# Patient Record
Sex: MALE | Race: BLACK OR AFRICAN AMERICAN | NOT HISPANIC OR LATINO | Employment: OTHER | URBAN - METROPOLITAN AREA
[De-identification: names, ages, dates, MRNs, and addresses within clinical notes are randomized per-mention and may not be internally consistent; named-entity substitution may affect disease eponyms.]

---

## 2017-01-03 ENCOUNTER — OFFICE VISIT (OUTPATIENT)
Dept: NEPHROLOGY | Facility: CLINIC | Age: 62
End: 2017-01-03
Payer: COMMERCIAL

## 2017-01-03 VITALS
DIASTOLIC BLOOD PRESSURE: 89 MMHG | WEIGHT: 266.56 LBS | SYSTOLIC BLOOD PRESSURE: 133 MMHG | BODY MASS INDEX: 34.21 KG/M2 | HEART RATE: 104 BPM | HEIGHT: 74 IN

## 2017-01-03 DIAGNOSIS — N18.4 CHRONIC KIDNEY DISEASE (CKD), STAGE IV (SEVERE): Primary | ICD-10-CM

## 2017-01-03 PROCEDURE — 99999 PR PBB SHADOW E&M-EST. PATIENT-LVL III: CPT | Mod: PBBFAC,,, | Performed by: INTERNAL MEDICINE

## 2017-01-03 PROCEDURE — 3075F SYST BP GE 130 - 139MM HG: CPT | Mod: S$GLB,,, | Performed by: INTERNAL MEDICINE

## 2017-01-03 PROCEDURE — 1159F MED LIST DOCD IN RCRD: CPT | Mod: S$GLB,,, | Performed by: INTERNAL MEDICINE

## 2017-01-03 PROCEDURE — 3079F DIAST BP 80-89 MM HG: CPT | Mod: S$GLB,,, | Performed by: INTERNAL MEDICINE

## 2017-01-03 PROCEDURE — 99214 OFFICE O/P EST MOD 30 MIN: CPT | Mod: S$GLB,,, | Performed by: INTERNAL MEDICINE

## 2017-01-03 NOTE — PATIENT INSTRUCTIONS
Avoid NSAID pain medications such as advil, aleve, motrin, ibuprofen, naprosyn, meloxicam, diclofenac, mobic.

## 2017-01-03 NOTE — PROGRESS NOTES
"Subjective:       Patient ID: Isidro Alves is a 61 y.o.   male who presents for follow-up evaluation of CKD stage 4 , HTN, DM-2, SHPT        Endocrine - Dr Woodson from  Clinic     Steven Morgan MD      HPI: Isidro lAves Is a pleasant 61-year-old  gentleman seen in office today in f/u for above medical problems. I saw him about 3 months ago. He has chronic kidney diseased stage 4 . Recent serum creatinine is 3.5 mg/dL with a GFR of about 25 ml/mt. he has long-standing history of hypertension and diabetes for more than 10 years. Nonadherence with his blood pressure medications is a big issue with him in the past. Patient says he has been more adherent with his medications in the recent past. Improvement in blood pressures noted. Also has been exercising regularly and trying to watch salt intake.  recent PSA levels were elevated and is currently following with urology. Recent labs discussed with patient,        Past Medical History   Diagnosis Date    Anemia 4/16/2014    CKD (chronic kidney disease) stage 4, GFR 15-29 ml/min     Diabetes mellitus     Hypertension     Secondary hyperparathyroidism (of renal origin)     UTI (lower urinary tract infection) 5/1/2015         Current Outpatient Prescriptions on File Prior to Visit   Medication Sig Dispense Refill    acyclovir (ZOVIRAX) 400 MG tablet TAKE 1 TABLET 3 TIMES DAILY. 30 tablet 6    albuterol 90 mcg/actuation inhaler Inhale 2 puffs into the lungs every 6 (six) hours as needed for Wheezing. 1 each 1    BD INSULIN SYRINGE HALF UNIT 0.3 mL 31 x 5/16" Syrg       blood sugar diagnostic (ONETOUCH ULTRA TEST) Strp 1 strip by Misc.(Non-Drug; Combo Route) route 3 (three) times daily. 100 each 2    esomeprazole (NEXIUM) 40 MG capsule Take 40 mg by mouth once daily.      folic acid (FOLVITE) 1 MG tablet Take 1 tablet (1 mg total) by mouth once daily. 30 tablet 3    hydrochlorothiazide (HYDRODIURIL) 25 MG tablet Take 1 " "tablet (25 mg total) by mouth once daily. 90 tablet 2    insulin regular 500 unit/mL Soln Inject into the skin. Take 5 units in AM and 5 units in PM      insulin syringe-needle U-100 0.3 mL 31 x 5/16" Syrg For use with insulin. 100 each 2    lancets (ONETOUCH ULTRASOFT LANCETS) Misc 1 lancet by Misc.(Non-Drug; Combo Route) route 2 (two) times daily. (Patient taking differently: 1 lancet by Misc.(Non-Drug; Combo Route) route 3 (three) times daily. ) 200 each 1    linagliptin (TRADJENTA) 5 mg Tab tablet Take 1 tablet (5 mg total) by mouth once daily. 90 tablet 1    liraglutide 0.6 mg/0.1 mL, 18 mg/3 mL, subq PNIJ (VICTOZA 3-SETH) 0.6 mg/0.1 mL (18 mg/3 mL) PnIj INJECT 1.2 MG INTO THE SKIN AS NEEDED. 9 Syringe 1    multivitamin (THERAGRAN) tablet Take 1 tablet by mouth once daily.      nifedipine (ADALAT CC) 60 MG TbSR TAKE 1 TABLET (60 MG TOTAL) BY MOUTH 2 (TWO) TIMES DAILY. 60 tablet 2    rosuvastatin (CRESTOR) 40 MG Tab TAKE 1 TABLET (40 MG TOTAL) BY MOUTH EVERY EVENING. 90 tablet 0    tadalafil (CIALIS) 10 MG tablet Take 1 tablet (10 mg total) by mouth as needed for Erectile Dysfunction. 6 tablet 11    tamsulosin (FLOMAX) 0.4 mg Cp24 Take 2 capsules (0.8 mg total) by mouth every evening. 60 capsule 11    valsartan (DIOVAN) 320 MG tablet TAKE 1 TABLET (320 MG TOTAL) BY MOUTH ONCE DAILY. 90 tablet 2    VITAMIN D2 50,000 unit capsule TAKE 1 CAPSULE (50,000 UNITS TOTAL) BY MOUTH EVERY 7 DAYS. 4 capsule 6     No current facility-administered medications on file prior to visit.      PSH: thoracotomy in 2010      Family history : positive for chronic kidney disease and hemodialysis in his father who is no more.      Social history : he owns a restaurant. He never smoked. He drinks alcohol occasionally. He has 3 children     Review of Systems  :    Constitutional: Negative for activity change and appetite change.   HENT: Negative for congestion, facial swelling, neck pain and neck stiffness.   Eyes: Negative for " pain, discharge and redness.   Respiratory: Negative for apnea, cough and chest tightness.   Cardiovascular: Negative for chest pain, palpitations and leg swelling.   Gastrointestinal: Negative for abdominal distention.   Genitourinary: Negative for dysuria, frequency and difficulty urinating.   Skin: Negative for color change, rash and wound.   Neurological: Negative for dizziness, weakness and numbness.   Psychiatric/Behavioral: Negative for sleep disturbance.   All other systems reviewed and are negative         Objective:         Vitals:    01/03/17 1323   BP: 133/89   Pulse: 104       Weight 266 lbs, last weight 265 lbs         Physical Exam  :      Nursing note and vitals reviewed.   Constitutional: He is oriented to person, place, and time. He appears well-developed and well-nourished. No distress.   HENT: Head: Normocephalic and atraumatic. Pupils are equal, round, and reactive to light.   Neck: Normal range of motion. Neck supple. No tracheal deviation present. No thyromegaly present.   Cardiovascular: Normal rate, regular rhythm, normal heart sounds and intact distal pulses. gallop and no friction rub. No murmur heard.   Pulmonary/Chest: Effort normal and breath sounds normal. He has no wheezes. He has no rales.   Abdominal: obese, Soft. He exhibits no mass. There is no tenderness. There is no rebound and no guarding.   Musculoskeletal: Normal range of motion. He exhibits no edema.   Lymphadenopathy: He has no cervical adenopathy.   Neurological: He is alert and oriented to person, place, and time.   Skin: Skin is warm. No rash noted. He is not diaphoretic. No erythema.         Labs:    Lab Results   Component Value Date    CREATININE 3.5 (H) 12/30/2016    BUN 39 (H) 12/30/2016     12/30/2016    K 4.8 12/30/2016     12/30/2016    CO2 22 (L) 12/30/2016       Lab Results   Component Value Date    WBC 8.01 12/30/2016    HGB 13.5 (L) 12/30/2016    HCT 41.2 12/30/2016    MCV 80 (L) 12/30/2016    PLT  270 12/30/2016       Lab Results   Component Value Date    ALBUMIN 3.8 12/30/2016       Lab Results   Component Value Date    .0 (H) 12/30/2016    CALCIUM 9.8 12/30/2016    PHOS 3.4 12/30/2016       Lab Results   Component Value Date    HGBA1C 8.0 (H) 12/30/2016       Impression and Plan: 61 - year-old  gentleman seen in office today in f/u for following medical problems ,      1. Hypertension : Blood pressure is controlled,  target blood pressures less than 140/90. Again discussed low-salt diet.       2. Chronic kidney disease stage 4 : Secondary to long-standing history of hypertension and diabetes. Again advised patient to avoid NSAIDs. Serum creatinine fluctuates between 3 and 3.5 mg/dL. Most recent serum creatinine is 3.5, progressive decline in renal function explained to the patient     3. Diabetes mellitus type 2 : discussed compliance with his diet and medications. Discussed weight loss and daily exercise.      4. Secondary hyperparathyroidism - His PTH is Slightly elevated at 137 , replete Vitamin D      5. Proteinuria - about 2 g daily. cont Diovan. Discussed diabetes control and adherence with ARB      6. Follow up with urology for elevated PSA levels.     7. Obesity - discussed weight loss, exercise,      I will see himn followup in about 4 months , Face-to-face time was 25 minutes discussing his lab results and plan of care.      Gabino Champion M.D.

## 2017-01-03 NOTE — MR AVS SNAPSHOT
[facility] Nephrology  [address] [address]  Phone: [phone]  Fax: [phone]                  Isidro Alves   1/3/2017 1:00 PM   Office Visit    Description:  Male : 1955   Provider:  Gabino Champion MD   Department:  [facility] - Nephrology           Diagnoses this Visit        Comments    Chronic kidney disease (CKD), stage IV (severe)    -  Primary            To Do List           Future Appointments        Provider Department Dept Phone    2017 9:00 AM Rima Norman RD, CDE [facility] - Diabetes Management [phone]    3/13/2017 8:05 AM LABORATORY, [facility] [facility] [phone]    2017 9:30 AM LABORATORY, [address] [facility] [phone]    11/10/2017 10:10 AM LABORATORY, [address] [facility] [phone]    2017 1:20 PM Ravinder Fallon IV, MD [facility] Urology [phone]      Goals (5 Years of Data)     None      Follow-Up and Disposition     Return in about 4 months (around 5/3/2017).      [facility]     [facility] Nurse Care Line - 24/7 Assistance  Registered nurses in the [facility] provide clinical advisement, health education, appointment booking, and other advisory services.  Call for this free service at [phone].             Medications           Message regarding Medications     Verify the changes and/or additions to your medication regime listed below are the same as discussed with your clinician today.  If any of these changes or additions are incorrect, please notify your healthcare provider.             Verify that the below list of medications is an accurate representation of the medications you are currently taking.  If none reported, the list may be blank. If incorrect, please contact your healthcare provider. Carry this list with you in case of emergency.           Current Medications     acyclovir (ZOVIRAX) 400 MG tablet TAKE 1  "TABLET 3 TIMES DAILY.    albuterol 90 mcg/actuation inhaler Inhale 2 puffs into the lungs every 6 (six) hours as needed for Wheezing.    BD INSULIN SYRINGE HALF UNIT 0.3 mL 31 x 5/16" Syrg     blood sugar diagnostic (ONETOUCH ULTRA TEST) Strp 1 strip by Misc.(Non-Drug; Combo Route) route 3 (three) times daily.    esomeprazole (NEXIUM) 40 MG capsule Take 40 mg by mouth once daily.    folic acid (FOLVITE) 1 MG tablet Take 1 tablet (1 mg total) by mouth once daily.    hydrochlorothiazide (HYDRODIURIL) 25 MG tablet Take 1 tablet (25 mg total) by mouth once daily.    insulin regular 500 unit/mL Soln Inject into the skin. Take 5 units in AM and 5 units in PM    insulin syringe-needle U-100 0.3 mL 31 x 5/16" Syrg For use with insulin.    lancets (ONETOUCH ULTRASOFT LANCETS) Misc 1 lancet by Misc.(Non-Drug; Combo Route) route 2 (two) times daily.    linagliptin (TRADJENTA) 5 mg Tab tablet Take 1 tablet (5 mg total) by mouth once daily.    liraglutide 0.6 mg/0.1 mL, 18 mg/3 mL, subq PNIJ (VICTOZA 3-SETH) 0.6 mg/0.1 mL (18 mg/3 mL) PnIj INJECT 1.2 MG INTO THE SKIN AS NEEDED.    multivitamin (THERAGRAN) tablet Take 1 tablet by mouth once daily.    nifedipine (ADALAT CC) 60 MG TbSR TAKE 1 TABLET (60 MG TOTAL) BY MOUTH 2 (TWO) TIMES DAILY.    rosuvastatin (CRESTOR) 40 MG Tab TAKE 1 TABLET (40 MG TOTAL) BY MOUTH EVERY EVENING.    tadalafil (CIALIS) 10 MG tablet Take 1 tablet (10 mg total) by mouth as needed for Erectile Dysfunction.    tamsulosin (FLOMAX) 0.4 mg Cp24 Take 2 capsules (0.8 mg total) by mouth every evening.    valsartan (DIOVAN) 320 MG tablet TAKE 1 TABLET (320 MG TOTAL) BY MOUTH ONCE DAILY.    VITAMIN D2 50,000 unit capsule TAKE 1 CAPSULE (50,000 UNITS TOTAL) BY MOUTH EVERY 7 DAYS.           Clinical Reference Information           Vital Signs - Last Recorded  Most recent update: 1/3/2017  1:23 PM by Alexandra Rosenthal LPN    BP Pulse Ht Wt BMI    133/89 104 6' 2" (1.88 m) 120.9 kg (266 lb 8.6 oz) 34.22 kg/m2      Blood " Pressure          Most Recent Value    BP  133/89      Allergies as of 1/3/2017     Bactrim [Sulfamethoxazole-trimethoprim]    Nsaids (Non-steroidal Anti-inflammatory Drug)      Immunizations Administered on Date of Encounter - 1/3/2017     None      Orders Placed During Today's Visit     Future Labs/Procedures Expected by Expires    CBC auto differential  1/3/2017 3/4/2018    Protein / creatinine ratio, urine  1/3/2017 1/3/2018    PTH, intact  1/3/2017 3/4/2018    Renal function panel  1/3/2017 3/4/2018    Uric acid  1/3/2017 3/4/2018    Urinalysis  1/3/2017 1/3/2018    Vitamin D  1/3/2017 3/4/2018      Instructions    Avoid NSAID pain medications such as advil, aleve, motrin, ibuprofen, naprosyn, meloxicam, diclofenac, mobic.

## 2017-01-28 RX ORDER — FOLIC ACID 1 MG/1
TABLET ORAL
Qty: 30 TABLET | Refills: 3 | Status: SHIPPED | OUTPATIENT
Start: 2017-01-28 | End: 2017-07-08 | Stop reason: SDUPTHER

## 2017-01-30 ENCOUNTER — TELEPHONE (OUTPATIENT)
Dept: NEPHROLOGY | Facility: CLINIC | Age: 62
End: 2017-01-30

## 2017-01-30 NOTE — TELEPHONE ENCOUNTER
----- Message from Martamj Herrera sent at 1/30/2017  9:28 AM CST -----  Contact: pt  Pt calling to speak to nurse...wants to know if it is ok for him to take Zyrtec...please adv/call pt back at [phone]///thx jw

## 2017-01-31 ENCOUNTER — OFFICE VISIT (OUTPATIENT)
Dept: INTERNAL MEDICINE | Facility: CLINIC | Age: 62
End: 2017-01-31
Payer: COMMERCIAL

## 2017-01-31 VITALS
BODY MASS INDEX: 33.05 KG/M2 | HEIGHT: 74 IN | SYSTOLIC BLOOD PRESSURE: 142 MMHG | WEIGHT: 257.5 LBS | OXYGEN SATURATION: 96 % | TEMPERATURE: 97 F | DIASTOLIC BLOOD PRESSURE: 98 MMHG | HEART RATE: 121 BPM

## 2017-01-31 DIAGNOSIS — N18.4 CHRONIC KIDNEY DISEASE (CKD), STAGE IV (SEVERE): ICD-10-CM

## 2017-01-31 DIAGNOSIS — I15.2 HYPERTENSION ASSOCIATED WITH DIABETES: ICD-10-CM

## 2017-01-31 DIAGNOSIS — E11.21 CONTROLLED TYPE 2 DIABETES MELLITUS WITH DIABETIC NEPHROPATHY, WITH LONG-TERM CURRENT USE OF INSULIN: ICD-10-CM

## 2017-01-31 DIAGNOSIS — Z79.4 CONTROLLED TYPE 2 DIABETES MELLITUS WITH DIABETIC NEPHROPATHY, WITH LONG-TERM CURRENT USE OF INSULIN: ICD-10-CM

## 2017-01-31 DIAGNOSIS — R05.9 COUGH: ICD-10-CM

## 2017-01-31 DIAGNOSIS — J00 ACUTE NASOPHARYNGITIS: Primary | ICD-10-CM

## 2017-01-31 DIAGNOSIS — E11.59 HYPERTENSION ASSOCIATED WITH DIABETES: ICD-10-CM

## 2017-01-31 PROCEDURE — 2022F DILAT RTA XM EVC RTNOPTHY: CPT | Mod: S$GLB,,, | Performed by: PHYSICIAN ASSISTANT

## 2017-01-31 PROCEDURE — 3077F SYST BP >= 140 MM HG: CPT | Mod: S$GLB,,, | Performed by: PHYSICIAN ASSISTANT

## 2017-01-31 PROCEDURE — 3045F PR MOST RECENT HEMOGLOBIN A1C LEVEL 7.0-9.0%: CPT | Mod: S$GLB,,, | Performed by: PHYSICIAN ASSISTANT

## 2017-01-31 PROCEDURE — 99999 PR PBB SHADOW E&M-EST. PATIENT-LVL IV: CPT | Mod: PBBFAC,,, | Performed by: PHYSICIAN ASSISTANT

## 2017-01-31 PROCEDURE — 3066F NEPHROPATHY DOC TX: CPT | Mod: S$GLB,,, | Performed by: PHYSICIAN ASSISTANT

## 2017-01-31 PROCEDURE — 99213 OFFICE O/P EST LOW 20 MIN: CPT | Mod: S$GLB,,, | Performed by: PHYSICIAN ASSISTANT

## 2017-01-31 PROCEDURE — 3080F DIAST BP >= 90 MM HG: CPT | Mod: S$GLB,,, | Performed by: PHYSICIAN ASSISTANT

## 2017-01-31 PROCEDURE — 1159F MED LIST DOCD IN RCRD: CPT | Mod: S$GLB,,, | Performed by: PHYSICIAN ASSISTANT

## 2017-01-31 RX ORDER — PREDNISONE 20 MG/1
20-40 TABLET ORAL DAILY
Qty: 6 TABLET | Refills: 0 | Status: SHIPPED | OUTPATIENT
Start: 2017-01-31 | End: 2017-02-03

## 2017-01-31 RX ORDER — DOXYCYCLINE HYCLATE 100 MG
100 TABLET ORAL EVERY 12 HOURS
Qty: 20 TABLET | Refills: 0 | Status: SHIPPED | OUTPATIENT
Start: 2017-01-31 | End: 2017-02-10

## 2017-01-31 RX ORDER — FLUTICASONE PROPIONATE 50 MCG
2 SPRAY, SUSPENSION (ML) NASAL DAILY
Qty: 1 BOTTLE | Refills: 0 | Status: SHIPPED | OUTPATIENT
Start: 2017-01-31 | End: 2018-06-25

## 2017-01-31 RX ORDER — PROMETHAZINE HYDROCHLORIDE AND DEXTROMETHORPHAN HYDROBROMIDE 6.25; 15 MG/5ML; MG/5ML
5 SYRUP ORAL NIGHTLY PRN
Qty: 120 ML | Refills: 0 | Status: SHIPPED | OUTPATIENT
Start: 2017-01-31 | End: 2017-02-10

## 2017-01-31 RX ORDER — OLOPATADINE HYDROCHLORIDE 2 MG/ML
1 SOLUTION/ DROPS OPHTHALMIC DAILY PRN
Qty: 2.5 ML | Refills: 0 | Status: SHIPPED | OUTPATIENT
Start: 2017-01-31 | End: 2023-05-14

## 2017-01-31 NOTE — PROGRESS NOTES
Subjective:       Patient ID: Isidro Alves is a 61 y.o. male.    Chief Complaint: Sinus Problem    HPI Comments: 61 nasal congestion, watery eyes, sinus pressure, productive cough with clear sputum, post-nasal drainage, ear pain (resolved), subj fever last night, and frontal headaches X 5 days. He reports no sore throat, N/V, SOB, CP, edema, rash, or other medical complaints. He has taken Zyrtec without relief. He would like something specifically for eye watering as well and requests antibiotic also. Pt reports glucose has been 130s-140s recently.    Past Medical History:    Anemia                                          4/16/2014     CKD (chronic kidney disease) stage 4, GFR 15-2*               Diabetes mellitus                                             Hypertension                                                  Secondary hyperparathyroidism (of renal origin)               UTI (lower urinary tract infection)             5/1/2015          Review of Systems   Constitutional: Positive for fever (subj, resolved). Negative for chills.   HENT: Positive for congestion, ear pain (resolved), postnasal drip and sinus pressure. Negative for sore throat.    Respiratory: Positive for cough. Negative for shortness of breath.    Cardiovascular: Negative for chest pain, palpitations and leg swelling.   Gastrointestinal: Negative for nausea.   Skin: Negative for rash.   Neurological: Positive for headaches. Negative for dizziness, weakness and numbness.   Psychiatric/Behavioral: Negative for confusion.       Objective:      Physical Exam   Constitutional: He is oriented to person, place, and time. He appears well-developed and well-nourished. No distress.   HENT:   Head: Normocephalic and atraumatic.   Right Ear: Tympanic membrane and ear canal normal.   Left Ear: Tympanic membrane and ear canal normal.   Nose: Right sinus exhibits frontal sinus tenderness. Left sinus exhibits frontal sinus tenderness.   Mouth/Throat:  Oropharynx is clear and moist. No oropharyngeal exudate.   Cobblestone appearance of posterior pharynx   Eyes: EOM are normal. No scleral icterus.   Neck: Neck supple.   Cardiovascular: Normal rate and regular rhythm.    Pulmonary/Chest: Effort normal and breath sounds normal. No respiratory distress. He has no decreased breath sounds. He has no wheezes. He has no rhonchi. He has no rales.   Musculoskeletal: Normal range of motion. He exhibits no edema.   Lymphadenopathy:     He has no cervical adenopathy.   Neurological: He is alert and oriented to person, place, and time. No cranial nerve deficit.   Skin: Skin is dry. No rash noted. He is not diaphoretic.   Psychiatric: He has a normal mood and affect. His speech is normal and behavior is normal. Thought content normal.       Assessment:       1. Acute nasopharyngitis    2. Cough    3. Hypertension associated with diabetes    4. Chronic kidney disease (CKD), stage IV (severe)    5. Controlled type 2 diabetes mellitus with diabetic nephropathy, with long-term current use of insulin        Plan:         1. Flonase and Pataday PRN sinus and eye sxs. Phenergan-DM PRN bedtime cough (sedation warning discussed with pt). Prednisone X 3 days PRN - pt to monitor glucose while taking this.  2. If no improvement in the next few days, doxycycline X 10 days.  3. Monitor for new or worsening symptoms. RTC if sxs persist or worsen. ER if sxs become severe.

## 2017-01-31 NOTE — MR AVS SNAPSHOT
[facility] Internal Medicine  [address] [place] [address]  Phone: [phone]  Fax: [phone]                  Isidro Alves   2017 3:40 PM   Office Visit    Description:  Male : 1955   Provider:  JOVANNI Gates   Department:  [facility] Internal Medicine           Diagnoses this Visit        Comments    Acute nasopharyngitis    -  Primary     Cough         Hypertension associated with diabetes         Chronic kidney disease (CKD), stage IV (severe)         Controlled type 2 diabetes mellitus with diabetic nephropathy, with long-term current use of insulin                To Do List           Future Appointments        Provider Department Dept Phone    2017 3:40 PM JOVANNI Gates [facility] Internal Medicine [phone]    2017 9:00 AM Rima Norman RD, E [facility] Diabetes Management [phone]    2017 3:20 PM Steven Morgan MD [facility] [phone]    3/13/2017 8:05 AM LABORATORY, [facility] [facility] [phone]    2017 9:30 AM LABORATORY, [address] [facility] [phone]      Goals (5 Years of Data)     None       These Medications        Disp Refills Start End    doxycycline (VIBRA-TABS) 100 MG tablet 20 tablet 0 2017 2/10/2017    Take 1 tablet (100 mg total) by mouth every 12 (twelve) hours. - Oral    Pharmacy: [facility]/pharmacy #[id number] - [place], [place]  [address] Ph #: [phone]       olopatadine (PATADAY) 0.2 % Drop 2.5 mL 0 2017    Place 1 drop into both eyes daily as needed. - Both Eyes    Pharmacy: [facility]/pharmacy #[id number] - [place], [place]  [address] Ph #: [phone]       fluticasone (FLONASE) 50 mcg/actuation nasal spray 1 Bottle 0 2017     2 sprays by Each Nare route once daily. - Each Nare    Pharmacy: [facility]/pharmacy #[id number] - [place] [facility] [place] [address] #: [phone]       predniSONE (DELTASONE) 20 MG tablet 6 tablet 0 2017 2/3/2017    Take 1-2 tablets (20-40  mg total) by mouth once daily. - Oral    Pharmacy: [facility]/pharmacy #[id number] - [place], [place] - [address] #: [phone]       promethazine-dextromethorphan (PROMETHAZINE-DM) 6.25-15 mg/5 mL Syrp 120 mL 0 2017 2/10/2017    Take 5 mLs by mouth nightly as needed (cough). sedating - Oral    Pharmacy: [facility]/pharmacy #[id number] - [place], [place] - [address] #: [phone]         [facility]     [facility] Nurse Care Line -  Assistance  Registered nurses in the [facility] provide clinical advisement, health education, appointment booking, and other advisory services.  Call for this free service at [phone].             Medications           Message regarding Medications     Verify the changes and/or additions to your medication regime listed below are the same as discussed with your clinician today.  If any of these changes or additions are incorrect, please notify your healthcare provider.        START taking these NEW medications        Refills    doxycycline (VIBRA-TABS) 100 MG tablet 0    Sig: Take 1 tablet (100 mg total) by mouth every 12 (twelve) hours.    Class: Normal    Route: Oral    olopatadine (PATADAY) 0.2 % Drop 0    Sig: Place 1 drop into both eyes daily as needed.    Class: Normal    Route: Both Eyes    fluticasone (FLONASE) 50 mcg/actuation nasal spray 0    Si sprays by Each Nare route once daily.    Class: Normal    Route: Each Nare    predniSONE (DELTASONE) 20 MG tablet 0    Sig: Take 1-2 tablets (20-40 mg total) by mouth once daily.    Class: Normal    Route: Oral    promethazine-dextromethorphan (PROMETHAZINE-DM) 6.25-15 mg/5 mL Syrp 0    Sig: Take 5 mLs by mouth nightly as needed (cough). sedating    Class: Normal    Route: Oral           Verify that the below list of medications is an accurate representation of the medications you are currently taking.  If none reported, the list may be blank. If incorrect, please contact your healthcare provider. Carry this list with  "you in case of emergency.           Current Medications     acyclovir (ZOVIRAX) 400 MG tablet TAKE 1 TABLET 3 TIMES DAILY.    albuterol 90 mcg/actuation inhaler Inhale 2 puffs into the lungs every 6 (six) hours as needed for Wheezing.    BD INSULIN SYRINGE HALF UNIT 0.3 mL 31 x 5/16" Syrg     blood sugar diagnostic (ONETOUCH ULTRA TEST) Strp 1 strip by Misc.(Non-Drug; Combo Route) route 3 (three) times daily.    doxycycline (VIBRA-TABS) 100 MG tablet Take 1 tablet (100 mg total) by mouth every 12 (twelve) hours.    esomeprazole (NEXIUM) 40 MG capsule Take 40 mg by mouth once daily.    fluticasone (FLONASE) 50 mcg/actuation nasal spray 2 sprays by Each Nare route once daily.    folic acid (FOLVITE) 1 MG tablet TAKE 1 TABLET (1 MG TOTAL) BY MOUTH ONCE DAILY.    hydrochlorothiazide (HYDRODIURIL) 25 MG tablet Take 1 tablet (25 mg total) by mouth once daily.    insulin regular 500 unit/mL Soln Inject into the skin. Take 5 units in AM and 5 units in PM    insulin syringe-needle U-100 0.3 mL 31 x 5/16" Syrg For use with insulin.    lancets (ONETOUCH ULTRASOFT LANCETS) Misc 1 lancet by Misc.(Non-Drug; Combo Route) route 2 (two) times daily.    linagliptin (TRADJENTA) 5 mg Tab tablet Take 1 tablet (5 mg total) by mouth once daily.    liraglutide 0.6 mg/0.1 mL, 18 mg/3 mL, subq PNIJ (VICTOZA 3-SETH) 0.6 mg/0.1 mL (18 mg/3 mL) PnIj INJECT 1.2 MG INTO THE SKIN AS NEEDED.    multivitamin (THERAGRAN) tablet Take 1 tablet by mouth once daily.    nifedipine (ADALAT CC) 60 MG TbSR TAKE 1 TABLET (60 MG TOTAL) BY MOUTH 2 (TWO) TIMES DAILY.    olopatadine (PATADAY) 0.2 % Drop Place 1 drop into both eyes daily as needed.    predniSONE (DELTASONE) 20 MG tablet Take 1-2 tablets (20-40 mg total) by mouth once daily.    promethazine-dextromethorphan (PROMETHAZINE-DM) 6.25-15 mg/5 mL Syrp Take 5 mLs by mouth nightly as needed (cough). sedating    rosuvastatin (CRESTOR) 40 MG Tab TAKE 1 TABLET (40 MG TOTAL) BY MOUTH EVERY EVENING.    tadalafil " "(CIALIS) 10 MG tablet Take 1 tablet (10 mg total) by mouth as needed for Erectile Dysfunction.    tamsulosin (FLOMAX) 0.4 mg Cp24 Take 2 capsules (0.8 mg total) by mouth every evening.    valsartan (DIOVAN) 320 MG tablet TAKE 1 TABLET (320 MG TOTAL) BY MOUTH ONCE DAILY.    VITAMIN D2 50,000 unit capsule TAKE 1 CAPSULE (50,000 UNITS TOTAL) BY MOUTH EVERY 7 DAYS.           Clinical Reference Information           Vital Signs - Last Recorded  Most recent update: 1/31/2017 12:04 PM by Salas Hamilton MA    BP Pulse Temp Ht    (!) 142/98 (BP Location: Right arm, Patient Position: Sitting, BP Method: Manual) (!) 121 97.4 °F (36.3 °C) (Tympanic) 6' 2" (1.88 m)    Wt SpO2 BMI    116.8 kg (257 lb 8 oz) 96% 33.06 kg/m2      Blood Pressure          Most Recent Value    BP  (!)  142/98      Allergies as of 1/31/2017     Bactrim [Sulfamethoxazole-trimethoprim]    Nsaids (Non-steroidal Anti-inflammatory Drug)      Immunizations Administered on Date of Encounter - 1/31/2017     None      "

## 2017-02-26 DIAGNOSIS — E78.5 HYPERLIPIDEMIA: ICD-10-CM

## 2017-02-26 RX ORDER — ROSUVASTATIN CALCIUM 40 MG/1
TABLET, COATED ORAL
Qty: 90 TABLET | Refills: 0 | Status: SHIPPED | OUTPATIENT
Start: 2017-02-26 | End: 2017-07-13 | Stop reason: SDUPTHER

## 2017-03-06 RX ORDER — BLOOD SUGAR DIAGNOSTIC
STRIP MISCELLANEOUS
Qty: 100 STRIP | Refills: 2 | Status: SHIPPED | OUTPATIENT
Start: 2017-03-06 | End: 2017-08-25 | Stop reason: SDUPTHER

## 2017-04-08 DIAGNOSIS — I10 HTN (HYPERTENSION), BENIGN: ICD-10-CM

## 2017-04-08 DIAGNOSIS — Z79.4 CONTROLLED TYPE 2 DIABETES MELLITUS WITH DIABETIC NEPHROPATHY, WITH LONG-TERM CURRENT USE OF INSULIN: ICD-10-CM

## 2017-04-08 DIAGNOSIS — E11.21 CONTROLLED TYPE 2 DIABETES MELLITUS WITH DIABETIC NEPHROPATHY, WITH LONG-TERM CURRENT USE OF INSULIN: ICD-10-CM

## 2017-04-10 RX ORDER — LINAGLIPTIN 5 MG/1
TABLET, FILM COATED ORAL
Qty: 90 TABLET | Refills: 0 | Status: SHIPPED | OUTPATIENT
Start: 2017-04-10 | End: 2017-05-30 | Stop reason: SDUPTHER

## 2017-04-10 NOTE — TELEPHONE ENCOUNTER
Notified pt that Tradjenta has been refilled and that he is due for f/u appt with Dr. Morgan, but he needs to contact financial services due to credit risk. Pt verbalized understanding and stated he will call financial services.

## 2017-04-11 RX ORDER — VALSARTAN 320 MG/1
TABLET ORAL
Qty: 90 TABLET | Refills: 3 | Status: SHIPPED | OUTPATIENT
Start: 2017-04-11 | End: 2017-09-11 | Stop reason: SDUPTHER

## 2017-05-29 DIAGNOSIS — I10 HTN (HYPERTENSION), BENIGN: ICD-10-CM

## 2017-05-29 RX ORDER — NIFEDIPINE 60 MG/1
TABLET, EXTENDED RELEASE ORAL
Qty: 60 TABLET | Refills: 8 | Status: SHIPPED | OUTPATIENT
Start: 2017-05-29 | End: 2018-04-20 | Stop reason: SDUPTHER

## 2017-05-30 DIAGNOSIS — Z79.4 CONTROLLED TYPE 2 DIABETES MELLITUS WITH DIABETIC NEPHROPATHY, WITH LONG-TERM CURRENT USE OF INSULIN: ICD-10-CM

## 2017-05-30 DIAGNOSIS — E11.21 CONTROLLED TYPE 2 DIABETES MELLITUS WITH DIABETIC NEPHROPATHY, WITH LONG-TERM CURRENT USE OF INSULIN: ICD-10-CM

## 2017-05-30 RX ORDER — LINAGLIPTIN 5 MG/1
TABLET, FILM COATED ORAL
Qty: 90 TABLET | Refills: 0 | Status: SHIPPED | OUTPATIENT
Start: 2017-05-30 | End: 2017-09-17 | Stop reason: SDUPTHER

## 2017-06-22 RX ORDER — INSULIN HUMAN 500 [IU]/ML
INJECTION, SOLUTION SUBCUTANEOUS
Qty: 20 ML | Refills: 0 | Status: SHIPPED | OUTPATIENT
Start: 2017-06-22 | End: 2017-08-01 | Stop reason: SDUPTHER

## 2017-07-03 RX ORDER — ACYCLOVIR 400 MG/1
TABLET ORAL
Qty: 30 TABLET | Refills: 6 | Status: SHIPPED | OUTPATIENT
Start: 2017-07-03 | End: 2018-04-17 | Stop reason: SDUPTHER

## 2017-07-08 DIAGNOSIS — E78.5 HYPERLIPIDEMIA: ICD-10-CM

## 2017-07-08 RX ORDER — FOLIC ACID 1 MG/1
TABLET ORAL
Qty: 30 TABLET | Refills: 3 | Status: SHIPPED | OUTPATIENT
Start: 2017-07-08 | End: 2017-11-13 | Stop reason: SDUPTHER

## 2017-07-09 RX ORDER — ROSUVASTATIN CALCIUM 40 MG/1
TABLET, COATED ORAL
Qty: 90 TABLET | Refills: 0 | Status: CANCELLED | OUTPATIENT
Start: 2017-07-09

## 2017-07-11 DIAGNOSIS — N40.0 BENIGN NON-NODULAR PROSTATIC HYPERPLASIA WITHOUT LOWER URINARY TRACT SYMPTOMS: ICD-10-CM

## 2017-07-11 RX ORDER — TAMSULOSIN HYDROCHLORIDE 0.4 MG/1
0.8 CAPSULE ORAL NIGHTLY
Qty: 60 CAPSULE | Refills: 7 | Status: SHIPPED | OUTPATIENT
Start: 2017-07-11 | End: 2018-04-21 | Stop reason: SDUPTHER

## 2017-07-13 DIAGNOSIS — E78.5 HYPERLIPIDEMIA, UNSPECIFIED HYPERLIPIDEMIA TYPE: ICD-10-CM

## 2017-07-13 RX ORDER — ROSUVASTATIN CALCIUM 40 MG/1
TABLET, COATED ORAL
Qty: 90 TABLET | Refills: 0 | Status: SHIPPED | OUTPATIENT
Start: 2017-07-13 | End: 2017-12-04 | Stop reason: SDUPTHER

## 2017-08-01 RX ORDER — INSULIN HUMAN 500 [IU]/ML
INJECTION, SOLUTION SUBCUTANEOUS
Qty: 20 ML | Refills: 0 | Status: SHIPPED | OUTPATIENT
Start: 2017-08-01 | End: 2017-11-20

## 2017-08-26 RX ORDER — SYRINGE AND NEEDLE,INSULIN,1ML 28GX1/2"
SYRINGE, EMPTY DISPOSABLE MISCELLANEOUS
Qty: 100 SYRINGE | Refills: 1 | Status: SHIPPED | OUTPATIENT
Start: 2017-08-26 | End: 2023-05-14 | Stop reason: SDUPTHER

## 2017-08-26 RX ORDER — BLOOD SUGAR DIAGNOSTIC
STRIP MISCELLANEOUS
Qty: 100 STRIP | Refills: 2 | Status: SHIPPED | OUTPATIENT
Start: 2017-08-26 | End: 2018-02-17 | Stop reason: SDUPTHER

## 2017-09-11 DIAGNOSIS — I10 HTN (HYPERTENSION), BENIGN: ICD-10-CM

## 2017-09-11 DIAGNOSIS — E55.9 VITAMIN D DEFICIENCY: ICD-10-CM

## 2017-09-11 RX ORDER — VALSARTAN 320 MG/1
TABLET ORAL
Qty: 90 TABLET | Refills: 2 | Status: SHIPPED | OUTPATIENT
Start: 2017-09-11 | End: 2018-06-26 | Stop reason: SDUPTHER

## 2017-09-11 RX ORDER — ERGOCALCIFEROL 1.25 MG/1
CAPSULE ORAL
Qty: 4 CAPSULE | Refills: 6 | Status: SHIPPED | OUTPATIENT
Start: 2017-09-11 | End: 2018-08-09 | Stop reason: SDUPTHER

## 2017-09-12 RX ORDER — INSULIN HUMAN 500 [IU]/ML
INJECTION, SOLUTION SUBCUTANEOUS
Qty: 20 ML | Refills: 0 | OUTPATIENT
Start: 2017-09-12

## 2017-09-17 DIAGNOSIS — E11.21 CONTROLLED TYPE 2 DIABETES MELLITUS WITH DIABETIC NEPHROPATHY, WITH LONG-TERM CURRENT USE OF INSULIN: ICD-10-CM

## 2017-09-17 DIAGNOSIS — Z79.4 CONTROLLED TYPE 2 DIABETES MELLITUS WITH DIABETIC NEPHROPATHY, WITH LONG-TERM CURRENT USE OF INSULIN: ICD-10-CM

## 2017-09-18 RX ORDER — LINAGLIPTIN 5 MG/1
TABLET, FILM COATED ORAL
Qty: 30 TABLET | Refills: 0 | Status: SHIPPED | OUTPATIENT
Start: 2017-09-18 | End: 2017-11-20

## 2017-09-18 NOTE — TELEPHONE ENCOUNTER
[facility] due to [facility] credit risk.

## 2017-10-09 RX ORDER — INSULIN HUMAN 500 [IU]/ML
INJECTION, SOLUTION SUBCUTANEOUS
Qty: 20 ML | Refills: 0 | OUTPATIENT
Start: 2017-10-09

## 2017-10-16 RX ORDER — TADALAFIL 10 MG
10 TABLET ORAL
Qty: 6 TABLET | Refills: 7 | Status: SHIPPED | OUTPATIENT
Start: 2017-10-16 | End: 2018-08-10 | Stop reason: SDUPTHER

## 2017-10-31 DIAGNOSIS — R06.2 WHEEZING: ICD-10-CM

## 2017-10-31 RX ORDER — ALBUTEROL SULFATE 90 UG/1
2 AEROSOL, METERED RESPIRATORY (INHALATION) EVERY 6 HOURS PRN
Qty: 18 INHALER | Refills: 0 | Status: SHIPPED | OUTPATIENT
Start: 2017-10-31 | End: 2018-06-25

## 2017-11-10 ENCOUNTER — LAB VISIT (OUTPATIENT)
Dept: LAB | Facility: HOSPITAL | Age: 62
End: 2017-11-10
Attending: UROLOGY
Payer: COMMERCIAL

## 2017-11-10 DIAGNOSIS — N18.4 CHRONIC KIDNEY DISEASE (CKD), STAGE IV (SEVERE): ICD-10-CM

## 2017-11-10 DIAGNOSIS — R97.20 ELEVATED PSA: ICD-10-CM

## 2017-11-10 LAB
COMPLEXED PSA SERPL-MCNC: 10.7 NG/ML
ESTIMATED AVG GLUCOSE: 200 MG/DL
HBA1C MFR BLD HPLC: 8.6 %

## 2017-11-10 PROCEDURE — 36415 COLL VENOUS BLD VENIPUNCTURE: CPT

## 2017-11-10 PROCEDURE — 84153 ASSAY OF PSA TOTAL: CPT

## 2017-11-10 PROCEDURE — 83036 HEMOGLOBIN GLYCOSYLATED A1C: CPT

## 2017-11-13 RX ORDER — FOLIC ACID 1 MG/1
TABLET ORAL
Qty: 30 TABLET | Refills: 3 | Status: SHIPPED | OUTPATIENT
Start: 2017-11-13 | End: 2018-04-10 | Stop reason: SDUPTHER

## 2017-11-16 ENCOUNTER — TELEPHONE (OUTPATIENT)
Dept: DIABETES | Facility: CLINIC | Age: 62
End: 2017-11-16

## 2017-11-16 ENCOUNTER — OFFICE VISIT (OUTPATIENT)
Dept: UROLOGY | Facility: CLINIC | Age: 62
End: 2017-11-16
Payer: COMMERCIAL

## 2017-11-16 VITALS
DIASTOLIC BLOOD PRESSURE: 98 MMHG | HEART RATE: 89 BPM | HEIGHT: 74 IN | BODY MASS INDEX: 32.82 KG/M2 | SYSTOLIC BLOOD PRESSURE: 148 MMHG | WEIGHT: 255.75 LBS

## 2017-11-16 DIAGNOSIS — I10 HYPERTENSION, UNSPECIFIED TYPE: ICD-10-CM

## 2017-11-16 DIAGNOSIS — R97.20 ELEVATED PSA: Primary | ICD-10-CM

## 2017-11-16 DIAGNOSIS — Z12.5 PROSTATE CANCER SCREENING: ICD-10-CM

## 2017-11-16 PROCEDURE — 99999 PR PBB SHADOW E&M-EST. PATIENT-LVL III: CPT | Mod: PBBFAC,,, | Performed by: NURSE PRACTITIONER

## 2017-11-16 PROCEDURE — 99213 OFFICE O/P EST LOW 20 MIN: CPT | Mod: S$GLB,,, | Performed by: NURSE PRACTITIONER

## 2017-11-16 NOTE — TELEPHONE ENCOUNTER
Called and left message with patient that appointment was scheduled incorrectly for 11-20. Informed patient that I have placed him a 1pm appointment slot and if not okay to please call.

## 2017-11-16 NOTE — PROGRESS NOTES
"Chief Complaint: Elevated PSA    HPI:    11/16/17: Laws: PSA has gone up from 7.7 to 10.7. Feels ok off the T. Strong stream; no LUTS. Nocturia x4 but depends how much he drinks after 6 pm. The nocturia doesn't bother him. No abd/pelvic pain and no exac/rel factors.  No hematuria.  No urolithiasis.  BP is elevated today; he did not take his BP medication this morning.   11/10/16: Symptoms have resolved in general, PSA is down.    9/28/16: Back early with groin pain.  Describes an itching sensation that runs from the rectum to the penis, waxes/wanes, worse at night.  Was given 7d doxycycline and there was some improvement.  No caffeine.  Good urinary habits.  No skin problems.  No balanitis.  Doing fine on one flomax a day.  6/1/16: Here today with Dr. Ren's records to review.  Had a biopsy 4/15 with PSA 13.9 at the time.  Was on T by Dr. Chiu's reccs but was asymptomatic overall and felt no symptomatic change on the T.  Has ED that was improved with Cialis.  Got trimix once but wasn't comfortable with it.  05/17/16: rushing: " This is a 60-yr-old male presenting as a follow-up to TRT. Patient is currently on Axiron 30 mg ( 2 pumps /day) . Total T was 838; 5 months ago. ( H/H 14.4/46.0 ) and psa level was 4.7. Patient was to have psa level and follow-up 3 months ago related to psa level of 4.7 and did not. He does have a h/o negative prostate bx  15 months  ago per Dr. BRIGID Burt. No reported adverse side effects and energy level has greatly improved. Patient is currently on Flomax 0.8mg a day. He states he " ran out " and had an increase in nocturia 4-5 times/ night over the past 10 days. No hematuria, dysuria or flank pain. "  06/12/15: Aram; HPI: Flomax is been very helpful, nocturia now down to 3 times per night from 6 times per night with similar daytime improvement. Has been using Axiron for about 2 weeks, no adverse effects. This patient had severe deficiencies on his pretreatment labs. Denies flank pain, " dysuira, hematuria . Despite his subjective improvement, his postvoid residual today in the clinic is 180 mL. Dipstick urinalysis suggested 1+ microscopic hematuria, microscopic examination of urine reveals no RBCs.    Allergies:  Bactrim [sulfamethoxazole-trimethoprim] and Nsaids (non-steroidal anti-inflammatory drug)    Medications:  has a current medication list which includes the following prescription(s): acyclovir, bd insulin syringe half unit, bd insulin syringe half unit, cialis, esomeprazole, fluticasone, folic acid, humulin r u-500 (concentrated), lancets, liraglutide 0.6 mg/0.1 ml (18 mg/3 ml) subq pnij, multivitamin, nifedipine, olopatadine, onetouch ultra test, rosuvastatin, tamsulosin, tradjenta, valsartan, ventolin hfa, and vitamin d2.    Review of Systems:  General: No fever, chills, fatigability, or weight loss.  Skin: No rashes, itching, or changes in color or texture of skin.  Chest: Denies [place], cyanosis, wheezing, cough, and sputum production.  Abdomen: Appetite fine. No weight loss. Denies diarrhea, abdominal pain, hematemesis, or blood in stool.  Musculoskeletal: No joint stiffness or swelling. Denies back pain.  : As above.  All other review of systems negative.    PMH:   has a past medical history of Anemia (4/16/2014); CKD (chronic kidney disease) stage 4, GFR 15-29 ml/min; Diabetes mellitus; Hypertension; Secondary hyperparathyroidism (of renal origin); and UTI (lower urinary tract infection) (5/1/2015).    PSH:   has a past surgical history that includes thorocotomy (2010) and Prostate biopsy.    FamHx: family history includes Diabetes in his father; Hypertension in his father; No Known Problems in his mother.    SocHx:  reports that he has never smoked. He has never used smokeless tobacco. He reports that he drinks alcohol. He reports that he does not use drugs.      Physical Exam:  Vitals:    11/16/17 1128   BP: (!) 148/98   Pulse: 89     General: A&Ox3, no apparent distress, no  deformities  Neck: No masses, normal thyroid  Lungs: normal inspiration, no use of accessory muscles  Heart: normal pulse, no arrhythmias  Abdomen: Soft, NT, ND  Skin: The skin is warm and dry. No jaundice.  Ext: No c/c/e.  :   11/16/17: Test desc michaelle, no abnormalities of epididymus. Penis normal, with normal penile and scrotal skin. Meatus normal. Normal rectal tone, no hemorrhoids. Prost 40 gm with palpable ridge to left side. SV not palpable. Perineum and anus normal.       Labs/Studies:   PSA    4/15: 13.9    11/15: 4.7    5/16: 12.7    11/16: 7.7    11/17: 10.7    Impression/Plan:   1. Elevated PSA: PSA 3 and 6 months; RTC 6 months with Dr. Fallon.   2. Prostate CA screen:  RTC 6 mo for repeat ROOPA   3. HTN; Discussed the importance of taking BP meds. Refer to PCP for further management.

## 2017-11-17 ENCOUNTER — TELEPHONE (OUTPATIENT)
Dept: DIABETES | Facility: CLINIC | Age: 62
End: 2017-11-17

## 2017-11-20 ENCOUNTER — OFFICE VISIT (OUTPATIENT)
Dept: DIABETES | Facility: CLINIC | Age: 62
End: 2017-11-20
Payer: COMMERCIAL

## 2017-11-20 ENCOUNTER — LAB VISIT (OUTPATIENT)
Dept: LAB | Facility: HOSPITAL | Age: 62
End: 2017-11-20
Attending: INTERNAL MEDICINE
Payer: COMMERCIAL

## 2017-11-20 VITALS
HEIGHT: 74 IN | DIASTOLIC BLOOD PRESSURE: 78 MMHG | WEIGHT: 265 LBS | BODY MASS INDEX: 34.01 KG/M2 | SYSTOLIC BLOOD PRESSURE: 142 MMHG

## 2017-11-20 DIAGNOSIS — E11.65 TYPE 2 DIABETES MELLITUS WITH HYPERGLYCEMIA, WITH LONG-TERM CURRENT USE OF INSULIN: ICD-10-CM

## 2017-11-20 DIAGNOSIS — Z79.4 TYPE 2 DIABETES MELLITUS WITH HYPERGLYCEMIA, WITH LONG-TERM CURRENT USE OF INSULIN: Primary | ICD-10-CM

## 2017-11-20 DIAGNOSIS — E78.5 HYPERLIPIDEMIA, UNSPECIFIED HYPERLIPIDEMIA TYPE: ICD-10-CM

## 2017-11-20 DIAGNOSIS — E11.65 TYPE 2 DIABETES MELLITUS WITH HYPERGLYCEMIA, WITH LONG-TERM CURRENT USE OF INSULIN: Primary | ICD-10-CM

## 2017-11-20 DIAGNOSIS — Z79.4 TYPE 2 DIABETES MELLITUS WITH HYPERGLYCEMIA, WITH LONG-TERM CURRENT USE OF INSULIN: ICD-10-CM

## 2017-11-20 LAB
ALBUMIN SERPL BCP-MCNC: 3.4 G/DL
ALP SERPL-CCNC: 79 U/L
ALT SERPL W/O P-5'-P-CCNC: 12 U/L
ANION GAP SERPL CALC-SCNC: 7 MMOL/L
AST SERPL-CCNC: 23 U/L
BASOPHILS # BLD AUTO: 0.03 K/UL
BASOPHILS NFR BLD: 0.4 %
BILIRUB SERPL-MCNC: 0.7 MG/DL
BUN SERPL-MCNC: 37 MG/DL
C PEPTIDE SERPL-MCNC: 3.88 NG/ML
CALCIUM SERPL-MCNC: 9.3 MG/DL
CHLORIDE SERPL-SCNC: 109 MMOL/L
CHOLEST SERPL-MCNC: 139 MG/DL
CHOLEST/HDLC SERPL: 3.2 {RATIO}
CO2 SERPL-SCNC: 22 MMOL/L
CREAT SERPL-MCNC: 4.2 MG/DL
DIFFERENTIAL METHOD: ABNORMAL
EOSINOPHIL # BLD AUTO: 0.2 K/UL
EOSINOPHIL NFR BLD: 2.4 %
ERYTHROCYTE [DISTWIDTH] IN BLOOD BY AUTOMATED COUNT: 15.4 %
EST. GFR  (AFRICAN AMERICAN): 16.5 ML/MIN/1.73 M^2
EST. GFR  (NON AFRICAN AMERICAN): 14.3 ML/MIN/1.73 M^2
GLUCOSE SERPL-MCNC: 109 MG/DL
GLUCOSE SERPL-MCNC: 140 MG/DL (ref 70–110)
HCT VFR BLD AUTO: 38.8 %
HDLC SERPL-MCNC: 44 MG/DL
HDLC SERPL: 31.7 %
HGB BLD-MCNC: 12.3 G/DL
IMM GRANULOCYTES # BLD AUTO: 0.04 K/UL
IMM GRANULOCYTES NFR BLD AUTO: 0.5 %
LDLC SERPL CALC-MCNC: 63.6 MG/DL
LYMPHOCYTES # BLD AUTO: 1.5 K/UL
LYMPHOCYTES NFR BLD: 17.9 %
MCH RBC QN AUTO: 26 PG
MCHC RBC AUTO-ENTMCNC: 31.7 G/DL
MCV RBC AUTO: 82 FL
MONOCYTES # BLD AUTO: 0.7 K/UL
MONOCYTES NFR BLD: 8.6 %
NEUTROPHILS # BLD AUTO: 6 K/UL
NEUTROPHILS NFR BLD: 70.2 %
NONHDLC SERPL-MCNC: 95 MG/DL
NRBC BLD-RTO: 0 /100 WBC
PLATELET # BLD AUTO: 281 K/UL
PMV BLD AUTO: 11.8 FL
POTASSIUM SERPL-SCNC: 4.2 MMOL/L
PROT SERPL-MCNC: 7.7 G/DL
RBC # BLD AUTO: 4.73 M/UL
SODIUM SERPL-SCNC: 138 MMOL/L
TRIGL SERPL-MCNC: 157 MG/DL
TSH SERPL DL<=0.005 MIU/L-ACNC: 1.33 UIU/ML
WBC # BLD AUTO: 8.49 K/UL

## 2017-11-20 PROCEDURE — 99205 OFFICE O/P NEW HI 60 MIN: CPT | Mod: S$GLB,,, | Performed by: NURSE PRACTITIONER

## 2017-11-20 PROCEDURE — 84681 ASSAY OF C-PEPTIDE: CPT

## 2017-11-20 PROCEDURE — 80061 LIPID PANEL: CPT

## 2017-11-20 PROCEDURE — 82948 REAGENT STRIP/BLOOD GLUCOSE: CPT | Mod: S$GLB,,, | Performed by: NURSE PRACTITIONER

## 2017-11-20 PROCEDURE — 36415 COLL VENOUS BLD VENIPUNCTURE: CPT | Mod: PO

## 2017-11-20 PROCEDURE — 99999 PR PBB SHADOW E&M-EST. PATIENT-LVL III: CPT | Mod: PBBFAC,,, | Performed by: NURSE PRACTITIONER

## 2017-11-20 PROCEDURE — 85025 COMPLETE CBC W/AUTO DIFF WBC: CPT

## 2017-11-20 PROCEDURE — 80053 COMPREHEN METABOLIC PANEL: CPT

## 2017-11-20 PROCEDURE — 84443 ASSAY THYROID STIM HORMONE: CPT

## 2017-11-20 PROCEDURE — 86341 ISLET CELL ANTIBODY: CPT

## 2017-11-20 RX ORDER — ROSUVASTATIN CALCIUM 40 MG/1
TABLET, COATED ORAL
Qty: 90 TABLET | Refills: 0 | OUTPATIENT
Start: 2017-11-20

## 2017-11-20 RX ORDER — INSULIN DEGLUDEC 200 U/ML
10 INJECTION, SOLUTION SUBCUTANEOUS DAILY
Qty: 3 SYRINGE | Refills: 1 | Status: SHIPPED | OUTPATIENT
Start: 2017-11-20 | End: 2017-12-19 | Stop reason: SDUPTHER

## 2017-11-20 NOTE — PROGRESS NOTES
"Subjective:         Patient ID: Isidro Alves is a 62 y.o. male.  Patient's current PCP is [facility]   Social History     Social History    Marital status:      Spouse name: N/A    Number of children: 3    Years of education: N/A     Occupational History     Self-Rmployed     Social History Main Topics    Smoking status: Never Smoker    Smokeless tobacco: Never Used    Alcohol use Yes      Comment: seldom     Drug use: No    Sexual activity: Yes     Partners: Female     Other Topics Concern    Not on file     Social History Narrative    No narrative on file       Chief Complaint: Diabetes Mellitus    HPI   Isidro Alves is a 62 y.o. Black or  male presenting for new consultation for diabetes. Patient has been diagnosed with diabetes for over 20 years and has the following complications from diabetes: nephropathy and peripheral neuropathy. Blood glucose testing is performed regularly. In the past 2 weeks patient reports blood glucose values to have approximately ranged from 130-200s.     He denies any recent hospital admissions, emergency room visits, hypoglycemia.      Height: 6' 2" (188 cm)  //  Weight: 120.2 kg (264 lb 15.9 oz), Body mass index is 34.02 kg/m².  Home Blood Glucose reading this AM: 190 mg/dl fasting.  His blood sugar in clinic today is:   Lab Results   Component Value Date    POCGLU 140 (A) 11/20/2017       Labs reviewed and are noted below.    His most recent A1C is:   Lab Results   Component Value Date    HGBA1C 8.6 (H) 11/10/2017     No results found for: CPEPTIDE  No results found for: GLUTAMICACID  Lab Results   Component Value Date    WBC 8.01 12/30/2016    HGB 13.5 (L) 12/30/2016    HCT 41.2 12/30/2016     12/30/2016    CHOL 140 11/04/2016    TRIG 157 (H) 11/04/2016    HDL 32 (L) 11/04/2016    ALT 16 11/14/2016    AST 25 11/14/2016     12/30/2016    K 4.8 12/30/2016     12/30/2016    CREATININE 3.5 (H) 12/30/2016    BUN 39 " (H) 12/30/2016    CO2 22 (L) 12/30/2016    PSA 10.7 (H) 11/10/2017    INR 0.9 05/02/2015    HGBA1C 8.6 (H) 11/10/2017       CURRENT DM MEDICATIONS:   Current Outpatient Prescriptions   Medication Sig Dispense Refill    HUMULIN R U-500, CONCENTRATED, 500 unit/mL Soln INJECT 4 UNITS IN THE MORNING, 2 UNITS AT LUNCH, AND 3 UNITS AT SUPPER. 20 mL 0    liraglutide 0.6 mg/0.1 mL, 18 mg/3 mL, subq PNIJ (VICTOZA 3-SETH) 0.6 mg/0.1 mL (18 mg/3 mL) PnIj INJECT 1.2 MG INTO THE SKIN AS NEEDED.    NOT TAKING 9 Syringe 1    TRADJENTA 5 mg Tab tablet TAKE 1 TABLET (5 MG TOTAL) BY MOUTH ONCE DAILY.  NOT TAKING 30 tablet 0     No current facility-administered medications for this visit.        Health Maintenance   Topic Date Due    Colonoscopy  12/20/2015    Zoster Vaccine  08/26/2016    Foot Exam  03/14/2017    Influenza Vaccine  08/01/2017    Lipid Panel  11/04/2017    Eye Exam  03/09/2018    Hemoglobin A1c  05/10/2018    Pneumococcal PPSV23 (Medium Risk) (2) 08/26/2021    TETANUS VACCINE  05/05/2026    Hepatitis C Screening  Completed       STANDARDS OF CARE:  Current Ophthalmologist/Optometrist: Dr. Del Real.  Last exam 2017  Current Dentist: Yes. Last exam 2017.  Current Podiatrist: No  ACE/ARB: Yes  Statin: Yes  He  has attended diabetes education in the past.     LIFESTYLE:  ACTIVITY LEVEL: Moderately Active  EXERCISE:  30-60 min 5* d/wk  MEAL PLANNING: Patient reports number of meals per day to be 3 and number of snacks per day to be 2    BLOOD GLUCOSE TESTING: Patient reports testing on average a total of 2-3 times per day.    Review of Systems   Constitutional: Positive for fatigue. Negative for activity change, appetite change, chills, diaphoresis, fever and unexpected weight change.   Eyes: Negative for visual disturbance.   Respiratory: Negative for apnea and shortness of breath.    Cardiovascular: Negative.  Negative for chest pain, palpitations and leg swelling.   Gastrointestinal: Negative for abdominal  distention, constipation, diarrhea, nausea and vomiting.   Endocrine: Positive for polydipsia and polyuria. Negative for cold intolerance, heat intolerance and polyphagia.   Genitourinary: Negative.  Negative for frequency.   Skin: Negative.  Negative for color change, pallor, rash and wound.   Neurological: Negative.  Negative for dizziness, seizures, syncope, light-headedness, numbness and headaches.   Psychiatric/Behavioral: Negative for confusion, hallucinations and suicidal ideas.         Objective:      Physical Exam   Constitutional: He is oriented to person, place, and time. He appears well-developed and well-nourished.   HENT:   Head: Normocephalic and atraumatic.   Eyes: EOM are normal. Pupils are equal, round, and reactive to light.   Neck: Normal range of motion. Neck supple.   Cardiovascular: Normal rate, regular rhythm and normal heart sounds.    Pulmonary/Chest: Effort normal and breath sounds normal.   Abdominal: Soft. Bowel sounds are normal.   Musculoskeletal: Normal range of motion.   Neurological: He is alert and oriented to person, place, and time.   Skin: Skin is warm and dry.   Psychiatric: He has a normal mood and affect. His behavior is normal. Judgment and thought content normal.   Nursing note and vitals reviewed.      Assessment:       1. Type 2 diabetes mellitus with hyperglycemia, with long-term current use of insulin        Plan:   Type 2 diabetes mellitus with hyperglycemia, with long-term current use of insulin  -     C-peptide; Future; Expected date: 11/20/2017  -     Glutamic acid decarboxylase; Future; Expected date: 11/20/2017  -     Microalbumin/creatinine urine ratio; Future; Expected date: 11/20/2017  -     Lipid panel; Future; Expected date: 11/20/2017  -     TSH; Future; Expected date: 11/20/2017  -     POCT glucose  -     Comprehensive metabolic panel; Future; Expected date: 11/20/2017  -     CBC auto differential; Future; Expected date: 11/20/2017  -     Hemoglobin A1c;  Future; Expected date: 12/20/2017  -     Ambulatory consult to Podiatry  -     insulin degludec (TRESIBA FLEXTOUCH U-200) 200 unit/mL (3 mL) InPn; Inject 10 Units into the skin once daily. Increase 2 units every 3 days until fasting blood sugar is less than 110  Dispense: 3 Syringe; Refill: 1  -     dulaglutide (TRULICITY) 0.75 mg/0.5 mL PnIj; Inject 0.5 mLs (0.75 mg total) into the skin once a week.  Dispense: 4 Syringe; Refill: 0      - Condition uncontrolled on current regimen. DC Humulin, Victoza, Tradjenta. Start Tresiba and Trulicity as noted. DM education reviewed. Patient encouraged to carb count and exercise per recommendations. Labs and Referrals as noted. Patient instructed to send in log once weekly for my review. RV scheduled in 1 month.     Additional Plan Details:    1.) Patient was instructed to monitor blood glucose 2 - 3 x daily, fasting and ac dinner or at bedtime. Discussed ADA goal for fasting blood sugar, 80 - 130mg/dL; pp blood sugars below 180 mg/dl. Also, discussed prevention of hypoglycemia and the need to adjust goals to higher levels if persistent hypoglycemia.  Reminded to bring BG records or meter to each visit for review.  2.) Reviewed pathophysiology of Type 2 diabetes, complications related to the disease, importance of annual dilated eye exam and daily foot examination.  3.) We discussed the ADA recommendations, which are as follows:  Hemoglobin A1c below 7.0 %. All patients with diabetes should be on statins unless contraindicated.  ACE or ARB therapy if not contraindicated.    4.) Continue medications as prescribed.  My [facility] e-mail or phone review in one week with BG records for adjustment of medication.  5.) Meal planning teaching: Reviewed carb counting, portion control, importance of spacing meals throughout the day to prevent post prandial elevations.  6.) Discussed activity with related benefits, methods, and precautions. Recommended patient start or continue some form of  exercise and increase as tolerated to 30 minutes per day to aid in control of BGs.  7.) A1C, TSH, Lipid Panel, CMP with eGFR and Micro/Creatinine are utd or were ordered per ADA protocol.  8.) Return to clinic in 1 month for follow up. The patient was explained the above plan and given opportunity to ask questions.  He understands, chooses and consents to this plan and accepts all the risks, which include but are not limited to the risks mentioned above. He understands the alternative of having no testing, interventions or treatments at this time. He left content and without further questions.     A total of 55 minutes was spent in face to face time, of which over 50% was spent in counseling patient on disease process, complications, treatment, and side effects of medications.        MIGNON CarrionC

## 2017-11-20 NOTE — LETTER
November 20, 2017      Steven Morgan MD  [address] [place] [address]           [facility]  [address] [place] [address]  Phone: [phone]  Fax: [phone]          Patient: Isidro Alves   MR Number: [id number]   YOB: 1955   Date of Visit: 11/20/2017       Dear Dr. Steven Morgan:    Thank you for referring Isidro Alves to me for evaluation. Attached you will find relevant portions of my assessment and plan of care.    If you have questions, please do not hesitate to call me. I look forward to following Isidro Alves along with you.    Sincerely,    Lynne Pacheco, JES    Enclosure  CC:  No Recipients    If you would like to receive this communication electronically, please contact [email or web address] or [phone] to request more information on [facility] access.    For providers and/or their staff who would like to refer a patient to [facility], please contact us through our one-stop-shop provider referral line, [facility], at [phone].    If you feel you have received this communication in error or would no longer like to receive these types of communications, please e-mail [email or web address]

## 2017-11-20 NOTE — PATIENT INSTRUCTIONS
PATIENT INSTRUCTIONS  - Follow up as scheduled.   - Carb Count: 30-45G/meal and 15G/snack  - Exercise: Goal is 150 minutes or more per week  - Bring meter and blood sugar log to each appointment.   - STOP HUMULIN R, Victoza, Tradjenta  - START Tresiba at 10 units. Increase 2 units every 3 days until fasting blood sugar is less than 110  - Start Trulicity once a week  - Send me your log on Tuesday, by phone or Email    - You will take your blood sugar two times daily. Once will always be in the morning before you have any food, (known as your fasting blood sugar), and once should be two hours after EITHER your breakfast, lunch, or dinner, (known as your post-prandial blood sugar). Each day you should check a different meal, (ie. Monday-breakfast; Tuesday- lunch; Wednesday- supper, then repeat).     - Send me your blood sugars weekly if directed to do so. The easiest way to do this is through [facility]. Send in logs on Tuesdays, Wednesdays, or Thursdays.    - Blood Sugar Goals:  1. The goal for fasting blood sugars is 80 -110 mg/dl.   2. The goal for the 2 hour after meal blood sugars is below 140-180 mg/dl.  3. Blood sugars below 70 are considered LOW and you must eat or drink 15G of carbohydrates immediately, then recheck blood sugar after 15 minutes to ensure blood sugar has returned to normal range, (70 or above)                                                              Diabetes (General Information)  Diabetes is a long-term health problem. It means your body does not make enough insulin. Or it may mean that your body cannot use the insulin it makes. Insulin is a hormone in your body. It lets blood sugar (glucose) reach the cells in your body. All of your cells need glucose for fuel.  When you have diabetes, the glucose in your blood builds up because it cannot get into the cells. This buildup is called high blood sugar (hyperglycemia).  Your blood sugar level depends on several things. It depends on what kind  of food you eat and how much of it you eat. It also depends on how much exercise you get, and how much insulin you have in your body. Eating too much of the wrong kinds of food or not taking diabetes medicine on time can cause high blood sugar. Infections can cause high blood sugar even if you are taking medicines correctly.  These things can also cause low blood sugar:  · Missing meals  · Not eating enough food  · Taking too much diabetes medicine  Diabetes can cause serious problems over time if you do not get treated. These problems include heart disease, stroke, kidney failure, and blindness. They also include nerve pain or loss of feeling in your legs and feet, and gangrene of the feet. By keeping your blood sugar under control you can prevent or delay these problems.  Normal blood sugar levels are 80 to 100 before a meal and less than 180 in the 1 to 2 hours after a meal.  Home care  Follow these guidelines when caring for yourself at home:  · Follow the diet your healthcare provider gives you. Take insulin or other diabetes medicine exactly as told to.  · Watch your blood sugar as you are told to. Keep a log of your results. This will help your provider change your medicines to keep your blood sugar under control.  · Try to reach your ideal weight. You may be able to cut back on or not have to take diabetes medicine if you eat the right foods and get exercise.  · Do not smoke. Smoking worsens the effects of diabetes on your circulation. You are much more likely to have a heart attack if you have diabetes and you smoke.  · Take good care of your feet. If you have lost feeling in your feet, you may not see an injury or infection. Check your feet and between your toes at least once a week.  · Wear a medical alert bracelet or necklace, or carry a card in your wallet that says you have diabetes. This will help healthcare providers give you the right care if you get very ill and cannot tell them that you have  diabetes.  Sick day plan  If you get a cold, the flu, or a bacterial or viral infection, take these steps:  · Look at your diabetes sick plan and call your healthcare provider as you were told to. You may need to call your provider right away if:  ¨ Your blood sugar is above 240 while taking your diabetes medicine  ¨ Your urine ketone levels are above normal or high  ¨ You have been vomiting more than 6 hours  ¨ You have trouble breathing or your breath ha s a fruity smell  ¨ You have a high fever  ¨ You have a fever for several days and you are not getting better  ¨ You get light-headed and are sleepier than usual  · Keep taking your diabetes pills (oral medicine) even if you have been vomiting and are feeling sick. Call your provider right away because you may need insulin to lower your blood sugar until you recover from your illness.  · Keep taking your insulin even if you have been vomiting and are feeling sick. Call your provider right away to ask if you need to change your insulin dose. This will depend on your blood sugar results.  · Check your blood sugar every 2 to 4 hours, or at least 4 times a day.  · Check your ketones often. If you are vomiting and having diarrhea, watch them more often.  · Do not skip meals. Try to eat small meals on a regular schedule. Do this even if you do not feel like eating.  · Drink water or other liquids that do not have caffeine or calories. This will keep you from getting dehydrated. If you are nauseated or vomiting, takes small sips every 5 minutes. To prevent dehydration try to drink a cup (8 ounces) of fluids every hour while you are awake.  General care  Always bring a source of fast-acting sugar with you in case you have symptoms of low blood sugar (below 70). At the first sign of low blood sugar, eat or drink 15 to 20 grams of fast-acting sugar to raise your blood sugar. Examples are:  · 3 to 4 glucose tablets. You can buy these at most drugstores.  · 4 ounces (1/2 cup)  of regular (not diet) soft drinks  · 4 ounces (1/2 cup) of any fruit juice  · 8 ounces (1 cup) of milk  · 5 to 6 pieces of hard candy  · 1 tablespoon of honey  Check your blood sugar 15 minutes after treating yourself. If it is still below 70, take 15 to 20 more grams of fast-acting sugar. Test again in 15 minutes. If it returns to normal (70 or above), eat a snack or meal to keep your blood sugar in a safe range. If it stays low, call your doctor or go to an emergency room.  Follow-up care  Follow-up with your healthcare provider, or as advised. For more information about diabetes, visit the American Diabetes Association website at www.diabetes.org or call 825-885-4283.  When to seek medical advice  Call your healthcare provider right away if you have any of these symptoms of high blood sugar:  · Frequent urination  · Dizziness  · Drowsiness  · Thirst  · Headache  · Nausea or vomiting  · Abdominal pain  · Eyesight changes  · Fast breathing  · Confusion or loss of consciousness  Also call your provider right away if you have any of these signs of low blood sugar:  · Fatigue  · Headache  · Shakes  · Excess sweating  · Hunger  · Feeling anxious or restless  · Eyesight changes  · Drowsiness  · Weakness  · Confusion or loss of consciousness  Call 911  Call for emergency help right away if any of these occur:  · Chest pain or shortness of breath  · Dizziness or fainting  · Weakness of an arm or leg or one side of the face  · Trouble speaking or seeing   Date Last Reviewed: 6/1/2016  © 9254-2787 [employer], [employer]. [address] [place] [address], [place] [address]. All rights reserved. This information is not intended as a substitute for professional medical care. Always follow your healthcare professional's instructions.                                                                     Understanding Type 2 Diabetes  When your body is working normally, the food you eat is digested and used as fuel. This fuel supplies  energy to the bodys cells. When you have diabetes, the fuel cant enter the cells. Without treatment, diabetes can cause serious long-term health problems.     Your body breaks down the food you eat into glucose.          How the body gets energy  The digestive system breaks down food, resulting in a sugar called glucose. Some of this glucose is stored in the liver. But most of it enters the bloodstream and travels to the cells to be used as fuel. Glucose needs the help of a hormone called insulin to enter the cells. Insulin is made in the pancreas. It is released into the bloodstream in response to the presence of glucose in the blood. Think of insulin as a key. When insulin reaches a cell, it attaches to the cell wall. This signals the cell to create an opening that allows glucose to enter the cell.  When you have type 2 diabetes  Early in type 2 diabetes, your cells dont respond properly to insulin. Because of this, less glucose than normal moves into cells. This is called insulin resistance. In response, the pancreas makes more insulin. But eventually, the pancreas cant produce enough insulin to overcome insulin resistance. As less and less glucose enters cells, it builds up to a harmful level in the bloodstream. This is known as high blood sugar or hyperglycemia. The result is type 2 diabetes. The cells become starved for energy, which can leave you feeling tired and rundown.  Why high blood sugar is a problem  If high blood sugar is not controlled, blood vessels throughout the body become damaged. Prolonged high blood sugar affects organs, blood vessels, and nerves. As a result, the risks of damage to the heart, kidneys, eyes, and limbs increase. Diabetes also makes other problems, such as high blood pressure and high cholesterol, more dangerous. Over time, people with uncontrolled high blood sugar have an increase in risk of dying of, or being disabled by, heart attack or stroke.  Date Last Reviewed:  7/1/2016  © 1376-1848 [employer], [employer]. [address] [place] [address], [place] [address]. All rights reserved. This information is not intended as a substitute for professional medical care. Always follow your healthcare professional's instructions.                                                            Using a Blood Sugar Log    You have diabetes. This means your body has trouble regulating a sugar called glucose. To help manage your diabetes, youll need to check your blood sugar level as directed by your healthcare provider. Keeping a log of your blood sugar levels will help you track your blood sugar readings. Its a simple and easy way to see how well you are controlling your diabetes.  Checking your blood sugar level  You can check your blood sugar level with a blood glucose meter. Youll first prick the side of your finger with a tiny lancet to draw a tiny drop of blood onto the test strip. Some glucose meters let you use another place on your body to test. But these other places should not be used in some cases as they may be inaccurate. Follow the instructions for your glucose meter. And talk with your healthcare provider before doing the test on other places.  The strip goes into the meter first, then a drop of blood is placed on the tip of the strip. The meter then shows a reading that tells you the level of your blood sugar. Your readings should be in your target range as often as possible. This means not too high or too low. Staying in this range helps lower your risk for complications. Your healthcare provider will help you figure out the target range that is best for you.  Tracking your readings  Every time you check your blood sugar, use your log to keep track of your readings. Your meter will also probably have a memory feature that your healthcare provider can check at your next visit. You may be advised by your healthcare provider to check your blood sugar in the morning, at bedtime, and  before and after meals. Be sure to write down all of your numbers. Also use your log to record things that might have affected your blood sugar. Some examples include being sick, certain medicines, being physically active, feeling stressed, or skipping meals.   Lessons learned from your readings  Tracking your blood sugar readings helps you see patterns. These patterns tell you how your actions affect your blood sugar. For instance, you may have higher numbers after eating certain foods or lower numbers after exercise. They just help you understand how to stay in your target range more often, so that your diabetes remains in good control.  Sharing your log with your healthcare team  Bring your blood sugar log and glucose meter with you to all of your healthcare appointments. This can help your healthcare team make changes to your treatment plan, if needed. This may involve making changes in what you eat, what medicines you take, or how much you exercise.  To learn more  The resources below can help you learn more:  · American Diabetes Association 655-868-8222 www.diabetes.org  · [employer] 510-153-4684 www.lighthouse.org  · [employer] [facility] [place] 064-631-5117 www.nei.nih.gov  · [facility] [facility] 908-807-2735 www.hormone.org  Date Last Reviewed: 5/1/2016  © 5661-2077 [employer]. [address] [place] [address], [place] [address]. All rights reserved. This information is not intended as a substitute for professional medical care. Always follow your healthcare professional's instructions.                                                                                            Diabetes: Exams and Tests    For your diabetes care, you may see your primary care provider or a specialist 2 to 4 times a year, or as directed. This page lists some of the regular exams and tests recommended for people with diabetes. To learn more, contact the American Diabetes Association (743-161-1955,  www.diabetes.org).  Tests and immunizations  These should be done at least as often as stated below:  · Blood pressure check: every healthcare provider visit  · A1C: at first, every 3 months; if controlled, then every 3 to 6 months   · Cholesterol and blood lipid tests: at least every 12 months.  · Urine tests for kidney function: every 12 months  · Flu shots: once a year  · Pneumonia shots: talk with your healthcare provider about which pneumonia vaccines are right for you  · Hepatitis B shots: as soon as possible if youre under 60, or as advised by your healthcare provider if youre older than 60  · Shingles vaccine after age 60, even if you have already had shingles   · Other tests or vaccines: as advised by your healthcare provider  · Individualized medical nutrition therapy: at least once, then as needed  · Stop smoking counseling, if you still smoke, at each visit   Regular exams  The following exams help keep you healthy:  · Foot exams. Nerve and blood vessel problems can affect your feet sooner than other parts of your body. Make sure that your healthcare provider checks your feet at every office visit.  · Eye exams. You can have problems with your eyes even if you dont have trouble seeing. An ophthalmologist (eye healthcare provider) or specially trained optometrist will give you a dilated eye exam at least once a year. If you see dark spots, see poorly in dim light, have eye pain or pressure, or notice any other problems, tell your healthcare provider right away.  · Dental exams. Gum disease (also called periodontal disease) and other mouth problems are common in people with diabetes. To help prevent these problems, see your dentist two or more times a year.  Ask your healthcare provider what other exams youll need on a regular basis.  Date Last Reviewed: 6/1/2016  © 3402-0995 [employer]. [address] [place] [address], [place] [address]. All rights reserved. This information is not intended as a  substitute for professional medical care. Always follow your healthcare professional's instructions.

## 2017-11-21 ENCOUNTER — TELEPHONE (OUTPATIENT)
Dept: NEPHROLOGY | Facility: CLINIC | Age: 62
End: 2017-11-21

## 2017-11-21 NOTE — TELEPHONE ENCOUNTER
----- Message from Justine Song LPN sent at 11/21/2017  9:59 AM CST -----  Lynne Pacheco NP would like patient's labs to reviewed by . Thanks

## 2017-11-21 NOTE — TELEPHONE ENCOUNTER
Please review patient's labs and advise. I also scheduled patient to see you 11/28/17. Please let me know if he needs to be seen sooner.  Thank you

## 2017-11-22 NOTE — TELEPHONE ENCOUNTER
Reviewed , nothing urgent , I will discuss with him at next visit , 11/28/17 is good     Dr Champion

## 2017-11-26 DIAGNOSIS — E78.5 HYPERLIPIDEMIA, UNSPECIFIED HYPERLIPIDEMIA TYPE: ICD-10-CM

## 2017-11-27 LAB — GAD65 AB SER-SCNC: 0 NMOL/L

## 2017-11-27 RX ORDER — ROSUVASTATIN CALCIUM 40 MG/1
TABLET, COATED ORAL
Qty: 90 TABLET | Refills: 0 | OUTPATIENT
Start: 2017-11-27

## 2017-11-28 ENCOUNTER — TELEPHONE (OUTPATIENT)
Dept: NEPHROLOGY | Facility: CLINIC | Age: 62
End: 2017-11-28

## 2017-11-28 ENCOUNTER — OFFICE VISIT (OUTPATIENT)
Dept: NEPHROLOGY | Facility: CLINIC | Age: 62
End: 2017-11-28
Payer: COMMERCIAL

## 2017-11-28 VITALS
WEIGHT: 257.69 LBS | DIASTOLIC BLOOD PRESSURE: 88 MMHG | SYSTOLIC BLOOD PRESSURE: 142 MMHG | BODY MASS INDEX: 33.07 KG/M2 | HEIGHT: 74 IN | HEART RATE: 104 BPM

## 2017-11-28 DIAGNOSIS — N18.4 CHRONIC KIDNEY DISEASE (CKD), STAGE IV (SEVERE): Primary | ICD-10-CM

## 2017-11-28 PROCEDURE — 99999 PR PBB SHADOW E&M-EST. PATIENT-LVL III: CPT | Mod: PBBFAC,,, | Performed by: INTERNAL MEDICINE

## 2017-11-28 PROCEDURE — 99214 OFFICE O/P EST MOD 30 MIN: CPT | Mod: S$GLB,,, | Performed by: INTERNAL MEDICINE

## 2017-11-28 NOTE — PROGRESS NOTES
"Subjective:       Patient ID: Isidro Alves is a 62 y.o.   male who presents for follow-up evaluation of  CKD stage 4 , HTN, DM-2, SHPT       HPI: Isidro Alves Is a pleasant 62 -year-old  gentleman seen in office today in f/u for above medical problems. I saw him about 10 months ago, he was lost to f/u ,  He has chronic kidney diseased stage 4 . Recent serum creatinine is 4.2  mg/dL with a GFR of about 18 ml/mt. he has long-standing history of hypertension and diabetes ,  Nonadherence with his blood pressure medications is a big issue with him in the past. Patient says he has been more adherent with his medications in the recent past. Improvement in blood pressures noted. Also has been exercising regularly and trying to watch salt intake.   PSA levels were elevated and is currently following with urology. Recent labs discussed with patient,              Past Medical History:   Diagnosis Date    Anemia 4/16/2014    CKD (chronic kidney disease) stage 4, GFR 15-29 ml/min     Diabetes mellitus     Hypertension     Secondary hyperparathyroidism (of renal origin)     UTI (lower urinary tract infection) 5/1/2015           Current Outpatient Prescriptions on File Prior to Visit   Medication Sig Dispense Refill    acyclovir (ZOVIRAX) 400 MG tablet TAKE 1 TABLET 3 TIMES DAILY. 30 tablet 6    BD INSULIN SYRINGE HALF UNIT 0.3 mL 31 gauge x 5/16" Syrg FOR USE WITH INSULIN. 100 Syringe 1    CIALIS 10 mg tablet TAKE 1 TABLET (10 MG TOTAL) BY MOUTH AS NEEDED FOR ERECTILE DYSFUNCTION. 6 tablet 7    dulaglutide (TRULICITY) 0.75 mg/0.5 mL PnIj Inject 0.5 mLs (0.75 mg total) into the skin once a week. 4 Syringe 0    esomeprazole (NEXIUM) 40 MG capsule Take 40 mg by mouth once daily.      fluticasone (FLONASE) 50 mcg/actuation nasal spray 2 sprays by Each Nare route once daily. 1 Bottle 0    folic acid (FOLVITE) 1 MG tablet TAKE 1 TABLET (1 MG TOTAL) BY MOUTH ONCE DAILY. 30 tablet 3    " insulin degludec (TRESIBA FLEXTOUCH U-200) 200 unit/mL (3 mL) InPn Inject 10 Units into the skin once daily. Increase 2 units every 3 days until fasting blood sugar is less than 110 3 Syringe 1    lancets (ONETOUCH ULTRASOFT LANCETS) Misc 1 lancet by Misc.(Non-Drug; Combo Route) route 2 (two) times daily. (Patient taking differently: 1 lancet by Misc.(Non-Drug; Combo Route) route 3 (three) times daily. ) 200 each 1    multivitamin (THERAGRAN) tablet Take 1 tablet by mouth once daily.      nifedipine (ADALAT CC) 60 MG TbSR TAKE 1 TABLET (60 MG TOTAL) BY MOUTH 2 (TWO) TIMES DAILY. 60 tablet 8    olopatadine (PATADAY) 0.2 % Drop Place 1 drop into both eyes daily as needed. 2.5 mL 0    ONETOUCH ULTRA TEST Strp 1 STRIP BY MISC.(NON-DRUG COMBO ROUTE) ROUTE 3 (THREE) TIMES DAILY. 100 strip 2    rosuvastatin (CRESTOR) 40 MG Tab TAKE 1 TABLET (40 MG TOTAL) BY MOUTH EVERY EVENING. 90 tablet 0    tamsulosin (FLOMAX) 0.4 mg Cp24 TAKE 2 CAPSULES (0.8 MG TOTAL) BY MOUTH EVERY EVENING. 60 capsule 7    valsartan (DIOVAN) 320 MG tablet TAKE 1 TABLET (320 MG TOTAL) BY MOUTH ONCE DAILY. 90 tablet 2    VENTOLIN HFA 90 mcg/actuation inhaler INHALE 2 PUFFS INTO THE LUNGS EVERY 6 (SIX) HOURS AS NEEDED FOR WHEEZING. 18 Inhaler 0    VITAMIN D2 50,000 unit capsule TAKE 1 CAPSULE (50,000 UNITS TOTAL) BY MOUTH EVERY 7 DAYS. 4 capsule 6     No current facility-administered medications on file prior to visit.          PSH: thoracotomy in 2010      Family history : positive for chronic kidney disease and hemodialysis in his father who is no more.      Social history : he owns a restaurant. He never smoked. He drinks alcohol occasionally. He has 3 children       Review of Systems  :         Constitutional: Negative for activity change and appetite change.   HENT: Negative for congestion, facial swelling, neck pain and neck stiffness.   Eyes: Negative for pain, discharge and redness.   Respiratory: Negative for apnea, cough and chest  tightness.   Cardiovascular: Negative for chest pain, palpitations and leg swelling.   Gastrointestinal: Negative for abdominal distention.   Genitourinary: Negative for dysuria, frequency and difficulty urinating.   Skin: Negative for color change, rash and wound.   Neurological: Negative for dizziness, weakness and numbness.   Psychiatric/Behavioral: Negative for sleep disturbance.   All other systems reviewed and are negative        Objective:         Vitals:    11/28/17 1036   BP: (!) 142/88   Pulse: 104       Respiratory rate 20, afebrile, weight 257 pounds, previous weight was 266 pounds      Physical Exam  :      Nursing note and vitals reviewed.   Constitutional: He is oriented to person, place, and time. He appears well-developed and well-nourished. No distress.   HENT: Head: Normocephalic and atraumatic. Pupils are equal, round, and reactive to light.   Neck: Normal range of motion. Neck supple. No tracheal deviation present. No thyromegaly present.   Cardiovascular: Normal rate, regular rhythm, normal heart sounds and intact distal pulses. gallop and no friction rub. No murmur heard.   Pulmonary/Chest: Effort normal and breath sounds normal. He has no wheezes. He has no rales.   Abdominal: obese, Soft. He exhibits no mass. There is no tenderness. There is no rebound and no guarding.   Musculoskeletal: Normal range of motion. He exhibits no edema.   Lymphadenopathy: He has no cervical adenopathy.   Neurological: He is alert and oriented to person, place, and time.   Skin: Skin is warm. No rash noted. He is not diaphoretic. No erythema.           Labs:    Lab Results   Component Value Date    CREATININE 4.2 (H) 11/20/2017    BUN 37 (H) 11/20/2017     11/20/2017    K 4.2 11/20/2017     11/20/2017    CO2 22 (L) 11/20/2017       Lab Results   Component Value Date    WBC 8.49 11/20/2017    HGB 12.3 (L) 11/20/2017    HCT 38.8 (L) 11/20/2017    MCV 82 11/20/2017     11/20/2017       Lab Results    Component Value Date    .0 (H) 12/30/2016    CALCIUM 9.3 11/20/2017    PHOS 3.4 12/30/2016       Lab Results   Component Value Date    ALBUMIN 3.4 (L) 11/20/2017       Lab Results   Component Value Date    URICACID 7.5 (H) 12/30/2016       Lab Results   Component Value Date    HGBA1C 8.6 (H) 11/10/2017          Impression and Plan: 62 - year-old  gentleman seen in office today in f/u for following medical problems ,      1. Hypertension : Blood pressure is controlled,   Again discussed low-salt diet.       2. Chronic kidney disease stage 4 : Secondary to long-standing history of hypertension and diabetes. Again advised patient to avoid NSAIDs.  serum creatinine is 4.2 mg/dl , progressive decline in renal function explained to the patient , will refer him for a kidney transplant evaluation, he is in some kind of denial regarding his worsening renal function,     3. Diabetes mellitus type 2 : discussed compliance with his diet and medications. Discussed weight loss and daily exercise.      4. Secondary hyperparathyroidism - PTH is acceptable for degree of renal dysfunction,     5. Proteinuria - about 2 g daily. cont Diovan. Discussed diabetes control and adherence with [facility]      6. Follow up with urology for elevated PSA levels.      7. Obesity - discussed weight loss, exercise,      I will see himn followup in about 3 months , Face-to-face time was 25 minutes discussing his lab results and plan of care.      Gabino Champion M.D.

## 2017-11-28 NOTE — TELEPHONE ENCOUNTER
----- Message from Felicita Reyna sent at 11/28/2017  9:57 AM CST -----  Contact: Pt  Pt will be 5-10 minutes late due to traffic

## 2017-11-28 NOTE — PATIENT INSTRUCTIONS
Avoid NSAID pain medications such as advil, aleve, motrin, ibuprofen, naprosyn, meloxicam, diclofenac, mobic.           Low-Salt Diet:    This diet removes foods that are high in salt. It also limits the amount of salt you use when cooking. It is most often used for people with high blood pressure, edema (fluid retention), and kidney, liver, or heart disease.  Table salt contains the mineral sodium. Your body needs sodium to work normally. But too much sodium can make your health problems worse. Your healthcare provider is recommending a low-salt (also called low-sodium) diet for you. Your total daily allowance of salt is 1,500 to 2,300 milligrams (mg). It is less than 1 teaspoon of table salt. This means you can have only about 500 to 700 mg of sodium at each meal. People with certain health problems should limit salt intake to the lower end of the recommended range.    When you cook, dont add much salt. If you can cook without using salt, even better. Dont add salt to your food at the table.  When shopping, read food labels. Salt is often called sodium on the label. Choose foods that are salt-free, low salt, or very low salt. Note that foods with reduced salt may not lower your salt intake enough.    Beans, potatoes, and pasta  Ok: Dry beans, split peas, lentils, potatoes, rice, macaroni, pasta, spaghetti without added salt  Avoid: Potato chips, tortilla chips, and similar products  Breads and cereals  Ok: Low-sodium breads, rolls, cereals, and cakes; low-salt crackers, matzo crackers  Avoid: Salted crackers, pretzels, popcorn, Romanian toast, pancakes, muffins  Dairy  Ok: Milk, chocolate milk, hot chocolate mix, low-salt cheeses, and yogurt  Avoid: Processed cheese and cheese spreads; Roquefort, Camembert, and cottage cheese; buttermilk, instant breakfast drink  Desserts  Ok: Ice cream, frozen yogurt, juice bars, gelatin, cookies and pies, sugar, honey, jelly, hard candy  Avoid: Most pies, cakes and cookies  prepared or processed with salt; instant pudding  Drinks  Ok: Tea, coffee, fizzy (carbonated) drinks, juices  Avoid: Flavored coffees, electrolyte replacement drinks, sports drinks  Meats  Ok: All fresh meat, fish, poultry, low-salt tuna, eggs, egg substitute  Avoid: Smoked, pickled, brine-cured, or salted meats and fish. This includes sellers, chipped beef, corned beef, hot dogs, deli meats, ham, kosher meats, salt pork, sausage, canned tuna, salted codfish, smoked salmon, herring, sardines, or anchovies.  Seasonings and spices  Ok: Most seasonings are okay. Good substitutes for salt include: fresh herb blends, hot sauce, lemon, garlic, chris, vinegar, dry mustard, parsley, cilantro, horseradish, tomato paste, regular margarine, mayonnaise, unsalted butter, cream cheese, vegetable oil, cream, low-salt salad dressing and gravy.  Avoid: Regular ketchup, relishes, pickles, soy sauce, teriyaki sauce, Worcestershire sauce, BBQ sauce, tartar sauce, meat tenderizer, chili sauce, regular gravy, regular salad dressing, salted butter  Soups  Ok: Low-salt soups and broths made with allowed foods  Avoid: Bouillon cubes, soups with smoked or salted meats, regular soup and broth  Vegetables  Ok: Most vegetables are okay; also low-salt tomato and vegetable juices  Avoid: Sauerkraut and other brine-soaked vegetables; pickles and other pickled vegetables; tomato juice, olives  Date Last Reviewed: 8/1/2016  © 8088-5432 [employer]. [address] [place] [address], [place] [address]. All rights reserved. This information is not intended as a substitute for professional medical care. Always follow your healthcare professional's instructions.

## 2017-12-04 ENCOUNTER — PATIENT OUTREACH (OUTPATIENT)
Dept: ADMINISTRATIVE | Facility: HOSPITAL | Age: 62
End: 2017-12-04

## 2017-12-04 ENCOUNTER — OFFICE VISIT (OUTPATIENT)
Dept: INTERNAL MEDICINE | Facility: CLINIC | Age: 62
End: 2017-12-04
Payer: COMMERCIAL

## 2017-12-04 VITALS
WEIGHT: 257.94 LBS | DIASTOLIC BLOOD PRESSURE: 90 MMHG | SYSTOLIC BLOOD PRESSURE: 142 MMHG | BODY MASS INDEX: 33.1 KG/M2 | TEMPERATURE: 98 F | OXYGEN SATURATION: 98 % | HEIGHT: 74 IN | HEART RATE: 79 BPM

## 2017-12-04 DIAGNOSIS — Z00.00 ROUTINE GENERAL MEDICAL EXAMINATION AT A HEALTH CARE FACILITY: Primary | ICD-10-CM

## 2017-12-04 DIAGNOSIS — E11.59 HYPERTENSION ASSOCIATED WITH DIABETES: ICD-10-CM

## 2017-12-04 DIAGNOSIS — I15.2 HYPERTENSION ASSOCIATED WITH DIABETES: ICD-10-CM

## 2017-12-04 DIAGNOSIS — E11.69 HYPERLIPIDEMIA ASSOCIATED WITH TYPE 2 DIABETES MELLITUS: ICD-10-CM

## 2017-12-04 DIAGNOSIS — E78.5 HYPERLIPIDEMIA, UNSPECIFIED HYPERLIPIDEMIA TYPE: ICD-10-CM

## 2017-12-04 DIAGNOSIS — E11.65 TYPE 2 DIABETES MELLITUS WITH HYPERGLYCEMIA, WITH LONG-TERM CURRENT USE OF INSULIN: ICD-10-CM

## 2017-12-04 DIAGNOSIS — N18.4 CHRONIC KIDNEY DISEASE (CKD), STAGE IV (SEVERE): ICD-10-CM

## 2017-12-04 DIAGNOSIS — E78.5 HYPERLIPIDEMIA ASSOCIATED WITH TYPE 2 DIABETES MELLITUS: ICD-10-CM

## 2017-12-04 DIAGNOSIS — Z12.11 COLON CANCER SCREENING: ICD-10-CM

## 2017-12-04 DIAGNOSIS — Z79.4 TYPE 2 DIABETES MELLITUS WITH HYPERGLYCEMIA, WITH LONG-TERM CURRENT USE OF INSULIN: ICD-10-CM

## 2017-12-04 PROCEDURE — 99999 PR PBB SHADOW E&M-EST. PATIENT-LVL V: CPT | Mod: PBBFAC,,, | Performed by: INTERNAL MEDICINE

## 2017-12-04 PROCEDURE — 99396 PREV VISIT EST AGE 40-64: CPT | Mod: S$GLB,,, | Performed by: INTERNAL MEDICINE

## 2017-12-04 RX ORDER — ROSUVASTATIN CALCIUM 40 MG/1
TABLET, COATED ORAL
Qty: 90 TABLET | Refills: 3 | Status: SHIPPED | OUTPATIENT
Start: 2017-12-04 | End: 2018-09-21

## 2017-12-04 RX ORDER — SODIUM, POTASSIUM,MAG SULFATES 17.5-3.13G
SOLUTION, RECONSTITUTED, ORAL ORAL
Qty: 354 ML | Refills: 0 | Status: ON HOLD | OUTPATIENT
Start: 2017-12-04 | End: 2017-12-21 | Stop reason: HOSPADM

## 2017-12-07 ENCOUNTER — TELEPHONE (OUTPATIENT)
Dept: DIABETES | Facility: CLINIC | Age: 62
End: 2017-12-07

## 2017-12-07 NOTE — TELEPHONE ENCOUNTER
Returned patient's call.Blood glucose readings:  Patient was not at home but was able to remember how his BG has been running.  He stated the first week his numbers were okay,but the second week they increased.   Stated fasting some of his numbers has been :  179,175,220,240  He has increased Tresiba to 20 units as of last night.  He is scheduled to take his 3rd dose of Trulicity next Monday.

## 2017-12-08 NOTE — PROGRESS NOTES
Subjective:      Patient ID: Isidro Alves is a 62 y.o. male.    Chief Complaint: Follow-up    61 yo with Patient Active Problem List:     Chronic kidney disease (CKD), stage IV (severe)     Hypertension associated with diabetes     Anemia     Secondary hyperparathyroidism (of renal origin)     Metabolic acidosis     Type 2 diabetes mellitus with hyperglycemia, with long-term current use of insulin     Herpes labialis     Benign non-nodular prostatic hyperplasia without lower urinary tract symptoms     Elevated PSA     Hypogonadism in male     Hyperlipidemia associated with type 2 diabetes mellitus     Non morbid obesity due to excess calories    Here today for annual exam.  He reports compliance with his medications without significant side effects.  He is feeling well and in his usual state of health.  He has no new complaints today.    Social History    Marital status:              Spouse name:                       Years of education:                 Number of children: 3             Occupational History  Occupation          Employer            Comment                                   self-rmployed           Social History Main Topics    Smoking status: Never Smoker                                                                Smokeless tobacco: Never Used                        Alcohol use: Yes                Comment: seldom     Drug use: No              Sexual activity: Yes               Partners with: Female    Other Topics            Concern    None on file    Social History Narrative    None on file            Review of Systems   Constitutional: Negative for chills and fever.   HENT: Negative for ear pain and sore throat.    Respiratory: Negative for cough.    Cardiovascular: Negative for chest pain.   Gastrointestinal: Negative for abdominal pain and blood in stool.   Genitourinary: Negative for dysuria and hematuria.   Neurological: Negative for seizures and syncope.     Objective:   BP (!)  "142/90 (BP Location: Right arm, Patient Position: Sitting)   Pulse 79   Temp 97.9 °F (36.6 °C) (Tympanic)   Ht 6' 2" (1.88 m)   Wt 117 kg (257 lb 15 oz)   SpO2 98%   BMI 33.12 kg/m²     Physical Exam   Constitutional: He is oriented to person, place, and time. He appears well-developed and well-nourished. No distress.   HENT:   Head: Normocephalic and atraumatic.   Mouth/Throat: Oropharynx is clear and moist.   Eyes: EOM are normal. Pupils are equal, round, and reactive to light.   Neck: Neck supple. No thyromegaly present.   Cardiovascular: Normal rate and regular rhythm.    Pulmonary/Chest: Breath sounds normal. He has no wheezes. He has no rales.   Abdominal: Soft. Bowel sounds are normal. There is no tenderness.   Lymphadenopathy:     He has no cervical adenopathy.   Neurological: He is alert and oriented to person, place, and time.   Skin: Skin is warm and dry.   Psychiatric: He has a normal mood and affect. His behavior is normal.     No visits with results within 2 Week(s) from this visit.   Latest known visit with results is:   Lab Visit on 11/20/2017   Component Date Value Ref Range Status    C-Peptide 11/20/2017 3.88  0.78 - 5.19 ng/mL Final    Glutamic Acid Decarb Ab 11/27/2017 0.00  <=0.02 nmol/L Final    Comment: -------------------ADDITIONAL INFORMATION-------------------  This test was developed and its performance characteristics   determined by [facility] in a manner consistent with CLIA   requirements. This test has not been cleared or approved by   the U.S. Food and Drug Administration.  Test Performed by:  [facility] - [address] [address]      Cholesterol 11/20/2017 139  120 - 199 mg/dL Final    Comment: The National Cholesterol Education Program (NCEP) has set the  following guidelines (reference ranges) for Cholesterol:  Optimal.....................<200 mg/dL  Borderline High.............200-239 " mg/dL  High........................> or = 240 mg/dL      Triglycerides 11/20/2017 157* 30 - 150 mg/dL Final    Comment: The National Cholesterol Education Program (NCEP) has set the  following guidelines (reference values) for triglycerides:  Normal......................<150 mg/dL  Borderline High.............150-199 mg/dL  High........................200-499 mg/dL      HDL 11/20/2017 44  40 - 75 mg/dL Final    Comment: The National Cholesterol Education Program (NCEP) has set the  following guidelines (reference values) for HDL Cholesterol:  Low...............<40 mg/dL  Optimal...........>60 mg/dL      LDL Cholesterol 11/20/2017 63.6  63.0 - 159.0 mg/dL Final    Comment: The National Cholesterol Education Program (NCEP) has set the  following guidelines (reference values) for LDL Cholesterol:  Optimal.......................<130 mg/dL  Borderline High...............130-159 mg/dL  High..........................160-189 mg/dL  Very High.....................>190 mg/dL      HDL/Chol Ratio 11/20/2017 31.7  20.0 - 50.0 % Final    Total Cholesterol/HDL Ratio 11/20/2017 3.2  2.0 - 5.0 Final    Non-HDL Cholesterol 11/20/2017 95  mg/dL Final    Comment: Risk category and Non-HDL cholesterol goals:  Coronary heart disease (CHD)or equivalent (10-year risk of CHD >20%):  Non-HDL cholesterol goal     <130 mg/dL  Two or more CHD risk factors and 10-year risk of CHD <= 20%:  Non-HDL cholesterol goal     <160 mg/dL  0 to 1 CHD risk factor:  Non-HDL cholesterol goal     <190 mg/dL      TSH 11/20/2017 1.329  0.400 - 4.000 uIU/mL Final    Sodium 11/20/2017 138  136 - 145 mmol/L Final    Potassium 11/20/2017 4.2  3.5 - 5.1 mmol/L Final    Chloride 11/20/2017 109  95 - 110 mmol/L Final    CO2 11/20/2017 22* 23 - 29 mmol/L Final    Glucose 11/20/2017 109  70 - 110 mg/dL Final    BUN, Bld 11/20/2017 37* 8 - 23 mg/dL Final    Creatinine 11/20/2017 4.2* 0.5 - 1.4 mg/dL Final    Calcium 11/20/2017 9.3  8.7 - 10.5 mg/dL Final     Total Protein 11/20/2017 7.7  6.0 - 8.4 g/dL Final    Albumin 11/20/2017 3.4* 3.5 - 5.2 g/dL Final    Total Bilirubin 11/20/2017 0.7  0.1 - 1.0 mg/dL Final    Comment: For infants and newborns, interpretation of results should be based  on gestational age, weight and in agreement with clinical  observations.  Premature Infant recommended reference ranges:  Up to 24 hours.............<8.0 mg/dL  Up to 48 hours............<12.0 mg/dL  3-5 days..................<15.0 mg/dL  6-29 days.................<15.0 mg/dL      Alkaline Phosphatase 11/20/2017 79  55 - 135 U/L Final    AST 11/20/2017 23  10 - 40 U/L Final    ALT 11/20/2017 12  10 - 44 U/L Final    Anion Gap 11/20/2017 7* 8 - 16 mmol/L Final    eGFR if  11/20/2017 16.5* >60 mL/min/1.73 m^2 Final    eGFR if non  11/20/2017 14.3* >60 mL/min/1.73 m^2 Final    Comment: Calculation used to obtain the estimated glomerular filtration  rate (eGFR) is the CKD-EPI equation.       WBC 11/20/2017 8.49  3.90 - 12.70 K/uL Final    RBC 11/20/2017 4.73  4.60 - 6.20 M/uL Final    Hemoglobin 11/20/2017 12.3* 14.0 - 18.0 g/dL Final    Hematocrit 11/20/2017 38.8* 40.0 - 54.0 % Final    MCV 11/20/2017 82  82 - 98 fL Final    MCH 11/20/2017 26.0* 27.0 - 31.0 pg Final    MCHC 11/20/2017 31.7* 32.0 - 36.0 g/dL Final    RDW 11/20/2017 15.4* 11.5 - 14.5 % Final    Platelets 11/20/2017 281  150 - 350 K/uL Final    MPV 11/20/2017 11.8  9.2 - 12.9 fL Final    Immature Granulocytes 11/20/2017 0.5  0.0 - 0.5 % Final    Gran # 11/20/2017 6.0  1.8 - 7.7 K/uL Final    Immature Grans (Abs) 11/20/2017 0.04  0.00 - 0.04 K/uL Final    Lymph # 11/20/2017 1.5  1.0 - 4.8 K/uL Final    Mono # 11/20/2017 0.7  0.3 - 1.0 K/uL Final    Eos # 11/20/2017 0.2  0.0 - 0.5 K/uL Final    Baso # 11/20/2017 0.03  0.00 - 0.20 K/uL Final    nRBC 11/20/2017 0  0 /100 WBC Final    Gran% 11/20/2017 70.2  38.0 - 73.0 % Final    Lymph% 11/20/2017 17.9* 18.0 - 48.0 %  Final    Mono% 11/20/2017 8.6  4.0 - 15.0 % Final    Eosinophil% 11/20/2017 2.4  0.0 - 8.0 % Final    Basophil% 11/20/2017 0.4  0.0 - 1.9 % Final    Differential Method 11/20/2017 Automated   Final       Assessment:     1. Routine general medical examination at a health care facility    2. Hypertension associated with diabetes    3. Hyperlipidemia associated with type 2 diabetes mellitus    4. Chronic kidney disease (CKD), stage IV (severe)    5. Type 2 diabetes mellitus with hyperglycemia, with long-term current use of insulin    6. Hyperlipidemia, unspecified hyperlipidemia type    7. Colon cancer screening      Plan:   Routine general medical examination at a health care facility    Hypertension associated with diabetes  At goal per nephrology    Hyperlipidemia associated with type 2 diabetes mellitus  Controlled    Chronic kidney disease (CKD), stage IV (severe)  Stable    Type 2 diabetes mellitus with hyperglycemia, with long-term current use of insulin  Not at goal. meds recently adjusted    Hyperlipidemia, unspecified hyperlipidemia type  Controlled  -     rosuvastatin (CRESTOR) 40 MG Tab; TAKE 1 TABLET (40 MG TOTAL) BY MOUTH EVERY EVENING.  Dispense: 90 tablet; Refill: 3    Colon cancer screening  -     Case request GI: COLONOSCOPY  -     sodium,potassium,mag sulfates (SUPREP BOWEL PREP KIT) 17.5-3.13-1.6 gram SolR; Take as directed  Dispense: 354 mL; Refill: 0    Other orders  -     Cancel: Case request GI: COLONOSCOPY     heart healthy diet and regular exercise.  Health maintenance reviewed with patient    Lab Frequency Next Occurrence   PSA, Screening Once 11/10/2017   Renal function panel Once 01/03/2017   CBC auto differential Once 01/03/2017   PTH, intact Once 01/03/2017   Uric acid Once 01/03/2017   Vitamin D Once 01/03/2017   Protein / creatinine ratio, urine Once 01/03/2017   Urinalysis Once 01/03/2017   PSA, Screening Once 11/16/2017   PSA, Screening Once 11/16/2017   Hemoglobin A1c Once  12/20/2017   Renal function panel Once 11/28/2017   CBC auto differential Once 11/28/2017   PTH, intact Once 11/28/2017   Uric acid Once 11/28/2017   Vitamin D Once 11/28/2017   Protein / creatinine ratio, urine Once 11/28/2017   Urinalysis Once 11/28/2017       Problem List Items Addressed This Visit        Cardiac/Vascular    Hypertension associated with diabetes    Hyperlipidemia associated with type 2 diabetes mellitus       Renal/    Chronic kidney disease (CKD), stage IV (severe)       Endocrine    Type 2 diabetes mellitus with hyperglycemia, with long-term current use of insulin      Other Visit Diagnoses     Routine general medical examination at a health care facility    -  Primary    Hyperlipidemia, unspecified hyperlipidemia type        Relevant Medications    rosuvastatin (CRESTOR) 40 MG Tab    Colon cancer screening        Relevant Medications    sodium,potassium,mag sulfates (SUPREP BOWEL PREP KIT) 17.5-3.13-1.6 gram SolR    Other Relevant Orders    Case request GI: COLONOSCOPY (Completed)          Return in about 1 year (around 12/4/2018).

## 2017-12-12 ENCOUNTER — TELEPHONE (OUTPATIENT)
Dept: TRANSPLANT | Facility: CLINIC | Age: 62
End: 2017-12-12

## 2017-12-18 ENCOUNTER — LAB VISIT (OUTPATIENT)
Dept: LAB | Facility: HOSPITAL | Age: 62
End: 2017-12-18
Attending: NURSE PRACTITIONER
Payer: COMMERCIAL

## 2017-12-18 ENCOUNTER — OFFICE VISIT (OUTPATIENT)
Dept: PODIATRY | Facility: CLINIC | Age: 62
End: 2017-12-18
Payer: COMMERCIAL

## 2017-12-18 ENCOUNTER — TELEPHONE (OUTPATIENT)
Dept: DIABETES | Facility: CLINIC | Age: 62
End: 2017-12-18

## 2017-12-18 VITALS
HEIGHT: 74 IN | BODY MASS INDEX: 33.1 KG/M2 | HEART RATE: 84 BPM | DIASTOLIC BLOOD PRESSURE: 93 MMHG | SYSTOLIC BLOOD PRESSURE: 150 MMHG | WEIGHT: 257.94 LBS

## 2017-12-18 DIAGNOSIS — M72.2 PLANTAR FASCIITIS: Primary | ICD-10-CM

## 2017-12-18 DIAGNOSIS — E11.65 TYPE 2 DIABETES MELLITUS WITH HYPERGLYCEMIA, WITH LONG-TERM CURRENT USE OF INSULIN: ICD-10-CM

## 2017-12-18 DIAGNOSIS — E11.9 COMPREHENSIVE DIABETIC FOOT EXAMINATION, TYPE 2 DM, ENCOUNTER FOR: ICD-10-CM

## 2017-12-18 DIAGNOSIS — Z79.4 TYPE 2 DIABETES MELLITUS WITH HYPERGLYCEMIA, WITH LONG-TERM CURRENT USE OF INSULIN: ICD-10-CM

## 2017-12-18 LAB
ESTIMATED AVG GLUCOSE: 192 MG/DL
HBA1C MFR BLD HPLC: 8.3 %

## 2017-12-18 PROCEDURE — 99243 OFF/OP CNSLTJ NEW/EST LOW 30: CPT | Mod: S$GLB,,, | Performed by: PODIATRIST

## 2017-12-18 PROCEDURE — 99999 PR PBB SHADOW E&M-EST. PATIENT-LVL IV: CPT | Mod: PBBFAC,,, | Performed by: PODIATRIST

## 2017-12-18 PROCEDURE — 83036 HEMOGLOBIN GLYCOSYLATED A1C: CPT | Mod: TXP

## 2017-12-18 PROCEDURE — 36415 COLL VENOUS BLD VENIPUNCTURE: CPT | Mod: PO,TXP

## 2017-12-18 NOTE — LETTER
December 18, 2017      [place]  [address] [address] [place] [address]           [facility] - Podiatry  [address] [place] [address]  Phone: [phone]  Fax: [phone]          Patient: Isidro Alves   MR Number: [id number]   YOB: 1955   Date of Visit: 12/18/2017       Dear Lynne Pacheco:    Thank you for referring Isidro Alves to me for evaluation. Attached you will find relevant portions of my assessment and plan of care.    If you have questions, please do not hesitate to call me. I look forward to following Isidro Alves along with you.    Sincerely,    Tomeka Joseph DPM    Enclosure  CC:  No Recipients    If you would like to receive this communication electronically, please contact [email or web address] or [phone] to request more information on [employer] access.    For providers and/or their staff who would like to refer a patient to [facility], please contact us through our one-stop-shop provider referral line, [facility], at [phone].    If you feel you have received this communication in error or would no longer like to receive these types of communications, please e-mail [email or web address]

## 2017-12-18 NOTE — PROGRESS NOTES
PODIATRY CONSULTATION NOTE    CHIEF COMPLAINT  Chief Complaint   Patient presents with    Diabetic Foot Exam     establishing care PCP Dr. Morgan 12/04/2017    Heel Pain     right heel pain worse after walking       HPI  Isidro Alves is a 62 y.o. male who complains of pain to the right heel.  Pain is described as achy.  Pain is worst after walking or exercising  Pain is rated to be 9/10 by the patient on a 1-10 scale.   Pain has been present for 3 months.  Pain is worsening.  Pain is aggravated by walking and exercise.  Pain is improved by rest and Tylenol. He was referred here for diabetic foot exam and for evaluation of heel pain.    Hemoglobin A1C   Date Value Ref Range Status   11/10/2017 8.6 (H) 4.0 - 5.6 % Final     Comment:     According to ADA guidelines, hemoglobin A1c <7.0% represents  optimal control in non-pregnant diabetic patients. Different  metrics may apply to specific patient populations.   Standards of Medical Care in Diabetes-2016.  For the purpose of screening for the presence of diabetes:  <5.7%     Consistent with the absence of diabetes  5.7-6.4%  Consistent with increasing risk for diabetes   (prediabetes)  >or=6.5%  Consistent with diabetes  Currently, no consensus exists for use of hemoglobin A1c  for diagnosis of diabetes for children.  This Hemoglobin A1c assay has significant interference with fetal   hemoglobin   (HbF). The results are invalid for patients with abnormal amounts of   HbF,   including those with known Hereditary Persistence   of Fetal Hemoglobin. Heterozygous hemoglobin variants (HbAS, HbAC,   HbAD, HbAE, HbA2) do not significantly interfere with this assay;   however, presence of multiple variants in a sample may impact the %   interference.     12/30/2016 8.0 (H) 4.5 - 6.2 % Final     Comment:     According to ADA guidelines, hemoglobin A1C <7.0% represents  optimal control in non-pregnant diabetic patients.  Different  metrics may apply to specific populations.  "  Standards of Medical Care in Diabetes - 2016.  For the purpose of screening for the presence of diabetes:  <5.7%     Consistent with the absence of diabetes  5.7-6.4%  Consistent with increasing risk for diabetes   (prediabetes)  >or=6.5%  Consistent with diabetes  Currently no consensus exists for use of hemoglobin A1C  for diagnosis of diabetes for children.     11/14/2016 8.7 (H) 4.5 - 6.2 % Final     Comment:     According to ADA guidelines, hemoglobin A1C <7.0% represents  optimal control in non-pregnant diabetic patients.  Different  metrics may apply to specific populations.   Standards of Medical Care in Diabetes - 2016.  For the purpose of screening for the presence of diabetes:  <5.7%     Consistent with the absence of diabetes  5.7-6.4%  Consistent with increasing risk for diabetes   (prediabetes)  >or=6.5%  Consistent with diabetes  Currently no consensus exists for use of hemoglobin A1C  for diagnosis of diabetes for children.         PMH  Past Medical History:   Diagnosis Date    Anemia 4/16/2014    CKD (chronic kidney disease) stage 4, GFR 15-29 ml/min     Diabetes mellitus     Hypertension     Secondary hyperparathyroidism (of renal origin)     UTI (lower urinary tract infection) 5/1/2015        PSH  Past Surgical History:   Procedure Laterality Date    PROSTATE BIOPSY      thorocotomy  2010        MEDS  Current Outpatient Prescriptions on File Prior to Visit   Medication Sig Dispense Refill    acyclovir (ZOVIRAX) 400 MG tablet TAKE 1 TABLET 3 TIMES DAILY. (Patient taking differently: TAKE 1 TABLET 3 TIMES DAILY AS NEEDED) 30 tablet 6    BD INSULIN SYRINGE HALF UNIT 0.3 mL 31 gauge x 5/16" Syrg FOR USE WITH INSULIN. 100 Syringe 1    CIALIS 10 mg tablet TAKE 1 TABLET (10 MG TOTAL) BY MOUTH AS NEEDED FOR ERECTILE DYSFUNCTION. 6 tablet 7    dulaglutide (TRULICITY) 0.75 mg/0.5 mL PnIj Inject 0.5 mLs (0.75 mg total) into the skin once a week. 4 Syringe 0    esomeprazole (NEXIUM) 40 MG " capsule Take 40 mg by mouth once daily.      fluticasone (FLONASE) 50 mcg/actuation nasal spray 2 sprays by Each Nare route once daily. 1 Bottle 0    folic acid (FOLVITE) 1 MG tablet TAKE 1 TABLET (1 MG TOTAL) BY MOUTH ONCE DAILY. 30 tablet 3    insulin degludec (TRESIBA FLEXTOUCH U-200) 200 unit/mL (3 mL) InPn Inject 10 Units into the skin once daily. Increase 2 units every 3 days until fasting blood sugar is less than 110 3 Syringe 1    lancets (ONETOUCH ULTRASOFT LANCETS) Misc 1 lancet by Misc.(Non-Drug; Combo Route) route 2 (two) times daily. (Patient taking differently: 1 lancet by Misc.(Non-Drug; Combo Route) route 3 (three) times daily. ) 200 each 1    multivitamin (THERAGRAN) tablet Take 1 tablet by mouth once daily.      nifedipine (ADALAT CC) 60 MG TbSR TAKE 1 TABLET (60 MG TOTAL) BY MOUTH 2 (TWO) TIMES DAILY. 60 tablet 8    olopatadine (PATADAY) 0.2 % Drop Place 1 drop into both eyes daily as needed. 2.5 mL 0    ONETOUCH ULTRA TEST Strp 1 STRIP BY MISC.(NON-DRUG COMBO ROUTE) ROUTE 3 (THREE) TIMES DAILY. 100 strip 2    rosuvastatin (CRESTOR) 40 MG Tab TAKE 1 TABLET (40 MG TOTAL) BY MOUTH EVERY EVENING. 90 tablet 3    tamsulosin (FLOMAX) 0.4 mg Cp24 TAKE 2 CAPSULES (0.8 MG TOTAL) BY MOUTH EVERY EVENING. 60 capsule 7    valsartan (DIOVAN) 320 MG tablet TAKE 1 TABLET (320 MG TOTAL) BY MOUTH ONCE DAILY. 90 tablet 2    VITAMIN D2 50,000 unit capsule TAKE 1 CAPSULE (50,000 UNITS TOTAL) BY MOUTH EVERY 7 DAYS. 4 capsule 6    sodium,potassium,mag sulfates (SUPREP BOWEL PREP KIT) 17.5-3.13-1.6 gram SolR Take as directed 354 mL 0    VENTOLIN HFA 90 mcg/actuation inhaler INHALE 2 PUFFS INTO THE LUNGS EVERY 6 (SIX) HOURS AS NEEDED FOR WHEEZING. 18 Inhaler 0     No current facility-administered medications on file prior to visit.       Medication List with Changes/Refills   Current Medications    ACYCLOVIR (ZOVIRAX) 400 MG TABLET    TAKE 1 TABLET 3 TIMES DAILY.    BD INSULIN SYRINGE HALF UNIT 0.3 ML 31  "GAUGE X 5/16" SYRG    FOR USE WITH INSULIN.    CIALIS 10 MG TABLET    TAKE 1 TABLET (10 MG TOTAL) BY MOUTH AS NEEDED FOR ERECTILE DYSFUNCTION.    DULAGLUTIDE (TRULICITY) 0.75 MG/0.5 ML PNIJ    Inject 0.5 mLs (0.75 mg total) into the skin once a week.    ESOMEPRAZOLE (NEXIUM) 40 MG CAPSULE    Take 40 mg by mouth once daily.    FLUTICASONE (FLONASE) 50 MCG/ACTUATION NASAL SPRAY    2 sprays by Each Nare route once daily.    FOLIC ACID (FOLVITE) 1 MG TABLET    TAKE 1 TABLET (1 MG TOTAL) BY MOUTH ONCE DAILY.    INSULIN DEGLUDEC (TRESIBA FLEXTOUCH U-200) 200 UNIT/ML (3 ML) INPN    Inject 10 Units into the skin once daily. Increase 2 units every 3 days until fasting blood sugar is less than 110    LANCETS (ONETOUCH ULTRASOFT LANCETS) MISC    1 lancet by Misc.(Non-Drug; Combo Route) route 2 (two) times daily.    MULTIVITAMIN (THERAGRAN) TABLET    Take 1 tablet by mouth once daily.    NIFEDIPINE (ADALAT CC) 60 MG TBSR    TAKE 1 TABLET (60 MG TOTAL) BY MOUTH 2 (TWO) TIMES DAILY.    OLOPATADINE (PATADAY) 0.2 % DROP    Place 1 drop into both eyes daily as needed.    ONETOUCH ULTRA TEST STRP    1 STRIP BY [facility]NON-DRUG COMBO ROUTE) ROUTE 3 (THREE) TIMES DAILY.    ROSUVASTATIN (CRESTOR) 40 MG TAB    TAKE 1 TABLET (40 MG TOTAL) BY MOUTH EVERY EVENING.    SODIUM,POTASSIUM,MAG SULFATES (SUPREP BOWEL PREP KIT) 17.5-3.13-1.6 GRAM SOLR    Take as directed    TAMSULOSIN (FLOMAX) 0.4 MG CP24    TAKE 2 CAPSULES (0.8 MG TOTAL) BY MOUTH EVERY EVENING.    VALSARTAN (DIOVAN) 320 MG TABLET    TAKE 1 TABLET (320 MG TOTAL) BY MOUTH ONCE DAILY.    VENTOLIN HFA 90 MCG/ACTUATION INHALER    INHALE 2 PUFFS INTO THE LUNGS EVERY 6 (SIX) HOURS AS NEEDED FOR WHEEZING.    VITAMIN D2 50,000 UNIT CAPSULE    TAKE 1 CAPSULE (50,000 UNITS TOTAL) BY MOUTH EVERY 7 DAYS.       ALLG  Review of patient's allergies indicates:   Allergen Reactions    Bactrim [sulfamethoxazole-trimethoprim] Swelling    Nsaids (non-steroidal anti-inflammatory drug)      CKD " "      SOCIAL Hx  Social History     Social History    Marital status:      Spouse name: N/A    Number of children: 3    Years of education: N/A     Occupational History     Self-Rmployed     Social History Main Topics    Smoking status: Never Smoker    Smokeless tobacco: Never Used    Alcohol use Yes      Comment: seldom     Drug use: No    Sexual activity: Yes     Partners: Female     Other Topics Concern    None     Social History Narrative    None       FAM Hx  Family History   Problem Relation Age of Onset    No Known Problems Mother     Diabetes Father     Hypertension Father     Hypertension      Diabetes         Review of systems:  The patient denies nausea, vomiting, fevers, chills, shortness of breath, chest pain, and calf pain.      OBJECTIVE:  Vitals:    12/18/17 1310   BP: (!) 150/93   Pulse: 84   Weight: 117 kg (257 lb 15 oz)   Height: 6' 2" (1.88 m)   PainSc:   9   PainLoc: Foot       Lower Extremity Physical Exam    Vascular exam:   · Dorsalis pedis and posterior tibial pulses palpable 2/4 bilaterally.   · Capillary refill time immediate to the toes.   · Feet are warm to the touch.   · There are no varicosities, telangiectasias noted to bilateral foot and ankle regions.   · There are no ecchymoses noted to bilateral foot and ankle regions.   · There is no gross lower extremity edema.    Dermatologic exam:   · Skin moist with healthy texture and turgor.  · There are no open ulcerations, lacerations, or fissures to bilateral foot and ankle regions. There are no signs of infection as there is no erythema, no proximal-extending lymphangiitis, no fluctuance, or crepitus noted on palpation to bilateral foot and ankle regions.   · There is no interdigital maceration.   · There are no hyperkeratotic lesions noted to feet. Nails are well-trimmed.    Neurologic exam:  ·  Epicritic sensation is intact as the patient is able to sense light touch to bilateral foot and ankle regions. "   · There is a negative Tinel's sign on percussion of the tibial nerve, dorsal cutaneous nerves, sural nerves of the right foot.    Musculoskeletal exam:   · Pain on palpation plantar medial tubercle of calcaneus, right foot. No pain along plantar fascia bilaterally.   · No pain with medial-lateral compression of calcaneus.  · 5/5 muscle strength in all 4 quadrants.   · Ankle joint range of motion decreased with knee extended and flexed b/l.      ASSESSMENT:   Plantar fasciitis - Right Foot    Comprehensive diabetic foot examination, type 2 DM, encounter for        PLAN:   1. The patient was examined and evaluated   2. Treatment options were discussed in detail; the patient elected to undergo conservative treatment.  Discussed different treatment options for heel pain.   3. I gave written and verbal instructions on heel cord stretching and this was demonstrated for the patient. A theraband was also provided to the patient for stretching exercises. Recommendations were given for plantar fasciitis treatment including icing, stretching, arch supports, avoidance of barefoot walking, appropriate shoe wear, and strict compliance. Discussed importance of patient to perform stretching exercises.   4. Prescription was written for OTC arch supports.  I Discussed that plantar fasciitis typically resolves on its own in a variable amount of time. If no improvement, will proceed with cortisone injection. I also  discussed possible tx with SLC, PT, Dynasplint and custom orthotics.Surgical intervention is the last resort for plantar fasciitis.   Due to uncontrolled DM2 and CKD will not recommend NSAID OR Steroid. Rehab and inserts/shoes, night splint   5. Information was given regarding the patient's condition and prognosis. All the patient's questions were answered.   6. Return to clinic in 4-6 weeks for follow up evaluation, or sooner if symptoms worsen.   7. The patient is welcome to call or email with any questions or concerns  at any time.       Report Electronically Signed By:  Tomeka Joseph DPM   Podiatric Medicine & Surgery  [facility]  12/18/2017

## 2017-12-18 NOTE — PATIENT INSTRUCTIONS
"You can purchase a night splint on the internet or at a medical supply store. These are not as effective as a "dynamic" night splint, however they have shown to help greatly in the treatment of plantar fasciitis.    Go to:  http://[employer]/   Elisha Plantar Fasciitis Night splint-$23.99.  · Alleviates pain in the arch and heel area associated with plantar fasciitis  · Lightweight, low profile shell is sturdy and breathable  · Provides gentle stretching of the plantar fascia and achilles tendon  · User-friendly center-release puma simplify application      You can also search online at different websites for NIGHT SPLINT. http://www.plantarfasciitisresource.com/best-plantar-fasciitis-night-splint/     BEST PLANTAR FASCIITIS NIGHT SPLINT  (click image to enlarge)  Image Brand / Model Price Rating    [employer] ~$20 5/5    [employer] Medical ~$20 5/5    Futuro Sleep Support(Dorsal) and Futuro Adjustable ~$25 4/5    Active Ankle DNS (Dorsal) ~$25 4/5    Swede-O Deluxe (Dorsal) $50+ 3.5/5    Thermoskin Night Time Relief FXT ~$30 3.5/5           "

## 2017-12-19 ENCOUNTER — TELEPHONE (OUTPATIENT)
Dept: DIABETES | Facility: CLINIC | Age: 62
End: 2017-12-19

## 2017-12-19 ENCOUNTER — OFFICE VISIT (OUTPATIENT)
Dept: DIABETES | Facility: CLINIC | Age: 62
End: 2017-12-19
Payer: COMMERCIAL

## 2017-12-19 VITALS
BODY MASS INDEX: 33.9 KG/M2 | HEIGHT: 74 IN | DIASTOLIC BLOOD PRESSURE: 92 MMHG | WEIGHT: 264.13 LBS | SYSTOLIC BLOOD PRESSURE: 142 MMHG

## 2017-12-19 DIAGNOSIS — E11.65 TYPE 2 DIABETES MELLITUS WITH HYPERGLYCEMIA, WITH LONG-TERM CURRENT USE OF INSULIN: Primary | ICD-10-CM

## 2017-12-19 DIAGNOSIS — Z79.4 TYPE 2 DIABETES MELLITUS WITH HYPERGLYCEMIA, WITH LONG-TERM CURRENT USE OF INSULIN: Primary | ICD-10-CM

## 2017-12-19 LAB — GLUCOSE SERPL-MCNC: 142 MG/DL (ref 70–110)

## 2017-12-19 PROCEDURE — 99214 OFFICE O/P EST MOD 30 MIN: CPT | Mod: S$GLB,,, | Performed by: NURSE PRACTITIONER

## 2017-12-19 PROCEDURE — 82948 REAGENT STRIP/BLOOD GLUCOSE: CPT | Mod: S$GLB,,, | Performed by: NURSE PRACTITIONER

## 2017-12-19 PROCEDURE — 99999 PR PBB SHADOW E&M-EST. PATIENT-LVL III: CPT | Mod: PBBFAC,,, | Performed by: NURSE PRACTITIONER

## 2017-12-19 RX ORDER — INSULIN DEGLUDEC 200 U/ML
35 INJECTION, SOLUTION SUBCUTANEOUS DAILY
Qty: 3 SYRINGE | Refills: 5 | Status: SHIPPED | OUTPATIENT
Start: 2017-12-19 | End: 2018-04-23 | Stop reason: SDUPTHER

## 2017-12-19 NOTE — PATIENT INSTRUCTIONS
PATIENT INSTRUCTIONS  - Follow up as scheduled.   - Carb Count: 30-45G/meal and 15G/snack  - Exercise: Goal is 150 minutes or more per week  - Bring meter and blood sugar log to each appointment.   - Increase Tresiba at 35 units. Increase 2 units every 3 days until fasting blood sugar is less than 110  - Increase Trulicity to 1.5mg. Continue to take once a week  - Send me your log on Tuesday, by phone or Email    - You will take your blood sugar two times daily. Once will always be in the morning before you have any food, (known as your fasting blood sugar), and once should be two hours after EITHER your breakfast, lunch, or dinner, (known as your post-prandial blood sugar). Each day you should check a different meal, (ie. Monday-breakfast; Tuesday- lunch; Wednesday- supper, then repeat).     - Send me your blood sugars weekly if directed to do so. The easiest way to do this is through [facility]. Send in logs on Tuesdays, Wednesdays, or Thursdays.    - Blood Sugar Goals:  1. The goal for fasting blood sugars is 80 -110 mg/dl.   2. The goal for the 2 hour after meal blood sugars is below 140-180 mg/dl.  3. Blood sugars below 70 are considered LOW and you must eat or drink 15G of carbohydrates immediately, then recheck blood sugar after 15 minutes to ensure blood sugar has returned to normal range, (70 or above)                                                              Diabetes (General Information)  Diabetes is a long-term health problem. It means your body does not make enough insulin. Or it may mean that your body cannot use the insulin it makes. Insulin is a hormone in your body. It lets blood sugar (glucose) reach the cells in your body. All of your cells need glucose for fuel.  When you have diabetes, the glucose in your blood builds up because it cannot get into the cells. This buildup is called high blood sugar (hyperglycemia).  Your blood sugar level depends on several things. It depends on what kind of  food you eat and how much of it you eat. It also depends on how much exercise you get, and how much insulin you have in your body. Eating too much of the wrong kinds of food or not taking diabetes medicine on time can cause high blood sugar. Infections can cause high blood sugar even if you are taking medicines correctly.  These things can also cause low blood sugar:  · Missing meals  · Not eating enough food  · Taking too much diabetes medicine  Diabetes can cause serious problems over time if you do not get treated. These problems include heart disease, stroke, kidney failure, and blindness. They also include nerve pain or loss of feeling in your legs and feet, and gangrene of the feet. By keeping your blood sugar under control you can prevent or delay these problems.  Normal blood sugar levels are 80 to 100 before a meal and less than 180 in the 1 to 2 hours after a meal.  Home care  Follow these guidelines when caring for yourself at home:  · Follow the diet your healthcare provider gives you. Take insulin or other diabetes medicine exactly as told to.  · Watch your blood sugar as you are told to. Keep a log of your results. This will help your provider change your medicines to keep your blood sugar under control.  · Try to reach your ideal weight. You may be able to cut back on or not have to take diabetes medicine if you eat the right foods and get exercise.  · Do not smoke. Smoking worsens the effects of diabetes on your circulation. You are much more likely to have a heart attack if you have diabetes and you smoke.  · Take good care of your feet. If you have lost feeling in your feet, you may not see an injury or infection. Check your feet and between your toes at least once a week.  · Wear a medical alert bracelet or necklace, or carry a card in your wallet that says you have diabetes. This will help healthcare providers give you the right care if you get very ill and cannot tell them that you have  diabetes.  Sick day plan  If you get a cold, the flu, or a bacterial or viral infection, take these steps:  · Look at your diabetes sick plan and call your healthcare provider as you were told to. You may need to call your provider right away if:  ¨ Your blood sugar is above 240 while taking your diabetes medicine  ¨ Your urine ketone levels are above normal or high  ¨ You have been vomiting more than 6 hours  ¨ You have trouble breathing or your breath ha s a fruity smell  ¨ You have a high fever  ¨ You have a fever for several days and you are not getting better  ¨ You get light-headed and are sleepier than usual  · Keep taking your diabetes pills (oral medicine) even if you have been vomiting and are feeling sick. Call your provider right away because you may need insulin to lower your blood sugar until you recover from your illness.  · Keep taking your insulin even if you have been vomiting and are feeling sick. Call your provider right away to ask if you need to change your insulin dose. This will depend on your blood sugar results.  · Check your blood sugar every 2 to 4 hours, or at least 4 times a day.  · Check your ketones often. If you are vomiting and having diarrhea, watch them more often.  · Do not skip meals. Try to eat small meals on a regular schedule. Do this even if you do not feel like eating.  · Drink water or other liquids that do not have caffeine or calories. This will keep you from getting dehydrated. If you are nauseated or vomiting, takes small sips every 5 minutes. To prevent dehydration try to drink a cup (8 ounces) of fluids every hour while you are awake.  General care  Always bring a source of fast-acting sugar with you in case you have symptoms of low blood sugar (below 70). At the first sign of low blood sugar, eat or drink 15 to 20 grams of fast-acting sugar to raise your blood sugar. Examples are:  · 3 to 4 glucose tablets. You can buy these at most drugstores.  · 4 ounces (1/2 cup)  of regular (not diet) soft drinks  · 4 ounces (1/2 cup) of any fruit juice  · 8 ounces (1 cup) of milk  · 5 to 6 pieces of hard candy  · 1 tablespoon of honey  Check your blood sugar 15 minutes after treating yourself. If it is still below 70, take 15 to 20 more grams of fast-acting sugar. Test again in 15 minutes. If it returns to normal (70 or above), eat a snack or meal to keep your blood sugar in a safe range. If it stays low, call your doctor or go to an emergency room.  Follow-up care  Follow-up with your healthcare provider, or as advised. For more information about diabetes, visit the American Diabetes Association website at www.diabetes.org or call 594-662-3342.  When to seek medical advice  Call your healthcare provider right away if you have any of these symptoms of high blood sugar:  · Frequent urination  · Dizziness  · Drowsiness  · Thirst  · Headache  · Nausea or vomiting  · Abdominal pain  · Eyesight changes  · Fast breathing  · Confusion or loss of consciousness  Also call your provider right away if you have any of these signs of low blood sugar:  · Fatigue  · Headache  · Shakes  · Excess sweating  · Hunger  · Feeling anxious or restless  · Eyesight changes  · Drowsiness  · Weakness  · Confusion or loss of consciousness  Call 911  Call for emergency help right away if any of these occur:  · Chest pain or shortness of breath  · Dizziness or fainting  · Weakness of an arm or leg or one side of the face  · Trouble speaking or seeing   Date Last Reviewed: 6/1/2016  © 8197-0081 [employer]. [address] [place] [address], [place] [address]. All rights reserved. This information is not intended as a substitute for professional medical care. Always follow your healthcare professional's instructions.                                                                     Understanding Type 2 Diabetes  When your body is working normally, the food you eat is digested and used as fuel. This fuel supplies  energy to the bodys cells. When you have diabetes, the fuel cant enter the cells. Without treatment, diabetes can cause serious long-term health problems.     Your body breaks down the food you eat into glucose.          How the body gets energy  The digestive system breaks down food, resulting in a sugar called glucose. Some of this glucose is stored in the liver. But most of it enters the bloodstream and travels to the cells to be used as fuel. Glucose needs the help of a hormone called insulin to enter the cells. Insulin is made in the pancreas. It is released into the bloodstream in response to the presence of glucose in the blood. Think of insulin as a key. When insulin reaches a cell, it attaches to the cell wall. This signals the cell to create an opening that allows glucose to enter the cell.  When you have type 2 diabetes  Early in type 2 diabetes, your cells dont respond properly to insulin. Because of this, less glucose than normal moves into cells. This is called insulin resistance. In response, the pancreas makes more insulin. But eventually, the pancreas cant produce enough insulin to overcome insulin resistance. As less and less glucose enters cells, it builds up to a harmful level in the bloodstream. This is known as high blood sugar or hyperglycemia. The result is type 2 diabetes. The cells become starved for energy, which can leave you feeling tired and rundown.  Why high blood sugar is a problem  If high blood sugar is not controlled, blood vessels throughout the body become damaged. Prolonged high blood sugar affects organs, blood vessels, and nerves. As a result, the risks of damage to the heart, kidneys, eyes, and limbs increase. Diabetes also makes other problems, such as high blood pressure and high cholesterol, more dangerous. Over time, people with uncontrolled high blood sugar have an increase in risk of dying of, or being disabled by, heart attack or stroke.  Date Last Reviewed:  7/1/2016  © 6563-4594 [employer]. [address] [place] [address], [place] [address]. All rights reserved. This information is not intended as a substitute for professional medical care. Always follow your healthcare professional's instructions.                                                            Using a Blood Sugar Log    You have diabetes. This means your body has trouble regulating a sugar called glucose. To help manage your diabetes, youll need to check your blood sugar level as directed by your healthcare provider. Keeping a log of your blood sugar levels will help you track your blood sugar readings. Its a simple and easy way to see how well you are controlling your diabetes.  Checking your blood sugar level  You can check your blood sugar level with a blood glucose meter. Youll first prick the side of your finger with a tiny lancet to draw a tiny drop of blood onto the test strip. Some glucose meters let you use another place on your body to test. But these other places should not be used in some cases as they may be inaccurate. Follow the instructions for your glucose meter. And talk with your healthcare provider before doing the test on other places.  The strip goes into the meter first, then a drop of blood is placed on the tip of the strip. The meter then shows a reading that tells you the level of your blood sugar. Your readings should be in your target range as often as possible. This means not too high or too low. Staying in this range helps lower your risk for complications. Your healthcare provider will help you figure out the target range that is best for you.  Tracking your readings  Every time you check your blood sugar, use your log to keep track of your readings. Your meter will also probably have a memory feature that your healthcare provider can check at your next visit. You may be advised by your healthcare provider to check your blood sugar in the morning, at bedtime, and  before and after meals. Be sure to write down all of your numbers. Also use your log to record things that might have affected your blood sugar. Some examples include being sick, certain medicines, being physically active, feeling stressed, or skipping meals.   Lessons learned from your readings  Tracking your blood sugar readings helps you see patterns. These patterns tell you how your actions affect your blood sugar. For instance, you may have higher numbers after eating certain foods or lower numbers after exercise. They just help you understand how to stay in your target range more often, so that your diabetes remains in good control.  Sharing your log with your healthcare team  Bring your blood sugar log and glucose meter with you to all of your healthcare appointments. This can help your healthcare team make changes to your treatment plan, if needed. This may involve making changes in what you eat, what medicines you take, or how much you exercise.  To learn more  The resources below can help you learn more:  · American Diabetes Association 098-812-2325 www.diabetes.org  · [employer] 094-546-6544 www.lighthouse.org  · [facility] 134-869-6806 www.nei.nih.gov  · [employer] 196-799-0515 www.hormone.org  Date Last Reviewed: 5/1/2016  © 4327-4621 [employer]. [address] [place] [address], [place] [address]. All rights reserved. This information is not intended as a substitute for professional medical care. Always follow your healthcare professional's instructions.                                                                                            Diabetes: Exams and Tests    For your diabetes care, you may see your primary care provider or a specialist 2 to 4 times a year, or as directed. This page lists some of the regular exams and tests recommended for people with diabetes. To learn more, contact the American Diabetes Association (664-250-8561,  www.diabetes.org).  Tests and immunizations  These should be done at least as often as stated below:  · Blood pressure check: every healthcare provider visit  · A1C: at first, every 3 months; if controlled, then every 3 to 6 months   · Cholesterol and blood lipid tests: at least every 12 months.  · Urine tests for kidney function: every 12 months  · Flu shots: once a year  · Pneumonia shots: talk with your healthcare provider about which pneumonia vaccines are right for you  · Hepatitis B shots: as soon as possible if youre under 60, or as advised by your healthcare provider if youre older than 60  · Shingles vaccine after age 60, even if you have already had shingles   · Other tests or vaccines: as advised by your healthcare provider  · Individualized medical nutrition therapy: at least once, then as needed  · Stop smoking counseling, if you still smoke, at each visit   Regular exams  The following exams help keep you healthy:  · Foot exams. Nerve and blood vessel problems can affect your feet sooner than other parts of your body. Make sure that your healthcare provider checks your feet at every office visit.  · Eye exams. You can have problems with your eyes even if you dont have trouble seeing. An ophthalmologist (eye healthcare provider) or specially trained optometrist will give you a dilated eye exam at least once a year. If you see dark spots, see poorly in dim light, have eye pain or pressure, or notice any other problems, tell your healthcare provider right away.  · Dental exams. Gum disease (also called periodontal disease) and other mouth problems are common in people with diabetes. To help prevent these problems, see your dentist two or more times a year.  Ask your healthcare provider what other exams youll need on a regular basis.  Date Last Reviewed: 6/1/2016  © 2453-4238 [employer]. [address] [place] [address], [place] [address]. All rights reserved. This information is not intended as a  substitute for professional medical care. Always follow your healthcare professional's instructions.

## 2017-12-19 NOTE — TELEPHONE ENCOUNTER
----- Message from Dinora Andrade sent at 12/19/2017  3:47 PM CST -----  Contact: pt  Request a call, no additional info given, pt can be reached at [phone]///[email or web address]

## 2017-12-19 NOTE — PROGRESS NOTES
"Subjective:         Patient ID: Isidro Alves is a 62 y.o. male.  Patient's current PCP is Steven Morgan MD.   Social History     Social History    Marital status:      Spouse name: N/A    Number of children: 3    Years of education: N/A     Occupational History     Self-Rmployed     Social History Main Topics    Smoking status: Never Smoker    Smokeless tobacco: Never Used    Alcohol use Yes      Comment: seldom     Drug use: No    Sexual activity: Yes     Partners: Female     Other Topics Concern    Not on file     Social History Narrative    No narrative on file       Chief Complaint: Diabetes Mellitus    HPI   Isidro Alves is a 62 y.o. Black or  male presenting for follow up of diabetes. Patient has been diagnosed with diabetes for over 20 years and has the following complications from diabetes: nephropathy and peripheral neuropathy. Blood glucose testing is performed regularly. Since last visit, patient reports blood glucose values to have approximately ranged from 120-170s, fasting, less than 200 PP.     He denies any recent hospital admissions, emergency room visits, hypoglycemia.      Height: 6' 2" (188 cm)  //  Weight: 119.8 kg (264 lb 1.8 oz), Body mass index is 33.91 kg/m².  Home Blood Glucose reading this AM: 150 mg/dl fasting.  His blood sugar in clinic today is:   Lab Results   Component Value Date    POCGLU 142 (A) 12/19/2017       Labs reviewed and are noted below.    His most recent A1C is:   Lab Results   Component Value Date    HGBA1C 8.3 (H) 12/18/2017     Lab Results   Component Value Date    CPEPTIDE 3.88 11/20/2017     Lab Results   Component Value Date    GLUTAMICACID 0.00 11/20/2017     Lab Results   Component Value Date    WBC 8.49 11/20/2017    HGB 12.3 (L) 11/20/2017    HCT 38.8 (L) 11/20/2017     11/20/2017    CHOL 139 11/20/2017    TRIG 157 (H) 11/20/2017    HDL 44 11/20/2017    ALT 12 11/20/2017    AST 23 11/20/2017     11/20/2017 "    K 4.2 11/20/2017     11/20/2017    CREATININE 4.2 (H) 11/20/2017    BUN 37 (H) 11/20/2017    CO2 22 (L) 11/20/2017    TSH 1.329 11/20/2017    PSA 10.7 (H) 11/10/2017    INR 0.9 05/02/2015    HGBA1C 8.3 (H) 12/18/2017       CURRENT DM MEDICATIONS:   Current Outpatient Prescriptions   Medication Sig Dispense Refill    insulin degludec (TRESIBA FLEXTOUCH U-200) 200 unit/mL (3 mL) InPn Inject 30 Units into the skin once daily. Increase 2 units every 3 days until fasting blood sugar is less than 110 3 Syringe 5    dulaglutide (TRULICITY) 0.75 mg/0.5 mL PnIj Inject 0.75mg into the skin every 7 days. 4 Syringe 5     No current facility-administered medications for this visit.        No current facility-administered medications for this visit.        Health Maintenance   Topic Date Due    Colonoscopy  12/20/2015    Zoster Vaccine  08/26/2016    Foot Exam  03/14/2017    Eye Exam  03/09/2018    Hemoglobin A1c  06/18/2018    Lipid Panel  11/20/2018    Pneumococcal PPSV23 (Medium Risk) (2) 08/26/2021    TETANUS VACCINE  05/05/2026    Hepatitis C Screening  Completed    Influenza Vaccine  Addressed       STANDARDS OF CARE:  Current Ophthalmologist/Optometrist: Dr. Del Real.  Last exam 2017  Current Dentist: Yes. Last exam 2017.  Current Podiatrist: No  ACE/ARB: Yes  Statin: Yes  He  has attended diabetes education in the past.     LIFESTYLE:  ACTIVITY LEVEL: Moderately Active  EXERCISE:  30-60 min 5* d/wk  MEAL PLANNING: Patient reports number of meals per day to be 3 and number of snacks per day to be 2    BLOOD GLUCOSE TESTING: Patient reports testing on average a total of 2-3 times per day.    Review of Systems   Constitutional: Negative for activity change, appetite change, chills, diaphoresis, fatigue, fever and unexpected weight change.   Eyes: Negative for visual disturbance.   Respiratory: Negative for apnea and shortness of breath.    Cardiovascular: Negative.  Negative for chest pain, palpitations  and leg swelling.   Gastrointestinal: Negative for abdominal distention, constipation, diarrhea, nausea and vomiting.   Endocrine: Negative for cold intolerance, heat intolerance, polydipsia, polyphagia and polyuria.   Genitourinary: Negative.  Negative for frequency.   Skin: Negative.  Negative for color change, pallor, rash and wound.   Neurological: Negative.  Negative for dizziness, seizures, syncope, light-headedness, numbness and headaches.   Psychiatric/Behavioral: Negative for confusion, hallucinations and suicidal ideas.         Objective:      Physical Exam   Constitutional: He is oriented to person, place, and time. He appears well-developed and well-nourished.   HENT:   Head: Normocephalic and atraumatic.   Eyes: EOM are normal. Pupils are equal, round, and reactive to light.   Neck: Normal range of motion. Neck supple.   Cardiovascular: Normal rate, regular rhythm and normal heart sounds.    Pulmonary/Chest: Effort normal and breath sounds normal.   Abdominal: Soft. Bowel sounds are normal.   Musculoskeletal: Normal range of motion.        Right foot: There is no deformity.        Left foot: There is no deformity.   Feet:   Right Foot:   Protective Sensation: 10 sites tested. 9 sites sensed.   Left Foot:   Protective Sensation: 10 sites tested. 10 sites sensed.   Neurological: He is alert and oriented to person, place, and time.   Skin: Skin is warm and dry.   Psychiatric: He has a normal mood and affect. His behavior is normal. Judgment and thought content normal.   Nursing note and vitals reviewed.      Assessment:       1. Type 2 diabetes mellitus with hyperglycemia, with long-term current use of insulin        Plan:   Type 2 diabetes mellitus with hyperglycemia, with long-term current use of insulin  -     dulaglutide (TRULICITY) 1.5 mg/0.5 mL PnIj; Inject 1.5 mg into the skin every 7 days.  Dispense: 4 Syringe; Refill: 5  -     insulin degludec (TRESIBA FLEXTOUCH U-200) 200 unit/mL (3 mL) InPn;  Inject 35 Units into the skin once daily. Increase 2 units every 3 days until fasting blood sugar is less than 110  Dispense: 3 Syringe; Refill: 5  -     POCT glucose    Other orders  -     Discontinue: dulaglutide (TRULICITY) 1.5 mg/0.5 mL PnIj; Inject 1.5 mg into the skin every 7 days.  Dispense: 4 Syringe; Refill: 0      - Condition improved but still not controlled. Increase Tresiba and Trulicity as noted. . DM education reviewed. Patient encouraged to carb count and exercise per recommendations. Labs and Referrals as noted. Patient instructed to send in log once weekly for my review. RV scheduled in 1 month.     Additional Plan Details:    1.) Patient was instructed to monitor blood glucose 2 - 3 x daily, fasting and ac dinner or at bedtime. Discussed ADA goal for fasting blood sugar, 80 - 130mg/dL; pp blood sugars below 180 mg/dl. Also, discussed prevention of hypoglycemia and the need to adjust goals to higher levels if persistent hypoglycemia.  Reminded to bring BG records or meter to each visit for review.  2.) Reviewed pathophysiology of Type 2 diabetes, complications related to the disease, importance of annual dilated eye exam and daily foot examination.  3.) We discussed the ADA recommendations, which are as follows:  Hemoglobin A1c below 7.0 %. All patients with diabetes should be on statins unless contraindicated.  ACE or ARB therapy if not contraindicated.    4.) Continue medications as prescribed.  My [facility] e-mail or phone review in one week with BG records for adjustment of medication.  5.) Meal planning teaching: Reviewed carb counting, portion control, importance of spacing meals throughout the day to prevent post prandial elevations.  6.) Discussed activity with related benefits, methods, and precautions. Recommended patient start or continue some form of exercise and increase as tolerated to 30 minutes per day to aid in control of BGs.  7.) A1C, TSH, Lipid Panel, CMP with eGFR and  Micro/Creatinine are utd or were ordered per ADA protocol.  8.) Return to clinic in 1 month for follow up. The patient was explained the above plan and given opportunity to ask questions.  He understands, chooses and consents to this plan and accepts all the risks, which include but are not limited to the risks mentioned above. He understands the alternative of having no testing, interventions or treatments at this time. He left content and without further questions.     A total of 35 minutes was spent in face to face time, of which over 50% was spent in counseling patient on disease process, complications, treatment, and side effects of medications.        MIGNON CarrionC

## 2017-12-21 ENCOUNTER — HOSPITAL ENCOUNTER (OUTPATIENT)
Facility: HOSPITAL | Age: 62
Discharge: HOME | End: 2017-12-21
Attending: FAMILY MEDICINE | Admitting: FAMILY MEDICINE
Payer: COMMERCIAL

## 2017-12-21 ENCOUNTER — ANESTHESIA (OUTPATIENT)
Dept: ENDOSCOPY | Facility: HOSPITAL | Age: 62
End: 2017-12-21
Payer: COMMERCIAL

## 2017-12-21 ENCOUNTER — TELEPHONE (OUTPATIENT)
Dept: FAMILY MEDICINE | Facility: CLINIC | Age: 62
End: 2017-12-21

## 2017-12-21 ENCOUNTER — ANESTHESIA EVENT (OUTPATIENT)
Dept: ENDOSCOPY | Facility: HOSPITAL | Age: 62
End: 2017-12-21
Payer: COMMERCIAL

## 2017-12-21 VITALS
HEART RATE: 63 BPM | DIASTOLIC BLOOD PRESSURE: 116 MMHG | OXYGEN SATURATION: 98 % | SYSTOLIC BLOOD PRESSURE: 185 MMHG | TEMPERATURE: 98 F | RESPIRATION RATE: 18 BRPM

## 2017-12-21 DIAGNOSIS — Z12.11 SPECIAL SCREENING FOR MALIGNANT NEOPLASMS, COLON: ICD-10-CM

## 2017-12-21 DIAGNOSIS — Z12.11 COLON CANCER SCREENING: Primary | ICD-10-CM

## 2017-12-21 DIAGNOSIS — K63.5 POLYP OF COLON, UNSPECIFIED PART OF COLON, UNSPECIFIED TYPE: ICD-10-CM

## 2017-12-21 LAB — POCT GLUCOSE: 126 MG/DL (ref 70–110)

## 2017-12-21 PROCEDURE — 27201012 HC FORCEPS, HOT/COLD, DISP: Mod: TXP | Performed by: FAMILY MEDICINE

## 2017-12-21 PROCEDURE — 45380 COLONOSCOPY AND BIOPSY: CPT | Mod: 59,NTX,, | Performed by: FAMILY MEDICINE

## 2017-12-21 PROCEDURE — 25000003 PHARM REV CODE 250: Mod: TXP | Performed by: NURSE ANESTHETIST, CERTIFIED REGISTERED

## 2017-12-21 PROCEDURE — 45385 COLONOSCOPY W/LESION REMOVAL: CPT | Mod: 33,NTX,, | Performed by: FAMILY MEDICINE

## 2017-12-21 PROCEDURE — 27201089 HC SNARE, DISP (ANY): Mod: TXP | Performed by: FAMILY MEDICINE

## 2017-12-21 PROCEDURE — 88305 TISSUE EXAM BY PATHOLOGIST: CPT | Mod: 26,NTX,, | Performed by: PATHOLOGY

## 2017-12-21 PROCEDURE — 88305 TISSUE EXAM BY PATHOLOGIST: CPT | Mod: TXP | Performed by: PATHOLOGY

## 2017-12-21 PROCEDURE — 63600175 PHARM REV CODE 636 W HCPCS: Mod: TXP | Performed by: NURSE ANESTHETIST, CERTIFIED REGISTERED

## 2017-12-21 PROCEDURE — 37000009 HC ANESTHESIA EA ADD 15 MINS: Mod: TXP | Performed by: FAMILY MEDICINE

## 2017-12-21 PROCEDURE — 82962 GLUCOSE BLOOD TEST: CPT | Mod: TXP | Performed by: FAMILY MEDICINE

## 2017-12-21 PROCEDURE — 45380 COLONOSCOPY AND BIOPSY: CPT | Mod: TXP | Performed by: FAMILY MEDICINE

## 2017-12-21 PROCEDURE — 45385 COLONOSCOPY W/LESION REMOVAL: CPT | Mod: TXP | Performed by: FAMILY MEDICINE

## 2017-12-21 PROCEDURE — 37000008 HC ANESTHESIA 1ST 15 MINUTES: Mod: TXP | Performed by: FAMILY MEDICINE

## 2017-12-21 RX ORDER — SODIUM CHLORIDE, SODIUM LACTATE, POTASSIUM CHLORIDE, CALCIUM CHLORIDE 600; 310; 30; 20 MG/100ML; MG/100ML; MG/100ML; MG/100ML
INJECTION, SOLUTION INTRAVENOUS CONTINUOUS PRN
Status: DISCONTINUED | OUTPATIENT
Start: 2017-12-21 | End: 2017-12-21

## 2017-12-21 RX ORDER — PROPOFOL 10 MG/ML
INJECTION, EMULSION INTRAVENOUS
Status: DISCONTINUED | OUTPATIENT
Start: 2017-12-21 | End: 2017-12-21

## 2017-12-21 RX ORDER — LIDOCAINE HCL/PF 100 MG/5ML
SYRINGE (ML) INTRAVENOUS
Status: DISCONTINUED | OUTPATIENT
Start: 2017-12-21 | End: 2017-12-21

## 2017-12-21 RX ORDER — SODIUM CHLORIDE, SODIUM LACTATE, POTASSIUM CHLORIDE, CALCIUM CHLORIDE 600; 310; 30; 20 MG/100ML; MG/100ML; MG/100ML; MG/100ML
INJECTION, SOLUTION INTRAVENOUS CONTINUOUS
Status: DISCONTINUED | OUTPATIENT
Start: 2017-12-21 | End: 2017-12-21 | Stop reason: HOSPADM

## 2017-12-21 RX ADMIN — PROPOFOL 140 MG: 10 INJECTION, EMULSION INTRAVENOUS at 02:12

## 2017-12-21 RX ADMIN — SODIUM CHLORIDE, SODIUM LACTATE, POTASSIUM CHLORIDE, AND CALCIUM CHLORIDE: 600; 310; 30; 20 INJECTION, SOLUTION INTRAVENOUS at 02:12

## 2017-12-21 RX ADMIN — PROPOFOL 40 MG: 10 INJECTION, EMULSION INTRAVENOUS at 02:12

## 2017-12-21 RX ADMIN — LIDOCAINE HYDROCHLORIDE 100 MG: 20 INJECTION, SOLUTION INTRAVENOUS at 02:12

## 2017-12-21 NOTE — TRANSFER OF CARE
Anesthesia Transfer of Care Note    Patient: Isidro Alves    Procedure(s) Performed: Procedure(s) (LRB):  COLONOSCOPY (N/A)    Patient location: PACU    Anesthesia Type: MAC    Transport from OR: Transported from OR on room air with adequate spontaneous ventilation    Post pain: adequate analgesia    Post assessment: no apparent anesthetic complications    Post vital signs: stable    Level of consciousness: sedated    Nausea/Vomiting: no nausea/vomiting    Complications: none    Transfer of care protocol was followed      Last vitals:   Visit Vitals  BP (!) 151/100 (BP Location: Left arm, Patient Position: Lying)   Pulse 74   Temp 36.8 °C (98.2 °F) (Oral)   Resp 17   SpO2 (!) 94%

## 2017-12-21 NOTE — H&P
Short Stay Endoscopy History and Physical    PCP - Steven Morgan MD    Procedure - Colonoscopy  ASA - 4  Mallampati - per anesthesia  History of Anesthesia problems - no  Family history Anesthesia problems -  no     HPI:  This is a 62 y.o. male here for evaluation of :   Active Hospital Problems    Diagnosis  POA    *Colon cancer screening [Z12.11]  Not Applicable    Special screening for malignant neoplasms, colon [Z12.11]  Not Applicable      Resolved Hospital Problems    Diagnosis Date Resolved POA   No resolved problems to display.         Health Maintenance       Date Due Completion Date    Colonoscopy 12/20/2015 12/20/2010 (Done)    Override on 12/20/2010: Done (repeat in 5 years)    Zoster Vaccine 08/26/2016 ---    Foot Exam 03/14/2017 3/14/2016 (Done)    Override on 3/14/2016: Done    Override on 5/11/2015: Done    Eye Exam 03/09/2018 3/9/2017 (Done)    Override on 3/9/2017: Done    Override on 1/5/2016: Done    Override on 3/1/2015: Done    Hemoglobin A1c 06/18/2018 12/18/2017    Override on 3/14/2016: Done    Lipid Panel 11/20/2018 11/20/2017    Pneumococcal PPSV23 (Medium Risk) (2) 08/26/2021 4/4/2016    TETANUS VACCINE 05/05/2026 5/5/2016          Screening - yes-he is being screened and prepped for a kidney transplant.  History of polyps - no  Diarrhea - no  Anemia - no  Blood in stools - no  Abdominal pain - no  Other - no    ROS:  CONSTITUTIONAL: Denies weight change,  fatigue, fevers, chills, night sweats.  CARDIOVASCULAR: Denies chest pain, shortness of breath, orthopnea and edema.  RESPIRATORY: Denies cough, hemoptysis, dyspnea, and wheezing.  GI: See HPI.    Medical History:   Past Medical History:   Diagnosis Date    Anemia 4/16/2014    CKD (chronic kidney disease) stage 4, GFR 15-29 ml/min     Diabetes mellitus     Hypertension     Secondary hyperparathyroidism (of renal origin)     UTI (lower urinary tract infection) 5/1/2015       Surgical History:   Past Surgical History:  "  Procedure Laterality Date    PROSTATE BIOPSY      thorocotomy  2010       Family History:   Family History   Problem Relation Age of Onset    No Known Problems Mother     Diabetes Father     Hypertension Father     Hypertension      Diabetes         Social History:   Social History   Substance Use Topics    Smoking status: Never Smoker    Smokeless tobacco: Never Used    Alcohol use Yes      Comment: seldom        Allergies:   Review of patient's allergies indicates:   Allergen Reactions    Bactrim [sulfamethoxazole-trimethoprim] Swelling    Nsaids (non-steroidal anti-inflammatory drug)      CKD       Medications:   No current facility-administered medications on file prior to encounter.      Current Outpatient Prescriptions on File Prior to Encounter   Medication Sig Dispense Refill    acyclovir (ZOVIRAX) 400 MG tablet TAKE 1 TABLET 3 TIMES DAILY. (Patient taking differently: TAKE 1 TABLET 3 TIMES DAILY AS NEEDED) 30 tablet 6    BD INSULIN SYRINGE HALF UNIT 0.3 mL 31 gauge x 5/16" Syrg FOR USE WITH INSULIN. 100 Syringe 1    esomeprazole (NEXIUM) 40 MG capsule Take 40 mg by mouth once daily.      folic acid (FOLVITE) 1 MG tablet TAKE 1 TABLET (1 MG TOTAL) BY MOUTH ONCE DAILY. 30 tablet 3    lancets (ONETOUCH ULTRASOFT LANCETS) Misc 1 lancet by Misc.(Non-Drug; Combo Route) route 2 (two) times daily. (Patient taking differently: 1 lancet by Misc.(Non-Drug; Combo Route) route 3 (three) times daily. ) 200 each 1    multivitamin (THERAGRAN) tablet Take 1 tablet by mouth once daily.      nifedipine (ADALAT CC) 60 MG TbSR TAKE 1 TABLET (60 MG TOTAL) BY MOUTH 2 (TWO) TIMES DAILY. 60 tablet 8    olopatadine (PATADAY) 0.2 % Drop Place 1 drop into both eyes daily as needed. 2.5 mL 0    ONETOUCH ULTRA TEST Strp 1 STRIP BY MISC.(NON-DRUG COMBO ROUTE) ROUTE 3 (THREE) TIMES DAILY. 100 strip 2    rosuvastatin (CRESTOR) 40 MG Tab TAKE 1 TABLET (40 MG TOTAL) BY MOUTH EVERY EVENING. 90 tablet 3    tamsulosin " (FLOMAX) 0.4 mg Cp24 TAKE 2 CAPSULES (0.8 MG TOTAL) BY MOUTH EVERY EVENING. 60 capsule 7    valsartan (DIOVAN) 320 MG tablet TAKE 1 TABLET (320 MG TOTAL) BY MOUTH ONCE DAILY. 90 tablet 2    VITAMIN D2 50,000 unit capsule TAKE 1 CAPSULE (50,000 UNITS TOTAL) BY MOUTH EVERY 7 DAYS. 4 capsule 6    CIALIS 10 mg tablet TAKE 1 TABLET (10 MG TOTAL) BY MOUTH AS NEEDED FOR ERECTILE DYSFUNCTION. 6 tablet 7    fluticasone (FLONASE) 50 mcg/actuation nasal spray 2 sprays by Each Nare route once daily. 1 Bottle 0    sodium,potassium,mag sulfates (SUPREP BOWEL PREP KIT) 17.5-3.13-1.6 gram SolR Take as directed 354 mL 0    VENTOLIN HFA 90 mcg/actuation inhaler INHALE 2 PUFFS INTO THE LUNGS EVERY 6 (SIX) HOURS AS NEEDED FOR WHEEZING. 18 Inhaler 0       Physical Exam:  Vital Signs:   Vitals:    12/21/17 1410   BP: (!) 166/126   Pulse:    Resp:    Temp:      General Appearance: Well appearing in no acute distress  ENT: OP clear  Chest: CTA B  CV: RRR, no m/r/g  Abd: s/nt/nd/nabs  Ext: no edema    Labs:Reviewed    IMP:  Active Hospital Problems    Diagnosis  POA    *Colon cancer screening [Z12.11]  Not Applicable    Special screening for malignant neoplasms, colon [Z12.11]  Not Applicable      Resolved Hospital Problems    Diagnosis Date Resolved POA   No resolved problems to display.         Plan:   I have explained the risks and benefits of colonoscopy to the patient including but not limited to bleeding, perforation, infection, and death. The patient wishes to proceed.

## 2017-12-21 NOTE — ANESTHESIA PREPROCEDURE EVALUATION
12/21/2017  Isidro Alves is a 62 y.o., male.    Anesthesia Evaluation    I have reviewed the Patient Summary Reports.    I have reviewed the Nursing Notes.   I have reviewed the Medications.     Review of Systems  Anesthesia Hx:  No problems with previous Anesthesia    Social:  Non-Smoker, Social Alcohol Use    Hematology/Oncology:     Oncology Normal    -- Anemia:   EENT/Dental:   chronic allergic rhinitis   Cardiovascular:   Hypertension, poorly controlled hyperlipidemia ECG has been reviewed. CONCLUSIONS     1 - Mild left ventricular enlargement.     2 - Eccentric hypertrophy.     3 - Normal left ventricular systolic function (EF 60-65%).     4 - Normal left ventricular diastolic function.     5 - Right atrial enlargement.     6 - Right ventricular enlargement with normal systolic function.    Pulmonary:   Asthma asymptomatic    Renal/:   Chronic Renal Disease    Hepatic/GI:   Bowel Prep. 0945 last drink of fluid.   Musculoskeletal:   Arthritis     Neurological:  Neurology Normal    Endocrine:   Diabetes, well controlled, type 2    Dermatological:  Skin Normal        Physical Exam  General:  Well nourished, Obesity    Airway/Jaw/Neck:  Airway Findings: Mallampati: II                Anesthesia Plan  Type of Anesthesia, risks & benefits discussed:  Anesthesia Type:  MAC  Patient's Preference:   Intra-op Monitoring Plan:   Intra-op Monitoring Plan Comments:   Post Op Pain Control Plan:   Post Op Pain Control Plan Comments:   Induction:   IV  Beta Blocker:  Patient is not currently on a Beta-Blocker (No further documentation required).       Informed Consent: Patient understands risks and agrees with Anesthesia plan.  Questions answered. Anesthesia consent signed with patient.  ASA Score: 3     Day of Surgery Review of History & Physical: I have interviewed and examined the patient. I have reviewed the  patient's H&P dated: 12/21/17. There are no significant changes.  H&P update referred to the provider.         Ready For Surgery From Anesthesia Perspective.

## 2017-12-21 NOTE — ANESTHESIA RELEASE NOTE
Anesthesia Release from PACU Note    Patient: Isidro Alves    Procedure(s) Performed: Procedure(s) (LRB):  COLONOSCOPY (N/A)    Anesthesia type: MAC    Post pain: Adequate analgesia    Post assessment: no apparent anesthetic complications, tolerated procedure well and no evidence of recall    Last Vitals:   Visit Vitals  BP (!) 151/100 (BP Location: Left arm, Patient Position: Lying)   Pulse 74   Temp 36.8 °C (98.2 °F) (Oral)   Resp 17   SpO2 (!) 94%       Post vital signs: stable    Level of consciousness: awake and alert     Nausea/Vomiting: no nausea/no vomiting    Complications: none    Airway Patency: patent    Respiratory: unassisted, spontaneous ventilation, room air    Cardiovascular: stable    Hydration: euvolemic

## 2017-12-21 NOTE — DISCHARGE SUMMARY
" Endoscopy Discharge Summary      Admit Date: 12/21/2017    Discharge Date and Time:  12/21/2017 2:37 PM    Attending Physician: Fran Escalera MD     Discharge Physician: Fran Escalera MD     Principal Admitting Diagnoses: Colon cancer screening         Discharge Diagnosis: The primary encounter diagnosis was Colon cancer screening. Diagnoses of Special screening for malignant neoplasms, colon and Polyp of colon, unspecified part of colon, unspecified type were also pertinent to this visit.     Discharged Condition: Good    Indication for Admission: Colon cancer screening     Hospital Course: Patient was admitted for an inpatient procedure and tolerated the procedure well with no complications.    Significant Diagnostic Studies: Colonoscopy with cold biopsy polypectomy and Colonoscopy with hot snare polypectomy    Pathology (if any):  Specimen (12h ago through future)    Start     Ordered    12/21/17 1434  Specimen to Pathology - Surgery  Once     Comments:  #1 1mm sessile descending polyp#2 1mm sigmoid sessile polyp      12/21/17 1436          Estimated Blood Loss: 1 ml.    Discussed with: patient and family.    Disposition: Home.    Follow Up/Patient Instructions:   Current Discharge Medication List      CONTINUE these medications which have NOT CHANGED    Details   acyclovir (ZOVIRAX) 400 MG tablet TAKE 1 TABLET 3 TIMES DAILY.  Qty: 30 tablet, Refills: 6      BD INSULIN SYRINGE HALF UNIT 0.3 mL 31 gauge x 5/16" Syrg FOR USE WITH INSULIN.  Qty: 100 Syringe, Refills: 1      dulaglutide (TRULICITY) 1.5 mg/0.5 mL PnIj Inject 1.5 mg into the skin every 7 days.  Qty: 4 Syringe, Refills: 5    Associated Diagnoses: Type 2 diabetes mellitus with hyperglycemia, with long-term current use of insulin      esomeprazole (NEXIUM) 40 MG capsule Take 40 mg by mouth once daily.      folic acid (FOLVITE) 1 MG tablet TAKE 1 TABLET (1 MG TOTAL) BY MOUTH ONCE DAILY.  Qty: 30 tablet, Refills: 3      insulin degludec (TRESIBA " FLEXTOUCH U-200) 200 unit/mL (3 mL) InPn Inject 35 Units into the skin once daily. Increase 2 units every 3 days until fasting blood sugar is less than 110  Qty: 3 Syringe, Refills: 5    Associated Diagnoses: Type 2 diabetes mellitus with hyperglycemia, with long-term current use of insulin      lancets (ONETOUCH ULTRASOFT LANCETS) Misc 1 lancet by Misc.(Non-Drug; Combo Route) route 2 (two) times daily.  Qty: 200 each, Refills: 1      multivitamin (THERAGRAN) tablet Take 1 tablet by mouth once daily.      nifedipine (ADALAT CC) 60 MG TbSR TAKE 1 TABLET (60 MG TOTAL) BY MOUTH 2 (TWO) TIMES DAILY.  Qty: 60 tablet, Refills: 8    Associated Diagnoses: HTN (hypertension), benign      olopatadine (PATADAY) 0.2 % Drop Place 1 drop into both eyes daily as needed.  Qty: 2.5 mL, Refills: 0    Associated Diagnoses: Acute nasopharyngitis; Cough      ONETOUCH ULTRA TEST Strp 1 STRIP BY MISC.(NON-DRUG COMBO ROUTE) ROUTE 3 (THREE) TIMES DAILY.  Qty: 100 strip, Refills: 2      rosuvastatin (CRESTOR) 40 MG Tab TAKE 1 TABLET (40 MG TOTAL) BY MOUTH EVERY EVENING.  Qty: 90 tablet, Refills: 3    Associated Diagnoses: Hyperlipidemia, unspecified hyperlipidemia type      tamsulosin (FLOMAX) 0.4 mg Cp24 TAKE 2 CAPSULES (0.8 MG TOTAL) BY MOUTH EVERY EVENING.  Qty: 60 capsule, Refills: 7    Associated Diagnoses: Benign non-nodular prostatic hyperplasia without lower urinary tract symptoms      valsartan (DIOVAN) 320 MG tablet TAKE 1 TABLET (320 MG TOTAL) BY MOUTH ONCE DAILY.  Qty: 90 tablet, Refills: 2    Associated Diagnoses: HTN (hypertension), benign      VITAMIN D2 50,000 unit capsule TAKE 1 CAPSULE (50,000 UNITS TOTAL) BY MOUTH EVERY 7 DAYS.  Qty: 4 capsule, Refills: 6    Associated Diagnoses: Vitamin D deficiency      CIALIS 10 mg tablet TAKE 1 TABLET (10 MG TOTAL) BY MOUTH AS NEEDED FOR ERECTILE DYSFUNCTION.  Qty: 6 tablet, Refills: 7      fluticasone (FLONASE) 50 mcg/actuation nasal spray 2 sprays by Each Nare route once daily.  Qty:  1 Bottle, Refills: 0    Associated Diagnoses: Acute nasopharyngitis; Cough      VENTOLIN HFA 90 mcg/actuation inhaler INHALE 2 PUFFS INTO THE LUNGS EVERY 6 (SIX) HOURS AS NEEDED FOR WHEEZING.  Qty: 18 Inhaler, Refills: 0    Associated Diagnoses: Wheezing         STOP taking these medications       sodium,potassium,mag sulfates (SUPREP BOWEL PREP KIT) 17.5-3.13-1.6 gram SolR Comments:   Reason for Stopping:                 Discharge Procedure Orders  Diet general     Activity as tolerated     Call MD for:  temperature >100.4     Call MD for:  persistent nausea and vomiting     Call MD for:  severe uncontrolled pain     Call MD for:  difficulty breathing, headache or visual disturbances     Call MD for:  redness, tenderness, or signs of infection (pain, swelling, redness, odor or green/yellow discharge around incision site)     Call MD for:  hives     Call MD for:  persistent dizziness or light-headedness     No dressing needed         Follow-up Information     Fran Escalera MD. Call in 1 week.    Specialty:  Family Medicine  Why:  To receive pathology results.  Contact information:  [id number] [facility] [address] [phone]                   [email or web address]

## 2017-12-21 NOTE — ANESTHESIA POSTPROCEDURE EVALUATION
Anesthesia Post Evaluation    Patient: Isidro Alves    Procedure(s) Performed: Procedure(s) (LRB):  COLONOSCOPY (N/A)    Final Anesthesia Type: MAC  Patient location during evaluation: PACU  Patient participation: Yes- Able to Participate  Level of consciousness: awake and alert and oriented  Post-procedure vital signs: reviewed and stable  Pain management: adequate  Airway patency: patent  PONV status at discharge: No PONV  Anesthetic complications: no      Cardiovascular status: blood pressure returned to baseline  Respiratory status: room air and unassisted  Hydration status: euvolemic  Follow-up not needed.        Visit Vitals  BP (!) 151/100 (BP Location: Left arm, Patient Position: Lying)   Pulse 74   Temp 36.8 °C (98.2 °F) (Oral)   Resp 17   SpO2 (!) 94%       Pain/Julia Score: No Data Recorded

## 2017-12-21 NOTE — DISCHARGE INSTRUCTIONS

## 2017-12-28 NOTE — PROGRESS NOTES
Dear Steven Morgan MD,    I recently cared for Isidro Alves and performed an endoscopy.  Tissue was sent for pathology evaluation and I will have a letter written to ask the patient to repeat the colonoscopy in 5 years.  The pathology showed that there was adenomatous tissue present.  Thank you for allowing me to participate in the care of your patient.  Please call me for any questions that you might have.      Dr. Fran Escalera  [phone] cell  [phone] office      NURSING STAFF:Please  inform the patient that I reviewed the recent pathology obtained at the time of colonoscopy.    The results showed that there was adenomatous tissue present which is benign and based on that, I recommend that the patient have a repeat colonoscopy performed in 5 years.     If the patient has MyChart, this message has been sent to them.  Confirm that they read the note.  If not, copy the information and print a letter to send to the patient at this time.  Confirm that a notation to the PCP was done.      Dear Isidro Alves,    This is to inform you that I have reviewed your recent colonoscopy pathology.  The results showed that you had adenomatous tissue present which is benign and based on that, I recommend that you have a repeat colonoscopy performed in 5 years.      Dr. Fran Escalera  [phone]

## 2018-01-18 ENCOUNTER — OFFICE VISIT (OUTPATIENT)
Dept: DIABETES | Facility: CLINIC | Age: 63
End: 2018-01-18
Payer: COMMERCIAL

## 2018-01-18 VITALS
WEIGHT: 256.19 LBS | BODY MASS INDEX: 32.88 KG/M2 | DIASTOLIC BLOOD PRESSURE: 82 MMHG | HEIGHT: 74 IN | SYSTOLIC BLOOD PRESSURE: 130 MMHG

## 2018-01-18 DIAGNOSIS — E11.22 TYPE 2 DIABETES MELLITUS WITH STAGE 4 CHRONIC KIDNEY DISEASE, WITH LONG-TERM CURRENT USE OF INSULIN: Primary | ICD-10-CM

## 2018-01-18 DIAGNOSIS — N18.4 TYPE 2 DIABETES MELLITUS WITH STAGE 4 CHRONIC KIDNEY DISEASE, WITH LONG-TERM CURRENT USE OF INSULIN: Primary | ICD-10-CM

## 2018-01-18 DIAGNOSIS — Z79.4 TYPE 2 DIABETES MELLITUS WITH STAGE 4 CHRONIC KIDNEY DISEASE, WITH LONG-TERM CURRENT USE OF INSULIN: Primary | ICD-10-CM

## 2018-01-18 LAB — GLUCOSE SERPL-MCNC: 125 MG/DL (ref 70–110)

## 2018-01-18 PROCEDURE — 82948 REAGENT STRIP/BLOOD GLUCOSE: CPT | Mod: S$GLB,,, | Performed by: NURSE PRACTITIONER

## 2018-01-18 PROCEDURE — 99214 OFFICE O/P EST MOD 30 MIN: CPT | Mod: S$GLB,,, | Performed by: NURSE PRACTITIONER

## 2018-01-18 PROCEDURE — 99999 PR PBB SHADOW E&M-EST. PATIENT-LVL III: CPT | Mod: PBBFAC,,, | Performed by: NURSE PRACTITIONER

## 2018-01-18 NOTE — PATIENT INSTRUCTIONS
PATIENT INSTRUCTIONS  - Follow up as scheduled.   - Carb Count: 30-45G/meal and 15G/snack  - Exercise: Goal is 150 minutes or more per week  - Bring meter and blood sugar log to each appointment.   - Keep it up!! Great Job!!    - You will take your blood sugar two times daily. Once will always be in the morning before you have any food, (known as your fasting blood sugar), and once should be two hours after EITHER your breakfast, lunch, or dinner, (known as your post-prandial blood sugar). Each day you should check a different meal, (ie. Monday-breakfast; Tuesday- lunch; Wednesday- supper, then repeat).     - Send me your blood sugars weekly if directed to do so. The easiest way to do this is through [facility]. Send in logs on Tuesdays, Wednesdays, or Thursdays.    - Blood Sugar Goals:  1. The goal for fasting blood sugars is 80 -130 mg/dl.   2. The goal for the 2 hour after meal blood sugars is below 180 mg/dl.  3. Blood sugars below 70 are considered LOW and you must eat or drink 15G of carbohydrates immediately, then recheck blood sugar after 15 minutes to ensure blood sugar has returned to normal range, (70 or above)                                                              Diabetes (General Information)  Diabetes is a long-term health problem. It means your body does not make enough insulin. Or it may mean that your body cannot use the insulin it makes. Insulin is a hormone in your body. It lets blood sugar (glucose) reach the cells in your body. All of your cells need glucose for fuel.  When you have diabetes, the glucose in your blood builds up because it cannot get into the cells. This buildup is called high blood sugar (hyperglycemia).  Your blood sugar level depends on several things. It depends on what kind of food you eat and how much of it you eat. It also depends on how much exercise you get, and how much insulin you have in your body. Eating too much of the wrong kinds of food or not taking  diabetes medicine on time can cause high blood sugar. Infections can cause high blood sugar even if you are taking medicines correctly.  These things can also cause low blood sugar:  · Missing meals  · Not eating enough food  · Taking too much diabetes medicine  Diabetes can cause serious problems over time if you do not get treated. These problems include heart disease, stroke, kidney failure, and blindness. They also include nerve pain or loss of feeling in your legs and feet, and gangrene of the feet. By keeping your blood sugar under control you can prevent or delay these problems.  Normal blood sugar levels are 80 to 100 before a meal and less than 180 in the 1 to 2 hours after a meal.  Home care  Follow these guidelines when caring for yourself at home:  · Follow the diet your healthcare provider gives you. Take insulin or other diabetes medicine exactly as told to.  · Watch your blood sugar as you are told to. Keep a log of your results. This will help your provider change your medicines to keep your blood sugar under control.  · Try to reach your ideal weight. You may be able to cut back on or not have to take diabetes medicine if you eat the right foods and get exercise.  · Do not smoke. Smoking worsens the effects of diabetes on your circulation. You are much more likely to have a heart attack if you have diabetes and you smoke.  · Take good care of your feet. If you have lost feeling in your feet, you may not see an injury or infection. Check your feet and between your toes at least once a week.  · Wear a medical alert bracelet or necklace, or carry a card in your wallet that says you have diabetes. This will help healthcare providers give you the right care if you get very ill and cannot tell them that you have diabetes.  Sick day plan  If you get a cold, the flu, or a bacterial or viral infection, take these steps:  · Look at your diabetes sick plan and call your healthcare provider as you were told to.  You may need to call your provider right away if:  ¨ Your blood sugar is above 240 while taking your diabetes medicine  ¨ Your urine ketone levels are above normal or high  ¨ You have been vomiting more than 6 hours  ¨ You have trouble breathing or your breath ha s a fruity smell  ¨ You have a high fever  ¨ You have a fever for several days and you are not getting better  ¨ You get light-headed and are sleepier than usual  · Keep taking your diabetes pills (oral medicine) even if you have been vomiting and are feeling sick. Call your provider right away because you may need insulin to lower your blood sugar until you recover from your illness.  · Keep taking your insulin even if you have been vomiting and are feeling sick. Call your provider right away to ask if you need to change your insulin dose. This will depend on your blood sugar results.  · Check your blood sugar every 2 to 4 hours, or at least 4 times a day.  · Check your ketones often. If you are vomiting and having diarrhea, watch them more often.  · Do not skip meals. Try to eat small meals on a regular schedule. Do this even if you do not feel like eating.  · Drink water or other liquids that do not have caffeine or calories. This will keep you from getting dehydrated. If you are nauseated or vomiting, takes small sips every 5 minutes. To prevent dehydration try to drink a cup (8 ounces) of fluids every hour while you are awake.  General care  Always bring a source of fast-acting sugar with you in case you have symptoms of low blood sugar (below 70). At the first sign of low blood sugar, eat or drink 15 to 20 grams of fast-acting sugar to raise your blood sugar. Examples are:  · 3 to 4 glucose tablets. You can buy these at most drugstores.  · 4 ounces (1/2 cup) of regular (not diet) soft drinks  · 4 ounces (1/2 cup) of any fruit juice  · 8 ounces (1 cup) of milk  · 5 to 6 pieces of hard candy  · 1 tablespoon of honey  Check your blood sugar 15 minutes  after treating yourself. If it is still below 70, take 15 to 20 more grams of fast-acting sugar. Test again in 15 minutes. If it returns to normal (70 or above), eat a snack or meal to keep your blood sugar in a safe range. If it stays low, call your doctor or go to an emergency room.  Follow-up care  Follow-up with your healthcare provider, or as advised. For more information about diabetes, visit the American Diabetes Association website at www.diabetes.org or call 759-711-0427.  When to seek medical advice  Call your healthcare provider right away if you have any of these symptoms of high blood sugar:  · Frequent urination  · Dizziness  · Drowsiness  · Thirst  · Headache  · Nausea or vomiting  · Abdominal pain  · Eyesight changes  · Fast breathing  · Confusion or loss of consciousness  Also call your provider right away if you have any of these signs of low blood sugar:  · Fatigue  · Headache  · Shakes  · Excess sweating  · Hunger  · Feeling anxious or restless  · Eyesight changes  · Drowsiness  · Weakness  · Confusion or loss of consciousness  Call 911  Call for emergency help right away if any of these occur:  · Chest pain or shortness of breath  · Dizziness or fainting  · Weakness of an arm or leg or one side of the face  · Trouble speaking or seeing   Date Last Reviewed: 6/1/2016 © 2000-2016 [employer]. [address] [place] [address], [place] [address]. All rights reserved. This information is not intended as a substitute for professional medical care. Always follow your healthcare professional's instructions.                                                                     Understanding Type 2 Diabetes  When your body is working normally, the food you eat is digested and used as fuel. This fuel supplies energy to the bodys cells. When you have diabetes, the fuel cant enter the cells. Without treatment, diabetes can cause serious long-term health problems.     Your body breaks down the food you  eat into glucose.          How the body gets energy  The digestive system breaks down food, resulting in a sugar called glucose. Some of this glucose is stored in the liver. But most of it enters the bloodstream and travels to the cells to be used as fuel. Glucose needs the help of a hormone called insulin to enter the cells. Insulin is made in the pancreas. It is released into the bloodstream in response to the presence of glucose in the blood. Think of insulin as a key. When insulin reaches a cell, it attaches to the cell wall. This signals the cell to create an opening that allows glucose to enter the cell.  When you have type 2 diabetes  Early in type 2 diabetes, your cells dont respond properly to insulin. Because of this, less glucose than normal moves into cells. This is called insulin resistance. In response, the pancreas makes more insulin. But eventually, the pancreas cant produce enough insulin to overcome insulin resistance. As less and less glucose enters cells, it builds up to a harmful level in the bloodstream. This is known as high blood sugar or hyperglycemia. The result is type 2 diabetes. The cells become starved for energy, which can leave you feeling tired and rundown.  Why high blood sugar is a problem  If high blood sugar is not controlled, blood vessels throughout the body become damaged. Prolonged high blood sugar affects organs, blood vessels, and nerves. As a result, the risks of damage to the heart, kidneys, eyes, and limbs increase. Diabetes also makes other problems, such as high blood pressure and high cholesterol, more dangerous. Over time, people with uncontrolled high blood sugar have an increase in risk of dying of, or being disabled by, heart attack or stroke.  Date Last Reviewed: 7/1/2016 © 2000-2016 [employer]. [address] [place] [address], [place] [address]. All rights reserved. This information is not intended as a substitute for professional medical care. Always  follow your healthcare professional's instructions.                                                            Using a Blood Sugar Log    You have diabetes. This means your body has trouble regulating a sugar called glucose. To help manage your diabetes, youll need to check your blood sugar level as directed by your healthcare provider. Keeping a log of your blood sugar levels will help you track your blood sugar readings. Its a simple and easy way to see how well you are controlling your diabetes.  Checking your blood sugar level  You can check your blood sugar level with a blood glucose meter. Youll first prick the side of your finger with a tiny lancet to draw a tiny drop of blood onto the test strip. Some glucose meters let you use another place on your body to test. But these other places should not be used in some cases as they may be inaccurate. Follow the instructions for your glucose meter. And talk with your healthcare provider before doing the test on other places.  The strip goes into the meter first, then a drop of blood is placed on the tip of the strip. The meter then shows a reading that tells you the level of your blood sugar. Your readings should be in your target range as often as possible. This means not too high or too low. Staying in this range helps lower your risk for complications. Your healthcare provider will help you figure out the target range that is best for you.  Tracking your readings  Every time you check your blood sugar, use your log to keep track of your readings. Your meter will also probably have a memory feature that your healthcare provider can check at your next visit. You may be advised by your healthcare provider to check your blood sugar in the morning, at bedtime, and before and after meals. Be sure to write down all of your numbers. Also use your log to record things that might have affected your blood sugar. Some examples include being sick, certain medicines, being  physically active, feeling stressed, or skipping meals.   Lessons learned from your readings  Tracking your blood sugar readings helps you see patterns. These patterns tell you how your actions affect your blood sugar. For instance, you may have higher numbers after eating certain foods or lower numbers after exercise. They just help you understand how to stay in your target range more often, so that your diabetes remains in good control.  Sharing your log with your healthcare team  Bring your blood sugar log and glucose meter with you to all of your healthcare appointments. This can help your healthcare team make changes to your treatment plan, if needed. This may involve making changes in what you eat, what medicines you take, or how much you exercise.  To learn more  The resources below can help you learn more:  · American Diabetes Association 460-485-8801 www.diabetes.org  · [employer] 146-574-2938 www.lighthouse.org  · [facility] 903-730-2485 www.nei.nih.gov  · [facility] 316-345-4839 www.hormone.org  Date Last Reviewed: 5/1/2016  © 8649-4131 [employer]. [address] [place] [address]. All rights reserved. This information is not intended as a substitute for professional medical care. Always follow your healthcare professional's instructions.                                                                                            Diabetes: Exams and Tests    For your diabetes care, you may see your primary care provider or a specialist 2 to 4 times a year, or as directed. This page lists some of the regular exams and tests recommended for people with diabetes. To learn more, contact the American Diabetes Association (564-996-0364, www.diabetes.org).  Tests and immunizations  These should be done at least as often as stated below:  · Blood pressure check: every healthcare provider visit  · A1C: at first, every 3 months; if controlled, then every 3  to 6 months   · Cholesterol and blood lipid tests: at least every 12 months.  · Urine tests for kidney function: every 12 months  · Flu shots: once a year  · Pneumonia shots: talk with your healthcare provider about which pneumonia vaccines are right for you  · Hepatitis B shots: as soon as possible if youre under 60, or as advised by your healthcare provider if youre older than 60  · Shingles vaccine after age 60, even if you have already had shingles   · Other tests or vaccines: as advised by your healthcare provider  · Individualized medical nutrition therapy: at least once, then as needed  · Stop smoking counseling, if you still smoke, at each visit   Regular exams  The following exams help keep you healthy:  · Foot exams. Nerve and blood vessel problems can affect your feet sooner than other parts of your body. Make sure that your healthcare provider checks your feet at every office visit.  · Eye exams. You can have problems with your eyes even if you dont have trouble seeing. An ophthalmologist (eye healthcare provider) or specially trained optometrist will give you a dilated eye exam at least once a year. If you see dark spots, see poorly in dim light, have eye pain or pressure, or notice any other problems, tell your healthcare provider right away.  · Dental exams. Gum disease (also called periodontal disease) and other mouth problems are common in people with diabetes. To help prevent these problems, see your dentist two or more times a year.  Ask your healthcare provider what other exams youll need on a regular basis.  Date Last Reviewed: 6/1/2016  © 3514-4947 [employer]. [address] [place] [address], [place] [address]. All rights reserved. This information is not intended as a substitute for professional medical care. Always follow your healthcare professional's instructions.

## 2018-01-18 NOTE — PROGRESS NOTES
Subjective:         Patient ID: Isidro Alves is a 62 y.o. male.  Patient's current PCP is Steven Morgan MD.   Social History     Social History    Marital status:      Spouse name: N/A    Number of children: 3    Years of education: N/A     Occupational History     Self-Rmployed     Social History Main Topics    Smoking status: Never Smoker    Smokeless tobacco: Never Used    Alcohol use Yes      Comment: seldom     Drug use: No    Sexual activity: Yes     Partners: Female     Other Topics Concern    Not on file     Social History Narrative    No narrative on file       Chief Complaint: Diabetes Mellitus    HPI   Isidro Alves is a 62 y.o. Black or  male presenting for follow up of diabetes. Patient has been diagnosed with diabetes for over 20 years and has the following complications from diabetes: nephropathy and peripheral neuropathy. At last visit, insulin and trulicity were increased. Blood glucose testing is performed regularly. Since last visit, patient reports blood glucose values to have approximately ranged from 90-120s, fasting, less than 160 PP. Condition has improved since last visit.     He denies any recent hospital admissions, emergency room visits, hypoglycemia.         //   , Body mass index is 32.89 kg/m².  Home Blood Glucose reading this AM: 95 mg/dl fasting.  His blood sugar in clinic today is:   Lab Results   Component Value Date    POCGLU 125 (A) 01/18/2018       Labs reviewed and are noted below.    His most recent A1C is:   Lab Results   Component Value Date    HGBA1C 8.3 (H) 12/18/2017     Lab Results   Component Value Date    CPEPTIDE 3.88 11/20/2017     Lab Results   Component Value Date    GLUTAMICACID 0.00 11/20/2017     Lab Results   Component Value Date    WBC 8.49 11/20/2017    HGB 12.3 (L) 11/20/2017    HCT 38.8 (L) 11/20/2017     11/20/2017    CHOL 139 11/20/2017    TRIG 157 (H) 11/20/2017    HDL 44 11/20/2017    ALT 12 11/20/2017     AST 23 11/20/2017     11/20/2017    K 4.2 11/20/2017     11/20/2017    CREATININE 4.2 (H) 11/20/2017    BUN 37 (H) 11/20/2017    CO2 22 (L) 11/20/2017    TSH 1.329 11/20/2017    PSA 10.7 (H) 11/10/2017    INR 0.9 05/02/2015    HGBA1C 8.3 (H) 12/18/2017       CURRENT DM MEDICATIONS:   Current Outpatient Prescriptions   Medication Sig Dispense Refill    insulin degludec (TRESIBA FLEXTOUCH U-200) 200 unit/mL (3 mL) InPn Inject 30 Units into the skin once daily. Increase 2 units every 3 days until fasting blood sugar is less than 110 3 Syringe 5    dulaglutide (TRULICITY)1.5 mg/0.5 mL PnIj Inject 1.5mg into the skin every 7 days. 4 Syringe 5     No current facility-administered medications for this visit.        No current facility-administered medications for this visit.        Health Maintenance   Topic Date Due    Zoster Vaccine  08/26/2016    Foot Exam  03/14/2017    Eye Exam  03/09/2018    Hemoglobin A1c  06/18/2018    Lipid Panel  11/20/2018    Pneumococcal PPSV23 (Medium Risk) (2) 08/26/2021    Colonoscopy  12/21/2022    TETANUS VACCINE  05/05/2026    Hepatitis C Screening  Completed    Influenza Vaccine  Addressed       STANDARDS OF CARE:  Current Ophthalmologist/Optometrist: Dr. Del Real.  Last exam 2017  Current Dentist: Yes. Last exam 2017.  Current Podiatrist: No  ACE/ARB: Yes  Statin: Yes  He  has attended diabetes education in the past.     LIFESTYLE:  ACTIVITY LEVEL: Moderately Active  EXERCISE:  30-60 min 5 d/wk  MEAL PLANNING: Patient reports number of meals per day to be 3 and number of snacks per day to be 2    BLOOD GLUCOSE TESTING: Patient reports testing on average a total of 2-3 times per day.    Review of Systems   Constitutional: Negative for activity change, appetite change, chills, diaphoresis, fatigue, fever and unexpected weight change.   Eyes: Negative for visual disturbance.   Respiratory: Negative for apnea and shortness of breath.    Cardiovascular: Negative.   Negative for chest pain, palpitations and leg swelling.   Gastrointestinal: Negative for abdominal distention, constipation, diarrhea, nausea and vomiting.   Endocrine: Negative for cold intolerance, heat intolerance, polydipsia, polyphagia and polyuria.   Genitourinary: Negative.  Negative for frequency.   Skin: Negative.  Negative for color change, pallor, rash and wound.   Neurological: Negative.  Negative for dizziness, seizures, syncope, light-headedness, numbness and headaches.   Psychiatric/Behavioral: Negative for confusion, hallucinations and suicidal ideas.         Objective:      Physical Exam   Constitutional: He is oriented to person, place, and time. He appears well-developed and well-nourished.   HENT:   Head: Normocephalic and atraumatic.   Eyes: EOM are normal. Pupils are equal, round, and reactive to light.   Neck: Normal range of motion. Neck supple.   Cardiovascular: Normal rate, regular rhythm and normal heart sounds.    Pulmonary/Chest: Effort normal and breath sounds normal.   Abdominal: Soft. Bowel sounds are normal.   Musculoskeletal: Normal range of motion.   Neurological: He is alert and oriented to person, place, and time.   Skin: Skin is warm and dry.   Psychiatric: He has a normal mood and affect. His behavior is normal. Judgment and thought content normal.   Nursing note and vitals reviewed.      Assessment:       1. Type 2 diabetes mellitus with hyperglycemia, with long-term current use of insulin        Plan:   Type 2 diabetes mellitus with stage 4 chronic kidney disease, with long-term current use of insulin  -     POCT glucose  -     Hemoglobin A1c; Future; Expected date: 03/18/2018      - Condition now at goal. Continue current regimen. I will follow GFR along with nephrology, as Trulicity may have to be discontinued if GFR falls any more. Patient should ensure he remains well hydrated. Patient voices understanding. DM education reviewed. Patient encouraged to carb count and  exercise per recommendations. Labs and Referrals as noted. Patient instructed to send in log once weekly for my review. RV scheduled in 3 months.     Additional Plan Details:    1.) Patient was instructed to monitor blood glucose 2 - 3 x daily, fasting and ac dinner or at bedtime. Discussed ADA goal for fasting blood sugar, 80 - 130mg/dL; pp blood sugars below 180 mg/dl. Also, discussed prevention of hypoglycemia and the need to adjust goals to higher levels if persistent hypoglycemia.  Reminded to bring BG records or meter to each visit for review.  2.) Reviewed pathophysiology of Type 2 diabetes, complications related to the disease, importance of annual dilated eye exam and daily foot examination.  3.) We discussed the ADA recommendations, which are as follows:  Hemoglobin A1c below 7.0 %. All patients with diabetes should be on statins unless contraindicated.  ACE or ARB therapy if not contraindicated.    4.) Continue medications as prescribed.  My [facility] e-mail or phone review in one week with BG records for adjustment of medication.  5.) Meal planning teaching: Reviewed carb counting, portion control, importance of spacing meals throughout the day to prevent post prandial elevations.  6.) Discussed activity with related benefits, methods, and precautions. Recommended patient start or continue some form of exercise and increase as tolerated to 30 minutes per day to aid in control of BGs.  7.) A1C, TSH, Lipid Panel, CMP with eGFR and Micro/Creatinine are utd or were ordered per ADA protocol.  8.) Return to clinic in 3 months for follow up. The patient was explained the above plan and given opportunity to ask questions.  He understands, chooses and consents to this plan and accepts all the risks, which include but are not limited to the risks mentioned above. He understands the alternative of having no testing, interventions or treatments at this time. He left content and without further questions.     A total  of 30 minutes was spent in face to face time, of which over 50% was spent in counseling patient on disease process, complications, treatment, and side effects of medications.        MIGNON CarrionC

## 2018-02-19 ENCOUNTER — LAB VISIT (OUTPATIENT)
Dept: LAB | Facility: HOSPITAL | Age: 63
End: 2018-02-19
Attending: INTERNAL MEDICINE
Payer: COMMERCIAL

## 2018-02-19 DIAGNOSIS — N18.4 CHRONIC KIDNEY DISEASE (CKD), STAGE IV (SEVERE): ICD-10-CM

## 2018-02-19 DIAGNOSIS — Z12.5 PROSTATE CANCER SCREENING: ICD-10-CM

## 2018-02-19 LAB
25(OH)D3+25(OH)D2 SERPL-MCNC: 26 NG/ML
ALBUMIN SERPL BCP-MCNC: 3.2 G/DL
ANION GAP SERPL CALC-SCNC: 11 MMOL/L
BASOPHILS # BLD AUTO: 0.06 K/UL
BASOPHILS NFR BLD: 0.8 %
BUN SERPL-MCNC: 38 MG/DL
CALCIUM SERPL-MCNC: 9 MG/DL
CHLORIDE SERPL-SCNC: 109 MMOL/L
CO2 SERPL-SCNC: 20 MMOL/L
COMPLEXED PSA SERPL-MCNC: 11 NG/ML
CREAT SERPL-MCNC: 3.8 MG/DL
DIFFERENTIAL METHOD: ABNORMAL
EOSINOPHIL # BLD AUTO: 0.2 K/UL
EOSINOPHIL NFR BLD: 2.6 %
ERYTHROCYTE [DISTWIDTH] IN BLOOD BY AUTOMATED COUNT: 15.2 %
EST. GFR  (AFRICAN AMERICAN): 18.6 ML/MIN/1.73 M^2
EST. GFR  (NON AFRICAN AMERICAN): 16.1 ML/MIN/1.73 M^2
GLUCOSE SERPL-MCNC: 107 MG/DL
HCT VFR BLD AUTO: 41.4 %
HGB BLD-MCNC: 12.7 G/DL
IMM GRANULOCYTES # BLD AUTO: 0.04 K/UL
IMM GRANULOCYTES NFR BLD AUTO: 0.5 %
LYMPHOCYTES # BLD AUTO: 1.5 K/UL
LYMPHOCYTES NFR BLD: 19.1 %
MCH RBC QN AUTO: 25.9 PG
MCHC RBC AUTO-ENTMCNC: 30.7 G/DL
MCV RBC AUTO: 84 FL
MONOCYTES # BLD AUTO: 0.6 K/UL
MONOCYTES NFR BLD: 7.8 %
NEUTROPHILS # BLD AUTO: 5.3 K/UL
NEUTROPHILS NFR BLD: 69.2 %
NRBC BLD-RTO: 0 /100 WBC
PHOSPHATE SERPL-MCNC: 2.6 MG/DL
PLATELET # BLD AUTO: 289 K/UL
PMV BLD AUTO: 11.7 FL
POTASSIUM SERPL-SCNC: 5 MMOL/L
PTH-INTACT SERPL-MCNC: 259 PG/ML
RBC # BLD AUTO: 4.91 M/UL
SODIUM SERPL-SCNC: 140 MMOL/L
URATE SERPL-MCNC: 7.7 MG/DL
WBC # BLD AUTO: 7.59 K/UL

## 2018-02-19 PROCEDURE — 83970 ASSAY OF PARATHORMONE: CPT | Mod: TXP

## 2018-02-19 PROCEDURE — 82306 VITAMIN D 25 HYDROXY: CPT | Mod: NTX

## 2018-02-19 PROCEDURE — 85025 COMPLETE CBC W/AUTO DIFF WBC: CPT | Mod: TXP

## 2018-02-19 PROCEDURE — 84550 ASSAY OF BLOOD/URIC ACID: CPT | Mod: TXP

## 2018-02-19 PROCEDURE — 80069 RENAL FUNCTION PANEL: CPT | Mod: TXP

## 2018-02-19 PROCEDURE — 84153 ASSAY OF PSA TOTAL: CPT | Mod: NTX

## 2018-02-19 PROCEDURE — 36415 COLL VENOUS BLD VENIPUNCTURE: CPT | Mod: PO,TXP

## 2018-02-19 RX ORDER — BLOOD SUGAR DIAGNOSTIC
STRIP MISCELLANEOUS
Qty: 100 STRIP | Refills: 2 | Status: SHIPPED | OUTPATIENT
Start: 2018-02-19 | End: 2018-05-18 | Stop reason: SDUPTHER

## 2018-02-27 ENCOUNTER — OFFICE VISIT (OUTPATIENT)
Dept: NEPHROLOGY | Facility: CLINIC | Age: 63
End: 2018-02-27
Payer: COMMERCIAL

## 2018-02-27 VITALS
HEART RATE: 82 BPM | DIASTOLIC BLOOD PRESSURE: 84 MMHG | SYSTOLIC BLOOD PRESSURE: 126 MMHG | BODY MASS INDEX: 32.29 KG/M2 | HEIGHT: 74 IN | WEIGHT: 251.56 LBS

## 2018-02-27 DIAGNOSIS — N18.4 CHRONIC KIDNEY DISEASE (CKD), STAGE IV (SEVERE): Primary | ICD-10-CM

## 2018-02-27 PROCEDURE — 99214 OFFICE O/P EST MOD 30 MIN: CPT | Mod: S$GLB,,, | Performed by: INTERNAL MEDICINE

## 2018-02-27 PROCEDURE — 99999 PR PBB SHADOW E&M-EST. PATIENT-LVL III: CPT | Mod: PBBFAC,,, | Performed by: INTERNAL MEDICINE

## 2018-02-27 PROCEDURE — 3008F BODY MASS INDEX DOCD: CPT | Mod: S$GLB,,, | Performed by: INTERNAL MEDICINE

## 2018-02-27 NOTE — PROGRESS NOTES
"Subjective:       Patient ID: Isidro Alves is a 62 y.o.   male who presents for follow-up evaluation of CKD stage 4 , HTN, DM-2, SHPT     Steven Morgan MD      HPI: Isidro Alves Is a pleasant 62 -year-old  gentleman seen in office today in f/u for above medical problems. I saw him about 4  months ago,   He has chronic kidney diseased stage 4 . Recent serum creatinine is 3.8  mg/dL with a GFR of about 18 ml/mt. he has long-standing history of hypertension and diabetes ,  Nonadherence with his blood pressure medications is a big issue with him in the past. Patient says he has been more adherent with his medications in the recent past. Improvement in blood pressures noted. Also has been exercising regularly and trying to watch salt intake.   PSA levels were elevated and is currently following with urology. Recent labs discussed with patient,        Past Medical History:   Diagnosis Date    Anemia 4/16/2014    CKD (chronic kidney disease) stage 4, GFR 15-29 ml/min     Diabetes mellitus     Hypertension     Secondary hyperparathyroidism (of renal origin)     UTI (lower urinary tract infection) 5/1/2015         Current Outpatient Prescriptions on File Prior to Visit   Medication Sig Dispense Refill    acyclovir (ZOVIRAX) 400 MG tablet TAKE 1 TABLET 3 TIMES DAILY. (Patient taking differently: TAKE 1 TABLET 3 TIMES DAILY AS NEEDED) 30 tablet 6    BD INSULIN SYRINGE HALF UNIT 0.3 mL 31 gauge x 5/16" Syrg FOR USE WITH INSULIN. 100 Syringe 1    CIALIS 10 mg tablet TAKE 1 TABLET (10 MG TOTAL) BY MOUTH AS NEEDED FOR ERECTILE DYSFUNCTION. 6 tablet 7    dulaglutide (TRULICITY) 1.5 mg/0.5 mL PnIj Inject 1.5 mg into the skin every 7 days. 4 Syringe 5    esomeprazole (NEXIUM) 40 MG capsule Take 40 mg by mouth once daily.      fluticasone (FLONASE) 50 mcg/actuation nasal spray 2 sprays by Each Nare route once daily. 1 Bottle 0    folic acid (FOLVITE) 1 MG tablet TAKE 1 TABLET (1 MG " TOTAL) BY MOUTH ONCE DAILY. 30 tablet 3    insulin degludec (TRESIBA FLEXTOUCH U-200) 200 unit/mL (3 mL) InPn Inject 35 Units into the skin once daily. Increase 2 units every 3 days until fasting blood sugar is less than 110 (Patient taking differently: Inject 30 Units into the skin once daily. Increase 2 units every 3 days until fasting blood sugar is less than 110) 3 Syringe 5    lancets (ONETOUCH ULTRASOFT LANCETS) Misc 1 lancet by Misc.(Non-Drug; Combo Route) route 2 (two) times daily. (Patient taking differently: 1 lancet by Misc.(Non-Drug; Combo Route) route 3 (three) times daily. ) 200 each 1    multivitamin (THERAGRAN) tablet Take 1 tablet by mouth once daily.      nifedipine (ADALAT CC) 60 MG TbSR TAKE 1 TABLET (60 MG TOTAL) BY MOUTH 2 (TWO) TIMES DAILY. 60 tablet 8    ONETOUCH ULTRA TEST Strp 1 STRIP BY MISC.(NON-DRUG COMBO ROUTE) ROUTE 3 (THREE) TIMES DAILY. 100 strip 2    rosuvastatin (CRESTOR) 40 MG Tab TAKE 1 TABLET (40 MG TOTAL) BY MOUTH EVERY EVENING. 90 tablet 3    tamsulosin (FLOMAX) 0.4 mg Cp24 TAKE 2 CAPSULES (0.8 MG TOTAL) BY MOUTH EVERY EVENING. 60 capsule 7    valsartan (DIOVAN) 320 MG tablet TAKE 1 TABLET (320 MG TOTAL) BY MOUTH ONCE DAILY. 90 tablet 2    VENTOLIN HFA 90 mcg/actuation inhaler INHALE 2 PUFFS INTO THE LUNGS EVERY 6 (SIX) HOURS AS NEEDED FOR WHEEZING. 18 Inhaler 0    VITAMIN D2 50,000 unit capsule TAKE 1 CAPSULE (50,000 UNITS TOTAL) BY MOUTH EVERY 7 DAYS. 4 capsule 6    olopatadine (PATADAY) 0.2 % Drop Place 1 drop into both eyes daily as needed. 2.5 mL 0     No current facility-administered medications on file prior to visit.      PSH: thoracotomy in 2010      Family history : positive for chronic kidney disease and hemodialysis in his father who is no more.      Social history : he owns a restaurant. He never smoked. He drinks alcohol occasionally. He has 3 children     Review of Systems  :    Constitutional: Negative for activity change and appetite change.   HENT:  Negative for congestion, facial swelling, neck pain and neck stiffness.   Eyes: Negative for pain, discharge and redness.   Respiratory: Negative for apnea, cough and chest tightness.   Cardiovascular: Negative for chest pain, palpitations and leg swelling.   Gastrointestinal: Negative for abdominal distention.   Genitourinary: Negative for dysuria, frequency and difficulty urinating.   Skin: Negative for color change, rash and wound.   Neurological: Negative for dizziness, weakness and numbness.   Psychiatric/Behavioral: Negative for sleep disturbance.   All other systems reviewed and are negative    Objective:         Vitals:    02/27/18 1006   BP: 126/84   Pulse: 82       Respiratory rate 20, afebrile, weight 251 pounds, previous weight was 257 pounds    Physical Exam  :      Nursing note and vitals reviewed.   Constitutional: He is oriented to person, place, and time. He appears well-developed and well-nourished. No distress.   HENT: Head: Normocephalic and atraumatic. Pupils are equal, round, and reactive to light.   Neck: Normal range of motion. Neck supple. No tracheal deviation present. No thyromegaly present.   Cardiovascular: Normal rate, regular rhythm, normal heart sounds and intact distal pulses. gallop and no friction rub. No murmur heard.   Pulmonary/Chest: Effort normal and breath sounds normal. He has no wheezes. He has no rales.   Abdominal: obese, Soft. He exhibits no mass. There is no tenderness. There is no rebound and no guarding.   Musculoskeletal: Normal range of motion. He exhibits no edema.   Lymphadenopathy: He has no cervical adenopathy.   Neurological: He is alert and oriented to person, place, and time.   Skin: Skin is warm. No rash noted. He is not diaphoretic. No erythema.         Labs:    Lab Results   Component Value Date    CREATININE 3.8 (H) 02/19/2018    BUN 38 (H) 02/19/2018     02/19/2018    K 5.0 02/19/2018     02/19/2018    CO2 20 (L) 02/19/2018       Lab Results    Component Value Date    WBC 7.59 02/19/2018    HGB 12.7 (L) 02/19/2018    HCT 41.4 02/19/2018    MCV 84 02/19/2018     02/19/2018       Lab Results   Component Value Date    .0 (H) 02/19/2018    CALCIUM 9.0 02/19/2018    PHOS 2.6 (L) 02/19/2018       Lab Results   Component Value Date    ALBUMIN 3.2 (L) 02/19/2018       Lab Results   Component Value Date    HGBA1C 8.3 (H) 12/18/2017          Impression and Plan: 62 - year-old  gentleman seen in office today in f/u for following medical problems ,      1. Hypertension : Blood pressure is controlled,   Again discussed low-salt diet.       2. Chronic kidney disease stage 4 : Secondary to long-standing history of hypertension and diabetes. Again advised patient to avoid NSAIDs.  serum creatinine is 3.8  mg/dl , progressive decline in renal function explained to the patient , recent letter from the transplant Department printed and handed  to the patient, advised patient to call and make an appointment.       3. Diabetes mellitus type 2 : discussed compliance with his diet and medications. Discussed weight loss and daily exercise.      4. Secondary hyperparathyroidism - PTH is acceptable for degree of renal dysfunction, cont Vit D ,      5. Proteinuria - about 2 g daily. cont Diovan. Discussed diabetes control and adherence with ARB      6. Follow up with urology for elevated PSA levels.      7. Obesity - discussed weight loss, exercise,     8. Metabolic acidosis : mild, follow      I will see himn followup in about 4 months , Face-to-face time was 25 minutes discussing his lab results and plan of care.      Gabino Champion M.D.

## 2018-03-19 ENCOUNTER — LAB VISIT (OUTPATIENT)
Dept: LAB | Facility: HOSPITAL | Age: 63
End: 2018-03-19
Attending: NURSE PRACTITIONER
Payer: COMMERCIAL

## 2018-03-19 DIAGNOSIS — Z79.4 TYPE 2 DIABETES MELLITUS WITH STAGE 4 CHRONIC KIDNEY DISEASE, WITH LONG-TERM CURRENT USE OF INSULIN: ICD-10-CM

## 2018-03-19 DIAGNOSIS — E11.22 TYPE 2 DIABETES MELLITUS WITH STAGE 4 CHRONIC KIDNEY DISEASE, WITH LONG-TERM CURRENT USE OF INSULIN: ICD-10-CM

## 2018-03-19 DIAGNOSIS — N18.4 TYPE 2 DIABETES MELLITUS WITH STAGE 4 CHRONIC KIDNEY DISEASE, WITH LONG-TERM CURRENT USE OF INSULIN: ICD-10-CM

## 2018-03-19 LAB
ESTIMATED AVG GLUCOSE: 128 MG/DL
HBA1C MFR BLD HPLC: 6.1 %

## 2018-03-19 PROCEDURE — 36415 COLL VENOUS BLD VENIPUNCTURE: CPT | Mod: PO

## 2018-03-19 PROCEDURE — 83036 HEMOGLOBIN GLYCOSYLATED A1C: CPT

## 2018-03-20 ENCOUNTER — TELEPHONE (OUTPATIENT)
Dept: DIABETES | Facility: CLINIC | Age: 63
End: 2018-03-20

## 2018-04-10 RX ORDER — FOLIC ACID 1 MG/1
TABLET ORAL
Qty: 30 TABLET | Refills: 3 | Status: SHIPPED | OUTPATIENT
Start: 2018-04-10 | End: 2018-08-10 | Stop reason: SDUPTHER

## 2018-04-11 RX ORDER — ACYCLOVIR 400 MG/1
TABLET ORAL
Qty: 30 TABLET | Refills: 6 | OUTPATIENT
Start: 2018-04-11

## 2018-04-17 RX ORDER — ACYCLOVIR 400 MG/1
TABLET ORAL
Qty: 30 TABLET | Refills: 6 | OUTPATIENT
Start: 2018-04-17

## 2018-04-17 NOTE — TELEPHONE ENCOUNTER
----- Message from Felicita Reyna sent at 4/17/2018  9:17 AM CDT -----  Contact: Pt  Please give pt a call at ..[phone] (home) regarding a refill on his ACYCLOVIR.          ..  [id number] - [place], [place] - [address]  [address]  [place] [address]  Phone: [phone] Fax: [phone]

## 2018-04-17 NOTE — TELEPHONE ENCOUNTER
Pt requesting refill of acyclovir.  Notified pt that request will be sent to doctor for approval.  Pt verbalized understanding.

## 2018-04-19 ENCOUNTER — TELEPHONE (OUTPATIENT)
Dept: DIABETES | Facility: CLINIC | Age: 63
End: 2018-04-19

## 2018-04-19 NOTE — TELEPHONE ENCOUNTER
Called and spoke with patient regarding missed appointment. Patient stated that he was held up in traffic. Appointment rescheduled to next Monday.

## 2018-04-20 DIAGNOSIS — I10 HTN (HYPERTENSION), BENIGN: ICD-10-CM

## 2018-04-20 RX ORDER — ACYCLOVIR 400 MG/1
TABLET ORAL
Qty: 30 TABLET | Refills: 6 | Status: SHIPPED | OUTPATIENT
Start: 2018-04-20 | End: 2019-06-01 | Stop reason: SDUPTHER

## 2018-04-20 RX ORDER — NIFEDIPINE 60 MG/1
TABLET, EXTENDED RELEASE ORAL
Qty: 60 TABLET | Refills: 7 | Status: SHIPPED | OUTPATIENT
Start: 2018-04-20 | End: 2018-10-29 | Stop reason: SDUPTHER

## 2018-04-21 DIAGNOSIS — N40.0 BENIGN NON-NODULAR PROSTATIC HYPERPLASIA WITHOUT LOWER URINARY TRACT SYMPTOMS: ICD-10-CM

## 2018-04-23 ENCOUNTER — OFFICE VISIT (OUTPATIENT)
Dept: DIABETES | Facility: CLINIC | Age: 63
End: 2018-04-23
Payer: COMMERCIAL

## 2018-04-23 ENCOUNTER — TELEPHONE (OUTPATIENT)
Dept: INTERNAL MEDICINE | Facility: CLINIC | Age: 63
End: 2018-04-23

## 2018-04-23 VITALS
WEIGHT: 256.19 LBS | SYSTOLIC BLOOD PRESSURE: 158 MMHG | DIASTOLIC BLOOD PRESSURE: 70 MMHG | HEIGHT: 74 IN | BODY MASS INDEX: 32.88 KG/M2

## 2018-04-23 DIAGNOSIS — Z79.4 TYPE 2 DIABETES MELLITUS WITH CHRONIC KIDNEY DISEASE, WITH LONG-TERM CURRENT USE OF INSULIN, UNSPECIFIED CKD STAGE: Primary | ICD-10-CM

## 2018-04-23 DIAGNOSIS — E11.22 TYPE 2 DIABETES MELLITUS WITH CHRONIC KIDNEY DISEASE, WITH LONG-TERM CURRENT USE OF INSULIN, UNSPECIFIED CKD STAGE: Primary | ICD-10-CM

## 2018-04-23 LAB — GLUCOSE SERPL-MCNC: 174 MG/DL (ref 70–110)

## 2018-04-23 PROCEDURE — 3077F SYST BP >= 140 MM HG: CPT | Mod: CPTII,S$GLB,, | Performed by: NURSE PRACTITIONER

## 2018-04-23 PROCEDURE — 99999 PR PBB SHADOW E&M-EST. PATIENT-LVL III: CPT | Mod: PBBFAC,,, | Performed by: NURSE PRACTITIONER

## 2018-04-23 PROCEDURE — 82948 REAGENT STRIP/BLOOD GLUCOSE: CPT | Mod: S$GLB,,, | Performed by: NURSE PRACTITIONER

## 2018-04-23 PROCEDURE — 99214 OFFICE O/P EST MOD 30 MIN: CPT | Mod: S$GLB,,, | Performed by: NURSE PRACTITIONER

## 2018-04-23 PROCEDURE — 3044F HG A1C LEVEL LT 7.0%: CPT | Mod: CPTII,S$GLB,, | Performed by: NURSE PRACTITIONER

## 2018-04-23 PROCEDURE — 3078F DIAST BP <80 MM HG: CPT | Mod: CPTII,S$GLB,, | Performed by: NURSE PRACTITIONER

## 2018-04-23 RX ORDER — INSULIN DEGLUDEC 200 U/ML
35 INJECTION, SOLUTION SUBCUTANEOUS DAILY
Qty: 3 SYRINGE | Refills: 5 | Status: SHIPPED | OUTPATIENT
Start: 2018-04-23 | End: 2018-07-24

## 2018-04-23 RX ORDER — TAMSULOSIN HYDROCHLORIDE 0.4 MG/1
0.8 CAPSULE ORAL NIGHTLY
Qty: 60 CAPSULE | Refills: 7 | Status: SHIPPED | OUTPATIENT
Start: 2018-04-23 | End: 2018-11-05 | Stop reason: SDUPTHER

## 2018-04-23 NOTE — PATIENT INSTRUCTIONS
PATIENT INSTRUCTIONS  - Follow up as scheduled.   - Carb Count: 30-45G/meal and 15G/snack  - Exercise: Goal is 150 minutes or more per week  - Bring meter and blood sugar log to each appointment.   - Keep it up!! Great Job!!    - You will take your blood sugar two times daily. Once will always be in the morning before you have any food, (known as your fasting blood sugar), and once should be two hours after EITHER your breakfast, lunch, or dinner, (known as your post-prandial blood sugar). Each day you should check a different meal, (ie. Monday-breakfast; Tuesday- lunch; Wednesday- supper, then repeat).     - Send me your blood sugars weekly if directed to do so. The easiest way to do this is through [facility]. Send in logs on Tuesdays, Wednesdays, or Thursdays.    - Blood Sugar Goals:  1. The goal for fasting blood sugars is 80 -130 mg/dl.   2. The goal for the 2 hour after meal blood sugars is below 180 mg/dl.  3. Blood sugars below 70 are considered LOW and you must eat or drink 15G of carbohydrates immediately, then recheck blood sugar after 15 minutes to ensure blood sugar has returned to normal range, (70 or above)                                                              Diabetes (General Information)  Diabetes is a long-term health problem. It means your body does not make enough insulin. Or it may mean that your body cannot use the insulin it makes. Insulin is a hormone in your body. It lets blood sugar (glucose) reach the cells in your body. All of your cells need glucose for fuel.  When you have diabetes, the glucose in your blood builds up because it cannot get into the cells. This buildup is called high blood sugar (hyperglycemia).  Your blood sugar level depends on several things. It depends on what kind of food you eat and how much of it you eat. It also depends on how much exercise you get, and how much insulin you have in your body. Eating too much of the wrong kinds of food or not taking  diabetes medicine on time can cause high blood sugar. Infections can cause high blood sugar even if you are taking medicines correctly.  These things can also cause low blood sugar:  · Missing meals  · Not eating enough food  · Taking too much diabetes medicine  Diabetes can cause serious problems over time if you do not get treated. These problems include heart disease, stroke, kidney failure, and blindness. They also include nerve pain or loss of feeling in your legs and feet, and gangrene of the feet. By keeping your blood sugar under control you can prevent or delay these problems.  Normal blood sugar levels are 80 to 100 before a meal and less than 180 in the 1 to 2 hours after a meal.  Home care  Follow these guidelines when caring for yourself at home:  · Follow the diet your healthcare provider gives you. Take insulin or other diabetes medicine exactly as told to.  · Watch your blood sugar as you are told to. Keep a log of your results. This will help your provider change your medicines to keep your blood sugar under control.  · Try to reach your ideal weight. You may be able to cut back on or not have to take diabetes medicine if you eat the right foods and get exercise.  · Do not smoke. Smoking worsens the effects of diabetes on your circulation. You are much more likely to have a heart attack if you have diabetes and you smoke.  · Take good care of your feet. If you have lost feeling in your feet, you may not see an injury or infection. Check your feet and between your toes at least once a week.  · Wear a medical alert bracelet or necklace, or carry a card in your wallet that says you have diabetes. This will help healthcare providers give you the right care if you get very ill and cannot tell them that you have diabetes.  Sick day plan  If you get a cold, the flu, or a bacterial or viral infection, take these steps:  · Look at your diabetes sick plan and call your healthcare provider as you were told to.  You may need to call your provider right away if:  ¨ Your blood sugar is above 240 while taking your diabetes medicine  ¨ Your urine ketone levels are above normal or high  ¨ You have been vomiting more than 6 hours  ¨ You have trouble breathing or your breath ha s a fruity smell  ¨ You have a high fever  ¨ You have a fever for several days and you are not getting better  ¨ You get light-headed and are sleepier than usual  · Keep taking your diabetes pills (oral medicine) even if you have been vomiting and are feeling sick. Call your provider right away because you may need insulin to lower your blood sugar until you recover from your illness.  · Keep taking your insulin even if you have been vomiting and are feeling sick. Call your provider right away to ask if you need to change your insulin dose. This will depend on your blood sugar results.  · Check your blood sugar every 2 to 4 hours, or at least 4 times a day.  · Check your ketones often. If you are vomiting and having diarrhea, watch them more often.  · Do not skip meals. Try to eat small meals on a regular schedule. Do this even if you do not feel like eating.  · Drink water or other liquids that do not have caffeine or calories. This will keep you from getting dehydrated. If you are nauseated or vomiting, takes small sips every 5 minutes. To prevent dehydration try to drink a cup (8 ounces) of fluids every hour while you are awake.  General care  Always bring a source of fast-acting sugar with you in case you have symptoms of low blood sugar (below 70). At the first sign of low blood sugar, eat or drink 15 to 20 grams of fast-acting sugar to raise your blood sugar. Examples are:  · 3 to 4 glucose tablets. You can buy these at most drugstores.  · 4 ounces (1/2 cup) of regular (not diet) soft drinks  · 4 ounces (1/2 cup) of any fruit juice  · 8 ounces (1 cup) of milk  · 5 to 6 pieces of hard candy  · 1 tablespoon of honey  Check your blood sugar 15 minutes  after treating yourself. If it is still below 70, take 15 to 20 more grams of fast-acting sugar. Test again in 15 minutes. If it returns to normal (70 or above), eat a snack or meal to keep your blood sugar in a safe range. If it stays low, call your doctor or go to an emergency room.  Follow-up care  Follow-up with your healthcare provider, or as advised. For more information about diabetes, visit the American Diabetes Association website at www.diabetes.org or call 167-612-8594.  When to seek medical advice  Call your healthcare provider right away if you have any of these symptoms of high blood sugar:  · Frequent urination  · Dizziness  · Drowsiness  · Thirst  · Headache  · Nausea or vomiting  · Abdominal pain  · Eyesight changes  · Fast breathing  · Confusion or loss of consciousness  Also call your provider right away if you have any of these signs of low blood sugar:  · Fatigue  · Headache  · Shakes  · Excess sweating  · Hunger  · Feeling anxious or restless  · Eyesight changes  · Drowsiness  · Weakness  · Confusion or loss of consciousness  Call 911  Call for emergency help right away if any of these occur:  · Chest pain or shortness of breath  · Dizziness or fainting  · Weakness of an arm or leg or one side of the face  · Trouble speaking or seeing   Date Last Reviewed: 6/1/2016 © 2000-2016 [employer]. [address] [place] [address], [place] [address]. All rights reserved. This information is not intended as a substitute for professional medical care. Always follow your healthcare professional's instructions.                                                                     Understanding Type 2 Diabetes  When your body is working normally, the food you eat is digested and used as fuel. This fuel supplies energy to the bodys cells. When you have diabetes, the fuel cant enter the cells. Without treatment, diabetes can cause serious long-term health problems.     Your body breaks down the food you  eat into glucose.          How the body gets energy  The digestive system breaks down food, resulting in a sugar called glucose. Some of this glucose is stored in the liver. But most of it enters the bloodstream and travels to the cells to be used as fuel. Glucose needs the help of a hormone called insulin to enter the cells. Insulin is made in the pancreas. It is released into the bloodstream in response to the presence of glucose in the blood. Think of insulin as a key. When insulin reaches a cell, it attaches to the cell wall. This signals the cell to create an opening that allows glucose to enter the cell.  When you have type 2 diabetes  Early in type 2 diabetes, your cells dont respond properly to insulin. Because of this, less glucose than normal moves into cells. This is called insulin resistance. In response, the pancreas makes more insulin. But eventually, the pancreas cant produce enough insulin to overcome insulin resistance. As less and less glucose enters cells, it builds up to a harmful level in the bloodstream. This is known as high blood sugar or hyperglycemia. The result is type 2 diabetes. The cells become starved for energy, which can leave you feeling tired and rundown.  Why high blood sugar is a problem  If high blood sugar is not controlled, blood vessels throughout the body become damaged. Prolonged high blood sugar affects organs, blood vessels, and nerves. As a result, the risks of damage to the heart, kidneys, eyes, and limbs increase. Diabetes also makes other problems, such as high blood pressure and high cholesterol, more dangerous. Over time, people with uncontrolled high blood sugar have an increase in risk of dying of, or being disabled by, heart attack or stroke.  Date Last Reviewed: 7/1/2016 © 2000-2016 [employer]. [address] [place] [address], [place] [address]. All rights reserved. This information is not intended as a substitute for professional medical care. Always  follow your healthcare professional's instructions.                                                            Using a Blood Sugar Log    You have diabetes. This means your body has trouble regulating a sugar called glucose. To help manage your diabetes, youll need to check your blood sugar level as directed by your healthcare provider. Keeping a log of your blood sugar levels will help you track your blood sugar readings. Its a simple and easy way to see how well you are controlling your diabetes.  Checking your blood sugar level  You can check your blood sugar level with a blood glucose meter. Youll first prick the side of your finger with a tiny lancet to draw a tiny drop of blood onto the test strip. Some glucose meters let you use another place on your body to test. But these other places should not be used in some cases as they may be inaccurate. Follow the instructions for your glucose meter. And talk with your healthcare provider before doing the test on other places.  The strip goes into the meter first, then a drop of blood is placed on the tip of the strip. The meter then shows a reading that tells you the level of your blood sugar. Your readings should be in your target range as often as possible. This means not too high or too low. Staying in this range helps lower your risk for complications. Your healthcare provider will help you figure out the target range that is best for you.  Tracking your readings  Every time you check your blood sugar, use your log to keep track of your readings. Your meter will also probably have a memory feature that your healthcare provider can check at your next visit. You may be advised by your healthcare provider to check your blood sugar in the morning, at bedtime, and before and after meals. Be sure to write down all of your numbers. Also use your log to record things that might have affected your blood sugar. Some examples include being sick, certain medicines, being  physically active, feeling stressed, or skipping meals.   Lessons learned from your readings  Tracking your blood sugar readings helps you see patterns. These patterns tell you how your actions affect your blood sugar. For instance, you may have higher numbers after eating certain foods or lower numbers after exercise. They just help you understand how to stay in your target range more often, so that your diabetes remains in good control.  Sharing your log with your healthcare team  Bring your blood sugar log and glucose meter with you to all of your healthcare appointments. This can help your healthcare team make changes to your treatment plan, if needed. This may involve making changes in what you eat, what medicines you take, or how much you exercise.  To learn more  The resources below can help you learn more:  · American Diabetes Association 781-032-7598 www.diabetes.org  · [employer] 682-904-2993 www.lighthouse.org  · [facility] 586-880-3871 www.nei.nih.gov  · [employer] 571-145-4996 www.hormone.org  Date Last Reviewed: 5/1/2016  © 8609-9308 [employer]. [address] [place] [address]. All rights reserved. This information is not intended as a substitute for professional medical care. Always follow your healthcare professional's instructions.                                                                                            Diabetes: Exams and Tests    For your diabetes care, you may see your primary care provider or a specialist 2 to 4 times a year, or as directed. This page lists some of the regular exams and tests recommended for people with diabetes. To learn more, contact the American Diabetes Association (747-699-3116, www.diabetes.org).  Tests and immunizations  These should be done at least as often as stated below:  · Blood pressure check: every healthcare provider visit  · A1C: at first, every 3 months; if controlled, then every 3  to 6 months   · Cholesterol and blood lipid tests: at least every 12 months.  · Urine tests for kidney function: every 12 months  · Flu shots: once a year  · Pneumonia shots: talk with your healthcare provider about which pneumonia vaccines are right for you  · Hepatitis B shots: as soon as possible if youre under 60, or as advised by your healthcare provider if youre older than 60  · Shingles vaccine after age 60, even if you have already had shingles   · Other tests or vaccines: as advised by your healthcare provider  · Individualized medical nutrition therapy: at least once, then as needed  · Stop smoking counseling, if you still smoke, at each visit   Regular exams  The following exams help keep you healthy:  · Foot exams. Nerve and blood vessel problems can affect your feet sooner than other parts of your body. Make sure that your healthcare provider checks your feet at every office visit.  · Eye exams. You can have problems with your eyes even if you dont have trouble seeing. An ophthalmologist (eye healthcare provider) or specially trained optometrist will give you a dilated eye exam at least once a year. If you see dark spots, see poorly in dim light, have eye pain or pressure, or notice any other problems, tell your healthcare provider right away.  · Dental exams. Gum disease (also called periodontal disease) and other mouth problems are common in people with diabetes. To help prevent these problems, see your dentist two or more times a year.  Ask your healthcare provider what other exams youll need on a regular basis.  Date Last Reviewed: 6/1/2016  © 4042-4439 [employer]. [address] [place] [address], [place] [address]. All rights reserved. This information is not intended as a substitute for professional medical care. Always follow your healthcare professional's instructions.

## 2018-04-23 NOTE — PROGRESS NOTES
"Subjective:         Patient ID: Isidro Alves is a 62 y.o. male.  Patient's current PCP is Steven Morgan MD.   Social History     Social History    Marital status:      Spouse name: N/A    Number of children: 3    Years of education: N/A     Occupational History     Self-Rmployed     Social History Main Topics    Smoking status: Never Smoker    Smokeless tobacco: Never Used    Alcohol use Yes      Comment: seldom     Drug use: No    Sexual activity: Yes     Partners: Female     Other Topics Concern    Not on file     Social History Narrative    No narrative on file       Chief Complaint: Diabetes Mellitus    HPI   Isidro Alves is a 62 y.o. Black or  male presenting for follow up of diabetes. Patient has been diagnosed with diabetes for over 20 years and has the following complications from diabetes: nephropathy and peripheral neuropathy. Blood glucose testing is performed regularly. Since last visit, patient reports blood glucose values to have approximately ranged from 90-120s, fasting, less than 180 PP. Condition has remained stable and at goal since last visit.     He denies any recent hospital admissions, emergency room visits, hypoglycemia.      Height: 6' 2" (188 cm)  //  Weight: 116.2 kg (256 lb 2.8 oz), Body mass index is 32.89 kg/m².  Home Blood Glucose reading this AM: 101 mg/dl fasting.  His blood sugar in clinic today is:   Lab Results   Component Value Date    POCGLU 174 (A) 04/23/2018       Labs reviewed and are noted below.    His most recent A1C is:   Lab Results   Component Value Date    HGBA1C 6.1 (H) 03/19/2018     Lab Results   Component Value Date    CPEPTIDE 3.88 11/20/2017     Lab Results   Component Value Date    GLUTAMICACID 0.00 11/20/2017     Lab Results   Component Value Date    WBC 7.59 02/19/2018    HGB 12.7 (L) 02/19/2018    HCT 41.4 02/19/2018     02/19/2018    CHOL 139 11/20/2017    TRIG 157 (H) 11/20/2017    HDL 44 11/20/2017    ALT " 12 11/20/2017    AST 23 11/20/2017     02/19/2018    K 5.0 02/19/2018     02/19/2018    CREATININE 3.8 (H) 02/19/2018    BUN 38 (H) 02/19/2018    CO2 20 (L) 02/19/2018    TSH 1.329 11/20/2017    PSA 11.0 (H) 02/19/2018    INR 0.9 05/02/2015    HGBA1C 6.1 (H) 03/19/2018       CURRENT DM MEDICATIONS:   Current Outpatient Prescriptions   Medication Sig Dispense Refill    insulin degludec (TRESIBA FLEXTOUCH U-200) 200 unit/mL (3 mL) InPn Inject 30 Units into the skin once daily. Increase 2 units every 3 days until fasting blood sugar is less than 110 3 Syringe 5    dulaglutide (TRULICITY)1.5 mg/0.5 mL PnIj Inject 1.5mg into the skin every 7 days. 4 Syringe 5     No current facility-administered medications for this visit.        No current facility-administered medications for this visit.        Health Maintenance   Topic Date Due    Zoster Vaccine  08/26/2016    Foot Exam  03/14/2017    Eye Exam  03/09/2018    Hemoglobin A1c  09/19/2018    Lipid Panel  11/20/2018    Pneumococcal PPSV23 (Medium Risk) (2) 08/26/2021    Colonoscopy  12/21/2022    TETANUS VACCINE  05/05/2026    Hepatitis C Screening  Completed    Influenza Vaccine  Addressed       STANDARDS OF CARE:  Current Ophthalmologist/Optometrist: Dr. Del Real.  Last exam 2017. Exam scheduled next week.   Current Dentist: Yes. Last exam 2017.  Current Podiatrist: No  ACE/ARB: Yes  Statin: Yes  He  has attended diabetes education in the past.     LIFESTYLE:  ACTIVITY LEVEL: Moderately Active  EXERCISE:  30-60 min 5 d/wk  MEAL PLANNING: Patient reports number of meals per day to be 3 and number of snacks per day to be 2    BLOOD GLUCOSE TESTING: Patient reports testing on average a total of 2-3 times per day.    Review of Systems   Constitutional: Negative for activity change, appetite change, chills, diaphoresis, fatigue, fever and unexpected weight change.   Eyes: Negative for visual disturbance.   Respiratory: Negative for apnea and shortness  of breath.    Cardiovascular: Negative.  Negative for chest pain, palpitations and leg swelling.   Gastrointestinal: Negative for abdominal distention, constipation, diarrhea, nausea and vomiting.   Endocrine: Negative for cold intolerance, heat intolerance, polydipsia, polyphagia and polyuria.   Genitourinary: Negative.  Negative for frequency.   Skin: Negative.  Negative for color change, pallor, rash and wound.   Neurological: Negative.  Negative for dizziness, seizures, syncope, light-headedness, numbness and headaches.   Psychiatric/Behavioral: Negative for confusion, hallucinations and suicidal ideas.         Objective:      Physical Exam   Constitutional: He is oriented to person, place, and time. He appears well-developed and well-nourished.   HENT:   Head: Normocephalic and atraumatic.   Eyes: EOM are normal. Pupils are equal, round, and reactive to light.   Neck: Normal range of motion. Neck supple.   Cardiovascular: Normal rate, regular rhythm and normal heart sounds.    Pulses:       Dorsalis pedis pulses are 2+ on the right side, and 2+ on the left side.        Posterior tibial pulses are 2+ on the right side, and 2+ on the left side.   Pulmonary/Chest: Effort normal and breath sounds normal.   Abdominal: Soft. Bowel sounds are normal.   Musculoskeletal: Normal range of motion.        Right foot: There is no deformity.        Left foot: There is no deformity.   Feet:   Right Foot:   Protective Sensation: 10 sites tested. 10 sites sensed.   Skin Integrity: Negative for ulcer, blister, skin breakdown, erythema, warmth, callus or dry skin.   Left Foot:   Protective Sensation: 10 sites tested. 10 sites sensed.   Skin Integrity: Negative for ulcer, blister, skin breakdown, erythema, warmth, callus or dry skin.   Neurological: He is alert and oriented to person, place, and time.   Skin: Skin is warm and dry.   Psychiatric: He has a normal mood and affect. His behavior is normal. Judgment and thought content  normal.   Nursing note and vitals reviewed.      Assessment:       1. Type 2 diabetes mellitus with chronic kidney disease, with long-term current use of insulin, unspecified CKD stage        Plan:   Type 2 diabetes mellitus with chronic kidney disease, with long-term current use of insulin, unspecified CKD stage  -     insulin degludec (TRESIBA FLEXTOUCH U-200) 200 unit/mL (3 mL) InPn; Inject 35 Units into the skin once daily. Increase 2 units every 3 days until fasting blood sugar is less than 110  Dispense: 3 Syringe; Refill: 5  -     dulaglutide (TRULICITY) 1.5 mg/0.5 mL PnIj; Inject 1.5 mg into the skin every 7 days.  Dispense: 4 Syringe; Refill: 5  -     Hemoglobin A1c; Future; Expected date: 04/23/2018  -     POCT glucose    - Condition at goal. Continue current regimen. DM education reviewed. Patient encouraged to carb count and exercise per recommendations. Labs and Referrals as noted. Patient instructed to send in log once weekly for my review. Patient to consider Xultophy which combines Tresiba and Victoza, and decreases patient from 2 copays to 1 copay. RV scheduled in 3 months.     Additional Plan Details:    1.) Patient was instructed to monitor blood glucose 2 - 3 x daily, fasting and ac dinner or at bedtime. Discussed ADA goal for fasting blood sugar, 80 - 130mg/dL; pp blood sugars below 180 mg/dl. Also, discussed prevention of hypoglycemia and the need to adjust goals to higher levels if persistent hypoglycemia.  Reminded to bring BG records or meter to each visit for review.  2.) Reviewed pathophysiology of Type 2 diabetes, complications related to the disease, importance of annual dilated eye exam and daily foot examination.  3.) We discussed the ADA recommendations, which are as follows:  Hemoglobin A1c below 7.0 %. All patients with diabetes should be on statins unless contraindicated.  ACE or ARB therapy if not contraindicated.    4.) Continue medications as prescribed.  My [facility] e-mail or  phone review in one week with BG records for adjustment of medication.  5.) Meal planning teaching: Reviewed carb counting, portion control, importance of spacing meals throughout the day to prevent post prandial elevations.  6.) Discussed activity with related benefits, methods, and precautions. Recommended patient start or continue some form of exercise and increase as tolerated to 30 minutes per day to aid in control of BGs.  7.) A1C, TSH, Lipid Panel, CMP with eGFR and Micro/Creatinine are utd or were ordered per ADA protocol.  8.) Return to clinic in 3 months for follow up. The patient was explained the above plan and given opportunity to ask questions.  He understands, chooses and consents to this plan and accepts all the risks, which include but are not limited to the risks mentioned above. He understands the alternative of having no testing, interventions or treatments at this time. He left content and without further questions.     A total of 30 minutes was spent in face to face time, of which over 50% was spent in counseling patient on disease process, complications, treatment, and side effects of medications.        GIBRAN Carrion

## 2018-04-23 NOTE — TELEPHONE ENCOUNTER
----- Message from Ana Lopez sent at 4/23/2018  3:59 PM CDT -----  Contact: PT   Pt request call back ..[phone] (amtf)

## 2018-04-23 NOTE — TELEPHONE ENCOUNTER
Pt stated he was supposed to get a prescription of acyclovir sent to his pharmacy.  Notified pt that acyclovir script was sent to his pharmacy on 4/20/18.  Advised pt to check with his pharmacy.  Pt verbalized understanding.

## 2018-06-18 ENCOUNTER — LAB VISIT (OUTPATIENT)
Dept: LAB | Facility: HOSPITAL | Age: 63
End: 2018-06-18
Attending: INTERNAL MEDICINE
Payer: COMMERCIAL

## 2018-06-18 DIAGNOSIS — N18.4 CHRONIC KIDNEY DISEASE (CKD), STAGE IV (SEVERE): ICD-10-CM

## 2018-06-18 PROCEDURE — 36415 COLL VENOUS BLD VENIPUNCTURE: CPT | Mod: PO

## 2018-06-18 PROCEDURE — 85025 COMPLETE CBC W/AUTO DIFF WBC: CPT

## 2018-06-18 PROCEDURE — 80069 RENAL FUNCTION PANEL: CPT

## 2018-06-19 LAB
ALBUMIN SERPL BCP-MCNC: 3.7 G/DL
ANION GAP SERPL CALC-SCNC: 9 MMOL/L
BASOPHILS # BLD AUTO: 0.06 K/UL
BASOPHILS NFR BLD: 0.7 %
BUN SERPL-MCNC: 47 MG/DL
CALCIUM SERPL-MCNC: 9.6 MG/DL
CHLORIDE SERPL-SCNC: 110 MMOL/L
CO2 SERPL-SCNC: 23 MMOL/L
CREAT SERPL-MCNC: 4.4 MG/DL
DIFFERENTIAL METHOD: ABNORMAL
EOSINOPHIL # BLD AUTO: 0.2 K/UL
EOSINOPHIL NFR BLD: 2.5 %
ERYTHROCYTE [DISTWIDTH] IN BLOOD BY AUTOMATED COUNT: 14.6 %
EST. GFR  (AFRICAN AMERICAN): 15.6 ML/MIN/1.73 M^2
EST. GFR  (NON AFRICAN AMERICAN): 13.5 ML/MIN/1.73 M^2
GLUCOSE SERPL-MCNC: 80 MG/DL
HCT VFR BLD AUTO: 45.5 %
HGB BLD-MCNC: 13.8 G/DL
IMM GRANULOCYTES # BLD AUTO: 0.06 K/UL
IMM GRANULOCYTES NFR BLD AUTO: 0.7 %
LYMPHOCYTES # BLD AUTO: 1.7 K/UL
LYMPHOCYTES NFR BLD: 20.4 %
MCH RBC QN AUTO: 26.1 PG
MCHC RBC AUTO-ENTMCNC: 30.3 G/DL
MCV RBC AUTO: 86 FL
MONOCYTES # BLD AUTO: 0.6 K/UL
MONOCYTES NFR BLD: 6.8 %
NEUTROPHILS # BLD AUTO: 5.6 K/UL
NEUTROPHILS NFR BLD: 68.9 %
NRBC BLD-RTO: 0 /100 WBC
PHOSPHATE SERPL-MCNC: 2.8 MG/DL
PLATELET # BLD AUTO: 292 K/UL
PMV BLD AUTO: 12.1 FL
POTASSIUM SERPL-SCNC: 5.1 MMOL/L
RBC # BLD AUTO: 5.29 M/UL
SODIUM SERPL-SCNC: 142 MMOL/L
WBC # BLD AUTO: 8.1 K/UL

## 2018-06-25 ENCOUNTER — OFFICE VISIT (OUTPATIENT)
Dept: NEPHROLOGY | Facility: CLINIC | Age: 63
End: 2018-06-25
Payer: COMMERCIAL

## 2018-06-25 VITALS
SYSTOLIC BLOOD PRESSURE: 130 MMHG | DIASTOLIC BLOOD PRESSURE: 88 MMHG | HEART RATE: 98 BPM | BODY MASS INDEX: 33.53 KG/M2 | HEIGHT: 74 IN | WEIGHT: 261.25 LBS

## 2018-06-25 DIAGNOSIS — N18.4 CHRONIC KIDNEY DISEASE (CKD), STAGE IV (SEVERE): Primary | ICD-10-CM

## 2018-06-25 PROCEDURE — 3079F DIAST BP 80-89 MM HG: CPT | Mod: CPTII,S$GLB,, | Performed by: INTERNAL MEDICINE

## 2018-06-25 PROCEDURE — 99999 PR PBB SHADOW E&M-EST. PATIENT-LVL III: CPT | Mod: PBBFAC,,, | Performed by: INTERNAL MEDICINE

## 2018-06-25 PROCEDURE — 3075F SYST BP GE 130 - 139MM HG: CPT | Mod: CPTII,S$GLB,, | Performed by: INTERNAL MEDICINE

## 2018-06-25 PROCEDURE — 3008F BODY MASS INDEX DOCD: CPT | Mod: CPTII,S$GLB,, | Performed by: INTERNAL MEDICINE

## 2018-06-25 PROCEDURE — 99214 OFFICE O/P EST MOD 30 MIN: CPT | Mod: S$GLB,,, | Performed by: INTERNAL MEDICINE

## 2018-06-25 NOTE — PROGRESS NOTES
"Subjective:       Patient ID: Isidro Alves is a 62 y.o.   male who presents for follow-up evaluation of CKD stage 4/5 , HTN, DM-2, SHPT        Steven Morgan MD      HPI :  Isidro Alves Is a pleasant 62 -year-old  gentleman seen in office today in f/u for above medical problems. I saw him about 4  months ago,   He has chronic kidney disease stage 4 . Recent serum creatinine is 4.4 mg/dL with a GFR of about 15 ml/mt. he has long-standing history of hypertension and diabetes ,  Nonadherence with his blood pressure medications is a big issue with him in the past. Patient says he has been more adherent with his medications in the recent past. Improvement in blood pressures noted. Also has been exercising regularly and trying to watch salt intake.   PSA levels were elevated and is currently following with urology. Recent labs discussed with patient,          Past Medical History:   Diagnosis Date    Anemia 4/16/2014    CKD (chronic kidney disease) stage 4, GFR 15-29 ml/min     Diabetes mellitus     Hypertension     Secondary hyperparathyroidism (of renal origin)     UTI (lower urinary tract infection) 5/1/2015         Current Outpatient Prescriptions on File Prior to Visit   Medication Sig Dispense Refill    acyclovir (ZOVIRAX) 400 MG tablet TAKE 1 TABLET 3 TIMES DAILY AS NEEDED 30 tablet 6    BD INSULIN SYRINGE HALF UNIT 0.3 mL 31 gauge x 5/16" Syrg FOR USE WITH INSULIN. 100 Syringe 1    CIALIS 10 mg tablet TAKE 1 TABLET (10 MG TOTAL) BY MOUTH AS NEEDED FOR ERECTILE DYSFUNCTION. 6 tablet 7    dulaglutide (TRULICITY) 1.5 mg/0.5 mL PnIj Inject 1.5 mg into the skin every 7 days. 4 Syringe 5    esomeprazole (NEXIUM) 40 MG capsule Take 40 mg by mouth once daily.      folic acid (FOLVITE) 1 MG tablet TAKE 1 TABLET (1 MG TOTAL) BY MOUTH ONCE DAILY. 30 tablet 3    insulin degludec (TRESIBA FLEXTOUCH U-200) 200 unit/mL (3 mL) InPn Inject 35 Units into the skin once daily. " Increase 2 units every 3 days until fasting blood sugar is less than 110 3 Syringe 5    lancets (ONETOUCH ULTRASOFT LANCETS) Misc 1 lancet by Misc.(Non-Drug; Combo Route) route 2 (two) times daily. (Patient taking differently: 1 lancet by Misc.(Non-Drug; Combo Route) route 3 (three) times daily. ) 200 each 1    multivitamin (THERAGRAN) tablet Take 1 tablet by mouth once daily.      NIFEdipine (PROCARDIA-XL) 60 MG (OSM) 24 hr tablet TAKE 1 TABLET (60 MG TOTAL) BY MOUTH 2 (TWO) TIMES DAILY. 60 tablet 7    olopatadine (PATADAY) 0.2 % Drop Place 1 drop into both eyes daily as needed. 2.5 mL 0    ONETOUCH ULTRA BLUE TEST STRIP Strp 1 STRIP BY [facility] [place]NON-DRUG COMBO ROUTE) ROUTE 3 (THREE) TIMES DAILY. 100 strip 2    tamsulosin (FLOMAX) 0.4 mg Cp24 TAKE 2 CAPSULES (0.8 MG TOTAL) BY MOUTH EVERY EVENING. 60 capsule 7    valsartan (DIOVAN) 320 MG tablet TAKE 1 TABLET (320 MG TOTAL) BY MOUTH ONCE DAILY. 90 tablet 2    VITAMIN D2 50,000 unit capsule TAKE 1 CAPSULE (50,000 UNITS TOTAL) BY MOUTH EVERY 7 DAYS. 4 capsule 6    rosuvastatin (CRESTOR) 40 MG Tab TAKE 1 TABLET (40 MG TOTAL) BY MOUTH EVERY EVENING. 90 tablet 3    [DISCONTINUED] fluticasone (FLONASE) 50 mcg/actuation nasal spray 2 sprays by Each Nare route once daily. 1 Bottle 0    [DISCONTINUED] insulin degludec (TRESIBA FLEXTOUCH U-200) 200 unit/mL (3 mL) InPn Inject 35 units into the skin once daily. increase by 2 units every 3 days until fasting blood sugar is less than 110 9 mL 5    [DISCONTINUED] VENTOLIN HFA 90 mcg/actuation inhaler INHALE 2 PUFFS INTO THE LUNGS EVERY 6 (SIX) HOURS AS NEEDED FOR WHEEZING. 18 Inhaler 0     No current facility-administered medications on file prior to visit.      PSH: thoracotomy in 2010      Family history : positive for chronic kidney disease and hemodialysis in his father who is no more.      Social history : he owns a restaurant. He never smoked. He drinks alcohol occasionally. He has 3 children     Review of Systems   :      Constitutional: Negative for activity change and appetite change.   HENT: Negative for congestion, facial swelling, neck pain and neck stiffness.   Eyes: Negative for pain, discharge and redness.   Respiratory: Negative for apnea, cough and chest tightness.   Cardiovascular: Negative for chest pain, palpitations and leg swelling.   Gastrointestinal: Negative for abdominal distention.   Genitourinary: Negative for dysuria, frequency and difficulty urinating.   Skin: Negative for color change, rash and wound.   Neurological: Negative for dizziness, weakness and numbness.   Psychiatric/Behavioral: Negative for sleep disturbance.   All other systems reviewed and are negative        Objective:           Vitals:    06/25/18 1106   BP: 130/88   Pulse: 98       Weight 251 lb, previous weight was 261 lb    Physical Exam  :      Nursing note and vitals reviewed.   Constitutional: He is oriented to person, place, and time. He appears well-developed and well-nourished. No distress.   HENT: Head: Normocephalic and atraumatic. Pupils are equal, round, and reactive to light.   Neck: Normal range of motion. Neck supple. No tracheal deviation present. No thyromegaly present.   Cardiovascular: Normal rate, regular rhythm, normal heart sounds and intact distal pulses. gallop and no friction rub. No murmur heard.   Pulmonary/Chest: Effort normal and breath sounds normal. He has no wheezes. He has no rales.   Abdominal: obese, Soft. He exhibits no mass. There is no tenderness. There is no rebound and no guarding.   Musculoskeletal: Normal range of motion. He exhibits no edema.   Lymphadenopathy: He has no cervical adenopathy.   Neurological: He is alert and oriented to person, place, and time.   Skin: Skin is warm. No rash noted. He is not diaphoretic. No erythema.              Labs:    Lab Results   Component Value Date    CREATININE 4.4 (H) 06/18/2018    BUN 47 (H) 06/18/2018     06/18/2018    K 5.1 06/18/2018      06/18/2018    CO2 23 06/18/2018       Lab Results   Component Value Date    WBC 8.10 06/18/2018    HGB 13.8 (L) 06/18/2018    HCT 45.5 06/18/2018    MCV 86 06/18/2018     06/18/2018       Lab Results   Component Value Date    .0 (H) 02/19/2018    CALCIUM 9.6 06/18/2018    PHOS 2.8 06/18/2018       Lab Results   Component Value Date    URICACID 7.7 (H) 02/19/2018       Impression and Plan: 62 - year-old  gentleman seen in office today in f/u for following medical problems ,      1. Hypertension : Blood pressure is controlled,   discussed low-salt diet and adherence with his medications.     2. 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 Again advised patient to avoid NSAIDs.  serum creatinine is 4.4 mg/dl , progressive decline in renal function explained to the patient , recent letter from the transplant Department printed again and handed  to the patient, advised patient to call and make an appointment.  Discussed adequate fluid intake.        3. Diabetes mellitus type 2 : discussed compliance with his diet and medications. Discussed weight loss and daily exercise.      4. Secondary hyperparathyroidism - PTH is acceptable for degree of renal dysfunction, cont Vit D ,      5. Proteinuria - about 2 g daily. cont Diovan. Discussed diabetes control and adherence with ARB      6. Follow up with urology for elevated PSA levels.      7. Obesity - discussed weight loss, exercise,      8. Metabolic acidosis :  follow      I will see himn followup in about 3 months , Face-to-face time was 25 minutes discussing his lab results and plan of care.      Gabino Champion M.D.

## 2018-06-26 ENCOUNTER — TELEPHONE (OUTPATIENT)
Dept: NEPHROLOGY | Facility: CLINIC | Age: 63
End: 2018-06-26

## 2018-06-26 DIAGNOSIS — I10 HTN (HYPERTENSION), BENIGN: ICD-10-CM

## 2018-06-26 RX ORDER — VALSARTAN 320 MG/1
TABLET ORAL
Qty: 90 TABLET | Refills: 2 | Status: SHIPPED | OUTPATIENT
Start: 2018-06-26 | End: 2019-05-08 | Stop reason: SDUPTHER

## 2018-06-26 NOTE — TELEPHONE ENCOUNTER
----- Message from Dinora Andrade sent at 6/26/2018 10:14 AM CDT -----  Contact: pt  The pt request a return call, no additional info given, can be reached at [phone] ///[email or web address]

## 2018-06-29 ENCOUNTER — TELEPHONE (OUTPATIENT)
Dept: NEPHROLOGY | Facility: CLINIC | Age: 63
End: 2018-06-29

## 2018-06-29 NOTE — TELEPHONE ENCOUNTER
Pt states that he received a letter for organ transplant.  Pt states that [place] says letter is no longer valid the office need to be contacted for resubmission of new letter process and approval.

## 2018-06-29 NOTE — TELEPHONE ENCOUNTER
----- Message from Stacey Castellon sent at 6/29/2018 10:24 AM CDT -----  Pt is requesting a call from nurse to f/u on a request.          Please call pt back at [phone]

## 2018-07-20 ENCOUNTER — TELEPHONE (OUTPATIENT)
Dept: NEPHROLOGY | Facility: CLINIC | Age: 63
End: 2018-07-20

## 2018-07-20 ENCOUNTER — LAB VISIT (OUTPATIENT)
Dept: LAB | Facility: HOSPITAL | Age: 63
End: 2018-07-20
Attending: NURSE PRACTITIONER
Payer: COMMERCIAL

## 2018-07-20 DIAGNOSIS — N18.4 CHRONIC KIDNEY DISEASE (CKD), STAGE IV (SEVERE): Primary | ICD-10-CM

## 2018-07-20 DIAGNOSIS — Z79.4 TYPE 2 DIABETES MELLITUS WITH CHRONIC KIDNEY DISEASE, WITH LONG-TERM CURRENT USE OF INSULIN, UNSPECIFIED CKD STAGE: ICD-10-CM

## 2018-07-20 DIAGNOSIS — E11.22 TYPE 2 DIABETES MELLITUS WITH CHRONIC KIDNEY DISEASE, WITH LONG-TERM CURRENT USE OF INSULIN, UNSPECIFIED CKD STAGE: ICD-10-CM

## 2018-07-20 LAB
ESTIMATED AVG GLUCOSE: 134 MG/DL
HBA1C MFR BLD HPLC: 6.3 %

## 2018-07-20 PROCEDURE — 83036 HEMOGLOBIN GLYCOSYLATED A1C: CPT

## 2018-07-20 PROCEDURE — 36415 COLL VENOUS BLD VENIPUNCTURE: CPT | Mod: PO

## 2018-07-20 NOTE — TELEPHONE ENCOUNTER
Returned call to patient. The referral from 2017 to transplant has . Please put in a new referral for patient.  Thanks

## 2018-07-20 NOTE — TELEPHONE ENCOUNTER
----- Message from Felicita Reyna sent at 7/20/2018 11:21 AM CDT -----  Contact: Pt  Please give pt a call at ..[phone] (home) regarding the appt he was suppose to have in [place].

## 2018-07-24 ENCOUNTER — OFFICE VISIT (OUTPATIENT)
Dept: DIABETES | Facility: CLINIC | Age: 63
End: 2018-07-24
Payer: COMMERCIAL

## 2018-07-24 VITALS
WEIGHT: 257.94 LBS | HEIGHT: 74 IN | BODY MASS INDEX: 33.1 KG/M2 | DIASTOLIC BLOOD PRESSURE: 86 MMHG | SYSTOLIC BLOOD PRESSURE: 136 MMHG

## 2018-07-24 DIAGNOSIS — Z79.4 TYPE 2 DIABETES MELLITUS WITH CHRONIC KIDNEY DISEASE, WITH LONG-TERM CURRENT USE OF INSULIN, UNSPECIFIED CKD STAGE: Primary | ICD-10-CM

## 2018-07-24 DIAGNOSIS — E11.22 TYPE 2 DIABETES MELLITUS WITH CHRONIC KIDNEY DISEASE, WITH LONG-TERM CURRENT USE OF INSULIN, UNSPECIFIED CKD STAGE: Primary | ICD-10-CM

## 2018-07-24 LAB — GLUCOSE SERPL-MCNC: 114 MG/DL (ref 70–110)

## 2018-07-24 PROCEDURE — 3075F SYST BP GE 130 - 139MM HG: CPT | Mod: CPTII,S$GLB,, | Performed by: NURSE PRACTITIONER

## 2018-07-24 PROCEDURE — 99999 PR PBB SHADOW E&M-EST. PATIENT-LVL III: CPT | Mod: PBBFAC,,, | Performed by: NURSE PRACTITIONER

## 2018-07-24 PROCEDURE — 3044F HG A1C LEVEL LT 7.0%: CPT | Mod: CPTII,S$GLB,, | Performed by: NURSE PRACTITIONER

## 2018-07-24 PROCEDURE — 82948 REAGENT STRIP/BLOOD GLUCOSE: CPT | Mod: S$GLB,,, | Performed by: NURSE PRACTITIONER

## 2018-07-24 PROCEDURE — 99214 OFFICE O/P EST MOD 30 MIN: CPT | Mod: S$GLB,,, | Performed by: NURSE PRACTITIONER

## 2018-07-24 PROCEDURE — 3008F BODY MASS INDEX DOCD: CPT | Mod: CPTII,S$GLB,, | Performed by: NURSE PRACTITIONER

## 2018-07-24 PROCEDURE — 3079F DIAST BP 80-89 MM HG: CPT | Mod: CPTII,S$GLB,, | Performed by: NURSE PRACTITIONER

## 2018-07-24 NOTE — PROGRESS NOTES
"Subjective:         Patient ID: Isidro Alves is a 62 y.o. male.  Patient's current PCP is Steven Morgan MD.   Social History     Social History    Marital status:      Spouse name: N/A    Number of children: 3    Years of education: N/A     Occupational History     Self-Rmployed     Social History Main Topics    Smoking status: Never Smoker    Smokeless tobacco: Never Used    Alcohol use Yes      Comment: seldom     Drug use: No    Sexual activity: Yes     Partners: Female     Other Topics Concern    Not on file     Social History Narrative    No narrative on file       Chief Complaint: Diabetes Mellitus    HPI   Isidro Alves is a 62 y.o. Black or  male presenting for follow up of diabetes. Patient has been diagnosed with diabetes for over 20 years and has the following complications from diabetes: nephropathy and peripheral neuropathy. Blood glucose testing is performed regularly. Since last visit, patient reports blood glucose values to have approximately ranged from 90-120s, fasting, less than 180 PP. Condition has remained stable and at goal since last visit. Patient requests Xultophy, which is a combination of Tresiba and Victoza.    He denies any recent hospital admissions, emergency room visits, hypoglycemia.      Height: 6' 2" (188 cm)  //  Weight: 117 kg (257 lb 15 oz), Body mass index is 33.12 kg/m².  Home Blood Glucose reading this AM: 101 mg/dl fasting.  His blood sugar in clinic today is:   Lab Results   Component Value Date    POCGLU 114 (A) 07/24/2018       Labs reviewed and are noted below.    His most recent A1C is:   Lab Results   Component Value Date    HGBA1C 6.3 (H) 07/20/2018     Lab Results   Component Value Date    CPEPTIDE 3.88 11/20/2017     Lab Results   Component Value Date    GLUTAMICACID 0.00 11/20/2017     Lab Results   Component Value Date    WBC 8.10 06/18/2018    HGB 13.8 (L) 06/18/2018    HCT 45.5 06/18/2018     06/18/2018    CHOL " 139 11/20/2017    TRIG 157 (H) 11/20/2017    HDL 44 11/20/2017    ALT 12 11/20/2017    AST 23 11/20/2017     06/18/2018    K 5.1 06/18/2018     06/18/2018    CREATININE 4.4 (H) 06/18/2018    BUN 47 (H) 06/18/2018    CO2 23 06/18/2018    TSH 1.329 11/20/2017    PSA 11.0 (H) 02/19/2018    INR 0.9 05/02/2015    HGBA1C 6.3 (H) 07/20/2018       CURRENT DM MEDICATIONS:   Current Outpatient Prescriptions   Medication Sig Dispense Refill    insulin degludec (TRESIBA FLEXTOUCH U-200) 200 unit/mL (3 mL) InPn Inject 30 Units into the skin once daily. Increase 2 units every 3 days until fasting blood sugar is less than 110 3 Syringe 5    dulaglutide (TRULICITY)1.5 mg/0.5 mL PnIj Inject 1.5mg into the skin every 7 days. 4 Syringe 5     No current facility-administered medications for this visit.        No current facility-administered medications for this visit.        Health Maintenance   Topic Date Due    Zoster Vaccine  08/26/2016    Eye Exam  03/09/2018    Influenza Vaccine  08/01/2018    Lipid Panel  11/20/2018    Hemoglobin A1c  01/20/2019    Foot Exam  04/23/2019    Pneumococcal PPSV23 (Medium Risk) (2) 08/26/2021    Colonoscopy  12/21/2022    TETANUS VACCINE  05/05/2026    Hepatitis C Screening  Completed       STANDARDS OF CARE:  Current Ophthalmologist/Optometrist: Dr. Del Real.  Last exam 2017. Exam scheduled next week.   Current Dentist: Yes. Last exam 2017.  Current Podiatrist: No  ACE/ARB: Yes  Statin: Yes  He  has attended diabetes education in the past.     LIFESTYLE:  ACTIVITY LEVEL: Moderately Active  EXERCISE:  30-60 min 5 d/wk  MEAL PLANNING: Patient reports number of meals per day to be 3 and number of snacks per day to be 2    BLOOD GLUCOSE TESTING: Patient reports testing on average a total of 2-3 times per day.    Review of Systems   Constitutional: Negative for activity change, appetite change, chills, diaphoresis, fatigue, fever and unexpected weight change.   Eyes: Negative for  visual disturbance.   Respiratory: Negative for apnea and shortness of breath.    Cardiovascular: Negative.  Negative for chest pain, palpitations and leg swelling.   Gastrointestinal: Negative for abdominal distention, constipation, diarrhea, nausea and vomiting.   Endocrine: Negative for cold intolerance, heat intolerance, polydipsia, polyphagia and polyuria.   Genitourinary: Negative.  Negative for frequency.   Skin: Negative.  Negative for color change, pallor, rash and wound.   Neurological: Negative.  Negative for dizziness, seizures, syncope, light-headedness, numbness and headaches.   Psychiatric/Behavioral: Negative for confusion, hallucinations and suicidal ideas.         Objective:      Physical Exam   Constitutional: He is oriented to person, place, and time. He appears well-developed and well-nourished.   HENT:   Head: Normocephalic and atraumatic.   Eyes: EOM are normal. Pupils are equal, round, and reactive to light.   Neck: Normal range of motion. Neck supple.   Cardiovascular: Normal rate, regular rhythm and normal heart sounds.    Pulses:       Dorsalis pedis pulses are 2+ on the right side, and 2+ on the left side.        Posterior tibial pulses are 2+ on the right side, and 2+ on the left side.   Pulmonary/Chest: Effort normal and breath sounds normal.   Abdominal: Soft. Bowel sounds are normal.   Musculoskeletal: Normal range of motion.        Right foot: There is no deformity.        Left foot: There is no deformity.   Feet:   Right Foot:   Protective Sensation: 10 sites tested. 10 sites sensed.   Skin Integrity: Negative for ulcer, blister, skin breakdown, erythema, warmth, callus or dry skin.   Left Foot:   Protective Sensation: 10 sites tested. 10 sites sensed.   Skin Integrity: Negative for ulcer, blister, skin breakdown, erythema, warmth, callus or dry skin.   Neurological: He is alert and oriented to person, place, and time.   Skin: Skin is warm and dry.   Psychiatric: He has a normal  mood and affect. His behavior is normal. Judgment and thought content normal.   Nursing note and vitals reviewed.      Assessment:       1. Type 2 diabetes mellitus with chronic kidney disease, with long-term current use of insulin, unspecified CKD stage        Plan:   Type 2 diabetes mellitus with chronic kidney disease, with long-term current use of insulin, unspecified CKD stage  -     POCT glucose  -     insulin degludec-liraglutide (XULTOPHY 100/3.6) 100 unit-3.6 mg /mL (3 mL) InPn; Inject 35 Units into the skin once daily.  Dispense: 5 Syringe; Refill: 1  -     Hemoglobin A1c; Future; Expected date: 07/24/2018    - Condition at goal. DC Tresiba and Trulicity. Start Xultophy as noted above.DM education reviewed. Patient encouraged to carb count and exercise per recommendations. Labs and Referrals as noted. Patient instructed to send in log once weekly for my review. RV scheduled in 3 months.     Additional Plan Details:    1.) Patient was instructed to monitor blood glucose 2 - 3 x daily, fasting and ac dinner or at bedtime. Discussed ADA goal for fasting blood sugar, 80 - 130mg/dL; pp blood sugars below 180 mg/dl. Also, discussed prevention of hypoglycemia and the need to adjust goals to higher levels if persistent hypoglycemia.  Reminded to bring BG records or meter to each visit for review.  2.) Reviewed pathophysiology of Type 2 diabetes, complications related to the disease, importance of annual dilated eye exam and daily foot examination.  3.) We discussed the ADA recommendations, which are as follows:  Hemoglobin A1c below 7.0 %. All patients with diabetes should be on statins unless contraindicated.  ACE or ARB therapy if not contraindicated.    4.) Continue medications as prescribed.  My [facility] e-mail or phone review in one week with BG records for adjustment of medication.  5.) Meal planning teaching: Reviewed carb counting, portion control, importance of spacing meals throughout the day to prevent  post prandial elevations.  6.) Discussed activity with related benefits, methods, and precautions. Recommended patient start or continue some form of exercise and increase as tolerated to 30 minutes per day to aid in control of BGs.  7.) A1C, TSH, Lipid Panel, CMP with eGFR and Micro/Creatinine are utd or were ordered per ADA protocol.  8.) Return to clinic in 3 months for follow up. The patient was explained the above plan and given opportunity to ask questions.  He understands, chooses and consents to this plan and accepts all the risks, which include but are not limited to the risks mentioned above. He understands the alternative of having no testing, interventions or treatments at this time. He left content and without further questions.     A total of 30 minutes was spent in face to face time, of which over 50% was spent in counseling patient on disease process, complications, treatment, and side effects of medications.        Lynne Crocker NP-C

## 2018-07-24 NOTE — PATIENT INSTRUCTIONS
PATIENT INSTRUCTIONS  - Follow up as scheduled.   - Carb Count: 30-45G/meal and 15G/snack  - Exercise: Goal is 150 minutes or more per week  - Bring meter and blood sugar log to each appointment.   - DC Tresiba and Trulicity  - Start Xultophy at 35 units/day. We may need to increase or decrease insulin, so call numbers in 1 week after starting.     - You will take your blood sugar two times daily. Once will always be in the morning before you have any food, (known as your fasting blood sugar), and once should be two hours after EITHER your breakfast, lunch, or dinner, (known as your post-prandial blood sugar). Each day you should check a different meal, (ie. Monday-breakfast; Tuesday- lunch; Wednesday- supper, then repeat).     - Send me your blood sugars weekly if directed to do so. The easiest way to do this is through [facility]. Send in logs on Tuesdays, Wednesdays, or Thursdays.    - Blood Sugar Goals:  1. The goal for fasting blood sugars is 80 -130 mg/dl.   2. The goal for the 2 hour after meal blood sugars is below 180 mg/dl.  3. Blood sugars below 70 are considered LOW and you must eat or drink 15G of carbohydrates immediately, then recheck blood sugar after 15 minutes to ensure blood sugar has returned to normal range, (70 or above)                                                              Diabetes (General Information)  Diabetes is a long-term health problem. It means your body does not make enough insulin. Or it may mean that your body cannot use the insulin it makes. Insulin is a hormone in your body. It lets blood sugar (glucose) reach the cells in your body. All of your cells need glucose for fuel.  When you have diabetes, the glucose in your blood builds up because it cannot get into the cells. This buildup is called high blood sugar (hyperglycemia).  Your blood sugar level depends on several things. It depends on what kind of food you eat and how much of it you eat. It also depends on how much  exercise you get, and how much insulin you have in your body. Eating too much of the wrong kinds of food or not taking diabetes medicine on time can cause high blood sugar. Infections can cause high blood sugar even if you are taking medicines correctly.  These things can also cause low blood sugar:  · Missing meals  · Not eating enough food  · Taking too much diabetes medicine  Diabetes can cause serious problems over time if you do not get treated. These problems include heart disease, stroke, kidney failure, and blindness. They also include nerve pain or loss of feeling in your legs and feet, and gangrene of the feet. By keeping your blood sugar under control you can prevent or delay these problems.  Normal blood sugar levels are 80 to 100 before a meal and less than 180 in the 1 to 2 hours after a meal.  Home care  Follow these guidelines when caring for yourself at home:  · Follow the diet your healthcare provider gives you. Take insulin or other diabetes medicine exactly as told to.  · Watch your blood sugar as you are told to. Keep a log of your results. This will help your provider change your medicines to keep your blood sugar under control.  · Try to reach your ideal weight. You may be able to cut back on or not have to take diabetes medicine if you eat the right foods and get exercise.  · Do not smoke. Smoking worsens the effects of diabetes on your circulation. You are much more likely to have a heart attack if you have diabetes and you smoke.  · Take good care of your feet. If you have lost feeling in your feet, you may not see an injury or infection. Check your feet and between your toes at least once a week.  · Wear a medical alert bracelet or necklace, or carry a card in your wallet that says you have diabetes. This will help healthcare providers give you the right care if you get very ill and cannot tell them that you have diabetes.  Sick day plan  If you get a cold, the flu, or a bacterial or viral  infection, take these steps:  · Look at your diabetes sick plan and call your healthcare provider as you were told to. You may need to call your provider right away if:  ¨ Your blood sugar is above 240 while taking your diabetes medicine  ¨ Your urine ketone levels are above normal or high  ¨ You have been vomiting more than 6 hours  ¨ You have trouble breathing or your breath ha s a fruity smell  ¨ You have a high fever  ¨ You have a fever for several days and you are not getting better  ¨ You get light-headed and are sleepier than usual  · Keep taking your diabetes pills (oral medicine) even if you have been vomiting and are feeling sick. Call your provider right away because you may need insulin to lower your blood sugar until you recover from your illness.  · Keep taking your insulin even if you have been vomiting and are feeling sick. Call your provider right away to ask if you need to change your insulin dose. This will depend on your blood sugar results.  · Check your blood sugar every 2 to 4 hours, or at least 4 times a day.  · Check your ketones often. If you are vomiting and having diarrhea, watch them more often.  · Do not skip meals. Try to eat small meals on a regular schedule. Do this even if you do not feel like eating.  · Drink water or other liquids that do not have caffeine or calories. This will keep you from getting dehydrated. If you are nauseated or vomiting, takes small sips every 5 minutes. To prevent dehydration try to drink a cup (8 ounces) of fluids every hour while you are awake.  General care  Always bring a source of fast-acting sugar with you in case you have symptoms of low blood sugar (below 70). At the first sign of low blood sugar, eat or drink 15 to 20 grams of fast-acting sugar to raise your blood sugar. Examples are:  · 3 to 4 glucose tablets. You can buy these at most drugstores.  · 4 ounces (1/2 cup) of regular (not diet) soft drinks  · 4 ounces (1/2 cup) of any fruit  juice  · 8 ounces (1 cup) of milk  · 5 to 6 pieces of hard candy  · 1 tablespoon of honey  Check your blood sugar 15 minutes after treating yourself. If it is still below 70, take 15 to 20 more grams of fast-acting sugar. Test again in 15 minutes. If it returns to normal (70 or above), eat a snack or meal to keep your blood sugar in a safe range. If it stays low, call your doctor or go to an emergency room.  Follow-up care  Follow-up with your healthcare provider, or as advised. For more information about diabetes, visit the American Diabetes Association website at www.diabetes.org or call 203-693-9867.  When to seek medical advice  Call your healthcare provider right away if you have any of these symptoms of high blood sugar:  · Frequent urination  · Dizziness  · Drowsiness  · Thirst  · Headache  · Nausea or vomiting  · Abdominal pain  · Eyesight changes  · Fast breathing  · Confusion or loss of consciousness  Also call your provider right away if you have any of these signs of low blood sugar:  · Fatigue  · Headache  · Shakes  · Excess sweating  · Hunger  · Feeling anxious or restless  · Eyesight changes  · Drowsiness  · Weakness  · Confusion or loss of consciousness  Call 911  Call for emergency help right away if any of these occur:  · Chest pain or shortness of breath  · Dizziness or fainting  · Weakness of an arm or leg or one side of the face  · Trouble speaking or seeing   Date Last Reviewed: 6/1/2016 © 2000-2016 [employer]. [address] [place] [address], [place] [address]. All rights reserved. This information is not intended as a substitute for professional medical care. Always follow your healthcare professional's instructions.                                                                     Understanding Type 2 Diabetes  When your body is working normally, the food you eat is digested and used as fuel. This fuel supplies energy to the bodys cells. When you have diabetes, the fuel cant enter  the cells. Without treatment, diabetes can cause serious long-term health problems.     Your body breaks down the food you eat into glucose.          How the body gets energy  The digestive system breaks down food, resulting in a sugar called glucose. Some of this glucose is stored in the liver. But most of it enters the bloodstream and travels to the cells to be used as fuel. Glucose needs the help of a hormone called insulin to enter the cells. Insulin is made in the pancreas. It is released into the bloodstream in response to the presence of glucose in the blood. Think of insulin as a key. When insulin reaches a cell, it attaches to the cell wall. This signals the cell to create an opening that allows glucose to enter the cell.  When you have type 2 diabetes  Early in type 2 diabetes, your cells dont respond properly to insulin. Because of this, less glucose than normal moves into cells. This is called insulin resistance. In response, the pancreas makes more insulin. But eventually, the pancreas cant produce enough insulin to overcome insulin resistance. As less and less glucose enters cells, it builds up to a harmful level in the bloodstream. This is known as high blood sugar or hyperglycemia. The result is type 2 diabetes. The cells become starved for energy, which can leave you feeling tired and rundown.  Why high blood sugar is a problem  If high blood sugar is not controlled, blood vessels throughout the body become damaged. Prolonged high blood sugar affects organs, blood vessels, and nerves. As a result, the risks of damage to the heart, kidneys, eyes, and limbs increase. Diabetes also makes other problems, such as high blood pressure and high cholesterol, more dangerous. Over time, people with uncontrolled high blood sugar have an increase in risk of dying of, or being disabled by, heart attack or stroke.  Date Last Reviewed: 7/1/2016  © 7528-0432 [employer]. [address] [place] [address],  [place] [address]. All rights reserved. This information is not intended as a substitute for professional medical care. Always follow your healthcare professional's instructions.                                                            Using a Blood Sugar Log    You have diabetes. This means your body has trouble regulating a sugar called glucose. To help manage your diabetes, youll need to check your blood sugar level as directed by your healthcare provider. Keeping a log of your blood sugar levels will help you track your blood sugar readings. Its a simple and easy way to see how well you are controlling your diabetes.  Checking your blood sugar level  You can check your blood sugar level with a blood glucose meter. Youll first prick the side of your finger with a tiny lancet to draw a tiny drop of blood onto the test strip. Some glucose meters let you use another place on your body to test. But these other places should not be used in some cases as they may be inaccurate. Follow the instructions for your glucose meter. And talk with your healthcare provider before doing the test on other places.  The strip goes into the meter first, then a drop of blood is placed on the tip of the strip. The meter then shows a reading that tells you the level of your blood sugar. Your readings should be in your target range as often as possible. This means not too high or too low. Staying in this range helps lower your risk for complications. Your healthcare provider will help you figure out the target range that is best for you.  Tracking your readings  Every time you check your blood sugar, use your log to keep track of your readings. Your meter will also probably have a memory feature that your healthcare provider can check at your next visit. You may be advised by your healthcare provider to check your blood sugar in the morning, at bedtime, and before and after meals. Be sure to write down all of your numbers. Also use  your log to record things that might have affected your blood sugar. Some examples include being sick, certain medicines, being physically active, feeling stressed, or skipping meals.   Lessons learned from your readings  Tracking your blood sugar readings helps you see patterns. These patterns tell you how your actions affect your blood sugar. For instance, you may have higher numbers after eating certain foods or lower numbers after exercise. They just help you understand how to stay in your target range more often, so that your diabetes remains in good control.  Sharing your log with your healthcare team  Bring your blood sugar log and glucose meter with you to all of your healthcare appointments. This can help your healthcare team make changes to your treatment plan, if needed. This may involve making changes in what you eat, what medicines you take, or how much you exercise.  To learn more  The resources below can help you learn more:  · American Diabetes Association 744-304-6760 www.diabetes.org  · [employer] 543-570-9950 www.lighthouse.org  · [facility] [place] 123-601-2235 www.nei.nih.gov  · [facility] [facility] 320-795-3273 www.hormone.org  Date Last Reviewed: 5/1/2016  © 8481-4435 [employer]. [address] [place] [address], [place] [address]. All rights reserved. This information is not intended as a substitute for professional medical care. Always follow your healthcare professional's instructions.                                                                                            Diabetes: Exams and Tests    For your diabetes care, you may see your primary care provider or a specialist 2 to 4 times a year, or as directed. This page lists some of the regular exams and tests recommended for people with diabetes. To learn more, contact the American Diabetes Association (950-494-5588, www.diabetes.org).  Tests and immunizations  These should be done at least as often as  stated below:  · Blood pressure check: every healthcare provider visit  · A1C: at first, every 3 months; if controlled, then every 3 to 6 months   · Cholesterol and blood lipid tests: at least every 12 months.  · Urine tests for kidney function: every 12 months  · Flu shots: once a year  · Pneumonia shots: talk with your healthcare provider about which pneumonia vaccines are right for you  · Hepatitis B shots: as soon as possible if youre under 60, or as advised by your healthcare provider if youre older than 60  · Shingles vaccine after age 60, even if you have already had shingles   · Other tests or vaccines: as advised by your healthcare provider  · Individualized medical nutrition therapy: at least once, then as needed  · Stop smoking counseling, if you still smoke, at each visit   Regular exams  The following exams help keep you healthy:  · Foot exams. Nerve and blood vessel problems can affect your feet sooner than other parts of your body. Make sure that your healthcare provider checks your feet at every office visit.  · Eye exams. You can have problems with your eyes even if you dont have trouble seeing. An ophthalmologist (eye healthcare provider) or specially trained optometrist will give you a dilated eye exam at least once a year. If you see dark spots, see poorly in dim light, have eye pain or pressure, or notice any other problems, tell your healthcare provider right away.  · Dental exams. Gum disease (also called periodontal disease) and other mouth problems are common in people with diabetes. To help prevent these problems, see your dentist two or more times a year.  Ask your healthcare provider what other exams youll need on a regular basis.  Date Last Reviewed: 6/1/2016 © 2000-2016 [employer]. [address] [place] [address], [place] [address]. All rights reserved. This information is not intended as a substitute for professional medical care. Always follow your healthcare professional's  instructions.

## 2018-08-06 ENCOUNTER — TELEPHONE (OUTPATIENT)
Dept: TRANSPLANT | Facility: CLINIC | Age: 63
End: 2018-08-06

## 2018-08-09 DIAGNOSIS — E55.9 VITAMIN D DEFICIENCY: ICD-10-CM

## 2018-08-09 RX ORDER — ERGOCALCIFEROL 1.25 MG/1
CAPSULE ORAL
Qty: 4 CAPSULE | Refills: 6 | Status: SHIPPED | OUTPATIENT
Start: 2018-08-09 | End: 2019-03-28 | Stop reason: SDUPTHER

## 2018-08-10 RX ORDER — TADALAFIL 10 MG
10 TABLET ORAL
Qty: 6 TABLET | Refills: 7 | Status: SHIPPED | OUTPATIENT
Start: 2018-08-10 | End: 2018-11-05 | Stop reason: SDUPTHER

## 2018-08-10 RX ORDER — FOLIC ACID 1 MG/1
TABLET ORAL
Qty: 30 TABLET | Refills: 3 | Status: SHIPPED | OUTPATIENT
Start: 2018-08-10 | End: 2018-10-11 | Stop reason: SDUPTHER

## 2018-09-06 DIAGNOSIS — Z76.82 ORGAN TRANSPLANT CANDIDATE: Primary | ICD-10-CM

## 2018-09-21 ENCOUNTER — OFFICE VISIT (OUTPATIENT)
Dept: INTERNAL MEDICINE | Facility: CLINIC | Age: 63
End: 2018-09-21
Payer: COMMERCIAL

## 2018-09-21 VITALS
WEIGHT: 265.44 LBS | HEIGHT: 74 IN | TEMPERATURE: 98 F | OXYGEN SATURATION: 98 % | DIASTOLIC BLOOD PRESSURE: 94 MMHG | HEART RATE: 85 BPM | SYSTOLIC BLOOD PRESSURE: 142 MMHG | BODY MASS INDEX: 34.07 KG/M2

## 2018-09-21 DIAGNOSIS — M77.12 LATERAL EPICONDYLITIS OF LEFT ELBOW: Primary | ICD-10-CM

## 2018-09-21 PROCEDURE — 3080F DIAST BP >= 90 MM HG: CPT | Mod: CPTII,S$GLB,, | Performed by: INTERNAL MEDICINE

## 2018-09-21 PROCEDURE — 3008F BODY MASS INDEX DOCD: CPT | Mod: CPTII,S$GLB,, | Performed by: INTERNAL MEDICINE

## 2018-09-21 PROCEDURE — 3077F SYST BP >= 140 MM HG: CPT | Mod: CPTII,S$GLB,, | Performed by: INTERNAL MEDICINE

## 2018-09-21 PROCEDURE — 99214 OFFICE O/P EST MOD 30 MIN: CPT | Mod: S$GLB,,, | Performed by: INTERNAL MEDICINE

## 2018-09-21 PROCEDURE — 99999 PR PBB SHADOW E&M-EST. PATIENT-LVL III: CPT | Mod: PBBFAC,,, | Performed by: INTERNAL MEDICINE

## 2018-09-21 RX ORDER — METHYLPREDNISOLONE 4 MG/1
TABLET ORAL
Qty: 1 PACKAGE | Refills: 0 | Status: SHIPPED | OUTPATIENT
Start: 2018-09-21 | End: 2018-10-11 | Stop reason: ALTCHOICE

## 2018-09-21 NOTE — PATIENT INSTRUCTIONS
Understanding Lateral Epicondylitis    Tendons are strong bands of tissue that connect muscles to bones. Lateral epicondylitis affects the tendons that connect muscles in the forearm to the lateral epicondyle. This is the bony knob on the outer side of the elbow. The condition occurs if the extensor tendons of the wrist become red and swollen (irritated). This can cause pain in the elbow, forearm, and wrist. Because the condition is sometimes caused by playing tennis, it is also known as tennis elbow.  How to say it  [id number]   What causes lateral epicondylitis?  The condition most often occurs because of overuse. This can be from any activity that repeatedly puts stress on the forearm extensor muscles or tendons and wrist. For instance, playing tennis, lifting weights, cutting meat, painting, and typing can all cause the condition. Wear and tear of the tendons from aging or an injury to the tendons can also cause the condition.  Symptoms of lateral epicondylitis  The most common symptom is pain. You may feel it on the outer side of the elbow and down the back of the forearm. It may be worse when moving or using the elbow, forearm, or wrist. You may also feel pain when gripping or lifting things.  Treatment for lateral epicondylitis  Treatments may include:  · Resting the elbow, forearm, and wrist. Youll need to avoid movements that can make your symptoms worse. You also may need to avoid certain sports and types of work for a time. This helps relieve symptoms and prevent further damage to the tendons.  · Changing the action that caused the problem. For instance, if the tendons were damaged from playing tennis, it may help to change your playing technique or use different equipment. This helps prevent further damage to the tendons.  · Using cold packs. Putting an ice pack on the injured area can help reduce pain and swelling.  · Taking pain medicines. Taking prescription or  over-the-counter pain medicines may help reduce pain and swelling.    · Wearing a brace. This helps reduce strain on the muscles and tendons in the forearm, which may relieve symptoms. It is very important to wear the brace properly.  · Doing exercises and physical therapy. These help improve strength and range of motion in the elbow, forearm, and wrist.  · Getting shots of medicine into the injured area. These may help relieve symptoms for a time.  · Having surgery. This may be an option if other treatments fail to relieve symptoms. In many cases, the surgeon removes the damaged tissue.  Possible complications of lateral epicondylitis  If the tendons involved dont heal properly, symptoms may return or get worse. To help prevent this, follow your treatment plan as directed.  When to call your healthcare provider  Call your healthcare provider right away if you have any of these:  · Fever of 100.4°F (38°C) or higher, or as directed  · Redness, swelling, or warmth in the elbow or forearm that gets worse  · Symptoms that dont get better with treatment, or get worse  · New symptoms   Date Last Reviewed: 3/10/2016  © 1201-1132 [employer]. [address] [place] [address]. All rights reserved. This information is not intended as a substitute for professional medical care. Always follow your healthcare professional's instructions.        Treating Tennis Elbow    Your treatment will depend on how inflamed your tendon is. The goal is to relieve your symptoms and help you regain full use of your elbow.  Rest and medicine  Wearing a tennis elbow splint allows the inflamed tendon to rest. It must be worn properly. It should be placed down the arm past the painful area of the elbow. If it is directly over the inflamed tendon, it can worsen the symptoms. This brace can help the tendon heal. Using your other hand or changing your  also takes stress off the tendon. Oral  nonsteroidal anti-inflammatory medicines (NSAIDs) and/or ice can relieve pain and reduce swelling.  Exercises and therapy  Your healthcare provider may give you an exercise program. He or she may refer you to a therapist. The therapist will teach you to gently stretch and then strengthen the muscles around your elbow.  Anti-inflammatory injections  Your healthcare provider may give you injections of an anti-inflammatory, such as cortisone. This helps reduce swelling. You may have more pain at first. But in a few days, your elbow should feel better.  If surgery is needed  If your symptoms persist for a long time, or other treatments dont work, your healthcare provider may recommend surgery. Surgery repairs the inflamed tendon.   Date Last Reviewed: 9/26/2015  © 5477-9023 [employer]. [address] [place] [address], [place] [address]. All rights reserved. This information is not intended as a substitute for professional medical care. Always follow your healthcare professional's instructions.

## 2018-09-22 NOTE — PROGRESS NOTES
Subjective:      Patient ID: Isidro Alves is a 63 y.o. male.    Chief Complaint: Elbow Pain (left)    62 yo with Patient Active Problem List:     Chronic kidney disease (CKD), stage IV (severe)     Hypertension associated with diabetes     Anemia     Secondary hyperparathyroidism (of renal origin)     Metabolic acidosis     Type 2 diabetes mellitus with chronic kidney disease, with long-term current use of insulin     Herpes labialis     Benign non-nodular prostatic hyperplasia without lower urinary tract symptoms     Elevated PSA     Hypogonadism in male     Hyperlipidemia associated with type 2 diabetes mellitus     Non morbid obesity due to excess calories     Colon cancer screening     Special screening for malignant neoplasms, colon     Colon polyp    Past Medical History:  4/16/2014: Anemia  No date: CKD (chronic kidney disease) stage 4, GFR 15-29 ml/min  No date: Diabetes mellitus  No date: Hypertension  No date: Secondary hyperparathyroidism (of renal origin)  5/1/2015: UTI (lower urinary tract infection)    Here today c/o left elbow pain.      Elbow Injury   This is a new problem. The current episode started in the past 7 days. The problem occurs daily. The problem has been waxing and waning. Pertinent negatives include no anorexia, chest pain, chills, congestion, coughing, fever, numbness, rash, sore throat or weakness. Nothing aggravates the symptoms. He has tried nothing for the symptoms. The treatment provided no relief.     Review of Systems   Constitutional: Negative for chills and fever.   HENT: Negative for congestion and sore throat.    Respiratory: Negative for cough, shortness of breath and wheezing.    Cardiovascular: Negative for chest pain.   Gastrointestinal: Negative for anorexia.   Skin: Negative for rash.   Neurological: Negative for weakness and numbness.     Objective:   BP (!) 142/94 (BP Location: Right arm, Patient Position: Sitting)   Pulse 85   Temp 98 °F (36.7 °C) (Tympanic)   " Ht 6' 2" (1.88 m)   Wt 120.4 kg (265 lb 6.9 oz)   SpO2 98%   BMI 34.08 kg/m²     Physical Exam   Constitutional: He appears well-developed and well-nourished. No distress.   Cardiovascular: Normal rate and regular rhythm.   Pulmonary/Chest: Effort normal and breath sounds normal.   Musculoskeletal: He exhibits no edema.        Left elbow: He exhibits normal range of motion and no swelling. Tenderness found. Lateral epicondyle tenderness noted.   No redness or warmth. Pain reproduced with extension of wrist against resistance.    Skin: Skin is warm and dry.   Psychiatric: He has a normal mood and affect. His behavior is normal.       Assessment:     1. Lateral epicondylitis of left elbow      Plan:   Lateral epicondylitis of left elbow  -     methylPREDNISolone (MEDROL, SETH,) 4 mg tablet; use as directed  Dispense: 1 Package; Refill: 0        Lab Frequency Next Occurrence   PSA, Screening Once 11/16/2017   Renal function panel Once 06/25/2018   CBC auto differential Once 06/25/2018   Uric acid Once 06/25/2018   Vitamin D Once 06/25/2018   PTH, intact Once 06/25/2018   Protein / creatinine ratio, urine Once 06/25/2018   Urinalysis Once 06/25/2018   Hemoglobin A1c Once 07/24/2018   Hepatitis B core antibody, total Once 01/04/2019   Hepatitis B surface antigen Once 01/04/2019   HIV-1 and HIV-2 antibodies Once 01/04/2019   Hepatitis C antibody Once 01/04/2019   Cytomegalovirus antibody, IgG Once 01/04/2019   RPR Once 01/04/2019   Type & Screen Once 01/04/2019   Phosphorus Once 01/04/2019   ABO/Rh Once 01/04/2019   APTT Once 01/04/2019   Bilirubin, direct Once 01/04/2019   CBC auto differential Once 01/04/2019   Comprehensive metabolic panel Once 01/04/2019   Hepatitis B Surface Antibody, Qual/Quant Once 01/04/2019   Strongyloides IgG Antibodies Once 01/04/2019   Lipid panel Once 01/04/2019   Protime-INR Once 01/04/2019   PTH, intact Once 01/04/2019   HLA Solid Organ Recipient Workup Once 01/04/2019   Quantiferon " Gold TB Once 01/04/2019   Varicella zoster antibody, IgG Once 01/04/2019   Hemoglobin A1c Once 01/04/2019   PSA, Screening Once 01/04/2019   X-Ray Pelvis Routine AP Once 03/06/2019   Bilateral Iliac Ultrasound Doppler Once 03/06/2019   US Retroperitoneal Complete Once 03/06/2019   X-ray chest PA and lateral Once 03/06/2019   2D echo with color flow doppler Once 03/06/2019   NM Myocardial Perfusion Spect Multi Pharmacologic Once 09/06/2018       Problem List Items Addressed This Visit     None      Visit Diagnoses     Lateral epicondylitis of left elbow    -  Primary    Relevant Medications    methylPREDNISolone (MEDROL, SETH,) 4 mg tablet          Follow-up in about 3 months (around 12/12/2018), or if symptoms worsen or fail to improve.

## 2018-10-03 ENCOUNTER — TELEPHONE (OUTPATIENT)
Dept: NEPHROLOGY | Facility: CLINIC | Age: 63
End: 2018-10-03

## 2018-10-11 ENCOUNTER — OFFICE VISIT (OUTPATIENT)
Dept: TRANSPLANT | Facility: CLINIC | Age: 63
End: 2018-10-11
Payer: COMMERCIAL

## 2018-10-11 ENCOUNTER — HOSPITAL ENCOUNTER (OUTPATIENT)
Dept: RADIOLOGY | Facility: HOSPITAL | Age: 63
Discharge: HOME | End: 2018-10-11
Attending: NURSE PRACTITIONER
Payer: COMMERCIAL

## 2018-10-11 ENCOUNTER — TELEPHONE (OUTPATIENT)
Dept: UROLOGY | Facility: CLINIC | Age: 63
End: 2018-10-11

## 2018-10-11 ENCOUNTER — LAB VISIT (OUTPATIENT)
Dept: LAB | Facility: HOSPITAL | Age: 63
End: 2018-10-11
Payer: COMMERCIAL

## 2018-10-11 ENCOUNTER — CLINICAL SUPPORT (OUTPATIENT)
Dept: INFECTIOUS DISEASES | Facility: CLINIC | Age: 63
End: 2018-10-11
Payer: COMMERCIAL

## 2018-10-11 VITALS
SYSTOLIC BLOOD PRESSURE: 181 MMHG | HEART RATE: 79 BPM | HEIGHT: 72 IN | DIASTOLIC BLOOD PRESSURE: 113 MMHG | RESPIRATION RATE: 18 BRPM | OXYGEN SATURATION: 97 % | WEIGHT: 267.63 LBS | BODY MASS INDEX: 36.25 KG/M2 | TEMPERATURE: 98 F

## 2018-10-11 DIAGNOSIS — Z76.82 ORGAN TRANSPLANT CANDIDATE: ICD-10-CM

## 2018-10-11 DIAGNOSIS — E11.69 HYPERLIPIDEMIA ASSOCIATED WITH TYPE 2 DIABETES MELLITUS: ICD-10-CM

## 2018-10-11 DIAGNOSIS — E66.09 NON MORBID OBESITY DUE TO EXCESS CALORIES: ICD-10-CM

## 2018-10-11 DIAGNOSIS — B00.1 HERPES LABIALIS: ICD-10-CM

## 2018-10-11 DIAGNOSIS — N18.4 CHRONIC KIDNEY DISEASE (CKD), STAGE IV (SEVERE): ICD-10-CM

## 2018-10-11 DIAGNOSIS — N40.0 BENIGN NON-NODULAR PROSTATIC HYPERPLASIA WITHOUT LOWER URINARY TRACT SYMPTOMS: ICD-10-CM

## 2018-10-11 DIAGNOSIS — Z79.4 TYPE 2 DIABETES MELLITUS WITH STAGE 4 CHRONIC KIDNEY DISEASE, WITH LONG-TERM CURRENT USE OF INSULIN: ICD-10-CM

## 2018-10-11 DIAGNOSIS — Z01.818 PRE-TRANSPLANT EVALUATION FOR CHRONIC KIDNEY DISEASE: Primary | ICD-10-CM

## 2018-10-11 DIAGNOSIS — E78.5 HYPERLIPIDEMIA ASSOCIATED WITH TYPE 2 DIABETES MELLITUS: ICD-10-CM

## 2018-10-11 DIAGNOSIS — R97.20 ELEVATED PSA: ICD-10-CM

## 2018-10-11 DIAGNOSIS — R80.8 OTHER PROTEINURIA: Chronic | ICD-10-CM

## 2018-10-11 DIAGNOSIS — E11.59 HYPERTENSION ASSOCIATED WITH DIABETES: ICD-10-CM

## 2018-10-11 DIAGNOSIS — I15.2 HYPERTENSION ASSOCIATED WITH DIABETES: ICD-10-CM

## 2018-10-11 DIAGNOSIS — N18.4 TYPE 2 DIABETES MELLITUS WITH STAGE 4 CHRONIC KIDNEY DISEASE, WITH LONG-TERM CURRENT USE OF INSULIN: ICD-10-CM

## 2018-10-11 DIAGNOSIS — E11.22 TYPE 2 DIABETES MELLITUS WITH STAGE 4 CHRONIC KIDNEY DISEASE, WITH LONG-TERM CURRENT USE OF INSULIN: ICD-10-CM

## 2018-10-11 LAB
ABO + RH BLD: NORMAL
ALBUMIN SERPL BCP-MCNC: 3.5 G/DL
ALP SERPL-CCNC: 82 U/L
ALT SERPL W/O P-5'-P-CCNC: 9 U/L
ANION GAP SERPL CALC-SCNC: 8 MMOL/L
APTT BLDCRRT: 22.6 SEC
AST SERPL-CCNC: 18 U/L
BASOPHILS # BLD AUTO: 0.06 K/UL
BASOPHILS NFR BLD: 0.7 %
BILIRUB DIRECT SERPL-MCNC: 0.2 MG/DL
BILIRUB SERPL-MCNC: 0.5 MG/DL
BLD GP AB SCN CELLS X3 SERPL QL: NORMAL
BUN SERPL-MCNC: 43 MG/DL
CALCIUM SERPL-MCNC: 9.4 MG/DL
CHLORIDE SERPL-SCNC: 112 MMOL/L
CHOLEST SERPL-MCNC: 224 MG/DL
CHOLEST/HDLC SERPL: 6.6 {RATIO}
CO2 SERPL-SCNC: 21 MMOL/L
COMPLEXED PSA SERPL-MCNC: 12.2 NG/ML
CREAT SERPL-MCNC: 4.2 MG/DL
DIFFERENTIAL METHOD: ABNORMAL
EOSINOPHIL # BLD AUTO: 0.3 K/UL
EOSINOPHIL NFR BLD: 3.6 %
ERYTHROCYTE [DISTWIDTH] IN BLOOD BY AUTOMATED COUNT: 15 %
EST. GFR  (AFRICAN AMERICAN): 16.4 ML/MIN/1.73 M^2
EST. GFR  (NON AFRICAN AMERICAN): 14.2 ML/MIN/1.73 M^2
ESTIMATED AVG GLUCOSE: 146 MG/DL
GLUCOSE SERPL-MCNC: 124 MG/DL
HBA1C MFR BLD HPLC: 6.7 %
HBV CORE AB SERPL QL IA: POSITIVE
HBV SURFACE AG SERPL QL IA: NEGATIVE
HCT VFR BLD AUTO: 41.7 %
HCV AB SERPL QL IA: NEGATIVE
HDLC SERPL-MCNC: 34 MG/DL
HDLC SERPL: 15.2 %
HGB BLD-MCNC: 12.7 G/DL
HIV 1+2 AB+HIV1 P24 AG SERPL QL IA: NEGATIVE
IMM GRANULOCYTES # BLD AUTO: 0.11 K/UL
IMM GRANULOCYTES NFR BLD AUTO: 1.3 %
INR PPP: 0.9
LDLC SERPL CALC-MCNC: 140.4 MG/DL
LYMPHOCYTES # BLD AUTO: 1.3 K/UL
LYMPHOCYTES NFR BLD: 15.4 %
MCH RBC QN AUTO: 26.1 PG
MCHC RBC AUTO-ENTMCNC: 30.5 G/DL
MCV RBC AUTO: 86 FL
MONOCYTES # BLD AUTO: 0.6 K/UL
MONOCYTES NFR BLD: 7.3 %
NEUTROPHILS # BLD AUTO: 6.3 K/UL
NEUTROPHILS NFR BLD: 71.7 %
NONHDLC SERPL-MCNC: 190 MG/DL
NRBC BLD-RTO: 0 /100 WBC
PHOSPHATE SERPL-MCNC: 3.1 MG/DL
PLATELET # BLD AUTO: 288 K/UL
PMV BLD AUTO: 11.6 FL
POTASSIUM SERPL-SCNC: 4.2 MMOL/L
PROT SERPL-MCNC: 7.6 G/DL
PROTHROMBIN TIME: 9.4 SEC
PTH-INTACT SERPL-MCNC: 276 PG/ML
RBC # BLD AUTO: 4.86 M/UL
RPR SER QL: NORMAL
SODIUM SERPL-SCNC: 141 MMOL/L
TRIGL SERPL-MCNC: 248 MG/DL
VARICELLA INTERPRETATION: POSITIVE
VARICELLA ZOSTER IGG: 3.29 ISR
WBC # BLD AUTO: 8.71 K/UL

## 2018-10-11 PROCEDURE — 85610 PROTHROMBIN TIME: CPT | Mod: TXP

## 2018-10-11 PROCEDURE — 99204 OFFICE O/P NEW MOD 45 MIN: CPT | Mod: S$GLB,TXP,, | Performed by: TRANSPLANT SURGERY

## 2018-10-11 PROCEDURE — 3008F BODY MASS INDEX DOCD: CPT | Mod: CPTII,S$GLB,TXP, | Performed by: NURSE PRACTITIONER

## 2018-10-11 PROCEDURE — 85025 COMPLETE CBC W/AUTO DIFF WBC: CPT | Mod: TXP

## 2018-10-11 PROCEDURE — 99999 PR PBB SHADOW E&M-EST. PATIENT-LVL V: CPT | Mod: PBBFAC,TXP,, | Performed by: NURSE PRACTITIONER

## 2018-10-11 PROCEDURE — 81373 HLA I TYPING 1 LOCUS LR: CPT | Mod: 91,PO,TXP

## 2018-10-11 PROCEDURE — 81373 HLA I TYPING 1 LOCUS LR: CPT | Mod: PO,TXP

## 2018-10-11 PROCEDURE — 84153 ASSAY OF PSA TOTAL: CPT | Mod: TXP

## 2018-10-11 PROCEDURE — 81376 HLA II TYPING 1 LOCUS LR: CPT | Mod: 91,PO,TXP

## 2018-10-11 PROCEDURE — 86703 HIV-1/HIV-2 1 RESULT ANTBDY: CPT | Mod: TXP

## 2018-10-11 PROCEDURE — 76770 US EXAM ABDO BACK WALL COMP: CPT | Mod: TC,TXP

## 2018-10-11 PROCEDURE — 72170 X-RAY EXAM OF PELVIS: CPT | Mod: TC,FY,TXP

## 2018-10-11 PROCEDURE — 3077F SYST BP >= 140 MM HG: CPT | Mod: CPTII,S$GLB,TXP, | Performed by: NURSE PRACTITIONER

## 2018-10-11 PROCEDURE — 82248 BILIRUBIN DIRECT: CPT | Mod: TXP

## 2018-10-11 PROCEDURE — 86592 SYPHILIS TEST NON-TREP QUAL: CPT | Mod: TXP

## 2018-10-11 PROCEDURE — 86480 TB TEST CELL IMMUN MEASURE: CPT | Mod: TXP

## 2018-10-11 PROCEDURE — 86682 HELMINTH ANTIBODY: CPT | Mod: TXP

## 2018-10-11 PROCEDURE — 86825 HLA X-MATH NON-CYTOTOXIC: CPT | Mod: 91,PO,TXP

## 2018-10-11 PROCEDURE — 93978 VASCULAR STUDY: CPT | Mod: 26,TXP,, | Performed by: RADIOLOGY

## 2018-10-11 PROCEDURE — 86706 HEP B SURFACE ANTIBODY: CPT | Mod: TXP

## 2018-10-11 PROCEDURE — 90670 PCV13 VACCINE IM: CPT | Mod: S$GLB,TXP,, | Performed by: INTERNAL MEDICINE

## 2018-10-11 PROCEDURE — 99204 OFFICE O/P NEW MOD 45 MIN: CPT | Mod: S$PBB,TXP,, | Performed by: PHYSICIAN ASSISTANT

## 2018-10-11 PROCEDURE — 86825 HLA X-MATH NON-CYTOTOXIC: CPT | Mod: PO,TXP

## 2018-10-11 PROCEDURE — 80053 COMPREHEN METABOLIC PANEL: CPT | Mod: TXP

## 2018-10-11 PROCEDURE — 90662 IIV NO PRSV INCREASED AG IM: CPT | Mod: S$GLB,TXP,, | Performed by: INTERNAL MEDICINE

## 2018-10-11 PROCEDURE — 83036 HEMOGLOBIN GLYCOSYLATED A1C: CPT | Mod: TXP

## 2018-10-11 PROCEDURE — 3080F DIAST BP >= 90 MM HG: CPT | Mod: CPTII,S$GLB,TXP, | Performed by: NURSE PRACTITIONER

## 2018-10-11 PROCEDURE — 87340 HEPATITIS B SURFACE AG IA: CPT | Mod: TXP

## 2018-10-11 PROCEDURE — 85730 THROMBOPLASTIN TIME PARTIAL: CPT | Mod: TXP

## 2018-10-11 PROCEDURE — 80061 LIPID PANEL: CPT | Mod: TXP

## 2018-10-11 PROCEDURE — 99205 OFFICE O/P NEW HI 60 MIN: CPT | Mod: S$GLB,TXP,, | Performed by: NURSE PRACTITIONER

## 2018-10-11 PROCEDURE — 86644 CMV ANTIBODY: CPT | Mod: TXP

## 2018-10-11 PROCEDURE — 86829 HLA CLASS I/II ANTIBODY QUAL: CPT | Mod: PO,TXP

## 2018-10-11 PROCEDURE — 83970 ASSAY OF PARATHORMONE: CPT | Mod: TXP

## 2018-10-11 PROCEDURE — 84100 ASSAY OF PHOSPHORUS: CPT | Mod: TXP

## 2018-10-11 PROCEDURE — 86850 RBC ANTIBODY SCREEN: CPT | Mod: TXP

## 2018-10-11 PROCEDURE — 90636 HEP A/HEP B VACC ADULT IM: CPT | Mod: S$GLB,TXP,, | Performed by: INTERNAL MEDICINE

## 2018-10-11 PROCEDURE — 90471 IMMUNIZATION ADMIN: CPT | Mod: S$GLB,TXP,, | Performed by: INTERNAL MEDICINE

## 2018-10-11 PROCEDURE — 86803 HEPATITIS C AB TEST: CPT | Mod: TXP

## 2018-10-11 PROCEDURE — 76770 US EXAM ABDO BACK WALL COMP: CPT | Mod: 26,XS,TXP, | Performed by: RADIOLOGY

## 2018-10-11 PROCEDURE — 71046 X-RAY EXAM CHEST 2 VIEWS: CPT | Mod: TC,FY,TXP

## 2018-10-11 PROCEDURE — 81376 HLA II TYPING 1 LOCUS LR: CPT | Mod: PO,TXP

## 2018-10-11 PROCEDURE — 93978 VASCULAR STUDY: CPT | Mod: TC,TXP

## 2018-10-11 PROCEDURE — 72170 X-RAY EXAM OF PELVIS: CPT | Mod: 26,TXP,, | Performed by: RADIOLOGY

## 2018-10-11 PROCEDURE — 90472 IMMUNIZATION ADMIN EACH ADD: CPT | Mod: S$GLB,TXP,, | Performed by: INTERNAL MEDICINE

## 2018-10-11 PROCEDURE — 71046 X-RAY EXAM CHEST 2 VIEWS: CPT | Mod: 26,TXP,, | Performed by: RADIOLOGY

## 2018-10-11 PROCEDURE — 86704 HEP B CORE ANTIBODY TOTAL: CPT | Mod: TXP

## 2018-10-11 PROCEDURE — 86828 HLA CLASS I&II ANTIBODY QUAL: CPT | Mod: 91,PO,TXP

## 2018-10-11 PROCEDURE — 36415 COLL VENOUS BLD VENIPUNCTURE: CPT | Mod: TXP

## 2018-10-11 PROCEDURE — 3044F HG A1C LEVEL LT 7.0%: CPT | Mod: CPTII,S$GLB,TXP, | Performed by: NURSE PRACTITIONER

## 2018-10-11 PROCEDURE — 86787 VARICELLA-ZOSTER ANTIBODY: CPT | Mod: TXP

## 2018-10-11 NOTE — PROGRESS NOTES
"TRANSPLANT NUTRITIONAL ASSESSMENT    Referring Provider: Jessica Collins NP    Reason for Visit: Pre-kidney transplant work-up (pre-dialysis)    Age: 63 y.o.  Sex: male    Patient Active Problem List   Diagnosis    Chronic kidney disease (CKD), stage IV (severe)    Hypertension associated with diabetes    Anemia    Secondary hyperparathyroidism (of renal origin)    Metabolic acidosis    Type 2 diabetes mellitus with chronic kidney disease, with long-term current use of insulin    Herpes labialis    Benign non-nodular prostatic hyperplasia without lower urinary tract symptoms    Elevated PSA    Hypogonadism in male    Hyperlipidemia associated with type 2 diabetes mellitus    Non morbid obesity due to excess calories    Colon cancer screening    Special screening for malignant neoplasms, colon    Colon polyp    Pre-transplant evaluation for chronic kidney disease    Other proteinuria     Past Medical History:   Diagnosis Date    Anemia 4/16/2014    BPH (benign prostatic hyperplasia)     CKD (chronic kidney disease) stage 4, GFR 15-29 ml/min     Diabetes mellitus     Diabetes mellitus, type 2     Elevated PSA     Encounter for blood transfusion     Herpes labialis     Hyperlipidemia     Hypertension     Obesity     Proteinuria     Secondary hyperparathyroidism (of renal origin)     UTI (lower urinary tract infection) 5/1/2015     Lab Results   Component Value Date     (H) 10/11/2018    K 4.2 10/11/2018    PHOS 3.1 10/11/2018    CHOL 224 (H) 10/11/2018    HDL 34 (L) 10/11/2018    TRIG 248 (H) 10/11/2018    ALBUMIN 3.5 10/11/2018    ALBUMIN 4.0 05/17/2016    HGBA1C 6.7 (H) 10/11/2018    CALCIUM 9.4 10/11/2018       Current Outpatient Medications   Medication Sig    acyclovir (ZOVIRAX) 400 MG tablet TAKE 1 TABLET 3 TIMES DAILY AS NEEDED    BD INSULIN SYRINGE HALF UNIT 0.3 mL 31 gauge x 5/16" Syrg FOR USE WITH INSULIN.    CIALIS 10 mg tablet TAKE 1 TABLET (10 MG TOTAL) BY MOUTH AS " NEEDED FOR ERECTILE DYSFUNCTION.    esomeprazole (NEXIUM) 40 MG capsule Take 40 mg by mouth once daily.    folic acid (FOLVITE) 1 MG tablet TAKE 1 TABLET (1 MG TOTAL) BY MOUTH ONCE DAILY.    insulin degludec-liraglutide (XULTOPHY 100/3.6) 100 unit-3.6 mg /mL (3 mL) InPn Inject 35 Units into the skin once daily.    lancets (ONETOUCH ULTRASOFT LANCETS) Misc 1 lancet by Misc.(Non-Drug; Combo Route) route 2 (two) times daily. (Patient taking differently: 1 lancet by Misc.(Non-Drug; Combo Route) route 3 (three) times daily. )    multivitamin (THERAGRAN) tablet Take 1 tablet by mouth once daily.    NIFEdipine (PROCARDIA-XL) 60 MG (OSM) 24 hr tablet TAKE 1 TABLET (60 MG TOTAL) BY MOUTH 2 (TWO) TIMES DAILY.    ONETOUCH ULTRA BLUE TEST STRIP Strp 1 STRIP BY MISC.(NON-DRUG COMBO ROUTE) ROUTE 3 (THREE) TIMES DAILY.    tamsulosin (FLOMAX) 0.4 mg Cp24 TAKE 2 CAPSULES (0.8 MG TOTAL) BY MOUTH EVERY EVENING. (Patient taking differently: Take 0.4 mg by mouth 2 (two) times daily. )    valsartan (DIOVAN) 320 MG tablet TAKE 1 TABLET (320 MG TOTAL) BY MOUTH ONCE DAILY.    VITAMIN D2 50,000 unit capsule TAKE 1 CAPSULE (50,000 UNITS TOTAL) BY MOUTH EVERY 7 DAYS.    olopatadine (PATADAY) 0.2 % Drop Place 1 drop into both eyes daily as needed.     No current facility-administered medications for this visit.      Allergies: Bactrim [sulfamethoxazole-trimethoprim] and Nsaids (non-steroidal anti-inflammatory drug)    Ht Readings from Last 1 Encounters:   10/11/18 6' (1.829 m)     Wt Readings from Last 1 Encounters:   10/11/18 121.4 kg (267 lb 10.2 oz)      BMI: Body mass index is 36.3 kg/m².    Usual Weight: 240's approx 2-3 months per go  Weight Change/Time: reports wt gain over the past few months, states he eats the same does not walk as much as he did in the past  Current Diet: low Na, low carb diet per pt  Appetite/Current Intake: good   Exercise/Physical Activity: pt states he walked approx 4-5 times per week in the past but  has not been walking over the past 3 months, plans to resume  Nutritional/Herbal Supplements: folic acid, multivitamin, vitamin D  Potential Food/Medication Interactions: nifedipine and valsartan may have interaction with grapefruit  Chewing/Swallowing Problems: none reported  Symptoms: none  Assessment of Lab Values: 10/11/18 chol 224(H), trig 248(H), gluc 124(H), HbA1C 6.7%(H)  Support System: pt resides with wife, Kunal, and she prepares meals and grocery shops    Estimated Kcal Need: 2420kcals/day (20kcals/kg BW)  Estimated Protein Need: 96gms protein/day (0.8gms/kg BW)    Nutritional History: Pt reports dining out every day at local restaurants-states he typically order baked/fried meats and vegetables.  States he does not consume fast food often.  Pt's wife does use salt in cooking, tries to limit purchasing higher Na canned/boxed/processed foods for home but pt does occasionally consume deli meats and sellers and regularly consumes potato chips.  SMBG: daily, averages 101, highest is approx 150 per pt.  Diet Recall: B: sometimes may skip breakfast or has a banana and water, L: baked/fried meat, vegetables OR chips OR fried shrimp sandwich with lettuce/tomato/cheese and onion rings or fries, water, Afternoon snack: chips, D: seafood gumbo, potato salad OR grilled shrimp salad with blue cheese dressing or apple cider vinegar, water.    Nutritional Diagnoses   Problem: food- and nutrition-related knowledge deficit  Etiology: related to no prior renal diet education  Symptoms: as evidenced by pt report    Educational Need? yes  Barriers: none identified  Discussed with: patient and wife  Interventions: Patient taught nutrition information regarding   -Diabetes and CKD Nutrition Therapy reviewed ( high/low food sources of K, Phos and protein, low sodium and fluid intake, emphasis on moderate protein intake, basic DM guidelines, carb counting, 2-day sample meal plan)   -Encouraged pt to choose healthy options when  dining out-suggestions provided  -Encouraged pt to limit intake of fried foods and potato chips-offered healthier suggestions  -Encouraged pt to begin walking again daily to increase physical activity and aid in wt loss  Goals/Recommendations: diet adherence, gradual weight loss, choose healthy options when dining out and aerobic exercises as medically able  Actions Taken: instruct/provide written information  Strategies Used: problem solving, goal setting, motivational interviewing  Patient and/or family comprehend instructions: yes , adherence expected  Outcome: Verbalizes understanding  Monitoring:  Contact information provided, will f/u in clinic and communicate with the care team as needed.     Counseling Time: 15 minutes

## 2018-10-11 NOTE — PROGRESS NOTES
Mr. Alves received initial dose Hep A/B, Flu HD, and Prevnar 13 vaccines and tolerated it well. He left the unit in NAD.

## 2018-10-11 NOTE — PROGRESS NOTES
"Transplant Recipient Adult Psychosocial Assessment    Isidro Avles  [address]  [place] [address]    Telephone Information:   Mobile [phone]   Home  [phone] (home)  Work  There is no work phone number on file.  E-mail  [email or web address]    Sex: male  YOB: 1955  Age: 63 y.o.    Encounter Date: 10/11/2018  [place] Citizen: yes  Primary Language: English   Needed: no    Emergency Contact:  Name: Kunal Alves  Relationship: wife  Address: same as above  Phone Numbers:  n/a (home), [phone] (work), [phone] (mobile)    Family/Social Support:   Number of dependents/: none  Marital history:  twice and  once.  Currently  to wife of 24 years.    Other family dynamics: seven sisters and two brothers.  Pt is the eldest.  He stated "some of these are my father's kids, but we are very close".      Household Composition:  Name: Spring Alves  Age: 63 and 58  Relationship: patient and wife  Does person drive? yes    Do you and your caregivers have access to reliable transportation? yes  PRIMARY CAREGIVER: Kunal Alves will be primary caregiver, phone number [phone].      provided in-depth information to patient and caregiver regarding pre- and post-transplant caregiver role.   strongly encourages patient and caregiver to have concrete plan regarding post-transplant care giving, including back-up caregiver(s) to ensure care giving needs are met as needed.    Patient and Caregiver states understanding all aspects of caregiver role/commitment and is able/willing/committed to being caregiver to the fullest extent necessary.    Patient and Caregiver verbalizes understanding of the education provided today and caregiver responsibilities.         remains available. Patient and Caregiver agree to contact  in a timely manner if concerns arise.      Able to take time off work " without financial concerns: yes.     Additional Significant Others who will Assist with Transplant:  Name: Princess Alves [phone]  Age: 71  City:  State: [place]  Relationship: aunt  Does person drive? no    Name: Kevin Alves [phone] (can assist if not a donor.  Info provided.)  Age: 42  State: [place]  Relationship: daughter  Does person drive? yes    Living Will: no  Healthcare Power of : no  Advance Directives on file: <<no information> per medical record.  Verbally reviewed LW/HCPA information.   provided patient with copy of LW/HCPA documents and provided education on completion of forms.    Living Donors: Yes.  Name: possibly Kevin burden. Education and resource information given to patient.    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    Spouse/Significant Other Employment:     Disabled: no    Monthly Income:  Other: $combined ~4100.00  Able to afford all costs now and if transplanted, including medications: yes  Patient and Caregiver verbalizes understanding of personal responsibilities related to transplant costs and the importance of having a financial plan to ensure that patients transplant costs are fully covered.       provided fundraising information/education. Patient and Caregiververbalizes understanding.   remains available.    Insurance:   Payor/Plan Subscr  Sex Relation Sub. Ins. ID Effective Group Num   1. BLUE CROSS BL* MAGGIE ALVES 1960 Female  [id number]                                   [address] [address]     Primary Insurance (for UNOS reporting): Private Insurance through wife's employer  Secondary Insurance (for UNOS  reporting): None  Patient and Caregiver verbalizes clear understanding that patient may experience difficulty obtaining and/or be denied insurance coverage post-surgery. This includes and is not limited to disability insurance, life insurance, health insurance, burial insurance, long term care insurance, and other insurances.      Patient and Caregiver also reports understanding that future health concerns related to or unrelated to transplantation may not be covered by patient's insurance.  Resources and information provided and reviewed.     Patient and Caregiver provides verbal permission to release any necessary information to outside resources for patient care and discharge planning.  Resources and information provided are reviewed.      Dialysis Adherence: Patient reports he is predialysis.  He reports he has been followed by Dr. Ballard for about 8 yrs and is compliant with all aspects of medical care.  He stated he kows he needs to exercise more and lose weight which he states he is working on.  Per Neph documentation, pt was non adherent with BP meds in the past, but is now taking them with noted lowered BPs.  Pt has a 15 % no show rate.      Infusion Service: patient utilizing? no  Home Health: patient utilizing? no  DME: diabetic supplies  Pulmonary/Cardiac Rehab: denies  ADLS:  Pt states ability to independently accomplish all adls.    Adherence:   Pt states current and expected compliance with all healthcare recommendations..  Adherence education and counseling provided.     Per History Section:  Past Medical History:   Diagnosis Date    Anemia 4/16/2014    BPH (benign prostatic hyperplasia)     CKD (chronic kidney disease) stage 4, GFR 15-29 ml/min     Diabetes mellitus     Diabetes mellitus, type 2     Elevated PSA     Encounter for blood transfusion     Herpes labialis     Hyperlipidemia     Hypertension     Obesity     Proteinuria     Secondary hyperparathyroidism (of renal origin)      UTI (lower urinary tract infection) 5/1/2015     Social History     Tobacco Use    Smoking status: Never Smoker    Smokeless tobacco: Never Used   Substance Use Topics    Alcohol use: Yes     Comment: seldom      Social History     Substance and Sexual Activity   Drug Use No     Social History     Substance and Sexual Activity   Sexual Activity Yes    Partners: Female       Per Today's Psychosocial:  Tobacco: none, patient denies any use.  Alcohol: three times a week, depending upon work . Wife states ETOH is not a problem.  Illicit Drugs/Non-prescribed Medications: none, patient denies any use.    Patient and Caregiver states clear understanding of the potential impact of substance use as it relates to transplant candidacy and is aware of possible random substance screening.  Substance abstinence/cessation counseling, education and resources provided and reviewed.     Arrests/DWI/Treatment/Rehab: patient denies    Psychiatric History:    Mental Health: pt denies mental health concerns.  Wife supports this.  Psychiatrist/Counselor: pt denies  Medications:  none  Suicide/Homicide Issues: Pt denies current or past suicidal/homicidal ideation.  Safety at home: Pt denies any home safety concerns.    Knowledge: Patient and Caregiver states having clear understanding and realistic expectations regarding the potential risks and potential benefits of organ transplantation and organ donation and agrees to discuss with health care team members and support system members, as well as to utilize available resources and express questions and/or concerns in order to further facilitate the pt informed decision-making.  Resources and information provided and reviewed.    Patient and Caregiver is aware of [facility]'s affiliation and/or partnership with agencies in home health care, LTAC, SNF, [facility], and other hospitals and clinics.    Understanding: Patient and Caregiver reports having a clear understanding of the many lifetime  commitments involved with being a transplant recipient, including costs, compliance, medications, lab work, procedures, appointments, concrete and financial planning, preparedness, timely and appropriate communication of concerns, abstinence (ETOH, tobacco, illicit non-prescribed drugs), adherence to all health care team recommendations, support system and caregiver involvement, appropriate and timely resource utilization and follow-through, mental health counseling as needed/recommended, and patient and caregiver responsibilities.  Social Service Handbook, resources and detailed educational information provided and reviewed.  Educational information provided.    Patient and Caregiver also reports current and expected compliance with health care regime and states having a clear understanding of the importance of compliance.      Patient and Caregiver reports a clear understanding that risks and benefits may be involved with organ transplantation and with organ donation.       Patient and Caregiver also reports clear understanding that psychosocial risk factors may affect patient, and include but are not limited to feelings of depression, generalized anxiety, anxiety regarding dependence on others, post traumatic stress disorder, feelings of guilt and other emotional and/or mental concerns, and/or exacerbation of existing mental health concerns.  Detailed resources provided and discussed.      Patient and Caregiver agrees to access appropriate resources in a timely manner as needed and/or as recommended, and to communicate concerns appropriately.  Patient and Caregiver also reports a clear understanding of treatment options available.     Patient and Caregiver received education in a group setting.   reviewed education, provided additional information, and answered questions.    Feelings or Concerns: Pt and wife deny any concerns at this time.    Coping: Pt states he enjoys sleeping and taking walks as a  means of coping.    Goals: continue working, avoid dialysis.  Patient referred to Vocational Rehabilitation.    Interview Behavior: Patient and Caregiver presents as alert and oriented x 4, pleasant, good eye contact, well groomed, recall good, concentration/judgement good, average intelligence, calm, communicative, cooperative and asking and answering questions appropriately. Pt was accompanied by his wife who presented as supportive of pt's pursuit of transplant.         Transplant Social Work - Candidacy  Assessment/Plan:     Psychosocial Suitability: Patient presents as an acceptable candidate for kidney transplant at this time. Based on psychosocial risk factors, patient presents as low risk, due to absence of significant psychosocial risk factors..    Recommendations/Additional Comments: recommend fundraising and will benefit from [address].    Chelita Dickinson LCSW

## 2018-10-11 NOTE — PROGRESS NOTES
CLINIC TEACHING NOTE    Isidro Alves was seen in transplant clinic today.  Coordinator verified that the patient viewed the teaching video and received written educational material.    The following information was reviewed:  · Waiting list time for cadaveric vs. living donation  · Waiting list process including: back-up calls, notification of changes in contact information, dialysis/physician information to the transplant coordinator, notifications of changes in health or if hospitalized, and notification of travel out of the area  · Monthly antibody testing to be obtained by the dialysis unit or arranged through a local lab and mailed to the transplant center.  Patient informed that if blood is not sent monthly and a kidney becomes available, the patient will need to come to the hospital to provide a fresh sample of blood for testing.    Patient was instructed that once on the waiting list, an appointment with the transplant physician will be scheduled yearly or every 6 months to ensure patient remains an acceptable transplant candidate.    Patient also informed that at the time of transplant, participation in a research protocol may be offered.  Notified that this is strictly voluntary and will not affect the quality of care received.      The option to have name placed on multiple transplant lists to increase opportunity for transplant was reviewed.    All patient questions were answered.  Patient verbalized understanding of above information.    Patient presents as a suitable candidate for transplant.

## 2018-10-11 NOTE — TELEPHONE ENCOUNTER
----- Message from Ángela Noland sent at 10/11/2018 11:16 AM CDT -----  Pt at [phone]//states he is now in [place] at the Transplant Dept//is needing to make an appt with Dr Fallon//please call//thanks/[facility]

## 2018-10-11 NOTE — LETTER
October 15, 2018        Gabino Champion  [address] [place] [address]  Phone: [phone]  Fax: [phone]             [facility]  [address]  [facility] [address]  Phone: [phone]   Patient: Isidro Alves   MR Number: [id number]   YOB: 1955   Date of Visit: 10/11/2018       Dear Dr. Gabino Champion    Thank you for referring Isidro Alves to me for evaluation. Attached you will find relevant portions of my assessment and plan of care.    If you have questions, please do not hesitate to call me. I look forward to following Isidro Alves along with you.    Sincerely,    Jessica Collins, NP    Enclosure    If you would like to receive this communication electronically, please contact [email or web address] or [phone] to request [employer] Link access.    [employer] is a tool which provides read-only access to select patient information with whom you have a relationship. Its easy to use and provides real time access to review your patients record including encounter summaries, notes, results, and demographic information.    If you feel you have received this communication in error or would no longer like to receive these types of communications, please e-mail [email or web address]

## 2018-10-11 NOTE — PROGRESS NOTES
Pre Transplant Infectious Diseases Consult  Kidney Transplant Recipient Evaluation    Requesting Physician: Gabino Champion      Reason for Visit:    Chief Complaint   Patient presents with    Kidney Transplant Pre-evaluation     History of Present Illness  Isidro Alves is a 63 y.o. year old Black or  male with advanced Kidney disease currently being evaluated for Kidney transplant.  Patient is pre-dialysis and has no current dialysis access. Patient denies any recent fever, chills, or infective illnesses. He is currently on acyclovir for prevention of orolabial herpes.      1) Do you have a history of:   YES NO   Diabetes      [x] []     Diabetic Foot Infection/Bone Infection  []        [x]     Surgical Removal of Spleen   []  [x]              2) Have you had recurrent infections involving:             YES NO  Sinus infections  []         [x]   Sore Throat   []         [x]                 Prostate Infections  []         [x] prostate infection x 1 requiring abx             Bladder Infections  []         [x] UTI x 1 after colonoscopy                     Kidney Infections  []         [x]                               Intestinal Infections  []         [x]      Skin Infections   []         [x]       Reproductive Infections          []  [x]   Periodontal Disease  []         [x]        3)Have you ever had: YES     NO UNKNOWN      Chicken Pox   []         []  [x]   Shingles   []         [x]  []   Orolabial Herpes             [x]  []  [] on acyclovir   Genital Herpes  []         [x]  []   Cytomegalovirus  []         [x]  []   Sera-Barr Virus  []         [x]  []   Genital Warts   []         [x]  []   Hepatitis A   []         [x]  []   Hepatitis B   []         [x]  []   Hepatitis C   []         [x]  []   Syphilis   []         [x]  []   Gonorrhea   []         [x]  []   Pelvic Inflammatory   Disease   []         [x]  []   Chlamydia Infection  []         [x]  []   Intestinal parasites   or worms   []          [x]  []   Fungal Infections  []         [x]  []   Blood Infections  []         [x]  []        Comment:       4) Have you ever been exposed   YES NO  To someone with tuberculosis?  []   [x]   If yes, what treatment did you receive:     5) What states have you lived in?  [place]    6) What countries have you visited for more than 2 weeks? N/A                       YES NO  7) Did you have any associated infections?  []  [x]       8) Are you planning to travel outside the    []  [x]   [place] after your transplant?    9) Household                   YES NO  Do you have pets living in your house?    []         [x]   If yes, describe:     Do you spend time or live on a farm or     []         [x]   have livestock or other farm animals?  If yes, which ones:    Do you have a fish tank?          []  [x]       Do you have a litter box?      []         [x]     Do you fish or hunt?      [x]         []     Do you clean or skin fish or animals?    []         [x]     Do you consume raw or undercooked    []         [x]   meat, fish, or shellfish?      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    []         [x]   13)Do you hike, camp, or spend     time in wooded areas?   []         [x]        14) The patient's immunization history was reviewed.    Have you ever received:  YES NO UNKNOWN DATES   Routine Childhood vaccines  [x]         []  []      Influenza vaccine   [x]  []  []    Pneumovax    [x]  []  []     Tetanus-diptheria   [x]         []  []    Hepatitis A vaccine series       []  []  [x]    Hepatitis B vaccine series         []  []  [x]    Meningitis vaccine   []         []  [x]    Varicella vaccine   []         []  [x]         Based on the patients immunization history and serologies, immunizations were ordered:         Ordered  Not Ordered  Influenza Vaccine     [x]    []   Hepatitis A series at 0,  6  months   [x]    []   Hepatitis B seriesat 0, 1, and 6 months  [x]    []   Hepatitis B High Dose 0,1, and 6 months  []    []   Prevnar      [x]    []   Pneumovax      []    []    TDap       []    []    Zoster       []    []   Menactra      []    []            The patient was encouraged to contact us about any problems that may develop after immunization and possible side effects were reviewed.      Previous Transplant: no    Etiology of Kidney Disease: HTN    Allergies: Bactrim [sulfamethoxazole-trimethoprim] and Nsaids (non-steroidal anti-inflammatory drug)  Immunization History   Administered Date(s) Administered    Influenza - Quadrivalent 11/14/2016    Pneumococcal Polysaccharide - 23 Valent 04/04/2016    Tdap 05/05/2016     Past Medical History:   Diagnosis Date    Anemia 4/16/2014    BPH (benign prostatic hyperplasia)     CKD (chronic kidney disease) stage 4, GFR 15-29 ml/min     Diabetes mellitus     Diabetes mellitus, type 2     Elevated PSA     Encounter for blood transfusion     Herpes labialis     Hyperlipidemia     Hypertension     Obesity     Proteinuria     Secondary hyperparathyroidism (of renal origin)     UTI (lower urinary tract infection) 5/1/2015     Past Surgical History:   Procedure Laterality Date    COLONOSCOPY N/A 12/21/2017    Procedure: COLONOSCOPY;  Surgeon: Fran Escalera MD;  Location[facility];  Service: Endoscopy;  Laterality: N/A;    COLONOSCOPY N/A 12/21/2017    Performed by Fran Escalera MD at [facility]    PROSTATE BIOPSY      thorocotomy  2010      Social History     Socioeconomic History    Marital status:      Spouse name: Not on file    Number of children: 3    Years of education: Not on file    Highest education level: Not on file   Social Needs    Financial resource strain: Not on file    Food insecurity - worry: Not on file    Food insecurity - inability: Not on file    Transportation needs - medical: Not on file    Transportation  needs - non-medical: Not on file   Occupational History     Employer: self-rmployed   Tobacco Use    Smoking status: Never Smoker    Smokeless tobacco: Never Used   Substance and Sexual Activity    Alcohol use: Yes     Comment: seldom     Drug use: No    Sexual activity: Yes     Partners: Female   Other Topics Concern    Not on file   Social History Narrative    Not on file       Review of Systems   Constitution: Negative for chills, decreased appetite, fever, weakness, malaise/fatigue, night sweats, weight gain and weight loss.   HENT: Negative for congestion, ear pain, hearing loss, hoarse voice, sore throat and tinnitus.    Eyes: Positive for visual disturbance. Negative for blurred vision and redness.   Cardiovascular: Positive for leg swelling. Negative for chest pain and palpitations.   Respiratory: Negative for cough, hemoptysis, shortness of breath, sputum production and wheezing.    Endocrine: Negative for cold intolerance and heat intolerance.   Hematologic/Lymphatic: Negative for adenopathy. Does not bruise/bleed easily.   Skin: Negative for dry skin, itching, rash and suspicious lesions.   Musculoskeletal: Negative for back pain, joint pain, myalgias and neck pain.   Gastrointestinal: Negative for abdominal pain, constipation, diarrhea, heartburn, nausea and vomiting.   Genitourinary: Negative for dysuria, flank pain, frequency, hematuria, hesitancy and urgency.   Neurological: Positive for headaches. Negative for dizziness, numbness and paresthesias.   Psychiatric/Behavioral: Negative for depression and memory loss. The patient does not have insomnia and is not nervous/anxious.    Allergic/Immunologic: Negative for environmental allergies, HIV exposure, hives and persistent infections.     Physical Exam   Constitutional: He is oriented to person, place, and time. He appears well-developed and well-nourished.   HENT:   Head: Normocephalic and atraumatic.   Mouth/Throat: Uvula is midline,  oropharynx is clear and moist and mucous membranes are normal. He does not have dentures. No oral lesions. Normal dentition. No dental abscesses, lacerations or dental caries.   Eyes: Conjunctivae and lids are normal. Pupils are equal, round, and reactive to light. No scleral icterus.   Wears glasses   Neck: Neck supple.   Cardiovascular: Normal rate and regular rhythm.   No murmur heard.  Pulmonary/Chest: Effort normal and breath sounds normal. No respiratory distress. He has no decreased breath sounds. He has no wheezes. He has no rhonchi. He has no rales.   Abdominal: Soft. Normal appearance, normal aorta and bowel sounds are normal. He exhibits no distension. There is no hepatosplenomegaly. There is no tenderness. There is no guarding.   Obese abdomen   Musculoskeletal: He exhibits no edema.   Lymphadenopathy:        Head (right side): No submental, no submandibular and no occipital adenopathy present.        Head (left side): No submental, no submandibular and no occipital adenopathy present.     He has no cervical adenopathy.     He has no axillary adenopathy.   Neurological: He is alert and oriented to person, place, and time. No cranial nerve deficit.   Skin: Skin is warm, dry and intact. No lesion and no rash noted. He is not diaphoretic. No erythema. No pallor.   Psychiatric: He has a normal mood and affect. His behavior is normal.     Diagnostics:   RPR   Date Value Ref Range Status   08/03/2016 Non-reactive Non-reactive Final     No results found for: CMVANTIBODIE  No results found for: HIV1X2  No results found for: HTLVIIIANTIB  Hepatitis B Surface Ag   Date Value Ref Range Status   05/02/2015 Negative  Final     No results found for: HEPBCAB  Hepatitis C Ab   Date Value Ref Range Status   05/02/2015 Negative  Final     No results found for: TOXOIGG  No components found for: TOXOIGGINTER  No results found for: KIC0DQM  No results found for: CEH6RWC  No results found for: VARICELLAZOS  No results found  for: VARICELLAINT  No results found for: STRONGANTIGG  No results found for: EPSTEINBARRV  No results found for: HEPBSAB  No results found for: QUANTIFERON  Hep A IgM   Date Value Ref Range Status   05/02/2015 Negative  Final     No results found for: PPD  No results found for this or any previous visit.         Transplant Infectious Diseases - Candidacy    Assessment/Plan:     Transplant Candidacy: Based on available information, there are no identified significant barriers to transplantation from an infectious disease standpoint pending acceptable serologies.  Quantiferon gold, HIV, strongyloides IgG and RPR are pending. Please refer to ID clinic for any serologies requiring further evaluation.    Immunizations recommended:  - Influenza annually  - Prevnar 13 today followed by Pneumovax 23 booster every 5 years  - Hepatitis A/Hepatitis B combination vaccine today, in 1 month and 6 months  - Shingrix (two doses at 0 and 2-6 months)      Final determination of transplant candidacy will be made once evaluation is complete and reviewed by the Transplant Selection Committee.    Anabelle Valdez PA-C         Counseling:I discussed with Isidro the risk for increased susceptibility to infections following transplantation including increased risk for infection right after transplant and if rejection should occur.  The patients has been counseled on the importance of vaccinations including but not limited to a yearly flu vaccine.  Specific guidance has been provided to the patient regarding the patients occupation, hobbies and activities to avoid future infectious complications including but not limited to avoiding undercooked meats and seafood, proper hygiene, and contact with animals.

## 2018-10-11 NOTE — PROGRESS NOTES
"PHARM.D. PRE-TRANSPLANT NOTE:    This patient's medication therapy was evaluated as part of his pre-transplant evaluation.    The following pharmacologic concerns were noted: none    This patient's medication profile was reviewed for contraindications for DAA Hepatitis C therapy:    [X]  No current inducers of CYP 3A4 or PGP  [X]  No amiodarone on this patient's EMR profile in the last 24 months  [X]  No past or current atrial fibrillation on this patient's EMR profile       Current Outpatient Medications   Medication Sig Dispense Refill    acyclovir (ZOVIRAX) 400 MG tablet TAKE 1 TABLET 3 TIMES DAILY AS NEEDED 30 tablet 6    BD INSULIN SYRINGE HALF UNIT 0.3 mL 31 gauge x 5/16" Syrg FOR USE WITH INSULIN. 100 Syringe 1    CIALIS 10 mg tablet TAKE 1 TABLET (10 MG TOTAL) BY MOUTH AS NEEDED FOR ERECTILE DYSFUNCTION. 6 tablet 7    esomeprazole (NEXIUM) 40 MG capsule Take 40 mg by mouth once daily.      folic acid (FOLVITE) 1 MG tablet TAKE 1 TABLET (1 MG TOTAL) BY MOUTH ONCE DAILY. 30 tablet 3    insulin degludec-liraglutide (XULTOPHY 100/3.6) 100 unit-3.6 mg /mL (3 mL) InPn Inject 35 Units into the skin once daily. 5 Syringe 1    lancets (ONETOUCH ULTRASOFT LANCETS) Misc 1 lancet by Misc.(Non-Drug; Combo Route) route 2 (two) times daily. (Patient taking differently: 1 lancet by Misc.(Non-Drug; Combo Route) route 3 (three) times daily. ) 200 each 1    multivitamin (THERAGRAN) tablet Take 1 tablet by mouth once daily.      NIFEdipine (PROCARDIA-XL) 60 MG (OSM) 24 hr tablet TAKE 1 TABLET (60 MG TOTAL) BY MOUTH 2 (TWO) TIMES DAILY. 60 tablet 7    ONETOUCH ULTRA BLUE TEST STRIP Strp 1 STRIP BY MISC.(NON-DRUG COMBO ROUTE) ROUTE 3 (THREE) TIMES DAILY. 100 strip 2    tamsulosin (FLOMAX) 0.4 mg Cp24 TAKE 2 CAPSULES (0.8 MG TOTAL) BY MOUTH EVERY EVENING. (Patient taking differently: Take 0.4 mg by mouth 2 (two) times daily. ) 60 capsule 7    valsartan (DIOVAN) 320 MG tablet TAKE 1 TABLET (320 MG TOTAL) BY MOUTH ONCE " DAILY. 90 tablet 2    VITAMIN D2 50,000 unit capsule TAKE 1 CAPSULE (50,000 UNITS TOTAL) BY MOUTH EVERY 7 DAYS. 4 capsule 6    olopatadine (PATADAY) 0.2 % Drop Place 1 drop into both eyes daily as needed. 2.5 mL 0     No current facility-administered medications for this visit.          Currently he is responsible for preparing / administering this patient's medications on a daily basis.  I am available for consultation and can be contacted, as needed by the other members of the Kidney Transplant team.

## 2018-10-11 NOTE — PROGRESS NOTES
Transplant Nephrology  Kidney Transplant Recipient Evaluation    Referring Physician: Gabino Champion  Current Nephrologist: Gabino Champion    Subjective:   CC:  Initial evaluation of kidney transplant candidacy.    HPI:  Mr. Alves is a 63 y.o. year old Black or  male who has presented to be evaluated as a potential kidney transplant recipient.  He has advanced kidney disease secondary to diabetic nephropathy.  Patient is currently pre-dialysis. He has a no access for dialysis access.     Previous Transplant: no    Past Medical and Surgical History: Mr. Alves  has a past medical history of Anemia, BPH (benign prostatic hyperplasia), CKD (chronic kidney disease) stage 4, GFR 15-29 ml/min, Diabetes mellitus, Diabetes mellitus, type 2, Elevated PSA, Herpes labialis, Hyperlipidemia, Hypertension, Obesity, Proteinuria, Secondary hyperparathyroidism (of renal origin), and UTI (lower urinary tract infection).  He has a past surgical history that includes thorocotomy (2010); Prostate biopsy; and COLONOSCOPY (N/A, 12/21/2017).    Past Social and Family History: Mr. Alves reports that  has never smoked. he has never used smokeless tobacco. He reports that he drinks alcohol. He reports that he does not use drugs. His family history includes Diabetes in his father and unknown relative; Hypertension in his father and unknown relative; No Known Problems in his mother.    DM2                                              Diagnosed over 20 years ago  Insulin 38 units/day  Denies peripheral neuropathy, eye or GI associated problems   Lab Results   Component Value Date    HGBA1C 6.7 (H) 10/11/2018       thoracotomy   Due to a cough noticed after working out a few years back. Thought to be injury related . No residual problems     HX Elevated PSA for the past few years  Prostate biopsy a few years back at [facility]   Urologist Dr Fallon   [place]--denies   Noted in chart -->Incomplete bladder  emptying--is taking flomax   Lab Results   Component Value Date    PSA 12.2 (H) 10/11/2018    PSA 11.0 (H) 02/19/2018    PSA 10.7 (H) 11/10/2017       Kidney bx at [facility] ~ 6-7 years ago      Body mass index is 36.3 kg/m².   FX assessment  Walks ~ 4-5x/week ~ 3-5 miles  Denies SOB, chest pain or leg pain      May have a donor      Past Medical History:   Diagnosis Date    Anemia 4/16/2014    BPH (benign prostatic hyperplasia)     CKD (chronic kidney disease) stage 4, GFR 15-29 ml/min     Diabetes mellitus     Diabetes mellitus, type 2     Elevated PSA     Herpes labialis     Hyperlipidemia     Hypertension     Obesity     Proteinuria     Secondary hyperparathyroidism (of renal origin)     UTI (lower urinary tract infection) 5/1/2015       Review of Systems   Constitutional: Positive for fatigue. Negative for activity change, appetite change, chills, fever and unexpected weight change.   HENT: Negative for congestion, facial swelling, postnasal drip, rhinorrhea, sinus pressure, sore throat and trouble swallowing.    Eyes: Positive for visual disturbance. Negative for pain and redness.        Wears glasses   Respiratory: Negative for cough, chest tightness, shortness of breath and wheezing.    Cardiovascular: Positive for leg swelling. Negative for chest pain and palpitations.   Gastrointestinal: Negative for abdominal pain, diarrhea, nausea and vomiting.   Genitourinary: Negative for dysuria, flank pain and urgency.   Musculoskeletal: Negative for gait problem, neck pain and neck stiffness.   Skin: Negative for rash.   Allergic/Immunologic: Negative for environmental allergies, food allergies and immunocompromised state.   Neurological: Positive for headaches. Negative for dizziness, weakness and light-headedness.   Psychiatric/Behavioral: Negative for agitation and confusion. The patient is not nervous/anxious.        Objective:   Blood pressure (!) 181/113, pulse 79, temperature 97.9 °F  (36.6 °C), temperature source Oral, resp. rate 18, height 6' (1.829 m), weight 121.4 kg (267 lb 10.2 oz), SpO2 97 %.body mass index is 36.3 kg/m².    Physical Exam   Constitutional: He is oriented to person, place, and time. He appears well-developed and well-nourished.   HENT:   Head: Normocephalic.   Mouth/Throat: Oropharynx is clear and moist. No oropharyngeal exudate.   Eyes: Conjunctivae and EOM are normal. Pupils are equal, round, and reactive to light. No scleral icterus.   Neck: Normal range of motion. Neck supple.   Cardiovascular: Normal rate, regular rhythm and normal heart sounds.   Pulmonary/Chest: Effort normal and breath sounds normal.   Abdominal: Soft. Normal appearance and bowel sounds are normal. He exhibits no distension and no mass. There is no splenomegaly or hepatomegaly. There is no tenderness. There is no rebound, no guarding, no CVA tenderness, no tenderness at McBurney's point and negative Curran's sign.   Musculoskeletal: Normal range of motion. He exhibits edema.   Bilateral trace peripheral edema    Lymphadenopathy:     He has no cervical adenopathy.   Neurological: He is alert and oriented to person, place, and time. He exhibits normal muscle tone. Coordination normal.   Skin: Skin is warm and dry.   Psychiatric: He has a normal mood and affect. His behavior is normal.   Vitals reviewed.      Labs:  Lab Results   Component Value Date    WBC 8.71 10/11/2018    HGB 12.7 (L) 10/11/2018    HCT 41.7 10/11/2018     10/11/2018    K 4.2 10/11/2018     (H) 10/11/2018    CO2 21 (L) 10/11/2018    BUN 43 (H) 10/11/2018    CREATININE 4.2 (H) 10/11/2018    EGFRNONAA 14.2 (A) 10/11/2018    CALCIUM 9.4 10/11/2018    PHOS 3.1 10/11/2018    ALBUMIN 3.5 10/11/2018    AST 18 10/11/2018    ALT 9 (L) 10/11/2018    UTPCR 1.96 (H) 06/18/2018    .0 (H) 10/11/2018       Lab Results   Component Value Date    BILIRUBINUA Negative 06/18/2018    PROTEINUA 2+ (A) 06/18/2018    NITRITE Negative  06/18/2018    RBCUA 1 06/18/2018    WBCUA 12 (H) 06/18/2018       No results found for: HLAABCTYPE    Labs were reviewed with the patient.    Assessment:     1. Pre-transplant evaluation for chronic kidney disease    2. Organ transplant candidate    3. Type 2 diabetes mellitus with stage 4 chronic kidney disease, with long-term current use of insulin    4. Non morbid obesity due to excess calories    5. Hypertension associated with diabetes    6. Hyperlipidemia associated with type 2 diabetes mellitus    7. Herpes labialis    8. Elevated PSA    9. Chronic kidney disease (CKD), stage IV (severe)    10. Benign non-nodular prostatic hyperplasia without lower urinary tract symptoms    11. Other proteinuria        Plan:   Urologist Dr Fallon -->Urology clearance, HX elevated PSA and incomplete bladder emptying. Biopsy pathology if available -->[facility]   Kidney bx at [facility] ~ 6-7 years ago        Kidney allocation scheme was also discussed with the patient.  Patient was advised that the average wait time for a kidney in [place] is 3-5 years     Kidney donor profile index (KPDI) and estimated post-transplant survival scores (EPTS) were reviewed. The benefits and risks of accepting a kidney with KDPI > 85% were explained to the patient. Patient verbalized understanding and consented to accept a kidney with KDPI > 85%       The side effects of immunosuppressants were reviewed that include but are not limited to the risk of infection, the risks of cancer (skin and lymphoma being most common), the risk of diabetes associated with prednisone use, acceleration of heart disease and diarrhea( this being more common post transplant).     Reviewed the hospital stay.  Reviewed the post transplant follow up.  Reviewed the importance of maintaining a good weight.  Reviewed the importance of controlling BP.  Reviewed the importance of following the renal diet.        Transplant Candidacy:   Based on  available information, Mr. Alves is a suitable kidney transplant candidate.  Final determination of transplant candidacy will be made once workup is complete and reviewed by the selection committee.    Jessica Collins NP       UNOS Patient Status  Functional Status: 80% - Normal activity with effort: some symptoms of disease  Physical Capacity: No Limitations

## 2018-10-11 NOTE — PROGRESS NOTES
Transplant Surgery  Kidney Transplant Recipient Evaluation    Referring Physician: Gabino Champion  Current Nephrologist: Gabino Champion    Subjective:     Reason for Visit: evaluate transplant candidacy    History of Present Illness: Isidro Alves is a 63 y.o. year old male undergoing transplant evaluation.    Dialysis History: Isidro is pre-dialysis.      Transplant History: N/A    Etiology of Renal Disease: Diabetes Mellitus - Type II (based on medical records from referral).    Review of Systems   Constitutional: Negative for activity change and appetite change.   HENT: Negative for congestion and tinnitus.    Eyes: Negative for redness and visual disturbance.   Respiratory: Negative for cough and shortness of breath.    Cardiovascular: Negative for chest pain and palpitations.   Gastrointestinal: Negative for abdominal distention and abdominal pain.   Endocrine: Negative for polydipsia and polyuria.   Genitourinary: Negative for decreased urine volume and dysuria.   Musculoskeletal: Negative for arthralgias and back pain.   Skin: Negative for pallor and rash.   Allergic/Immunologic: Negative for environmental allergies and immunocompromised state.   Neurological: Negative for tremors and headaches.   Hematological: Negative for adenopathy. Does not bruise/bleed easily.   Psychiatric/Behavioral: Negative for behavioral problems and confusion.       Objective:     Physical Exam:  Constitutional:   Vitals reviewed: yes   Well-nourished and well-groomed: yes  Eyes:   Sclerae icteric: no   Extraocular movements intact: yes  GI:    Bowel sounds normal: yes   Tenderness: no    If yes, quadrant/location: not applicable   Palpable masses: no    If yes, quadrant/location: not applicable   Hepatosplenomegaly: no   Ascites: no   Hernia: no    If yes, type/location: not applicable   Surgical scars: no    If yes, type/location: not applicable  Resp:   Effort normal: yes   Breath sounds normal: yes    CV:   Regular rate  and rhythm: yes   Heart sounds normal: yes   Femoral pulses normal: yes   Extremities edematous: no  Skin:   Rashes or lesions present: no    If yes, describe:not applicable   Jaundice:: no    Musculoskeletal:   Gait normal: yes   Strength normal: yes  Psych:   Oriented to person, place, and time: yes   Affect and mood normal: yes    Additional comments: not applicable    Counseling: We provided Isidro Alves with a group education session today.  We discussed kidney transplantation at length with him, including risks, potential complications, and alternatives in the management of his renal failure.  The discussion included complications related to anesthesia, bleeding, infection, primary nonfunction, and ATN.  I discussed the typical postoperative course, length of hospitalization, the need for long-term immunosuppression, and the need for long-term routine follow-up.  I discussed living-donor and -donor transplantation and the relative advantages and disadvantages of each.  I also discussed average waiting times for both living donation and  donation.  I discussed national and center-specific survival rates.  I also mentioned the potential benefit of multicenter listing to candidates listed with centers within more than one organ procurement organization.  All questions were answered.    Final determination of transplant candidacy will be made once evaluation is complete and reviewed by the Kidney & Kidney/Pancreas Selection Committee.         Transplant Surgery - Candidacy   Assessment/Plan:   Isidro Alves is pre-dialysis with CKD stage 4 (GFR 15-29 mL/min). I see no surgical contraindication to placing a kidney transplant. Based on available information, Isidro Alves is a suitable kidney transplant candidate. I strongly encourage living donation and weight loss.    Adrian Smith MD

## 2018-10-12 LAB
CMV IGG SERPL QL IA: REACTIVE
HEP. B SURF AB, QUAL: POSITIVE
HEP. B SURF AB, QUANT.: NORMAL MIU/ML

## 2018-10-12 RX ORDER — FOLIC ACID 1 MG/1
TABLET ORAL
Qty: 90 TABLET | Refills: 1 | Status: SHIPPED | OUTPATIENT
Start: 2018-10-12 | End: 2019-08-07 | Stop reason: SDUPTHER

## 2018-10-15 LAB
M TB IFN-G CD4+ BCKGRND COR BLD-ACNC: -0.04 IU/ML
MITOGEN IGNF BCKGRD COR BLD-ACNC: 7.6 IU/ML
MITOGEN IGNF BCKGRD COR BLD-ACNC: NEGATIVE [IU]/ML
NIL: 0.06 IU/ML
STRONGYLOIDES ANTIBODY IGG: NEGATIVE
TB2 - NIL: -0.04 IU/ML

## 2018-10-18 ENCOUNTER — PATIENT OUTREACH (OUTPATIENT)
Dept: ADMINISTRATIVE | Facility: HOSPITAL | Age: 63
End: 2018-10-18

## 2018-10-18 NOTE — PROGRESS NOTES
Spoke with patient Re scheduling Appt for BP check for BP<140/90. Appt Elham'd 10/22/18 w/ pcp nurse.

## 2018-10-19 LAB
B CELL RESULTS - XM AUTO: NEGATIVE
B MCS AVERAGE - XM AUTO: -18.5
FXMAU TESTING DATE: NORMAL
HLA AB QL: NEGATIVE
HLA AB SERPL: NEGATIVE
HLA DRB4 1: NORMAL
HLA SSO DNA TYPING CLASS I & II INTERPRETATION: NORMAL
HLA-A 1 SERO. EQUIV: 30
HLA-A 1: NORMAL
HLA-A 2 SERO. EQUIV: 34
HLA-A 2: NORMAL
HLA-B 1 SERO. EQUIV: 72
HLA-B 1: NORMAL
HLA-B 2 SERO. EQUIV: 57
HLA-B 2: NORMAL
HLA-BW 1 SERO. EQUIV: 6
HLA-BW 2 SERO. EQUIV: 4
HLA-C 1: NORMAL
HLA-C 2: NORMAL
HLA-CW 1 SERO. EQUIV: 2
HLA-CW 2 SERO. EQUIV: 18
HLA-DQ 1 SERO. EQUIV: 7
HLA-DQ 2 SERO. EQUIV: 5
HLA-DQB1 1: NORMAL
HLA-DQB1 2: NORMAL
HLA-DRB1 1 SERO. EQUIV: 11
HLA-DRB1 1: NORMAL
HLA-DRB1 2 SERO. EQUIV: 13
HLA-DRB1 2: NORMAL
HLA-DRB3 1: NORMAL
HLA-DRB3 2: NORMAL
HLA-DRB345 1 SERO. EQUIV: 52
HLA-DRB345 2 SERO. EQUIV: NORMAL
HLA-DRB4 2: NORMAL
HLA-DRB5 1: NORMAL
HLA-DRB5 2: NORMAL
SCRFL TESTING DATE: NORMAL
SERUM COLLECTION DT - XM AUTO: NORMAL
SERUM COLLECTION DT: NORMAL
SSDQB TESTING DATE: NORMAL
SSDRB TESTING DATE: NORMAL
SSOA TESTING DATE: NORMAL
SSOB TESTING DATE: NORMAL
SSOC TESTING DATE: NORMAL
SSODR TESTING DATE: NORMAL
T CELL RESULTS - XM AUTO: NEGATIVE
T MCS AVERAGE - XM AUTO: -10
TYSSO TESTING DATE: NORMAL

## 2018-10-22 ENCOUNTER — CLINICAL SUPPORT (OUTPATIENT)
Dept: INTERNAL MEDICINE | Facility: CLINIC | Age: 63
End: 2018-10-22
Payer: COMMERCIAL

## 2018-10-22 ENCOUNTER — LAB VISIT (OUTPATIENT)
Dept: LAB | Facility: HOSPITAL | Age: 63
End: 2018-10-22
Attending: NURSE PRACTITIONER
Payer: COMMERCIAL

## 2018-10-22 VITALS — SYSTOLIC BLOOD PRESSURE: 164 MMHG | HEART RATE: 88 BPM | DIASTOLIC BLOOD PRESSURE: 104 MMHG

## 2018-10-22 DIAGNOSIS — Z79.4 TYPE 2 DIABETES MELLITUS WITH CHRONIC KIDNEY DISEASE, WITH LONG-TERM CURRENT USE OF INSULIN, UNSPECIFIED CKD STAGE: ICD-10-CM

## 2018-10-22 DIAGNOSIS — Z76.82 ORGAN TRANSPLANT CANDIDATE: ICD-10-CM

## 2018-10-22 DIAGNOSIS — Z01.30 BLOOD PRESSURE CHECK: Primary | ICD-10-CM

## 2018-10-22 DIAGNOSIS — E11.22 TYPE 2 DIABETES MELLITUS WITH CHRONIC KIDNEY DISEASE, WITH LONG-TERM CURRENT USE OF INSULIN, UNSPECIFIED CKD STAGE: ICD-10-CM

## 2018-10-22 LAB
ABO + RH BLD: NORMAL
ESTIMATED AVG GLUCOSE: 140 MG/DL
HBA1C MFR BLD HPLC: 6.5 %

## 2018-10-22 PROCEDURE — 86901 BLOOD TYPING SEROLOGIC RH(D): CPT | Mod: NTX

## 2018-10-22 PROCEDURE — 99999 PR PBB SHADOW E&M-EST. PATIENT-LVL II: CPT | Mod: PBBFAC,,,

## 2018-10-22 PROCEDURE — 36415 COLL VENOUS BLD VENIPUNCTURE: CPT | Mod: PO,TXP

## 2018-10-22 PROCEDURE — 83036 HEMOGLOBIN GLYCOSYLATED A1C: CPT | Mod: TXP

## 2018-10-22 RX ORDER — (INSULIN DEGLUDEC AND LIRAGLUTIDE) 100; 3.6 [IU]/ML; MG/ML
INJECTION, SOLUTION SUBCUTANEOUS
Qty: 15 SYRINGE | Refills: 0 | Status: SHIPPED | OUTPATIENT
Start: 2018-10-22 | End: 2019-01-04 | Stop reason: SDUPTHER

## 2018-10-22 NOTE — PROGRESS NOTES
Pt here for BP check as scheduled by care coordination.  /104, P 88.  After pt sat quietly in exam room for 10 minutes, recheck of BP was 158/100.  Notified Dr. Morgan who recommended pt contact his nephrologist, Dr. Champion, who oversees his blood pressure medications.  Conveyed instruction to pt who verbalized understanding.

## 2018-10-29 ENCOUNTER — HOSPITAL ENCOUNTER (OUTPATIENT)
Dept: CARDIOLOGY | Facility: HOSPITAL | Age: 63
Discharge: HOME | End: 2018-10-29
Attending: NURSE PRACTITIONER
Payer: COMMERCIAL

## 2018-10-29 ENCOUNTER — OFFICE VISIT (OUTPATIENT)
Dept: CARDIOLOGY | Facility: CLINIC | Age: 63
End: 2018-10-29
Payer: COMMERCIAL

## 2018-10-29 ENCOUNTER — HOSPITAL ENCOUNTER (OUTPATIENT)
Dept: RADIOLOGY | Facility: HOSPITAL | Age: 63
Discharge: HOME | End: 2018-10-29
Attending: NURSE PRACTITIONER
Payer: COMMERCIAL

## 2018-10-29 ENCOUNTER — CLINICAL SUPPORT (OUTPATIENT)
Dept: CARDIOLOGY | Facility: CLINIC | Age: 63
End: 2018-10-29
Payer: COMMERCIAL

## 2018-10-29 ENCOUNTER — HOSPITAL ENCOUNTER (OUTPATIENT)
Dept: PULMONOLOGY | Facility: HOSPITAL | Age: 63
Discharge: HOME | End: 2018-10-29
Attending: NURSE PRACTITIONER
Payer: COMMERCIAL

## 2018-10-29 VITALS
HEIGHT: 72 IN | BODY MASS INDEX: 36.13 KG/M2 | WEIGHT: 266.75 LBS | DIASTOLIC BLOOD PRESSURE: 94 MMHG | HEART RATE: 70 BPM | SYSTOLIC BLOOD PRESSURE: 150 MMHG

## 2018-10-29 DIAGNOSIS — N18.4 CHRONIC KIDNEY DISEASE (CKD), STAGE IV (SEVERE): ICD-10-CM

## 2018-10-29 DIAGNOSIS — Z76.82 ORGAN TRANSPLANT CANDIDATE: ICD-10-CM

## 2018-10-29 DIAGNOSIS — E78.5 HYPERLIPIDEMIA ASSOCIATED WITH TYPE 2 DIABETES MELLITUS: ICD-10-CM

## 2018-10-29 DIAGNOSIS — N18.4 TYPE 2 DIABETES MELLITUS WITH STAGE 4 CHRONIC KIDNEY DISEASE, WITH LONG-TERM CURRENT USE OF INSULIN: ICD-10-CM

## 2018-10-29 DIAGNOSIS — I10 ESSENTIAL HYPERTENSION: ICD-10-CM

## 2018-10-29 DIAGNOSIS — E11.69 HYPERLIPIDEMIA ASSOCIATED WITH TYPE 2 DIABETES MELLITUS: ICD-10-CM

## 2018-10-29 DIAGNOSIS — I10 HTN (HYPERTENSION): ICD-10-CM

## 2018-10-29 DIAGNOSIS — Z79.4 TYPE 2 DIABETES MELLITUS WITH STAGE 4 CHRONIC KIDNEY DISEASE, WITH LONG-TERM CURRENT USE OF INSULIN: ICD-10-CM

## 2018-10-29 DIAGNOSIS — E11.22 TYPE 2 DIABETES MELLITUS WITH STAGE 4 CHRONIC KIDNEY DISEASE, WITH LONG-TERM CURRENT USE OF INSULIN: ICD-10-CM

## 2018-10-29 DIAGNOSIS — I10 HTN (HYPERTENSION), BENIGN: ICD-10-CM

## 2018-10-29 DIAGNOSIS — I10 ESSENTIAL HYPERTENSION: Primary | ICD-10-CM

## 2018-10-29 LAB
DIASTOLIC DYSFUNCTION: NO
DIASTOLIC DYSFUNCTION: NO
ESTIMATED PA SYSTOLIC PRESSURE: 21.9
RETIRED EF AND QEF - SEE NOTES: 50 (ref 55–65)

## 2018-10-29 PROCEDURE — 93306 TTE W/DOPPLER COMPLETE: CPT | Mod: 26,TXP,, | Performed by: INTERNAL MEDICINE

## 2018-10-29 PROCEDURE — 78452 HT MUSCLE IMAGE SPECT MULT: CPT | Mod: TC,TXP

## 2018-10-29 PROCEDURE — 93010 ELECTROCARDIOGRAM REPORT: CPT | Mod: S$PBB,TXP,, | Performed by: INTERNAL MEDICINE

## 2018-10-29 PROCEDURE — 78452 HT MUSCLE IMAGE SPECT MULT: CPT | Mod: 26,TXP,, | Performed by: INTERNAL MEDICINE

## 2018-10-29 PROCEDURE — 93005 ELECTROCARDIOGRAM TRACING: CPT | Mod: PBBFAC,TXP | Performed by: INTERNAL MEDICINE

## 2018-10-29 PROCEDURE — 93306 TTE W/DOPPLER COMPLETE: CPT | Mod: TXP

## 2018-10-29 PROCEDURE — 93016 CV STRESS TEST SUPVJ ONLY: CPT | Mod: TXP,,, | Performed by: INTERNAL MEDICINE

## 2018-10-29 PROCEDURE — 93017 CV STRESS TEST TRACING ONLY: CPT | Mod: TXP | Performed by: GENERAL PRACTICE

## 2018-10-29 PROCEDURE — 99999 PR PBB SHADOW E&M-EST. PATIENT-LVL III: CPT | Mod: PBBFAC,TXP,, | Performed by: INTERNAL MEDICINE

## 2018-10-29 PROCEDURE — 99244 OFF/OP CNSLTJ NEW/EST MOD 40: CPT | Mod: S$PBB,NTX,, | Performed by: INTERNAL MEDICINE

## 2018-10-29 PROCEDURE — 93018 CV STRESS TEST I&R ONLY: CPT | Mod: TXP,,, | Performed by: INTERNAL MEDICINE

## 2018-10-29 PROCEDURE — 63600175 PHARM REV CODE 636 W HCPCS: Mod: TXP | Performed by: NURSE PRACTITIONER

## 2018-10-29 RX ORDER — NIFEDIPINE 90 MG/1
90 TABLET, EXTENDED RELEASE ORAL DAILY
Qty: 30 TABLET | Refills: 5 | Status: SHIPPED | OUTPATIENT
Start: 2018-10-29 | End: 2020-01-02 | Stop reason: SDUPTHER

## 2018-10-29 RX ORDER — REGADENOSON 0.08 MG/ML
0.4 INJECTION, SOLUTION INTRAVENOUS ONCE
Status: COMPLETED | OUTPATIENT
Start: 2018-10-29 | End: 2018-10-29

## 2018-10-29 RX ORDER — ATORVASTATIN CALCIUM 40 MG/1
40 TABLET, FILM COATED ORAL DAILY
Qty: 90 TABLET | Refills: 3 | Status: SHIPPED | OUTPATIENT
Start: 2018-10-29 | End: 2019-11-15 | Stop reason: SDUPTHER

## 2018-10-29 RX ADMIN — REGADENOSON 0.4 MG: 0.08 INJECTION, SOLUTION INTRAVENOUS at 04:10

## 2018-10-29 NOTE — LETTER
October 30, 2018      Jessica Collins, JES  [address]  [facility]  1st Floor Clinic  [facility] [address]           [facility]  [address] [place] [id number]  Phone: [phone]  Fax: [phone]          Patient: Isidro Alves   MR Number: [id number]   YOB: 1955   Date of Visit: 10/29/2018       Dear Jessica Collins:    Thank you for referring Isidro Alves to me for evaluation. Attached you will find relevant portions of my assessment and plan of care.    If you have questions, please do not hesitate to call me. I look forward to following Isidro Alves along with you.    Sincerely,    Dez Ragland MD    Enclosure  CC:  No Recipients    If you would like to receive this communication electronically, please contact [email or web address] or [phone] to request more information on [facility] access.    For providers and/or their staff who would like to refer a patient to [facility], please contact us through our one-stop-shop provider referral line, [facility], at [phone].    If you feel you have received this communication in error or would no longer like to receive these types of communications, please e-mail [email or web address]

## 2018-10-29 NOTE — PROGRESS NOTES
Subjective:   Patient ID:  Isidro Alves is a 63 y.o. male who presents for evaluation of Kidney Transplant Evaluation      62 yo male, renal Tx preop clearance. Own a night club.  PMH CKD stage IV not on HD, IDDM, HTN, HLD. No smoking. Occasional drink.  Denied h/o stroke, heart attack and cancer.  No chest pain, dyspnea, dizziness palpitation and orthopnea.  ECHO and MPI pending  No f/h premature CAD.        Past Medical History:   Diagnosis Date    Anemia 4/16/2014    BPH (benign prostatic hyperplasia)     CKD (chronic kidney disease) stage 4, GFR 15-29 ml/min     Diabetes mellitus     Diabetes mellitus, type 2     Elevated PSA     Encounter for blood transfusion     Herpes labialis     Hyperlipidemia     Hypertension     Obesity     Proteinuria     Secondary hyperparathyroidism (of renal origin)     UTI (lower urinary tract infection) 5/1/2015       Past Surgical History:   Procedure Laterality Date    COLONOSCOPY N/A 12/21/2017    Procedure: COLONOSCOPY;  Surgeon: Fran Escalera MD;  Location: [facility];  Service: Endoscopy;  Laterality: N/A;    COLONOSCOPY N/A 12/21/2017    [facility]era [facility] END[facility]    PROSTATE BIOPSY      thorocotomy  2010       Social History     Tobacco Use    Smoking status: Never Smoker    Smokeless tobacco: Never Used   Substance Use Topics    Alcohol use: Yes     Comment: seldom     Drug use: No       Family History   Problem Relation Age of Onset    No Known Problems Mother     Diabetes Father     Hypertension Father     Hypertension Unknown     Diabetes Unknown        Review of Systems   Constitution: Negative for decreased appetite, diaphoresis, fever, weakness, malaise/fatigue and night sweats.   HENT: Negative for nosebleeds.    Eyes: Negative for blurred vision and double vision.   Cardiovascular: Negative for chest pain, claudication, dyspnea on exertion, irregular heartbeat, leg swelling, near-syncope, orthopnea, palpitations,  paroxysmal nocturnal dyspnea and syncope.   Respiratory: Negative for cough, shortness of breath, sleep disturbances due to breathing, snoring, sputum production and wheezing.    Endocrine: Negative for cold intolerance and polyuria.   Hematologic/Lymphatic: Does not bruise/bleed easily.   Skin: Negative for rash.   Musculoskeletal: Negative for back pain, falls, joint pain, joint swelling and neck pain.   Gastrointestinal: Negative for abdominal pain, heartburn, nausea and vomiting.   Genitourinary: Negative for dysuria, frequency and hematuria.   Neurological: Negative for difficulty with concentration, dizziness, focal weakness, headaches, light-headedness, numbness and seizures.   Psychiatric/Behavioral: Negative for depression, memory loss and substance abuse. The patient does not have insomnia.    Allergic/Immunologic: Negative for HIV exposure and hives.       Objective:   Physical Exam   Constitutional: He is oriented to person, place, and time. He appears well-nourished.   HENT:   Head: Normocephalic.   Eyes: Pupils are equal, round, and reactive to light.   Neck: Normal carotid pulses and no JVD present. Carotid bruit is not present. No thyromegaly present.   Cardiovascular: Normal rate, regular rhythm, normal heart sounds and normal pulses.  No extrasystoles are present. PMI is not displaced. Exam reveals no gallop and no S3.   No murmur heard.  Pulmonary/Chest: Breath sounds normal. No stridor. No respiratory distress.   Abdominal: Soft. Bowel sounds are normal. There is no tenderness. There is no rebound.   Musculoskeletal: Normal range of motion. He exhibits edema.   Trace edema     Neurological: He is alert and oriented to person, place, and time.   Skin: Skin is intact. No rash noted.   Psychiatric: His behavior is normal.       Lab Results   Component Value Date    CHOL 224 (H) 10/11/2018    CHOL 139 11/20/2017    CHOL 140 11/04/2016     Lab Results   Component Value Date    HDL 34 (L) 10/11/2018     HDL 44 11/20/2017    HDL 32 (L) 11/04/2016     Lab Results   Component Value Date    LDLCALC 140.4 10/11/2018    LDLCALC 63.6 11/20/2017    LDLCALC 76.6 11/04/2016     Lab Results   Component Value Date    TRIG 248 (H) 10/11/2018    TRIG 157 (H) 11/20/2017    TRIG 157 (H) 11/04/2016     Lab Results   Component Value Date    CHOLHDL 15.2 (L) 10/11/2018    CHOLHDL 31.7 11/20/2017    CHOLHDL 22.9 11/04/2016       Chemistry        Component Value Date/Time     10/11/2018 0740    K 4.2 10/11/2018 0740     (H) 10/11/2018 0740    CO2 21 (L) 10/11/2018 0740    BUN 43 (H) 10/11/2018 0740    CREATININE 4.2 (H) 10/11/2018 0740     (H) 10/11/2018 0740        Component Value Date/Time    CALCIUM 9.4 10/11/2018 0740    ALKPHOS 82 10/11/2018 0740    AST 18 10/11/2018 0740    ALT 9 (L) 10/11/2018 0740    BILITOT 0.5 10/11/2018 0740    ESTGFRAFRICA 16.4 (A) 10/11/2018 0740    EGFRNONAA 14.2 (A) 10/11/2018 0740          Lab Results   Component Value Date    HGBA1C 6.5 (H) 10/22/2018     Lab Results   Component Value Date    TSH 1.329 11/20/2017     Lab Results   Component Value Date    INR 0.9 10/11/2018    INR 0.9 05/02/2015    INR 0.9 05/01/2015     Lab Results   Component Value Date    WBC 8.71 10/11/2018    HGB 12.7 (L) 10/11/2018    HCT 41.7 10/11/2018    MCV 86 10/11/2018     10/11/2018     BNP  @LABRCNTIP(BNP,BNPTRIAGEBLO)@  CrCl cannot be calculated (Patient's most recent lab result is older than the maximum 7 days allowed.).  No results found in the last 24 hours.  No results found in the last 24 hours.  No results found in the last 24 hours.    Assessment:      1. Essential hypertension    2. Hyperlipidemia associated with type 2 diabetes mellitus    3. Chronic kidney disease (CKD), stage IV (severe)    4. Type 2 diabetes mellitus with stage 4 chronic kidney disease, with long-term current use of insulin    5. HTN (hypertension), benign      No CP and CHF  BP high  IDDM.    Plan:   Resume lipitor  40 mg  Increase Procardia from 60 mg to 90 mg  Repeat CMP and Lipid profile  Continue Losartan.    10/30/2018 Addendum  ECHo and MPI reviewed  EF 35 to 40% with inferior WMA. NUKE showed old MI.   Would continue medical Rx.  The goal of LDL < 70 and BP < 140/90 mmHg  Add Coreg 3.125 mg bid  RTC in 2 months  If no allergy to Statin, will add Lipitor 80 mg     11/14/2018  Advise to add ASA 81 mg daily.    RCRI index: 4 including high risk procedure.  Elevated periop risk of CV events for high risk procedure.  Good functional and exercise capacity.  No chest pain, active arrhythmia and CHF symptoms.  Ok to proceed the scheduled surgery without further cardiac study.  OK to hold Aspirin 5 to 7 days before the procedure and resume ASAP postop.  Continue Coreg and Statin periop.  Avoid periop fluid overloaded.

## 2018-10-30 ENCOUNTER — TELEPHONE (OUTPATIENT)
Dept: CARDIOLOGY | Facility: CLINIC | Age: 63
End: 2018-10-30

## 2018-10-30 RX ORDER — CARVEDILOL 3.12 MG/1
3.12 TABLET ORAL 2 TIMES DAILY WITH MEALS
Qty: 60 TABLET | Refills: 5 | Status: SHIPPED | OUTPATIENT
Start: 2018-10-30 | End: 2019-04-22 | Stop reason: SDUPTHER

## 2018-10-30 NOTE — TELEPHONE ENCOUNTER
ECHo and NUKE stress showed decreased LV systolic function and old MI  Add Coreg 3.125 mg bid  Please confirm pt if taking Lipitor or allergy  RTC in 2 months  THX

## 2018-10-30 NOTE — TELEPHONE ENCOUNTER
Spoke with pt with test results and to add coreg.  Pt just started Lipitor.  Pt stated he will call if he has any problems with new medications.  Scheduled pt for follow up. Pt verbalized understanding.  All questions answered.

## 2018-11-05 ENCOUNTER — OFFICE VISIT (OUTPATIENT)
Dept: UROLOGY | Facility: CLINIC | Age: 63
End: 2018-11-05
Payer: COMMERCIAL

## 2018-11-05 VITALS
HEIGHT: 74 IN | DIASTOLIC BLOOD PRESSURE: 92 MMHG | HEART RATE: 78 BPM | SYSTOLIC BLOOD PRESSURE: 158 MMHG | RESPIRATION RATE: 18 BRPM | WEIGHT: 267.19 LBS | BODY MASS INDEX: 34.29 KG/M2

## 2018-11-05 DIAGNOSIS — N40.0 BENIGN NON-NODULAR PROSTATIC HYPERPLASIA WITHOUT LOWER URINARY TRACT SYMPTOMS: ICD-10-CM

## 2018-11-05 PROCEDURE — 99999 PR PBB SHADOW E&M-EST. PATIENT-LVL III: CPT | Mod: PBBFAC,TXP,, | Performed by: UROLOGY

## 2018-11-05 PROCEDURE — 99214 OFFICE O/P EST MOD 30 MIN: CPT | Mod: S$PBB,TXP,, | Performed by: UROLOGY

## 2018-11-05 RX ORDER — FINASTERIDE 5 MG/1
5 TABLET, FILM COATED ORAL DAILY
Qty: 30 TABLET | Refills: 11 | Status: SHIPPED | OUTPATIENT
Start: 2018-11-05 | End: 2019-11-15 | Stop reason: SDUPTHER

## 2018-11-05 RX ORDER — TADALAFIL 10 MG/1
10 TABLET ORAL
Qty: 10 TABLET | Refills: 11 | Status: SHIPPED | OUTPATIENT
Start: 2018-11-05 | End: 2019-07-17 | Stop reason: SDUPTHER

## 2018-11-05 RX ORDER — TAMSULOSIN HYDROCHLORIDE 0.4 MG/1
0.4 CAPSULE ORAL 2 TIMES DAILY
Qty: 60 CAPSULE | Refills: 11 | Status: SHIPPED | OUTPATIENT
Start: 2018-11-05 | End: 2019-07-17 | Stop reason: SDUPTHER

## 2018-11-05 NOTE — PROGRESS NOTES
"Chief Complaint: Elevated PSA    HPI:    11/5/18: PSA up where it was 2 years ago.  No LUTS, no pain.  Taking flomax voiding fine.  11/16/17: Laws: PSA has gone up from 7.7 to 10.7. Feels ok off the T. Strong stream; no LUTS. Nocturia x4 but depends how much he drinks after 6 pm. The nocturia doesn't bother him. No abd/pelvic pain and no exac/rel factors.  No hematuria.  No urolithiasis.  BP is elevated today; he did not take his BP medication this morning.   11/10/16: Symptoms have resolved in general, PSA is down.    9/28/16: Back early with groin pain.  Describes an itching sensation that runs from the rectum to the penis, waxes/wanes, worse at night.  Was given 7d doxycycline and there was some improvement.  No caffeine.  Good urinary habits.  No skin problems.  No balanitis.  Doing fine on one flomax a day.  6/1/16: Here today with Dr. Ren's records to review.  Had a biopsy 4/15 with PSA 13.9 at the time.  Was on T by Dr. Chiu's reccs but was asymptomatic overall and felt no symptomatic change on the T.  Has ED that was improved with Cialis.  Got trimix once but wasn't comfortable with it.  05/17/16: rushing: " This is a 60-yr-old male presenting as a follow-up to TRT. Patient is currently on Axiron 30 mg ( 2 pumps /day) . Total T was 838; 5 months ago. ( H/H 14.4/46.0 ) and psa level was 4.7. Patient was to have psa level and follow-up 3 months ago related to psa level of 4.7 and did not. He does have a h/o negative prostate bx  15 months  ago per Dr. BRIGID Burt. No reported adverse side effects and energy level has greatly improved. Patient is currently on Flomax 0.8mg a day. He states he " ran out " and had an increase in nocturia 4-5 times/ night over the past 10 days. No hematuria, dysuria or flank pain. "  06/12/15: Aram; HPI: Flomax is been very helpful, nocturia now down to 3 times per night from 6 times per night with similar daytime improvement. Has been using Axiron for about 2 weeks, no adverse " effects. This patient had severe deficiencies on his pretreatment labs. Denies flank pain, dysuira, hematuria . Despite his subjective improvement, his postvoid residual today in the clinic is 180 mL. Dipstick urinalysis suggested 1+ microscopic hematuria, microscopic examination of urine reveals no RBCs.    Allergies:  Bactrim [sulfamethoxazole-trimethoprim] and Nsaids (non-steroidal anti-inflammatory drug)    Medications:  has a current medication list which includes the following prescription(s): acyclovir, atorvastatin, bd insulin syringe half unit, carvedilol, cialis, esomeprazole, folic acid, lancets, multivitamin, nifedipine, onetouch ultra blue test strip, tamsulosin, valsartan, vitamin d2, xultophy 100/3.6, and olopatadine.    Review of Systems:  General: No fever, chills, fatigability, or weight loss.  Skin: No rashes, itching, or changes in color or texture of skin.  Chest: Denies [place], cyanosis, wheezing, cough, and sputum production.  Abdomen: Appetite fine. No weight loss. Denies diarrhea, abdominal pain, hematemesis, or blood in stool.  Musculoskeletal: No joint stiffness or swelling. Denies back pain.  : As above.  All other review of systems negative.    PMH:   has a past medical history of Anemia (4/16/2014), BPH (benign prostatic hyperplasia), CKD (chronic kidney disease) stage 4, GFR 15-29 ml/min, Diabetes mellitus, Diabetes mellitus, type 2, Elevated PSA, Encounter for blood transfusion, Herpes labialis, Hyperlipidemia, Hypertension, Obesity, Proteinuria, Secondary hyperparathyroidism (of renal origin), and UTI (lower urinary tract infection) (5/1/2015).    PSH:   has a past surgical history that includes thorocotomy (2010); Prostate biopsy; and COLONOSCOPY (N/A, 12/21/2017).    FamHx: family history includes Diabetes in his father and unknown relative; Hypertension in his father and unknown relative; No Known Problems in his mother.    SocHx:  reports that  has never smoked. he has never used  smokeless tobacco. He reports that he drinks alcohol. He reports that he does not use drugs.      Physical Exam:  Vitals:    11/05/18 1401   BP: (!) 158/92   Pulse: 78   Resp: 18     General: A&Ox3, no apparent distress, no deformities  Neck: No masses, normal thyroid  Lungs: normal inspiration, no use of accessory muscles  Heart: normal pulse, no arrhythmias  Abdomen: Soft, NT, ND  Skin: The skin is warm and dry. No jaundice.  Ext: No c/c/e.  :   11/16/17: Test desc michaelle, no abnormalities of epididymus. Penis normal, with normal penile and scrotal skin. Meatus normal. Normal rectal tone, no hemorrhoids. Prost 40 gm with palpable ridge to left side. SV not palpable. Perineum and anus normal.     Labs/Studies:   PSA    4/15: 13.9    11/15: 4.7    5/16: 12.7    11/16: 7.7    11/17: 10.7    10/18: 12.2    Impression/Plan:   1. Elevated PSA: PSA 6/12 months  2. HTN; Discussed the importance of taking BP meds. Refer to PCP for further management.   3. Flomax for BPH, added finasteride so perhaps less flomax next year

## 2018-11-14 ENCOUNTER — TELEPHONE (OUTPATIENT)
Dept: TRANSPLANT | Facility: CLINIC | Age: 63
End: 2018-11-14

## 2018-11-14 NOTE — TELEPHONE ENCOUNTER
----- Message from Ravinder Fallon IV, MD sent at 11/13/2018  6:40 PM CST -----  Regarding: RE: Urology clearance for kidney transplant  I think its okay    ----- Message -----  From: Komal Ellsworth  Sent: 11/13/2018  11:12 AM  To: Ravinder Fallon IV, MD  Subject: Urology clearance for kidney transplant          Dr. Fallon,    You saw Mr. Alves on 11/5 due to elevated PSA and incomplete bladder emptying. He is in evaluation for a kidney transplant with lifelong immunosuppression. Please advise if he is cleared to proceed with transplant. Thank you.

## 2018-11-30 ENCOUNTER — COMMITTEE REVIEW (OUTPATIENT)
Dept: TRANSPLANT | Facility: CLINIC | Age: 63
End: 2018-11-30

## 2018-11-30 DIAGNOSIS — Z76.82 ORGAN TRANSPLANT CANDIDATE: Primary | ICD-10-CM

## 2018-11-30 NOTE — COMMITTEE REVIEW
Native Organ Dx: Diabetes Mellitus - Type II      Unable to determine transplant candidacy at this time due to need for patient to have thyroid U/S, clarification from cards regarding fluctuating EF (35% on lexiscan and drop in EF on echo from 60-65% (May 2015) to 50-55% (Oct 2018) in light of low EF on lexiscan and old MI), and need to obtain prostate pathology report.    CT chest in [facility] from 2014 noted to have 4 mm right thyroid cyst thus need to obtain thyroid US for further evaluation (has it increased in size to warrant FNA, etc)    LM for patient to discuss decision and further testing needed.    Note written by Komal Ellsworth RN    ===============================================    I was present at the meeting and attest to the decision of the committee. My additional comments are bolded above.     Aleida Anders MD

## 2018-11-30 NOTE — LETTER
November 30, 2018    Isidro Alves  [address] [address]  [place] [address]      Dear Isidro Alves:  MRN: [id number]    Your transplant evaluation was reviewed at the [facility] Selection Committee meeting on 11/30/2018.  It is with regret I inform you that your workup is incomplete and we are unable to determine your transplant candidacy at this time due to need for thyroid ultrasound to assess thyroid nodules, obtain and review prostate biopsy pathology report, and clarification from Cardiology of concern for decreased ejection fraction on 2D Echocardiogram .  You will be re-presented to the selection committee once pending studies are completed.  You will be notified of the committees decision once we review the new results.    The [facility] Trans[facility] Selection Committee carefully considers each patients transplant candidacy using established selection criteria to determine if it is safe to proceed with transplantation for each and every person evaluated.  Although the selection committee believes your workup is incomplete and we are unable to determine your transplant candidacy, you have the right to be evaluated at other transplant centers.  You may request your [facility] records be sent to any center of your choice by contacting our Medical Records Department at [phone].    Attached is a letter from the [employer] for Organ Sharing (UNOS).  It describes the services and information offered to patients by UNOS and the Organ Procurement and Transplant Network.    Sincerely,      Lynn Kincaid MD  Medical Director, Kidney & Kidney/Pancreas Transplantation    Cc: Dr. Champion    [facility]    Encl: UNOS Letter          OPTN/UNOS: Your Resource for Organ Transplant Information        If you have a question regarding your own medical care, you always should call your transplant center first. However, for general organ transplant-related information, you can call the [employer]  Organ Sharing (UNOS) toll-free patient services line at 1-380.174.8406.    Anyone, including potential transplant candidates, recipients, family members/friends, living donors, and/or donor family members can call this number to:    · talk about organ donation, living donation, how transplant and donation work, the donation process, transplant policies, and transplant/donor information;  · get a free patient information kit with helpful booklets, waiting list and transplant information, and a list of all transplant centers;  · ask questions about the Organ Procurement and Transplantation Network (OPTN) web site (www.optn.transplant.hrsa.gov); the UNOS Web site (www.unos.org); or the UNOS web site for living donors and transplant recipients (www.transplantliving.org);  · learn how UNOS and the OPTN can help you;  · talk about any concerns that you may have with a transplant center and how they perform    UNOS is a not-for-profit organization that provides all of the administrative services for the national OPTN under federal contract to the Health Resources and Services Administration (HRSA), an agency under the U.S. Department of Health and Human Services (HHS).     UNOS and OPTN responsibilities include:    · writing educational material for patients, the public and professionals;  · helping to make people aware of the need for donated organs and tissue;  · writing organ transplant policy with help from doctors, nurses, transplant patients/candidates, donor families, living donors, and the public;  · coordinating the organ matching and placement process;  · collecting information about every organ transplant and donation that occurs in the [place].    Remember, you should contact your transplant center directly if you have questions or concerns about your own medical care including medical records, work-up progress and test reports. [facility] is not your transplant center, and staff at [facility] will not be able to  transfer you to your transplant center, so keep your transplant centers phone number handy. But, while you research your transplant needs and learn as much as you can about transplantation and donation, we welcome your call to our toll-free patient services line at [phone].

## 2019-01-04 DIAGNOSIS — E11.22 TYPE 2 DIABETES MELLITUS WITH CHRONIC KIDNEY DISEASE, WITH LONG-TERM CURRENT USE OF INSULIN, UNSPECIFIED CKD STAGE: ICD-10-CM

## 2019-01-04 DIAGNOSIS — Z79.4 TYPE 2 DIABETES MELLITUS WITH CHRONIC KIDNEY DISEASE, WITH LONG-TERM CURRENT USE OF INSULIN, UNSPECIFIED CKD STAGE: ICD-10-CM

## 2019-01-04 RX ORDER — (INSULIN DEGLUDEC AND LIRAGLUTIDE) 100; 3.6 [IU]/ML; MG/ML
INJECTION, SOLUTION SUBCUTANEOUS
Qty: 15 SYRINGE | Refills: 0 | Status: SHIPPED | OUTPATIENT
Start: 2019-01-04 | End: 2019-02-12 | Stop reason: SDUPTHER

## 2019-01-04 NOTE — TELEPHONE ENCOUNTER
----- Message from Maureen Handley sent at 1/4/2019 10:52 AM CST -----  Contact: pt  1. What is the name of the medication you are requesting?  xultophy  2. What is the dose?  100-3.6 mg  3. How do you take the medication? Orally, topically, etc?   orally   4. How often do you take this medication?  Once daily   5. Do you need a 30 day or 90 day supply?    6. How many refills are you requesting?    7. What is your preferred pharmacy and location of the pharmacy?    [facility]/pharmacy #[id number] - [place], [place] - [address]  [address] [address]  Phone: [phone] Fax: [phone]  8. Who can we contact with further questions?  .... [phone] (home)

## 2019-01-09 ENCOUNTER — TELEPHONE (OUTPATIENT)
Dept: TRANSPLANT | Facility: CLINIC | Age: 64
End: 2019-01-09

## 2019-01-25 ENCOUNTER — LAB VISIT (OUTPATIENT)
Dept: LAB | Facility: HOSPITAL | Age: 64
End: 2019-01-25
Attending: INTERNAL MEDICINE
Payer: COMMERCIAL

## 2019-01-25 DIAGNOSIS — N18.4 CHRONIC KIDNEY DISEASE (CKD), STAGE IV (SEVERE): ICD-10-CM

## 2019-01-25 LAB
25(OH)D3+25(OH)D2 SERPL-MCNC: 32 NG/ML
ALBUMIN SERPL BCP-MCNC: 3.3 G/DL
ANION GAP SERPL CALC-SCNC: 7 MMOL/L
BASOPHILS # BLD AUTO: 0.06 K/UL
BASOPHILS NFR BLD: 0.8 %
BUN SERPL-MCNC: 28 MG/DL
CALCIUM SERPL-MCNC: 9.8 MG/DL
CHLORIDE SERPL-SCNC: 105 MMOL/L
CO2 SERPL-SCNC: 27 MMOL/L
CREAT SERPL-MCNC: 4.1 MG/DL
DIFFERENTIAL METHOD: ABNORMAL
EOSINOPHIL # BLD AUTO: 0.3 K/UL
EOSINOPHIL NFR BLD: 4.7 %
ERYTHROCYTE [DISTWIDTH] IN BLOOD BY AUTOMATED COUNT: 15.1 %
EST. GFR  (AFRICAN AMERICAN): 16.9 ML/MIN/1.73 M^2
EST. GFR  (NON AFRICAN AMERICAN): 14.6 ML/MIN/1.73 M^2
GLUCOSE SERPL-MCNC: 80 MG/DL
HCT VFR BLD AUTO: 40.2 %
HGB BLD-MCNC: 12.3 G/DL
IMM GRANULOCYTES # BLD AUTO: 0.02 K/UL
IMM GRANULOCYTES NFR BLD AUTO: 0.3 %
LYMPHOCYTES # BLD AUTO: 1.6 K/UL
LYMPHOCYTES NFR BLD: 22.1 %
MCH RBC QN AUTO: 25.1 PG
MCHC RBC AUTO-ENTMCNC: 30.6 G/DL
MCV RBC AUTO: 82 FL
MONOCYTES # BLD AUTO: 0.7 K/UL
MONOCYTES NFR BLD: 9.3 %
NEUTROPHILS # BLD AUTO: 4.5 K/UL
NEUTROPHILS NFR BLD: 62.8 %
NRBC BLD-RTO: 0 /100 WBC
PHOSPHATE SERPL-MCNC: 4.1 MG/DL
PLATELET # BLD AUTO: 338 K/UL
PMV BLD AUTO: 11 FL
POTASSIUM SERPL-SCNC: 4.3 MMOL/L
PTH-INTACT SERPL-MCNC: 150 PG/ML
RBC # BLD AUTO: 4.9 M/UL
SODIUM SERPL-SCNC: 139 MMOL/L
URATE SERPL-MCNC: 8.6 MG/DL
WBC # BLD AUTO: 7.2 K/UL

## 2019-01-25 PROCEDURE — 83970 ASSAY OF PARATHORMONE: CPT | Mod: NTX

## 2019-01-25 PROCEDURE — 80069 RENAL FUNCTION PANEL: CPT | Mod: TXP

## 2019-01-25 PROCEDURE — 82306 VITAMIN D 25 HYDROXY: CPT | Mod: TXP

## 2019-01-25 PROCEDURE — 85025 COMPLETE CBC W/AUTO DIFF WBC: CPT | Mod: TXP

## 2019-01-25 PROCEDURE — 84550 ASSAY OF BLOOD/URIC ACID: CPT | Mod: TXP

## 2019-01-25 PROCEDURE — 36415 COLL VENOUS BLD VENIPUNCTURE: CPT | Mod: TXP

## 2019-01-29 ENCOUNTER — OFFICE VISIT (OUTPATIENT)
Dept: NEPHROLOGY | Facility: CLINIC | Age: 64
End: 2019-01-29
Payer: COMMERCIAL

## 2019-01-29 VITALS
HEIGHT: 74 IN | DIASTOLIC BLOOD PRESSURE: 88 MMHG | WEIGHT: 236.75 LBS | BODY MASS INDEX: 30.38 KG/M2 | HEART RATE: 90 BPM | SYSTOLIC BLOOD PRESSURE: 134 MMHG

## 2019-01-29 DIAGNOSIS — N18.4 CHRONIC KIDNEY DISEASE (CKD), STAGE IV (SEVERE): Primary | ICD-10-CM

## 2019-01-29 PROCEDURE — 99214 PR OFFICE/OUTPT VISIT, EST, LEVL IV, 30-39 MIN: ICD-10-PCS | Mod: S$GLB,,, | Performed by: INTERNAL MEDICINE

## 2019-01-29 PROCEDURE — 3079F DIAST BP 80-89 MM HG: CPT | Mod: CPTII,S$GLB,, | Performed by: INTERNAL MEDICINE

## 2019-01-29 PROCEDURE — 3008F BODY MASS INDEX DOCD: CPT | Mod: CPTII,S$GLB,, | Performed by: INTERNAL MEDICINE

## 2019-01-29 PROCEDURE — 99214 OFFICE O/P EST MOD 30 MIN: CPT | Mod: S$GLB,,, | Performed by: INTERNAL MEDICINE

## 2019-01-29 PROCEDURE — 99999 PR PBB SHADOW E&M-EST. PATIENT-LVL III: ICD-10-PCS | Mod: PBBFAC,,, | Performed by: INTERNAL MEDICINE

## 2019-01-29 PROCEDURE — 3075F PR MOST RECENT SYSTOLIC BLOOD PRESS GE 130-139MM HG: ICD-10-PCS | Mod: CPTII,S$GLB,, | Performed by: INTERNAL MEDICINE

## 2019-01-29 PROCEDURE — 3075F SYST BP GE 130 - 139MM HG: CPT | Mod: CPTII,S$GLB,, | Performed by: INTERNAL MEDICINE

## 2019-01-29 PROCEDURE — 99999 PR PBB SHADOW E&M-EST. PATIENT-LVL III: CPT | Mod: PBBFAC,,, | Performed by: INTERNAL MEDICINE

## 2019-01-29 PROCEDURE — 3008F PR BODY MASS INDEX (BMI) DOCUMENTED: ICD-10-PCS | Mod: CPTII,S$GLB,, | Performed by: INTERNAL MEDICINE

## 2019-01-29 PROCEDURE — 3079F PR MOST RECENT DIASTOLIC BLOOD PRESSURE 80-89 MM HG: ICD-10-PCS | Mod: CPTII,S$GLB,, | Performed by: INTERNAL MEDICINE

## 2019-01-29 RX ORDER — SODIUM BICARBONATE 650 MG/1
650 TABLET ORAL 2 TIMES DAILY
COMMUNITY
Start: 2018-12-13 | End: 2019-03-14 | Stop reason: SDUPTHER

## 2019-01-29 NOTE — PROGRESS NOTES
Subjective:       Patient ID: Isidro Alves is a 63 y.o.   male who presents for follow-up evaluation of f CKD stage 4/5 , HTN, DM-2, SHPT     Steven Morgan MD      HPI : Isidro Alves Is a pleasant 63 -year-old  gentleman seen in office today in f/u for above medical problems. I saw him about  6 months ago,   He has chronic kidney disease stage 4 . Recent serum creatinine is 4.1 mg/dL with a GFR of about 18 ml/mt. he has long-standing history of hypertension and diabetes ,  Nonadherence with his blood pressure medications is a big issue with him in the past. Patient says he has been more adherent with his medications in the recent past. Improvement in blood pressures noted. Also has been exercising regularly and trying to watch salt intake.   PSA levels were elevated and is currently following with urology. Recent labs discussed with patient and his wife, patient sustained a fall while working on his roof in November 2018, he lost his consciousness and was admitted in intensive care unit at [facility] for about 3 weeks, following discharge she had to undergo few weeks of rehab, he sustained some rib fractures and pelvic fracture.  Patient reports improvement in his overall strength and recovering well.          Past Medical History:   Diagnosis Date    Anemia 4/16/2014    BPH (benign prostatic hyperplasia)     CKD (chronic kidney disease) stage 4, GFR 15-29 ml/min     Diabetes mellitus     Diabetes mellitus, type 2     Elevated PSA     Encounter for blood transfusion     Herpes labialis     Hyperlipidemia     Hypertension     Obesity     Proteinuria     Secondary hyperparathyroidism (of renal origin)     UTI (lower urinary tract infection) 5/1/2015       Current Outpatient Medications on File Prior to Visit   Medication Sig Dispense Refill    acyclovir (ZOVIRAX) 400 MG tablet TAKE 1 TABLET 3 TIMES DAILY AS NEEDED 30 tablet 6    atorvastatin (LIPITOR) 40 MG tablet Take  "1 tablet (40 mg total) by mouth once daily. 90 tablet 3    BD INSULIN SYRINGE HALF UNIT 0.3 mL 31 gauge x 5/16" Syrg FOR USE WITH INSULIN. 100 Syringe 1    carvedilol (COREG) 3.125 MG tablet Take 1 tablet (3.125 mg total) by mouth 2 (two) times daily with meals. 60 tablet 5    esomeprazole (NEXIUM) 40 MG capsule Take 40 mg by mouth once daily.      finasteride (PROSCAR) 5 mg tablet Take 1 tablet (5 mg total) by mouth once daily. 30 tablet 11    folic acid (FOLVITE) 1 MG tablet TAKE 1 TABLET (1 MG TOTAL) BY MOUTH ONCE DAILY. 90 tablet 1    lancets (ONETOUCH ULTRASOFT LANCETS) Misc 1 lancet by Misc.(Non-Drug; Combo Route) route 2 (two) times daily. (Patient taking differently: 1 lancet by Misc.(Non-Drug; Combo Route) route 3 (three) times daily. ) 200 each 1    multivitamin (THERAGRAN) tablet Take 1 tablet by mouth once daily.      NIFEdipine (PROCARDIA-XL) 90 MG (OSM) 24 hr tablet Take 1 tablet (90 mg total) by mouth once daily. 30 tablet 5    olopatadine (PATADAY) 0.2 % Drop Place 1 drop into both eyes daily as needed. 2.5 mL 0    ONETOUCH ULTRA BLUE TEST STRIP Strp 1 STRIP [place] [facility]NON-DRUG COMBO ROUTE) ROUTE 3 (THREE) TIMES DAILY. 100 strip 2    sodium bicarbonate 650 MG tablet Take 1,300 mg by mouth 3 (three) times daily.      tadalafil (CIALIS) 10 MG tablet Take 1 tablet (10 mg total) by mouth as needed for Erectile Dysfunction. 10 tablet 11    tamsulosin (FLOMAX) 0.4 mg Cap Take 1 capsule (0.4 mg total) by mouth 2 (two) times daily. 60 capsule 11    valsartan (DIOVAN) 320 MG tablet TAKE 1 TABLET (320 MG TOTAL) BY MOUTH ONCE DAILY. 90 tablet 2    VITAMIN D2 50,000 unit capsule TAKE 1 CAPSULE (50,000 UNITS TOTAL) BY MOUTH EVERY 7 DAYS. 4 capsule 6    XULTOPHY 100/3.6 100 unit-3.6 mg /mL (3 mL) InPn INJECT 35 UNITS INTO THE SKIN ONCE DAILY. 15 Syringe 0     No current facility-administered medications on file prior to visit.      PSH: thoracotomy in 2010      Family history : positive for chronic " kidney disease and hemodialysis in his father who is no more.      Social history : he owns a restaurant. He never smoked. He drinks alcohol occasionally. He has 3 children     Review of Systems   Constitutional: Positive for fatigue. Negative for activity change and appetite change.   HENT: Negative for congestion and facial swelling.    Eyes: Negative for pain, discharge and redness.   Respiratory: Negative for apnea, cough and chest tightness.    Cardiovascular: Negative for chest pain, palpitations and leg swelling.   Gastrointestinal: Negative for abdominal distention.   Genitourinary: Negative for difficulty urinating, dysuria and frequency.   Musculoskeletal: Positive for arthralgias and back pain. Negative for neck pain and neck stiffness.   Skin: Negative for color change, rash and wound.   Neurological: Negative for dizziness, weakness and numbness.   Psychiatric/Behavioral: Negative for sleep disturbance.   All other systems reviewed and are negative.    :            Objective:         Vitals:    01/29/19 1113   BP: 134/88   Pulse: 90       Weight 236 lb, previous weight was 250 lb      Physical Exam  :      Nursing note and vitals reviewed.   Constitutional: He is oriented to person, place, and time. He appears well-developed and well-nourished. No distress.   HENT: Head: Normocephalic and atraumatic. Pupils are equal, round, and reactive to light.   Neck: Normal range of motion. Neck supple. No tracheal deviation present. No thyromegaly present.   Cardiovascular: Normal rate, regular rhythm, normal heart sounds and intact distal pulses. gallop and no friction rub. No murmur heard.   Pulmonary/Chest: Effort normal and breath sounds normal. He has no wheezes. He has no rales.   Abdominal: obese, Soft. He exhibits no mass. There is no tenderness. There is no rebound and no guarding.   Musculoskeletal: Normal range of motion. He exhibits no edema.   Lymphadenopathy: He has no cervical adenopathy.    Neurological: He is alert and oriented to person, place, and time.   Skin: Skin is warm. No rash noted. He is not diaphoretic. No erythema.          Labs:    Lab Results   Component Value Date    CREATININE 4.1 (H) 01/25/2019    BUN 28 (H) 01/25/2019     01/25/2019    K 4.3 01/25/2019     01/25/2019    CO2 27 01/25/2019       Lab Results   Component Value Date    WBC 7.20 01/25/2019    HGB 12.3 (L) 01/25/2019    HCT 40.2 01/25/2019    MCV 82 01/25/2019     01/25/2019       Lab Results   Component Value Date    .0 (H) 01/25/2019    CALCIUM 9.8 01/25/2019    PHOS 4.1 01/25/2019       Lab Results   Component Value Date    ALBUMIN 3.3 (L) 01/25/2019       Lab Results   Component Value Date    URICACID 8.6 (H) 01/25/2019     Lab Results   Component Value Date    HGBA1C 6.5 (H) 10/22/2018       Impression and Plan: 63 - year-old  gentleman seen in office today in f/u for following medical problems ,      1. Hypertension : Blood pressure is controlled,   discussed low-salt diet and adherence with his medications.     2. 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 Again advised patient to avoid NSAIDs.  serum creatinine is 4.1 mg/dl , work for kidney transplant is in progress, got slow down due to recent fall and hospitalization,     3. Diabetes mellitus type 2 : discussed compliance with his diet and medications. Discussed weight loss and daily exercise.      4. Secondary hyperparathyroidism - PTH is acceptable for degree of renal dysfunction, cont Vit D ,      5. Proteinuria - about 2 g daily. cont Diovan. Discussed diabetes control and adherence with ARB      6. BPH : Follow up with urology for elevated PSA levels.      7. Obesity - he lost weight following recent hospitalization,     8. Metabolic acidosis :  follow , on sodium bicarbonate supplements    9.  Anemia of chronic kidney disease - stable hemoglobin at 12  g,     I will see himn followup in about 3 months , Face-to-face time was 25 minutes discussing his lab results and plan of care.      Gabino Champion M.D.

## 2019-02-12 ENCOUNTER — TELEPHONE (OUTPATIENT)
Dept: TRANSPLANT | Facility: CLINIC | Age: 64
End: 2019-02-12

## 2019-02-12 DIAGNOSIS — Z79.4 TYPE 2 DIABETES MELLITUS WITH CHRONIC KIDNEY DISEASE, WITH LONG-TERM CURRENT USE OF INSULIN, UNSPECIFIED CKD STAGE: ICD-10-CM

## 2019-02-12 DIAGNOSIS — E11.22 TYPE 2 DIABETES MELLITUS WITH CHRONIC KIDNEY DISEASE, WITH LONG-TERM CURRENT USE OF INSULIN, UNSPECIFIED CKD STAGE: ICD-10-CM

## 2019-02-12 RX ORDER — (INSULIN DEGLUDEC AND LIRAGLUTIDE) 100; 3.6 [IU]/ML; MG/ML
INJECTION, SOLUTION SUBCUTANEOUS
Qty: 15 SYRINGE | Refills: 0 | Status: SHIPPED | OUTPATIENT
Start: 2019-02-12 | End: 2019-04-18 | Stop reason: SDUPTHER

## 2019-02-12 NOTE — TELEPHONE ENCOUNTER
Please call and let patient know he will need to present in clinic for any further refills, as he is overdue for visit.

## 2019-03-04 ENCOUNTER — TELEPHONE (OUTPATIENT)
Dept: INTERNAL MEDICINE | Facility: CLINIC | Age: 64
End: 2019-03-04

## 2019-03-04 NOTE — TELEPHONE ENCOUNTER
----- Message from Mary Viramontes sent at 3/4/2019  9:24 AM CST -----  Contact: pt  Please call pt regarding outside ct and xrays.

## 2019-03-04 NOTE — TELEPHONE ENCOUNTER
[facility] and wanted to make sure we could get those results from them. I informed the pt that we could.

## 2019-03-11 ENCOUNTER — OFFICE VISIT (OUTPATIENT)
Dept: INTERNAL MEDICINE | Facility: CLINIC | Age: 64
End: 2019-03-11
Payer: COMMERCIAL

## 2019-03-11 ENCOUNTER — LAB VISIT (OUTPATIENT)
Dept: LAB | Facility: HOSPITAL | Age: 64
End: 2019-03-11
Attending: INTERNAL MEDICINE
Payer: COMMERCIAL

## 2019-03-11 VITALS
WEIGHT: 234.81 LBS | SYSTOLIC BLOOD PRESSURE: 110 MMHG | OXYGEN SATURATION: 98 % | HEART RATE: 77 BPM | BODY MASS INDEX: 30.15 KG/M2 | DIASTOLIC BLOOD PRESSURE: 76 MMHG | TEMPERATURE: 97 F

## 2019-03-11 DIAGNOSIS — Z79.4 TYPE 2 DIABETES MELLITUS WITH STAGE 4 CHRONIC KIDNEY DISEASE, WITH LONG-TERM CURRENT USE OF INSULIN: ICD-10-CM

## 2019-03-11 DIAGNOSIS — K45.8 FLANK HERNIA: ICD-10-CM

## 2019-03-11 DIAGNOSIS — I15.2 HYPERTENSION ASSOCIATED WITH DIABETES: ICD-10-CM

## 2019-03-11 DIAGNOSIS — E11.59 HYPERTENSION ASSOCIATED WITH DIABETES: ICD-10-CM

## 2019-03-11 DIAGNOSIS — N18.4 CHRONIC KIDNEY DISEASE (CKD), STAGE IV (SEVERE): ICD-10-CM

## 2019-03-11 DIAGNOSIS — I10 ESSENTIAL HYPERTENSION: ICD-10-CM

## 2019-03-11 DIAGNOSIS — E78.5 HYPERLIPIDEMIA ASSOCIATED WITH TYPE 2 DIABETES MELLITUS: ICD-10-CM

## 2019-03-11 DIAGNOSIS — E11.69 HYPERLIPIDEMIA ASSOCIATED WITH TYPE 2 DIABETES MELLITUS: ICD-10-CM

## 2019-03-11 DIAGNOSIS — N18.4 TYPE 2 DIABETES MELLITUS WITH STAGE 4 CHRONIC KIDNEY DISEASE, WITH LONG-TERM CURRENT USE OF INSULIN: ICD-10-CM

## 2019-03-11 DIAGNOSIS — R97.20 ELEVATED PSA: ICD-10-CM

## 2019-03-11 DIAGNOSIS — N25.81 SECONDARY RENAL HYPERPARATHYROIDISM: Primary | ICD-10-CM

## 2019-03-11 DIAGNOSIS — E11.22 TYPE 2 DIABETES MELLITUS WITH STAGE 4 CHRONIC KIDNEY DISEASE, WITH LONG-TERM CURRENT USE OF INSULIN: ICD-10-CM

## 2019-03-11 PROCEDURE — 99999 PR PBB SHADOW E&M-EST. PATIENT-LVL III: CPT | Mod: PBBFAC,,, | Performed by: INTERNAL MEDICINE

## 2019-03-11 PROCEDURE — 83036 HEMOGLOBIN GLYCOSYLATED A1C: CPT | Mod: TXP

## 2019-03-11 PROCEDURE — 80053 COMPREHEN METABOLIC PANEL: CPT | Mod: TXP

## 2019-03-11 PROCEDURE — 80061 LIPID PANEL: CPT | Mod: NTX

## 2019-03-11 PROCEDURE — 3074F PR MOST RECENT SYSTOLIC BLOOD PRESSURE < 130 MM HG: ICD-10-PCS | Mod: CPTII,S$GLB,, | Performed by: INTERNAL MEDICINE

## 2019-03-11 PROCEDURE — 3008F PR BODY MASS INDEX (BMI) DOCUMENTED: ICD-10-PCS | Mod: CPTII,S$GLB,, | Performed by: INTERNAL MEDICINE

## 2019-03-11 PROCEDURE — 36415 COLL VENOUS BLD VENIPUNCTURE: CPT | Mod: TXP

## 2019-03-11 PROCEDURE — 3044F HG A1C LEVEL LT 7.0%: CPT | Mod: CPTII,S$GLB,, | Performed by: INTERNAL MEDICINE

## 2019-03-11 PROCEDURE — 3008F BODY MASS INDEX DOCD: CPT | Mod: CPTII,S$GLB,, | Performed by: INTERNAL MEDICINE

## 2019-03-11 PROCEDURE — 3044F PR MOST RECENT HEMOGLOBIN A1C LEVEL <7.0%: ICD-10-PCS | Mod: CPTII,S$GLB,, | Performed by: INTERNAL MEDICINE

## 2019-03-11 PROCEDURE — 99214 PR OFFICE/OUTPT VISIT, EST, LEVL IV, 30-39 MIN: ICD-10-PCS | Mod: S$GLB,,, | Performed by: INTERNAL MEDICINE

## 2019-03-11 PROCEDURE — 3078F PR MOST RECENT DIASTOLIC BLOOD PRESSURE < 80 MM HG: ICD-10-PCS | Mod: CPTII,S$GLB,, | Performed by: INTERNAL MEDICINE

## 2019-03-11 PROCEDURE — 3074F SYST BP LT 130 MM HG: CPT | Mod: CPTII,S$GLB,, | Performed by: INTERNAL MEDICINE

## 2019-03-11 PROCEDURE — 3078F DIAST BP <80 MM HG: CPT | Mod: CPTII,S$GLB,, | Performed by: INTERNAL MEDICINE

## 2019-03-11 PROCEDURE — 99214 OFFICE O/P EST MOD 30 MIN: CPT | Mod: S$GLB,,, | Performed by: INTERNAL MEDICINE

## 2019-03-11 PROCEDURE — 99999 PR PBB SHADOW E&M-EST. PATIENT-LVL III: ICD-10-PCS | Mod: PBBFAC,,, | Performed by: INTERNAL MEDICINE

## 2019-03-11 RX ORDER — FUROSEMIDE 80 MG/1
80 TABLET ORAL DAILY
Refills: 11 | COMMUNITY
Start: 2019-02-28 | End: 2019-03-11

## 2019-03-11 RX ORDER — CARVEDILOL 12.5 MG/1
12.5 TABLET ORAL 2 TIMES DAILY WITH MEALS
Refills: 11 | COMMUNITY
Start: 2019-02-28 | End: 2019-03-11

## 2019-03-11 NOTE — PROGRESS NOTES
Subjective:      Patient ID: Isidro Alves is a 63 y.o. male.    Chief Complaint: Flank Pain (right-sided, worse after eating)    HPI   62 yo with   Patient Active Problem List   Diagnosis    Chronic kidney disease (CKD), stage IV (severe)    Hypertension associated with diabetes    Anemia    Secondary renal hyperparathyroidism    Metabolic acidosis    Type 2 diabetes mellitus with chronic kidney disease, with long-term current use of insulin    Herpes labialis    Benign non-nodular prostatic hyperplasia without lower urinary tract symptoms    Elevated PSA    Hypogonadism in male    Hyperlipidemia associated with type 2 diabetes mellitus    Non morbid obesity due to excess calories    Colon cancer screening    Special screening for malignant neoplasms, colon    Colon polyp    Pre-transplant evaluation for chronic kidney disease    Other proteinuria    Essential hypertension     Past Medical History:   Diagnosis Date    Anemia 4/16/2014    BPH (benign prostatic hyperplasia)     CKD (chronic kidney disease) stage 4, GFR 15-29 ml/min     Diabetes mellitus     Diabetes mellitus, type 2     Elevated PSA     Encounter for blood transfusion     Herpes labialis     Hyperlipidemia     Hypertension     Obesity     Proteinuria     Secondary hyperparathyroidism (of renal origin)     UTI (lower urinary tract infection) 5/1/2015     Here today for management of multiple medical problems as outlined below and   Pt reports falling off of ladder. Sustained right sided pelvic fracture. Mult rib fractures. Continues therapy. Symptoms improving. Dr. Pierson has recommended sx for flank hernia with essentially normal ct scan reported radiolgist. Pt requests second opinion.       Review of Systems   Constitutional: Negative for chills and fever.   HENT: Negative for ear pain and sore throat.    Respiratory: Negative for cough.    Cardiovascular: Negative for chest pain.   Gastrointestinal: Negative for  abdominal pain and blood in stool.   Genitourinary: Negative for dysuria and hematuria.   Neurological: Negative for seizures and syncope.     Objective:   /76 (BP Location: Left arm, Patient Position: Sitting, BP Method: Large (Manual))   Pulse 77   Temp 97 °F (36.1 °C) (Tympanic)   Wt 106.5 kg (234 lb 12.6 oz)   SpO2 98%   BMI 30.15 kg/m²     Physical Exam   Constitutional: He is oriented to person, place, and time. He appears well-developed and well-nourished. No distress.   HENT:   Head: Normocephalic and atraumatic.   Mouth/Throat: Oropharynx is clear and moist.   Eyes: EOM are normal. Pupils are equal, round, and reactive to light.   Neck: Neck supple. No thyromegaly present.   Cardiovascular: Normal rate and regular rhythm.   Pulmonary/Chest: Breath sounds normal. He has no wheezes. He has no rales.   Abdominal: Soft. Bowel sounds are normal. There is no tenderness.   Lymphadenopathy:     He has no cervical adenopathy.   Neurological: He is alert and oriented to person, place, and time.   Skin: Skin is warm and dry.   Psychiatric: He has a normal mood and affect. His behavior is normal.   asymmetric abdomen. No ttp.     Assessment:     1. Secondary renal hyperparathyroidism    2. Essential hypertension    3. Hyperlipidemia associated with type 2 diabetes mellitus    4. Type 2 diabetes mellitus with stage 4 chronic kidney disease, with long-term current use of insulin    5. Hypertension associated with diabetes    6. Chronic kidney disease (CKD), stage IV (severe)    7. Elevated PSA    8. Flank hernia      Plan:   Secondary renal hyperparathyroidism  Stable  Cont recs and f/u as per renal  Essential hypertension  controlled    Hyperlipidemia associated with type 2 diabetes mellitus  stable  Type 2 diabetes mellitus with stage 4 chronic kidney disease, with long-term current use of insulin  -     Hemoglobin A1c; Future; Expected date: 03/11/2019  -     Hemoglobin A1c; Future; Expected date:  09/07/2019    Hypertension associated with diabetes  Controlled  Cont current meds    Chronic kidney disease (CKD), stage IV (severe)  Stable    Elevated PSA  Cont f/u and recs as per urology    Flank hernia  -     Ambulatory Referral to General Surgery        Lab Frequency Next Occurrence   Hepatitis A / Hepatitis B Combined Vaccine (IM) Once 11/10/2018   Hepatitis A / Hepatitis B Combined Vaccine (IM) Once 04/09/2019   Comprehensive metabolic panel Once 12/10/2018   Lipid panel Once 12/10/2018   PSA, Screening Once 11/05/2018   PSA, Screening Once 11/05/2018   US Soft Tissue Head Neck Thyroid Once 11/30/2018   Renal function panel Once 01/29/2019   CBC auto differential Once 01/29/2019   Urinalysis Once 01/29/2019   Protein / creatinine ratio, urine Once 01/29/2019   Hemoglobin A1c Once 03/11/2019   Hemoglobin A1c Once 09/07/2019       Problem List Items Addressed This Visit        Cardiac/Vascular    Hypertension associated with diabetes    Hyperlipidemia associated with type 2 diabetes mellitus    Essential hypertension       Renal/    Chronic kidney disease (CKD), stage IV (severe)    Elevated PSA    Overview     Sees rachell            Endocrine    Secondary renal hyperparathyroidism - Primary    Type 2 diabetes mellitus with chronic kidney disease, with long-term current use of insulin    Relevant Orders    Hemoglobin A1c (Completed)    Hemoglobin A1c      Other Visit Diagnoses     Flank hernia        Relevant Orders    Ambulatory Referral to General Surgery          Follow-up in about 1 year (around 3/11/2020), or if symptoms worsen or fail to improve.

## 2019-03-12 LAB
ALBUMIN SERPL BCP-MCNC: 3.7 G/DL
ALP SERPL-CCNC: 101 U/L
ALT SERPL W/O P-5'-P-CCNC: 13 U/L
ANION GAP SERPL CALC-SCNC: 13 MMOL/L
AST SERPL-CCNC: 17 U/L
BILIRUB SERPL-MCNC: 0.8 MG/DL
BUN SERPL-MCNC: 34 MG/DL
CALCIUM SERPL-MCNC: 10.2 MG/DL
CHLORIDE SERPL-SCNC: 105 MMOL/L
CHOLEST SERPL-MCNC: 150 MG/DL
CHOLEST/HDLC SERPL: 5.4 {RATIO}
CO2 SERPL-SCNC: 21 MMOL/L
CREAT SERPL-MCNC: 4.1 MG/DL
EST. GFR  (AFRICAN AMERICAN): 16.9 ML/MIN/1.73 M^2
EST. GFR  (NON AFRICAN AMERICAN): 14.6 ML/MIN/1.73 M^2
ESTIMATED AVG GLUCOSE: 131 MG/DL
GLUCOSE SERPL-MCNC: 78 MG/DL
HBA1C MFR BLD HPLC: 6.2 %
HDLC SERPL-MCNC: 28 MG/DL
HDLC SERPL: 18.7 %
LDLC SERPL CALC-MCNC: 93.2 MG/DL
NONHDLC SERPL-MCNC: 122 MG/DL
POTASSIUM SERPL-SCNC: 4.9 MMOL/L
PROT SERPL-MCNC: 7.9 G/DL
SODIUM SERPL-SCNC: 139 MMOL/L
TRIGL SERPL-MCNC: 144 MG/DL

## 2019-03-13 ENCOUNTER — OFFICE VISIT (OUTPATIENT)
Dept: SURGERY | Facility: CLINIC | Age: 64
End: 2019-03-13
Payer: COMMERCIAL

## 2019-03-13 ENCOUNTER — TELEPHONE (OUTPATIENT)
Dept: TRANSPLANT | Facility: CLINIC | Age: 64
End: 2019-03-13

## 2019-03-13 ENCOUNTER — TELEPHONE (OUTPATIENT)
Dept: CARDIOLOGY | Facility: CLINIC | Age: 64
End: 2019-03-13

## 2019-03-13 VITALS
WEIGHT: 237 LBS | HEART RATE: 92 BPM | SYSTOLIC BLOOD PRESSURE: 115 MMHG | HEIGHT: 74 IN | BODY MASS INDEX: 30.42 KG/M2 | TEMPERATURE: 98 F | DIASTOLIC BLOOD PRESSURE: 76 MMHG

## 2019-03-13 DIAGNOSIS — R19.8 ASYMMETRY OF ABDOMINAL WALL: Primary | ICD-10-CM

## 2019-03-13 PROCEDURE — 99213 PR OFFICE/OUTPT VISIT, EST, LEVL III, 20-29 MIN: ICD-10-PCS | Mod: NTX,S$GLB,, | Performed by: SURGERY

## 2019-03-13 PROCEDURE — 3008F PR BODY MASS INDEX (BMI) DOCUMENTED: ICD-10-PCS | Mod: CPTII,NTX,S$GLB, | Performed by: SURGERY

## 2019-03-13 PROCEDURE — 99999 PR PBB SHADOW E&M-EST. PATIENT-LVL III: CPT | Mod: PBBFAC,TXP,, | Performed by: SURGERY

## 2019-03-13 PROCEDURE — 3074F SYST BP LT 130 MM HG: CPT | Mod: CPTII,NTX,S$GLB, | Performed by: SURGERY

## 2019-03-13 PROCEDURE — 3008F BODY MASS INDEX DOCD: CPT | Mod: CPTII,NTX,S$GLB, | Performed by: SURGERY

## 2019-03-13 PROCEDURE — 3074F PR MOST RECENT SYSTOLIC BLOOD PRESSURE < 130 MM HG: ICD-10-PCS | Mod: CPTII,NTX,S$GLB, | Performed by: SURGERY

## 2019-03-13 PROCEDURE — 99213 OFFICE O/P EST LOW 20 MIN: CPT | Mod: NTX,S$GLB,, | Performed by: SURGERY

## 2019-03-13 PROCEDURE — 99999 PR PBB SHADOW E&M-EST. PATIENT-LVL III: ICD-10-PCS | Mod: PBBFAC,TXP,, | Performed by: SURGERY

## 2019-03-13 PROCEDURE — 3078F PR MOST RECENT DIASTOLIC BLOOD PRESSURE < 80 MM HG: ICD-10-PCS | Mod: CPTII,NTX,S$GLB, | Performed by: SURGERY

## 2019-03-13 PROCEDURE — 3078F DIAST BP <80 MM HG: CPT | Mod: CPTII,NTX,S$GLB, | Performed by: SURGERY

## 2019-03-13 NOTE — PROGRESS NOTES
"History & Physical    SUBJECTIVE:     History of Present Illness:  Patient is a 63 y.o. male reviewed for asymmetry of his right flank/abdomen.  Patient reports he underwent a fall and had to have a pelvic fracture fixed.  After surgery he noticed a bulge on his right flank.  Cause him some discomfort and deformity of his abdomen. Outside CT report shows no hernia defect. Patient presents for 2nd opinion as he has seen Dr. Pierson [facility] and considering ?  plication repair with mesh.     Chief Complaint   Patient presents with    Consult       Review of patient's allergies indicates:   Allergen Reactions    Bactrim [sulfamethoxazole-trimethoprim] Swelling    Nsaids (non-steroidal anti-inflammatory drug)      CKD       Current Outpatient Medications   Medication Sig Dispense Refill    acyclovir (ZOVIRAX) 400 MG tablet TAKE 1 TABLET 3 TIMES DAILY AS NEEDED 30 tablet 6    atorvastatin (LIPITOR) 40 MG tablet Take 1 tablet (40 mg total) by mouth once daily. 90 tablet 3    BD INSULIN SYRINGE HALF UNIT 0.3 mL 31 gauge x 5/16" Syrg FOR USE WITH INSULIN. 100 Syringe 1    carvedilol (COREG) 3.125 MG tablet Take 1 tablet (3.125 mg total) by mouth 2 (two) times daily with meals. 60 tablet 5    esomeprazole (NEXIUM) 40 MG capsule Take 40 mg by mouth once daily.      finasteride (PROSCAR) 5 mg tablet Take 1 tablet (5 mg total) by mouth once daily. 30 tablet 11    folic acid (FOLVITE) 1 MG tablet TAKE 1 TABLET (1 MG TOTAL) BY MOUTH ONCE DAILY. 90 tablet 1    lancets (ONETOUCH ULTRASOFT LANCETS) Misc 1 lancet by Misc.(Non-Drug; Combo Route) route 2 (two) times daily. (Patient taking differently: 1 lancet by Misc.(Non-Drug; Combo Route) route 3 (three) times daily. ) 200 each 1    multivitamin (THERAGRAN) tablet Take 1 tablet by mouth once daily.      NIFEdipine (PROCARDIA-XL) 90 MG (OSM) 24 hr tablet Take 1 tablet (90 mg total) by mouth once daily. 30 tablet 5    ONETOUCH ULTRA BLUE TEST STRIP Strp 1 STRIP BY " MISC.(NON-DRUG COMBO ROUTE) ROUTE 3 (THREE) TIMES DAILY. 100 strip 2    sodium bicarbonate 650 MG tablet Take 650 mg by mouth 2 (two) times daily.      tadalafil (CIALIS) 10 MG tablet Take 1 tablet (10 mg total) by mouth as needed for Erectile Dysfunction. 10 tablet 11    tamsulosin (FLOMAX) 0.4 mg Cap Take 1 capsule (0.4 mg total) by mouth 2 (two) times daily. (Patient taking differently: Take 0.4 mg by mouth once daily. ) 60 capsule 11    valsartan (DIOVAN) 320 MG tablet TAKE 1 TABLET (320 MG TOTAL) BY MOUTH ONCE DAILY. 90 tablet 2    VITAMIN D2 50,000 unit capsule TAKE 1 CAPSULE (50,000 UNITS TOTAL) BY MOUTH EVERY 7 DAYS. 4 capsule 6    XULTOPHY 100/3.6 100 unit-3.6 mg /mL (3 mL) InPn INJECT 35 UNITS INTO THE SKIN ONCE DAILY. 15 Syringe 0    olopatadine (PATADAY) 0.2 % Drop Place 1 drop into both eyes daily as needed. 2.5 mL 0     No current facility-administered medications for this visit.        Past Medical History:   Diagnosis Date    Anemia 4/16/2014    BPH (benign prostatic hyperplasia)     CKD (chronic kidney disease) stage 4, GFR 15-29 ml/min     Diabetes mellitus     Diabetes mellitus, type 2     Elevated PSA     Encounter for blood transfusion     Herpes labialis     Hyperlipidemia     Hypertension     Obesity     Proteinuria     Secondary hyperparathyroidism (of renal origin)     UTI (lower urinary tract infection) 5/1/2015     Past Surgical History:   Procedure Laterality Date    COLONOSCOPY N/A 12/21/2017    [facility]al[facility] at [facility] ENDO    PROSTATE BIOPSY      thorocotomy  2010     Family History   Problem Relation Age of Onset    No Known Problems Mother     Diabetes Father     Hypertension Father     Hypertension Unknown     Diabetes Unknown      Social History     Tobacco Use    Smoking status: Never Smoker    Smokeless tobacco: Never Used   Substance Use Topics    Alcohol use: Yes     Comment: seldom     Drug use: No        Review of  "Systems:  Review of Systems   Constitutional: Negative for activity change, chills, fever and unexpected weight change.   HENT: Negative for congestion, hearing loss, rhinorrhea and trouble swallowing.    Eyes: Negative for discharge and visual disturbance.   Respiratory: Negative for chest tightness, shortness of breath and wheezing.    Cardiovascular: Negative for chest pain and palpitations.   Gastrointestinal: Negative for abdominal distention, abdominal pain, blood in stool, constipation, diarrhea, nausea, rectal pain and vomiting.   Endocrine: Negative for polydipsia and polyuria.   Genitourinary: Negative for difficulty urinating, dysuria, hematuria and urgency.   Musculoskeletal: Negative for arthralgias, joint swelling and neck pain.   Skin: Negative for rash.   Neurological: Negative for weakness, light-headedness and headaches.   Hematological: Negative for adenopathy.   Psychiatric/Behavioral: Negative for confusion and dysphoric mood.       OBJECTIVE:     Vital Signs (Most Recent)  Temp: 98.2 °F (36.8 °C) (03/13/19 1406)  Pulse: 92 (03/13/19 1406)  BP: 115/76 (03/13/19 1406)  6' 2" (1.88 m)  107.5 kg (236 lb 15.9 oz)     Physical Exam:  Physical Exam   Constitutional: He is oriented to person, place, and time. He appears well-developed and well-nourished.   HENT:   Head: Normocephalic and atraumatic.   Eyes: EOM are normal.   Neck: Normal range of motion. Neck supple.   Cardiovascular: Normal rate and regular rhythm.   Pulmonary/Chest: Effort normal and breath sounds normal.   Abdominal: Soft. Bowel sounds are normal. He exhibits no distension. There is no tenderness.       Neurological: He is alert and oriented to person, place, and time.   Skin: Skin is warm and dry.   Vitals reviewed.      Diagnostic Results:  Outside CT:  1.  Despite the history of a flank hernia, I see no significant hernia along the anterior abdominal wall on this study.    2.  Prostatic hypertrophy with probable partial bladder " outlet obstruction and with secondary diffuse thickening of the wall the urinary bladder. There is a persistent air-fluid level in the bladder, probably iatrogenic.    3.  Minimal diverticulosis along the descending colon, with no diverticulitis.    4.  Small hiatal hernia.    5.  Herniation of fat along both inguinal canals, with no abnormal bowel herniation.    6.  Multiple right-sided lumbar transverse process and bilateral lower rib fractures, as discussed above, with a previous fracture dislocation of the right sacroiliac joint, with screws present through the right sacroiliac joint, similar to the previous CT.    ASSESSMENT/PLAN:     63-year-old male with abdominal wall asymmetry possibly secondary to his recent surgery to fix his pelvic injury or denervation injury    PLAN:Plan     Discussed with the patient he does not have a hernia clinically on exam nor on outside report.  He is already following with an outside general surgeon who is considering performing a ?Plication/repair to help the patient with his asymmetry.  Explained to the patient that this is not something I perform nor do my partners perform this operation to my knowledge so would be unable to offer him any surgery at this time to help with his abdominal wall asymmetry.  Recommend continuing to follow up with his initial general surgeon if he would like to undergo repair of this  Obtain outside CT films for review, report only available at time of appointment  Follow-up p.r.n.

## 2019-03-13 NOTE — TELEPHONE ENCOUNTER
----- Message from Dez Ragland MD sent at 3/13/2019  8:53 AM CDT -----  Lab showed controlled lipid and normal liver function

## 2019-03-13 NOTE — LETTER
March 14, 2019      Steven Morgan MD  [address]  [place] [address]           [facility]  [address] [address]  Phone: [phone]  Fax: [phone]          Patient: Isidro Alves   MR Number: [id number]   YOB: 1955   Date of Visit: 3/13/2019       Dear Dr. Steven Morgan:    Thank you for referring Isidro Alves to me for evaluation. Attached you will find relevant portions of my assessment and plan of care.    If you have questions, please do not hesitate to call me. I look forward to following Isidro Alves along with you.    Sincerely,    Juice Rodriguez MD    Enclosure  CC:  No Recipients    If you would like to receive this communication electronically, please contact [email or web address] or [phone] to request more information on [employer] Link access.    For providers and/or their staff who would like to refer a patient to [facility], please contact us through our one-stop-shop provider referral line, [facility], at [phone].    If you feel you have received this communication in error or would no longer like to receive these types of communications, please e-mail [email or web address]

## 2019-03-13 NOTE — TELEPHONE ENCOUNTER
Pt cancelled thyroid ultrasound on 3/11/19 needed for transplant eval. Will send 30 day letter for him to call to reschedule.

## 2019-03-14 RX ORDER — SODIUM BICARBONATE 650 MG/1
650 TABLET ORAL 2 TIMES DAILY
Qty: 60 TABLET | Refills: 11 | COMMUNITY
Start: 2019-03-14 | End: 2019-03-22 | Stop reason: SDUPTHER

## 2019-03-14 NOTE — TELEPHONE ENCOUNTER
----- Message from Adelita Joshua sent at 3/14/2019 11:38 AM CDT -----  Contact: [phone]  Would like to consult with the nurse,patients need a refill on his Rx medication, please call back [phone], thanks sj   .Type:  RX Refill Request    Who Called:  Mr evangelista  Refill or New Rx:refill  RX Name and Strength:sodium bicarb  How is the patient currently taking it? (ex. 1XDay):  Is this a 30 day or 90 day RX:  Preferred Pharmacy with phone number:.  [facility]/pharmacy #[id number] - [place], [place]  [address]  [address] [address]  Phone: [phone] Fax: [phone]        Local or Mail Order: mail order  Ordering Provider:dr gibson  Would the patient rather a call back or a response via [facility]? Call back   Best Call Back Number:[phone]  Additional Information:

## 2019-03-15 ENCOUNTER — TELEPHONE (OUTPATIENT)
Dept: SURGERY | Facility: CLINIC | Age: 64
End: 2019-03-15

## 2019-03-15 NOTE — TELEPHONE ENCOUNTER
----- Message from Juice Rodriguez MD sent at 3/14/2019  4:06 PM CDT -----  Regarding: outside ct  Can we get a copy of his outside CT for my review.  I have his report but would like to have disc with images on it to review the images.  Thanks

## 2019-03-22 NOTE — TELEPHONE ENCOUNTER
----- Message from Nandini Michael sent at 3/22/2019  4:25 PM CDT -----  Contact: Patient   Type:  RX Refill Request    Who Called:  Isidro  Refill or New Rx: Refill   RX Name and Strength: Sodium bicarbonate   How is the patient currently taking it? (ex. 1XDay): 2x day  Is this a 30 day or 90 day RX: 90  Preferred Pharmacy with phone number:   [facility]/pharmacy #[id number] - [place], [place] - [id number] [facility] [address]  Phone: [phone] Fax: [phone]  Local or Mail Order: Local  Ordering Provider: Dr. Champion  Would the patient rather a call back or a response via [facility]? Call back   Best Call Back Number: Please call him at [phone]  Additional Information: n/a

## 2019-03-25 RX ORDER — SODIUM BICARBONATE 650 MG/1
650 TABLET ORAL 2 TIMES DAILY
Qty: 60 TABLET | Refills: 11 | COMMUNITY
Start: 2019-03-25 | End: 2019-04-22 | Stop reason: SDUPTHER

## 2019-03-28 DIAGNOSIS — E55.9 VITAMIN D DEFICIENCY: ICD-10-CM

## 2019-03-28 RX ORDER — ERGOCALCIFEROL 1.25 MG/1
CAPSULE ORAL
Qty: 4 CAPSULE | Refills: 6 | Status: SHIPPED | OUTPATIENT
Start: 2019-03-28 | End: 2019-10-30 | Stop reason: SDUPTHER

## 2019-04-15 ENCOUNTER — LAB VISIT (OUTPATIENT)
Dept: LAB | Facility: HOSPITAL | Age: 64
End: 2019-04-15
Attending: INTERNAL MEDICINE
Payer: COMMERCIAL

## 2019-04-15 DIAGNOSIS — N18.4 CHRONIC KIDNEY DISEASE (CKD), STAGE IV (SEVERE): ICD-10-CM

## 2019-04-15 LAB
ALBUMIN SERPL BCP-MCNC: 3.5 G/DL (ref 3.5–5.2)
ANION GAP SERPL CALC-SCNC: 9 MMOL/L (ref 8–16)
BASOPHILS # BLD AUTO: 0.05 K/UL (ref 0–0.2)
BASOPHILS NFR BLD: 0.5 % (ref 0–1.9)
BUN SERPL-MCNC: 50 MG/DL (ref 8–23)
CALCIUM SERPL-MCNC: 9.5 MG/DL (ref 8.7–10.5)
CHLORIDE SERPL-SCNC: 108 MMOL/L (ref 95–110)
CO2 SERPL-SCNC: 21 MMOL/L (ref 23–29)
CREAT SERPL-MCNC: 4.3 MG/DL (ref 0.5–1.4)
DIFFERENTIAL METHOD: ABNORMAL
EOSINOPHIL # BLD AUTO: 0.3 K/UL (ref 0–0.5)
EOSINOPHIL NFR BLD: 3.2 % (ref 0–8)
ERYTHROCYTE [DISTWIDTH] IN BLOOD BY AUTOMATED COUNT: 18.2 % (ref 11.5–14.5)
EST. GFR  (AFRICAN AMERICAN): 15.9 ML/MIN/1.73 M^2
EST. GFR  (NON AFRICAN AMERICAN): 13.8 ML/MIN/1.73 M^2
GLUCOSE SERPL-MCNC: 113 MG/DL (ref 70–110)
HCT VFR BLD AUTO: 41 % (ref 40–54)
HGB BLD-MCNC: 12.5 G/DL (ref 14–18)
IMM GRANULOCYTES # BLD AUTO: 0.06 K/UL (ref 0–0.04)
IMM GRANULOCYTES NFR BLD AUTO: 0.6 % (ref 0–0.5)
LYMPHOCYTES # BLD AUTO: 1.6 K/UL (ref 1–4.8)
LYMPHOCYTES NFR BLD: 16.7 % (ref 18–48)
MCH RBC QN AUTO: 24.7 PG (ref 27–31)
MCHC RBC AUTO-ENTMCNC: 30.5 G/DL (ref 32–36)
MCV RBC AUTO: 81 FL (ref 82–98)
MONOCYTES # BLD AUTO: 0.7 K/UL (ref 0.3–1)
MONOCYTES NFR BLD: 7 % (ref 4–15)
NEUTROPHILS # BLD AUTO: 6.7 K/UL (ref 1.8–7.7)
NEUTROPHILS NFR BLD: 72 % (ref 38–73)
NRBC BLD-RTO: 0 /100 WBC
PHOSPHATE SERPL-MCNC: 4.1 MG/DL (ref 2.7–4.5)
PLATELET # BLD AUTO: 328 K/UL (ref 150–350)
PMV BLD AUTO: 11 FL (ref 9.2–12.9)
POTASSIUM SERPL-SCNC: 4.3 MMOL/L (ref 3.5–5.1)
RBC # BLD AUTO: 5.07 M/UL (ref 4.6–6.2)
SODIUM SERPL-SCNC: 138 MMOL/L (ref 136–145)
WBC # BLD AUTO: 9.27 K/UL (ref 3.9–12.7)

## 2019-04-15 PROCEDURE — 85025 COMPLETE CBC W/AUTO DIFF WBC: CPT | Mod: NTX

## 2019-04-15 PROCEDURE — 80069 RENAL FUNCTION PANEL: CPT | Mod: NTX

## 2019-04-15 PROCEDURE — 36415 COLL VENOUS BLD VENIPUNCTURE: CPT | Mod: PO,TXP

## 2019-04-17 DIAGNOSIS — Z79.4 TYPE 2 DIABETES MELLITUS WITH CHRONIC KIDNEY DISEASE, WITH LONG-TERM CURRENT USE OF INSULIN, UNSPECIFIED CKD STAGE: ICD-10-CM

## 2019-04-17 DIAGNOSIS — E11.22 TYPE 2 DIABETES MELLITUS WITH CHRONIC KIDNEY DISEASE, WITH LONG-TERM CURRENT USE OF INSULIN, UNSPECIFIED CKD STAGE: ICD-10-CM

## 2019-04-17 RX ORDER — (INSULIN DEGLUDEC AND LIRAGLUTIDE) 100; 3.6 [IU]/ML; MG/ML
INJECTION, SOLUTION SUBCUTANEOUS
Qty: 15 SYRINGE | Refills: 0 | OUTPATIENT
Start: 2019-04-17

## 2019-04-18 DIAGNOSIS — Z76.82 ORGAN TRANSPLANT CANDIDATE: Primary | ICD-10-CM

## 2019-04-18 DIAGNOSIS — Z79.4 TYPE 2 DIABETES MELLITUS WITH CHRONIC KIDNEY DISEASE, WITH LONG-TERM CURRENT USE OF INSULIN, UNSPECIFIED CKD STAGE: ICD-10-CM

## 2019-04-18 DIAGNOSIS — E11.22 TYPE 2 DIABETES MELLITUS WITH CHRONIC KIDNEY DISEASE, WITH LONG-TERM CURRENT USE OF INSULIN, UNSPECIFIED CKD STAGE: ICD-10-CM

## 2019-04-22 ENCOUNTER — OFFICE VISIT (OUTPATIENT)
Dept: DIABETES | Facility: CLINIC | Age: 64
End: 2019-04-22
Payer: COMMERCIAL

## 2019-04-22 ENCOUNTER — TELEPHONE (OUTPATIENT)
Dept: DIABETES | Facility: CLINIC | Age: 64
End: 2019-04-22

## 2019-04-22 ENCOUNTER — OFFICE VISIT (OUTPATIENT)
Dept: NEPHROLOGY | Facility: CLINIC | Age: 64
End: 2019-04-22
Payer: COMMERCIAL

## 2019-04-22 VITALS
DIASTOLIC BLOOD PRESSURE: 96 MMHG | HEIGHT: 74 IN | BODY MASS INDEX: 31.58 KG/M2 | SYSTOLIC BLOOD PRESSURE: 134 MMHG | DIASTOLIC BLOOD PRESSURE: 96 MMHG | HEIGHT: 74 IN | HEART RATE: 83 BPM | BODY MASS INDEX: 31.58 KG/M2 | SYSTOLIC BLOOD PRESSURE: 150 MMHG | WEIGHT: 246.06 LBS | WEIGHT: 246.06 LBS

## 2019-04-22 DIAGNOSIS — N18.4 TYPE 2 DIABETES MELLITUS WITH STAGE 4 CHRONIC KIDNEY DISEASE, WITH LONG-TERM CURRENT USE OF INSULIN: Primary | ICD-10-CM

## 2019-04-22 DIAGNOSIS — E11.22 TYPE 2 DIABETES MELLITUS WITH STAGE 4 CHRONIC KIDNEY DISEASE, WITH LONG-TERM CURRENT USE OF INSULIN: Primary | ICD-10-CM

## 2019-04-22 DIAGNOSIS — I10 HTN (HYPERTENSION), BENIGN: ICD-10-CM

## 2019-04-22 DIAGNOSIS — Z79.4 TYPE 2 DIABETES MELLITUS WITH STAGE 4 CHRONIC KIDNEY DISEASE, WITH LONG-TERM CURRENT USE OF INSULIN: Primary | ICD-10-CM

## 2019-04-22 DIAGNOSIS — E87.20 METABOLIC ACIDOSIS: ICD-10-CM

## 2019-04-22 DIAGNOSIS — N18.4 CHRONIC KIDNEY DISEASE (CKD), STAGE IV (SEVERE): Primary | ICD-10-CM

## 2019-04-22 LAB — GLUCOSE SERPL-MCNC: 88 MG/DL (ref 70–110)

## 2019-04-22 PROCEDURE — 3008F BODY MASS INDEX DOCD: CPT | Mod: CPTII,S$GLB,, | Performed by: NURSE PRACTITIONER

## 2019-04-22 PROCEDURE — 3044F HG A1C LEVEL LT 7.0%: CPT | Mod: CPTII,S$GLB,, | Performed by: NURSE PRACTITIONER

## 2019-04-22 PROCEDURE — 3008F PR BODY MASS INDEX (BMI) DOCUMENTED: ICD-10-PCS | Mod: CPTII,S$GLB,, | Performed by: NURSE PRACTITIONER

## 2019-04-22 PROCEDURE — 82962 GLUCOSE BLOOD TEST: CPT | Mod: S$GLB,,, | Performed by: NURSE PRACTITIONER

## 2019-04-22 PROCEDURE — 3080F DIAST BP >= 90 MM HG: CPT | Mod: CPTII,S$GLB,, | Performed by: INTERNAL MEDICINE

## 2019-04-22 PROCEDURE — 99999 PR PBB SHADOW E&M-EST. PATIENT-LVL III: ICD-10-PCS | Mod: PBBFAC,,, | Performed by: INTERNAL MEDICINE

## 2019-04-22 PROCEDURE — 3074F SYST BP LT 130 MM HG: CPT | Mod: CPTII,S$GLB,, | Performed by: NURSE PRACTITIONER

## 2019-04-22 PROCEDURE — 99999 PR PBB SHADOW E&M-EST. PATIENT-LVL III: CPT | Mod: PBBFAC,,, | Performed by: NURSE PRACTITIONER

## 2019-04-22 PROCEDURE — 3075F SYST BP GE 130 - 139MM HG: CPT | Mod: CPTII,S$GLB,, | Performed by: INTERNAL MEDICINE

## 2019-04-22 PROCEDURE — 99999 PR PBB SHADOW E&M-EST. PATIENT-LVL III: ICD-10-PCS | Mod: PBBFAC,,, | Performed by: NURSE PRACTITIONER

## 2019-04-22 PROCEDURE — 99214 OFFICE O/P EST MOD 30 MIN: CPT | Mod: S$GLB,,, | Performed by: INTERNAL MEDICINE

## 2019-04-22 PROCEDURE — 3008F PR BODY MASS INDEX (BMI) DOCUMENTED: ICD-10-PCS | Mod: CPTII,S$GLB,, | Performed by: INTERNAL MEDICINE

## 2019-04-22 PROCEDURE — 3080F PR MOST RECENT DIASTOLIC BLOOD PRESSURE >= 90 MM HG: ICD-10-PCS | Mod: CPTII,S$GLB,, | Performed by: NURSE PRACTITIONER

## 2019-04-22 PROCEDURE — 3074F PR MOST RECENT SYSTOLIC BLOOD PRESSURE < 130 MM HG: ICD-10-PCS | Mod: CPTII,S$GLB,, | Performed by: NURSE PRACTITIONER

## 2019-04-22 PROCEDURE — 99214 PR OFFICE/OUTPT VISIT, EST, LEVL IV, 30-39 MIN: ICD-10-PCS | Mod: S$GLB,,, | Performed by: INTERNAL MEDICINE

## 2019-04-22 PROCEDURE — 3008F BODY MASS INDEX DOCD: CPT | Mod: CPTII,S$GLB,, | Performed by: INTERNAL MEDICINE

## 2019-04-22 PROCEDURE — 99999 PR PBB SHADOW E&M-EST. PATIENT-LVL III: CPT | Mod: PBBFAC,,, | Performed by: INTERNAL MEDICINE

## 2019-04-22 PROCEDURE — 82962 POCT GLUCOSE, HAND-HELD DEVICE: ICD-10-PCS | Mod: S$GLB,,, | Performed by: NURSE PRACTITIONER

## 2019-04-22 PROCEDURE — 3080F DIAST BP >= 90 MM HG: CPT | Mod: CPTII,S$GLB,, | Performed by: NURSE PRACTITIONER

## 2019-04-22 PROCEDURE — 3075F PR MOST RECENT SYSTOLIC BLOOD PRESS GE 130-139MM HG: ICD-10-PCS | Mod: CPTII,S$GLB,, | Performed by: INTERNAL MEDICINE

## 2019-04-22 PROCEDURE — 99214 OFFICE O/P EST MOD 30 MIN: CPT | Mod: S$GLB,,, | Performed by: NURSE PRACTITIONER

## 2019-04-22 PROCEDURE — 99214 PR OFFICE/OUTPT VISIT, EST, LEVL IV, 30-39 MIN: ICD-10-PCS | Mod: S$GLB,,, | Performed by: NURSE PRACTITIONER

## 2019-04-22 PROCEDURE — 3044F PR MOST RECENT HEMOGLOBIN A1C LEVEL <7.0%: ICD-10-PCS | Mod: CPTII,S$GLB,, | Performed by: NURSE PRACTITIONER

## 2019-04-22 PROCEDURE — 3080F PR MOST RECENT DIASTOLIC BLOOD PRESSURE >= 90 MM HG: ICD-10-PCS | Mod: CPTII,S$GLB,, | Performed by: INTERNAL MEDICINE

## 2019-04-22 RX ORDER — SODIUM BICARBONATE 650 MG/1
650 TABLET ORAL 2 TIMES DAILY
Qty: 60 TABLET | Refills: 11 | COMMUNITY
Start: 2019-04-22 | End: 2020-04-15

## 2019-04-22 RX ORDER — FUROSEMIDE 80 MG/1
80 TABLET ORAL DAILY
Refills: 11 | COMMUNITY
Start: 2019-03-29 | End: 2020-02-04 | Stop reason: SDUPTHER

## 2019-04-22 RX ORDER — CARVEDILOL 3.12 MG/1
3.12 TABLET ORAL 2 TIMES DAILY WITH MEALS
Qty: 60 TABLET | Refills: 5 | Status: SHIPPED | OUTPATIENT
Start: 2019-04-22 | End: 2019-07-17 | Stop reason: DRUGHIGH

## 2019-04-22 NOTE — PATIENT INSTRUCTIONS
Avoid NSAID pain medications such as advil, aleve, motrin, ibuprofen, naprosyn, meloxicam, diclofenac, mobic.     Restart Coreg / carvedilol twice a day for BP control

## 2019-04-22 NOTE — PROGRESS NOTES
"Subjective:         Patient ID: Isidro Alves is a 63 y.o. male.  Patient's current PCP is Steven Morgan MD.       Chief Complaint: Diabetes Mellitus    HPI   Isidro Alves is a 63 y.o. Black or  male presenting for follow up of diabetes. Patient has been diagnosed with diabetes for over 20 years and has the following complications from diabetes: nephropathy and peripheral neuropathy. Blood glucose testing is performed regularly. Since last visit, patient reports blood glucose values to have approximately ranged from 90-120s, fasting, less than 180 PP. Condition has remained stable and at goal since last visit.     He denies any recent hospital admissions, emergency room visits related to DM.      Height: 6' 2" (188 cm)  //  Weight: 111.6 kg (246 lb 0.5 oz), Body mass index is 31.59 kg/m².  His blood sugar in clinic today is:   Lab Results   Component Value Date    POCGLU 88 04/22/2019       Labs reviewed and are noted below.    His most recent A1C is:   Lab Results   Component Value Date    HGBA1C 6.2 (H) 03/11/2019     Lab Results   Component Value Date    CPEPTIDE 3.88 11/20/2017     Lab Results   Component Value Date    GLUTAMICACID 0.00 11/20/2017         CURRENT DM MEDICATIONS:     Xultophy 35 units daily    No current facility-administered medications for this visit.        No current facility-administered medications for this visit.        Health Maintenance   Topic Date Due    Zoster Vaccine  08/26/2016    Foot Exam  07/24/2019    Eye Exam  09/05/2019    Hemoglobin A1c  09/11/2019    Lipid Panel  03/11/2020    Low Dose Statin  04/22/2020    Colonoscopy  12/21/2022    TETANUS VACCINE  05/05/2026    Hepatitis C Screening  Completed    Pneumococcal Vaccine (Medium Risk)  Completed    Influenza Vaccine  Completed       STANDARDS OF CARE:  Current Ophthalmologist/Optometrist: Dr. Del Real.  UTD  Current Podiatrist: No  ACE/ARB: Yes  Statin: Yes  He  has attended diabetes " education in the past.     LIFESTYLE:  ACTIVITY LEVEL: Moderately Active  EXERCISE:  30-60 min 5 d/wk  MEAL PLANNING: Patient reports number of meals per day to be 3 and number of snacks per day to be 2    BLOOD GLUCOSE TESTING: Patient reports testing on average a total of 2-3 times per day.    Review of Systems   Constitutional: Negative.  Negative for activity change, appetite change, fatigue and unexpected weight change.   Eyes: Negative for visual disturbance.   Gastrointestinal: Negative for constipation, diarrhea and nausea.   Endocrine: Negative.  Negative for cold intolerance, heat intolerance, polydipsia, polyphagia and polyuria.   Skin: Negative for wound.         Objective:      Physical Exam   Constitutional: He appears well-developed and well-nourished.   HENT:   Head: Normocephalic and atraumatic.   Cardiovascular: Normal rate.   Pulmonary/Chest: Effort normal.   Skin: Skin is warm and dry.   Psychiatric: He has a normal mood and affect. His speech is normal and behavior is normal. Judgment and thought content normal.   Nursing note and vitals reviewed.      Assessment:       1. Type 2 diabetes mellitus with stage 4 chronic kidney disease, with long-term current use of insulin        Plan:   Type 2 diabetes mellitus with stage 4 chronic kidney disease, with long-term current use of insulin  -     POCT Glucose, Hand-Held Device  -     insulin degludec-liraglutide (XULTOPHY 100/3.6) 100 unit-3.6 mg /mL (3 mL) InPn; INJECT 35 UNITS INTO THE SKIN ONCE DAILY.  Dispense: 15 Syringe; Refill: 1    - Condition at goal. Continue current regimen. DM education reviewed. Patient encouraged to carb count and exercise per recommendations. Labs and Referrals as noted. Patient instructed to send in log once weekly for my review. RV scheduled in 3 months.     Additional Plan Details:    1.) Patient was instructed to monitor blood glucose 2 - 3 x daily, fasting and ac dinner or at bedtime. Discussed ADA goal for fasting  blood sugar, 80 - 130mg/dL; pp blood sugars below 180 mg/dl. Also, discussed prevention of hypoglycemia and the need to adjust goals to higher levels if persistent hypoglycemia.  Reminded to bring BG records or meter to each visit for review.    2.) The patient was explained the above plan and given opportunity to ask questions.  He understands, chooses and consents to this plan and accepts all the risks, which include but are not limited to the risks mentioned above. He understands the alternative of having no testing, interventions or treatments at this time. He left content and without further questions.     A total of 30 minutes was spent in face to face time, of which over 50% was spent in counseling patient on disease process, complications, treatment, and side effects of medications.        MIGNON CarrionC

## 2019-04-22 NOTE — PROGRESS NOTES
Subjective:       Patient ID: Isidro Alves is a 63 y.o.   male who presents for follow-up evaluation of  CKD stage 4/5 , HTN, DM-2, SHPT     Steven Morgan MD      HPI : Isidro Alves Is a pleasant 63 -year-old  gentleman seen in office today in f/u for above medical problems. I saw him about 4 months ago,   He has chronic kidney disease stage 4 . Recent serum creatinine is 4.1 mg/dL with a GFR of about 16 ml/mt. he has long-standing history of hypertension and diabetes ,  Nonadherence with his blood pressure medications is a big issue with him in the past. Patient says he has been more adherent with his medications in the recent past. Improvement in blood pressures noted. Also has been exercising regularly and trying to watch salt intake.   PSA levels were elevated and is currently following with urology. Recent labs discussed with patient and his wife, patient sustained a fall while working on his roof in November 2018, he lost his consciousness and was admitted in intensive [facility] for about 3 weeks, following discharge she had to undergo few weeks of rehab, he sustained some rib fractures and pelvic fracture.  Patient reports improvement in his overall strength and recovering well.             Past Medical History:   Diagnosis Date    Anemia 4/16/2014    BPH (benign prostatic hyperplasia)     CKD (chronic kidney disease) stage 4, GFR 15-29 ml/min     Diabetes mellitus     Diabetes mellitus, type 2     Elevated PSA     Encounter for blood transfusion     Herpes labialis     Hyperlipidemia     Hypertension     Obesity     Proteinuria     Secondary hyperparathyroidism (of renal origin)     UTI (lower urinary tract infection) 5/1/2015         Current Outpatient Medications on File Prior to Visit   Medication Sig Dispense Refill    acyclovir (ZOVIRAX) 400 MG tablet TAKE 1 TABLET 3 TIMES DAILY AS NEEDED 30 tablet 6    atorvastatin (LIPITOR) 40 MG tablet  "Take 1 tablet (40 mg total) by mouth once daily. 90 tablet 3    BD INSULIN SYRINGE HALF UNIT 0.3 mL 31 gauge x 5/16" Syrg FOR USE WITH INSULIN. 100 Syringe 1    carvedilol (COREG) 3.125 MG tablet Take 1 tablet (3.125 mg total) by mouth 2 (two) times daily with meals. 60 tablet 5    esomeprazole (NEXIUM) 40 MG capsule Take 40 mg by mouth once daily.      finasteride (PROSCAR) 5 mg tablet Take 1 tablet (5 mg total) by mouth once daily. 30 tablet 11    folic acid (FOLVITE) 1 MG tablet TAKE 1 TABLET (1 MG TOTAL) BY MOUTH ONCE DAILY. 90 tablet 1    furosemide (LASIX) 80 MG tablet Take 80 mg by mouth once daily.  11    insulin degludec-liraglutide (XULTOPHY 100/3.6) 100 unit-3.6 mg /mL (3 mL) InPn INJECT 35 UNITS INTO THE SKIN ONCE DAILY. 15 Syringe 0    lancets (ONETOUCH ULTRASOFT LANCETS) Misc 1 lancet by Misc.(Non-Drug; Combo Route) route 2 (two) times daily. (Patient taking differently: 1 lancet by Misc.(Non-Drug; Combo Route) route 3 (three) times daily. ) 200 each 1    multivitamin (THERAGRAN) tablet Take 1 tablet by mouth once daily.      NIFEdipine (PROCARDIA-XL) 90 MG (OSM) 24 hr tablet Take 1 tablet (90 mg total) by mouth once daily. 30 tablet 5    olopatadine (PATADAY) 0.2 % Drop Place 1 drop into both eyes daily as needed. 2.5 mL 0    ONETOUCH ULTRA BLUE TEST STRIP Strp 1 STRIP BY [facility]NON-DRUG COMBO ROUTE) ROUTE 3 (THREE) TIMES DAILY. 100 strip 2    sodium bicarbonate 650 MG tablet Take 1 tablet (650 mg total) by mouth 2 (two) times daily. 60 tablet 11    tadalafil (CIALIS) 10 MG tablet Take 1 tablet (10 mg total) by mouth as needed for Erectile Dysfunction. 10 tablet 11    tamsulosin (FLOMAX) 0.4 mg Cap Take 1 capsule (0.4 mg total) by mouth 2 (two) times daily. (Patient taking differently: Take 0.4 mg by mouth once daily. ) 60 capsule 11    valsartan (DIOVAN) 320 MG tablet TAKE 1 TABLET (320 MG TOTAL) BY MOUTH ONCE DAILY. 90 tablet 2    VITAMIN D2 50,000 unit capsule TAKE 1 CAPSULE (50,000 " UNITS TOTAL) BY MOUTH EVERY 7 DAYS. 4 capsule 6     No current facility-administered medications on file prior to visit.      PSH: thoracotomy in 2010      Family history : positive for chronic kidney disease and hemodialysis in his father who is no more.      Social history : he owns a restaurant. He never smoked. He drinks alcohol occasionally. He has 3 children     Review of Systems   Constitutional: Negative for activity change and appetite change.   HENT: Negative for congestion and facial swelling.    Eyes: Negative for pain, discharge and redness.   Respiratory: Negative for apnea, cough and chest tightness.    Cardiovascular: Negative for chest pain, palpitations and leg swelling.   Gastrointestinal: Negative for abdominal distention.   Genitourinary: Negative for difficulty urinating, dysuria and frequency.   Musculoskeletal: Positive for arthralgias. Negative for neck pain and neck stiffness.   Skin: Negative for color change, rash and wound.   Neurological: Negative for dizziness, weakness and numbness.   Psychiatric/Behavioral: Negative for sleep disturbance.   All other systems reviewed and are negative.    :              Objective:           Vitals:    04/22/19 1416   BP: (!) 134/96   Pulse: 83       Weight 246 lb, previous weight was 236 lb    Physical Exam  :         Nursing note and vitals reviewed.   Constitutional: He is oriented to person, place, and time. He appears well-developed and well-nourished. No distress.   HENT: Head: Normocephalic and atraumatic. Pupils are equal, round, and reactive to light.   Neck: Normal range of motion. Neck supple. No tracheal deviation present. No thyromegaly present.   Cardiovascular: Normal rate, regular rhythm, normal heart sounds and intact distal pulses. gallop and no friction rub. No murmur heard.   Pulmonary/Chest: Effort normal and breath sounds normal. He has no wheezes. He has no rales.   Abdominal: obese, Soft. He exhibits no mass. There is no  tenderness. There is no rebound and no guarding.   Musculoskeletal: Normal range of motion. He exhibits no edema.   Lymphadenopathy: He has no cervical adenopathy.   Neurological: He is alert and oriented to person, place, and time.   Skin: Skin is warm. No rash noted. He is not diaphoretic. No erythema.     Labs:    Lab Results   Component Value Date    CREATININE 4.3 (H) 04/15/2019    BUN 50 (H) 04/15/2019     04/15/2019    K 4.3 04/15/2019     04/15/2019    CO2 21 (L) 04/15/2019       Lab Results   Component Value Date    WBC 9.27 04/15/2019    HGB 12.5 (L) 04/15/2019    HCT 41.0 04/15/2019    MCV 81 (L) 04/15/2019     04/15/2019       Lab Results   Component Value Date    .0 (H) 01/25/2019    CALCIUM 9.5 04/15/2019    PHOS 4.1 04/15/2019       Lab Results   Component Value Date    ALBUMIN 3.5 04/15/2019       Lab Results   Component Value Date    HGBA1C 6.2 (H) 03/11/2019         Impression and Plan: 63 - year-old  gentleman seen in office today in f/u for following medical problems ,      1. Hypertension : Blood pressure is elevated,  discussed low-salt diet and adherence with his medications.  Currently on nifedipine and Diovan, will add Coreg 3.125 mg twice a day, currently not taking this medication, will enroll in digital blood pressure monitoring program,     2. Chronic kidney disease stage 4/5  : Secondary to long-standing history of hypertension and diabetes. Again advised patient to avoid NSAIDs.  serum creatinine is 4.1 mg/dl , work for kidney transplant is in progress,     got slow down due to a  fall and hospitalization in 2018 ,     3. Diabetes mellitus type 2 : discussed compliance with his diet and medications. Discussed weight loss and daily exercise.      4. Secondary hyperparathyroidism - PTH is acceptable for degree of renal dysfunction, cont Vit D ,      5. Proteinuria - about 1.5 g daily. cont Diovan. Discussed diabetes control and adherence with  ARB      6. BPH : Follow up with urology ,      7. Obesity -  discuss calorie restriction weight loss,     8. Metabolic acidosis :  follow , on sodium bicarbonate supplements     9.  Anemia of chronic kidney disease - stable hemoglobin at 12.5 g,     I will see himn followup in about 3 months , Face-to-face time was 25 minutes discussing his lab results and plan of care.      Gabino Champion M.D.

## 2019-04-22 NOTE — TELEPHONE ENCOUNTER
----- Message from Nehemias Hook sent at 4/22/2019  8:39 AM CDT -----  Contact: [phone]  Would like a nurse to contact him regarding scheduling an appointment today, please contact him @ [phone]. Thanks

## 2019-04-23 NOTE — TELEPHONE ENCOUNTER
04/22/19 1814   Vital Signs   Temp 97.7 °F (36.5 °C)   Temp src Oral   Pulse 78   Heart Rate Source Monitor   Resp 17   SpO2 95 %   Pulse Oximetry Type Intermittent   O2 Device (Oxygen Therapy) room air   BP (!) 182/88   MAP (mmHg) 127   BP Location Right arm   BP Method Automatic   Patient Position Lying   Assessments (Pre/Post)   Level of Consciousness (AVPU) alert   Team paged regarding patient's hypertention.   The patient has been notified of this information and all questions answered. Voiced understand

## 2019-04-24 ENCOUNTER — TELEPHONE (OUTPATIENT)
Dept: NEPHROLOGY | Facility: CLINIC | Age: 64
End: 2019-04-24

## 2019-04-24 NOTE — TELEPHONE ENCOUNTER
----- Message from Nandini Michael sent at 4/24/2019 10:57 AM CDT -----  Contact: Patient   Patient is calling to check on the status of his medication, the pharmacy is saying that they don't have anything for him, Please call him at [phone].    Thanks  Td

## 2019-04-24 NOTE — TELEPHONE ENCOUNTER
Called patient who states that his medication was not called in and that he lost the prescription that the provider handed him. I called the medication in for patient. He expressed understanding.

## 2019-04-25 ENCOUNTER — HOSPITAL ENCOUNTER (OUTPATIENT)
Dept: RADIOLOGY | Facility: HOSPITAL | Age: 64
Discharge: HOME | End: 2019-04-25
Attending: NURSE PRACTITIONER
Payer: COMMERCIAL

## 2019-04-25 DIAGNOSIS — Z76.82 ORGAN TRANSPLANT CANDIDATE: ICD-10-CM

## 2019-04-25 PROCEDURE — 76536 US SOFT TISSUE HEAD NECK THYROID: ICD-10-PCS | Mod: 26,TXP,, | Performed by: RADIOLOGY

## 2019-04-25 PROCEDURE — 76536 US EXAM OF HEAD AND NECK: CPT | Mod: TC,TXP

## 2019-04-25 PROCEDURE — 76536 US EXAM OF HEAD AND NECK: CPT | Mod: 26,TXP,, | Performed by: RADIOLOGY

## 2019-04-29 ENCOUNTER — OFFICE VISIT (OUTPATIENT)
Dept: CARDIOLOGY | Facility: CLINIC | Age: 64
End: 2019-04-29
Payer: COMMERCIAL

## 2019-04-29 VITALS
BODY MASS INDEX: 31.46 KG/M2 | DIASTOLIC BLOOD PRESSURE: 96 MMHG | WEIGHT: 245.13 LBS | HEART RATE: 80 BPM | SYSTOLIC BLOOD PRESSURE: 150 MMHG | HEIGHT: 74 IN

## 2019-04-29 DIAGNOSIS — Z79.4 TYPE 2 DIABETES MELLITUS WITH STAGE 4 CHRONIC KIDNEY DISEASE, WITH LONG-TERM CURRENT USE OF INSULIN: ICD-10-CM

## 2019-04-29 DIAGNOSIS — N18.4 CHRONIC KIDNEY DISEASE (CKD), STAGE IV (SEVERE): ICD-10-CM

## 2019-04-29 DIAGNOSIS — E78.5 HYPERLIPIDEMIA ASSOCIATED WITH TYPE 2 DIABETES MELLITUS: ICD-10-CM

## 2019-04-29 DIAGNOSIS — E11.22 TYPE 2 DIABETES MELLITUS WITH STAGE 4 CHRONIC KIDNEY DISEASE, WITH LONG-TERM CURRENT USE OF INSULIN: ICD-10-CM

## 2019-04-29 DIAGNOSIS — E11.59 HYPERTENSION ASSOCIATED WITH DIABETES: Primary | ICD-10-CM

## 2019-04-29 DIAGNOSIS — I25.118 CORONARY ARTERY DISEASE OF NATIVE ARTERY OF NATIVE HEART WITH STABLE ANGINA PECTORIS: ICD-10-CM

## 2019-04-29 DIAGNOSIS — Z76.82 ORGAN TRANSPLANT CANDIDATE: Primary | ICD-10-CM

## 2019-04-29 DIAGNOSIS — N18.4 TYPE 2 DIABETES MELLITUS WITH STAGE 4 CHRONIC KIDNEY DISEASE, WITH LONG-TERM CURRENT USE OF INSULIN: ICD-10-CM

## 2019-04-29 DIAGNOSIS — I15.2 HYPERTENSION ASSOCIATED WITH DIABETES: Primary | ICD-10-CM

## 2019-04-29 DIAGNOSIS — E11.69 HYPERLIPIDEMIA ASSOCIATED WITH TYPE 2 DIABETES MELLITUS: ICD-10-CM

## 2019-04-29 PROCEDURE — 3044F HG A1C LEVEL LT 7.0%: CPT | Mod: CPTII,NTX,S$GLB, | Performed by: INTERNAL MEDICINE

## 2019-04-29 PROCEDURE — 99999 PR PBB SHADOW E&M-EST. PATIENT-LVL III: CPT | Mod: PBBFAC,TXP,, | Performed by: INTERNAL MEDICINE

## 2019-04-29 PROCEDURE — 99214 PR OFFICE/OUTPT VISIT, EST, LEVL IV, 30-39 MIN: ICD-10-PCS | Mod: NTX,S$GLB,, | Performed by: INTERNAL MEDICINE

## 2019-04-29 PROCEDURE — 3008F PR BODY MASS INDEX (BMI) DOCUMENTED: ICD-10-PCS | Mod: CPTII,NTX,S$GLB, | Performed by: INTERNAL MEDICINE

## 2019-04-29 PROCEDURE — 3008F BODY MASS INDEX DOCD: CPT | Mod: CPTII,NTX,S$GLB, | Performed by: INTERNAL MEDICINE

## 2019-04-29 PROCEDURE — 3077F SYST BP >= 140 MM HG: CPT | Mod: CPTII,NTX,S$GLB, | Performed by: INTERNAL MEDICINE

## 2019-04-29 PROCEDURE — 3044F PR MOST RECENT HEMOGLOBIN A1C LEVEL <7.0%: ICD-10-PCS | Mod: CPTII,NTX,S$GLB, | Performed by: INTERNAL MEDICINE

## 2019-04-29 PROCEDURE — 3080F PR MOST RECENT DIASTOLIC BLOOD PRESSURE >= 90 MM HG: ICD-10-PCS | Mod: CPTII,NTX,S$GLB, | Performed by: INTERNAL MEDICINE

## 2019-04-29 PROCEDURE — 99214 OFFICE O/P EST MOD 30 MIN: CPT | Mod: NTX,S$GLB,, | Performed by: INTERNAL MEDICINE

## 2019-04-29 PROCEDURE — 99999 PR PBB SHADOW E&M-EST. PATIENT-LVL III: ICD-10-PCS | Mod: PBBFAC,TXP,, | Performed by: INTERNAL MEDICINE

## 2019-04-29 PROCEDURE — 3080F DIAST BP >= 90 MM HG: CPT | Mod: CPTII,NTX,S$GLB, | Performed by: INTERNAL MEDICINE

## 2019-04-29 PROCEDURE — 3077F PR MOST RECENT SYSTOLIC BLOOD PRESSURE >= 140 MM HG: ICD-10-PCS | Mod: CPTII,NTX,S$GLB, | Performed by: INTERNAL MEDICINE

## 2019-04-29 RX ORDER — ASPIRIN 81 MG/1
81 TABLET ORAL DAILY
Status: ON HOLD
Start: 2019-04-29 | End: 2024-02-20

## 2019-04-29 NOTE — PROGRESS NOTES
Subjective:   Patient ID:  Isidro Alves is a 63 y.o. male who presents for follow up of Follow-up      62 yo male, 6 months f/u [employer].  PMH CKD stage IV not on HD and on renal Tx l ist, CAD, old MI per MPI in , EF 50%, IDDM, HTN, HLD. No smoking. Occasional drink.  Denied h/o stroke, heart attack and cancer.  No chest pain, dyspnea, dizziness palpitation and orthopnea.   MPI showed inferior old MI and EF 35%. ECHo showed EF 50% and LVH  No f/h premature CAD.  No smoking  Walks 4 times a week. And on PT      Past Medical History:   Diagnosis Date    Anemia 4/16/2014    BPH (benign prostatic hyperplasia)     CKD (chronic kidney disease) stage 4, GFR 15-29 ml/min     Coronary artery disease of native artery of native heart with stable angina pectoris 4/29/2019    Diabetes mellitus     Diabetes mellitus, type 2     Elevated PSA     Encounter for blood transfusion     Herpes labialis     Hyperlipidemia     Hypertension     Obesity     Proteinuria     Secondary hyperparathyroidism (of renal origin)     UTI (lower urinary tract infection) 5/1/2015       Past Surgical History:   Procedure Laterality Date    COLONOSCOPY N/A 12/21/2017    Per[facility]    PROSTATE BIOPSY      thorocotomy  2010       Social History     Tobacco Use    Smoking status: Never Smoker    Smokeless tobacco: Never Used   Substance Use Topics    Alcohol use: Yes     Comment: seldom     Drug use: No       Family History   Problem Relation Age of Onset    No Known Problems Mother     Diabetes Father     Hypertension Father     Hypertension Unknown     Diabetes Unknown          Review of Systems   Constitution: Negative for decreased appetite, diaphoresis, fever, malaise/fatigue and night sweats.   HENT: Negative for nosebleeds.    Eyes: Negative for blurred vision and double vision.   Cardiovascular: Negative for chest pain, claudication, dyspnea on exertion,  irregular heartbeat, leg swelling, near-syncope, orthopnea, palpitations, paroxysmal nocturnal dyspnea and syncope.   Respiratory: Negative for cough, shortness of breath, sleep disturbances due to breathing, snoring, sputum production and wheezing.    Endocrine: Negative for cold intolerance and polyuria.   Hematologic/Lymphatic: Does not bruise/bleed easily.   Skin: Negative for rash.   Musculoskeletal: Negative for back pain, falls, joint pain, joint swelling and neck pain.   Gastrointestinal: Negative for abdominal pain, heartburn, nausea and vomiting.   Genitourinary: Negative for dysuria, frequency and hematuria.   Neurological: Negative for difficulty with concentration, dizziness, focal weakness, headaches, light-headedness, numbness, seizures and weakness.   Psychiatric/Behavioral: Negative for depression, memory loss and substance abuse. The patient does not have insomnia.    Allergic/Immunologic: Negative for HIV exposure and hives.       Objective:   Physical Exam   Constitutional: He is oriented to person, place, and time. He appears well-nourished.   HENT:   Head: Normocephalic.   Eyes: Pupils are equal, round, and reactive to light.   Neck: Normal carotid pulses and no JVD present. Carotid bruit is not present. No thyromegaly present.   Cardiovascular: Normal rate, regular rhythm, normal heart sounds and normal pulses.  No extrasystoles are present. PMI is not displaced. Exam reveals no gallop and no S3.   No murmur heard.  Pulmonary/Chest: Breath sounds normal. No stridor. No respiratory distress.   Abdominal: Soft. Bowel sounds are normal. There is no tenderness. There is no rebound.   Musculoskeletal: Normal range of motion. He exhibits edema.   Trace edema     Neurological: He is alert and oriented to person, place, and time.   Skin: Skin is intact. No rash noted.   Psychiatric: His behavior is normal.       Lab Results   Component Value Date    CHOL 150 03/11/2019    CHOL 224 (H) 10/11/2018     CHOL 139 11/20/2017     Lab Results   Component Value Date    HDL 28 (L) 03/11/2019    HDL 34 (L) 10/11/2018    HDL 44 11/20/2017     Lab Results   Component Value Date    LDLCALC 93.2 03/11/2019    LDLCALC 140.4 10/11/2018    LDLCALC 63.6 11/20/2017     Lab Results   Component Value Date    TRIG 144 03/11/2019    TRIG 248 (H) 10/11/2018    TRIG 157 (H) 11/20/2017     Lab Results   Component Value Date    CHOLHDL 18.7 (L) 03/11/2019    CHOLHDL 15.2 (L) 10/11/2018    CHOLHDL 31.7 11/20/2017       Chemistry        Component Value Date/Time     04/15/2019 1103    K 4.3 04/15/2019 1103     04/15/2019 1103    CO2 21 (L) 04/15/2019 1103    BUN 50 (H) 04/15/2019 1103    CREATININE 4.3 (H) 04/15/2019 1103     (H) 04/15/2019 1103        Component Value Date/Time    CALCIUM 9.5 04/15/2019 1103    ALKPHOS 101 03/11/2019 1640    AST 17 03/11/2019 1640    ALT 13 03/11/2019 1640    BILITOT 0.8 03/11/2019 1640    ESTGFRAFRICA 15.9 (A) 04/15/2019 1103    EGFRNONAA 13.8 (A) 04/15/2019 1103          Lab Results   Component Value Date    HGBA1C 6.2 (H) 03/11/2019     Lab Results   Component Value Date    TSH 1.329 11/20/2017     Lab Results   Component Value Date    INR 0.9 10/11/2018    INR 0.9 05/02/2015    INR 0.9 05/01/2015     Lab Results   Component Value Date    WBC 9.27 04/15/2019    HGB 12.5 (L) 04/15/2019    HCT 41.0 04/15/2019    MCV 81 (L) 04/15/2019     04/15/2019     BMP  Sodium   Date Value Ref Range Status   04/15/2019 138 136 - 145 mmol/L Final     Potassium   Date Value Ref Range Status   04/15/2019 4.3 3.5 - 5.1 mmol/L Final     Chloride   Date Value Ref Range Status   04/15/2019 108 95 - 110 mmol/L Final     CO2   Date Value Ref Range Status   04/15/2019 21 (L) 23 - 29 mmol/L Final     BUN, Bld   Date Value Ref Range Status   04/15/2019 50 (H) 8 - 23 mg/dL Final     Creatinine   Date Value Ref Range Status   04/15/2019 4.3 (H) 0.5 - 1.4 mg/dL Final     Calcium   Date Value Ref Range Status    04/15/2019 9.5 8.7 - 10.5 mg/dL Final     Anion Gap   Date Value Ref Range Status   04/15/2019 9 8 - 16 mmol/L Final     eGFR if    Date Value Ref Range Status   04/15/2019 15.9 (A) >60 mL/min/1.73 m^2 Final     eGFR if non    Date Value Ref Range Status   04/15/2019 13.8 (A) >60 mL/min/1.73 m^2 Final     Comment:     Calculation used to obtain the estimated glomerular filtration  rate (eGFR) is the CKD-EPI equation.        BNP  @LABRCNTIP(BNP,BNPTRIAGEBLO)@  @LABRCNTIP(troponini)@  CrCl cannot be calculated (Patient's most recent lab result is older than the maximum 7 days allowed.).  No results found in the last 24 hours.  No results found in the last 24 hours.  No results found in the last 24 hours.    Assessment:      1. Hypertension associated with diabetes    2. Hyperlipidemia associated with type 2 diabetes mellitus    3. Coronary artery disease of native artery of native heart with stable angina pectoris    4. Chronic kidney disease (CKD), stage IV (severe)    5. Type 2 diabetes mellitus with stage 4 chronic kidney disease, with long-term current use of insulin      BP controlled at home. LDL and BS wnl  Sometime salty food  No CP and CHF    Plan:   Add ASA 81 mg  Continue Coreg, ARB, nifedipine, statin   DASH  Continue current meds.  Recommend heart-healthy diet, weight control and regular exercise.  Amy. Risk modification.   RTC in 1 yr    I have reviewed all pertinent labs and cardiac studies. Plans and recommendations have been formulated under my direct supervision. All questions answered and patient voiced understanding. Patient to continue current medications.

## 2019-04-30 ENCOUNTER — TELEPHONE (OUTPATIENT)
Dept: TRANSPLANT | Facility: CLINIC | Age: 64
End: 2019-04-30

## 2019-04-30 NOTE — TELEPHONE ENCOUNTER
"----- Message from Katie Robles MD sent at 4/30/2019  8:30 AM CDT -----  Good morning,    No biopsy, no CT. Just repeat US in a year and check TSH with next lab. Thank you! You can present the case.       ----- Message -----  From: Komal Castano MD  Sent: 4/30/2019   8:28 AM  To: Katie Robles MD    No biopsy needed.. Its too small  Can check a tsh with next labs  Can repeat the ultrasound in 1 year to ensure stability - especially if thyroid cancer risk factors         ----- Message -----  From: Katie Robles MD  Sent: 4/30/2019   8:11 AM  To: MD Dr. Omaira Spencer,    Hope you are well.  This patient is supposed to see endo but the appointment would be in few months as your department is really busy.  I am wondering if you can help me out to see if his thyroid finding is a concern.     The chest CT in [facility] on June 18, 2014 noted this as "Probable small, 4 mm cyst or adenoma in the right thyroid lobe". The Transplant committee ordered this thyroid ultrasound to reassess which showed Uniform echotexture identified throughout the gland with the exception of small heterogeneously hypoechoic nodule within the right inferior pole region measuring 0.4 x 0.2 x 0.3 cm.  Do you think this patinet needs biopsy or just follow up?      Regards  Katie        "

## 2019-05-08 DIAGNOSIS — I10 HTN (HYPERTENSION), BENIGN: ICD-10-CM

## 2019-05-08 RX ORDER — VALSARTAN 320 MG/1
TABLET ORAL
Qty: 90 TABLET | Refills: 3 | Status: SHIPPED | OUTPATIENT
Start: 2019-05-08 | End: 2019-05-09 | Stop reason: SDUPTHER

## 2019-05-08 RX ORDER — VALSARTAN 320 MG/1
TABLET ORAL
Qty: 90 TABLET | Refills: 2 | Status: SHIPPED | OUTPATIENT
Start: 2019-05-08 | End: 2019-05-08 | Stop reason: SDUPTHER

## 2019-05-08 NOTE — TELEPHONE ENCOUNTER
----- Message from Samantha King sent at 5/8/2019 10:31 AM CDT -----  Contact: patient  Type:  RX Refill Request    Who Called: patient  Refill or New Rx:Refill  RX Name and Strength:Valsartan, 320mg  How is the patient currently taking it? (ex. 1XDay):once daily  Is this a 30 day or 90 day RX:30  Preferred Pharmacy with phone number:[facility] in [place]  Local or Mail Order:Local  Ordering Provider:Dr Champion  Would the patient rather a call back or a response via [facility]? call  Best Call Back Number:[phone]  Additional Information: Patient needs this today, going out of town tomorrow

## 2019-05-09 DIAGNOSIS — I10 HTN (HYPERTENSION), BENIGN: ICD-10-CM

## 2019-05-09 RX ORDER — VALSARTAN 320 MG/1
TABLET ORAL
Qty: 90 TABLET | Refills: 3 | Status: SHIPPED | OUTPATIENT
Start: 2019-05-09 | End: 2020-03-24 | Stop reason: SDUPTHER

## 2019-05-09 NOTE — TELEPHONE ENCOUNTER
----- Message from Stacey Castellon sent at 5/9/2019  9:21 AM CDT -----    Who Called: pt   Refill or New Rx: refill   RX Name and Strength: valsartan   How is the patient currently taking it? (ex. 1XDay):1   Is this a 30 day or 90 day RX: 30   Preferred Pharmacy with phone number:..   [id number] - [place], [place]  [address]   [address] [address]   Phone: [phone] Fax: [phone]         Local or Mail Order:local   Ordering Provider:   Would the patient rather a call back or a response via [facility]? Call back   Best Call Back Number:[phone]   Additional Information: Pt states he will be leaving to go out of town this morning and will need a refill on valsartan. Pt says he requested a refill when he seen Dr Champion

## 2019-05-10 ENCOUNTER — COMMITTEE REVIEW (OUTPATIENT)
Dept: TRANSPLANT | Facility: CLINIC | Age: 64
End: 2019-05-10

## 2019-05-10 DIAGNOSIS — Z76.82 ORGAN TRANSPLANT CANDIDATE: Primary | ICD-10-CM

## 2019-05-10 NOTE — COMMITTEE REVIEW
Native Organ Dx: Diabetes Mellitus - Type II      Unable to determine transplant candidacy at this time due to need for urology follow up regarding PSA (Dr. Fernandez will discuss with Dr. Fallon regarding elevated PSA) and follow up Neph appointment in clinic to assess functional status secondary to fall in Nov 2018 causing multiple fractures. Patient will need to be fully recovered.     Cards clarification regarding EF and thyroid u/s issues reviewed and sorted out from last committee discussion.     (outside general surgeon note 2/22/19. Still recovering from his hip surgery doing physical therapy and recently started walking. Stated that patient will need continue 2-3 more months of physical therapy.)     Called patient to discuss committee's decision. Patient was attending his daughter's graduation and will call me back.     Note written by Nora Corbin RN    ===============================================    I was present at the meeting and attest to the decision of the committee.    Marcus Fernandez  05/10/2019

## 2019-05-10 NOTE — LETTER
May 10, 2019    Isidro Alves  [address]  [place] [address]    Dear Isidro Alves:  MRN: [id number]    Your transplant evaluation was reviewed at the [facility] Selection [facility] meeting on 11/30/2018.  It is with regret I inform you that your workup is incomplete and we are unable to determine your transplant candidacy at this time due to elevated PSA, which will need to be addressed by your Urologist and Transplant Nephrology follow up in clinic to assess functional status secondary to fall in Nov 2018 causing multiple fractures. You will need to be fully recovered. You will be re-presented to the selection committee once pending studies are completed.  You will be notified of the committees decision once we review the new results.    The [facility] Selection Committee carefully considers each patients transplant candidacy using established selection criteria to determine if it is safe to proceed with transplantation for each and every person evaluated.  Although the selection committee believes your workup is incomplete and we are unable to determine your transplant candidacy, you have the right to be evaluated at other transplant centers.  You may request your [facility] records be sent to any center of your choice by contacting our Medical Records Department at [phone].    Attached is a letter from the [employer] Organ [employer]aring (UNOS).  It describes the services and information offered to patients by UNOS and the Organ Procurement and Transplant Network.    Sincerely,      Lynn Kincaid MD  Medical Director, Kidney & Kidney/Pancreas Transplantation    Cc: Gabino Champion MD    [facility] [place]    Encl: UNOS Letter          OPTN/UNOS: Your Resource for Organ Transplant Information        If you have a question regarding your own medical care, you always should call your transplant center first. However, for general organ transplant-related information, you can  call the [employer] Organ [employer]aring (UNOS) toll-free patient services line at 1-943.111.8850.    Anyone, including potential transplant candidates, recipients, family members/friends, living donors, and/or donor family members can call this number to:    · talk about organ donation, living donation, how transplant and donation work, the donation process, transplant policies, and transplant/donor information;  · get a free patient information kit with helpful booklets, waiting list and transplant information, and a list of all transplant centers;  · ask questions about the Organ Procurement and Transplantation Network (OPTN) web site (www.optn.transplant.hrsa.gov); the UNOS Web site (www.unos.org); or the UNOS web site for living donors and transplant recipients (www.transplantliving.org);  · learn how UNOS and the OPTN can help you;  · talk about any concerns that you may have with a transplant center and how they perform    UN is a not-for-profit organization that provides all of the administrative services for the national OPTN under federal contract to the Health Resources and Services Administration (HRSA), an agency under the U.S. Department of Health and Human Services (HHS).     UNOS and OPTN responsibilities include:    · writing educational material for patients, the public and professionals;  · helping to make people aware of the need for donated organs and tissue;  · writing organ transplant policy with help from doctors, nurses, transplant patients/candidates, donor families, living donors, and the public;  · coordinating the organ matching and placement process;  · collecting information about every organ transplant and donation that occurs in the [place].    Remember, you should contact your transplant center directly if you have questions or concerns about your own medical care including medical records, work-up progress and test reports. [facility] is not your transplant center, and staff at  UN[employer] will not be able to transfer you to your transplant center, so keep your transplant centers phone number handy. But, while you research your transplant needs and learn as much as you can about transplantation and donation, we welcome your call to our toll-free patient services line at 1-602.645.1998.

## 2019-05-13 ENCOUNTER — TELEPHONE (OUTPATIENT)
Dept: TRANSPLANT | Facility: CLINIC | Age: 64
End: 2019-05-13

## 2019-05-13 NOTE — TELEPHONE ENCOUNTER
----- Message from Marcus Fernandez MD sent at 5/13/2019  1:10 PM CDT -----  Sure we do what ever you feel is needed. If you do not feel is indicated we list him active for a transplant.    Sergiorell: proceed with follow up PSA as per current guidelines. See Dr Fallon note below.  Marcus   ----- Message -----  From: Ravinder Fallon IV, MD  Sent: 5/13/2019  11:37 AM  To: Marcus Fernandez MD    His PSA doesn't track for a cancer based on history.  Always an outside chance of one being present.  An MRI pelvis for prostate protocol might bring something to light but clinically it isn't mandatory.  If you want to order one for your reassurance it wouldn't be wrong.    ----- Message -----  From: Marcus Fernandez MD  Sent: 5/13/2019   8:52 AM  To: Ravinder Fallon IV, MD, [facility]    Dr Fallon,    We discussed this patient in our transplant selection meeting last week and had some concerns with his elevated PSA. Please advise.   The question from the group is Does he need other prostate biopsy to make sure this is benign?  Please see below the PSA's you document in your last clinic encounter  4/15: 13.9    11/15: 4.7    5/16: 12.7    11/16: 7.7    11/17: 10.7    10/18: 12.2    Thanks very much    Regards    Marcus

## 2019-05-17 ENCOUNTER — TELEPHONE (OUTPATIENT)
Dept: TRANSPLANT | Facility: CLINIC | Age: 64
End: 2019-05-17

## 2019-05-17 NOTE — TELEPHONE ENCOUNTER
----- Message from Ju Mcclain sent at 5/17/2019  2:24 PM CDT -----  Contact: Patient       ----- Message -----  From: Kandace Barraza  Sent: 5/17/2019   8:20 AM  To: [facility] Pre-Kidney Transplant Clinical    Patient Returning Call from [facility]    Who Left Message for Patient: Nora    Communication Preference: [phone]     Additional Information: N/A

## 2019-05-17 NOTE — TELEPHONE ENCOUNTER
Patient informed of committee's decision. All questions were answered and patient verbalized understanding

## 2019-06-03 RX ORDER — ACYCLOVIR 400 MG/1
TABLET ORAL
Qty: 30 TABLET | Refills: 6 | Status: SHIPPED | OUTPATIENT
Start: 2019-06-03 | End: 2020-03-24 | Stop reason: SDUPTHER

## 2019-06-06 ENCOUNTER — OFFICE VISIT (OUTPATIENT)
Dept: TRANSPLANT | Facility: CLINIC | Age: 64
End: 2019-06-06
Payer: COMMERCIAL

## 2019-06-06 ENCOUNTER — LAB VISIT (OUTPATIENT)
Dept: LAB | Facility: HOSPITAL | Age: 64
End: 2019-06-06
Attending: UROLOGY
Payer: COMMERCIAL

## 2019-06-06 ENCOUNTER — TELEPHONE (OUTPATIENT)
Dept: TRANSPLANT | Facility: CLINIC | Age: 64
End: 2019-06-06

## 2019-06-06 VITALS
BODY MASS INDEX: 32.34 KG/M2 | DIASTOLIC BLOOD PRESSURE: 111 MMHG | HEIGHT: 74 IN | WEIGHT: 252 LBS | RESPIRATION RATE: 18 BRPM | SYSTOLIC BLOOD PRESSURE: 172 MMHG | TEMPERATURE: 98 F | OXYGEN SATURATION: 100 % | HEART RATE: 80 BPM

## 2019-06-06 DIAGNOSIS — Z76.82 ORGAN TRANSPLANT CANDIDATE: ICD-10-CM

## 2019-06-06 DIAGNOSIS — E04.2 MULTIPLE THYROID NODULES: ICD-10-CM

## 2019-06-06 DIAGNOSIS — R97.20 ELEVATED PSA: ICD-10-CM

## 2019-06-06 DIAGNOSIS — I15.2 HYPERTENSION ASSOCIATED WITH DIABETES: ICD-10-CM

## 2019-06-06 DIAGNOSIS — E11.59 HYPERTENSION ASSOCIATED WITH DIABETES: ICD-10-CM

## 2019-06-06 DIAGNOSIS — I25.118 CORONARY ARTERY DISEASE OF NATIVE ARTERY OF NATIVE HEART WITH STABLE ANGINA PECTORIS: ICD-10-CM

## 2019-06-06 DIAGNOSIS — Z01.818 PRE-TRANSPLANT EVALUATION FOR CHRONIC KIDNEY DISEASE: Primary | ICD-10-CM

## 2019-06-06 DIAGNOSIS — N40.0 BENIGN NON-NODULAR PROSTATIC HYPERPLASIA WITHOUT LOWER URINARY TRACT SYMPTOMS: ICD-10-CM

## 2019-06-06 DIAGNOSIS — I10 ESSENTIAL HYPERTENSION: ICD-10-CM

## 2019-06-06 LAB — COMPLEXED PSA SERPL-MCNC: 4 NG/ML (ref 0–4)

## 2019-06-06 PROCEDURE — 3044F PR MOST RECENT HEMOGLOBIN A1C LEVEL <7.0%: ICD-10-PCS | Mod: CPTII,S$GLB,TXP, | Performed by: NURSE PRACTITIONER

## 2019-06-06 PROCEDURE — 99999 PR PBB SHADOW E&M-EST. PATIENT-LVL V: CPT | Mod: PBBFAC,TXP,, | Performed by: NURSE PRACTITIONER

## 2019-06-06 PROCEDURE — 86833 HLA CLASS II HIGH DEFIN QUAL: CPT | Mod: PO,TXP

## 2019-06-06 PROCEDURE — 99215 OFFICE O/P EST HI 40 MIN: CPT | Mod: S$GLB,TXP,, | Performed by: NURSE PRACTITIONER

## 2019-06-06 PROCEDURE — 36415 COLL VENOUS BLD VENIPUNCTURE: CPT | Mod: TXP

## 2019-06-06 PROCEDURE — 99215 PR OFFICE/OUTPT VISIT, EST, LEVL V, 40-54 MIN: ICD-10-PCS | Mod: S$GLB,TXP,, | Performed by: NURSE PRACTITIONER

## 2019-06-06 PROCEDURE — 99999 PR PBB SHADOW E&M-EST. PATIENT-LVL V: ICD-10-PCS | Mod: PBBFAC,TXP,, | Performed by: NURSE PRACTITIONER

## 2019-06-06 PROCEDURE — 3077F PR MOST RECENT SYSTOLIC BLOOD PRESSURE >= 140 MM HG: ICD-10-PCS | Mod: CPTII,S$GLB,TXP, | Performed by: NURSE PRACTITIONER

## 2019-06-06 PROCEDURE — 3077F SYST BP >= 140 MM HG: CPT | Mod: CPTII,S$GLB,TXP, | Performed by: NURSE PRACTITIONER

## 2019-06-06 PROCEDURE — 3080F DIAST BP >= 90 MM HG: CPT | Mod: CPTII,S$GLB,TXP, | Performed by: NURSE PRACTITIONER

## 2019-06-06 PROCEDURE — 3080F PR MOST RECENT DIASTOLIC BLOOD PRESSURE >= 90 MM HG: ICD-10-PCS | Mod: CPTII,S$GLB,TXP, | Performed by: NURSE PRACTITIONER

## 2019-06-06 PROCEDURE — 86832 HLA CLASS I HIGH DEFIN QUAL: CPT | Mod: PO,TXP

## 2019-06-06 PROCEDURE — 3008F BODY MASS INDEX DOCD: CPT | Mod: CPTII,S$GLB,TXP, | Performed by: NURSE PRACTITIONER

## 2019-06-06 PROCEDURE — 84153 ASSAY OF PSA TOTAL: CPT | Mod: TXP

## 2019-06-06 PROCEDURE — 3008F PR BODY MASS INDEX (BMI) DOCUMENTED: ICD-10-PCS | Mod: CPTII,S$GLB,TXP, | Performed by: NURSE PRACTITIONER

## 2019-06-06 PROCEDURE — 3044F HG A1C LEVEL LT 7.0%: CPT | Mod: CPTII,S$GLB,TXP, | Performed by: NURSE PRACTITIONER

## 2019-06-06 NOTE — LETTER
June 6, 2019        Gabino Champion  [address]  [place] [address]  Phone: [phone]  Fax: [phone]             [facility]  [address]  [facility] [address]  Phone: [phone]   Patient: Isidro Alves   MR Number: [id number]   YOB: 1955   Date of Visit: 6/6/2019       Dear Dr. Gabino Champion    Thank you for referring Isidro Alves to me for evaluation. Attached you will find relevant portions of my assessment and plan of care.    If you have questions, please do not hesitate to call me. I look forward to following Isidro Alves along with you.    Sincerely,    Jessica Collins, NP    Enclosure    If you would like to receive this communication electronically, please contact [email or web address] or [phone] to request [employer] Link access.    [employer] Link is a tool which provides read-only access to select patient information with whom you have a relationship. Its easy to use and provides real time access to review your patients record including encounter summaries, notes, results, and demographic information.    If you feel you have received this communication in error or would no longer like to receive these types of communications, please e-mail [email or web address]

## 2019-06-06 NOTE — PROGRESS NOTES
Transplant Nephrology  Kidney Transplant Recipient Evaluation    Referring Physician: Gabino Champion  Current Nephrologist: Gabino Champion    Subjective:   CC:  Initial evaluation of kidney transplant candidacy.    HPI:  Mr. Alves is a 63 y.o. year old Black or  male who has presented to be evaluated as a potential kidney transplant recipient.  He has advanced kidney disease secondary to diabetic nephropathy.  Patient is currently pre-dialysis. He has a no access for dialysis access.     Previous Transplant: no    Past Medical and Surgical History: Mr. Alves  has a past medical history of Anemia, BPH (benign prostatic hyperplasia), CKD (chronic kidney disease) stage 4, GFR 15-29 ml/min, Coronary artery disease of native artery of native heart with stable angina pectoris, Diabetes mellitus, Diabetes mellitus, type 2, Elevated PSA, Encounter for blood transfusion, Herpes labialis, Hyperlipidemia, Hypertension, Obesity, Proteinuria, Secondary hyperparathyroidism (of renal origin), and UTI (lower urinary tract infection).  He has a past surgical history that includes thorocotomy (2010); Prostate biopsy; and Colonoscopy (N/A, 12/21/2017).    Past Social and Family History: Mr. Alves reports that he has never smoked. He has never used smokeless tobacco. He reports that he drinks alcohol. He reports that he does not use drugs. His family history includes Diabetes in his father and unknown relative; Hypertension in his father and unknown relative; No Known Problems in his mother.     Thyroid nodules, Thyroid US completed in 4/25/2019  Per Dr Castano: no bx needed, nodules are too small. Repeat thyroid US 1 year and  Add TSH with next labs    Cardiology   Pt was seen by genny juarez 4/29/2019  . Will need to RTC 1 year for annual f/u       HX Elevated PSA for the past few years  Prostate biopsy a few years back at [facility]   Urologist Dr Fallon  F/u scheduled 11/2019  TX--denies    Noted in chart -->Incomplete bladder emptying--is taking flomax   Lab Results   Component Value Date    PSA 4.0 06/06/2019    PSA 12.2 (H) 10/11/2018    PSA 11.0 (H) 02/19/2018     Transplant Coordinator      Telephone Encounter   Signed   Encounter Date:  5/13/2019                  Telephone encounter/ Epic    ----- Message from Marcus Fernandez MD sent at 5/13/2019  1:10 PM CDT -----  Sure we do what ever you feel is needed. If you do not feel is indicated we list him active for a transplant.     Sharrell: proceed with follow up PSA as per current guidelines. See Dr Fallon note below.  Marcus   ----- Message -----  From: Ravinder Fallon IV, MD  Sent: 5/13/2019  11:37 AM  To: Marcus Fernandez MD     His PSA doesn't track for a cancer based on history.  Always an outside chance of one being present.  An MRI pelvis for prostate protocol might bring something to light but clinically it isn't mandatory.  If you want to order one for your reassurance it wouldn't be wrong.     ----- Message -----  From: Marcus Fernandez MD  Sent: 5/13/2019   8:52 AM  To: Ravinder Fallon IV, MD, [facility]     Dr Fallon,     We discussed this patient in our transplant selection meeting last week and had some concerns with his elevated PSA. Please advise.   The question from the group is Does he need other prostate biopsy to make sure this is benign?  Please see below the PSA's you document in your last clinic encounter  4/15: 13.9    11/15: 4.7    5/16: 12.7    11/16: 7.7    11/17: 10.7    10/18: 12.2     Thanks very much     Regards     Marcus                      Body mass index is 32.41 kg/m².     FX assessment  Fall injury / from a roof top in 11/2018. Has been recovering and doing well. Walks a mile without problems. Does not use assistive devices. Does a have a limp to the right leg. Recently, the patient requested to return to PT to help strengthen the right leg. Over all he looks good and feels well.     May have a  "donor/ daughter      Past Medical History:   Diagnosis Date    Anemia 4/16/2014    BPH (benign prostatic hyperplasia)     CKD (chronic kidney disease) stage 4, GFR 15-29 ml/min     Coronary artery disease of native artery of native heart with stable angina pectoris 4/29/2019    Diabetes mellitus     Diabetes mellitus, type 2     Elevated PSA     Encounter for blood transfusion     Herpes labialis     Hyperlipidemia     Hypertension     Obesity     Proteinuria     Secondary hyperparathyroidism (of renal origin)     UTI (lower urinary tract infection) 5/1/2015       Review of Systems   Constitutional: Positive for fatigue. Negative for activity change, appetite change, chills, fever and unexpected weight change.   HENT: Negative for congestion, facial swelling, postnasal drip, rhinorrhea, sinus pressure, sore throat and trouble swallowing.    Eyes: Positive for visual disturbance. Negative for pain and redness.        Wears glasses   Respiratory: Negative for cough, chest tightness, shortness of breath and wheezing.    Cardiovascular: Negative for chest pain and palpitations.   Gastrointestinal: Negative for abdominal pain, diarrhea, nausea and vomiting.   Genitourinary: Negative for dysuria, flank pain and urgency.   Musculoskeletal: Negative for gait problem, neck pain and neck stiffness.   Skin: Negative for rash.   Allergic/Immunologic: Negative for environmental allergies, food allergies and immunocompromised state.   Neurological: Negative for dizziness, weakness and light-headedness.   Psychiatric/Behavioral: Negative for agitation and confusion. The patient is not nervous/anxious.        Objective:   Blood pressure (!) 172/111, pulse 80, temperature 98 °F (36.7 °C), temperature source Oral, resp. rate 18, height 6' 1.94" (1.878 m), weight 114.3 kg (251 lb 15.8 oz), SpO2 100 %.body mass index is 32.41 kg/m².    Physical Exam   Constitutional: He is oriented to person, place, and time. He appears " well-developed and well-nourished.   HENT:   Head: Normocephalic.   Mouth/Throat: Oropharynx is clear and moist. No oropharyngeal exudate.   Eyes: Pupils are equal, round, and reactive to light. Conjunctivae and EOM are normal. No scleral icterus.   Neck: Normal range of motion. Neck supple.   Cardiovascular: Normal rate, regular rhythm and normal heart sounds.   Pulmonary/Chest: Effort normal and breath sounds normal.   Abdominal: Soft. Normal appearance and bowel sounds are normal. He exhibits no distension and no mass. There is no splenomegaly or hepatomegaly. There is no tenderness. There is no rebound, no guarding, no CVA tenderness, no tenderness at McBurney's point and negative Curran's sign.   Musculoskeletal: Normal range of motion.   Bilateral trace peripheral edema    Lymphadenopathy:     He has no cervical adenopathy.   Neurological: He is alert and oriented to person, place, and time. He exhibits normal muscle tone. Coordination normal.   Skin: Skin is warm and dry.   Psychiatric: He has a normal mood and affect. His behavior is normal.   Vitals reviewed.      Labs:  Lab Results   Component Value Date    WBC 9.27 04/15/2019    HGB 12.5 (L) 04/15/2019    HCT 41.0 04/15/2019     04/15/2019    K 4.3 04/15/2019     04/15/2019    CO2 21 (L) 04/15/2019    BUN 50 (H) 04/15/2019    CREATININE 4.3 (H) 04/15/2019    EGFRNONAA 13.8 (A) 04/15/2019    CALCIUM 9.5 04/15/2019    PHOS 4.1 04/15/2019    ALBUMIN 3.5 04/15/2019    AST 17 03/11/2019    ALT 13 03/11/2019    UTPCR 1.49 (H) 04/15/2019    .0 (H) 01/25/2019       Lab Results   Component Value Date    BILIRUBINUA Negative 04/15/2019    PROTEINUA 2+ (A) 04/15/2019    NITRITE Negative 04/15/2019    RBCUA 0 04/15/2019    WBCUA 6 (H) 04/15/2019       No results found for: HLAABCTYPE    Labs were reviewed with the patient.    Assessment:     1. Pre-transplant evaluation for chronic kidney disease    2. Hypertension associated with diabetes    3.  Coronary artery disease of native artery of native heart with stable angina pectoris    4. Benign non-nodular prostatic hyperplasia without lower urinary tract symptoms    5. Multiple thyroid nodules    6. Elevated PSA    7. Essential hypertension        Plan:    Thyroid nodules--repeat US 1 year for surveillance and add TSH next labs    Elevated PSA--drawn today results pending  Urology clearance --if acceptable by the committee  Apt scheduled 11/11/2019 with Dr Fallon       High KDPI--no d/t weight  Pt meets organizational criteria for HCV kidney  Pt declines accepting HCV kidney         Kidney allocation scheme was also discussed with the patient.  Patient was advised that the average wait time for a kidney in [place] is 3-5 years     Kidney donor profile index (KPDI) and estimated post-transplant survival scores (EPTS) were reviewed. The benefits and risks of accepting a kidney with KDPI > 85% were explained to the patient. Patient verbalized understanding and consented to accept a kidney with KDPI > 85%       The side effects of immunosuppressants were reviewed that include but are not limited to the risk of infection, the risks of cancer (skin and lymphoma being most common), the risk of diabetes associated with prednisone use, acceleration of heart disease and diarrhea( this being more common post transplant).     Reviewed the hospital stay.  Reviewed the post transplant follow up.  Reviewed the importance of maintaining a good weight.  Reviewed the importance of controlling BP.  Reviewed the importance of following the renal diet.        Transplant Candidacy:   Based on available information, Mr. Alves is a suitable kidney transplant candidate assuming he is cleared by urology.  Final determination of transplant candidacy will be made once workup is complete and reviewed by the selection committee.    Jessica Collins, JES       OS Patient Status  Functional Status: 80% - Normal activity with effort:  some symptoms of disease  Physical Capacity: No Limitations

## 2019-06-10 LAB — HPRA INTERPRETATION: NORMAL

## 2019-06-13 LAB
CLASS I ANTIBODY COMMENTS - LUMINEX: NORMAL
CLASS II ANTIBODIES - LUMINEX: NEGATIVE
CPRA %: 0
SERUM COLLECTION DT - LUMINEX CLASS I: NORMAL
SERUM COLLECTION DT - LUMINEX CLASS II: NORMAL
SPCL1 TESTING DATE: NORMAL
SPCL2 TESTING DATE: NORMAL
SPCLU TESTING DATE: NORMAL

## 2019-07-12 ENCOUNTER — LAB VISIT (OUTPATIENT)
Dept: LAB | Facility: HOSPITAL | Age: 64
End: 2019-07-12
Attending: INTERNAL MEDICINE
Payer: COMMERCIAL

## 2019-07-12 DIAGNOSIS — N18.4 CHRONIC KIDNEY DISEASE (CKD), STAGE IV (SEVERE): ICD-10-CM

## 2019-07-12 LAB
25(OH)D3+25(OH)D2 SERPL-MCNC: 26 NG/ML (ref 30–96)
ALBUMIN SERPL BCP-MCNC: 3.5 G/DL (ref 3.5–5.2)
ANION GAP SERPL CALC-SCNC: 11 MMOL/L (ref 8–16)
BACTERIA #/AREA URNS AUTO: ABNORMAL /HPF
BASOPHILS # BLD AUTO: 0.07 K/UL (ref 0–0.2)
BASOPHILS NFR BLD: 0.8 % (ref 0–1.9)
BILIRUB UR QL STRIP: NEGATIVE
BUN SERPL-MCNC: 50 MG/DL (ref 8–23)
CALCIUM SERPL-MCNC: 8.9 MG/DL (ref 8.7–10.5)
CHLORIDE SERPL-SCNC: 111 MMOL/L (ref 95–110)
CLARITY UR REFRACT.AUTO: CLEAR
CO2 SERPL-SCNC: 18 MMOL/L (ref 23–29)
COLOR UR AUTO: ABNORMAL
CREAT SERPL-MCNC: 5.2 MG/DL (ref 0.5–1.4)
CREAT UR-MCNC: 34 MG/DL (ref 23–375)
DIFFERENTIAL METHOD: ABNORMAL
EOSINOPHIL # BLD AUTO: 0.3 K/UL (ref 0–0.5)
EOSINOPHIL NFR BLD: 3.7 % (ref 0–8)
ERYTHROCYTE [DISTWIDTH] IN BLOOD BY AUTOMATED COUNT: 15 % (ref 11.5–14.5)
EST. GFR  (AFRICAN AMERICAN): 12.6 ML/MIN/1.73 M^2
EST. GFR  (NON AFRICAN AMERICAN): 10.9 ML/MIN/1.73 M^2
GLUCOSE SERPL-MCNC: 122 MG/DL (ref 70–110)
GLUCOSE UR QL STRIP: NEGATIVE
HCT VFR BLD AUTO: 40.6 % (ref 40–54)
HGB BLD-MCNC: 12.4 G/DL (ref 14–18)
HGB UR QL STRIP: ABNORMAL
HYALINE CASTS UR QL AUTO: 0 /LPF
IMM GRANULOCYTES # BLD AUTO: 0.09 K/UL (ref 0–0.04)
IMM GRANULOCYTES NFR BLD AUTO: 1.1 % (ref 0–0.5)
KETONES UR QL STRIP: NEGATIVE
LEUKOCYTE ESTERASE UR QL STRIP: ABNORMAL
LYMPHOCYTES # BLD AUTO: 1.6 K/UL (ref 1–4.8)
LYMPHOCYTES NFR BLD: 18.8 % (ref 18–48)
MCH RBC QN AUTO: 26.1 PG (ref 27–31)
MCHC RBC AUTO-ENTMCNC: 30.5 G/DL (ref 32–36)
MCV RBC AUTO: 85 FL (ref 82–98)
MICROSCOPIC COMMENT: ABNORMAL
MONOCYTES # BLD AUTO: 0.8 K/UL (ref 0.3–1)
MONOCYTES NFR BLD: 9.3 % (ref 4–15)
NEUTROPHILS # BLD AUTO: 5.5 K/UL (ref 1.8–7.7)
NEUTROPHILS NFR BLD: 66.3 % (ref 38–73)
NITRITE UR QL STRIP: NEGATIVE
NRBC BLD-RTO: 0 /100 WBC
PH UR STRIP: 6 [PH] (ref 5–8)
PHOSPHATE SERPL-MCNC: 3.3 MG/DL (ref 2.7–4.5)
PLATELET # BLD AUTO: 273 K/UL (ref 150–350)
PMV BLD AUTO: 11.9 FL (ref 9.2–12.9)
POTASSIUM SERPL-SCNC: 4.6 MMOL/L (ref 3.5–5.1)
PROT UR QL STRIP: ABNORMAL
PROT UR-MCNC: 38 MG/DL (ref 0–15)
PROT/CREAT UR: 1.12 MG/G{CREAT} (ref 0–0.2)
PTH-INTACT SERPL-MCNC: 373 PG/ML (ref 9–77)
RBC # BLD AUTO: 4.76 M/UL (ref 4.6–6.2)
RBC #/AREA URNS AUTO: 3 /HPF (ref 0–4)
SODIUM SERPL-SCNC: 140 MMOL/L (ref 136–145)
SP GR UR STRIP: 1 (ref 1–1.03)
URN SPEC COLLECT METH UR: ABNORMAL
WBC # BLD AUTO: 8.29 K/UL (ref 3.9–12.7)
WBC #/AREA URNS AUTO: 23 /HPF (ref 0–5)
WBC CLUMPS UR QL AUTO: ABNORMAL

## 2019-07-12 PROCEDURE — 83970 ASSAY OF PARATHORMONE: CPT | Mod: NTX

## 2019-07-12 PROCEDURE — 84156 ASSAY OF PROTEIN URINE: CPT | Mod: NTX

## 2019-07-12 PROCEDURE — 81001 URINALYSIS AUTO W/SCOPE: CPT | Mod: NTX

## 2019-07-12 PROCEDURE — 82306 VITAMIN D 25 HYDROXY: CPT | Mod: NTX

## 2019-07-12 PROCEDURE — 85025 COMPLETE CBC W/AUTO DIFF WBC: CPT | Mod: NTX

## 2019-07-12 PROCEDURE — 36415 COLL VENOUS BLD VENIPUNCTURE: CPT | Mod: PO,NTX

## 2019-07-12 PROCEDURE — 80069 RENAL FUNCTION PANEL: CPT | Mod: TXP

## 2019-07-17 ENCOUNTER — TELEPHONE (OUTPATIENT)
Dept: NEPHROLOGY | Facility: CLINIC | Age: 64
End: 2019-07-17

## 2019-07-17 ENCOUNTER — OFFICE VISIT (OUTPATIENT)
Dept: NEPHROLOGY | Facility: CLINIC | Age: 64
End: 2019-07-17
Payer: COMMERCIAL

## 2019-07-17 ENCOUNTER — TELEPHONE (OUTPATIENT)
Dept: UROLOGY | Facility: CLINIC | Age: 64
End: 2019-07-17

## 2019-07-17 VITALS
DIASTOLIC BLOOD PRESSURE: 94 MMHG | HEART RATE: 85 BPM | BODY MASS INDEX: 34.59 KG/M2 | HEIGHT: 73 IN | OXYGEN SATURATION: 99 % | WEIGHT: 261 LBS | RESPIRATION RATE: 20 BRPM | SYSTOLIC BLOOD PRESSURE: 136 MMHG

## 2019-07-17 DIAGNOSIS — N18.4 CHRONIC KIDNEY DISEASE (CKD), STAGE IV (SEVERE): Primary | ICD-10-CM

## 2019-07-17 DIAGNOSIS — N52.01 ERECTILE DYSFUNCTION DUE TO ARTERIAL INSUFFICIENCY: ICD-10-CM

## 2019-07-17 DIAGNOSIS — I10 HTN (HYPERTENSION), BENIGN: ICD-10-CM

## 2019-07-17 DIAGNOSIS — N40.0 BENIGN NON-NODULAR PROSTATIC HYPERPLASIA WITHOUT LOWER URINARY TRACT SYMPTOMS: ICD-10-CM

## 2019-07-17 PROCEDURE — 3080F PR MOST RECENT DIASTOLIC BLOOD PRESSURE >= 90 MM HG: ICD-10-PCS | Mod: CPTII,S$GLB,, | Performed by: INTERNAL MEDICINE

## 2019-07-17 PROCEDURE — 3075F SYST BP GE 130 - 139MM HG: CPT | Mod: CPTII,S$GLB,, | Performed by: INTERNAL MEDICINE

## 2019-07-17 PROCEDURE — 3008F PR BODY MASS INDEX (BMI) DOCUMENTED: ICD-10-PCS | Mod: CPTII,S$GLB,, | Performed by: INTERNAL MEDICINE

## 2019-07-17 PROCEDURE — 99999 PR PBB SHADOW E&M-EST. PATIENT-LVL III: CPT | Mod: PBBFAC,,, | Performed by: INTERNAL MEDICINE

## 2019-07-17 PROCEDURE — 3075F PR MOST RECENT SYSTOLIC BLOOD PRESS GE 130-139MM HG: ICD-10-PCS | Mod: CPTII,S$GLB,, | Performed by: INTERNAL MEDICINE

## 2019-07-17 PROCEDURE — 99214 PR OFFICE/OUTPT VISIT, EST, LEVL IV, 30-39 MIN: ICD-10-PCS | Mod: S$GLB,,, | Performed by: INTERNAL MEDICINE

## 2019-07-17 PROCEDURE — 99214 OFFICE O/P EST MOD 30 MIN: CPT | Mod: S$GLB,,, | Performed by: INTERNAL MEDICINE

## 2019-07-17 PROCEDURE — 99999 PR PBB SHADOW E&M-EST. PATIENT-LVL III: ICD-10-PCS | Mod: PBBFAC,,, | Performed by: INTERNAL MEDICINE

## 2019-07-17 PROCEDURE — 3008F BODY MASS INDEX DOCD: CPT | Mod: CPTII,S$GLB,, | Performed by: INTERNAL MEDICINE

## 2019-07-17 PROCEDURE — 3080F DIAST BP >= 90 MM HG: CPT | Mod: CPTII,S$GLB,, | Performed by: INTERNAL MEDICINE

## 2019-07-17 RX ORDER — TAMSULOSIN HYDROCHLORIDE 0.4 MG/1
0.4 CAPSULE ORAL 2 TIMES DAILY
Qty: 60 CAPSULE | Refills: 11 | Status: SHIPPED | OUTPATIENT
Start: 2019-07-17 | End: 2019-11-21 | Stop reason: SDUPTHER

## 2019-07-17 RX ORDER — TADALAFIL 10 MG/1
10 TABLET ORAL
Qty: 10 TABLET | Refills: 11 | Status: SHIPPED | OUTPATIENT
Start: 2019-07-17 | End: 2019-07-17 | Stop reason: SDUPTHER

## 2019-07-17 RX ORDER — TADALAFIL 10 MG/1
10 TABLET ORAL
Qty: 10 TABLET | Refills: 11 | Status: SHIPPED | OUTPATIENT
Start: 2019-07-17 | End: 2020-03-24 | Stop reason: SDUPTHER

## 2019-07-17 RX ORDER — CARVEDILOL 12.5 MG/1
12.5 TABLET ORAL 2 TIMES DAILY WITH MEALS
Qty: 60 TABLET | Refills: 11 | Status: SHIPPED | OUTPATIENT
Start: 2019-07-17 | End: 2020-01-02 | Stop reason: DRUGHIGH

## 2019-07-17 NOTE — PROGRESS NOTES
Subjective:       Patient ID: Isidro Alves is a 63 y.o.  male who presents for follow-up evaluation of   CKD stage 4/5 , HTN, DM-2, SHPT     Steven Morgan MD      HPI  : Isidro Alves Is a pleasant 63 -year-old  gentleman seen in office today in f/u for above medical problems. I saw him about 3 months ago,   He has chronic kidney disease  . Recent serum creatinine is 5 mg/dL .  Progressive decline in renal function explained to the patient. he has long-standing history of hypertension and diabetes ,  Nonadherence with his blood pressure medications is a big issue with him in the past. Patient says he has been more adherent with his medications in the recent past. Improvement in blood pressures noted. Also has been exercising regularly and trying to watch salt intake.   PSA levels were elevated and is currently following with urology.  Recent PSA level was better at 4.      patient sustained a fall while working on his roof in November 2018, he lost his consciousness and was admitted in intensive care unit at [facility] for about 3 weeks, following discharge she had to undergo few weeks of rehab, he sustained some rib fractures and pelvic fracture.  Patient reports improvement in his overall strength and recovering well.        Past Medical History:   Diagnosis Date    Anemia 4/16/2014    BPH (benign prostatic hyperplasia)     CKD (chronic kidney disease) stage 4, GFR 15-29 ml/min     Coronary artery disease of native artery of native heart with stable angina pectoris 4/29/2019    Diabetes mellitus     Diabetes mellitus, type 2     Elevated PSA     Encounter for blood transfusion     Herpes labialis     Hyperlipidemia     Hypertension     Obesity     Proteinuria     Secondary hyperparathyroidism (of renal origin)     UTI (lower urinary tract infection) 5/1/2015       Current Outpatient Medications on File Prior to Visit   Medication Sig Dispense Refill    acyclovir  "(ZOVIRAX) 400 MG tablet TAKE 1 TABLET 3 TIMES DAILY AS NEEDED 30 tablet 6    aspirin (ECOTRIN) 81 MG EC tablet Take 1 tablet (81 mg total) by mouth once daily.      atorvastatin (LIPITOR) 40 MG tablet Take 1 tablet (40 mg total) by mouth once daily. 90 tablet 3    BD INSULIN SYRINGE HALF UNIT 0.3 mL 31 gauge x 5/16" Syrg FOR USE WITH INSULIN. 100 Syringe 1    carvedilol (COREG) 3.125 MG tablet Take 1 tablet (3.125 mg total) by mouth 2 (two) times daily with meals. 60 tablet 5    esomeprazole (NEXIUM) 40 MG capsule Take 40 mg by mouth once daily.      finasteride (PROSCAR) 5 mg tablet Take 1 tablet (5 mg total) by mouth once daily. 30 tablet 11    folic acid (FOLVITE) 1 MG tablet TAKE 1 TABLET (1 MG TOTAL) BY MOUTH ONCE DAILY. 90 tablet 1    furosemide (LASIX) 80 MG tablet Take 80 mg by mouth once daily.  11    insulin degludec-liraglutide (XULTOPHY 100/3.6) 100 unit-3.6 mg /mL (3 mL) InPn INJECT 35 UNITS INTO THE SKIN ONCE DAILY. 15 Syringe 1    lancets (ONETOUCH ULTRASOFT LANCETS) Misc 1 lancet by Misc.(Non-Drug; Combo Route) route 2 (two) times daily. (Patient taking differently: 1 lancet by Misc.(Non-Drug; Combo Route) route 3 (three) times daily. ) 200 each 1    multivitamin (THERAGRAN) tablet Take 1 tablet by mouth once daily.      NIFEdipine (PROCARDIA-XL) 90 MG (OSM) 24 hr tablet Take 1 tablet (90 mg total) by mouth once daily. 30 tablet 5    olopatadine (PATADAY) 0.2 % Drop Place 1 drop into both eyes daily as needed. 2.5 mL 0    ONETOUCH ULTRA BLUE TEST STRIP Strp 1 STRIP BY MISC.(NON-DRUG COMBO ROUTE) ROUTE 3 (THREE) TIMES DAILY. 100 strip 2    sodium bicarbonate 650 MG tablet Take 1 tablet (650 mg total) by mouth 2 (two) times daily. 60 tablet 11    tadalafil (CIALIS) 10 MG tablet Take 1 tablet (10 mg total) by mouth as needed for Erectile Dysfunction. 10 tablet 11    tamsulosin (FLOMAX) 0.4 mg Cap Take 1 capsule (0.4 mg total) by mouth 2 (two) times daily. (Patient taking differently: " Take 0.4 mg by mouth once daily. ) 60 capsule 11    valsartan (DIOVAN) 320 MG tablet TAKE 1 TABLET (320 MG TOTAL) BY MOUTH ONCE DAILY. 90 tablet 3    VITAMIN D2 50,000 unit capsule TAKE 1 CAPSULE (50,000 UNITS TOTAL) BY MOUTH EVERY 7 DAYS. 4 capsule 6     No current facility-administered medications on file prior to visit.         PSH: thoracotomy in 2010      Family history : positive for chronic kidney disease and hemodialysis in his father who is no more.      Social history : he owns a restaurant. He never smoked. He drinks alcohol occasionally. He has 3 children     Review of Systems  :      Constitutional: Negative for activity change and appetite change.   HENT: Negative for congestion and facial swelling.    Eyes: Negative for pain, discharge and redness.   Respiratory: Negative for apnea, cough and chest tightness.    Cardiovascular: Negative for chest pain, palpitations and leg swelling.   Gastrointestinal: Negative for abdominal distention.   Genitourinary: Negative for difficulty urinating, dysuria and frequency.   Musculoskeletal: Positive for arthralgias. Negative for neck pain and neck stiffness.   Skin: Negative for color change, rash and wound.   Neurological: Negative for dizziness, weakness and numbness.   Psychiatric/Behavioral: Negative for sleep disturbance.   All other systems reviewed and are negative.      Objective:           Vitals:    07/17/19 1313   BP: (!) 136/94   Pulse: 85   Resp: 20       Weight 261 lb, previous weight was 246 lb      Physical Exam  :         Nursing note and vitals reviewed.   Constitutional: He is oriented to person, place, and time. He appears well-developed and well-nourished. No distress.   HENT: Head: Normocephalic and atraumatic. Pupils are equal, round, and reactive to light.   Neck: Normal range of motion. Neck supple. No tracheal deviation present. No thyromegaly present.   Cardiovascular: Normal rate, regular rhythm, normal heart sounds and intact distal  pulses. gallop and no friction rub. No murmur heard.   Pulmonary/Chest: Effort normal and breath sounds normal. He has no wheezes. He has no rales.   Abdominal: obese, Soft. He exhibits no mass. There is no tenderness. There is no rebound and no guarding.   Musculoskeletal: Normal range of motion. He exhibits no edema.   Lymphadenopathy: He has no cervical adenopathy.   Neurological: He is alert and oriented to person, place, and time.   Skin: Skin is warm. No rash noted. He is not diaphoretic. No erythema.       Labs:    Lab Results   Component Value Date    CREATININE 5.2 (H) 07/12/2019    BUN 50 (H) 07/12/2019     07/12/2019    K 4.6 07/12/2019     (H) 07/12/2019    CO2 18 (L) 07/12/2019       Lab Results   Component Value Date    WBC 8.29 07/12/2019    HGB 12.4 (L) 07/12/2019    HCT 40.6 07/12/2019    MCV 85 07/12/2019     07/12/2019       Lab Results   Component Value Date    URICACID 8.6 (H) 01/25/2019       Lab Results   Component Value Date    .0 (H) 07/12/2019    CALCIUM 8.9 07/12/2019    PHOS 3.3 07/12/2019       Impression and Plan: 63 - year-old  gentleman seen in office today in f/u for following medical problems ,      1. Hypertension : Blood pressure is elevated,  discussed low-salt diet and adherence with his medications.  Currently on nifedipine Coreg and Diovan, will increase carvedilol from 6.25 mg twice a day to 12.5 mg twice a day,       2. Chronic kidney disease stage 4/5  : Secondary to long-standing history of hypertension and diabetes. Again advised patient to avoid NSAIDs.  serum creatinine is 5 mg/dl , work up for kidney transplant is in progress,      3. Diabetes mellitus type 2 : discussed compliance with his diet and medications. Discussed weight loss and daily exercise.      4. Secondary hyperparathyroidism - PTH is acceptable for degree of renal dysfunction, cont Vit D ,      5. Proteinuria - about 1.5 g daily. cont Diovan. Discussed diabetes  control and adherence with [facility]B      6. BPH : Follow up with urology ,      7. Obesity -  discuss calorie restriction weight loss,     8. Metabolic acidosis :  follow , on sodium bicarbonate supplements     9.  Anemia of chronic kidney disease - stable hemoglobin at 12.5 g,     I will see himn followup in about 2 months , Face-to-face time was 25 minutes discussing his lab results and plan of care.      Gbaino Champion M.D.

## 2019-07-17 NOTE — TELEPHONE ENCOUNTER
----- Message from Barbie Beavers sent at 7/17/2019  2:34 PM CDT -----  Contact: patient  Calling concerning needing to get a pre authorization to receive Cilias. Please call patient @ [phone] ASAP today. Thanks, zully

## 2019-07-17 NOTE — PATIENT INSTRUCTIONS
Avoid NSAID pain medications such as advil, aleve, motrin, ibuprofen, naprosyn, meloxicam, diclofenac, mobic.       Increase Coreg to 12.5 mg twice a day

## 2019-07-17 NOTE — TELEPHONE ENCOUNTER
----- Message from Myla Bee sent at 7/17/2019  2:52 PM CDT -----  Contact: pt   Type:  Needs Medical Advice    Who Called: KENNEY TEJEDA   Symptoms (please be specific):   How long has patient had these symptoms:    Pharmacy name and phone #:    Would the patient rather a call back or a response via [facility]?: call   Best Call Back Number:  [phone] (home)    Additional Information: caller is requesting a call back from the nurse in regards to the pt needing an authorization for med CIALIS sent to his insurance company

## 2019-07-17 NOTE — TELEPHONE ENCOUNTER
Called and informed patient that medication will be sent to another pharmacy. He expressed understanding.

## 2019-07-17 NOTE — TELEPHONE ENCOUNTER
Returned call, explained that Dr. Champion approved Cialis Rx today. He verbalized understanding.

## 2019-07-25 ENCOUNTER — OFFICE VISIT (OUTPATIENT)
Dept: DIABETES | Facility: CLINIC | Age: 64
End: 2019-07-25
Payer: COMMERCIAL

## 2019-07-25 ENCOUNTER — TELEPHONE (OUTPATIENT)
Dept: DIABETES | Facility: CLINIC | Age: 64
End: 2019-07-25

## 2019-07-25 VITALS
SYSTOLIC BLOOD PRESSURE: 134 MMHG | DIASTOLIC BLOOD PRESSURE: 88 MMHG | BODY MASS INDEX: 33.21 KG/M2 | HEIGHT: 74 IN | WEIGHT: 258.81 LBS

## 2019-07-25 DIAGNOSIS — E78.5 HYPERLIPIDEMIA ASSOCIATED WITH TYPE 2 DIABETES MELLITUS: ICD-10-CM

## 2019-07-25 DIAGNOSIS — N18.4 TYPE 2 DIABETES MELLITUS WITH STAGE 4 CHRONIC KIDNEY DISEASE, WITH LONG-TERM CURRENT USE OF INSULIN: Primary | ICD-10-CM

## 2019-07-25 DIAGNOSIS — E11.69 HYPERLIPIDEMIA ASSOCIATED WITH TYPE 2 DIABETES MELLITUS: ICD-10-CM

## 2019-07-25 DIAGNOSIS — Z79.4 TYPE 2 DIABETES MELLITUS WITH STAGE 4 CHRONIC KIDNEY DISEASE, WITH LONG-TERM CURRENT USE OF INSULIN: Primary | ICD-10-CM

## 2019-07-25 DIAGNOSIS — I15.2 HYPERTENSION ASSOCIATED WITH DIABETES: ICD-10-CM

## 2019-07-25 DIAGNOSIS — E66.09 NON MORBID OBESITY DUE TO EXCESS CALORIES: ICD-10-CM

## 2019-07-25 DIAGNOSIS — E11.22 TYPE 2 DIABETES MELLITUS WITH STAGE 4 CHRONIC KIDNEY DISEASE, WITH LONG-TERM CURRENT USE OF INSULIN: Primary | ICD-10-CM

## 2019-07-25 DIAGNOSIS — E11.59 HYPERTENSION ASSOCIATED WITH DIABETES: ICD-10-CM

## 2019-07-25 LAB — GLUCOSE SERPL-MCNC: 120 MG/DL (ref 70–110)

## 2019-07-25 PROCEDURE — 3075F PR MOST RECENT SYSTOLIC BLOOD PRESS GE 130-139MM HG: ICD-10-PCS | Mod: CPTII,S$GLB,, | Performed by: NURSE PRACTITIONER

## 2019-07-25 PROCEDURE — 99214 OFFICE O/P EST MOD 30 MIN: CPT | Mod: S$GLB,,, | Performed by: NURSE PRACTITIONER

## 2019-07-25 PROCEDURE — 82962 GLUCOSE BLOOD TEST: CPT | Mod: S$GLB,,, | Performed by: NURSE PRACTITIONER

## 2019-07-25 PROCEDURE — 82962 POCT GLUCOSE, HAND-HELD DEVICE: ICD-10-PCS | Mod: S$GLB,,, | Performed by: NURSE PRACTITIONER

## 2019-07-25 PROCEDURE — 3008F PR BODY MASS INDEX (BMI) DOCUMENTED: ICD-10-PCS | Mod: CPTII,S$GLB,, | Performed by: NURSE PRACTITIONER

## 2019-07-25 PROCEDURE — 3044F PR MOST RECENT HEMOGLOBIN A1C LEVEL <7.0%: ICD-10-PCS | Mod: CPTII,S$GLB,, | Performed by: NURSE PRACTITIONER

## 2019-07-25 PROCEDURE — 99214 PR OFFICE/OUTPT VISIT, EST, LEVL IV, 30-39 MIN: ICD-10-PCS | Mod: S$GLB,,, | Performed by: NURSE PRACTITIONER

## 2019-07-25 PROCEDURE — 3075F SYST BP GE 130 - 139MM HG: CPT | Mod: CPTII,S$GLB,, | Performed by: NURSE PRACTITIONER

## 2019-07-25 PROCEDURE — 3044F HG A1C LEVEL LT 7.0%: CPT | Mod: CPTII,S$GLB,, | Performed by: NURSE PRACTITIONER

## 2019-07-25 PROCEDURE — 99999 PR PBB SHADOW E&M-EST. PATIENT-LVL III: CPT | Mod: PBBFAC,,, | Performed by: NURSE PRACTITIONER

## 2019-07-25 PROCEDURE — 3008F BODY MASS INDEX DOCD: CPT | Mod: CPTII,S$GLB,, | Performed by: NURSE PRACTITIONER

## 2019-07-25 PROCEDURE — 99999 PR PBB SHADOW E&M-EST. PATIENT-LVL III: ICD-10-PCS | Mod: PBBFAC,,, | Performed by: NURSE PRACTITIONER

## 2019-07-25 PROCEDURE — 3079F DIAST BP 80-89 MM HG: CPT | Mod: CPTII,S$GLB,, | Performed by: NURSE PRACTITIONER

## 2019-07-25 PROCEDURE — 3079F PR MOST RECENT DIASTOLIC BLOOD PRESSURE 80-89 MM HG: ICD-10-PCS | Mod: CPTII,S$GLB,, | Performed by: NURSE PRACTITIONER

## 2019-07-25 NOTE — PROGRESS NOTES
"Subjective:         Patient ID: Isidro Alves is a 63 y.o. male.  Patient's current PCP is Steven Morgan MD.       Chief Complaint: Diabetes Mellitus    HPI   Isidro Alves is a 63 y.o. Black or  male presenting for follow up of diabetes. Patient has been diagnosed with diabetes for over 20 years and has the following complications from diabetes: nephropathy and peripheral neuropathy. Blood glucose testing is performed regularly. Since last visit, patient reports blood glucose values to have approximately ranged from 90-120s, fasting, less than 180 PP. Condition has remained stable and at goal since last visit.     He denies any recent hospital admissions, emergency room visits related to DM.      Height: 6' 2" (188 cm)  //  Weight: 117.4 kg (258 lb 13.1 oz), Body mass index is 33.23 kg/m².  His blood sugar in clinic today is:   Lab Results   Component Value Date    POCGLU 120 (A) 07/25/2019       Labs reviewed and are noted below.    His most recent A1C is:   Lab Results   Component Value Date    HGBA1C 6.2 (H) 03/11/2019     Lab Results   Component Value Date    CPEPTIDE 3.88 11/20/2017     Lab Results   Component Value Date    GLUTAMICACID 0.00 11/20/2017         CURRENT DM MEDICATIONS:     Xultophy 35 units daily    No current facility-administered medications for this visit.        No current facility-administered medications for this visit.        Health Maintenance   Topic Date Due    Influenza Vaccine  08/01/2019    Eye Exam  09/05/2019    Hemoglobin A1c  09/11/2019    Lipid Panel  03/11/2020    Foot Exam  04/22/2020    High Dose Statin  07/25/2020    Aspirin/Antiplatelet Therapy  07/25/2020    Colonoscopy  12/21/2022    TETANUS VACCINE  05/05/2026    Hepatitis C Screening  Completed    Pneumococcal Vaccine (Medium Risk)  Completed       STANDARDS OF CARE:  Current Ophthalmologist/Optometrist: Dr. Del Real.  [facility]  Current Podiatrist: No  ACE/ARB: Yes  Statin: Yes  He  has " attended diabetes education in the past.     BLOOD GLUCOSE TESTING: Patient reports testing on average a total of 2-3 times per day.    Review of Systems   Constitutional: Negative.  Negative for activity change, appetite change, fatigue and unexpected weight change.   Eyes: Negative for visual disturbance.   Gastrointestinal: Negative for constipation, diarrhea and nausea.   Endocrine: Negative.  Negative for cold intolerance, heat intolerance, polydipsia, polyphagia and polyuria.   Skin: Negative for wound.         Objective:      Physical Exam   Constitutional: He appears well-developed and well-nourished.   HENT:   Head: Normocephalic and atraumatic.   Cardiovascular: Normal rate.   Pulmonary/Chest: Effort normal.   Skin: Skin is warm and dry.   Psychiatric: He has a normal mood and affect. His speech is normal and behavior is normal. Judgment and thought content normal.   Nursing note and vitals reviewed.      Assessment:       1. Type 2 diabetes mellitus with stage 4 chronic kidney disease, with long-term current use of insulin    2. Non morbid obesity due to excess calories    3. Hyperlipidemia associated with type 2 diabetes mellitus    4. Hypertension associated with diabetes        Plan:   Type 2 diabetes mellitus with stage 4 chronic kidney disease, with long-term current use of insulin  -     POCT Glucose, Hand-Held Device  -     TSH; Future; Expected date: 07/25/2019  -     Microalbumin/creatinine urine ratio; Future; Expected date: 07/25/2019  -     insulin degludec-liraglutide (XULTOPHY 100/3.6) 100 unit-3.6 mg /mL (3 mL) InPn; INJECT 35 UNITS INTO THE SKIN ONCE DAILY.  Dispense: 15 Syringe; Refill: 1    - Condition at goal. Continue current regimen. DM education reviewed. Patient encouraged to carb count and exercise per recommendations. Labs and Referrals as noted. Patient instructed to send in log once weekly for my review. RV scheduled in 3 months. Patient has been referred to transplant team for  consideration for kidney transplantation. I will reach out to transplant team to see if patient would qualify for Simultaneous Pancreas Kidney transplant which can be advantageous for diabetic patients.     Non morbid obesity due to excess calories    - DM diet discussed.     Hyperlipidemia associated with type 2 diabetes mellitus    - LDL at goal. Cardiology managing.     Hypertension associated with diabetes    - Stable      Additional Plan Details:    1.) Patient was instructed to monitor blood glucose 2 - 3 x daily, fasting and ac dinner or at bedtime. Discussed ADA goal for fasting blood sugar, 80 - 130mg/dL; pp blood sugars below 180 mg/dl. Also, discussed prevention of hypoglycemia and the need to adjust goals to higher levels if persistent hypoglycemia.  Reminded to bring BG records or meter to each visit for review.    2.) The patient was explained the above plan and given opportunity to ask questions.  He understands, chooses and consents to this plan and accepts all the risks, which include but are not limited to the risks mentioned above. He understands the alternative of having no testing, interventions or treatments at this time. He left content and without further questions.     A total of 30 minutes was spent in face to face time, of which over 50% was spent in counseling patient on disease process, complications, treatment, and side effects of medications.        GIBRAN Carrion

## 2019-07-25 NOTE — TELEPHONE ENCOUNTER
Spoke with patient about BMI requirements for pancreas transplantation. Patient voiced understanding.

## 2019-07-25 NOTE — PATIENT INSTRUCTIONS
CONTINUE CURRENT REGIMEN!!!      IF YOU ARE HAPPY WITH YOUR VISIT TODAY, PLEASE FILL OUT THE SURVEY THAT MAY BE SENT TO YOUR EMAIL ADDRESS OR MAILBOX FOLLOWING THIS VISIT. WE LOVE TO HEAR YOUR COMMENTS! HAVE A WONDERFUL DAY!

## 2019-08-07 RX ORDER — FOLIC ACID 1 MG/1
TABLET ORAL
Qty: 30 TABLET | Refills: 5 | Status: SHIPPED | OUTPATIENT
Start: 2019-08-07 | End: 2020-01-02 | Stop reason: SDUPTHER

## 2019-08-16 ENCOUNTER — TELEPHONE (OUTPATIENT)
Dept: INTERNAL MEDICINE | Facility: CLINIC | Age: 64
End: 2019-08-16

## 2019-08-16 NOTE — TELEPHONE ENCOUNTER
Spoke with patient to schedule an appointment for hypertension with Dr. Steven Morgan. Pt was out of town will callback later to schedule

## 2019-08-23 ENCOUNTER — COMMITTEE REVIEW (OUTPATIENT)
Dept: TRANSPLANT | Facility: CLINIC | Age: 64
End: 2019-08-23

## 2019-08-23 DIAGNOSIS — Z76.82 ORGAN TRANSPLANT CANDIDATE: Primary | ICD-10-CM

## 2019-08-23 NOTE — LETTER
August 23, 2019    Gabino Champion MD  [address] [facility]  [place] [address]    Phone: [phone]  Fax: [phone]       Dear Dr. Gabino Champion:    Patient: Isidro Alves   MR Number: [id number]   YOB: 1955     Your patient, Isidro Alves, was recently discussed at the [facility] Selection Committee meeting on 11/30/2018. I am happy to inform you that Isidro has been approved for transplantation.  He has met selection criteria for a kidney transplant related to CKD stage 5 secondary to primary diagnosis of Diabetes Mellitus - Type II. Your patient will be placed on the cadaveric wait list pending final financial approval from insurance company.     We appreciate your confidence in allowing us to participate in your patients care.  If you have any questions or concerns, please do not hesitate to contact me.    Sincerely,      Lynn Kincaid MD  Medical Director, Kidney & Kidney/Pancreas Transplantation    [facility]

## 2019-08-23 NOTE — COMMITTEE REVIEW
Native Organ Dx: Diabetes Mellitus - Type II      SELECTION COMMITTEE NOTE    Isidro Alves was re-presented at selection committee on 08/23/2019.  Patient met selection criteria for kidney transplant related to CKD V due to  Diabetes Mellitus - Type II.  No absolute contraindications to transplant at this time.  Patient will be placed on the cadaveric wait list pending final financial approval from insurance company.  Patient will return to clinic for routine appointment in 1 year(s). Patient does not meet criteria for High KDPI kidney offer due to weight greater than 75 kgs.  Patient meets HCV+ kidney offer. Patient does not meet criteria for dual/enbloc, due to weight greater than 75 kgs.    Spoken to patient. All questions were answered and patient verbalized understanding.       Note written by Nora Corbin RN    ===============================================    I was present at the meeting and attest to the decision of the committee.    Aleida Anders MD

## 2019-08-29 ENCOUNTER — TELEPHONE (OUTPATIENT)
Dept: INTERNAL MEDICINE | Facility: CLINIC | Age: 64
End: 2019-08-29

## 2019-08-29 DIAGNOSIS — E78.5 HYPERLIPIDEMIA ASSOCIATED WITH TYPE 2 DIABETES MELLITUS: Primary | ICD-10-CM

## 2019-08-29 DIAGNOSIS — E11.69 HYPERLIPIDEMIA ASSOCIATED WITH TYPE 2 DIABETES MELLITUS: Primary | ICD-10-CM

## 2019-08-29 NOTE — TELEPHONE ENCOUNTER
----- Message from Becca Lizarraga sent at 8/29/2019  4:08 PM CDT -----  Contact: Self  Mr. Alves would like to know if he will need lab work done before his appointment with Dr. Morgan. If he does, he would like to have them added to the lab work he is currently scheduled to do for Dr. Champion on 09/05/19. Please call Mr. Alves to let him know at [phone]

## 2019-09-05 ENCOUNTER — LAB VISIT (OUTPATIENT)
Dept: LAB | Facility: HOSPITAL | Age: 64
End: 2019-09-05
Attending: INTERNAL MEDICINE
Payer: COMMERCIAL

## 2019-09-05 ENCOUNTER — PATIENT OUTREACH (OUTPATIENT)
Dept: ADMINISTRATIVE | Facility: HOSPITAL | Age: 64
End: 2019-09-05

## 2019-09-05 DIAGNOSIS — E11.22 TYPE 2 DIABETES MELLITUS WITH STAGE 4 CHRONIC KIDNEY DISEASE, WITH LONG-TERM CURRENT USE OF INSULIN: ICD-10-CM

## 2019-09-05 DIAGNOSIS — E11.69 HYPERLIPIDEMIA ASSOCIATED WITH TYPE 2 DIABETES MELLITUS: ICD-10-CM

## 2019-09-05 DIAGNOSIS — N18.4 CHRONIC KIDNEY DISEASE (CKD), STAGE IV (SEVERE): ICD-10-CM

## 2019-09-05 DIAGNOSIS — N18.4 TYPE 2 DIABETES MELLITUS WITH STAGE 4 CHRONIC KIDNEY DISEASE, WITH LONG-TERM CURRENT USE OF INSULIN: ICD-10-CM

## 2019-09-05 DIAGNOSIS — E78.5 HYPERLIPIDEMIA ASSOCIATED WITH TYPE 2 DIABETES MELLITUS: ICD-10-CM

## 2019-09-05 DIAGNOSIS — Z76.82 ORGAN TRANSPLANT CANDIDATE: ICD-10-CM

## 2019-09-05 DIAGNOSIS — Z79.4 TYPE 2 DIABETES MELLITUS WITH STAGE 4 CHRONIC KIDNEY DISEASE, WITH LONG-TERM CURRENT USE OF INSULIN: ICD-10-CM

## 2019-09-05 LAB
25(OH)D3+25(OH)D2 SERPL-MCNC: 32 NG/ML (ref 30–96)
ALBUMIN SERPL BCP-MCNC: 3.8 G/DL (ref 3.5–5.2)
ANION GAP SERPL CALC-SCNC: 11 MMOL/L (ref 8–16)
BACTERIA #/AREA URNS AUTO: ABNORMAL /HPF
BASOPHILS # BLD AUTO: 0.04 K/UL (ref 0–0.2)
BASOPHILS NFR BLD: 0.5 % (ref 0–1.9)
BILIRUB UR QL STRIP: NEGATIVE
BUN SERPL-MCNC: 61 MG/DL (ref 8–23)
CALCIUM SERPL-MCNC: 9.1 MG/DL (ref 8.7–10.5)
CHLORIDE SERPL-SCNC: 108 MMOL/L (ref 95–110)
CHOLEST SERPL-MCNC: 136 MG/DL (ref 120–199)
CHOLEST/HDLC SERPL: 4.3 {RATIO} (ref 2–5)
CLARITY UR REFRACT.AUTO: ABNORMAL
CO2 SERPL-SCNC: 19 MMOL/L (ref 23–29)
COLOR UR AUTO: YELLOW
CREAT SERPL-MCNC: 5.7 MG/DL (ref 0.5–1.4)
CREAT UR-MCNC: 73 MG/DL (ref 23–375)
DIFFERENTIAL METHOD: ABNORMAL
EOSINOPHIL # BLD AUTO: 0.3 K/UL (ref 0–0.5)
EOSINOPHIL NFR BLD: 3.7 % (ref 0–8)
ERYTHROCYTE [DISTWIDTH] IN BLOOD BY AUTOMATED COUNT: 14.9 % (ref 11.5–14.5)
EST. GFR  (AFRICAN AMERICAN): 11.2 ML/MIN/1.73 M^2
EST. GFR  (NON AFRICAN AMERICAN): 9.7 ML/MIN/1.73 M^2
ESTIMATED AVG GLUCOSE: 151 MG/DL (ref 68–131)
GLUCOSE SERPL-MCNC: 166 MG/DL (ref 70–110)
GLUCOSE UR QL STRIP: ABNORMAL
HBA1C MFR BLD HPLC: 6.9 % (ref 4–5.6)
HCT VFR BLD AUTO: 41 % (ref 40–54)
HDLC SERPL-MCNC: 32 MG/DL (ref 40–75)
HDLC SERPL: 23.5 % (ref 20–50)
HGB BLD-MCNC: 12.4 G/DL (ref 14–18)
HGB UR QL STRIP: ABNORMAL
HYALINE CASTS UR QL AUTO: 0 /LPF
IMM GRANULOCYTES # BLD AUTO: 0.05 K/UL (ref 0–0.04)
IMM GRANULOCYTES NFR BLD AUTO: 0.6 % (ref 0–0.5)
KETONES UR QL STRIP: NEGATIVE
LDLC SERPL CALC-MCNC: 73.8 MG/DL (ref 63–159)
LEUKOCYTE ESTERASE UR QL STRIP: ABNORMAL
LYMPHOCYTES # BLD AUTO: 1.2 K/UL (ref 1–4.8)
LYMPHOCYTES NFR BLD: 15.2 % (ref 18–48)
MCH RBC QN AUTO: 25.9 PG (ref 27–31)
MCHC RBC AUTO-ENTMCNC: 30.2 G/DL (ref 32–36)
MCV RBC AUTO: 86 FL (ref 82–98)
MICROSCOPIC COMMENT: ABNORMAL
MONOCYTES # BLD AUTO: 0.6 K/UL (ref 0.3–1)
MONOCYTES NFR BLD: 7.8 % (ref 4–15)
NEUTROPHILS # BLD AUTO: 5.6 K/UL (ref 1.8–7.7)
NEUTROPHILS NFR BLD: 72.2 % (ref 38–73)
NITRITE UR QL STRIP: NEGATIVE
NONHDLC SERPL-MCNC: 104 MG/DL
NRBC BLD-RTO: 0 /100 WBC
PH UR STRIP: 5 [PH] (ref 5–8)
PHOSPHATE SERPL-MCNC: 4.4 MG/DL (ref 2.7–4.5)
PLATELET # BLD AUTO: 283 K/UL (ref 150–350)
PMV BLD AUTO: 12.3 FL (ref 9.2–12.9)
POTASSIUM SERPL-SCNC: 4.7 MMOL/L (ref 3.5–5.1)
PROT UR QL STRIP: ABNORMAL
PROT UR-MCNC: 73 MG/DL (ref 0–15)
PROT/CREAT UR: 1 MG/G{CREAT} (ref 0–0.2)
PTH-INTACT SERPL-MCNC: 481 PG/ML (ref 9–77)
RBC # BLD AUTO: 4.78 M/UL (ref 4.6–6.2)
RBC #/AREA URNS AUTO: 1 /HPF (ref 0–4)
SODIUM SERPL-SCNC: 138 MMOL/L (ref 136–145)
SP GR UR STRIP: 1.01 (ref 1–1.03)
SQUAMOUS #/AREA URNS AUTO: 1 /HPF
TRIGL SERPL-MCNC: 151 MG/DL (ref 30–150)
TSH SERPL DL<=0.005 MIU/L-ACNC: 1.46 UIU/ML (ref 0.4–4)
URN SPEC COLLECT METH UR: ABNORMAL
WBC # BLD AUTO: 7.74 K/UL (ref 3.9–12.7)
WBC #/AREA URNS AUTO: 92 /HPF (ref 0–5)
WBC CLUMPS UR QL AUTO: ABNORMAL

## 2019-09-05 PROCEDURE — 84443 ASSAY THYROID STIM HORMONE: CPT | Mod: NTX

## 2019-09-05 PROCEDURE — 86832 HLA CLASS I HIGH DEFIN QUAL: CPT | Mod: TXP

## 2019-09-05 PROCEDURE — 82570 ASSAY OF URINE CREATININE: CPT | Mod: TXP

## 2019-09-05 PROCEDURE — 83970 ASSAY OF PARATHORMONE: CPT | Mod: TXP

## 2019-09-05 PROCEDURE — 80069 RENAL FUNCTION PANEL: CPT | Mod: NTX

## 2019-09-05 PROCEDURE — 80061 LIPID PANEL: CPT | Mod: TXP

## 2019-09-05 PROCEDURE — 36415 COLL VENOUS BLD VENIPUNCTURE: CPT | Mod: PO,TXP

## 2019-09-05 PROCEDURE — 86833 HLA CLASS II HIGH DEFIN QUAL: CPT | Mod: TXP

## 2019-09-05 PROCEDURE — 83036 HEMOGLOBIN GLYCOSYLATED A1C: CPT | Mod: NTX

## 2019-09-05 PROCEDURE — 81001 URINALYSIS AUTO W/SCOPE: CPT | Mod: NTX

## 2019-09-05 PROCEDURE — 85025 COMPLETE CBC W/AUTO DIFF WBC: CPT | Mod: NTX

## 2019-09-05 PROCEDURE — 82306 VITAMIN D 25 HYDROXY: CPT | Mod: NTX

## 2019-09-12 ENCOUNTER — OFFICE VISIT (OUTPATIENT)
Dept: NEPHROLOGY | Facility: CLINIC | Age: 64
End: 2019-09-12
Payer: COMMERCIAL

## 2019-09-12 VITALS
WEIGHT: 258.81 LBS | HEIGHT: 74 IN | HEART RATE: 74 BPM | RESPIRATION RATE: 20 BRPM | SYSTOLIC BLOOD PRESSURE: 138 MMHG | BODY MASS INDEX: 33.21 KG/M2 | DIASTOLIC BLOOD PRESSURE: 88 MMHG

## 2019-09-12 DIAGNOSIS — N18.5 CHRONIC KIDNEY DISEASE (CKD), STAGE V: Primary | ICD-10-CM

## 2019-09-12 PROCEDURE — 99999 PR PBB SHADOW E&M-EST. PATIENT-LVL III: ICD-10-PCS | Mod: PBBFAC,,, | Performed by: INTERNAL MEDICINE

## 2019-09-12 PROCEDURE — 99214 PR OFFICE/OUTPT VISIT, EST, LEVL IV, 30-39 MIN: ICD-10-PCS | Mod: S$GLB,,, | Performed by: INTERNAL MEDICINE

## 2019-09-12 PROCEDURE — 3008F PR BODY MASS INDEX (BMI) DOCUMENTED: ICD-10-PCS | Mod: CPTII,S$GLB,, | Performed by: INTERNAL MEDICINE

## 2019-09-12 PROCEDURE — 99214 OFFICE O/P EST MOD 30 MIN: CPT | Mod: S$GLB,,, | Performed by: INTERNAL MEDICINE

## 2019-09-12 PROCEDURE — 99999 PR PBB SHADOW E&M-EST. PATIENT-LVL III: CPT | Mod: PBBFAC,,, | Performed by: INTERNAL MEDICINE

## 2019-09-12 PROCEDURE — 3075F PR MOST RECENT SYSTOLIC BLOOD PRESS GE 130-139MM HG: ICD-10-PCS | Mod: CPTII,S$GLB,, | Performed by: INTERNAL MEDICINE

## 2019-09-12 PROCEDURE — 3079F PR MOST RECENT DIASTOLIC BLOOD PRESSURE 80-89 MM HG: ICD-10-PCS | Mod: CPTII,S$GLB,, | Performed by: INTERNAL MEDICINE

## 2019-09-12 PROCEDURE — 3075F SYST BP GE 130 - 139MM HG: CPT | Mod: CPTII,S$GLB,, | Performed by: INTERNAL MEDICINE

## 2019-09-12 PROCEDURE — 3079F DIAST BP 80-89 MM HG: CPT | Mod: CPTII,S$GLB,, | Performed by: INTERNAL MEDICINE

## 2019-09-12 PROCEDURE — 3008F BODY MASS INDEX DOCD: CPT | Mod: CPTII,S$GLB,, | Performed by: INTERNAL MEDICINE

## 2019-09-12 NOTE — PATIENT INSTRUCTIONS
Avoid NSAID pain medications such as advil, aleve, motrin, ibuprofen, naprosyn, meloxicam, diclofenac, mobic.     Low salt diet

## 2019-09-12 NOTE — PROGRESS NOTES
Subjective:       Patient ID: Isidro Alves is a 64 y.o.   male who presents for follow-up evaluation of CKD stage 4/5 , HTN, DM-2, SHPT        Steven Morgan MD      HPI : Isidro Alves Is a pleasant 64 -year-old  gentleman seen in office today in f/u for above medical problems. I saw him about 2 months ago,   He has chronic kidney disease  . Recent serum creatinine is 5.7 mg/dL .  Progressive decline in renal function explained to the patient. he has long-standing history of hypertension and diabetes ,  Nonadherence with his blood pressure medications is a big issue with him in the past. Patient says he has been more adherent with his medications in the recent past.  Also has been exercising regularly and trying to watch salt intake.   PSA levels were elevated and is currently following with urology.  Recent PSA level was better at 4.      patient sustained a fall while working on his roof in November 2018, he lost his consciousness and was admitted in intensive care unit at [facility] for about 3 weeks, following discharge she had to undergo few weeks of rehab, he sustained some rib fractures and pelvic fracture.  Patient reports improvement in his overall strength and recovering well.              Past Medical History:   Diagnosis Date    Anemia 4/16/2014    BPH (benign prostatic hyperplasia)     CKD (chronic kidney disease) stage 4, GFR 15-29 ml/min     Coronary artery disease of native artery of native heart with stable angina pectoris 4/29/2019    Diabetes mellitus     Diabetes mellitus, type 2     Elevated PSA     Encounter for blood transfusion     Herpes labialis     Hyperlipidemia     Hypertension     Obesity     Proteinuria     Secondary hyperparathyroidism (of renal origin)     UTI (lower urinary tract infection) 5/1/2015         Current Outpatient Medications on File Prior to Visit   Medication Sig Dispense Refill    acyclovir (ZOVIRAX) 400 MG tablet TAKE  "1 TABLET 3 TIMES DAILY AS NEEDED 30 tablet 6    aspirin (ECOTRIN) 81 MG EC tablet Take 1 tablet (81 mg total) by mouth once daily.      atorvastatin (LIPITOR) 40 MG tablet Take 1 tablet (40 mg total) by mouth once daily. 90 tablet 3    BD INSULIN SYRINGE HALF UNIT 0.3 mL 31 gauge x 5/16" Syrg FOR USE WITH INSULIN. 100 Syringe 1    carvedilol (COREG) 12.5 MG tablet Take 1 tablet (12.5 mg total) by mouth 2 (two) times daily with meals. 60 tablet 11    esomeprazole (NEXIUM) 40 MG capsule Take 40 mg by mouth once daily.      finasteride (PROSCAR) 5 mg tablet Take 1 tablet (5 mg total) by mouth once daily. 30 tablet 11    folic acid (FOLVITE) 1 MG tablet TAKE 1 TABLET BY MOUTH EVERY DAY 30 tablet 5    furosemide (LASIX) 80 MG tablet Take 80 mg by mouth once daily.  11    insulin degludec-liraglutide (XULTOPHY 100/3.6) 100 unit-3.6 mg /mL (3 mL) InPn INJECT 35 UNITS INTO THE SKIN ONCE DAILY. 15 Syringe 1    lancets (ONETOUCH ULTRASOFT LANCETS) Misc 1 lancet by Misc.(Non-Drug; Combo Route) route 2 (two) times daily. (Patient taking differently: 1 lancet by Misc.(Non-Drug; Combo Route) route 3 (three) times daily. ) 200 each 1    multivitamin (THERAGRAN) tablet Take 1 tablet by mouth once daily.      NIFEdipine (PROCARDIA-XL) 90 MG (OSM) 24 hr tablet Take 1 tablet (90 mg total) by mouth once daily. 30 tablet 5    olopatadine (PATADAY) 0.2 % Drop Place 1 drop into both eyes daily as needed. 2.5 mL 0    ONETOUCH ULTRA BLUE TEST STRIP Strp 1 STRIP BY MIS.(NON-DRUG COMBO ROUTE) ROUTE 3 (THREE) TIMES DAILY. 100 strip 2    sodium bicarbonate 650 MG tablet Take 1 tablet (650 mg total) by mouth 2 (two) times daily. 60 tablet 11    tadalafil (CIALIS) 10 MG tablet Take 1 tablet (10 mg total) by mouth as needed for Erectile Dysfunction. 10 tablet 11    tamsulosin (FLOMAX) 0.4 mg Cap Take 1 capsule (0.4 mg total) by mouth 2 (two) times daily. 60 capsule 11    valsartan (DIOVAN) 320 MG tablet TAKE 1 TABLET (320 MG " TOTAL) BY MOUTH ONCE DAILY. 90 tablet 3    VITAMIN D2 50,000 unit capsule TAKE 1 CAPSULE (50,000 UNITS TOTAL) BY MOUTH EVERY 7 DAYS. 4 capsule 6     No current facility-administered medications on file prior to visit.      PSH: thoracotomy in 2010      Family history : positive for chronic kidney disease and hemodialysis in his father who is no more.      Social history : he owns a restaurant. He never smoked. He drinks alcohol occasionally. He has 3 children     Review of Systems  :    Constitutional: Negative for activity change and appetite change.   HENT: Negative for congestion and facial swelling.    Eyes: Negative for pain, discharge and redness.   Respiratory: Negative for apnea, cough and chest tightness.    Cardiovascular: Negative for chest pain, palpitations and leg swelling.   Gastrointestinal: Negative for abdominal distention.   Genitourinary: Negative for difficulty urinating, dysuria and frequency.   Musculoskeletal: Positive for arthralgias. Negative for neck pain and neck stiffness.   Skin: Negative for color change, rash and wound.   Neurological: Negative for dizziness, weakness and numbness.   Psychiatric/Behavioral: Negative for sleep disturbance.   All other systems reviewed and are negative.        Objective:         Vitals:    09/12/19 1017   BP: 138/88   Pulse: 74   Resp: 20       Weight 258 lbs, last weight 261 lbs       Physical Exam  :         Nursing note and vitals reviewed.   Constitutional: He is oriented to person, place, and time. He appears well-developed and well-nourished. No distress.   HENT: Head: Normocephalic and atraumatic. Pupils are equal, round, and reactive to light.   Neck: Normal range of motion. Neck supple. No tracheal deviation present. No thyromegaly present.   Cardiovascular: Normal rate, regular rhythm, normal heart sounds and intact distal pulses. gallop and no friction rub. No murmur heard.   Pulmonary/Chest: Effort normal and breath sounds normal. He has no  wheezes. He has no rales.   Abdominal: obese, Soft. He exhibits no mass. There is no tenderness. There is no rebound and no guarding.   Musculoskeletal: Normal range of motion. He exhibits no edema.   Lymphadenopathy: He has no cervical adenopathy.   Neurological: He is alert and oriented to person, place, and time.   Skin: Skin is warm. No rash noted. He is not diaphoretic. No erythema.          Labs:    Lab Results   Component Value Date    CREATININE 5.7 (H) 09/05/2019    BUN 61 (H) 09/05/2019     09/05/2019    K 4.7 09/05/2019     09/05/2019    CO2 19 (L) 09/05/2019       Lab Results   Component Value Date    WBC 7.74 09/05/2019    HGB 12.4 (L) 09/05/2019    HCT 41.0 09/05/2019    MCV 86 09/05/2019     09/05/2019       Lab Results   Component Value Date    .0 (H) 09/05/2019    CALCIUM 9.1 09/05/2019    PHOS 4.4 09/05/2019       Lab Results   Component Value Date    URICACID 8.6 (H) 01/25/2019       Impression and Plan: 64 - year-old  gentleman seen in office today in f/u for following medical problems ,      1. Hypertension : Blood pressure is elevated,  discussed low-salt diet and adherence with his medications.  Currently on nifedipine Coreg and Diovan, will increase carvedilol from 6.25 mg twice a day to 12.5 mg twice a day,         2. Chronic kidney disease stage 5  : Secondary to long-standing history of hypertension and diabetes. Again advised patient to avoid NSAIDs.  serum creatinine is 5.7 mg/dl ,     He is active on kidney transplant list since 04/19, advised patient to discuss with family members and friends for a potential  Donor, he had ended chronic kidney disease class, he is interested in peritoneal dialysis when indicated,     3. Diabetes mellitus type 2 : discussed compliance with his diet and medications. Discussed weight loss and daily exercise.      4. Secondary hyperparathyroidism - PTH is acceptable for degree of renal dysfunction, cont Vit D ,       5. Proteinuria - about 1.5 g daily. cont Diovan. Discussed diabetes control and adherence with [facility]B      6. BPH : Follow up with urology ,      7. Obesity -  discuss calorie restriction weight loss,     8. Metabolic acidosis :  follow , on sodium bicarbonate supplements     9.  Anemia of chronic kidney disease - stable hemoglobin at 12.4 g,     I will see him followup in about 2 months , Face-to-face time was 25 minutes discussing his lab results and plan of care.      Gabino Champion M.D.

## 2019-09-17 ENCOUNTER — TELEPHONE (OUTPATIENT)
Dept: TRANSPLANT | Facility: CLINIC | Age: 64
End: 2019-09-17

## 2019-09-17 DIAGNOSIS — Z76.82 ORGAN TRANSPLANT CANDIDATE: Primary | ICD-10-CM

## 2019-09-17 NOTE — TELEPHONE ENCOUNTER
"  KIDNEY WAIT LISTING NOTE    Date of Financial clearance to list: 9/10/19    SSN/[facility]:     Organ: Kidney  Name:       Isidro Alves   : 1955          Gender:     male    MRN#: [id number]                                 State of Permanent Residence:  [address] [place] [address]  [place] [address]  Ethnicity: /Black   Race:      Black or     CLINICAL INFORMATION   Candidate Medical Urgency Status: Active  Number of Previous Kidney Transplants: 0  Number of Previous Solid Organ Transplants: 0  Did you enter number of previous kidney or other solid organ transplants? yes  Is this Candidate a Prior Living Donor: no  (If yes, please generate letter to [facility] with patient's date of donation, recipient SSN, signed by Surgical Director after patient is listed in order to receive priority points).      ABO  ABO Blood Group:   B POS     ABO Confirmation: (THESE DATES MUST BE PRIOR TO THE LIST DATE AND SUPPORTED BY SEPARATE LAB REPORTS)    Internal Results    Lab Results   Component Value Date    GROUPTRH B POS 10/22/2018    GROUPTRH B POS 10/11/2018     No results found for: ABO    External Results    ABO Date 1:    ABO Date 2  Are either of these ABO results based on External Labs? no  (If Yes, STOP and go to source document in Media Tab for verification).    VITALS  Height:  6' 1"  Weight:  251 lbs  (Use height from Transplant clinic visits only).  Did you enter height/weight? Yes    HLA    Class I:  Lab Results   Component Value Date    JOSS4HJ 30 10/11/2018    EHXN5DD 34 10/11/2018    VLAO5WE 72 10/11/2018    THOH8QB 57 10/11/2018    MHKJY4ZQ 6 10/11/2018    DGEDF4AV 4 10/11/2018    KMUFP6IU 2 10/11/2018    UMZBQ6TH 18 10/11/2018       Class II:  Lab Results   Component Value Date    GJGEAL90DM 11 10/11/2018    RPSVCN14ID 13 10/11/2018    [id number] 52 10/11/2018    [id number] XX 10/11/2018    KSWFX9LX 7 10/11/2018    WWICW8KM 5 10/11/2018       Tested for HLA Antibodies: Yes, " "antibodies detected     If result is "Positive" antibodies are detected     If result is "Negative or questionable" no antibodies detected    Lab Results   Component Value Date    CIPRAS Negative 10/11/2018    CIIPRAS Negative 10/11/2018       DIALYSIS INFORMATION  Is patient Pre-Dialysis: Yes     GFR Information  Report GFR being used as the criteria for placement on the kidney list. If not, leave blank  GFR < or = 20 ml/min? Yes  If Yes, Specify value  _11.2__   ml/min     Initial date GFR became 20 or less: 9/5/19  Is GFR obtained from an Outside lab Result? No  (If YES verify with source document scanned into media)    If patient on Dialysis:    Is candidate currently on dialysis for ESRD? No  If Yes,  Date Chronic Dialysis Started:   9/17/2019  (verify with source document in Media Tab)   [facility]                        Physician Name:  Dr. Lynn Toussaint  NPI#: [id number]    DIABETES INFORMATION  Primary Native Kidney Diagnosis: Diabetes Mellitus - Type II  C-Peptide Value -   Lab Results   Component Value Date    CPEPTIDE 3.88 11/20/2017     Current Diabetes Status: Type II    FOR NON-KIDNEY DEPARTMENT USE ONLY:  Additional Organs Registered? none    Maximum Acceptable Number of HLA Mismatches  ABDR:     6      (0-6)               AB:               (0-4)  ADR:   _____  (0-4)              BDR: _____ (0-4)  A:        _____  (0-2)              B:      _____ (0-2)          DR: ______ (0-2)    Will Recipient Accept?   Accept HBcAB Positive Organ:            Yes  Accept HBV DERICK Positive Organ:        no  Accept HCV Antibody Positive Organ: no   Accept HCV DERICK Positive Organ: no    Dual Kidney and En Bloc Opt In : No  Dual  Local:   No  Dual Import:   No  En Bloc Local:   No  En Bloc Import: No     Accept KDPI > 85: Single: No     Local: No     Import: No  Accept KDPI > 85: Dual: No     Local: No     Import: No      ### NURSE TO VERIFY CONSENT AND MAKE ANY NECESSARY " CHANGES NEEDED IN UNET AT THE TIME OF VERIFICATION ###    Unacceptible Antigens  If yes, list     Lab Results   Component Value Date    CIABCLM A66 06/06/2019    CIIAB Negative 06/06/2019       ### DO NOT LIST IF ANTIGEN VALUE WEAK ###

## 2019-09-17 NOTE — LETTER
2019     Isidro Long  [address] [facility]  [place] [address]    Dear Isidro Alves:  MRN: [id number]    Congratulations! On 2019, you were placed on  the waiting list for a  donor transplant.    Your candidacy for kidney transplant is based on the following criteria: CKD stage 5 due to Diabetes Mellitus - Type II.    Your transplant coordinator while on the waiting list is Sue Gonzalez RN. They can be reached at [phone] or [phone] with any questions.      What to do now?    ? Ask your living donors to call and begin testing   Give your donors our phone number, [phone]   Make sure your donors have your name and date of birth when they call   You will get transplanted much faster if you have a living donor    ? Have your blood sent to our Transplant Lab every month   If you are on dialysis - our Transplant Lab will work with your dialysis unit to send your blood every month   If you are not on dialysis   - If you live near an [facility] lab, we will schedule you to have blood drawn every month  - If you do not live near an [facility] lab, you will be sent blood kits in the mail. You will need to take a kit to your local lab or doctor to have your blood drawn every month and mail to the Transplant Lab.     ? Call us with ANY CHANGES   Phone numbers - we must be able to reach you anytime of the day or night when a kidney is available   Address   Insurance coverage   Dialysis unit or kidney doctor   Marlo: if you have surgery, stay in the hospital, have to get blood, or have an infection    ? Review your Kidney/Kidney-Pancreas Transplant Guide    This will give you detailed information about what happens when  - you are on the waiting list   - you are called when a kidney is available     The [facility] has a transplant surgeon and physician available 365  days a year, 24 hours a day to coordinate organ acceptance, procurement, surgical  placement and to  address urgent patient care issues.  You will be notified in writing of any changes to our Transplant  Centers staffing plan that would impact your ability to receive a transplant.     Attached is a letter from the [employer] Network for Organ Sharing (UNOS). It describes the services and  information offered to patients by UNOS and the Organ Procurement and Transplant Network. We look  forward to working with you while on the waiting list.      Congratulations,     Your Transplant Partner   [facility]    [facility] [place] [address]   [phone]    lh/Enclosure  In basket:  Dr. Gabino Champion                                                                          OPTN/UNOS: Your Resource for Organ Transplant Information    If you have a question regarding your own medical care, you always should call your transplant center first. However, for general organ transplant-related information, you can call the [employer] Organ Sharing (UNOS) toll-free patient services line at 1-930.217.6627.    Anyone, including potential transplant candidates, recipients, family members/friends, living donors, and/or donor family members can call this number to:    · talk about organ donation, living donation, how transplant and donation work, the donation process, transplant policies, and transplant/donor information;  · get a free patient information kit with helpful booklets, waiting list and transplant information, and a list of all transplant centers;  · ask questions about the Organ Procurement and Transplantation Network (OPTN) web site (www.optn.transplant.hrsa.gov); the UNOS Web site (www.unos.org); or the UNOS web site for living donors and transplant recipients (www.transplantliving.org);  · learn how UNOS and the OPTN can help you;  · talk about any concerns that you may have with a transplant center and how they perform    UNOS is a not-for-profit  organization that provides all of the administrative services for the national OPTN under federal contract to the Health Resources and Services Administration (HRSA), an agency under the U.S. Department of Health and Human Services (HHS).     UNOS and OPTN responsibilities include:    · writing educational material for patients, the public and professionals;  · helping to make people aware of the need for donated organs and tissue;  · writing organ transplant policy with help from doctors, nurses, transplant patients/candidates, donor families, living donors, and the public;  · coordinating the organ matching and placement process;  · collecting information about every organ transplant and donation that occurs in the [place].    Remember, you should contact your transplant center directly if you have questions or concerns about your own medical care including medical records, work-up progress and test reports. [facility] is not your transplant center, and staff at [facility] will not be able to transfer you to your transplant center, so keep your transplant centers phone number handy. But, while you research your transplant needs and learn as much as you can about transplantation and donation, we welcome your call to our toll-free patient services line at [phone].

## 2019-09-18 DIAGNOSIS — Z76.82 ORGAN TRANSPLANT CANDIDATE: Primary | ICD-10-CM

## 2019-09-18 LAB — HPRA INTERPRETATION: NORMAL

## 2019-10-01 ENCOUNTER — LAB VISIT (OUTPATIENT)
Dept: LAB | Facility: HOSPITAL | Age: 64
End: 2019-10-01
Payer: COMMERCIAL

## 2019-10-01 DIAGNOSIS — Z76.82 ORGAN TRANSPLANT CANDIDATE: Primary | ICD-10-CM

## 2019-10-01 DIAGNOSIS — Z76.82 ORGAN TRANSPLANT CANDIDATE: ICD-10-CM

## 2019-10-01 PROCEDURE — 86825 HLA X-MATH NON-CYTOTOXIC: CPT | Mod: PO,TXP

## 2019-10-01 PROCEDURE — 86825 HLA X-MATH NON-CYTOTOXIC: CPT | Mod: 91,PO,TXP

## 2019-10-01 PROCEDURE — 86826 HLA X-MATCH NONCYTOTOXC ADDL: CPT | Mod: PO,TXP

## 2019-10-01 PROCEDURE — 86826 HLA X-MATCH NONCYTOTOXC ADDL: CPT | Mod: 91,PO,TXP

## 2019-10-03 LAB
B CELL RESULTS - XM ALLO: NEGATIVE
B CELL RESULTS - XM ALLO: NEGATIVE
B MCS AVERAGE - XM ALLO: 7.1
B MCS AVERAGE - XM ALLO: 7.7
DONOR MRN: NORMAL
DONOR MRN: NORMAL
FXMAL TESTING DATE: NORMAL
FXMAL TESTING DATE: NORMAL
HLA FLOW CROSSMATCH (ALLO) INTERPRETATION: NORMAL
SERUM COLLECTION DT - XM ALLO: NORMAL
SERUM COLLECTION DT - XM ALLO: NORMAL
T CELL RESULTS - XM ALLO: NEGATIVE
T CELL RESULTS - XM ALLO: NEGATIVE
T MCS AVERAGE - XM ALLO: -2.2
T MCS AVERAGE - XM ALLO: -5.4

## 2019-10-03 RX ORDER — PEN NEEDLE, DIABETIC 30 GX3/16"
1 NEEDLE, DISPOSABLE MISCELLANEOUS DAILY
Qty: 100 EACH | Refills: 11 | Status: SHIPPED | OUTPATIENT
Start: 2019-10-03 | End: 2020-08-07

## 2019-10-03 NOTE — TELEPHONE ENCOUNTER
----- Message from Makenzie Aldridge sent at 10/3/2019  9:00 AM CDT -----  Patient needs call back will elaborate..[phone] (home)

## 2019-10-08 ENCOUNTER — LAB VISIT (OUTPATIENT)
Dept: LAB | Facility: HOSPITAL | Age: 64
End: 2019-10-08
Attending: NURSE PRACTITIONER
Payer: COMMERCIAL

## 2019-10-08 DIAGNOSIS — Z76.82 ORGAN TRANSPLANT CANDIDATE: ICD-10-CM

## 2019-10-08 PROCEDURE — 99001 SPECIMEN HANDLING PT-LAB: CPT | Mod: TXP

## 2019-10-21 LAB
HLA FREEZE AND HOLD INTERPRETATION: NORMAL
HLAFH COLLECTION DATE: NORMAL
HPRA INTERPRETATION: NORMAL

## 2019-10-24 ENCOUNTER — TELEPHONE (OUTPATIENT)
Dept: DIABETES | Facility: CLINIC | Age: 64
End: 2019-10-24

## 2019-10-26 PROCEDURE — 81382 HLA II TYPING 1 LOC HR: CPT | Mod: PO,TXP

## 2019-10-29 LAB
HLA HR DPB1: NORMAL
HLA HR DPB2: NORMAL
HRDPB TESTING DATE: NORMAL

## 2019-10-30 DIAGNOSIS — E55.9 VITAMIN D DEFICIENCY: ICD-10-CM

## 2019-10-30 RX ORDER — ERGOCALCIFEROL 1.25 MG/1
CAPSULE ORAL
Qty: 4 CAPSULE | Refills: 6 | Status: SHIPPED | OUTPATIENT
Start: 2019-10-30 | End: 2020-03-27

## 2019-11-05 ENCOUNTER — LAB VISIT (OUTPATIENT)
Dept: LAB | Facility: HOSPITAL | Age: 64
End: 2019-11-05
Attending: UROLOGY
Payer: COMMERCIAL

## 2019-11-05 DIAGNOSIS — N40.0 BENIGN NON-NODULAR PROSTATIC HYPERPLASIA WITHOUT LOWER URINARY TRACT SYMPTOMS: ICD-10-CM

## 2019-11-05 LAB — COMPLEXED PSA SERPL-MCNC: 3.8 NG/ML (ref 0–4)

## 2019-11-05 PROCEDURE — 84153 ASSAY OF PSA TOTAL: CPT | Mod: TXP

## 2019-11-05 PROCEDURE — 36415 COLL VENOUS BLD VENIPUNCTURE: CPT | Mod: PO,NTX

## 2019-11-11 ENCOUNTER — TELEPHONE (OUTPATIENT)
Dept: UROLOGY | Facility: CLINIC | Age: 64
End: 2019-11-11

## 2019-11-14 ENCOUNTER — OFFICE VISIT (OUTPATIENT)
Dept: DIABETES | Facility: CLINIC | Age: 64
End: 2019-11-14
Payer: COMMERCIAL

## 2019-11-14 VITALS
SYSTOLIC BLOOD PRESSURE: 123 MMHG | DIASTOLIC BLOOD PRESSURE: 80 MMHG | HEIGHT: 74 IN | WEIGHT: 254.44 LBS | BODY MASS INDEX: 32.65 KG/M2

## 2019-11-14 DIAGNOSIS — I10 ESSENTIAL HYPERTENSION: ICD-10-CM

## 2019-11-14 DIAGNOSIS — E11.22 TYPE 2 DIABETES MELLITUS WITH STAGE 4 CHRONIC KIDNEY DISEASE, WITH LONG-TERM CURRENT USE OF INSULIN: Primary | ICD-10-CM

## 2019-11-14 DIAGNOSIS — E78.5 HYPERLIPIDEMIA ASSOCIATED WITH TYPE 2 DIABETES MELLITUS: ICD-10-CM

## 2019-11-14 DIAGNOSIS — E66.09 NON MORBID OBESITY DUE TO EXCESS CALORIES: ICD-10-CM

## 2019-11-14 DIAGNOSIS — E11.69 HYPERLIPIDEMIA ASSOCIATED WITH TYPE 2 DIABETES MELLITUS: ICD-10-CM

## 2019-11-14 DIAGNOSIS — N18.4 TYPE 2 DIABETES MELLITUS WITH STAGE 4 CHRONIC KIDNEY DISEASE, WITH LONG-TERM CURRENT USE OF INSULIN: Primary | ICD-10-CM

## 2019-11-14 DIAGNOSIS — Z79.4 TYPE 2 DIABETES MELLITUS WITH STAGE 4 CHRONIC KIDNEY DISEASE, WITH LONG-TERM CURRENT USE OF INSULIN: Primary | ICD-10-CM

## 2019-11-14 LAB — GLUCOSE SERPL-MCNC: 92 MG/DL (ref 70–110)

## 2019-11-14 PROCEDURE — 99999 PR PBB SHADOW E&M-EST. PATIENT-LVL III: ICD-10-PCS | Mod: PBBFAC,,, | Performed by: NURSE PRACTITIONER

## 2019-11-14 PROCEDURE — 3074F PR MOST RECENT SYSTOLIC BLOOD PRESSURE < 130 MM HG: ICD-10-PCS | Mod: CPTII,S$GLB,, | Performed by: NURSE PRACTITIONER

## 2019-11-14 PROCEDURE — 3074F SYST BP LT 130 MM HG: CPT | Mod: CPTII,S$GLB,, | Performed by: NURSE PRACTITIONER

## 2019-11-14 PROCEDURE — 3044F PR MOST RECENT HEMOGLOBIN A1C LEVEL <7.0%: ICD-10-PCS | Mod: CPTII,S$GLB,, | Performed by: NURSE PRACTITIONER

## 2019-11-14 PROCEDURE — 99214 OFFICE O/P EST MOD 30 MIN: CPT | Mod: S$GLB,,, | Performed by: NURSE PRACTITIONER

## 2019-11-14 PROCEDURE — 3079F PR MOST RECENT DIASTOLIC BLOOD PRESSURE 80-89 MM HG: ICD-10-PCS | Mod: CPTII,S$GLB,, | Performed by: NURSE PRACTITIONER

## 2019-11-14 PROCEDURE — 99999 PR PBB SHADOW E&M-EST. PATIENT-LVL III: CPT | Mod: PBBFAC,,, | Performed by: NURSE PRACTITIONER

## 2019-11-14 PROCEDURE — 3044F HG A1C LEVEL LT 7.0%: CPT | Mod: CPTII,S$GLB,, | Performed by: NURSE PRACTITIONER

## 2019-11-14 PROCEDURE — 3079F DIAST BP 80-89 MM HG: CPT | Mod: CPTII,S$GLB,, | Performed by: NURSE PRACTITIONER

## 2019-11-14 PROCEDURE — 3008F BODY MASS INDEX DOCD: CPT | Mod: CPTII,S$GLB,, | Performed by: NURSE PRACTITIONER

## 2019-11-14 PROCEDURE — 82962 GLUCOSE BLOOD TEST: CPT | Mod: S$GLB,,, | Performed by: NURSE PRACTITIONER

## 2019-11-14 PROCEDURE — 99214 PR OFFICE/OUTPT VISIT, EST, LEVL IV, 30-39 MIN: ICD-10-PCS | Mod: S$GLB,,, | Performed by: NURSE PRACTITIONER

## 2019-11-14 PROCEDURE — 82962 POCT GLUCOSE, HAND-HELD DEVICE: ICD-10-PCS | Mod: S$GLB,,, | Performed by: NURSE PRACTITIONER

## 2019-11-14 PROCEDURE — 3008F PR BODY MASS INDEX (BMI) DOCUMENTED: ICD-10-PCS | Mod: CPTII,S$GLB,, | Performed by: NURSE PRACTITIONER

## 2019-11-14 NOTE — PATIENT INSTRUCTIONS
CONTINUE CURRENT REGIMEN!!    IF YOU ARE HAPPY WITH YOUR VISIT TODAY, PLEASE FILL OUT THE SURVEY THAT MAY BE SENT TO YOUR EMAIL ADDRESS OR MAILBOX FOLLOWING THIS VISIT. WE LOVE TO HEAR YOUR COMMENTS! HAVE A WONDERFUL DAY!

## 2019-11-14 NOTE — PROGRESS NOTES
"Subjective:         Patient ID: Isidro Alves is a 64 y.o. male.  Patient's current PCP is Steven Morgan MD.       Chief Complaint: Diabetes Mellitus    HPI   Isidro Alves is a 64 y.o. Black or  male presenting for follow up of diabetes. Patient has been diagnosed with diabetes for over 20 years and has the following complications from diabetes: nephropathy and peripheral neuropathy. Blood glucose testing is performed regularly. Since last visit, patient reports blood glucose values to have approximately ranged from 90-120s, fasting, less than 180 PP. Condition has remained stable and at goal since last visit.     He denies any recent hospital admissions, emergency room visits related to DM.      Height: 6' 2" (188 cm)  //  Weight: 115.4 kg (254 lb 6.6 oz), Body mass index is 32.66 kg/m².  His blood sugar in clinic today is:   Lab Results   Component Value Date    POCGLU 120 (A) 07/25/2019       Labs reviewed and are noted below.    His most recent A1C is:   Lab Results   Component Value Date    HGBA1C 6.9 (H) 09/05/2019     Lab Results   Component Value Date    CPEPTIDE 3.88 11/20/2017     Lab Results   Component Value Date    GLUTAMICACID 0.00 11/20/2017         CURRENT DM MEDICATIONS:     Xultophy 35 units daily    No current facility-administered medications for this visit.        No current facility-administered medications for this visit.        Health Maintenance   Topic Date Due    High Dose Statin  08/26/1977    Eye Exam  09/05/2019    Hemoglobin A1c  03/05/2020    Foot Exam  04/22/2020    Lipid Panel  09/05/2020    Pneumococcal Vaccine (Highest Risk) (3 of 3 - PPSV23) 04/04/2021    Colonoscopy  12/21/2022    TETANUS VACCINE  05/05/2026    Hepatitis C Screening  Completed       STANDARDS OF CARE:  Current Ophthalmologist/Optometrist: Dr. Del Real.  UTD  Current Podiatrist: No  ACE/ARB: Yes  Statin: Yes  He  has attended diabetes education in the past.     BLOOD GLUCOSE " TESTING: Patient reports testing on average a total of 2-3 times per day.    Review of Systems   Constitutional: Negative.  Negative for activity change, appetite change, fatigue and unexpected weight change.   Eyes: Negative for visual disturbance.   Gastrointestinal: Negative for constipation, diarrhea and nausea.   Endocrine: Negative.  Negative for cold intolerance, heat intolerance, polydipsia, polyphagia and polyuria.   Skin: Negative for wound.         Objective:      Physical Exam   Constitutional: He appears well-developed and well-nourished.   HENT:   Head: Normocephalic and atraumatic.   Cardiovascular: Normal rate.   Pulmonary/Chest: Effort normal.   Skin: Skin is warm and dry.   Psychiatric: He has a normal mood and affect. His speech is normal and behavior is normal. Judgment and thought content normal.   Nursing note and vitals reviewed.      Assessment:       1. Type 2 diabetes mellitus with stage 4 chronic kidney disease, with long-term current use of insulin    2. Non morbid obesity due to excess calories    3. Essential hypertension    4. Hyperlipidemia associated with type 2 diabetes mellitus        Plan:   Type 2 diabetes mellitus with stage 4 chronic kidney disease, with long-term current use of insulin  -     POCT Glucose, Hand-Held Device  -     Microalbumin/creatinine urine ratio; Future; Expected date: 11/14/2019  -     insulin degludec-liraglutide (XULTOPHY 100/3.6) 100 unit-3.6 mg /mL (3 mL) InPn; INJECT 35 UNITS INTO THE SKIN ONCE DAILY.  Dispense: 15 Syringe; Refill: 1    - Condition at goal. Continue current regimen. DM education reviewed. Patient encouraged to carb count and exercise per recommendations. Labs and Referrals as noted. Patient instructed to send in log once weekly for my review. RV scheduled in 3 months.   Non morbid obesity due to excess calories    - DM diet discussed.     Hyperlipidemia associated with type 2 diabetes mellitus    - LDL at goal. Cardiology managing.      Hypertension associated with diabetes    - Stable      Additional Plan Details:    1.) Patient was instructed to monitor blood glucose 2 - 3 x daily, fasting and ac dinner or at bedtime. Discussed ADA goal for fasting blood sugar, 80 - 130mg/dL; pp blood sugars below 180 mg/dl. Also, discussed prevention of hypoglycemia and the need to adjust goals to higher levels if persistent hypoglycemia.  Reminded to bring BG records or meter to each visit for review.    2.) The patient was explained the above plan and given opportunity to ask questions.  He understands, chooses and consents to this plan and accepts all the risks, which include but are not limited to the risks mentioned above. He understands the alternative of having no testing, interventions or treatments at this time. He left content and without further questions.     A total of 30 minutes was spent in face to face time, of which over 50% was spent in counseling patient on disease process, complications, treatment, and side effects of medications.        GIBRAN Carrion

## 2019-11-15 RX ORDER — ATORVASTATIN CALCIUM 40 MG/1
TABLET, FILM COATED ORAL
Qty: 30 TABLET | Refills: 11 | Status: SHIPPED | OUTPATIENT
Start: 2019-11-15 | End: 2021-02-08 | Stop reason: SDUPTHER

## 2019-11-18 RX ORDER — FINASTERIDE 5 MG/1
TABLET, FILM COATED ORAL
Qty: 90 TABLET | Refills: 3 | Status: SHIPPED | OUTPATIENT
Start: 2019-11-18 | End: 2019-11-21 | Stop reason: SDUPTHER

## 2019-11-21 ENCOUNTER — OFFICE VISIT (OUTPATIENT)
Dept: UROLOGY | Facility: CLINIC | Age: 64
End: 2019-11-21
Payer: COMMERCIAL

## 2019-11-21 VITALS
BODY MASS INDEX: 33.38 KG/M2 | DIASTOLIC BLOOD PRESSURE: 80 MMHG | WEIGHT: 260.13 LBS | HEIGHT: 74 IN | SYSTOLIC BLOOD PRESSURE: 122 MMHG

## 2019-11-21 DIAGNOSIS — Z12.5 PROSTATE CANCER SCREENING: ICD-10-CM

## 2019-11-21 DIAGNOSIS — N52.9 ERECTILE DYSFUNCTION, UNSPECIFIED ERECTILE DYSFUNCTION TYPE: ICD-10-CM

## 2019-11-21 DIAGNOSIS — I10 HYPERTENSION, UNSPECIFIED TYPE: ICD-10-CM

## 2019-11-21 DIAGNOSIS — N40.0 BENIGN NON-NODULAR PROSTATIC HYPERPLASIA WITHOUT LOWER URINARY TRACT SYMPTOMS: ICD-10-CM

## 2019-11-21 DIAGNOSIS — N40.0 BENIGN PROSTATIC HYPERPLASIA, UNSPECIFIED WHETHER LOWER URINARY TRACT SYMPTOMS PRESENT: Primary | ICD-10-CM

## 2019-11-21 LAB
BILIRUB SERPL-MCNC: NORMAL MG/DL
BLOOD URINE, POC: NORMAL
COLOR, POC UA: YELLOW
GLUCOSE UR QL STRIP: NORMAL
KETONES UR QL STRIP: NORMAL
LEUKOCYTE ESTERASE URINE, POC: NORMAL
NITRITE, POC UA: NORMAL
PH, POC UA: 5
PROTEIN, POC: NORMAL
SPECIFIC GRAVITY, POC UA: 1.01
UROBILINOGEN, POC UA: NORMAL

## 2019-11-21 PROCEDURE — 3008F BODY MASS INDEX DOCD: CPT | Mod: CPTII,NTX,S$GLB, | Performed by: UROLOGY

## 2019-11-21 PROCEDURE — 99214 OFFICE O/P EST MOD 30 MIN: CPT | Mod: 25,NTX,S$GLB, | Performed by: UROLOGY

## 2019-11-21 PROCEDURE — 3079F PR MOST RECENT DIASTOLIC BLOOD PRESSURE 80-89 MM HG: ICD-10-PCS | Mod: CPTII,NTX,S$GLB, | Performed by: UROLOGY

## 2019-11-21 PROCEDURE — 3074F SYST BP LT 130 MM HG: CPT | Mod: CPTII,NTX,S$GLB, | Performed by: UROLOGY

## 2019-11-21 PROCEDURE — 81002 POCT URINE DIPSTICK WITHOUT MICROSCOPE: ICD-10-PCS | Mod: NTX,S$GLB,, | Performed by: UROLOGY

## 2019-11-21 PROCEDURE — 81002 URINALYSIS NONAUTO W/O SCOPE: CPT | Mod: NTX,S$GLB,, | Performed by: UROLOGY

## 2019-11-21 PROCEDURE — 99999 PR PBB SHADOW E&M-EST. PATIENT-LVL III: CPT | Mod: PBBFAC,TXP,, | Performed by: UROLOGY

## 2019-11-21 PROCEDURE — 99999 PR PBB SHADOW E&M-EST. PATIENT-LVL III: ICD-10-PCS | Mod: PBBFAC,TXP,, | Performed by: UROLOGY

## 2019-11-21 PROCEDURE — 3008F PR BODY MASS INDEX (BMI) DOCUMENTED: ICD-10-PCS | Mod: CPTII,NTX,S$GLB, | Performed by: UROLOGY

## 2019-11-21 PROCEDURE — 3074F PR MOST RECENT SYSTOLIC BLOOD PRESSURE < 130 MM HG: ICD-10-PCS | Mod: CPTII,NTX,S$GLB, | Performed by: UROLOGY

## 2019-11-21 PROCEDURE — 3079F DIAST BP 80-89 MM HG: CPT | Mod: CPTII,NTX,S$GLB, | Performed by: UROLOGY

## 2019-11-21 PROCEDURE — 99214 PR OFFICE/OUTPT VISIT, EST, LEVL IV, 30-39 MIN: ICD-10-PCS | Mod: 25,NTX,S$GLB, | Performed by: UROLOGY

## 2019-11-21 RX ORDER — TADALAFIL 20 MG/1
20 TABLET ORAL DAILY
Qty: 5 TABLET | Refills: 11 | Status: SHIPPED | OUTPATIENT
Start: 2019-11-21 | End: 2020-06-26 | Stop reason: SDUPTHER

## 2019-11-21 RX ORDER — TAMSULOSIN HYDROCHLORIDE 0.4 MG/1
0.4 CAPSULE ORAL DAILY
Qty: 30 CAPSULE | Refills: 11 | Status: SHIPPED | OUTPATIENT
Start: 2019-11-21 | End: 2020-08-11

## 2019-11-21 RX ORDER — FINASTERIDE 5 MG/1
5 TABLET, FILM COATED ORAL DAILY
Qty: 90 TABLET | Refills: 3 | Status: SHIPPED | OUTPATIENT
Start: 2019-11-21 | End: 2020-11-23 | Stop reason: SDUPTHER

## 2019-11-21 NOTE — PROGRESS NOTES
"Chief Complaint: Elevated PSA    HPI:    11/21/19: Doing well no complaints, good stream on fllomax.  Reviewed history in detail.  PSA way down again.  Taking finasteride and PSA down as a result. Uses cialis 10mg with good effect for ED from a friend.  11/5/18: PSA up where it was 2 years ago.  No LUTS, no pain.  Taking flomax voiding fine.  11/16/17: Laws: PSA has gone up from 7.7 to 10.7. Feels ok off the T. Strong stream; no LUTS. Nocturia x4 but depends how much he drinks after 6 pm. The nocturia doesn't bother him. No abd/pelvic pain and no exac/rel factors.  No hematuria.  No urolithiasis.  BP is elevated today; he did not take his BP medication this morning.   11/10/16: Symptoms have resolved in general, PSA is down.    9/28/16: Back early with groin pain.  Describes an itching sensation that runs from the rectum to the penis, waxes/wanes, worse at night.  Was given 7d doxycycline and there was some improvement.  No caffeine.  Good urinary habits.  No skin problems.  No balanitis.  Doing fine on one flomax a day.  6/1/16: Here today with Dr. Ren's records to review.  Had a biopsy 4/15 with PSA 13.9 at the time.  Was on T by Dr. Chiu's reccs but was asymptomatic overall and felt no symptomatic change on the T.  Has ED that was improved with Cialis.  Got trimix once but wasn't comfortable with it.  05/17/16: rushing: " This is a 60-yr-old male presenting as a follow-up to T. Patient is currently on Axiron 30 mg ( 2 pumps /day) . Total T was 838; 5 months ago. ( H/H 14.4/46.0 ) and psa level was 4.7. Patient was to have psa level and follow-up 3 months ago related to psa level of 4.7 and did not. He does have a h/o negative prostate bx  15 months  ago per Dr. BRIGID Burt. No reported adverse side effects and energy level has greatly improved. Patient is currently on Flomax 0.8mg a day. He states he " ran out " and had an increase in nocturia 4-5 times/ night over the past 10 days. No hematuria, dysuria or " "flank pain. "  06/12/15: Aram; HPI: Flomax is been very helpful, nocturia now down to 3 times per night from 6 times per night with similar daytime improvement. Has been using Axiron for about 2 weeks, no adverse effects. This patient had severe deficiencies on his pretreatment labs. Denies flank pain, dysuira, hematuria . Despite his subjective improvement, his postvoid residual today in the clinic is 180 mL. Dipstick urinalysis suggested 1+ microscopic hematuria, microscopic examination of urine reveals no RBCs.    Allergies:  Bactrim [sulfamethoxazole-trimethoprim] and Nsaids (non-steroidal anti-inflammatory drug)    Medications:  has a current medication list which includes the following prescription(s): acyclovir, aspirin, atorvastatin, bd insulin syringe half unit, carvedilol, ergocalciferol, esomeprazole, finasteride, folic acid, furosemide, insulin degludec-liraglutide, lancets, multivitamin, nifedipine, olopatadine, onetouch ultra blue test strip, pen needle, diabetic, sodium bicarbonate, tadalafil, tamsulosin, and valsartan.    Review of Systems:  General: No fever, chills, fatigability, or weight loss.  Skin: No rashes, itching, or changes in color or texture of skin.  Chest: Denies [place], cyanosis, wheezing, cough, and sputum production.  Abdomen: Appetite fine. No weight loss. Denies diarrhea, abdominal pain, hematemesis, or blood in stool.  Musculoskeletal: No joint stiffness or swelling. Denies back pain.  : As above.  All other review of systems negative.    PMH:   has a past medical history of Anemia (4/16/2014), BPH (benign prostatic hyperplasia), CKD (chronic kidney disease) stage 4, GFR 15-29 ml/min, Coronary artery disease of native artery of native heart with stable angina pectoris (4/29/2019), Diabetes mellitus, Diabetes mellitus, type 2, Elevated PSA, Encounter for blood transfusion, Herpes labialis, Hyperlipidemia, Hypertension, Obesity, Proteinuria, Secondary hyperparathyroidism (of renal " origin), and UTI (lower urinary tract infection) (5/1/2015).    PSH:   has a past surgical history that includes thorocotomy (2010); Prostate biopsy; and Colonoscopy (N/A, 12/21/2017).    FamHx: family history includes Diabetes in his father and unknown relative; Hypertension in his father and unknown relative; No Known Problems in his mother.    SocHx:  reports that he has never smoked. He has never used smokeless tobacco. He reports that he drinks alcohol. He reports that he does not use drugs.      Physical Exam:  Vitals:    11/21/19 1206   BP: 122/80     General: A&Ox3, no apparent distress, no deformities  Neck: No masses, normal thyroid  Lungs: normal inspiration, no use of accessory muscles  Heart: normal pulse, no arrhythmias  Abdomen: Soft, NT, ND  Skin: The skin is warm and dry. No jaundice.  Ext: No c/c/e.  :   11/16/17: Test desc michaelle, no abnormalities of epididymus. Penis normal, with normal penile and scrotal skin. Meatus normal. Normal rectal tone, no hemorrhoids. Prost 40 gm with palpable ridge to left side. SV not palpable. Perineum and anus normal.     Labs/Studies:   PSA    4/15: 13.9    11/15: 4.7    5/16: 12.7    11/16: 7.7    11/17: 10.7    10/18: 12.2    6/19: 4.0    11/19: 3.8        Impression/Plan:   1. Elevated PSA: PSA 12 mo normal response to finasteride  2. HTN; BP better today has cut back on salt.   3. Flomax/finasteride for BPH  4. New rx for cialis for ED

## 2019-12-02 ENCOUNTER — LAB VISIT (OUTPATIENT)
Dept: LAB | Facility: HOSPITAL | Age: 64
End: 2019-12-02
Attending: INTERNAL MEDICINE
Payer: COMMERCIAL

## 2019-12-02 DIAGNOSIS — Z76.82 ORGAN TRANSPLANT CANDIDATE: ICD-10-CM

## 2019-12-02 PROCEDURE — 86832 HLA CLASS I HIGH DEFIN QUAL: CPT | Mod: TXP

## 2019-12-02 PROCEDURE — 86833 HLA CLASS II HIGH DEFIN QUAL: CPT | Mod: TXP

## 2019-12-23 LAB — HPRA INTERPRETATION: NORMAL

## 2019-12-30 ENCOUNTER — LAB VISIT (OUTPATIENT)
Dept: LAB | Facility: HOSPITAL | Age: 64
End: 2019-12-30
Attending: INTERNAL MEDICINE
Payer: COMMERCIAL

## 2019-12-30 DIAGNOSIS — N18.5 CHRONIC KIDNEY DISEASE (CKD), STAGE V: ICD-10-CM

## 2019-12-30 LAB
25(OH)D3+25(OH)D2 SERPL-MCNC: 31 NG/ML (ref 30–96)
ALBUMIN SERPL BCP-MCNC: 3.4 G/DL (ref 3.5–5.2)
ANION GAP SERPL CALC-SCNC: 7 MMOL/L (ref 8–16)
BACTERIA #/AREA URNS HPF: ABNORMAL /HPF
BASOPHILS # BLD AUTO: 0.07 K/UL (ref 0–0.2)
BASOPHILS NFR BLD: 0.8 % (ref 0–1.9)
BILIRUB UR QL STRIP: NEGATIVE
BUN SERPL-MCNC: 49 MG/DL (ref 8–23)
CALCIUM SERPL-MCNC: 8.9 MG/DL (ref 8.7–10.5)
CHLORIDE SERPL-SCNC: 113 MMOL/L (ref 95–110)
CLARITY UR: ABNORMAL
CO2 SERPL-SCNC: 21 MMOL/L (ref 23–29)
COLOR UR: ABNORMAL
CREAT SERPL-MCNC: 4.5 MG/DL (ref 0.5–1.4)
CREAT UR-MCNC: 77 MG/DL (ref 23–375)
DIFFERENTIAL METHOD: ABNORMAL
EOSINOPHIL # BLD AUTO: 0.3 K/UL (ref 0–0.5)
EOSINOPHIL NFR BLD: 3 % (ref 0–8)
ERYTHROCYTE [DISTWIDTH] IN BLOOD BY AUTOMATED COUNT: 15.3 % (ref 11.5–14.5)
EST. GFR  (AFRICAN AMERICAN): 15 ML/MIN/1.73 M^2
EST. GFR  (NON AFRICAN AMERICAN): 12.9 ML/MIN/1.73 M^2
GLUCOSE SERPL-MCNC: 155 MG/DL (ref 70–110)
GLUCOSE UR QL STRIP: NEGATIVE
HCT VFR BLD AUTO: 40.6 % (ref 40–54)
HGB BLD-MCNC: 12 G/DL (ref 14–18)
HGB UR QL STRIP: ABNORMAL
HYALINE CASTS #/AREA URNS LPF: 0 /LPF
IMM GRANULOCYTES # BLD AUTO: 0.11 K/UL (ref 0–0.04)
IMM GRANULOCYTES NFR BLD AUTO: 1.2 % (ref 0–0.5)
KETONES UR QL STRIP: NEGATIVE
LEUKOCYTE ESTERASE UR QL STRIP: ABNORMAL
LYMPHOCYTES # BLD AUTO: 1.4 K/UL (ref 1–4.8)
LYMPHOCYTES NFR BLD: 14.9 % (ref 18–48)
MCH RBC QN AUTO: 25.2 PG (ref 27–31)
MCHC RBC AUTO-ENTMCNC: 29.6 G/DL (ref 32–36)
MCV RBC AUTO: 85 FL (ref 82–98)
MICROSCOPIC COMMENT: ABNORMAL
MONOCYTES # BLD AUTO: 0.7 K/UL (ref 0.3–1)
MONOCYTES NFR BLD: 7 % (ref 4–15)
NEUTROPHILS # BLD AUTO: 6.8 K/UL (ref 1.8–7.7)
NEUTROPHILS NFR BLD: 73.1 % (ref 38–73)
NITRITE UR QL STRIP: NEGATIVE
NRBC BLD-RTO: 0 /100 WBC
PH UR STRIP: 6 [PH] (ref 5–8)
PHOSPHATE SERPL-MCNC: 3.2 MG/DL (ref 2.7–4.5)
PLATELET # BLD AUTO: 323 K/UL (ref 150–350)
PMV BLD AUTO: 11.4 FL (ref 9.2–12.9)
POTASSIUM SERPL-SCNC: 4.4 MMOL/L (ref 3.5–5.1)
PROT UR QL STRIP: ABNORMAL
PROT UR-MCNC: 141 MG/DL (ref 0–15)
PROT/CREAT UR: 1.83 MG/G{CREAT} (ref 0–0.2)
PTH-INTACT SERPL-MCNC: 432 PG/ML (ref 9–77)
RBC # BLD AUTO: 4.77 M/UL (ref 4.6–6.2)
RBC #/AREA URNS HPF: 0 /HPF (ref 0–4)
SODIUM SERPL-SCNC: 141 MMOL/L (ref 136–145)
SP GR UR STRIP: 1.02 (ref 1–1.03)
URN SPEC COLLECT METH UR: ABNORMAL
WBC # BLD AUTO: 9.29 K/UL (ref 3.9–12.7)
WBC #/AREA URNS HPF: >100 /HPF (ref 0–5)

## 2019-12-30 PROCEDURE — 83970 ASSAY OF PARATHORMONE: CPT | Mod: TXP

## 2019-12-30 PROCEDURE — 80069 RENAL FUNCTION PANEL: CPT | Mod: NTX

## 2019-12-30 PROCEDURE — 82306 VITAMIN D 25 HYDROXY: CPT | Mod: TXP

## 2019-12-30 PROCEDURE — 84156 ASSAY OF PROTEIN URINE: CPT | Mod: NTX

## 2019-12-30 PROCEDURE — 81000 URINALYSIS NONAUTO W/SCOPE: CPT | Mod: TXP

## 2019-12-30 PROCEDURE — 36415 COLL VENOUS BLD VENIPUNCTURE: CPT | Mod: NTX

## 2019-12-30 PROCEDURE — 85025 COMPLETE CBC W/AUTO DIFF WBC: CPT | Mod: NTX

## 2020-01-02 ENCOUNTER — OFFICE VISIT (OUTPATIENT)
Dept: NEPHROLOGY | Facility: CLINIC | Age: 65
End: 2020-01-02
Payer: COMMERCIAL

## 2020-01-02 VITALS
HEIGHT: 74 IN | RESPIRATION RATE: 20 BRPM | WEIGHT: 258.63 LBS | BODY MASS INDEX: 33.19 KG/M2 | SYSTOLIC BLOOD PRESSURE: 156 MMHG | DIASTOLIC BLOOD PRESSURE: 100 MMHG | HEART RATE: 62 BPM

## 2020-01-02 DIAGNOSIS — I10 HTN (HYPERTENSION), BENIGN: ICD-10-CM

## 2020-01-02 DIAGNOSIS — E53.8 FOLATE DEFICIENCY: ICD-10-CM

## 2020-01-02 DIAGNOSIS — N18.5 CHRONIC KIDNEY DISEASE (CKD), STAGE V: Primary | ICD-10-CM

## 2020-01-02 PROCEDURE — 99214 OFFICE O/P EST MOD 30 MIN: CPT | Mod: S$GLB,,, | Performed by: INTERNAL MEDICINE

## 2020-01-02 PROCEDURE — 99999 PR PBB SHADOW E&M-EST. PATIENT-LVL III: CPT | Mod: PBBFAC,,, | Performed by: INTERNAL MEDICINE

## 2020-01-02 PROCEDURE — 3077F PR MOST RECENT SYSTOLIC BLOOD PRESSURE >= 140 MM HG: ICD-10-PCS | Mod: CPTII,S$GLB,, | Performed by: INTERNAL MEDICINE

## 2020-01-02 PROCEDURE — 3077F SYST BP >= 140 MM HG: CPT | Mod: CPTII,S$GLB,, | Performed by: INTERNAL MEDICINE

## 2020-01-02 PROCEDURE — 99999 PR PBB SHADOW E&M-EST. PATIENT-LVL III: ICD-10-PCS | Mod: PBBFAC,,, | Performed by: INTERNAL MEDICINE

## 2020-01-02 PROCEDURE — 3080F PR MOST RECENT DIASTOLIC BLOOD PRESSURE >= 90 MM HG: ICD-10-PCS | Mod: CPTII,S$GLB,, | Performed by: INTERNAL MEDICINE

## 2020-01-02 PROCEDURE — 3008F BODY MASS INDEX DOCD: CPT | Mod: CPTII,S$GLB,, | Performed by: INTERNAL MEDICINE

## 2020-01-02 PROCEDURE — 3008F PR BODY MASS INDEX (BMI) DOCUMENTED: ICD-10-PCS | Mod: CPTII,S$GLB,, | Performed by: INTERNAL MEDICINE

## 2020-01-02 PROCEDURE — 99214 PR OFFICE/OUTPT VISIT, EST, LEVL IV, 30-39 MIN: ICD-10-PCS | Mod: S$GLB,,, | Performed by: INTERNAL MEDICINE

## 2020-01-02 PROCEDURE — 3080F DIAST BP >= 90 MM HG: CPT | Mod: CPTII,S$GLB,, | Performed by: INTERNAL MEDICINE

## 2020-01-02 RX ORDER — CARVEDILOL 25 MG/1
25 TABLET ORAL 2 TIMES DAILY WITH MEALS
Qty: 60 TABLET | Refills: 11 | Status: SHIPPED | OUTPATIENT
Start: 2020-01-02 | End: 2020-04-30 | Stop reason: SDUPTHER

## 2020-01-02 RX ORDER — NIFEDIPINE 90 MG/1
90 TABLET, EXTENDED RELEASE ORAL DAILY
Qty: 30 TABLET | Refills: 9 | Status: SHIPPED | OUTPATIENT
Start: 2020-01-02 | End: 2020-11-13

## 2020-01-02 RX ORDER — FOLIC ACID 1 MG/1
TABLET ORAL
Qty: 30 TABLET | Refills: 9 | Status: ON HOLD | OUTPATIENT
Start: 2020-01-02 | End: 2024-02-20 | Stop reason: HOSPADM

## 2020-01-02 NOTE — PROGRESS NOTES
Subjective:       Patient ID: Isidro Alves is a 64 y.o.   male who presents for follow-up evaluation of CKD stage 5 , HTN, DM-2, SHPT        Steven Morgan MD      HPI : Isidro Alves Is a pleasant 64 -year-old  gentleman seen in office today in f/u for above medical problems. I saw him about 2 months ago,   He has chronic kidney disease  . Recent serum creatinine is 4.5  mg/dL .  Progressive decline in renal function explained to the patient. he has long-standing history of hypertension and diabetes ,  Nonadherence with his blood pressure medications is a big issue with him in the past. Patient says he has been more adherent with his medications in the recent past.  Also has been exercising regularly and trying to watch salt intake.   PSA levels were elevated and is currently following with urology.  Recent PSA level is  3.8.     patient sustained a fall while working on his roof in November 2018, he lost his consciousness and was admitted in intensive care unit at [facility] for about 3 weeks, following discharge she had to undergo few weeks of rehab, he sustained some rib fractures and pelvic fracture.  Patient reports improvement in his overall strength and recovering well.          Past Medical History:   Diagnosis Date    Anemia 4/16/2014    BPH (benign prostatic hyperplasia)     CKD (chronic kidney disease) stage 4, GFR 15-29 ml/min     Coronary artery disease of native artery of native heart with stable angina pectoris 4/29/2019    Diabetes mellitus     Diabetes mellitus, type 2     Elevated PSA     Encounter for blood transfusion     Herpes labialis     Hyperlipidemia     Hypertension     Obesity     Proteinuria     Secondary hyperparathyroidism (of renal origin)     UTI (lower urinary tract infection) 5/1/2015       Current Outpatient Medications on File Prior to Visit   Medication Sig Dispense Refill    acyclovir (ZOVIRAX) 400 MG tablet TAKE 1 TABLET 3 TIMES  "DAILY AS NEEDED 30 tablet 6    aspirin (ECOTRIN) 81 MG EC tablet Take 1 tablet (81 mg total) by mouth once daily.      atorvastatin (LIPITOR) 40 MG tablet TAKE 1 TABLET BY MOUTH EVERY DAY 30 tablet 11    BD INSULIN SYRINGE HALF UNIT 0.3 mL 31 gauge x 5/16" Syrg FOR USE WITH INSULIN. 100 Syringe 1    carvedilol (COREG) 12.5 MG tablet Take 1 tablet (12.5 mg total) by mouth 2 (two) times daily with meals. 60 tablet 11    ergocalciferol (ERGOCALCIFEROL) 50,000 unit Cap TAKE 1 CAPSULE (50,000 UNITS TOTAL) BY MOUTH EVERY 7 DAYS. 4 capsule 6    esomeprazole (NEXIUM) 40 MG capsule Take 40 mg by mouth once daily.      finasteride (PROSCAR) 5 mg tablet Take 1 tablet (5 mg total) by mouth once daily. 90 tablet 3    folic acid (FOLVITE) 1 MG tablet TAKE 1 TABLET BY MOUTH EVERY DAY 30 tablet 5    furosemide (LASIX) 80 MG tablet Take 80 mg by mouth once daily.  11    insulin degludec-liraglutide (XULTOPHY 100/3.6) 100 unit-3.6 mg /mL (3 mL) InPn INJECT 35 UNITS INTO THE SKIN ONCE DAILY. 15 Syringe 1    lancets (ONETOUCH ULTRASOFT LANCETS) Misc 1 lancet by Misc.(Non-Drug; Combo Route) route 2 (two) times daily. (Patient taking differently: 1 lancet by Misc.(Non-Drug; Combo Route) route 3 (three) times daily. ) 200 each 1    multivitamin (THERAGRAN) tablet Take 1 tablet by mouth once daily.      NIFEdipine (PROCARDIA-XL) 90 MG (OSM) 24 hr tablet Take 1 tablet (90 mg total) by mouth once daily. 30 tablet 5    ONETOUCH ULTRA BLUE TEST STRIP Strp 1 STRIP BY MIS.(NON-DRUG COMBO ROUTE) ROUTE 3 (THREE) TIMES DAILY. 100 strip 2    pen needle, diabetic (BD ULTRA-FINE SHIRA PEN NEEDLE) 32 gauge x 5/32" Ndle 1 each by Misc.(Non-Drug; Combo Route) route once daily. 100 each 11    sodium bicarbonate 650 MG tablet Take 1 tablet (650 mg total) by mouth 2 (two) times daily. 60 tablet 11    tadalafil (ADCIRCA) 20 mg Tab Take 1 tablet (20 mg total) by mouth once daily. 5 tablet 11    tadalafil (CIALIS) 10 MG tablet Take 1 tablet " (10 mg total) by mouth as needed for Erectile Dysfunction. 10 tablet 11    tamsulosin (FLOMAX) 0.4 mg Cap Take 1 capsule (0.4 mg total) by mouth once daily. 30 capsule 11    valsartan (DIOVAN) 320 MG tablet TAKE 1 TABLET (320 MG TOTAL) BY MOUTH ONCE DAILY. 90 tablet 3    olopatadine (PATADAY) 0.2 % Drop Place 1 drop into both eyes daily as needed. 2.5 mL 0     No current facility-administered medications on file prior to visit.      PSH: thoracotomy in 2010      Family history : positive for chronic kidney disease and hemodialysis in his father who is no more.      Social history : he owns a restaurant. He never smoked. He drinks alcohol occasionally. He has 3 children     Review of Systems  :      Constitutional: Negative for activity change and appetite change.   HENT: Negative for congestion and facial swelling.    Eyes: Negative for pain, discharge and redness.   Respiratory: Negative for apnea, cough and chest tightness.    Cardiovascular: Negative for chest pain, palpitations and leg swelling.   Gastrointestinal: Negative for abdominal distention.   Genitourinary: Negative for difficulty urinating, dysuria and frequency.   Musculoskeletal: Positive for arthralgias. Negative for neck pain and neck stiffness.   Skin: Negative for color change, rash and wound.   Neurological: Negative for dizziness, weakness and numbness.   Psychiatric/Behavioral: Negative for sleep disturbance.   All other systems reviewed and are negative.          Objective:         Vitals:    01/02/20 1427   BP: (!) 156/100   Pulse: 62   Resp: 20       Weight 258 lbs , stable       Physical Exam  :      Nursing note and vitals reviewed.   Constitutional: He is oriented to person, place, and time. He appears well-developed and well-nourished. No distress.   HENT: Head: Normocephalic and atraumatic. Pupils are equal, round, and reactive to light.   Neck: Normal range of motion. Neck supple. No tracheal deviation present. No thyromegaly  present.   Cardiovascular: Normal rate, regular rhythm, normal heart sounds and intact distal pulses. gallop and no friction rub. No murmur heard.   Pulmonary/Chest: Effort normal and breath sounds normal. He has no wheezes. He has no rales.   Abdominal: obese, Soft. He exhibits no mass. There is no tenderness. There is no rebound and no guarding.   Musculoskeletal: Normal range of motion. He exhibits no edema.   Lymphadenopathy: He has no cervical adenopathy.   Neurological: He is alert and oriented to person, place, and time.   Skin: Skin is warm. No rash noted. He is not diaphoretic. No erythema.                Labs:    Lab Results   Component Value Date    CREATININE 4.5 (H) 12/30/2019    BUN 49 (H) 12/30/2019     12/30/2019    K 4.4 12/30/2019     (H) 12/30/2019    CO2 21 (L) 12/30/2019       Lab Results   Component Value Date    WBC 9.29 12/30/2019    HGB 12.0 (L) 12/30/2019    HCT 40.6 12/30/2019    MCV 85 12/30/2019     12/30/2019         Lab Results   Component Value Date    .0 (H) 12/30/2019    CALCIUM 8.9 12/30/2019    PHOS 3.2 12/30/2019       Lab Results   Component Value Date    ALBUMIN 3.4 (L) 12/30/2019       Lab Results   Component Value Date    HGBA1C 6.9 (H) 09/05/2019       Impression and Plan: 64 - year-old  gentleman seen in office today in f/u for following medical problems ,      1. Hypertension : Blood pressure is elevated,  discussed low-salt diet and adherence with his medications.  Currently on nifedipine Coreg and Diovan,     will increase carvedilol from 12.5 mg twice a day to  25 mg twice a day, Refill given for Nifedipine.      2. Chronic kidney disease stage 5  : Secondary to long-standing history of hypertension and diabetes. Again advised patient to avoid NSAIDs.  serum creatinine is 4.5 mg/dl ,      He is active on kidney transplant list since 04/19, advised patient to discuss with family members and friends for a potential  Donor, he had  ended chronic kidney disease class, he is interested in peritoneal dialysis when indicated,     3. Diabetes mellitus type 2 : discussed compliance with his diet and medications. Discussed weight loss and daily exercise.      4. Secondary hyperparathyroidism - PTH is acceptable for degree of renal dysfunction, cont Vit D ,      5. Proteinuria - about 1.5 g daily. cont Diovan. Discussed diabetes control and adherence with [facility]      6. BPH : Follow up with urology ,      7. Obesity -  discuss calorie restriction weight loss,     8. Metabolic acidosis :  follow , on sodium bicarbonate supplements     9.  Anemia of chronic kidney disease - stable hemoglobin at 12 g,     I will see him followup in about 2 months , Face-to-face time was 25 minutes discussing his lab results and plan of care.      Gabino Champion M.D.

## 2020-01-02 NOTE — PATIENT INSTRUCTIONS
Avoid NSAID pain medications such as advil, aleve, motrin, ibuprofen, naprosyn, meloxicam, diclofenac, mobic.     Low salt diet as discussed

## 2020-01-06 ENCOUNTER — LAB VISIT (OUTPATIENT)
Dept: LAB | Facility: HOSPITAL | Age: 65
End: 2020-01-06
Attending: NURSE PRACTITIONER
Payer: COMMERCIAL

## 2020-01-06 DIAGNOSIS — Z76.82 ORGAN TRANSPLANT CANDIDATE: ICD-10-CM

## 2020-01-06 PROCEDURE — 99001 SPECIMEN HANDLING PT-LAB: CPT | Mod: TXP

## 2020-02-03 ENCOUNTER — HOSPITAL ENCOUNTER (OUTPATIENT)
Dept: RADIOLOGY | Facility: HOSPITAL | Age: 65
Discharge: HOME | End: 2020-02-03
Attending: FAMILY MEDICINE
Payer: COMMERCIAL

## 2020-02-03 ENCOUNTER — OFFICE VISIT (OUTPATIENT)
Dept: INTERNAL MEDICINE | Facility: CLINIC | Age: 65
End: 2020-02-03
Payer: COMMERCIAL

## 2020-02-03 VITALS
WEIGHT: 271.63 LBS | BODY MASS INDEX: 34.86 KG/M2 | OXYGEN SATURATION: 97 % | TEMPERATURE: 99 F | HEIGHT: 74 IN | SYSTOLIC BLOOD PRESSURE: 124 MMHG | DIASTOLIC BLOOD PRESSURE: 76 MMHG | HEART RATE: 82 BPM

## 2020-02-03 DIAGNOSIS — R05.9 COUGH: ICD-10-CM

## 2020-02-03 DIAGNOSIS — J06.9 URI WITH COUGH AND CONGESTION: Primary | ICD-10-CM

## 2020-02-03 PROCEDURE — 99999 PR PBB SHADOW E&M-EST. PATIENT-LVL III: ICD-10-PCS | Mod: PBBFAC,,, | Performed by: FAMILY MEDICINE

## 2020-02-03 PROCEDURE — 71046 X-RAY EXAM CHEST 2 VIEWS: CPT | Mod: TC,TXP

## 2020-02-03 PROCEDURE — 71046 XR CHEST PA AND LATERAL: ICD-10-PCS | Mod: 26,NTX,, | Performed by: RADIOLOGY

## 2020-02-03 PROCEDURE — 3078F DIAST BP <80 MM HG: CPT | Mod: CPTII,S$GLB,, | Performed by: FAMILY MEDICINE

## 2020-02-03 PROCEDURE — 3078F PR MOST RECENT DIASTOLIC BLOOD PRESSURE < 80 MM HG: ICD-10-PCS | Mod: CPTII,S$GLB,, | Performed by: FAMILY MEDICINE

## 2020-02-03 PROCEDURE — 71046 X-RAY EXAM CHEST 2 VIEWS: CPT | Mod: 26,NTX,, | Performed by: RADIOLOGY

## 2020-02-03 PROCEDURE — 3074F SYST BP LT 130 MM HG: CPT | Mod: CPTII,S$GLB,, | Performed by: FAMILY MEDICINE

## 2020-02-03 PROCEDURE — 99999 PR PBB SHADOW E&M-EST. PATIENT-LVL III: CPT | Mod: PBBFAC,,, | Performed by: FAMILY MEDICINE

## 2020-02-03 PROCEDURE — 3074F PR MOST RECENT SYSTOLIC BLOOD PRESSURE < 130 MM HG: ICD-10-PCS | Mod: CPTII,S$GLB,, | Performed by: FAMILY MEDICINE

## 2020-02-03 PROCEDURE — 3008F BODY MASS INDEX DOCD: CPT | Mod: CPTII,S$GLB,, | Performed by: FAMILY MEDICINE

## 2020-02-03 PROCEDURE — 99214 OFFICE O/P EST MOD 30 MIN: CPT | Mod: S$GLB,,, | Performed by: FAMILY MEDICINE

## 2020-02-03 PROCEDURE — 99214 PR OFFICE/OUTPT VISIT, EST, LEVL IV, 30-39 MIN: ICD-10-PCS | Mod: S$GLB,,, | Performed by: FAMILY MEDICINE

## 2020-02-03 PROCEDURE — 3008F PR BODY MASS INDEX (BMI) DOCUMENTED: ICD-10-PCS | Mod: CPTII,S$GLB,, | Performed by: FAMILY MEDICINE

## 2020-02-03 RX ORDER — PROMETHAZINE HYDROCHLORIDE AND DEXTROMETHORPHAN HYDROBROMIDE 6.25; 15 MG/5ML; MG/5ML
SYRUP ORAL
COMMUNITY
Start: 2020-02-01 | End: 2020-02-03 | Stop reason: ALTCHOICE

## 2020-02-03 RX ORDER — BENZONATATE 200 MG/1
200 CAPSULE ORAL 3 TIMES DAILY PRN
Qty: 30 CAPSULE | Refills: 1 | Status: SHIPPED | OUTPATIENT
Start: 2020-02-03 | End: 2020-02-13

## 2020-02-03 RX ORDER — LOTEPREDNOL ETABONATE AND TOBRAMYCIN 5; 3 MG/ML; MG/ML
SUSPENSION/ DROPS OPHTHALMIC
COMMUNITY
Start: 2019-11-26 | End: 2020-08-07

## 2020-02-03 RX ORDER — PREDNISONE 20 MG/1
TABLET ORAL
COMMUNITY
Start: 2020-02-01 | End: 2020-02-14

## 2020-02-03 RX ORDER — CODEINE PHOSPHATE AND GUAIFENESIN 10; 100 MG/5ML; MG/5ML
5-10 SOLUTION ORAL 3 TIMES DAILY PRN
Qty: 118 ML | Refills: 1 | Status: SHIPPED | OUTPATIENT
Start: 2020-02-03 | End: 2020-02-24

## 2020-02-03 RX ORDER — ALBUTEROL SULFATE 90 UG/1
AEROSOL, METERED RESPIRATORY (INHALATION)
COMMUNITY
Start: 2020-02-01 | End: 2020-08-07

## 2020-02-03 NOTE — ASSESSMENT & PLAN NOTE
Diabetes Management Status    Statin: Taking  ACE/ARB: Taking    Screening or Prevention Patient's value Goal Complete/Controlled?   HgA1C Testing and Control   Lab Results   Component Value Date    HGBA1C 6.9 (H) 09/05/2019      Annually/Less than 8% Yes   Lipid profile : 09/05/2019 Annually Yes   LDL control Lab Results   Component Value Date    LDLCALC 73.8 09/05/2019    Annually/Less than 100 mg/dl  Yes   Nephropathy screening Lab Results   Component Value Date    LABMICR 807.0 11/20/2017     Lab Results   Component Value Date    PROTEINUA 2+ (A) 12/30/2019    Annually Yes   Blood pressure BP Readings from Last 1 Encounters:   02/03/20 124/76    Less than 140/90 Yes   Dilated retinal exam : 09/05/2018 Annually No   Foot exam   : 04/22/2019 Annually Yes     Lab Results   Component Value Date    HGBA1C 6.9 (H) 09/05/2019    HGBA1C 6.2 (H) 03/11/2019    HGBA1C 6.5 (H) 10/22/2018    ESTGFRAFRICA 15.0 (A) 12/30/2019    EGFRNONAA 12.9 (A) 12/30/2019    MICALBCREAT 1222.7 (H) 11/20/2017    LDLCALC 73.8 09/05/2019       HEALTH MAINTENANCE: Diabetic health maintenance interventions reviewed and are up to date except for:      Topic    Eye Exam

## 2020-02-03 NOTE — PROGRESS NOTES
CHIEF COMPLAINT  Cough and Nasal Congestion      HISTORY, ASSESSMENT, and PLAN    1. URI with cough and congestion    2. Cough      ONSET of symptoms reported as 4-5 days ago. QUALITY of symptoms described primarily as nonproductive cough. ASSOCIATED SYMPTOMS include nasal congestion, and runny nose. No additional ASSOCIATED SYMPTOMS reported. LOCATION of worst symptoms reported as chest (cough). SEVERITY of symptoms described as SEVERE at worst, and MODERATELY SEVERE at present. TIMING of symptoms described as symptoms typically worse at night. EXACERBATING FACTORS include recumbent positioning. ALLEVIATING FACTORS were not identified. NARRATIVE HISTORY: He says he went to urgent care about four days ago and received to shot, apparently corticosteroids.  His diabetes had been well controlled, but since the shot, he says that his morning blood glucose levels are approaching 200.  He reports no symptomatic hyperglycemia. We discussed the apparent viral nature of this acute illness, how the management was largely supportive and symptom focused. We discussed the risks and benefits of treatment options, and he feels that the severity of his symptoms is sufficient to warrant the treatment prescribed. I told him to expect gradual improvement and resolution of symptoms over the next week or two, and I instructed him to let me know if his illness failed to follow this expected course.     Orders Placed This Encounter    X-Ray Chest PA And Lateral    benzonatate (TESSALON) 200 MG capsule    guaifenesin-codeine 100-10 mg/5 ml (TUSSI-ORGANIDIN NR)  mg/5 mL syrup        Problem List Items Addressed This Visit     None      Visit Diagnoses     URI with cough and congestion    -  Primary    Relevant Medications    benzonatate (TESSALON) 200 MG capsule    guaifenesin-codeine 100-10 mg/5 ml (TUSSI-ORGANIDIN NR)  mg/5 mL syrup    Cough        Relevant Medications    benzonatate (TESSALON) 200 MG capsule     "guaifenesin-codeine 100-10 mg/5 ml (TUSSI-ORGANIDIN NR)  mg/5 mL syrup    Other Relevant Orders    X-Ray Chest PA And Lateral           MEDICATION MANAGMENT    Outpatient Medications Prior to Visit   Medication Sig Dispense Refill    acyclovir (ZOVIRAX) 400 MG tablet TAKE 1 TABLET 3 TIMES DAILY AS NEEDED 30 tablet 6    aspirin (ECOTRIN) 81 MG EC tablet Take 1 tablet (81 mg total) by mouth once daily.      atorvastatin (LIPITOR) 40 MG tablet TAKE 1 TABLET BY MOUTH EVERY DAY 30 tablet 11    BD INSULIN SYRINGE HALF UNIT 0.3 mL 31 gauge x 5/16" Syrg FOR USE WITH INSULIN. 100 Syringe 1    carvedilol (COREG) 25 MG tablet Take 1 tablet (25 mg total) by mouth 2 (two) times daily with meals. 60 tablet 11    ergocalciferol (ERGOCALCIFEROL) 50,000 unit Cap TAKE 1 CAPSULE (50,000 UNITS TOTAL) BY MOUTH EVERY 7 DAYS. 4 capsule 6    esomeprazole (NEXIUM) 40 MG capsule Take 40 mg by mouth once daily.      finasteride (PROSCAR) 5 mg tablet Take 1 tablet (5 mg total) by mouth once daily. 90 tablet 3    folic acid (FOLVITE) 1 MG tablet TAKE 1 TABLET BY MOUTH EVERY DAY 30 tablet 9    insulin degludec-liraglutide (XULTOPHY 100/3.6) 100 unit-3.6 mg /mL (3 mL) InPn INJECT 35 UNITS INTO THE SKIN ONCE DAILY. 15 Syringe 1    lancets (ONETOUCH ULTRASOFT LANCETS) Misc 1 lancet by Misc.(Non-Drug; Combo Route) route 2 (two) times daily. (Patient taking differently: 1 lancet by Misc.(Non-Drug; Combo Route) route 3 (three) times daily. ) 200 each 1    multivitamin (THERAGRAN) tablet Take 1 tablet by mouth once daily.      NIFEdipine (PROCARDIA-XL) 90 MG (OSM) 24 hr tablet Take 1 tablet (90 mg total) by mouth once daily. 30 tablet 9    ONETOUCH ULTRA BLUE TEST STRIP Strp 1 STRIP BY MISC.(NON-DRUG COMBO ROUTE) ROUTE 3 (THREE) TIMES DAILY. 100 strip 2    pen needle, diabetic (BD ULTRA-FINE SHIRA PEN NEEDLE) 32 gauge x 5/32" Ndle 1 each by Misc.(Non-Drug; Combo Route) route once daily. 100 each 11    predniSONE (DELTASONE) 20 MG " tablet       sodium bicarbonate 650 MG tablet Take 1 tablet (650 mg total) by mouth 2 (two) times daily. 60 tablet 11    tadalafil (ADCIRCA) 20 mg Tab Take 1 tablet (20 mg total) by mouth once daily. 5 tablet 11    tadalafil (CIALIS) 10 MG tablet Take 1 tablet (10 mg total) by mouth as needed for Erectile Dysfunction. 10 tablet 11    tamsulosin (FLOMAX) 0.4 mg Cap Take 1 capsule (0.4 mg total) by mouth once daily. 30 capsule 11    valsartan (DIOVAN) 320 MG tablet TAKE 1 TABLET (320 MG TOTAL) BY MOUTH ONCE DAILY. 90 tablet 3    VENTOLIN HFA 90 mcg/actuation inhaler       ZYLET 0.3-0.5 % DrpS       promethazine-dextromethorphan (PROMETHAZINE-DM) 6.25-15 mg/5 mL Syrp       furosemide (LASIX) 80 MG tablet Take 80 mg by mouth once daily.  11    olopatadine (PATADAY) 0.2 % Drop Place 1 drop into both eyes daily as needed. 2.5 mL 0     No facility-administered medications prior to visit.         Medications Discontinued During This Encounter   Medication Reason    promethazine-dextromethorphan (PROMETHAZINE-DM) 6.25-15 mg/5 mL Syrp Alternate therapy        Medications Ordered This Encounter   Medications    benzonatate (TESSALON) 200 MG capsule     Sig: Take 1 capsule (200 mg total) by mouth 3 (three) times daily as needed for Cough.     Dispense:  30 capsule     Refill:  1    guaifenesin-codeine 100-10 mg/5 ml (TUSSI-ORGANIDIN NR)  mg/5 mL syrup     Sig: Take 5-10 mLs by mouth 3 (three) times daily as needed (FOR BAD COUGH).     Dispense:  118 mL     Refill:  1     GREATER THAN 7-DAY SUPPLY IS MEDICALLY NECESSARY.        FOLLOW-UP  Follow up if symptoms worsen or fail to improve.     REVIEW OF SYSTEMS  CONSTITUTIONAL: No objective fever reported.  PULMONARY: No hemoptysis or difficulty breathing reported.  CARDIOVASCULAR: No chest pain or orthopnea reported.     PHYSICAL EXAM  Vitals:    02/03/20 1432   BP: 124/76   BP Location: Right arm   Patient Position: Sitting   BP Method: Large (Manual)   Pulse:  "82   Temp: 98.7 °F (37.1 °C)   TempSrc: Oral   SpO2: 97%   Weight: 123.2 kg (271 lb 9.7 oz)   Height: 6' 2" (1.88 m)     CONSTITUTIONAL: Vital signs noted. No apparent distress. Does not appear acutely ill or septic. Appears adequately hydrated.  EYES: Pupils equal and reactive. Extraocular movements intact. Sclerae anicteric. Lids and conjunctiva unremarkable.  ENT: External ENT grossly unremarkable. Ear canals and visualized tympanic membranes are unremarkable. Hearing grossly intact. Nasal mucosa pink. Oropharynx moist. Posterior oropharynx is reasonably symmetric with mild erythema, but is without lesion, ulceration, or exudate.  NECK: Trachea midline. No significant cervical lymphadenopathy.  PULM: Lungs clear. Breathing unlabored.  HEART: Auscultation reveals regular rate and rhythm.  DERM: Skin warm and moist with normal turgor.     Documentation entered by me for this encounter may have been done in part using speech-recognition technology. Although I have made an effort to ensure accuracy, "sound like" errors may exist and should be interpreted in context. -JAJA Thao MD.   "

## 2020-02-04 RX ORDER — FUROSEMIDE 80 MG/1
80 TABLET ORAL DAILY
Qty: 30 TABLET | Refills: 11 | Status: SHIPPED | OUTPATIENT
Start: 2020-02-04 | End: 2021-02-24

## 2020-02-04 NOTE — TELEPHONE ENCOUNTER
----- Message from Pretty Núñez sent at 2/4/2020 10:47 AM CST -----  Contact: self  Requesting call back regarding refill status on rx furosemide. [facility] pharmacy has been calling but has not gotten no response. Please call back at [phone].    Thanks,  Pretty Núñez

## 2020-02-04 NOTE — TELEPHONE ENCOUNTER
----- Message from Pretty Núñez sent at 2/4/2020 10:47 AM CST -----  Contact: self  Requesting call back regarding refill status on rx furosemide. [facility] pharmacy has been calling but has not gotten no response. Please call back at [phone].    Thanks,  Pretty Núñez

## 2020-02-04 NOTE — PROGRESS NOTES
Please let him know: I'm happy to report that your chest x-ray did NOT show a pneumonia or anything else bad in your lungs and heart.

## 2020-02-04 NOTE — TELEPHONE ENCOUNTER
Pt says he has asked for refill at the pharmacy and not us. I sent his refill req to dr gibson2/4/2020/1058/sf

## 2020-02-05 ENCOUNTER — TELEPHONE (OUTPATIENT)
Dept: INTERNAL MEDICINE | Facility: CLINIC | Age: 65
End: 2020-02-05

## 2020-02-05 NOTE — TELEPHONE ENCOUNTER
----- Message from Pretty Núñez sent at 2/5/2020 10:44 AM CST -----  Contact: self  Requesting call back regarding status of pt results. States the nurse said she would get back with him but she has not heard anything. Please call back at [phone].    Thanks,  Pretty Núñez

## 2020-02-05 NOTE — TELEPHONE ENCOUNTER
Spoke with patient. He was calling for his xray results. Gave him the results and he verbalized understanding.

## 2020-02-14 ENCOUNTER — OFFICE VISIT (OUTPATIENT)
Dept: FAMILY MEDICINE | Facility: CLINIC | Age: 65
End: 2020-02-14
Payer: COMMERCIAL

## 2020-02-14 VITALS
HEART RATE: 100 BPM | TEMPERATURE: 98 F | OXYGEN SATURATION: 96 % | DIASTOLIC BLOOD PRESSURE: 85 MMHG | WEIGHT: 254.88 LBS | BODY MASS INDEX: 32.71 KG/M2 | SYSTOLIC BLOOD PRESSURE: 140 MMHG | HEIGHT: 74 IN

## 2020-02-14 DIAGNOSIS — R05.9 COUGHING: Primary | ICD-10-CM

## 2020-02-14 DIAGNOSIS — R06.02 SOB (SHORTNESS OF BREATH): ICD-10-CM

## 2020-02-14 DIAGNOSIS — R07.9 CHEST PAIN, UNSPECIFIED TYPE: ICD-10-CM

## 2020-02-14 PROCEDURE — 99214 PR OFFICE/OUTPT VISIT, EST, LEVL IV, 30-39 MIN: ICD-10-PCS | Mod: S$GLB,,, | Performed by: FAMILY MEDICINE

## 2020-02-14 PROCEDURE — 3077F SYST BP >= 140 MM HG: CPT | Mod: CPTII,S$GLB,, | Performed by: FAMILY MEDICINE

## 2020-02-14 PROCEDURE — 99214 OFFICE O/P EST MOD 30 MIN: CPT | Mod: S$GLB,,, | Performed by: FAMILY MEDICINE

## 2020-02-14 PROCEDURE — 99999 PR PBB SHADOW E&M-EST. PATIENT-LVL III: ICD-10-PCS | Mod: PBBFAC,,, | Performed by: FAMILY MEDICINE

## 2020-02-14 PROCEDURE — 3079F PR MOST RECENT DIASTOLIC BLOOD PRESSURE 80-89 MM HG: ICD-10-PCS | Mod: CPTII,S$GLB,, | Performed by: FAMILY MEDICINE

## 2020-02-14 PROCEDURE — 99999 PR PBB SHADOW E&M-EST. PATIENT-LVL III: CPT | Mod: PBBFAC,,, | Performed by: FAMILY MEDICINE

## 2020-02-14 PROCEDURE — 3008F PR BODY MASS INDEX (BMI) DOCUMENTED: ICD-10-PCS | Mod: CPTII,S$GLB,, | Performed by: FAMILY MEDICINE

## 2020-02-14 PROCEDURE — 3077F PR MOST RECENT SYSTOLIC BLOOD PRESSURE >= 140 MM HG: ICD-10-PCS | Mod: CPTII,S$GLB,, | Performed by: FAMILY MEDICINE

## 2020-02-14 PROCEDURE — 3079F DIAST BP 80-89 MM HG: CPT | Mod: CPTII,S$GLB,, | Performed by: FAMILY MEDICINE

## 2020-02-14 PROCEDURE — 3008F BODY MASS INDEX DOCD: CPT | Mod: CPTII,S$GLB,, | Performed by: FAMILY MEDICINE

## 2020-02-14 RX ORDER — AZELASTINE 1 MG/ML
1 SPRAY, METERED NASAL 2 TIMES DAILY
Qty: 30 ML | Refills: 0 | Status: SHIPPED | OUTPATIENT
Start: 2020-02-14 | End: 2023-03-09 | Stop reason: ALTCHOICE

## 2020-02-14 RX ORDER — AZITHROMYCIN 250 MG/1
TABLET, FILM COATED ORAL
Qty: 6 TABLET | Refills: 0 | Status: SHIPPED | OUTPATIENT
Start: 2020-02-14 | End: 2020-02-19

## 2020-02-14 NOTE — PROGRESS NOTES
"Subjective:      Patient ID: Isidro Alves is a 64 y.o. male.    Chief Complaint: Sinus Problem    HPI    Patient here today for sinus problem   2 weeks   Coughing - consistent  Symptoms feel like they are worsening   Nasal drip, drip down his throat, congestion in chest, head congestion, sweating at night - this is a new symptom x 3-4 days ago, some sob and wheezing  Left sided chest pain - only  Happens when cough   Hx of coughing so hard in the past - with tearing of "lining in between his ribs"   CXR on 2/3/20 - did not show acute changes or pneumonia  Was seen by urgent care and after hours clinic was given tessalon, cough syrup 5 days of prednisone - not helping. Also has inahler - albuterol not working. zytec not working   BS running , last a1c 6.9   Did start using new cologne at the same time the coughing began   When this happened last time - when he tore his lining- he had walked into an area where pesticides had been sprayed which they thought caused the issue        Past Medical History:   Diagnosis Date    Anemia 4/16/2014    BPH (benign prostatic hyperplasia)     CKD (chronic kidney disease) stage 4, GFR 15-29 ml/min     Coronary artery disease of native artery of native heart with stable angina pectoris 4/29/2019    Diabetes mellitus     Diabetes mellitus, type 2     Elevated PSA     Encounter for blood transfusion     Herpes labialis     Hyperlipidemia     Hypertension     Obesity     Proteinuria     Secondary hyperparathyroidism (of renal origin)     UTI (lower urinary tract infection) 5/1/2015       Past Surgical History:   Procedure Laterality Date    COLONOSCOPY N/A 12/21/2017    Procedure: COLONOSCOPY;  Surgeon: Fran Escalera MD;  Location: [facility];  Service: Endoscopy;  Laterality: N/A;    PROSTATE BIOPSY      thorocotomy  2010       Family History   Problem Relation Age of Onset    No Known Problems Mother     Diabetes Father     Hypertension Father     " Hypertension Unknown     Diabetes Unknown        Social History     Socioeconomic History    Marital status:      Spouse name: Not on file    Number of children: 3    Years of education: Not on file    Highest education level: Not on file   Occupational History     Employer: self-rmployed   Social Needs    Financial resource strain: Not on file    Food insecurity:     Worry: Not on file     Inability: Not on file    Transportation needs:     Medical: Not on file     Non-medical: Not on file   Tobacco Use    Smoking status: Never Smoker    Smokeless tobacco: Never Used   Substance and Sexual Activity    Alcohol use: Yes     Comment: seldom     Drug use: No    Sexual activity: Yes     Partners: Female   Lifestyle    Physical activity:     Days per week: Not on file     Minutes per session: Not on file    Stress: Not on file   Relationships    Social connections:     Talks on phone: Not on file     Gets together: Not on file     Attends [employer] service: Not on file     Active member of club or organization: Not on file     Attends meetings of clubs or organizations: Not on file     Relationship status: Not on file   Other Topics Concern    Not on file   Social History Narrative    Not on file       Health Maintenance Topics with due status: Not Due       Topic Last Completion Date    TETANUS VACCINE 05/05/2016    Colonoscopy 12/21/2017    Pneumococcal Vaccine (Highest Risk) 10/11/2018    Foot Exam 04/22/2019    Lipid Panel 09/05/2019    Hemoglobin A1c 09/05/2019    High Dose Statin 02/14/2020    Aspirin/Antiplatelet Therapy 02/14/2020       Medication List with Changes/Refills   New Medications    AZELASTINE (ASTELIN) 137 MCG (0.1 %) NASAL SPRAY    1 spray (137 mcg total) by Nasal route 2 (two) times daily.    AZITHROMYCIN (Z-SETH) 250 MG TABLET    Take 2 tablets by mouth on day 1; Take 1 tablet by mouth on days 2-5   Current Medications    ACYCLOVIR (ZOVIRAX) 400 MG TABLET    TAKE 1 TABLET 3  "TIMES DAILY AS NEEDED    ASPIRIN (ECOTRIN) 81 MG EC TABLET    Take 1 tablet (81 mg total) by mouth once daily.    ATORVASTATIN (LIPITOR) 40 MG TABLET    TAKE 1 TABLET BY MOUTH EVERY DAY    BD INSULIN SYRINGE HALF UNIT 0.3 ML 31 GAUGE X 5/16" SYRG    FOR USE WITH INSULIN.    CARVEDILOL (COREG) 25 MG TABLET    Take 1 tablet (25 mg total) by mouth 2 (two) times daily with meals.    ERGOCALCIFEROL (ERGOCALCIFEROL) 50,000 UNIT CAP    TAKE 1 CAPSULE (50,000 UNITS TOTAL) BY MOUTH EVERY 7 DAYS.    ESOMEPRAZOLE (NEXIUM) 40 MG CAPSULE    Take 40 mg by mouth once daily.    FINASTERIDE (PROSCAR) 5 MG TABLET    Take 1 tablet (5 mg total) by mouth once daily.    FOLIC ACID (FOLVITE) 1 MG TABLET    TAKE 1 TABLET BY MOUTH EVERY DAY    FUROSEMIDE (LASIX) 80 MG TABLET    Take 1 tablet (80 mg total) by mouth once daily.    INSULIN DEGLUDEC-LIRAGLUTIDE (XULTOPHY 100/3.6) 100 UNIT-3.6 MG /ML (3 ML) INPN    INJECT 35 UNITS INTO THE SKIN ONCE DAILY.    LANCETS (ONETOUCH ULTRASOFT LANCETS) MISC    1 lancet by Misc.(Non-Drug; Combo Route) route 2 (two) times daily.    MULTIVITAMIN (THERAGRAN) TABLET    Take 1 tablet by mouth once daily.    NIFEDIPINE (PROCARDIA-XL) 90 MG (OSM) 24 HR TABLET    Take 1 tablet (90 mg total) by mouth once daily.    OLOPATADINE (PATADAY) 0.2 % DROP    Place 1 drop into both eyes daily as needed.    ONETOUCH ULTRA BLUE TEST STRIP STRP    1 STRIP BY [facility]NON-DRUG COMBO ROUTE) ROUTE 3 (THREE) TIMES DAILY.    PEN NEEDLE, DIABETIC (BD ULTRA-FINE SHIRA PEN NEEDLE) 32 GAUGE X 5/32" NDLE    1 each by Misc.(Non-Drug; Combo Route) route once daily.    SODIUM BICARBONATE 650 MG TABLET    Take 1 tablet (650 mg total) by mouth 2 (two) times daily.    TADALAFIL (ADCIRCA) 20 MG TAB    Take 1 tablet (20 mg total) by mouth once daily.    TADALAFIL (CIALIS) 10 MG TABLET    Take 1 tablet (10 mg total) by mouth as needed for Erectile Dysfunction.    TAMSULOSIN (FLOMAX) 0.4 MG CAP    Take 1 capsule (0.4 mg total) by mouth once daily. "    VALSARTAN (DIOVAN) 320 MG TABLET    TAKE 1 TABLET (320 MG TOTAL) BY MOUTH ONCE DAILY.    VENTOLIN HFA 90 MCG/ACTUATION INHALER        ZYLET 0.3-0.5 % DRPS       Discontinued Medications    PREDNISONE (DELTASONE) 20 MG TABLET           Review of patient's allergies indicates:   Allergen Reactions    Bactrim [sulfamethoxazole-trimethoprim] Swelling    Nsaids (non-steroidal anti-inflammatory drug)      CKD       Review of Systems   Constitutional: Positive for chills and malaise/fatigue. Negative for fever.   HENT: Positive for congestion. Negative for ear discharge, ear pain, sinus pain, sore throat and tinnitus.         Nasal congestion    Eyes: Negative for blurred vision and pain.   Respiratory: Positive for cough, sputum production and wheezing. Negative for shortness of breath.    Cardiovascular: Positive for chest pain. Negative for leg swelling.   Gastrointestinal: Negative for abdominal pain, constipation, diarrhea and nausea.   Genitourinary: Negative for dysuria.   Musculoskeletal: Negative for myalgias.   Skin: Negative for rash.   Neurological: Negative for headaches.       Objective:     Vitals:    02/14/20 1630   BP: (!) 140/85   Pulse: 100   Temp: 97.9 °F (36.6 °C)     Body mass index is 32.72 kg/m².    Physical Exam   Constitutional: He is oriented to person, place, and time. He appears well-developed and well-nourished. No distress.   HENT:   Head: Normocephalic and atraumatic.   Right Ear: External ear normal.   Left Ear: External ear normal.   Nose: Nose normal.   Mouth/Throat: Oropharynx is clear and moist.   Post nasal drip symptoms    Eyes: Pupils are equal, round, and reactive to light. Conjunctivae and EOM are normal.   Neck: No thyromegaly present.   Cardiovascular: Normal rate, regular rhythm, normal heart sounds and intact distal pulses.   No murmur heard.  Pulmonary/Chest: Effort normal and breath sounds normal. No respiratory distress. He has no wheezes. He has no rales. He exhibits  no tenderness.   Tenderness in area highlighted        Abdominal: Soft. Bowel sounds are normal. He exhibits no distension. There is no tenderness.   Musculoskeletal: He exhibits no edema.   Lymphadenopathy:     He has no cervical adenopathy.   Neurological: He is alert and oriented to person, place, and time.   Skin: Skin is warm and dry.   Psychiatric: He has a normal mood and affect.   Nursing note and vitals reviewed.      Assessment and Plan:     Coughing    Chest pain, unspecified type    SOB (shortness of breath)    Other orders  -     azithromycin (Z-SETH) 250 MG tablet; Take 2 tablets by mouth on day 1; Take 1 tablet by mouth on days 2-5  Dispense: 6 tablet; Refill: 0  -     azelastine (ASTELIN) 137 mcg (0.1 %) nasal spray; 1 spray (137 mcg total) by Nasal route 2 (two) times daily.  Dispense: 30 mL; Refill: 0    Vitals stable, no XR available at clinic today - but given hx of new symptoms - night sweats and sob - will treat with azithromycin to cover for possible pneumonia. Will not start steroids given that he recently finished course without relief   Advised continued use of albuterol inhaler, will add astelin  Stop using new product as this may be a trigger for his cough   Will monitor for symptoms relief - if worsens return to clinic asap or go to ER  Follow up if symptoms worsen or fail to improve.    @@

## 2020-03-02 ENCOUNTER — LAB VISIT (OUTPATIENT)
Dept: LAB | Facility: HOSPITAL | Age: 65
End: 2020-03-02
Attending: INTERNAL MEDICINE
Payer: COMMERCIAL

## 2020-03-02 DIAGNOSIS — Z76.82 ORGAN TRANSPLANT CANDIDATE: ICD-10-CM

## 2020-03-02 PROCEDURE — 99001 SPECIMEN HANDLING PT-LAB: CPT | Mod: TXP

## 2020-03-12 LAB
HLA FREEZE AND HOLD INTERPRETATION: NORMAL
HLAFH COLLECTION DATE: NORMAL

## 2020-03-13 LAB — HPRA INTERPRETATION: NORMAL

## 2020-03-19 ENCOUNTER — LAB VISIT (OUTPATIENT)
Dept: LAB | Facility: HOSPITAL | Age: 65
End: 2020-03-19
Attending: INTERNAL MEDICINE
Payer: COMMERCIAL

## 2020-03-19 DIAGNOSIS — N18.5 CHRONIC KIDNEY DISEASE (CKD), STAGE V: ICD-10-CM

## 2020-03-19 LAB
25(OH)D3+25(OH)D2 SERPL-MCNC: 31 NG/ML (ref 30–96)
ALBUMIN SERPL BCP-MCNC: 3.6 G/DL (ref 3.5–5.2)
ANION GAP SERPL CALC-SCNC: 10 MMOL/L (ref 8–16)
BASOPHILS # BLD AUTO: 0.07 K/UL (ref 0–0.2)
BASOPHILS NFR BLD: 0.9 % (ref 0–1.9)
BUN SERPL-MCNC: 45 MG/DL (ref 8–23)
CALCIUM SERPL-MCNC: 9.6 MG/DL (ref 8.7–10.5)
CHLORIDE SERPL-SCNC: 107 MMOL/L (ref 95–110)
CO2 SERPL-SCNC: 24 MMOL/L (ref 23–29)
CREAT SERPL-MCNC: 5 MG/DL (ref 0.5–1.4)
DIFFERENTIAL METHOD: ABNORMAL
EOSINOPHIL # BLD AUTO: 0.3 K/UL (ref 0–0.5)
EOSINOPHIL NFR BLD: 3.9 % (ref 0–8)
ERYTHROCYTE [DISTWIDTH] IN BLOOD BY AUTOMATED COUNT: 16.1 % (ref 11.5–14.5)
EST. GFR  (AFRICAN AMERICAN): 13.2 ML/MIN/1.73 M^2
EST. GFR  (NON AFRICAN AMERICAN): 11.4 ML/MIN/1.73 M^2
GLUCOSE SERPL-MCNC: 81 MG/DL (ref 70–110)
HCT VFR BLD AUTO: 43.3 % (ref 40–54)
HGB BLD-MCNC: 12.8 G/DL (ref 14–18)
IMM GRANULOCYTES # BLD AUTO: 0.06 K/UL (ref 0–0.04)
IMM GRANULOCYTES NFR BLD AUTO: 0.7 % (ref 0–0.5)
LYMPHOCYTES # BLD AUTO: 1.6 K/UL (ref 1–4.8)
LYMPHOCYTES NFR BLD: 19.1 % (ref 18–48)
MCH RBC QN AUTO: 25.1 PG (ref 27–31)
MCHC RBC AUTO-ENTMCNC: 29.6 G/DL (ref 32–36)
MCV RBC AUTO: 85 FL (ref 82–98)
MONOCYTES # BLD AUTO: 0.7 K/UL (ref 0.3–1)
MONOCYTES NFR BLD: 8.2 % (ref 4–15)
NEUTROPHILS # BLD AUTO: 5.5 K/UL (ref 1.8–7.7)
NEUTROPHILS NFR BLD: 67.2 % (ref 38–73)
NRBC BLD-RTO: 0 /100 WBC
PHOSPHATE SERPL-MCNC: 3.4 MG/DL (ref 2.7–4.5)
PLATELET # BLD AUTO: 322 K/UL (ref 150–350)
PMV BLD AUTO: 10.9 FL (ref 9.2–12.9)
POTASSIUM SERPL-SCNC: 4.7 MMOL/L (ref 3.5–5.1)
PTH-INTACT SERPL-MCNC: 408 PG/ML (ref 9–77)
RBC # BLD AUTO: 5.1 M/UL (ref 4.6–6.2)
SODIUM SERPL-SCNC: 141 MMOL/L (ref 136–145)
URATE SERPL-MCNC: 10 MG/DL (ref 3.4–7)
WBC # BLD AUTO: 8.21 K/UL (ref 3.9–12.7)

## 2020-03-19 PROCEDURE — 82306 VITAMIN D 25 HYDROXY: CPT | Mod: NTX

## 2020-03-19 PROCEDURE — 36415 COLL VENOUS BLD VENIPUNCTURE: CPT | Mod: NTX

## 2020-03-19 PROCEDURE — 83970 ASSAY OF PARATHORMONE: CPT | Mod: NTX

## 2020-03-19 PROCEDURE — 85025 COMPLETE CBC W/AUTO DIFF WBC: CPT | Mod: TXP

## 2020-03-19 PROCEDURE — 80069 RENAL FUNCTION PANEL: CPT | Mod: TXP

## 2020-03-19 PROCEDURE — 84550 ASSAY OF BLOOD/URIC ACID: CPT | Mod: TXP

## 2020-03-23 ENCOUNTER — PATIENT OUTREACH (OUTPATIENT)
Dept: ADMINISTRATIVE | Facility: OTHER | Age: 65
End: 2020-03-23

## 2020-03-23 DIAGNOSIS — E11.22 TYPE 2 DIABETES MELLITUS WITH STAGE 4 CHRONIC KIDNEY DISEASE, WITH LONG-TERM CURRENT USE OF INSULIN: ICD-10-CM

## 2020-03-23 DIAGNOSIS — E11.69 HYPERLIPIDEMIA ASSOCIATED WITH TYPE 2 DIABETES MELLITUS: Primary | ICD-10-CM

## 2020-03-23 DIAGNOSIS — E78.5 HYPERLIPIDEMIA ASSOCIATED WITH TYPE 2 DIABETES MELLITUS: Primary | ICD-10-CM

## 2020-03-23 DIAGNOSIS — N18.4 TYPE 2 DIABETES MELLITUS WITH STAGE 4 CHRONIC KIDNEY DISEASE, WITH LONG-TERM CURRENT USE OF INSULIN: ICD-10-CM

## 2020-03-23 DIAGNOSIS — Z79.4 TYPE 2 DIABETES MELLITUS WITH STAGE 4 CHRONIC KIDNEY DISEASE, WITH LONG-TERM CURRENT USE OF INSULIN: ICD-10-CM

## 2020-03-24 ENCOUNTER — OFFICE VISIT (OUTPATIENT)
Dept: NEPHROLOGY | Facility: CLINIC | Age: 65
End: 2020-03-24
Payer: COMMERCIAL

## 2020-03-24 VITALS
HEIGHT: 74 IN | SYSTOLIC BLOOD PRESSURE: 140 MMHG | RESPIRATION RATE: 20 BRPM | BODY MASS INDEX: 32.31 KG/M2 | DIASTOLIC BLOOD PRESSURE: 94 MMHG | WEIGHT: 251.75 LBS | HEART RATE: 88 BPM

## 2020-03-24 DIAGNOSIS — I10 HTN (HYPERTENSION), BENIGN: ICD-10-CM

## 2020-03-24 DIAGNOSIS — N18.5 ANEMIA OF CHRONIC RENAL FAILURE, STAGE 5: ICD-10-CM

## 2020-03-24 DIAGNOSIS — N18.5 CHRONIC KIDNEY DISEASE (CKD), STAGE V: Primary | ICD-10-CM

## 2020-03-24 DIAGNOSIS — N25.81 SECONDARY HYPERPARATHYROIDISM OF RENAL ORIGIN: ICD-10-CM

## 2020-03-24 DIAGNOSIS — D63.1 ANEMIA OF CHRONIC RENAL FAILURE, STAGE 5: ICD-10-CM

## 2020-03-24 PROCEDURE — 99214 OFFICE O/P EST MOD 30 MIN: CPT | Mod: S$GLB,,, | Performed by: INTERNAL MEDICINE

## 2020-03-24 PROCEDURE — 3077F SYST BP >= 140 MM HG: CPT | Mod: CPTII,S$GLB,, | Performed by: INTERNAL MEDICINE

## 2020-03-24 PROCEDURE — 99214 PR OFFICE/OUTPT VISIT, EST, LEVL IV, 30-39 MIN: ICD-10-PCS | Mod: S$GLB,,, | Performed by: INTERNAL MEDICINE

## 2020-03-24 PROCEDURE — 3077F PR MOST RECENT SYSTOLIC BLOOD PRESSURE >= 140 MM HG: ICD-10-PCS | Mod: CPTII,S$GLB,, | Performed by: INTERNAL MEDICINE

## 2020-03-24 PROCEDURE — 99999 PR PBB SHADOW E&M-EST. PATIENT-LVL III: ICD-10-PCS | Mod: PBBFAC,,, | Performed by: INTERNAL MEDICINE

## 2020-03-24 PROCEDURE — 3080F DIAST BP >= 90 MM HG: CPT | Mod: CPTII,S$GLB,, | Performed by: INTERNAL MEDICINE

## 2020-03-24 PROCEDURE — 3008F BODY MASS INDEX DOCD: CPT | Mod: CPTII,S$GLB,, | Performed by: INTERNAL MEDICINE

## 2020-03-24 PROCEDURE — 99999 PR PBB SHADOW E&M-EST. PATIENT-LVL III: CPT | Mod: PBBFAC,,, | Performed by: INTERNAL MEDICINE

## 2020-03-24 PROCEDURE — 3080F PR MOST RECENT DIASTOLIC BLOOD PRESSURE >= 90 MM HG: ICD-10-PCS | Mod: CPTII,S$GLB,, | Performed by: INTERNAL MEDICINE

## 2020-03-24 PROCEDURE — 3008F PR BODY MASS INDEX (BMI) DOCUMENTED: ICD-10-PCS | Mod: CPTII,S$GLB,, | Performed by: INTERNAL MEDICINE

## 2020-03-24 RX ORDER — ACYCLOVIR 400 MG/1
400 TABLET ORAL 2 TIMES DAILY
Qty: 30 TABLET | Refills: 6 | Status: SHIPPED | OUTPATIENT
Start: 2020-03-24 | End: 2020-07-10

## 2020-03-24 RX ORDER — VALSARTAN 320 MG/1
TABLET ORAL
Qty: 90 TABLET | Refills: 3 | Status: SHIPPED | OUTPATIENT
Start: 2020-03-24 | End: 2021-04-18

## 2020-03-24 NOTE — PROGRESS NOTES
Subjective:       Patient ID: Isidro Alves is a 64 y.o.   male who presents for follow-up evaluation of CKD stage 5 , HTN, DM-2, SHPT        Steven Morgan MD      HPI : Isidro Alves Is a pleasant 64 -year-old  gentleman seen in office today in f/u for above medical problems. I saw him about 2 months ago,   He has chronic kidney disease  . Recent serum creatinine is 5  mg/dL .  Progressive decline in renal function explained to the patient. he has long-standing history of hypertension and diabetes ,  Nonadherence with his blood pressure medications is a big issue with him in the past. Patient says he has been more adherent with his medications in the recent past.  Also has been exercising regularly and trying to watch salt intake.   PSA levels were elevated and is currently following with urology.  Recent PSA level is  3.8.     patient sustained a fall while working on his roof in November 2018, he lost his consciousness and was admitted in intensive care unit at [facility] for about 3 weeks, following discharge she had to undergo few weeks of rehab, he sustained some rib fractures and pelvic fracture.  Patient reports improvement in his overall strength and recovering well.          Past Medical History:   Diagnosis Date    Anemia 4/16/2014    BPH (benign prostatic hyperplasia)     CKD (chronic kidney disease) stage 4, GFR 15-29 ml/min     Coronary artery disease of native artery of native heart with stable angina pectoris 4/29/2019    Diabetes mellitus     Diabetes mellitus, type 2     Elevated PSA     Encounter for blood transfusion     Herpes labialis     Hyperlipidemia     Hypertension     Obesity     Proteinuria     Secondary hyperparathyroidism (of renal origin)     UTI (lower urinary tract infection) 5/1/2015       Current Outpatient Medications on File Prior to Visit   Medication Sig Dispense Refill    acyclovir (ZOVIRAX) 400 MG tablet TAKE 1 TABLET 3 TIMES  "DAILY AS NEEDED 30 tablet 6    aspirin (ECOTRIN) 81 MG EC tablet Take 1 tablet (81 mg total) by mouth once daily.      atorvastatin (LIPITOR) 40 MG tablet TAKE 1 TABLET BY MOUTH EVERY DAY 30 tablet 11    azelastine (ASTELIN) 137 mcg (0.1 %) nasal spray 1 spray (137 mcg total) by Nasal route 2 (two) times daily. 30 mL 0    BD INSULIN SYRINGE HALF UNIT 0.3 mL 31 gauge x 5/16" Syrg FOR USE WITH INSULIN. 100 Syringe 1    carvedilol (COREG) 25 MG tablet Take 1 tablet (25 mg total) by mouth 2 (two) times daily with meals. 60 tablet 11    ergocalciferol (ERGOCALCIFEROL) 50,000 unit Cap TAKE 1 CAPSULE (50,000 UNITS TOTAL) BY MOUTH EVERY 7 DAYS. 4 capsule 6    esomeprazole (NEXIUM) 40 MG capsule Take 40 mg by mouth once daily.      finasteride (PROSCAR) 5 mg tablet Take 1 tablet (5 mg total) by mouth once daily. 90 tablet 3    folic acid (FOLVITE) 1 MG tablet TAKE 1 TABLET BY MOUTH EVERY DAY 30 tablet 9    furosemide (LASIX) 80 MG tablet Take 1 tablet (80 mg total) by mouth once daily. 30 tablet 11    insulin degludec-liraglutide (XULTOPHY 100/3.6) 100 unit-3.6 mg /mL (3 mL) InPn INJECT 35 UNITS INTO THE SKIN ONCE DAILY. 15 Syringe 1    lancets (ONETOUCH ULTRASOFT LANCETS) Misc 1 lancet by Misc.(Non-Drug; Combo Route) route 2 (two) times daily. (Patient taking differently: 1 lancet by Misc.(Non-Drug; Combo Route) route 3 (three) times daily. ) 200 each 1    multivitamin (THERAGRAN) tablet Take 1 tablet by mouth once daily.      NIFEdipine (PROCARDIA-XL) 90 MG (OSM) 24 hr tablet Take 1 tablet (90 mg total) by mouth once daily. 30 tablet 9    olopatadine (PATADAY) 0.2 % Drop Place 1 drop into both eyes daily as needed. 2.5 mL 0    ONETOUCH ULTRA BLUE TEST STRIP Strp 1 STRIP BY [facility].(NON-DRUG COMBO ROUTE) ROUTE 3 (THREE) TIMES DAILY. 100 strip 2    pen needle, diabetic (BD ULTRA-FINE SHIRA PEN NEEDLE) 32 gauge x 5/32" Ndle 1 each by Misc.(Non-Drug; Combo Route) route once daily. 100 each 11    sodium " bicarbonate 650 MG tablet Take 1 tablet (650 mg total) by mouth 2 (two) times daily. 60 tablet 11    tadalafil (ADCIRCA) 20 mg Tab Take 1 tablet (20 mg total) by mouth once daily. 5 tablet 11    tamsulosin (FLOMAX) 0.4 mg Cap Take 1 capsule (0.4 mg total) by mouth once daily. 30 capsule 11    valsartan (DIOVAN) 320 MG tablet TAKE 1 TABLET (320 MG TOTAL) BY MOUTH ONCE DAILY. 90 tablet 3    VENTOLIN HFA 90 mcg/actuation inhaler       ZYLET 0.3-0.5 % DrpS       tadalafil (CIALIS) 10 MG tablet Take 1 tablet (10 mg total) by mouth as needed for Erectile Dysfunction. 10 tablet 11     No current facility-administered medications on file prior to visit.      PSH: thoracotomy in 2010      Family history : positive for chronic kidney disease and hemodialysis in his father who is no more.      Social history : he owns a restaurant. He never smoked. He drinks alcohol occasionally. He has 3 children     Review of Systems  :         Constitutional: Negative for activity change and appetite change.   HENT: Negative for congestion and facial swelling.    Eyes: Negative for pain, discharge and redness.   Respiratory: Negative for apnea, cough and chest tightness.    Cardiovascular: Negative for chest pain, palpitations and leg swelling.   Gastrointestinal: Negative for abdominal distention.   Genitourinary: Negative for difficulty urinating, dysuria and frequency.   Musculoskeletal: Positive for arthralgias. Negative for neck pain and neck stiffness.   Skin: Negative for color change, rash and wound.   Neurological: Negative for dizziness, weakness and numbness.   Psychiatric/Behavioral: Negative for sleep disturbance.   All other systems reviewed and are negative.      Objective:         Vitals:    03/24/20 1302   BP: (!) 140/94   Pulse: 88   Resp: 20       Weight 251 lbs , last weight 258 lbs     Physical Exam  :      Nursing note and vitals reviewed.   Constitutional: He is oriented to person, place, and time. He appears  well-developed and well-nourished. No distress.   HENT: Head: Normocephalic and atraumatic. Pupils are equal, round, and reactive to light.   Neck: Normal range of motion. Neck supple. No tracheal deviation present. No thyromegaly present.   Cardiovascular: Normal rate, regular rhythm, normal heart sounds and intact distal pulses. gallop and no friction rub. No murmur heard.   Pulmonary/Chest: Effort normal and breath sounds normal. He has no wheezes. He has no rales.   Abdominal: obese, Soft. He exhibits no mass. There is no tenderness. There is no rebound and no guarding.   Musculoskeletal: Normal range of motion. He exhibits no edema.   Lymphadenopathy: He has no cervical adenopathy.   Neurological: He is alert and oriented to person, place, and time.   Skin: Skin is warm. No rash noted. He is not diaphoretic. No erythema.          Labs:    Lab Results   Component Value Date    CREATININE 5.0 (H) 03/19/2020    BUN 45 (H) 03/19/2020     03/19/2020    K 4.7 03/19/2020     03/19/2020    CO2 24 03/19/2020       Lab Results   Component Value Date    WBC 8.21 03/19/2020    HGB 12.8 (L) 03/19/2020    HCT 43.3 03/19/2020    MCV 85 03/19/2020     03/19/2020         Lab Results   Component Value Date    .0 (H) 03/19/2020    CALCIUM 9.6 03/19/2020    PHOS 3.4 03/19/2020       Lab Results   Component Value Date    URICACID 10.0 (H) 03/19/2020       Impression and Plan: 64 - year-old  gentleman seen in office today in f/u for following medical problems ,      1. Hypertension : Blood pressure is elevated,  discussed low-salt diet and adherence with his medications.       2. Chronic kidney disease stage 5  : Secondary to long-standing history of hypertension and diabetes. Again advised patient to avoid NSAIDs.  serum creatinine is 5 mg/dl ,      He is active on kidney transplant list since 04/19, advised patient to discuss with family members and friends for a potential  Donor, he had  ended chronic kidney disease class, he is interested in peritoneal dialysis when indicated,     3. Diabetes mellitus type 2 : discussed compliance with his diet and medications. Discussed weight loss and daily exercise.      4. Secondary hyperparathyroidism - PTH is acceptable for degree of renal dysfunction, cont Vit D ,      5. Proteinuria - about 1.5 g daily. cont Diovan. Discussed diabetes control and adherence with [facility]      6. BPH : Follow up with urology ,      7. Obesity -  discuss calorie restriction weight loss,     8. Metabolic acidosis :  follow , on sodium bicarbonate supplements     9.  Anemia of chronic kidney disease - stable hemoglobin at 12.8 g,     I will see him followup in about 2 months , Face-to-face time was 25 minutes discussing his lab results and plan of care.      Gabino Champion M.D.

## 2020-03-26 DIAGNOSIS — E55.9 VITAMIN D DEFICIENCY: ICD-10-CM

## 2020-03-27 RX ORDER — ERGOCALCIFEROL 1.25 MG/1
CAPSULE ORAL
Qty: 12 CAPSULE | Refills: 2 | Status: SHIPPED | OUTPATIENT
Start: 2020-03-27 | End: 2021-01-21

## 2020-04-03 ENCOUNTER — TELEPHONE (OUTPATIENT)
Dept: DIABETES | Facility: CLINIC | Age: 65
End: 2020-04-03

## 2020-04-03 NOTE — TELEPHONE ENCOUNTER
Called and spoke with patient. Virtual appointment is scheduled.     ----- Message from Satish Patel sent at 4/3/2020  3:33 PM CDT -----  Contact: Pt  Pt called and said that he use to see Dr. Pacheco but she left    Pt had an appt with you but it was canceled    Pt need refills on his medication    Pt can be reached at [phone]

## 2020-04-06 ENCOUNTER — LAB VISIT (OUTPATIENT)
Dept: LAB | Facility: HOSPITAL | Age: 65
End: 2020-04-06
Attending: SPECIALIST
Payer: COMMERCIAL

## 2020-04-06 DIAGNOSIS — Z76.82 ORGAN TRANSPLANT CANDIDATE: ICD-10-CM

## 2020-04-06 PROCEDURE — 86832 HLA CLASS I HIGH DEFIN QUAL: CPT | Mod: TXP

## 2020-04-06 PROCEDURE — 86833 HLA CLASS II HIGH DEFIN QUAL: CPT | Mod: TXP

## 2020-04-07 ENCOUNTER — PATIENT OUTREACH (OUTPATIENT)
Dept: ADMINISTRATIVE | Facility: OTHER | Age: 65
End: 2020-04-07

## 2020-04-07 ENCOUNTER — OFFICE VISIT (OUTPATIENT)
Dept: DIABETES | Facility: CLINIC | Age: 65
End: 2020-04-07
Payer: COMMERCIAL

## 2020-04-07 DIAGNOSIS — E29.1 HYPOGONADISM IN MALE: ICD-10-CM

## 2020-04-07 DIAGNOSIS — E66.09 NON MORBID OBESITY DUE TO EXCESS CALORIES: ICD-10-CM

## 2020-04-07 DIAGNOSIS — Z01.818 PRE-TRANSPLANT EVALUATION FOR CHRONIC KIDNEY DISEASE: ICD-10-CM

## 2020-04-07 DIAGNOSIS — I15.2 HYPERTENSION ASSOCIATED WITH DIABETES: ICD-10-CM

## 2020-04-07 DIAGNOSIS — E11.22 TYPE 2 DIABETES MELLITUS WITH STAGE 4 CHRONIC KIDNEY DISEASE, WITH LONG-TERM CURRENT USE OF INSULIN: Primary | ICD-10-CM

## 2020-04-07 DIAGNOSIS — E11.59 HYPERTENSION ASSOCIATED WITH DIABETES: ICD-10-CM

## 2020-04-07 DIAGNOSIS — N18.4 TYPE 2 DIABETES MELLITUS WITH STAGE 4 CHRONIC KIDNEY DISEASE, WITH LONG-TERM CURRENT USE OF INSULIN: Primary | ICD-10-CM

## 2020-04-07 DIAGNOSIS — N18.4 CHRONIC KIDNEY DISEASE (CKD), STAGE IV (SEVERE): ICD-10-CM

## 2020-04-07 DIAGNOSIS — E11.69 HYPERLIPIDEMIA ASSOCIATED WITH TYPE 2 DIABETES MELLITUS: ICD-10-CM

## 2020-04-07 DIAGNOSIS — E78.5 HYPERLIPIDEMIA ASSOCIATED WITH TYPE 2 DIABETES MELLITUS: ICD-10-CM

## 2020-04-07 DIAGNOSIS — Z79.4 TYPE 2 DIABETES MELLITUS WITH STAGE 4 CHRONIC KIDNEY DISEASE, WITH LONG-TERM CURRENT USE OF INSULIN: Primary | ICD-10-CM

## 2020-04-07 DIAGNOSIS — E04.2 MULTIPLE THYROID NODULES: ICD-10-CM

## 2020-04-07 DIAGNOSIS — I25.118 CORONARY ARTERY DISEASE OF NATIVE ARTERY OF NATIVE HEART WITH STABLE ANGINA PECTORIS: ICD-10-CM

## 2020-04-07 PROCEDURE — 3044F PR MOST RECENT HEMOGLOBIN A1C LEVEL <7.0%: ICD-10-PCS | Mod: CPTII,,, | Performed by: PHYSICIAN ASSISTANT

## 2020-04-07 PROCEDURE — 99214 OFFICE O/P EST MOD 30 MIN: CPT | Mod: 95,,, | Performed by: PHYSICIAN ASSISTANT

## 2020-04-07 PROCEDURE — 3044F HG A1C LEVEL LT 7.0%: CPT | Mod: CPTII,,, | Performed by: PHYSICIAN ASSISTANT

## 2020-04-07 PROCEDURE — 99214 PR OFFICE/OUTPT VISIT, EST, LEVL IV, 30-39 MIN: ICD-10-PCS | Mod: 95,,, | Performed by: PHYSICIAN ASSISTANT

## 2020-04-07 NOTE — PROGRESS NOTES
PCP: Steven Morgan MD    Subjective:     Chief Complaint: Diabetes - Established Patient    TELEMEDICINE VISIT:     The patient location is: Home  The chief complaint leading to consultation is: Diabetes Follow up  Visit type: Virtual visit with synchronous audio and video  Total time spent with patient: 30  Each patient to whom he or she provides medical services by telemedicine is:  (1) informed of the relationship between the physician and patient and the respective role of any other health care provider with respect to management of the patient; and (2) notified that he or she may decline to receive medical services by telemedicine and may withdraw from such care at any time.    Notes: N/A      HISTORY OF PRESENT ILLNESS: 64 y.o.   male presenting for diabetes management visit.   Patient has previously been established with the Diabetes Management Department and was last seen by Lynne Crocker NP on 11/14/19.   Patient is new to me and is here for establishment of future care .   Patient has had Type II diabetes since 20 years or more.  Pertinent to decision making is the following comorbidities: HTN, HLD, CAD, ESRD, Obesity by BMI and Male Hypogonadism, History of Thyroid Nodules, and Renal Transplant Candidate  Patient has the following Diabetes complications: with diabetic chronic kidney disease  He  has attended diabetes education in the past.     Patient's most recent A1c of 6.9% was completed 7 months ago.   Patient states since His last A1c His blood glucose levels have been within range throughout the day .   Patient monitors blood glucose 2 times per day with meter : Fasting and Before Bed.   Patient blood glucose monitoring device will not be uploaded into Media Section today secondary to Telemedicine visit.   Per patient recall, fasting blood sugars ranging from 109 - 120 with a max of 130 and before bed blood sugars ranging from 116 - 125.   Patient endorses the following  diabetes related symptoms: None.   Patient is due today for the following diabetes-related health maintenance standards: Foot Exam , Eye Exam and A1c. Patient denies active issues with feet today. Patient reports having Eye Exam in Jan 2020 at outside eye clinic and will have report faxed.   He denies recent hospital admissions or emergency room visits.   He denies having hypoglycemia.   Patient's concerns today include glycemic control.   Patient medication regimen is as below.     CURRENT DM MEDICATIONS:    Xultophy 38 units in the morning    Patient has failed the following Diabetes medications:    Metformin        Labs Reviewed.       Lab Results   Component Value Date    CPEPTIDE 3.88 11/20/2017     Lab Results   Component Value Date    GLUTAMICACID 0.00 11/20/2017          //   , There is no height or weight on file to calculate BMI.  His blood sugar in clinic today is:    Lab Results   Component Value Date    POCGLU 92 11/14/2019       Review of Systems   Constitutional: Negative for activity change, appetite change, chills and fever.   HENT: Negative for dental problem, mouth sores, nosebleeds, sore throat and trouble swallowing.    Eyes: Negative for pain and discharge.   Respiratory: Negative for shortness of breath, wheezing and stridor.    Cardiovascular: Negative for chest pain, palpitations and leg swelling.   Gastrointestinal: Negative for abdominal pain, diarrhea, nausea and vomiting.   Endocrine: Negative for polydipsia, polyphagia and polyuria.   Genitourinary: Negative for dysuria, frequency and urgency.   Musculoskeletal: Negative for joint swelling and myalgias.   Skin: Negative for rash and wound.   Neurological: Negative for dizziness, syncope, weakness and headaches.   Psychiatric/Behavioral: Negative for behavioral problems and dysphoric mood.         Diabetes Management Status  Statin: Taking  ACE/ARB: Taking    Screening or Prevention Patient's value Goal Complete/Controlled?   HgA1C  Testing and Control   Lab Results   Component Value Date    HGBA1C 6.9 (H) 09/05/2019      Annually/Less than 8% Yes   Lipid profile : 09/05/2019 Annually Yes   LDL control Lab Results   Component Value Date    LDLCALC 73.8 09/05/2019    Annually/Less than 100 mg/dl  Yes   Nephropathy screening Lab Results   Component Value Date    MICALBCREAT 1222.7 (H) 11/20/2017     Lab Results   Component Value Date    PROTEINUA 2+ (A) 03/19/2020    Annually Yes   Blood pressure BP Readings from Last 1 Encounters:   03/24/20 (!) 140/94    Less than 140/90 No   Dilated retinal exam : 09/05/2018 Annually No    Foot exam   : 04/22/2019 Annually Yes     Social History     Socioeconomic History    Marital status:      Spouse name: Not on file    Number of children: 3    Years of education: Not on file    Highest education level: Not on file   Occupational History     Employer: self-rmployed   Social Needs    Financial resource strain: Not on file    Food insecurity:     Worry: Not on file     Inability: Not on file    Transportation needs:     Medical: Not on file     Non-medical: Not on file   Tobacco Use    Smoking status: Never Smoker    Smokeless tobacco: Never Used   Substance and Sexual Activity    Alcohol use: Yes     Comment: seldom     Drug use: No    Sexual activity: Yes     Partners: Female   Lifestyle    Physical activity:     Days per week: Not on file     Minutes per session: Not on file    Stress: Not on file   Relationships    Social connections:     Talks on phone: Not on file     Gets together: Not on file     Attends Mandaen service: Not on file     Active member of club or organization: Not on file     Attends meetings of clubs or organizations: Not on file     Relationship status: Not on file   Other Topics Concern    Not on file   Social History Narrative    Not on file     Past Medical History:   Diagnosis Date    Anemia 4/16/2014    BPH (benign prostatic hyperplasia)     CKD  (chronic kidney disease) stage 4, GFR 15-29 ml/min     Coronary artery disease of native artery of native heart with stable angina pectoris 4/29/2019    Diabetes mellitus     Diabetes mellitus, type 2     Elevated PSA     Encounter for blood transfusion     Herpes labialis     Hyperlipidemia     Hypertension     Obesity     Proteinuria     Secondary hyperparathyroidism (of renal origin)     UTI (lower urinary tract infection) 5/1/2015       Objective:        Physical Exam   Constitutional: He is oriented to person, place, and time. He appears well-developed and well-nourished. No distress.   HENT:   Head: Normocephalic and atraumatic.   Right Ear: External ear normal.   Left Ear: External ear normal.   Nose: Nose normal.   Eyes: EOM are normal. Right eye exhibits no discharge. Left eye exhibits no discharge.   Neck: Normal range of motion.   Pulmonary/Chest: Effort normal. No stridor. No respiratory distress.   Musculoskeletal: Normal range of motion.   Neurological: He is alert and oriented to person, place, and time. He exhibits normal muscle tone. Coordination normal.   Skin: He is not diaphoretic. No pallor.   Psychiatric: He has a normal mood and affect. His behavior is normal. Judgment and thought content normal.       Physical Exam limited secondary to Telemedicine visit    Assessment / Plan:     Type 2 diabetes mellitus with stage 4 chronic kidney disease, with long-term current use of insulin  -     insulin degludec-liraglutide (XULTOPHY 100/3.6) 100 unit-3.6 mg /mL (3 mL) InPn; INJECT 35 UNITS INTO THE SKIN ONCE DAILY.  Dispense: 15 Syringe; Refill: 1  -     Hemoglobin A1c; Standing    Chronic kidney disease (CKD), stage IV (severe)    Hypertension associated with diabetes    Hyperlipidemia associated with type 2 diabetes mellitus    Coronary artery disease of native artery of native heart with stable angina pectoris    Pre-transplant evaluation for chronic kidney disease    Hypogonadism in  male    Multiple thyroid nodules    Non morbid obesity due to excess calories      Additional Plan Details:    - POCT Glucose  - Encouraged continuation of lifestyle changes including regular exercise and limiting carbohydrates to 30-45 grams per meal threes times daily and 15 grams per snack with a limit of two daily.   - Encouraged continued monitoring of blood glucose with maintenance of 2 times daily at Fasting and Before Bed.   - Current DM Medication Regimen: Continue Xultophy 38 units daily.   - Health Maintenance standards addressed today: Foot Exam - deferred by patient today because Telemedicine visit and will be completed at next visit and Eye Exam - completed in Jan 2020 at outside office and patient will faxed to us, and A1c scheduled today  - Nursing Visit: Patient is below goal range for nursing visit for age group and will not need nursing visit at this time .   - Follow up in 3 months with A1c prior.       Blakeney McKnight, PA-C [facility]    A total of 30 minutes was spent in face to face time, of which over 50 % was spent in counseling patient on disease process, complications, treatment, and side effects of medications.

## 2020-04-13 LAB — HPRA INTERPRETATION: NORMAL

## 2020-04-14 LAB
CLASS I ANTIBODY COMMENTS - LUMINEX: NORMAL
CLASS II ANTIBODIES - LUMINEX: NORMAL
CLASS II ANTIBODY COMMENTS - LUMINEX: NORMAL
CPRA %: 0
SERUM COLLECTION DT - LUMINEX CLASS I: NORMAL
SERUM COLLECTION DT - LUMINEX CLASS II: NORMAL
SPCL1 TESTING DATE: NORMAL
SPCL2 TESTING DATE: NORMAL
SPCLU TESTING DATE: NORMAL

## 2020-04-15 DIAGNOSIS — E87.20 METABOLIC ACIDOSIS: ICD-10-CM

## 2020-04-15 RX ORDER — SODIUM BICARBONATE 650 MG/1
TABLET ORAL
Qty: 180 TABLET | Refills: 3 | Status: SHIPPED | OUTPATIENT
Start: 2020-04-15 | End: 2021-02-26

## 2020-04-24 DIAGNOSIS — Z76.82 ORGAN TRANSPLANT CANDIDATE: Primary | ICD-10-CM

## 2020-04-30 DIAGNOSIS — I10 HTN (HYPERTENSION), BENIGN: ICD-10-CM

## 2020-04-30 RX ORDER — CARVEDILOL 25 MG/1
25 TABLET ORAL 2 TIMES DAILY WITH MEALS
Qty: 60 TABLET | Refills: 11 | Status: SHIPPED | OUTPATIENT
Start: 2020-04-30 | End: 2020-09-18

## 2020-04-30 RX ORDER — CARVEDILOL 3.12 MG/1
TABLET ORAL
Qty: 60 TABLET | Refills: 11 | Status: SHIPPED | OUTPATIENT
Start: 2020-04-30 | End: 2020-04-30 | Stop reason: DRUGHIGH

## 2020-04-30 NOTE — TELEPHONE ENCOUNTER
"Asked him the dose of coreg he takes.25mg bid"I have been unable to reach this patient by phone.  A letter is being sent to the last known home address. Dr gibson know4/30/[id number]/[id number]  "

## 2020-04-30 NOTE — TELEPHONE ENCOUNTER
----- Message from Gabino Champion MD sent at 4/30/2020  8:26 AM CDT -----  What is the dose of  Coreg is he taking ? Can you pl ask him

## 2020-05-04 ENCOUNTER — LAB VISIT (OUTPATIENT)
Dept: LAB | Facility: HOSPITAL | Age: 65
End: 2020-05-04
Attending: INTERNAL MEDICINE
Payer: COMMERCIAL

## 2020-05-04 DIAGNOSIS — Z76.82 ORGAN TRANSPLANT CANDIDATE: ICD-10-CM

## 2020-05-04 PROCEDURE — 99001 SPECIMEN HANDLING PT-LAB: CPT | Mod: TXP

## 2020-05-11 ENCOUNTER — TELEPHONE (OUTPATIENT)
Dept: TRANSPLANT | Facility: CLINIC | Age: 65
End: 2020-05-11

## 2020-05-29 ENCOUNTER — PATIENT OUTREACH (OUTPATIENT)
Dept: ADMINISTRATIVE | Facility: OTHER | Age: 65
End: 2020-05-29

## 2020-06-01 ENCOUNTER — LAB VISIT (OUTPATIENT)
Dept: LAB | Facility: HOSPITAL | Age: 65
End: 2020-06-01
Attending: NURSE PRACTITIONER
Payer: COMMERCIAL

## 2020-06-01 DIAGNOSIS — Z76.82 ORGAN TRANSPLANT CANDIDATE: ICD-10-CM

## 2020-06-01 DIAGNOSIS — E11.22 TYPE 2 DIABETES MELLITUS WITH STAGE 4 CHRONIC KIDNEY DISEASE, WITH LONG-TERM CURRENT USE OF INSULIN: ICD-10-CM

## 2020-06-01 DIAGNOSIS — N18.4 TYPE 2 DIABETES MELLITUS WITH STAGE 4 CHRONIC KIDNEY DISEASE, WITH LONG-TERM CURRENT USE OF INSULIN: ICD-10-CM

## 2020-06-01 DIAGNOSIS — Z79.4 TYPE 2 DIABETES MELLITUS WITH STAGE 4 CHRONIC KIDNEY DISEASE, WITH LONG-TERM CURRENT USE OF INSULIN: ICD-10-CM

## 2020-06-01 PROCEDURE — 36415 COLL VENOUS BLD VENIPUNCTURE: CPT | Mod: PO,TXP

## 2020-06-01 PROCEDURE — 83036 HEMOGLOBIN GLYCOSYLATED A1C: CPT | Mod: NTX

## 2020-06-01 PROCEDURE — 86833 HLA CLASS II HIGH DEFIN QUAL: CPT | Mod: TXP

## 2020-06-01 PROCEDURE — 86832 HLA CLASS I HIGH DEFIN QUAL: CPT | Mod: TXP

## 2020-06-02 LAB
ESTIMATED AVG GLUCOSE: 137 MG/DL (ref 68–131)
HBA1C MFR BLD HPLC: 6.4 % (ref 4–5.6)

## 2020-06-10 ENCOUNTER — TELEPHONE (OUTPATIENT)
Dept: RADIOLOGY | Facility: HOSPITAL | Age: 65
End: 2020-06-10

## 2020-06-11 ENCOUNTER — HOSPITAL ENCOUNTER (OUTPATIENT)
Dept: RADIOLOGY | Facility: HOSPITAL | Age: 65
Discharge: HOME | End: 2020-06-11
Attending: NURSE PRACTITIONER
Payer: COMMERCIAL

## 2020-06-11 ENCOUNTER — HOSPITAL ENCOUNTER (OUTPATIENT)
Dept: PULMONOLOGY | Facility: HOSPITAL | Age: 65
Discharge: HOME | End: 2020-06-11
Attending: NURSE PRACTITIONER
Payer: COMMERCIAL

## 2020-06-11 ENCOUNTER — HOSPITAL ENCOUNTER (OUTPATIENT)
Dept: CARDIOLOGY | Facility: HOSPITAL | Age: 65
Discharge: HOME | End: 2020-06-11
Attending: NURSE PRACTITIONER
Payer: COMMERCIAL

## 2020-06-11 VITALS — WEIGHT: 247 LBS | BODY MASS INDEX: 31.7 KG/M2 | HEIGHT: 74 IN

## 2020-06-11 VITALS
SYSTOLIC BLOOD PRESSURE: 162 MMHG | HEART RATE: 61 BPM | DIASTOLIC BLOOD PRESSURE: 90 MMHG | BODY MASS INDEX: 31.7 KG/M2 | WEIGHT: 247 LBS | HEIGHT: 74 IN

## 2020-06-11 DIAGNOSIS — Z76.82 ORGAN TRANSPLANT CANDIDATE: ICD-10-CM

## 2020-06-11 LAB
AORTIC ROOT ANNULUS: 3.63 CM
ASCENDING AORTA: 3.28 CM
AV INDEX (PROSTH): 0.59
AV MEAN GRADIENT: 4 MMHG
AV PEAK GRADIENT: 7 MMHG
AV VALVE AREA: 1.82 CM2
AV VELOCITY RATIO: 0.57
BSA FOR ECHO PROCEDURE: 2.42 M2
CV ECHO LV RWT: 0.54 CM
CV STRESS BASE HR: 60 BPM
DIASTOLIC BLOOD PRESSURE: 90 MMHG
DOP CALC AO PEAK VEL: 1.31 M/S
DOP CALC AO VTI: 25.83 CM
DOP CALC LVOT AREA: 3.1 CM2
DOP CALC LVOT DIAMETER: 1.99 CM
DOP CALC LVOT PEAK VEL: 0.75 M/S
DOP CALC LVOT STROKE VOLUME: 47.07 CM3
DOP CALCLVOT PEAK VEL VTI: 15.14 CM
E WAVE DECELERATION TIME: 249.04 MSEC
E/A RATIO: 0.81
ECHO LV POSTERIOR WALL: 1.36 CM (ref 0.6–1.1)
FRACTIONAL SHORTENING: 29 % (ref 28–44)
HPRA INTERPRETATION: NORMAL
INTERVENTRICULAR SEPTUM: 1.63 CM (ref 0.6–1.1)
IVRT: 64.59 MSEC
LA MAJOR: 5.25 CM
LA MINOR: 4.97 CM
LA WIDTH: 4.69 CM
LEFT ATRIUM SIZE: 4.4 CM
LEFT ATRIUM VOLUME INDEX: 37.7 ML/M2
LEFT ATRIUM VOLUME: 89.57 CM3
LEFT INTERNAL DIMENSION IN SYSTOLE: 3.57 CM (ref 2.1–4)
LEFT VENTRICLE DIASTOLIC VOLUME INDEX: 50.61 ML/M2
LEFT VENTRICLE DIASTOLIC VOLUME: 120.31 ML
LEFT VENTRICLE MASS INDEX: 137 G/M2
LEFT VENTRICLE SYSTOLIC VOLUME INDEX: 22.4 ML/M2
LEFT VENTRICLE SYSTOLIC VOLUME: 53.31 ML
LEFT VENTRICULAR INTERNAL DIMENSION IN DIASTOLE: 5.04 CM (ref 3.5–6)
LEFT VENTRICULAR MASS: 324.88 G
MV PEAK A VEL: 0.42 M/S
MV PEAK E VEL: 0.34 M/S
NUC REST DIASTOLIC VOLUME INDEX: 173
NUC REST EJECTION FRACTION: 42
NUC REST SYSTOLIC VOLUME INDEX: 100
NUC STRESS DIASTOLIC VOLUME INDEX: 164
NUC STRESS EJECTION FRACTION: 41 %
NUC STRESS SYSTOLIC VOLUME INDEX: 96
OHS CV CPX 85 PERCENT MAX PREDICTED HEART RATE MALE: 133
OHS CV CPX MAX PREDICTED HEART RATE: 157
OHS CV CPX PATIENT IS FEMALE: 0
OHS CV CPX PATIENT IS MALE: 1
OHS CV CPX PEAK DIASTOLIC BLOOD PRESSURE: 90 MMHG
OHS CV CPX PEAK HEAR RATE: 87 BPM
OHS CV CPX PEAK RATE PRESSURE PRODUCT: NORMAL
OHS CV CPX PEAK SYSTOLIC BLOOD PRESSURE: 162 MMHG
OHS CV CPX PERCENT MAX PREDICTED HEART RATE ACHIEVED: 55
OHS CV CPX RATE PRESSURE PRODUCT PRESENTING: 9720
RA MAJOR: 4.46 CM
RA PRESSURE: 3 MMHG
RA WIDTH: 3.91 CM
SINUS: 3.53 CM
STJ: 3.44 CM
STRESS ECHO TARGET HR: 133 BPM
SYSTOLIC BLOOD PRESSURE: 162 MMHG
TRICUSPID ANNULAR PLANE SYSTOLIC EXCURSION: 2.24 CM

## 2020-06-11 PROCEDURE — 93306 TTE W/DOPPLER COMPLETE: CPT | Mod: TXP

## 2020-06-11 PROCEDURE — A9502 TC99M TETROFOSMIN: HCPCS | Mod: TXP

## 2020-06-11 PROCEDURE — 93306 TTE W/DOPPLER COMPLETE: CPT | Mod: 26,TXP,, | Performed by: INTERNAL MEDICINE

## 2020-06-11 PROCEDURE — 93306 ECHO (CUPID ONLY): ICD-10-PCS | Mod: 26,TXP,, | Performed by: INTERNAL MEDICINE

## 2020-06-11 PROCEDURE — 63600175 PHARM REV CODE 636 W HCPCS: Mod: TXP | Performed by: PHYSICIAN ASSISTANT

## 2020-06-11 RX ORDER — REGADENOSON 0.08 MG/ML
0.4 INJECTION, SOLUTION INTRAVENOUS ONCE
Status: COMPLETED | OUTPATIENT
Start: 2020-06-11 | End: 2020-06-11

## 2020-06-11 RX ADMIN — REGADENOSON 0.4 MG: 0.08 INJECTION, SOLUTION INTRAVENOUS at 03:06

## 2020-06-12 ENCOUNTER — HOSPITAL ENCOUNTER (OUTPATIENT)
Dept: RADIOLOGY | Facility: HOSPITAL | Age: 65
Discharge: HOME | End: 2020-06-12
Attending: NURSE PRACTITIONER
Payer: COMMERCIAL

## 2020-06-12 DIAGNOSIS — Z76.82 ORGAN TRANSPLANT CANDIDATE: ICD-10-CM

## 2020-06-12 PROCEDURE — 93978 US DOPP ILIACS BILATERAL: ICD-10-PCS | Mod: 26,TXP,, | Performed by: RADIOLOGY

## 2020-06-12 PROCEDURE — 93978 VASCULAR STUDY: CPT | Mod: 26,TXP,, | Performed by: RADIOLOGY

## 2020-06-12 PROCEDURE — 72170 XR PELVIS ROUTINE AP: ICD-10-PCS | Mod: 26,TXP,, | Performed by: RADIOLOGY

## 2020-06-12 PROCEDURE — 76536 US SOFT TISSUE HEAD NECK THYROID: ICD-10-PCS | Mod: 26,TXP,, | Performed by: RADIOLOGY

## 2020-06-12 PROCEDURE — 72170 X-RAY EXAM OF PELVIS: CPT | Mod: 26,TXP,, | Performed by: RADIOLOGY

## 2020-06-12 PROCEDURE — 76770 US EXAM ABDO BACK WALL COMP: CPT | Mod: TC,TXP

## 2020-06-12 PROCEDURE — 76770 US RETROPERITONEAL COMPLETE: ICD-10-PCS | Mod: 26,TXP,, | Performed by: RADIOLOGY

## 2020-06-12 PROCEDURE — 76536 US EXAM OF HEAD AND NECK: CPT | Mod: TC,TXP

## 2020-06-12 PROCEDURE — 93978 VASCULAR STUDY: CPT | Mod: TC,TXP

## 2020-06-12 PROCEDURE — 76536 US EXAM OF HEAD AND NECK: CPT | Mod: 26,TXP,, | Performed by: RADIOLOGY

## 2020-06-12 PROCEDURE — 72170 X-RAY EXAM OF PELVIS: CPT | Mod: TC,TXP

## 2020-06-12 PROCEDURE — 76770 US EXAM ABDO BACK WALL COMP: CPT | Mod: 26,TXP,, | Performed by: RADIOLOGY

## 2020-06-16 ENCOUNTER — LAB VISIT (OUTPATIENT)
Dept: LAB | Facility: HOSPITAL | Age: 65
End: 2020-06-16
Attending: INTERNAL MEDICINE
Payer: COMMERCIAL

## 2020-06-16 DIAGNOSIS — N18.5 CHRONIC KIDNEY DISEASE (CKD), STAGE V: ICD-10-CM

## 2020-06-16 DIAGNOSIS — D63.1 ANEMIA OF CHRONIC RENAL FAILURE, STAGE 5: ICD-10-CM

## 2020-06-16 DIAGNOSIS — N18.5 ANEMIA OF CHRONIC RENAL FAILURE, STAGE 5: ICD-10-CM

## 2020-06-16 DIAGNOSIS — I10 HTN (HYPERTENSION), BENIGN: ICD-10-CM

## 2020-06-16 LAB
ALBUMIN SERPL BCP-MCNC: 3.5 G/DL (ref 3.5–5.2)
ANION GAP SERPL CALC-SCNC: 9 MMOL/L (ref 8–16)
BACTERIA #/AREA URNS HPF: ABNORMAL /HPF
BASOPHILS # BLD AUTO: 0.07 K/UL (ref 0–0.2)
BASOPHILS NFR BLD: 0.8 % (ref 0–1.9)
BILIRUB UR QL STRIP: NEGATIVE
BUN SERPL-MCNC: 52 MG/DL (ref 8–23)
CALCIUM SERPL-MCNC: 8.7 MG/DL (ref 8.7–10.5)
CHLORIDE SERPL-SCNC: 110 MMOL/L (ref 95–110)
CLARITY UR: CLEAR
CLASS I ANTIBODY COMMENTS - LUMINEX: NORMAL
CLASS II ANTIBODIES - LUMINEX: NEGATIVE
CO2 SERPL-SCNC: 22 MMOL/L (ref 23–29)
COLOR UR: YELLOW
CPRA %: 0
CREAT SERPL-MCNC: 5.3 MG/DL (ref 0.5–1.4)
CREAT UR-MCNC: 54 MG/DL (ref 23–375)
DIFFERENTIAL METHOD: ABNORMAL
EOSINOPHIL # BLD AUTO: 0.3 K/UL (ref 0–0.5)
EOSINOPHIL NFR BLD: 3.5 % (ref 0–8)
ERYTHROCYTE [DISTWIDTH] IN BLOOD BY AUTOMATED COUNT: 14.8 % (ref 11.5–14.5)
EST. GFR  (AFRICAN AMERICAN): 12 ML/MIN/1.73 M^2
EST. GFR  (NON AFRICAN AMERICAN): 11 ML/MIN/1.73 M^2
GLUCOSE SERPL-MCNC: 108 MG/DL (ref 70–110)
GLUCOSE UR QL STRIP: NEGATIVE
HCT VFR BLD AUTO: 39.7 % (ref 40–54)
HGB BLD-MCNC: 12.3 G/DL (ref 14–18)
HGB UR QL STRIP: ABNORMAL
HYALINE CASTS #/AREA URNS LPF: 0 /LPF
IMM GRANULOCYTES # BLD AUTO: 0.08 K/UL (ref 0–0.04)
IMM GRANULOCYTES NFR BLD AUTO: 0.9 % (ref 0–0.5)
KETONES UR QL STRIP: NEGATIVE
LEUKOCYTE ESTERASE UR QL STRIP: ABNORMAL
LYMPHOCYTES # BLD AUTO: 1.4 K/UL (ref 1–4.8)
LYMPHOCYTES NFR BLD: 16 % (ref 18–48)
MCH RBC QN AUTO: 25.4 PG (ref 27–31)
MCHC RBC AUTO-ENTMCNC: 31 G/DL (ref 32–36)
MCV RBC AUTO: 82 FL (ref 82–98)
MICROSCOPIC COMMENT: ABNORMAL
MONOCYTES # BLD AUTO: 0.7 K/UL (ref 0.3–1)
MONOCYTES NFR BLD: 8.2 % (ref 4–15)
NEUTROPHILS # BLD AUTO: 6.1 K/UL (ref 1.8–7.7)
NEUTROPHILS NFR BLD: 71.5 % (ref 38–73)
NITRITE UR QL STRIP: NEGATIVE
NRBC BLD-RTO: 0 /100 WBC
PH UR STRIP: 7 [PH] (ref 5–8)
PHOSPHATE SERPL-MCNC: 4 MG/DL (ref 2.7–4.5)
PLATELET # BLD AUTO: 264 K/UL (ref 150–350)
PMV BLD AUTO: 9.6 FL (ref 9.2–12.9)
POTASSIUM SERPL-SCNC: 4.2 MMOL/L (ref 3.5–5.1)
PROT UR QL STRIP: ABNORMAL
PROT UR-MCNC: 91 MG/DL (ref 0–15)
PROT/CREAT UR: 1.69 MG/G{CREAT} (ref 0–0.2)
RBC # BLD AUTO: 4.85 M/UL (ref 4.6–6.2)
RBC #/AREA URNS HPF: 2 /HPF (ref 0–4)
SERUM COLLECTION DT - LUMINEX CLASS I: NORMAL
SERUM COLLECTION DT - LUMINEX CLASS II: NORMAL
SODIUM SERPL-SCNC: 141 MMOL/L (ref 136–145)
SP GR UR STRIP: 1.01 (ref 1–1.03)
SPCL1 TESTING DATE: NORMAL
SPCL2 TESTING DATE: NORMAL
SPCLU TESTING DATE: NORMAL
SQUAMOUS #/AREA URNS HPF: 2 /HPF
URN SPEC COLLECT METH UR: ABNORMAL
WBC # BLD AUTO: 8.46 K/UL (ref 3.9–12.7)
WBC #/AREA URNS HPF: 8 /HPF (ref 0–5)

## 2020-06-16 PROCEDURE — 81000 URINALYSIS NONAUTO W/SCOPE: CPT | Mod: NTX

## 2020-06-16 PROCEDURE — 36415 COLL VENOUS BLD VENIPUNCTURE: CPT | Mod: TXP

## 2020-06-16 PROCEDURE — 85025 COMPLETE CBC W/AUTO DIFF WBC: CPT | Mod: TXP

## 2020-06-16 PROCEDURE — 80069 RENAL FUNCTION PANEL: CPT | Mod: NTX

## 2020-06-16 PROCEDURE — 84156 ASSAY OF PROTEIN URINE: CPT | Mod: NTX

## 2020-06-17 ENCOUNTER — TELEPHONE (OUTPATIENT)
Dept: TRANSPLANT | Facility: CLINIC | Age: 65
End: 2020-06-17

## 2020-06-17 NOTE — TELEPHONE ENCOUNTER
Faxed results and requested nephrologist medically manage PTH. Patient is scheduled follow-up with his Urologist noted.    ----- Message from Faisal León RN sent at 6/15/2020  1:32 PM CDT -----    ----- Message -----  From: Katie Robles MD  Sent: 6/15/2020  10:48 AM CDT  To: Kidney Waitlisted Coordinator    Please inform HD unit for better PTh management. Urology follow up for high PSA

## 2020-06-18 ENCOUNTER — PATIENT OUTREACH (OUTPATIENT)
Dept: ADMINISTRATIVE | Facility: OTHER | Age: 65
End: 2020-06-18

## 2020-06-19 ENCOUNTER — OFFICE VISIT (OUTPATIENT)
Dept: NEPHROLOGY | Facility: CLINIC | Age: 65
End: 2020-06-19
Payer: COMMERCIAL

## 2020-06-19 VITALS
DIASTOLIC BLOOD PRESSURE: 88 MMHG | WEIGHT: 256.38 LBS | BODY MASS INDEX: 32.9 KG/M2 | SYSTOLIC BLOOD PRESSURE: 132 MMHG | HEIGHT: 74 IN | HEART RATE: 100 BPM

## 2020-06-19 DIAGNOSIS — D63.1 ANEMIA OF CHRONIC RENAL FAILURE, STAGE 5: ICD-10-CM

## 2020-06-19 DIAGNOSIS — N18.5 ANEMIA OF CHRONIC RENAL FAILURE, STAGE 5: ICD-10-CM

## 2020-06-19 DIAGNOSIS — N18.5 CHRONIC KIDNEY DISEASE (CKD), STAGE V: Primary | ICD-10-CM

## 2020-06-19 DIAGNOSIS — N25.81 SECONDARY HYPERPARATHYROIDISM OF RENAL ORIGIN: ICD-10-CM

## 2020-06-19 DIAGNOSIS — I10 HTN (HYPERTENSION), BENIGN: ICD-10-CM

## 2020-06-19 PROCEDURE — 99999 PR PBB SHADOW E&M-EST. PATIENT-LVL IV: CPT | Mod: PBBFAC,,, | Performed by: INTERNAL MEDICINE

## 2020-06-19 PROCEDURE — 3008F PR BODY MASS INDEX (BMI) DOCUMENTED: ICD-10-PCS | Mod: CPTII,S$GLB,, | Performed by: INTERNAL MEDICINE

## 2020-06-19 PROCEDURE — 3008F BODY MASS INDEX DOCD: CPT | Mod: CPTII,S$GLB,, | Performed by: INTERNAL MEDICINE

## 2020-06-19 PROCEDURE — 3075F PR MOST RECENT SYSTOLIC BLOOD PRESS GE 130-139MM HG: ICD-10-PCS | Mod: CPTII,S$GLB,, | Performed by: INTERNAL MEDICINE

## 2020-06-19 PROCEDURE — 3075F SYST BP GE 130 - 139MM HG: CPT | Mod: CPTII,S$GLB,, | Performed by: INTERNAL MEDICINE

## 2020-06-19 PROCEDURE — 99215 PR OFFICE/OUTPT VISIT, EST, LEVL V, 40-54 MIN: ICD-10-PCS | Mod: S$GLB,,, | Performed by: INTERNAL MEDICINE

## 2020-06-19 PROCEDURE — 3079F DIAST BP 80-89 MM HG: CPT | Mod: CPTII,S$GLB,, | Performed by: INTERNAL MEDICINE

## 2020-06-19 PROCEDURE — 99215 OFFICE O/P EST HI 40 MIN: CPT | Mod: S$GLB,,, | Performed by: INTERNAL MEDICINE

## 2020-06-19 PROCEDURE — 99999 PR PBB SHADOW E&M-EST. PATIENT-LVL IV: ICD-10-PCS | Mod: PBBFAC,,, | Performed by: INTERNAL MEDICINE

## 2020-06-19 PROCEDURE — 3079F PR MOST RECENT DIASTOLIC BLOOD PRESSURE 80-89 MM HG: ICD-10-PCS | Mod: CPTII,S$GLB,, | Performed by: INTERNAL MEDICINE

## 2020-06-19 RX ORDER — CALCITRIOL 0.25 UG/1
0.25 CAPSULE ORAL
Qty: 45 CAPSULE | Refills: 3 | Status: SHIPPED | OUTPATIENT
Start: 2020-06-19 | End: 2021-07-12 | Stop reason: SDUPTHER

## 2020-06-19 NOTE — PROGRESS NOTES
Subjective:       Patient ID: Isidro Alves is a 64 y.o.    male who presents for follow-up evaluation of CKD stage 5 , HTN, DM-2, SHPT        Steven Morgan MD    HPI : Isidro Alves Is a pleasant 64 -year-old  gentleman seen in office today in f/u for above medical problems. I saw him about 3 months ago,   He has chronic kidney disease  . Recent serum creatinine is 5.2  mg/dL .  Progressive decline in renal function explained to the patient. he has long-standing history of hypertension and diabetes ,  Nonadherence with his blood pressure medications is a big issue with him in the past. Patient says he has been more adherent with his medications in the recent past.  Also has been exercising regularly and trying to watch salt intake.   PSA levels were elevated and is currently following with urology.  Recent PSA level is  3.8.     patient sustained a fall while working on his roof in November 2018, he lost his consciousness and was admitted in intensive care unit at [facility] for about 3 weeks, following discharge she had to undergo few weeks of rehab, he sustained some rib fractures and pelvic fracture.  Patient reports improvement in his overall strength and recovering well.          Past Medical History:   Diagnosis Date    Anemia 4/16/2014    BPH (benign prostatic hyperplasia)     CKD (chronic kidney disease) stage 4, GFR 15-29 ml/min     Coronary artery disease of native artery of native heart with stable angina pectoris 4/29/2019    Diabetes mellitus     Diabetes mellitus, type 2     Elevated PSA     Encounter for blood transfusion     Herpes labialis     Hyperlipidemia     Hypertension     Obesity     Proteinuria     Secondary hyperparathyroidism (of renal origin)     UTI (lower urinary tract infection) 5/1/2015       Current Outpatient Medications on File Prior to Visit   Medication Sig Dispense Refill    acyclovir (ZOVIRAX) 400 MG tablet Take 1 tablet (400 mg  "total) by mouth 2 (two) times daily. One tablet 30 tablet 6    aspirin (ECOTRIN) 81 MG EC tablet Take 1 tablet (81 mg total) by mouth once daily.      atorvastatin (LIPITOR) 40 MG tablet TAKE 1 TABLET BY MOUTH EVERY DAY 30 tablet 11    azelastine (ASTELIN) 137 mcg (0.1 %) nasal spray 1 spray (137 mcg total) by Nasal route 2 (two) times daily. 30 mL 0    BD INSULIN SYRINGE HALF UNIT 0.3 mL 31 gauge x 5/16" Syrg FOR USE WITH INSULIN. 100 Syringe 1    carvediloL (COREG) 25 MG tablet Take 1 tablet (25 mg total) by mouth 2 (two) times daily with meals. 60 tablet 11    ergocalciferol (ERGOCALCIFEROL) 50,000 unit Cap TAKE 1 CAPSULE (50,000 UNITS TOTAL) BY MOUTH EVERY 7 DAYS. 12 capsule 2    esomeprazole (NEXIUM) 40 MG capsule Take 40 mg by mouth once daily.      finasteride (PROSCAR) 5 mg tablet Take 1 tablet (5 mg total) by mouth once daily. 90 tablet 3    folic acid (FOLVITE) 1 MG tablet TAKE 1 TABLET BY MOUTH EVERY DAY 30 tablet 9    furosemide (LASIX) 80 MG tablet Take 1 tablet (80 mg total) by mouth once daily. 30 tablet 11    insulin degludec-liraglutide (XULTOPHY 100/3.6) 100 unit-3.6 mg /mL (3 mL) InPn INJECT 35 UNITS INTO THE SKIN ONCE DAILY. 15 Syringe 1    lancets (ONETOUCH ULTRASOFT LANCETS) Misc 1 lancet by Misc.(Non-Drug; Combo Route) route 2 (two) times daily. (Patient taking differently: 1 lancet by Misc.(Non-Drug; Combo Route) route 3 (three) times daily. ) 200 each 1    multivitamin (THERAGRAN) tablet Take 1 tablet by mouth once daily.      NIFEdipine (PROCARDIA-XL) 90 MG (OSM) 24 hr tablet Take 1 tablet (90 mg total) by mouth once daily. 30 tablet 9    ONETOUCH ULTRA BLUE TEST STRIP Strp 1 STRIP BY MISC.(NON-DRUG COMBO ROUTE) ROUTE 3 (THREE) TIMES DAILY. 100 strip 2    pen needle, diabetic (BD ULTRA-FINE SHIRA PEN NEEDLE) 32 gauge x 5/32" Ndle 1 each by Misc.(Non-Drug; Combo Route) route once daily. 100 each 11    sodium bicarbonate 650 MG tablet TAKE 1 TABLET TWICE DAILY 180 tablet 3    " tadalafil (ADCIRCA) 20 mg Tab Take 1 tablet (20 mg total) by mouth once daily. 5 tablet 11    tamsulosin (FLOMAX) 0.4 mg Cap Take 1 capsule (0.4 mg total) by mouth once daily. 30 capsule 11    valsartan (DIOVAN) 320 MG tablet TAKE 1 TABLET (320 MG TOTAL) BY MOUTH ONCE DAILY. 90 tablet 3    VENTOLIN HFA 90 mcg/actuation inhaler       ZYLET 0.3-0.5 % DrpS       olopatadine (PATADAY) 0.2 % Drop Place 1 drop into both eyes daily as needed. 2.5 mL 0     No current facility-administered medications on file prior to visit.      PSH: thoracotomy in 2010      Family history : positive for chronic kidney disease and hemodialysis in his father who is no more.      Social history : he owns a restaurant. He never smoked. He drinks alcohol occasionally. He has 3 children     Review of Systems   Constitutional: Negative for activity change and appetite change.   HENT: Negative for congestion and facial swelling.    Eyes: Negative for pain, discharge and redness.   Respiratory: Negative for apnea, cough and chest tightness.    Cardiovascular: Negative for chest pain, palpitations and leg swelling.   Gastrointestinal: Negative for abdominal distention.   Genitourinary: Negative for difficulty urinating, dysuria and frequency.   Musculoskeletal: Positive for arthralgias. Negative for neck pain and neck stiffness.   Skin: Negative for color change, rash and wound.   Neurological: Negative for dizziness, weakness and numbness.   Psychiatric/Behavioral: Negative for sleep disturbance.   All other systems reviewed and are negative.    :            Objective:           Vitals:    06/19/20 1401   BP: (!) 132/90   Pulse: 100       Weight 256 lbs, stable     Physical Exam  Vitals signs and nursing note reviewed.   Constitutional:       General: He is not in acute distress.     Appearance: He is well-developed. He is not diaphoretic.   HENT:      Head: Normocephalic and atraumatic.   Eyes:      General:         Right eye: No discharge.       Pupils: Pupils are equal, round, and reactive to light.   Neck:      Musculoskeletal: Normal range of motion and neck supple.      Thyroid: No thyromegaly.      Trachea: No tracheal deviation.   Cardiovascular:      Rate and Rhythm: Normal rate and regular rhythm.      Heart sounds: Normal heart sounds. No murmur. No friction rub. No gallop.    Pulmonary:      Effort: Pulmonary effort is normal.      Breath sounds: Normal breath sounds. No wheezing or rales.   Abdominal:      Palpations: Abdomen is soft. There is no mass.      Tenderness: There is no abdominal tenderness. There is no guarding or rebound.   Musculoskeletal: Normal range of motion.   Lymphadenopathy:      Cervical: No cervical adenopathy.   Skin:     General: Skin is warm.      Findings: No erythema or rash.   Neurological:      Mental Status: He is alert and oriented to person, place, and time.       :            Labs:    Lab Results   Component Value Date    CREATININE 5.3 (H) 06/16/2020    BUN 52 (H) 06/16/2020     06/16/2020    K 4.2 06/16/2020     06/16/2020    CO2 22 (L) 06/16/2020       Lab Results   Component Value Date    WBC 8.46 06/16/2020    HGB 12.3 (L) 06/16/2020    HCT 39.7 (L) 06/16/2020    MCV 82 06/16/2020     06/16/2020         Lab Results   Component Value Date    .0 (H) 06/12/2020    CALCIUM 8.7 06/16/2020    PHOS 4.0 06/16/2020       Lab Results   Component Value Date    ALBUMIN 3.5 06/16/2020       Lab Results   Component Value Date    URICACID 10.0 (H) 03/19/2020       Impression and Plan: 64 - year-old  gentleman seen in office today in f/u for following medical problems ,      1. Hypertension :  discussed low-salt diet and adherence with his medications.       2. Chronic kidney disease stage 5  : Secondary to long-standing history of hypertension and diabetes. Again advised patient to avoid NSAIDs.  serum creatinine is 5.2  mg/dl ,      He is active on kidney transplant list  since 04/19, advised patient to discuss with family members and friends for a potential  Donor, he had ended chronic kidney disease class, he is interested in peritoneal dialysis when indicated,     3. Diabetes mellitus type 2 : discussed compliance with his diet and medications. Discussed weight loss and daily exercise.      4. Secondary hyperparathyroidism - PTH is 712, follow, cont ergocalciferol, will add calcitriol 0.25 mcg 3 times a week,     5. Proteinuria - about 1.5 g daily. cont Diovan. Discussed diabetes control and adherence with [facility]B      6. BPH : Follow up with urology ,      7. Obesity -  discuss calorie restriction weight loss,     8. Metabolic acidosis :  follow , on sodium bicarbonate supplements     9.  Anemia of chronic kidney disease - stable hemoglobin at 12.3 g,     I will see him followup in about  3 months , Face-to-face time was 40  minutes discussing his lab results and plan of care.            Gabino Champion M.D.

## 2020-06-19 NOTE — PATIENT INSTRUCTIONS
Avoid NSAID pain medications such as advil, aleve, motrin, ibuprofen, naprosyn, meloxicam, diclofenac, mobic.     Low salt diet     Fluid restriction about 40-50 oz a day

## 2020-06-25 DIAGNOSIS — I10 ESSENTIAL HYPERTENSION: Primary | ICD-10-CM

## 2020-06-26 ENCOUNTER — HOSPITAL ENCOUNTER (OUTPATIENT)
Dept: CARDIOLOGY | Facility: HOSPITAL | Age: 65
Discharge: HOME | End: 2020-06-26
Attending: INTERNAL MEDICINE
Payer: COMMERCIAL

## 2020-06-26 ENCOUNTER — OFFICE VISIT (OUTPATIENT)
Dept: CARDIOLOGY | Facility: CLINIC | Age: 65
End: 2020-06-26
Payer: COMMERCIAL

## 2020-06-26 VITALS
OXYGEN SATURATION: 97 % | DIASTOLIC BLOOD PRESSURE: 98 MMHG | BODY MASS INDEX: 32.86 KG/M2 | SYSTOLIC BLOOD PRESSURE: 144 MMHG | HEART RATE: 78 BPM | WEIGHT: 255.94 LBS

## 2020-06-26 DIAGNOSIS — N18.4 CHRONIC KIDNEY DISEASE (CKD), STAGE IV (SEVERE): ICD-10-CM

## 2020-06-26 DIAGNOSIS — N18.4 TYPE 2 DIABETES MELLITUS WITH STAGE 4 CHRONIC KIDNEY DISEASE, WITH LONG-TERM CURRENT USE OF INSULIN: ICD-10-CM

## 2020-06-26 DIAGNOSIS — E11.22 TYPE 2 DIABETES MELLITUS WITH STAGE 4 CHRONIC KIDNEY DISEASE, WITH LONG-TERM CURRENT USE OF INSULIN: ICD-10-CM

## 2020-06-26 DIAGNOSIS — Z79.4 TYPE 2 DIABETES MELLITUS WITH STAGE 4 CHRONIC KIDNEY DISEASE, WITH LONG-TERM CURRENT USE OF INSULIN: ICD-10-CM

## 2020-06-26 DIAGNOSIS — E78.5 HYPERLIPIDEMIA ASSOCIATED WITH TYPE 2 DIABETES MELLITUS: ICD-10-CM

## 2020-06-26 DIAGNOSIS — I25.118 CORONARY ARTERY DISEASE OF NATIVE ARTERY OF NATIVE HEART WITH STABLE ANGINA PECTORIS: Primary | ICD-10-CM

## 2020-06-26 DIAGNOSIS — I10 ESSENTIAL HYPERTENSION: ICD-10-CM

## 2020-06-26 DIAGNOSIS — E11.69 HYPERLIPIDEMIA ASSOCIATED WITH TYPE 2 DIABETES MELLITUS: ICD-10-CM

## 2020-06-26 PROCEDURE — 93010 EKG 12-LEAD: ICD-10-PCS | Mod: NTX,,, | Performed by: INTERNAL MEDICINE

## 2020-06-26 PROCEDURE — 99214 PR OFFICE/OUTPT VISIT, EST, LEVL IV, 30-39 MIN: ICD-10-PCS | Mod: 25,NTX,S$GLB, | Performed by: INTERNAL MEDICINE

## 2020-06-26 PROCEDURE — 3044F PR MOST RECENT HEMOGLOBIN A1C LEVEL <7.0%: ICD-10-PCS | Mod: CPTII,NTX,S$GLB, | Performed by: INTERNAL MEDICINE

## 2020-06-26 PROCEDURE — 3080F DIAST BP >= 90 MM HG: CPT | Mod: CPTII,NTX,S$GLB, | Performed by: INTERNAL MEDICINE

## 2020-06-26 PROCEDURE — 93005 ELECTROCARDIOGRAM TRACING: CPT | Mod: TXP

## 2020-06-26 PROCEDURE — 3044F HG A1C LEVEL LT 7.0%: CPT | Mod: CPTII,NTX,S$GLB, | Performed by: INTERNAL MEDICINE

## 2020-06-26 PROCEDURE — 3008F BODY MASS INDEX DOCD: CPT | Mod: CPTII,NTX,S$GLB, | Performed by: INTERNAL MEDICINE

## 2020-06-26 PROCEDURE — 3080F PR MOST RECENT DIASTOLIC BLOOD PRESSURE >= 90 MM HG: ICD-10-PCS | Mod: CPTII,NTX,S$GLB, | Performed by: INTERNAL MEDICINE

## 2020-06-26 PROCEDURE — 99214 OFFICE O/P EST MOD 30 MIN: CPT | Mod: 25,NTX,S$GLB, | Performed by: INTERNAL MEDICINE

## 2020-06-26 PROCEDURE — 3077F SYST BP >= 140 MM HG: CPT | Mod: CPTII,NTX,S$GLB, | Performed by: INTERNAL MEDICINE

## 2020-06-26 PROCEDURE — 99999 PR PBB SHADOW E&M-EST. PATIENT-LVL V: ICD-10-PCS | Mod: PBBFAC,TXP,, | Performed by: INTERNAL MEDICINE

## 2020-06-26 PROCEDURE — 3077F PR MOST RECENT SYSTOLIC BLOOD PRESSURE >= 140 MM HG: ICD-10-PCS | Mod: CPTII,NTX,S$GLB, | Performed by: INTERNAL MEDICINE

## 2020-06-26 PROCEDURE — 99999 PR PBB SHADOW E&M-EST. PATIENT-LVL V: CPT | Mod: PBBFAC,TXP,, | Performed by: INTERNAL MEDICINE

## 2020-06-26 PROCEDURE — 3008F PR BODY MASS INDEX (BMI) DOCUMENTED: ICD-10-PCS | Mod: CPTII,NTX,S$GLB, | Performed by: INTERNAL MEDICINE

## 2020-06-26 PROCEDURE — 93010 ELECTROCARDIOGRAM REPORT: CPT | Mod: NTX,,, | Performed by: INTERNAL MEDICINE

## 2020-06-26 NOTE — PROGRESS NOTES
Subjective:   Patient ID:  Isidro Alves is a 64 y.o. male who presents for follow up of No chief complaint on file.      63 yo male, 1yr f/u [employer] a [employer].  PMH CKD stage IV not on HD and on renal Tx l ist, CAD, old MI per MPI in , EF 50%, IDDM 20 yrs, HTN, HLD. No smoking. Occasional drink.  Denied h/o stroke, heart attack and cancer.  No chest pain, dyspnea, dizziness palpitation and orthopnea.  Fell off the roof and multi fx.    MPI showed inferior old MI and EF 35%. ECHo showed EF 50% and LVH  No f/h premature CAD.  No smoking  Walks 4 times a week. And on PT  ekg NSR  Out of Valsartan for 1 day and states that BP controlled at home      Past Medical History:   Diagnosis Date    Anemia 4/16/2014    BPH (benign prostatic hyperplasia)     CKD (chronic kidney disease) stage 4, GFR 15-29 ml/min     Coronary artery disease of native artery of native heart with stable angina pectoris 4/29/2019    Diabetes mellitus     Diabetes mellitus, type 2     Elevated PSA     Encounter for blood transfusion     Herpes labialis     Hyperlipidemia     Hypertension     Obesity     Proteinuria     Secondary hyperparathyroidism (of renal origin)     UTI (lower urinary tract infection) 5/1/2015       Past Surgical History:   Procedure Laterality Date    COLONOSCOPY N/A 12/21/2017    Procedure: COLONOSCOPY;  Surgeon: Fran Escalera MD;  Location: [facility];  Service: Endoscopy;  Laterality: N/A;    PROSTATE BIOPSY      thorocotomy  2010       Social History     Tobacco Use    Smoking status: Never Smoker    Smokeless tobacco: Never Used   Substance Use Topics    Alcohol use: Yes     Comment: seldom     Drug use: No       Family History   Problem Relation Age of Onset    No Known Problems Mother     Diabetes Father     Hypertension Father     Hypertension Unknown     Diabetes Unknown          Review of Systems   Constitution: Negative for decreased appetite, diaphoresis, fever,  malaise/fatigue and night sweats.   HENT: Negative for nosebleeds.    Eyes: Negative for blurred vision and double vision.   Cardiovascular: Negative for chest pain, claudication, dyspnea on exertion, irregular heartbeat, leg swelling, near-syncope, orthopnea, palpitations, paroxysmal nocturnal dyspnea and syncope.   Respiratory: Negative for cough, shortness of breath, sleep disturbances due to breathing, snoring, sputum production and wheezing.    Endocrine: Negative for cold intolerance and polyuria.   Hematologic/Lymphatic: Does not bruise/bleed easily.   Skin: Negative for rash.   Musculoskeletal: Negative for back pain, falls, joint pain, joint swelling and neck pain.   Gastrointestinal: Negative for abdominal pain, heartburn, nausea and vomiting.   Genitourinary: Negative for dysuria, frequency and hematuria.   Neurological: Negative for difficulty with concentration, dizziness, focal weakness, headaches, light-headedness, numbness, seizures and weakness.   Psychiatric/Behavioral: Negative for depression, memory loss and substance abuse. The patient does not have insomnia.    Allergic/Immunologic: Negative for HIV exposure and hives.       Objective:   Physical Exam   Constitutional: He is oriented to person, place, and time. He appears well-nourished.   HENT:   Head: Normocephalic.   Eyes: Pupils are equal, round, and reactive to light.   Neck: Normal carotid pulses and no JVD present. Carotid bruit is not present. No thyromegaly present.   Cardiovascular: Normal rate, regular rhythm, normal heart sounds and normal pulses.  No extrasystoles are present. PMI is not displaced. Exam reveals no gallop and no S3.   No murmur heard.  Pulmonary/Chest: Breath sounds normal. No stridor. No respiratory distress.   Abdominal: Soft. Bowel sounds are normal. There is no abdominal tenderness. There is no rebound.   Musculoskeletal: Normal range of motion.         General: Edema present.      Comments: Trace edema      Neurological: He is alert and oriented to person, place, and time.   Skin: Skin is intact. No rash noted.   Psychiatric: His behavior is normal.       Lab Results   Component Value Date    CHOL 136 09/05/2019    CHOL 150 03/11/2019    CHOL 224 (H) 10/11/2018     Lab Results   Component Value Date    HDL 32 (L) 09/05/2019    HDL 28 (L) 03/11/2019    HDL 34 (L) 10/11/2018     Lab Results   Component Value Date    LDLCALC 73.8 09/05/2019    LDLCALC 93.2 03/11/2019    LDLCALC 140.4 10/11/2018     Lab Results   Component Value Date    TRIG 151 (H) 09/05/2019    TRIG 144 03/11/2019    TRIG 248 (H) 10/11/2018     Lab Results   Component Value Date    CHOLHDL 23.5 09/05/2019    CHOLHDL 18.7 (L) 03/11/2019    CHOLHDL 15.2 (L) 10/11/2018       Chemistry        Component Value Date/Time     06/16/2020 0903    K 4.2 06/16/2020 0903     06/16/2020 0903    CO2 22 (L) 06/16/2020 0903    BUN 52 (H) 06/16/2020 0903    CREATININE 5.3 (H) 06/16/2020 0903     06/16/2020 0903        Component Value Date/Time    CALCIUM 8.7 06/16/2020 0903    ALKPHOS 101 03/11/2019 1640    AST 17 03/11/2019 1640    ALT 13 03/11/2019 1640    BILITOT 0.8 03/11/2019 1640    ESTGFRAFRICA 12 (A) 06/16/2020 0903    EGFRNONAA 11 (A) 06/16/2020 0903          Lab Results   Component Value Date    HGBA1C 6.4 (H) 06/01/2020     Lab Results   Component Value Date    TSH 1.464 09/05/2019     Lab Results   Component Value Date    INR 0.9 10/11/2018    INR 0.9 05/02/2015    INR 0.9 05/01/2015     Lab Results   Component Value Date    WBC 8.46 06/16/2020    HGB 12.3 (L) 06/16/2020    HCT 39.7 (L) 06/16/2020    MCV 82 06/16/2020     06/16/2020     BMP  Sodium   Date Value Ref Range Status   06/16/2020 141 136 - 145 mmol/L Final     Potassium   Date Value Ref Range Status   06/16/2020 4.2 3.5 - 5.1 mmol/L Final     Chloride   Date Value Ref Range Status   06/16/2020 110 95 - 110 mmol/L Final     CO2   Date Value Ref Range Status   06/16/2020 22  (L) 23 - 29 mmol/L Final     BUN, Bld   Date Value Ref Range Status   06/16/2020 52 (H) 8 - 23 mg/dL Final     Creatinine   Date Value Ref Range Status   06/16/2020 5.3 (H) 0.5 - 1.4 mg/dL Final     Calcium   Date Value Ref Range Status   06/16/2020 8.7 8.7 - 10.5 mg/dL Final     Anion Gap   Date Value Ref Range Status   06/16/2020 9 8 - 16 mmol/L Final     eGFR if    Date Value Ref Range Status   06/16/2020 12 (A) >60 mL/min/1.73 m^2 Final     eGFR if non    Date Value Ref Range Status   06/16/2020 11 (A) >60 mL/min/1.73 m^2 Final     Comment:     Calculation used to obtain the estimated glomerular filtration  rate (eGFR) is the CKD-EPI equation.        BNP  @LABRCNTIP(BNP,BNPTRIAGEBLO)@  @LABRCNTIP(troponini)@  CrCl cannot be calculated (Patient's most recent lab result is older than the maximum 7 days allowed.).  No results found in the last 24 hours.  No results found in the last 24 hours.  No results found in the last 24 hours.    Assessment:      1. Coronary artery disease of native artery of native heart with stable angina pectoris    2. Essential hypertension    3. Hyperlipidemia associated with type 2 diabetes mellitus    4. Chronic kidney disease (CKD), stage IV (severe)    5. Type 2 diabetes mellitus with stage 4 chronic kidney disease, with long-term current use of insulin        Plan:   Continue ASA, Lipitor, Losartan Coreg and nefidipine  Counseled DASH  Check Lipid profile in 6 months  Recommend heart-healthy diet, weight control and regular exercise.  Amy. Risk modification.   RTC in 1 yr    I have reviewed all pertinent labs and cardiac studies independently. Plans and recommendations have been formulated under my direct supervision. All questions answered and patient voiced understanding. Patient to continue current medications.   Return sooner for concerns or questions.   If symptoms persist go to the ED

## 2020-06-28 RX ORDER — TADALAFIL 20 MG/1
20 TABLET ORAL DAILY
Qty: 5 TABLET | Refills: 11 | Status: SHIPPED | OUTPATIENT
Start: 2020-06-28 | End: 2021-04-24 | Stop reason: SDUPTHER

## 2020-06-29 ENCOUNTER — TELEPHONE (OUTPATIENT)
Dept: TRANSPLANT | Facility: CLINIC | Age: 65
End: 2020-06-29

## 2020-06-29 NOTE — TELEPHONE ENCOUNTER
I called to confirm patient's appointment for Thursday, July 2, 2020.  Patient stated he is coming for his clinic appointment. Patient given time to ask questions and all questions answered.

## 2020-06-30 ENCOUNTER — TELEPHONE (OUTPATIENT)
Dept: NEPHROLOGY | Facility: CLINIC | Age: 65
End: 2020-06-30

## 2020-06-30 NOTE — TELEPHONE ENCOUNTER
----- Message from Nandini Michael sent at 6/30/2020 11:28 AM CDT -----  Contact: Isidro Felix would like a call back at [phone], Regards to his medication from last visit Keith segovia he don't know the name of it but he was advise by the pharmacy that nothing was called in.      [facility]/pharmacy #[id number] - [place], [place] - [address]  [address] [address]  Phone: [phone] Fax: [phone]    Thanks  Td

## 2020-06-30 NOTE — TELEPHONE ENCOUNTER
"Called pharmacy to see if they got the calcitiol order ,they said yes but it was on hold." so they will call him today when its  ready. Let pt know.6/30/2020/1152/sf  "

## 2020-07-02 ENCOUNTER — OFFICE VISIT (OUTPATIENT)
Dept: TRANSPLANT | Facility: CLINIC | Age: 65
End: 2020-07-02
Payer: COMMERCIAL

## 2020-07-02 VITALS
BODY MASS INDEX: 34.84 KG/M2 | OXYGEN SATURATION: 94 % | WEIGHT: 257.25 LBS | HEIGHT: 72 IN | RESPIRATION RATE: 18 BRPM | HEART RATE: 95 BPM | DIASTOLIC BLOOD PRESSURE: 78 MMHG | TEMPERATURE: 98 F | SYSTOLIC BLOOD PRESSURE: 121 MMHG

## 2020-07-02 DIAGNOSIS — D63.1 ANEMIA DUE TO STAGE 5 CHRONIC KIDNEY DISEASE, NOT ON CHRONIC DIALYSIS: ICD-10-CM

## 2020-07-02 DIAGNOSIS — E11.22 TYPE 2 DIABETES MELLITUS WITH STAGE 2 CHRONIC KIDNEY DISEASE, WITH LONG-TERM CURRENT USE OF INSULIN: Primary | ICD-10-CM

## 2020-07-02 DIAGNOSIS — N25.81 SECONDARY RENAL HYPERPARATHYROIDISM: ICD-10-CM

## 2020-07-02 DIAGNOSIS — E04.2 MULTIPLE THYROID NODULES: ICD-10-CM

## 2020-07-02 DIAGNOSIS — I25.118 CORONARY ARTERY DISEASE OF NATIVE ARTERY OF NATIVE HEART WITH STABLE ANGINA PECTORIS: ICD-10-CM

## 2020-07-02 DIAGNOSIS — I15.2 HYPERTENSION ASSOCIATED WITH DIABETES: ICD-10-CM

## 2020-07-02 DIAGNOSIS — N18.5 CKD (CHRONIC KIDNEY DISEASE), STAGE V: Chronic | ICD-10-CM

## 2020-07-02 DIAGNOSIS — R97.20 ELEVATED PSA: ICD-10-CM

## 2020-07-02 DIAGNOSIS — Z76.82 PATIENT ON WAITING LIST FOR KIDNEY TRANSPLANT: Chronic | ICD-10-CM

## 2020-07-02 DIAGNOSIS — E11.59 HYPERTENSION ASSOCIATED WITH DIABETES: ICD-10-CM

## 2020-07-02 DIAGNOSIS — Z79.4 TYPE 2 DIABETES MELLITUS WITH STAGE 2 CHRONIC KIDNEY DISEASE, WITH LONG-TERM CURRENT USE OF INSULIN: Primary | ICD-10-CM

## 2020-07-02 DIAGNOSIS — N18.2 TYPE 2 DIABETES MELLITUS WITH STAGE 2 CHRONIC KIDNEY DISEASE, WITH LONG-TERM CURRENT USE OF INSULIN: Primary | ICD-10-CM

## 2020-07-02 DIAGNOSIS — N18.5 ANEMIA DUE TO STAGE 5 CHRONIC KIDNEY DISEASE, NOT ON CHRONIC DIALYSIS: ICD-10-CM

## 2020-07-02 PROCEDURE — 3044F HG A1C LEVEL LT 7.0%: CPT | Mod: CPTII,S$GLB,TXP, | Performed by: NURSE PRACTITIONER

## 2020-07-02 PROCEDURE — 99999 PR PBB SHADOW E&M-EST. PATIENT-LVL V: CPT | Mod: PBBFAC,TXP,, | Performed by: NURSE PRACTITIONER

## 2020-07-02 PROCEDURE — 99215 OFFICE O/P EST HI 40 MIN: CPT | Mod: S$GLB,TXP,, | Performed by: NURSE PRACTITIONER

## 2020-07-02 PROCEDURE — 3074F SYST BP LT 130 MM HG: CPT | Mod: CPTII,S$GLB,TXP, | Performed by: NURSE PRACTITIONER

## 2020-07-02 PROCEDURE — 3044F PR MOST RECENT HEMOGLOBIN A1C LEVEL <7.0%: ICD-10-PCS | Mod: CPTII,S$GLB,TXP, | Performed by: NURSE PRACTITIONER

## 2020-07-02 PROCEDURE — 3008F PR BODY MASS INDEX (BMI) DOCUMENTED: ICD-10-PCS | Mod: CPTII,S$GLB,TXP, | Performed by: NURSE PRACTITIONER

## 2020-07-02 PROCEDURE — 99215 PR OFFICE/OUTPT VISIT, EST, LEVL V, 40-54 MIN: ICD-10-PCS | Mod: S$GLB,TXP,, | Performed by: NURSE PRACTITIONER

## 2020-07-02 PROCEDURE — 3078F PR MOST RECENT DIASTOLIC BLOOD PRESSURE < 80 MM HG: ICD-10-PCS | Mod: CPTII,S$GLB,TXP, | Performed by: NURSE PRACTITIONER

## 2020-07-02 PROCEDURE — 3008F BODY MASS INDEX DOCD: CPT | Mod: CPTII,S$GLB,TXP, | Performed by: NURSE PRACTITIONER

## 2020-07-02 PROCEDURE — 3078F DIAST BP <80 MM HG: CPT | Mod: CPTII,S$GLB,TXP, | Performed by: NURSE PRACTITIONER

## 2020-07-02 PROCEDURE — 3074F PR MOST RECENT SYSTOLIC BLOOD PRESSURE < 130 MM HG: ICD-10-PCS | Mod: CPTII,S$GLB,TXP, | Performed by: NURSE PRACTITIONER

## 2020-07-02 PROCEDURE — 99999 PR PBB SHADOW E&M-EST. PATIENT-LVL V: ICD-10-PCS | Mod: PBBFAC,TXP,, | Performed by: NURSE PRACTITIONER

## 2020-07-02 NOTE — LETTER
July 6, 2020        Gabino Champion  [address]  [place] [address]  Phone: [phone]  Fax: [phone]             [facility]  [address]  [facility] [address]  Phone: [phone]   Patient: Isidro Alves   MR Number: [id number]   YOB: 1955   Date of Visit: 7/2/2020       Dear Dr. Gabino Champion    Thank you for referring Isidro Alves to me for evaluation. Attached you will find relevant portions of my assessment and plan of care.    If you have questions, please do not hesitate to call me. I look forward to following Isidro Alves along with you.    Sincerely,    Jessica Collins, NP    Enclosure    If you would like to receive this communication electronically, please contact [email or web address] or [phone] to request [employer] Link access.    [employer] Link is a tool which provides read-only access to select patient information with whom you have a relationship. Its easy to use and provides real time access to review your patients record including encounter summaries, notes, results, and demographic information.    If you feel you have received this communication in error or would no longer like to receive these types of communications, please e-mail [email or web address]

## 2020-07-02 NOTE — PROGRESS NOTES
Listed PATIENT EDUCATION NOTE    Mr. Isidro Alves was seen in pre-kidney transplant clinic for evaluation for kidney, kidney/pancreas or pancreas only transplant.  The patient attended a an individual video education session that discussed/reviewed the following aspects of transplantation: evaluation and selection committee process, UNOS waitlist management/multiple listings, types of organs offered (KDPI < 85%, KDPI > 85%, PHS increased risk, DCD, HCV+, HIV+ for HIV+ recipients and enbloc/dual), financial aspects, surgical procedures, dietary instruction pre- and post-transplant, health maintenance pre- and post-transplant, post-transplant hospitalization and outpatient follow-up, potential to participate in a research protocol, and medication management and side effects.  A question and answer session was provided after the presentation.    The patient was seen by all members of the multi-disciplinary team to include: Nephrologist/PA, Surgeon, , Transplant Coordinator, , Pharmacist and Dietician (if applicable).    The patient reviewed and signed all consents for evaluation which were witnessed and sent to scanning into the [facility] chart.    The patient was given an education book and plan for further evaluation based on his individual assessment.      The patient was encouraged to call with any questions or concerns.

## 2020-07-02 NOTE — PROGRESS NOTES
Kidney Transplant Recipient Reevalulation    Referring Physician: Gabino Champion  Current Nephrologist: Gabino Champion  Waitlist Status: active  Dialysis Start Date: 9/17/2019    Subjective:     CC:  Annual reassessment of kidney transplant candidacy.    HPI:  Mr. Alves is a 64 y.o. year old Black or  male with ESRD secondary to diabetic nephropathy.  He has been on the wait list for a kidney transplant at [facility] since 9/17/2019. Patient is currently PRE DIALYSIS.   Patient reports that he is tolerating dialysis well.. He has  NO ACCESS. Patient denies any recent hospitalizations or ED visits.     6/16/2020   eGFR if African American >60 mL/min/1.73 m^2 12 (A)     Previous Transplant: no  Past Medical History:   Diagnosis Date    Anemia 4/16/2014    BPH (benign prostatic hyperplasia)     CKD (chronic kidney disease) stage 4, GFR 15-29 ml/min     Coronary artery disease of native artery of native heart with stable angina pectoris 4/29/2019    Diabetes mellitus     Diabetes mellitus, type 2     Elevated PSA     Encounter for blood transfusion     Herpes labialis     Hyperlipidemia     Hypertension     Obesity     Proteinuria     Secondary hyperparathyroidism (of renal origin)     UTI (lower urinary tract infection) 5/1/2015       Past Medical and Surgical History: Mr. Alves  has a past medical history of Anemia, BPH (benign prostatic hyperplasia), CKD (chronic kidney disease) stage 4, GFR 15-29 ml/min, Coronary artery disease of native artery of native heart with stable angina pectoris, Diabetes mellitus, Diabetes mellitus, type 2, Elevated PSA, Encounter for blood transfusion, Herpes labialis, Hyperlipidemia, Hypertension, Obesity, Proteinuria, Secondary hyperparathyroidism (of renal origin), and UTI (lower urinary tract infection).  He has a past surgical history that includes thorocotomy (2010); Prostate biopsy; and Colonoscopy (N/A, 12/21/2017).    Past Social and Family  History: Mr. Alves reports that he has never smoked. He has never used smokeless tobacco. He reports current alcohol use. He reports that he does not use drugs. His family history includes Diabetes in his father and unknown relative; Hypertension in his father and unknown relative; No Known Problems in his mother.         PM Thyroid nodules, Thyroid US completed in 4/25/2019  Per Dr Castano: no bx needed, nodules are too small. Repeat thyroid US 1 year and  Add TSH with next labs    Cardiology   Pt was seen by cards o n 6/2020.  Will need to RTC 1 year for annual f/u       HX Elevated PSA for the past few years  Prostate biopsy a few years back at [facility]   Urologist Dr Fallon  F/u scheduled 11/2020  TX--denies   Noted in chart -->Incomplete bladder emptying--is taking flomax       Lab Results   Component Value Date    PSA 4.3 (H) 06/12/2020    PSA 3.8 11/05/2019    PSA 4.0 06/06/2019     Transplant Coordinator      Telephone Encounter   Signed   Encounter Date:  5/13/2019                  Telephone encounter/ Epic    ----- Message from Marcus Fernandez MD sent at 5/13/2019  1:10 PM CDT -----  Sure we do what ever you feel is needed. If you do not feel is indicated we list him active for a transplant.     Prince: proceed with follow up PSA as per current guidelines. See Dr Fallon note below.  Marcus   ----- Message -----  From: Ravinder Fallon IV, MD  Sent: 5/13/2019  11:37 AM  To: Marcus Fernandez MD     His PSA doesn't track for a cancer based on history.  Always an outside chance of one being present.  An MRI pelvis for prostate protocol might bring something to light but clinically it isn't mandatory.  If you want to order one for your reassurance it wouldn't be wrong.     ----- Message -----  From: Marcus Fernandez MD  Sent: 5/13/2019   8:52 AM  To: Ravinder Fallon IV, MD, Nora Fallon,     We discussed this patient in our transplant selection meeting last week and had  some concerns with his elevated PSA. Please advise.   The question from the group is Does he need other prostate biopsy to make sure this is benign?  Please see below the PSA's you document in your last clinic encounter  4/15: 13.9    11/15: 4.7    5/16: 12.7    11/16: 7.7    11/17: 10.7    10/18: 12.2     Thanks very much     Regards     Marcus                    6/12/2020 Renal US  Impression:  1. Findings consistent with chronic medical renal disease.  2. Stable appearance of the bilateral renal cysts including a mildly complex right renal cyst.  Body mass index is 34.47 kg/m².     6/12/2020 Thyroid US  Impression:  1. No nodules requiring FNA or follow-up per TI RADS criteria      6/12/2020 iliac doppler study   FINDINGS:  The bilateral iliac veins are patent and demonstrate phasicity.  The right iliac artery peak systolic velocity measures up to 95 centimeters/second and demonstrates triphasic waveforms.  The left iliac artery peak systolic velocity is 110 centimeters/second and demonstrates triphasic waveforms.   Impression:   As above    6/12/2020 PXR  FINDINGS:  There are 2 screws with washers seen across the right SI joint which are new when compared to the prior exam.  There is widening of the pubic symphysis which is presumed to be posttraumatic in nature that is also new when compared to the prior exam.  No vascular calcifications are identified.   Impression:   As above    2/3/2020 CXR  FINDINGS:  The cardiac and mediastinal silhouettes appear within normal limits.   The lungs are clear bilaterally.  There are multiple healed bilateral rib fracture deformities present.   Impression:   As above.  No acute findings.      6/11/2020 Lexiscan   Conclusion       Normal myocardial perfusion study.    There is a  trivial intensity fixed defect in the inferior wall of the left ventricle secondary to diaphragm attenuation.    Gated perfusion images showed an ejection fraction of 42% at rest and 41% post stress.  Normal ejection fraction is greater than %.    The EKG portion of this study is negative for ischemia.    Arrhythmias during stress: rare PVCs.        6/11/2020 TTE  Conclusion  · Concentric left ventricular hypertrophy.  · Mild left atrial enlargement.  · Normal left ventricular systolic function. The estimated ejection fraction is 55%.  · Normal LV diastolic function.  · Normal right ventricular systolic function.  · Normal central venous pressure (3 mmHg).         FX assessment   continues to Walk a mile without problems. Does not use assistive devices.   Over all he looks good and feels well.         Past Medical History:   Diagnosis Date    Anemia 4/16/2014    BPH (benign prostatic hyperplasia)     CKD (chronic kidney disease) stage 4, GFR 15-29 ml/min     Coronary artery disease of native artery of native heart with stable angina pectoris 4/29/2019    Diabetes mellitus     Diabetes mellitus, type 2     Elevated PSA     Encounter for blood transfusion     Herpes labialis     Hyperlipidemia     Hypertension     Obesity     Proteinuria     Secondary hyperparathyroidism (of renal origin)     UTI (lower urinary tract infection) 5/1/2015       Review of Systems   Constitutional: Positive for fatigue. Negative for activity change, appetite change, chills, fever and unexpected weight change.   HENT: Negative for congestion, facial swelling, postnasal drip, rhinorrhea, sinus pressure, sore throat and trouble swallowing.    Eyes: Positive for visual disturbance. Negative for pain and redness.        Wears glasses   Respiratory: Negative for cough, chest tightness, shortness of breath and wheezing.    Cardiovascular: Negative for chest pain and palpitations.   Gastrointestinal: Negative for abdominal pain, diarrhea, nausea and vomiting.   Genitourinary: Negative for dysuria, flank pain and urgency.   Musculoskeletal: Negative for gait problem, neck pain and neck stiffness.   Skin: Negative for rash.  "  Allergic/Immunologic: Negative for environmental allergies, food allergies and immunocompromised state.   Neurological: Negative for dizziness, weakness and light-headedness.   Psychiatric/Behavioral: Negative for agitation and confusion. The patient is not nervous/anxious.        Objective:   Blood pressure 121/78, pulse 95, temperature 98.3 °F (36.8 °C), temperature source Oral, resp. rate 18, height 6' 0.44" (1.84 m), weight 116.7 kg (257 lb 4.4 oz), SpO2 (!) 94 %.body mass index is 34.47 kg/m².    Physical Exam  Vitals signs reviewed.   Constitutional:       Appearance: Normal appearance. He is well-developed.   HENT:      Head: Normocephalic.   Eyes:      Conjunctiva/sclera: Conjunctivae normal.      Pupils: Pupils are equal, round, and reactive to light.   Neck:      Musculoskeletal: Normal range of motion and neck supple.   Cardiovascular:      Rate and Rhythm: Normal rate and regular rhythm.      Heart sounds: Normal heart sounds.   Pulmonary:      Effort: Pulmonary effort is normal.      Breath sounds: Normal breath sounds.   Abdominal:      General: Bowel sounds are normal. There is distension.      Palpations: Abdomen is soft. There is no hepatomegaly, splenomegaly or mass.      Tenderness: There is no abdominal tenderness. There is no guarding or rebound. Negative signs include Curran's sign and McBurney's sign.   Musculoskeletal: Normal range of motion.      Comments: Bilateral trace peripheral edema    Lymphadenopathy:      Cervical: No cervical adenopathy.   Skin:     General: Skin is warm and dry.   Neurological:      Mental Status: He is alert and oriented to person, place, and time.      Motor: No abnormal muscle tone.      Coordination: Coordination normal.   Psychiatric:         Behavior: Behavior normal.         Labs:  Lab Results   Component Value Date    WBC 8.46 06/16/2020    HGB 12.3 (L) 06/16/2020    HCT 39.7 (L) 06/16/2020     06/16/2020    K 4.2 06/16/2020     06/16/2020    " CO2 22 (L) 06/16/2020    BUN 52 (H) 06/16/2020    CREATININE 5.3 (H) 06/16/2020    EGFRNONAA 11 (A) 06/16/2020    CALCIUM 8.7 06/16/2020    PHOS 4.0 06/16/2020    ALBUMIN 3.5 06/16/2020    AST 17 03/11/2019    ALT 13 03/11/2019    UTPCR 1.69 (H) 06/16/2020    .0 (H) 06/12/2020       Lab Results   Component Value Date    BILIRUBINUA Negative 06/16/2020    PROTEINUA 2+ (A) 06/16/2020    NITRITE Negative 06/16/2020    RBCUA 2 06/16/2020    WBCUA 8 (H) 06/16/2020       No results found for: HLAABCTYPE    Labs were reviewed with the patient.    Assessment:     1. Type 2 diabetes mellitus with stage 2 chronic kidney disease, with long-term current use of insulin    2. Patient on waiting list for kidney transplant    3. CKD (chronic kidney disease), stage V    4. Hypertension associated with diabetes    5. Coronary artery disease of native artery of native heart with stable angina pectoris    6. Anemia due to stage 5 chronic kidney disease, not on chronic dialysis    7. Secondary renal hyperparathyroidism    8. Elevated PSA    9. Multiple thyroid nodules        Plan:    HX Elevated PSA-   Urology f/u as scheduled   Apt scheduled 11/2020 with Dr Fallon       High KDPI--no d/t weight  Pt meets organizational criteria for HCV kidney  Pt is willing to accept HCV kidney           Kidney allocation scheme was also discussed with the patient.  Patient was advised that the average wait time for a kidney in [place] is 3-5 years     Kidney donor profile index (KPDI) and estimated post-transplant survival scores (EPTS) were reviewed. The benefits and risks of accepting a kidney with KDPI > 85% were explained to the patient. Patient verbalized understanding and consented to accept a kidney with KDPI > 85%       The side effects of immunosuppressants were reviewed that include but are not limited to the risk of infection, the risks of cancer (skin and lymphoma being most common), the risk of diabetes associated with  prednisone use, acceleration of heart disease and diarrhea( this being more common post transplant).     Reviewed the hospital stay.  Reviewed the post transplant follow up.  Reviewed the importance of maintaining a good weight.  Reviewed the importance of controlling BP.  Reviewed the importance of following the renal diet.        Transplant Candidacy:   Based on available information, Mr. Alves is a suitable kidney transplant candidate assuming he is cleared by urology.  Final determination of transplant candidacy will be made once workup is complete and reviewed by the selection committee.    eJssica Collins NP       OS Patient Status  Functional Status: 80% - Normal activity with effort: some symptoms of disease  Physical Capacity: No Limitations

## 2020-07-05 ENCOUNTER — PATIENT OUTREACH (OUTPATIENT)
Dept: ADMINISTRATIVE | Facility: OTHER | Age: 65
End: 2020-07-05

## 2020-07-06 ENCOUNTER — LAB VISIT (OUTPATIENT)
Dept: LAB | Facility: HOSPITAL | Age: 65
End: 2020-07-06
Attending: NURSE PRACTITIONER
Payer: COMMERCIAL

## 2020-07-06 DIAGNOSIS — Z76.82 ORGAN TRANSPLANT CANDIDATE: ICD-10-CM

## 2020-07-06 DIAGNOSIS — E11.22 TYPE 2 DIABETES MELLITUS WITH STAGE 4 CHRONIC KIDNEY DISEASE, WITH LONG-TERM CURRENT USE OF INSULIN: ICD-10-CM

## 2020-07-06 DIAGNOSIS — Z79.4 TYPE 2 DIABETES MELLITUS WITH STAGE 4 CHRONIC KIDNEY DISEASE, WITH LONG-TERM CURRENT USE OF INSULIN: ICD-10-CM

## 2020-07-06 DIAGNOSIS — N18.4 TYPE 2 DIABETES MELLITUS WITH STAGE 4 CHRONIC KIDNEY DISEASE, WITH LONG-TERM CURRENT USE OF INSULIN: ICD-10-CM

## 2020-07-06 PROCEDURE — 99001 SPECIMEN HANDLING PT-LAB: CPT | Mod: TXP

## 2020-07-06 RX ORDER — (INSULIN DEGLUDEC AND LIRAGLUTIDE) 100; 3.6 [IU]/ML; MG/ML
INJECTION, SOLUTION SUBCUTANEOUS
Qty: 15 SYRINGE | Refills: 1 | Status: SHIPPED | OUTPATIENT
Start: 2020-07-06 | End: 2020-10-08

## 2020-07-07 ENCOUNTER — OFFICE VISIT (OUTPATIENT)
Dept: DIABETES | Facility: CLINIC | Age: 65
End: 2020-07-07
Payer: COMMERCIAL

## 2020-07-07 VITALS
BODY MASS INDEX: 34.94 KG/M2 | DIASTOLIC BLOOD PRESSURE: 99 MMHG | SYSTOLIC BLOOD PRESSURE: 161 MMHG | WEIGHT: 260.81 LBS | HEART RATE: 78 BPM

## 2020-07-07 DIAGNOSIS — Z79.4 TYPE 2 DIABETES MELLITUS WITH STAGE 4 CHRONIC KIDNEY DISEASE, WITH LONG-TERM CURRENT USE OF INSULIN: Primary | ICD-10-CM

## 2020-07-07 DIAGNOSIS — E11.22 TYPE 2 DIABETES MELLITUS WITH STAGE 4 CHRONIC KIDNEY DISEASE, WITH LONG-TERM CURRENT USE OF INSULIN: Primary | ICD-10-CM

## 2020-07-07 DIAGNOSIS — I25.118 CORONARY ARTERY DISEASE OF NATIVE ARTERY OF NATIVE HEART WITH STABLE ANGINA PECTORIS: ICD-10-CM

## 2020-07-07 DIAGNOSIS — E11.59 HYPERTENSION ASSOCIATED WITH DIABETES: ICD-10-CM

## 2020-07-07 DIAGNOSIS — I15.2 HYPERTENSION ASSOCIATED WITH DIABETES: ICD-10-CM

## 2020-07-07 DIAGNOSIS — E66.09 NON MORBID OBESITY DUE TO EXCESS CALORIES: ICD-10-CM

## 2020-07-07 DIAGNOSIS — E04.2 MULTIPLE THYROID NODULES: ICD-10-CM

## 2020-07-07 DIAGNOSIS — N18.4 CHRONIC KIDNEY DISEASE (CKD), STAGE IV (SEVERE): ICD-10-CM

## 2020-07-07 DIAGNOSIS — Z76.82 AWAITING ORGAN TRANSPLANT STATUS: Primary | ICD-10-CM

## 2020-07-07 DIAGNOSIS — E11.69 HYPERLIPIDEMIA ASSOCIATED WITH TYPE 2 DIABETES MELLITUS: ICD-10-CM

## 2020-07-07 DIAGNOSIS — E29.1 HYPOGONADISM IN MALE: ICD-10-CM

## 2020-07-07 DIAGNOSIS — N18.4 TYPE 2 DIABETES MELLITUS WITH STAGE 4 CHRONIC KIDNEY DISEASE, WITH LONG-TERM CURRENT USE OF INSULIN: Primary | ICD-10-CM

## 2020-07-07 DIAGNOSIS — E78.5 HYPERLIPIDEMIA ASSOCIATED WITH TYPE 2 DIABETES MELLITUS: ICD-10-CM

## 2020-07-07 LAB — GLUCOSE SERPL-MCNC: 120 MG/DL (ref 70–110)

## 2020-07-07 PROCEDURE — 3080F DIAST BP >= 90 MM HG: CPT | Mod: CPTII,S$GLB,, | Performed by: PHYSICIAN ASSISTANT

## 2020-07-07 PROCEDURE — 3077F PR MOST RECENT SYSTOLIC BLOOD PRESSURE >= 140 MM HG: ICD-10-PCS | Mod: CPTII,S$GLB,, | Performed by: PHYSICIAN ASSISTANT

## 2020-07-07 PROCEDURE — 99214 OFFICE O/P EST MOD 30 MIN: CPT | Mod: S$GLB,,, | Performed by: PHYSICIAN ASSISTANT

## 2020-07-07 PROCEDURE — 3077F SYST BP >= 140 MM HG: CPT | Mod: CPTII,S$GLB,, | Performed by: PHYSICIAN ASSISTANT

## 2020-07-07 PROCEDURE — 3008F PR BODY MASS INDEX (BMI) DOCUMENTED: ICD-10-PCS | Mod: CPTII,S$GLB,, | Performed by: PHYSICIAN ASSISTANT

## 2020-07-07 PROCEDURE — 3044F PR MOST RECENT HEMOGLOBIN A1C LEVEL <7.0%: ICD-10-PCS | Mod: CPTII,S$GLB,, | Performed by: PHYSICIAN ASSISTANT

## 2020-07-07 PROCEDURE — 99999 PR PBB SHADOW E&M-EST. PATIENT-LVL IV: ICD-10-PCS | Mod: PBBFAC,,, | Performed by: PHYSICIAN ASSISTANT

## 2020-07-07 PROCEDURE — 3008F BODY MASS INDEX DOCD: CPT | Mod: CPTII,S$GLB,, | Performed by: PHYSICIAN ASSISTANT

## 2020-07-07 PROCEDURE — 99214 PR OFFICE/OUTPT VISIT, EST, LEVL IV, 30-39 MIN: ICD-10-PCS | Mod: S$GLB,,, | Performed by: PHYSICIAN ASSISTANT

## 2020-07-07 PROCEDURE — 82962 POCT GLUCOSE, HAND-HELD DEVICE: ICD-10-PCS | Mod: S$GLB,,, | Performed by: PHYSICIAN ASSISTANT

## 2020-07-07 PROCEDURE — 3080F PR MOST RECENT DIASTOLIC BLOOD PRESSURE >= 90 MM HG: ICD-10-PCS | Mod: CPTII,S$GLB,, | Performed by: PHYSICIAN ASSISTANT

## 2020-07-07 PROCEDURE — 99999 PR PBB SHADOW E&M-EST. PATIENT-LVL IV: CPT | Mod: PBBFAC,,, | Performed by: PHYSICIAN ASSISTANT

## 2020-07-07 PROCEDURE — 3044F HG A1C LEVEL LT 7.0%: CPT | Mod: CPTII,S$GLB,, | Performed by: PHYSICIAN ASSISTANT

## 2020-07-07 PROCEDURE — 82962 GLUCOSE BLOOD TEST: CPT | Mod: S$GLB,,, | Performed by: PHYSICIAN ASSISTANT

## 2020-07-07 NOTE — PROGRESS NOTES
YEARLY LIST MANAGEMENT NOTE    Isidro Alves's kidney transplant listing status reviewed.  Patient is due for follow-up appointments on 7/2021  Appointments will be scheduled per protocol.

## 2020-07-07 NOTE — PROGRESS NOTES
PCP: Steven Morgan MD    Subjective:     Chief Complaint: Diabetes - Established Patient    HISTORY OF PRESENT ILLNESS: 64 y.o.   male presenting for diabetes management visit.   Patient has previously been established with the Diabetes Management Department and was last seen by myself on 04/07/20.  Patient has had Type II diabetes since 20 years or more.  Pertinent to decision making is the following comorbidities: HTN, HLD, CAD, ESRD, Obesity by BMI and Male Hypogonadism, History of Thyroid Nodules, and Renal Transplant Candidate  Patient has the following Diabetes complications: with diabetic chronic kidney disease  He  has attended diabetes education in the past.     Patient's most recent A1c of 6.4% was completed 1 months ago.   Patient states since His last A1c His blood glucose levels have been within range throughout the day .   Patient monitors blood glucose 2 times per day with meter : Fasting and Before Bed.   Patient blood glucose monitoring device will not be uploaded into Media Section today secondary to Telemedicine visit.   Per patient recall, fasting blood sugars ranging from 90 - 120 and before bed blood sugars ranging less 120.   Patient endorses the following diabetes related symptoms: None.   Patient is due today for the following diabetes-related health maintenance standards: Foot Exam  and Eye Exam. Patient reports having Eye Exam in Nov 2020 at outside eye clinic - Dr. Del Real.   He denies recent hospital admissions or emergency room visits.   He denies having hypoglycemia.   Patient's concerns today include glycemic control.   Patient medication regimen is as below.     CURRENT DM MEDICATIONS:    Xultophy 38 units in the morning    Patient has failed the following Diabetes medications:    Metformin        Labs Reviewed.       Lab Results   Component Value Date    CPEPTIDE 3.88 11/20/2017     Lab Results   Component Value Date    GLUTAMICACID 0.00 11/20/2017          //   Weight: 118.3 kg (260 lb 12.9 oz), Body mass index is 34.94 kg/m².  His blood sugar in clinic today is:    Lab Results   Component Value Date    POCGLU 120 (A) 07/07/2020       Review of Systems   Constitutional: Negative for activity change, appetite change, chills and fever.   HENT: Negative for dental problem, mouth sores, nosebleeds, sore throat and trouble swallowing.    Eyes: Negative for pain and discharge.   Respiratory: Negative for shortness of breath, wheezing and stridor.    Cardiovascular: Negative for chest pain, palpitations and leg swelling.   Gastrointestinal: Negative for abdominal pain, diarrhea, nausea and vomiting.   Endocrine: Negative for polydipsia, polyphagia and polyuria.   Genitourinary: Negative for dysuria, frequency and urgency.   Musculoskeletal: Negative for joint swelling and myalgias.   Skin: Negative for rash and wound.   Neurological: Negative for dizziness, syncope, weakness and headaches.   Psychiatric/Behavioral: Negative for behavioral problems and dysphoric mood.         Diabetes Management Status  Statin: Taking  ACE/ARB: Taking    Screening or Prevention Patient's value Goal Complete/Controlled?   HgA1C Testing and Control   Lab Results   Component Value Date    HGBA1C 6.4 (H) 06/01/2020      Annually/Less than 8% Yes   Lipid profile : 09/05/2019 Annually Yes   LDL control Lab Results   Component Value Date    LDLCALC 73.8 09/05/2019    Annually/Less than 100 mg/dl  Yes   Nephropathy screening Lab Results   Component Value Date    MICALBCREAT 1222.7 (H) 11/20/2017     Lab Results   Component Value Date    PROTEINUA 2+ (A) 06/16/2020    Annually Yes   Blood pressure BP Readings from Last 1 Encounters:   07/07/20 (!) 161/99    Less than 140/90 No   Dilated retinal exam : 09/05/2018 Annually No    Foot exam   : 04/22/2019 Annually Yes     Social History     Socioeconomic History    Marital status:      Spouse name: Not on file    Number of children: 3    Years of  education: Not on file    Highest education level: Not on file   Occupational History     Employer: self-rmployed   Social Needs    Financial resource strain: Not on file    Food insecurity     Worry: Not on file     Inability: Not on file    Transportation needs     Medical: Not on file     Non-medical: Not on file   Tobacco Use    Smoking status: Never Smoker    Smokeless tobacco: Never Used   Substance and Sexual Activity    Alcohol use: Yes     Comment: seldom     Drug use: No    Sexual activity: Yes     Partners: Female   Lifestyle    Physical activity     Days per week: Not on file     Minutes per session: Not on file    Stress: Not on file   Relationships    Social connections     Talks on phone: Not on file     Gets together: Not on file     Attends Church service: Not on file     Active member of club or organization: Not on file     Attends meetings of clubs or organizations: Not on file     Relationship status: Not on file   Other Topics Concern    Not on file   Social History Narrative    Not on file     Past Medical History:   Diagnosis Date    Anemia 4/16/2014    BPH (benign prostatic hyperplasia)     CKD (chronic kidney disease) stage 4, GFR 15-29 ml/min     Coronary artery disease of native artery of native heart with stable angina pectoris 4/29/2019    Diabetes mellitus     Diabetes mellitus, type 2     Elevated PSA     Encounter for blood transfusion     Herpes labialis     Hyperlipidemia     Hypertension     Obesity     Proteinuria     Secondary hyperparathyroidism (of renal origin)     UTI (lower urinary tract infection) 5/1/2015       Objective:        Physical Exam  Constitutional:       General: He is not in acute distress.     Appearance: He is well-developed. He is not diaphoretic.   HENT:      Head: Normocephalic and atraumatic.      Right Ear: External ear normal.      Left Ear: External ear normal.      Nose: Nose normal.   Eyes:      General:         Right  eye: No discharge.         Left eye: No discharge.      Pupils: Pupils are equal, round, and reactive to light.   Neck:      Musculoskeletal: Normal range of motion and neck supple.   Cardiovascular:      Rate and Rhythm: Normal rate and regular rhythm.      Pulses:           Dorsalis pedis pulses are 2+ on the right side and 2+ on the left side.        Posterior tibial pulses are 2+ on the right side and 2+ on the left side.      Heart sounds: Normal heart sounds.   Pulmonary:      Effort: Pulmonary effort is normal.      Breath sounds: Normal breath sounds.   Abdominal:      Palpations: Abdomen is soft.   Musculoskeletal: Normal range of motion.      Right foot: Normal range of motion. No deformity.      Left foot: Normal range of motion. No deformity.   Feet:      Right foot:      Protective Sensation: 6 sites tested. 6 sites sensed.      Skin integrity: Dry skin present. No ulcer, blister, skin breakdown, erythema, warmth or callus.      Left foot:      Protective Sensation: 6 sites tested. 6 sites sensed.      Skin integrity: Dry skin present. No ulcer, blister, skin breakdown, erythema, warmth or callus.   Skin:     General: Skin is warm and dry.      Capillary Refill: Capillary refill takes less than 2 seconds.   Neurological:      Mental Status: He is oriented to person, place, and time.   Psychiatric:         Behavior: Behavior normal.         Thought Content: Thought content normal.         Judgment: Judgment normal.           Assessment / Plan:     Type 2 diabetes mellitus with stage 4 chronic kidney disease, with long-term current use of insulin  -     POCT Glucose, Hand-Held Device  -     Lipid Panel; Future; Expected date: 07/07/2020  -     Hemoglobin A1C; Standing    Chronic kidney disease (CKD), stage IV (severe)    Hypertension associated with diabetes    Hyperlipidemia associated with type 2 diabetes mellitus    Coronary artery disease of native artery of native heart with stable angina  pectoris    Hypogonadism in male    Multiple thyroid nodules    Non morbid obesity due to excess calories      Additional Plan Details:    - POCT Glucose  - Encouraged continuation of lifestyle changes including regular exercise and limiting carbohydrates to 30-45 grams per meal threes times daily and 15 grams per snack with a limit of two daily.   - Encouraged continued monitoring of blood glucose with maintenance of 2 times daily at Fasting and Before Bed.   - Current DM Medication Regimen: Continue Xultophy 38 units daily - well controlled.   - Health Maintenance standards addressed today: Foot Exam - completed today and documented in physical exam with feedback to patient about proper diabetic foot care and findings and Eye Exam - completed in Jan 2020 at outside office and ROR signed today  - Nursing Visit: Patient is below goal range for nursing visit for age group and will not need nursing visit at this time .   - Follow up in 6 months with A1c prior.       Blakeney McKnight, PA-C [facility]    A total of 30 minutes was spent in face to face time, of which over 50 % was spent in counseling patient on disease process, complications, treatment, and side effects of medications.

## 2020-07-08 DIAGNOSIS — N52.9 ERECTILE DYSFUNCTION, UNSPECIFIED ERECTILE DYSFUNCTION TYPE: Primary | ICD-10-CM

## 2020-07-08 RX ORDER — TADALAFIL 20 MG/1
20 TABLET ORAL DAILY
Qty: 30 TABLET | Refills: 11 | Status: SHIPPED | OUTPATIENT
Start: 2020-07-08 | End: 2020-11-23 | Stop reason: SDUPTHER

## 2020-07-08 RX ORDER — TADALAFIL 20 MG/1
20 TABLET ORAL DAILY
Qty: 30 TABLET | Refills: 11 | Status: CANCELLED | OUTPATIENT
Start: 2020-07-08 | End: 2021-07-08

## 2020-07-08 NOTE — TELEPHONE ENCOUNTER
----- Message from Amy Ashby sent at 7/8/2020  2:36 PM CDT -----  Contact: Patient  .Type:  RX Refill Request    Who Called: Patient  Refill or New Rx:Refill  RX Name and Strength: tadalafil (ADCIRCA) 20 mg Tab  How is the patient currently taking it? (ex. 1XDay):  Is this a 30 day or 90 day RX: 30 Day supply  Preferred Pharmacy with phone number: .    [email or web address] [address]    Local or Mail Order: Local  Ordering Provider:Dr Fallon  Would the patient rather a call back or a response via [facility]? Call  Best Call Back Number: .[phone] (home)     Additional Information: Patient is requesting a 30 day supply, please call to advise.

## 2020-07-17 NOTE — PROGRESS NOTES
"Transplant Recipient Adult Psychosocial Assessment UPDATE (Last Assessment completed on 10/11/2018)    Isidro Alves  [id number] [address] [facility]  [place] [address]  Telephone Information:   Mobile [phone]   Home  [phone] (home)  Work  There is no work phone number on file.  E-mail  [email or web address]    Sex: male  YOB: 1955  Age: 64 y.o.    Encounter Date: 7/2/2020  [place] Citizen: yes  Primary Language: English   Needed: no    Emergency Contact:  Name: Kunal Alves  Relationship: wife  Address: same as above  Phone Numbers:  [phone] (mobile)    Family/Social Support:   Number of dependents/: Pt reports no dependents.  Marital history: Pt reports 2 marriages with the first marriage ending in divorce. Pt reports current marriage of 26 years to Kunal Alves.  Other family dynamics: seven sisters and two brothers.  Pt is the eldest.  He stated "some of these are my father's kids, but we are very close".      Household Composition:  Name: Isidro Alves  Age: 64  Relationship: patient  Does person drive? yes    Name: Kunal Alves  Age: 59  Relationship: wife  Does person drive? yes    Do you and your caregivers have access to reliable transportation? yes  PRIMARY CAREGIVER: Kunal Alves will be primary caregiver, phone number [phone].      provided in-depth information to patient and caregiver regarding pre- and post-transplant caregiver role.   strongly encourages patient and caregiver to have concrete plan regarding post-transplant care giving, including back-up caregiver(s) to ensure care giving needs are met as needed.    Patient and Caregiver states understanding all aspects of caregiver role/commitment and is able/willing/committed to being caregiver to the fullest extent necessary.    Patient and Caregiver verbalizes understanding of the education provided today and caregiver responsibilities.         remains " available. Patient and Caregiver agree to contact  in a timely manner if concerns arise.      Able to take time off work without financial concerns: yes.     Caregiver was not present during the initial assessment, however pt gave permission to contact pt's caregiver via phone. JINNY contacted pt's caregiver to provide education and information. Pt's caregiver expressed no questions or concerns at this time, reports understanding of the caregiver role, and reports knowing how to contact the transplant team. SW remains available at [phone].    Additional Significant Others who will Assist with Transplant:  Name: Princess Alves   Age: 72  Phone: [phone]  City: [place] State: [place]  Relationship: aunt  Does person drive? no     Name: Sahil Reynolds  Age: 52  Phone: [phone]  City: [place] State: [place]  Relationship: sister  Does person drive? yes    Name: Kevin Alves  Age: 43  Phone: [phone]  City: [place] State: [place]  Relationship: daughter  Does person drive? yes    Living Will: no  Healthcare Power of : no  Advance Directives on file: <<no information> per medical record.  Verbally reviewed LW/HCPA information.   provided patient with copy of LW/HCPA documents and provided education on completion of forms.    Living Donors: Yes.  Name: possibly jamarKevin gruber. Education and resource information given to patient.    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    Spouse/Significant Other Employment: Pt reports wife works full-time as a  at [employer].    Disabled: no    Monthly Income:  Other: $combined ~4100.00  Able to afford all costs now and if transplanted,  including medications: yes  Patient verbalizes understanding of personal responsibilities related to transplant costs and the importance of having a financial plan to ensure that patients transplant costs are fully covered.       provided fundraising information/education. Patient and Caregiver verbalizes understanding.   remains available.    Insurance:   Payor/Plan Subscr  Sex Relation Sub. Ins. ID Effective Group Num   1. BLUE CROSS BL* MAGGIE TEJEDA 1960 Female  [id number]                                   [address] [address]     Primary Insurance (for UNOS reporting): Private Insurance through wife's employer  Secondary Insurance (for UNOS reporting): None  Patient verbalizes clear understanding that patient may experience difficulty obtaining and/or be denied insurance coverage post-surgery. This includes and is not limited to disability insurance, life insurance, health insurance, burial insurance, long term care insurance, and other insurances.      Patient also reports understanding that future health concerns related to or unrelated to transplantation may not be covered by patient's insurance.  Resources and information provided and reviewed.     Patient provides verbal permission to release any necessary information to outside resources for patient care and discharge planning.  Resources and information provided are reviewed.      Dialysis Adherence: Patient reports he is predialysis.  He reports he has been followed by Dr. Ballard for about 8 yrs and is compliant with all aspects of medical care.  He stated he kows he needs to exercise more and lose weight which he states he is working on. Per Neph documentation, pt was non adherent with BP meds in the past, but is now taking them with noted lowered BPs.  Pt has a 12 % no show rate.      Infusion Service: patient utilizing? no  Home Health: patient utilizing? no  DME: yes Pt reports diabetic  supplies  Pulmonary/Cardiac Rehab: Pt denies  ADLS:  Pt reports no difficulties with driving, walking, bathing, cooking, housekeeping, eating, shopping, and taking medication.    Adherence:   Pt reports suitable adherence with medications and health regimen.  Adherence education and counseling provided.     Per History Section:  Past Medical History:   Diagnosis Date    Anemia 4/16/2014    BPH (benign prostatic hyperplasia)     CKD (chronic kidney disease) stage 4, GFR 15-29 ml/min     Coronary artery disease of native artery of native heart with stable angina pectoris 4/29/2019    Diabetes mellitus     Diabetes mellitus, type 2     Elevated PSA     Encounter for blood transfusion     Herpes labialis     Hyperlipidemia     Hypertension     Obesity     Proteinuria     Secondary hyperparathyroidism (of renal origin)     UTI (lower urinary tract infection) 5/1/2015     Social History     Tobacco Use    Smoking status: Never Smoker    Smokeless tobacco: Never Used   Substance Use Topics    Alcohol use: Yes     Comment: seldom      Social History     Substance and Sexual Activity   Drug Use No     Social History     Substance and Sexual Activity   Sexual Activity Yes    Partners: Female       Per Today's Psychosocial:  Tobacco: none, patient denies any use.  Alcohol: Pt reports seldom ETOH use.   Illicit Drugs/Non-prescribed Medications: Pt reports a remote history of marijuana use over 20+ years ago. Pt reports no current drug use.    Patient and Caregiver states clear understanding of the potential impact of substance use as it relates to transplant candidacy and is aware of possible random substance screening.  Substance abstinence/cessation counseling, education and resources provided and reviewed.     Arrests/DWI/Treatment/Rehab: patient denies    Psychiatric History:    Mental Health: Pt reports no history of or current mental health issues or concerns.   Psychiatrist/Counselor: Pt denies seeing  a mental health professional and reports being open to seeing the psych department for talk therapy if necessary.  Medications:  Pt denies taking medications for mental health reasons.  Suicide/Homicide Issues: Pt denies any history of or current suicidal or homicidal ideations.   Safety at home: Pt reports no current or history of safety concerns in household; including mental, physical, verbal, or sexual abuse.    Knowledge: Patient states having clear understanding and realistic expectations regarding the potential risks and potential benefits of organ transplantation and organ donation and agrees to discuss with health care team members and support system members, as well as to utilize available resources and express questions and/or concerns in order to further facilitate the pt informed decision-making.  Resources and information provided and reviewed.    Patient is aware of [facility]'s affiliation and/or partnership with agencies in home health care, LTAC, SNF, [facility], and other hospitals and clinics.    Understanding: Patient reports having a clear understanding of the many lifetime commitments involved with being a transplant recipient, including costs, compliance, medications, lab work, procedures, appointments, concrete and financial planning, preparedness, timely and appropriate communication of concerns, abstinence (ETOH, tobacco, illicit non-prescribed drugs), adherence to all health care team recommendations, support system and caregiver involvement, appropriate and timely resource utilization and follow-through, mental health counseling as needed/recommended, and patient and caregiver responsibilities.  Social Service Handbook, resources and detailed educational information provided and reviewed.  Educational information provided.    Patient also reports current and expected compliance with health care regime and states having a clear understanding of the importance of compliance.      Patient reports a  "clear understanding that risks and benefits may be involved with organ transplantation and with organ donation.       Patient also reports clear understanding that psychosocial risk factors may affect patient, and include but are not limited to feelings of depression, generalized anxiety, anxiety regarding dependence on others, post traumatic stress disorder, feelings of guilt and other emotional and/or mental concerns, and/or exacerbation of existing mental health concerns.  Detailed resources provided and discussed.      Patient agrees to access appropriate resources in a timely manner as needed and/or as recommended, and to communicate concerns appropriately.  Patient also reports a clear understanding of treatment options available.      reviewed education, provided additional information, and answered questions.    Feelings or Concerns: Patient did not express any concerns at this time. Patient reports high motivation to pursue transplant at this time.    Coping: Pt reports coping well with the transplant process at this time and reports going to the gym, taking a walk, and sleeping as ways to cope.    Amish: Pt reports [facility] [place] [place] with Frida Quintanilla presiding.     Goals: Pt reports getting back to "normal", traveling, and enjoying life as goals for post transplant.  Patient referred to Vocational Rehabilitation.    Interview Behavior: Patient presents as alert and oriented x 4, pleasant, good eye contact, well groomed, recall good, concentration/judgement good, average intelligence, calm, communicative, cooperative and asking and answering questions appropriately. Caregiver was not present during the initial assessment, however pt gave permission to contact pt's caregiver via phone. SW contacted pt's caregiver to provide education and information. Pt's caregiver expressed no questions or concerns at this time, reports understanding of the caregiver " role, and reports knowing how to contact the transplant team. SW remains available at [phone].           Transplant Social Work - Candidacy  Assessment/Plan:     Psychosocial Suitability: Patient presents as an acceptable candidate for kidney transplant at this time. Based on psychosocial risk factors, patient presents as low risk, due to suitable caregiver plan in place, adequate adherence to nephrology appointments, and financial plan in place.    Recommendations/Additional Comments: SW recommends that pt conduct fundraising to assist pt with pay for cost of medications, food, gas, and other transplant related needs. SW recommends that pt remain aware of potential mental health concerns and contact the team if any concerns arise. SW recommends that pt remain abstinent from tobacco, ETOH, and drug use. SW supports pt's continued adherence. SW remains available to answer any questions or concerns that arise as the pt moves through the transplant process.     Donato Joshua, AUBRIE

## 2020-07-29 ENCOUNTER — TELEPHONE (OUTPATIENT)
Dept: NEPHROLOGY | Facility: CLINIC | Age: 65
End: 2020-07-29

## 2020-07-29 NOTE — TELEPHONE ENCOUNTER
----- Message from Essence Coates sent at 7/29/2020  9:07 AM CDT -----  Regarding: a personal letter  Contact: pt  Caller is requesting a call back regarding a personal letter that her needs your office to write for him.  Please call back at [phone] ([place]). Thanks.

## 2020-08-03 ENCOUNTER — TELEPHONE (OUTPATIENT)
Dept: NEPHROLOGY | Facility: CLINIC | Age: 65
End: 2020-08-03

## 2020-08-03 ENCOUNTER — PATIENT OUTREACH (OUTPATIENT)
Dept: ADMINISTRATIVE | Facility: HOSPITAL | Age: 65
End: 2020-08-03

## 2020-08-03 ENCOUNTER — LAB VISIT (OUTPATIENT)
Dept: LAB | Facility: HOSPITAL | Age: 65
End: 2020-08-03
Attending: NURSE PRACTITIONER
Payer: COMMERCIAL

## 2020-08-03 DIAGNOSIS — Z76.82 ORGAN TRANSPLANT CANDIDATE: ICD-10-CM

## 2020-08-03 PROCEDURE — 86832 HLA CLASS I HIGH DEFIN QUAL: CPT | Mod: TXP

## 2020-08-03 PROCEDURE — 86833 HLA CLASS II HIGH DEFIN QUAL: CPT | Mod: TXP

## 2020-08-03 NOTE — TELEPHONE ENCOUNTER
He wants paper filled out for his wife to not have to work outside home due to  at risk for covid.sent dr gibson a message. Dr ochoa said in a message to a pt that [facility]  Does not support writing excuse letters etc for these type of issues and they should follow the present protocol for possible exposure to covid as anyone else would. I sent dr gibson the message as req per pt.8/3/2020/1059/sf

## 2020-08-03 NOTE — LETTER
To whom it may concern     We are seeing Isidro Alves YOB: 1955, at [facility]. Steven Morgan MD is their primary care physician. To help with our [place] maintenance records could you please send the following:     Most recent copy of Diabetic Eye Exam that states Positive or Negative Retinopathy.    Please send fax to [phone], Attention Gabriel WILKINSON LPN Care Coordination.    Thank-you in advance for your assistance. If you have any questions or concerns, please don't hesitate to contact me at [phone].     Gabriel WILKINSON LPN  Care Coordination [facility]  [phone]

## 2020-08-03 NOTE — TELEPHONE ENCOUNTER
----- Message from Myla Bee sent at 8/3/2020 10:01 AM CDT -----  Type:  Needs Medical Advice    Who Called: KENNEY TEJEDA   Symptoms (please be specific):  How long has patient had these symptoms:   Pharmacy name and phone #:   Would the patient rather a call back or a response via [facility]? Both   Best Call Back Number:  [phone] (home)    Additional Information:     Pt is requesting a call back from the nurse in regards to the pt needing the paperwork for the pt wife sent to his email please   [email or web address]

## 2020-08-03 NOTE — PROGRESS NOTES
Working [employer] CKD report.  Noted last /99 on 7-7-2020 in Diabetes.  Called pt to scheduled bp recheck with overdue annual with pcp. Scheduled annual for 8-7. Instructed pt to bring bp cuff with him to appt to get calibrated if he was able to get another one by time of appt as he said the one he has at home is broken.  Sent fax to listed OD in care teams for recent eye exam.

## 2020-08-04 ENCOUNTER — TELEPHONE (OUTPATIENT)
Dept: NEPHROLOGY | Facility: CLINIC | Age: 65
End: 2020-08-04

## 2020-08-04 NOTE — TELEPHONE ENCOUNTER
----- Message from Charan Verma sent at 8/4/2020  1:18 PM CDT -----  Pt's wife would like return call regarding paperwork. Please call back at [phone].  Md Eduardo

## 2020-08-04 NOTE — TELEPHONE ENCOUNTER
Let her know that dr gibson will come in to sign his paper sometime today and I will email back to her.8/3/2020/1343/sf

## 2020-08-06 LAB — HPRA INTERPRETATION: NORMAL

## 2020-08-07 ENCOUNTER — OFFICE VISIT (OUTPATIENT)
Dept: INTERNAL MEDICINE | Facility: CLINIC | Age: 65
End: 2020-08-07
Payer: COMMERCIAL

## 2020-08-07 VITALS
TEMPERATURE: 97 F | DIASTOLIC BLOOD PRESSURE: 78 MMHG | HEART RATE: 91 BPM | WEIGHT: 259.06 LBS | OXYGEN SATURATION: 95 % | BODY MASS INDEX: 34.71 KG/M2 | SYSTOLIC BLOOD PRESSURE: 124 MMHG

## 2020-08-07 DIAGNOSIS — I25.118 CORONARY ARTERY DISEASE OF NATIVE ARTERY OF NATIVE HEART WITH STABLE ANGINA PECTORIS: ICD-10-CM

## 2020-08-07 DIAGNOSIS — N18.5 CKD (CHRONIC KIDNEY DISEASE), STAGE V: Chronic | ICD-10-CM

## 2020-08-07 DIAGNOSIS — E11.69 HYPERLIPIDEMIA ASSOCIATED WITH TYPE 2 DIABETES MELLITUS: ICD-10-CM

## 2020-08-07 DIAGNOSIS — Z00.00 ROUTINE GENERAL MEDICAL EXAMINATION AT A HEALTH CARE FACILITY: Primary | ICD-10-CM

## 2020-08-07 DIAGNOSIS — N25.81 SECONDARY RENAL HYPERPARATHYROIDISM: ICD-10-CM

## 2020-08-07 DIAGNOSIS — L29.3 ITCHING OF PENIS: ICD-10-CM

## 2020-08-07 DIAGNOSIS — E11.22 TYPE 2 DIABETES MELLITUS WITH STAGE 2 CHRONIC KIDNEY DISEASE, WITH LONG-TERM CURRENT USE OF INSULIN: ICD-10-CM

## 2020-08-07 DIAGNOSIS — E29.1 HYPOGONADISM IN MALE: ICD-10-CM

## 2020-08-07 DIAGNOSIS — E04.2 MULTIPLE THYROID NODULES: ICD-10-CM

## 2020-08-07 DIAGNOSIS — E11.59 HYPERTENSION ASSOCIATED WITH DIABETES: ICD-10-CM

## 2020-08-07 DIAGNOSIS — E78.5 HYPERLIPIDEMIA ASSOCIATED WITH TYPE 2 DIABETES MELLITUS: ICD-10-CM

## 2020-08-07 DIAGNOSIS — N18.2 TYPE 2 DIABETES MELLITUS WITH STAGE 2 CHRONIC KIDNEY DISEASE, WITH LONG-TERM CURRENT USE OF INSULIN: ICD-10-CM

## 2020-08-07 DIAGNOSIS — Z79.4 TYPE 2 DIABETES MELLITUS WITH STAGE 2 CHRONIC KIDNEY DISEASE, WITH LONG-TERM CURRENT USE OF INSULIN: ICD-10-CM

## 2020-08-07 DIAGNOSIS — I15.2 HYPERTENSION ASSOCIATED WITH DIABETES: ICD-10-CM

## 2020-08-07 PROBLEM — I10 ESSENTIAL HYPERTENSION: Status: RESOLVED | Noted: 2018-10-29 | Resolved: 2020-08-07

## 2020-08-07 PROCEDURE — 99396 PR PREVENTIVE VISIT,EST,40-64: ICD-10-PCS | Mod: S$GLB,,, | Performed by: INTERNAL MEDICINE

## 2020-08-07 PROCEDURE — 99213 OFFICE O/P EST LOW 20 MIN: CPT | Mod: 25,S$GLB,, | Performed by: INTERNAL MEDICINE

## 2020-08-07 PROCEDURE — 3074F SYST BP LT 130 MM HG: CPT | Mod: CPTII,S$GLB,, | Performed by: INTERNAL MEDICINE

## 2020-08-07 PROCEDURE — 3008F PR BODY MASS INDEX (BMI) DOCUMENTED: ICD-10-PCS | Mod: CPTII,S$GLB,, | Performed by: INTERNAL MEDICINE

## 2020-08-07 PROCEDURE — 3044F PR MOST RECENT HEMOGLOBIN A1C LEVEL <7.0%: ICD-10-PCS | Mod: CPTII,S$GLB,, | Performed by: INTERNAL MEDICINE

## 2020-08-07 PROCEDURE — 99999 PR PBB SHADOW E&M-EST. PATIENT-LVL V: ICD-10-PCS | Mod: PBBFAC,,, | Performed by: INTERNAL MEDICINE

## 2020-08-07 PROCEDURE — 3078F PR MOST RECENT DIASTOLIC BLOOD PRESSURE < 80 MM HG: ICD-10-PCS | Mod: CPTII,S$GLB,, | Performed by: INTERNAL MEDICINE

## 2020-08-07 PROCEDURE — 3074F PR MOST RECENT SYSTOLIC BLOOD PRESSURE < 130 MM HG: ICD-10-PCS | Mod: CPTII,S$GLB,, | Performed by: INTERNAL MEDICINE

## 2020-08-07 PROCEDURE — 3008F BODY MASS INDEX DOCD: CPT | Mod: CPTII,S$GLB,, | Performed by: INTERNAL MEDICINE

## 2020-08-07 PROCEDURE — 99213 PR OFFICE/OUTPT VISIT, EST, LEVL III, 20-29 MIN: ICD-10-PCS | Mod: 25,S$GLB,, | Performed by: INTERNAL MEDICINE

## 2020-08-07 PROCEDURE — 99396 PREV VISIT EST AGE 40-64: CPT | Mod: S$GLB,,, | Performed by: INTERNAL MEDICINE

## 2020-08-07 PROCEDURE — 99999 PR PBB SHADOW E&M-EST. PATIENT-LVL V: CPT | Mod: PBBFAC,,, | Performed by: INTERNAL MEDICINE

## 2020-08-07 PROCEDURE — 3078F DIAST BP <80 MM HG: CPT | Mod: CPTII,S$GLB,, | Performed by: INTERNAL MEDICINE

## 2020-08-07 PROCEDURE — 3044F HG A1C LEVEL LT 7.0%: CPT | Mod: CPTII,S$GLB,, | Performed by: INTERNAL MEDICINE

## 2020-08-07 RX ORDER — CLOTRIMAZOLE 1 %
CREAM (GRAM) TOPICAL 2 TIMES DAILY
Qty: 45 G | Refills: 1 | Status: SHIPPED | OUTPATIENT
Start: 2020-08-07 | End: 2021-02-26

## 2020-08-07 NOTE — PROGRESS NOTES
Subjective:      Patient ID: Isidro Alves is a 64 y.o. male.    Chief Complaint: Annual Exam    HPI     Pt also c/o itching. To penis. In past 7 days. Unchanged. No treatment.  No d/c. No trauma. Last intercourse 6 mo ago.    Physical Exam  Constitutional:       General: He is not in acute distress.     Appearance: He is well-developed.   HENT:      Head: Normocephalic and atraumatic.   Eyes:      Pupils: Pupils are equal, round, and reactive to light.   Neck:      Musculoskeletal: Neck supple.      Thyroid: No thyromegaly.      Vascular: No carotid bruit.   Cardiovascular:      Rate and Rhythm: Normal rate and regular rhythm.   Pulmonary:      Breath sounds: Normal breath sounds. No wheezing or rales.   Abdominal:      General: Bowel sounds are normal.      Palpations: Abdomen is soft.      Tenderness: There is no abdominal tenderness.   Genitourinary:     Penis: Normal. No phimosis or paraphimosis.        Lymphadenopathy:      Cervical: No cervical adenopathy.   Skin:     General: Skin is warm and dry.   Neurological:      Mental Status: He is alert and oriented to person, place, and time.   Psychiatric:         Behavior: Behavior normal.   65 yo with   Patient Active Problem List   Diagnosis    Hypertension associated with diabetes    Anemia    Secondary renal hyperparathyroidism    Metabolic acidosis    Type 2 diabetes mellitus with chronic kidney disease, with long-term current use of insulin    Herpes labialis    Benign non-nodular prostatic hyperplasia without lower urinary tract symptoms    Elevated PSA    Hypogonadism in male    Hyperlipidemia associated with type 2 diabetes mellitus    Non morbid obesity due to excess calories    Colon cancer screening    Special screening for malignant neoplasms, colon    Colon polyp    Other proteinuria    Coronary artery disease of native artery of native heart with stable angina pectoris    Multiple thyroid nodules    Patient on waiting list for  kidney transplant    CKD (chronic kidney disease), stage V     Past Medical History:   Diagnosis Date    Anemia 4/16/2014    BPH (benign prostatic hyperplasia)     CKD (chronic kidney disease) stage 4, GFR 15-29 ml/min     Coronary artery disease of native artery of native heart with stable angina pectoris 4/29/2019    Diabetes mellitus     Diabetes mellitus, type 2     Elevated PSA     Encounter for blood transfusion     Herpes labialis     Hyperlipidemia     Hypertension     Obesity     Proteinuria     Secondary hyperparathyroidism (of renal origin)     UTI (lower urinary tract infection) 5/1/2015     Here today for annual prev exam.  Compliant with meds without significant side effects. Energy and appetite are good.              Review of Systems   Constitutional: Negative for chills and fever.   HENT: Negative for ear pain and sore throat.    Respiratory: Negative for cough.    Cardiovascular: Negative for chest pain.   Gastrointestinal: Negative for abdominal pain and blood in stool.   Genitourinary: Negative for dysuria and hematuria.   Neurological: Negative for seizures and syncope.     Objective:   /78 (BP Location: Left arm, Patient Position: Sitting, BP Method: Large (Manual))   Pulse 91   Temp 96.5 °F (35.8 °C) (Tympanic)   Wt 117.5 kg (259 lb 0.7 oz)   SpO2 95%   BMI 34.71 kg/m²     Physical Exam  Constitutional:       General: He is not in acute distress.     Appearance: He is well-developed.   HENT:      Head: Normocephalic and atraumatic.   Eyes:      Pupils: Pupils are equal, round, and reactive to light.   Neck:      Musculoskeletal: Neck supple.      Thyroid: No thyromegaly.      Vascular: No carotid bruit.   Cardiovascular:      Rate and Rhythm: Normal rate and regular rhythm.   Pulmonary:      Breath sounds: Normal breath sounds. No wheezing or rales.   Abdominal:      General: Bowel sounds are normal.      Palpations: Abdomen is soft.      Tenderness: There is no  abdominal tenderness.   Genitourinary:     Penis: Normal. No phimosis or paraphimosis.        Lymphadenopathy:      Cervical: No cervical adenopathy.   Skin:     General: Skin is warm and dry.   Neurological:      Mental Status: He is alert and oriented to person, place, and time.   Psychiatric:         Behavior: Behavior normal.         Assessment:     1. Routine general medical examination at a health care facility    2. CKD (chronic kidney disease), stage V    3. Hypertension associated with diabetes    4. Type 2 diabetes mellitus with stage 2 chronic kidney disease, with long-term current use of insulin    5. Hyperlipidemia associated with type 2 diabetes mellitus    6. Coronary artery disease of native artery of native heart with stable angina pectoris    7. Multiple thyroid nodules    8. Hypogonadism in male    9. Secondary renal hyperparathyroidism    10. Itching of penis      Plan:   Routine general medical examination at a health care facility  Heart healthy diet and reg exercise  HM reviewed  CKD (chronic kidney disease), stage V  Stable.  Hypertension associated with diabetes  Stable  Type 2 diabetes mellitus with stage 2 chronic kidney disease, with long-term current use of insulin  Stable  Hyperlipidemia associated with type 2 diabetes mellitus  -     Lipid Panel; Future; Expected date: 10/06/2020    Coronary artery disease of native artery of native heart with stable angina pectoris  Stable.  Continue current medications  Multiple thyroid nodules    Hypogonadism in male    Secondary renal hyperparathyroidism  Up-to-date with renal    Itching of penis  -     Cancel: C. trachomatis/N. gonorrhoeae by AMP DNA; Future; Expected date: 08/07/2020  -     clotrimazole (LOTRIMIN) 1 % cream; Apply topically 2 (two) times daily.  Dispense: 45 g; Refill: 1        Lab Frequency Next Occurrence   Microalbumin/creatinine urine ratio Once 11/14/2019   PSA, Screening Once 11/21/2019   Hemoglobin A1c Once 03/23/2020    Renal function panel Once 06/19/2020   CBC auto differential Once 06/19/2020   PTH, intact Once 06/19/2020   Vitamin D Once 06/19/2020   Urinalysis Once 06/19/2020   Protein / creatinine ratio, urine Once 06/19/2020   Lipid Panel Once 07/07/2020   HLA Virtual Crossmatch     HLA PRA Screen     HLA PRA Screen Every 8 Weeks    Hemoglobin A1c Every 12 Weeks    HLA Virtual Crossmatch     Hemoglobin A1C Every 12 Weeks 9/25/2020, 12/18/2020, 3/12/2021   HLA PRA Screen         Problem List Items Addressed This Visit        Cardiac/Vascular    Hypertension associated with diabetes    Hyperlipidemia associated with type 2 diabetes mellitus    Relevant Orders    Lipid Panel    Coronary artery disease of native artery of native heart with stable angina pectoris       Renal/    CKD (chronic kidney disease), stage V (Chronic)       Endocrine    Secondary renal hyperparathyroidism    Type 2 diabetes mellitus with chronic kidney disease, with long-term current use of insulin    Hypogonadism in male    Multiple thyroid nodules      Other Visit Diagnoses     Routine general medical examination at a health care facility    -  Primary    Itching of penis        Relevant Medications    clotrimazole (LOTRIMIN) 1 % cream          Follow up in about 1 year (around 8/7/2021).

## 2020-08-10 LAB
CLASS I ANTIBODY COMMENTS - LUMINEX: NORMAL
CLASS II ANTIBODY COMMENTS - LUMINEX: NORMAL
CPRA %: 0
SERUM COLLECTION DT - LUMINEX CLASS I: NORMAL
SERUM COLLECTION DT - LUMINEX CLASS II: NORMAL
SPCL1 TESTING DATE: NORMAL
SPCL2 TESTING DATE: NORMAL
SPCLU TESTING DATE: NORMAL

## 2020-09-01 ENCOUNTER — LAB VISIT (OUTPATIENT)
Dept: LAB | Facility: HOSPITAL | Age: 65
End: 2020-09-01
Attending: NURSE PRACTITIONER
Payer: COMMERCIAL

## 2020-09-01 DIAGNOSIS — N18.5 CHRONIC KIDNEY DISEASE (CKD), STAGE V: ICD-10-CM

## 2020-09-01 DIAGNOSIS — D63.1 ANEMIA OF CHRONIC RENAL FAILURE, STAGE 5: ICD-10-CM

## 2020-09-01 DIAGNOSIS — N25.81 SECONDARY HYPERPARATHYROIDISM OF RENAL ORIGIN: ICD-10-CM

## 2020-09-01 DIAGNOSIS — N18.5 ANEMIA OF CHRONIC RENAL FAILURE, STAGE 5: ICD-10-CM

## 2020-09-01 DIAGNOSIS — Z76.82 ORGAN TRANSPLANT CANDIDATE: ICD-10-CM

## 2020-09-01 PROCEDURE — 85025 COMPLETE CBC W/AUTO DIFF WBC: CPT | Mod: TXP

## 2020-09-01 PROCEDURE — 99001 SPECIMEN HANDLING PT-LAB: CPT | Mod: TXP

## 2020-09-01 PROCEDURE — 82306 VITAMIN D 25 HYDROXY: CPT | Mod: NTX

## 2020-09-01 PROCEDURE — 80069 RENAL FUNCTION PANEL: CPT | Mod: NTX

## 2020-09-01 PROCEDURE — 83970 ASSAY OF PARATHORMONE: CPT | Mod: NTX

## 2020-09-01 PROCEDURE — 36415 COLL VENOUS BLD VENIPUNCTURE: CPT | Mod: PO,TXP

## 2020-09-02 LAB
25(OH)D3+25(OH)D2 SERPL-MCNC: 33 NG/ML (ref 30–96)
ALBUMIN SERPL BCP-MCNC: 3.6 G/DL (ref 3.5–5.2)
ANION GAP SERPL CALC-SCNC: 10 MMOL/L (ref 8–16)
BASOPHILS # BLD AUTO: 0.05 K/UL (ref 0–0.2)
BASOPHILS NFR BLD: 0.6 % (ref 0–1.9)
BUN SERPL-MCNC: 62 MG/DL (ref 8–23)
CALCIUM SERPL-MCNC: 8.4 MG/DL (ref 8.7–10.5)
CHLORIDE SERPL-SCNC: 108 MMOL/L (ref 95–110)
CO2 SERPL-SCNC: 17 MMOL/L (ref 23–29)
CREAT SERPL-MCNC: 5.6 MG/DL (ref 0.5–1.4)
DIFFERENTIAL METHOD: ABNORMAL
EOSINOPHIL # BLD AUTO: 0.4 K/UL (ref 0–0.5)
EOSINOPHIL NFR BLD: 4.4 % (ref 0–8)
ERYTHROCYTE [DISTWIDTH] IN BLOOD BY AUTOMATED COUNT: 16.8 % (ref 11.5–14.5)
EST. GFR  (AFRICAN AMERICAN): 11.4 ML/MIN/1.73 M^2
EST. GFR  (NON AFRICAN AMERICAN): 9.9 ML/MIN/1.73 M^2
GLUCOSE SERPL-MCNC: 111 MG/DL (ref 70–110)
HCT VFR BLD AUTO: 39.2 % (ref 40–54)
HGB BLD-MCNC: 11.4 G/DL (ref 14–18)
IMM GRANULOCYTES # BLD AUTO: 0.07 K/UL (ref 0–0.04)
IMM GRANULOCYTES NFR BLD AUTO: 0.9 % (ref 0–0.5)
LYMPHOCYTES # BLD AUTO: 1.5 K/UL (ref 1–4.8)
LYMPHOCYTES NFR BLD: 19 % (ref 18–48)
MCH RBC QN AUTO: 24.9 PG (ref 27–31)
MCHC RBC AUTO-ENTMCNC: 29.1 G/DL (ref 32–36)
MCV RBC AUTO: 86 FL (ref 82–98)
MONOCYTES # BLD AUTO: 0.7 K/UL (ref 0.3–1)
MONOCYTES NFR BLD: 8.4 % (ref 4–15)
NEUTROPHILS # BLD AUTO: 5.3 K/UL (ref 1.8–7.7)
NEUTROPHILS NFR BLD: 66.7 % (ref 38–73)
NRBC BLD-RTO: 0 /100 WBC
PHOSPHATE SERPL-MCNC: 4 MG/DL (ref 2.7–4.5)
PLATELET # BLD AUTO: 263 K/UL (ref 150–350)
PMV BLD AUTO: 11.9 FL (ref 9.2–12.9)
POTASSIUM SERPL-SCNC: 4.3 MMOL/L (ref 3.5–5.1)
PTH-INTACT SERPL-MCNC: 563 PG/ML (ref 9–77)
RBC # BLD AUTO: 4.58 M/UL (ref 4.6–6.2)
SODIUM SERPL-SCNC: 135 MMOL/L (ref 136–145)
WBC # BLD AUTO: 7.9 K/UL (ref 3.9–12.7)

## 2020-10-06 ENCOUNTER — PATIENT OUTREACH (OUTPATIENT)
Dept: ADMINISTRATIVE | Facility: OTHER | Age: 65
End: 2020-10-06

## 2020-10-06 ENCOUNTER — PATIENT MESSAGE (OUTPATIENT)
Dept: ADMINISTRATIVE | Facility: HOSPITAL | Age: 65
End: 2020-10-06

## 2020-10-06 ENCOUNTER — LAB VISIT (OUTPATIENT)
Dept: LAB | Facility: HOSPITAL | Age: 65
End: 2020-10-06
Attending: INTERNAL MEDICINE
Payer: COMMERCIAL

## 2020-10-06 DIAGNOSIS — Z76.82 AWAITING ORGAN TRANSPLANT STATUS: ICD-10-CM

## 2020-10-06 DIAGNOSIS — E11.69 HYPERLIPIDEMIA ASSOCIATED WITH TYPE 2 DIABETES MELLITUS: ICD-10-CM

## 2020-10-06 DIAGNOSIS — E78.5 HYPERLIPIDEMIA ASSOCIATED WITH TYPE 2 DIABETES MELLITUS: ICD-10-CM

## 2020-10-06 PROCEDURE — 86832 HLA CLASS I HIGH DEFIN QUAL: CPT | Mod: TXP

## 2020-10-06 PROCEDURE — 80061 LIPID PANEL: CPT | Mod: TXP

## 2020-10-06 PROCEDURE — 86833 HLA CLASS II HIGH DEFIN QUAL: CPT | Mod: TXP

## 2020-10-06 PROCEDURE — 36415 COLL VENOUS BLD VENIPUNCTURE: CPT | Mod: PO,TXP

## 2020-10-07 ENCOUNTER — LAB VISIT (OUTPATIENT)
Dept: LAB | Facility: HOSPITAL | Age: 65
End: 2020-10-07
Attending: INTERNAL MEDICINE
Payer: COMMERCIAL

## 2020-10-07 ENCOUNTER — PATIENT MESSAGE (OUTPATIENT)
Dept: NEPHROLOGY | Facility: CLINIC | Age: 65
End: 2020-10-07

## 2020-10-07 ENCOUNTER — OFFICE VISIT (OUTPATIENT)
Dept: NEPHROLOGY | Facility: CLINIC | Age: 65
End: 2020-10-07
Payer: COMMERCIAL

## 2020-10-07 VITALS
HEIGHT: 72 IN | RESPIRATION RATE: 20 BRPM | DIASTOLIC BLOOD PRESSURE: 88 MMHG | BODY MASS INDEX: 35.62 KG/M2 | HEART RATE: 78 BPM | WEIGHT: 263 LBS | SYSTOLIC BLOOD PRESSURE: 142 MMHG

## 2020-10-07 DIAGNOSIS — N18.4 CHRONIC KIDNEY DISEASE (CKD), STAGE IV (SEVERE): ICD-10-CM

## 2020-10-07 DIAGNOSIS — N25.81 SECONDARY HYPERPARATHYROIDISM OF RENAL ORIGIN: ICD-10-CM

## 2020-10-07 DIAGNOSIS — N18.5 CHRONIC KIDNEY DISEASE (CKD), STAGE V: ICD-10-CM

## 2020-10-07 DIAGNOSIS — I10 HTN (HYPERTENSION), BENIGN: ICD-10-CM

## 2020-10-07 DIAGNOSIS — N18.5 CHRONIC KIDNEY DISEASE (CKD), STAGE V: Primary | ICD-10-CM

## 2020-10-07 DIAGNOSIS — N39.0 UTI (URINARY TRACT INFECTION), UNCOMPLICATED: ICD-10-CM

## 2020-10-07 LAB
ALBUMIN SERPL BCP-MCNC: 3.6 G/DL (ref 3.5–5.2)
ANION GAP SERPL CALC-SCNC: 10 MMOL/L (ref 8–16)
BUN SERPL-MCNC: 48 MG/DL (ref 8–23)
CALCIUM SERPL-MCNC: 8.7 MG/DL (ref 8.7–10.5)
CHLORIDE SERPL-SCNC: 109 MMOL/L (ref 95–110)
CHOLEST SERPL-MCNC: 155 MG/DL (ref 120–199)
CHOLEST/HDLC SERPL: 6 {RATIO} (ref 2–5)
CO2 SERPL-SCNC: 18 MMOL/L (ref 23–29)
CREAT SERPL-MCNC: 5.6 MG/DL (ref 0.5–1.4)
EST. GFR  (AFRICAN AMERICAN): 11 ML/MIN/1.73 M^2
EST. GFR  (NON AFRICAN AMERICAN): 10 ML/MIN/1.73 M^2
GLUCOSE SERPL-MCNC: 81 MG/DL (ref 70–110)
HDLC SERPL-MCNC: 26 MG/DL (ref 40–75)
HDLC SERPL: 16.8 % (ref 20–50)
LDLC SERPL CALC-MCNC: 98 MG/DL (ref 63–159)
NONHDLC SERPL-MCNC: 129 MG/DL
PHOSPHATE SERPL-MCNC: 4.5 MG/DL (ref 2.7–4.5)
POTASSIUM SERPL-SCNC: 5.2 MMOL/L (ref 3.5–5.1)
SODIUM SERPL-SCNC: 137 MMOL/L (ref 136–145)
TRIGL SERPL-MCNC: 155 MG/DL (ref 30–150)

## 2020-10-07 PROCEDURE — 87088 URINE BACTERIA CULTURE: CPT | Mod: NTX

## 2020-10-07 PROCEDURE — 3079F PR MOST RECENT DIASTOLIC BLOOD PRESSURE 80-89 MM HG: ICD-10-PCS | Mod: CPTII,S$GLB,, | Performed by: INTERNAL MEDICINE

## 2020-10-07 PROCEDURE — 99214 PR OFFICE/OUTPT VISIT, EST, LEVL IV, 30-39 MIN: ICD-10-PCS | Mod: S$GLB,,, | Performed by: INTERNAL MEDICINE

## 2020-10-07 PROCEDURE — 3008F BODY MASS INDEX DOCD: CPT | Mod: CPTII,S$GLB,, | Performed by: INTERNAL MEDICINE

## 2020-10-07 PROCEDURE — 3008F PR BODY MASS INDEX (BMI) DOCUMENTED: ICD-10-PCS | Mod: CPTII,S$GLB,, | Performed by: INTERNAL MEDICINE

## 2020-10-07 PROCEDURE — 36415 COLL VENOUS BLD VENIPUNCTURE: CPT | Mod: NTX

## 2020-10-07 PROCEDURE — 99214 OFFICE O/P EST MOD 30 MIN: CPT | Mod: S$GLB,,, | Performed by: INTERNAL MEDICINE

## 2020-10-07 PROCEDURE — 99999 PR PBB SHADOW E&M-EST. PATIENT-LVL V: ICD-10-PCS | Mod: PBBFAC,,, | Performed by: INTERNAL MEDICINE

## 2020-10-07 PROCEDURE — 80069 RENAL FUNCTION PANEL: CPT | Mod: NTX

## 2020-10-07 PROCEDURE — 3077F SYST BP >= 140 MM HG: CPT | Mod: CPTII,S$GLB,, | Performed by: INTERNAL MEDICINE

## 2020-10-07 PROCEDURE — 87077 CULTURE AEROBIC IDENTIFY: CPT | Mod: TXP

## 2020-10-07 PROCEDURE — 87086 URINE CULTURE/COLONY COUNT: CPT | Mod: TXP

## 2020-10-07 PROCEDURE — 3077F PR MOST RECENT SYSTOLIC BLOOD PRESSURE >= 140 MM HG: ICD-10-PCS | Mod: CPTII,S$GLB,, | Performed by: INTERNAL MEDICINE

## 2020-10-07 PROCEDURE — 87186 SC STD MICRODIL/AGAR DIL: CPT | Mod: NTX

## 2020-10-07 PROCEDURE — 99999 PR PBB SHADOW E&M-EST. PATIENT-LVL V: CPT | Mod: PBBFAC,,, | Performed by: INTERNAL MEDICINE

## 2020-10-07 PROCEDURE — 3079F DIAST BP 80-89 MM HG: CPT | Mod: CPTII,S$GLB,, | Performed by: INTERNAL MEDICINE

## 2020-10-07 NOTE — PROGRESS NOTES
Subjective:       Patient ID: Isidro Alves is a 65 y.o.    male who presents for follow-up evaluation of  CKD stage 5 , HTN, DM-2, SHPT        Steven Morgan MD      HPI : Isidro Alves Is a pleasant 65  -year-old  gentleman seen in office today in f/u for above medical problems. I saw him about 3 months ago,   He has chronic kidney disease  . Recent serum creatinine is 5.6  mg/dL .  Progressive decline in renal function explained , has long-standing history of hypertension and diabetes ,  Nonadherence with his blood pressure medications is a big issue with him in the past. Patient says he has been more adherent with his medications in the recent past.  Also has been exercising regularly and trying to watch salt intake.   PSA levels were elevated and is currently following with urology.  Recent PSA level is  3.8.     patient sustained a fall while working on his roof in November 2018, he lost his consciousness and was admitted in intensive care unit at [facility] for about 3 weeks, following discharge she had to undergo few weeks of rehab, he sustained some rib fractures and pelvic fracture.  Patient reports improvement in his overall strength and recovering well.           Past Medical History:   Diagnosis Date    Anemia 4/16/2014    BPH (benign prostatic hyperplasia)     CKD (chronic kidney disease) stage 4, GFR 15-29 ml/min     Coronary artery disease of native artery of native heart with stable angina pectoris 4/29/2019    Diabetes mellitus     Diabetes mellitus, type 2     Elevated PSA     Encounter for blood transfusion     Herpes labialis     Hyperlipidemia     Hypertension     Obesity     Proteinuria     Secondary hyperparathyroidism (of renal origin)     UTI (lower urinary tract infection) 5/1/2015       Current Outpatient Medications on File Prior to Visit   Medication Sig Dispense Refill    acyclovir (ZOVIRAX) 400 MG tablet TAKE 1 TABLET (400 MG TOTAL) BY  "MOUTH 2 (TWO) TIMES DAILY. ONE TABLET 60 tablet 3    aspirin (ECOTRIN) 81 MG EC tablet Take 1 tablet (81 mg total) by mouth once daily.      atorvastatin (LIPITOR) 40 MG tablet TAKE 1 TABLET BY MOUTH EVERY DAY 30 tablet 11    azelastine (ASTELIN) 137 mcg (0.1 %) nasal spray 1 spray (137 mcg total) by Nasal route 2 (two) times daily. 30 mL 0    BD INSULIN SYRINGE HALF UNIT 0.3 mL 31 gauge x 5/16" Syrg FOR USE WITH INSULIN. 100 Syringe 1    calcitRIOL (ROCALTROL) 0.25 MCG Cap Take 1 capsule (0.25 mcg total) by mouth every Mon, Wed, Fri. 45 capsule 3    carvediloL (COREG) 25 MG tablet TAKE 1 TABLET BY MOUTH TWICE A DAY WITH FOOD 60 tablet 11    clotrimazole (LOTRIMIN) 1 % cream Apply topically 2 (two) times daily. 45 g 1    ergocalciferol (ERGOCALCIFEROL) 50,000 unit Cap TAKE 1 CAPSULE (50,000 UNITS TOTAL) BY MOUTH EVERY 7 DAYS. 12 capsule 2    esomeprazole (NEXIUM) 40 MG capsule Take 40 mg by mouth once daily.      finasteride (PROSCAR) 5 mg tablet Take 1 tablet (5 mg total) by mouth once daily. 90 tablet 3    folic acid (FOLVITE) 1 MG tablet TAKE 1 TABLET BY MOUTH EVERY DAY 30 tablet 9    furosemide (LASIX) 80 MG tablet Take 1 tablet (80 mg total) by mouth once daily. 30 tablet 11    insulin degludec-liraglutide (XULTOPHY 100/3.6) 100 unit-3.6 mg /mL (3 mL) InPn INJECT 38 UNITS INTO THE SKIN ONCE DAILY. 15 Syringe 1    lancets (ONETOUCH ULTRASOFT LANCETS) Misc 1 lancet by Misc.(Non-Drug; Combo Route) route 2 (two) times daily. (Patient taking differently: 1 lancet by Misc.(Non-Drug; Combo Route) route 3 (three) times daily. ) 200 each 1    multivitamin (THERAGRAN) tablet Take 1 tablet by mouth once daily.      NIFEdipine (PROCARDIA-XL) 90 MG (OSM) 24 hr tablet Take 1 tablet (90 mg total) by mouth once daily. 30 tablet 9    ONETOUCH ULTRA BLUE TEST STRIP Strp 1 STRIP BY [facility]NON-DRUG COMBO ROUTE) ROUTE 3 (THREE) TIMES DAILY. 100 strip 2    sodium bicarbonate 650 MG tablet TAKE 1 TABLET TWICE DAILY " 180 tablet 3    tadalafil (ADCIRCA) 20 mg Tab Take 1 tablet (20 mg total) by mouth once daily. 5 tablet 11    tadalafil (ADCIRCA) 20 mg Tab Take 1 tablet (20 mg total) by mouth once daily. 30 tablet 11    tamsulosin (FLOMAX) 0.4 mg Cap TAKE 1 CAPSULE (0.4 MG TOTAL) BY MOUTH 2 (TWO) TIMES DAILY. 60 capsule 11    valsartan (DIOVAN) 320 MG tablet TAKE 1 TABLET (320 MG TOTAL) BY MOUTH ONCE DAILY. 90 tablet 3    olopatadine (PATADAY) 0.2 % Drop Place 1 drop into both eyes daily as needed. 2.5 mL 0     No current facility-administered medications on file prior to visit.        PSH: thoracotomy in 2010      Family history : positive for chronic kidney disease and hemodialysis in his father who is no more.      Social history : he owns a restaurant. He never smoked. He drinks alcohol occasionally. He has 3 children     Review of Systems  :      Constitutional: Negative for activity change and appetite change.   HENT: Negative for congestion and facial swelling.    Eyes: Negative for pain, discharge and redness.   Respiratory: Negative for apnea, cough and chest tightness.    Cardiovascular: Negative for chest pain, palpitations and leg swelling.   Gastrointestinal: Negative for abdominal distention.   Genitourinary: Negative for difficulty urinating, dysuria and frequency.   Musculoskeletal: Positive for arthralgias. Negative for neck pain and neck stiffness.   Skin: Negative for color change, rash and wound.   Neurological: Negative for dizziness, weakness and numbness.   Psychiatric/Behavioral: Negative for sleep disturbance.   All other systems reviewed and are negative.      Objective:         Vitals:    10/07/20 1456   BP: (!) 142/88   Pulse: 78   Resp: 20       Weight  263 lb, previous weight was 256 lb    Physical Exam  Vitals signs and nursing note reviewed.   Constitutional:       General: He is not in acute distress.     Appearance: He is well-developed. He is not diaphoretic.   HENT:      Head: Normocephalic  and atraumatic.   Eyes:      Pupils: Pupils are equal, round, and reactive to light.   Neck:      Musculoskeletal: Normal range of motion and neck supple.      Thyroid: No thyromegaly.      Trachea: No tracheal deviation.   Cardiovascular:      Rate and Rhythm: Normal rate and regular rhythm.      Heart sounds: Normal heart sounds. No murmur. No friction rub. No gallop.    Pulmonary:      Effort: Pulmonary effort is normal.      Breath sounds: Normal breath sounds. No wheezing or rales.   Abdominal:      Palpations: Abdomen is soft. There is no mass.      Tenderness: There is no abdominal tenderness. There is no guarding or rebound.      Comments: Obese    Musculoskeletal: Normal range of motion.   Lymphadenopathy:      Cervical: No cervical adenopathy.   Skin:     General: Skin is warm.      Findings: No erythema or rash.   Neurological:      Mental Status: He is alert and oriented to person, place, and time.       :            Labs:      Lab Results   Component Value Date    CREATININE 5.6 (H) 09/01/2020    BUN 62 (H) 09/01/2020     (L) 09/01/2020    K 4.3 09/01/2020     09/01/2020    CO2 17 (L) 09/01/2020       Lab Results   Component Value Date    WBC 7.90 09/01/2020    HGB 11.4 (L) 09/01/2020    HCT 39.2 (L) 09/01/2020    MCV 86 09/01/2020     09/01/2020         Lab Results   Component Value Date    .0 (H) 09/01/2020    CALCIUM 8.4 (L) 09/01/2020    PHOS 4.0 09/01/2020     Lab Results   Component Value Date    HGBA1C 6.4 (H) 06/01/2020     Lab Results   Component Value Date    ALBUMIN 3.6 09/01/2020       Impression and Plan: 65  - year-old  gentleman seen in office today in f/u for following medical problems ,      1. Hypertension :  discussed low-salt diet and adherence with his medications.       2. Chronic kidney disease stage 5  : Secondary to long-standing history of hypertension and diabetes. Again advised patient to avoid NSAIDs.  serum creatinine is 5.6  mg/dl  ,      He is active on kidney transplant list since 04/19, advised patient to discuss with family members and friends for a potential  Donor, he had ended chronic kidney disease class, he is interested in peritoneal dialysis when indicated,     3. Diabetes mellitus type 2 : discussed compliance with his diet and medications. Discussed weight loss and daily exercise.      4. Secondary hyperparathyroidism - PTH is 563, follow, cont ergocalciferol,  calcitriol 0.25 mcg 3 times a week,     5. Proteinuria - about 3 g daily. cont Diovan. Discussed diabetes control and adherence with [facility]      6. BPH : Follow up with urology ,      7. Obesity -  discussed calorie restriction weight loss,     8. Metabolic acidosis :  follow , on sodium bicarbonate supplements     9.  Anemia of chronic kidney disease - stable hemoglobin at 11.4  g,    10. Abnormal UA , check urine culture today      I will see him followup in about 2 months , Face-to-face time was 30  minutes discussing his lab results and plan of care.         Gabino Champion MD

## 2020-10-07 NOTE — TELEPHONE ENCOUNTER
Lab Results   Component Value Date    CREATININE 5.6 (H) 10/07/2020    BUN 48 (H) 10/07/2020     10/07/2020    K 5.2 (H) 10/07/2020     10/07/2020    CO2 18 (L) 10/07/2020       Stable kidney function at a creatinine of 5.6,    Potassium is slightly elevated at 5.2, referred potassium level is 4-5,    Make sure to watch your diet , cut back on foods high in potassium like bananas, tomatoes, potatoes, oranges,    Dr Champion

## 2020-10-08 ENCOUNTER — PATIENT MESSAGE (OUTPATIENT)
Dept: DIABETES | Facility: CLINIC | Age: 65
End: 2020-10-08

## 2020-10-08 DIAGNOSIS — Z79.4 TYPE 2 DIABETES MELLITUS WITH STAGE 4 CHRONIC KIDNEY DISEASE, WITH LONG-TERM CURRENT USE OF INSULIN: ICD-10-CM

## 2020-10-08 DIAGNOSIS — N18.4 TYPE 2 DIABETES MELLITUS WITH STAGE 4 CHRONIC KIDNEY DISEASE, WITH LONG-TERM CURRENT USE OF INSULIN: ICD-10-CM

## 2020-10-08 DIAGNOSIS — E11.22 TYPE 2 DIABETES MELLITUS WITH STAGE 4 CHRONIC KIDNEY DISEASE, WITH LONG-TERM CURRENT USE OF INSULIN: ICD-10-CM

## 2020-10-08 RX ORDER — (INSULIN DEGLUDEC AND LIRAGLUTIDE) 100; 3.6 [IU]/ML; MG/ML
INJECTION, SOLUTION SUBCUTANEOUS
Qty: 15 SYRINGE | Refills: 3 | Status: SHIPPED | OUTPATIENT
Start: 2020-10-08 | End: 2021-02-21

## 2020-10-09 LAB — BACTERIA UR CULT: ABNORMAL

## 2020-10-12 ENCOUNTER — TELEPHONE (OUTPATIENT)
Dept: NEPHROLOGY | Facility: CLINIC | Age: 65
End: 2020-10-12

## 2020-10-12 DIAGNOSIS — N39.0 UTI (URINARY TRACT INFECTION), UNCOMPLICATED: Primary | ICD-10-CM

## 2020-10-12 RX ORDER — CIPROFLOXACIN 500 MG/1
500 TABLET ORAL DAILY
Qty: 5 TABLET | Refills: 0 | Status: ON HOLD | OUTPATIENT
Start: 2020-10-12 | End: 2021-01-27 | Stop reason: HOSPADM

## 2020-10-12 NOTE — PROGRESS NOTES
Urine culture positive for Klebsiella pneumoniae urinary tract infection, will treat with ciprofloxacin for 5 days    Gabino Champion MD

## 2020-10-13 LAB — HPRA INTERPRETATION: NORMAL

## 2020-10-19 LAB
CLASS I ANTIBODIES - LUMINEX: NORMAL
CLASS I ANTIBODY COMMENTS - LUMINEX: NORMAL
CLASS II ANTIBODY COMMENTS - LUMINEX: NORMAL
CPRA %: 6
SERUM COLLECTION DT - LUMINEX CLASS I: NORMAL
SERUM COLLECTION DT - LUMINEX CLASS II: NORMAL
SPCL1 TESTING DATE: NORMAL
SPCL2 TESTING DATE: NORMAL
SPCLU TESTING DATE: NORMAL

## 2020-11-03 ENCOUNTER — LAB VISIT (OUTPATIENT)
Dept: LAB | Facility: HOSPITAL | Age: 65
End: 2020-11-03
Attending: INTERNAL MEDICINE
Payer: COMMERCIAL

## 2020-11-03 DIAGNOSIS — Z76.82 ORGAN TRANSPLANT CANDIDATE: ICD-10-CM

## 2020-11-03 PROCEDURE — 99001 SPECIMEN HANDLING PT-LAB: CPT | Mod: TXP

## 2020-11-23 ENCOUNTER — OFFICE VISIT (OUTPATIENT)
Dept: UROLOGY | Facility: CLINIC | Age: 65
End: 2020-11-23
Payer: COMMERCIAL

## 2020-11-23 VITALS
HEIGHT: 72 IN | DIASTOLIC BLOOD PRESSURE: 88 MMHG | SYSTOLIC BLOOD PRESSURE: 114 MMHG | BODY MASS INDEX: 35.54 KG/M2 | TEMPERATURE: 97 F | WEIGHT: 262.38 LBS

## 2020-11-23 DIAGNOSIS — Z12.5 PROSTATE CANCER SCREENING: Primary | ICD-10-CM

## 2020-11-23 DIAGNOSIS — N40.0 BENIGN NON-NODULAR PROSTATIC HYPERPLASIA WITHOUT LOWER URINARY TRACT SYMPTOMS: ICD-10-CM

## 2020-11-23 DIAGNOSIS — I10 HYPERTENSION, UNSPECIFIED TYPE: ICD-10-CM

## 2020-11-23 DIAGNOSIS — R31.9 HEMATURIA, UNSPECIFIED TYPE: ICD-10-CM

## 2020-11-23 DIAGNOSIS — N40.0 BENIGN PROSTATIC HYPERPLASIA, UNSPECIFIED WHETHER LOWER URINARY TRACT SYMPTOMS PRESENT: ICD-10-CM

## 2020-11-23 DIAGNOSIS — N39.0 URINARY TRACT INFECTION WITHOUT HEMATURIA, SITE UNSPECIFIED: ICD-10-CM

## 2020-11-23 DIAGNOSIS — N52.9 ERECTILE DYSFUNCTION, UNSPECIFIED ERECTILE DYSFUNCTION TYPE: ICD-10-CM

## 2020-11-23 LAB
BACTERIA #/AREA URNS AUTO: ABNORMAL /HPF
BILIRUB SERPL-MCNC: ABNORMAL MG/DL
BLOOD URINE, POC: 250
CLARITY, POC UA: ABNORMAL
COLOR, POC UA: YELLOW
GLUCOSE UR QL STRIP: ABNORMAL
KETONES UR QL STRIP: ABNORMAL
LEUKOCYTE ESTERASE URINE, POC: ABNORMAL
MICROSCOPIC COMMENT: ABNORMAL
NITRITE, POC UA: ABNORMAL
PH, POC UA: 5
PROTEIN, POC: 100
SPECIFIC GRAVITY, POC UA: 1.01
SQUAMOUS #/AREA URNS AUTO: 1 /HPF
UROBILINOGEN, POC UA: ABNORMAL
WBC #/AREA URNS AUTO: 67 /HPF (ref 0–5)
WBC CLUMPS UR QL AUTO: ABNORMAL

## 2020-11-23 PROCEDURE — 99999 PR PBB SHADOW E&M-EST. PATIENT-LVL IV: CPT | Mod: PBBFAC,TXP,, | Performed by: UROLOGY

## 2020-11-23 PROCEDURE — 1126F PR PAIN SEVERITY QUANTIFIED, NO PAIN PRESENT: ICD-10-PCS | Mod: NTX,S$GLB,, | Performed by: UROLOGY

## 2020-11-23 PROCEDURE — 3079F DIAST BP 80-89 MM HG: CPT | Mod: CPTII,NTX,S$GLB, | Performed by: UROLOGY

## 2020-11-23 PROCEDURE — 87077 CULTURE AEROBIC IDENTIFY: CPT | Mod: TXP

## 2020-11-23 PROCEDURE — 81001 URINALYSIS AUTO W/SCOPE: CPT | Mod: TXP

## 2020-11-23 PROCEDURE — 3074F PR MOST RECENT SYSTOLIC BLOOD PRESSURE < 130 MM HG: ICD-10-PCS | Mod: CPTII,NTX,S$GLB, | Performed by: UROLOGY

## 2020-11-23 PROCEDURE — 87186 SC STD MICRODIL/AGAR DIL: CPT | Mod: TXP

## 2020-11-23 PROCEDURE — 1126F AMNT PAIN NOTED NONE PRSNT: CPT | Mod: NTX,S$GLB,, | Performed by: UROLOGY

## 2020-11-23 PROCEDURE — 3008F BODY MASS INDEX DOCD: CPT | Mod: CPTII,NTX,S$GLB, | Performed by: UROLOGY

## 2020-11-23 PROCEDURE — 81002 POCT URINE DIPSTICK WITHOUT MICROSCOPE: ICD-10-PCS | Mod: NTX,S$GLB,, | Performed by: UROLOGY

## 2020-11-23 PROCEDURE — 99214 PR OFFICE/OUTPT VISIT, EST, LEVL IV, 30-39 MIN: ICD-10-PCS | Mod: 25,NTX,S$GLB, | Performed by: UROLOGY

## 2020-11-23 PROCEDURE — 99999 PR PBB SHADOW E&M-EST. PATIENT-LVL IV: ICD-10-PCS | Mod: PBBFAC,TXP,, | Performed by: UROLOGY

## 2020-11-23 PROCEDURE — 81002 URINALYSIS NONAUTO W/O SCOPE: CPT | Mod: NTX,S$GLB,, | Performed by: UROLOGY

## 2020-11-23 PROCEDURE — 87086 URINE CULTURE/COLONY COUNT: CPT | Mod: NTX

## 2020-11-23 PROCEDURE — 3008F PR BODY MASS INDEX (BMI) DOCUMENTED: ICD-10-PCS | Mod: CPTII,NTX,S$GLB, | Performed by: UROLOGY

## 2020-11-23 PROCEDURE — 3079F PR MOST RECENT DIASTOLIC BLOOD PRESSURE 80-89 MM HG: ICD-10-PCS | Mod: CPTII,NTX,S$GLB, | Performed by: UROLOGY

## 2020-11-23 PROCEDURE — 87088 URINE BACTERIA CULTURE: CPT | Mod: NTX

## 2020-11-23 PROCEDURE — 3074F SYST BP LT 130 MM HG: CPT | Mod: CPTII,NTX,S$GLB, | Performed by: UROLOGY

## 2020-11-23 PROCEDURE — 99214 OFFICE O/P EST MOD 30 MIN: CPT | Mod: 25,NTX,S$GLB, | Performed by: UROLOGY

## 2020-11-23 RX ORDER — TAMSULOSIN HYDROCHLORIDE 0.4 MG/1
0.4 CAPSULE ORAL DAILY
Qty: 30 CAPSULE | Refills: 11 | Status: SHIPPED | OUTPATIENT
Start: 2020-11-23 | End: 2022-01-25 | Stop reason: SDUPTHER

## 2020-11-23 RX ORDER — TADALAFIL 20 MG/1
20 TABLET ORAL DAILY
Qty: 30 TABLET | Refills: 11 | Status: SHIPPED | OUTPATIENT
Start: 2020-11-23 | End: 2022-02-28 | Stop reason: SDUPTHER

## 2020-11-23 RX ORDER — FINASTERIDE 5 MG/1
5 TABLET, FILM COATED ORAL DAILY
Qty: 90 TABLET | Refills: 3 | Status: SHIPPED | OUTPATIENT
Start: 2020-11-23 | End: 2022-05-11 | Stop reason: SDUPTHER

## 2020-11-23 NOTE — PROGRESS NOTES
"Chief Complaint: Elevated PSA    HPI:    11/23/20: UA abnormal with ++leuk and blood. Good stream.  Reviewed history in detail.  Discussed hygeine.  11/21/19: Doing well no complaints, good stream on fllomax.  Reviewed history in detail.  PSA way down again.  Taking finasteride and PSA down as a result. Uses cialis 10mg with good effect for ED from a friend.  11/5/18: PSA up where it was 2 years ago.  No LUTS, no pain.  Taking flomax voiding fine.  11/16/17: Laws: PSA has gone up from 7.7 to 10.7. Feels ok off the T. Strong stream; no LUTS. Nocturia x4 but depends how much he drinks after 6 pm. The nocturia doesn't bother him. No abd/pelvic pain and no exac/rel factors.  No hematuria.  No urolithiasis.  BP is elevated today; he did not take his BP medication this morning.   11/10/16: Symptoms have resolved in general, PSA is down.    9/28/16: Back early with groin pain.  Describes an itching sensation that runs from the rectum to the penis, waxes/wanes, worse at night.  Was given 7d doxycycline and there was some improvement.  No caffeine.  Good urinary habits.  No skin problems.  No balanitis.  Doing fine on one flomax a day.  6/1/16: Here today with Dr. Ren's records to review.  Had a biopsy 4/15 with PSA 13.9 at the time.  Was on T by Dr. Chiu's reccs but was asymptomatic overall and felt no symptomatic change on the T.  Has ED that was improved with Cialis.  Got trimix once but wasn't comfortable with it.  05/17/16: rushing: " This is a 60-yr-old male presenting as a follow-up to TRT. Patient is currently on Axiron 30 mg ( 2 pumps /day) . Total T was 838; 5 months ago. ( H/H 14.4/46.0 ) and psa level was 4.7. Patient was to have psa level and follow-up 3 months ago related to psa level of 4.7 and did not. He does have a h/o negative prostate bx  15 months  ago per Dr. BRIGID Burt. No reported adverse side effects and energy level has greatly improved. Patient is currently on Flomax 0.8mg a day. He states he " " "ran out " and had an increase in nocturia 4-5 times/ night over the past 10 days. No hematuria, dysuria or flank pain. "  06/12/15: Aram; HPI: Flomax is been very helpful, nocturia now down to 3 times per night from 6 times per night with similar daytime improvement. Has been using Axiron for about 2 weeks, no adverse effects. This patient had severe deficiencies on his pretreatment labs. Denies flank pain, dysuira, hematuria . Despite his subjective improvement, his postvoid residual today in the clinic is 180 mL. Dipstick urinalysis suggested 1+ microscopic hematuria, microscopic examination of urine reveals no RBCs.    Allergies:  Bactrim [sulfamethoxazole-trimethoprim] and Nsaids (non-steroidal anti-inflammatory drug)    Medications:  has a current medication list which includes the following prescription(s): acyclovir, aspirin, atorvastatin, azelastine, bd insulin syringe half unit, calcitriol, carvedilol, ciprofloxacin hcl, clotrimazole, ergocalciferol, esomeprazole, finasteride, folic acid, furosemide, lancets, multivitamin, nifedipine, onetouch ultra blue test strip, sodium bicarbonate, tadalafil, tadalafil, tamsulosin, valsartan, xultophy 100/3.6, and olopatadine.    Review of Systems:  General: No fever, chills, fatigability, or weight loss.  Skin: No rashes, itching, or changes in color or texture of skin.  Chest: Denies [place], cyanosis, wheezing, cough, and sputum production.  Abdomen: Appetite fine. No weight loss. Denies diarrhea, abdominal pain, hematemesis, or blood in stool.  Musculoskeletal: No joint stiffness or swelling. Denies back pain.  : As above.  All other review of systems negative.    PMH:   has a past medical history of Anemia (4/16/2014), BPH (benign prostatic hyperplasia), CKD (chronic kidney disease) stage 4, GFR 15-29 ml/min, Coronary artery disease of native artery of native heart with stable angina pectoris (4/29/2019), Diabetes mellitus, Diabetes mellitus, type 2, Elevated PSA, " Encounter for blood transfusion, Herpes labialis, Hyperlipidemia, Hypertension, Obesity, Proteinuria, Secondary hyperparathyroidism (of renal origin), and UTI (lower urinary tract infection) (5/1/2015).    PSH:   has a past surgical history that includes thorocotomy (2010); Prostate biopsy; and Colonoscopy (N/A, 12/21/2017).    FamHx: family history includes Diabetes in his father and unknown relative; Hypertension in his father and unknown relative; No Known Problems in his mother.    SocHx:  reports that he has never smoked. He has never used smokeless tobacco. He reports current alcohol use. He reports that he does not use drugs.      Physical Exam:  Vitals:    11/23/20 1041   BP: 114/88   Temp: 97.3 °F (36.3 °C)     General: A&Ox3, no apparent distress, no deformities  Neck: No masses, normal thyroid  Lungs: normal inspiration, no use of accessory muscles  Heart: normal pulse, no arrhythmias  Abdomen: Soft, NT, ND  Skin: The skin is warm and dry. No jaundice.  Ext: No c/c/e.  :   11/16/17: Test desc michaelle, no abnormalities of epididymus. Penis normal, with normal penile and scrotal skin. Meatus normal. Normal rectal tone, no hemorrhoids. Prost 40 gm with palpable ridge to left side. SV not palpable. Perineum and anus normal.     Labs/Studies:   PSA    4/15: 13.9    11/15: 4.7    5/16: 12.7    11/16: 7.7    11/17: 10.7    10/18: 12.2    6/19: 4.0    11/19: 3.8    6/20: 4.3    Impression/Plan:   1. Elevated PSA: PSA down on finasteride, recheck 1 year  2. HTN; BP better today has cut back on salt.   3. Flomax/finasteride for BPH  4. Continue rx for cialis for ED  5. UA/UCx based on UA, may treat UTI or get hematuria workup.

## 2020-11-25 LAB — BACTERIA UR CULT: ABNORMAL

## 2020-11-29 ENCOUNTER — TELEPHONE (OUTPATIENT)
Dept: UROLOGY | Facility: CLINIC | Age: 65
End: 2020-11-29

## 2020-11-29 RX ORDER — DOXYCYCLINE 100 MG/1
100 CAPSULE ORAL EVERY 12 HOURS
Qty: 14 CAPSULE | Refills: 0 | Status: SHIPPED | OUTPATIENT
Start: 2020-11-29 | End: 2020-12-06

## 2020-12-01 ENCOUNTER — LAB VISIT (OUTPATIENT)
Dept: LAB | Facility: HOSPITAL | Age: 65
End: 2020-12-01
Attending: INTERNAL MEDICINE
Payer: COMMERCIAL

## 2020-12-01 DIAGNOSIS — N25.81 SECONDARY HYPERPARATHYROIDISM OF RENAL ORIGIN: ICD-10-CM

## 2020-12-01 DIAGNOSIS — I10 HTN (HYPERTENSION), BENIGN: ICD-10-CM

## 2020-12-01 DIAGNOSIS — N18.5 CHRONIC KIDNEY DISEASE (CKD), STAGE V: ICD-10-CM

## 2020-12-01 DIAGNOSIS — Z76.82 AWAITING ORGAN TRANSPLANT STATUS: ICD-10-CM

## 2020-12-01 LAB
25(OH)D3+25(OH)D2 SERPL-MCNC: 32 NG/ML (ref 30–96)
ALBUMIN SERPL BCP-MCNC: 3.6 G/DL (ref 3.5–5.2)
ANION GAP SERPL CALC-SCNC: 12 MMOL/L (ref 8–16)
BACTERIA #/AREA URNS AUTO: ABNORMAL /HPF
BASOPHILS # BLD AUTO: 0.08 K/UL (ref 0–0.2)
BASOPHILS NFR BLD: 0.8 % (ref 0–1.9)
BILIRUB UR QL STRIP: NEGATIVE
BUN SERPL-MCNC: 61 MG/DL (ref 8–23)
CALCIUM SERPL-MCNC: 9.1 MG/DL (ref 8.7–10.5)
CHLORIDE SERPL-SCNC: 107 MMOL/L (ref 95–110)
CLARITY UR REFRACT.AUTO: CLEAR
CO2 SERPL-SCNC: 18 MMOL/L (ref 23–29)
COLOR UR AUTO: ABNORMAL
CREAT SERPL-MCNC: 6.4 MG/DL (ref 0.5–1.4)
DIFFERENTIAL METHOD: ABNORMAL
EOSINOPHIL # BLD AUTO: 0.4 K/UL (ref 0–0.5)
EOSINOPHIL NFR BLD: 4.1 % (ref 0–8)
ERYTHROCYTE [DISTWIDTH] IN BLOOD BY AUTOMATED COUNT: 15.6 % (ref 11.5–14.5)
EST. GFR  (AFRICAN AMERICAN): 10 ML/MIN/1.73 M^2
EST. GFR  (NON AFRICAN AMERICAN): 8 ML/MIN/1.73 M^2
GLUCOSE SERPL-MCNC: 176 MG/DL (ref 70–110)
GLUCOSE UR QL STRIP: ABNORMAL
HCT VFR BLD AUTO: 41.5 % (ref 40–54)
HGB BLD-MCNC: 12.4 G/DL (ref 14–18)
HGB UR QL STRIP: ABNORMAL
HYALINE CASTS UR QL AUTO: 0 /LPF
IMM GRANULOCYTES # BLD AUTO: 0.12 K/UL (ref 0–0.04)
IMM GRANULOCYTES NFR BLD AUTO: 1.3 % (ref 0–0.5)
KETONES UR QL STRIP: NEGATIVE
LEUKOCYTE ESTERASE UR QL STRIP: ABNORMAL
LYMPHOCYTES # BLD AUTO: 1.4 K/UL (ref 1–4.8)
LYMPHOCYTES NFR BLD: 14.7 % (ref 18–48)
MAGNESIUM SERPL-MCNC: 1.7 MG/DL (ref 1.6–2.6)
MCH RBC QN AUTO: 25.3 PG (ref 27–31)
MCHC RBC AUTO-ENTMCNC: 29.9 G/DL (ref 32–36)
MCV RBC AUTO: 85 FL (ref 82–98)
MICROSCOPIC COMMENT: ABNORMAL
MONOCYTES # BLD AUTO: 0.9 K/UL (ref 0.3–1)
MONOCYTES NFR BLD: 8.9 % (ref 4–15)
NEUTROPHILS # BLD AUTO: 6.7 K/UL (ref 1.8–7.7)
NEUTROPHILS NFR BLD: 70.2 % (ref 38–73)
NITRITE UR QL STRIP: NEGATIVE
NRBC BLD-RTO: 0 /100 WBC
PH UR STRIP: 6 [PH] (ref 5–8)
PHOSPHATE SERPL-MCNC: 4.4 MG/DL (ref 2.7–4.5)
PLATELET # BLD AUTO: 349 K/UL (ref 150–350)
PMV BLD AUTO: 10.9 FL (ref 9.2–12.9)
POTASSIUM SERPL-SCNC: 4.2 MMOL/L (ref 3.5–5.1)
PROT UR QL STRIP: ABNORMAL
PTH-INTACT SERPL-MCNC: 665.3 PG/ML (ref 9–77)
RBC # BLD AUTO: 4.91 M/UL (ref 4.6–6.2)
RBC #/AREA URNS AUTO: 2 /HPF (ref 0–4)
SODIUM SERPL-SCNC: 137 MMOL/L (ref 136–145)
SP GR UR STRIP: 1.01 (ref 1–1.03)
URN SPEC COLLECT METH UR: ABNORMAL
WBC # BLD AUTO: 9.58 K/UL (ref 3.9–12.7)
WBC #/AREA URNS AUTO: 9 /HPF (ref 0–5)

## 2020-12-01 PROCEDURE — 86833 HLA CLASS II HIGH DEFIN QUAL: CPT | Mod: TXP

## 2020-12-01 PROCEDURE — 82306 VITAMIN D 25 HYDROXY: CPT | Mod: NTX

## 2020-12-01 PROCEDURE — 86832 HLA CLASS I HIGH DEFIN QUAL: CPT | Mod: TXP

## 2020-12-01 PROCEDURE — 82570 ASSAY OF URINE CREATININE: CPT | Mod: TXP

## 2020-12-01 PROCEDURE — 36415 COLL VENOUS BLD VENIPUNCTURE: CPT | Mod: PO,NTX

## 2020-12-01 PROCEDURE — 81001 URINALYSIS AUTO W/SCOPE: CPT | Mod: TXP

## 2020-12-01 PROCEDURE — 83970 ASSAY OF PARATHORMONE: CPT | Mod: TXP

## 2020-12-01 PROCEDURE — 83735 ASSAY OF MAGNESIUM: CPT | Mod: NTX

## 2020-12-01 PROCEDURE — 80069 RENAL FUNCTION PANEL: CPT | Mod: NTX

## 2020-12-01 PROCEDURE — 85025 COMPLETE CBC W/AUTO DIFF WBC: CPT | Mod: TXP

## 2020-12-02 LAB
CREAT UR-MCNC: 63 MG/DL (ref 23–375)
PROT UR-MCNC: 245 MG/DL (ref 0–15)
PROT/CREAT UR: 3.89 MG/G{CREAT} (ref 0–0.2)

## 2020-12-09 ENCOUNTER — OFFICE VISIT (OUTPATIENT)
Dept: NEPHROLOGY | Facility: CLINIC | Age: 65
End: 2020-12-09
Payer: COMMERCIAL

## 2020-12-09 ENCOUNTER — PATIENT OUTREACH (OUTPATIENT)
Dept: ADMINISTRATIVE | Facility: OTHER | Age: 65
End: 2020-12-09

## 2020-12-09 VITALS
DIASTOLIC BLOOD PRESSURE: 72 MMHG | WEIGHT: 264.56 LBS | BODY MASS INDEX: 33.95 KG/M2 | SYSTOLIC BLOOD PRESSURE: 124 MMHG | HEIGHT: 74 IN | HEART RATE: 86 BPM

## 2020-12-09 DIAGNOSIS — I10 HTN (HYPERTENSION), BENIGN: ICD-10-CM

## 2020-12-09 DIAGNOSIS — N18.5 CHRONIC KIDNEY DISEASE (CKD), STAGE V: Primary | ICD-10-CM

## 2020-12-09 LAB — HPRA INTERPRETATION: NORMAL

## 2020-12-09 PROCEDURE — 99214 PR OFFICE/OUTPT VISIT, EST, LEVL IV, 30-39 MIN: ICD-10-PCS | Mod: S$GLB,,, | Performed by: INTERNAL MEDICINE

## 2020-12-09 PROCEDURE — 99999 PR PBB SHADOW E&M-EST. PATIENT-LVL IV: CPT | Mod: PBBFAC,,, | Performed by: INTERNAL MEDICINE

## 2020-12-09 PROCEDURE — 99214 OFFICE O/P EST MOD 30 MIN: CPT | Mod: S$GLB,,, | Performed by: INTERNAL MEDICINE

## 2020-12-09 PROCEDURE — 3008F PR BODY MASS INDEX (BMI) DOCUMENTED: ICD-10-PCS | Mod: CPTII,S$GLB,, | Performed by: INTERNAL MEDICINE

## 2020-12-09 PROCEDURE — 1126F PR PAIN SEVERITY QUANTIFIED, NO PAIN PRESENT: ICD-10-PCS | Mod: S$GLB,,, | Performed by: INTERNAL MEDICINE

## 2020-12-09 PROCEDURE — 3078F PR MOST RECENT DIASTOLIC BLOOD PRESSURE < 80 MM HG: ICD-10-PCS | Mod: CPTII,S$GLB,, | Performed by: INTERNAL MEDICINE

## 2020-12-09 PROCEDURE — 3074F SYST BP LT 130 MM HG: CPT | Mod: CPTII,S$GLB,, | Performed by: INTERNAL MEDICINE

## 2020-12-09 PROCEDURE — 1126F AMNT PAIN NOTED NONE PRSNT: CPT | Mod: S$GLB,,, | Performed by: INTERNAL MEDICINE

## 2020-12-09 PROCEDURE — 3008F BODY MASS INDEX DOCD: CPT | Mod: CPTII,S$GLB,, | Performed by: INTERNAL MEDICINE

## 2020-12-09 PROCEDURE — 3074F PR MOST RECENT SYSTOLIC BLOOD PRESSURE < 130 MM HG: ICD-10-PCS | Mod: CPTII,S$GLB,, | Performed by: INTERNAL MEDICINE

## 2020-12-09 PROCEDURE — 3078F DIAST BP <80 MM HG: CPT | Mod: CPTII,S$GLB,, | Performed by: INTERNAL MEDICINE

## 2020-12-09 PROCEDURE — 99999 PR PBB SHADOW E&M-EST. PATIENT-LVL IV: ICD-10-PCS | Mod: PBBFAC,,, | Performed by: INTERNAL MEDICINE

## 2020-12-09 NOTE — LETTER
December 9, 2020        Yoni Del Real MD  [address] [facility] [address]             [facility]  [address]  [facility] [address]  Phone: [phone]   Patient: Isidro Alves   MR Number: [id number]   YOB: 1955           Dear Dr. Del Real:    Isidro Alves is a patient of Dr. Steven Morgan (PCP) at [facility]. While reviewing his/her chart, it has come to our attention that there is an outstanding lab/procedure. Please help keep our Health Maintenance records as accurate and as up to date as possible by supplying the following:     [id number] Eye exam                                                                             Please fax to [facility] @ [phone].    Thank you for your assistance in our patient's healthcare.     Sincerely,       Hali Perez MA           CC  No Recipients

## 2020-12-09 NOTE — PROGRESS NOTES
Subjective:       Patient ID: Isidro Alves is a 65 y.o.   male who presents for follow-up evaluation of CKD stage 5 , HTN, DM-2, SHPT        Steven Morgan MD      HPI : Isidro Alves Is a pleasant 65 year-old  gentleman seen in office today in f/u for above medical problems. I saw him about 2 months ago,   He has chronic kidney disease  . Recent serum creatinine is 6.4 mg/dL .  Progressive decline in renal function explained , has long-standing history of hypertension and diabetes ,  Nonadherence with his blood pressure medications is a big issue with him in the past. Patient says he has been more adherent with his medications in the recent past.  Also has been exercising regularly and trying to watch salt intake.   PSA levels were elevated and is currently following with urology.  Recent PSA level is  3.8.     patient sustained a fall while working on his roof in November 2018, he lost his consciousness and was admitted in intensive care unit at [facility] for about 3 weeks, following discharge she had to undergo few weeks of rehab, he sustained some rib fractures and pelvic fracture.  Patient reports improvement in his overall strength and recovering well.             Past Medical History:   Diagnosis Date    Anemia 4/16/2014    BPH (benign prostatic hyperplasia)     CKD (chronic kidney disease) stage 4, GFR 15-29 ml/min     Coronary artery disease of native artery of native heart with stable angina pectoris 4/29/2019    Diabetes mellitus     Diabetes mellitus, type 2     Elevated PSA     Encounter for blood transfusion     Herpes labialis     Hyperlipidemia     Hypertension     Obesity     Proteinuria     Secondary hyperparathyroidism (of renal origin)     UTI (lower urinary tract infection) 5/1/2015       Current Outpatient Medications on File Prior to Visit   Medication Sig Dispense Refill    acyclovir (ZOVIRAX) 400 MG tablet TAKE 1 TABLET (400 MG TOTAL) BY MOUTH  "2 (TWO) TIMES DAILY. ONE TABLET 60 tablet 3    aspirin (ECOTRIN) 81 MG EC tablet Take 1 tablet (81 mg total) by mouth once daily.      atorvastatin (LIPITOR) 40 MG tablet TAKE 1 TABLET BY MOUTH EVERY DAY 30 tablet 11    azelastine (ASTELIN) 137 mcg (0.1 %) nasal spray 1 spray (137 mcg total) by Nasal route 2 (two) times daily. 30 mL 0    BD INSULIN SYRINGE HALF UNIT 0.3 mL 31 gauge x 5/16" Syrg FOR USE WITH INSULIN. 100 Syringe 1    calcitRIOL (ROCALTROL) 0.25 MCG Cap Take 1 capsule (0.25 mcg total) by mouth every Mon, Wed, Fri. 45 capsule 3    carvediloL (COREG) 25 MG tablet TAKE 1 TABLET BY MOUTH TWICE A DAY WITH FOOD 60 tablet 11    ciprofloxacin HCl (CIPRO) 500 MG tablet Take 1 tablet (500 mg total) by mouth once daily. 5 tablet 0    clotrimazole (LOTRIMIN) 1 % cream Apply topically 2 (two) times daily. 45 g 1    ergocalciferol (ERGOCALCIFEROL) 50,000 unit Cap TAKE 1 CAPSULE (50,000 UNITS TOTAL) BY MOUTH EVERY 7 DAYS. 12 capsule 2    esomeprazole (NEXIUM) 40 MG capsule Take 40 mg by mouth once daily.      finasteride (PROSCAR) 5 mg tablet Take 1 tablet (5 mg total) by mouth once daily. 90 tablet 3    folic acid (FOLVITE) 1 MG tablet TAKE 1 TABLET BY MOUTH EVERY DAY 30 tablet 9    furosemide (LASIX) 80 MG tablet Take 1 tablet (80 mg total) by mouth once daily. 30 tablet 11    lancets (ONETOUCH ULTRASOFT LANCETS) Misc 1 lancet by Misc.(Non-Drug; Combo Route) route 2 (two) times daily. (Patient taking differently: 1 lancet by Misc.(Non-Drug; Combo Route) route 3 (three) times daily. ) 200 each 1    multivitamin (THERAGRAN) tablet Take 1 tablet by mouth once daily.      NIFEdipine (PROCARDIA-XL) 90 MG (OSM) 24 hr tablet TAKE 1 TABLET (90 MG TOTAL) BY MOUTH ONCE DAILY. 30 tablet 9    ONETOUCH ULTRA BLUE TEST STRIP Strp 1 STRIP BY MISC.(NON-DRUG COMBO ROUTE) ROUTE 3 (THREE) TIMES DAILY. 100 strip 2    sodium bicarbonate 650 MG tablet TAKE 1 TABLET TWICE DAILY 180 tablet 3    tadalafil (ADCIRCA) 20 " mg Tab Take 1 tablet (20 mg total) by mouth once daily. 5 tablet 11    tadalafil (ADCIRCA) 20 mg Tab Take 1 tablet (20 mg total) by mouth once daily. 30 tablet 11    tamsulosin (FLOMAX) 0.4 mg Cap Take 1 capsule (0.4 mg total) by mouth once daily. 30 capsule 11    valsartan (DIOVAN) 320 MG tablet TAKE 1 TABLET (320 MG TOTAL) BY MOUTH ONCE DAILY. 90 tablet 3    XULTOPHY 100/3.6 100 unit-3.6 mg /mL (3 mL) InPn INJECT 38 UNITS INTO THE SKIN ONCE DAILY. 15 Syringe 3    olopatadine (PATADAY) 0.2 % Drop Place 1 drop into both eyes daily as needed. 2.5 mL 0     No current facility-administered medications on file prior to visit.      PSH: thoracotomy in 2010      Family history : positive for chronic kidney disease and hemodialysis in his father who is no more.      Social history : he owns a restaurant. He never smoked. He drinks alcohol occasionally. He has 3 children        Review of Systems  :    Constitutional: Negative for activity change and appetite change.   HENT: Negative for congestion and facial swelling.    Eyes: Negative for pain, discharge and redness.   Respiratory: Negative for apnea, cough and chest tightness.    Cardiovascular: Negative for chest pain, palpitations and leg swelling.   Gastrointestinal: Negative for abdominal distention.   Genitourinary: Negative for difficulty urinating, dysuria and frequency.   Musculoskeletal: Positive for arthralgias. Negative for neck pain and neck stiffness.   Skin: Negative for color change, rash and wound.   Neurological: Negative for dizziness, weakness and numbness.   Psychiatric/Behavioral: Negative for sleep disturbance.   All other systems reviewed and are negative.        Objective:         Vitals:    12/09/20 1544   BP: 124/72   Pulse: 86       Weight 264 lb, stable      Physical Exam  :      Vitals signs and nursing note reviewed.   Constitutional:       General: He is not in acute distress.     Appearance: He is well-developed. He is not diaphoretic.    HENT:      Head: Normocephalic and atraumatic.   Eyes:      Pupils: Pupils are equal, round, and reactive to light.   Neck:      Musculoskeletal: Normal range of motion and neck supple.      Thyroid: No thyromegaly.      Trachea: No tracheal deviation.   Cardiovascular:      Rate and Rhythm: Normal rate and regular rhythm.      Heart sounds: Normal heart sounds. No murmur. No friction rub. No gallop.    Pulmonary:      Effort: Pulmonary effort is normal.      Breath sounds: Normal breath sounds. No wheezing or rales.   Abdominal:      Palpations: Abdomen is soft. There is no mass.      Tenderness: There is no abdominal tenderness. There is no guarding or rebound.      Comments: Obese    Musculoskeletal: Normal range of motion.   Lymphadenopathy:      Cervical: No cervical adenopathy.   Skin:     General: Skin is warm.      Findings: No erythema or rash.   Neurological:      Mental Status: He is alert and oriented to person, place, and time.      :         Labs:    Lab Results   Component Value Date    CREATININE 6.4 (H) 12/01/2020    BUN 61 (H) 12/01/2020     12/01/2020    K 4.2 12/01/2020     12/01/2020    CO2 18 (L) 12/01/2020       Lab Results   Component Value Date    WBC 9.58 12/01/2020    HGB 12.4 (L) 12/01/2020    HCT 41.5 12/01/2020    MCV 85 12/01/2020     12/01/2020         Lab Results   Component Value Date    .3 (H) 12/01/2020    CALCIUM 9.1 12/01/2020    PHOS 4.4 12/01/2020       Lab Results   Component Value Date    ALBUMIN 3.6 12/01/2020       Lab Results   Component Value Date    URICACID 10.0 (H) 03/19/2020     Lab Results   Component Value Date    HGBA1C 6.4 (H) 06/01/2020         Impression and Plan: 65  - year-old  gentleman seen in office today in f/u for following medical problems ,      1. Chronic kidney disease stage 5  : Secondary to long-standing history of hypertension and diabetes. Again advised patient to avoid NSAIDs.  serum creatinine is 6.4  mg/dl ,     No signs and symptoms of uremia, He is active on kidney transplant list since 04/19, advised patient to discuss with family members and friends for a potential  Donor,     Attended chronic kidney disease education class, he is interested in peritoneal dialysis when indicated,     2.  Hypertension :  discussed low-salt diet and adherence with his medications.       3. Diabetes mellitus type 2 : discussed compliance with his diet and medications. Discussed weight loss and daily exercise.      4. Secondary hyperparathyroidism - PTH is 665,  follow, cont ergocalciferol,  calcitriol 0.25 mcg 3 times a week,     5. Proteinuria - about 3 g daily. cont Diovan.      6. BPH : Follow up with urology ,      7. Obesity -  discussed calorie restriction weight loss,     8. Metabolic acidosis :  follow , on sodium bicarbonate supplements     9.  Anemia of chronic kidney disease - stable hemoglobin at 12.4  g,        I will see him followup in about 2 months , Face-to-face time was 30  minutes discussing his lab results and plan of care.            Gabino Champion MD

## 2020-12-09 NOTE — PROGRESS NOTES
Health Maintenance Due   Topic Date Due    Shingles Vaccine (1 of 2) 08/26/2006    Eye Exam  09/05/2019    Influenza Vaccine (1) 08/01/2020    Hemoglobin A1c  12/01/2020     Updates were requested from care everywhere.  Chart was reviewed for overdue [facility] ([facility]) topics (CRS, Breast Cancer Screening, Eye exam)  Health Maintenance has been updated.  LINKS immunization registry triggered.  Immunizations were reconciled.  BEN sent to

## 2020-12-28 ENCOUNTER — LAB VISIT (OUTPATIENT)
Dept: LAB | Facility: HOSPITAL | Age: 65
End: 2020-12-28
Attending: INTERNAL MEDICINE
Payer: COMMERCIAL

## 2020-12-28 ENCOUNTER — LAB VISIT (OUTPATIENT)
Dept: LAB | Facility: HOSPITAL | Age: 65
End: 2020-12-28
Attending: PHYSICIAN ASSISTANT
Payer: COMMERCIAL

## 2020-12-28 DIAGNOSIS — N18.4 TYPE 2 DIABETES MELLITUS WITH STAGE 4 CHRONIC KIDNEY DISEASE, WITH LONG-TERM CURRENT USE OF INSULIN: ICD-10-CM

## 2020-12-28 DIAGNOSIS — E11.22 TYPE 2 DIABETES MELLITUS WITH STAGE 4 CHRONIC KIDNEY DISEASE, WITH LONG-TERM CURRENT USE OF INSULIN: ICD-10-CM

## 2020-12-28 DIAGNOSIS — Z79.4 TYPE 2 DIABETES MELLITUS WITH STAGE 4 CHRONIC KIDNEY DISEASE, WITH LONG-TERM CURRENT USE OF INSULIN: ICD-10-CM

## 2020-12-28 DIAGNOSIS — N18.5 CHRONIC KIDNEY DISEASE (CKD), STAGE V: ICD-10-CM

## 2020-12-28 DIAGNOSIS — I10 HTN (HYPERTENSION), BENIGN: ICD-10-CM

## 2020-12-28 LAB
BACTERIA #/AREA URNS HPF: ABNORMAL /HPF
BILIRUB UR QL STRIP: NEGATIVE
CLARITY UR: ABNORMAL
COLOR UR: ABNORMAL
CREAT UR-MCNC: 80 MG/DL (ref 23–375)
ESTIMATED AVG GLUCOSE: 137 MG/DL (ref 68–131)
GLUCOSE UR QL STRIP: NEGATIVE
HBA1C MFR BLD HPLC: 6.4 % (ref 4–5.6)
HGB UR QL STRIP: ABNORMAL
HYALINE CASTS #/AREA URNS LPF: 0 /LPF
KETONES UR QL STRIP: NEGATIVE
LEUKOCYTE ESTERASE UR QL STRIP: ABNORMAL
MICROSCOPIC COMMENT: ABNORMAL
NITRITE UR QL STRIP: NEGATIVE
PH UR STRIP: 6 [PH] (ref 5–8)
PROT UR QL STRIP: ABNORMAL
PROT UR-MCNC: 150 MG/DL (ref 0–15)
PROT/CREAT UR: 1.88 MG/G{CREAT} (ref 0–0.2)
RBC #/AREA URNS HPF: 0 /HPF (ref 0–4)
SP GR UR STRIP: 1.01 (ref 1–1.03)
URN SPEC COLLECT METH UR: ABNORMAL
WBC #/AREA URNS HPF: 25 /HPF (ref 0–5)

## 2020-12-28 PROCEDURE — 83036 HEMOGLOBIN GLYCOSYLATED A1C: CPT | Mod: NTX

## 2020-12-28 PROCEDURE — 82570 ASSAY OF URINE CREATININE: CPT | Mod: NTX

## 2020-12-28 PROCEDURE — 36415 COLL VENOUS BLD VENIPUNCTURE: CPT | Mod: NTX

## 2020-12-28 PROCEDURE — 81000 URINALYSIS NONAUTO W/SCOPE: CPT | Mod: TXP

## 2021-01-05 ENCOUNTER — LAB VISIT (OUTPATIENT)
Dept: LAB | Facility: HOSPITAL | Age: 66
End: 2021-01-05
Attending: NURSE PRACTITIONER
Payer: COMMERCIAL

## 2021-01-05 DIAGNOSIS — Z76.82 AWAITING ORGAN TRANSPLANT STATUS: ICD-10-CM

## 2021-01-05 PROCEDURE — 99001 SPECIMEN HANDLING PT-LAB: CPT | Mod: TXP

## 2021-01-08 ENCOUNTER — PATIENT MESSAGE (OUTPATIENT)
Dept: TRANSPLANT | Facility: CLINIC | Age: 66
End: 2021-01-08

## 2021-01-10 ENCOUNTER — PATIENT OUTREACH (OUTPATIENT)
Dept: ADMINISTRATIVE | Facility: OTHER | Age: 66
End: 2021-01-10

## 2021-01-11 ENCOUNTER — OFFICE VISIT (OUTPATIENT)
Dept: DIABETES | Facility: CLINIC | Age: 66
End: 2021-01-11
Payer: COMMERCIAL

## 2021-01-11 VITALS
SYSTOLIC BLOOD PRESSURE: 111 MMHG | HEART RATE: 91 BPM | BODY MASS INDEX: 33.13 KG/M2 | RESPIRATION RATE: 18 BRPM | WEIGHT: 258.19 LBS | HEIGHT: 74 IN | DIASTOLIC BLOOD PRESSURE: 74 MMHG

## 2021-01-11 DIAGNOSIS — Z79.4 TYPE 2 DIABETES MELLITUS WITH STAGE 4 CHRONIC KIDNEY DISEASE, WITH LONG-TERM CURRENT USE OF INSULIN: Primary | ICD-10-CM

## 2021-01-11 DIAGNOSIS — I15.2 HYPERTENSION ASSOCIATED WITH DIABETES: ICD-10-CM

## 2021-01-11 DIAGNOSIS — E29.1 HYPOGONADISM IN MALE: ICD-10-CM

## 2021-01-11 DIAGNOSIS — E78.5 HYPERLIPIDEMIA ASSOCIATED WITH TYPE 2 DIABETES MELLITUS: ICD-10-CM

## 2021-01-11 DIAGNOSIS — E04.2 MULTIPLE THYROID NODULES: ICD-10-CM

## 2021-01-11 DIAGNOSIS — N18.4 CHRONIC KIDNEY DISEASE (CKD), STAGE IV (SEVERE): ICD-10-CM

## 2021-01-11 DIAGNOSIS — E11.69 HYPERLIPIDEMIA ASSOCIATED WITH TYPE 2 DIABETES MELLITUS: ICD-10-CM

## 2021-01-11 DIAGNOSIS — N18.4 TYPE 2 DIABETES MELLITUS WITH STAGE 4 CHRONIC KIDNEY DISEASE, WITH LONG-TERM CURRENT USE OF INSULIN: Primary | ICD-10-CM

## 2021-01-11 DIAGNOSIS — E11.22 TYPE 2 DIABETES MELLITUS WITH STAGE 4 CHRONIC KIDNEY DISEASE, WITH LONG-TERM CURRENT USE OF INSULIN: Primary | ICD-10-CM

## 2021-01-11 DIAGNOSIS — I25.118 CORONARY ARTERY DISEASE OF NATIVE ARTERY OF NATIVE HEART WITH STABLE ANGINA PECTORIS: ICD-10-CM

## 2021-01-11 DIAGNOSIS — E11.59 HYPERTENSION ASSOCIATED WITH DIABETES: ICD-10-CM

## 2021-01-11 DIAGNOSIS — E66.09 NON MORBID OBESITY DUE TO EXCESS CALORIES: ICD-10-CM

## 2021-01-11 LAB — GLUCOSE SERPL-MCNC: 100 MG/DL (ref 70–110)

## 2021-01-11 PROCEDURE — 3074F SYST BP LT 130 MM HG: CPT | Mod: CPTII,S$GLB,, | Performed by: PHYSICIAN ASSISTANT

## 2021-01-11 PROCEDURE — 1126F AMNT PAIN NOTED NONE PRSNT: CPT | Mod: S$GLB,,, | Performed by: PHYSICIAN ASSISTANT

## 2021-01-11 PROCEDURE — 82962 POCT GLUCOSE, HAND-HELD DEVICE: ICD-10-PCS | Mod: S$GLB,,, | Performed by: PHYSICIAN ASSISTANT

## 2021-01-11 PROCEDURE — 99999 PR PBB SHADOW E&M-EST. PATIENT-LVL IV: ICD-10-PCS | Mod: PBBFAC,,, | Performed by: PHYSICIAN ASSISTANT

## 2021-01-11 PROCEDURE — 3008F BODY MASS INDEX DOCD: CPT | Mod: CPTII,S$GLB,, | Performed by: PHYSICIAN ASSISTANT

## 2021-01-11 PROCEDURE — 3044F PR MOST RECENT HEMOGLOBIN A1C LEVEL <7.0%: ICD-10-PCS | Mod: CPTII,S$GLB,, | Performed by: PHYSICIAN ASSISTANT

## 2021-01-11 PROCEDURE — 3008F PR BODY MASS INDEX (BMI) DOCUMENTED: ICD-10-PCS | Mod: CPTII,S$GLB,, | Performed by: PHYSICIAN ASSISTANT

## 2021-01-11 PROCEDURE — 82962 GLUCOSE BLOOD TEST: CPT | Mod: S$GLB,,, | Performed by: PHYSICIAN ASSISTANT

## 2021-01-11 PROCEDURE — 3044F HG A1C LEVEL LT 7.0%: CPT | Mod: CPTII,S$GLB,, | Performed by: PHYSICIAN ASSISTANT

## 2021-01-11 PROCEDURE — 99999 PR PBB SHADOW E&M-EST. PATIENT-LVL IV: CPT | Mod: PBBFAC,,, | Performed by: PHYSICIAN ASSISTANT

## 2021-01-11 PROCEDURE — 3078F DIAST BP <80 MM HG: CPT | Mod: CPTII,S$GLB,, | Performed by: PHYSICIAN ASSISTANT

## 2021-01-11 PROCEDURE — 3078F PR MOST RECENT DIASTOLIC BLOOD PRESSURE < 80 MM HG: ICD-10-PCS | Mod: CPTII,S$GLB,, | Performed by: PHYSICIAN ASSISTANT

## 2021-01-11 PROCEDURE — 1126F PR PAIN SEVERITY QUANTIFIED, NO PAIN PRESENT: ICD-10-PCS | Mod: S$GLB,,, | Performed by: PHYSICIAN ASSISTANT

## 2021-01-11 PROCEDURE — 99214 OFFICE O/P EST MOD 30 MIN: CPT | Mod: S$GLB,,, | Performed by: PHYSICIAN ASSISTANT

## 2021-01-11 PROCEDURE — 99214 PR OFFICE/OUTPT VISIT, EST, LEVL IV, 30-39 MIN: ICD-10-PCS | Mod: S$GLB,,, | Performed by: PHYSICIAN ASSISTANT

## 2021-01-11 PROCEDURE — 3074F PR MOST RECENT SYSTOLIC BLOOD PRESSURE < 130 MM HG: ICD-10-PCS | Mod: CPTII,S$GLB,, | Performed by: PHYSICIAN ASSISTANT

## 2021-01-11 RX ORDER — ATORVASTATIN CALCIUM 40 MG/1
40 TABLET, FILM COATED ORAL DAILY
Qty: 30 TABLET | Refills: 11 | Status: CANCELLED | OUTPATIENT
Start: 2021-01-11

## 2021-01-19 ENCOUNTER — TELEPHONE (OUTPATIENT)
Dept: NEPHROLOGY | Facility: CLINIC | Age: 66
End: 2021-01-19

## 2021-01-19 DIAGNOSIS — N18.5 CHRONIC KIDNEY DISEASE (CKD), STAGE V: Primary | ICD-10-CM

## 2021-01-21 ENCOUNTER — HOSPITAL ENCOUNTER (INPATIENT)
Facility: HOSPITAL | Age: 66
LOS: 6 days | Discharge: HOME | DRG: 177 | End: 2021-01-27
Attending: EMERGENCY MEDICINE | Admitting: FAMILY MEDICINE
Payer: COMMERCIAL

## 2021-01-21 DIAGNOSIS — Z99.2 STAGE 5 CHRONIC KIDNEY DISEASE ON CHRONIC DIALYSIS: ICD-10-CM

## 2021-01-21 DIAGNOSIS — J12.82 PNEUMONIA DUE TO COVID-19 VIRUS: ICD-10-CM

## 2021-01-21 DIAGNOSIS — N25.81 SECONDARY HYPERPARATHYROIDISM: ICD-10-CM

## 2021-01-21 DIAGNOSIS — E87.5 HYPERKALEMIA: ICD-10-CM

## 2021-01-21 DIAGNOSIS — N18.6 ESRD (END STAGE RENAL DISEASE): ICD-10-CM

## 2021-01-21 DIAGNOSIS — N17.9 AKI (ACUTE KIDNEY INJURY): ICD-10-CM

## 2021-01-21 DIAGNOSIS — E87.20 METABOLIC ACIDOSIS: ICD-10-CM

## 2021-01-21 DIAGNOSIS — U07.1 ACUTE HYPOXEMIC RESPIRATORY FAILURE DUE TO COVID-19: ICD-10-CM

## 2021-01-21 DIAGNOSIS — U07.1 PNEUMONIA DUE TO COVID-19 VIRUS: ICD-10-CM

## 2021-01-21 DIAGNOSIS — N17.9 ACUTE RENAL FAILURE, UNSPECIFIED ACUTE RENAL FAILURE TYPE: Primary | ICD-10-CM

## 2021-01-21 DIAGNOSIS — R06.00 DYSPNEA: ICD-10-CM

## 2021-01-21 DIAGNOSIS — R06.02 SHORTNESS OF BREATH: ICD-10-CM

## 2021-01-21 DIAGNOSIS — N18.5 CKD (CHRONIC KIDNEY DISEASE), STAGE V: Chronic | ICD-10-CM

## 2021-01-21 DIAGNOSIS — J96.01 ACUTE RESPIRATORY FAILURE WITH HYPOXIA: ICD-10-CM

## 2021-01-21 DIAGNOSIS — J96.01 ACUTE HYPOXEMIC RESPIRATORY FAILURE DUE TO COVID-19: ICD-10-CM

## 2021-01-21 DIAGNOSIS — N18.6 STAGE 5 CHRONIC KIDNEY DISEASE ON CHRONIC DIALYSIS: ICD-10-CM

## 2021-01-21 DIAGNOSIS — N17.9 ACUTE RENAL FAILURE: ICD-10-CM

## 2021-01-21 LAB
ALBUMIN SERPL BCP-MCNC: 2.5 G/DL (ref 3.5–5.2)
ALLENS TEST: ABNORMAL
ALP SERPL-CCNC: 60 U/L (ref 55–135)
ALT SERPL W/O P-5'-P-CCNC: 22 U/L (ref 10–44)
ANION GAP SERPL CALC-SCNC: 21 MMOL/L (ref 8–16)
AST SERPL-CCNC: 58 U/L (ref 10–40)
BASOPHILS # BLD AUTO: 0.04 K/UL (ref 0–0.2)
BASOPHILS NFR BLD: 0.2 % (ref 0–1.9)
BILIRUB SERPL-MCNC: 0.4 MG/DL (ref 0.1–1)
BNP SERPL-MCNC: 101 PG/ML (ref 0–99)
BUN SERPL-MCNC: 126 MG/DL (ref 8–23)
CALCIUM SERPL-MCNC: 8 MG/DL (ref 8.7–10.5)
CHLORIDE SERPL-SCNC: 97 MMOL/L (ref 95–110)
CK SERPL-CCNC: 1395 U/L (ref 20–200)
CO2 SERPL-SCNC: 8 MMOL/L (ref 23–29)
CREAT SERPL-MCNC: 23 MG/DL (ref 0.5–1.4)
CTP QC/QA: YES
DELSYS: ABNORMAL
DIFFERENTIAL METHOD: ABNORMAL
EOSINOPHIL # BLD AUTO: 0 K/UL (ref 0–0.5)
EOSINOPHIL NFR BLD: 0.2 % (ref 0–8)
ERYTHROCYTE [DISTWIDTH] IN BLOOD BY AUTOMATED COUNT: 14.7 % (ref 11.5–14.5)
EST. GFR  (AFRICAN AMERICAN): 2 ML/MIN/1.73 M^2
EST. GFR  (NON AFRICAN AMERICAN): 2 ML/MIN/1.73 M^2
FIO2: 36
FLOW: 4
GLUCOSE SERPL-MCNC: 96 MG/DL (ref 70–110)
HCO3 UR-SCNC: 6.6 MMOL/L (ref 24–28)
HCT VFR BLD AUTO: 38.3 % (ref 40–54)
HGB BLD-MCNC: 12.2 G/DL (ref 14–18)
IMM GRANULOCYTES # BLD AUTO: 0.34 K/UL (ref 0–0.04)
IMM GRANULOCYTES NFR BLD AUTO: 2.1 % (ref 0–0.5)
LACTATE SERPL-SCNC: 0.8 MMOL/L (ref 0.5–2.2)
LYMPHOCYTES # BLD AUTO: 0.6 K/UL (ref 1–4.8)
LYMPHOCYTES NFR BLD: 3.9 % (ref 18–48)
MCH RBC QN AUTO: 25.3 PG (ref 27–31)
MCHC RBC AUTO-ENTMCNC: 31.9 G/DL (ref 32–36)
MCV RBC AUTO: 80 FL (ref 82–98)
MODE: ABNORMAL
MONOCYTES # BLD AUTO: 0.8 K/UL (ref 0.3–1)
MONOCYTES NFR BLD: 4.6 % (ref 4–15)
NEUTROPHILS # BLD AUTO: 14.6 K/UL (ref 1.8–7.7)
NEUTROPHILS NFR BLD: 89 % (ref 38–73)
NRBC BLD-RTO: 0 /100 WBC
PCO2 BLDA: 19.4 MMHG (ref 35–45)
PH SMN: 7.14 [PH] (ref 7.35–7.45)
PLATELET # BLD AUTO: 397 K/UL (ref 150–350)
PMV BLD AUTO: 10.6 FL (ref 9.2–12.9)
PO2 BLDA: 72 MMHG (ref 80–100)
POC BE: -22 MMOL/L
POC SATURATED O2: 89 % (ref 95–100)
POCT GLUCOSE: 116 MG/DL (ref 70–110)
POCT GLUCOSE: 163 MG/DL (ref 70–110)
POTASSIUM SERPL-SCNC: 5.8 MMOL/L (ref 3.5–5.1)
PROCALCITONIN SERPL IA-MCNC: 3.08 NG/ML
PROT SERPL-MCNC: 7.7 G/DL (ref 6–8.4)
RBC # BLD AUTO: 4.82 M/UL (ref 4.6–6.2)
SALICYLATES SERPL-MCNC: <5 MG/DL (ref 15–30)
SAMPLE: ABNORMAL
SARS-COV-2 RDRP RESP QL NAA+PROBE: POSITIVE
SITE: ABNORMAL
SODIUM SERPL-SCNC: 126 MMOL/L (ref 136–145)
TROPONIN I SERPL DL<=0.01 NG/ML-MCNC: 0.14 NG/ML (ref 0–0.03)
WBC # BLD AUTO: 16.44 K/UL (ref 3.9–12.7)

## 2021-01-21 PROCEDURE — 83605 ASSAY OF LACTIC ACID: CPT | Mod: NTX

## 2021-01-21 PROCEDURE — 36600 WITHDRAWAL OF ARTERIAL BLOOD: CPT | Mod: NTX

## 2021-01-21 PROCEDURE — 84145 PROCALCITONIN (PCT): CPT | Mod: NTX

## 2021-01-21 PROCEDURE — 99291 PR CRITICAL CARE, E/M 30-74 MINUTES: ICD-10-PCS | Mod: 25,NTX,, | Performed by: INTERNAL MEDICINE

## 2021-01-21 PROCEDURE — 82550 ASSAY OF CK (CPK): CPT | Mod: NTX

## 2021-01-21 PROCEDURE — 25000242 PHARM REV CODE 250 ALT 637 W/ HCPCS: Mod: NTX | Performed by: FAMILY MEDICINE

## 2021-01-21 PROCEDURE — 87186 SC STD MICRODIL/AGAR DIL: CPT | Mod: NTX

## 2021-01-21 PROCEDURE — 85025 COMPLETE CBC W/AUTO DIFF WBC: CPT | Mod: NTX

## 2021-01-21 PROCEDURE — 93010 EKG 12-LEAD: ICD-10-PCS | Mod: NTX,,, | Performed by: INTERNAL MEDICINE

## 2021-01-21 PROCEDURE — 90937 HEMODIALYSIS REPEATED EVAL: CPT | Mod: NTX,,, | Performed by: INTERNAL MEDICINE

## 2021-01-21 PROCEDURE — 99900035 HC TECH TIME PER 15 MIN (STAT): Mod: NTX

## 2021-01-21 PROCEDURE — 99291 CRITICAL CARE FIRST HOUR: CPT | Mod: 25,NTX

## 2021-01-21 PROCEDURE — 63600175 PHARM REV CODE 636 W HCPCS: Mod: NTX | Performed by: EMERGENCY MEDICINE

## 2021-01-21 PROCEDURE — 25000242 PHARM REV CODE 250 ALT 637 W/ HCPCS: Mod: NTX | Performed by: EMERGENCY MEDICINE

## 2021-01-21 PROCEDURE — 90937 PR HEMODIALYSIS, REPEATED EVAL.: ICD-10-PCS | Mod: NTX,,, | Performed by: INTERNAL MEDICINE

## 2021-01-21 PROCEDURE — 36415 COLL VENOUS BLD VENIPUNCTURE: CPT | Mod: NTX

## 2021-01-21 PROCEDURE — 80179 DRUG ASSAY SALICYLATE: CPT | Mod: NTX

## 2021-01-21 PROCEDURE — 96365 THER/PROPH/DIAG IV INF INIT: CPT | Mod: NTX

## 2021-01-21 PROCEDURE — 80053 COMPREHEN METABOLIC PANEL: CPT | Mod: NTX

## 2021-01-21 PROCEDURE — 82962 GLUCOSE BLOOD TEST: CPT | Mod: NTX

## 2021-01-21 PROCEDURE — 84484 ASSAY OF TROPONIN QUANT: CPT | Mod: NTX

## 2021-01-21 PROCEDURE — 63600175 PHARM REV CODE 636 W HCPCS: Mod: NTX | Performed by: FAMILY MEDICINE

## 2021-01-21 PROCEDURE — 87077 CULTURE AEROBIC IDENTIFY: CPT | Mod: NTX

## 2021-01-21 PROCEDURE — 25000003 PHARM REV CODE 250: Mod: NTX

## 2021-01-21 PROCEDURE — 94660 CPAP INITIATION&MGMT: CPT | Mod: NTX

## 2021-01-21 PROCEDURE — 94640 AIRWAY INHALATION TREATMENT: CPT | Mod: NTX

## 2021-01-21 PROCEDURE — 93010 ELECTROCARDIOGRAM REPORT: CPT | Mod: NTX,,, | Performed by: INTERNAL MEDICINE

## 2021-01-21 PROCEDURE — 25000003 PHARM REV CODE 250: Mod: NTX | Performed by: FAMILY MEDICINE

## 2021-01-21 PROCEDURE — 83880 ASSAY OF NATRIURETIC PEPTIDE: CPT | Mod: NTX

## 2021-01-21 PROCEDURE — 93005 ELECTROCARDIOGRAM TRACING: CPT | Mod: NTX

## 2021-01-21 PROCEDURE — 82803 BLOOD GASES ANY COMBINATION: CPT | Mod: NTX

## 2021-01-21 PROCEDURE — 96375 TX/PRO/DX INJ NEW DRUG ADDON: CPT | Mod: NTX

## 2021-01-21 PROCEDURE — 87040 BLOOD CULTURE FOR BACTERIA: CPT | Mod: NTX

## 2021-01-21 PROCEDURE — 99291 CRITICAL CARE FIRST HOUR: CPT | Mod: 25,NTX,, | Performed by: INTERNAL MEDICINE

## 2021-01-21 PROCEDURE — U0002 COVID-19 LAB TEST NON-CDC: HCPCS | Mod: NTX | Performed by: EMERGENCY MEDICINE

## 2021-01-21 PROCEDURE — C1750 CATH, HEMODIALYSIS,LONG-TERM: HCPCS | Mod: NTX | Performed by: RADIOLOGY

## 2021-01-21 PROCEDURE — 27000190 HC CPAP FULL FACE MASK W/VALVE: Mod: NTX

## 2021-01-21 PROCEDURE — 20000000 HC ICU ROOM: Mod: NTX

## 2021-01-21 DEVICE — GLIDEPATH HEMODIALYSIS CATH, ST, DL, 14.5 FR. 35CM
Type: IMPLANTABLE DEVICE | Site: GROIN | Status: FUNCTIONAL
Brand: GLIDEPATH LONG-TERM HEMODIALYSIS CATHETER WITH PRELOADED STYLET

## 2021-01-21 RX ORDER — NIFEDIPINE 30 MG/1
90 TABLET, EXTENDED RELEASE ORAL DAILY
Status: DISCONTINUED | OUTPATIENT
Start: 2021-01-22 | End: 2021-01-23

## 2021-01-21 RX ORDER — ONDANSETRON 2 MG/ML
4 INJECTION INTRAMUSCULAR; INTRAVENOUS EVERY 8 HOURS PRN
Status: DISCONTINUED | OUTPATIENT
Start: 2021-01-21 | End: 2021-01-27 | Stop reason: HOSPADM

## 2021-01-21 RX ORDER — CHOLECALCIFEROL (VITAMIN D3) 25 MCG
1000 TABLET ORAL DAILY
Status: DISCONTINUED | OUTPATIENT
Start: 2021-01-22 | End: 2021-01-27 | Stop reason: HOSPADM

## 2021-01-21 RX ORDER — GLUCAGON 1 MG
1 KIT INJECTION
Status: DISCONTINUED | OUTPATIENT
Start: 2021-01-21 | End: 2021-01-23

## 2021-01-21 RX ORDER — TAMSULOSIN HYDROCHLORIDE 0.4 MG/1
0.4 CAPSULE ORAL DAILY
Status: DISCONTINUED | OUTPATIENT
Start: 2021-01-22 | End: 2021-01-27 | Stop reason: HOSPADM

## 2021-01-21 RX ORDER — ATORVASTATIN CALCIUM 40 MG/1
40 TABLET, FILM COATED ORAL DAILY
Status: DISCONTINUED | OUTPATIENT
Start: 2021-01-22 | End: 2021-01-27 | Stop reason: HOSPADM

## 2021-01-21 RX ORDER — DEXAMETHASONE SODIUM PHOSPHATE 4 MG/ML
6 INJECTION, SOLUTION INTRA-ARTICULAR; INTRALESIONAL; INTRAMUSCULAR; INTRAVENOUS; SOFT TISSUE
Status: COMPLETED | OUTPATIENT
Start: 2021-01-21 | End: 2021-01-21

## 2021-01-21 RX ORDER — MUPIROCIN 20 MG/G
OINTMENT TOPICAL 2 TIMES DAILY
Status: COMPLETED | OUTPATIENT
Start: 2021-01-21 | End: 2021-01-26

## 2021-01-21 RX ORDER — ASPIRIN 81 MG/1
81 TABLET ORAL DAILY
Status: DISCONTINUED | OUTPATIENT
Start: 2021-01-22 | End: 2021-01-27 | Stop reason: HOSPADM

## 2021-01-21 RX ORDER — NITROGLYCERIN 20 MG/100ML
0-100 INJECTION INTRAVENOUS CONTINUOUS
Status: DISCONTINUED | OUTPATIENT
Start: 2021-01-21 | End: 2021-01-23

## 2021-01-21 RX ORDER — NITROGLYCERIN 20 MG/100ML
INJECTION INTRAVENOUS
Status: COMPLETED
Start: 2021-01-21 | End: 2021-01-21

## 2021-01-21 RX ORDER — FAMOTIDINE 10 MG/ML
20 INJECTION INTRAVENOUS DAILY
Status: DISCONTINUED | OUTPATIENT
Start: 2021-01-22 | End: 2021-01-25

## 2021-01-21 RX ORDER — CALCIUM GLUCONATE 98 MG/ML
1 INJECTION, SOLUTION INTRAVENOUS ONCE
Status: DISCONTINUED | OUTPATIENT
Start: 2021-01-21 | End: 2021-01-22

## 2021-01-21 RX ORDER — IPRATROPIUM BROMIDE AND ALBUTEROL SULFATE 2.5; .5 MG/3ML; MG/3ML
3 SOLUTION RESPIRATORY (INHALATION) ONCE
Status: COMPLETED | OUTPATIENT
Start: 2021-01-21 | End: 2021-01-21

## 2021-01-21 RX ORDER — INSULIN ASPART 100 [IU]/ML
1-10 INJECTION, SOLUTION INTRAVENOUS; SUBCUTANEOUS EVERY 6 HOURS PRN
Status: DISCONTINUED | OUTPATIENT
Start: 2021-01-21 | End: 2021-01-23

## 2021-01-21 RX ORDER — LORAZEPAM 2 MG/ML
1 INJECTION INTRAMUSCULAR
Status: COMPLETED | OUTPATIENT
Start: 2021-01-21 | End: 2021-01-21

## 2021-01-21 RX ORDER — SODIUM CHLORIDE 0.9 % (FLUSH) 0.9 %
10 SYRINGE (ML) INJECTION
Status: DISCONTINUED | OUTPATIENT
Start: 2021-01-21 | End: 2021-01-27 | Stop reason: HOSPADM

## 2021-01-21 RX ORDER — ENOXAPARIN SODIUM 100 MG/ML
30 INJECTION SUBCUTANEOUS EVERY 24 HOURS
Status: DISCONTINUED | OUTPATIENT
Start: 2021-01-21 | End: 2021-01-21

## 2021-01-21 RX ORDER — ASCORBIC ACID 500 MG
500 TABLET ORAL 2 TIMES DAILY
Status: DISCONTINUED | OUTPATIENT
Start: 2021-01-21 | End: 2021-01-27 | Stop reason: HOSPADM

## 2021-01-21 RX ORDER — MANNITOL 250 MG/ML
25 INJECTION, SOLUTION INTRAVENOUS ONCE
Status: COMPLETED | OUTPATIENT
Start: 2021-01-21 | End: 2021-01-21

## 2021-01-21 RX ORDER — DEXAMETHASONE SODIUM PHOSPHATE 4 MG/ML
6 INJECTION, SOLUTION INTRA-ARTICULAR; INTRALESIONAL; INTRAMUSCULAR; INTRAVENOUS; SOFT TISSUE DAILY
Status: DISCONTINUED | OUTPATIENT
Start: 2021-01-22 | End: 2021-01-25

## 2021-01-21 RX ORDER — FINASTERIDE 5 MG/1
5 TABLET, FILM COATED ORAL DAILY
Status: DISCONTINUED | OUTPATIENT
Start: 2021-01-22 | End: 2021-01-27 | Stop reason: HOSPADM

## 2021-01-21 RX ORDER — HEPARIN SODIUM 5000 [USP'U]/ML
5000 INJECTION, SOLUTION INTRAVENOUS; SUBCUTANEOUS EVERY 8 HOURS
Status: DISCONTINUED | OUTPATIENT
Start: 2021-01-21 | End: 2021-01-27 | Stop reason: HOSPADM

## 2021-01-21 RX ORDER — IPRATROPIUM BROMIDE AND ALBUTEROL SULFATE 2.5; .5 MG/3ML; MG/3ML
3 SOLUTION RESPIRATORY (INHALATION) EVERY 6 HOURS
Status: DISCONTINUED | OUTPATIENT
Start: 2021-01-21 | End: 2021-01-24

## 2021-01-21 RX ORDER — ACETAMINOPHEN 325 MG/1
650 TABLET ORAL EVERY 6 HOURS PRN
Status: DISCONTINUED | OUTPATIENT
Start: 2021-01-21 | End: 2021-01-27 | Stop reason: HOSPADM

## 2021-01-21 RX ORDER — CARVEDILOL 12.5 MG/1
25 TABLET ORAL 2 TIMES DAILY WITH MEALS
Status: DISCONTINUED | OUTPATIENT
Start: 2021-01-21 | End: 2021-01-23

## 2021-01-21 RX ADMIN — DEXAMETHASONE SODIUM PHOSPHATE 6 MG: 4 INJECTION INTRA-ARTICULAR; INTRALESIONAL; INTRAMUSCULAR; INTRAVENOUS; SOFT TISSUE at 03:01

## 2021-01-21 RX ADMIN — MUPIROCIN: 20 OINTMENT TOPICAL at 09:01

## 2021-01-21 RX ADMIN — SODIUM BICARBONATE: 84 INJECTION, SOLUTION INTRAVENOUS at 07:01

## 2021-01-21 RX ADMIN — IPRATROPIUM BROMIDE AND ALBUTEROL SULFATE 3 ML: .5; 2.5 SOLUTION RESPIRATORY (INHALATION) at 02:01

## 2021-01-21 RX ADMIN — AZITHROMYCIN MONOHYDRATE 500 MG: 500 INJECTION, POWDER, LYOPHILIZED, FOR SOLUTION INTRAVENOUS at 08:01

## 2021-01-21 RX ADMIN — NITROGLYCERIN 30 MCG/MIN: 20 INJECTION INTRAVENOUS at 03:01

## 2021-01-21 RX ADMIN — REMDESIVIR 200 MG: 100 INJECTION, POWDER, LYOPHILIZED, FOR SOLUTION INTRAVENOUS at 09:01

## 2021-01-21 RX ADMIN — HEPARIN SODIUM 5000 UNITS: 5000 INJECTION INTRAVENOUS; SUBCUTANEOUS at 10:01

## 2021-01-21 RX ADMIN — MANNITOL 25 G: 12.5 INJECTION, SOLUTION INTRAVENOUS at 08:01

## 2021-01-21 RX ADMIN — CEFTRIAXONE 1 G: 1 INJECTION, SOLUTION INTRAVENOUS at 08:01

## 2021-01-21 RX ADMIN — CARVEDILOL 25 MG: 12.5 TABLET, FILM COATED ORAL at 08:01

## 2021-01-21 RX ADMIN — IPRATROPIUM BROMIDE AND ALBUTEROL SULFATE 3 ML: .5; 3 SOLUTION RESPIRATORY (INHALATION) at 07:01

## 2021-01-21 RX ADMIN — OXYCODONE HYDROCHLORIDE AND ACETAMINOPHEN 500 MG: 500 TABLET ORAL at 09:01

## 2021-01-21 RX ADMIN — LORAZEPAM 1 MG: 2 INJECTION INTRAMUSCULAR; INTRAVENOUS at 06:01

## 2021-01-22 LAB
ALBUMIN SERPL BCP-MCNC: 1.9 G/DL (ref 3.5–5.2)
ANION GAP SERPL CALC-SCNC: 24 MMOL/L (ref 8–16)
BASOPHILS # BLD AUTO: 0.01 K/UL (ref 0–0.2)
BASOPHILS NFR BLD: 0.1 % (ref 0–1.9)
BUN SERPL-MCNC: 72 MG/DL (ref 8–23)
CALCIUM SERPL-MCNC: 7.4 MG/DL (ref 8.7–10.5)
CHLORIDE SERPL-SCNC: 92 MMOL/L (ref 95–110)
CO2 SERPL-SCNC: 16 MMOL/L (ref 23–29)
CREAT SERPL-MCNC: 14.8 MG/DL (ref 0.5–1.4)
CRP SERPL-MCNC: 217.6 MG/L (ref 0–8.2)
D DIMER PPP IA.FEU-MCNC: 5.88 MG/L FEU
DIFFERENTIAL METHOD: ABNORMAL
EOSINOPHIL # BLD AUTO: 0 K/UL (ref 0–0.5)
EOSINOPHIL NFR BLD: 0 % (ref 0–8)
ERYTHROCYTE [DISTWIDTH] IN BLOOD BY AUTOMATED COUNT: 14.4 % (ref 11.5–14.5)
EST. GFR  (AFRICAN AMERICAN): 4 ML/MIN/1.73 M^2
EST. GFR  (NON AFRICAN AMERICAN): 3 ML/MIN/1.73 M^2
FERRITIN SERPL-MCNC: 1136 NG/ML (ref 20–300)
GLUCOSE SERPL-MCNC: 225 MG/DL (ref 70–110)
HBV CORE AB SERPL QL IA: POSITIVE
HBV SURFACE AG SERPL QL IA: NEGATIVE
HCT VFR BLD AUTO: 31.5 % (ref 40–54)
HGB BLD-MCNC: 10.4 G/DL (ref 14–18)
IMM GRANULOCYTES # BLD AUTO: 0.27 K/UL (ref 0–0.04)
IMM GRANULOCYTES NFR BLD AUTO: 2.9 % (ref 0–0.5)
LYMPHOCYTES # BLD AUTO: 0.3 K/UL (ref 1–4.8)
LYMPHOCYTES NFR BLD: 3.6 % (ref 18–48)
MAGNESIUM SERPL-MCNC: 1.9 MG/DL (ref 1.6–2.6)
MCH RBC QN AUTO: 24.9 PG (ref 27–31)
MCHC RBC AUTO-ENTMCNC: 33 G/DL (ref 32–36)
MCV RBC AUTO: 76 FL (ref 82–98)
MONOCYTES # BLD AUTO: 0.3 K/UL (ref 0.3–1)
MONOCYTES NFR BLD: 3.7 % (ref 4–15)
NEUTROPHILS # BLD AUTO: 8.3 K/UL (ref 1.8–7.7)
NEUTROPHILS NFR BLD: 89.7 % (ref 38–73)
NRBC BLD-RTO: 0 /100 WBC
PHOSPHATE SERPL-MCNC: 8.5 MG/DL (ref 2.7–4.5)
PLATELET # BLD AUTO: 318 K/UL (ref 150–350)
PMV BLD AUTO: 10.7 FL (ref 9.2–12.9)
POCT GLUCOSE: 171 MG/DL (ref 70–110)
POCT GLUCOSE: 206 MG/DL (ref 70–110)
POCT GLUCOSE: 230 MG/DL (ref 70–110)
POCT GLUCOSE: 259 MG/DL (ref 70–110)
POCT GLUCOSE: 440 MG/DL (ref 70–110)
POTASSIUM SERPL-SCNC: 4 MMOL/L (ref 3.5–5.1)
RBC # BLD AUTO: 4.17 M/UL (ref 4.6–6.2)
SODIUM SERPL-SCNC: 132 MMOL/L (ref 136–145)
WBC # BLD AUTO: 9.21 K/UL (ref 3.9–12.7)

## 2021-01-22 PROCEDURE — 86704 HEP B CORE ANTIBODY TOTAL: CPT | Mod: NTX

## 2021-01-22 PROCEDURE — 80100014 HC HEMODIALYSIS 1:1: Mod: NTX

## 2021-01-22 PROCEDURE — 36415 COLL VENOUS BLD VENIPUNCTURE: CPT | Mod: NTX

## 2021-01-22 PROCEDURE — 90937 HEMODIALYSIS REPEATED EVAL: CPT | Mod: NTX,,, | Performed by: INTERNAL MEDICINE

## 2021-01-22 PROCEDURE — 27100171 HC OXYGEN HIGH FLOW UP TO 24 HOURS: Mod: NTX

## 2021-01-22 PROCEDURE — 99291 CRITICAL CARE FIRST HOUR: CPT | Mod: 25,NTX,, | Performed by: INTERNAL MEDICINE

## 2021-01-22 PROCEDURE — 94640 AIRWAY INHALATION TREATMENT: CPT | Mod: NTX

## 2021-01-22 PROCEDURE — 25000242 PHARM REV CODE 250 ALT 637 W/ HCPCS: Mod: NTX | Performed by: FAMILY MEDICINE

## 2021-01-22 PROCEDURE — 99900035 HC TECH TIME PER 15 MIN (STAT): Mod: NTX

## 2021-01-22 PROCEDURE — 63600175 PHARM REV CODE 636 W HCPCS: Mod: NTX | Performed by: INTERNAL MEDICINE

## 2021-01-22 PROCEDURE — 63600175 PHARM REV CODE 636 W HCPCS: Mod: NTX | Performed by: FAMILY MEDICINE

## 2021-01-22 PROCEDURE — 83735 ASSAY OF MAGNESIUM: CPT | Mod: NTX

## 2021-01-22 PROCEDURE — 20000000 HC ICU ROOM: Mod: NTX

## 2021-01-22 PROCEDURE — 82728 ASSAY OF FERRITIN: CPT | Mod: NTX

## 2021-01-22 PROCEDURE — 25000003 PHARM REV CODE 250: Mod: NTX | Performed by: FAMILY MEDICINE

## 2021-01-22 PROCEDURE — 80069 RENAL FUNCTION PANEL: CPT | Mod: NTX

## 2021-01-22 PROCEDURE — 25000003 PHARM REV CODE 250: Mod: NTX | Performed by: INTERNAL MEDICINE

## 2021-01-22 PROCEDURE — 99291 PR CRITICAL CARE, E/M 30-74 MINUTES: ICD-10-PCS | Mod: 25,NTX,, | Performed by: INTERNAL MEDICINE

## 2021-01-22 PROCEDURE — 99291 PR CRITICAL CARE, E/M 30-74 MINUTES: ICD-10-PCS | Mod: NTX,,, | Performed by: INTERNAL MEDICINE

## 2021-01-22 PROCEDURE — 94660 CPAP INITIATION&MGMT: CPT | Mod: NTX

## 2021-01-22 PROCEDURE — 85379 FIBRIN DEGRADATION QUANT: CPT | Mod: NTX

## 2021-01-22 PROCEDURE — 86706 HEP B SURFACE ANTIBODY: CPT | Mod: NTX

## 2021-01-22 PROCEDURE — 86140 C-REACTIVE PROTEIN: CPT | Mod: NTX

## 2021-01-22 PROCEDURE — 87340 HEPATITIS B SURFACE AG IA: CPT | Mod: NTX

## 2021-01-22 PROCEDURE — 85025 COMPLETE CBC W/AUTO DIFF WBC: CPT | Mod: NTX

## 2021-01-22 PROCEDURE — 27000221 HC OXYGEN, UP TO 24 HOURS: Mod: NTX

## 2021-01-22 PROCEDURE — 99291 CRITICAL CARE FIRST HOUR: CPT | Mod: NTX,,, | Performed by: INTERNAL MEDICINE

## 2021-01-22 PROCEDURE — 90937 PR HEMODIALYSIS, REPEATED EVAL.: ICD-10-PCS | Mod: NTX,,, | Performed by: INTERNAL MEDICINE

## 2021-01-22 PROCEDURE — C9399 UNCLASSIFIED DRUGS OR BIOLOG: HCPCS | Mod: NTX | Performed by: INTERNAL MEDICINE

## 2021-01-22 RX ORDER — CEFEPIME HYDROCHLORIDE 1 G/50ML
1 INJECTION, SOLUTION INTRAVENOUS
Status: DISCONTINUED | OUTPATIENT
Start: 2021-01-22 | End: 2021-01-22

## 2021-01-22 RX ORDER — HEPARIN SODIUM 1000 [USP'U]/ML
1000 INJECTION, SOLUTION INTRAVENOUS; SUBCUTANEOUS ONCE
Status: COMPLETED | OUTPATIENT
Start: 2021-01-22 | End: 2021-01-22

## 2021-01-22 RX ORDER — MANNITOL 250 MG/ML
25 INJECTION, SOLUTION INTRAVENOUS ONCE
Status: COMPLETED | OUTPATIENT
Start: 2021-01-23 | End: 2021-01-23

## 2021-01-22 RX ORDER — SEVELAMER CARBONATE 800 MG/1
800 TABLET, FILM COATED ORAL
Status: DISCONTINUED | OUTPATIENT
Start: 2021-01-22 | End: 2021-01-27 | Stop reason: HOSPADM

## 2021-01-22 RX ORDER — MANNITOL 250 MG/ML
25 INJECTION, SOLUTION INTRAVENOUS ONCE
Status: COMPLETED | OUTPATIENT
Start: 2021-01-22 | End: 2021-01-22

## 2021-01-22 RX ORDER — CALCITRIOL 0.25 UG/1
0.25 CAPSULE ORAL DAILY
Status: DISCONTINUED | OUTPATIENT
Start: 2021-01-22 | End: 2021-01-27 | Stop reason: HOSPADM

## 2021-01-22 RX ADMIN — SODIUM BICARBONATE: 84 INJECTION, SOLUTION INTRAVENOUS at 11:01

## 2021-01-22 RX ADMIN — INSULIN DETEMIR 10 UNITS: 100 INJECTION, SOLUTION SUBCUTANEOUS at 08:01

## 2021-01-22 RX ADMIN — REMDESIVIR 100 MG: 100 INJECTION, POWDER, LYOPHILIZED, FOR SOLUTION INTRAVENOUS at 04:01

## 2021-01-22 RX ADMIN — INSULIN ASPART 4 UNITS: 100 INJECTION, SOLUTION INTRAVENOUS; SUBCUTANEOUS at 05:01

## 2021-01-22 RX ADMIN — DEXAMETHASONE SODIUM PHOSPHATE 6 MG: 4 INJECTION INTRA-ARTICULAR; INTRALESIONAL; INTRAMUSCULAR; INTRAVENOUS; SOFT TISSUE at 08:01

## 2021-01-22 RX ADMIN — CARVEDILOL 25 MG: 12.5 TABLET, FILM COATED ORAL at 05:01

## 2021-01-22 RX ADMIN — INSULIN ASPART 6 UNITS: 100 INJECTION, SOLUTION INTRAVENOUS; SUBCUTANEOUS at 05:01

## 2021-01-22 RX ADMIN — HEPARIN SODIUM 1000 UNITS: 1000 INJECTION INTRAVENOUS; SUBCUTANEOUS at 02:01

## 2021-01-22 RX ADMIN — PIPERACILLIN AND TAZOBACTAM 4.5 G: 4; .5 INJECTION, POWDER, LYOPHILIZED, FOR SOLUTION INTRAVENOUS; PARENTERAL at 07:01

## 2021-01-22 RX ADMIN — INSULIN ASPART 5 UNITS: 100 INJECTION, SOLUTION INTRAVENOUS; SUBCUTANEOUS at 11:01

## 2021-01-22 RX ADMIN — FINASTERIDE 5 MG: 5 TABLET, FILM COATED ORAL at 08:01

## 2021-01-22 RX ADMIN — INSULIN ASPART 4 UNITS: 100 INJECTION, SOLUTION INTRAVENOUS; SUBCUTANEOUS at 11:01

## 2021-01-22 RX ADMIN — MUPIROCIN: 20 OINTMENT TOPICAL at 08:01

## 2021-01-22 RX ADMIN — SODIUM BICARBONATE: 84 INJECTION, SOLUTION INTRAVENOUS at 04:01

## 2021-01-22 RX ADMIN — TAMSULOSIN HYDROCHLORIDE 0.4 MG: 0.4 CAPSULE ORAL at 08:01

## 2021-01-22 RX ADMIN — CARVEDILOL 25 MG: 12.5 TABLET, FILM COATED ORAL at 08:01

## 2021-01-22 RX ADMIN — INSULIN DETEMIR 10 UNITS: 100 INJECTION, SOLUTION SUBCUTANEOUS at 01:01

## 2021-01-22 RX ADMIN — CALCITRIOL CAPSULES 0.25 MCG 0.25 MCG: 0.25 CAPSULE ORAL at 05:01

## 2021-01-22 RX ADMIN — IPRATROPIUM BROMIDE AND ALBUTEROL SULFATE 3 ML: .5; 3 SOLUTION RESPIRATORY (INHALATION) at 07:01

## 2021-01-22 RX ADMIN — NIFEDIPINE 90 MG: 30 TABLET, FILM COATED, EXTENDED RELEASE ORAL at 08:01

## 2021-01-22 RX ADMIN — IPRATROPIUM BROMIDE AND ALBUTEROL SULFATE 3 ML: .5; 3 SOLUTION RESPIRATORY (INHALATION) at 12:01

## 2021-01-22 RX ADMIN — HEPARIN SODIUM 5000 UNITS: 5000 INJECTION INTRAVENOUS; SUBCUTANEOUS at 01:01

## 2021-01-22 RX ADMIN — FAMOTIDINE 20 MG: 10 INJECTION INTRAVENOUS at 08:01

## 2021-01-22 RX ADMIN — OXYCODONE HYDROCHLORIDE AND ACETAMINOPHEN 500 MG: 500 TABLET ORAL at 08:01

## 2021-01-22 RX ADMIN — HEPARIN SODIUM 5000 UNITS: 5000 INJECTION INTRAVENOUS; SUBCUTANEOUS at 05:01

## 2021-01-22 RX ADMIN — INSULIN ASPART 1 UNITS: 100 INJECTION, SOLUTION INTRAVENOUS; SUBCUTANEOUS at 12:01

## 2021-01-22 RX ADMIN — ATORVASTATIN CALCIUM 40 MG: 40 TABLET, FILM COATED ORAL at 08:01

## 2021-01-22 RX ADMIN — Medication 1 TABLET: at 08:01

## 2021-01-22 RX ADMIN — HEPARIN SODIUM 5000 UNITS: 5000 INJECTION INTRAVENOUS; SUBCUTANEOUS at 09:01

## 2021-01-22 RX ADMIN — ASPIRIN 81 MG: 81 TABLET, COATED ORAL at 08:01

## 2021-01-22 RX ADMIN — CHOLECALCIFEROL (VITAMIN D3) 25 MCG (1,000 UNIT) TABLET 1000 UNITS: at 08:01

## 2021-01-22 RX ADMIN — IPRATROPIUM BROMIDE AND ALBUTEROL SULFATE 3 ML: .5; 3 SOLUTION RESPIRATORY (INHALATION) at 01:01

## 2021-01-22 RX ADMIN — MANNITOL 25 G: 12.5 INJECTION, SOLUTION INTRAVENOUS at 09:01

## 2021-01-22 RX ADMIN — AZITHROMYCIN MONOHYDRATE 500 MG: 500 INJECTION, POWDER, LYOPHILIZED, FOR SOLUTION INTRAVENOUS at 08:01

## 2021-01-23 LAB
ALBUMIN SERPL BCP-MCNC: 1.8 G/DL (ref 3.5–5.2)
ANION GAP SERPL CALC-SCNC: 15 MMOL/L (ref 8–16)
BASOPHILS # BLD AUTO: 0.02 K/UL (ref 0–0.2)
BASOPHILS NFR BLD: 0.2 % (ref 0–1.9)
BUN SERPL-MCNC: 64 MG/DL (ref 8–23)
CALCIUM SERPL-MCNC: 7.3 MG/DL (ref 8.7–10.5)
CHLORIDE SERPL-SCNC: 88 MMOL/L (ref 95–110)
CO2 SERPL-SCNC: 29 MMOL/L (ref 23–29)
CREAT SERPL-MCNC: 12.8 MG/DL (ref 0.5–1.4)
DIFFERENTIAL METHOD: ABNORMAL
EOSINOPHIL # BLD AUTO: 0 K/UL (ref 0–0.5)
EOSINOPHIL NFR BLD: 0 % (ref 0–8)
ERYTHROCYTE [DISTWIDTH] IN BLOOD BY AUTOMATED COUNT: 13.6 % (ref 11.5–14.5)
EST. GFR  (AFRICAN AMERICAN): 4 ML/MIN/1.73 M^2
EST. GFR  (NON AFRICAN AMERICAN): 4 ML/MIN/1.73 M^2
GLUCOSE SERPL-MCNC: 411 MG/DL (ref 70–110)
HBV SURFACE AB SER QL IA: POSITIVE
HBV SURFACE AB SERPL IA-ACNC: NORMAL MIU/ML
HCT VFR BLD AUTO: 30.4 % (ref 40–54)
HGB BLD-MCNC: 10.1 G/DL (ref 14–18)
IMM GRANULOCYTES # BLD AUTO: 0.16 K/UL (ref 0–0.04)
IMM GRANULOCYTES NFR BLD AUTO: 1.7 % (ref 0–0.5)
IRON SERPL-MCNC: 43 UG/DL (ref 45–160)
LYMPHOCYTES # BLD AUTO: 0.3 K/UL (ref 1–4.8)
LYMPHOCYTES NFR BLD: 3.4 % (ref 18–48)
MAGNESIUM SERPL-MCNC: 1.8 MG/DL (ref 1.6–2.6)
MCH RBC QN AUTO: 25 PG (ref 27–31)
MCHC RBC AUTO-ENTMCNC: 33.2 G/DL (ref 32–36)
MCV RBC AUTO: 75 FL (ref 82–98)
MONOCYTES # BLD AUTO: 0.8 K/UL (ref 0.3–1)
MONOCYTES NFR BLD: 8.3 % (ref 4–15)
NEUTROPHILS # BLD AUTO: 8.2 K/UL (ref 1.8–7.7)
NEUTROPHILS NFR BLD: 86.4 % (ref 38–73)
NRBC BLD-RTO: 0 /100 WBC
PHOSPHATE SERPL-MCNC: 5.6 MG/DL (ref 2.7–4.5)
PLATELET # BLD AUTO: 336 K/UL (ref 150–350)
PMV BLD AUTO: 11.4 FL (ref 9.2–12.9)
POCT GLUCOSE: 214 MG/DL (ref 70–110)
POCT GLUCOSE: 222 MG/DL (ref 70–110)
POCT GLUCOSE: 278 MG/DL (ref 70–110)
POCT GLUCOSE: 315 MG/DL (ref 70–110)
POCT GLUCOSE: 406 MG/DL (ref 70–110)
POTASSIUM SERPL-SCNC: 3.6 MMOL/L (ref 3.5–5.1)
RBC # BLD AUTO: 4.04 M/UL (ref 4.6–6.2)
SATURATED IRON: 38 % (ref 20–50)
SODIUM SERPL-SCNC: 132 MMOL/L (ref 136–145)
TOTAL IRON BINDING CAPACITY: 112 UG/DL (ref 250–450)
TRANSFERRIN SERPL-MCNC: 76 MG/DL (ref 200–375)
WBC # BLD AUTO: 9.43 K/UL (ref 3.9–12.7)

## 2021-01-23 PROCEDURE — 90937 HEMODIALYSIS REPEATED EVAL: CPT | Mod: NTX,,, | Performed by: INTERNAL MEDICINE

## 2021-01-23 PROCEDURE — 63600175 PHARM REV CODE 636 W HCPCS: Mod: NTX | Performed by: INTERNAL MEDICINE

## 2021-01-23 PROCEDURE — 25000003 PHARM REV CODE 250: Mod: NTX | Performed by: INTERNAL MEDICINE

## 2021-01-23 PROCEDURE — 99900035 HC TECH TIME PER 15 MIN (STAT): Mod: NTX

## 2021-01-23 PROCEDURE — 25000242 PHARM REV CODE 250 ALT 637 W/ HCPCS: Mod: NTX | Performed by: FAMILY MEDICINE

## 2021-01-23 PROCEDURE — 83735 ASSAY OF MAGNESIUM: CPT | Mod: NTX

## 2021-01-23 PROCEDURE — 99291 CRITICAL CARE FIRST HOUR: CPT | Mod: 25,NTX,, | Performed by: INTERNAL MEDICINE

## 2021-01-23 PROCEDURE — 94660 CPAP INITIATION&MGMT: CPT | Mod: NTX

## 2021-01-23 PROCEDURE — 99291 PR CRITICAL CARE, E/M 30-74 MINUTES: ICD-10-PCS | Mod: NTX,,, | Performed by: INTERNAL MEDICINE

## 2021-01-23 PROCEDURE — 80069 RENAL FUNCTION PANEL: CPT | Mod: NTX

## 2021-01-23 PROCEDURE — 36415 COLL VENOUS BLD VENIPUNCTURE: CPT | Mod: NTX

## 2021-01-23 PROCEDURE — 94640 AIRWAY INHALATION TREATMENT: CPT | Mod: NTX

## 2021-01-23 PROCEDURE — 99291 CRITICAL CARE FIRST HOUR: CPT | Mod: NTX,,, | Performed by: INTERNAL MEDICINE

## 2021-01-23 PROCEDURE — 80100014 HC HEMODIALYSIS 1:1: Mod: NTX

## 2021-01-23 PROCEDURE — 20000000 HC ICU ROOM: Mod: NTX

## 2021-01-23 PROCEDURE — 27100171 HC OXYGEN HIGH FLOW UP TO 24 HOURS: Mod: NTX

## 2021-01-23 PROCEDURE — 90937 PR HEMODIALYSIS, REPEATED EVAL.: ICD-10-PCS | Mod: NTX,,, | Performed by: INTERNAL MEDICINE

## 2021-01-23 PROCEDURE — 85025 COMPLETE CBC W/AUTO DIFF WBC: CPT | Mod: NTX

## 2021-01-23 PROCEDURE — 99291 PR CRITICAL CARE, E/M 30-74 MINUTES: ICD-10-PCS | Mod: 25,NTX,, | Performed by: INTERNAL MEDICINE

## 2021-01-23 PROCEDURE — 25000242 PHARM REV CODE 250 ALT 637 W/ HCPCS: Mod: NTX | Performed by: INTERNAL MEDICINE

## 2021-01-23 PROCEDURE — 25000003 PHARM REV CODE 250: Mod: NTX | Performed by: FAMILY MEDICINE

## 2021-01-23 PROCEDURE — 83540 ASSAY OF IRON: CPT | Mod: NTX

## 2021-01-23 PROCEDURE — 63600175 PHARM REV CODE 636 W HCPCS: Mod: NTX | Performed by: FAMILY MEDICINE

## 2021-01-23 RX ORDER — IBUPROFEN 200 MG
16 TABLET ORAL
Status: DISCONTINUED | OUTPATIENT
Start: 2021-01-23 | End: 2021-01-27 | Stop reason: HOSPADM

## 2021-01-23 RX ORDER — AMLODIPINE BESYLATE 2.5 MG/1
2.5 TABLET ORAL DAILY
Status: DISCONTINUED | OUTPATIENT
Start: 2021-01-23 | End: 2021-01-27

## 2021-01-23 RX ORDER — IBUPROFEN 200 MG
24 TABLET ORAL
Status: DISCONTINUED | OUTPATIENT
Start: 2021-01-23 | End: 2021-01-27 | Stop reason: HOSPADM

## 2021-01-23 RX ORDER — GLUCAGON 1 MG
1 KIT INJECTION
Status: DISCONTINUED | OUTPATIENT
Start: 2021-01-23 | End: 2021-01-27 | Stop reason: HOSPADM

## 2021-01-23 RX ORDER — CARVEDILOL 12.5 MG/1
25 TABLET ORAL 2 TIMES DAILY WITH MEALS
Status: DISCONTINUED | OUTPATIENT
Start: 2021-01-24 | End: 2021-01-27 | Stop reason: HOSPADM

## 2021-01-23 RX ORDER — INSULIN ASPART 100 [IU]/ML
1-10 INJECTION, SOLUTION INTRAVENOUS; SUBCUTANEOUS
Status: DISCONTINUED | OUTPATIENT
Start: 2021-01-23 | End: 2021-01-27 | Stop reason: HOSPADM

## 2021-01-23 RX ORDER — ALBUTEROL SULFATE 90 UG/1
2 AEROSOL, METERED RESPIRATORY (INHALATION) EVERY 8 HOURS
Status: DISCONTINUED | OUTPATIENT
Start: 2021-01-23 | End: 2021-01-27 | Stop reason: HOSPADM

## 2021-01-23 RX ADMIN — CARVEDILOL 25 MG: 12.5 TABLET, FILM COATED ORAL at 12:01

## 2021-01-23 RX ADMIN — IPRATROPIUM BROMIDE AND ALBUTEROL SULFATE 3 ML: .5; 3 SOLUTION RESPIRATORY (INHALATION) at 08:01

## 2021-01-23 RX ADMIN — ASPIRIN 81 MG: 81 TABLET, COATED ORAL at 09:01

## 2021-01-23 RX ADMIN — REMDESIVIR 100 MG: 100 INJECTION, POWDER, LYOPHILIZED, FOR SOLUTION INTRAVENOUS at 05:01

## 2021-01-23 RX ADMIN — Medication 1 TABLET: at 09:01

## 2021-01-23 RX ADMIN — INSULIN ASPART 6 UNITS: 100 INJECTION, SOLUTION INTRAVENOUS; SUBCUTANEOUS at 12:01

## 2021-01-23 RX ADMIN — INSULIN DETEMIR 10 UNITS: 100 INJECTION, SOLUTION SUBCUTANEOUS at 09:01

## 2021-01-23 RX ADMIN — TAMSULOSIN HYDROCHLORIDE 0.4 MG: 0.4 CAPSULE ORAL at 12:01

## 2021-01-23 RX ADMIN — HEPARIN SODIUM 5000 UNITS: 5000 INJECTION INTRAVENOUS; SUBCUTANEOUS at 02:01

## 2021-01-23 RX ADMIN — OXYCODONE HYDROCHLORIDE AND ACETAMINOPHEN 500 MG: 500 TABLET ORAL at 08:01

## 2021-01-23 RX ADMIN — HEPARIN SODIUM 5000 UNITS: 5000 INJECTION INTRAVENOUS; SUBCUTANEOUS at 05:01

## 2021-01-23 RX ADMIN — CHOLECALCIFEROL (VITAMIN D3) 25 MCG (1,000 UNIT) TABLET 1000 UNITS: at 09:01

## 2021-01-23 RX ADMIN — FAMOTIDINE 20 MG: 10 INJECTION INTRAVENOUS at 09:01

## 2021-01-23 RX ADMIN — INSULIN ASPART 2 UNITS: 100 INJECTION, SOLUTION INTRAVENOUS; SUBCUTANEOUS at 09:01

## 2021-01-23 RX ADMIN — EPOETIN ALFA-EPBX 6000 UNITS: 10000 INJECTION, SOLUTION INTRAVENOUS; SUBCUTANEOUS at 08:01

## 2021-01-23 RX ADMIN — INSULIN ASPART 4 UNITS: 100 INJECTION, SOLUTION INTRAVENOUS; SUBCUTANEOUS at 05:01

## 2021-01-23 RX ADMIN — IPRATROPIUM BROMIDE AND ALBUTEROL SULFATE 3 ML: .5; 3 SOLUTION RESPIRATORY (INHALATION) at 12:01

## 2021-01-23 RX ADMIN — AZITHROMYCIN MONOHYDRATE 500 MG: 500 INJECTION, POWDER, LYOPHILIZED, FOR SOLUTION INTRAVENOUS at 07:01

## 2021-01-23 RX ADMIN — DEXAMETHASONE SODIUM PHOSPHATE 6 MG: 4 INJECTION INTRA-ARTICULAR; INTRALESIONAL; INTRAMUSCULAR; INTRAVENOUS; SOFT TISSUE at 09:01

## 2021-01-23 RX ADMIN — AMLODIPINE BESYLATE 2.5 MG: 2.5 TABLET ORAL at 02:01

## 2021-01-23 RX ADMIN — CALCITRIOL CAPSULES 0.25 MCG 0.25 MCG: 0.25 CAPSULE ORAL at 09:01

## 2021-01-23 RX ADMIN — OXYCODONE HYDROCHLORIDE AND ACETAMINOPHEN 500 MG: 500 TABLET ORAL at 09:01

## 2021-01-23 RX ADMIN — MUPIROCIN: 20 OINTMENT TOPICAL at 08:01

## 2021-01-23 RX ADMIN — MUPIROCIN: 20 OINTMENT TOPICAL at 10:01

## 2021-01-23 RX ADMIN — ALBUTEROL SULFATE 2 PUFF: 90 AEROSOL, METERED RESPIRATORY (INHALATION) at 02:01

## 2021-01-23 RX ADMIN — INSULIN ASPART 10 UNITS: 100 INJECTION, SOLUTION INTRAVENOUS; SUBCUTANEOUS at 05:01

## 2021-01-23 RX ADMIN — HEPARIN SODIUM 5000 UNITS: 5000 INJECTION INTRAVENOUS; SUBCUTANEOUS at 10:01

## 2021-01-23 RX ADMIN — SEVELAMER CARBONATE 800 MG: 800 TABLET, FILM COATED ORAL at 05:01

## 2021-01-23 RX ADMIN — FINASTERIDE 5 MG: 5 TABLET, FILM COATED ORAL at 12:01

## 2021-01-23 RX ADMIN — PIPERACILLIN AND TAZOBACTAM 4.5 G: 4; .5 INJECTION, POWDER, LYOPHILIZED, FOR SOLUTION INTRAVENOUS; PARENTERAL at 07:01

## 2021-01-23 RX ADMIN — PIPERACILLIN AND TAZOBACTAM 4.5 G: 4; .5 INJECTION, POWDER, LYOPHILIZED, FOR SOLUTION INTRAVENOUS; PARENTERAL at 05:01

## 2021-01-23 RX ADMIN — MANNITOL 25 G: 12.5 INJECTION, SOLUTION INTRAVENOUS at 08:01

## 2021-01-23 RX ADMIN — ATORVASTATIN CALCIUM 40 MG: 40 TABLET, FILM COATED ORAL at 09:01

## 2021-01-24 PROBLEM — R78.81 GRAM-NEGATIVE BACTEREMIA: Status: ACTIVE | Noted: 2021-01-24

## 2021-01-24 LAB
ALBUMIN SERPL BCP-MCNC: 1.8 G/DL (ref 3.5–5.2)
ANION GAP SERPL CALC-SCNC: 17 MMOL/L (ref 8–16)
BACTERIA BLD CULT: ABNORMAL
BASOPHILS # BLD AUTO: 0.03 K/UL (ref 0–0.2)
BASOPHILS NFR BLD: 0.2 % (ref 0–1.9)
BUN SERPL-MCNC: 73 MG/DL (ref 8–23)
CALCIUM SERPL-MCNC: 7.6 MG/DL (ref 8.7–10.5)
CHLORIDE SERPL-SCNC: 91 MMOL/L (ref 95–110)
CO2 SERPL-SCNC: 25 MMOL/L (ref 23–29)
CREAT SERPL-MCNC: 13.4 MG/DL (ref 0.5–1.4)
DIFFERENTIAL METHOD: ABNORMAL
EOSINOPHIL # BLD AUTO: 0 K/UL (ref 0–0.5)
EOSINOPHIL NFR BLD: 0 % (ref 0–8)
ERYTHROCYTE [DISTWIDTH] IN BLOOD BY AUTOMATED COUNT: 13.7 % (ref 11.5–14.5)
EST. GFR  (AFRICAN AMERICAN): 4 ML/MIN/1.73 M^2
EST. GFR  (NON AFRICAN AMERICAN): 3 ML/MIN/1.73 M^2
GLUCOSE SERPL-MCNC: 177 MG/DL (ref 70–110)
HCT VFR BLD AUTO: 33 % (ref 40–54)
HGB BLD-MCNC: 10.9 G/DL (ref 14–18)
IMM GRANULOCYTES # BLD AUTO: 0.24 K/UL (ref 0–0.04)
IMM GRANULOCYTES NFR BLD AUTO: 1.7 % (ref 0–0.5)
LYMPHOCYTES # BLD AUTO: 0.5 K/UL (ref 1–4.8)
LYMPHOCYTES NFR BLD: 3.7 % (ref 18–48)
MAGNESIUM SERPL-MCNC: 1.8 MG/DL (ref 1.6–2.6)
MCH RBC QN AUTO: 25.3 PG (ref 27–31)
MCHC RBC AUTO-ENTMCNC: 33 G/DL (ref 32–36)
MCV RBC AUTO: 77 FL (ref 82–98)
MONOCYTES # BLD AUTO: 1.1 K/UL (ref 0.3–1)
MONOCYTES NFR BLD: 7.6 % (ref 4–15)
NEUTROPHILS # BLD AUTO: 12.5 K/UL (ref 1.8–7.7)
NEUTROPHILS NFR BLD: 86.8 % (ref 38–73)
NRBC BLD-RTO: 0 /100 WBC
PHOSPHATE SERPL-MCNC: 5.7 MG/DL (ref 2.7–4.5)
PLATELET # BLD AUTO: 365 K/UL (ref 150–350)
PMV BLD AUTO: 11 FL (ref 9.2–12.9)
POCT GLUCOSE: 147 MG/DL (ref 70–110)
POCT GLUCOSE: 180 MG/DL (ref 70–110)
POCT GLUCOSE: 188 MG/DL (ref 70–110)
POCT GLUCOSE: 194 MG/DL (ref 70–110)
POCT GLUCOSE: 219 MG/DL (ref 70–110)
POTASSIUM SERPL-SCNC: 3.5 MMOL/L (ref 3.5–5.1)
RBC # BLD AUTO: 4.31 M/UL (ref 4.6–6.2)
SODIUM SERPL-SCNC: 133 MMOL/L (ref 136–145)
WBC # BLD AUTO: 14.38 K/UL (ref 3.9–12.7)

## 2021-01-24 PROCEDURE — 25000003 PHARM REV CODE 250: Mod: NTX | Performed by: INTERNAL MEDICINE

## 2021-01-24 PROCEDURE — 85025 COMPLETE CBC W/AUTO DIFF WBC: CPT | Mod: NTX

## 2021-01-24 PROCEDURE — 99291 PR CRITICAL CARE, E/M 30-74 MINUTES: ICD-10-PCS | Mod: NTX,,, | Performed by: INTERNAL MEDICINE

## 2021-01-24 PROCEDURE — 80069 RENAL FUNCTION PANEL: CPT | Mod: NTX

## 2021-01-24 PROCEDURE — 20000000 HC ICU ROOM: Mod: NTX

## 2021-01-24 PROCEDURE — 36415 COLL VENOUS BLD VENIPUNCTURE: CPT | Mod: NTX

## 2021-01-24 PROCEDURE — 94799 UNLISTED PULMONARY SVC/PX: CPT | Mod: NTX

## 2021-01-24 PROCEDURE — 25000242 PHARM REV CODE 250 ALT 637 W/ HCPCS: Mod: NTX | Performed by: FAMILY MEDICINE

## 2021-01-24 PROCEDURE — 27100171 HC OXYGEN HIGH FLOW UP TO 24 HOURS: Mod: NTX

## 2021-01-24 PROCEDURE — 94640 AIRWAY INHALATION TREATMENT: CPT | Mod: NTX

## 2021-01-24 PROCEDURE — 83735 ASSAY OF MAGNESIUM: CPT | Mod: NTX

## 2021-01-24 PROCEDURE — 63600175 PHARM REV CODE 636 W HCPCS: Mod: NTX | Performed by: INTERNAL MEDICINE

## 2021-01-24 PROCEDURE — 63600175 PHARM REV CODE 636 W HCPCS: Mod: NTX | Performed by: FAMILY MEDICINE

## 2021-01-24 PROCEDURE — 99291 CRITICAL CARE FIRST HOUR: CPT | Mod: NTX,,, | Performed by: INTERNAL MEDICINE

## 2021-01-24 PROCEDURE — 99900035 HC TECH TIME PER 15 MIN (STAT): Mod: NTX

## 2021-01-24 RX ORDER — SODIUM CHLORIDE 9 MG/ML
INJECTION, SOLUTION INTRAVENOUS ONCE
Status: CANCELLED | OUTPATIENT
Start: 2021-01-24 | End: 2021-01-24

## 2021-01-24 RX ORDER — IPRATROPIUM BROMIDE AND ALBUTEROL SULFATE 2.5; .5 MG/3ML; MG/3ML
3 SOLUTION RESPIRATORY (INHALATION) EVERY 6 HOURS PRN
Status: DISCONTINUED | OUTPATIENT
Start: 2021-01-24 | End: 2021-01-27 | Stop reason: HOSPADM

## 2021-01-24 RX ADMIN — TAMSULOSIN HYDROCHLORIDE 0.4 MG: 0.4 CAPSULE ORAL at 09:01

## 2021-01-24 RX ADMIN — ALBUTEROL SULFATE 2 PUFF: 90 AEROSOL, METERED RESPIRATORY (INHALATION) at 11:01

## 2021-01-24 RX ADMIN — SEVELAMER CARBONATE 800 MG: 800 TABLET, FILM COATED ORAL at 07:01

## 2021-01-24 RX ADMIN — OXYCODONE HYDROCHLORIDE AND ACETAMINOPHEN 500 MG: 500 TABLET ORAL at 08:01

## 2021-01-24 RX ADMIN — IPRATROPIUM BROMIDE AND ALBUTEROL SULFATE 3 ML: .5; 3 SOLUTION RESPIRATORY (INHALATION) at 12:01

## 2021-01-24 RX ADMIN — HEPARIN SODIUM 5000 UNITS: 5000 INJECTION INTRAVENOUS; SUBCUTANEOUS at 03:01

## 2021-01-24 RX ADMIN — FINASTERIDE 5 MG: 5 TABLET, FILM COATED ORAL at 09:01

## 2021-01-24 RX ADMIN — CARVEDILOL 25 MG: 12.5 TABLET, FILM COATED ORAL at 07:01

## 2021-01-24 RX ADMIN — SEVELAMER CARBONATE 800 MG: 800 TABLET, FILM COATED ORAL at 04:01

## 2021-01-24 RX ADMIN — CHOLECALCIFEROL (VITAMIN D3) 25 MCG (1,000 UNIT) TABLET 1000 UNITS: at 09:01

## 2021-01-24 RX ADMIN — CALCITRIOL CAPSULES 0.25 MCG 0.25 MCG: 0.25 CAPSULE ORAL at 09:01

## 2021-01-24 RX ADMIN — MUPIROCIN: 20 OINTMENT TOPICAL at 08:01

## 2021-01-24 RX ADMIN — FAMOTIDINE 20 MG: 10 INJECTION INTRAVENOUS at 09:01

## 2021-01-24 RX ADMIN — Medication 1 TABLET: at 09:01

## 2021-01-24 RX ADMIN — PIPERACILLIN AND TAZOBACTAM 4.5 G: 4; .5 INJECTION, POWDER, LYOPHILIZED, FOR SOLUTION INTRAVENOUS; PARENTERAL at 05:01

## 2021-01-24 RX ADMIN — CARVEDILOL 25 MG: 12.5 TABLET, FILM COATED ORAL at 04:01

## 2021-01-24 RX ADMIN — HEPARIN SODIUM 5000 UNITS: 5000 INJECTION INTRAVENOUS; SUBCUTANEOUS at 05:01

## 2021-01-24 RX ADMIN — ATORVASTATIN CALCIUM 40 MG: 40 TABLET, FILM COATED ORAL at 09:01

## 2021-01-24 RX ADMIN — ALBUTEROL SULFATE 2 PUFF: 90 AEROSOL, METERED RESPIRATORY (INHALATION) at 01:01

## 2021-01-24 RX ADMIN — AMLODIPINE BESYLATE 2.5 MG: 2.5 TABLET ORAL at 07:01

## 2021-01-24 RX ADMIN — INSULIN ASPART 2 UNITS: 100 INJECTION, SOLUTION INTRAVENOUS; SUBCUTANEOUS at 06:01

## 2021-01-24 RX ADMIN — ALBUTEROL SULFATE 2 PUFF: 90 AEROSOL, METERED RESPIRATORY (INHALATION) at 08:01

## 2021-01-24 RX ADMIN — OXYCODONE HYDROCHLORIDE AND ACETAMINOPHEN 500 MG: 500 TABLET ORAL at 09:01

## 2021-01-24 RX ADMIN — ASPIRIN 81 MG: 81 TABLET, COATED ORAL at 09:01

## 2021-01-24 RX ADMIN — INSULIN ASPART 1 UNITS: 100 INJECTION, SOLUTION INTRAVENOUS; SUBCUTANEOUS at 09:01

## 2021-01-24 RX ADMIN — HEPARIN SODIUM 5000 UNITS: 5000 INJECTION INTRAVENOUS; SUBCUTANEOUS at 09:01

## 2021-01-24 RX ADMIN — INSULIN ASPART 4 UNITS: 100 INJECTION, SOLUTION INTRAVENOUS; SUBCUTANEOUS at 10:01

## 2021-01-24 RX ADMIN — REMDESIVIR 100 MG: 100 INJECTION, POWDER, LYOPHILIZED, FOR SOLUTION INTRAVENOUS at 05:01

## 2021-01-24 RX ADMIN — DEXAMETHASONE SODIUM PHOSPHATE 6 MG: 4 INJECTION INTRA-ARTICULAR; INTRALESIONAL; INTRAMUSCULAR; INTRAVENOUS; SOFT TISSUE at 09:01

## 2021-01-24 NOTE — PATIENT INSTRUCTIONS
Avoid NSAID pain medications such as advil, aleve, motrin, ibuprofen, naprosyn, meloxicam, diclofenac, mobic.            Gia Soto(NP)

## 2021-01-25 ENCOUNTER — TELEPHONE (OUTPATIENT)
Dept: NEPHROLOGY | Facility: CLINIC | Age: 66
End: 2021-01-25

## 2021-01-25 LAB
ALBUMIN SERPL BCP-MCNC: 1.7 G/DL (ref 3.5–5.2)
ALBUMIN SERPL BCP-MCNC: 1.7 G/DL (ref 3.5–5.2)
ALP SERPL-CCNC: 68 U/L (ref 55–135)
ALT SERPL W/O P-5'-P-CCNC: 27 U/L (ref 10–44)
ANION GAP SERPL CALC-SCNC: 19 MMOL/L (ref 8–16)
AST SERPL-CCNC: 89 U/L (ref 10–40)
BASOPHILS # BLD AUTO: 0.04 K/UL (ref 0–0.2)
BASOPHILS NFR BLD: 0.3 % (ref 0–1.9)
BILIRUB DIRECT SERPL-MCNC: 0.2 MG/DL (ref 0.1–0.3)
BILIRUB SERPL-MCNC: 0.3 MG/DL (ref 0.1–1)
BUN SERPL-MCNC: 86 MG/DL (ref 8–23)
CALCIUM SERPL-MCNC: 7 MG/DL (ref 8.7–10.5)
CHLORIDE SERPL-SCNC: 90 MMOL/L (ref 95–110)
CO2 SERPL-SCNC: 22 MMOL/L (ref 23–29)
CREAT SERPL-MCNC: 15.1 MG/DL (ref 0.5–1.4)
DIFFERENTIAL METHOD: ABNORMAL
EOSINOPHIL # BLD AUTO: 0 K/UL (ref 0–0.5)
EOSINOPHIL NFR BLD: 0.2 % (ref 0–8)
ERYTHROCYTE [DISTWIDTH] IN BLOOD BY AUTOMATED COUNT: 13.6 % (ref 11.5–14.5)
EST. GFR  (AFRICAN AMERICAN): 3 ML/MIN/1.73 M^2
EST. GFR  (NON AFRICAN AMERICAN): 3 ML/MIN/1.73 M^2
GLUCOSE SERPL-MCNC: 147 MG/DL (ref 70–110)
HCT VFR BLD AUTO: 31.4 % (ref 40–54)
HGB BLD-MCNC: 10.4 G/DL (ref 14–18)
IMM GRANULOCYTES # BLD AUTO: 0.78 K/UL (ref 0–0.04)
IMM GRANULOCYTES NFR BLD AUTO: 5 % (ref 0–0.5)
LYMPHOCYTES # BLD AUTO: 0.9 K/UL (ref 1–4.8)
LYMPHOCYTES NFR BLD: 5.5 % (ref 18–48)
MAGNESIUM SERPL-MCNC: 2 MG/DL (ref 1.6–2.6)
MCH RBC QN AUTO: 25.4 PG (ref 27–31)
MCHC RBC AUTO-ENTMCNC: 33.1 G/DL (ref 32–36)
MCV RBC AUTO: 77 FL (ref 82–98)
MONOCYTES # BLD AUTO: 1.2 K/UL (ref 0.3–1)
MONOCYTES NFR BLD: 7.9 % (ref 4–15)
NEUTROPHILS # BLD AUTO: 12.6 K/UL (ref 1.8–7.7)
NEUTROPHILS NFR BLD: 81.1 % (ref 38–73)
NRBC BLD-RTO: 0 /100 WBC
PHOSPHATE SERPL-MCNC: 6.8 MG/DL (ref 2.7–4.5)
PLATELET # BLD AUTO: 356 K/UL (ref 150–350)
PMV BLD AUTO: 11.2 FL (ref 9.2–12.9)
POCT GLUCOSE: 144 MG/DL (ref 70–110)
POCT GLUCOSE: 155 MG/DL (ref 70–110)
POCT GLUCOSE: 222 MG/DL (ref 70–110)
POCT GLUCOSE: 230 MG/DL (ref 70–110)
POTASSIUM SERPL-SCNC: 3.7 MMOL/L (ref 3.5–5.1)
PROT SERPL-MCNC: 5.6 G/DL (ref 6–8.4)
RBC # BLD AUTO: 4.1 M/UL (ref 4.6–6.2)
SODIUM SERPL-SCNC: 131 MMOL/L (ref 136–145)
WBC # BLD AUTO: 15.5 K/UL (ref 3.9–12.7)

## 2021-01-25 PROCEDURE — 25000003 PHARM REV CODE 250: Mod: NTX | Performed by: INTERNAL MEDICINE

## 2021-01-25 PROCEDURE — 99233 SBSQ HOSP IP/OBS HIGH 50: CPT | Mod: NTX,,, | Performed by: INTERNAL MEDICINE

## 2021-01-25 PROCEDURE — 99233 PR SUBSEQUENT HOSPITAL CARE,LEVL III: ICD-10-PCS | Mod: NTX,,, | Performed by: INTERNAL MEDICINE

## 2021-01-25 PROCEDURE — 27100171 HC OXYGEN HIGH FLOW UP TO 24 HOURS: Mod: NTX

## 2021-01-25 PROCEDURE — 63600175 PHARM REV CODE 636 W HCPCS: Mod: NTX | Performed by: INTERNAL MEDICINE

## 2021-01-25 PROCEDURE — 80069 RENAL FUNCTION PANEL: CPT | Mod: NTX

## 2021-01-25 PROCEDURE — 99900035 HC TECH TIME PER 15 MIN (STAT): Mod: NTX

## 2021-01-25 PROCEDURE — 36415 COLL VENOUS BLD VENIPUNCTURE: CPT | Mod: NTX

## 2021-01-25 PROCEDURE — 27201247 HC HEMODIALYSIS, SET-UP & CANCEL: Mod: NTX

## 2021-01-25 PROCEDURE — 87040 BLOOD CULTURE FOR BACTERIA: CPT | Mod: NTX

## 2021-01-25 PROCEDURE — 96372 THER/PROPH/DIAG INJ SC/IM: CPT | Mod: NTX

## 2021-01-25 PROCEDURE — 21400001 HC TELEMETRY ROOM: Mod: NTX

## 2021-01-25 PROCEDURE — 85027 COMPLETE CBC AUTOMATED: CPT | Mod: NTX

## 2021-01-25 PROCEDURE — 80076 HEPATIC FUNCTION PANEL: CPT | Mod: NTX

## 2021-01-25 PROCEDURE — 83735 ASSAY OF MAGNESIUM: CPT | Mod: NTX

## 2021-01-25 PROCEDURE — 94640 AIRWAY INHALATION TREATMENT: CPT | Mod: NTX

## 2021-01-25 PROCEDURE — 94760 N-INVAS EAR/PLS OXIMETRY 1: CPT | Mod: NTX

## 2021-01-25 PROCEDURE — 85007 BL SMEAR W/DIFF WBC COUNT: CPT | Mod: NTX

## 2021-01-25 RX ORDER — HEPARIN SODIUM 1000 [USP'U]/ML
2000 INJECTION, SOLUTION INTRAVENOUS; SUBCUTANEOUS
Status: DISCONTINUED | OUTPATIENT
Start: 2021-01-25 | End: 2021-01-27 | Stop reason: HOSPADM

## 2021-01-25 RX ORDER — PANTOPRAZOLE SODIUM 40 MG/1
40 TABLET, DELAYED RELEASE ORAL DAILY
Status: DISCONTINUED | OUTPATIENT
Start: 2021-01-25 | End: 2021-01-27 | Stop reason: HOSPADM

## 2021-01-25 RX ADMIN — REMDESIVIR 100 MG: 100 INJECTION, POWDER, LYOPHILIZED, FOR SOLUTION INTRAVENOUS at 04:01

## 2021-01-25 RX ADMIN — CARVEDILOL 25 MG: 12.5 TABLET, FILM COATED ORAL at 04:01

## 2021-01-25 RX ADMIN — HEPARIN SODIUM 5000 UNITS: 5000 INJECTION INTRAVENOUS; SUBCUTANEOUS at 02:01

## 2021-01-25 RX ADMIN — CHOLECALCIFEROL (VITAMIN D3) 25 MCG (1,000 UNIT) TABLET 1000 UNITS: at 08:01

## 2021-01-25 RX ADMIN — INSULIN ASPART 4 UNITS: 100 INJECTION, SOLUTION INTRAVENOUS; SUBCUTANEOUS at 04:01

## 2021-01-25 RX ADMIN — TAMSULOSIN HYDROCHLORIDE 0.4 MG: 0.4 CAPSULE ORAL at 08:01

## 2021-01-25 RX ADMIN — CEFTRIAXONE 1 G: 1 INJECTION, SOLUTION INTRAVENOUS at 09:01

## 2021-01-25 RX ADMIN — OXYCODONE HYDROCHLORIDE AND ACETAMINOPHEN 500 MG: 500 TABLET ORAL at 08:01

## 2021-01-25 RX ADMIN — ALBUTEROL SULFATE 2 PUFF: 90 AEROSOL, METERED RESPIRATORY (INHALATION) at 03:01

## 2021-01-25 RX ADMIN — PANTOPRAZOLE SODIUM 40 MG: 40 TABLET, DELAYED RELEASE ORAL at 08:01

## 2021-01-25 RX ADMIN — DEXAMETHASONE 6 MG: 4 TABLET ORAL at 08:01

## 2021-01-25 RX ADMIN — SEVELAMER CARBONATE 800 MG: 800 TABLET, FILM COATED ORAL at 04:01

## 2021-01-25 RX ADMIN — ALBUTEROL SULFATE 2 PUFF: 90 AEROSOL, METERED RESPIRATORY (INHALATION) at 08:01

## 2021-01-25 RX ADMIN — HEPARIN SODIUM 5000 UNITS: 5000 INJECTION INTRAVENOUS; SUBCUTANEOUS at 09:01

## 2021-01-25 RX ADMIN — AMLODIPINE BESYLATE 2.5 MG: 2.5 TABLET ORAL at 08:01

## 2021-01-25 RX ADMIN — CARVEDILOL 25 MG: 12.5 TABLET, FILM COATED ORAL at 08:01

## 2021-01-25 RX ADMIN — INSULIN ASPART 2 UNITS: 100 INJECTION, SOLUTION INTRAVENOUS; SUBCUTANEOUS at 09:01

## 2021-01-25 RX ADMIN — CALCITRIOL CAPSULES 0.25 MCG 0.25 MCG: 0.25 CAPSULE ORAL at 08:01

## 2021-01-25 RX ADMIN — ASPIRIN 81 MG: 81 TABLET, COATED ORAL at 08:01

## 2021-01-25 RX ADMIN — MUPIROCIN: 20 OINTMENT TOPICAL at 08:01

## 2021-01-25 RX ADMIN — FINASTERIDE 5 MG: 5 TABLET, FILM COATED ORAL at 08:01

## 2021-01-25 RX ADMIN — HEPARIN SODIUM 5000 UNITS: 5000 INJECTION INTRAVENOUS; SUBCUTANEOUS at 05:01

## 2021-01-25 RX ADMIN — MUPIROCIN: 20 OINTMENT TOPICAL at 09:01

## 2021-01-25 RX ADMIN — Medication 1 TABLET: at 08:01

## 2021-01-25 RX ADMIN — INSULIN ASPART 2 UNITS: 100 INJECTION, SOLUTION INTRAVENOUS; SUBCUTANEOUS at 11:01

## 2021-01-25 RX ADMIN — OXYCODONE HYDROCHLORIDE AND ACETAMINOPHEN 500 MG: 500 TABLET ORAL at 09:01

## 2021-01-25 RX ADMIN — ATORVASTATIN CALCIUM 40 MG: 40 TABLET, FILM COATED ORAL at 08:01

## 2021-01-26 LAB
ALBUMIN SERPL BCP-MCNC: 1.8 G/DL (ref 3.5–5.2)
ANION GAP SERPL CALC-SCNC: 21 MMOL/L (ref 8–16)
BASOPHILS NFR BLD: 0 % (ref 0–1.9)
BUN SERPL-MCNC: 110 MG/DL (ref 8–23)
CALCIUM SERPL-MCNC: 7.2 MG/DL (ref 8.7–10.5)
CHLORIDE SERPL-SCNC: 91 MMOL/L (ref 95–110)
CO2 SERPL-SCNC: 21 MMOL/L (ref 23–29)
CREAT SERPL-MCNC: 17.9 MG/DL (ref 0.5–1.4)
DIFFERENTIAL METHOD: ABNORMAL
EOSINOPHIL NFR BLD: 0 % (ref 0–8)
ERYTHROCYTE [DISTWIDTH] IN BLOOD BY AUTOMATED COUNT: 13.5 % (ref 11.5–14.5)
EST. GFR  (AFRICAN AMERICAN): 3 ML/MIN/1.73 M^2
EST. GFR  (NON AFRICAN AMERICAN): 2 ML/MIN/1.73 M^2
GLUCOSE SERPL-MCNC: 149 MG/DL (ref 70–110)
HCT VFR BLD AUTO: 34 % (ref 40–54)
HGB BLD-MCNC: 11.2 G/DL (ref 14–18)
IMM GRANULOCYTES # BLD AUTO: ABNORMAL K/UL (ref 0–0.04)
IMM GRANULOCYTES NFR BLD AUTO: ABNORMAL % (ref 0–0.5)
LYMPHOCYTES NFR BLD: 6 % (ref 18–48)
MAGNESIUM SERPL-MCNC: 2.1 MG/DL (ref 1.6–2.6)
MCH RBC QN AUTO: 24.9 PG (ref 27–31)
MCHC RBC AUTO-ENTMCNC: 32.9 G/DL (ref 32–36)
MCV RBC AUTO: 76 FL (ref 82–98)
MONOCYTES NFR BLD: 6 % (ref 4–15)
MYELOCYTES NFR BLD MANUAL: 3 %
NEUTROPHILS NFR BLD: 85 % (ref 38–73)
NRBC BLD-RTO: 0 /100 WBC
PHOSPHATE SERPL-MCNC: 8.3 MG/DL (ref 2.7–4.5)
PLATELET # BLD AUTO: 430 K/UL (ref 150–350)
PLATELET BLD QL SMEAR: ABNORMAL
PMV BLD AUTO: 11.4 FL (ref 9.2–12.9)
POCT GLUCOSE: 148 MG/DL (ref 70–110)
POCT GLUCOSE: 179 MG/DL (ref 70–110)
POCT GLUCOSE: 249 MG/DL (ref 70–110)
POCT GLUCOSE: 288 MG/DL (ref 70–110)
POTASSIUM SERPL-SCNC: 4 MMOL/L (ref 3.5–5.1)
RBC # BLD AUTO: 4.49 M/UL (ref 4.6–6.2)
SODIUM SERPL-SCNC: 133 MMOL/L (ref 136–145)
WBC # BLD AUTO: 18.04 K/UL (ref 3.9–12.7)

## 2021-01-26 PROCEDURE — 80069 RENAL FUNCTION PANEL: CPT | Mod: NTX

## 2021-01-26 PROCEDURE — 99233 PR SUBSEQUENT HOSPITAL CARE,LEVL III: ICD-10-PCS | Mod: NTX,,, | Performed by: INTERNAL MEDICINE

## 2021-01-26 PROCEDURE — 85007 BL SMEAR W/DIFF WBC COUNT: CPT | Mod: NTX

## 2021-01-26 PROCEDURE — 83735 ASSAY OF MAGNESIUM: CPT | Mod: NTX

## 2021-01-26 PROCEDURE — 80100014 HC HEMODIALYSIS 1:1: Mod: NTX

## 2021-01-26 PROCEDURE — 36415 COLL VENOUS BLD VENIPUNCTURE: CPT | Mod: NTX

## 2021-01-26 PROCEDURE — 25000003 PHARM REV CODE 250: Mod: NTX | Performed by: INTERNAL MEDICINE

## 2021-01-26 PROCEDURE — 94640 AIRWAY INHALATION TREATMENT: CPT | Mod: NTX

## 2021-01-26 PROCEDURE — 94760 N-INVAS EAR/PLS OXIMETRY 1: CPT | Mod: NTX

## 2021-01-26 PROCEDURE — 85027 COMPLETE CBC AUTOMATED: CPT | Mod: NTX

## 2021-01-26 PROCEDURE — 94799 UNLISTED PULMONARY SVC/PX: CPT | Mod: NTX

## 2021-01-26 PROCEDURE — 94761 N-INVAS EAR/PLS OXIMETRY MLT: CPT | Mod: NTX

## 2021-01-26 PROCEDURE — 99233 SBSQ HOSP IP/OBS HIGH 50: CPT | Mod: NTX,,, | Performed by: INTERNAL MEDICINE

## 2021-01-26 PROCEDURE — 27100171 HC OXYGEN HIGH FLOW UP TO 24 HOURS: Mod: NTX

## 2021-01-26 PROCEDURE — 63600175 PHARM REV CODE 636 W HCPCS: Mod: NTX | Performed by: INTERNAL MEDICINE

## 2021-01-26 PROCEDURE — 99900035 HC TECH TIME PER 15 MIN (STAT): Mod: NTX

## 2021-01-26 PROCEDURE — 99232 SBSQ HOSP IP/OBS MODERATE 35: CPT | Mod: NTX,,, | Performed by: INTERNAL MEDICINE

## 2021-01-26 PROCEDURE — 21400001 HC TELEMETRY ROOM: Mod: NTX

## 2021-01-26 PROCEDURE — 63600175 PHARM REV CODE 636 W HCPCS: Mod: NTX | Performed by: NURSE PRACTITIONER

## 2021-01-26 PROCEDURE — 99232 PR SUBSEQUENT HOSPITAL CARE,LEVL II: ICD-10-PCS | Mod: NTX,,, | Performed by: INTERNAL MEDICINE

## 2021-01-26 RX ORDER — HYDRALAZINE HYDROCHLORIDE 20 MG/ML
10 INJECTION INTRAMUSCULAR; INTRAVENOUS EVERY 6 HOURS PRN
Status: DISCONTINUED | OUTPATIENT
Start: 2021-01-26 | End: 2021-01-27 | Stop reason: HOSPADM

## 2021-01-26 RX ORDER — HYDRALAZINE HYDROCHLORIDE 20 MG/ML
10 INJECTION INTRAMUSCULAR; INTRAVENOUS ONCE
Status: COMPLETED | OUTPATIENT
Start: 2021-01-26 | End: 2021-01-26

## 2021-01-26 RX ADMIN — CARVEDILOL 25 MG: 12.5 TABLET, FILM COATED ORAL at 08:01

## 2021-01-26 RX ADMIN — DEXAMETHASONE 6 MG: 4 TABLET ORAL at 08:01

## 2021-01-26 RX ADMIN — CARVEDILOL 25 MG: 12.5 TABLET, FILM COATED ORAL at 06:01

## 2021-01-26 RX ADMIN — SEVELAMER CARBONATE 800 MG: 800 TABLET, FILM COATED ORAL at 06:01

## 2021-01-26 RX ADMIN — SEVELAMER CARBONATE 800 MG: 800 TABLET, FILM COATED ORAL at 11:01

## 2021-01-26 RX ADMIN — ASPIRIN 81 MG: 81 TABLET, COATED ORAL at 08:01

## 2021-01-26 RX ADMIN — ALBUTEROL SULFATE 2 PUFF: 90 AEROSOL, METERED RESPIRATORY (INHALATION) at 07:01

## 2021-01-26 RX ADMIN — FINASTERIDE 5 MG: 5 TABLET, FILM COATED ORAL at 08:01

## 2021-01-26 RX ADMIN — Medication 1 TABLET: at 08:01

## 2021-01-26 RX ADMIN — HEPARIN SODIUM 5000 UNITS: 5000 INJECTION INTRAVENOUS; SUBCUTANEOUS at 02:01

## 2021-01-26 RX ADMIN — AMLODIPINE BESYLATE 2.5 MG: 2.5 TABLET ORAL at 08:01

## 2021-01-26 RX ADMIN — PANTOPRAZOLE SODIUM 40 MG: 40 TABLET, DELAYED RELEASE ORAL at 08:01

## 2021-01-26 RX ADMIN — CEFTRIAXONE 1 G: 1 INJECTION, SOLUTION INTRAVENOUS at 09:01

## 2021-01-26 RX ADMIN — MUPIROCIN: 20 OINTMENT TOPICAL at 09:01

## 2021-01-26 RX ADMIN — ALBUTEROL SULFATE 2 PUFF: 90 AEROSOL, METERED RESPIRATORY (INHALATION) at 12:01

## 2021-01-26 RX ADMIN — EPOETIN ALFA-EPBX 6000 UNITS: 10000 INJECTION, SOLUTION INTRAVENOUS; SUBCUTANEOUS at 11:01

## 2021-01-26 RX ADMIN — OXYCODONE HYDROCHLORIDE AND ACETAMINOPHEN 500 MG: 500 TABLET ORAL at 08:01

## 2021-01-26 RX ADMIN — CALCITRIOL CAPSULES 0.25 MCG 0.25 MCG: 0.25 CAPSULE ORAL at 08:01

## 2021-01-26 RX ADMIN — ATORVASTATIN CALCIUM 40 MG: 40 TABLET, FILM COATED ORAL at 08:01

## 2021-01-26 RX ADMIN — HYDRALAZINE HYDROCHLORIDE 10 MG: 20 INJECTION, SOLUTION INTRAMUSCULAR; INTRAVENOUS at 12:01

## 2021-01-26 RX ADMIN — SEVELAMER CARBONATE 800 MG: 800 TABLET, FILM COATED ORAL at 08:01

## 2021-01-26 RX ADMIN — INSULIN ASPART 2 UNITS: 100 INJECTION, SOLUTION INTRAVENOUS; SUBCUTANEOUS at 05:01

## 2021-01-26 RX ADMIN — CHOLECALCIFEROL (VITAMIN D3) 25 MCG (1,000 UNIT) TABLET 1000 UNITS: at 08:01

## 2021-01-26 RX ADMIN — HEPARIN SODIUM 5000 UNITS: 5000 INJECTION INTRAVENOUS; SUBCUTANEOUS at 05:01

## 2021-01-26 RX ADMIN — TAMSULOSIN HYDROCHLORIDE 0.4 MG: 0.4 CAPSULE ORAL at 08:01

## 2021-01-26 RX ADMIN — INSULIN ASPART 3 UNITS: 100 INJECTION, SOLUTION INTRAVENOUS; SUBCUTANEOUS at 09:01

## 2021-01-26 RX ADMIN — OXYCODONE HYDROCHLORIDE AND ACETAMINOPHEN 500 MG: 500 TABLET ORAL at 09:01

## 2021-01-26 RX ADMIN — HYDRALAZINE HYDROCHLORIDE 10 MG: 20 INJECTION INTRAMUSCULAR; INTRAVENOUS at 09:01

## 2021-01-27 VITALS
TEMPERATURE: 96 F | OXYGEN SATURATION: 97 % | HEART RATE: 88 BPM | DIASTOLIC BLOOD PRESSURE: 89 MMHG | SYSTOLIC BLOOD PRESSURE: 172 MMHG | BODY MASS INDEX: 35.45 KG/M2 | RESPIRATION RATE: 17 BRPM | WEIGHT: 276.25 LBS | HEIGHT: 74 IN

## 2021-01-27 PROBLEM — N17.9 ACUTE RENAL FAILURE: Status: RESOLVED | Noted: 2021-01-21 | Resolved: 2021-01-27

## 2021-01-27 PROBLEM — R78.81 GRAM-NEGATIVE BACTEREMIA: Status: RESOLVED | Noted: 2021-01-24 | Resolved: 2021-01-27

## 2021-01-27 LAB
ALBUMIN SERPL BCP-MCNC: 2 G/DL (ref 3.5–5.2)
ANION GAP SERPL CALC-SCNC: 17 MMOL/L (ref 8–16)
BACTERIA BLD CULT: NORMAL
BASOPHILS NFR BLD: 0 % (ref 0–1.9)
BUN SERPL-MCNC: 81 MG/DL (ref 8–23)
CALCIUM SERPL-MCNC: 7.6 MG/DL (ref 8.7–10.5)
CHLORIDE SERPL-SCNC: 93 MMOL/L (ref 95–110)
CO2 SERPL-SCNC: 21 MMOL/L (ref 23–29)
CREAT SERPL-MCNC: 14.6 MG/DL (ref 0.5–1.4)
DIFFERENTIAL METHOD: ABNORMAL
EOSINOPHIL NFR BLD: 1 % (ref 0–8)
ERYTHROCYTE [DISTWIDTH] IN BLOOD BY AUTOMATED COUNT: 13.5 % (ref 11.5–14.5)
EST. GFR  (AFRICAN AMERICAN): 4 ML/MIN/1.73 M^2
EST. GFR  (NON AFRICAN AMERICAN): 3 ML/MIN/1.73 M^2
GLUCOSE SERPL-MCNC: 128 MG/DL (ref 70–110)
HCT VFR BLD AUTO: 35.2 % (ref 40–54)
HGB BLD-MCNC: 11.3 G/DL (ref 14–18)
IMM GRANULOCYTES # BLD AUTO: ABNORMAL K/UL (ref 0–0.04)
IMM GRANULOCYTES NFR BLD AUTO: ABNORMAL % (ref 0–0.5)
LYMPHOCYTES NFR BLD: 9 % (ref 18–48)
MAGNESIUM SERPL-MCNC: 2.1 MG/DL (ref 1.6–2.6)
MCH RBC QN AUTO: 24.8 PG (ref 27–31)
MCHC RBC AUTO-ENTMCNC: 32.1 G/DL (ref 32–36)
MCV RBC AUTO: 77 FL (ref 82–98)
METAMYELOCYTES NFR BLD MANUAL: 4 %
MONOCYTES NFR BLD: 7 % (ref 4–15)
NEUTROPHILS NFR BLD: 75 % (ref 38–73)
NEUTS BAND NFR BLD MANUAL: 4 %
NRBC BLD-RTO: 1 /100 WBC
PHOSPHATE SERPL-MCNC: 6.8 MG/DL (ref 2.7–4.5)
PLATELET # BLD AUTO: 466 K/UL (ref 150–350)
PMV BLD AUTO: 11.1 FL (ref 9.2–12.9)
POCT GLUCOSE: 112 MG/DL (ref 70–110)
POCT GLUCOSE: 126 MG/DL (ref 70–110)
POTASSIUM SERPL-SCNC: 3.9 MMOL/L (ref 3.5–5.1)
RBC # BLD AUTO: 4.55 M/UL (ref 4.6–6.2)
SODIUM SERPL-SCNC: 131 MMOL/L (ref 136–145)
WBC # BLD AUTO: 20.45 K/UL (ref 3.9–12.7)

## 2021-01-27 PROCEDURE — 85027 COMPLETE CBC AUTOMATED: CPT | Mod: NTX

## 2021-01-27 PROCEDURE — 25000003 PHARM REV CODE 250: Mod: NTX | Performed by: INTERNAL MEDICINE

## 2021-01-27 PROCEDURE — 99900035 HC TECH TIME PER 15 MIN (STAT): Mod: NTX

## 2021-01-27 PROCEDURE — 99233 PR SUBSEQUENT HOSPITAL CARE,LEVL III: ICD-10-PCS | Mod: NTX,,, | Performed by: INTERNAL MEDICINE

## 2021-01-27 PROCEDURE — 63600175 PHARM REV CODE 636 W HCPCS: Mod: NTX | Performed by: INTERNAL MEDICINE

## 2021-01-27 PROCEDURE — 94799 UNLISTED PULMONARY SVC/PX: CPT | Mod: NTX

## 2021-01-27 PROCEDURE — 85007 BL SMEAR W/DIFF WBC COUNT: CPT | Mod: NTX

## 2021-01-27 PROCEDURE — 99233 SBSQ HOSP IP/OBS HIGH 50: CPT | Mod: NTX,,, | Performed by: INTERNAL MEDICINE

## 2021-01-27 PROCEDURE — 27100171 HC OXYGEN HIGH FLOW UP TO 24 HOURS: Mod: NTX

## 2021-01-27 PROCEDURE — 94640 AIRWAY INHALATION TREATMENT: CPT | Mod: NTX

## 2021-01-27 PROCEDURE — 94760 N-INVAS EAR/PLS OXIMETRY 1: CPT | Mod: NTX

## 2021-01-27 PROCEDURE — 36415 COLL VENOUS BLD VENIPUNCTURE: CPT | Mod: NTX

## 2021-01-27 PROCEDURE — 80069 RENAL FUNCTION PANEL: CPT | Mod: NTX

## 2021-01-27 PROCEDURE — 94761 N-INVAS EAR/PLS OXIMETRY MLT: CPT | Mod: NTX

## 2021-01-27 PROCEDURE — 83735 ASSAY OF MAGNESIUM: CPT | Mod: NTX

## 2021-01-27 RX ORDER — DEXAMETHASONE 6 MG/1
6 TABLET ORAL DAILY
Qty: 10 TABLET | Refills: 0 | Status: SHIPPED | OUTPATIENT
Start: 2021-01-28 | End: 2021-02-07

## 2021-01-27 RX ORDER — CEFTRIAXONE 1 G/50ML
2 INJECTION, SOLUTION INTRAVENOUS
Status: DISCONTINUED | OUTPATIENT
Start: 2021-01-27 | End: 2021-01-27 | Stop reason: HOSPADM

## 2021-01-27 RX ORDER — AMLODIPINE BESYLATE 5 MG/1
5 TABLET ORAL ONCE
Status: COMPLETED | OUTPATIENT
Start: 2021-01-27 | End: 2021-01-27

## 2021-01-27 RX ORDER — LEVOFLOXACIN 500 MG/1
500 TABLET, FILM COATED ORAL DAILY
Qty: 7 TABLET | Refills: 0 | Status: SHIPPED | OUTPATIENT
Start: 2021-01-27 | End: 2021-03-02 | Stop reason: ALTCHOICE

## 2021-01-27 RX ORDER — SEVELAMER CARBONATE 800 MG/1
800 TABLET, FILM COATED ORAL
Qty: 90 TABLET | Refills: 11 | Status: SHIPPED | OUTPATIENT
Start: 2021-01-27 | End: 2021-02-22 | Stop reason: SDUPTHER

## 2021-01-27 RX ORDER — AMLODIPINE BESYLATE 10 MG/1
10 TABLET ORAL DAILY
Status: DISCONTINUED | OUTPATIENT
Start: 2021-01-28 | End: 2021-01-27 | Stop reason: HOSPADM

## 2021-01-27 RX ADMIN — CARVEDILOL 25 MG: 12.5 TABLET, FILM COATED ORAL at 08:01

## 2021-01-27 RX ADMIN — CALCITRIOL CAPSULES 0.25 MCG 0.25 MCG: 0.25 CAPSULE ORAL at 08:01

## 2021-01-27 RX ADMIN — OXYCODONE HYDROCHLORIDE AND ACETAMINOPHEN 500 MG: 500 TABLET ORAL at 08:01

## 2021-01-27 RX ADMIN — AMLODIPINE BESYLATE 5 MG: 5 TABLET ORAL at 02:01

## 2021-01-27 RX ADMIN — SEVELAMER CARBONATE 800 MG: 800 TABLET, FILM COATED ORAL at 12:01

## 2021-01-27 RX ADMIN — ASPIRIN 81 MG: 81 TABLET, COATED ORAL at 08:01

## 2021-01-27 RX ADMIN — ALBUTEROL SULFATE 2 PUFF: 90 AEROSOL, METERED RESPIRATORY (INHALATION) at 07:01

## 2021-01-27 RX ADMIN — PANTOPRAZOLE SODIUM 40 MG: 40 TABLET, DELAYED RELEASE ORAL at 08:01

## 2021-01-27 RX ADMIN — ALBUTEROL SULFATE 2 PUFF: 90 AEROSOL, METERED RESPIRATORY (INHALATION) at 12:01

## 2021-01-27 RX ADMIN — FINASTERIDE 5 MG: 5 TABLET, FILM COATED ORAL at 08:01

## 2021-01-27 RX ADMIN — ATORVASTATIN CALCIUM 40 MG: 40 TABLET, FILM COATED ORAL at 08:01

## 2021-01-27 RX ADMIN — DEXAMETHASONE 6 MG: 4 TABLET ORAL at 08:01

## 2021-01-27 RX ADMIN — ALBUTEROL SULFATE 2 PUFF: 90 AEROSOL, METERED RESPIRATORY (INHALATION) at 03:01

## 2021-01-27 RX ADMIN — Medication 1 TABLET: at 08:01

## 2021-01-27 RX ADMIN — CARVEDILOL 25 MG: 12.5 TABLET, FILM COATED ORAL at 04:01

## 2021-01-27 RX ADMIN — TAMSULOSIN HYDROCHLORIDE 0.4 MG: 0.4 CAPSULE ORAL at 08:01

## 2021-01-27 RX ADMIN — SEVELAMER CARBONATE 800 MG: 800 TABLET, FILM COATED ORAL at 04:01

## 2021-01-27 RX ADMIN — HEPARIN SODIUM 2000 UNITS: 1000 INJECTION, SOLUTION INTRAVENOUS; SUBCUTANEOUS at 12:01

## 2021-01-27 RX ADMIN — CHOLECALCIFEROL (VITAMIN D3) 25 MCG (1,000 UNIT) TABLET 1000 UNITS: at 08:01

## 2021-01-27 RX ADMIN — SEVELAMER CARBONATE 800 MG: 800 TABLET, FILM COATED ORAL at 08:01

## 2021-01-27 RX ADMIN — AMLODIPINE BESYLATE 2.5 MG: 2.5 TABLET ORAL at 08:01

## 2021-01-28 ENCOUNTER — PATIENT OUTREACH (OUTPATIENT)
Dept: ADMINISTRATIVE | Facility: CLINIC | Age: 66
End: 2021-01-28

## 2021-01-28 ENCOUNTER — PATIENT MESSAGE (OUTPATIENT)
Dept: ADMINISTRATIVE | Facility: OTHER | Age: 66
End: 2021-01-28

## 2021-01-28 ENCOUNTER — TELEPHONE (OUTPATIENT)
Dept: ADMINISTRATIVE | Facility: CLINIC | Age: 66
End: 2021-01-28

## 2021-01-28 ENCOUNTER — NURSE TRIAGE (OUTPATIENT)
Dept: ADMINISTRATIVE | Facility: CLINIC | Age: 66
End: 2021-01-28

## 2021-01-29 ENCOUNTER — PATIENT MESSAGE (OUTPATIENT)
Dept: ADMINISTRATIVE | Facility: OTHER | Age: 66
End: 2021-01-29

## 2021-01-29 ENCOUNTER — TELEPHONE (OUTPATIENT)
Dept: NEPHROLOGY | Facility: CLINIC | Age: 66
End: 2021-01-29

## 2021-01-30 ENCOUNTER — PATIENT MESSAGE (OUTPATIENT)
Dept: ADMINISTRATIVE | Facility: OTHER | Age: 66
End: 2021-01-30

## 2021-01-30 ENCOUNTER — PATIENT MESSAGE (OUTPATIENT)
Dept: ADMINISTRATIVE | Facility: CLINIC | Age: 66
End: 2021-01-30

## 2021-01-31 ENCOUNTER — PATIENT MESSAGE (OUTPATIENT)
Dept: ADMINISTRATIVE | Facility: OTHER | Age: 66
End: 2021-01-31

## 2021-01-31 LAB
BACTERIA BLD CULT: NORMAL
BACTERIA BLD CULT: NORMAL

## 2021-02-01 ENCOUNTER — TELEPHONE (OUTPATIENT)
Dept: NEPHROLOGY | Facility: CLINIC | Age: 66
End: 2021-02-01

## 2021-02-01 ENCOUNTER — PATIENT MESSAGE (OUTPATIENT)
Dept: ADMINISTRATIVE | Facility: OTHER | Age: 66
End: 2021-02-01

## 2021-02-01 ENCOUNTER — LAB VISIT (OUTPATIENT)
Dept: LAB | Facility: HOSPITAL | Age: 66
End: 2021-02-01
Attending: INTERNAL MEDICINE
Payer: COMMERCIAL

## 2021-02-01 DIAGNOSIS — N18.5 CHRONIC KIDNEY DISEASE (CKD), STAGE V: ICD-10-CM

## 2021-02-01 DIAGNOSIS — Z76.82 AWAITING ORGAN TRANSPLANT STATUS: ICD-10-CM

## 2021-02-01 LAB
ALBUMIN SERPL BCP-MCNC: 2.2 G/DL (ref 3.5–5.2)
ANION GAP SERPL CALC-SCNC: 19 MMOL/L (ref 8–16)
BACTERIA #/AREA URNS AUTO: NORMAL /HPF
BASOPHILS # BLD AUTO: 0.03 K/UL (ref 0–0.2)
BASOPHILS NFR BLD: 0.2 % (ref 0–1.9)
BILIRUB UR QL STRIP: NEGATIVE
BUN SERPL-MCNC: 89 MG/DL (ref 8–23)
CALCIUM SERPL-MCNC: 7.3 MG/DL (ref 8.7–10.5)
CHLORIDE SERPL-SCNC: 95 MMOL/L (ref 95–110)
CLARITY UR REFRACT.AUTO: ABNORMAL
CO2 SERPL-SCNC: 22 MMOL/L (ref 23–29)
COLOR UR AUTO: ABNORMAL
CREAT SERPL-MCNC: 14.6 MG/DL (ref 0.5–1.4)
DIFFERENTIAL METHOD: ABNORMAL
EOSINOPHIL # BLD AUTO: 0.2 K/UL (ref 0–0.5)
EOSINOPHIL NFR BLD: 1.3 % (ref 0–8)
ERYTHROCYTE [DISTWIDTH] IN BLOOD BY AUTOMATED COUNT: 15.3 % (ref 11.5–14.5)
EST. GFR  (AFRICAN AMERICAN): 4 ML/MIN/1.73 M^2
EST. GFR  (NON AFRICAN AMERICAN): 3 ML/MIN/1.73 M^2
GLUCOSE SERPL-MCNC: 191 MG/DL (ref 70–110)
GLUCOSE UR QL STRIP: ABNORMAL
HCT VFR BLD AUTO: 34.8 % (ref 40–54)
HGB BLD-MCNC: 11 G/DL (ref 14–18)
HGB UR QL STRIP: NEGATIVE
HYALINE CASTS UR QL AUTO: 0 /LPF
IMM GRANULOCYTES # BLD AUTO: 0.76 K/UL (ref 0–0.04)
IMM GRANULOCYTES NFR BLD AUTO: 4.4 % (ref 0–0.5)
KETONES UR QL STRIP: NEGATIVE
LEUKOCYTE ESTERASE UR QL STRIP: NEGATIVE
LYMPHOCYTES # BLD AUTO: 1.4 K/UL (ref 1–4.8)
LYMPHOCYTES NFR BLD: 7.9 % (ref 18–48)
MCH RBC QN AUTO: 25.6 PG (ref 27–31)
MCHC RBC AUTO-ENTMCNC: 31.6 G/DL (ref 32–36)
MCV RBC AUTO: 81 FL (ref 82–98)
MICROSCOPIC COMMENT: NORMAL
MONOCYTES # BLD AUTO: 2 K/UL (ref 0.3–1)
MONOCYTES NFR BLD: 11.5 % (ref 4–15)
NEUTROPHILS # BLD AUTO: 13 K/UL (ref 1.8–7.7)
NEUTROPHILS NFR BLD: 74.7 % (ref 38–73)
NITRITE UR QL STRIP: NEGATIVE
NRBC BLD-RTO: 0 /100 WBC
PH UR STRIP: 7 [PH] (ref 5–8)
PHOSPHATE SERPL-MCNC: 7.1 MG/DL (ref 2.7–4.5)
PLATELET # BLD AUTO: 423 K/UL (ref 150–350)
PMV BLD AUTO: 11.8 FL (ref 9.2–12.9)
POTASSIUM SERPL-SCNC: 4.7 MMOL/L (ref 3.5–5.1)
PROT UR QL STRIP: ABNORMAL
PTH-INTACT SERPL-MCNC: 1050.8 PG/ML (ref 9–77)
RBC # BLD AUTO: 4.3 M/UL (ref 4.6–6.2)
RBC #/AREA URNS AUTO: 1 /HPF (ref 0–4)
SODIUM SERPL-SCNC: 136 MMOL/L (ref 136–145)
SP GR UR STRIP: 1.01 (ref 1–1.03)
SQUAMOUS #/AREA URNS AUTO: 2 /HPF
URN SPEC COLLECT METH UR: ABNORMAL
WBC # BLD AUTO: 17.36 K/UL (ref 3.9–12.7)
WBC #/AREA URNS AUTO: 1 /HPF (ref 0–5)
YEAST UR QL AUTO: NORMAL

## 2021-02-01 PROCEDURE — 80069 RENAL FUNCTION PANEL: CPT | Mod: TXP

## 2021-02-01 PROCEDURE — 81001 URINALYSIS AUTO W/SCOPE: CPT | Mod: TXP

## 2021-02-01 PROCEDURE — 36415 COLL VENOUS BLD VENIPUNCTURE: CPT | Mod: PO,TXP

## 2021-02-01 PROCEDURE — 82570 ASSAY OF URINE CREATININE: CPT | Mod: TXP

## 2021-02-01 PROCEDURE — 85025 COMPLETE CBC W/AUTO DIFF WBC: CPT | Mod: NTX

## 2021-02-01 PROCEDURE — 83970 ASSAY OF PARATHORMONE: CPT | Mod: NTX

## 2021-02-01 PROCEDURE — 86832 HLA CLASS I HIGH DEFIN QUAL: CPT | Mod: TXP

## 2021-02-01 PROCEDURE — 86833 HLA CLASS II HIGH DEFIN QUAL: CPT | Mod: TXP

## 2021-02-02 ENCOUNTER — PATIENT MESSAGE (OUTPATIENT)
Dept: ADMINISTRATIVE | Facility: OTHER | Age: 66
End: 2021-02-02

## 2021-02-02 LAB
CREAT UR-MCNC: 52 MG/DL (ref 23–375)
PROT UR-MCNC: 288 MG/DL (ref 0–15)
PROT/CREAT UR: 5.54 MG/G{CREAT} (ref 0–0.2)

## 2021-02-03 ENCOUNTER — PATIENT MESSAGE (OUTPATIENT)
Dept: ADMINISTRATIVE | Facility: OTHER | Age: 66
End: 2021-02-03

## 2021-02-04 ENCOUNTER — PATIENT MESSAGE (OUTPATIENT)
Dept: ADMINISTRATIVE | Facility: OTHER | Age: 66
End: 2021-02-04

## 2021-02-04 DIAGNOSIS — Z76.82 AWAITING ORGAN TRANSPLANT STATUS: Primary | ICD-10-CM

## 2021-02-05 ENCOUNTER — PATIENT MESSAGE (OUTPATIENT)
Dept: ADMINISTRATIVE | Facility: OTHER | Age: 66
End: 2021-02-05

## 2021-02-05 ENCOUNTER — TELEPHONE (OUTPATIENT)
Dept: TRANSPLANT | Facility: CLINIC | Age: 66
End: 2021-02-05

## 2021-02-06 ENCOUNTER — PATIENT MESSAGE (OUTPATIENT)
Dept: ADMINISTRATIVE | Facility: OTHER | Age: 66
End: 2021-02-06

## 2021-02-07 ENCOUNTER — PATIENT MESSAGE (OUTPATIENT)
Dept: ADMINISTRATIVE | Facility: OTHER | Age: 66
End: 2021-02-07

## 2021-02-08 ENCOUNTER — OFFICE VISIT (OUTPATIENT)
Dept: INTERNAL MEDICINE | Facility: CLINIC | Age: 66
End: 2021-02-08
Payer: COMMERCIAL

## 2021-02-08 ENCOUNTER — NURSE TRIAGE (OUTPATIENT)
Dept: ADMINISTRATIVE | Facility: CLINIC | Age: 66
End: 2021-02-08

## 2021-02-08 ENCOUNTER — PATIENT MESSAGE (OUTPATIENT)
Dept: ADMINISTRATIVE | Facility: OTHER | Age: 66
End: 2021-02-08

## 2021-02-08 DIAGNOSIS — E11.22 TYPE 2 DIABETES MELLITUS WITH STAGE 2 CHRONIC KIDNEY DISEASE, WITH LONG-TERM CURRENT USE OF INSULIN: ICD-10-CM

## 2021-02-08 DIAGNOSIS — N18.2 TYPE 2 DIABETES MELLITUS WITH STAGE 2 CHRONIC KIDNEY DISEASE, WITH LONG-TERM CURRENT USE OF INSULIN: ICD-10-CM

## 2021-02-08 DIAGNOSIS — Z76.0 MEDICATION REFILL: Primary | ICD-10-CM

## 2021-02-08 DIAGNOSIS — Z79.4 TYPE 2 DIABETES MELLITUS WITH STAGE 2 CHRONIC KIDNEY DISEASE, WITH LONG-TERM CURRENT USE OF INSULIN: ICD-10-CM

## 2021-02-08 DIAGNOSIS — U07.1 COVID-19 VIRUS INFECTION: Primary | ICD-10-CM

## 2021-02-08 DIAGNOSIS — J02.9 PHARYNGITIS, UNSPECIFIED ETIOLOGY: ICD-10-CM

## 2021-02-08 PROCEDURE — 99213 OFFICE O/P EST LOW 20 MIN: CPT | Mod: 95,,, | Performed by: NURSE PRACTITIONER

## 2021-02-08 PROCEDURE — 99213 PR OFFICE/OUTPT VISIT, EST, LEVL III, 20-29 MIN: ICD-10-PCS | Mod: 95,,, | Performed by: NURSE PRACTITIONER

## 2021-02-08 PROCEDURE — 3044F HG A1C LEVEL LT 7.0%: CPT | Mod: CPTII,,, | Performed by: NURSE PRACTITIONER

## 2021-02-08 PROCEDURE — 3044F PR MOST RECENT HEMOGLOBIN A1C LEVEL <7.0%: ICD-10-PCS | Mod: CPTII,,, | Performed by: NURSE PRACTITIONER

## 2021-02-08 RX ORDER — AMOXICILLIN 500 MG/1
500 TABLET, FILM COATED ORAL EVERY 12 HOURS
Qty: 14 TABLET | Refills: 0 | Status: SHIPPED | OUTPATIENT
Start: 2021-02-08 | End: 2021-02-15

## 2021-02-08 RX ORDER — ATORVASTATIN CALCIUM 40 MG/1
40 TABLET, FILM COATED ORAL DAILY
Qty: 90 TABLET | Refills: 0 | Status: SHIPPED | OUTPATIENT
Start: 2021-02-08 | End: 2021-08-05 | Stop reason: SDUPTHER

## 2021-02-09 ENCOUNTER — PATIENT MESSAGE (OUTPATIENT)
Dept: ADMINISTRATIVE | Facility: OTHER | Age: 66
End: 2021-02-09

## 2021-02-10 ENCOUNTER — PATIENT MESSAGE (OUTPATIENT)
Dept: ADMINISTRATIVE | Facility: OTHER | Age: 66
End: 2021-02-10

## 2021-02-10 LAB — HPRA INTERPRETATION: NORMAL

## 2021-02-18 ENCOUNTER — HOSPITAL ENCOUNTER (OUTPATIENT)
Dept: RADIOLOGY | Facility: HOSPITAL | Age: 66
Discharge: HOME | End: 2021-02-18
Attending: INTERNAL MEDICINE
Payer: COMMERCIAL

## 2021-02-18 ENCOUNTER — OFFICE VISIT (OUTPATIENT)
Dept: INTERNAL MEDICINE | Facility: CLINIC | Age: 66
End: 2021-02-18
Payer: COMMERCIAL

## 2021-02-18 VITALS
DIASTOLIC BLOOD PRESSURE: 72 MMHG | TEMPERATURE: 98 F | SYSTOLIC BLOOD PRESSURE: 130 MMHG | WEIGHT: 246.06 LBS | BODY MASS INDEX: 31.58 KG/M2 | RESPIRATION RATE: 18 BRPM | HEART RATE: 87 BPM | OXYGEN SATURATION: 97 % | HEIGHT: 74 IN

## 2021-02-18 DIAGNOSIS — R05.9 COUGH: ICD-10-CM

## 2021-02-18 DIAGNOSIS — R05.9 COUGH: Primary | ICD-10-CM

## 2021-02-18 DIAGNOSIS — U07.1 COVID-19: ICD-10-CM

## 2021-02-18 PROCEDURE — 3008F PR BODY MASS INDEX (BMI) DOCUMENTED: ICD-10-PCS | Mod: CPTII,S$GLB,, | Performed by: INTERNAL MEDICINE

## 2021-02-18 PROCEDURE — 71046 X-RAY EXAM CHEST 2 VIEWS: CPT | Mod: TC,TXP

## 2021-02-18 PROCEDURE — 71046 XR CHEST PA AND LATERAL: ICD-10-PCS | Mod: 26,NTX,, | Performed by: RADIOLOGY

## 2021-02-18 PROCEDURE — 1126F AMNT PAIN NOTED NONE PRSNT: CPT | Mod: S$GLB,,, | Performed by: INTERNAL MEDICINE

## 2021-02-18 PROCEDURE — 3008F BODY MASS INDEX DOCD: CPT | Mod: CPTII,S$GLB,, | Performed by: INTERNAL MEDICINE

## 2021-02-18 PROCEDURE — 99214 OFFICE O/P EST MOD 30 MIN: CPT | Mod: S$GLB,,, | Performed by: INTERNAL MEDICINE

## 2021-02-18 PROCEDURE — 1126F PR PAIN SEVERITY QUANTIFIED, NO PAIN PRESENT: ICD-10-PCS | Mod: S$GLB,,, | Performed by: INTERNAL MEDICINE

## 2021-02-18 PROCEDURE — 99999 PR PBB SHADOW E&M-EST. PATIENT-LVL V: ICD-10-PCS | Mod: PBBFAC,,, | Performed by: INTERNAL MEDICINE

## 2021-02-18 PROCEDURE — 71046 X-RAY EXAM CHEST 2 VIEWS: CPT | Mod: 26,NTX,, | Performed by: RADIOLOGY

## 2021-02-18 PROCEDURE — 99999 PR PBB SHADOW E&M-EST. PATIENT-LVL V: CPT | Mod: PBBFAC,,, | Performed by: INTERNAL MEDICINE

## 2021-02-18 PROCEDURE — 99214 PR OFFICE/OUTPT VISIT, EST, LEVL IV, 30-39 MIN: ICD-10-PCS | Mod: S$GLB,,, | Performed by: INTERNAL MEDICINE

## 2021-02-18 RX ORDER — PROMETHAZINE HYDROCHLORIDE AND DEXTROMETHORPHAN HYDROBROMIDE 6.25; 15 MG/5ML; MG/5ML
5 SYRUP ORAL NIGHTLY PRN
Qty: 180 ML | Refills: 0 | Status: SHIPPED | OUTPATIENT
Start: 2021-02-18 | End: 2021-03-11 | Stop reason: SDUPTHER

## 2021-02-19 ENCOUNTER — TELEPHONE (OUTPATIENT)
Dept: TRANSPLANT | Facility: CLINIC | Age: 66
End: 2021-02-19

## 2021-02-19 ENCOUNTER — PATIENT MESSAGE (OUTPATIENT)
Dept: NEPHROLOGY | Facility: CLINIC | Age: 66
End: 2021-02-19

## 2021-02-22 ENCOUNTER — TELEPHONE (OUTPATIENT)
Dept: TRANSPLANT | Facility: CLINIC | Age: 66
End: 2021-02-22

## 2021-02-22 DIAGNOSIS — E83.39 HYPERPHOSPHATEMIA: ICD-10-CM

## 2021-02-22 DIAGNOSIS — N18.6 ESRD (END STAGE RENAL DISEASE): Primary | ICD-10-CM

## 2021-02-22 RX ORDER — SEVELAMER CARBONATE 800 MG/1
800 TABLET, FILM COATED ORAL
Qty: 90 TABLET | Refills: 2 | Status: SHIPPED | OUTPATIENT
Start: 2021-02-22 | End: 2021-03-20 | Stop reason: SDUPTHER

## 2021-02-26 ENCOUNTER — TELEPHONE (OUTPATIENT)
Dept: NEPHROLOGY | Facility: CLINIC | Age: 66
End: 2021-02-26

## 2021-02-26 ENCOUNTER — PATIENT MESSAGE (OUTPATIENT)
Dept: NEPHROLOGY | Facility: CLINIC | Age: 66
End: 2021-02-26

## 2021-02-26 DIAGNOSIS — N18.5 CHRONIC KIDNEY DISEASE (CKD), STAGE V: Primary | ICD-10-CM

## 2021-03-01 ENCOUNTER — PATIENT OUTREACH (OUTPATIENT)
Dept: ADMINISTRATIVE | Facility: OTHER | Age: 66
End: 2021-03-01

## 2021-03-01 ENCOUNTER — OFFICE VISIT (OUTPATIENT)
Dept: SURGERY | Facility: CLINIC | Age: 66
End: 2021-03-01
Payer: COMMERCIAL

## 2021-03-01 VITALS
BODY MASS INDEX: 31.32 KG/M2 | WEIGHT: 244.06 LBS | SYSTOLIC BLOOD PRESSURE: 137 MMHG | HEART RATE: 85 BPM | HEIGHT: 74 IN | TEMPERATURE: 97 F | DIASTOLIC BLOOD PRESSURE: 83 MMHG

## 2021-03-01 DIAGNOSIS — K42.9 UMBILICAL HERNIA WITHOUT OBSTRUCTION AND WITHOUT GANGRENE: Primary | ICD-10-CM

## 2021-03-01 DIAGNOSIS — N18.5 CHRONIC KIDNEY DISEASE (CKD), STAGE V: ICD-10-CM

## 2021-03-01 PROCEDURE — 3008F BODY MASS INDEX DOCD: CPT | Mod: CPTII,NTX,S$GLB, | Performed by: SURGERY

## 2021-03-01 PROCEDURE — 3008F PR BODY MASS INDEX (BMI) DOCUMENTED: ICD-10-PCS | Mod: CPTII,NTX,S$GLB, | Performed by: SURGERY

## 2021-03-01 PROCEDURE — 99214 PR OFFICE/OUTPT VISIT, EST, LEVL IV, 30-39 MIN: ICD-10-PCS | Mod: NTX,S$GLB,, | Performed by: SURGERY

## 2021-03-01 PROCEDURE — 99214 OFFICE O/P EST MOD 30 MIN: CPT | Mod: NTX,S$GLB,, | Performed by: SURGERY

## 2021-03-01 PROCEDURE — 99999 PR PBB SHADOW E&M-EST. PATIENT-LVL V: CPT | Mod: PBBFAC,TXP,, | Performed by: SURGERY

## 2021-03-01 PROCEDURE — 99999 PR PBB SHADOW E&M-EST. PATIENT-LVL V: ICD-10-PCS | Mod: PBBFAC,TXP,, | Performed by: SURGERY

## 2021-03-01 PROCEDURE — 1126F PR PAIN SEVERITY QUANTIFIED, NO PAIN PRESENT: ICD-10-PCS | Mod: NTX,S$GLB,, | Performed by: SURGERY

## 2021-03-01 PROCEDURE — 1126F AMNT PAIN NOTED NONE PRSNT: CPT | Mod: NTX,S$GLB,, | Performed by: SURGERY

## 2021-03-02 ENCOUNTER — LAB VISIT (OUTPATIENT)
Dept: LAB | Facility: HOSPITAL | Age: 66
End: 2021-03-02
Attending: NURSE PRACTITIONER
Payer: COMMERCIAL

## 2021-03-02 DIAGNOSIS — Z76.82 AWAITING ORGAN TRANSPLANT STATUS: ICD-10-CM

## 2021-03-02 PROCEDURE — 99001 SPECIMEN HANDLING PT-LAB: CPT | Mod: TXP | Performed by: NURSE PRACTITIONER

## 2021-03-11 ENCOUNTER — OFFICE VISIT (OUTPATIENT)
Dept: INTERNAL MEDICINE | Facility: CLINIC | Age: 66
End: 2021-03-11
Payer: COMMERCIAL

## 2021-03-11 ENCOUNTER — HOSPITAL ENCOUNTER (OUTPATIENT)
Dept: RADIOLOGY | Facility: HOSPITAL | Age: 66
Discharge: HOME | End: 2021-03-11
Attending: NURSE PRACTITIONER
Payer: COMMERCIAL

## 2021-03-11 VITALS
DIASTOLIC BLOOD PRESSURE: 74 MMHG | HEART RATE: 90 BPM | HEIGHT: 74 IN | TEMPERATURE: 97 F | BODY MASS INDEX: 31.6 KG/M2 | SYSTOLIC BLOOD PRESSURE: 120 MMHG | OXYGEN SATURATION: 98 % | WEIGHT: 246.25 LBS

## 2021-03-11 DIAGNOSIS — Z86.16 HISTORY OF COVID-19: ICD-10-CM

## 2021-03-11 DIAGNOSIS — R05.3 PERSISTENT COUGH: Primary | ICD-10-CM

## 2021-03-11 DIAGNOSIS — R05.3 PERSISTENT COUGH: ICD-10-CM

## 2021-03-11 PROCEDURE — 99214 PR OFFICE/OUTPT VISIT, EST, LEVL IV, 30-39 MIN: ICD-10-PCS | Mod: S$GLB,,, | Performed by: NURSE PRACTITIONER

## 2021-03-11 PROCEDURE — 71046 X-RAY EXAM CHEST 2 VIEWS: CPT | Mod: TC,NTX

## 2021-03-11 PROCEDURE — 1126F PR PAIN SEVERITY QUANTIFIED, NO PAIN PRESENT: ICD-10-PCS | Mod: S$GLB,,, | Performed by: NURSE PRACTITIONER

## 2021-03-11 PROCEDURE — 71046 X-RAY EXAM CHEST 2 VIEWS: CPT | Mod: 26,NTX,, | Performed by: RADIOLOGY

## 2021-03-11 PROCEDURE — 99214 OFFICE O/P EST MOD 30 MIN: CPT | Mod: S$GLB,,, | Performed by: NURSE PRACTITIONER

## 2021-03-11 PROCEDURE — 3008F BODY MASS INDEX DOCD: CPT | Mod: CPTII,S$GLB,, | Performed by: NURSE PRACTITIONER

## 2021-03-11 PROCEDURE — 3008F PR BODY MASS INDEX (BMI) DOCUMENTED: ICD-10-PCS | Mod: CPTII,S$GLB,, | Performed by: NURSE PRACTITIONER

## 2021-03-11 PROCEDURE — 71046 XR CHEST PA AND LATERAL: ICD-10-PCS | Mod: 26,NTX,, | Performed by: RADIOLOGY

## 2021-03-11 PROCEDURE — 99999 PR PBB SHADOW E&M-EST. PATIENT-LVL V: CPT | Mod: PBBFAC,,, | Performed by: NURSE PRACTITIONER

## 2021-03-11 PROCEDURE — 99999 PR PBB SHADOW E&M-EST. PATIENT-LVL V: ICD-10-PCS | Mod: PBBFAC,,, | Performed by: NURSE PRACTITIONER

## 2021-03-11 PROCEDURE — 1126F AMNT PAIN NOTED NONE PRSNT: CPT | Mod: S$GLB,,, | Performed by: NURSE PRACTITIONER

## 2021-03-11 RX ORDER — CETIRIZINE HYDROCHLORIDE 10 MG/1
10 TABLET ORAL DAILY
Qty: 30 TABLET | Refills: 1 | Status: SHIPPED | OUTPATIENT
Start: 2021-03-11 | End: 2023-03-09 | Stop reason: SDUPTHER

## 2021-03-11 RX ORDER — PROMETHAZINE HYDROCHLORIDE AND DEXTROMETHORPHAN HYDROBROMIDE 6.25; 15 MG/5ML; MG/5ML
5 SYRUP ORAL 3 TIMES DAILY PRN
Qty: 180 ML | Refills: 0 | Status: SHIPPED | OUTPATIENT
Start: 2021-03-11 | End: 2021-04-24 | Stop reason: ALTCHOICE

## 2021-03-12 ENCOUNTER — TELEPHONE (OUTPATIENT)
Dept: SURGERY | Facility: CLINIC | Age: 66
End: 2021-03-12

## 2021-03-12 DIAGNOSIS — Z76.82 AWAITING ORGAN TRANSPLANT STATUS: Primary | ICD-10-CM

## 2021-03-15 DIAGNOSIS — R05.3 CHRONIC COUGH: Primary | ICD-10-CM

## 2021-03-19 DIAGNOSIS — R05.9 COUGH: Primary | ICD-10-CM

## 2021-03-22 LAB — HBA1C MFR BLD: 6.5 % (ref 4.2–5.9)

## 2021-03-25 ENCOUNTER — TELEPHONE (OUTPATIENT)
Dept: PULMONOLOGY | Facility: CLINIC | Age: 66
End: 2021-03-25

## 2021-03-25 DIAGNOSIS — J96.01 ACUTE RESPIRATORY FAILURE WITH HYPOXIA: Primary | ICD-10-CM

## 2021-04-03 ENCOUNTER — LAB VISIT (OUTPATIENT)
Dept: OTOLARYNGOLOGY | Facility: CLINIC | Age: 66
End: 2021-04-03
Payer: COMMERCIAL

## 2021-04-03 DIAGNOSIS — J96.01 ACUTE RESPIRATORY FAILURE WITH HYPOXIA: ICD-10-CM

## 2021-04-03 PROCEDURE — U0005 INFEC AGEN DETEC AMPLI PROBE: HCPCS | Performed by: INTERNAL MEDICINE

## 2021-04-03 PROCEDURE — U0003 INFECTIOUS AGENT DETECTION BY NUCLEIC ACID (DNA OR RNA); SEVERE ACUTE RESPIRATORY SYNDROME CORONAVIRUS 2 (SARS-COV-2) (CORONAVIRUS DISEASE [COVID-19]), AMPLIFIED PROBE TECHNIQUE, MAKING USE OF HIGH THROUGHPUT TECHNOLOGIES AS DESCRIBED BY CMS-2020-01-R: HCPCS | Mod: NTX | Performed by: INTERNAL MEDICINE

## 2021-04-04 LAB — SARS-COV-2 RNA RESP QL NAA+PROBE: NOT DETECTED

## 2021-04-05 ENCOUNTER — TELEPHONE (OUTPATIENT)
Dept: RADIOLOGY | Facility: HOSPITAL | Age: 66
End: 2021-04-05

## 2021-04-05 DIAGNOSIS — N18.6 ESRD (END STAGE RENAL DISEASE): Primary | ICD-10-CM

## 2021-04-06 ENCOUNTER — CLINICAL SUPPORT (OUTPATIENT)
Dept: PULMONOLOGY | Facility: CLINIC | Age: 66
End: 2021-04-06
Payer: COMMERCIAL

## 2021-04-06 ENCOUNTER — HOSPITAL ENCOUNTER (OUTPATIENT)
Facility: HOSPITAL | Age: 66
Discharge: HOME | End: 2021-04-06
Attending: RADIOLOGY | Admitting: RADIOLOGY
Payer: COMMERCIAL

## 2021-04-06 ENCOUNTER — OFFICE VISIT (OUTPATIENT)
Dept: INTERNAL MEDICINE | Facility: CLINIC | Age: 66
End: 2021-04-06
Payer: COMMERCIAL

## 2021-04-06 ENCOUNTER — HOSPITAL ENCOUNTER (OUTPATIENT)
Dept: RADIOLOGY | Facility: HOSPITAL | Age: 66
Discharge: HOME | End: 2021-04-06
Attending: INTERNAL MEDICINE
Payer: COMMERCIAL

## 2021-04-06 ENCOUNTER — HOSPITAL ENCOUNTER (OUTPATIENT)
Dept: RADIOLOGY | Facility: HOSPITAL | Age: 66
Discharge: HOME | End: 2021-04-06
Attending: INTERNAL MEDICINE | Admitting: RADIOLOGY
Payer: COMMERCIAL

## 2021-04-06 VITALS
OXYGEN SATURATION: 97 % | TEMPERATURE: 97 F | SYSTOLIC BLOOD PRESSURE: 104 MMHG | WEIGHT: 251.31 LBS | BODY MASS INDEX: 32.27 KG/M2 | DIASTOLIC BLOOD PRESSURE: 80 MMHG | HEART RATE: 92 BPM

## 2021-04-06 DIAGNOSIS — J96.01 ACUTE RESPIRATORY FAILURE WITH HYPOXIA: ICD-10-CM

## 2021-04-06 DIAGNOSIS — N18.5 CKD (CHRONIC KIDNEY DISEASE), STAGE V: Primary | ICD-10-CM

## 2021-04-06 DIAGNOSIS — N18.6 ESRD (END STAGE RENAL DISEASE): ICD-10-CM

## 2021-04-06 DIAGNOSIS — R05.9 COUGH: Primary | ICD-10-CM

## 2021-04-06 LAB
ALLENS TEST: ABNORMAL
ANION GAP SERPL CALC-SCNC: 10 MMOL/L (ref 8–16)
BUN SERPL-MCNC: 61 MG/DL (ref 8–23)
CALCIUM SERPL-MCNC: 9.2 MG/DL (ref 8.7–10.5)
CHLORIDE SERPL-SCNC: 113 MMOL/L (ref 95–110)
CO2 SERPL-SCNC: 20 MMOL/L (ref 23–29)
CREAT SERPL-MCNC: 8.4 MG/DL (ref 0.5–1.4)
DELSYS: ABNORMAL
EST. GFR  (AFRICAN AMERICAN): 7 ML/MIN/1.73 M^2
EST. GFR  (NON AFRICAN AMERICAN): 6 ML/MIN/1.73 M^2
FIO2: 21
GLUCOSE SERPL-MCNC: 89 MG/DL (ref 70–110)
HCO3 UR-SCNC: 17.4 MMOL/L (ref 24–28)
MODE: ABNORMAL
PCO2 BLDA: 33.6 MMHG (ref 35–45)
PH SMN: 7.32 [PH] (ref 7.35–7.45)
PO2 BLDA: 96 MMHG (ref 80–100)
POC BE: -9 MMOL/L
POC SATURATED O2: 97 % (ref 95–100)
POTASSIUM SERPL-SCNC: 4.5 MMOL/L (ref 3.5–5.1)
SAMPLE: ABNORMAL
SITE: ABNORMAL
SODIUM SERPL-SCNC: 143 MMOL/L (ref 136–145)

## 2021-04-06 PROCEDURE — 99999 PR PBB SHADOW E&M-EST. PATIENT-LVL V: ICD-10-PCS | Mod: PBBFAC,,, | Performed by: INTERNAL MEDICINE

## 2021-04-06 PROCEDURE — 71046 XR CHEST PA AND LATERAL: ICD-10-PCS | Mod: 26,NTX,, | Performed by: RADIOLOGY

## 2021-04-06 PROCEDURE — 36600 WITHDRAWAL OF ARTERIAL BLOOD: CPT | Mod: NTX,S$GLB,, | Performed by: INTERNAL MEDICINE

## 2021-04-06 PROCEDURE — 71046 X-RAY EXAM CHEST 2 VIEWS: CPT | Mod: TC,NTX,59

## 2021-04-06 PROCEDURE — C1750 CATH, HEMODIALYSIS,LONG-TERM: HCPCS | Mod: NTX | Performed by: RADIOLOGY

## 2021-04-06 PROCEDURE — 71046 X-RAY EXAM CHEST 2 VIEWS: CPT | Mod: 26,NTX,, | Performed by: RADIOLOGY

## 2021-04-06 PROCEDURE — 82803 PR  BLOOD GASES: PH, PO2 & PCO2: ICD-10-PCS | Mod: NTX,S$GLB,, | Performed by: INTERNAL MEDICINE

## 2021-04-06 PROCEDURE — C1769 GUIDE WIRE: HCPCS | Mod: TXP | Performed by: RADIOLOGY

## 2021-04-06 PROCEDURE — 25000003 PHARM REV CODE 250: Mod: TXP | Performed by: RADIOLOGY

## 2021-04-06 PROCEDURE — 94010 BREATHING CAPACITY TEST: CPT | Mod: NTX,S$GLB,, | Performed by: INTERNAL MEDICINE

## 2021-04-06 PROCEDURE — 99212 OFFICE O/P EST SF 10 MIN: CPT | Mod: S$GLB,,, | Performed by: INTERNAL MEDICINE

## 2021-04-06 PROCEDURE — 80048 BASIC METABOLIC PNL TOTAL CA: CPT | Mod: NTX | Performed by: RADIOLOGY

## 2021-04-06 PROCEDURE — 36581 REPLACE TUNNELED CV CATH: CPT | Mod: NTX | Performed by: RADIOLOGY

## 2021-04-06 PROCEDURE — 99212 PR OFFICE/OUTPT VISIT, EST, LEVL II, 10-19 MIN: ICD-10-PCS | Mod: S$GLB,,, | Performed by: INTERNAL MEDICINE

## 2021-04-06 PROCEDURE — 77001 FLUOROGUIDE FOR VEIN DEVICE: CPT | Mod: TC,NTX | Performed by: RADIOLOGY

## 2021-04-06 PROCEDURE — 99999 PR PBB SHADOW E&M-EST. PATIENT-LVL V: CPT | Mod: PBBFAC,,, | Performed by: INTERNAL MEDICINE

## 2021-04-06 PROCEDURE — 94010 BREATHING CAPACITY TEST: ICD-10-PCS | Mod: NTX,S$GLB,, | Performed by: INTERNAL MEDICINE

## 2021-04-06 PROCEDURE — 63600175 PHARM REV CODE 636 W HCPCS: Mod: NTX | Performed by: RADIOLOGY

## 2021-04-06 PROCEDURE — 82803 BLOOD GASES ANY COMBINATION: CPT | Mod: NTX,S$GLB,, | Performed by: INTERNAL MEDICINE

## 2021-04-06 PROCEDURE — 36600 PR WITHDRAWAL OF ARTERIAL BLOOD: ICD-10-PCS | Mod: NTX,S$GLB,, | Performed by: INTERNAL MEDICINE

## 2021-04-06 DEVICE — GLIDEPATH HEMODIALYSIS CATH, ST, DL, 14.5 FR. 23CM
Type: IMPLANTABLE DEVICE | Site: CHEST | Status: FUNCTIONAL
Brand: GLIDEPATH LONG-TERM HEMODIALYSIS CATHETER WITH PRELOADED STYLET

## 2021-04-06 RX ORDER — HEPARIN SODIUM 1000 [USP'U]/ML
INJECTION, SOLUTION INTRAVENOUS; SUBCUTANEOUS
Status: DISCONTINUED | OUTPATIENT
Start: 2021-04-06 | End: 2021-04-06 | Stop reason: HOSPADM

## 2021-04-06 RX ORDER — ALBUTEROL SULFATE 90 UG/1
2 AEROSOL, METERED RESPIRATORY (INHALATION)
COMMUNITY
Start: 2021-03-15 | End: 2021-04-09 | Stop reason: SDUPTHER

## 2021-04-06 RX ORDER — LIDOCAINE HYDROCHLORIDE 10 MG/ML
INJECTION INFILTRATION; PERINEURAL
Status: COMPLETED | OUTPATIENT
Start: 2021-04-06 | End: 2021-04-06

## 2021-04-06 RX ORDER — CEFAZOLIN SODIUM 2 G/50ML
2 SOLUTION INTRAVENOUS ONCE
Status: DISCONTINUED | OUTPATIENT
Start: 2021-04-06 | End: 2021-04-07 | Stop reason: HOSPADM

## 2021-04-06 RX ORDER — SODIUM BICARBONATE 650 MG/1
TABLET ORAL
COMMUNITY
Start: 2021-01-29 | End: 2022-08-25

## 2021-04-06 RX ADMIN — LIDOCAINE HYDROCHLORIDE 5 ML: 10 INJECTION, SOLUTION INFILTRATION; PERINEURAL at 04:04

## 2021-04-07 VITALS
SYSTOLIC BLOOD PRESSURE: 173 MMHG | OXYGEN SATURATION: 100 % | RESPIRATION RATE: 21 BRPM | HEIGHT: 74 IN | BODY MASS INDEX: 32.85 KG/M2 | DIASTOLIC BLOOD PRESSURE: 92 MMHG | TEMPERATURE: 98 F | HEART RATE: 88 BPM | WEIGHT: 256 LBS

## 2021-04-07 PROCEDURE — 86832 HLA CLASS I HIGH DEFIN QUAL: CPT | Mod: PO,TXP | Performed by: NURSE PRACTITIONER

## 2021-04-07 PROCEDURE — 86833 HLA CLASS II HIGH DEFIN QUAL: CPT | Mod: PO,TXP | Performed by: NURSE PRACTITIONER

## 2021-04-08 LAB
BRPFT: NORMAL
FEF 25 75 LLN: 1
FEF 25 75 PRE REF: 72.3 %
FEF 25 75 REF: 2.54
FEV1 FVC LLN: 65
FEV1 FVC PRE REF: 96.5 %
FEV1 FVC REF: 77
FEV1 LLN: 2.25
FEV1 PRE REF: 81.2 %
FEV1 REF: 3.18
FVC LLN: 3.04
FVC PRE REF: 83.9 %
FVC REF: 4.15
PEF LLN: 6.09
PEF PRE REF: 108 %
PEF REF: 8.97
PRE FEF 25 75: 1.83 L/S (ref 1–4.08)
PRE FET 100: 13.89 SEC
PRE FEV1 FVC: 74.21 % (ref 64.76–89.03)
PRE FEV1: 2.58 L (ref 2.25–4.11)
PRE FVC: 3.48 L (ref 3.04–5.26)
PRE PEF: 9.69 L/S (ref 6.09–11.84)

## 2021-04-09 ENCOUNTER — LAB VISIT (OUTPATIENT)
Dept: LAB | Facility: HOSPITAL | Age: 66
End: 2021-04-09
Payer: COMMERCIAL

## 2021-04-09 ENCOUNTER — OFFICE VISIT (OUTPATIENT)
Dept: PULMONOLOGY | Facility: CLINIC | Age: 66
End: 2021-04-09
Payer: COMMERCIAL

## 2021-04-09 VITALS
WEIGHT: 243.06 LBS | RESPIRATION RATE: 18 BRPM | HEART RATE: 100 BPM | HEIGHT: 74 IN | BODY MASS INDEX: 31.19 KG/M2 | DIASTOLIC BLOOD PRESSURE: 80 MMHG | SYSTOLIC BLOOD PRESSURE: 120 MMHG | OXYGEN SATURATION: 96 %

## 2021-04-09 DIAGNOSIS — U07.1 PNEUMONIA DUE TO COVID-19 VIRUS: ICD-10-CM

## 2021-04-09 DIAGNOSIS — E66.09 CLASS 1 OBESITY DUE TO EXCESS CALORIES WITH SERIOUS COMORBIDITY AND BODY MASS INDEX (BMI) OF 31.0 TO 31.9 IN ADULT: ICD-10-CM

## 2021-04-09 DIAGNOSIS — J45.998 POST VIRAL ASTHMA: Primary | ICD-10-CM

## 2021-04-09 DIAGNOSIS — Z76.82 AWAITING ORGAN TRANSPLANT STATUS: ICD-10-CM

## 2021-04-09 DIAGNOSIS — I10 SEVERE HYPERTENSION: ICD-10-CM

## 2021-04-09 DIAGNOSIS — R05.9 COUGH: ICD-10-CM

## 2021-04-09 DIAGNOSIS — J30.89 SEASONAL ALLERGIC RHINITIS DUE TO OTHER ALLERGIC TRIGGER: ICD-10-CM

## 2021-04-09 DIAGNOSIS — J12.82 PNEUMONIA DUE TO COVID-19 VIRUS: ICD-10-CM

## 2021-04-09 PROCEDURE — 99215 PR OFFICE/OUTPT VISIT, EST, LEVL V, 40-54 MIN: ICD-10-PCS | Mod: 25,NTX,S$GLB, | Performed by: INTERNAL MEDICINE

## 2021-04-09 PROCEDURE — 3008F PR BODY MASS INDEX (BMI) DOCUMENTED: ICD-10-PCS | Mod: CPTII,NTX,S$GLB, | Performed by: INTERNAL MEDICINE

## 2021-04-09 PROCEDURE — 99999 PR PBB SHADOW E&M-EST. PATIENT-LVL III: ICD-10-PCS | Mod: PBBFAC,TXP,, | Performed by: INTERNAL MEDICINE

## 2021-04-09 PROCEDURE — 99999 PR PBB SHADOW E&M-EST. PATIENT-LVL III: CPT | Mod: PBBFAC,TXP,, | Performed by: INTERNAL MEDICINE

## 2021-04-09 PROCEDURE — 99215 OFFICE O/P EST HI 40 MIN: CPT | Mod: 25,NTX,S$GLB, | Performed by: INTERNAL MEDICINE

## 2021-04-09 PROCEDURE — 3008F BODY MASS INDEX DOCD: CPT | Mod: CPTII,NTX,S$GLB, | Performed by: INTERNAL MEDICINE

## 2021-04-09 RX ORDER — FLUTICASONE PROPIONATE AND SALMETEROL 250; 50 UG/1; UG/1
1 POWDER RESPIRATORY (INHALATION) 2 TIMES DAILY
Qty: 60 EACH | Refills: 11 | Status: SHIPPED | OUTPATIENT
Start: 2021-04-09 | End: 2021-08-12

## 2021-04-09 RX ORDER — FLUTICASONE PROPIONATE 50 MCG
2 SPRAY, SUSPENSION (ML) NASAL DAILY
Qty: 16 G | Refills: 11 | Status: SHIPPED | OUTPATIENT
Start: 2021-04-09 | End: 2021-08-12 | Stop reason: SDUPTHER

## 2021-04-09 RX ORDER — INHALER, ASSIST DEVICES
SPACER (EA) MISCELLANEOUS
Qty: 1 DEVICE | Status: SHIPPED | OUTPATIENT
Start: 2021-04-09 | End: 2023-05-14

## 2021-04-09 RX ORDER — ALBUTEROL SULFATE 90 UG/1
2 AEROSOL, METERED RESPIRATORY (INHALATION) EVERY 4 HOURS PRN
Qty: 18 G | Refills: 11 | Status: SHIPPED | OUTPATIENT
Start: 2021-04-09 | End: 2022-12-15

## 2021-04-13 ENCOUNTER — LAB VISIT (OUTPATIENT)
Dept: LAB | Facility: HOSPITAL | Age: 66
End: 2021-04-13
Attending: NURSE PRACTITIONER
Payer: COMMERCIAL

## 2021-04-13 DIAGNOSIS — Z76.82 AWAITING ORGAN TRANSPLANT STATUS: ICD-10-CM

## 2021-04-13 PROCEDURE — 86832 HLA CLASS I HIGH DEFIN QUAL: CPT | Mod: TXP | Performed by: NURSE PRACTITIONER

## 2021-04-13 PROCEDURE — 86833 HLA CLASS II HIGH DEFIN QUAL: CPT | Mod: TXP | Performed by: NURSE PRACTITIONER

## 2021-04-13 PROCEDURE — 86977 RBC SERUM PRETX INCUBJ/INHIB: CPT | Mod: 59,TXP | Performed by: NURSE PRACTITIONER

## 2021-04-13 PROCEDURE — 99001 SPECIMEN HANDLING PT-LAB: CPT | Mod: TXP | Performed by: NURSE PRACTITIONER

## 2021-04-19 LAB — HPRA INTERPRETATION: NORMAL

## 2021-04-20 ENCOUNTER — TELEPHONE (OUTPATIENT)
Dept: TRANSPLANT | Facility: CLINIC | Age: 66
End: 2021-04-20

## 2021-04-28 ENCOUNTER — TELEPHONE (OUTPATIENT)
Dept: TRANSPLANT | Facility: CLINIC | Age: 66
End: 2021-04-28

## 2021-04-28 ENCOUNTER — PATIENT OUTREACH (OUTPATIENT)
Dept: ADMINISTRATIVE | Facility: HOSPITAL | Age: 66
End: 2021-04-28

## 2021-04-29 ENCOUNTER — PATIENT OUTREACH (OUTPATIENT)
Dept: ADMINISTRATIVE | Facility: HOSPITAL | Age: 66
End: 2021-04-29

## 2021-05-03 DIAGNOSIS — Z76.82 ORGAN TRANSPLANT CANDIDATE: Primary | ICD-10-CM

## 2021-05-03 DIAGNOSIS — Z76.82 AWAITING ORGAN TRANSPLANT STATUS: Primary | ICD-10-CM

## 2021-05-03 PROCEDURE — 99001 SPECIMEN HANDLING PT-LAB: CPT | Mod: PO,TXP | Performed by: NURSE PRACTITIONER

## 2021-05-04 ENCOUNTER — LAB VISIT (OUTPATIENT)
Dept: LAB | Facility: HOSPITAL | Age: 66
End: 2021-05-04
Attending: NURSE PRACTITIONER
Payer: COMMERCIAL

## 2021-05-04 DIAGNOSIS — Z76.82 AWAITING ORGAN TRANSPLANT STATUS: ICD-10-CM

## 2021-05-04 PROCEDURE — 99001 SPECIMEN HANDLING PT-LAB: CPT | Mod: TXP | Performed by: NURSE PRACTITIONER

## 2021-05-05 ENCOUNTER — LAB VISIT (OUTPATIENT)
Dept: LAB | Facility: HOSPITAL | Age: 66
End: 2021-05-05
Payer: COMMERCIAL

## 2021-05-05 DIAGNOSIS — Z76.82 AWAITING ORGAN TRANSPLANT STATUS: ICD-10-CM

## 2021-05-05 LAB
B CELL RESULTS - XM ALLO: NEGATIVE
B CELL RESULTS - XM ALLO: NEGATIVE
B MCS AVERAGE - XM ALLO: -17.4
B MCS AVERAGE - XM ALLO: -19.8
DONOR MRN: NORMAL
DONOR MRN: NORMAL
FXMAL TESTING DATE: NORMAL
FXMAL TESTING DATE: NORMAL
HLA FLOW CROSSMATCH (ALLO) INTERPRETATION: NORMAL
SERUM COLLECTION DT - XM ALLO: NORMAL
SERUM COLLECTION DT - XM ALLO: NORMAL
T CELL RESULTS - XM ALLO: NEGATIVE
T CELL RESULTS - XM ALLO: NEGATIVE
T MCS AVERAGE - XM ALLO: -14.1
T MCS AVERAGE - XM ALLO: -9.3

## 2021-05-05 PROCEDURE — 86825 HLA X-MATH NON-CYTOTOXIC: CPT | Mod: PO,TXP | Performed by: NURSE PRACTITIONER

## 2021-05-05 PROCEDURE — 86826 HLA X-MATCH NONCYTOTOXC ADDL: CPT | Mod: PO,TXP | Performed by: NURSE PRACTITIONER

## 2021-05-05 PROCEDURE — 86826 HLA X-MATCH NONCYTOTOXC ADDL: CPT | Mod: 91,PO,TXP | Performed by: NURSE PRACTITIONER

## 2021-05-05 PROCEDURE — 86825 HLA X-MATH NON-CYTOTOXIC: CPT | Mod: 91,PO,TXP | Performed by: NURSE PRACTITIONER

## 2021-05-06 LAB
CLASS I ANTIBODY COMMENTS - LUMINEX: NORMAL
CLASS II ANTIBODIES - LUMINEX: NEGATIVE
CPRA %: 0
SERUM COLLECTION DT - LUMINEX CLASS I: NORMAL
SERUM COLLECTION DT - LUMINEX CLASS II: NORMAL
SPCL1 TESTING DATE: NORMAL
SPCL2 TESTING DATE: NORMAL
SPLUA TESTING DATE: NORMAL

## 2021-05-10 ENCOUNTER — LAB VISIT (OUTPATIENT)
Dept: LAB | Facility: HOSPITAL | Age: 66
End: 2021-05-10
Payer: COMMERCIAL

## 2021-05-10 DIAGNOSIS — Z76.82 AWAITING ORGAN TRANSPLANT STATUS: ICD-10-CM

## 2021-05-18 ENCOUNTER — LAB VISIT (OUTPATIENT)
Dept: LAB | Facility: HOSPITAL | Age: 66
End: 2021-05-18
Attending: INTERNAL MEDICINE
Payer: COMMERCIAL

## 2021-05-18 ENCOUNTER — OFFICE VISIT (OUTPATIENT)
Dept: INTERNAL MEDICINE | Facility: CLINIC | Age: 66
End: 2021-05-18
Payer: COMMERCIAL

## 2021-05-18 VITALS
HEART RATE: 108 BPM | WEIGHT: 256.38 LBS | SYSTOLIC BLOOD PRESSURE: 130 MMHG | BODY MASS INDEX: 32.92 KG/M2 | TEMPERATURE: 96 F | OXYGEN SATURATION: 96 % | DIASTOLIC BLOOD PRESSURE: 90 MMHG

## 2021-05-18 DIAGNOSIS — R30.0 BURNING WITH URINATION: Primary | ICD-10-CM

## 2021-05-18 DIAGNOSIS — R30.0 BURNING WITH URINATION: ICD-10-CM

## 2021-05-18 LAB
BACTERIA #/AREA URNS HPF: ABNORMAL /HPF
BILIRUB UR QL STRIP: NEGATIVE
CLARITY UR: ABNORMAL
COLOR UR: ABNORMAL
GLUCOSE UR QL STRIP: ABNORMAL
HGB UR QL STRIP: ABNORMAL
HYALINE CASTS #/AREA URNS LPF: 0 /LPF
KETONES UR QL STRIP: NEGATIVE
LEUKOCYTE ESTERASE UR QL STRIP: ABNORMAL
MICROSCOPIC COMMENT: ABNORMAL
NITRITE UR QL STRIP: NEGATIVE
PH UR STRIP: 7 [PH] (ref 5–8)
PROT UR QL STRIP: ABNORMAL
RBC #/AREA URNS HPF: 0 /HPF (ref 0–4)
SP GR UR STRIP: 1.01 (ref 1–1.03)
URN SPEC COLLECT METH UR: ABNORMAL
WBC #/AREA URNS HPF: >100 /HPF (ref 0–5)

## 2021-05-18 PROCEDURE — 87086 URINE CULTURE/COLONY COUNT: CPT | Mod: NTX | Performed by: INTERNAL MEDICINE

## 2021-05-18 PROCEDURE — 99999 PR PBB SHADOW E&M-EST. PATIENT-LVL III: CPT | Mod: PBBFAC,,, | Performed by: INTERNAL MEDICINE

## 2021-05-18 PROCEDURE — 99999 PR PBB SHADOW E&M-EST. PATIENT-LVL III: ICD-10-PCS | Mod: PBBFAC,,, | Performed by: INTERNAL MEDICINE

## 2021-05-18 PROCEDURE — 99213 PR OFFICE/OUTPT VISIT, EST, LEVL III, 20-29 MIN: ICD-10-PCS | Mod: S$GLB,,, | Performed by: INTERNAL MEDICINE

## 2021-05-18 PROCEDURE — 87088 URINE BACTERIA CULTURE: CPT | Mod: NTX | Performed by: INTERNAL MEDICINE

## 2021-05-18 PROCEDURE — 99213 OFFICE O/P EST LOW 20 MIN: CPT | Mod: S$GLB,,, | Performed by: INTERNAL MEDICINE

## 2021-05-18 PROCEDURE — 87147 CULTURE TYPE IMMUNOLOGIC: CPT | Mod: TXP | Performed by: INTERNAL MEDICINE

## 2021-05-18 PROCEDURE — 81000 URINALYSIS NONAUTO W/SCOPE: CPT | Mod: NTX | Performed by: INTERNAL MEDICINE

## 2021-05-18 RX ORDER — CIPROFLOXACIN 500 MG/1
500 TABLET ORAL DAILY
Qty: 5 TABLET | Refills: 0 | Status: SHIPPED | OUTPATIENT
Start: 2021-05-18 | End: 2021-07-08

## 2021-05-21 LAB — BACTERIA UR CULT: ABNORMAL

## 2021-05-24 ENCOUNTER — PATIENT MESSAGE (OUTPATIENT)
Dept: INTERNAL MEDICINE | Facility: CLINIC | Age: 66
End: 2021-05-24

## 2021-05-24 ENCOUNTER — TELEPHONE (OUTPATIENT)
Dept: INTERNAL MEDICINE | Facility: CLINIC | Age: 66
End: 2021-05-24

## 2021-05-24 DIAGNOSIS — N39.0 URINARY TRACT INFECTION WITHOUT HEMATURIA, SITE UNSPECIFIED: Primary | ICD-10-CM

## 2021-05-24 LAB
HLA FREEZE AND HOLD INTERPRETATION: NORMAL
HLA FREEZE AND HOLD INTERPRETATION: NORMAL
HLAFH COLLECTION DATE: NORMAL
HLAFH COLLECTION DATE: NORMAL
HPRA INTERPRETATION: NORMAL
HPRA INTERPRETATION: NORMAL

## 2021-05-24 RX ORDER — CEPHALEXIN 500 MG/1
500 CAPSULE ORAL EVERY 12 HOURS
Qty: 14 CAPSULE | Refills: 0 | Status: SHIPPED | OUTPATIENT
Start: 2021-05-24 | End: 2021-07-08

## 2021-05-31 PROCEDURE — 86832 HLA CLASS I HIGH DEFIN QUAL: CPT | Mod: PO,TXP | Performed by: NURSE PRACTITIONER

## 2021-05-31 PROCEDURE — 86833 HLA CLASS II HIGH DEFIN QUAL: CPT | Mod: PO,TXP | Performed by: NURSE PRACTITIONER

## 2021-06-02 ENCOUNTER — LAB VISIT (OUTPATIENT)
Dept: LAB | Facility: HOSPITAL | Age: 66
End: 2021-06-02
Attending: NURSE PRACTITIONER
Payer: COMMERCIAL

## 2021-06-02 ENCOUNTER — PATIENT OUTREACH (OUTPATIENT)
Dept: ADMINISTRATIVE | Facility: HOSPITAL | Age: 66
End: 2021-06-02

## 2021-06-02 DIAGNOSIS — Z76.82 ORGAN TRANSPLANT CANDIDATE: ICD-10-CM

## 2021-06-02 PROCEDURE — 84153 ASSAY OF PSA TOTAL: CPT | Mod: TXP | Performed by: NURSE PRACTITIONER

## 2021-06-02 PROCEDURE — 83970 ASSAY OF PARATHORMONE: CPT | Mod: TXP | Performed by: NURSE PRACTITIONER

## 2021-06-02 PROCEDURE — 36415 COLL VENOUS BLD VENIPUNCTURE: CPT | Mod: PO,TXP | Performed by: NURSE PRACTITIONER

## 2021-06-02 PROCEDURE — 86886 COOMBS TEST INDIRECT TITER: CPT | Mod: TXP | Performed by: NURSE PRACTITIONER

## 2021-06-02 PROCEDURE — 86706 HEP B SURFACE ANTIBODY: CPT | Mod: TXP | Performed by: NURSE PRACTITIONER

## 2021-06-03 ENCOUNTER — LAB VISIT (OUTPATIENT)
Dept: LAB | Facility: HOSPITAL | Age: 66
End: 2021-06-03
Payer: COMMERCIAL

## 2021-06-03 ENCOUNTER — TELEPHONE (OUTPATIENT)
Dept: TRANSPLANT | Facility: CLINIC | Age: 66
End: 2021-06-03

## 2021-06-03 DIAGNOSIS — Z76.82 ORGAN TRANSPLANT CANDIDATE: ICD-10-CM

## 2021-06-03 LAB
BLD GP AB SCN TITR SERPL: 2 {TITER}
COMPLEXED PSA SERPL-MCNC: 3.6 NG/ML (ref 0–4)
LEFT EYE DM RETINOPATHY: NEGATIVE
PTH-INTACT SERPL-MCNC: 430 PG/ML (ref 9–77)
RIGHT EYE DM RETINOPATHY: NEGATIVE

## 2021-06-05 LAB
HBV SURFACE AB SER QL IA: POSITIVE
HBV SURFACE AB SERPL IA-ACNC: NORMAL MIU/ML

## 2021-06-09 ENCOUNTER — TELEPHONE (OUTPATIENT)
Dept: RADIOLOGY | Facility: HOSPITAL | Age: 66
End: 2021-06-09

## 2021-06-10 ENCOUNTER — HOSPITAL ENCOUNTER (OUTPATIENT)
Dept: CARDIOLOGY | Facility: HOSPITAL | Age: 66
Discharge: HOME | End: 2021-06-10
Attending: NURSE PRACTITIONER
Payer: COMMERCIAL

## 2021-06-10 ENCOUNTER — HOSPITAL ENCOUNTER (OUTPATIENT)
Dept: RADIOLOGY | Facility: HOSPITAL | Age: 66
Discharge: HOME | End: 2021-06-10
Attending: NURSE PRACTITIONER
Payer: COMMERCIAL

## 2021-06-10 VITALS
HEIGHT: 74 IN | DIASTOLIC BLOOD PRESSURE: 90 MMHG | WEIGHT: 256 LBS | SYSTOLIC BLOOD PRESSURE: 130 MMHG | BODY MASS INDEX: 32.85 KG/M2

## 2021-06-10 DIAGNOSIS — Z76.82 ORGAN TRANSPLANT CANDIDATE: ICD-10-CM

## 2021-06-10 PROCEDURE — 76770 US EXAM ABDO BACK WALL COMP: CPT | Mod: TC,TXP

## 2021-06-10 PROCEDURE — 93306 TTE W/DOPPLER COMPLETE: CPT | Mod: TXP

## 2021-06-10 PROCEDURE — 76770 US EXAM ABDO BACK WALL COMP: CPT | Mod: 26,TXP,, | Performed by: RADIOLOGY

## 2021-06-10 PROCEDURE — 93306 ECHO (CUPID ONLY): ICD-10-PCS | Mod: 26,TXP,, | Performed by: INTERNAL MEDICINE

## 2021-06-10 PROCEDURE — 76770 US RETROPERITONEAL COMPLETE: ICD-10-PCS | Mod: 26,TXP,, | Performed by: RADIOLOGY

## 2021-06-10 PROCEDURE — 93306 TTE W/DOPPLER COMPLETE: CPT | Mod: 26,TXP,, | Performed by: INTERNAL MEDICINE

## 2021-06-11 LAB
AORTIC ROOT ANNULUS: 3.28 CM
ASCENDING AORTA: 3.56 CM
AV INDEX (PROSTH): 0.74
AV MEAN GRADIENT: 4 MMHG
AV PEAK GRADIENT: 8 MMHG
AV VALVE AREA: 2.49 CM2
AV VELOCITY RATIO: 0.67
BSA FOR ECHO PROCEDURE: 2.46 M2
CV ECHO LV RWT: 0.46 CM
DOP CALC AO PEAK VEL: 1.37 M/S
DOP CALC AO VTI: 27.1 CM
DOP CALC LVOT AREA: 3.4 CM2
DOP CALC LVOT DIAMETER: 2.07 CM
DOP CALC LVOT PEAK VEL: 0.92 M/S
DOP CALC LVOT STROKE VOLUME: 67.51 CM3
DOP CALC RVOT PEAK VEL: 0.5 M/S
DOP CALC RVOT VTI: 11.24 CM
DOP CALCLVOT PEAK VEL VTI: 20.07 CM
ECHO LV POSTERIOR WALL: 1.35 CM (ref 0.6–1.1)
EJECTION FRACTION: 55 %
FRACTIONAL SHORTENING: 29 % (ref 28–44)
INTERVENTRICULAR SEPTUM: 1.43 CM (ref 0.6–1.1)
IVRT: 37.11 MSEC
LA MAJOR: 4.66 CM
LA MINOR: 5.59 CM
LA WIDTH: 3.12 CM
LEFT ATRIUM SIZE: 5.4 CM
LEFT ATRIUM VOLUME INDEX: 30.2 ML/M2
LEFT ATRIUM VOLUME: 72.79 CM3
LEFT INTERNAL DIMENSION IN SYSTOLE: 4.15 CM (ref 2.1–4)
LEFT VENTRICLE DIASTOLIC VOLUME INDEX: 70.83 ML/M2
LEFT VENTRICLE DIASTOLIC VOLUME: 170.71 ML
LEFT VENTRICLE MASS INDEX: 153 G/M2
LEFT VENTRICLE SYSTOLIC VOLUME INDEX: 31.8 ML/M2
LEFT VENTRICLE SYSTOLIC VOLUME: 76.59 ML
LEFT VENTRICULAR INTERNAL DIMENSION IN DIASTOLE: 5.86 CM (ref 3.5–6)
LEFT VENTRICULAR MASS: 369.79 G
PULM VEIN S/D RATIO: 1.68
PV MEAN GRADIENT: 0 MMHG
PV PEAK D VEL: 0.31 M/S
PV PEAK S VEL: 0.52 M/S
PV PEAK VELOCITY: 0.66 CM/S
RA MAJOR: 4.54 CM
RA PRESSURE: 3 MMHG
RA WIDTH: 3.96 CM
RIGHT VENTRICULAR END-DIASTOLIC DIMENSION: 3.11 CM
SINUS: 3.33 CM
STJ: 3.05 CM
TDI LATERAL: 0.04 M/S
TDI SEPTAL: 0.05 M/S
TDI: 0.05 M/S
TRICUSPID ANNULAR PLANE SYSTOLIC EXCURSION: 1.61 CM

## 2021-06-14 LAB — HPRA INTERPRETATION: NORMAL

## 2021-07-06 ENCOUNTER — LAB VISIT (OUTPATIENT)
Dept: LAB | Facility: HOSPITAL | Age: 66
End: 2021-07-06
Attending: NURSE PRACTITIONER
Payer: COMMERCIAL

## 2021-07-06 ENCOUNTER — OFFICE VISIT (OUTPATIENT)
Dept: TRANSPLANT | Facility: CLINIC | Age: 66
End: 2021-07-06
Payer: COMMERCIAL

## 2021-07-06 VITALS
BODY MASS INDEX: 34.51 KG/M2 | HEART RATE: 88 BPM | TEMPERATURE: 97 F | DIASTOLIC BLOOD PRESSURE: 86 MMHG | HEIGHT: 73 IN | SYSTOLIC BLOOD PRESSURE: 162 MMHG | OXYGEN SATURATION: 93 % | RESPIRATION RATE: 20 BRPM | WEIGHT: 260.38 LBS

## 2021-07-06 DIAGNOSIS — N18.6 TYPE 2 DIABETES MELLITUS WITH CHRONIC KIDNEY DISEASE ON CHRONIC DIALYSIS, WITH LONG-TERM CURRENT USE OF INSULIN: ICD-10-CM

## 2021-07-06 DIAGNOSIS — Z76.82 PATIENT ON WAITING LIST FOR KIDNEY TRANSPLANT: Primary | Chronic | ICD-10-CM

## 2021-07-06 DIAGNOSIS — I10 SEVERE HYPERTENSION: ICD-10-CM

## 2021-07-06 DIAGNOSIS — N18.6 ESRD (END STAGE RENAL DISEASE): ICD-10-CM

## 2021-07-06 DIAGNOSIS — Z76.82 AWAITING ORGAN TRANSPLANT STATUS: ICD-10-CM

## 2021-07-06 DIAGNOSIS — Z99.2 ANEMIA DUE TO CHRONIC KIDNEY DISEASE, ON CHRONIC DIALYSIS: ICD-10-CM

## 2021-07-06 DIAGNOSIS — Z79.4 TYPE 2 DIABETES MELLITUS WITH CHRONIC KIDNEY DISEASE ON CHRONIC DIALYSIS, WITH LONG-TERM CURRENT USE OF INSULIN: ICD-10-CM

## 2021-07-06 DIAGNOSIS — D63.1 ANEMIA DUE TO CHRONIC KIDNEY DISEASE, ON CHRONIC DIALYSIS: ICD-10-CM

## 2021-07-06 DIAGNOSIS — N18.6 ANEMIA DUE TO CHRONIC KIDNEY DISEASE, ON CHRONIC DIALYSIS: ICD-10-CM

## 2021-07-06 DIAGNOSIS — Z99.2 TYPE 2 DIABETES MELLITUS WITH CHRONIC KIDNEY DISEASE ON CHRONIC DIALYSIS, WITH LONG-TERM CURRENT USE OF INSULIN: ICD-10-CM

## 2021-07-06 DIAGNOSIS — N25.81 SECONDARY HYPERPARATHYROIDISM: ICD-10-CM

## 2021-07-06 DIAGNOSIS — E11.22 TYPE 2 DIABETES MELLITUS WITH CHRONIC KIDNEY DISEASE ON CHRONIC DIALYSIS, WITH LONG-TERM CURRENT USE OF INSULIN: ICD-10-CM

## 2021-07-06 PROCEDURE — 99999 PR PBB SHADOW E&M-EST. PATIENT-LVL III: ICD-10-PCS | Mod: PBBFAC,TXP,, | Performed by: NURSE PRACTITIONER

## 2021-07-06 PROCEDURE — 99215 OFFICE O/P EST HI 40 MIN: CPT | Mod: S$GLB,TXP,, | Performed by: NURSE PRACTITIONER

## 2021-07-06 PROCEDURE — 86833 HLA CLASS II HIGH DEFIN QUAL: CPT | Mod: TXP | Performed by: NURSE PRACTITIONER

## 2021-07-06 PROCEDURE — 1126F AMNT PAIN NOTED NONE PRSNT: CPT | Mod: S$GLB,TXP,, | Performed by: NURSE PRACTITIONER

## 2021-07-06 PROCEDURE — 99215 PR OFFICE/OUTPT VISIT, EST, LEVL V, 40-54 MIN: ICD-10-PCS | Mod: S$GLB,TXP,, | Performed by: NURSE PRACTITIONER

## 2021-07-06 PROCEDURE — 3008F BODY MASS INDEX DOCD: CPT | Mod: CPTII,S$GLB,TXP, | Performed by: NURSE PRACTITIONER

## 2021-07-06 PROCEDURE — 3008F PR BODY MASS INDEX (BMI) DOCUMENTED: ICD-10-PCS | Mod: CPTII,S$GLB,TXP, | Performed by: NURSE PRACTITIONER

## 2021-07-06 PROCEDURE — 86832 HLA CLASS I HIGH DEFIN QUAL: CPT | Mod: TXP | Performed by: NURSE PRACTITIONER

## 2021-07-06 PROCEDURE — 1126F PR PAIN SEVERITY QUANTIFIED, NO PAIN PRESENT: ICD-10-PCS | Mod: S$GLB,TXP,, | Performed by: NURSE PRACTITIONER

## 2021-07-06 PROCEDURE — 99999 PR PBB SHADOW E&M-EST. PATIENT-LVL III: CPT | Mod: PBBFAC,TXP,, | Performed by: NURSE PRACTITIONER

## 2021-07-07 PROCEDURE — 99001 SPECIMEN HANDLING PT-LAB: CPT | Mod: PO,TXP | Performed by: NURSE PRACTITIONER

## 2021-07-08 ENCOUNTER — TELEPHONE (OUTPATIENT)
Dept: TRANSPLANT | Facility: CLINIC | Age: 66
End: 2021-07-08

## 2021-07-08 DIAGNOSIS — Z76.82 AWAITING ORGAN TRANSPLANT STATUS: Primary | ICD-10-CM

## 2021-07-10 DIAGNOSIS — N25.81 SECONDARY HYPERPARATHYROIDISM OF RENAL ORIGIN: ICD-10-CM

## 2021-07-10 DIAGNOSIS — E53.8 FOLATE DEFICIENCY: ICD-10-CM

## 2021-07-11 RX ORDER — FOLIC ACID 1 MG/1
TABLET ORAL
Qty: 30 TABLET | Refills: 9 | OUTPATIENT
Start: 2021-07-11

## 2021-07-11 RX ORDER — CALCITRIOL 0.25 UG/1
0.25 CAPSULE ORAL
Qty: 12 CAPSULE | Refills: 14 | OUTPATIENT
Start: 2021-07-12

## 2021-07-12 ENCOUNTER — LAB VISIT (OUTPATIENT)
Dept: LAB | Facility: HOSPITAL | Age: 66
End: 2021-07-12
Payer: COMMERCIAL

## 2021-07-12 DIAGNOSIS — Z76.82 AWAITING ORGAN TRANSPLANT STATUS: ICD-10-CM

## 2021-07-12 RX ORDER — CALCITRIOL 0.25 UG/1
0.25 CAPSULE ORAL
Qty: 45 CAPSULE | Refills: 3 | Status: SHIPPED | OUTPATIENT
Start: 2021-07-12 | End: 2022-07-25

## 2021-07-14 LAB
HLA FREEZE AND HOLD INTERPRETATION: NORMAL
HLAFH COLLECTION DATE: NORMAL
HPRA INTERPRETATION: NORMAL
HPRA INTERPRETATION: NORMAL

## 2021-08-03 ENCOUNTER — LAB VISIT (OUTPATIENT)
Dept: LAB | Facility: HOSPITAL | Age: 66
End: 2021-08-03
Attending: NURSE PRACTITIONER
Payer: COMMERCIAL

## 2021-08-03 DIAGNOSIS — Z76.82 AWAITING ORGAN TRANSPLANT STATUS: ICD-10-CM

## 2021-08-03 PROCEDURE — 99001 SPECIMEN HANDLING PT-LAB: CPT | Mod: TXP | Performed by: NURSE PRACTITIONER

## 2021-08-05 DIAGNOSIS — Z76.0 MEDICATION REFILL: ICD-10-CM

## 2021-08-05 RX ORDER — ATORVASTATIN CALCIUM 40 MG/1
40 TABLET, FILM COATED ORAL DAILY
Qty: 90 TABLET | Refills: 0 | Status: SHIPPED | OUTPATIENT
Start: 2021-08-05 | End: 2021-11-08

## 2021-08-09 NOTE — TELEPHONE ENCOUNTER
Returned call to patient. He states that he need a letter stating that his health will be at risk with his wife working in the school and if he get the virus.   full weight-bearing

## 2021-08-11 ENCOUNTER — TELEPHONE (OUTPATIENT)
Dept: NEPHROLOGY | Facility: CLINIC | Age: 66
End: 2021-08-11

## 2021-08-11 DIAGNOSIS — N25.81 SECONDARY HYPERPARATHYROIDISM OF RENAL ORIGIN: ICD-10-CM

## 2021-08-12 ENCOUNTER — OFFICE VISIT (OUTPATIENT)
Dept: PULMONOLOGY | Facility: CLINIC | Age: 66
End: 2021-08-12
Payer: COMMERCIAL

## 2021-08-12 ENCOUNTER — HOSPITAL ENCOUNTER (OUTPATIENT)
Dept: RADIOLOGY | Facility: HOSPITAL | Age: 66
Discharge: HOME | End: 2021-08-12
Attending: INTERNAL MEDICINE
Payer: COMMERCIAL

## 2021-08-12 VITALS
BODY MASS INDEX: 34.15 KG/M2 | RESPIRATION RATE: 16 BRPM | DIASTOLIC BLOOD PRESSURE: 88 MMHG | HEART RATE: 85 BPM | OXYGEN SATURATION: 98 % | HEIGHT: 74 IN | WEIGHT: 266.13 LBS | SYSTOLIC BLOOD PRESSURE: 148 MMHG

## 2021-08-12 DIAGNOSIS — J84.10 PULMONARY FIBROSIS, POSTINFLAMMATORY: ICD-10-CM

## 2021-08-12 DIAGNOSIS — J45.998 POST VIRAL ASTHMA: ICD-10-CM

## 2021-08-12 DIAGNOSIS — J12.82 PNEUMONIA DUE TO COVID-19 VIRUS: Primary | ICD-10-CM

## 2021-08-12 DIAGNOSIS — J31.0 OTHER RHINITIS: ICD-10-CM

## 2021-08-12 DIAGNOSIS — J84.9 INTERSTITIAL PULMONARY DISEASE, UNSPECIFIED: ICD-10-CM

## 2021-08-12 DIAGNOSIS — U07.1 PNEUMONIA DUE TO COVID-19 VIRUS: Primary | ICD-10-CM

## 2021-08-12 DIAGNOSIS — J32.9 SINUSITIS, UNSPECIFIED CHRONICITY, UNSPECIFIED LOCATION: ICD-10-CM

## 2021-08-12 DIAGNOSIS — R05.9 COUGH: ICD-10-CM

## 2021-08-12 DIAGNOSIS — J30.89 SEASONAL ALLERGIC RHINITIS DUE TO OTHER ALLERGIC TRIGGER: ICD-10-CM

## 2021-08-12 DIAGNOSIS — J18.9 PNEUMONIA, UNSPECIFIED ORGANISM: ICD-10-CM

## 2021-08-12 PROCEDURE — 3008F PR BODY MASS INDEX (BMI) DOCUMENTED: ICD-10-PCS | Mod: CPTII,NTX,S$GLB, | Performed by: INTERNAL MEDICINE

## 2021-08-12 PROCEDURE — 1159F MED LIST DOCD IN RCRD: CPT | Mod: CPTII,NTX,S$GLB, | Performed by: INTERNAL MEDICINE

## 2021-08-12 PROCEDURE — 3079F DIAST BP 80-89 MM HG: CPT | Mod: CPTII,NTX,S$GLB, | Performed by: INTERNAL MEDICINE

## 2021-08-12 PROCEDURE — 70220 X-RAY EXAM OF SINUSES: CPT | Mod: TC,TXP

## 2021-08-12 PROCEDURE — 99215 PR OFFICE/OUTPT VISIT, EST, LEVL V, 40-54 MIN: ICD-10-PCS | Mod: NTX,S$GLB,, | Performed by: INTERNAL MEDICINE

## 2021-08-12 PROCEDURE — 70220 X-RAY EXAM OF SINUSES: CPT | Mod: 26,NTX,, | Performed by: RADIOLOGY

## 2021-08-12 PROCEDURE — 71046 X-RAY EXAM CHEST 2 VIEWS: CPT | Mod: 26,NTX,, | Performed by: RADIOLOGY

## 2021-08-12 PROCEDURE — 99215 OFFICE O/P EST HI 40 MIN: CPT | Mod: NTX,S$GLB,, | Performed by: INTERNAL MEDICINE

## 2021-08-12 PROCEDURE — 3008F BODY MASS INDEX DOCD: CPT | Mod: CPTII,NTX,S$GLB, | Performed by: INTERNAL MEDICINE

## 2021-08-12 PROCEDURE — 1159F PR MEDICATION LIST DOCUMENTED IN MEDICAL RECORD: ICD-10-PCS | Mod: CPTII,NTX,S$GLB, | Performed by: INTERNAL MEDICINE

## 2021-08-12 PROCEDURE — 3044F HG A1C LEVEL LT 7.0%: CPT | Mod: CPTII,NTX,S$GLB, | Performed by: INTERNAL MEDICINE

## 2021-08-12 PROCEDURE — 3044F PR MOST RECENT HEMOGLOBIN A1C LEVEL <7.0%: ICD-10-PCS | Mod: CPTII,NTX,S$GLB, | Performed by: INTERNAL MEDICINE

## 2021-08-12 PROCEDURE — 70220 XR SINUSES MIN 3 VIEWS: ICD-10-PCS | Mod: 26,NTX,, | Performed by: RADIOLOGY

## 2021-08-12 PROCEDURE — 99999 PR PBB SHADOW E&M-EST. PATIENT-LVL V: ICD-10-PCS | Mod: PBBFAC,TXP,, | Performed by: INTERNAL MEDICINE

## 2021-08-12 PROCEDURE — 71046 X-RAY EXAM CHEST 2 VIEWS: CPT | Mod: TC,NTX

## 2021-08-12 PROCEDURE — 3077F PR MOST RECENT SYSTOLIC BLOOD PRESSURE >= 140 MM HG: ICD-10-PCS | Mod: CPTII,NTX,S$GLB, | Performed by: INTERNAL MEDICINE

## 2021-08-12 PROCEDURE — 99999 PR PBB SHADOW E&M-EST. PATIENT-LVL V: CPT | Mod: PBBFAC,TXP,, | Performed by: INTERNAL MEDICINE

## 2021-08-12 PROCEDURE — 3079F PR MOST RECENT DIASTOLIC BLOOD PRESSURE 80-89 MM HG: ICD-10-PCS | Mod: CPTII,NTX,S$GLB, | Performed by: INTERNAL MEDICINE

## 2021-08-12 PROCEDURE — 3077F SYST BP >= 140 MM HG: CPT | Mod: CPTII,NTX,S$GLB, | Performed by: INTERNAL MEDICINE

## 2021-08-12 PROCEDURE — 71046 XR CHEST PA AND LATERAL: ICD-10-PCS | Mod: 26,NTX,, | Performed by: RADIOLOGY

## 2021-08-12 RX ORDER — FLUTICASONE PROPIONATE AND SALMETEROL 500; 50 UG/1; UG/1
1 POWDER RESPIRATORY (INHALATION) 2 TIMES DAILY
Qty: 60 EACH | Refills: 11 | Status: SHIPPED | OUTPATIENT
Start: 2021-08-12 | End: 2022-08-12

## 2021-08-12 RX ORDER — PROMETHAZINE HYDROCHLORIDE AND DEXTROMETHORPHAN HYDROBROMIDE 6.25; 15 MG/5ML; MG/5ML
5 SYRUP ORAL 4 TIMES DAILY PRN
Qty: 240 ML | Refills: 5 | Status: SHIPPED | OUTPATIENT
Start: 2021-08-12 | End: 2021-08-22

## 2021-08-12 RX ORDER — AZITHROMYCIN 250 MG/1
TABLET, FILM COATED ORAL
Qty: 6 TABLET | Refills: 0 | Status: SHIPPED | OUTPATIENT
Start: 2021-08-12 | End: 2022-02-10

## 2021-08-12 RX ORDER — ALBUTEROL SULFATE 0.83 MG/ML
2.5 SOLUTION RESPIRATORY (INHALATION)
Qty: 360 ML | Refills: 11 | Status: SHIPPED | OUTPATIENT
Start: 2021-08-12 | End: 2022-06-04 | Stop reason: SDUPTHER

## 2021-08-12 RX ORDER — METHYLPREDNISOLONE 4 MG/1
TABLET ORAL
Qty: 1 PACKAGE | Refills: 0 | Status: SHIPPED | OUTPATIENT
Start: 2021-08-12 | End: 2022-02-10

## 2021-08-12 RX ORDER — FLUTICASONE PROPIONATE 50 MCG
2 SPRAY, SUSPENSION (ML) NASAL DAILY
Qty: 16 G | Refills: 11 | Status: SHIPPED | OUTPATIENT
Start: 2021-08-12 | End: 2022-02-10 | Stop reason: SDUPTHER

## 2021-08-12 RX ORDER — CALCITRIOL 0.25 UG/1
0.25 CAPSULE ORAL
Qty: 12 CAPSULE | Refills: 14 | OUTPATIENT
Start: 2021-08-13

## 2021-09-01 ENCOUNTER — PATIENT MESSAGE (OUTPATIENT)
Dept: INTERNAL MEDICINE | Facility: CLINIC | Age: 66
End: 2021-09-01

## 2021-09-02 ENCOUNTER — LAB VISIT (OUTPATIENT)
Dept: LAB | Facility: HOSPITAL | Age: 66
End: 2021-09-02
Attending: INTERNAL MEDICINE
Payer: COMMERCIAL

## 2021-09-02 ENCOUNTER — TELEPHONE (OUTPATIENT)
Dept: INTERNAL MEDICINE | Facility: CLINIC | Age: 66
End: 2021-09-02

## 2021-09-02 DIAGNOSIS — E11.22 TYPE 2 DIABETES MELLITUS WITH STAGE 4 CHRONIC KIDNEY DISEASE, WITH LONG-TERM CURRENT USE OF INSULIN: ICD-10-CM

## 2021-09-02 DIAGNOSIS — Z79.4 TYPE 2 DIABETES MELLITUS WITH STAGE 4 CHRONIC KIDNEY DISEASE, WITH LONG-TERM CURRENT USE OF INSULIN: ICD-10-CM

## 2021-09-02 DIAGNOSIS — N18.4 TYPE 2 DIABETES MELLITUS WITH STAGE 4 CHRONIC KIDNEY DISEASE, WITH LONG-TERM CURRENT USE OF INSULIN: ICD-10-CM

## 2021-09-02 LAB
ESTIMATED AVG GLUCOSE: 143 MG/DL (ref 68–131)
HBA1C MFR BLD: 6.6 % (ref 4–5.6)

## 2021-09-02 PROCEDURE — 36415 COLL VENOUS BLD VENIPUNCTURE: CPT | Mod: TXP | Performed by: PHYSICIAN ASSISTANT

## 2021-09-02 PROCEDURE — 83036 HEMOGLOBIN GLYCOSYLATED A1C: CPT | Mod: TXP | Performed by: PHYSICIAN ASSISTANT

## 2021-09-06 PROCEDURE — 99001 SPECIMEN HANDLING PT-LAB: CPT | Mod: PO,TXP | Performed by: NURSE PRACTITIONER

## 2021-09-07 ENCOUNTER — LAB VISIT (OUTPATIENT)
Dept: LAB | Facility: HOSPITAL | Age: 66
End: 2021-09-07
Attending: NURSE PRACTITIONER
Payer: COMMERCIAL

## 2021-09-07 DIAGNOSIS — Z76.82 AWAITING ORGAN TRANSPLANT STATUS: ICD-10-CM

## 2021-09-07 PROCEDURE — 86886 COOMBS TEST INDIRECT TITER: CPT | Mod: TXP | Performed by: NURSE PRACTITIONER

## 2021-09-07 PROCEDURE — 86832 HLA CLASS I HIGH DEFIN QUAL: CPT | Mod: TXP | Performed by: NURSE PRACTITIONER

## 2021-09-07 PROCEDURE — 36415 COLL VENOUS BLD VENIPUNCTURE: CPT | Mod: TXP | Performed by: NURSE PRACTITIONER

## 2021-09-07 PROCEDURE — 86833 HLA CLASS II HIGH DEFIN QUAL: CPT | Mod: TXP | Performed by: NURSE PRACTITIONER

## 2021-09-08 LAB — BLD GP AB SCN TITR SERPL: 4 {TITER}

## 2021-09-09 ENCOUNTER — OFFICE VISIT (OUTPATIENT)
Dept: DIABETES | Facility: CLINIC | Age: 66
End: 2021-09-09
Payer: COMMERCIAL

## 2021-09-09 DIAGNOSIS — E11.59 HYPERTENSION ASSOCIATED WITH DIABETES: ICD-10-CM

## 2021-09-09 DIAGNOSIS — I25.118 CORONARY ARTERY DISEASE OF NATIVE ARTERY OF NATIVE HEART WITH STABLE ANGINA PECTORIS: ICD-10-CM

## 2021-09-09 DIAGNOSIS — Z79.4 TYPE 2 DIABETES MELLITUS WITH STAGE 4 CHRONIC KIDNEY DISEASE, WITH LONG-TERM CURRENT USE OF INSULIN: Primary | ICD-10-CM

## 2021-09-09 DIAGNOSIS — I15.2 HYPERTENSION ASSOCIATED WITH DIABETES: ICD-10-CM

## 2021-09-09 DIAGNOSIS — E66.09 NON MORBID OBESITY DUE TO EXCESS CALORIES: ICD-10-CM

## 2021-09-09 DIAGNOSIS — E29.1 HYPOGONADISM IN MALE: ICD-10-CM

## 2021-09-09 DIAGNOSIS — N18.4 TYPE 2 DIABETES MELLITUS WITH STAGE 4 CHRONIC KIDNEY DISEASE, WITH LONG-TERM CURRENT USE OF INSULIN: Primary | ICD-10-CM

## 2021-09-09 DIAGNOSIS — E11.69 HYPERLIPIDEMIA ASSOCIATED WITH TYPE 2 DIABETES MELLITUS: ICD-10-CM

## 2021-09-09 DIAGNOSIS — E78.5 HYPERLIPIDEMIA ASSOCIATED WITH TYPE 2 DIABETES MELLITUS: ICD-10-CM

## 2021-09-09 DIAGNOSIS — N18.4 CHRONIC KIDNEY DISEASE (CKD), STAGE IV (SEVERE): ICD-10-CM

## 2021-09-09 DIAGNOSIS — E11.22 TYPE 2 DIABETES MELLITUS WITH STAGE 4 CHRONIC KIDNEY DISEASE, WITH LONG-TERM CURRENT USE OF INSULIN: Primary | ICD-10-CM

## 2021-09-09 DIAGNOSIS — E04.2 MULTIPLE THYROID NODULES: ICD-10-CM

## 2021-09-09 PROCEDURE — 4010F ACE/ARB THERAPY RXD/TAKEN: CPT | Mod: CPTII,,, | Performed by: PHYSICIAN ASSISTANT

## 2021-09-09 PROCEDURE — 99213 PR OFFICE/OUTPT VISIT, EST, LEVL III, 20-29 MIN: ICD-10-PCS | Mod: 95,,, | Performed by: PHYSICIAN ASSISTANT

## 2021-09-09 PROCEDURE — 3044F HG A1C LEVEL LT 7.0%: CPT | Mod: CPTII,,, | Performed by: PHYSICIAN ASSISTANT

## 2021-09-09 PROCEDURE — 3066F PR DOCUMENTATION OF TREATMENT FOR NEPHROPATHY: ICD-10-PCS | Mod: CPTII,,, | Performed by: PHYSICIAN ASSISTANT

## 2021-09-09 PROCEDURE — 99213 OFFICE O/P EST LOW 20 MIN: CPT | Mod: 95,,, | Performed by: PHYSICIAN ASSISTANT

## 2021-09-09 PROCEDURE — 3066F NEPHROPATHY DOC TX: CPT | Mod: CPTII,,, | Performed by: PHYSICIAN ASSISTANT

## 2021-09-09 PROCEDURE — 3044F PR MOST RECENT HEMOGLOBIN A1C LEVEL <7.0%: ICD-10-PCS | Mod: CPTII,,, | Performed by: PHYSICIAN ASSISTANT

## 2021-09-09 PROCEDURE — 4010F PR ACE/ARB THEARPY RXD/TAKEN: ICD-10-PCS | Mod: CPTII,,, | Performed by: PHYSICIAN ASSISTANT

## 2021-09-09 RX ORDER — PEN NEEDLE, DIABETIC 30 GX3/16"
1 NEEDLE, DISPOSABLE MISCELLANEOUS
Qty: 300 EACH | Refills: 3 | Status: SHIPPED | OUTPATIENT
Start: 2021-09-09 | End: 2022-04-14

## 2021-09-09 RX ORDER — LANCETS
1 EACH MISCELLANEOUS 4 TIMES DAILY
Qty: 100 EACH | Refills: 11 | Status: SHIPPED | OUTPATIENT
Start: 2021-09-09 | End: 2022-07-28

## 2021-09-09 RX ORDER — (INSULIN DEGLUDEC AND LIRAGLUTIDE) 100; 3.6 [IU]/ML; MG/ML
38 INJECTION, SOLUTION SUBCUTANEOUS DAILY
Qty: 12 PEN | Refills: 3 | Status: SHIPPED | OUTPATIENT
Start: 2021-09-09 | End: 2022-04-14 | Stop reason: ALTCHOICE

## 2021-09-13 ENCOUNTER — TELEPHONE (OUTPATIENT)
Dept: SURGERY | Facility: CLINIC | Age: 66
End: 2021-09-13

## 2021-09-13 ENCOUNTER — TELEPHONE (OUTPATIENT)
Dept: INTERNAL MEDICINE | Facility: CLINIC | Age: 66
End: 2021-09-13

## 2021-09-13 ENCOUNTER — PATIENT OUTREACH (OUTPATIENT)
Dept: ADMINISTRATIVE | Facility: HOSPITAL | Age: 66
End: 2021-09-13

## 2021-09-15 ENCOUNTER — LAB VISIT (OUTPATIENT)
Dept: LAB | Facility: HOSPITAL | Age: 66
End: 2021-09-15
Payer: COMMERCIAL

## 2021-09-15 DIAGNOSIS — Z76.82 ORGAN TRANSPLANT CANDIDATE: ICD-10-CM

## 2021-09-18 ENCOUNTER — EPISODE CHANGES (OUTPATIENT)
Dept: TRANSPLANT | Facility: CLINIC | Age: 66
End: 2021-09-18

## 2021-09-18 ENCOUNTER — TELEPHONE (OUTPATIENT)
Dept: TRANSPLANT | Facility: CLINIC | Age: 66
End: 2021-09-18

## 2021-09-22 ENCOUNTER — TELEPHONE (OUTPATIENT)
Dept: INTERNAL MEDICINE | Facility: CLINIC | Age: 66
End: 2021-09-22

## 2021-10-01 PROCEDURE — 86832 HLA CLASS I HIGH DEFIN QUAL: CPT | Mod: PO,TXP | Performed by: NURSE PRACTITIONER

## 2021-10-01 PROCEDURE — 86833 HLA CLASS II HIGH DEFIN QUAL: CPT | Mod: PO,TXP | Performed by: NURSE PRACTITIONER

## 2021-10-01 PROCEDURE — 86977 RBC SERUM PRETX INCUBJ/INHIB: CPT | Mod: 59,PO,TXP | Performed by: NURSE PRACTITIONER

## 2021-10-04 ENCOUNTER — LAB VISIT (OUTPATIENT)
Dept: LAB | Facility: HOSPITAL | Age: 66
End: 2021-10-04
Payer: COMMERCIAL

## 2021-10-04 DIAGNOSIS — Z76.82 ORGAN TRANSPLANT CANDIDATE: ICD-10-CM

## 2021-10-04 LAB — HPRA INTERPRETATION: NORMAL

## 2021-10-06 ENCOUNTER — LAB VISIT (OUTPATIENT)
Dept: LAB | Facility: HOSPITAL | Age: 66
End: 2021-10-06
Attending: NURSE PRACTITIONER
Payer: COMMERCIAL

## 2021-10-06 DIAGNOSIS — Z76.82 AWAITING ORGAN TRANSPLANT STATUS: ICD-10-CM

## 2021-10-06 LAB — HPRA INTERPRETATION: NORMAL

## 2021-10-06 PROCEDURE — 99001 SPECIMEN HANDLING PT-LAB: CPT | Mod: TXP | Performed by: NURSE PRACTITIONER

## 2021-10-13 ENCOUNTER — TELEPHONE (OUTPATIENT)
Dept: TRANSPLANT | Facility: CLINIC | Age: 66
End: 2021-10-13

## 2021-10-13 ENCOUNTER — TELEPHONE (OUTPATIENT)
Dept: TRANSPLANT | Facility: HOSPITAL | Age: 66
End: 2021-10-13

## 2021-10-13 DIAGNOSIS — Z76.82 AWAITING ORGAN TRANSPLANT STATUS: Primary | ICD-10-CM

## 2021-10-13 LAB — HLAFH COLLECTION DATE: NORMAL

## 2021-10-14 ENCOUNTER — LAB VISIT (OUTPATIENT)
Dept: LAB | Facility: HOSPITAL | Age: 66
End: 2021-10-14
Payer: COMMERCIAL

## 2021-10-14 ENCOUNTER — TELEPHONE (OUTPATIENT)
Dept: TRANSPLANT | Facility: CLINIC | Age: 66
End: 2021-10-14

## 2021-10-14 DIAGNOSIS — Z76.82 AWAITING ORGAN TRANSPLANT STATUS: ICD-10-CM

## 2021-10-14 LAB
B CELL RESULTS - XM ALLO: NEGATIVE
B CELL RESULTS - XM ALLO: NEGATIVE
B MCS AVERAGE - XM ALLO: -20.4
B MCS AVERAGE - XM ALLO: -20.7
DONOR MRN: NORMAL
DONOR MRN: NORMAL
FXMAL TESTING DATE: NORMAL
FXMAL TESTING DATE: NORMAL
HLA FLOW CROSSMATCH (ALLO) INTERPRETATION: NORMAL
HPRA INTERPRETATION: NORMAL
SERUM COLLECTION DT - XM ALLO: NORMAL
SERUM COLLECTION DT - XM ALLO: NORMAL
T CELL RESULTS - XM ALLO: NEGATIVE
T CELL RESULTS - XM ALLO: NEGATIVE
T MCS AVERAGE - XM ALLO: -3.8
T MCS AVERAGE - XM ALLO: -4.9

## 2021-10-14 PROCEDURE — 86826 HLA X-MATCH NONCYTOTOXC ADDL: CPT | Mod: 91,PO,TXP | Performed by: TRANSPLANT SURGERY

## 2021-10-14 PROCEDURE — 86825 HLA X-MATH NON-CYTOTOXIC: CPT | Mod: 91,PO,TXP | Performed by: TRANSPLANT SURGERY

## 2021-10-14 PROCEDURE — 86825 HLA X-MATH NON-CYTOTOXIC: CPT | Mod: PO,TXP | Performed by: TRANSPLANT SURGERY

## 2021-10-14 PROCEDURE — 86826 HLA X-MATCH NONCYTOTOXC ADDL: CPT | Mod: PO,TXP | Performed by: TRANSPLANT SURGERY

## 2021-10-19 ENCOUNTER — TELEPHONE (OUTPATIENT)
Dept: INTERNAL MEDICINE | Facility: CLINIC | Age: 66
End: 2021-10-19

## 2021-10-19 RX ORDER — ACYCLOVIR 400 MG/1
400 TABLET ORAL 2 TIMES DAILY
Qty: 60 TABLET | Refills: 3 | Status: ON HOLD | OUTPATIENT
Start: 2021-10-19 | End: 2022-08-30 | Stop reason: SDUPTHER

## 2021-11-01 PROCEDURE — 99001 SPECIMEN HANDLING PT-LAB: CPT | Mod: PO,TXP | Performed by: NURSE PRACTITIONER

## 2021-11-02 ENCOUNTER — LAB VISIT (OUTPATIENT)
Dept: LAB | Facility: HOSPITAL | Age: 66
End: 2021-11-02
Attending: NURSE PRACTITIONER
Payer: COMMERCIAL

## 2021-11-02 DIAGNOSIS — Z76.82 AWAITING ORGAN TRANSPLANT STATUS: ICD-10-CM

## 2021-11-02 PROCEDURE — 99001 SPECIMEN HANDLING PT-LAB: CPT | Mod: TXP | Performed by: NURSE PRACTITIONER

## 2021-11-07 DIAGNOSIS — Z76.0 MEDICATION REFILL: ICD-10-CM

## 2021-11-08 RX ORDER — ATORVASTATIN CALCIUM 40 MG/1
TABLET, FILM COATED ORAL
Qty: 30 TABLET | Refills: 2 | Status: SHIPPED | OUTPATIENT
Start: 2021-11-08 | End: 2022-02-16

## 2021-11-09 ENCOUNTER — LAB VISIT (OUTPATIENT)
Dept: LAB | Facility: HOSPITAL | Age: 66
End: 2021-11-09
Payer: COMMERCIAL

## 2021-11-09 DIAGNOSIS — Z76.82 ORGAN TRANSPLANT CANDIDATE: ICD-10-CM

## 2021-11-23 ENCOUNTER — DOCUMENTATION ONLY (OUTPATIENT)
Dept: NEPHROLOGY | Facility: CLINIC | Age: 66
End: 2021-11-23

## 2021-11-23 ENCOUNTER — DOCUMENTATION ONLY (OUTPATIENT)
Dept: NEPHROLOGY | Facility: CLINIC | Age: 66
End: 2021-11-23
Payer: COMMERCIAL

## 2021-11-30 ENCOUNTER — OFFICE VISIT (OUTPATIENT)
Dept: UROLOGY | Facility: CLINIC | Age: 66
End: 2021-11-30
Payer: COMMERCIAL

## 2021-11-30 VITALS
WEIGHT: 265 LBS | HEART RATE: 86 BPM | DIASTOLIC BLOOD PRESSURE: 95 MMHG | HEIGHT: 74 IN | SYSTOLIC BLOOD PRESSURE: 133 MMHG | TEMPERATURE: 98 F | BODY MASS INDEX: 34.01 KG/M2

## 2021-11-30 DIAGNOSIS — R97.20 ELEVATED PSA: ICD-10-CM

## 2021-11-30 DIAGNOSIS — N52.9 ERECTILE DYSFUNCTION, UNSPECIFIED ERECTILE DYSFUNCTION TYPE: Primary | ICD-10-CM

## 2021-11-30 PROCEDURE — 4010F ACE/ARB THERAPY RXD/TAKEN: CPT | Mod: CPTII,NTX,S$GLB, | Performed by: UROLOGY

## 2021-11-30 PROCEDURE — 3066F NEPHROPATHY DOC TX: CPT | Mod: CPTII,NTX,S$GLB, | Performed by: UROLOGY

## 2021-11-30 PROCEDURE — 99214 PR OFFICE/OUTPT VISIT, EST, LEVL IV, 30-39 MIN: ICD-10-PCS | Mod: NTX,S$GLB,, | Performed by: UROLOGY

## 2021-11-30 PROCEDURE — 99999 PR PBB SHADOW E&M-EST. PATIENT-LVL V: CPT | Mod: PBBFAC,TXP,, | Performed by: UROLOGY

## 2021-11-30 PROCEDURE — 3066F PR DOCUMENTATION OF TREATMENT FOR NEPHROPATHY: ICD-10-PCS | Mod: CPTII,NTX,S$GLB, | Performed by: UROLOGY

## 2021-11-30 PROCEDURE — 99999 PR PBB SHADOW E&M-EST. PATIENT-LVL V: ICD-10-PCS | Mod: PBBFAC,TXP,, | Performed by: UROLOGY

## 2021-11-30 PROCEDURE — 4010F PR ACE/ARB THEARPY RXD/TAKEN: ICD-10-PCS | Mod: CPTII,NTX,S$GLB, | Performed by: UROLOGY

## 2021-11-30 PROCEDURE — 99214 OFFICE O/P EST MOD 30 MIN: CPT | Mod: NTX,S$GLB,, | Performed by: UROLOGY

## 2021-11-30 RX ORDER — SILDENAFIL 100 MG/1
100 TABLET, FILM COATED ORAL DAILY PRN
Qty: 10 TABLET | Refills: 8 | Status: SHIPPED | OUTPATIENT
Start: 2021-11-30 | End: 2022-07-07

## 2021-12-01 DIAGNOSIS — Z76.82 AWAITING ORGAN TRANSPLANT STATUS: Primary | ICD-10-CM

## 2021-12-01 PROCEDURE — 86832 HLA CLASS I HIGH DEFIN QUAL: CPT | Mod: PO,TXP | Performed by: NURSE PRACTITIONER

## 2021-12-01 PROCEDURE — 86833 HLA CLASS II HIGH DEFIN QUAL: CPT | Mod: PO,TXP | Performed by: NURSE PRACTITIONER

## 2021-12-02 ENCOUNTER — TELEPHONE (OUTPATIENT)
Dept: UROLOGY | Facility: CLINIC | Age: 66
End: 2021-12-02
Payer: COMMERCIAL

## 2021-12-03 ENCOUNTER — LAB VISIT (OUTPATIENT)
Dept: LAB | Facility: HOSPITAL | Age: 66
End: 2021-12-03
Payer: COMMERCIAL

## 2021-12-03 DIAGNOSIS — Z76.82 ORGAN TRANSPLANT CANDIDATE: ICD-10-CM

## 2021-12-07 ENCOUNTER — LAB VISIT (OUTPATIENT)
Dept: LAB | Facility: HOSPITAL | Age: 66
End: 2021-12-07
Attending: NURSE PRACTITIONER
Payer: COMMERCIAL

## 2021-12-07 DIAGNOSIS — Z76.82 AWAITING ORGAN TRANSPLANT STATUS: ICD-10-CM

## 2021-12-07 PROCEDURE — 86886 COOMBS TEST INDIRECT TITER: CPT | Mod: TXP | Performed by: NURSE PRACTITIONER

## 2021-12-07 PROCEDURE — 99001 SPECIMEN HANDLING PT-LAB: CPT | Mod: TXP | Performed by: NURSE PRACTITIONER

## 2021-12-07 PROCEDURE — 36415 COLL VENOUS BLD VENIPUNCTURE: CPT | Mod: PO,TXP | Performed by: NURSE PRACTITIONER

## 2021-12-15 LAB — HPRA INTERPRETATION: NORMAL

## 2021-12-17 DIAGNOSIS — Z76.0 MEDICATION REFILL: Primary | ICD-10-CM

## 2021-12-17 DIAGNOSIS — I10 HYPERTENSION, UNSPECIFIED TYPE: ICD-10-CM

## 2021-12-17 RX ORDER — NIFEDIPINE 90 MG/1
90 TABLET, EXTENDED RELEASE ORAL DAILY
Qty: 30 TABLET | Refills: 5 | Status: SHIPPED | OUTPATIENT
Start: 2021-12-17 | End: 2022-07-01 | Stop reason: SDUPTHER

## 2021-12-18 LAB — BLD GP AB SCN TITR SERPL: 2 {TITER}

## 2021-12-21 ENCOUNTER — TELEPHONE (OUTPATIENT)
Dept: UROLOGY | Facility: CLINIC | Age: 66
End: 2021-12-21
Payer: COMMERCIAL

## 2021-12-21 DIAGNOSIS — R97.20 ELEVATED PSA: Primary | ICD-10-CM

## 2021-12-23 ENCOUNTER — TELEPHONE (OUTPATIENT)
Dept: TRANSPLANT | Facility: CLINIC | Age: 66
End: 2021-12-23
Payer: COMMERCIAL

## 2021-12-27 ENCOUNTER — TELEPHONE (OUTPATIENT)
Dept: UROLOGY | Facility: CLINIC | Age: 66
End: 2021-12-27
Payer: COMMERCIAL

## 2021-12-27 DIAGNOSIS — R97.20 ELEVATED PSA: Primary | ICD-10-CM

## 2021-12-29 ENCOUNTER — TELEPHONE (OUTPATIENT)
Dept: RADIOLOGY | Facility: HOSPITAL | Age: 66
End: 2021-12-29
Payer: COMMERCIAL

## 2021-12-30 ENCOUNTER — LAB VISIT (OUTPATIENT)
Dept: LAB | Facility: HOSPITAL | Age: 66
End: 2021-12-30
Attending: UROLOGY
Payer: COMMERCIAL

## 2021-12-30 ENCOUNTER — HOSPITAL ENCOUNTER (OUTPATIENT)
Dept: RADIOLOGY | Facility: HOSPITAL | Age: 66
Discharge: HOME | End: 2021-12-30
Attending: UROLOGY
Payer: COMMERCIAL

## 2021-12-30 DIAGNOSIS — R97.20 ELEVATED PSA: ICD-10-CM

## 2021-12-30 LAB
CREAT SERPL-MCNC: 8.4 MG/DL (ref 0.5–1.4)
EST. GFR  (AFRICAN AMERICAN): 7 ML/MIN/1.73 M^2
EST. GFR  (NON AFRICAN AMERICAN): 6 ML/MIN/1.73 M^2

## 2021-12-30 PROCEDURE — 36415 COLL VENOUS BLD VENIPUNCTURE: CPT | Mod: TXP | Performed by: UROLOGY

## 2021-12-30 PROCEDURE — 72195 MRI PELVIS W/O DYE: CPT | Mod: 26,NTX,, | Performed by: RADIOLOGY

## 2021-12-30 PROCEDURE — 72195 MRI PELVIS W/O DYE: CPT | Mod: TC,TXP

## 2021-12-30 PROCEDURE — 72195 MRI PELVIS WITHOUT CONTRAST: ICD-10-PCS | Mod: 26,NTX,, | Performed by: RADIOLOGY

## 2021-12-30 PROCEDURE — 82565 ASSAY OF CREATININE: CPT | Mod: TXP | Performed by: UROLOGY

## 2021-12-30 RX ORDER — GADOBUTROL 604.72 MG/ML
10 INJECTION INTRAVENOUS
Status: DISCONTINUED | OUTPATIENT
Start: 2021-12-30 | End: 2021-12-30

## 2022-01-04 ENCOUNTER — LAB VISIT (OUTPATIENT)
Dept: LAB | Facility: HOSPITAL | Age: 67
End: 2022-01-04
Attending: NURSE PRACTITIONER
Payer: COMMERCIAL

## 2022-01-04 ENCOUNTER — PATIENT MESSAGE (OUTPATIENT)
Dept: UROLOGY | Facility: CLINIC | Age: 67
End: 2022-01-04
Payer: COMMERCIAL

## 2022-01-04 DIAGNOSIS — Z76.82 AWAITING ORGAN TRANSPLANT STATUS: ICD-10-CM

## 2022-01-04 PROCEDURE — 99001 SPECIMEN HANDLING PT-LAB: CPT | Mod: TXP | Performed by: NURSE PRACTITIONER

## 2022-01-05 ENCOUNTER — TELEPHONE (OUTPATIENT)
Dept: PULMONOLOGY | Facility: CLINIC | Age: 67
End: 2022-01-05
Payer: COMMERCIAL

## 2022-01-05 NOTE — TELEPHONE ENCOUNTER
Called pt in regards to required pre-procedural Covid-19 testing prior to PFT/Spirometry. Left a detailed message requesting pt arrive 30 minutes before appt time to allow time for rapid Covid testing prior to procedure.

## 2022-01-10 ENCOUNTER — TELEPHONE (OUTPATIENT)
Dept: PULMONOLOGY | Facility: CLINIC | Age: 67
End: 2022-01-10
Payer: COMMERCIAL

## 2022-01-10 PROCEDURE — 99001 SPECIMEN HANDLING PT-LAB: CPT | Mod: PO,TXP | Performed by: NURSE PRACTITIONER

## 2022-01-12 ENCOUNTER — PATIENT OUTREACH (OUTPATIENT)
Dept: ADMINISTRATIVE | Facility: HOSPITAL | Age: 67
End: 2022-01-12
Payer: COMMERCIAL

## 2022-01-13 ENCOUNTER — LAB VISIT (OUTPATIENT)
Dept: LAB | Facility: HOSPITAL | Age: 67
End: 2022-01-13
Payer: COMMERCIAL

## 2022-01-13 DIAGNOSIS — Z76.82 ORGAN TRANSPLANT CANDIDATE: ICD-10-CM

## 2022-01-19 ENCOUNTER — TELEPHONE (OUTPATIENT)
Dept: TRANSPLANT | Facility: CLINIC | Age: 67
End: 2022-01-19
Payer: COMMERCIAL

## 2022-01-19 ENCOUNTER — PATIENT MESSAGE (OUTPATIENT)
Dept: UROLOGY | Facility: CLINIC | Age: 67
End: 2022-01-19
Payer: COMMERCIAL

## 2022-01-19 ENCOUNTER — PATIENT OUTREACH (OUTPATIENT)
Dept: ADMINISTRATIVE | Facility: HOSPITAL | Age: 67
End: 2022-01-19
Payer: COMMERCIAL

## 2022-01-19 NOTE — TELEPHONE ENCOUNTER
Returned pt's call. Informed pt that he remains inactive due to need for Urology clearance secondary to elevated PSA. Pt stated he was cleared by Dr. Nugent. Advised pt that clearance is needed in writing. Pt verbalized understanding and will speak with Dr. Nugent.    ----- Message from Tan Joshua sent at 1/19/2022 11:54 AM CST -----  Regarding: Speak to coordinator  Patient calling to speak to coordinator.    Call:  [phone]

## 2022-01-19 NOTE — PROGRESS NOTES
No Outside A1c noted in chart Pt Scheduled 3/1/2022      Amanda WILKINSON LPN Care Coordinator  Care Coordination Department  [facility]  [phone]

## 2022-01-20 ENCOUNTER — TELEPHONE (OUTPATIENT)
Dept: UROLOGY | Facility: CLINIC | Age: 67
End: 2022-01-20
Payer: COMMERCIAL

## 2022-01-20 NOTE — TELEPHONE ENCOUNTER
Pt states he was removed from transplant list based on last urology note. Pt states he talked with Dr. Nugent and that he was not to be removed from list and would reach out to someone.   Informed we will contact him once we get a resolution

## 2022-01-20 NOTE — TELEPHONE ENCOUNTER
----- Message from Dinora Andrade sent at 1/19/2022  3:10 PM CST -----  Contact: pt  The pt request a return call, no additional info given and can be reached at [phone]///thxMW

## 2022-01-21 ENCOUNTER — TELEPHONE (OUTPATIENT)
Dept: UROLOGY | Facility: CLINIC | Age: 67
End: 2022-01-21
Payer: COMMERCIAL

## 2022-01-21 ENCOUNTER — TELEPHONE (OUTPATIENT)
Dept: TRANSPLANT | Facility: CLINIC | Age: 67
End: 2022-01-21
Payer: COMMERCIAL

## 2022-01-21 NOTE — TELEPHONE ENCOUNTER
Returned pt's call there was no answer. Left a message to call us back      ----- Message from Samantha King sent at 1/21/2022  2:12 PM CST -----  Regarding: returned call  Contact: patient  Patient is returning a call, please call them back at  [phone]

## 2022-01-21 NOTE — TELEPHONE ENCOUNTER
"Urology clearance added to checklist.    ----- Message from Lobo Malave RN sent at 1/21/2022  1:31 PM CST -----  Regarding: Patient to be added back to transplant list  Good Afternoon Faisal,    Mr. Alves contacted the clinic to let Dr. Nugent know he was "removed from transplant list" based on the documentation in his last clinic note, 11-30-21.    Dr. Nugent has updated his note:   Addendum 01/21/2022 - PSA in normal range and MRI negative for any concerning lesions. He is cleared for transplant from a urologic perspective.      Can you please add the patient back to the transplant list?    Thank you      "

## 2022-01-21 NOTE — TELEPHONE ENCOUNTER
Left message for patient that Dr. Nugent has updated his last clinic note and message has been sent to Faisal León RN with the [facility] transplant clinic to add patient back to transplant list.

## 2022-01-24 ENCOUNTER — TELEPHONE (OUTPATIENT)
Dept: TRANSPLANT | Facility: CLINIC | Age: 67
End: 2022-01-24
Payer: COMMERCIAL

## 2022-01-25 ENCOUNTER — TELEPHONE (OUTPATIENT)
Dept: UROLOGY | Facility: CLINIC | Age: 67
End: 2022-01-25
Payer: COMMERCIAL

## 2022-01-27 RX ORDER — TAMSULOSIN HYDROCHLORIDE 0.4 MG/1
0.4 CAPSULE ORAL DAILY
Qty: 30 CAPSULE | Refills: 11 | Status: SHIPPED | OUTPATIENT
Start: 2022-01-27 | End: 2023-02-08

## 2022-02-01 ENCOUNTER — PATIENT MESSAGE (OUTPATIENT)
Dept: PHARMACY | Facility: CLINIC | Age: 67
End: 2022-02-01
Payer: COMMERCIAL

## 2022-02-02 ENCOUNTER — TELEPHONE (OUTPATIENT)
Dept: TRANSPLANT | Facility: CLINIC | Age: 67
End: 2022-02-02
Payer: COMMERCIAL

## 2022-02-02 DIAGNOSIS — Z76.82 AWAITING ORGAN TRANSPLANT STATUS: Primary | ICD-10-CM

## 2022-02-07 ENCOUNTER — LAB VISIT (OUTPATIENT)
Dept: LAB | Facility: HOSPITAL | Age: 67
End: 2022-02-07
Payer: COMMERCIAL

## 2022-02-07 DIAGNOSIS — Z76.82 ORGAN TRANSPLANT CANDIDATE: ICD-10-CM

## 2022-02-10 ENCOUNTER — OFFICE VISIT (OUTPATIENT)
Dept: INTERNAL MEDICINE | Facility: CLINIC | Age: 67
End: 2022-02-10
Payer: COMMERCIAL

## 2022-02-10 ENCOUNTER — LAB VISIT (OUTPATIENT)
Dept: LAB | Facility: HOSPITAL | Age: 67
End: 2022-02-10
Attending: NURSE PRACTITIONER
Payer: COMMERCIAL

## 2022-02-10 VITALS
BODY MASS INDEX: 34.15 KG/M2 | TEMPERATURE: 98 F | OXYGEN SATURATION: 95 % | WEIGHT: 266.13 LBS | HEART RATE: 78 BPM | SYSTOLIC BLOOD PRESSURE: 110 MMHG | HEIGHT: 74 IN | DIASTOLIC BLOOD PRESSURE: 72 MMHG | RESPIRATION RATE: 18 BRPM

## 2022-02-10 DIAGNOSIS — J30.89 SEASONAL ALLERGIC RHINITIS DUE TO OTHER ALLERGIC TRIGGER: ICD-10-CM

## 2022-02-10 DIAGNOSIS — H61.23 BILATERAL IMPACTED CERUMEN: Primary | ICD-10-CM

## 2022-02-10 DIAGNOSIS — Z76.82 AWAITING ORGAN TRANSPLANT STATUS: ICD-10-CM

## 2022-02-10 PROCEDURE — 99213 OFFICE O/P EST LOW 20 MIN: CPT | Mod: S$GLB,,, | Performed by: NURSE PRACTITIONER

## 2022-02-10 PROCEDURE — 1159F PR MEDICATION LIST DOCUMENTED IN MEDICAL RECORD: ICD-10-PCS | Mod: CPTII,S$GLB,, | Performed by: NURSE PRACTITIONER

## 2022-02-10 PROCEDURE — 3008F BODY MASS INDEX DOCD: CPT | Mod: CPTII,S$GLB,, | Performed by: NURSE PRACTITIONER

## 2022-02-10 PROCEDURE — 99999 PR PBB SHADOW E&M-EST. PATIENT-LVL V: CPT | Mod: PBBFAC,,, | Performed by: NURSE PRACTITIONER

## 2022-02-10 PROCEDURE — 3074F PR MOST RECENT SYSTOLIC BLOOD PRESSURE < 130 MM HG: ICD-10-PCS | Mod: CPTII,S$GLB,, | Performed by: NURSE PRACTITIONER

## 2022-02-10 PROCEDURE — 1101F PT FALLS ASSESS-DOCD LE1/YR: CPT | Mod: CPTII,S$GLB,, | Performed by: NURSE PRACTITIONER

## 2022-02-10 PROCEDURE — 3078F DIAST BP <80 MM HG: CPT | Mod: CPTII,S$GLB,, | Performed by: NURSE PRACTITIONER

## 2022-02-10 PROCEDURE — 1159F MED LIST DOCD IN RCRD: CPT | Mod: CPTII,S$GLB,, | Performed by: NURSE PRACTITIONER

## 2022-02-10 PROCEDURE — 99999 PR PBB SHADOW E&M-EST. PATIENT-LVL V: ICD-10-PCS | Mod: PBBFAC,,, | Performed by: NURSE PRACTITIONER

## 2022-02-10 PROCEDURE — 3288F PR FALLS RISK ASSESSMENT DOCUMENTED: ICD-10-PCS | Mod: CPTII,S$GLB,, | Performed by: NURSE PRACTITIONER

## 2022-02-10 PROCEDURE — 99213 PR OFFICE/OUTPT VISIT, EST, LEVL III, 20-29 MIN: ICD-10-PCS | Mod: S$GLB,,, | Performed by: NURSE PRACTITIONER

## 2022-02-10 PROCEDURE — 4010F PR ACE/ARB THEARPY RXD/TAKEN: ICD-10-PCS | Mod: CPTII,S$GLB,, | Performed by: NURSE PRACTITIONER

## 2022-02-10 PROCEDURE — 1160F RVW MEDS BY RX/DR IN RCRD: CPT | Mod: CPTII,S$GLB,, | Performed by: NURSE PRACTITIONER

## 2022-02-10 PROCEDURE — 1125F PR PAIN SEVERITY QUANTIFIED, PAIN PRESENT: ICD-10-PCS | Mod: CPTII,S$GLB,, | Performed by: NURSE PRACTITIONER

## 2022-02-10 PROCEDURE — 1160F PR REVIEW ALL MEDS BY PRESCRIBER/CLIN PHARMACIST DOCUMENTED: ICD-10-PCS | Mod: CPTII,S$GLB,, | Performed by: NURSE PRACTITIONER

## 2022-02-10 PROCEDURE — 4010F ACE/ARB THERAPY RXD/TAKEN: CPT | Mod: CPTII,S$GLB,, | Performed by: NURSE PRACTITIONER

## 2022-02-10 PROCEDURE — 99001 SPECIMEN HANDLING PT-LAB: CPT | Mod: TXP | Performed by: NURSE PRACTITIONER

## 2022-02-10 PROCEDURE — 1125F AMNT PAIN NOTED PAIN PRSNT: CPT | Mod: CPTII,S$GLB,, | Performed by: NURSE PRACTITIONER

## 2022-02-10 PROCEDURE — 3008F PR BODY MASS INDEX (BMI) DOCUMENTED: ICD-10-PCS | Mod: CPTII,S$GLB,, | Performed by: NURSE PRACTITIONER

## 2022-02-10 PROCEDURE — 3074F SYST BP LT 130 MM HG: CPT | Mod: CPTII,S$GLB,, | Performed by: NURSE PRACTITIONER

## 2022-02-10 PROCEDURE — 1101F PR PT FALLS ASSESS DOC 0-1 FALLS W/OUT INJ PAST YR: ICD-10-PCS | Mod: CPTII,S$GLB,, | Performed by: NURSE PRACTITIONER

## 2022-02-10 PROCEDURE — 3288F FALL RISK ASSESSMENT DOCD: CPT | Mod: CPTII,S$GLB,, | Performed by: NURSE PRACTITIONER

## 2022-02-10 PROCEDURE — 3078F PR MOST RECENT DIASTOLIC BLOOD PRESSURE < 80 MM HG: ICD-10-PCS | Mod: CPTII,S$GLB,, | Performed by: NURSE PRACTITIONER

## 2022-02-10 RX ORDER — FLUTICASONE PROPIONATE 50 MCG
2 SPRAY, SUSPENSION (ML) NASAL DAILY
Qty: 16 G | Refills: 11 | Status: SHIPPED | OUTPATIENT
Start: 2022-02-10 | End: 2022-12-15

## 2022-02-10 NOTE — LETTER
February 10, 2022    Isidro Alves  [id number] [address] [facility]  [place] [address]             [facility]  Internal Medicine  [address] [place] [address]  [place] [address]  Phone: [phone]  Fax: [phone]   February 10, 2022     Patient: Isidro Alves   YOB: 1955   Date of Visit: 2/10/2022       To Whom it May Concern:    Isidro Alves was seen in my clinic on 2/10/2022. He may return to work on 02/11/2022.    Please excuse him from any work missed.    If you have any questions or concerns, please don't hesitate to call.    Sincerely,         Andra Park NP

## 2022-02-10 NOTE — PATIENT INSTRUCTIONS
DO NOT USE PIN OR Q-TIPS IN EAR.     START MEDICATIONS.     IF NO IMPROVEMENT IN PAIN OVER THE NEXT 1-2 WEEKS, PLEASE LET ME KNOW.   WILL GET AN APPOINTMENT WITH ENT

## 2022-02-10 NOTE — PROGRESS NOTES
Subjective:       Patient ID: Isidro Alves is a 66 y.o. male.    Chief Complaint: No chief complaint on file.    Patient presents for left ear pain.  Started about 2-3 days ago.      Otalgia   There is pain in the left ear. This is a new problem. Episode onset: 2-3 days ago  The problem occurs constantly. There has been no fever. Associated symptoms include coughing (mild) and hearing loss. Pertinent negatives include no sore throat. He has tried acetaminophen for the symptoms. The treatment provided mild relief.     Review of Systems   Constitutional: Negative for chills, fatigue and fever.   HENT: Positive for ear pain and hearing loss. Negative for sore throat.    Respiratory: Positive for cough (mild). Negative for shortness of breath.    Musculoskeletal: Negative for arthralgias and myalgias.   Psychiatric/Behavioral: Negative for agitation and confusion.         Objective:      Physical Exam  Vitals reviewed.   Constitutional:       Appearance: Normal appearance.   HENT:      Head:      Comments: Slight improvement from ear wash.  Recommended Debrox.  No q-tips or pins.      Right Ear: There is impacted cerumen.      Left Ear: There is impacted cerumen.   Cardiovascular:      Rate and Rhythm: Normal rate and regular rhythm.   Pulmonary:      Effort: Pulmonary effort is normal.      Breath sounds: Normal breath sounds.   Musculoskeletal:         General: Normal range of motion.   Skin:     General: Skin is warm.   Neurological:      General: No focal deficit present.      Mental Status: He is alert.   Psychiatric:         Behavior: Behavior is cooperative.         Assessment:       Problem List Items Addressed This Visit    None     Visit Diagnoses     Bilateral impacted cerumen    -  Primary    Relevant Medications    carbamide peroxide (DEBROX) 6.5 % otic solution    Other Relevant Orders    Ear wax removal (Completed)    Seasonal allergic rhinitis due to other allergic trigger        Relevant Medications     fluticasone propionate (FLONASE) 50 mcg/actuation nasal spray          Plan:         Bilateral impacted cerumen  -     Ear wax removal  -     carbamide peroxide (DEBROX) 6.5 % otic solution; Place 5 drops into both ears 2 (two) times daily. for 7 days  Dispense: 15 mL; Refill: 0    Seasonal allergic rhinitis due to other allergic trigger  -     fluticasone propionate (FLONASE) 50 mcg/actuation nasal spray; 2 sprays (100 mcg total) by Each Nostril route once daily.  Dispense: 16 g; Refill: 11        Will refer to ENT if no improvement in pain.     Start Flonase and Debrox

## 2022-02-11 ENCOUNTER — TELEPHONE (OUTPATIENT)
Dept: INTERNAL MEDICINE | Facility: CLINIC | Age: 67
End: 2022-02-11

## 2022-02-11 ENCOUNTER — PATIENT OUTREACH (OUTPATIENT)
Dept: ADMINISTRATIVE | Facility: OTHER | Age: 67
End: 2022-02-11
Payer: COMMERCIAL

## 2022-02-11 DIAGNOSIS — H92.09 OTALGIA, UNSPECIFIED LATERALITY: Primary | ICD-10-CM

## 2022-02-11 NOTE — PROGRESS NOTES
Health Maintenance Due   Topic Date Due    Shingles Vaccine (1 of 2) Never done    Pneumococcal Vaccines (Age 65+) (3 of 4 - PPSV23) 05/26/2021    Foot Exam  07/07/2021     Updates were requested from care everywhere.  Chart was reviewed for overdue [facility] ([facility]) topics (CRS, Breast Cancer Screening, Eye exam)  Health Maintenance has been updated.  LINKS immunization registry triggered.  Immunizations were reconciled.

## 2022-02-11 NOTE — TELEPHONE ENCOUNTER
----- Message from Eun Muniz sent at 2/11/2022  8:56 AM CST -----  Regarding: EAR ACHE  PT STS HE WAS SEEN IN CLINIC ON 02/10/22  FOR EAR ACHE PT STS HIS EAR ACHE IS WORSE PLEASE CALL [phone] THANKS! :)

## 2022-02-11 NOTE — TELEPHONE ENCOUNTER
I contacted the pt and assisted him with scheduling his ENT appt . He verbalized understanding of instructions. //kah

## 2022-02-11 NOTE — TELEPHONE ENCOUNTER
I called and informed that I will call him once the provider places his order for ENT as he's in more pain with his ear. He verbalized understanding . //kah

## 2022-02-14 ENCOUNTER — OFFICE VISIT (OUTPATIENT)
Dept: OTOLARYNGOLOGY | Facility: CLINIC | Age: 67
End: 2022-02-14
Payer: COMMERCIAL

## 2022-02-14 VITALS — BODY MASS INDEX: 33.57 KG/M2 | TEMPERATURE: 98 F | WEIGHT: 261.44 LBS

## 2022-02-14 DIAGNOSIS — H92.09 OTALGIA, UNSPECIFIED LATERALITY: ICD-10-CM

## 2022-02-14 PROCEDURE — 1159F MED LIST DOCD IN RCRD: CPT | Mod: CPTII,S$GLB,TXP, | Performed by: STUDENT IN AN ORGANIZED HEALTH CARE EDUCATION/TRAINING PROGRAM

## 2022-02-14 PROCEDURE — 3288F PR FALLS RISK ASSESSMENT DOCUMENTED: ICD-10-PCS | Mod: CPTII,S$GLB,TXP, | Performed by: STUDENT IN AN ORGANIZED HEALTH CARE EDUCATION/TRAINING PROGRAM

## 2022-02-14 PROCEDURE — 3008F BODY MASS INDEX DOCD: CPT | Mod: CPTII,S$GLB,TXP, | Performed by: STUDENT IN AN ORGANIZED HEALTH CARE EDUCATION/TRAINING PROGRAM

## 2022-02-14 PROCEDURE — 99999 PR PBB SHADOW E&M-EST. PATIENT-LVL IV: ICD-10-PCS | Mod: PBBFAC,TXP,, | Performed by: STUDENT IN AN ORGANIZED HEALTH CARE EDUCATION/TRAINING PROGRAM

## 2022-02-14 PROCEDURE — 3008F PR BODY MASS INDEX (BMI) DOCUMENTED: ICD-10-PCS | Mod: CPTII,S$GLB,TXP, | Performed by: STUDENT IN AN ORGANIZED HEALTH CARE EDUCATION/TRAINING PROGRAM

## 2022-02-14 PROCEDURE — 1159F PR MEDICATION LIST DOCUMENTED IN MEDICAL RECORD: ICD-10-PCS | Mod: CPTII,S$GLB,TXP, | Performed by: STUDENT IN AN ORGANIZED HEALTH CARE EDUCATION/TRAINING PROGRAM

## 2022-02-14 PROCEDURE — 69210 REMOVE IMPACTED EAR WAX UNI: CPT | Mod: S$GLB,TXP,, | Performed by: STUDENT IN AN ORGANIZED HEALTH CARE EDUCATION/TRAINING PROGRAM

## 2022-02-14 PROCEDURE — 4010F ACE/ARB THERAPY RXD/TAKEN: CPT | Mod: CPTII,S$GLB,TXP, | Performed by: STUDENT IN AN ORGANIZED HEALTH CARE EDUCATION/TRAINING PROGRAM

## 2022-02-14 PROCEDURE — 1101F PT FALLS ASSESS-DOCD LE1/YR: CPT | Mod: CPTII,S$GLB,TXP, | Performed by: STUDENT IN AN ORGANIZED HEALTH CARE EDUCATION/TRAINING PROGRAM

## 2022-02-14 PROCEDURE — 3288F FALL RISK ASSESSMENT DOCD: CPT | Mod: CPTII,S$GLB,TXP, | Performed by: STUDENT IN AN ORGANIZED HEALTH CARE EDUCATION/TRAINING PROGRAM

## 2022-02-14 PROCEDURE — 1126F PR PAIN SEVERITY QUANTIFIED, NO PAIN PRESENT: ICD-10-PCS | Mod: CPTII,S$GLB,TXP, | Performed by: STUDENT IN AN ORGANIZED HEALTH CARE EDUCATION/TRAINING PROGRAM

## 2022-02-14 PROCEDURE — 1101F PR PT FALLS ASSESS DOC 0-1 FALLS W/OUT INJ PAST YR: ICD-10-PCS | Mod: CPTII,S$GLB,TXP, | Performed by: STUDENT IN AN ORGANIZED HEALTH CARE EDUCATION/TRAINING PROGRAM

## 2022-02-14 PROCEDURE — 1126F AMNT PAIN NOTED NONE PRSNT: CPT | Mod: CPTII,S$GLB,TXP, | Performed by: STUDENT IN AN ORGANIZED HEALTH CARE EDUCATION/TRAINING PROGRAM

## 2022-02-14 PROCEDURE — 4010F PR ACE/ARB THEARPY RXD/TAKEN: ICD-10-PCS | Mod: CPTII,S$GLB,TXP, | Performed by: STUDENT IN AN ORGANIZED HEALTH CARE EDUCATION/TRAINING PROGRAM

## 2022-02-14 PROCEDURE — 69210 PR REMOVAL IMPACTED CERUMEN REQUIRING INSTRUMENTATION, UNILATERAL: ICD-10-PCS | Mod: S$GLB,TXP,, | Performed by: STUDENT IN AN ORGANIZED HEALTH CARE EDUCATION/TRAINING PROGRAM

## 2022-02-14 PROCEDURE — 99203 PR OFFICE/OUTPT VISIT, NEW, LEVL III, 30-44 MIN: ICD-10-PCS | Mod: 25,S$GLB,TXP, | Performed by: STUDENT IN AN ORGANIZED HEALTH CARE EDUCATION/TRAINING PROGRAM

## 2022-02-14 PROCEDURE — 99999 PR PBB SHADOW E&M-EST. PATIENT-LVL IV: CPT | Mod: PBBFAC,TXP,, | Performed by: STUDENT IN AN ORGANIZED HEALTH CARE EDUCATION/TRAINING PROGRAM

## 2022-02-14 PROCEDURE — 99203 OFFICE O/P NEW LOW 30 MIN: CPT | Mod: 25,S$GLB,TXP, | Performed by: STUDENT IN AN ORGANIZED HEALTH CARE EDUCATION/TRAINING PROGRAM

## 2022-02-14 RX ORDER — CIPROFLOXACIN HYDROCHLORIDE 3 MG/ML
4 SOLUTION/ DROPS OPHTHALMIC 2 TIMES DAILY
Qty: 10 ML | Refills: 0 | Status: SHIPPED | OUTPATIENT
Start: 2022-02-14 | End: 2022-02-21

## 2022-02-14 NOTE — PROGRESS NOTES
Chief complaint:   Chief Complaint   Patient presents with    Otalgia        History of Present Illness:     Mr. Alves is a 66 y.o. male presenting for evaluation of left ear otalgia and hearing loss. Onset of this chief complaint was about a month ago.  Additional symptoms that also have been associated are left ear hearing loss. Treatment included ear canal flushing. Symptoms worsened after this. Denies otorrhea, vertigo, fever.    The patient also denies pain deep within the ear, tenderness around the ear canal or pre-auricular area, or headaches.       The patient denies significant hearing loss risk factors, ototoxic medication exposure, chronic vestibular suppressant use, head/ facial/ soraya trauma, and otologic surgery.      Review of Systems     A complete 10 point ROS was completed and are positive as per above HPI.    Otherwise negative for fever, diplopia, chest pain, shortness of breath, vomiting, blood in urine, joint pain, skin rash, seizures and unusual bleeding.       History        Past Medical History:   Past Medical History:   Diagnosis Date    Anemia 4/16/2014    BPH (benign prostatic hyperplasia)     CKD (chronic kidney disease) stage 4, GFR 15-29 ml/min     Coronary artery disease of native artery of native heart with stable angina pectoris 4/29/2019    Diabetes mellitus     Diabetes mellitus, type 2     Elevated PSA     Encounter for blood transfusion     Herpes labialis     Hyperlipidemia     Hypertension     Obesity     Proteinuria     Secondary hyperparathyroidism (of renal origin)     UTI (lower urinary tract infection) 5/1/2015    .          Past Surgical History:  Past Surgical History:   Procedure Laterality Date    COLONOSCOPY N/A 12/21/2017    Procedure: COLONOSCOPY;  Surgeon: Fran Escalera MD;  Location: [facility];  Service: Endoscopy;  Laterality: N/A;    FLUOROSCOPY N/A 1/21/2021    Procedure: Vascath insertion;  Surgeon: Adrian Pruitt Jr., MD;  Location:  [facility] CATH LAB;  Service: General;  Laterality: N/A;    FLUOROSCOPY N/A 4/6/2021    Procedure: Over the wire Vas Cath exchange;  Surgeon: Ho Pressley MD;  Location: [facility] CATH LAB;  Service: General;  Laterality: N/A;    PROSTATE BIOPSY      thorocotomy  2010   .         Medications: Medication list was reviewed. He  has a current medication list which includes the following prescription(s): acyclovir, albuterol, albuterol, aspirin, atorvastatin, bd insulin syringe (half unit), blood sugar diagnostic, calcitriol, carbamide peroxide, carvedilol, cetirizine, ergocalciferol, esomeprazole, finasteride, fluticasone propionate, fluticasone-salmeterol 500-50 mcg/dose, folic acid, furosemide, vortex holding chamber, xultophy 100/3.6, lancets, multivitamin, nifedipine, pen needle, diabetic, sevelamer carbonate, sildenafil, sodium bicarbonate, tamsulosin, valsartan, azelastine, olopatadine, and tadalafil.         Allergies:   Review of patient's allergies indicates:   Allergen Reactions    Bactrim [sulfamethoxazole-trimethoprim] Swelling    Nsaids (non-steroidal anti-inflammatory drug)      CKD            Family history: family history includes Diabetes in his father and unknown relative; Hypertension in his father and unknown relative; No Known Problems in his mother.         Social History          Alcohol use:  reports current alcohol use.            Tobacco:  reports that he has never smoked. He has never used smokeless tobacco.         Please see the patient's intake form for full details of past medical history, past surgical history, family history, social history and review of systems. ?This information was reviewed by me and noted.      Physical Examination     General: Well developed, well nourished, well hydrated. Verbal with a strong voice and not dysphonic.     Head/Face: Normocephalic, atraumatic. No scars or lesions. Facial musculature equal.     Eyes: No scleral icterus or conjunctival hemorrhage.  EOMI. PERRLA.     Ears: after disimpaciton    · Right ear: No gross deformity. EAC is clear The TM is intact with a pneumatized middle ear. No signs of retraction, fluid or infection.      · Left ear: No gross deformity. EAC with mild erythema. The TM is intact with a pneumatized middle ear. No signs of retraction, fluid or infection.       Nose: No gross deformity or lesions. No purulent discharge. No significant NSD.      Mouth/Oropharynx: Lips without any lesions. No mucosal lesions within the oropharynx. No tonsillar exudate or lesions. Pharyngeal walls symmetrical. Uvula midline. Tongue midline without lesions.     Neck: Trachea midline. No masses. No thyromegaly or nodules palpated.     Lymphatic: No lymphadenopathy in the neck.     Extremities: No cyanosis. Warm and well-perfused.     Skin: No scars or lesions on face or neck.      Neurologic: Moving all extremities without gross abnormality.CN II-XII grossly intact. House-Brackmann 1/6. No signs of nystagmus.      Psych: Alert and oriented to person, place, and time with an appropriate mood and affect.       Procedure: ear microscopy with removal of cerumen    Description: The patient was in agreement with the examination and debridement of the ears. Removal of the cerumen required use of an operating microscope and multiple micro-instruments.     With the patient in the supine position, we used the operating microscope to examine both ears with the appropriate sized ear speculum.  A variety of sterile, micro-instruments were utilized to remove the cerumen atraumatically.  I performed the procedure which required a significant amount of time and effort. The tympanic membrane was then well visualized.  The patient tolerated the procedure well and there were no complications.    Findings:   Right ear had significant wax, the EAC was normal, and the tympanic membrane was intact with no evidence of middle ear fluid.    Left ear had significant wax, the EAC was  normal, and the tympanic membrane was intact with no evidence of middle ear fluid.    Hearing improved, but felt there was still mild echo in left ear after removal.       Assessment     Otalgia, unspecified laterality   Cerumen impaction  Mild OE    Plan:    Drops x 7 days. RTC in 1-2 weeks if symptoms not completely resovled      Fabio Vargas MD  [facility]   [facility]  [address] [address] [facility] [facility].  [place] [address]  P: [phone]  F: [phone]

## 2022-02-16 DIAGNOSIS — Z76.0 MEDICATION REFILL: ICD-10-CM

## 2022-02-16 RX ORDER — ATORVASTATIN CALCIUM 40 MG/1
TABLET, FILM COATED ORAL
Qty: 30 TABLET | Refills: 2 | Status: SHIPPED | OUTPATIENT
Start: 2022-02-16 | End: 2022-05-23

## 2022-02-16 NOTE — TELEPHONE ENCOUNTER
Care Due:                  Date            Visit Type   Department     Provider  --------------------------------------------------------------------------------                                EP -                              PRIMARY      [facility] INTER[facility]  Last Visit: 05-      CARE ([facility])   JESSY Morgan                              EP -                              PRIMARY      [facility] INTER[facility]  Next Visit: 03-      CARE ([facility])   JESSY Morgan                                                            Last  Test          Frequency    Reason                     Performed    Due Date  --------------------------------------------------------------------------------    CBC.........  12 months..  acyclovir................  02- 01-    CMP.........  12 months..  atorvastatin.............  01- 01-    Lipid Panel.  12 months..  atorvastatin.............  10-   10-    Powered by [employer]. Reference number: [id number].   2/16/2022 12:10:42 AM CST

## 2022-02-17 ENCOUNTER — OFFICE VISIT (OUTPATIENT)
Dept: OTOLARYNGOLOGY | Facility: CLINIC | Age: 67
End: 2022-02-17
Payer: COMMERCIAL

## 2022-02-17 VITALS
BODY MASS INDEX: 34.5 KG/M2 | TEMPERATURE: 97 F | SYSTOLIC BLOOD PRESSURE: 126 MMHG | HEART RATE: 85 BPM | DIASTOLIC BLOOD PRESSURE: 85 MMHG | WEIGHT: 268.75 LBS

## 2022-02-17 DIAGNOSIS — H60.502 ACUTE OTITIS EXTERNA OF LEFT EAR, UNSPECIFIED TYPE: Primary | ICD-10-CM

## 2022-02-17 DIAGNOSIS — H65.192 OTHER NON-RECURRENT ACUTE NONSUPPURATIVE OTITIS MEDIA OF LEFT EAR: ICD-10-CM

## 2022-02-17 PROCEDURE — 1159F MED LIST DOCD IN RCRD: CPT | Mod: CPTII,S$GLB,TXP, | Performed by: STUDENT IN AN ORGANIZED HEALTH CARE EDUCATION/TRAINING PROGRAM

## 2022-02-17 PROCEDURE — 99999 PR PBB SHADOW E&M-EST. PATIENT-LVL V: CPT | Mod: PBBFAC,TXP,, | Performed by: STUDENT IN AN ORGANIZED HEALTH CARE EDUCATION/TRAINING PROGRAM

## 2022-02-17 PROCEDURE — 3074F PR MOST RECENT SYSTOLIC BLOOD PRESSURE < 130 MM HG: ICD-10-PCS | Mod: CPTII,S$GLB,TXP, | Performed by: STUDENT IN AN ORGANIZED HEALTH CARE EDUCATION/TRAINING PROGRAM

## 2022-02-17 PROCEDURE — 1126F PR PAIN SEVERITY QUANTIFIED, NO PAIN PRESENT: ICD-10-PCS | Mod: CPTII,S$GLB,TXP, | Performed by: STUDENT IN AN ORGANIZED HEALTH CARE EDUCATION/TRAINING PROGRAM

## 2022-02-17 PROCEDURE — 3008F PR BODY MASS INDEX (BMI) DOCUMENTED: ICD-10-PCS | Mod: CPTII,S$GLB,TXP, | Performed by: STUDENT IN AN ORGANIZED HEALTH CARE EDUCATION/TRAINING PROGRAM

## 2022-02-17 PROCEDURE — 99213 OFFICE O/P EST LOW 20 MIN: CPT | Mod: S$GLB,TXP,, | Performed by: STUDENT IN AN ORGANIZED HEALTH CARE EDUCATION/TRAINING PROGRAM

## 2022-02-17 PROCEDURE — 3008F BODY MASS INDEX DOCD: CPT | Mod: CPTII,S$GLB,TXP, | Performed by: STUDENT IN AN ORGANIZED HEALTH CARE EDUCATION/TRAINING PROGRAM

## 2022-02-17 PROCEDURE — 3079F PR MOST RECENT DIASTOLIC BLOOD PRESSURE 80-89 MM HG: ICD-10-PCS | Mod: CPTII,S$GLB,TXP, | Performed by: STUDENT IN AN ORGANIZED HEALTH CARE EDUCATION/TRAINING PROGRAM

## 2022-02-17 PROCEDURE — 3074F SYST BP LT 130 MM HG: CPT | Mod: CPTII,S$GLB,TXP, | Performed by: STUDENT IN AN ORGANIZED HEALTH CARE EDUCATION/TRAINING PROGRAM

## 2022-02-17 PROCEDURE — 3288F PR FALLS RISK ASSESSMENT DOCUMENTED: ICD-10-PCS | Mod: CPTII,S$GLB,TXP, | Performed by: STUDENT IN AN ORGANIZED HEALTH CARE EDUCATION/TRAINING PROGRAM

## 2022-02-17 PROCEDURE — 99999 PR PBB SHADOW E&M-EST. PATIENT-LVL V: ICD-10-PCS | Mod: PBBFAC,TXP,, | Performed by: STUDENT IN AN ORGANIZED HEALTH CARE EDUCATION/TRAINING PROGRAM

## 2022-02-17 PROCEDURE — 4010F ACE/ARB THERAPY RXD/TAKEN: CPT | Mod: CPTII,S$GLB,TXP, | Performed by: STUDENT IN AN ORGANIZED HEALTH CARE EDUCATION/TRAINING PROGRAM

## 2022-02-17 PROCEDURE — 1159F PR MEDICATION LIST DOCUMENTED IN MEDICAL RECORD: ICD-10-PCS | Mod: CPTII,S$GLB,TXP, | Performed by: STUDENT IN AN ORGANIZED HEALTH CARE EDUCATION/TRAINING PROGRAM

## 2022-02-17 PROCEDURE — 4010F PR ACE/ARB THEARPY RXD/TAKEN: ICD-10-PCS | Mod: CPTII,S$GLB,TXP, | Performed by: STUDENT IN AN ORGANIZED HEALTH CARE EDUCATION/TRAINING PROGRAM

## 2022-02-17 PROCEDURE — 3288F FALL RISK ASSESSMENT DOCD: CPT | Mod: CPTII,S$GLB,TXP, | Performed by: STUDENT IN AN ORGANIZED HEALTH CARE EDUCATION/TRAINING PROGRAM

## 2022-02-17 PROCEDURE — 3079F DIAST BP 80-89 MM HG: CPT | Mod: CPTII,S$GLB,TXP, | Performed by: STUDENT IN AN ORGANIZED HEALTH CARE EDUCATION/TRAINING PROGRAM

## 2022-02-17 PROCEDURE — 1126F AMNT PAIN NOTED NONE PRSNT: CPT | Mod: CPTII,S$GLB,TXP, | Performed by: STUDENT IN AN ORGANIZED HEALTH CARE EDUCATION/TRAINING PROGRAM

## 2022-02-17 PROCEDURE — 92504 EAR MICROSCOPY EXAMINATION: CPT | Mod: S$GLB,TXP,, | Performed by: STUDENT IN AN ORGANIZED HEALTH CARE EDUCATION/TRAINING PROGRAM

## 2022-02-17 PROCEDURE — 1101F PT FALLS ASSESS-DOCD LE1/YR: CPT | Mod: CPTII,S$GLB,TXP, | Performed by: STUDENT IN AN ORGANIZED HEALTH CARE EDUCATION/TRAINING PROGRAM

## 2022-02-17 PROCEDURE — 99213 PR OFFICE/OUTPT VISIT, EST, LEVL III, 20-29 MIN: ICD-10-PCS | Mod: S$GLB,TXP,, | Performed by: STUDENT IN AN ORGANIZED HEALTH CARE EDUCATION/TRAINING PROGRAM

## 2022-02-17 PROCEDURE — 92504 PR EAR MICROSCOPY EXAMINATION: ICD-10-PCS | Mod: S$GLB,TXP,, | Performed by: STUDENT IN AN ORGANIZED HEALTH CARE EDUCATION/TRAINING PROGRAM

## 2022-02-17 PROCEDURE — 1101F PR PT FALLS ASSESS DOC 0-1 FALLS W/OUT INJ PAST YR: ICD-10-PCS | Mod: CPTII,S$GLB,TXP, | Performed by: STUDENT IN AN ORGANIZED HEALTH CARE EDUCATION/TRAINING PROGRAM

## 2022-02-17 RX ORDER — AMOXICILLIN AND CLAVULANATE POTASSIUM 875; 125 MG/1; MG/1
1 TABLET, FILM COATED ORAL 2 TIMES DAILY
Qty: 20 TABLET | Refills: 0 | Status: SHIPPED | OUTPATIENT
Start: 2022-02-17 | End: 2022-02-27

## 2022-02-17 RX ORDER — PREDNISONE 10 MG/1
TABLET ORAL
Qty: 24 TABLET | Refills: 0 | Status: CANCELLED | OUTPATIENT
Start: 2022-02-17

## 2022-02-17 NOTE — PROGRESS NOTES
Chief complaint:   Chief Complaint   Patient presents with    Follow-up     No improvement in left ear. Throbbing in L ear.         History of Present Illness, 2/14/22:     Mr. Alves is a 66 y.o. male presenting for evaluation of left ear otalgia and hearing loss. Onset of this chief complaint was about a month ago.  Additional symptoms that also have been associated are left ear hearing loss. Treatment included ear canal flushing. Symptoms worsened after this. Denies otorrhea, vertigo, fever.    The patient also denies pain deep within the ear, tenderness around the ear canal or pre-auricular area, or headaches.       The patient denies significant hearing loss risk factors, ototoxic medication exposure, chronic vestibular suppressant use, head/ facial/ soraya trauma, and otologic surgery.      Return clinic visit, 02/17/2022  Ear pain and throbbing worsened over last couple days. Muffled hearing now. No fever, post auricular pain, facial weakness.     Review of Systems     A complete 10 point ROS was completed and are positive as per above HPI.    Otherwise negative for fever, diplopia, chest pain, shortness of breath, vomiting, blood in urine, joint pain, skin rash, seizures and unusual bleeding.       History        Past Medical History:   Past Medical History:   Diagnosis Date    Anemia 4/16/2014    BPH (benign prostatic hyperplasia)     CKD (chronic kidney disease) stage 4, GFR 15-29 ml/min     Coronary artery disease of native artery of native heart with stable angina pectoris 4/29/2019    Diabetes mellitus     Diabetes mellitus, type 2     Elevated PSA     Encounter for blood transfusion     Herpes labialis     Hyperlipidemia     Hypertension     Obesity     Proteinuria     Secondary hyperparathyroidism (of renal origin)     UTI (lower urinary tract infection) 5/1/2015    .          Past Surgical History:  Past Surgical History:   Procedure Laterality Date    COLONOSCOPY N/A 12/21/2017     Procedure: COLONOSCOPY;  Surgeon: Fran Escalera MD;  Location: [facility] ENDO;  Service: Endoscopy;  Laterality: N/A;    FLUOROSCOPY N/A 1/21/2021    Procedure: Vascath insertion;  Surgeon: Adrian Pruitt Jr., MD;  Location: [facility] CATH LAB;  Service: General;  Laterality: N/A;    FLUOROSCOPY N/A 4/6/2021    Procedure: Over the wire Vas Cath exchange;  Surgeon: Ho Pressley MD;  Location: [facility] CATH LAB;  Service: General;  Laterality: N/A;    PROSTATE BIOPSY      thorocotomy  2010   .         Medications: Medication list was reviewed. He  has a current medication list which includes the following prescription(s): acyclovir, albuterol, albuterol, aspirin, atorvastatin, azelastine, bd insulin syringe (half unit), blood sugar diagnostic, calcitriol, carbamide peroxide, carvedilol, cetirizine, ciprofloxacin hcl, ergocalciferol, esomeprazole, finasteride, fluticasone propionate, fluticasone-salmeterol 500-50 mcg/dose, folic acid, furosemide, vortex holding chamber, xultophy 100/3.6, lancets, multivitamin, nifedipine, olopatadine, pen needle, diabetic, sevelamer carbonate, sildenafil, sodium bicarbonate, tadalafil, tamsulosin, and valsartan.         Allergies:   Review of patient's allergies indicates:   Allergen Reactions    Bactrim [sulfamethoxazole-trimethoprim] Swelling    Nsaids (non-steroidal anti-inflammatory drug)      CKD            Family history: family history includes Diabetes in his father and unknown relative; Hypertension in his father and unknown relative; No Known Problems in his mother.         Social History          Alcohol use:  reports current alcohol use.            Tobacco:  reports that he has never smoked. He has never used smokeless tobacco.         Please see the patient's intake form for full details of past medical history, past surgical history, family history, social history and review of systems. ?This information was reviewed by me and noted.      Physical Examination      General: Well developed, well nourished, well hydrated. Verbal with a strong voice and not dysphonic.     Head/Face: Normocephalic, atraumatic. No scars or lesions. Facial musculature equal.     Eyes: No scleral icterus or conjunctival hemorrhage. EOMI. PERRLA.     Ears:     · Right ear: No gross deformity. EAC is clear The TM is intact with a pneumatized middle ear. No signs of retraction, fluid or infection.      · Left ear: see microscopy note      Nose: No gross deformity or lesions.     Mouth/Oropharynx: Lips without any lesions.     Neck: Trachea midline. No masses. No thyromegaly or nodules palpated.     Lymphatic: No lymphadenopathy in the neck.     Extremities: No cyanosis. Warm and well-perfused.     Skin: No scars or lesions on face or neck.      Neurologic: Moving all extremities without gross abnormality.CN II-XII grossly intact. House-Brackmann 1/6. No signs of nystagmus.      Psych: Alert and oriented to person, place, and time with an appropriate mood and affect.     Ear Microscopy  Microscopy was required for accurate diagnosis of middle ear pathology, particularly at the anterior TM.    The patient was placed in a semi-recumbent position.  The left ear was first inspected under a microscope. There was evidence of partial mucoid effusion in the middle ear cleft.       Assessment   Acute otitis externa of left ear, unspecified type   Cerumen impaction  Mild OE    Plan:    Drops x 7 days. RTC in 1-2 weeks if symptoms not completely resovled    Update, 02/17/2022    Evidence of Acute Otitis Media today    augmentin x 10 days  Will hold off on steroids due to DM  F/u 1 month; if no resolution will plan for NP scope then possible myringotomy/PET  Call sooner if pain worsens, fevers, etc      Fabio Vargas MD  [facility]   [facility]  [address]  [place] [address]  P: [phone]  F: [phone]

## 2022-02-21 DIAGNOSIS — Z76.82 AWAITING ORGAN TRANSPLANT STATUS: ICD-10-CM

## 2022-02-21 DIAGNOSIS — Z99.2 ESRD ON DIALYSIS: Primary | ICD-10-CM

## 2022-02-21 DIAGNOSIS — N18.6 ESRD ON DIALYSIS: Primary | ICD-10-CM

## 2022-02-21 DIAGNOSIS — Z01.818 PRE-OP TESTING: ICD-10-CM

## 2022-02-23 DIAGNOSIS — Z76.82 AWAITING ORGAN TRANSPLANT STATUS: Primary | ICD-10-CM

## 2022-02-24 ENCOUNTER — OFFICE VISIT (OUTPATIENT)
Dept: TRANSPLANT | Facility: CLINIC | Age: 67
End: 2022-02-24
Payer: COMMERCIAL

## 2022-02-24 VITALS
RESPIRATION RATE: 16 BRPM | DIASTOLIC BLOOD PRESSURE: 99 MMHG | SYSTOLIC BLOOD PRESSURE: 188 MMHG | OXYGEN SATURATION: 92 % | HEART RATE: 100 BPM | WEIGHT: 265.63 LBS | BODY MASS INDEX: 34.09 KG/M2 | HEIGHT: 74 IN | TEMPERATURE: 97 F

## 2022-02-24 DIAGNOSIS — I10 SEVERE HYPERTENSION: ICD-10-CM

## 2022-02-24 DIAGNOSIS — Z99.2 ESRD ON HEMODIALYSIS: ICD-10-CM

## 2022-02-24 DIAGNOSIS — Z79.4 TYPE 2 DIABETES MELLITUS WITH CHRONIC KIDNEY DISEASE, WITH LONG-TERM CURRENT USE OF INSULIN, UNSPECIFIED CKD STAGE: ICD-10-CM

## 2022-02-24 DIAGNOSIS — N25.81 SECONDARY HYPERPARATHYROIDISM: ICD-10-CM

## 2022-02-24 DIAGNOSIS — E11.22 TYPE 2 DIABETES MELLITUS WITH CHRONIC KIDNEY DISEASE, WITH LONG-TERM CURRENT USE OF INSULIN, UNSPECIFIED CKD STAGE: ICD-10-CM

## 2022-02-24 DIAGNOSIS — N18.6 ESRD ON HEMODIALYSIS: ICD-10-CM

## 2022-02-24 DIAGNOSIS — Z76.82 PATIENT ON WAITING LIST FOR KIDNEY TRANSPLANT: Primary | ICD-10-CM

## 2022-02-24 PROBLEM — N18.5 CKD (CHRONIC KIDNEY DISEASE), STAGE V: Chronic | Status: RESOLVED | Noted: 2020-07-02 | Resolved: 2022-02-24

## 2022-02-24 PROCEDURE — 3077F SYST BP >= 140 MM HG: CPT | Mod: CPTII,S$GLB,TXP, | Performed by: NURSE PRACTITIONER

## 2022-02-24 PROCEDURE — 3008F BODY MASS INDEX DOCD: CPT | Mod: CPTII,S$GLB,TXP, | Performed by: NURSE PRACTITIONER

## 2022-02-24 PROCEDURE — 1160F RVW MEDS BY RX/DR IN RCRD: CPT | Mod: CPTII,S$GLB,TXP, | Performed by: NURSE PRACTITIONER

## 2022-02-24 PROCEDURE — 99999 PR PBB SHADOW E&M-EST. PATIENT-LVL V: CPT | Mod: PBBFAC,TXP,, | Performed by: NURSE PRACTITIONER

## 2022-02-24 PROCEDURE — 3288F PR FALLS RISK ASSESSMENT DOCUMENTED: ICD-10-PCS | Mod: CPTII,S$GLB,TXP, | Performed by: NURSE PRACTITIONER

## 2022-02-24 PROCEDURE — 1160F PR REVIEW ALL MEDS BY PRESCRIBER/CLIN PHARMACIST DOCUMENTED: ICD-10-PCS | Mod: CPTII,S$GLB,TXP, | Performed by: NURSE PRACTITIONER

## 2022-02-24 PROCEDURE — 1159F PR MEDICATION LIST DOCUMENTED IN MEDICAL RECORD: ICD-10-PCS | Mod: CPTII,S$GLB,TXP, | Performed by: NURSE PRACTITIONER

## 2022-02-24 PROCEDURE — 4010F ACE/ARB THERAPY RXD/TAKEN: CPT | Mod: CPTII,S$GLB,TXP, | Performed by: NURSE PRACTITIONER

## 2022-02-24 PROCEDURE — 3080F PR MOST RECENT DIASTOLIC BLOOD PRESSURE >= 90 MM HG: ICD-10-PCS | Mod: CPTII,S$GLB,TXP, | Performed by: NURSE PRACTITIONER

## 2022-02-24 PROCEDURE — 1126F AMNT PAIN NOTED NONE PRSNT: CPT | Mod: CPTII,S$GLB,TXP, | Performed by: NURSE PRACTITIONER

## 2022-02-24 PROCEDURE — 4010F PR ACE/ARB THEARPY RXD/TAKEN: ICD-10-PCS | Mod: CPTII,S$GLB,TXP, | Performed by: NURSE PRACTITIONER

## 2022-02-24 PROCEDURE — 3077F PR MOST RECENT SYSTOLIC BLOOD PRESSURE >= 140 MM HG: ICD-10-PCS | Mod: CPTII,S$GLB,TXP, | Performed by: NURSE PRACTITIONER

## 2022-02-24 PROCEDURE — 3008F PR BODY MASS INDEX (BMI) DOCUMENTED: ICD-10-PCS | Mod: CPTII,S$GLB,TXP, | Performed by: NURSE PRACTITIONER

## 2022-02-24 PROCEDURE — 99214 OFFICE O/P EST MOD 30 MIN: CPT | Mod: S$GLB,TXP,, | Performed by: NURSE PRACTITIONER

## 2022-02-24 PROCEDURE — 1101F PR PT FALLS ASSESS DOC 0-1 FALLS W/OUT INJ PAST YR: ICD-10-PCS | Mod: CPTII,S$GLB,TXP, | Performed by: NURSE PRACTITIONER

## 2022-02-24 PROCEDURE — 1101F PT FALLS ASSESS-DOCD LE1/YR: CPT | Mod: CPTII,S$GLB,TXP, | Performed by: NURSE PRACTITIONER

## 2022-02-24 PROCEDURE — 3080F DIAST BP >= 90 MM HG: CPT | Mod: CPTII,S$GLB,TXP, | Performed by: NURSE PRACTITIONER

## 2022-02-24 PROCEDURE — 3288F FALL RISK ASSESSMENT DOCD: CPT | Mod: CPTII,S$GLB,TXP, | Performed by: NURSE PRACTITIONER

## 2022-02-24 PROCEDURE — 1159F MED LIST DOCD IN RCRD: CPT | Mod: CPTII,S$GLB,TXP, | Performed by: NURSE PRACTITIONER

## 2022-02-24 PROCEDURE — 99999 PR PBB SHADOW E&M-EST. PATIENT-LVL V: ICD-10-PCS | Mod: PBBFAC,TXP,, | Performed by: NURSE PRACTITIONER

## 2022-02-24 PROCEDURE — 1126F PR PAIN SEVERITY QUANTIFIED, NO PAIN PRESENT: ICD-10-PCS | Mod: CPTII,S$GLB,TXP, | Performed by: NURSE PRACTITIONER

## 2022-02-24 PROCEDURE — 99214 PR OFFICE/OUTPT VISIT, EST, LEVL IV, 30-39 MIN: ICD-10-PCS | Mod: S$GLB,TXP,, | Performed by: NURSE PRACTITIONER

## 2022-02-24 NOTE — PROGRESS NOTES
YEARLY LIST MANAGEMENT NOTE    Isidro Alves's kidney transplant listing status reviewed.  Patient is due for follow-up appointments 8/2022  Appointments will be scheduled per protocol. Patient has pulmonary appointment on 3/3/2022.   Remains acitve

## 2022-02-24 NOTE — PROGRESS NOTES
LISTED PATIENT EDUCATION NOTE    Mr. Isidro Alves was seen in pre-kidney transplant clinic for evaluation for kidney, kidney/pancreas or pancreas only transplant.  The patient attended a an individual video education session that discussed/reviewed the following aspects of transplantation: evaluation and selection committee process, [facility] waitlist management/multiple listings, types of organs offered (KDPI < 85%, KDPI > 85%, PHS risk, DCD, HCV+, HIV+ for HIV+ recipients and enbloc/dual), financial aspects, surgical procedures, dietary instruction pre- and post-transplant, health maintenance pre- and post-transplant, post-transplant hospitalization and outpatient follow-up, potential to participate in a research protocol, and medication management and side effects.  A question and answer session was provided after the presentation.    The patient was seen by all members of the multi-disciplinary team to include: Nephrologist/PA, Surgeon, , Transplant Coordinator, , Pharmacist and Dietician (if applicable).    The patient reviewed and signed all consents for evaluation which were witnessed and sent to scanning into the [facility] chart.    The patient was given an education book and plan for further evaluation based on his individual assessment.      Reviewed program requirement for complete COVID vaccination with documentation prior to listing.  COVID education information reviewed with patient.     The patient was informed that the transplant team would manage immediate post op pain. If the patient requires long term pain management, they will need to have that pain management addressed by their PCP or previous provider who wrote for long term pain medicines.    The patient was encouraged to call with any questions or concerns.

## 2022-02-24 NOTE — PROGRESS NOTES
Kidney Transplant Recipient Reevalulation    Referring Physician: Gabino Champion  Current Nephrologist: Gabino Champion  Waitlist Status: active  Dialysis Start Date: 9/17/2019    Subjective:     CC:   6 month  reassessment of kidney transplant candidacy.    HPI:  Mr. Alves is a 64 y.o. year old Black or  male with ESRD secondary to diabetic nephropathy.  He has been on the wait list for a kidney transplant at [facility] since 9/17/2019. He is currently  ESRD, Pt reports starting HD ~ 3/2021, he is dialyzing MWF for 3 1/2 hours Per session.  Patient reports that he is tolerating dialysis well. Access  Right chest CATHETER FOR DIALYSIS.       FX assessment    CONTINUES TO WALK 3 X/WEEK  FOR ~ 15-20 MINUTED, DENIES  ARRINGTON, CHEST PAIN OR LEG PAIN.   OVER ALL LOOKS GOOD NOT FRAIL.         + COVID 1/2022 Hospitalized: --he has been referred to pulmonology , apt on 3/3/2022-TO REASSESS POST COVID ASTHMA . PT IS NO LONGER USING AN INHALER.   PT REPORTS HE HAS A VERY MILD RESIDUAL POST NASAL DRIP AND COUGH, OTHERWISE FEELS WELL    * Acute hypoxemic respiratory failure due to COVID-19   O2 keep sat above 90%. Vapotherm and BiPAP p.r.n.   IV of Rocephin azithromycin elevated inflammatory markers.   REMDESIVIR IV DECADRON.          hospitalizations or ED visits. Admitted in 1/2021 at [facility] for+  COVID   Lab Results   Component Value Date    ALT 27 01/25/2021    AST 89 (H) 01/25/2021    ALKPHOS 68 01/25/2021    BILITOT 0.3 01/25/2021      HX Elevated PSA  PSA, Screen (ng/mL)   Date Value   06/02/2021 3.6   HX Prostate biopsy a few years back at [facility]   Urologist Dr Nugent /urology 1/19/2022  He recently had a MRI WO ABD/P which  Did not show lesions in the prostate concerning for a cancer,  prostate is a little enlarged, this is most likely just from normal aging and growth.  He was  Cleared for a transplant by urology , and  can hold off on a biopsy at this time.      Lab /diagnostic  results reviewed with patient today.        6/10/21 Renal US: chronic medical renal disease.  4/6/21 CXR:  without acute infiltrate  6/1/21 TTE  · The left ventricle is normal in size with moderate concentric hypertrophy and normal systolic function.  · The estimated ejection fraction is 55%.  · Normal left ventricular diastolic function.  · Normal right ventricular size with normal right ventricular systolic function.  · Normal central venous pressure (3 mmHg).      6/11/2020 Lexiscan Stress Test    Normal myocardial perfusion study.    There is a  trivial intensity fixed defect in the inferior wall of the left ventricle secondary to diaphragm attenuation.    Gated perfusion images showed an ejection fraction of 42% at rest and 41% post stress. Normal ejection fraction is greater than %.    The EKG portion of this study is negative for ischemia.    Arrhythmias during stress: rare PVCs      12/30/21 MRI Pelvis Without Contrast  1. BPH with no focal suspicious findings.  2. Diffuse urinary bladder wall thickening, likely related to chronic outlet obstruction         PMH Thyroid nodules, Thyroid US completed in 4/25/2019  Per Dr Castano: no bx needed, nodules are too small. Repeat thyroid US 4/24/20 (stable)  Lab Results   Component Value Date    TSH 1.464 09/05/2019         Past Medical History:   Diagnosis Date    Anemia 4/16/2014    BPH (benign prostatic hyperplasia)     CKD (chronic kidney disease) stage 4, GFR 15-29 ml/min     Coronary artery disease of native artery of native heart with stable angina pectoris 4/29/2019    Diabetes mellitus     Diabetes mellitus, type 2     Elevated PSA     Encounter for blood transfusion     Herpes labialis     Hyperlipidemia     Hypertension     Obesity     Proteinuria     Secondary hyperparathyroidism (of renal origin)     UTI (lower urinary tract infection) 5/1/2015       Past Medical and Surgical History: Mr. Alves  has a past medical history of  Anemia, BPH (benign prostatic hyperplasia), CKD (chronic kidney disease) stage 4, GFR 15-29 ml/min, Coronary artery disease of native artery of native heart with stable angina pectoris, Diabetes mellitus, Diabetes mellitus, type 2, Elevated PSA, Encounter for blood transfusion, Herpes labialis, Hyperlipidemia, Hypertension, Obesity, Proteinuria, Secondary hyperparathyroidism (of renal origin), and UTI (lower urinary tract infection).  He has a past surgical history that includes thorocotomy (2010); Prostate biopsy; Colonoscopy (N/A, 12/21/2017); Fluoroscopy (N/A, 1/21/2021); and Fluoroscopy (N/A, 4/6/2021).    Past Social and Family History: Mr. Alves reports that he has never smoked. He has never used smokeless tobacco. He reports current alcohol use. He reports that he does not use drugs. His family history includes Diabetes in his father and unknown relative; Hypertension in his father and unknown relative; No Known Problems in his mother.        Past Medical History:   Diagnosis Date    Anemia 4/16/2014    BPH (benign prostatic hyperplasia)     CKD (chronic kidney disease) stage 4, GFR 15-29 ml/min     Coronary artery disease of native artery of native heart with stable angina pectoris 4/29/2019    Diabetes mellitus     Diabetes mellitus, type 2     Elevated PSA     Encounter for blood transfusion     Herpes labialis     Hyperlipidemia     Hypertension     Obesity     Proteinuria     Secondary hyperparathyroidism (of renal origin)     UTI (lower urinary tract infection) 5/1/2015       Review of Systems   Constitutional: Positive for fatigue. Negative for activity change, appetite change, chills, fever and unexpected weight change.   HENT: Negative for congestion, facial swelling, postnasal drip, rhinorrhea, sinus pressure, sore throat and trouble swallowing.    Eyes: Positive for visual disturbance. Negative for pain and redness.        Wears glasses   Respiratory: Negative for cough, chest  "tightness, shortness of breath and wheezing.    Cardiovascular: Negative for chest pain and palpitations.   Gastrointestinal: Negative for abdominal pain, diarrhea, nausea and vomiting.   Genitourinary: Negative for dysuria, flank pain and urgency.   Musculoskeletal: Negative for gait problem, neck pain and neck stiffness.   Skin: Negative for rash.   Allergic/Immunologic: Negative for environmental allergies, food allergies and immunocompromised state.   Neurological: Negative for dizziness, weakness and light-headedness.   Psychiatric/Behavioral: Negative for agitation and confusion. The patient is not nervous/anxious.        Objective:   Blood pressure (!) 188/99, pulse 100, temperature 97.3 °F (36.3 °C), temperature source Temporal, resp. rate 16, height 6' 2" (1.88 m), weight 120.5 kg (265 lb 10.5 oz), SpO2 (!) 92 %.body mass index is 34.11 kg/m².    Physical Exam  Vitals reviewed.   Constitutional:       Appearance: Normal appearance. He is well-developed.   HENT:      Head: Normocephalic.   Eyes:      Conjunctiva/sclera: Conjunctivae normal.      Pupils: Pupils are equal, round, and reactive to light.   Cardiovascular:      Rate and Rhythm: Normal rate and regular rhythm.      Heart sounds: Normal heart sounds.   Pulmonary:      Effort: Pulmonary effort is normal.      Breath sounds: Normal breath sounds.   Abdominal:      General: Bowel sounds are normal. There is distension.      Palpations: Abdomen is soft. There is no hepatomegaly, splenomegaly or mass.      Tenderness: There is no abdominal tenderness. There is no guarding or rebound. Negative signs include Curran's sign and McBurney's sign.   Musculoskeletal:         General: Normal range of motion.      Cervical back: Normal range of motion and neck supple.      Comments: Bilateral trace peripheral edema    Lymphadenopathy:      Cervical: No cervical adenopathy.   Skin:     General: Skin is warm and dry.   Neurological:      Mental Status: He is alert " and oriented to person, place, and time.      Motor: No abnormal muscle tone.      Coordination: Coordination normal.   Psychiatric:         Behavior: Behavior normal.         Labs:  Lab Results   Component Value Date    WBC 17.36 (H) 02/01/2021    HGB 11.0 (L) 02/01/2021    HCT 34.8 (L) 02/01/2021     04/06/2021    K 4.5 04/06/2021     (H) 04/06/2021    CO2 20 (L) 04/06/2021    BUN 61 (H) 04/06/2021    CREATININE 8.4 (H) 12/30/2021    EGFRNONAA 6 (A) 12/30/2021    CALCIUM 9.2 04/06/2021    PHOS 7.1 (H) 02/01/2021    MG 2.1 01/27/2021    ALBUMIN 2.2 (L) 02/01/2021    AST 89 (H) 01/25/2021    ALT 27 01/25/2021    UTPCR 5.54 (H) 02/01/2021    .0 (H) 06/02/2021       Lab Results   Component Value Date    BILIRUBINUA Negative 05/18/2021    PROTEINUA 3+ (A) 05/18/2021    NITRITE Negative 05/18/2021    RBCUA 0 05/18/2021    WBCUA >100 (H) 05/18/2021       No results found for: HLAABCTYPE    Labs were reviewed with the patient.    Assessment:     1. Patient on waiting list for kidney transplant    2. ESRD on hemodialysis    3. Type 2 diabetes mellitus with chronic kidney disease, with long-term current use of insulin, unspecified CKD stage    4. Secondary hyperparathyroidism    5. Severe hypertension        Plan:     pulm apt 3/3/2022  colonoscopy due 12/2022     Needs repeat LFTs--if remains elevated will need hepatology referral   Lab Results   Component Value Date    ALT 27 01/25/2021    AST 89 (H) 01/25/2021    ALKPHOS 68 01/25/2021    BILITOT 0.3 01/25/2021     F/u with urology as scheduled      Transplant Candidacy:   MR. Alves  is a suitable kidney transplant candidate.  Meets center eligibility for accepting HCV+ donor offer - yes.  Patient educated on HCV+ donors. He is willing  to accept HCV+ donor offer -  yes   Patient is a candidate for KDPI > 85 kidney donor offer - no d/t weight.  he remains in overall stable health, and will remain active on the transplant list.      Follow-up:   In  addition to the tests noted in the plan, MR. Alves will continue to have reevaluation as per the standing pre-kidney transplant protocol:  1. Monthly blood for PRA  2. Annual return to clinic, except HIV positive, > 65 years of age, or pancreas transplant candidates who will be scheduled to see transplant every 6 months while in pre-transplant phase  3. Annual re-testing: CXR, EKG, yearly mammograms for women over 40 and PSA for males over 40, cardiology follow-up as recommended by initial cardiology pre-transplant evaluation  4. Renal ultrasound every 2 years  5. Baseline colonoscopy after age 50 and repeated as recommended          JES Davis Patient Status  Functional Status: 60% - Requires occasional assistance but is able to care for needs  Physical Capacity: No Limitations

## 2022-02-24 NOTE — H&P (VIEW-ONLY)
Kidney Transplant Recipient Reevalulation    Referring Physician: Gabino Champion  Current Nephrologist: Gabino Champion  Waitlist Status: active  Dialysis Start Date: 9/17/2019    Subjective:     CC:   6 month  reassessment of kidney transplant candidacy.    HPI:  Mr. Avles is a 64 y.o. year old Black or  male with ESRD secondary to diabetic nephropathy.  He has been on the wait list for a kidney transplant at [facility] since 9/17/2019. He is currently  ESRD, Pt reports starting HD ~ 3/2021, he is dialyzing MWF for 3 1/2 hours Per session.  Patient reports that he is tolerating dialysis well. Access  Right chest CATHETER FOR DIALYSIS.       FX assessment    CONTINUES TO WALK 3 X/WEEK  FOR ~ 15-20 MINUTED, DENIES  ARRINGTON, CHEST PAIN OR LEG PAIN.   OVER ALL LOOKS GOOD NOT FRAIL.         + COVID 1/2022 Hospitalized: --he has been referred to pulmonology , apt on 3/3/2022-TO REASSESS POST COVID ASTHMA . PT IS NO LONGER USING AN INHALER.   PT REPORTS HE HAS A VERY MILD RESIDUAL POST NASAL DRIP AND COUGH, OTHERWISE FEELS WELL    * Acute hypoxemic respiratory failure due to COVID-19   O2 keep sat above 90%. Vapotherm and BiPAP p.r.n.   IV of Rocephin azithromycin elevated inflammatory markers.   REMDESIVIR IV DECADRON.          hospitalizations or ED visits. Admitted in 1/2021 at [facility] for+  COVID   Lab Results   Component Value Date    ALT 27 01/25/2021    AST 89 (H) 01/25/2021    ALKPHOS 68 01/25/2021    BILITOT 0.3 01/25/2021      HX Elevated PSA  PSA, Screen (ng/mL)   Date Value   06/02/2021 3.6   HX Prostate biopsy a few years back at [facility]   Urologist Dr Nugent /urology 1/19/2022  He recently had a MRI WO ABD/P which  Did not show lesions in the prostate concerning for a cancer,  prostate is a little enlarged, this is most likely just from normal aging and growth.  He was  Cleared for a transplant by urology , and  can hold off on a biopsy at this time.      Lab /diagnostic  results reviewed with patient today.        6/10/21 Renal US: chronic medical renal disease.  4/6/21 CXR:  without acute infiltrate  6/1/21 TTE  · The left ventricle is normal in size with moderate concentric hypertrophy and normal systolic function.  · The estimated ejection fraction is 55%.  · Normal left ventricular diastolic function.  · Normal right ventricular size with normal right ventricular systolic function.  · Normal central venous pressure (3 mmHg).      6/11/2020 Lexiscan Stress Test    Normal myocardial perfusion study.    There is a  trivial intensity fixed defect in the inferior wall of the left ventricle secondary to diaphragm attenuation.    Gated perfusion images showed an ejection fraction of 42% at rest and 41% post stress. Normal ejection fraction is greater than %.    The EKG portion of this study is negative for ischemia.    Arrhythmias during stress: rare PVCs      12/30/21 MRI Pelvis Without Contrast  1. BPH with no focal suspicious findings.  2. Diffuse urinary bladder wall thickening, likely related to chronic outlet obstruction         PMH Thyroid nodules, Thyroid US completed in 4/25/2019  Per Dr Castano: no bx needed, nodules are too small. Repeat thyroid US 4/24/20 (stable)  Lab Results   Component Value Date    TSH 1.464 09/05/2019         Past Medical History:   Diagnosis Date    Anemia 4/16/2014    BPH (benign prostatic hyperplasia)     CKD (chronic kidney disease) stage 4, GFR 15-29 ml/min     Coronary artery disease of native artery of native heart with stable angina pectoris 4/29/2019    Diabetes mellitus     Diabetes mellitus, type 2     Elevated PSA     Encounter for blood transfusion     Herpes labialis     Hyperlipidemia     Hypertension     Obesity     Proteinuria     Secondary hyperparathyroidism (of renal origin)     UTI (lower urinary tract infection) 5/1/2015       Past Medical and Surgical History: Mr. Alves  has a past medical history of  Anemia, BPH (benign prostatic hyperplasia), CKD (chronic kidney disease) stage 4, GFR 15-29 ml/min, Coronary artery disease of native artery of native heart with stable angina pectoris, Diabetes mellitus, Diabetes mellitus, type 2, Elevated PSA, Encounter for blood transfusion, Herpes labialis, Hyperlipidemia, Hypertension, Obesity, Proteinuria, Secondary hyperparathyroidism (of renal origin), and UTI (lower urinary tract infection).  He has a past surgical history that includes thorocotomy (2010); Prostate biopsy; Colonoscopy (N/A, 12/21/2017); Fluoroscopy (N/A, 1/21/2021); and Fluoroscopy (N/A, 4/6/2021).    Past Social and Family History: Mr. Alves reports that he has never smoked. He has never used smokeless tobacco. He reports current alcohol use. He reports that he does not use drugs. His family history includes Diabetes in his father and unknown relative; Hypertension in his father and unknown relative; No Known Problems in his mother.        Past Medical History:   Diagnosis Date    Anemia 4/16/2014    BPH (benign prostatic hyperplasia)     CKD (chronic kidney disease) stage 4, GFR 15-29 ml/min     Coronary artery disease of native artery of native heart with stable angina pectoris 4/29/2019    Diabetes mellitus     Diabetes mellitus, type 2     Elevated PSA     Encounter for blood transfusion     Herpes labialis     Hyperlipidemia     Hypertension     Obesity     Proteinuria     Secondary hyperparathyroidism (of renal origin)     UTI (lower urinary tract infection) 5/1/2015       Review of Systems   Constitutional: Positive for fatigue. Negative for activity change, appetite change, chills, fever and unexpected weight change.   HENT: Negative for congestion, facial swelling, postnasal drip, rhinorrhea, sinus pressure, sore throat and trouble swallowing.    Eyes: Positive for visual disturbance. Negative for pain and redness.        Wears glasses   Respiratory: Negative for cough, chest  "tightness, shortness of breath and wheezing.    Cardiovascular: Negative for chest pain and palpitations.   Gastrointestinal: Negative for abdominal pain, diarrhea, nausea and vomiting.   Genitourinary: Negative for dysuria, flank pain and urgency.   Musculoskeletal: Negative for gait problem, neck pain and neck stiffness.   Skin: Negative for rash.   Allergic/Immunologic: Negative for environmental allergies, food allergies and immunocompromised state.   Neurological: Negative for dizziness, weakness and light-headedness.   Psychiatric/Behavioral: Negative for agitation and confusion. The patient is not nervous/anxious.        Objective:   Blood pressure (!) 188/99, pulse 100, temperature 97.3 °F (36.3 °C), temperature source Temporal, resp. rate 16, height 6' 2" (1.88 m), weight 120.5 kg (265 lb 10.5 oz), SpO2 (!) 92 %.body mass index is 34.11 kg/m².    Physical Exam  Vitals reviewed.   Constitutional:       Appearance: Normal appearance. He is well-developed.   HENT:      Head: Normocephalic.   Eyes:      Conjunctiva/sclera: Conjunctivae normal.      Pupils: Pupils are equal, round, and reactive to light.   Cardiovascular:      Rate and Rhythm: Normal rate and regular rhythm.      Heart sounds: Normal heart sounds.   Pulmonary:      Effort: Pulmonary effort is normal.      Breath sounds: Normal breath sounds.   Abdominal:      General: Bowel sounds are normal. There is distension.      Palpations: Abdomen is soft. There is no hepatomegaly, splenomegaly or mass.      Tenderness: There is no abdominal tenderness. There is no guarding or rebound. Negative signs include Curran's sign and McBurney's sign.   Musculoskeletal:         General: Normal range of motion.      Cervical back: Normal range of motion and neck supple.      Comments: Bilateral trace peripheral edema    Lymphadenopathy:      Cervical: No cervical adenopathy.   Skin:     General: Skin is warm and dry.   Neurological:      Mental Status: He is alert " and oriented to person, place, and time.      Motor: No abnormal muscle tone.      Coordination: Coordination normal.   Psychiatric:         Behavior: Behavior normal.         Labs:  Lab Results   Component Value Date    WBC 17.36 (H) 02/01/2021    HGB 11.0 (L) 02/01/2021    HCT 34.8 (L) 02/01/2021     04/06/2021    K 4.5 04/06/2021     (H) 04/06/2021    CO2 20 (L) 04/06/2021    BUN 61 (H) 04/06/2021    CREATININE 8.4 (H) 12/30/2021    EGFRNONAA 6 (A) 12/30/2021    CALCIUM 9.2 04/06/2021    PHOS 7.1 (H) 02/01/2021    MG 2.1 01/27/2021    ALBUMIN 2.2 (L) 02/01/2021    AST 89 (H) 01/25/2021    ALT 27 01/25/2021    UTPCR 5.54 (H) 02/01/2021    .0 (H) 06/02/2021       Lab Results   Component Value Date    BILIRUBINUA Negative 05/18/2021    PROTEINUA 3+ (A) 05/18/2021    NITRITE Negative 05/18/2021    RBCUA 0 05/18/2021    WBCUA >100 (H) 05/18/2021       No results found for: HLAABCTYPE    Labs were reviewed with the patient.    Assessment:     1. Patient on waiting list for kidney transplant    2. ESRD on hemodialysis    3. Type 2 diabetes mellitus with chronic kidney disease, with long-term current use of insulin, unspecified CKD stage    4. Secondary hyperparathyroidism    5. Severe hypertension        Plan:     pulm apt 3/3/2022  colonoscopy due 12/2022     Needs repeat LFTs--if remains elevated will need hepatology referral   Lab Results   Component Value Date    ALT 27 01/25/2021    AST 89 (H) 01/25/2021    ALKPHOS 68 01/25/2021    BILITOT 0.3 01/25/2021     F/u with urology as scheduled      Transplant Candidacy:   MR. Alves  is a suitable kidney transplant candidate.  Meets center eligibility for accepting HCV+ donor offer - yes.  Patient educated on HCV+ donors. He is willing  to accept HCV+ donor offer -  yes   Patient is a candidate for KDPI > 85 kidney donor offer - no d/t weight.  he remains in overall stable health, and will remain active on the transplant list.      Follow-up:   In  addition to the tests noted in the plan, MR. Alves will continue to have reevaluation as per the standing pre-kidney transplant protocol:  1. Monthly blood for PRA  2. Annual return to clinic, except HIV positive, > 65 years of age, or pancreas transplant candidates who will be scheduled to see transplant every 6 months while in pre-transplant phase  3. Annual re-testing: CXR, EKG, yearly mammograms for women over 40 and PSA for males over 40, cardiology follow-up as recommended by initial cardiology pre-transplant evaluation  4. Renal ultrasound every 2 years  5. Baseline colonoscopy after age 50 and repeated as recommended          JES Davis Patient Status  Functional Status: 60% - Requires occasional assistance but is able to care for needs  Physical Capacity: No Limitations

## 2022-02-24 NOTE — PROGRESS NOTES
Transplant Recipient Adult Psychosocial Assessment Update   Encounter Date: 02/24/2022, AUBRIE Delacruz-BACS, MPH - current information reviewed along with all sections in most recent psychosocial evaluation as part of pts update today.    Isidro Alves  [address] [facility] [address]      Telephone Information:   Mobile [phone]   Home               [phone] (home)  Work                There is no work phone number on file.  E-mail              [email or web address]     Sex: male  YOB: 1955  Age: 66 y.o.  [place] Citizen: yes  Primary Language: English   Needed: no     Emergency Contact:  Name: Kunal Alves  Relationship: wife  Address: same as above  Phone Numbers:  [phone] (mobile)     Family/Social Support:   Number of dependents/: Pt reports no dependents.  Two adult biological children and one adult step child.    Marital history: Pt  twice, with the first marriage ending in divorce. Pt reports current marriage of 27 years to supportive and involved wife, Mrs. Kunal Alves.  Other family dynamics: Pt reports having seven sisters and two brothers (step or full siblings), pt is the eldest sibling. Reports close family and friends network.    Household Composition:  Name: Isidro Alves  Age: 66  Relationship: patient  Does person drive? yes     Name: Kunal Alves  Age: 61  Relationship: wife  Does person drive? Yes    Additional Caregivers for Transplant and Recovery Period at Home and in [facility] area:    Adult Son, Wilner Alves, is present in [facility] today per pts choice, and states is able and committed to serving as patients alternate primary caregiver now and if transplanted. Wilner Alves is age 43, lives at [address] [place] [address], works at [employer] in , [place] and states is a . Denies concern regarding time away from work to assist and care for pt as needed.      Do you and your caregivers have  access to reliable transportation? yes  PRIMARY CAREGIVER: Kunal Alves will be primary caregiver, phone number [phone].       reviewed in-depth information to patient and caregiver regarding pre- and post-transplant caregiver role.   confirmed patient and caregiver have concrete plans in place for pre- and post-transplant care giving, including primary and back-up caregivers to ensure care giving needs are met as needed.     Patient and Caregiver state understanding all aspects of caregiver role/commitment and is able/willing/committed to being caregiver to the fullest extent necessary.     Patient and Caregiver verbalize understanding of the education provided today and caregiver responsibilities.          remains available. Patient and Caregiver agree to contact  in a timely manner if concerns arise.       Able to take time off work without financial concerns: yes.      Additional Significant Others who will Assist with Transplant:  Name: Princess Alves   Age: 75  Phone: [phone]  City: [place] State: [place]  Relationship: aunt  Does person drive? no      Name: Sahil Reynolds  Age: 62  Phone: [phone]  City: [place] State: [place]  Relationship: sister  Does person drive? yes     Name: Kevin Alves  Age: 46  Phone: [phone]  City: [place] State: [place]  Relationship: daughter  Does person drive? yes     Living Will: no  Healthcare Power of : no  Advance Directives on file: <<no information> per medical record.  Verbally reviewed LW/HCPA information.   provided patient with copy of LW/HCPA documents and provided education on completion of forms.     Living Donors: none currently, Education and resource information given to patient.     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  Pt states is able to take time away from work as needed including for transplant and recovery period.     Spouse/Significant Other Employment: Pt reports wife works full-time as a  at [employer] and plans to retire in one year, however is able to take time off without concern to serve as primary caregiver.   Disabled: no     Monthly Income:  Other: $combined 4200.00 approximately per month  Able to afford all costs now and if transplanted, including medications: yes  Patient verbalizes understanding of personal responsibilities related to transplant costs and the importance of having a financial plan to ensure that patients transplant costs are fully covered.        provided fundraising information/education. Patient and Caregiver verbalizes understanding.   remains available.     Insurance:   BC BS through spouse's employer.    Primary Insurance (for UNOS reporting): Private   Secondary Insurance (for UNOS reporting): None  Pt has been on dialysis since March 2021 and now qualifies to apply for Medicare.  Info provided.    Patient and Caregiver verbalizes clear understanding that patient may experience difficulty obtaining and/or be denied insurance coverage post-surgery. This includes and is not limited to disability insurance, life insurance, health insurance, burial insurance, long term care insurance, and other insurances.       Patient and Caregiver also report understanding that future health concerns related to or unrelated to transplantation may not be covered by patient's insurance.  Resources and information provided and reviewed.      Patient and Caregiver provide verbal permission to release any necessary  information to outside resources for patient care and discharge planning.  Resources and information provided are reviewed.       Dialysis Adherence: Patient states started HD in March of 2021.   Pt reports high level of compliance with treatments and health care recommendations or instructions.      Infusion Service: patient utilizing? no  Home Health: patient utilizing? no  DME: yes Pt reports using a BP cuff and diabetic supplies to monitor blood sugar levels at home.  Pulmonary/Cardiac Rehab: Pt denies  ADLS:  Pt reports no difficulties with driving, walking, bathing, cooking, housekeeping, eating, shopping, or taking prescribed medication.     Adherence:   Pt reports remains adherant with all medications and health regimen.  Adherence education and counseling provided.       Confirmed substance use and history as follows:        Tobacco Use    Smoking status: Never Smoker    Smokeless tobacco: Never Used   Substance Use Topics    Alcohol use: Yes, one per week - will discuss more in-depth with MD regarding recommendations and pt states plans to stop use unless medically directed. Denies need for resources or additional support or information at this time.        Comment: seldom       Social History       Illicit Drugs/Non-prescribed Medications: Pt reports a remote history of marijuana use over 20+ years ago. Pt reports no current drug use.      Patient and Caregiver state having a clear understanding of the potential impact of substance use as it relates to transplant candidacy and is aware of possible random substance screening.  Substance abstinence/cessation counseling, education and resources provided and reviewed.      Arrests/DWI/Treatment/Rehab: patient denies     Psychiatric History:    Mental Health: Pt reports no history of or current mental health issues or concerns.   Psychiatrist/Counselor: Pt denies seeing a mental health professional and reports being open to seeing the psych department for  talk therapy if necessary.  Medications:  Pt denies taking medications for mental health reasons.  Suicide/Homicide Issues: Pt denies any history of or current suicidal or homicidal ideations.   Safety at home: Pt reports no current or history of safety concerns in household; or history of abuse of any kind.     Knowledge: Patient and Caregiver stated having clear understanding and realistic expectations regarding the potential risks and potential benefits of organ transplantation and organ donation and agrees to discuss with health care team members and support system members, as well as to utilize available resources and express questions and/or concerns in order to further facilitate the pt informed decision-making.  Resources and information provided and reviewed.     Patient and Caregiver stated are aware of [facility]'s affiliation and/or partnership with agencies in home health care, LTAC, SNF, [facility], and other hospitals and clinics.     Understanding: Patient and Caregiver reported having a clear understanding of the many lifetime commitments involved with being a transplant recipient, including costs, compliance, medications, lab work, procedures, appointments, concrete and financial planning, preparedness, timely and appropriate communication of concerns, abstinence (ETOH, tobacco, illicit non-prescribed drugs), adherence to all health care team recommendations, support system and caregiver involvement, appropriate and timely resource utilization and follow-through, mental health counseling as needed/recommended, and patient and caregiver responsibilities.  Social Service Handbook, resources and detailed educational information provided and reviewed.  Educational information provided.     Patient and Caregiver also reported current and expected compliance with health care regime and states having a clear understanding of the importance of compliance.       Patient and Caregiver reported having a clear  understanding that risks and benefits may be involved with organ transplantation and with organ donation.        Patient and Caregiver also reported having a lear understanding that psychosocial risk factors may affect patient, and include but are not limited to feelings of depression, generalized anxiety, anxiety regarding dependence on others, post traumatic stress disorder, feelings of guilt and other emotional and/or mental concerns, and/or exacerbation of existing mental health concerns.  Detailed resources provided and discussed.       Patient and Caregiver agreed to access appropriate resources in a timely manner as needed and/or as recommended, and to communicate concerns appropriately. Patient and Caregiver also reports a clear understanding of treatment options available.       reviewed resources, resource limitations, educational information, psychosocial risk factors and ways to mitigate risk factors, and provided additional information, and answered all questions.     Feelings or Concerns: Patient and Caregiver reported having no concerns at this time. Patient and Caregiver reported having high levels of motivation to pursue transplant at this time and deny needs, barriers or issues proceeding if pt is called in for a transplant. Also see Epic for documentation from transplant social work team regarding organ offer/reviews.     Coping: Pt reports continues to coping well with the transplant process at this time and reports ongoing family support, is walking the track, and listens to music to manage stressors as needed. Pt reports coping strategies are effective and plans to engage in same or similar positive coping strategies in hospital and during recovery period as medically allowed.     Sabianism: Pt reports attends [employer] in [place], [place] with strong Sabianism support.      Goals: Pt reports plans to spend time with family, with work, traveling, and  appreciating life now and if transplanted.  Pt denies need for additional support or referrals at this time for vocational support.     Interview Behavior: Patient presents as alert and oriented x 4, pleasant, good eye contact, well groomed, recall good, concentration/judgement good, average intelligence, calm, communicative, cooperative and asking and answering questions appropriately. He was accompanied by his adult son, Wilner Alves, who presents as engaged and highly supportive of pt's pursuit of transplant. No other concerns reported or indicated. Both state will call as needed and were provided with contact information for myself and transplant team.        Transplant Social Work - Candidacy  Assessment/Plan:      Psychosocial Suitability:    Based on psychosocial risk factors, patient presents as low risk at this time due to overall psychosocial stability and concrete plans in place now and if transplanted.      Recommendations/Additional Comments for Discharge Planning if Transplanted:   Pt reports plans to return home post-transplant if medically cleared and is able to return as needed for care. Or, will stay either at [facility] or [facility] if needs to stay closer to hospital post-transplant for recovery period. Pt denies financial concerns and reports ability to afford costs as needed, including for housing, food, medications and other needs now, during and after transplant and throughout recovery period.     Pt states is willing to utilize resources including mental health resources as needed or recommended now and in future. Resources reviewed with pt today.

## 2022-02-24 NOTE — LETTER
February 28, 2022        Gabino Champion  [address]  [place] [address]  Phone: [phone]  Fax: [phone]             Alan Wooten- Transplant [facility]  [address]  [facility] [address]  Phone: [phone]   Patient: Isidro Alves   MR Number: [id number]   YOB: 1955   Date of Visit: 2/24/2022       Dear Dr. Gabino Champion    Thank you for referring Isidro Alves to me for evaluation. Attached you will find relevant portions of my assessment and plan of care.    If you have questions, please do not hesitate to call me. I look forward to following Isidro Alves along with you.    Sincerely,    Jessica Collins, NP    Enclosure    If you would like to receive this communication electronically, please contact [email or web address] or [phone] to request [employer] access.    [employer] is a tool which provides read-only access to select patient information with whom you have a relationship. Its easy to use and provides real time access to review your patients record including encounter summaries, notes, results, and demographic information.    If you feel you have received this communication in error or would no longer like to receive these types of communications, please e-mail [email or web address]

## 2022-02-28 ENCOUNTER — LAB VISIT (OUTPATIENT)
Dept: PRIMARY CARE CLINIC | Facility: CLINIC | Age: 67
End: 2022-02-28
Payer: COMMERCIAL

## 2022-02-28 DIAGNOSIS — Z01.818 PRE-OP TESTING: ICD-10-CM

## 2022-02-28 DIAGNOSIS — N52.9 ERECTILE DYSFUNCTION, UNSPECIFIED ERECTILE DYSFUNCTION TYPE: ICD-10-CM

## 2022-02-28 DIAGNOSIS — N18.6 ESRD (END STAGE RENAL DISEASE): Primary | ICD-10-CM

## 2022-02-28 PROCEDURE — U0005 INFEC AGEN DETEC AMPLI PROBE: HCPCS | Performed by: PHYSICIAN ASSISTANT

## 2022-02-28 PROCEDURE — U0003 INFECTIOUS AGENT DETECTION BY NUCLEIC ACID (DNA OR RNA); SEVERE ACUTE RESPIRATORY SYNDROME CORONAVIRUS 2 (SARS-COV-2) (CORONAVIRUS DISEASE [COVID-19]), AMPLIFIED PROBE TECHNIQUE, MAKING USE OF HIGH THROUGHPUT TECHNOLOGIES AS DESCRIBED BY CMS-2020-01-R: HCPCS | Mod: NTX | Performed by: PHYSICIAN ASSISTANT

## 2022-02-28 RX ORDER — TADALAFIL 20 MG/1
20 TABLET ORAL DAILY
Qty: 30 TABLET | Refills: 11 | Status: SHIPPED | OUTPATIENT
Start: 2022-02-28 | End: 2022-07-07

## 2022-03-01 ENCOUNTER — PATIENT OUTREACH (OUTPATIENT)
Dept: ADMINISTRATIVE | Facility: HOSPITAL | Age: 67
End: 2022-03-01
Payer: COMMERCIAL

## 2022-03-01 ENCOUNTER — HOSPITAL ENCOUNTER (OUTPATIENT)
Dept: RADIOLOGY | Facility: HOSPITAL | Age: 67
Discharge: HOME | End: 2022-03-01
Attending: INTERNAL MEDICINE
Payer: COMMERCIAL

## 2022-03-01 VITALS
BODY MASS INDEX: 34.01 KG/M2 | HEART RATE: 78 BPM | WEIGHT: 265 LBS | OXYGEN SATURATION: 99 % | RESPIRATION RATE: 18 BRPM | SYSTOLIC BLOOD PRESSURE: 205 MMHG | HEIGHT: 74 IN | DIASTOLIC BLOOD PRESSURE: 103 MMHG

## 2022-03-01 DIAGNOSIS — N18.6 ESRD (END STAGE RENAL DISEASE): ICD-10-CM

## 2022-03-01 LAB
SARS-COV-2 RNA RESP QL NAA+PROBE: NOT DETECTED
SARS-COV-2- CYCLE NUMBER: NORMAL

## 2022-03-01 PROCEDURE — 76937 US GUIDE VASCULAR ACCESS: CPT | Mod: TC,TXP | Performed by: STUDENT IN AN ORGANIZED HEALTH CARE EDUCATION/TRAINING PROGRAM

## 2022-03-01 PROCEDURE — 77001 FLUOROGUIDE FOR VEIN DEVICE: CPT | Mod: TC,NTX | Performed by: STUDENT IN AN ORGANIZED HEALTH CARE EDUCATION/TRAINING PROGRAM

## 2022-03-01 PROCEDURE — 36558 INSERT TUNNELED CV CATH: CPT | Mod: RT,NTX | Performed by: STUDENT IN AN ORGANIZED HEALTH CARE EDUCATION/TRAINING PROGRAM

## 2022-03-01 PROCEDURE — 63600175 PHARM REV CODE 636 W HCPCS: Mod: NTX | Performed by: STUDENT IN AN ORGANIZED HEALTH CARE EDUCATION/TRAINING PROGRAM

## 2022-03-01 PROCEDURE — 36581 REPLACE TUNNELED CV CATH: CPT | Mod: RT,NTX | Performed by: STUDENT IN AN ORGANIZED HEALTH CARE EDUCATION/TRAINING PROGRAM

## 2022-03-01 PROCEDURE — C1750 CATH, HEMODIALYSIS,LONG-TERM: HCPCS | Mod: TXP

## 2022-03-01 RX ORDER — CEFAZOLIN SODIUM 1 G/50ML
SOLUTION INTRAVENOUS
Status: COMPLETED | OUTPATIENT
Start: 2022-03-01 | End: 2022-03-01

## 2022-03-01 RX ORDER — HEPARIN SODIUM 1000 [USP'U]/ML
INJECTION, SOLUTION INTRAVENOUS; SUBCUTANEOUS CODE/TRAUMA/SEDATION MEDICATION
Status: COMPLETED | OUTPATIENT
Start: 2022-03-01 | End: 2022-03-01

## 2022-03-01 RX ORDER — MIDAZOLAM HYDROCHLORIDE 1 MG/ML
INJECTION INTRAMUSCULAR; INTRAVENOUS CODE/TRAUMA/SEDATION MEDICATION
Status: COMPLETED | OUTPATIENT
Start: 2022-03-01 | End: 2022-03-01

## 2022-03-01 RX ORDER — CEFAZOLIN SODIUM 2 G/50ML
2 SOLUTION INTRAVENOUS ONCE
Status: DISCONTINUED | OUTPATIENT
Start: 2022-03-01 | End: 2022-03-02 | Stop reason: HOSPADM

## 2022-03-01 RX ORDER — FENTANYL CITRATE 50 UG/ML
INJECTION, SOLUTION INTRAMUSCULAR; INTRAVENOUS CODE/TRAUMA/SEDATION MEDICATION
Status: COMPLETED | OUTPATIENT
Start: 2022-03-01 | End: 2022-03-01

## 2022-03-01 RX ORDER — HYDRALAZINE HYDROCHLORIDE 20 MG/ML
10 INJECTION INTRAMUSCULAR; INTRAVENOUS ONCE
Status: COMPLETED | OUTPATIENT
Start: 2022-03-01 | End: 2022-03-01

## 2022-03-01 RX ADMIN — MIDAZOLAM HYDROCHLORIDE 1 MG: 1 INJECTION INTRAMUSCULAR; INTRAVENOUS at 08:03

## 2022-03-01 RX ADMIN — HEPARIN SODIUM 2000 UNITS: 1000 INJECTION, SOLUTION INTRAVENOUS; SUBCUTANEOUS at 09:03

## 2022-03-01 RX ADMIN — HYDRALAZINE HYDROCHLORIDE 10 MG: 20 INJECTION INTRAMUSCULAR; INTRAVENOUS at 10:03

## 2022-03-01 RX ADMIN — FENTANYL CITRATE 50 MCG: 50 INJECTION, SOLUTION INTRAMUSCULAR; INTRAVENOUS at 08:03

## 2022-03-01 RX ADMIN — CEFAZOLIN SODIUM 2 G: 1 SOLUTION INTRAVENOUS at 09:03

## 2022-03-01 RX ADMIN — FENTANYL CITRATE 50 MCG: 50 INJECTION, SOLUTION INTRAMUSCULAR; INTRAVENOUS at 09:03

## 2022-03-01 RX ADMIN — MIDAZOLAM HYDROCHLORIDE 1 MG: 1 INJECTION INTRAMUSCULAR; INTRAVENOUS at 09:03

## 2022-03-01 NOTE — INTERVAL H&P NOTE
The patient has been examined and the H&P has been reviewed:    I concur with the findings and no changes have occurred since H&P was written.    Plan for vascular catheter replacement/exchange    There are no hospital problems to display for this patient.

## 2022-03-01 NOTE — NURSING
Pt medicated per MAR re:hypertension. Pt denies headache, nausea, or pain. Pt awake and alert playing on phone. Denies needing anything at this time.

## 2022-03-01 NOTE — DISCHARGE SUMMARY
Radiology Post-Procedure Note    Pre Op Diagnosis: Indwelling tunneled dialysis catheter    Post Op Diagnosis: Same    Procedure: Tunneled central venous catheter exchange    Procedure performed by: Lesvia Ma MD    Written Informed Consent Obtained: Yes    Specimen Removed: No    Estimated Blood Loss: Minimal    Findings:   Using fluoroscopic guidance an indwelling tunneled right IJ dialysis catheter was exchanged over a wire for a 14.5 Fr tunneled dialysis catheter with tip of the catheter in the right atrium. The catheter was secured to the skin with suture and a sterile dressing was applied.    Catheter is ready for use.     Lesvia Ma MD  Interventional Radiology

## 2022-03-01 NOTE — PLAN OF CARE
Pt /98; MD aware, per MD okay to DC pt to take daily meds as instructed. PIV removed. Pt able to abulate with no assist, pt urinated with no difficultty. Pt continues to deny headache, nausea, pain, or dizziness. DC papers reviewed with RN. Pt verbalizes understanding, denies questions. Pt wheeled to private vehicle driven by wife.

## 2022-03-01 NOTE — NURSING
Pt in recovery- Hypertensive at this time. Pt alert, sleeping between care. Denies pain, headache, or nausea.

## 2022-03-02 DIAGNOSIS — Z76.82 AWAITING ORGAN TRANSPLANT STATUS: Primary | ICD-10-CM

## 2022-03-03 ENCOUNTER — CLINICAL SUPPORT (OUTPATIENT)
Dept: PULMONOLOGY | Facility: CLINIC | Age: 67
End: 2022-03-03
Payer: COMMERCIAL

## 2022-03-03 ENCOUNTER — OFFICE VISIT (OUTPATIENT)
Dept: PULMONOLOGY | Facility: CLINIC | Age: 67
End: 2022-03-03
Payer: COMMERCIAL

## 2022-03-03 ENCOUNTER — HOSPITAL ENCOUNTER (OUTPATIENT)
Dept: RADIOLOGY | Facility: HOSPITAL | Age: 67
Discharge: HOME | End: 2022-03-03
Attending: INTERNAL MEDICINE
Payer: COMMERCIAL

## 2022-03-03 VITALS
OXYGEN SATURATION: 95 % | SYSTOLIC BLOOD PRESSURE: 120 MMHG | HEART RATE: 86 BPM | DIASTOLIC BLOOD PRESSURE: 84 MMHG | HEIGHT: 74 IN | RESPIRATION RATE: 16 BRPM | WEIGHT: 265 LBS | BODY MASS INDEX: 34.01 KG/M2

## 2022-03-03 DIAGNOSIS — E11.69 HYPERLIPIDEMIA ASSOCIATED WITH TYPE 2 DIABETES MELLITUS: ICD-10-CM

## 2022-03-03 DIAGNOSIS — J45.998 POST VIRAL ASTHMA: ICD-10-CM

## 2022-03-03 DIAGNOSIS — I10 SEVERE HYPERTENSION: ICD-10-CM

## 2022-03-03 DIAGNOSIS — J18.9 PNEUMONIA, UNSPECIFIED ORGANISM: ICD-10-CM

## 2022-03-03 DIAGNOSIS — E78.5 HYPERLIPIDEMIA ASSOCIATED WITH TYPE 2 DIABETES MELLITUS: ICD-10-CM

## 2022-03-03 DIAGNOSIS — J84.10 PULMONARY FIBROSIS, POSTINFLAMMATORY: ICD-10-CM

## 2022-03-03 DIAGNOSIS — R05.8 UPPER AIRWAY COUGH SYNDROME: ICD-10-CM

## 2022-03-03 DIAGNOSIS — E66.09 CLASS 1 OBESITY DUE TO EXCESS CALORIES WITH SERIOUS COMORBIDITY AND BODY MASS INDEX (BMI) OF 34.0 TO 34.9 IN ADULT: Chronic | ICD-10-CM

## 2022-03-03 DIAGNOSIS — J45.998 POST VIRAL ASTHMA: Primary | ICD-10-CM

## 2022-03-03 PROCEDURE — 94010 BREATHING CAPACITY TEST: ICD-10-PCS | Mod: NTX,S$GLB,, | Performed by: INTERNAL MEDICINE

## 2022-03-03 PROCEDURE — 99214 OFFICE O/P EST MOD 30 MIN: CPT | Mod: 25,NTX,S$GLB, | Performed by: PHYSICIAN ASSISTANT

## 2022-03-03 PROCEDURE — 1159F MED LIST DOCD IN RCRD: CPT | Mod: CPTII,NTX,S$GLB, | Performed by: PHYSICIAN ASSISTANT

## 2022-03-03 PROCEDURE — 94729 PR C02/MEMBANE DIFFUSE CAPACITY: ICD-10-PCS | Mod: NTX,S$GLB,, | Performed by: INTERNAL MEDICINE

## 2022-03-03 PROCEDURE — 99999 PR PBB SHADOW E&M-EST. PATIENT-LVL V: ICD-10-PCS | Mod: PBBFAC,TXP,, | Performed by: PHYSICIAN ASSISTANT

## 2022-03-03 PROCEDURE — 94729 DIFFUSING CAPACITY: CPT | Mod: NTX,S$GLB,, | Performed by: INTERNAL MEDICINE

## 2022-03-03 PROCEDURE — 94010 BREATHING CAPACITY TEST: CPT | Mod: NTX,S$GLB,, | Performed by: INTERNAL MEDICINE

## 2022-03-03 PROCEDURE — 71250 CT THORAX DX C-: CPT | Mod: TC,TXP

## 2022-03-03 PROCEDURE — 94726 PLETHYSMOGRAPHY LUNG VOLUMES: CPT | Mod: NTX,S$GLB,, | Performed by: INTERNAL MEDICINE

## 2022-03-03 PROCEDURE — 99214 PR OFFICE/OUTPT VISIT, EST, LEVL IV, 30-39 MIN: ICD-10-PCS | Mod: 25,NTX,S$GLB, | Performed by: PHYSICIAN ASSISTANT

## 2022-03-03 PROCEDURE — 3074F SYST BP LT 130 MM HG: CPT | Mod: CPTII,NTX,S$GLB, | Performed by: PHYSICIAN ASSISTANT

## 2022-03-03 PROCEDURE — 3008F BODY MASS INDEX DOCD: CPT | Mod: CPTII,NTX,S$GLB, | Performed by: PHYSICIAN ASSISTANT

## 2022-03-03 PROCEDURE — 3079F DIAST BP 80-89 MM HG: CPT | Mod: CPTII,NTX,S$GLB, | Performed by: PHYSICIAN ASSISTANT

## 2022-03-03 PROCEDURE — 3074F PR MOST RECENT SYSTOLIC BLOOD PRESSURE < 130 MM HG: ICD-10-PCS | Mod: CPTII,NTX,S$GLB, | Performed by: PHYSICIAN ASSISTANT

## 2022-03-03 PROCEDURE — 4010F PR ACE/ARB THEARPY RXD/TAKEN: ICD-10-PCS | Mod: CPTII,NTX,S$GLB, | Performed by: PHYSICIAN ASSISTANT

## 2022-03-03 PROCEDURE — 99999 PR PBB SHADOW E&M-EST. PATIENT-LVL V: CPT | Mod: PBBFAC,TXP,, | Performed by: PHYSICIAN ASSISTANT

## 2022-03-03 PROCEDURE — 3079F PR MOST RECENT DIASTOLIC BLOOD PRESSURE 80-89 MM HG: ICD-10-PCS | Mod: CPTII,NTX,S$GLB, | Performed by: PHYSICIAN ASSISTANT

## 2022-03-03 PROCEDURE — 4010F ACE/ARB THERAPY RXD/TAKEN: CPT | Mod: CPTII,NTX,S$GLB, | Performed by: PHYSICIAN ASSISTANT

## 2022-03-03 PROCEDURE — 1160F RVW MEDS BY RX/DR IN RCRD: CPT | Mod: CPTII,NTX,S$GLB, | Performed by: PHYSICIAN ASSISTANT

## 2022-03-03 PROCEDURE — 94726 PULM FUNCT TST PLETHYSMOGRAP: ICD-10-PCS | Mod: NTX,S$GLB,, | Performed by: INTERNAL MEDICINE

## 2022-03-03 PROCEDURE — 1160F PR REVIEW ALL MEDS BY PRESCRIBER/CLIN PHARMACIST DOCUMENTED: ICD-10-PCS | Mod: CPTII,NTX,S$GLB, | Performed by: PHYSICIAN ASSISTANT

## 2022-03-03 PROCEDURE — 3008F PR BODY MASS INDEX (BMI) DOCUMENTED: ICD-10-PCS | Mod: CPTII,NTX,S$GLB, | Performed by: PHYSICIAN ASSISTANT

## 2022-03-03 PROCEDURE — 1159F PR MEDICATION LIST DOCUMENTED IN MEDICAL RECORD: ICD-10-PCS | Mod: CPTII,NTX,S$GLB, | Performed by: PHYSICIAN ASSISTANT

## 2022-03-03 RX ORDER — BENZONATATE 200 MG/1
200 CAPSULE ORAL 3 TIMES DAILY PRN
Qty: 90 CAPSULE | Refills: 0 | Status: SHIPPED | OUTPATIENT
Start: 2022-03-03 | End: 2022-03-14

## 2022-03-03 NOTE — PROGRESS NOTES
Subjective:       Patient ID: Isidro Alves is a 66 y.o. male.    Chief Complaint: Pneumonia      65yo male here for follow up of post viral asthma/cough  Chronic cough/sputum production after COVID diagnosis and hospital stay one year ago  He says the cough and sputum production is not everyday but comes in flares; no known precipitating or exacerbating factors   No wheezing, no SOB  He says after his last visit his cough went away for a few weeks, currently with a cough for the past few days; Clear productive sputum, also with post nasal drip  Started taking claritin today  Denies fever, chest pain, hemoptysis  Taking advair BID, nebulizer prn - he says he benefits from this regimen  PFT and chest CT completed today  Never smoker  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 Symptoms began 2 weeks ago. Symptoms have been unchanged since that time.The cough is nocturnal and paroxysmal and is aggravated by nothing. Associated symptoms include: postnasal drip. Patient does not have new pets. Patient does have a history of asthma. Patient does not have a history of environmental allergens. Patient has not traveled recently. Patient does not have a history of smoking. Patient has had a previous chest x-ray. Patient has had a PPD done.  Hx of COVID pneumonia  COVID 19 pneumonia - discharged on oxygen on 1/27/2021  Now on dialysis       Immunization History   Administered Date(s) Administered    COVID-19, MRNA, LN-S, PF (Pfizer) (Purple Cap) 02/27/2021, 03/20/2021, 11/01/2021    Hepatitis A / Hepatitis B 10/11/2018    Influenza - High Dose - PF (65 years and older) 10/11/2018, 10/13/2021    Influenza - Quadrivalent 11/14/2016    Influenza - Quadrivalent - PF *Preferred* (6 months and older) 02/05/2021    PPD Test  "02/02/2021, 02/07/2022    Pneumococcal 03/31/2021    Pneumococcal Conjugate - 13 Valent 10/11/2018    Pneumococcal Polysaccharide - 23 Valent 04/04/2016    Tdap 05/05/2016      Tobacco Use: Low Risk     Smoking Tobacco Use: Never Smoker    Smokeless Tobacco Use: Never Used      Past Medical History:   Diagnosis Date    Anemia 4/16/2014    BPH (benign prostatic hyperplasia)     CKD (chronic kidney disease) stage 4, GFR 15-29 ml/min     Coronary artery disease of native artery of native heart with stable angina pectoris 4/29/2019    Diabetes mellitus     Diabetes mellitus, type 2     Elevated PSA     Encounter for blood transfusion     Herpes labialis     Hyperlipidemia     Hypertension     Obesity     Proteinuria     Secondary hyperparathyroidism (of renal origin)     UTI (lower urinary tract infection) 5/1/2015      Current Outpatient Medications on File Prior to Visit   Medication Sig Dispense Refill    acyclovir (ZOVIRAX) 400 MG tablet Take 1 tablet (400 mg total) by mouth 2 (two) times daily. One tablet 60 tablet 3    albuterol (PROVENTIL) 2.5 mg /3 mL (0.083 %) nebulizer solution Take 3 mLs (2.5 mg total) by nebulization every 4 to 6 hours as needed for Wheezing or Shortness of Breath. 360 mL 11    albuterol (PROVENTIL/VENTOLIN HFA) 90 mcg/actuation inhaler Inhale 2 puffs into the lungs every 4 (four) hours as needed for Wheezing or Shortness of Breath. 18 g 11    aspirin (ECOTRIN) 81 MG EC tablet Take 1 tablet (81 mg total) by mouth once daily.      atorvastatin (LIPITOR) 40 MG tablet TAKE 1 TABLET BY MOUTH EVERY DAY 30 tablet 2    BD INSULIN SYRINGE HALF UNIT 0.3 mL 31 gauge x 5/16" Syrg FOR USE WITH INSULIN. 100 Syringe 1    blood sugar diagnostic Strp 1 each by Misc.(Non-Drug; Combo Route) route 4 (four) times daily. 100 each 11    calcitRIOL (ROCALTROL) 0.25 MCG Cap Take 1 capsule (0.25 mcg total) by mouth every Mon, Wed, Fri. 45 capsule 3    carvediloL (COREG) 25 MG tablet " "TAKE 1 TABLET BY MOUTH TWICE A DAY WITH FOOD 60 tablet 11    cetirizine (ZYRTEC) 10 MG tablet Take 1 tablet (10 mg total) by mouth once daily. 30 tablet 1    ergocalciferol (ERGOCALCIFEROL) 50,000 unit Cap TAKE 1 CAPSULE BY MOUTH ONE TIME PER WEEK 12 capsule 2    esomeprazole (NEXIUM) 40 MG capsule Take 40 mg by mouth once daily.      finasteride (PROSCAR) 5 mg tablet Take 1 tablet (5 mg total) by mouth once daily. (Patient taking differently: Take 5 mg by mouth once daily. PRN) 90 tablet 3    fluticasone propionate (FLONASE) 50 mcg/actuation nasal spray 2 sprays (100 mcg total) by Each Nostril route once daily. 16 g 11    fluticasone-salmeterol diskus inhaler 500-50 mcg Inhale 1 puff into the lungs 2 (two) times daily. Controller.Wash out mouth after use 60 each 11    folic acid (FOLVITE) 1 MG tablet TAKE 1 TABLET BY MOUTH EVERY DAY 30 tablet 9    furosemide (LASIX) 80 MG tablet TAKE 1 TABLET BY MOUTH EVERY DAY 30 tablet 11    inhalation spacing device (VORTEX HOLDING CHAMBER) Use as directed for inhalation. 1 Device prn    insulin degludec-liraglutide (XULTOPHY 100/3.6) 100 unit-3.6 mg /mL (3 mL) Pen Inject 0.38 mLs (38 Units total) into the skin once daily. 12 pen 3    lancets (ONETOUCH ULTRASOFT LANCETS) Misc 1 lancet by Misc.(Non-Drug; Combo Route) route 4 (four) times daily. 100 each 11    multivitamin (THERAGRAN) tablet Take 1 tablet by mouth once daily.      NIFEdipine (PROCARDIA-XL) 90 MG (OSM) 24 hr tablet Take 1 tablet (90 mg total) by mouth once daily. 30 tablet 5    pen needle, diabetic (BD ULTRA-FINE SHIRA PEN NEEDLE) 32 gauge x 5/32" Ndle 1 each by Misc.(Non-Drug; Combo Route) route 4 (four) times daily with meals and nightly. 300 each 3    sevelamer carbonate (RENVELA) 800 mg Tab TAKE 1 TABLET (800 MG TOTAL) BY MOUTH 3 (THREE) TIMES DAILY WITH MEALS. 90 tablet 6    sildenafiL (VIAGRA) 100 MG tablet Take 1 tablet (100 mg total) by mouth daily as needed for Erectile Dysfunction. 10 " "tablet 8    sodium bicarbonate 650 MG tablet SODIUM BICARB 650 MG TABLET  TAKE 1 TABLET BY MOUTH TWICE A DAY      tadalafil (ADCIRCA) 20 mg Tab Take 1 tablet (20 mg total) by mouth once daily. 30 tablet 11    tamsulosin (FLOMAX) 0.4 mg Cap Take 1 capsule (0.4 mg total) by mouth once daily. 30 capsule 11    valsartan (DIOVAN) 320 MG tablet TAKE 1 TABLET (320 MG TOTAL) BY MOUTH ONCE DAILY. 30 tablet 11    azelastine (ASTELIN) 137 mcg (0.1 %) nasal spray 1 spray (137 mcg total) by Nasal route 2 (two) times daily. 30 mL 0    olopatadine (PATADAY) 0.2 % Drop Place 1 drop into both eyes daily as needed. 2.5 mL 0     No current facility-administered medications on file prior to visit.        Review of Systems   Constitutional: Negative for fever, weight loss, appetite change, fatigue and weakness.   HENT: Positive for postnasal drip. Negative for rhinorrhea, sinus pressure, trouble swallowing and congestion.    Respiratory: Positive for cough and sputum production. Negative for choking, chest tightness, shortness of breath, wheezing and dyspnea on extertion.    Cardiovascular: Negative for chest pain and leg swelling.   Musculoskeletal: Negative for arthralgias, gait problem and joint swelling.   Gastrointestinal: Negative for nausea, vomiting and abdominal pain.   Neurological: Negative for dizziness, weakness and headaches.   All other systems reviewed and are negative.      Objective:       Vitals:    03/03/22 1537   BP: 120/84   Pulse: 86   Resp: 16   SpO2: 95%   Weight: 120.2 kg (264 lb 15.9 oz)   Height: 6' 2" (1.88 m)       Physical Exam   Constitutional: He is oriented to person, place, and time. He appears well-developed and well-nourished. No distress.   HENT:   Head: Normocephalic.   Mouth/Throat: Oropharynx is clear and moist.   Cardiovascular: Normal rate and regular rhythm.   Pulmonary/Chest: Effort normal and breath sounds normal. No respiratory distress. He has no wheezes. He has no rhonchi. He has no " rales.   Musculoskeletal:         General: No edema.      Cervical back: Normal range of motion and neck supple.   Lymphadenopathy: No supraclavicular adenopathy is present.     He has no cervical adenopathy.   Neurological: He is alert and oriented to person, place, and time. Gait normal.   Skin: Skin is warm and dry.   Psychiatric: He has a normal mood and affect.   Vitals reviewed.    Personal Diagnostic Review    CT Chest Without Contrast  Narrative: EXAMINATION:  CT CHEST WITHOUT CONTRAST    CLINICAL HISTORY:  Interstitial lung disease;Pneumonia; Pneumonia, unspecified organism    TECHNIQUE:  Low dose axial images, sagittal and coronal reformations were obtained from the thoracic inlet to the lung bases. Contrast was not administered.  All CT scans at this location are performed using dose modulation techniques as appropriate to a performed exam including the following: Automated exposure control; adjustment of the mA and/or kV  according to patient size.    COMPARISON:  08/12/2021    FINDINGS:  Base of Neck: Right IJ dialysis catheter again noted    Thoracic soft tissues: Normal.    Aorta: Left-sided aortic arch.  No aneurysm and no significant atherosclerosis    Heart: Normal size. No effusion.    Pulmonary vasculature: Pulmonary arteries distribute normally.    Karly/Mediastinum: No pathologic ivan enlargement.    Esophagus: Normal.    Upper Abdomen: No abnormality of the partially imaged upper abdomen.    Airways: Patent.    Lungs/Pleura: Remote healed bilateral rib fracture deformities and chronic postoperative changes left lateral chest wall.  Unchanged left hemithorax volume loss.  Unchanged pleuroparenchymal scarring and mild subsegmental atelectasis.  Lungs are otherwise clear.  No evidence of interstitial lung disease.    Bones: No acute fracture. No suspicious lytic or sclerotic lesions.  Impression: No evidence of interstitial lung disease.  Chronic findings as above.    Electronically signed  by: Ho Huan  Date:    03/03/2022  Time:    14:57  Personally reviewed and reviewed with patient during office visit.    PFT: FVC 81.6 % of predicted. FEV1 81.6 % of predicted. TLC 92.6 % of predicted. DLCO 59.2% of predicted.  No obstructive or restrictive defect noted.   Therapist Comments:  Spirometry data is acceptable and reproducible. No bronchodilator ordered. No Plateau before 15 seconds. Patient unable to  pant properly. Resting method used.Estimated single breath alveolar volume is less than 75% of measured TLC.At least two  acceptable DLCO measurements within 2 ml/min/mmHg of each other.No recent hemoglobin value available.Quality Grading  A-C acceptable. Good patient effort.        Assessment/Plan:       Problem List Items Addressed This Visit        Pulmonary    Post viral asthma - Primary    Relevant Medications    benzonatate (TESSALON) 200 MG capsule    Upper airway cough syndrome       Cardiac/Vascular    Severe hypertension     Stable, F/u regularly with PCP             Hyperlipidemia associated with type 2 diabetes mellitus     F/u regularly with PCP                Endocrine    Class 1 obesity due to excess calories with serious comorbidity and body mass index (BMI) of 34.0 to 34.9 in adult (Chronic)     Weight loss and exercise to improve overall health.                 Continue advair BID, nebulizer prn, tessalon pearls as needed for cough. Encouraged claritin and flonase daily, mucinex prn. Suspect scarring on chest CT related to occupational exposure vs post covid changes. Recommend Chest X Ray follow up yearly or follow up chest CT if new or worsening symptoms.     Follow up in 6 months or sooner if symptoms worsen.      Discussed diagnosis, its evaluation, treatment and usual course. All questions answered.    Patient verbalized understanding of plan and left in no acute distress    Thank you for the courtesy of participating in the care of this patient    Emily Childers PA-C

## 2022-03-04 PROCEDURE — 99001 SPECIMEN HANDLING PT-LAB: CPT | Mod: PO,TXP | Performed by: NURSE PRACTITIONER

## 2022-03-06 LAB
BRPFT: NORMAL
DLCO ADJ PRE: 18.69 ML/(MIN*MMHG)
DLCO SINGLE BREATH LLN: 24.62
DLCO SINGLE BREATH PRE REF: 59.2 %
DLCO SINGLE BREATH REF: 31.55
DLCOC SBVA LLN: 2.93
DLCOC SBVA PRE REF: 106.7 %
DLCOC SBVA REF: 3.97
DLCOC SINGLE BREATH LLN: 24.62
DLCOC SINGLE BREATH PRE REF: 59.2 %
DLCOC SINGLE BREATH REF: 31.55
DLCOVA LLN: 2.93
DLCOVA PRE REF: 106.7 %
DLCOVA PRE: 4.24 ML/(MIN*MMHG*L)
DLCOVA REF: 3.97
DLVAADJ PRE: 4.24 ML/(MIN*MMHG*L)
ERV LLN: -16448.77
ERV PRE REF: 40.3 %
ERV REF: 1.23
FEF 25 75 LLN: 0.99
FEF 25 75 PRE REF: 88.9 %
FEF 25 75 REF: 2.56
FEV1 FVC LLN: 64
FEV1 FVC PRE REF: 99.7 %
FEV1 FVC REF: 77
FEV1 LLN: 2.3
FEV1 PRE REF: 81.6 %
FEV1 REF: 3.26
FRCPLETH LLN: 2.91
FRCPLETH PREREF: 109.8 %
FRCPLETH REF: 3.89
FVC LLN: 3.13
FVC PRE REF: 81.6 %
FVC REF: 4.28
IVC PRE: 3.12 L
IVC SINGLE BREATH LLN: 3.13
IVC SINGLE BREATH PRE REF: 72.9 %
IVC SINGLE BREATH REF: 4.28
MVV LLN: 126
MVV PRE REF: 53.9 %
MVV REF: 148
PEF LLN: 6.27
PEF PRE REF: 86.9 %
PEF REF: 9.23
PRE DLCO: 18.69 ML/(MIN*MMHG)
PRE ERV: 0.5 L
PRE FEF 25 75: 2.28 L/S
PRE FET 100: 11.95 SEC
PRE FEV1 FVC: 76.34 %
PRE FEV1: 2.67 L
PRE FRC PL: 4.28 L
PRE FVC: 3.49 L
PRE MVV: 80 L/MIN
PRE PEF: 8.03 L/S
PRE RV: 3.86 L
PRE TLC: 7.35 L
RAW LLN: 3.06
RAW PRE REF: 78.5 %
RAW PRE: 2.4 CMH2O*S/L
RAW REF: 3.06
RV LLN: 1.99
RV PRE REF: 144.9 %
RV REF: 2.66
RVTLC LLN: 30
RVTLC PRE REF: 133.6 %
RVTLC PRE: 52.5 %
RVTLC REF: 39
TLC LLN: 6.79
TLC PRE REF: 92.6 %
TLC REF: 7.94
VA PRE: 4.41 L
VA SINGLE BREATH LLN: 7.79
VA SINGLE BREATH PRE REF: 56.6 %
VA SINGLE BREATH REF: 7.79
VC LLN: 3.13
VC PRE REF: 81.6 %
VC PRE: 3.49 L
VC REF: 4.28
VTGRAWPRE: 5.27 L

## 2022-03-07 ENCOUNTER — LAB VISIT (OUTPATIENT)
Dept: LAB | Facility: HOSPITAL | Age: 67
End: 2022-03-07
Attending: PHYSICIAN ASSISTANT
Payer: COMMERCIAL

## 2022-03-07 DIAGNOSIS — N18.4 TYPE 2 DIABETES MELLITUS WITH STAGE 4 CHRONIC KIDNEY DISEASE, WITH LONG-TERM CURRENT USE OF INSULIN: ICD-10-CM

## 2022-03-07 DIAGNOSIS — E11.22 TYPE 2 DIABETES MELLITUS WITH STAGE 4 CHRONIC KIDNEY DISEASE, WITH LONG-TERM CURRENT USE OF INSULIN: ICD-10-CM

## 2022-03-07 DIAGNOSIS — Z76.82 AWAITING ORGAN TRANSPLANT STATUS: ICD-10-CM

## 2022-03-07 DIAGNOSIS — Z79.4 TYPE 2 DIABETES MELLITUS WITH STAGE 4 CHRONIC KIDNEY DISEASE, WITH LONG-TERM CURRENT USE OF INSULIN: ICD-10-CM

## 2022-03-07 PROCEDURE — 99001 SPECIMEN HANDLING PT-LAB: CPT | Mod: TXP | Performed by: NURSE PRACTITIONER

## 2022-03-07 PROCEDURE — 86886 COOMBS TEST INDIRECT TITER: CPT | Mod: TXP | Performed by: NURSE PRACTITIONER

## 2022-03-07 PROCEDURE — 83036 HEMOGLOBIN GLYCOSYLATED A1C: CPT | Mod: NTX | Performed by: PHYSICIAN ASSISTANT

## 2022-03-07 PROCEDURE — 36415 COLL VENOUS BLD VENIPUNCTURE: CPT | Mod: TXP | Performed by: NURSE PRACTITIONER

## 2022-03-08 ENCOUNTER — LAB VISIT (OUTPATIENT)
Dept: LAB | Facility: HOSPITAL | Age: 67
End: 2022-03-08
Payer: COMMERCIAL

## 2022-03-08 DIAGNOSIS — Z76.82 ORGAN TRANSPLANT CANDIDATE: ICD-10-CM

## 2022-03-08 LAB
BLD GP AB SCN TITR SERPL: 4 {TITER}
ESTIMATED AVG GLUCOSE: 154 MG/DL (ref 68–131)
HBA1C MFR BLD: 7 % (ref 4–5.6)

## 2022-03-09 PROCEDURE — 86833 HLA CLASS II HIGH DEFIN QUAL: CPT | Mod: PO,TXP | Performed by: NURSE PRACTITIONER

## 2022-03-09 PROCEDURE — 86832 HLA CLASS I HIGH DEFIN QUAL: CPT | Mod: PO,TXP | Performed by: NURSE PRACTITIONER

## 2022-03-10 ENCOUNTER — LAB VISIT (OUTPATIENT)
Dept: LAB | Facility: HOSPITAL | Age: 67
End: 2022-03-10
Attending: NURSE PRACTITIONER
Payer: COMMERCIAL

## 2022-03-10 DIAGNOSIS — Z76.82 AWAITING ORGAN TRANSPLANT STATUS: ICD-10-CM

## 2022-03-10 PROCEDURE — 99001 SPECIMEN HANDLING PT-LAB: CPT | Mod: TXP | Performed by: NURSE PRACTITIONER

## 2022-03-11 DIAGNOSIS — T82.528A: ICD-10-CM

## 2022-03-11 DIAGNOSIS — N18.6 ESRD ON DIALYSIS: Primary | ICD-10-CM

## 2022-03-11 DIAGNOSIS — Z99.2 ESRD ON DIALYSIS: Primary | ICD-10-CM

## 2022-03-13 DIAGNOSIS — N18.6 ESRD (END STAGE RENAL DISEASE): ICD-10-CM

## 2022-03-13 DIAGNOSIS — J18.9 PNEUMONIA DUE TO INFECTIOUS ORGANISM, UNSPECIFIED LATERALITY, UNSPECIFIED PART OF LUNG: Primary | ICD-10-CM

## 2022-03-14 ENCOUNTER — TELEPHONE (OUTPATIENT)
Dept: RADIOLOGY | Facility: HOSPITAL | Age: 67
End: 2022-03-14
Payer: COMMERCIAL

## 2022-03-15 ENCOUNTER — HOSPITAL ENCOUNTER (OUTPATIENT)
Dept: RADIOLOGY | Facility: HOSPITAL | Age: 67
Discharge: HOME | End: 2022-03-15
Attending: PHYSICIAN ASSISTANT
Payer: COMMERCIAL

## 2022-03-15 DIAGNOSIS — N18.6 ESRD (END STAGE RENAL DISEASE): ICD-10-CM

## 2022-03-15 PROCEDURE — 36581 REPLACE TUNNELED CV CATH: CPT | Mod: NTX | Performed by: STUDENT IN AN ORGANIZED HEALTH CARE EDUCATION/TRAINING PROGRAM

## 2022-03-15 PROCEDURE — 36581 REPLACE TUNNELED CV CATH: CPT | Mod: RT,NTX | Performed by: STUDENT IN AN ORGANIZED HEALTH CARE EDUCATION/TRAINING PROGRAM

## 2022-03-15 PROCEDURE — 77001 FLUOROGUIDE FOR VEIN DEVICE: CPT | Mod: TC,NTX | Performed by: STUDENT IN AN ORGANIZED HEALTH CARE EDUCATION/TRAINING PROGRAM

## 2022-03-15 PROCEDURE — C1750 CATH, HEMODIALYSIS,LONG-TERM: HCPCS | Mod: TXP

## 2022-03-15 PROCEDURE — 63600175 PHARM REV CODE 636 W HCPCS: Mod: TXP | Performed by: STUDENT IN AN ORGANIZED HEALTH CARE EDUCATION/TRAINING PROGRAM

## 2022-03-15 RX ORDER — CEFAZOLIN SODIUM 1 G/50ML
1 SOLUTION INTRAVENOUS ONCE
Status: COMPLETED | OUTPATIENT
Start: 2022-03-15 | End: 2022-03-15

## 2022-03-15 RX ADMIN — CEFAZOLIN SODIUM 1 G: 1 SOLUTION INTRAVENOUS at 12:03

## 2022-03-15 NOTE — SEDATION DOCUMENTATION
Pt placed supine on fluoro table at this time. NADN and pt verbalizes understanding of procedure.

## 2022-03-15 NOTE — SEDATION DOCUMENTATION
Pt with help getting off the table, and ambulated to recovery area at this time. Report to Justine LÓPEZ and verbalized understanding of all.

## 2022-03-17 ENCOUNTER — OFFICE VISIT (OUTPATIENT)
Dept: OTOLARYNGOLOGY | Facility: CLINIC | Age: 67
End: 2022-03-17
Payer: COMMERCIAL

## 2022-03-17 VITALS
WEIGHT: 263.25 LBS | DIASTOLIC BLOOD PRESSURE: 96 MMHG | HEART RATE: 76 BPM | BODY MASS INDEX: 33.79 KG/M2 | SYSTOLIC BLOOD PRESSURE: 152 MMHG | HEIGHT: 74 IN | TEMPERATURE: 97 F

## 2022-03-17 DIAGNOSIS — H65.492 CHRONIC OTITIS MEDIA OF LEFT EAR WITH EFFUSION: Primary | ICD-10-CM

## 2022-03-17 LAB — HPRA INTERPRETATION: NORMAL

## 2022-03-17 PROCEDURE — 1101F PT FALLS ASSESS-DOCD LE1/YR: CPT | Mod: CPTII,S$GLB,TXP, | Performed by: STUDENT IN AN ORGANIZED HEALTH CARE EDUCATION/TRAINING PROGRAM

## 2022-03-17 PROCEDURE — 3288F PR FALLS RISK ASSESSMENT DOCUMENTED: ICD-10-PCS | Mod: CPTII,S$GLB,TXP, | Performed by: STUDENT IN AN ORGANIZED HEALTH CARE EDUCATION/TRAINING PROGRAM

## 2022-03-17 PROCEDURE — 4010F ACE/ARB THERAPY RXD/TAKEN: CPT | Mod: CPTII,S$GLB,TXP, | Performed by: STUDENT IN AN ORGANIZED HEALTH CARE EDUCATION/TRAINING PROGRAM

## 2022-03-17 PROCEDURE — 3051F PR MOST RECENT HEMOGLOBIN A1C LEVEL 7.0 - < 8.0%: ICD-10-PCS | Mod: CPTII,S$GLB,TXP, | Performed by: STUDENT IN AN ORGANIZED HEALTH CARE EDUCATION/TRAINING PROGRAM

## 2022-03-17 PROCEDURE — 3008F PR BODY MASS INDEX (BMI) DOCUMENTED: ICD-10-PCS | Mod: CPTII,S$GLB,TXP, | Performed by: STUDENT IN AN ORGANIZED HEALTH CARE EDUCATION/TRAINING PROGRAM

## 2022-03-17 PROCEDURE — 3008F BODY MASS INDEX DOCD: CPT | Mod: CPTII,S$GLB,TXP, | Performed by: STUDENT IN AN ORGANIZED HEALTH CARE EDUCATION/TRAINING PROGRAM

## 2022-03-17 PROCEDURE — 1126F PR PAIN SEVERITY QUANTIFIED, NO PAIN PRESENT: ICD-10-PCS | Mod: CPTII,S$GLB,TXP, | Performed by: STUDENT IN AN ORGANIZED HEALTH CARE EDUCATION/TRAINING PROGRAM

## 2022-03-17 PROCEDURE — 3080F PR MOST RECENT DIASTOLIC BLOOD PRESSURE >= 90 MM HG: ICD-10-PCS | Mod: CPTII,S$GLB,TXP, | Performed by: STUDENT IN AN ORGANIZED HEALTH CARE EDUCATION/TRAINING PROGRAM

## 2022-03-17 PROCEDURE — 3051F HG A1C>EQUAL 7.0%<8.0%: CPT | Mod: CPTII,S$GLB,TXP, | Performed by: STUDENT IN AN ORGANIZED HEALTH CARE EDUCATION/TRAINING PROGRAM

## 2022-03-17 PROCEDURE — 99213 PR OFFICE/OUTPT VISIT, EST, LEVL III, 20-29 MIN: ICD-10-PCS | Mod: S$GLB,TXP,, | Performed by: STUDENT IN AN ORGANIZED HEALTH CARE EDUCATION/TRAINING PROGRAM

## 2022-03-17 PROCEDURE — 99213 OFFICE O/P EST LOW 20 MIN: CPT | Mod: S$GLB,TXP,, | Performed by: STUDENT IN AN ORGANIZED HEALTH CARE EDUCATION/TRAINING PROGRAM

## 2022-03-17 PROCEDURE — 4010F PR ACE/ARB THEARPY RXD/TAKEN: ICD-10-PCS | Mod: CPTII,S$GLB,TXP, | Performed by: STUDENT IN AN ORGANIZED HEALTH CARE EDUCATION/TRAINING PROGRAM

## 2022-03-17 PROCEDURE — 3077F PR MOST RECENT SYSTOLIC BLOOD PRESSURE >= 140 MM HG: ICD-10-PCS | Mod: CPTII,S$GLB,TXP, | Performed by: STUDENT IN AN ORGANIZED HEALTH CARE EDUCATION/TRAINING PROGRAM

## 2022-03-17 PROCEDURE — 1159F PR MEDICATION LIST DOCUMENTED IN MEDICAL RECORD: ICD-10-PCS | Mod: CPTII,S$GLB,TXP, | Performed by: STUDENT IN AN ORGANIZED HEALTH CARE EDUCATION/TRAINING PROGRAM

## 2022-03-17 PROCEDURE — 3077F SYST BP >= 140 MM HG: CPT | Mod: CPTII,S$GLB,TXP, | Performed by: STUDENT IN AN ORGANIZED HEALTH CARE EDUCATION/TRAINING PROGRAM

## 2022-03-17 PROCEDURE — 1126F AMNT PAIN NOTED NONE PRSNT: CPT | Mod: CPTII,S$GLB,TXP, | Performed by: STUDENT IN AN ORGANIZED HEALTH CARE EDUCATION/TRAINING PROGRAM

## 2022-03-17 PROCEDURE — 3080F DIAST BP >= 90 MM HG: CPT | Mod: CPTII,S$GLB,TXP, | Performed by: STUDENT IN AN ORGANIZED HEALTH CARE EDUCATION/TRAINING PROGRAM

## 2022-03-17 PROCEDURE — 99999 PR PBB SHADOW E&M-EST. PATIENT-LVL V: CPT | Mod: PBBFAC,TXP,, | Performed by: STUDENT IN AN ORGANIZED HEALTH CARE EDUCATION/TRAINING PROGRAM

## 2022-03-17 PROCEDURE — 3288F FALL RISK ASSESSMENT DOCD: CPT | Mod: CPTII,S$GLB,TXP, | Performed by: STUDENT IN AN ORGANIZED HEALTH CARE EDUCATION/TRAINING PROGRAM

## 2022-03-17 PROCEDURE — 99999 PR PBB SHADOW E&M-EST. PATIENT-LVL V: ICD-10-PCS | Mod: PBBFAC,TXP,, | Performed by: STUDENT IN AN ORGANIZED HEALTH CARE EDUCATION/TRAINING PROGRAM

## 2022-03-17 PROCEDURE — 1101F PR PT FALLS ASSESS DOC 0-1 FALLS W/OUT INJ PAST YR: ICD-10-PCS | Mod: CPTII,S$GLB,TXP, | Performed by: STUDENT IN AN ORGANIZED HEALTH CARE EDUCATION/TRAINING PROGRAM

## 2022-03-17 PROCEDURE — 1159F MED LIST DOCD IN RCRD: CPT | Mod: CPTII,S$GLB,TXP, | Performed by: STUDENT IN AN ORGANIZED HEALTH CARE EDUCATION/TRAINING PROGRAM

## 2022-03-17 NOTE — PROGRESS NOTES
Chief complaint:   Chief Complaint   Patient presents with    Follow-up    Otitis Media        History of Present Illness, 2/14/22:     Mr. Alves is a 66 y.o. male presenting for evaluation of left ear otalgia and hearing loss. Onset of this chief complaint was about a month ago.  Additional symptoms that also have been associated are left ear hearing loss. Treatment included ear canal flushing. Symptoms worsened after this. Denies otorrhea, vertigo, fever.    The patient also denies pain deep within the ear, tenderness around the ear canal or pre-auricular area, or headaches.       The patient denies significant hearing loss risk factors, ototoxic medication exposure, chronic vestibular suppressant use, head/ facial/ soraya trauma, and otologic surgery.      Return clinic visit, 02/17/2022  Ear pain and throbbing worsened over last couple days. Muffled hearing now. No fever, post auricular pain, facial weakness.     Return clinic visit, 3/17/22  Used abx as prescribed. Pain has resolved. Hearing still off - feels hollow.     No nose bleed, nasal obstruction, neck mass.    Review of Systems     A complete 10 point ROS was completed and are positive as per above HPI.    Otherwise negative for fever, diplopia, chest pain, shortness of breath, vomiting, blood in urine, joint pain, skin rash, seizures and unusual bleeding.       History        Past Medical History:   Past Medical History:   Diagnosis Date    Anemia 4/16/2014    BPH (benign prostatic hyperplasia)     CKD (chronic kidney disease) stage 4, GFR 15-29 ml/min     Coronary artery disease of native artery of native heart with stable angina pectoris 4/29/2019    Diabetes mellitus     Diabetes mellitus, type 2     Elevated PSA     Encounter for blood transfusion     Herpes labialis     Hyperlipidemia     Hypertension     Obesity     Proteinuria     Secondary hyperparathyroidism (of renal origin)     UTI (lower urinary tract infection) 5/1/2015     .          Past Surgical History:  Past Surgical History:   Procedure Laterality Date    COLONOSCOPY N/A 12/21/2017    Procedure: COLONOSCOPY;  Surgeon: Fran Escalera MD;  Location: [facility] ENDO;  Service: Endoscopy;  Laterality: N/A;    FLUOROSCOPY N/A 1/21/2021    Procedure: Vascath insertion;  Surgeon: Adrian Pruitt Jr., MD;  Location: [facility] CATH LAB;  Service: General;  Laterality: N/A;    FLUOROSCOPY N/A 4/6/2021    Procedure: Over the wire Vas Cath exchange;  Surgeon: Ho Pressley MD;  Location: [facility] CATH LAB;  Service: General;  Laterality: N/A;    PROSTATE BIOPSY      thorocotomy  2010   .         Medications: Medication list was reviewed. He  has a current medication list which includes the following prescription(s): acyclovir, albuterol, albuterol, aspirin, atorvastatin, bd insulin syringe (half unit), benzonatate, blood sugar diagnostic, calcitriol, carvedilol, cetirizine, ergocalciferol, esomeprazole, finasteride, fluticasone propionate, fluticasone-salmeterol 500-50 mcg/dose, folic acid, furosemide, vortex holding chamber, xultophy 100/3.6, lancets, multivitamin, nifedipine, pen needle, diabetic, sevelamer carbonate, sildenafil, sodium bicarbonate, tadalafil, tamsulosin, valsartan, azelastine, and olopatadine.         Allergies:   Review of patient's allergies indicates:   Allergen Reactions    Bactrim [sulfamethoxazole-trimethoprim] Swelling    Nsaids (non-steroidal anti-inflammatory drug)      CKD            Family history: family history includes Diabetes in his father and unknown relative; Hypertension in his father and unknown relative; No Known Problems in his mother.         Social History          Alcohol use:  reports current alcohol use.            Tobacco:  reports that he has never smoked. He has never used smokeless tobacco.         Please see the patient's intake form for full details of past medical history, past surgical history, family history, social history and  review of systems. ?This information was reviewed by me and noted.      Physical Examination     General: Well developed, well nourished, well hydrated. Verbal with a strong voice and not dysphonic.     Head/Face: Normocephalic, atraumatic. No scars or lesions. Facial musculature equal.     Eyes: No scleral icterus or conjunctival hemorrhage. EOMI. PERRLA.     Ears:     · Right ear: No gross deformity. EAC is clear The TM is intact with a pneumatized middle ear. No signs of retraction, fluid or infection.      · Left ear: TM intact, serous effusion      Nose: No gross deformity or lesions.     Mouth/Oropharynx: Lips without any lesions.     Neck: Trachea midline. No masses. No thyromegaly or nodules palpated.     Lymphatic: No lymphadenopathy in the neck.     Extremities: No cyanosis. Warm and well-perfused.     Skin: No scars or lesions on face or neck.      Neurologic: Moving all extremities without gross abnormality.CN II-XII grossly intact. House-Brackmann 1/6. No signs of nystagmus.      Psych: Alert and oriented to person, place, and time with an appropriate mood and affect.          Assessment   Chronic otitis media of left ear with effusion     Plan:    Drops x 7 days. RTC in 1-2 weeks if symptoms not completely resovled    Update, 02/17/2022    Evidence of Acute Otitis Media today    augmentin x 10 days  Will hold off on steroids due to DM  F/u 1 month; if no resolution will plan for NP scope then possible myringotomy/PET  Call sooner if pain worsens, fevers, etc    Update, 3/17/22  Acute OM cleared now with chronic Otitis Media with Effusion   Continue Flonase  If present in 6 weeks will plan for NP scope and tube placement in clinic, scheduled for procedure visit      Fabio Vargas MD  [facility]   [facility] [place]  [id number] [place] [address].  MAKAYLA Savage [id number]  P: [phone]  F: [phone]

## 2022-04-01 PROCEDURE — 86833 HLA CLASS II HIGH DEFIN QUAL: CPT | Mod: PO,TXP | Performed by: NURSE PRACTITIONER

## 2022-04-01 PROCEDURE — 86832 HLA CLASS I HIGH DEFIN QUAL: CPT | Mod: PO,TXP | Performed by: NURSE PRACTITIONER

## 2022-04-07 ENCOUNTER — LAB VISIT (OUTPATIENT)
Dept: LAB | Facility: HOSPITAL | Age: 67
End: 2022-04-07
Payer: COMMERCIAL

## 2022-04-07 DIAGNOSIS — Z76.82 ORGAN TRANSPLANT CANDIDATE: ICD-10-CM

## 2022-04-12 ENCOUNTER — PATIENT MESSAGE (OUTPATIENT)
Dept: OTOLARYNGOLOGY | Facility: CLINIC | Age: 67
End: 2022-04-12
Payer: COMMERCIAL

## 2022-04-14 ENCOUNTER — OFFICE VISIT (OUTPATIENT)
Dept: DIABETES | Facility: CLINIC | Age: 67
End: 2022-04-14
Payer: COMMERCIAL

## 2022-04-14 ENCOUNTER — LAB VISIT (OUTPATIENT)
Dept: LAB | Facility: HOSPITAL | Age: 67
End: 2022-04-14
Attending: NURSE PRACTITIONER
Payer: COMMERCIAL

## 2022-04-14 VITALS
BODY MASS INDEX: 34.03 KG/M2 | DIASTOLIC BLOOD PRESSURE: 86 MMHG | WEIGHT: 265.19 LBS | SYSTOLIC BLOOD PRESSURE: 142 MMHG | HEART RATE: 78 BPM | HEIGHT: 74 IN

## 2022-04-14 DIAGNOSIS — Z79.4 TYPE 2 DIABETES MELLITUS WITH STAGE 4 CHRONIC KIDNEY DISEASE, WITH LONG-TERM CURRENT USE OF INSULIN: Primary | ICD-10-CM

## 2022-04-14 DIAGNOSIS — E29.1 HYPOGONADISM IN MALE: ICD-10-CM

## 2022-04-14 DIAGNOSIS — E11.69 HYPERLIPIDEMIA ASSOCIATED WITH TYPE 2 DIABETES MELLITUS: ICD-10-CM

## 2022-04-14 DIAGNOSIS — E78.5 HYPERLIPIDEMIA ASSOCIATED WITH TYPE 2 DIABETES MELLITUS: ICD-10-CM

## 2022-04-14 DIAGNOSIS — E11.59 HYPERTENSION ASSOCIATED WITH DIABETES: ICD-10-CM

## 2022-04-14 DIAGNOSIS — E11.22 TYPE 2 DIABETES MELLITUS WITH STAGE 4 CHRONIC KIDNEY DISEASE, WITH LONG-TERM CURRENT USE OF INSULIN: Primary | ICD-10-CM

## 2022-04-14 DIAGNOSIS — Z76.82 AWAITING ORGAN TRANSPLANT STATUS: ICD-10-CM

## 2022-04-14 DIAGNOSIS — N18.4 CHRONIC KIDNEY DISEASE (CKD), STAGE IV (SEVERE): ICD-10-CM

## 2022-04-14 DIAGNOSIS — E04.2 MULTIPLE THYROID NODULES: ICD-10-CM

## 2022-04-14 DIAGNOSIS — N18.4 TYPE 2 DIABETES MELLITUS WITH STAGE 4 CHRONIC KIDNEY DISEASE, WITH LONG-TERM CURRENT USE OF INSULIN: Primary | ICD-10-CM

## 2022-04-14 DIAGNOSIS — I25.118 CORONARY ARTERY DISEASE OF NATIVE ARTERY OF NATIVE HEART WITH STABLE ANGINA PECTORIS: ICD-10-CM

## 2022-04-14 DIAGNOSIS — E66.09 NON MORBID OBESITY DUE TO EXCESS CALORIES: ICD-10-CM

## 2022-04-14 DIAGNOSIS — I15.2 HYPERTENSION ASSOCIATED WITH DIABETES: ICD-10-CM

## 2022-04-14 PROCEDURE — 3051F PR MOST RECENT HEMOGLOBIN A1C LEVEL 7.0 - < 8.0%: ICD-10-PCS | Mod: CPTII,S$GLB,, | Performed by: PHYSICIAN ASSISTANT

## 2022-04-14 PROCEDURE — 3008F PR BODY MASS INDEX (BMI) DOCUMENTED: ICD-10-PCS | Mod: CPTII,S$GLB,, | Performed by: PHYSICIAN ASSISTANT

## 2022-04-14 PROCEDURE — 1101F PT FALLS ASSESS-DOCD LE1/YR: CPT | Mod: CPTII,S$GLB,, | Performed by: PHYSICIAN ASSISTANT

## 2022-04-14 PROCEDURE — 3288F FALL RISK ASSESSMENT DOCD: CPT | Mod: CPTII,S$GLB,, | Performed by: PHYSICIAN ASSISTANT

## 2022-04-14 PROCEDURE — 3008F BODY MASS INDEX DOCD: CPT | Mod: CPTII,S$GLB,, | Performed by: PHYSICIAN ASSISTANT

## 2022-04-14 PROCEDURE — 3288F PR FALLS RISK ASSESSMENT DOCUMENTED: ICD-10-PCS | Mod: CPTII,S$GLB,, | Performed by: PHYSICIAN ASSISTANT

## 2022-04-14 PROCEDURE — 4010F PR ACE/ARB THEARPY RXD/TAKEN: ICD-10-PCS | Mod: CPTII,S$GLB,, | Performed by: PHYSICIAN ASSISTANT

## 2022-04-14 PROCEDURE — 3077F PR MOST RECENT SYSTOLIC BLOOD PRESSURE >= 140 MM HG: ICD-10-PCS | Mod: CPTII,S$GLB,, | Performed by: PHYSICIAN ASSISTANT

## 2022-04-14 PROCEDURE — 3079F PR MOST RECENT DIASTOLIC BLOOD PRESSURE 80-89 MM HG: ICD-10-PCS | Mod: CPTII,S$GLB,, | Performed by: PHYSICIAN ASSISTANT

## 2022-04-14 PROCEDURE — 1159F MED LIST DOCD IN RCRD: CPT | Mod: CPTII,S$GLB,, | Performed by: PHYSICIAN ASSISTANT

## 2022-04-14 PROCEDURE — 99999 PR PBB SHADOW E&M-EST. PATIENT-LVL III: ICD-10-PCS | Mod: PBBFAC,,, | Performed by: PHYSICIAN ASSISTANT

## 2022-04-14 PROCEDURE — 3051F HG A1C>EQUAL 7.0%<8.0%: CPT | Mod: CPTII,S$GLB,, | Performed by: PHYSICIAN ASSISTANT

## 2022-04-14 PROCEDURE — 99999 PR PBB SHADOW E&M-EST. PATIENT-LVL III: CPT | Mod: PBBFAC,,, | Performed by: PHYSICIAN ASSISTANT

## 2022-04-14 PROCEDURE — 99214 PR OFFICE/OUTPT VISIT, EST, LEVL IV, 30-39 MIN: ICD-10-PCS | Mod: S$GLB,,, | Performed by: PHYSICIAN ASSISTANT

## 2022-04-14 PROCEDURE — 99214 OFFICE O/P EST MOD 30 MIN: CPT | Mod: S$GLB,,, | Performed by: PHYSICIAN ASSISTANT

## 2022-04-14 PROCEDURE — 99001 SPECIMEN HANDLING PT-LAB: CPT | Mod: TXP | Performed by: NURSE PRACTITIONER

## 2022-04-14 PROCEDURE — 3077F SYST BP >= 140 MM HG: CPT | Mod: CPTII,S$GLB,, | Performed by: PHYSICIAN ASSISTANT

## 2022-04-14 PROCEDURE — 1126F AMNT PAIN NOTED NONE PRSNT: CPT | Mod: CPTII,S$GLB,, | Performed by: PHYSICIAN ASSISTANT

## 2022-04-14 PROCEDURE — 1126F PR PAIN SEVERITY QUANTIFIED, NO PAIN PRESENT: ICD-10-PCS | Mod: CPTII,S$GLB,, | Performed by: PHYSICIAN ASSISTANT

## 2022-04-14 PROCEDURE — 1101F PR PT FALLS ASSESS DOC 0-1 FALLS W/OUT INJ PAST YR: ICD-10-PCS | Mod: CPTII,S$GLB,, | Performed by: PHYSICIAN ASSISTANT

## 2022-04-14 PROCEDURE — 4010F ACE/ARB THERAPY RXD/TAKEN: CPT | Mod: CPTII,S$GLB,, | Performed by: PHYSICIAN ASSISTANT

## 2022-04-14 PROCEDURE — 1159F PR MEDICATION LIST DOCUMENTED IN MEDICAL RECORD: ICD-10-PCS | Mod: CPTII,S$GLB,, | Performed by: PHYSICIAN ASSISTANT

## 2022-04-14 PROCEDURE — 3079F DIAST BP 80-89 MM HG: CPT | Mod: CPTII,S$GLB,, | Performed by: PHYSICIAN ASSISTANT

## 2022-04-14 RX ORDER — PEN NEEDLE, DIABETIC 30 GX3/16"
1 NEEDLE, DISPOSABLE MISCELLANEOUS
Qty: 100 EACH | Refills: 11 | Status: SHIPPED | OUTPATIENT
Start: 2022-04-14 | End: 2022-07-28 | Stop reason: ALTCHOICE

## 2022-04-14 RX ORDER — INSULIN GLARGINE 100 [IU]/ML
30 INJECTION, SOLUTION SUBCUTANEOUS DAILY
Qty: 9 ML | Refills: 11 | Status: SHIPPED | OUTPATIENT
Start: 2022-04-14 | End: 2022-04-14

## 2022-04-14 NOTE — PROGRESS NOTES
"  PCP: Steven Morgan MD    Subjective:     Chief Complaint: Diabetes - Established Patient    HISTORY OF PRESENT ILLNESS: 66 y.o.   male presenting for diabetes management visit.   The patient's last visit with me was on 9/9/2021.  Patient has had Type II diabetes since 20 years or more.  Pertinent to decision making is the following comorbidities: HTN, HLD, CAD, ESRD, Dialysis Use - Sched MWF from 0930a-130p and Obesity by BMI  Patient has the following Diabetes complications: with diabetic chronic kidney disease  He  has attended diabetes education in the past.     Patient's most recent A1c of 7.0% was completed 1 months ago.   Patient states since His last A1c His blood glucose levels have been within range throughout the day .   Patient monitors blood glucose 2 times per day with meter : Fasting and Before Bed.   Patient blood glucose monitoring device will not be uploaded into Media Section today secondary to Telemedicine visit.   Per patient recall, all blood sugars ranging from 90 - 130.   Patient endorses the following diabetes related symptoms: None.   Patient is due today for the following diabetes-related health maintenance standards: Foot Exam  and Lipid panel.   He denies recent hospital admissions or emergency room visits.   He denies having hypoglycemia.   Patient's concerns today include glycemic control.   Patient medication regimen is as below.     CURRENT DM MEDICATIONS:    Xultophy 30 units in the morning - timing after dialysis    Patient has failed the following Diabetes medications:    Metformin        Labs Reviewed.       Lab Results   Component Value Date    CPEPTIDE 3.88 11/20/2017     Lab Results   Component Value Date    GLUTAMICACID 0.00 11/20/2017       Height: 6' 2" (188 cm)  //  Weight: 120.3 kg (265 lb 3.4 oz), Body mass index is 34.05 kg/m².  His blood sugar in clinic today is:    Lab Results   Component Value Date    POCGLU 100 01/11/2021       Review of Systems "   Constitutional: Negative for activity change, appetite change, chills and fever.   HENT: Negative for dental problem, mouth sores, nosebleeds, sore throat and trouble swallowing.    Eyes: Negative for pain and discharge.   Respiratory: Negative for shortness of breath, wheezing and stridor.    Cardiovascular: Negative for chest pain, palpitations and leg swelling.   Gastrointestinal: Negative for abdominal pain, diarrhea, nausea and vomiting.   Endocrine: Negative for polydipsia, polyphagia and polyuria.   Genitourinary: Negative for dysuria, frequency and urgency.   Musculoskeletal: Negative for joint swelling and myalgias.   Skin: Negative for rash and wound.   Neurological: Negative for dizziness, syncope, weakness and headaches.   Psychiatric/Behavioral: Negative for behavioral problems and dysphoric mood.         Diabetes Management Status  Statin: Taking  ACE/ARB: Taking    Screening or Prevention Patient's value Goal Complete/Controlled?   HgA1C Testing and Control   Lab Results   Component Value Date    HGBA1C 7.0 (H) 03/07/2022      Annually/Less than 8% Yes   Lipid profile : 03/22/2021 Annually Yes   LDL control Lab Results   Component Value Date    LDLCALC 98.0 10/06/2020    Annually/Less than 100 mg/dl  Yes   Nephropathy screening Lab Results   Component Value Date    MICALBCREAT 1222.7 (H) 11/20/2017     Lab Results   Component Value Date    PROTEINUA 3+ (A) 05/18/2021    Annually Yes   Blood pressure BP Readings from Last 1 Encounters:   04/14/22 (!) 142/86    Less than 140/90 No   Dilated retinal exam : 06/03/2021 Annually No    Foot exam   : 07/07/2020 Annually Yes     Social History     Socioeconomic History    Marital status:     Number of children: 3   Occupational History     Employer: self-rmployed   Tobacco Use    Smoking status: Never Smoker    Smokeless tobacco: Never Used   Substance and Sexual Activity    Alcohol use: Yes     Comment: seldom     Drug use: No    Sexual  activity: Yes     Partners: Female   Social History Narrative    ** Merged History Encounter **          Past Medical History:   Diagnosis Date    Anemia 4/16/2014    BPH (benign prostatic hyperplasia)     CKD (chronic kidney disease) stage 4, GFR 15-29 ml/min     Coronary artery disease of native artery of native heart with stable angina pectoris 4/29/2019    Diabetes mellitus     Diabetes mellitus, type 2     Elevated PSA     Encounter for blood transfusion     Herpes labialis     Hyperlipidemia     Hypertension     Obesity     Proteinuria     Secondary hyperparathyroidism (of renal origin)     UTI (lower urinary tract infection) 5/1/2015       Objective:        Physical Exam  Constitutional:       General: He is not in acute distress.     Appearance: He is well-developed. He is not diaphoretic.   HENT:      Head: Normocephalic and atraumatic.      Right Ear: External ear normal.      Left Ear: External ear normal.      Nose: Nose normal.   Eyes:      General:         Right eye: No discharge.         Left eye: No discharge.      Pupils: Pupils are equal, round, and reactive to light.   Cardiovascular:      Rate and Rhythm: Normal rate and regular rhythm.      Pulses:           Dorsalis pedis pulses are 2+ on the right side and 2+ on the left side.        Posterior tibial pulses are 2+ on the right side and 2+ on the left side.      Heart sounds: Normal heart sounds.   Pulmonary:      Effort: Pulmonary effort is normal.      Breath sounds: Normal breath sounds.   Abdominal:      Palpations: Abdomen is soft.   Musculoskeletal:         General: Normal range of motion.      Cervical back: Normal range of motion and neck supple.      Right foot: Normal range of motion. No deformity.      Left foot: Normal range of motion. No deformity.   Feet:      Right foot:      Protective Sensation: 6 sites tested. 5 sites sensed.      Skin integrity: Dry skin present. No ulcer, blister, skin breakdown, erythema,  "warmth or callus.      Left foot:      Protective Sensation: 6 sites tested. 5 sites sensed.      Skin integrity: Dry skin present. No ulcer, blister, skin breakdown, erythema, warmth or callus.   Skin:     General: Skin is warm and dry.      Capillary Refill: Capillary refill takes less than 2 seconds.   Neurological:      Mental Status: He is alert and oriented to person, place, and time.      Motor: No abnormal muscle tone.      Coordination: Coordination normal.   Psychiatric:         Behavior: Behavior normal.         Thought Content: Thought content normal.         Judgment: Judgment normal.           Assessment / Plan:     Type 2 diabetes mellitus with stage 4 chronic kidney disease, with long-term current use of insulin  -     Hemoglobin A1C; Standing  -     Lipid Panel; Future; Expected date: 04/14/2022  -     Comprehensive Metabolic Panel; Future; Expected date: 04/14/2022  -     Microalbumin/Creatinine Ratio, Urine; Future; Expected date: 04/14/2022  -     TSH; Future; Expected date: 04/14/2022  -     Discontinue: insulin (LANTUS SOLOSTAR U-100 INSULIN) glargine 100 units/mL (3mL) SubQ pen; Inject 30 Units into the skin once daily.  Dispense: 9 mL; Refill: 11  -     pen needle, diabetic (BD ULTRA-FINE SHIRA PEN NEEDLE) 32 gauge x 5/32" Ndle; 1 each by Misc.(Non-Drug; Combo Route) route 4 (four) times daily with meals and nightly.  Dispense: 100 each; Refill: 11    Chronic kidney disease (CKD), stage IV (severe)    Hypertension associated with diabetes    Hyperlipidemia associated with type 2 diabetes mellitus    Coronary artery disease of native artery of native heart with stable angina pectoris    Hypogonadism in male    Multiple thyroid nodules    Non morbid obesity due to excess calories      Additional Plan Details:    - POCT Glucose  - Encouraged continuation of lifestyle changes including regular exercise and limiting carbohydrates to 30-45 grams per meal threes times daily and 15 grams per snack " with a limit of two daily.   - Encouraged continued monitoring of blood glucose with maintenance of 2 times daily at Fasting and Before Bed.  - Current DM Medication Regimen: Change Xultophy to Lantus 38 units daily.  - Fasting labs scheduled  - Health Maintenance standards addressed today: Foot Exam - completed today and documented in physical exam with feedback to patient about proper diabetic foot care and findings and Lipid panel to be scheduled today  - Nursing Visit: Patient is below goal range for nursing visit for age group and will not need nursing visit at this time .   - Follow up in 3 months with A1c prior.       Blakeney McKnight, PA-C [facility]    A total of 30 minutes was spent in face to face time, of which over 50 % was spent in counseling patient on disease process, complications, treatment, and side effects of medications.

## 2022-04-27 LAB — HPRA INTERPRETATION: NORMAL

## 2022-04-28 ENCOUNTER — TELEPHONE (OUTPATIENT)
Dept: TRANSPLANT | Facility: CLINIC | Age: 67
End: 2022-04-28
Payer: COMMERCIAL

## 2022-04-29 NOTE — TELEPHONE ENCOUNTER
ON-CALL NOTE    OS# [id number]    Notified by Juice Sosa, , that Isidro Alves is eligible for kidney offer.  Spoke with patient and identified no acute medical issues with telephone assessment. Protocol script read to patient regarding [facility] risk criteria donor offer . Patient verbalized understanding, all questions answered, patient declines organ offer. Notified Dr. Sanchez of decline. MD called patient, patient still declines offer. [facility] notified.

## 2022-05-02 PROCEDURE — 99001 SPECIMEN HANDLING PT-LAB: CPT | Mod: PO,TXP | Performed by: NURSE PRACTITIONER

## 2022-05-03 ENCOUNTER — LAB VISIT (OUTPATIENT)
Dept: LAB | Facility: HOSPITAL | Age: 67
End: 2022-05-03
Payer: COMMERCIAL

## 2022-05-03 DIAGNOSIS — Z76.82 ORGAN TRANSPLANT CANDIDATE: ICD-10-CM

## 2022-05-05 ENCOUNTER — OFFICE VISIT (OUTPATIENT)
Dept: INTERNAL MEDICINE | Facility: CLINIC | Age: 67
End: 2022-05-05
Payer: COMMERCIAL

## 2022-05-05 VITALS
SYSTOLIC BLOOD PRESSURE: 138 MMHG | HEIGHT: 74 IN | BODY MASS INDEX: 33.86 KG/M2 | TEMPERATURE: 99 F | DIASTOLIC BLOOD PRESSURE: 82 MMHG | HEART RATE: 83 BPM | OXYGEN SATURATION: 96 % | WEIGHT: 263.88 LBS

## 2022-05-05 DIAGNOSIS — E11.69 HYPERLIPIDEMIA ASSOCIATED WITH TYPE 2 DIABETES MELLITUS: ICD-10-CM

## 2022-05-05 DIAGNOSIS — E11.22 TYPE 2 DIABETES MELLITUS WITH CHRONIC KIDNEY DISEASE, WITH LONG-TERM CURRENT USE OF INSULIN, UNSPECIFIED CKD STAGE: ICD-10-CM

## 2022-05-05 DIAGNOSIS — I10 HYPERTENSION, UNSPECIFIED TYPE: ICD-10-CM

## 2022-05-05 DIAGNOSIS — J84.10 PULMONARY FIBROSIS, POSTINFLAMMATORY: ICD-10-CM

## 2022-05-05 DIAGNOSIS — N25.81 SECONDARY HYPERPARATHYROIDISM: ICD-10-CM

## 2022-05-05 DIAGNOSIS — Z76.0 MEDICATION REFILL: ICD-10-CM

## 2022-05-05 DIAGNOSIS — E78.5 HYPERLIPIDEMIA ASSOCIATED WITH TYPE 2 DIABETES MELLITUS: ICD-10-CM

## 2022-05-05 DIAGNOSIS — Z00.00 ROUTINE GENERAL MEDICAL EXAMINATION AT A HEALTH CARE FACILITY: Primary | ICD-10-CM

## 2022-05-05 DIAGNOSIS — R97.20 ELEVATED PSA: ICD-10-CM

## 2022-05-05 DIAGNOSIS — Z79.4 TYPE 2 DIABETES MELLITUS WITH CHRONIC KIDNEY DISEASE, WITH LONG-TERM CURRENT USE OF INSULIN, UNSPECIFIED CKD STAGE: ICD-10-CM

## 2022-05-05 DIAGNOSIS — E66.09 CLASS 1 OBESITY DUE TO EXCESS CALORIES WITH SERIOUS COMORBIDITY AND BODY MASS INDEX (BMI) OF 34.0 TO 34.9 IN ADULT: Chronic | ICD-10-CM

## 2022-05-05 DIAGNOSIS — I10 SEVERE HYPERTENSION: ICD-10-CM

## 2022-05-05 PROCEDURE — 3288F FALL RISK ASSESSMENT DOCD: CPT | Mod: CPTII,S$GLB,, | Performed by: INTERNAL MEDICINE

## 2022-05-05 PROCEDURE — 1126F PR PAIN SEVERITY QUANTIFIED, NO PAIN PRESENT: ICD-10-PCS | Mod: CPTII,S$GLB,, | Performed by: INTERNAL MEDICINE

## 2022-05-05 PROCEDURE — 3075F PR MOST RECENT SYSTOLIC BLOOD PRESS GE 130-139MM HG: ICD-10-PCS | Mod: CPTII,S$GLB,, | Performed by: INTERNAL MEDICINE

## 2022-05-05 PROCEDURE — 1101F PT FALLS ASSESS-DOCD LE1/YR: CPT | Mod: CPTII,S$GLB,, | Performed by: INTERNAL MEDICINE

## 2022-05-05 PROCEDURE — 3051F HG A1C>EQUAL 7.0%<8.0%: CPT | Mod: CPTII,S$GLB,, | Performed by: INTERNAL MEDICINE

## 2022-05-05 PROCEDURE — 3051F PR MOST RECENT HEMOGLOBIN A1C LEVEL 7.0 - < 8.0%: ICD-10-PCS | Mod: CPTII,S$GLB,, | Performed by: INTERNAL MEDICINE

## 2022-05-05 PROCEDURE — 3288F PR FALLS RISK ASSESSMENT DOCUMENTED: ICD-10-PCS | Mod: CPTII,S$GLB,, | Performed by: INTERNAL MEDICINE

## 2022-05-05 PROCEDURE — 1159F MED LIST DOCD IN RCRD: CPT | Mod: CPTII,S$GLB,, | Performed by: INTERNAL MEDICINE

## 2022-05-05 PROCEDURE — 1101F PR PT FALLS ASSESS DOC 0-1 FALLS W/OUT INJ PAST YR: ICD-10-PCS | Mod: CPTII,S$GLB,, | Performed by: INTERNAL MEDICINE

## 2022-05-05 PROCEDURE — 99999 PR PBB SHADOW E&M-EST. PATIENT-LVL V: ICD-10-PCS | Mod: PBBFAC,,, | Performed by: INTERNAL MEDICINE

## 2022-05-05 PROCEDURE — 3079F DIAST BP 80-89 MM HG: CPT | Mod: CPTII,S$GLB,, | Performed by: INTERNAL MEDICINE

## 2022-05-05 PROCEDURE — 4010F PR ACE/ARB THEARPY RXD/TAKEN: ICD-10-PCS | Mod: CPTII,S$GLB,, | Performed by: INTERNAL MEDICINE

## 2022-05-05 PROCEDURE — 99397 PER PM REEVAL EST PAT 65+ YR: CPT | Mod: S$GLB,,, | Performed by: INTERNAL MEDICINE

## 2022-05-05 PROCEDURE — 99999 PR PBB SHADOW E&M-EST. PATIENT-LVL V: CPT | Mod: PBBFAC,,, | Performed by: INTERNAL MEDICINE

## 2022-05-05 PROCEDURE — 1126F AMNT PAIN NOTED NONE PRSNT: CPT | Mod: CPTII,S$GLB,, | Performed by: INTERNAL MEDICINE

## 2022-05-05 PROCEDURE — 3075F SYST BP GE 130 - 139MM HG: CPT | Mod: CPTII,S$GLB,, | Performed by: INTERNAL MEDICINE

## 2022-05-05 PROCEDURE — 1159F PR MEDICATION LIST DOCUMENTED IN MEDICAL RECORD: ICD-10-PCS | Mod: CPTII,S$GLB,, | Performed by: INTERNAL MEDICINE

## 2022-05-05 PROCEDURE — 3008F BODY MASS INDEX DOCD: CPT | Mod: CPTII,S$GLB,, | Performed by: INTERNAL MEDICINE

## 2022-05-05 PROCEDURE — 3008F PR BODY MASS INDEX (BMI) DOCUMENTED: ICD-10-PCS | Mod: CPTII,S$GLB,, | Performed by: INTERNAL MEDICINE

## 2022-05-05 PROCEDURE — 4010F ACE/ARB THERAPY RXD/TAKEN: CPT | Mod: CPTII,S$GLB,, | Performed by: INTERNAL MEDICINE

## 2022-05-05 PROCEDURE — 3079F PR MOST RECENT DIASTOLIC BLOOD PRESSURE 80-89 MM HG: ICD-10-PCS | Mod: CPTII,S$GLB,, | Performed by: INTERNAL MEDICINE

## 2022-05-05 PROCEDURE — 99397 PR PREVENTIVE VISIT,EST,65 & OVER: ICD-10-PCS | Mod: S$GLB,,, | Performed by: INTERNAL MEDICINE

## 2022-05-05 NOTE — PROGRESS NOTES
Subjective:      Patient ID: Isidro Alves is a 66 y.o. male.    Chief Complaint: Follow-up    HPI     65 yo with   Patient Active Problem List   Diagnosis    Severe hypertension    Anemia    Secondary hyperparathyroidism    Metabolic acidosis    Type 2 diabetes mellitus with chronic kidney disease, with long-term current use of insulin    Herpes labialis    Benign non-nodular prostatic hyperplasia without lower urinary tract symptoms    Elevated PSA    Hypogonadism in male    Hyperlipidemia associated with type 2 diabetes mellitus    Class 1 obesity due to excess calories with serious comorbidity and body mass index (BMI) of 34.0 to 34.9 in adult    Colon cancer screening    Special screening for malignant neoplasms, colon    Colon polyp    Other proteinuria    Coronary artery disease of native artery of native heart with stable angina pectoris    Multiple thyroid nodules    Patient on waiting list for kidney transplant    Acute hypoxemic respiratory failure due to COVID-19    Hyperkalemia    Pneumonia due to COVID-19 virus    Dyspnea    Post viral asthma    Upper airway cough syndrome    Pulmonary fibrosis, postinflammatory     Past Medical History:   Diagnosis Date    Anemia 4/16/2014    BPH (benign prostatic hyperplasia)     CKD (chronic kidney disease) stage 4, GFR 15-29 ml/min     Coronary artery disease of native artery of native heart with stable angina pectoris 4/29/2019    Diabetes mellitus     Diabetes mellitus, type 2     Elevated PSA     Encounter for blood transfusion     Herpes labialis     Hyperlipidemia     Hypertension     Obesity     Proteinuria     Secondary hyperparathyroidism (of renal origin)     UTI (lower urinary tract infection) 5/1/2015     Here today for annual prev exam.  Compliant with meds without significant side effects. Energy and appetite are good.     Home glucose 130. bp 120s/80s    Past Surgical History:   Procedure Laterality Date     "COLONOSCOPY N/A 12/21/2017    Procedure: COLONOSCOPY;  Surgeon: Fran Escalera MD;  Location: [facility] ENDO;  Service: Endoscopy;  Laterality: N/A;    FLUOROSCOPY N/A 1/21/2021    Procedure: Vascath insertion;  Surgeon: Adrian Pruitt Jr., MD;  Location: [facility] CATH LAB;  Service: General;  Laterality: N/A;    FLUOROSCOPY N/A 4/6/2021    Procedure: Over the wire Vas Cath exchange;  Surgeon: Ho Pressley MD;  Location: [facility] CATH LAB;  Service: General;  Laterality: N/A;    PROSTATE BIOPSY      thorocotomy  2010     Social History     Socioeconomic History    Marital status:     Number of children: 3   Occupational History     Employer: self-rmployed   Tobacco Use    Smoking status: Never Smoker    Smokeless tobacco: Never Used   Substance and Sexual Activity    Alcohol use: Yes     Comment: seldom     Drug use: No    Sexual activity: Yes     Partners: Female   Social History Narrative    ** Merged History Encounter **          family history includes Diabetes in his father and unknown relative; Hypertension in his father and unknown relative; No Known Problems in his mother.    Review of Systems   Constitutional: Negative for chills and fever.   HENT: Negative for ear pain and sore throat.    Respiratory: Negative for cough.    Cardiovascular: Negative for chest pain.   Gastrointestinal: Negative for abdominal pain and blood in stool.   Genitourinary: Negative for dysuria and hematuria.   Neurological: Negative for seizures and syncope.     Objective:   /82   Pulse 83   Temp 98.6 °F (37 °C)   Ht 6' 2" (1.88 m)   Wt 119.7 kg (263 lb 14.3 oz)   SpO2 96%   BMI 33.88 kg/m²     Physical Exam  Constitutional:       General: He is not in acute distress.     Appearance: He is well-developed.   HENT:      Head: Normocephalic and atraumatic.   Eyes:      Pupils: Pupils are equal, round, and reactive to light.   Neck:      Thyroid: No thyromegaly.   Cardiovascular:      Rate and Rhythm: Normal " rate and regular rhythm.   Pulmonary:      Breath sounds: Normal breath sounds. No wheezing or rales.   Abdominal:      General: Bowel sounds are normal.      Palpations: Abdomen is soft.      Tenderness: There is no abdominal tenderness.   Musculoskeletal:      Cervical back: Neck supple.   Lymphadenopathy:      Cervical: No cervical adenopathy.   Skin:     General: Skin is warm and dry.   Neurological:      Mental Status: He is alert and oriented to person, place, and time.   Psychiatric:         Behavior: Behavior normal.         Assessment:     1. Routine general medical examination at a health care facility    2. Class 1 obesity due to excess calories with serious comorbidity and body mass index (BMI) of 34.0 to 34.9 in adult    3. Severe hypertension    4. Secondary hyperparathyroidism    5. Type 2 diabetes mellitus with chronic kidney disease, with long-term current use of insulin, unspecified CKD stage    6. Elevated PSA    7. Hyperlipidemia associated with type 2 diabetes mellitus    8. Medication refill    9. Hypertension, unspecified type    10. Pulmonary fibrosis, postinflammatory      Plan:   Routine general medical examination at a health care facility  Heart healthy diet and reg exercise   reviewed  Class 1 obesity due to excess calories with serious comorbidity and body mass index (BMI) of 34.0 to 34.9 in adult  D and e  Severe hypertension  Controlled    Secondary hyperparathyroidism  As per renal.     Type 2 diabetes mellitus with chronic kidney disease, with long-term current use of insulin, unspecified CKD stage  stable  Elevated PSA  As per urology.     Hyperlipidemia associated with type 2 diabetes mellitus    Medication refill    Hypertension, unspecified type    Pulmonary fibrosis, postinflammatory        Lab Frequency Next Occurrence   Complete PFT without bronchodilator Once 04/09/2021   Creatinine, Serum Once 12/21/2021   Ambulatory referral/consult to Interventional Radiology Once  02/28/2022   ANTIBODY TITER Once 03/16/2022   Ambulatory referral/consult to Interventional Radiology Once 03/18/2022   Lipid Panel Once 04/14/2022   Comprehensive Metabolic Panel Once 04/14/2022   Microalbumin/Creatinine Ratio, Urine Once 04/14/2022   TSH Once 04/14/2022   HLA PRA Screen     HLA PRA Screen     HLA Virtual Crossmatch     Hemoglobin A1C Every 12 Weeks 5/13/2022   HLA PRA Screen     Hemoglobin A1C Every 12 Weeks 7/1/2022, 9/23/2022, 12/16/2022       Problem List Items Addressed This Visit     Class 1 obesity due to excess calories with serious comorbidity and body mass index (BMI) of 34.0 to 34.9 in adult (Chronic)    Severe hypertension    Secondary hyperparathyroidism    Type 2 diabetes mellitus with chronic kidney disease, with long-term current use of insulin    Elevated PSA    Overview     Sees lovett           Hyperlipidemia associated with type 2 diabetes mellitus    Pulmonary fibrosis, postinflammatory    Current Assessment & Plan     As per pulmonary             Other Visit Diagnoses     Routine general medical examination at a health care facility    -  Primary    Medication refill        Hypertension, unspecified type              Follow up in about 6 months (around 11/5/2022), or if symptoms worsen or fail to improve.

## 2022-05-09 ENCOUNTER — PATIENT MESSAGE (OUTPATIENT)
Dept: UROLOGY | Facility: CLINIC | Age: 67
End: 2022-05-09
Payer: COMMERCIAL

## 2022-05-09 DIAGNOSIS — N40.0 BENIGN PROSTATIC HYPERPLASIA, UNSPECIFIED WHETHER LOWER URINARY TRACT SYMPTOMS PRESENT: Primary | ICD-10-CM

## 2022-05-11 ENCOUNTER — PATIENT MESSAGE (OUTPATIENT)
Dept: CARDIOLOGY | Facility: CLINIC | Age: 67
End: 2022-05-11
Payer: COMMERCIAL

## 2022-05-11 ENCOUNTER — PATIENT MESSAGE (OUTPATIENT)
Dept: UROLOGY | Facility: CLINIC | Age: 67
End: 2022-05-11
Payer: COMMERCIAL

## 2022-05-11 DIAGNOSIS — R00.1 BRADYCARDIA: Primary | ICD-10-CM

## 2022-05-11 RX ORDER — FINASTERIDE 5 MG/1
5 TABLET, FILM COATED ORAL DAILY
Qty: 90 TABLET | Refills: 3 | Status: ON HOLD | OUTPATIENT
Start: 2022-05-11 | End: 2023-05-18

## 2022-05-11 NOTE — TELEPHONE ENCOUNTER
holter ordered  And Please schedule a f/u with me after the holter is done   If has dizziness or faint, go to ER

## 2022-05-12 ENCOUNTER — LAB VISIT (OUTPATIENT)
Dept: LAB | Facility: HOSPITAL | Age: 67
End: 2022-05-12
Attending: NURSE PRACTITIONER
Payer: COMMERCIAL

## 2022-05-12 DIAGNOSIS — Z76.82 AWAITING ORGAN TRANSPLANT STATUS: ICD-10-CM

## 2022-05-12 PROCEDURE — 99001 SPECIMEN HANDLING PT-LAB: CPT | Mod: TXP | Performed by: NURSE PRACTITIONER

## 2022-05-16 DIAGNOSIS — N18.6 ESRD (END STAGE RENAL DISEASE): Primary | ICD-10-CM

## 2022-05-16 DIAGNOSIS — E83.39 HYPERPHOSPHATEMIA: ICD-10-CM

## 2022-05-16 RX ORDER — SEVELAMER CARBONATE 800 MG/1
1600 TABLET, FILM COATED ORAL
Qty: 240 TABLET | Refills: 11 | Status: SHIPPED | OUTPATIENT
Start: 2022-05-16 | End: 2022-09-19 | Stop reason: SDUPTHER

## 2022-05-18 DIAGNOSIS — Z76.82 AWAITING ORGAN TRANSPLANT STATUS: Primary | ICD-10-CM

## 2022-05-22 DIAGNOSIS — Z76.0 MEDICATION REFILL: ICD-10-CM

## 2022-05-22 NOTE — TELEPHONE ENCOUNTER
Care Due:                  Date            Visit Type   Department     Provider  --------------------------------------------------------------------------------                                EP -                              PRIMARY      [facility] INTER[facility]  Last Visit: 05-      CARE ([facility])   MEDICINE       Steven Morgan  Next Visit: None Scheduled  None         None Found                                                            Last  Test          Frequency    Reason                     Performed    Due Date  --------------------------------------------------------------------------------    CBC.........  12 months..  acyclovir................  02- 01-    CMP.........  12 months..  atorvastatin.............  01- 01-    Lipid Panel.  12 months..  atorvastatin.............  10-   10-    Health [facility] Embedded Care Gaps. Reference number: [id number]. 5/22/2022   12:14:42 AM CDT

## 2022-05-22 NOTE — TELEPHONE ENCOUNTER
Refill Routing Note   Medication(s) are not appropriate for processing by [facility] for the following reason(s):      - Required laboratory values are outdated    [facility] action(s):  Defer Medication-related problems identified: Requires labs        Medication reconciliation completed: No     Appointments  past 12m or future 3m with PCP    Date Provider   Last Visit   5/5/2022 Steven Morgan MD   Next Visit   Visit date not found Steven Morgan MD   ED visits in past 90 days: 0        Note composed:12:00 PM 05/22/2022

## 2022-05-23 RX ORDER — ATORVASTATIN CALCIUM 40 MG/1
TABLET, FILM COATED ORAL
Qty: 30 TABLET | Refills: 2 | Status: SHIPPED | OUTPATIENT
Start: 2022-05-23 | End: 2022-08-04

## 2022-05-25 ENCOUNTER — PATIENT MESSAGE (OUTPATIENT)
Dept: RESEARCH | Facility: HOSPITAL | Age: 67
End: 2022-05-25
Payer: COMMERCIAL

## 2022-05-29 PROBLEM — J84.10 PULMONARY FIBROSIS, POSTINFLAMMATORY: Status: ACTIVE | Noted: 2022-05-29

## 2022-05-30 ENCOUNTER — PATIENT MESSAGE (OUTPATIENT)
Dept: CARDIOLOGY | Facility: HOSPITAL | Age: 67
End: 2022-05-30
Payer: COMMERCIAL

## 2022-06-01 DIAGNOSIS — I10 HTN (HYPERTENSION), BENIGN: ICD-10-CM

## 2022-06-01 RX ORDER — VALSARTAN 320 MG/1
320 TABLET ORAL DAILY
Qty: 30 TABLET | Refills: 4 | Status: SHIPPED | OUTPATIENT
Start: 2022-06-01 | End: 2022-11-02 | Stop reason: SDUPTHER

## 2022-06-01 RX ORDER — CARVEDILOL 25 MG/1
25 TABLET ORAL 2 TIMES DAILY WITH MEALS
Qty: 60 TABLET | Refills: 11 | Status: SHIPPED | OUTPATIENT
Start: 2022-06-01 | End: 2022-07-07

## 2022-06-02 DIAGNOSIS — Z76.82 ORGAN TRANSPLANT CANDIDATE: ICD-10-CM

## 2022-06-02 DIAGNOSIS — Z76.82 AWAITING ORGAN TRANSPLANT STATUS: Primary | ICD-10-CM

## 2022-06-03 ENCOUNTER — TELEPHONE (OUTPATIENT)
Dept: CARDIOLOGY | Facility: HOSPITAL | Age: 67
End: 2022-06-03
Payer: COMMERCIAL

## 2022-06-06 ENCOUNTER — PATIENT MESSAGE (OUTPATIENT)
Dept: PHARMACY | Facility: CLINIC | Age: 67
End: 2022-06-06
Payer: COMMERCIAL

## 2022-06-09 ENCOUNTER — LAB VISIT (OUTPATIENT)
Dept: LAB | Facility: HOSPITAL | Age: 67
End: 2022-06-09
Attending: NURSE PRACTITIONER
Payer: COMMERCIAL

## 2022-06-09 DIAGNOSIS — Z76.82 AWAITING ORGAN TRANSPLANT STATUS: ICD-10-CM

## 2022-06-09 PROCEDURE — 86832 HLA CLASS I HIGH DEFIN QUAL: CPT | Mod: TXP | Performed by: NURSE PRACTITIONER

## 2022-06-09 PROCEDURE — 36415 COLL VENOUS BLD VENIPUNCTURE: CPT | Mod: PO,TXP | Performed by: NURSE PRACTITIONER

## 2022-06-09 PROCEDURE — 86833 HLA CLASS II HIGH DEFIN QUAL: CPT | Mod: TXP | Performed by: NURSE PRACTITIONER

## 2022-06-09 PROCEDURE — 86886 COOMBS TEST INDIRECT TITER: CPT | Mod: TXP | Performed by: NURSE PRACTITIONER

## 2022-06-10 LAB — BLD GP AB SCN TITR SERPL: 1 {TITER}

## 2022-06-13 ENCOUNTER — TELEPHONE (OUTPATIENT)
Dept: TRANSPLANT | Facility: CLINIC | Age: 67
End: 2022-06-13
Payer: COMMERCIAL

## 2022-06-14 ENCOUNTER — HOSPITAL ENCOUNTER (OUTPATIENT)
Dept: CARDIOLOGY | Facility: HOSPITAL | Age: 67
Discharge: HOME | End: 2022-06-14
Attending: INTERNAL MEDICINE
Payer: COMMERCIAL

## 2022-06-14 ENCOUNTER — HOSPITAL ENCOUNTER (OUTPATIENT)
Dept: CARDIOLOGY | Facility: HOSPITAL | Age: 67
Discharge: HOME | End: 2022-06-14
Attending: NURSE PRACTITIONER
Payer: COMMERCIAL

## 2022-06-14 VITALS — HEIGHT: 74 IN | BODY MASS INDEX: 33.75 KG/M2 | WEIGHT: 263 LBS

## 2022-06-14 DIAGNOSIS — Z76.82 AWAITING ORGAN TRANSPLANT STATUS: ICD-10-CM

## 2022-06-14 DIAGNOSIS — R00.1 BRADYCARDIA: ICD-10-CM

## 2022-06-14 PROCEDURE — 93227 XTRNL ECG REC<48 HR R&I: CPT | Mod: TXP,,, | Performed by: INTERNAL MEDICINE

## 2022-06-14 PROCEDURE — 93227 HOLTER MONITOR - 48 HOUR (CUPID ONLY): ICD-10-PCS | Mod: TXP,,, | Performed by: INTERNAL MEDICINE

## 2022-06-14 PROCEDURE — 93306 TTE W/DOPPLER COMPLETE: CPT | Mod: TXP

## 2022-06-14 PROCEDURE — 93306 TTE W/DOPPLER COMPLETE: CPT | Mod: 26,TXP,, | Performed by: STUDENT IN AN ORGANIZED HEALTH CARE EDUCATION/TRAINING PROGRAM

## 2022-06-14 PROCEDURE — 93306 ECHO (CUPID ONLY): ICD-10-PCS | Mod: 26,TXP,, | Performed by: STUDENT IN AN ORGANIZED HEALTH CARE EDUCATION/TRAINING PROGRAM

## 2022-06-14 PROCEDURE — 93225 XTRNL ECG REC<48 HRS REC: CPT | Mod: TXP

## 2022-06-15 ENCOUNTER — TELEPHONE (OUTPATIENT)
Dept: TRANSPLANT | Facility: CLINIC | Age: 67
End: 2022-06-15
Payer: COMMERCIAL

## 2022-06-15 ENCOUNTER — TELEPHONE (OUTPATIENT)
Dept: CARDIOLOGY | Facility: CLINIC | Age: 67
End: 2022-06-15
Payer: COMMERCIAL

## 2022-06-15 LAB
ASCENDING AORTA: 3.67 CM
AV INDEX (PROSTH): 0.89
AV MEAN GRADIENT: 3 MMHG
AV PEAK GRADIENT: 4 MMHG
AV VALVE AREA: 3.52 CM2
AV VELOCITY RATIO: 0.86
BSA FOR ECHO PROCEDURE: 2.5 M2
CV ECHO LV RWT: 0.48 CM
DOP CALC AO PEAK VEL: 1.03 M/S
DOP CALC AO VTI: 19.3 CM
DOP CALC LVOT AREA: 4 CM2
DOP CALC LVOT DIAMETER: 2.25 CM
DOP CALC LVOT PEAK VEL: 0.89 M/S
DOP CALC LVOT STROKE VOLUME: 67.96 CM3
DOP CALC RVOT PEAK VEL: 0.64 M/S
DOP CALC RVOT VTI: 11.4 CM
DOP CALCLVOT PEAK VEL VTI: 17.1 CM
E WAVE DECELERATION TIME: 281.53 MSEC
E/A RATIO: 0.6
E/E' RATIO: 6 M/S
ECHO LV POSTERIOR WALL: 1.44 CM (ref 0.6–1.1)
EJECTION FRACTION: 40 %
FRACTIONAL SHORTENING: 15 % (ref 28–44)
INTERVENTRICULAR SEPTUM: 1.4 CM (ref 0.6–1.1)
IVRT: 119.89 MSEC
LA MAJOR: 4.44 CM
LA MINOR: 4.8 CM
LA WIDTH: 3.8 CM
LEFT ATRIUM SIZE: 5.54 CM
LEFT ATRIUM VOLUME INDEX MOD: 14 ML/M2
LEFT ATRIUM VOLUME INDEX: 33.8 ML/M2
LEFT ATRIUM VOLUME MOD: 34.09 CM3
LEFT ATRIUM VOLUME: 82.55 CM3
LEFT INTERNAL DIMENSION IN SYSTOLE: 5.12 CM (ref 2.1–4)
LEFT VENTRICLE DIASTOLIC VOLUME INDEX: 75.05 ML/M2
LEFT VENTRICLE DIASTOLIC VOLUME: 183.11 ML
LEFT VENTRICLE MASS INDEX: 164 G/M2
LEFT VENTRICLE SYSTOLIC VOLUME INDEX: 51.2 ML/M2
LEFT VENTRICLE SYSTOLIC VOLUME: 124.89 ML
LEFT VENTRICULAR INTERNAL DIMENSION IN DIASTOLE: 6.05 CM (ref 3.5–6)
LEFT VENTRICULAR MASS: 400.92 G
LV LATERAL E/E' RATIO: 4.5 M/S
LV SEPTAL E/E' RATIO: 9 M/S
LVOT MG: 1.98 MMHG
LVOT MV: 0.68 CM/S
MV PEAK A VEL: 0.9 M/S
MV PEAK E VEL: 0.54 M/S
MV STENOSIS PRESSURE HALF TIME: 81.64 MS
MV VALVE AREA P 1/2 METHOD: 2.69 CM2
PULM VEIN S/D RATIO: 1.73
PV MEAN GRADIENT: 0.83 MMHG
PV PEAK D VEL: 0.33 M/S
PV PEAK S VEL: 0.57 M/S
PV PEAK VELOCITY: 1.04 CM/S
RA MAJOR: 4.2 CM
RA PRESSURE: 3 MMHG
RA WIDTH: 3.25 CM
RIGHT VENTRICULAR END-DIASTOLIC DIMENSION: 3.12 CM
SINUS: 3.38 CM
STJ: 2.94 CM
TDI LATERAL: 0.12 M/S
TDI SEPTAL: 0.06 M/S
TDI: 0.09 M/S

## 2022-06-15 NOTE — TELEPHONE ENCOUNTER
I called patient and discussed Echo results and ef drop to 40%. Patient also had halter monitor ordered by his cardiologist. Message sent to patient's cardiologist to request follow-up appointment. Patient verbalized understanding of all information discussed.

## 2022-06-15 NOTE — TELEPHONE ENCOUNTER
Patient contacted and scheduled an appointment the patient stated understanding with no questions.    ----- Message from Dez Ragland MD sent at 6/15/2022  4:21 PM CDT -----  Regarding: RE: Echo with decreased EF (transplant waitlist patient)  Please schedule a f/u with me in 1 to 2 weeks  Thx      ----- Message -----  From: Sue Gonzalez RN  Sent: 6/15/2022  11:27 AM CDT  To: Dez Ragland MD  Subject: Echo with decreased EF (transplant waitlist #    Hi Dr. Ragland,    Mr. Alves was recently seen in the transplant clinic and his yearly echo was done. His EF has decreased to 40% from 55% last year.   The transplant nurse practitioner would like him to be seen by cardiology and evaluated for kidney transplant.    He is currently active on the list and will need cardiology eval to remain active.   Thanks,  Sue

## 2022-06-15 NOTE — TELEPHONE ENCOUNTER
----- Message from Jasmina Cortez NP sent at 6/15/2022 11:03 AM CDT -----  EF 40% (from 55%), needs cardiology follow up

## 2022-06-16 ENCOUNTER — TELEPHONE (OUTPATIENT)
Dept: TRANSPLANT | Facility: CLINIC | Age: 67
End: 2022-06-16
Payer: COMMERCIAL

## 2022-06-16 NOTE — TELEPHONE ENCOUNTER
----- Message from Dez Ragland MD sent at 6/15/2022  4:21 PM CDT -----  Regarding: RE: Echo with decreased EF (transplant waitlist patient)  Please schedule a f/u with me in 1 to 2 weeks  Thx      ----- Message -----  From: Sue Gonzalez RN  Sent: 6/15/2022  11:27 AM CDT  To: Dez Ragland MD  Subject: Echo with decreased EF (transplant waitlist #    Hi Dr. Ragland,    Mr. Alves was recently seen in the transplant clinic and his yearly echo was done. His EF has decreased to 40% from 55% last year.   The transplant nurse practitioner would like him to be seen by cardiology and evaluated for kidney transplant.    He is currently active on the list and will need cardiology eval to remain active.   Thanks,  Sue

## 2022-06-17 LAB
OHS CV EVENT MONITOR DAY: 0
OHS CV HOLTER LENGTH DECIMAL HOURS: 48
OHS CV HOLTER LENGTH HOURS: 48
OHS CV HOLTER LENGTH MINUTES: 0
OHS CV HOLTER SINUS AVERAGE HR: 85
OHS CV HOLTER SINUS MAX HR: 133
OHS CV HOLTER SINUS MIN HR: 48

## 2022-06-20 ENCOUNTER — TELEPHONE (OUTPATIENT)
Dept: CARDIOLOGY | Facility: CLINIC | Age: 67
End: 2022-06-20
Payer: COMMERCIAL

## 2022-06-20 NOTE — TELEPHONE ENCOUNTER
Pt contacted about results, pt verbalized understanding.          ----- Message from Dez Ragland MD sent at 6/19/2022  4:44 PM CDT -----  Holter showed frequent PVCs  F/u with me in 1 week. Ok for double book

## 2022-06-27 LAB — HPRA INTERPRETATION: NORMAL

## 2022-06-30 ENCOUNTER — HOSPITAL ENCOUNTER (OUTPATIENT)
Dept: RADIOLOGY | Facility: HOSPITAL | Age: 67
Discharge: HOME | End: 2022-06-30
Attending: NURSE PRACTITIONER
Payer: COMMERCIAL

## 2022-06-30 DIAGNOSIS — Z76.82 AWAITING ORGAN TRANSPLANT STATUS: ICD-10-CM

## 2022-06-30 PROCEDURE — 72170 X-RAY EXAM OF PELVIS: CPT | Mod: TC,TXP

## 2022-06-30 PROCEDURE — 72170 X-RAY EXAM OF PELVIS: CPT | Mod: 26,TXP,, | Performed by: RADIOLOGY

## 2022-06-30 PROCEDURE — 72170 XR PELVIS ROUTINE AP: ICD-10-PCS | Mod: 26,TXP,, | Performed by: RADIOLOGY

## 2022-07-06 DIAGNOSIS — I10 SEVERE HYPERTENSION: Primary | ICD-10-CM

## 2022-07-07 ENCOUNTER — OFFICE VISIT (OUTPATIENT)
Dept: CARDIOLOGY | Facility: CLINIC | Age: 67
End: 2022-07-07
Payer: COMMERCIAL

## 2022-07-07 ENCOUNTER — HOSPITAL ENCOUNTER (OUTPATIENT)
Dept: CARDIOLOGY | Facility: HOSPITAL | Age: 67
Discharge: HOME | End: 2022-07-07
Attending: INTERNAL MEDICINE
Payer: COMMERCIAL

## 2022-07-07 ENCOUNTER — LAB VISIT (OUTPATIENT)
Dept: LAB | Facility: HOSPITAL | Age: 67
End: 2022-07-07
Attending: INTERNAL MEDICINE
Payer: COMMERCIAL

## 2022-07-07 VITALS
HEART RATE: 98 BPM | SYSTOLIC BLOOD PRESSURE: 162 MMHG | OXYGEN SATURATION: 97 % | DIASTOLIC BLOOD PRESSURE: 98 MMHG | WEIGHT: 267 LBS | BODY MASS INDEX: 34.28 KG/M2

## 2022-07-07 DIAGNOSIS — I10 SEVERE HYPERTENSION: ICD-10-CM

## 2022-07-07 DIAGNOSIS — I50.23 ACUTE ON CHRONIC SYSTOLIC CONGESTIVE HEART FAILURE: Primary | ICD-10-CM

## 2022-07-07 DIAGNOSIS — E66.09 CLASS 1 OBESITY DUE TO EXCESS CALORIES WITH SERIOUS COMORBIDITY AND BODY MASS INDEX (BMI) OF 34.0 TO 34.9 IN ADULT: Chronic | ICD-10-CM

## 2022-07-07 DIAGNOSIS — I49.3 PVC (PREMATURE VENTRICULAR CONTRACTION): ICD-10-CM

## 2022-07-07 DIAGNOSIS — I25.118 CORONARY ARTERY DISEASE OF NATIVE ARTERY OF NATIVE HEART WITH STABLE ANGINA PECTORIS: ICD-10-CM

## 2022-07-07 DIAGNOSIS — I50.23 ACUTE ON CHRONIC SYSTOLIC CONGESTIVE HEART FAILURE: ICD-10-CM

## 2022-07-07 DIAGNOSIS — I50.22 CHRONIC SYSTOLIC CONGESTIVE HEART FAILURE: ICD-10-CM

## 2022-07-07 DIAGNOSIS — Z79.4 TYPE 2 DIABETES MELLITUS WITH CHRONIC KIDNEY DISEASE, WITH LONG-TERM CURRENT USE OF INSULIN, UNSPECIFIED CKD STAGE: ICD-10-CM

## 2022-07-07 DIAGNOSIS — E11.22 TYPE 2 DIABETES MELLITUS WITH CHRONIC KIDNEY DISEASE, WITH LONG-TERM CURRENT USE OF INSULIN, UNSPECIFIED CKD STAGE: ICD-10-CM

## 2022-07-07 LAB
ANION GAP SERPL CALC-SCNC: 9 MMOL/L (ref 8–16)
BNP SERPL-MCNC: 2381 PG/ML (ref 0–99)
BUN SERPL-MCNC: 43 MG/DL (ref 8–23)
CALCIUM SERPL-MCNC: 9.7 MG/DL (ref 8.7–10.5)
CHLORIDE SERPL-SCNC: 106 MMOL/L (ref 95–110)
CO2 SERPL-SCNC: 23 MMOL/L (ref 23–29)
CREAT SERPL-MCNC: 8.2 MG/DL (ref 0.5–1.4)
EST. GFR  (AFRICAN AMERICAN): 7.1 ML/MIN/1.73 M^2
EST. GFR  (NON AFRICAN AMERICAN): 6.2 ML/MIN/1.73 M^2
GLUCOSE SERPL-MCNC: 221 MG/DL (ref 70–110)
POTASSIUM SERPL-SCNC: 4.9 MMOL/L (ref 3.5–5.1)
SODIUM SERPL-SCNC: 138 MMOL/L (ref 136–145)

## 2022-07-07 PROCEDURE — 99999 PR PBB SHADOW E&M-EST. PATIENT-LVL III: ICD-10-PCS | Mod: PBBFAC,TXP,, | Performed by: INTERNAL MEDICINE

## 2022-07-07 PROCEDURE — 3008F BODY MASS INDEX DOCD: CPT | Mod: CPTII,NTX,S$GLB, | Performed by: INTERNAL MEDICINE

## 2022-07-07 PROCEDURE — 1159F MED LIST DOCD IN RCRD: CPT | Mod: CPTII,NTX,S$GLB, | Performed by: INTERNAL MEDICINE

## 2022-07-07 PROCEDURE — 93005 ELECTROCARDIOGRAM TRACING: CPT | Mod: NTX

## 2022-07-07 PROCEDURE — 3077F PR MOST RECENT SYSTOLIC BLOOD PRESSURE >= 140 MM HG: ICD-10-PCS | Mod: CPTII,NTX,S$GLB, | Performed by: INTERNAL MEDICINE

## 2022-07-07 PROCEDURE — 3008F PR BODY MASS INDEX (BMI) DOCUMENTED: ICD-10-PCS | Mod: CPTII,NTX,S$GLB, | Performed by: INTERNAL MEDICINE

## 2022-07-07 PROCEDURE — 36415 COLL VENOUS BLD VENIPUNCTURE: CPT | Mod: TXP | Performed by: INTERNAL MEDICINE

## 2022-07-07 PROCEDURE — 1159F PR MEDICATION LIST DOCUMENTED IN MEDICAL RECORD: ICD-10-PCS | Mod: CPTII,NTX,S$GLB, | Performed by: INTERNAL MEDICINE

## 2022-07-07 PROCEDURE — 83880 ASSAY OF NATRIURETIC PEPTIDE: CPT | Mod: TXP | Performed by: INTERNAL MEDICINE

## 2022-07-07 PROCEDURE — 4010F ACE/ARB THERAPY RXD/TAKEN: CPT | Mod: CPTII,NTX,S$GLB, | Performed by: INTERNAL MEDICINE

## 2022-07-07 PROCEDURE — 4010F PR ACE/ARB THEARPY RXD/TAKEN: ICD-10-PCS | Mod: CPTII,NTX,S$GLB, | Performed by: INTERNAL MEDICINE

## 2022-07-07 PROCEDURE — 3077F SYST BP >= 140 MM HG: CPT | Mod: CPTII,NTX,S$GLB, | Performed by: INTERNAL MEDICINE

## 2022-07-07 PROCEDURE — 80048 BASIC METABOLIC PNL TOTAL CA: CPT | Mod: TXP | Performed by: INTERNAL MEDICINE

## 2022-07-07 PROCEDURE — 1160F RVW MEDS BY RX/DR IN RCRD: CPT | Mod: CPTII,NTX,S$GLB, | Performed by: INTERNAL MEDICINE

## 2022-07-07 PROCEDURE — 99215 OFFICE O/P EST HI 40 MIN: CPT | Mod: NTX,S$GLB,, | Performed by: INTERNAL MEDICINE

## 2022-07-07 PROCEDURE — 93010 ELECTROCARDIOGRAM REPORT: CPT | Mod: NTX,,, | Performed by: INTERNAL MEDICINE

## 2022-07-07 PROCEDURE — 3080F PR MOST RECENT DIASTOLIC BLOOD PRESSURE >= 90 MM HG: ICD-10-PCS | Mod: CPTII,NTX,S$GLB, | Performed by: INTERNAL MEDICINE

## 2022-07-07 PROCEDURE — 93010 EKG 12-LEAD: ICD-10-PCS | Mod: NTX,,, | Performed by: INTERNAL MEDICINE

## 2022-07-07 PROCEDURE — 99215 PR OFFICE/OUTPT VISIT, EST, LEVL V, 40-54 MIN: ICD-10-PCS | Mod: NTX,S$GLB,, | Performed by: INTERNAL MEDICINE

## 2022-07-07 PROCEDURE — 3051F PR MOST RECENT HEMOGLOBIN A1C LEVEL 7.0 - < 8.0%: ICD-10-PCS | Mod: CPTII,NTX,S$GLB, | Performed by: INTERNAL MEDICINE

## 2022-07-07 PROCEDURE — 3051F HG A1C>EQUAL 7.0%<8.0%: CPT | Mod: CPTII,NTX,S$GLB, | Performed by: INTERNAL MEDICINE

## 2022-07-07 PROCEDURE — 99999 PR PBB SHADOW E&M-EST. PATIENT-LVL III: CPT | Mod: PBBFAC,TXP,, | Performed by: INTERNAL MEDICINE

## 2022-07-07 PROCEDURE — 3080F DIAST BP >= 90 MM HG: CPT | Mod: CPTII,NTX,S$GLB, | Performed by: INTERNAL MEDICINE

## 2022-07-07 PROCEDURE — 1160F PR REVIEW ALL MEDS BY PRESCRIBER/CLIN PHARMACIST DOCUMENTED: ICD-10-PCS | Mod: CPTII,NTX,S$GLB, | Performed by: INTERNAL MEDICINE

## 2022-07-07 RX ORDER — HYDRALAZINE HYDROCHLORIDE 50 MG/1
50 TABLET, FILM COATED ORAL EVERY 12 HOURS
Qty: 60 TABLET | Refills: 11 | Status: SHIPPED | OUTPATIENT
Start: 2022-07-07 | End: 2022-08-23 | Stop reason: SDUPTHER

## 2022-07-07 RX ORDER — ISOSORBIDE DINITRATE 20 MG/1
20 TABLET ORAL 2 TIMES DAILY
Qty: 60 TABLET | Refills: 11 | Status: SHIPPED | OUTPATIENT
Start: 2022-07-07 | End: 2022-08-23 | Stop reason: SDUPTHER

## 2022-07-07 RX ORDER — METOPROLOL SUCCINATE 50 MG/1
50 TABLET, EXTENDED RELEASE ORAL DAILY
Qty: 30 TABLET | Refills: 11 | Status: SHIPPED | OUTPATIENT
Start: 2022-07-07 | End: 2023-03-16

## 2022-07-07 NOTE — PROGRESS NOTES
Subjective:   Patient ID:  Isidro Alves is a 66 y.o. male who presents for follow up of No chief complaint on file.      65 yo male, routine f/u [employer].  PMH ESRD on HD since  and renal Tx list at [facility], CAD, old MI per MPI in , CHFrEF, IDDM 20 yrs, HTN, HLD. No smoking. Occasional drink.  Denied h/o stroke, heart attack and cancer.  No chest pain, dyspnea, dizziness palpitation and orthopnea.  Fell off the roof and multi fx.    MPI showed inferior old MI and EF 35%. ECHo showed EF 50% and LVH  No f/h premature CAD.  No smoking  Walks 4 times a week. And on PT  ekg NSR  Out of Valsartan for 1 day and states that BP controlled at home    Interval history  HD on right chest catheter.   echo dilated LV mod, EF 40% and  echo EF 55%  HOLTER showed 12% PVCs  Occasional palpitation. orthopnea and PND. No chest pain leg swelling and syncope. Urinating 8 times a week. No fluid restriction.      Past Medical History:   Diagnosis Date    Anemia 4/16/2014    BPH (benign prostatic hyperplasia)     CKD (chronic kidney disease) stage 4, GFR 15-29 ml/min     Coronary artery disease of native artery of native heart with stable angina pectoris 4/29/2019    Diabetes mellitus     Diabetes mellitus, type 2     Elevated PSA     Encounter for blood transfusion     Herpes labialis     Hyperlipidemia     Hypertension     Obesity     Proteinuria     Secondary hyperparathyroidism (of renal origin)     UTI (lower urinary tract infection) 5/1/2015       Past Surgical History:   Procedure Laterality Date    COLONOSCOPY N/A 12/21/2017    Procedure: COLONOSCOPY;  Surgeon: Fran Escalera MD;  Location: [facility] ENDO;  Service: Endoscopy;  Laterality: N/A;    FLUOROSCOPY N/A 1/21/2021    Procedure: Vascath insertion;  Surgeon: Adrian Pruitt Jr., MD;  Location: [facility] CATH LAB;  Service: General;  Laterality: N/A;    FLUOROSCOPY N/A 4/6/2021    Procedure: Over the wire Vas Cath  exchange;  Surgeon: Ho Pressley MD;  Location: [facility] CATH LAB;  Service: General;  Laterality: N/A;    PROSTATE BIOPSY      thorocotomy  2010       Social History     Tobacco Use    Smoking status: Never Smoker    Smokeless tobacco: Never Used   Substance Use Topics    Alcohol use: Yes     Comment: seldom     Drug use: No       Family History   Problem Relation Age of Onset    No Known Problems Mother     Diabetes Father     Hypertension Father     Hypertension Unknown     Diabetes Unknown          Review of Systems   Constitutional: Positive for malaise/fatigue. Negative for decreased appetite, diaphoresis, fever and night sweats.   HENT: Negative for nosebleeds.    Eyes: Negative for blurred vision and double vision.   Cardiovascular: Positive for dyspnea on exertion and orthopnea. Negative for chest pain, claudication, irregular heartbeat, leg swelling, near-syncope, palpitations, paroxysmal nocturnal dyspnea and syncope.   Respiratory: Negative for cough, shortness of breath, sleep disturbances due to breathing, snoring, sputum production and wheezing.    Endocrine: Negative for cold intolerance and polyuria.   Hematologic/Lymphatic: Does not bruise/bleed easily.   Skin: Negative for rash.   Musculoskeletal: Negative for back pain, falls, joint pain, joint swelling and neck pain.   Gastrointestinal: Negative for abdominal pain, heartburn, nausea and vomiting.   Genitourinary: Negative for dysuria, frequency and hematuria.   Neurological: Negative for difficulty with concentration, dizziness, focal weakness, headaches, light-headedness, numbness, seizures and weakness.   Psychiatric/Behavioral: Negative for depression, memory loss and substance abuse. The patient does not have insomnia.    Allergic/Immunologic: Negative for HIV exposure and hives.       Objective:   Physical Exam  HENT:      Head: Normocephalic.   Eyes:      Pupils: Pupils are equal, round, and reactive to light.   Neck:       Thyroid: No thyromegaly.      Vascular: Normal carotid pulses. No carotid bruit or JVD.   Cardiovascular:      Rate and Rhythm: Normal rate and regular rhythm.  No extrasystoles are present.     Chest Wall: PMI is not displaced.      Pulses: Normal pulses.      Heart sounds: Normal heart sounds. No murmur heard.    No gallop. No S3 sounds.   Pulmonary:      Effort: No respiratory distress.      Breath sounds: Normal breath sounds. No stridor.   Abdominal:      General: Bowel sounds are normal.      Palpations: Abdomen is soft.      Tenderness: There is no abdominal tenderness. There is no rebound.   Musculoskeletal:         General: Normal range of motion.   Skin:     Findings: No rash.   Neurological:      Mental Status: He is alert and oriented to person, place, and time.   Psychiatric:         Behavior: Behavior normal.         Lab Results   Component Value Date    CHOL 155 10/06/2020    CHOL 136 09/05/2019    CHOL 150 03/11/2019     Lab Results   Component Value Date    HDL 26 (L) 10/06/2020    HDL 32 (L) 09/05/2019    HDL 28 (L) 03/11/2019     Lab Results   Component Value Date    LDLCALC 98.0 10/06/2020    LDLCALC 73.8 09/05/2019    LDLCALC 93.2 03/11/2019     Lab Results   Component Value Date    TRIG 155 (H) 10/06/2020    TRIG 151 (H) 09/05/2019    TRIG 144 03/11/2019     Lab Results   Component Value Date    CHOLHDL 16.8 (L) 10/06/2020    CHOLHDL 23.5 09/05/2019    CHOLHDL 18.7 (L) 03/11/2019       Chemistry        Component Value Date/Time     04/06/2021 1302    K 4.5 04/06/2021 1302     (H) 04/06/2021 1302    CO2 20 (L) 04/06/2021 1302    BUN 61 (H) 04/06/2021 1302    CREATININE 8.4 (H) 12/30/2021 1352    GLU 89 04/06/2021 1302        Component Value Date/Time    CALCIUM 9.2 04/06/2021 1302    ALKPHOS 68 01/25/2021 0426    AST 89 (H) 01/25/2021 0426    ALT 27 01/25/2021 0426    BILITOT 0.3 01/25/2021 0426    ESTGFRAFRICA 7 (A) 12/30/2021 1352    EGFRNONAA 6 (A) 12/30/2021 1352          Lab  Results   Component Value Date    HGBA1C 7.0 (H) 03/07/2022     Lab Results   Component Value Date    TSH 1.464 09/05/2019     Lab Results   Component Value Date    INR 0.9 10/11/2018    INR 0.9 05/02/2015    INR 0.9 05/01/2015     Lab Results   Component Value Date    WBC 17.36 (H) 02/01/2021    HGB 11.0 (L) 02/01/2021    HCT 34.8 (L) 02/01/2021    MCV 81 (L) 02/01/2021     (H) 02/01/2021     BMP  Sodium   Date Value Ref Range Status   04/06/2021 143 136 - 145 mmol/L Final     Potassium   Date Value Ref Range Status   04/06/2021 4.5 3.5 - 5.1 mmol/L Final     Chloride   Date Value Ref Range Status   04/06/2021 113 (H) 95 - 110 mmol/L Final     CO2   Date Value Ref Range Status   04/06/2021 20 (L) 23 - 29 mmol/L Final     BUN   Date Value Ref Range Status   04/06/2021 61 (H) 8 - 23 mg/dL Final     Creatinine   Date Value Ref Range Status   12/30/2021 8.4 (H) 0.5 - 1.4 mg/dL Final     Calcium   Date Value Ref Range Status   04/06/2021 9.2 8.7 - 10.5 mg/dL Final     Anion Gap   Date Value Ref Range Status   04/06/2021 10 8 - 16 mmol/L Final     eGFR if    Date Value Ref Range Status   12/30/2021 7 (A) >60 mL/min/1.73 m^2 Final     eGFR if non    Date Value Ref Range Status   12/30/2021 6 (A) >60 mL/min/1.73 m^2 Final     Comment:     Calculation used to obtain the estimated glomerular filtration  rate (eGFR) is the CKD-EPI equation.        BNP  @LABRCNTIP(BNP,BNPTRIAGEBLO)@  @LABRCNTIP(troponini)@  CrCl cannot be calculated (Patient's most recent lab result is older than the maximum 7 days allowed.).  No results found in the last 24 hours.  No results found in the last 24 hours.  No results found in the last 24 hours.    Assessment:      1. Acute on chronic systolic congestive heart failure    2. PVC (premature ventricular contraction)    3. Severe hypertension    4. Coronary artery disease of native artery of native heart with stable angina pectoris    5. Chronic systolic  congestive heart failure    6. Class 1 obesity due to excess calories with serious comorbidity and body mass index (BMI) of 34.0 to 34.9 in adult    7. Type 2 diabetes mellitus with chronic kidney disease, with long-term current use of insulin, unspecified CKD stage      PVC  CHFrEF compensated FC II EF dropped to 40%  H/o CAD DM  Plan:   Ok to d/c Coreg  Add toprolXL 50 mg daily  Add hydralazine 50 mg bid and isodil 20 mg bid   d/c viagra  Continue valsartan nifedipine ASA and Statin  BNP and BMP today  Phar MPI to r/o ischemia  Fluid restriction 1.5 liters a day  RTC in 6 weeks

## 2022-07-13 ENCOUNTER — TELEPHONE (OUTPATIENT)
Dept: CARDIOLOGY | Facility: CLINIC | Age: 67
End: 2022-07-13
Payer: COMMERCIAL

## 2022-07-13 NOTE — TELEPHONE ENCOUNTER
Pt contacted about results, pt verbalized understanding.                ----- Message from Dez Ragland MD sent at 7/12/2022  8:48 PM CDT -----  The lab showed + CHF  Fluid restriction 32 oz a day  Keep BP < 140/90  Rtc as scheduled

## 2022-07-14 ENCOUNTER — HOSPITAL ENCOUNTER (OUTPATIENT)
Dept: RADIOLOGY | Facility: HOSPITAL | Age: 67
Discharge: HOME | End: 2022-07-14
Attending: NURSE PRACTITIONER
Payer: COMMERCIAL

## 2022-07-14 ENCOUNTER — LAB VISIT (OUTPATIENT)
Dept: LAB | Facility: HOSPITAL | Age: 67
End: 2022-07-14
Payer: COMMERCIAL

## 2022-07-14 DIAGNOSIS — Z79.4 TYPE 2 DIABETES MELLITUS WITH STAGE 4 CHRONIC KIDNEY DISEASE, WITH LONG-TERM CURRENT USE OF INSULIN: ICD-10-CM

## 2022-07-14 DIAGNOSIS — Z76.82 AWAITING ORGAN TRANSPLANT STATUS: ICD-10-CM

## 2022-07-14 DIAGNOSIS — E11.22 TYPE 2 DIABETES MELLITUS WITH STAGE 4 CHRONIC KIDNEY DISEASE, WITH LONG-TERM CURRENT USE OF INSULIN: ICD-10-CM

## 2022-07-14 DIAGNOSIS — N18.4 TYPE 2 DIABETES MELLITUS WITH STAGE 4 CHRONIC KIDNEY DISEASE, WITH LONG-TERM CURRENT USE OF INSULIN: ICD-10-CM

## 2022-07-14 LAB
ALBUMIN/CREAT UR: 1998.1 UG/MG (ref 0–30)
CREAT UR-MCNC: 52 MG/DL (ref 23–375)
MICROALBUMIN UR DL<=1MG/L-MCNC: 1039 UG/ML

## 2022-07-14 PROCEDURE — 76770 US EXAM ABDO BACK WALL COMP: CPT | Mod: 26,TXP,, | Performed by: RADIOLOGY

## 2022-07-14 PROCEDURE — 71046 X-RAY EXAM CHEST 2 VIEWS: CPT | Mod: TC,TXP

## 2022-07-14 PROCEDURE — 76770 US EXAM ABDO BACK WALL COMP: CPT | Mod: TC,TXP

## 2022-07-14 PROCEDURE — 82570 ASSAY OF URINE CREATININE: CPT | Mod: TXP | Performed by: PHYSICIAN ASSISTANT

## 2022-07-14 PROCEDURE — 71046 X-RAY EXAM CHEST 2 VIEWS: CPT | Mod: 26,TXP,, | Performed by: RADIOLOGY

## 2022-07-14 PROCEDURE — 82043 UR ALBUMIN QUANTITATIVE: CPT | Mod: TXP | Performed by: PHYSICIAN ASSISTANT

## 2022-07-14 PROCEDURE — 71046 XR CHEST PA AND LATERAL: ICD-10-PCS | Mod: 26,TXP,, | Performed by: RADIOLOGY

## 2022-07-14 PROCEDURE — 76770 US RETROPERITONEAL COMPLETE: ICD-10-PCS | Mod: 26,TXP,, | Performed by: RADIOLOGY

## 2022-07-21 ENCOUNTER — TELEPHONE (OUTPATIENT)
Dept: CARDIOLOGY | Facility: HOSPITAL | Age: 67
End: 2022-07-21
Payer: COMMERCIAL

## 2022-07-26 ENCOUNTER — HOSPITAL ENCOUNTER (OUTPATIENT)
Dept: RADIOLOGY | Facility: HOSPITAL | Age: 67
Discharge: HOME | End: 2022-07-26
Attending: NURSE PRACTITIONER
Payer: COMMERCIAL

## 2022-07-26 ENCOUNTER — HOSPITAL ENCOUNTER (OUTPATIENT)
Dept: PULMONOLOGY | Facility: HOSPITAL | Age: 67
Discharge: HOME | End: 2022-07-26
Attending: NURSE PRACTITIONER
Payer: COMMERCIAL

## 2022-07-26 VITALS
HEIGHT: 74 IN | DIASTOLIC BLOOD PRESSURE: 92 MMHG | WEIGHT: 266 LBS | SYSTOLIC BLOOD PRESSURE: 172 MMHG | HEART RATE: 63 BPM | BODY MASS INDEX: 34.14 KG/M2

## 2022-07-26 DIAGNOSIS — Z76.82 AWAITING ORGAN TRANSPLANT STATUS: ICD-10-CM

## 2022-07-26 LAB
CV STRESS BASE HR: 67 BPM
DIASTOLIC BLOOD PRESSURE: 92 MMHG
EJECTION FRACTION- HIGH: 73 %
END DIASTOLIC INDEX-HIGH: 165 ML/M2
END DIASTOLIC INDEX-LOW: 101 ML/M2
END SYSTOLIC INDEX-HIGH: 64 ML/M2
END SYSTOLIC INDEX-LOW: 28 ML/M2
NUC REST EJECTION FRACTION: 33
NUC STRESS EJECTION FRACTION: 48 %
OHS CV CPX 85 PERCENT MAX PREDICTED HEART RATE MALE: 132
OHS CV CPX MAX PREDICTED HEART RATE: 155
OHS CV CPX PATIENT IS FEMALE: 0
OHS CV CPX PATIENT IS MALE: 1
OHS CV CPX PEAK DIASTOLIC BLOOD PRESSURE: 92 MMHG
OHS CV CPX PEAK HEAR RATE: 90 BPM
OHS CV CPX PEAK RATE PRESSURE PRODUCT: NORMAL
OHS CV CPX PEAK SYSTOLIC BLOOD PRESSURE: 172 MMHG
OHS CV CPX PERCENT MAX PREDICTED HEART RATE ACHIEVED: 58
OHS CV CPX RATE PRESSURE PRODUCT PRESENTING: NORMAL
RETIRED EF AND QEF - SEE NOTES: 59 %
SYSTOLIC BLOOD PRESSURE: 172 MMHG

## 2022-07-26 PROCEDURE — A9502 TC99M TETROFOSMIN: HCPCS | Mod: TXP

## 2022-07-26 RX ORDER — REGADENOSON 0.08 MG/ML
0.4 INJECTION, SOLUTION INTRAVENOUS ONCE
Status: DISCONTINUED | OUTPATIENT
Start: 2022-07-26 | End: 2022-07-27 | Stop reason: HOSPADM

## 2022-07-27 ENCOUNTER — TELEPHONE (OUTPATIENT)
Dept: TRANSPLANT | Facility: CLINIC | Age: 67
End: 2022-07-27
Payer: COMMERCIAL

## 2022-07-27 NOTE — PROGRESS NOTES
PCP: Steven Morgan MD    Subjective:     Chief Complaint: Diabetes - Established Patient    HISTORY OF PRESENT ILLNESS: 66 y.o.   male presenting for diabetes management visit.   The patient's last visit with me was on 4/14/2022.  Patient has had Type II diabetes since 20 years or more.  Pertinent to decision making is the following comorbidities: HTN, HLD, CAD, ESRD, Dialysis Use - Sched MWF from 0530a-900a and Obesity by BMI  Patient has the following Diabetes complications: with diabetic chronic kidney disease  He  has attended diabetes education in the past.     Patient's most recent A1c of 8.0% was completed 1 weeks ago.   Patient states since His last A1c His blood glucose levels have been high  throughout the day .   Patient monitors blood glucose 2 times per day with meter : Fasting and Before Bed.   Patient blood glucose monitoring device will not be uploaded into Media Section today.  Per patient recall, all blood sugars ranging  Up [id number] when patient is frequently missing insulin doses.   Patient endorses the following diabetes related symptoms: None.   Patient is due today for the following diabetes-related health maintenance standards: Eye Exam and COVID-19 Vaccine . Sched for eye exam next week.   He denies recent hospital admissions or emergency room visits.   He denies having hypoglycemia.   Patient's concerns today include glycemic control.   Patient medication regimen is as below.     CURRENT DM MEDICATIONS:    Semglee 30 units in the morning - timing after dialysis; missing doses     Patient has failed the following Diabetes medications:    Metformin     Xultophy      Labs Reviewed.       Lab Results   Component Value Date    CPEPTIDE 3.88 11/20/2017     Lab Results   Component Value Date    GLUTAMICACID 0.00 11/20/2017          //   , There is no height or weight on file to calculate BMI.  His blood sugar in clinic today is:    Lab Results   Component Value Date    POCGLU  100 01/11/2021       Review of Systems   Constitutional: Negative for activity change, appetite change, chills and fever.   HENT: Negative for dental problem, mouth sores, nosebleeds, sore throat and trouble swallowing.    Eyes: Negative for pain and discharge.   Respiratory: Negative for shortness of breath, wheezing and stridor.    Cardiovascular: Negative for chest pain, palpitations and leg swelling.   Gastrointestinal: Negative for abdominal pain, diarrhea, nausea and vomiting.   Endocrine: Negative for polydipsia, polyphagia and polyuria.   Genitourinary: Negative for dysuria, frequency and urgency.   Musculoskeletal: Negative for joint swelling and myalgias.   Skin: Negative for rash and wound.   Neurological: Negative for dizziness, syncope, weakness and headaches.   Psychiatric/Behavioral: Negative for behavioral problems and dysphoric mood.         Diabetes Management Status  Statin: Taking  ACE/ARB: Taking    Screening or Prevention Patient's value Goal Complete/Controlled?   HgA1C Testing and Control   Lab Results   Component Value Date    HGBA1C 8.0 (H) 07/14/2022      Annually/Less than 8% Yes   Lipid profile : 07/14/2022 Annually Yes   LDL control Lab Results   Component Value Date    LDLCALC 79.8 07/14/2022    Annually/Less than 100 mg/dl  Yes   Nephropathy screening Lab Results   Component Value Date    MICALBCREAT 1998.1 (H) 07/14/2022     Lab Results   Component Value Date    PROTEINUA 3+ (A) 05/18/2021    Annually Yes   Blood pressure BP Readings from Last 1 Encounters:   07/26/22 (!) 172/92    Less than 140/90 No   Dilated retinal exam : 06/03/2021 Annually No    Foot exam   : 04/14/2022 Annually Yes     Social History     Socioeconomic History    Marital status:     Number of children: 3   Occupational History     Employer: self-rmployed   Tobacco Use    Smoking status: Never Smoker    Smokeless tobacco: Never Used   Substance and Sexual Activity    Alcohol use: Yes     Comment:  seldom     Drug use: No    Sexual activity: Yes     Partners: Female   Social History Narrative    ** Merged History Encounter **          Past Medical History:   Diagnosis Date    Anemia 4/16/2014    BPH (benign prostatic hyperplasia)     CKD (chronic kidney disease) stage 4, GFR 15-29 ml/min     Coronary artery disease of native artery of native heart with stable angina pectoris 4/29/2019    Diabetes mellitus     Diabetes mellitus, type 2     Elevated PSA     Encounter for blood transfusion     Herpes labialis     Hyperlipidemia     Hypertension     Obesity     Proteinuria     Secondary hyperparathyroidism (of renal origin)     UTI (lower urinary tract infection) 5/1/2015       Objective:        Physical Exam  Constitutional:       General: He is not in acute distress.     Appearance: He is well-developed. He is not diaphoretic.   HENT:      Head: Normocephalic and atraumatic.      Right Ear: External ear normal.      Left Ear: External ear normal.      Nose: Nose normal.   Eyes:      General:         Right eye: No discharge.         Left eye: No discharge.      Pupils: Pupils are equal, round, and reactive to light.   Cardiovascular:      Rate and Rhythm: Normal rate and regular rhythm.      Heart sounds: Normal heart sounds.   Pulmonary:      Effort: Pulmonary effort is normal.      Breath sounds: Normal breath sounds.   Abdominal:      Palpations: Abdomen is soft.   Musculoskeletal:         General: Normal range of motion.      Cervical back: Normal range of motion and neck supple.   Skin:     General: Skin is warm and dry.      Capillary Refill: Capillary refill takes less than 2 seconds.   Neurological:      Mental Status: He is alert and oriented to person, place, and time.      Motor: No abnormal muscle tone.      Coordination: Coordination normal.   Psychiatric:         Behavior: Behavior normal.         Thought Content: Thought content normal.         Judgment: Judgment normal.        "    Assessment / Plan:     Type 2 diabetes mellitus with stage 4 chronic kidney disease, with long-term current use of insulin  -     POCT Glucose, Hand-Held Device  -     insulin degludec (TRESIBA FLEXTOUCH U-200) 200 unit/mL (3 mL) insulin pen; Inject 30 Units into the skin once daily.  Dispense: 2 pen; Refill: 8  -     pen needle, diabetic (BD ULTRA-FINE SHIRA PEN NEEDLE) 32 gauge x 5/32" Ndle; 1 each by Misc.(Non-Drug; Combo Route) route 4 (four) times daily with meals and nightly.  Dispense: 100 each; Refill: 11  -     lancets Misc; 1 each by Misc.(Non-Drug; Combo Route) route 4 (four) times daily.  Dispense: 100 each; Refill: 11  -     blood-glucose meter kit; Test finger stick blood sugar once daily.  Dispense: 1 each; Refill: 0  -     blood sugar diagnostic Strp; 1 each by Misc.(Non-Drug; Combo Route) route 4 (four) times daily.  Dispense: 100 each; Refill: 11  -     Hemoglobin A1C; Standing    ESRD on dialysis    Hypertension associated with diabetes    Hyperlipidemia associated with type 2 diabetes mellitus    Coronary artery disease of native artery of native heart with stable angina pectoris    Hypogonadism in male    Multiple thyroid nodules    Non morbid obesity due to excess calories      Additional Plan Details:    - POCT Glucose  - Encouraged continuation of lifestyle changes including regular exercise and limiting carbohydrates to 30-45 grams per meal threes times daily and 15 grams per snack with a limit of two daily.   - Encouraged continued monitoring of blood glucose with maintenance of 2 times daily at Fasting and Before Bed. Insurance preferred Meter and supplies Rx today. Offered CGM sample but declined.   - Current DM Medication Regimen: Change Semglee to Tresiba 30 units daily - timed after dialysis. Focus on compliance.  - Health Maintenance standards addressed today: Eye Exam - will be completed outside of [facility] and patient will schedule and COVID - 19 Vaccine - patient will schedule " outside of [facility]   - Nursing Visit: Patient is below goal range for nursing visit for age group and will not need nursing visit at this time .   - Follow up in 3 months with A1c prior and 4 weeks for call.       Blakeney McKnight, PA-C [facility]    A total of 30 minutes was spent in face to face time, of which over 50 % was spent in counseling patient on disease process, complications, treatment, and side effects of medications.

## 2022-07-27 NOTE — TELEPHONE ENCOUNTER
----- Message from Natasha Hunter NP sent at 7/27/2022  8:29 AM CDT -----  There is a moderate to severe intensity, large sized, mostly fixed perfusion abnormality with some reversibilty in the inferior, inferolateral, lateral and apical wall(s).    Needs Cardiologyfollow-up--Appointment with Dr. Ragland on 8/23/2022

## 2022-07-28 ENCOUNTER — OFFICE VISIT (OUTPATIENT)
Dept: DIABETES | Facility: CLINIC | Age: 67
End: 2022-07-28
Payer: COMMERCIAL

## 2022-07-28 VITALS
BODY MASS INDEX: 33.54 KG/M2 | SYSTOLIC BLOOD PRESSURE: 129 MMHG | DIASTOLIC BLOOD PRESSURE: 85 MMHG | WEIGHT: 261.25 LBS | HEART RATE: 80 BPM

## 2022-07-28 DIAGNOSIS — E11.69 HYPERLIPIDEMIA ASSOCIATED WITH TYPE 2 DIABETES MELLITUS: ICD-10-CM

## 2022-07-28 DIAGNOSIS — E04.2 MULTIPLE THYROID NODULES: ICD-10-CM

## 2022-07-28 DIAGNOSIS — I25.118 CORONARY ARTERY DISEASE OF NATIVE ARTERY OF NATIVE HEART WITH STABLE ANGINA PECTORIS: ICD-10-CM

## 2022-07-28 DIAGNOSIS — Z79.4 TYPE 2 DIABETES MELLITUS WITH STAGE 4 CHRONIC KIDNEY DISEASE, WITH LONG-TERM CURRENT USE OF INSULIN: Primary | ICD-10-CM

## 2022-07-28 DIAGNOSIS — E66.09 NON MORBID OBESITY DUE TO EXCESS CALORIES: ICD-10-CM

## 2022-07-28 DIAGNOSIS — E11.59 HYPERTENSION ASSOCIATED WITH DIABETES: ICD-10-CM

## 2022-07-28 DIAGNOSIS — N18.4 TYPE 2 DIABETES MELLITUS WITH STAGE 4 CHRONIC KIDNEY DISEASE, WITH LONG-TERM CURRENT USE OF INSULIN: Primary | ICD-10-CM

## 2022-07-28 DIAGNOSIS — I15.2 HYPERTENSION ASSOCIATED WITH DIABETES: ICD-10-CM

## 2022-07-28 DIAGNOSIS — E78.5 HYPERLIPIDEMIA ASSOCIATED WITH TYPE 2 DIABETES MELLITUS: ICD-10-CM

## 2022-07-28 DIAGNOSIS — Z99.2 ESRD ON DIALYSIS: ICD-10-CM

## 2022-07-28 DIAGNOSIS — N18.6 ESRD ON DIALYSIS: ICD-10-CM

## 2022-07-28 DIAGNOSIS — E29.1 HYPOGONADISM IN MALE: ICD-10-CM

## 2022-07-28 DIAGNOSIS — E11.22 TYPE 2 DIABETES MELLITUS WITH STAGE 4 CHRONIC KIDNEY DISEASE, WITH LONG-TERM CURRENT USE OF INSULIN: Primary | ICD-10-CM

## 2022-07-28 LAB — GLUCOSE SERPL-MCNC: 216 MG/DL (ref 70–110)

## 2022-07-28 PROCEDURE — 3074F SYST BP LT 130 MM HG: CPT | Mod: CPTII,S$GLB,, | Performed by: PHYSICIAN ASSISTANT

## 2022-07-28 PROCEDURE — 3288F PR FALLS RISK ASSESSMENT DOCUMENTED: ICD-10-PCS | Mod: CPTII,S$GLB,, | Performed by: PHYSICIAN ASSISTANT

## 2022-07-28 PROCEDURE — 3079F PR MOST RECENT DIASTOLIC BLOOD PRESSURE 80-89 MM HG: ICD-10-PCS | Mod: CPTII,S$GLB,, | Performed by: PHYSICIAN ASSISTANT

## 2022-07-28 PROCEDURE — 3066F NEPHROPATHY DOC TX: CPT | Mod: CPTII,S$GLB,, | Performed by: PHYSICIAN ASSISTANT

## 2022-07-28 PROCEDURE — 3052F PR MOST RECENT HEMOGLOBIN A1C LEVEL 8.0 - < 9.0%: ICD-10-PCS | Mod: CPTII,S$GLB,, | Performed by: PHYSICIAN ASSISTANT

## 2022-07-28 PROCEDURE — 99999 PR PBB SHADOW E&M-EST. PATIENT-LVL III: CPT | Mod: PBBFAC,,, | Performed by: PHYSICIAN ASSISTANT

## 2022-07-28 PROCEDURE — 3008F PR BODY MASS INDEX (BMI) DOCUMENTED: ICD-10-PCS | Mod: CPTII,S$GLB,, | Performed by: PHYSICIAN ASSISTANT

## 2022-07-28 PROCEDURE — 82962 GLUCOSE BLOOD TEST: CPT | Mod: S$GLB,,, | Performed by: PHYSICIAN ASSISTANT

## 2022-07-28 PROCEDURE — 82962 POCT GLUCOSE, HAND-HELD DEVICE: ICD-10-PCS | Mod: S$GLB,,, | Performed by: PHYSICIAN ASSISTANT

## 2022-07-28 PROCEDURE — 1159F PR MEDICATION LIST DOCUMENTED IN MEDICAL RECORD: ICD-10-PCS | Mod: CPTII,S$GLB,, | Performed by: PHYSICIAN ASSISTANT

## 2022-07-28 PROCEDURE — 1101F PT FALLS ASSESS-DOCD LE1/YR: CPT | Mod: CPTII,S$GLB,, | Performed by: PHYSICIAN ASSISTANT

## 2022-07-28 PROCEDURE — 99214 OFFICE O/P EST MOD 30 MIN: CPT | Mod: S$GLB,,, | Performed by: PHYSICIAN ASSISTANT

## 2022-07-28 PROCEDURE — 1126F PR PAIN SEVERITY QUANTIFIED, NO PAIN PRESENT: ICD-10-PCS | Mod: CPTII,S$GLB,, | Performed by: PHYSICIAN ASSISTANT

## 2022-07-28 PROCEDURE — 3079F DIAST BP 80-89 MM HG: CPT | Mod: CPTII,S$GLB,, | Performed by: PHYSICIAN ASSISTANT

## 2022-07-28 PROCEDURE — 3066F PR DOCUMENTATION OF TREATMENT FOR NEPHROPATHY: ICD-10-PCS | Mod: CPTII,S$GLB,, | Performed by: PHYSICIAN ASSISTANT

## 2022-07-28 PROCEDURE — 3062F POS MACROALBUMINURIA REV: CPT | Mod: CPTII,S$GLB,, | Performed by: PHYSICIAN ASSISTANT

## 2022-07-28 PROCEDURE — 3062F PR POS MACROALBUMINURIA RESULT DOCUMENTED/REVIEW: ICD-10-PCS | Mod: CPTII,S$GLB,, | Performed by: PHYSICIAN ASSISTANT

## 2022-07-28 PROCEDURE — 3288F FALL RISK ASSESSMENT DOCD: CPT | Mod: CPTII,S$GLB,, | Performed by: PHYSICIAN ASSISTANT

## 2022-07-28 PROCEDURE — 1101F PR PT FALLS ASSESS DOC 0-1 FALLS W/OUT INJ PAST YR: ICD-10-PCS | Mod: CPTII,S$GLB,, | Performed by: PHYSICIAN ASSISTANT

## 2022-07-28 PROCEDURE — 3008F BODY MASS INDEX DOCD: CPT | Mod: CPTII,S$GLB,, | Performed by: PHYSICIAN ASSISTANT

## 2022-07-28 PROCEDURE — 99214 PR OFFICE/OUTPT VISIT, EST, LEVL IV, 30-39 MIN: ICD-10-PCS | Mod: S$GLB,,, | Performed by: PHYSICIAN ASSISTANT

## 2022-07-28 PROCEDURE — 1126F AMNT PAIN NOTED NONE PRSNT: CPT | Mod: CPTII,S$GLB,, | Performed by: PHYSICIAN ASSISTANT

## 2022-07-28 PROCEDURE — 3052F HG A1C>EQUAL 8.0%<EQUAL 9.0%: CPT | Mod: CPTII,S$GLB,, | Performed by: PHYSICIAN ASSISTANT

## 2022-07-28 PROCEDURE — 3074F PR MOST RECENT SYSTOLIC BLOOD PRESSURE < 130 MM HG: ICD-10-PCS | Mod: CPTII,S$GLB,, | Performed by: PHYSICIAN ASSISTANT

## 2022-07-28 PROCEDURE — 4010F PR ACE/ARB THEARPY RXD/TAKEN: ICD-10-PCS | Mod: CPTII,S$GLB,, | Performed by: PHYSICIAN ASSISTANT

## 2022-07-28 PROCEDURE — 4010F ACE/ARB THERAPY RXD/TAKEN: CPT | Mod: CPTII,S$GLB,, | Performed by: PHYSICIAN ASSISTANT

## 2022-07-28 PROCEDURE — 99999 PR PBB SHADOW E&M-EST. PATIENT-LVL III: ICD-10-PCS | Mod: PBBFAC,,, | Performed by: PHYSICIAN ASSISTANT

## 2022-07-28 PROCEDURE — 1159F MED LIST DOCD IN RCRD: CPT | Mod: CPTII,S$GLB,, | Performed by: PHYSICIAN ASSISTANT

## 2022-07-28 RX ORDER — INSULIN PUMP SYRINGE, 3 ML
EACH MISCELLANEOUS
Qty: 1 EACH | Refills: 0 | Status: ON HOLD | OUTPATIENT
Start: 2022-07-28 | End: 2024-02-19 | Stop reason: HOSPADM

## 2022-07-28 RX ORDER — LANCETS
1 EACH MISCELLANEOUS 4 TIMES DAILY
Qty: 100 EACH | Refills: 11 | Status: ON HOLD | OUTPATIENT
Start: 2022-07-28 | End: 2024-02-19 | Stop reason: HOSPADM

## 2022-07-28 RX ORDER — PEN NEEDLE, DIABETIC 30 GX3/16"
1 NEEDLE, DISPOSABLE MISCELLANEOUS
Qty: 100 EACH | Refills: 11 | Status: SHIPPED | OUTPATIENT
Start: 2022-07-28 | End: 2023-07-28

## 2022-07-28 RX ORDER — INSULIN DEGLUDEC 200 U/ML
30 INJECTION, SOLUTION SUBCUTANEOUS DAILY
Qty: 2 PEN | Refills: 8 | Status: SHIPPED | OUTPATIENT
Start: 2022-07-28 | End: 2023-07-28

## 2022-08-01 PROCEDURE — 99001 SPECIMEN HANDLING PT-LAB: CPT | Mod: PO,TXP | Performed by: NURSE PRACTITIONER

## 2022-08-03 ENCOUNTER — LAB VISIT (OUTPATIENT)
Dept: LAB | Facility: HOSPITAL | Age: 67
End: 2022-08-03
Payer: COMMERCIAL

## 2022-08-03 DIAGNOSIS — Z76.0 MEDICATION REFILL: ICD-10-CM

## 2022-08-03 DIAGNOSIS — Z76.82 AWAITING ORGAN TRANSPLANT STATUS: ICD-10-CM

## 2022-08-03 NOTE — TELEPHONE ENCOUNTER
Care Due:                  Date            Visit Type   Department     Provider  --------------------------------------------------------------------------------                                EP -                              PRIMARY      [facility] INTERNAL  Last Visit: 05-      CARE ([facility])   MEDICINE       Steven Morgan  Next Visit: None Scheduled  None         None Found                                                            Last  Test          Frequency    Reason                     Performed    Due Date  --------------------------------------------------------------------------------    CBC.........  12 months..  acyclovir................  02- 01-    [facility] Embedded Care Gaps. Reference number: [id number]. 8/03/2022   5:35:03 PM CDT

## 2022-08-04 LAB
LEFT EYE DM RETINOPATHY: NEGATIVE
RIGHT EYE DM RETINOPATHY: NEGATIVE

## 2022-08-04 RX ORDER — ATORVASTATIN CALCIUM 40 MG/1
TABLET, FILM COATED ORAL
Qty: 90 TABLET | Refills: 3 | Status: SHIPPED | OUTPATIENT
Start: 2022-08-04 | End: 2023-09-26

## 2022-08-04 NOTE — TELEPHONE ENCOUNTER
Refill Decision Note   Isidro Alves  is requesting a refill authorization.  Brief Assessment and Rationale for Refill:  Approve    -Medication-Related Problems Identified: Requires labs  Medication Therapy Plan:       Medication Reconciliation Completed: No   Comments:     Provider Staff:     Action is required for this patient.   Please see care gap opportunities below in Care Due Message.     Thanks!  [facility]     Appointments      Date Provider   Last Visit   5/5/2022 Steven Morgan MD   Next Visit   Visit date not found Steven Morgan MD     Note composed:11:58 AM 08/04/2022           Note composed:11:58 AM 08/04/2022

## 2022-08-10 NOTE — TELEPHONE ENCOUNTER
----- Message from Ana Lopez sent at 12/21/2017  8:30 AM CST -----  Contact: Pt   Pt states he needs someone to call him about his colonoscopy that is scheduled.Needs to see what floor. Request call .[phone] (home)      Physical Therapy Visit    Referred by: Edouard Garcia MD; Medical Diagnosis (from order):    Diagnosis Information      Diagnosis    715.15 (ICD-9-CM) - M16.12 (ICD-10-CM) - Primary osteoarthritis of left hip              Visit: 8    Visit Type: Daily Treatment Note    SUBJECTIVE                                                                                                               Patient states that he is feeling \"pretty good\" today. He notes mild soreness following last treatment session. He reports that he may have over did it yesterday making pickles.  Pain / Symptoms:  Pain rating (out of 10): Current: 2     OBJECTIVE                                                                                                                        TREATMENT                                                                                                                  Therapeutic Exercise:  Nu step (seat 13) level 8 x 6 minutes with bilateral lower extremities only  PROM left hip in supine: flexion, abduction, internal rotation/external rotation (at 90/90), extension off edge of table  Passive hamstring stretch - left only    Standing in parallel bars:  - Posterior disc slide x 10 - bilateral  - Lateral disc slide x 10 - bilateral  - Alternating forward step onto Bosu (flat surface down)  - Lateral step onto Bosu (flat surface down) - bilateral  - Mini-squat on Bosu (flat surface down) with narrow base of support    Mini-squat sidestepping  Mini-squat diagonal step    Skilled input: verbal instruction/cues, tactile instruction/cues and as detailed above    Writer verbally educated and received verbal consent for hand placement, positioning of patient, and techniques to be performed today from patient for hand placement and palpation for techniques and therapist position for techniques as described above and how they are pertinent to the patient's plan of care.    Home Exercise Program/Education Materials:  Access Code: [email or web address]  URL: [email or web address]  Date: 07/18/2022  Prepared by: Nicci Gleason    Exercises  · Supine Heel Slides - 2-3 x daily - 7 x weekly - 2 sets - 10 reps  · Supine Ankle Pumps - 2-3 x daily - 7 x weekly - 2 sets - 20 reps  · Supine Quadricep Sets - 2-3 x daily - 7 x weekly - 2 sets - 10 reps - 2-3 seconds hold  · Supine Gluteal Sets - 2-3 x daily - 7 x weekly - 2 sets - 10 reps - 5 seconds hold  · Bent Knee Fallouts - 2-3 x daily - 7 x weekly - 2 sets - 10 reps - 2-3 seconds hold  · Supine Hip Abduction - 2-3 x daily - 7 x weekly - 2 sets - 10 reps  7/25/22; weight shifting at counter top, sit<>stands     ASSESSMENT                                                                                                             Binu was able to complete exercises this date without complaints of increased pain/discomfort. He requires cues to load through his hip and thigh versus too much anterior translation of knee. He was advised on how to modify activity during his trip over the weekend as it is anticipated that he will have a tendency to over do it. Overall good progress.  Pain/symptoms after session (out of 10): 2  Patient Education:   Results of above outlined education: Verbalizes understanding, Demonstrates understanding and Needs reinforcement      PLAN                                                                                                                           Suggestions for next session as indicated: Progress per plan of care, left hip PROM/AAROM/AROM, hip strengthening, gait training, balance activities  Review and update home exercise program as appropriate         Therapy procedure time and total treatment time can be found documented on the Time Entry flowsheet

## 2022-08-23 ENCOUNTER — TELEPHONE (OUTPATIENT)
Dept: TRANSPLANT | Facility: CLINIC | Age: 67
End: 2022-08-23
Payer: COMMERCIAL

## 2022-08-23 ENCOUNTER — OFFICE VISIT (OUTPATIENT)
Dept: CARDIOLOGY | Facility: CLINIC | Age: 67
End: 2022-08-23
Payer: MEDICARE

## 2022-08-23 ENCOUNTER — LAB VISIT (OUTPATIENT)
Dept: LAB | Facility: HOSPITAL | Age: 67
End: 2022-08-23
Attending: INTERNAL MEDICINE
Payer: MEDICARE

## 2022-08-23 VITALS
DIASTOLIC BLOOD PRESSURE: 80 MMHG | HEART RATE: 51 BPM | SYSTOLIC BLOOD PRESSURE: 146 MMHG | BODY MASS INDEX: 33.67 KG/M2 | OXYGEN SATURATION: 97 % | WEIGHT: 262.38 LBS | HEIGHT: 74 IN

## 2022-08-23 DIAGNOSIS — Z76.82 AWAITING ORGAN TRANSPLANT STATUS: Primary | ICD-10-CM

## 2022-08-23 DIAGNOSIS — R07.9 CHEST PAIN, UNSPECIFIED TYPE: ICD-10-CM

## 2022-08-23 DIAGNOSIS — I50.22 CHRONIC SYSTOLIC HEART FAILURE: Primary | ICD-10-CM

## 2022-08-23 DIAGNOSIS — E78.5 HYPERLIPIDEMIA ASSOCIATED WITH TYPE 2 DIABETES MELLITUS: ICD-10-CM

## 2022-08-23 DIAGNOSIS — R94.39 ABNORMAL STRESS TEST: ICD-10-CM

## 2022-08-23 DIAGNOSIS — I50.22 CHRONIC SYSTOLIC CONGESTIVE HEART FAILURE: ICD-10-CM

## 2022-08-23 DIAGNOSIS — I10 SEVERE HYPERTENSION: ICD-10-CM

## 2022-08-23 DIAGNOSIS — Z79.4 TYPE 2 DIABETES MELLITUS WITH CHRONIC KIDNEY DISEASE, WITH LONG-TERM CURRENT USE OF INSULIN, UNSPECIFIED CKD STAGE: ICD-10-CM

## 2022-08-23 DIAGNOSIS — I49.3 PVC (PREMATURE VENTRICULAR CONTRACTION): ICD-10-CM

## 2022-08-23 DIAGNOSIS — I50.22 CHRONIC SYSTOLIC HEART FAILURE: ICD-10-CM

## 2022-08-23 DIAGNOSIS — I25.10 CORONARY ARTERY DISEASE INVOLVING NATIVE CORONARY ARTERY OF NATIVE HEART, UNSPECIFIED WHETHER ANGINA PRESENT: ICD-10-CM

## 2022-08-23 DIAGNOSIS — E11.69 HYPERLIPIDEMIA ASSOCIATED WITH TYPE 2 DIABETES MELLITUS: ICD-10-CM

## 2022-08-23 DIAGNOSIS — E66.09 CLASS 1 OBESITY DUE TO EXCESS CALORIES WITH SERIOUS COMORBIDITY AND BODY MASS INDEX (BMI) OF 34.0 TO 34.9 IN ADULT: Chronic | ICD-10-CM

## 2022-08-23 DIAGNOSIS — R06.02 SOB (SHORTNESS OF BREATH): ICD-10-CM

## 2022-08-23 DIAGNOSIS — E11.22 TYPE 2 DIABETES MELLITUS WITH CHRONIC KIDNEY DISEASE, WITH LONG-TERM CURRENT USE OF INSULIN, UNSPECIFIED CKD STAGE: ICD-10-CM

## 2022-08-23 DIAGNOSIS — I25.118 CORONARY ARTERY DISEASE OF NATIVE ARTERY OF NATIVE HEART WITH STABLE ANGINA PECTORIS: Primary | ICD-10-CM

## 2022-08-23 LAB
ANION GAP SERPL CALC-SCNC: 13 MMOL/L (ref 8–16)
APTT BLDCRRT: 26.2 SEC (ref 21–32)
BASOPHILS # BLD AUTO: 0.07 K/UL (ref 0–0.2)
BASOPHILS NFR BLD: 0.7 % (ref 0–1.9)
BUN SERPL-MCNC: 41 MG/DL (ref 8–23)
CALCIUM SERPL-MCNC: 9.8 MG/DL (ref 8.7–10.5)
CHLORIDE SERPL-SCNC: 106 MMOL/L (ref 95–110)
CO2 SERPL-SCNC: 23 MMOL/L (ref 23–29)
CREAT SERPL-MCNC: 8.3 MG/DL (ref 0.5–1.4)
DIFFERENTIAL METHOD: ABNORMAL
EOSINOPHIL # BLD AUTO: 0.2 K/UL (ref 0–0.5)
EOSINOPHIL NFR BLD: 2.4 % (ref 0–8)
ERYTHROCYTE [DISTWIDTH] IN BLOOD BY AUTOMATED COUNT: 15.7 % (ref 11.5–14.5)
EST. GFR  (NO RACE VARIABLE): 6.6 ML/MIN/1.73 M^2
GLUCOSE SERPL-MCNC: 109 MG/DL (ref 70–110)
HCT VFR BLD AUTO: 37.9 % (ref 40–54)
HGB BLD-MCNC: 11.7 G/DL (ref 14–18)
IMM GRANULOCYTES # BLD AUTO: 0.05 K/UL (ref 0–0.04)
IMM GRANULOCYTES NFR BLD AUTO: 0.5 % (ref 0–0.5)
INR PPP: 0.9 (ref 0.8–1.2)
LYMPHOCYTES # BLD AUTO: 1.8 K/UL (ref 1–4.8)
LYMPHOCYTES NFR BLD: 18.5 % (ref 18–48)
MCH RBC QN AUTO: 27 PG (ref 27–31)
MCHC RBC AUTO-ENTMCNC: 30.9 G/DL (ref 32–36)
MCV RBC AUTO: 88 FL (ref 82–98)
MONOCYTES # BLD AUTO: 0.9 K/UL (ref 0.3–1)
MONOCYTES NFR BLD: 9.2 % (ref 4–15)
NEUTROPHILS # BLD AUTO: 6.6 K/UL (ref 1.8–7.7)
NEUTROPHILS NFR BLD: 68.7 % (ref 38–73)
NRBC BLD-RTO: 0 /100 WBC
PLATELET # BLD AUTO: 284 K/UL (ref 150–450)
PMV BLD AUTO: 11.6 FL (ref 9.2–12.9)
POTASSIUM SERPL-SCNC: 4.7 MMOL/L (ref 3.5–5.1)
PROTHROMBIN TIME: 10.4 SEC (ref 9–12.5)
RBC # BLD AUTO: 4.33 M/UL (ref 4.6–6.2)
SODIUM SERPL-SCNC: 142 MMOL/L (ref 136–145)
WBC # BLD AUTO: 9.58 K/UL (ref 3.9–12.7)

## 2022-08-23 PROCEDURE — 3062F PR POS MACROALBUMINURIA RESULT DOCUMENTED/REVIEW: ICD-10-PCS | Mod: TXP,,, | Performed by: INTERNAL MEDICINE

## 2022-08-23 PROCEDURE — 3062F POS MACROALBUMINURIA REV: CPT | Mod: TXP,,, | Performed by: INTERNAL MEDICINE

## 2022-08-23 PROCEDURE — 4010F ACE/ARB THERAPY RXD/TAKEN: CPT | Mod: TXP,,, | Performed by: INTERNAL MEDICINE

## 2022-08-23 PROCEDURE — 85025 COMPLETE CBC W/AUTO DIFF WBC: CPT | Mod: TXP | Performed by: INTERNAL MEDICINE

## 2022-08-23 PROCEDURE — 36415 COLL VENOUS BLD VENIPUNCTURE: CPT | Mod: TXP | Performed by: INTERNAL MEDICINE

## 2022-08-23 PROCEDURE — 99999 PR PBB SHADOW E&M-EST. PATIENT-LVL V: CPT | Mod: PBBFAC,TXP,, | Performed by: INTERNAL MEDICINE

## 2022-08-23 PROCEDURE — 99999 PR PBB SHADOW E&M-EST. PATIENT-LVL V: ICD-10-PCS | Mod: PBBFAC,TXP,, | Performed by: INTERNAL MEDICINE

## 2022-08-23 PROCEDURE — 99215 PR OFFICE/OUTPT VISIT, EST, LEVL V, 40-54 MIN: ICD-10-PCS | Mod: S$PBB,TXP,, | Performed by: INTERNAL MEDICINE

## 2022-08-23 PROCEDURE — 80048 BASIC METABOLIC PNL TOTAL CA: CPT | Mod: TXP | Performed by: INTERNAL MEDICINE

## 2022-08-23 PROCEDURE — 85730 THROMBOPLASTIN TIME PARTIAL: CPT | Mod: TXP | Performed by: INTERNAL MEDICINE

## 2022-08-23 PROCEDURE — 3066F PR DOCUMENTATION OF TREATMENT FOR NEPHROPATHY: ICD-10-PCS | Mod: TXP,,, | Performed by: INTERNAL MEDICINE

## 2022-08-23 PROCEDURE — 3066F NEPHROPATHY DOC TX: CPT | Mod: TXP,,, | Performed by: INTERNAL MEDICINE

## 2022-08-23 PROCEDURE — 99215 OFFICE O/P EST HI 40 MIN: CPT | Mod: S$PBB,TXP,, | Performed by: INTERNAL MEDICINE

## 2022-08-23 PROCEDURE — 85610 PROTHROMBIN TIME: CPT | Mod: TXP | Performed by: INTERNAL MEDICINE

## 2022-08-23 PROCEDURE — 4010F PR ACE/ARB THEARPY RXD/TAKEN: ICD-10-PCS | Mod: TXP,,, | Performed by: INTERNAL MEDICINE

## 2022-08-23 RX ORDER — HYDRALAZINE HYDROCHLORIDE 100 MG/1
100 TABLET, FILM COATED ORAL EVERY 8 HOURS
Qty: 90 TABLET | Refills: 11 | Status: SHIPPED | OUTPATIENT
Start: 2022-08-23 | End: 2023-05-25

## 2022-08-23 RX ORDER — ISOSORBIDE DINITRATE 20 MG/1
20 TABLET ORAL 3 TIMES DAILY
Qty: 90 TABLET | Refills: 11 | Status: SHIPPED | OUTPATIENT
Start: 2022-08-23 | End: 2023-09-26

## 2022-08-23 NOTE — PROGRESS NOTES
Subjective:   Patient ID:  Isidro Alves is a 66 y.o. male who presents for follow up of No chief complaint on file.      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  PMH ESRD on HD since  and renal Tx list at [facility], CAD, old MI per MPI in , CHFrEF, IDDM 20 yrs, HTN, HLD. No smoking. Occasional drink.  Denied h/o stroke, heart attack and cancer.  No chest pain, dyspnea, dizziness palpitation and orthopnea.  Fell off the roof and multi fx.    MPI showed inferior old MI and EF 35%. ECHo showed EF 50% and LVH  No f/h premature CAD.  No smoking  Walks 4 times a week. And on PT  ekg NSR  Out of Valsartan for 1 day and states that BP controlled at home     visit  HD on right chest catheter.   echo dilated LV mod, EF 40% and  echo EF 55%  HOLTER showed 12% PVCs  Occasional palpitation. orthopnea and PND. No chest pain leg swelling and syncope. Urinating 8 times a week. No fluid restriction.    Interval history   Pha rmPI showed There is a moderate to severe intensity, large sized, mostly fixed perfusion abnormality with some reversibilty in the inferior, inferolateral, lateral and apical wall(s).  Still has SOB PND orthopnea. No leg swelling   BP high. On low Na diet and on 32 oz fluid restriction        Past Medical History:   Diagnosis Date    Anemia 4/16/2014    BPH (benign prostatic hyperplasia)     CKD (chronic kidney disease) stage 4, GFR 15-29 ml/min     Coronary artery disease of native artery of native heart with stable angina pectoris 4/29/2019    Diabetes mellitus     Diabetes mellitus, type 2     Elevated PSA     Encounter for blood transfusion     Herpes labialis     Hyperlipidemia     Hypertension     Obesity     Proteinuria     Secondary hyperparathyroidism (of renal origin)     UTI (lower urinary tract infection) 5/1/2015       Past Surgical History:   Procedure Laterality Date    COLONOSCOPY N/A 12/21/2017     Procedure: COLONOSCOPY;  Surgeon: Fran Escalera MD;  Location: [facility] ENDO;  Service: Endoscopy;  Laterality: N/A;    FLUOROSCOPY N/A 1/21/2021    Procedure: Vascath insertion;  Surgeon: Adrian Pruitt Jr., MD;  Location: [facility] CATH LAB;  Service: General;  Laterality: N/A;    FLUOROSCOPY N/A 4/6/2021    Procedure: Over the wire Vas Cath exchange;  Surgeon: Ho Pressley MD;  Location: [facility] CATH LAB;  Service: General;  Laterality: N/A;    PROSTATE BIOPSY      thorocotomy  2010       Social History     Tobacco Use    Smoking status: Never Smoker    Smokeless tobacco: Never Used   Substance Use Topics    Alcohol use: Yes     Comment: seldom     Drug use: No       Family History   Problem Relation Age of Onset    No Known Problems Mother     Diabetes Father     Hypertension Father     Hypertension Other     Diabetes Other          Review of Systems   Constitutional: Positive for malaise/fatigue. Negative for decreased appetite, diaphoresis, fever and night sweats.   HENT: Negative for nosebleeds.    Eyes: Negative for blurred vision and double vision.   Cardiovascular: Positive for dyspnea on exertion, orthopnea and paroxysmal nocturnal dyspnea. Negative for chest pain, claudication, irregular heartbeat, leg swelling, near-syncope, palpitations and syncope.   Respiratory: Negative for cough, shortness of breath, sleep disturbances due to breathing, snoring, sputum production and wheezing.    Endocrine: Negative for cold intolerance and polyuria.   Hematologic/Lymphatic: Does not bruise/bleed easily.   Skin: Negative for rash.   Musculoskeletal: Negative for back pain, falls, joint pain, joint swelling and neck pain.   Gastrointestinal: Negative for abdominal pain, heartburn, nausea and vomiting.   Genitourinary: Negative for dysuria, frequency and hematuria.   Neurological: Negative for difficulty with concentration, dizziness, focal weakness, headaches, light-headedness, numbness, seizures and  weakness.   Psychiatric/Behavioral: Negative for depression, memory loss and substance abuse. The patient does not have insomnia.    Allergic/Immunologic: Negative for HIV exposure and hives.       Objective:   Physical Exam  HENT:      Head: Normocephalic.   Eyes:      Pupils: Pupils are equal, round, and reactive to light.   Neck:      Thyroid: No thyromegaly.      Vascular: Normal carotid pulses. No carotid bruit or JVD.   Cardiovascular:      Rate and Rhythm: Normal rate and regular rhythm.  No extrasystoles are present.     Chest Wall: PMI is not displaced.      Pulses: Normal pulses.      Heart sounds: Normal heart sounds. No murmur heard.    No gallop. No S3 sounds.   Pulmonary:      Effort: No respiratory distress.      Breath sounds: Normal breath sounds. No stridor.   Abdominal:      General: Bowel sounds are normal.      Palpations: Abdomen is soft.      Tenderness: There is no abdominal tenderness. There is no rebound.   Skin:     Findings: No rash.   Neurological:      Mental Status: He is alert and oriented to person, place, and time.   Psychiatric:         Behavior: Behavior normal.         Lab Results   Component Value Date    CHOL 149 07/14/2022    CHOL 155 10/06/2020    CHOL 136 09/05/2019     Lab Results   Component Value Date    HDL 31 (L) 07/14/2022    HDL 26 (L) 10/06/2020    HDL 32 (L) 09/05/2019     Lab Results   Component Value Date    LDLCALC 79.8 07/14/2022    LDLCALC 98.0 10/06/2020    LDLCALC 73.8 09/05/2019     Lab Results   Component Value Date    TRIG 191 (H) 07/14/2022    TRIG 155 (H) 10/06/2020    TRIG 151 (H) 09/05/2019     Lab Results   Component Value Date    CHOLHDL 20.8 07/14/2022    CHOLHDL 16.8 (L) 10/06/2020    CHOLHDL 23.5 09/05/2019       Chemistry        Component Value Date/Time     07/14/2022 0706    K 4.7 07/14/2022 0706     07/14/2022 0706    CO2 24 07/14/2022 0706    BUN 28 (H) 07/14/2022 0706    CREATININE 8.4 (H) 07/14/2022 0706     (H)  07/14/2022 0706        Component Value Date/Time    CALCIUM 9.6 07/14/2022 0706    ALKPHOS 80 07/14/2022 0706    ALKPHOS 80 07/14/2022 0706    AST 16 07/14/2022 0706    AST 16 07/14/2022 0706    ALT 8 (L) 07/14/2022 0706    ALT 8 (L) 07/14/2022 0706    BILITOT 1.0 07/14/2022 0706    BILITOT 1.0 07/14/2022 0706    ESTGFRAFRICA 6.9 (A) 07/14/2022 0706    EGFRNONAA 6.0 (A) 07/14/2022 0706          Lab Results   Component Value Date    HGBA1C 8.0 (H) 07/14/2022     Lab Results   Component Value Date    TSH 1.941 07/14/2022     Lab Results   Component Value Date    INR 0.9 10/11/2018    INR 0.9 05/02/2015    INR 0.9 05/01/2015     Lab Results   Component Value Date    WBC 17.36 (H) 02/01/2021    HGB 11.0 (L) 02/01/2021    HCT 34.8 (L) 02/01/2021    MCV 81 (L) 02/01/2021     (H) 02/01/2021     BMP  Sodium   Date Value Ref Range Status   07/14/2022 140 136 - 145 mmol/L Final     Potassium   Date Value Ref Range Status   07/14/2022 4.7 3.5 - 5.1 mmol/L Final     Chloride   Date Value Ref Range Status   07/14/2022 105 95 - 110 mmol/L Final     CO2   Date Value Ref Range Status   07/14/2022 24 23 - 29 mmol/L Final     BUN   Date Value Ref Range Status   07/14/2022 28 (H) 8 - 23 mg/dL Final     Creatinine   Date Value Ref Range Status   07/14/2022 8.4 (H) 0.5 - 1.4 mg/dL Final     Calcium   Date Value Ref Range Status   07/14/2022 9.6 8.7 - 10.5 mg/dL Final     Anion Gap   Date Value Ref Range Status   07/14/2022 11 8 - 16 mmol/L Final     eGFR if    Date Value Ref Range Status   07/14/2022 6.9 (A) >60 mL/min/1.73 m^2 Final     eGFR if non    Date Value Ref Range Status   07/14/2022 6.0 (A) >60 mL/min/1.73 m^2 Final     Comment:     Calculation used to obtain the estimated glomerular filtration  rate (eGFR) is the CKD-EPI equation.        BNP  @LABRCNTIP(BNP,BNPTRIAGEBLO)@  @LABRCNTIP(troponini)@  CrCl cannot be calculated (Patient's most recent lab result is older than the maximum 7 days  allowed.).  No results found in the last 24 hours.  No results found in the last 24 hours.  No results found in the last 24 hours.    Assessment:      1. Coronary artery disease of native artery of native heart with stable angina pectoris    2. Chronic systolic congestive heart failure    3. Severe hypertension    4. Hyperlipidemia associated with type 2 diabetes mellitus    5. PVC (premature ventricular contraction)    6. Type 2 diabetes mellitus with chronic kidney disease, with long-term current use of insulin, unspecified CKD stage    7. Class 1 obesity due to excess calories with serious comorbidity and body mass index (BMI) of 34.0 to 34.9 in adult        EF dropped to 40 % and Nuek stress test showed mix of scar and ischemia  + orthopnea and PND   ESRD on HD  CAD nd DM  High BP    Plan:   Arrange LHC and RHC due to ischemia on recent nuclear stress test and EF dropped to 40%, CHFrEF fluid overloaded  Increase Hydralazine to 100 mg tid and isodil 20 mg tid      Continue torpolXL valsartan nifedipine ASA and Statin  HD per Nephrology  Fluid restriction 1.5 liters a day  Na< 2 gm  DM Rx per PCP  Counseled DASH  Check Lipid profile in 6 months  Recommend heart-healthy diet, weight control and regular exercise.  Amy. Risk modification.   I have reviewed all pertinent labs and cardiac studies independently. Plans and recommendations have been formulated under my direct supervision. All questions answered and patient voiced understanding.   If symptoms persist go to the ED  RTC as needed

## 2022-08-23 NOTE — H&P (VIEW-ONLY)
Subjective:   Patient ID:  Isidro Alves is a 66 y.o. male who presents for follow up of No chief complaint on file.      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  PMH ESRD on HD since  and renal Tx list at [facility], CAD, old MI per MPI in , CHFrEF, IDDM 20 yrs, HTN, HLD. No smoking. Occasional drink.  Denied h/o stroke, heart attack and cancer.  No chest pain, dyspnea, dizziness palpitation and orthopnea.  Fell off the roof and multi fx.    MPI showed inferior old MI and EF 35%. ECHo showed EF 50% and LVH  No f/h premature CAD.  No smoking  Walks 4 times a week. And on PT  ekg NSR  Out of Valsartan for 1 day and states that BP controlled at home     visit  HD on right chest catheter.   echo dilated LV mod, EF 40% and  echo EF 55%  HOLTER showed 12% PVCs  Occasional palpitation. orthopnea and PND. No chest pain leg swelling and syncope. Urinating 8 times a week. No fluid restriction.    Interval history   Pha rmPI showed There is a moderate to severe intensity, large sized, mostly fixed perfusion abnormality with some reversibilty in the inferior, inferolateral, lateral and apical wall(s).  Still has SOB PND orthopnea. No leg swelling   BP high. On low Na diet and on 32 oz fluid restriction        Past Medical History:   Diagnosis Date    Anemia 4/16/2014    BPH (benign prostatic hyperplasia)     CKD (chronic kidney disease) stage 4, GFR 15-29 ml/min     Coronary artery disease of native artery of native heart with stable angina pectoris 4/29/2019    Diabetes mellitus     Diabetes mellitus, type 2     Elevated PSA     Encounter for blood transfusion     Herpes labialis     Hyperlipidemia     Hypertension     Obesity     Proteinuria     Secondary hyperparathyroidism (of renal origin)     UTI (lower urinary tract infection) 5/1/2015       Past Surgical History:   Procedure Laterality Date    COLONOSCOPY N/A 12/21/2017     Procedure: COLONOSCOPY;  Surgeon: Fran Escalera MD;  Location: [facility] ENDO;  Service: Endoscopy;  Laterality: N/A;    FLUOROSCOPY N/A 1/21/2021    Procedure: Vascath insertion;  Surgeon: Adrian Pruitt Jr., MD;  Location: [facility] CATH LAB;  Service: General;  Laterality: N/A;    FLUOROSCOPY N/A 4/6/2021    Procedure: Over the wire Vas Cath exchange;  Surgeon: Ho Pressley MD;  Location: [facility] CATH LAB;  Service: General;  Laterality: N/A;    PROSTATE BIOPSY      thorocotomy  2010       Social History     Tobacco Use    Smoking status: Never Smoker    Smokeless tobacco: Never Used   Substance Use Topics    Alcohol use: Yes     Comment: seldom     Drug use: No       Family History   Problem Relation Age of Onset    No Known Problems Mother     Diabetes Father     Hypertension Father     Hypertension Other     Diabetes Other          Review of Systems   Constitutional: Positive for malaise/fatigue. Negative for decreased appetite, diaphoresis, fever and night sweats.   HENT: Negative for nosebleeds.    Eyes: Negative for blurred vision and double vision.   Cardiovascular: Positive for dyspnea on exertion, orthopnea and paroxysmal nocturnal dyspnea. Negative for chest pain, claudication, irregular heartbeat, leg swelling, near-syncope, palpitations and syncope.   Respiratory: Negative for cough, shortness of breath, sleep disturbances due to breathing, snoring, sputum production and wheezing.    Endocrine: Negative for cold intolerance and polyuria.   Hematologic/Lymphatic: Does not bruise/bleed easily.   Skin: Negative for rash.   Musculoskeletal: Negative for back pain, falls, joint pain, joint swelling and neck pain.   Gastrointestinal: Negative for abdominal pain, heartburn, nausea and vomiting.   Genitourinary: Negative for dysuria, frequency and hematuria.   Neurological: Negative for difficulty with concentration, dizziness, focal weakness, headaches, light-headedness, numbness, seizures and  weakness.   Psychiatric/Behavioral: Negative for depression, memory loss and substance abuse. The patient does not have insomnia.    Allergic/Immunologic: Negative for HIV exposure and hives.       Objective:   Physical Exam  HENT:      Head: Normocephalic.   Eyes:      Pupils: Pupils are equal, round, and reactive to light.   Neck:      Thyroid: No thyromegaly.      Vascular: Normal carotid pulses. No carotid bruit or JVD.   Cardiovascular:      Rate and Rhythm: Normal rate and regular rhythm.  No extrasystoles are present.     Chest Wall: PMI is not displaced.      Pulses: Normal pulses.      Heart sounds: Normal heart sounds. No murmur heard.    No gallop. No S3 sounds.   Pulmonary:      Effort: No respiratory distress.      Breath sounds: Normal breath sounds. No stridor.   Abdominal:      General: Bowel sounds are normal.      Palpations: Abdomen is soft.      Tenderness: There is no abdominal tenderness. There is no rebound.   Skin:     Findings: No rash.   Neurological:      Mental Status: He is alert and oriented to person, place, and time.   Psychiatric:         Behavior: Behavior normal.         Lab Results   Component Value Date    CHOL 149 07/14/2022    CHOL 155 10/06/2020    CHOL 136 09/05/2019     Lab Results   Component Value Date    HDL 31 (L) 07/14/2022    HDL 26 (L) 10/06/2020    HDL 32 (L) 09/05/2019     Lab Results   Component Value Date    LDLCALC 79.8 07/14/2022    LDLCALC 98.0 10/06/2020    LDLCALC 73.8 09/05/2019     Lab Results   Component Value Date    TRIG 191 (H) 07/14/2022    TRIG 155 (H) 10/06/2020    TRIG 151 (H) 09/05/2019     Lab Results   Component Value Date    CHOLHDL 20.8 07/14/2022    CHOLHDL 16.8 (L) 10/06/2020    CHOLHDL 23.5 09/05/2019       Chemistry        Component Value Date/Time     07/14/2022 0706    K 4.7 07/14/2022 0706     07/14/2022 0706    CO2 24 07/14/2022 0706    BUN 28 (H) 07/14/2022 0706    CREATININE 8.4 (H) 07/14/2022 0706     (H)  07/14/2022 0706        Component Value Date/Time    CALCIUM 9.6 07/14/2022 0706    ALKPHOS 80 07/14/2022 0706    ALKPHOS 80 07/14/2022 0706    AST 16 07/14/2022 0706    AST 16 07/14/2022 0706    ALT 8 (L) 07/14/2022 0706    ALT 8 (L) 07/14/2022 0706    BILITOT 1.0 07/14/2022 0706    BILITOT 1.0 07/14/2022 0706    ESTGFRAFRICA 6.9 (A) 07/14/2022 0706    EGFRNONAA 6.0 (A) 07/14/2022 0706          Lab Results   Component Value Date    HGBA1C 8.0 (H) 07/14/2022     Lab Results   Component Value Date    TSH 1.941 07/14/2022     Lab Results   Component Value Date    INR 0.9 10/11/2018    INR 0.9 05/02/2015    INR 0.9 05/01/2015     Lab Results   Component Value Date    WBC 17.36 (H) 02/01/2021    HGB 11.0 (L) 02/01/2021    HCT 34.8 (L) 02/01/2021    MCV 81 (L) 02/01/2021     (H) 02/01/2021     BMP  Sodium   Date Value Ref Range Status   07/14/2022 140 136 - 145 mmol/L Final     Potassium   Date Value Ref Range Status   07/14/2022 4.7 3.5 - 5.1 mmol/L Final     Chloride   Date Value Ref Range Status   07/14/2022 105 95 - 110 mmol/L Final     CO2   Date Value Ref Range Status   07/14/2022 24 23 - 29 mmol/L Final     BUN   Date Value Ref Range Status   07/14/2022 28 (H) 8 - 23 mg/dL Final     Creatinine   Date Value Ref Range Status   07/14/2022 8.4 (H) 0.5 - 1.4 mg/dL Final     Calcium   Date Value Ref Range Status   07/14/2022 9.6 8.7 - 10.5 mg/dL Final     Anion Gap   Date Value Ref Range Status   07/14/2022 11 8 - 16 mmol/L Final     eGFR if    Date Value Ref Range Status   07/14/2022 6.9 (A) >60 mL/min/1.73 m^2 Final     eGFR if non    Date Value Ref Range Status   07/14/2022 6.0 (A) >60 mL/min/1.73 m^2 Final     Comment:     Calculation used to obtain the estimated glomerular filtration  rate (eGFR) is the CKD-EPI equation.        BNP  @LABRCNTIP(BNP,BNPTRIAGEBLO)@  @LABRCNTIP(troponini)@  CrCl cannot be calculated (Patient's most recent lab result is older than the maximum 7 days  allowed.).  No results found in the last 24 hours.  No results found in the last 24 hours.  No results found in the last 24 hours.    Assessment:      1. Coronary artery disease of native artery of native heart with stable angina pectoris    2. Chronic systolic congestive heart failure    3. Severe hypertension    4. Hyperlipidemia associated with type 2 diabetes mellitus    5. PVC (premature ventricular contraction)    6. Type 2 diabetes mellitus with chronic kidney disease, with long-term current use of insulin, unspecified CKD stage    7. Class 1 obesity due to excess calories with serious comorbidity and body mass index (BMI) of 34.0 to 34.9 in adult        EF dropped to 40 % and Nuek stress test showed mix of scar and ischemia  + orthopnea and PND   ESRD on HD  CAD nd DM  High BP    Plan:   Arrange LHC and RHC due to ischemia on recent nuclear stress test and EF dropped to 40%, CHFrEF fluid overloaded  Increase Hydralazine to 100 mg tid and isodil 20 mg tid      Continue torpolXL valsartan nifedipine ASA and Statin  HD per Nephrology  Fluid restriction 1.5 liters a day  Na< 2 gm  DM Rx per PCP  Counseled DASH  Check Lipid profile in 6 months  Recommend heart-healthy diet, weight control and regular exercise.  Amy. Risk modification.   I have reviewed all pertinent labs and cardiac studies independently. Plans and recommendations have been formulated under my direct supervision. All questions answered and patient voiced understanding.   If symptoms persist go to the ED  RTC as needed

## 2022-08-25 ENCOUNTER — TELEPHONE (OUTPATIENT)
Dept: TRANSPLANT | Facility: CLINIC | Age: 67
End: 2022-08-25
Payer: COMMERCIAL

## 2022-08-25 ENCOUNTER — LAB VISIT (OUTPATIENT)
Dept: LAB | Facility: HOSPITAL | Age: 67
End: 2022-08-25
Payer: COMMERCIAL

## 2022-08-25 ENCOUNTER — OFFICE VISIT (OUTPATIENT)
Dept: TRANSPLANT | Facility: CLINIC | Age: 67
End: 2022-08-25
Payer: COMMERCIAL

## 2022-08-25 VITALS
WEIGHT: 260.81 LBS | SYSTOLIC BLOOD PRESSURE: 143 MMHG | HEART RATE: 78 BPM | RESPIRATION RATE: 18 BRPM | HEIGHT: 73 IN | DIASTOLIC BLOOD PRESSURE: 97 MMHG | BODY MASS INDEX: 34.57 KG/M2 | TEMPERATURE: 97 F | OXYGEN SATURATION: 97 %

## 2022-08-25 DIAGNOSIS — I10 SEVERE HYPERTENSION: ICD-10-CM

## 2022-08-25 DIAGNOSIS — N18.6 ANEMIA DUE TO CHRONIC KIDNEY DISEASE, ON CHRONIC DIALYSIS: ICD-10-CM

## 2022-08-25 DIAGNOSIS — Z76.82 AWAITING ORGAN TRANSPLANT STATUS: ICD-10-CM

## 2022-08-25 DIAGNOSIS — Z99.2 ESRD ON DIALYSIS: ICD-10-CM

## 2022-08-25 DIAGNOSIS — I25.10 CORONARY ARTERY DISEASE INVOLVING NATIVE CORONARY ARTERY OF NATIVE HEART WITHOUT ANGINA PECTORIS: ICD-10-CM

## 2022-08-25 DIAGNOSIS — N18.6 ESRD ON DIALYSIS: ICD-10-CM

## 2022-08-25 DIAGNOSIS — Z99.2 ANEMIA DUE TO CHRONIC KIDNEY DISEASE, ON CHRONIC DIALYSIS: ICD-10-CM

## 2022-08-25 DIAGNOSIS — Z76.82 PATIENT ON WAITING LIST FOR KIDNEY TRANSPLANT: Primary | Chronic | ICD-10-CM

## 2022-08-25 DIAGNOSIS — N18.6 TYPE 2 DIABETES MELLITUS WITH CHRONIC KIDNEY DISEASE ON CHRONIC DIALYSIS, WITH LONG-TERM CURRENT USE OF INSULIN: ICD-10-CM

## 2022-08-25 DIAGNOSIS — E11.22 TYPE 2 DIABETES MELLITUS WITH CHRONIC KIDNEY DISEASE ON CHRONIC DIALYSIS, WITH LONG-TERM CURRENT USE OF INSULIN: ICD-10-CM

## 2022-08-25 DIAGNOSIS — D63.1 ANEMIA DUE TO CHRONIC KIDNEY DISEASE, ON CHRONIC DIALYSIS: ICD-10-CM

## 2022-08-25 DIAGNOSIS — E66.09 CLASS 1 OBESITY DUE TO EXCESS CALORIES WITH SERIOUS COMORBIDITY AND BODY MASS INDEX (BMI) OF 34.0 TO 34.9 IN ADULT: Chronic | ICD-10-CM

## 2022-08-25 DIAGNOSIS — Z99.2 TYPE 2 DIABETES MELLITUS WITH CHRONIC KIDNEY DISEASE ON CHRONIC DIALYSIS, WITH LONG-TERM CURRENT USE OF INSULIN: ICD-10-CM

## 2022-08-25 DIAGNOSIS — N25.81 SECONDARY HYPERPARATHYROIDISM: ICD-10-CM

## 2022-08-25 DIAGNOSIS — Z79.4 TYPE 2 DIABETES MELLITUS WITH CHRONIC KIDNEY DISEASE ON CHRONIC DIALYSIS, WITH LONG-TERM CURRENT USE OF INSULIN: ICD-10-CM

## 2022-08-25 LAB
BLD GP AB SCN TITR SERPL: 2 {TITER}
COMPLEXED PSA SERPL-MCNC: 6.1 NG/ML (ref 0–4)
PTH-INTACT SERPL-MCNC: 324.5 PG/ML (ref 9–77)

## 2022-08-25 PROCEDURE — 86886 COOMBS TEST INDIRECT TITER: CPT | Mod: TXP | Performed by: NURSE PRACTITIONER

## 2022-08-25 PROCEDURE — 84153 ASSAY OF PSA TOTAL: CPT | Mod: TXP | Performed by: NURSE PRACTITIONER

## 2022-08-25 PROCEDURE — 99215 OFFICE O/P EST HI 40 MIN: CPT | Mod: S$GLB,TXP,, | Performed by: NURSE PRACTITIONER

## 2022-08-25 PROCEDURE — 36415 COLL VENOUS BLD VENIPUNCTURE: CPT | Mod: TXP | Performed by: NURSE PRACTITIONER

## 2022-08-25 PROCEDURE — 99999 PR PBB SHADOW E&M-EST. PATIENT-LVL V: CPT | Mod: PBBFAC,TXP,, | Performed by: NURSE PRACTITIONER

## 2022-08-25 PROCEDURE — 99999 PR PBB SHADOW E&M-EST. PATIENT-LVL V: ICD-10-PCS | Mod: PBBFAC,TXP,, | Performed by: NURSE PRACTITIONER

## 2022-08-25 PROCEDURE — 83970 ASSAY OF PARATHORMONE: CPT | Mod: TXP | Performed by: NURSE PRACTITIONER

## 2022-08-25 PROCEDURE — 86706 HEP B SURFACE ANTIBODY: CPT | Mod: TXP | Performed by: NURSE PRACTITIONER

## 2022-08-25 PROCEDURE — 99215 OFFICE O/P EST HI 40 MIN: CPT | Mod: PBBFAC,TXP | Performed by: NURSE PRACTITIONER

## 2022-08-25 PROCEDURE — 99215 PR OFFICE/OUTPT VISIT, EST, LEVL V, 40-54 MIN: ICD-10-PCS | Mod: S$GLB,TXP,, | Performed by: NURSE PRACTITIONER

## 2022-08-25 NOTE — PROGRESS NOTES
Transplant Recipient Adult Psychosocial Assessment (UPDATE from 2/24/2022)    Pt presents in clinic today w/ pt's cousin Zay.    Isidro Alves  [address]  [place] [address]      Telephone Information:   Mobile [phone]   Home               [phone] (home)  Work                There is no work phone number on file.  E-mail              [email or web address]     Sex: male  YOB: 1955  Age: 66 y.o.  [place] Citizen: yes  Primary Language: English   Needed: no     Emergency Contact:  Name: Kunal Alves  Relationship: wife  Address: same as above  Phone Numbers: [phone] (mobile)     Family/Social Support:   Number of dependents/: Pt reports no dependents.  Two adult biological children and one adult step child.    Marital history: Pt  twice, with the first marriage ending in divorce. Pt reports current marriage of 27 years to supportive and involved wife, Mrs. Kunal Alves.  Other family dynamics: Pt reports having seven sisters and two brothers (step or full siblings), pt is the eldest sibling. Reports close family and friends network.    Household Composition:  Name: Isidro Alves  Age: 66  Relationship: patient  Does person drive? yes     Name: Kunal Alves  Age: 61  Relationship: wife  Does person drive? Yes    Do you and your caregivers have access to reliable transportation? yes  PRIMARY CAREGIVER: Kunal Alves will be primary caregiver, phone number [phone].       reviewed in-depth information to patient and caregiver regarding pre- and post-transplant caregiver role.   confirmed patient and caregiver have concrete plans in place for pre- and post-transplant care giving, including primary and back-up caregivers to ensure care giving needs are met as needed.     Patient and Caregiver state understanding all aspects of caregiver role/commitment and is able/willing/committed to being caregiver to the fullest  "extent necessary.     Patient and Caregiver verbalize understanding of the education provided today and caregiver responsibilities.          remains available. Patient and Caregiver agree to contact  in a timely manner if concerns arise.       Able to take time off work without financial concerns: yes.      Additional Significant Others who will Assist with Transplant:  Name: Princess Alves   Age: 75  Phone: [phone]  City: [place] State: [place]  Relationship: aunt  Does person drive? no     Name: Wilner Alves  Age: 43  Phone: [phone]  City: [place] State: [place]  Relationship: son  Does person drive? yes     Name: Sahil Reynolds  Age: 62  Phone: [phone]  City: [place] State: [place]  Relationship: sister  Does person drive? yes     Name: Kevin Alves  Age: 46  Phone: [phone]  City: [place] State: [place]  Relationship: daughter  Does person drive? yes     Living Will: no  Healthcare Power of : no  Advance Directives on file: <<no information> per medical record.  Verbally reviewed LW/HCPA information.   provided patient with copy of LW/HCPA documents and provided education on completion of forms.     Living Donors: none currently, Education and resource information given to patient.     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  Pt states is able to take time away from work as needed including for transplant and recovery period.     Spouse/Significant Other Employment: Pt reports wife works full-time as a  at [place] [employer] ([employer]) and plans to retire in one year, however is able to take time off without concern to serve as primary caregiver.   Disabled: no     Monthly Income:  Other: $combined 4200.00 approximately per month  Able to afford all costs now and if transplanted, including medications: yes  Patient verbalizes understanding of personal responsibilities related to transplant costs and the importance of having a financial plan to ensure that patients transplant costs are fully covered.        provided fundraising information/education. Patient and Caregiver verbalizes understanding.   remains available.     Insurance:   Payer/Plan Subscr  Sex Relation Sub. Ins. ID Effective Group Num   1. MEDICARE - ME* KENNEY TEJEDA 1955 Male Self [id number]                                    [address]   2. BLUE CROSS BL* MAGGIE TEJEDA 1960 Female Spouse [id number]                                   [address] [address]     Primary Insurance (for UNOS reporting): Medicare  Secondary Insurance (for UNOS reporting): Private (through spouse's employer)  Patient and Caregiver verbalizes clear understanding that patient may experience difficulty obtaining and/or be denied insurance coverage post-surgery. This includes and is not limited to disability insurance, life insurance, health insurance, burial insurance, long term care insurance, and other insurances.       Patient and Caregiver also report understanding that future health concerns related to or unrelated to transplantation may not be covered by patient's insurance.  Resources and information provided and reviewed.      Patient and Caregiver provide verbal permission to release any necessary information to outside resources for patient care and discharge planning.  Resources and information provided are reviewed.       Dialysis Adherence: Patient states started HD in 2021. Pt reports  high level of compliance with treatments and health care recommendations or instructions.      Infusion Service: patient utilizing? no  Home Health: patient utilizing? no  DME: yes Pt reports using a BP cuff and diabetic supplies to monitor blood sugar levels at home.  Pulmonary/Cardiac Rehab: Pt denies  ADLS:  Pt reports no difficulties with driving, walking, bathing, cooking, housekeeping, eating, shopping, or taking prescribed medication.     Adherence:   Pt reports remains adherant with all medications and health regimen.  Adherence education and counseling provided.       Confirmed substance use and history as follows:        Tobacco Use    Smoking status: Never Smoker    Smokeless tobacco: Never Used   Substance Use Topics    Alcohol use: Yes, one per week - will discuss more in-depth with MD regarding recommendations and pt states plans to stop use unless medically directed. Denies need for resources or additional support or information at this time.        Comment: seldom       Social History       Illicit Drugs/Non-prescribed Medications: Pt reports a remote history of marijuana use over 20+ years ago. Pt reports no current drug use.      Patient and Caregiver state having a clear understanding of the potential impact of substance use as it relates to transplant candidacy and is aware of possible random substance screening.  Substance abstinence/cessation counseling, education and resources provided and reviewed.      Arrests/DWI/Treatment/Rehab: patient denies     Psychiatric History:  Mental Health: Pt reports no history of or current mental health issues or concerns.   Psychiatrist/Counselor: Pt denies seeing a mental health professional and reports being open to seeing the psych department for talk therapy if necessary.  Medications:  Pt denies taking medications for mental health reasons.  Suicide/Homicide Issues: Pt denies any history of or current suicidal or homicidal ideations.   Safety at home:  Pt reports no current or history of safety concerns in household; or history of abuse of any kind.     Knowledge: Patient and Caregiver stated having clear understanding and realistic expectations regarding the potential risks and potential benefits of organ transplantation and organ donation and agrees to discuss with health care team members and support system members, as well as to utilize available resources and express questions and/or concerns in order to further facilitate the pt informed decision-making.  Resources and information provided and reviewed.     Patient and Caregiver stated are aware of [facility]'s affiliation and/or partnership with agencies in home health care, LTAC, SNF, [facility], and other hospitals and clinics.     Understanding: Patient and Caregiver reported having a clear understanding of the many lifetime commitments involved with being a transplant recipient, including costs, compliance, medications, lab work, procedures, appointments, concrete and financial planning, preparedness, timely and appropriate communication of concerns, abstinence (ETOH, tobacco, illicit non-prescribed drugs), adherence to all health care team recommendations, support system and caregiver involvement, appropriate and timely resource utilization and follow-through, mental health counseling as needed/recommended, and patient and caregiver responsibilities.  Social Service Handbook, resources and detailed educational information provided and reviewed.  Educational information provided.     Patient and Caregiver also reported current and expected compliance with health care regime and states having a clear understanding of the importance of compliance.       Patient and Caregiver reported having a clear understanding that risks and benefits may be involved with organ transplantation and with organ donation.        Patient and Caregiver also reported having a lear understanding that psychosocial risk factors may affect  patient, and include but are not limited to feelings of depression, generalized anxiety, anxiety regarding dependence on others, post traumatic stress disorder, feelings of guilt and other emotional and/or mental concerns, and/or exacerbation of existing mental health concerns.  Detailed resources provided and discussed.       Patient and Caregiver agreed to access appropriate resources in a timely manner as needed and/or as recommended, and to communicate concerns appropriately. Patient and Caregiver also reports a clear understanding of treatment options available.       reviewed resources, resource limitations, educational information, psychosocial risk factors and ways to mitigate risk factors, and provided additional information, and answered all questions.     Feelings or Concerns: Patient and Caregiver reported having no concerns at this time. Patient and Caregiver reported having high levels of motivation to pursue transplant at this time and deny needs, barriers or issues proceeding if pt is called in for a transplant. Also see Epic for documentation from transplant social work team regarding organ offer/reviews.     Coping: Pt reports continues to coping well with the transplant process at this time and reports ongoing family support, is walking the track, and listens to music to manage stressors as needed. Pt reports coping strategies are effective and plans to engage in same or similar positive coping strategies in hospital and during recovery period as medically allowed.     Scientologist: Pt reports attends [employer] in [place], [place] with strong Scientologist support.      Goals: Pt reports plans to spend time with family, with work, traveling, and appreciating life now and if transplanted.  Pt denies need for additional support or referrals at this time for vocational support.     Interview Behavior: Patient presents as alert and oriented x 4, pleasant, good eye contact,  well groomed, recall good, concentration/judgement good, average intelligence, calm, communicative, cooperative and asking and answering questions appropriately. He was accompanied by his adult son, Wilner Alves, who presents as engaged and highly supportive of pt's pursuit of transplant. No other concerns reported or indicated. Both state will call as needed and were provided with contact information for myself and transplant team.     Transplant Social Work - Candidacy  Assessment/Plan:      Psychosocial Suitability:  Based on psychosocial risk factors, patient presents as low risk candidate for kidney transplant at this time. Pt presents with clear caregiver plan and good family support. Pt reports financial ability to afford transplant, reports compliance with current medical regimen, reports adequate functional status - currently independent with ADLs, no reported cognitive/developmental/behavioral impairments, no significant mental health concerns reports, no reported substance use/abuse, and reports general understanding of txp process with adequate coping skills.     Recommendations/Additional Comments: SW recommends pt conduct fundraising to assist in the transplant process. SW recommends that pt remain aware of potential mental health concerns and contact the team if any concerns arise. SW recommends that pt remain abstinent from tobacco, ETOH and drug use. SW support pt's continued dialysis adherence. SW remains available to answer any questions or concerns that arise as the pt moves through the transplant process.     Final determination of transplant candidacy will be made once work up is complete and reviewed by the selection committee.    Era Patel LMSW

## 2022-08-25 NOTE — TELEPHONE ENCOUNTER
----- Message from Jessica Collins NP sent at 8/25/2022  2:43 PM CDT -----  Results reviewed, action needed.   Needs f/u with urology

## 2022-08-25 NOTE — LETTER
August 25, 2022        Gabino Champion  [address]  [place] [address]  Phone: [phone]  Fax: [phone]             Alan Wooten- Transplant [facility]  [address]  [facility] [address]  Phone: [phone]   Patient: Isidro Alves   MR Number: [id number]   YOB: 1955   Date of Visit: 8/25/2022       Dear Dr. Gabino Champion    Thank you for referring Isidro Alves to me for evaluation. Attached you will find relevant portions of my assessment and plan of care.    If you have questions, please do not hesitate to call me. I look forward to following Isidro Alves along with you.    Sincerely,    Jessica Collins, NP    Enclosure    If you would like to receive this communication electronically, please contact [email or web address] or [phone] to request [email or web address] Link access.    [email or web address] is a tool which provides read-only access to select patient information with whom you have a relationship. Its easy to use and provides real time access to review your patients record including encounter summaries, notes, results, and demographic information.    If you feel you have received this communication in error or would no longer like to receive these types of communications, please e-mail [email or web address]

## 2022-08-25 NOTE — PROGRESS NOTES
LISTED PATIENT EDUCATION NOTE    Mr. Isidro Alves was seen in pre-kidney transplant clinic for evaluation for kidney, kidney/pancreas or pancreas only transplant.  The patient attended a an individual video education session that discussed/reviewed the following aspects of transplantation: evaluation and selection committee process, UNOS waitlist management/multiple listings, types of organs offered (KDPI < 85%, KDPI > 85%, PHS risk, DCD, HCV+, HIV+ for HIV+ recipients and enbloc/dual), financial aspects, surgical procedures, dietary instruction pre- and post-transplant, health maintenance pre- and post-transplant, post-transplant hospitalization and outpatient follow-up, potential to participate in a research protocol, and medication management and side effects.  A question and answer session was provided after the presentation.    The patient was seen by all members of the multi-disciplinary team to include: Nephrologist/PA, Surgeon, , Transplant Coordinator, , Pharmacist and Dietician (if applicable).    The patient reviewed and signed all consents for evaluation which were witnessed and sent to scanning into the [facility] chart.    The patient was given an education book and plan for further evaluation based on his individual assessment.      Reviewed program requirement for complete COVID vaccination with documentation prior to listing.  COVID education information reviewed with patient.     The patient was informed that the transplant team would manage immediate post op pain. If the patient requires long term pain management, they will need to have that pain management addressed by their PCP or previous provider who wrote for long term pain medicines.    The patient was encouraged to call with any questions or concerns.

## 2022-08-25 NOTE — PROGRESS NOTES
Kidney Transplant Recipient Reevalulation    Referring Physician: Gabino Champion  Current Nephrologist: Gabino Champion  Waitlist Status: active  Dialysis Start Date: 9/17/2019    Subjective:     CC:   6 month  reassessment of kidney transplant candidacy.    HPI:  Mr. Alves is a 64 y.o. year old Black or  male with ESRD secondary to diabetic nephropathy.  He has been on the wait list for a kidney transplant at [facility] since 9/17/2019. He is currently  ESRD, Pt reports starting HD ~ 3/2021, he is dialyzing MWF for 3 1/2 hours Per session.  Patient reports that he is tolerating dialysis well. Access  Right chest CATHETER FOR DIALYSIS.       FX assessment -Patient walks ~ 1/2-1 mile 3x/week.   Denies ARR[place], leg pain or chest pain, denies dizziness.   Over all looks good not frail.         ABN stress test and EF 40 % on TTE  8/23/22 Cardiology Dr Ragland  Plan:   Arrange LHC and RHC due to ischemia on recent nuclear stress test and EF dropped to 40%, CHFrEF fluid overloaded  Increase Hydralazine to 100 mg tid and isodil 20 mg tid       + COVID 1/2022 Hospitalized: -- Pt reports he has recuperated well.  *Acute hypoxemic respiratory failure due to COVID-19   O2 keep sat above 90%. Vapotherm and BiPAP p.r.n.   IV of Rocephin azithromycin elevated inflammatory markers.   REMDESIVIR IV DECADRON.        HX Elevated PSA   HX Prostate biopsy a few years back at [facility]   Urologist Dr Nugent /urology 1/19/2022. He has been Cleared for a transplant by urology   PSA, Screen (ng/mL)   Date Value   08/25/2022 6.1 (H)        Lab /diagnostic results reviewed with patient today.    7/17/22 Renal US:  Subcentimeter left renal cyst  PXR 6/30/22  :   No significant vascular calcifications.  7/14/22  CXR:   Lungs are clear without focal consolidation, edema, or mass  6/12/20 iliac doppler: triphasic waveforms bilaterally     6/14/22  · The left ventricle is mildly enlarged with concentric hypertrophy and  mildly decreased systolic function.  · Grade I left ventricular diastolic dysfunction.  · Normal right ventricular size with normal right ventricular systolic function.  · Normal central venous pressure (3 mmHg).  · Mild mitral regurgitation.  · The estimated ejection fraction is 40%.  · There is mild left ventricular global hypokinesis.       7/26/22 Stress test    Abnormal myocardial perfusion scan.    There is a moderate to severe intensity, large sized, mostly fixed perfusion abnormality with some reversibilty in the inferior, inferolateral, lateral and apical wall(s).    There are no other significant perfusion abnormalities.    The gated perfusion images showed an ejection fraction of 33% at rest. The gated perfusion images showed an ejection fraction of 48% post stress. Normal ejection fraction is greater than 59%.    There is moderate global hypokinesis at rest and stress.    The EKG portion of this study is negative for ischemia.           PMH Thyroid nodules, Thyroid US completed in 4/25/2019  Per Dr Castano: no bx needed, nodules are too small. Repeat thyroid US 4/24/20 (stable)  Lab Results   Component Value Date    TSH 1.464 09/05/2019         Past Medical History:   Diagnosis Date    Anemia 4/16/2014    BPH (benign prostatic hyperplasia)     CKD (chronic kidney disease) stage 4, GFR 15-29 ml/min     Coronary artery disease of native artery of native heart with stable angina pectoris 4/29/2019    Diabetes mellitus     Diabetes mellitus, type 2     Elevated PSA     Encounter for blood transfusion     Herpes labialis     Hyperlipidemia     Hypertension     Obesity     Proteinuria     Secondary hyperparathyroidism (of renal origin)     UTI (lower urinary tract infection) 5/1/2015       Past Medical and Surgical History: Mr. Alves  has a past medical history of Anemia, BPH (benign prostatic hyperplasia), CKD (chronic kidney disease) stage 4, GFR 15-29 ml/min, Coronary artery disease  of native artery of native heart with stable angina pectoris, Diabetes mellitus, Diabetes mellitus, type 2, Elevated PSA, Encounter for blood transfusion, Herpes labialis, Hyperlipidemia, Hypertension, Obesity, Proteinuria, Secondary hyperparathyroidism (of renal origin), and UTI (lower urinary tract infection).  He has a past surgical history that includes thorocotomy (2010); Prostate biopsy; Colonoscopy (N/A, 12/21/2017); Fluoroscopy (N/A, 1/21/2021); and Fluoroscopy (N/A, 4/6/2021).    Past Social and Family History: Mr. Alves reports that he has never smoked. He has never used smokeless tobacco. He reports current alcohol use. He reports that he does not use drugs. His family history includes Diabetes in his father and another family member; Hypertension in his father and another family member; No Known Problems in his mother.        Past Medical History:   Diagnosis Date    Anemia 4/16/2014    BPH (benign prostatic hyperplasia)     CKD (chronic kidney disease) stage 4, GFR 15-29 ml/min     Coronary artery disease of native artery of native heart with stable angina pectoris 4/29/2019    Diabetes mellitus     Diabetes mellitus, type 2     Elevated PSA     Encounter for blood transfusion     Herpes labialis     Hyperlipidemia     Hypertension     Obesity     Proteinuria     Secondary hyperparathyroidism (of renal origin)     UTI (lower urinary tract infection) 5/1/2015       Review of Systems   Constitutional: Positive for fatigue. Negative for activity change, appetite change, chills, fever and unexpected weight change.   HENT: Negative for congestion, facial swelling, postnasal drip, rhinorrhea, sinus pressure, sore throat and trouble swallowing.    Eyes: Positive for visual disturbance. Negative for pain and redness.        Wears glasses   Respiratory: Negative for cough, chest tightness, shortness of breath and wheezing.    Cardiovascular: Negative for chest pain and palpitations.  "  Gastrointestinal: Positive for abdominal distention. Negative for abdominal pain, diarrhea, nausea and vomiting.   Genitourinary: Negative for dysuria, flank pain and urgency.   Musculoskeletal: Negative for gait problem, neck pain and neck stiffness.   Skin: Negative for rash.   Allergic/Immunologic: Negative for environmental allergies, food allergies and immunocompromised state.   Neurological: Negative for dizziness, weakness and light-headedness.   Psychiatric/Behavioral: Negative for agitation and confusion. The patient is not nervous/anxious.        Objective:   Blood pressure (!) 143/97, pulse 78, temperature 97.3 °F (36.3 °C), temperature source Temporal, resp. rate 18, height 6' 0.64" (1.845 m), weight 118.3 kg (260 lb 12.9 oz), SpO2 97 %.body mass index is 34.75 kg/m².    Physical Exam  Vitals reviewed.   Constitutional:       Appearance: Normal appearance. He is well-developed.   HENT:      Head: Normocephalic.   Eyes:      Conjunctiva/sclera: Conjunctivae normal.      Pupils: Pupils are equal, round, and reactive to light.   Cardiovascular:      Rate and Rhythm: Normal rate and regular rhythm.      Heart sounds: Normal heart sounds.   Pulmonary:      Effort: Pulmonary effort is normal.      Breath sounds: Normal breath sounds.   Chest:       Abdominal:      General: Bowel sounds are normal. There is distension.      Palpations: Abdomen is soft. There is no hepatomegaly, splenomegaly or mass.      Tenderness: There is no abdominal tenderness. There is no guarding or rebound. Negative signs include Curran's sign and McBurney's sign.   Musculoskeletal:         General: Normal range of motion.      Cervical back: Normal range of motion and neck supple.      Comments:     Lymphadenopathy:      Cervical: No cervical adenopathy.   Skin:     General: Skin is warm and dry.   Neurological:      Mental Status: He is alert and oriented to person, place, and time.      Motor: No abnormal muscle tone.      " Coordination: Coordination normal.   Psychiatric:         Behavior: Behavior normal.         Labs:  Lab Results   Component Value Date    WBC 9.58 08/23/2022    HGB 11.7 (L) 08/23/2022    HCT 37.9 (L) 08/23/2022     08/23/2022    K 4.7 08/23/2022     08/23/2022    CO2 23 08/23/2022    BUN 41 (H) 08/23/2022    CREATININE 8.3 (H) 08/23/2022    EGFRNONAA 6.0 (A) 07/14/2022    CALCIUM 9.8 08/23/2022    PHOS 7.1 (H) 02/01/2021    MG 2.1 01/27/2021    ALBUMIN 3.7 07/14/2022    ALBUMIN 3.7 07/14/2022    AST 16 07/14/2022    AST 16 07/14/2022    ALT 8 (L) 07/14/2022    ALT 8 (L) 07/14/2022    UTPCR 5.54 (H) 02/01/2021    .5 (H) 08/25/2022       Lab Results   Component Value Date    BILIRUBINUA Negative 05/18/2021    PROTEINUA 3+ (A) 05/18/2021    NITRITE Negative 05/18/2021    RBCUA 0 05/18/2021    WBCUA >100 (H) 05/18/2021       No results found for: HLAABCTYPE    Labs were reviewed with the patient.    Assessment:     1. Patient on waiting list for kidney transplant    2. ESRD on dialysis    3. Class 1 obesity due to excess calories with serious comorbidity and body mass index (BMI) of 34.0 to 34.9 in adult    4. Severe hypertension    5. Anemia due to chronic kidney disease, on chronic dialysis    6. Type 2 diabetes mellitus with chronic kidney disease on chronic dialysis, with long-term current use of insulin    7. Secondary hyperparathyroidism    8. Coronary artery disease involving native coronary artery of native heart without angina pectoris        Plan:    colonoscopy due 12/2022  F/u with urology as scheduled    There is a moderate to severe intensity, large sized, mostly fixed perfusion abnormality with some reversibilty in the inferior, inferolateral, lateral and apical wall(s).   EF 40 % of TTE  Needs to be inactive:   Cardiology follow-up--Appointment with Dr. Ragland on 8/23/2022  Heart Cath scheduled on 8/30/22  Plan:   Arrange LHC and RHC due to ischemia on recent nuclear stress test and EF  dropped to 40%, CHFrEF fluid overloaded  Increase Hydralazine to 100 mg tid and isodil 20 mg tid    Transplant Candidacy:   MR. Alves  is a unacceptable for  kidney transplant candidate.  Meets center eligibility for accepting HCV+ donor offer - yes.  Patient educated on HCV+ donors. He is willing  to accept HCV+ donor offer -  yes   Patient is a candidate for KDPI > 85 kidney donor offer - no d/t weight.   The patient has experienced health changes that warrant further investigation.  Patient will be placed in an inactive status at present      Follow-up:   In addition to the tests noted in the plan, MR. Alves will continue to have reevaluation as per the standing pre-kidney transplant protocol:  1. Monthly blood for PRA  2. Annual return to clinic, except HIV positive, > 65 years of age, or pancreas transplant candidates who will be scheduled to see transplant every 6 months while in pre-transplant phase  3. Annual re-testing: CXR, EKG, yearly mammograms for women over 40 and PSA for males over 40, cardiology follow-up as recommended by initial cardiology pre-transplant evaluation  4. Renal ultrasound every 2 years  5. Baseline colonoscopy after age 50 and repeated as recommended          JES Davis Patient Status  Functional Status: 60% - Requires occasional assistance but is able to care for needs  Physical Capacity: No Limitations

## 2022-08-29 LAB
HBV SURFACE AB SER QL IA: POSITIVE
HBV SURFACE AB SERPL IA-ACNC: NORMAL MIU/ML

## 2022-08-30 ENCOUNTER — HOSPITAL ENCOUNTER (OUTPATIENT)
Facility: HOSPITAL | Age: 67
Discharge: HOME | End: 2022-08-30
Attending: INTERNAL MEDICINE | Admitting: INTERNAL MEDICINE
Payer: COMMERCIAL

## 2022-08-30 VITALS
HEART RATE: 72 BPM | OXYGEN SATURATION: 99 % | WEIGHT: 260.81 LBS | SYSTOLIC BLOOD PRESSURE: 179 MMHG | DIASTOLIC BLOOD PRESSURE: 97 MMHG | TEMPERATURE: 98 F | HEIGHT: 72 IN | BODY MASS INDEX: 35.33 KG/M2 | RESPIRATION RATE: 15 BRPM

## 2022-08-30 DIAGNOSIS — I25.10 CORONARY ARTERY DISEASE INVOLVING NATIVE CORONARY ARTERY OF NATIVE HEART, UNSPECIFIED WHETHER ANGINA PRESENT: ICD-10-CM

## 2022-08-30 DIAGNOSIS — I42.0 DILATED CARDIOMYOPATHY: ICD-10-CM

## 2022-08-30 DIAGNOSIS — R06.02 SOB (SHORTNESS OF BREATH): ICD-10-CM

## 2022-08-30 DIAGNOSIS — I50.22 CHRONIC SYSTOLIC HEART FAILURE: ICD-10-CM

## 2022-08-30 DIAGNOSIS — R94.39 ABNORMAL STRESS TEST: ICD-10-CM

## 2022-08-30 DIAGNOSIS — R94.39 ABNORMAL NUCLEAR STRESS TEST: Primary | ICD-10-CM

## 2022-08-30 LAB
CATH EF QUANTITATIVE: 40 %
POCT GLUCOSE: 141 MG/DL (ref 70–110)

## 2022-08-30 PROCEDURE — 93460 PR CATH PLACE/CORON ANGIO, IMG SUPER/INTERP,R&L HRT CATH, L HRT VENTRIC: ICD-10-PCS | Mod: 26,TXP,, | Performed by: INTERNAL MEDICINE

## 2022-08-30 PROCEDURE — 63600175 PHARM REV CODE 636 W HCPCS: Mod: NTX | Performed by: INTERNAL MEDICINE

## 2022-08-30 PROCEDURE — C1760 CLOSURE DEV, VASC: HCPCS | Mod: TXP | Performed by: INTERNAL MEDICINE

## 2022-08-30 PROCEDURE — C1769 GUIDE WIRE: HCPCS | Mod: TXP | Performed by: INTERNAL MEDICINE

## 2022-08-30 PROCEDURE — 99152 MOD SED SAME PHYS/QHP 5/>YRS: CPT | Mod: NTX | Performed by: INTERNAL MEDICINE

## 2022-08-30 PROCEDURE — 93460 R&L HRT ART/VENTRICLE ANGIO: CPT | Mod: TXP | Performed by: INTERNAL MEDICINE

## 2022-08-30 PROCEDURE — 99152 MOD SED SAME PHYS/QHP 5/>YRS: CPT | Mod: TXP,,, | Performed by: INTERNAL MEDICINE

## 2022-08-30 PROCEDURE — 99153 MOD SED SAME PHYS/QHP EA: CPT | Mod: TXP | Performed by: INTERNAL MEDICINE

## 2022-08-30 PROCEDURE — 25000003 PHARM REV CODE 250: Mod: TXP | Performed by: INTERNAL MEDICINE

## 2022-08-30 PROCEDURE — 27201423 OPTIME MED/SURG SUP & DEVICES STERILE SUPPLY: Mod: TXP | Performed by: INTERNAL MEDICINE

## 2022-08-30 PROCEDURE — C1751 CATH, INF, PER/CENT/MIDLINE: HCPCS | Mod: NTX | Performed by: INTERNAL MEDICINE

## 2022-08-30 PROCEDURE — 93460 R&L HRT ART/VENTRICLE ANGIO: CPT | Mod: 26,TXP,, | Performed by: INTERNAL MEDICINE

## 2022-08-30 PROCEDURE — 99152 PR MOD CONSCIOUS SEDATION, SAME PHYS, 5+ YRS, FIRST 15 MIN: ICD-10-PCS | Mod: TXP,,, | Performed by: INTERNAL MEDICINE

## 2022-08-30 PROCEDURE — 25500020 PHARM REV CODE 255: Mod: NTX | Performed by: INTERNAL MEDICINE

## 2022-08-30 PROCEDURE — C1894 INTRO/SHEATH, NON-LASER: HCPCS | Mod: TXP | Performed by: INTERNAL MEDICINE

## 2022-08-30 RX ORDER — ACETAMINOPHEN 325 MG/1
650 TABLET ORAL EVERY 4 HOURS PRN
Status: DISCONTINUED | OUTPATIENT
Start: 2022-08-30 | End: 2022-08-30 | Stop reason: HOSPADM

## 2022-08-30 RX ORDER — ACYCLOVIR 400 MG/1
400 TABLET ORAL 2 TIMES DAILY PRN
Qty: 60 TABLET | Refills: 6 | Status: SHIPPED | OUTPATIENT
Start: 2022-08-30 | End: 2022-11-10

## 2022-08-30 RX ORDER — DIPHENHYDRAMINE HCL 50 MG
50 CAPSULE ORAL ONCE
Status: COMPLETED | OUTPATIENT
Start: 2022-08-30 | End: 2022-08-30

## 2022-08-30 RX ORDER — LABETALOL HYDROCHLORIDE 5 MG/ML
INJECTION, SOLUTION INTRAVENOUS
Status: DISCONTINUED | OUTPATIENT
Start: 2022-08-30 | End: 2022-08-30 | Stop reason: HOSPADM

## 2022-08-30 RX ORDER — METOPROLOL SUCCINATE 50 MG/1
50 TABLET, EXTENDED RELEASE ORAL ONCE
Status: COMPLETED | OUTPATIENT
Start: 2022-08-30 | End: 2022-08-30

## 2022-08-30 RX ORDER — VALSARTAN 160 MG/1
320 TABLET ORAL DAILY
Status: COMPLETED | OUTPATIENT
Start: 2022-08-30 | End: 2022-08-30

## 2022-08-30 RX ORDER — DIAZEPAM 5 MG/1
5 TABLET ORAL
Status: DISCONTINUED | OUTPATIENT
Start: 2022-08-30 | End: 2022-08-30 | Stop reason: HOSPADM

## 2022-08-30 RX ORDER — LIDOCAINE HYDROCHLORIDE 20 MG/ML
INJECTION, SOLUTION INFILTRATION; PERINEURAL
Status: DISCONTINUED | OUTPATIENT
Start: 2022-08-30 | End: 2022-08-30 | Stop reason: HOSPADM

## 2022-08-30 RX ORDER — SODIUM CHLORIDE 9 MG/ML
INJECTION, SOLUTION INTRAVENOUS CONTINUOUS
Status: ACTIVE | OUTPATIENT
Start: 2022-08-30 | End: 2022-08-30

## 2022-08-30 RX ORDER — SODIUM CHLORIDE 9 MG/ML
INJECTION, SOLUTION INTRAVENOUS CONTINUOUS
Status: DISCONTINUED | OUTPATIENT
Start: 2022-08-30 | End: 2022-08-30 | Stop reason: HOSPADM

## 2022-08-30 RX ORDER — HYDRALAZINE HYDROCHLORIDE 20 MG/ML
INJECTION INTRAMUSCULAR; INTRAVENOUS
Status: DISCONTINUED | OUTPATIENT
Start: 2022-08-30 | End: 2022-08-30 | Stop reason: HOSPADM

## 2022-08-30 RX ORDER — NAPROXEN SODIUM 220 MG/1
81 TABLET, FILM COATED ORAL ONCE
Status: COMPLETED | OUTPATIENT
Start: 2022-08-30 | End: 2022-08-30

## 2022-08-30 RX ORDER — MIDAZOLAM HYDROCHLORIDE 1 MG/ML
INJECTION, SOLUTION INTRAMUSCULAR; INTRAVENOUS
Status: DISCONTINUED | OUTPATIENT
Start: 2022-08-30 | End: 2022-08-30 | Stop reason: HOSPADM

## 2022-08-30 RX ORDER — ONDANSETRON 8 MG/1
8 TABLET, ORALLY DISINTEGRATING ORAL EVERY 8 HOURS PRN
Status: DISCONTINUED | OUTPATIENT
Start: 2022-08-30 | End: 2022-08-30 | Stop reason: HOSPADM

## 2022-08-30 RX ORDER — CEFAZOLIN SODIUM 1 G/3ML
INJECTION, POWDER, FOR SOLUTION INTRAMUSCULAR; INTRAVENOUS
Status: DISCONTINUED | OUTPATIENT
Start: 2022-08-30 | End: 2022-08-30 | Stop reason: HOSPADM

## 2022-08-30 RX ORDER — FENTANYL CITRATE 50 UG/ML
INJECTION, SOLUTION INTRAMUSCULAR; INTRAVENOUS
Status: DISCONTINUED | OUTPATIENT
Start: 2022-08-30 | End: 2022-08-30 | Stop reason: HOSPADM

## 2022-08-30 RX ADMIN — ASPIRIN 81 MG CHEWABLE TABLET 81 MG: 81 TABLET CHEWABLE at 10:08

## 2022-08-30 RX ADMIN — DIPHENHYDRAMINE HYDROCHLORIDE 50 MG: 50 CAPSULE ORAL at 10:08

## 2022-08-30 RX ADMIN — METOPROLOL SUCCINATE 50 MG: 50 TABLET, FILM COATED, EXTENDED RELEASE ORAL at 02:08

## 2022-08-30 RX ADMIN — SODIUM CHLORIDE: 9 INJECTION, SOLUTION INTRAVENOUS at 10:08

## 2022-08-30 RX ADMIN — DIAZEPAM 5 MG: 5 TABLET ORAL at 10:08

## 2022-08-30 RX ADMIN — VALSARTAN 320 MG: 160 TABLET, FILM COATED ORAL at 02:08

## 2022-08-30 RX ADMIN — NIFEDIPINE 90 MG: 30 TABLET, FILM COATED, EXTENDED RELEASE ORAL at 02:08

## 2022-08-30 NOTE — INTERVAL H&P NOTE
The patient has been examined and the H&P has been reviewed:    I concur with the findings and no changes have occurred since H&P was written.    Procedure risks, benefits and alternative options discussed and understood by patient/family.          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

## 2022-08-30 NOTE — DISCHARGE INSTRUCTIONS
"Post-op Heart Catheterization    1. DIET: It is advisable for you to follow a diet that limits the intake of salt, sugar, saturated fats and cholesterol.     2. FOR THE NEXT 24 HOURS:   For the next 8 hours, you should be watched by a responsible adult. This person should make sure your condition is not getting worse.   Don't drink any alcohol for the next 24 hours.  Don't drive, operate dangerous machinery, or make important business or personal decisions during the next 24 hours.  Your healthcare provider may tell you not to take any medicine by mouth for pain or sleep in the next 4 hours. These medicines may react with the medicines you were given in the hospital. This could cause a much stronger response than usual.     3. ACTIVITY:                                Day of discharge:             NO vigorous activity, lifting or straining                                                   Day after discharge:         Avoid heavy lifting (up to 10 lbs)                                                                        The day after discharge you may shower, but avoid tub baths for 5 days                                                                         Wash site gently with soap and water        NO powder or lotion to your procedure site.                 Before you shower, remove dressing, apply bandaid if desired                                                                                                                                                                            2nd day after discharge:  Resume normal activities                                                                         Exercise program as instructed    4. WOUND CARE: It is not unusual to have a small amount of bruising appear in the groin area. It is also common to have a tender "knot" develop beneath the skin at the puncture site in the groin. This is scar tissue only and is not a cause for concern or alarm. This tender " "knot may take several weeks to fully resolve. The bruise will usually spread over several days. If the lump gets bigger, call you doctor immediately.    5. DISCOMFORT: For general discomfort at the puncture site, you may take 1 or 2 Acetaminophen (Tylenol) tablets every 4 hours as needed. (Do not take more than 4000 mg a day)               6. CALL YOUR HEALTHCARE PROVIDER IF YOU START TO HAVE THE FOLLOWING SYMPTOMS:        1. Problems with the affected leg: Pain, discomfort, loss of warmth, numbness or tingling                                                                                                                            2. Problems at the groin site: Bleeding, pain that is sudden/sharp/persistent,                   swelling at site or a change in "lump" size, increased redness or drainage at                     puncture site                                                               3. High fever (101 degrees or higher)       4.  Drowsiness that doesn't get better       5. Weakness or dizziness that doesn't get better            6. Repeated vomiting    7. GO TO  THE EMERGENCY ROOM OR CALL 911 IF YOU HAVE: Chest pains or discomforts not relieved with 3 nitroglycerin doses (sublingual tablets or spray), numbness or severe pain or if your foot or leg becomes cold or discolored or uncontrolled bleeding from site (apply direct pressure above site).   "

## 2022-08-30 NOTE — Clinical Note
The catheter was inserted into the ostial  left coronary artery. Hemodynamics were performed.  An angiography was performed of the left coronary arteries. Multiple views were taken. Inserted over guidewidre

## 2022-08-30 NOTE — Clinical Note
The catheter was inserted into the ostium   right coronary artery. Hemodynamics were performed.  An angiography was performed of the right coronary arteries. Multiple views were taken. Inserted over guidewire

## 2022-08-30 NOTE — Clinical Note
Manual pressure was applied to the right femoral vein sheath insertion site. Per Charlene FERNANDEZ CVT using thrombix until hemotasis achieved

## 2022-08-30 NOTE — Clinical Note
An angiography of the  right femoral artery was performed to evaluate for the placement of a closure device.

## 2022-08-30 NOTE — Clinical Note
120 ml of contrast were injected throughout the case. 0 mL of contrast was the total wasted during the case. 120 mL was the total amount used during the case.

## 2022-08-30 NOTE — Clinical Note
The catheter was inserted into the left ventricle. Hemodynamics were performed.  and Pullback was recorded.  An angiography was performed of the LV. The angiography was performed via power injection. The injected amount was 30 mL contrast at 12 mL/s. The PSI from the power injection was 800.

## 2022-08-30 NOTE — OP NOTE
INPATIENT Operative Note         SUMMARY     Surgery Date: 8/30/2022     Surgeon(s) and Role:     * Kassandra Deras MD - Primary    ASSISTANT:none    Pre-op Diagnosis:  Chronic systolic heart failure [I50.22]  Coronary artery disease involving native coronary artery of native heart, unspecified whether angina present [I25.10]  Abnormal stress test [R94.39]  SOB (shortness of breath) [R06.02]      Post-op Diagnosis:  Chronic systolic heart failure [I50.22]  Coronary artery disease involving native coronary artery of native heart, unspecified whether angina present [I25.10]  Abnormal stress test [R94.39]  SOB (shortness of breath) [R06.02]    Procedure(s) (LRB):  CATHETERIZATION, HEART, BOTH LEFT AND RIGHT (N/A)    COMPLICATION:none    Anesthesia: RN IV Sedation    Findings/Key Components:  Non obs disease with calcified lesions in rca lad 30% in magnitude.  Normal filling pressures.  Ef 35-40% generalized hk./    Estimated Blood Loss: < 50 ML.         SPECIMEN: NONE    Devices/Prostetics: None    PLAN:   Medical therapy.

## 2022-08-30 NOTE — Clinical Note
The DP pulses were 2+ bilaterally. The PT pulses were 1+ bilaterally. The radial pulses were +2 bilaterally.

## 2022-08-30 NOTE — PROGRESS NOTES
YEARLY LIST MANAGEMENT NOTE    Isidro Alves's kidney transplant listing status reviewed.  Patient is due for follow-up appointments on 2/01/2023.  Appointments will be scheduled per protocol.  Patient had abnormal stress test. R and L heart cath scheduled per primary cardiologist.  Inactive on the list.

## 2022-08-30 NOTE — PLAN OF CARE
Pt being discharged Home in stable condition. IV removed, catheter intact, pt tolerated well. R groin dressing CDI at time of discharge. PT able to ambulate, eat and drink without complication at time of discharge. VSS. Discharge instructions given to pt, pt verbalized understanding.

## 2022-08-30 NOTE — Clinical Note
A percutaneous stick to the right femoral vein was performed. Ultrasound guidance was used to obtain access.

## 2022-08-31 NOTE — DISCHARGE SUMMARY
"      Discharge Note  Short Stay      SUMMARY     Admit Date: 8/30/2022    Attending Physician: Marielle att. providers found     Discharge Physician: Brittni Deras MD     Discharge Date: 8/30/2022    Final Diagnosis: cardiomyopathy non obs cad    Outcome of Stay:patient tolerated procedure well no complications he was deemed stable for discharge    Disposition: Home or Self Care    Patient Instructions:   Discharge Medication List as of 8/30/2022  4:08 PM        CONTINUE these medications which have CHANGED    Details   acyclovir (ZOVIRAX) 400 MG tablet Take 1 tablet (400 mg total) by mouth 2 (two) times daily as needed., Starting Tue 8/30/2022, Normal           CONTINUE these medications which have NOT CHANGED    Details   ADVAIR DISKUS 250-50 mcg/dose diskus inhaler INHALE 1 PUFF INTO THE LUNGS 2 (TWO) TIMES DAILY. CONTROLLER, Normal      albuterol (PROVENTIL) 2.5 mg /3 mL (0.083 %) nebulizer solution Take 3 mLs (2.5 mg total) by nebulization every 4 to 6 hours as needed for Wheezing or Shortness of Breath., Starting Mon 6/6/2022, Until Tue 6/6/2023 at 2359, Normal      albuterol (PROVENTIL/VENTOLIN HFA) 90 mcg/actuation inhaler Inhale 2 puffs into the lungs every 4 (four) hours as needed for Wheezing or Shortness of Breath., Starting Fri 4/9/2021, Normal      aspirin (ECOTRIN) 81 MG EC tablet Take 1 tablet (81 mg total) by mouth once daily., Starting Mon 4/29/2019, No Print      atorvastatin (LIPITOR) 40 MG tablet TAKE 1 TABLET BY MOUTH EVERY DAY, Normal      azelastine (ASTELIN) 137 mcg (0.1 %) nasal spray 1 spray (137 mcg total) by Nasal route 2 (two) times daily., Starting Fri 2/14/2020, Until Thu 8/25/2022, Normal      BD INSULIN SYRINGE HALF UNIT 0.3 mL 31 gauge x 5/16" Syrg FOR USE WITH INSULIN., Normal      blood sugar diagnostic Strp 1 each by Misc.(Non-Drug; Combo Route) route 4 (four) times daily., Starting Thu 7/28/2022, Normal      blood-glucose meter kit Test finger stick blood sugar once daily., Normal "      calcitRIOL (ROCALTROL) 0.25 MCG Cap TAKE 1 CAPSULE (0.25 MCG TOTAL) BY MOUTH EVERY MON, WED, FRI., Starting Mon 7/25/2022, Normal      cetirizine (ZYRTEC) 10 MG tablet Take 1 tablet (10 mg total) by mouth once daily., Starting Thu 3/11/2021, Normal      esomeprazole (NEXIUM) 40 MG capsule Take 40 mg by mouth once daily., Until Discontinued, Historical Med      finasteride (PROSCAR) 5 mg tablet Take 1 tablet (5 mg total) by mouth once daily., Starting Wed 5/11/2022, Normal      fluticasone propionate (FLONASE) 50 mcg/actuation nasal spray 2 sprays (100 mcg total) by Each Nostril route once daily., Starting Thu 2/10/2022, Normal      folic acid (FOLVITE) 1 MG tablet TAKE 1 TABLET BY MOUTH EVERY DAY, Normal      furosemide (LASIX) 80 MG tablet TAKE 1 TABLET BY MOUTH EVERY DAY, Normal      hydrALAZINE (APRESOLINE) 100 MG tablet Take 1 tablet (100 mg total) by mouth every 8 (eight) hours., Starting Tue 8/23/2022, Until Wed 8/23/2023, Normal      inhalation spacing device (VORTEX HOLDING CHAMBER) Use as directed for inhalation., Normal      insulin degludec (TRESIBA FLEXTOUCH U-200) 200 unit/mL (3 mL) insulin pen Inject 30 Units into the skin once daily., Starting Thu 7/28/2022, Until Fri 7/28/2023, Normal      isosorbide dinitrate (ISORDIL) 20 MG tablet Take 1 tablet (20 mg total) by mouth 3 (three) times daily., Starting Tue 8/23/2022, Until Wed 8/23/2023, Normal      lancets Misc 1 each by Misc.(Non-Drug; Combo Route) route 4 (four) times daily., Starting Thu 7/28/2022, Normal      metoprolol succinate (TOPROL-XL) 50 MG 24 hr tablet Take 1 tablet (50 mg total) by mouth once daily., Starting Thu 7/7/2022, Until Fri 7/7/2023, Normal      multivitamin (THERAGRAN) tablet Take 1 tablet by mouth once daily., Starting 5/2/2015, Until Discontinued, OTC      NIFEdipine (PROCARDIA-XL) 90 MG (OSM) 24 hr tablet TAKE 1 TABLET BY MOUTH ONCE DAILY., Normal      olopatadine (PATADAY) 0.2 % Drop Place 1 drop into both eyes daily  "as needed., Starting Tue 1/31/2017, Until Thu 8/25/2022 at 2359, Normal      pen needle, diabetic (BD ULTRA-FINE SHIRA PEN NEEDLE) 32 gauge x 5/32" Ndle 1 each by Misc.(Non-Drug; Combo Route) route 4 (four) times daily with meals and nightly., Starting Thu 7/28/2022, Until Fri 7/28/2023, Normal      sevelamer carbonate (RENVELA) 800 mg Tab Take 2 tablets (1,600 mg total) by mouth 3 (three) times daily with meals. Take 2 tablets by mouth by 3 times with meals and one tablet with snacks, Starting Mon 5/16/2022, Until Tue 5/16/2023, Normal      tamsulosin (FLOMAX) 0.4 mg Cap Take 1 capsule (0.4 mg total) by mouth once daily., Starting Thu 1/27/2022, Until Fri 1/27/2023, Normal      valsartan (DIOVAN) 320 MG tablet Take 1 tablet (320 mg total) by mouth once daily., Starting Wed 6/1/2022, Until Tue 8/30/2022, Normal             Discharge Procedure Orders (must include Diet, Follow-up, Activity)   Discharge Procedure Orders (must include Diet, Follow-up, Activity)   Diet general     Call MD for:  temperature >100.4     Call MD for:  persistent nausea and vomiting     Call MD for:  severe uncontrolled pain     Call MD for:  difficulty breathing, headache or visual disturbances     Call MD for:  redness, tenderness, or signs of infection (pain, swelling, redness, odor or green/yellow discharge around incision site)     Call MD for:  hives     Call MD for:  persistent dizziness or light-headedness     Call MD for:  extreme fatigue      Follow-up Information       Dez Ragland MD Follow up in 1 week(s).    Specialties: Cardiology, Cardiovascular Disease  Contact information:  [address]  [place] [address] [phone]                            "

## 2022-09-01 ENCOUNTER — OFFICE VISIT (OUTPATIENT)
Dept: OTOLARYNGOLOGY | Facility: CLINIC | Age: 67
End: 2022-09-01
Payer: COMMERCIAL

## 2022-09-01 ENCOUNTER — TELEPHONE (OUTPATIENT)
Dept: TRANSPLANT | Facility: CLINIC | Age: 67
End: 2022-09-01
Payer: COMMERCIAL

## 2022-09-01 VITALS
TEMPERATURE: 98 F | WEIGHT: 269.38 LBS | DIASTOLIC BLOOD PRESSURE: 88 MMHG | SYSTOLIC BLOOD PRESSURE: 156 MMHG | BODY MASS INDEX: 36.54 KG/M2 | HEART RATE: 84 BPM

## 2022-09-01 DIAGNOSIS — H92.09 OTALGIA, UNSPECIFIED LATERALITY: Primary | ICD-10-CM

## 2022-09-01 PROCEDURE — 99999 PR PBB SHADOW E&M-EST. PATIENT-LVL V: CPT | Mod: PBBFAC,TXP,, | Performed by: OTOLARYNGOLOGY

## 2022-09-01 PROCEDURE — 4010F PR ACE/ARB THEARPY RXD/TAKEN: ICD-10-PCS | Mod: TXP,,, | Performed by: OTOLARYNGOLOGY

## 2022-09-01 PROCEDURE — 3062F POS MACROALBUMINURIA REV: CPT | Mod: TXP,,, | Performed by: OTOLARYNGOLOGY

## 2022-09-01 PROCEDURE — 99215 OFFICE O/P EST HI 40 MIN: CPT | Mod: PBBFAC,NTX | Performed by: OTOLARYNGOLOGY

## 2022-09-01 PROCEDURE — 99213 OFFICE O/P EST LOW 20 MIN: CPT | Mod: S$PBB,TXP,, | Performed by: OTOLARYNGOLOGY

## 2022-09-01 PROCEDURE — 3066F NEPHROPATHY DOC TX: CPT | Mod: TXP,,, | Performed by: OTOLARYNGOLOGY

## 2022-09-01 PROCEDURE — 3062F PR POS MACROALBUMINURIA RESULT DOCUMENTED/REVIEW: ICD-10-PCS | Mod: TXP,,, | Performed by: OTOLARYNGOLOGY

## 2022-09-01 PROCEDURE — 4010F ACE/ARB THERAPY RXD/TAKEN: CPT | Mod: TXP,,, | Performed by: OTOLARYNGOLOGY

## 2022-09-01 PROCEDURE — 99999 PR PBB SHADOW E&M-EST. PATIENT-LVL V: ICD-10-PCS | Mod: PBBFAC,TXP,, | Performed by: OTOLARYNGOLOGY

## 2022-09-01 PROCEDURE — 99213 PR OFFICE/OUTPT VISIT, EST, LEVL III, 20-29 MIN: ICD-10-PCS | Mod: S$PBB,TXP,, | Performed by: OTOLARYNGOLOGY

## 2022-09-01 PROCEDURE — 3066F PR DOCUMENTATION OF TREATMENT FOR NEPHROPATHY: ICD-10-PCS | Mod: TXP,,, | Performed by: OTOLARYNGOLOGY

## 2022-09-01 RX ORDER — CIPROFLOXACIN AND DEXAMETHASONE 3; 1 MG/ML; MG/ML
SUSPENSION/ DROPS AURICULAR (OTIC)
Qty: 7.5 ML | Refills: 1 | Status: SHIPPED | OUTPATIENT
Start: 2022-09-01 | End: 2023-03-09 | Stop reason: ALTCHOICE

## 2022-09-01 NOTE — PROGRESS NOTES
"Dialysis Compliance found under "Media Tab" > personal history or patient questionnaire > dialysis compliance     "

## 2022-09-01 NOTE — PROGRESS NOTES
Referring Provider:    Aaareferral Self  No address on file  Subjective:   Patient: Isidro Alves [id number], :1955   Visit date:2022 4:58 PM    Chief Complaint: see below  HPI:       Other (Ears )    Prior notes reviewed  Clinical documentation obtained by nursing staff reviewed.     Otalgia, L>R.  Present for about a week.  Possible change in hearing.        Objective:     Physical Exam:  Vitals:  BP (!) 156/88   Pulse 84   Temp 97.7 °F (36.5 °C) (Temporal)   Wt 122.2 kg (269 lb 6.4 oz)   BMI 36.54 kg/m²   General appearance:  Well developed, well nourished    Ears:  small amount of cerumen AD (removed), other wise normal; AS with excoriation and fresh blood on anterior wall. TM wnl.     Nose:  No masses/lesions of external nose, nasal mucosa, septum, and turbinates were within normal limits.    Mouth:  No mass/lesion of lips, teeth, gums, hard/soft palate, tongue, tonsils, or oropharynx.    Neck & Lymphatics:  No cervical lymphadenopathy, no neck mass/crepitus/ asymmetry, trachea is midline, no thyroid enlargement/tenderness/mass.              []  Data Reviewed:    Lab Results   Component Value Date    WBC 9.58 2022    HGB 11.7 (L) 2022    HCT 37.9 (L) 2022    MCV 88 2022    EOSINOPHIL 2.4 2022                 Assessment & Plan:   Otalgia, unspecified laterality    Other orders  -     ciprofloxacin-dexamethasone 0.3-0.1% (CIPRODEX) 0.3-0.1 % DrpS; 5 drops in affected ear twice daily for 7 days  Dispense: 7.5 mL; Refill: 1      Unclear if his discomfort is secondary to the excoriation or if he was trying to remove cerumen and scratched the ear.  He has been using Qtips.  Gtt due to blood and excoriation.  Audiogram in 1 week.     Thank you for allowing me to participate in the care of Isidro.       Adrian Eli MD, FACS  [facility]   [facility]  [address]  [place] [address]  P: [phone]  F: [phone]

## 2022-09-01 NOTE — TELEPHONE ENCOUNTER
I called patient to discuss heart cath results. Patent has follow-up on 9/12/2022 with cardiologist to review results. I reminded patient to have Cardiologist write a note of clearance to be re-activated on the kidney transplant waitlist.  He verbalized understanding. He will call with any questions or concerns.

## 2022-09-07 ENCOUNTER — PATIENT MESSAGE (OUTPATIENT)
Dept: PHARMACY | Facility: CLINIC | Age: 67
End: 2022-09-07
Payer: COMMERCIAL

## 2022-09-08 ENCOUNTER — CLINICAL SUPPORT (OUTPATIENT)
Dept: AUDIOLOGY | Facility: CLINIC | Age: 67
End: 2022-09-08
Payer: COMMERCIAL

## 2022-09-08 ENCOUNTER — OFFICE VISIT (OUTPATIENT)
Dept: OTOLARYNGOLOGY | Facility: CLINIC | Age: 67
End: 2022-09-08
Payer: MEDICARE

## 2022-09-08 DIAGNOSIS — H90.A12 CONDUCTIVE HEARING LOSS OF LEFT EAR WITH RESTRICTED HEARING OF RIGHT EAR: ICD-10-CM

## 2022-09-08 DIAGNOSIS — H65.92 MEE (MIDDLE EAR EFFUSION), LEFT: Primary | ICD-10-CM

## 2022-09-08 DIAGNOSIS — H69.92 EUSTACHIAN TUBE DYSFUNCTION, LEFT: Primary | ICD-10-CM

## 2022-09-08 PROCEDURE — 4010F ACE/ARB THERAPY RXD/TAKEN: CPT | Mod: TXP,,, | Performed by: OTOLARYNGOLOGY

## 2022-09-08 PROCEDURE — 92557 COMPREHENSIVE HEARING TEST: CPT | Mod: NTX,S$GLB,, | Performed by: AUDIOLOGIST-HEARING AID FITTER

## 2022-09-08 PROCEDURE — 99999 PR PBB SHADOW E&M-EST. PATIENT-LVL I: CPT | Mod: PBBFAC,TXP,, | Performed by: AUDIOLOGIST-HEARING AID FITTER

## 2022-09-08 PROCEDURE — 4010F PR ACE/ARB THEARPY RXD/TAKEN: ICD-10-PCS | Mod: TXP,,, | Performed by: OTOLARYNGOLOGY

## 2022-09-08 PROCEDURE — 99213 PR OFFICE/OUTPT VISIT, EST, LEVL III, 20-29 MIN: ICD-10-PCS | Mod: S$PBB,TXP,, | Performed by: OTOLARYNGOLOGY

## 2022-09-08 PROCEDURE — 3062F PR POS MACROALBUMINURIA RESULT DOCUMENTED/REVIEW: ICD-10-PCS | Mod: TXP,,, | Performed by: OTOLARYNGOLOGY

## 2022-09-08 PROCEDURE — 3066F PR DOCUMENTATION OF TREATMENT FOR NEPHROPATHY: ICD-10-PCS | Mod: TXP,,, | Performed by: OTOLARYNGOLOGY

## 2022-09-08 PROCEDURE — 3062F POS MACROALBUMINURIA REV: CPT | Mod: TXP,,, | Performed by: OTOLARYNGOLOGY

## 2022-09-08 PROCEDURE — 99999 PR PBB SHADOW E&M-EST. PATIENT-LVL III: ICD-10-PCS | Mod: PBBFAC,TXP,, | Performed by: OTOLARYNGOLOGY

## 2022-09-08 PROCEDURE — 92567 PR TYMPA2METRY: ICD-10-PCS | Mod: NTX,S$GLB,, | Performed by: AUDIOLOGIST-HEARING AID FITTER

## 2022-09-08 PROCEDURE — 99213 OFFICE O/P EST LOW 20 MIN: CPT | Mod: S$PBB,TXP,, | Performed by: OTOLARYNGOLOGY

## 2022-09-08 PROCEDURE — 99999 PR PBB SHADOW E&M-EST. PATIENT-LVL III: CPT | Mod: PBBFAC,TXP,, | Performed by: OTOLARYNGOLOGY

## 2022-09-08 PROCEDURE — 92557 PR COMPREHENSIVE HEARING TEST: ICD-10-PCS | Mod: NTX,S$GLB,, | Performed by: AUDIOLOGIST-HEARING AID FITTER

## 2022-09-08 PROCEDURE — 92567 TYMPANOMETRY: CPT | Mod: NTX,S$GLB,, | Performed by: AUDIOLOGIST-HEARING AID FITTER

## 2022-09-08 PROCEDURE — 3066F NEPHROPATHY DOC TX: CPT | Mod: TXP,,, | Performed by: OTOLARYNGOLOGY

## 2022-09-08 PROCEDURE — 99999 PR PBB SHADOW E&M-EST. PATIENT-LVL I: ICD-10-PCS | Mod: PBBFAC,TXP,, | Performed by: AUDIOLOGIST-HEARING AID FITTER

## 2022-09-08 PROCEDURE — 99213 OFFICE O/P EST LOW 20 MIN: CPT | Mod: PBBFAC,NTX | Performed by: OTOLARYNGOLOGY

## 2022-09-08 NOTE — PROGRESS NOTES
"Referring Provider:    No referring provider defined for this encounter.  Subjective:   Patient: Isidro Alves [id number], :1955   Visit date:2022 1:13 PM    Chief Complaint: see below  HPI:       Otalgia (Reports "same")    Prior notes reviewed  Clinical documentation obtained by nursing staff reviewed.     No change.  Pressure AS.  Decreased hearing AS      Objective:     Physical Exam:  Vitals:  There were no vitals taken for this visit.  General appearance:  Well developed, well nourished      Nose:  No masses/lesions of external nose, nasal mucosa, septum, and turbinates were within normal limits.    Mouth:  No mass/lesion of lips, teeth, gums, hard/soft palate, tongue, tonsils, or oropharynx.    Neck & Lymphatics:  No cervical lymphadenopathy, no neck mass/crepitus/ asymmetry, trachea is midline, no thyroid enlargement/tenderness/mass.      Serous effusion AS        []  Data Reviewed:    Lab Results   Component Value Date    WBC 9.58 2022    HGB 11.7 (L) 2022    HCT 37.9 (L) 2022    MCV 88 2022    EOSINOPHIL 2.4 2022                 Assessment & Plan:   Eustachian tube dysfunction, left      Afrin 3 days  Flonase BID 3 weeks  Follow up with TYmps in 3 weeks    Thank you for allowing me to participate in the care of Isidro.       Adrian Eli MD, FACS  [facility]   [facility]  [address]  [place] [address]  P: [phone]  F: [phone]      "

## 2022-09-08 NOTE — PROGRESS NOTES
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  Patient complains of left ear fullness and pulsatile tinnitus.     Results reveal a mild sensorineural hearing loss 250-8000 Hz for the right ear, and a mild-to-moderate conductive hearing loss 250-8000 Hz for the left ear.   Speech Reception Thresholds were  10 dBHL for the right ear and 40 dBHL for the left ear.   Word recognition scores were excellent for the right ear and excellent for the left ear.   Tympanograms were Type A, normal for the right ear and Type B, abnormal, flat for the left ear.    Patient was counseled on the above findings.    Recommendations:  1. ENT: CHL AS

## 2022-09-09 DIAGNOSIS — N18.6 ESRD (END STAGE RENAL DISEASE): Primary | ICD-10-CM

## 2022-09-09 DIAGNOSIS — Z76.0 MEDICATION REFILL: ICD-10-CM

## 2022-09-09 RX ORDER — FUROSEMIDE 80 MG/1
80 TABLET ORAL DAILY
Qty: 30 TABLET | Refills: 6 | Status: SHIPPED | OUTPATIENT
Start: 2022-09-09 | End: 2022-11-11 | Stop reason: SDUPTHER

## 2022-09-16 ENCOUNTER — OFFICE VISIT (OUTPATIENT)
Dept: CARDIOLOGY | Facility: CLINIC | Age: 67
End: 2022-09-16
Payer: COMMERCIAL

## 2022-09-16 VITALS
WEIGHT: 262.38 LBS | DIASTOLIC BLOOD PRESSURE: 77 MMHG | HEART RATE: 97 BPM | SYSTOLIC BLOOD PRESSURE: 142 MMHG | BODY MASS INDEX: 35.58 KG/M2 | OXYGEN SATURATION: 96 %

## 2022-09-16 DIAGNOSIS — I42.0 DILATED CARDIOMYOPATHY: ICD-10-CM

## 2022-09-16 DIAGNOSIS — Z01.810 PREOP CARDIOVASCULAR EXAM: Primary | ICD-10-CM

## 2022-09-16 DIAGNOSIS — E78.5 HYPERLIPIDEMIA ASSOCIATED WITH TYPE 2 DIABETES MELLITUS: ICD-10-CM

## 2022-09-16 DIAGNOSIS — I50.22 CHRONIC SYSTOLIC CONGESTIVE HEART FAILURE: ICD-10-CM

## 2022-09-16 DIAGNOSIS — I25.118 CORONARY ARTERY DISEASE OF NATIVE ARTERY OF NATIVE HEART WITH STABLE ANGINA PECTORIS: ICD-10-CM

## 2022-09-16 DIAGNOSIS — I10 SEVERE HYPERTENSION: ICD-10-CM

## 2022-09-16 DIAGNOSIS — I27.20 PULMONARY HTN: ICD-10-CM

## 2022-09-16 DIAGNOSIS — E11.69 HYPERLIPIDEMIA ASSOCIATED WITH TYPE 2 DIABETES MELLITUS: ICD-10-CM

## 2022-09-16 PROCEDURE — 1159F PR MEDICATION LIST DOCUMENTED IN MEDICAL RECORD: ICD-10-PCS | Mod: CPTII,S$GLB,TXP, | Performed by: INTERNAL MEDICINE

## 2022-09-16 PROCEDURE — 99214 PR OFFICE/OUTPT VISIT, EST, LEVL IV, 30-39 MIN: ICD-10-PCS | Mod: S$GLB,TXP,, | Performed by: INTERNAL MEDICINE

## 2022-09-16 PROCEDURE — 99214 OFFICE O/P EST MOD 30 MIN: CPT | Mod: S$GLB,TXP,, | Performed by: INTERNAL MEDICINE

## 2022-09-16 PROCEDURE — 3077F PR MOST RECENT SYSTOLIC BLOOD PRESSURE >= 140 MM HG: ICD-10-PCS | Mod: CPTII,S$GLB,TXP, | Performed by: INTERNAL MEDICINE

## 2022-09-16 PROCEDURE — 3052F PR MOST RECENT HEMOGLOBIN A1C LEVEL 8.0 - < 9.0%: ICD-10-PCS | Mod: CPTII,S$GLB,TXP, | Performed by: INTERNAL MEDICINE

## 2022-09-16 PROCEDURE — 3062F POS MACROALBUMINURIA REV: CPT | Mod: CPTII,S$GLB,TXP, | Performed by: INTERNAL MEDICINE

## 2022-09-16 PROCEDURE — 3077F SYST BP >= 140 MM HG: CPT | Mod: CPTII,S$GLB,TXP, | Performed by: INTERNAL MEDICINE

## 2022-09-16 PROCEDURE — 3078F PR MOST RECENT DIASTOLIC BLOOD PRESSURE < 80 MM HG: ICD-10-PCS | Mod: CPTII,S$GLB,TXP, | Performed by: INTERNAL MEDICINE

## 2022-09-16 PROCEDURE — 1160F RVW MEDS BY RX/DR IN RCRD: CPT | Mod: CPTII,S$GLB,TXP, | Performed by: INTERNAL MEDICINE

## 2022-09-16 PROCEDURE — 3062F PR POS MACROALBUMINURIA RESULT DOCUMENTED/REVIEW: ICD-10-PCS | Mod: CPTII,S$GLB,TXP, | Performed by: INTERNAL MEDICINE

## 2022-09-16 PROCEDURE — 3052F HG A1C>EQUAL 8.0%<EQUAL 9.0%: CPT | Mod: CPTII,S$GLB,TXP, | Performed by: INTERNAL MEDICINE

## 2022-09-16 PROCEDURE — 4010F PR ACE/ARB THEARPY RXD/TAKEN: ICD-10-PCS | Mod: CPTII,S$GLB,TXP, | Performed by: INTERNAL MEDICINE

## 2022-09-16 PROCEDURE — 3066F PR DOCUMENTATION OF TREATMENT FOR NEPHROPATHY: ICD-10-PCS | Mod: CPTII,S$GLB,TXP, | Performed by: INTERNAL MEDICINE

## 2022-09-16 PROCEDURE — 4010F ACE/ARB THERAPY RXD/TAKEN: CPT | Mod: CPTII,S$GLB,TXP, | Performed by: INTERNAL MEDICINE

## 2022-09-16 PROCEDURE — 3078F DIAST BP <80 MM HG: CPT | Mod: CPTII,S$GLB,TXP, | Performed by: INTERNAL MEDICINE

## 2022-09-16 PROCEDURE — 3066F NEPHROPATHY DOC TX: CPT | Mod: CPTII,S$GLB,TXP, | Performed by: INTERNAL MEDICINE

## 2022-09-16 PROCEDURE — 99999 PR PBB SHADOW E&M-EST. PATIENT-LVL V: CPT | Mod: PBBFAC,TXP,, | Performed by: INTERNAL MEDICINE

## 2022-09-16 PROCEDURE — 1159F MED LIST DOCD IN RCRD: CPT | Mod: CPTII,S$GLB,TXP, | Performed by: INTERNAL MEDICINE

## 2022-09-16 PROCEDURE — 99999 PR PBB SHADOW E&M-EST. PATIENT-LVL V: ICD-10-PCS | Mod: PBBFAC,TXP,, | Performed by: INTERNAL MEDICINE

## 2022-09-16 PROCEDURE — 1160F PR REVIEW ALL MEDS BY PRESCRIBER/CLIN PHARMACIST DOCUMENTED: ICD-10-PCS | Mod: CPTII,S$GLB,TXP, | Performed by: INTERNAL MEDICINE

## 2022-09-16 NOTE — PROGRESS NOTES
Subjective:   Patient ID:  Isidro Alves is a 67 y.o. male who presents for follow up of No chief complaint on file.      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  PMH ESRD on HD since  and renal Tx list at [facility], CAD, old MI per MPI in , CHFrEF, IDDM 20 yrs, HTN, HLD. No smoking. Occasional drink.  H/o Fell off the roof and multi fx.    MPI showed inferior old MI and EF 35%. ECHo showed EF 50% and LVH  No f/h premature CAD.  No smoking  Walks 4 times a week. And on PT  ekg NSR     visit  HD via right chest catheter.   echo dilated LV mod, EF 40% and  echo EF 55%  HOLTER showed 12% PVCs  Occasional palpitation. orthopnea and PND. No chest pain leg swelling and syncope. Urinating 8 times a week. No fluid restriction.    08/2022 visit   Phar mPI showed There is a moderate to severe intensity, large sized, mostly fixed perfusion abnormality with some reversibilty in the inferior, inferolateral, lateral and apical wall(s).  Still has SOB PND orthopnea. No leg swelling   BP high. On low Na diet and on 32 oz fluid restriction    Interval history  H/o LHC and RHC done on 08/31/2022 by Dr. Deras at [facility]  Nonobstructive cAD 30% lesion on LAD and RCA. EF 40%   PAP 28 mmHG PCWP 15 mmHG.  HD 3 times a week. No chest pain  Fluid resriction 32 oz         Past Medical History:   Diagnosis Date    Abnormal nuclear stress test 8/30/2022    Anemia 4/16/2014    BPH (benign prostatic hyperplasia)     CKD (chronic kidney disease) stage 4, GFR 15-29 ml/min     Coronary artery disease of native artery of native heart with stable angina pectoris 4/29/2019    Diabetes mellitus     Diabetes mellitus, type 2     Dilated cardiomyopathy 8/30/2022    Elevated PSA     Encounter for blood transfusion     Herpes labialis     Hyperlipidemia     Hypertension     Obesity     Proteinuria     Secondary hyperparathyroidism (of renal origin)     UTI (lower urinary tract  infection) 5/1/2015       Past Surgical History:   Procedure Laterality Date    CATHETERIZATION OF BOTH LEFT AND RIGHT HEART N/A 8/30/2022    Procedure: CATHETERIZATION, HEART, BOTH LEFT AND RIGHT;  Surgeon: Kassandra Deras MD;  Location: [facility] CATH LAB;  Service: Cardiology;  Laterality: N/A;    COLONOSCOPY N/A 12/21/2017    Procedure: COLONOSCOPY;  Surgeon: Fran Escalera MD;  Location: [facility] ENDO;  Service: Endoscopy;  Laterality: N/A;    FLUOROSCOPY N/A 1/21/2021    Procedure: Vascath insertion;  Surgeon: Adrian Pruitt Jr., MD;  Location: [facility] CATH LAB;  Service: General;  Laterality: N/A;    FLUOROSCOPY N/A 4/6/2021    Procedure: Over the wire Vas Cath exchange;  Surgeon: Ho Pressley MD;  Location: [facility] CATH LAB;  Service: General;  Laterality: N/A;    PROSTATE BIOPSY      thorocotomy  2010       Social History     Tobacco Use    Smoking status: Never    Smokeless tobacco: Never   Substance Use Topics    Alcohol use: Yes     Comment: seldom     Drug use: No       Family History   Problem Relation Age of Onset    No Known Problems Mother     Diabetes Father     Hypertension Father     Hypertension Other     Diabetes Other          Review of Systems   Constitutional: Positive for malaise/fatigue. Negative for decreased appetite, diaphoresis, fever and night sweats.   HENT:  Negative for nosebleeds.    Eyes:  Negative for blurred vision and double vision.   Cardiovascular:  Positive for dyspnea on exertion, orthopnea and paroxysmal nocturnal dyspnea. Negative for chest pain, claudication, irregular heartbeat, leg swelling, near-syncope, palpitations and syncope.   Respiratory:  Negative for cough, shortness of breath, sleep disturbances due to breathing, snoring, sputum production and wheezing.    Endocrine: Negative for cold intolerance and polyuria.   Hematologic/Lymphatic: Does not bruise/bleed easily.   Skin:  Negative for rash.   Musculoskeletal:  Negative for back pain, falls, joint pain, joint  swelling and neck pain.   Gastrointestinal:  Negative for abdominal pain, heartburn, nausea and vomiting.   Genitourinary:  Negative for dysuria, frequency and hematuria.   Neurological:  Negative for difficulty with concentration, dizziness, focal weakness, headaches, light-headedness, numbness, seizures and weakness.   Psychiatric/Behavioral:  Negative for depression, memory loss and substance abuse. The patient does not have insomnia.    Allergic/Immunologic: Negative for HIV exposure and hives.     Objective:   Physical Exam  HENT:      Head: Normocephalic.   Eyes:      Pupils: Pupils are equal, round, and reactive to light.   Neck:      Thyroid: No thyromegaly.      Vascular: Normal carotid pulses. No carotid bruit or JVD.   Cardiovascular:      Rate and Rhythm: Normal rate and regular rhythm. No extrasystoles are present.     Chest Wall: PMI is not displaced.      Pulses: Normal pulses.      Heart sounds: Normal heart sounds. No murmur heard.    No gallop. No S3 sounds.   Pulmonary:      Effort: No respiratory distress.      Breath sounds: Normal breath sounds. No stridor.   Abdominal:      General: Bowel sounds are normal.      Palpations: Abdomen is soft.      Tenderness: There is no abdominal tenderness. There is no rebound.   Skin:     Findings: No rash.   Neurological:      Mental Status: He is alert and oriented to person, place, and time.   Psychiatric:         Behavior: Behavior normal.       Lab Results   Component Value Date    CHOL 149 07/14/2022    CHOL 155 10/06/2020    CHOL 136 09/05/2019     Lab Results   Component Value Date    HDL 31 (L) 07/14/2022    HDL 26 (L) 10/06/2020    HDL 32 (L) 09/05/2019     Lab Results   Component Value Date    LDLCALC 79.8 07/14/2022    LDLCALC 98.0 10/06/2020    LDLCALC 73.8 09/05/2019     Lab Results   Component Value Date    TRIG 191 (H) 07/14/2022    TRIG 155 (H) 10/06/2020    TRIG 151 (H) 09/05/2019     Lab Results   Component Value Date    CHOLHDL 20.8  07/14/2022    CHOLHDL 16.8 (L) 10/06/2020    CHOLHDL 23.5 09/05/2019       Chemistry        Component Value Date/Time     08/23/2022 1500    K 4.7 08/23/2022 1500     08/23/2022 1500    CO2 23 08/23/2022 1500    BUN 41 (H) 08/23/2022 1500    CREATININE 8.3 (H) 08/23/2022 1500     08/23/2022 1500        Component Value Date/Time    CALCIUM 9.8 08/23/2022 1500    ALKPHOS 80 07/14/2022 0706    ALKPHOS 80 07/14/2022 0706    AST 16 07/14/2022 0706    AST 16 07/14/2022 0706    ALT 8 (L) 07/14/2022 0706    ALT 8 (L) 07/14/2022 0706    BILITOT 1.0 07/14/2022 0706    BILITOT 1.0 07/14/2022 0706    ESTGFRAFRICA 6.9 (A) 07/14/2022 0706    EGFRNONAA 6.0 (A) 07/14/2022 0706          Lab Results   Component Value Date    HGBA1C 8.0 (H) 07/14/2022     Lab Results   Component Value Date    TSH 1.941 07/14/2022     Lab Results   Component Value Date    INR 0.9 08/23/2022    INR 0.9 10/11/2018    INR 0.9 05/02/2015     Lab Results   Component Value Date    WBC 9.58 08/23/2022    HGB 11.7 (L) 08/23/2022    HCT 37.9 (L) 08/23/2022    MCV 88 08/23/2022     08/23/2022     BMP  Sodium   Date Value Ref Range Status   08/23/2022 142 136 - 145 mmol/L Final     Potassium   Date Value Ref Range Status   08/23/2022 4.7 3.5 - 5.1 mmol/L Final     Chloride   Date Value Ref Range Status   08/23/2022 106 95 - 110 mmol/L Final     CO2   Date Value Ref Range Status   08/23/2022 23 23 - 29 mmol/L Final     BUN   Date Value Ref Range Status   08/23/2022 41 (H) 8 - 23 mg/dL Final     Creatinine   Date Value Ref Range Status   08/23/2022 8.3 (H) 0.5 - 1.4 mg/dL Final     Calcium   Date Value Ref Range Status   08/23/2022 9.8 8.7 - 10.5 mg/dL Final     Anion Gap   Date Value Ref Range Status   08/23/2022 13 8 - 16 mmol/L Final     eGFR if    Date Value Ref Range Status   07/14/2022 6.9 (A) >60 mL/min/1.73 m^2 Final     eGFR if non    Date Value Ref Range Status   07/14/2022 6.0 (A) >60 mL/min/1.73  m^2 Final     Comment:     Calculation used to obtain the estimated glomerular filtration  rate (eGFR) is the CKD-EPI equation.        BNP  @LABRCNTIP(BNP,BNPTRIAGEBLO)@  @LABRCNTIP(troponini)@  CrCl cannot be calculated (Patient's most recent lab result is older than the maximum 7 days allowed.).  No results found in the last 24 hours.  No results found in the last 24 hours.  No results found in the last 24 hours.    Assessment:      1. Preop cardiovascular exam    2. Coronary artery disease of native artery of native heart with stable angina pectoris    3. Chronic systolic congestive heart failure    4. Dilated cardiomyopathy    5. Severe hypertension    6. Hyperlipidemia associated with type 2 diabetes mellitus    7. Pulmonary HTN       Heart cath showed LV EF 40% and normal filling pressure. RHC showed mild pulm HTN 28 mmHg and PCWP 15 mmHG  CHFrEF 40% NICM  Nonobstructive CAD    Plan:   Continue torpolXL valsartan nifedipine hydralazine isodil ASA and Statin  HD per Nephrology  Fluid restriction 1.5 liters a day  Na< 2 gm  DM Rx per PCP  Counseled DASH  Check Lipid profile in 6 months  Recommend heart-healthy diet, weight control and regular exercise.  Amy. Risk modification.   I have reviewed all pertinent labs and cardiac studies independently. Plans and recommendations have been formulated under my direct supervision. All questions answered and patient voiced understanding.   If symptoms persist go to the ED  RTC in 6 months        Elevated periop risk of CV events for non-high risk procedure.  Good functional and exercise capacity.  No chest pain, active arrhythmia and CHF symptoms.  Ok to proceed the scheduled surgery without further cardiac study.  OK to hold Aspirin 5 to 7 days before the procedure and resume ASAP postop.  Continue Metoprolol and Statin periop.  Avoid periop fluid overloaded.

## 2022-09-19 DIAGNOSIS — N18.6 ESRD (END STAGE RENAL DISEASE): Primary | ICD-10-CM

## 2022-09-19 DIAGNOSIS — E83.39 HYPERPHOSPHATEMIA: ICD-10-CM

## 2022-09-19 DIAGNOSIS — N30.01 ACUTE CYSTITIS WITH HEMATURIA: ICD-10-CM

## 2022-09-19 RX ORDER — SEVELAMER CARBONATE 800 MG/1
2400 TABLET, FILM COATED ORAL
Qty: 300 TABLET | Refills: 11 | Status: SHIPPED | OUTPATIENT
Start: 2022-09-19 | End: 2023-10-05 | Stop reason: SDUPTHER

## 2022-09-23 ENCOUNTER — OFFICE VISIT (OUTPATIENT)
Dept: URGENT CARE | Facility: CLINIC | Age: 67
End: 2022-09-23
Payer: COMMERCIAL

## 2022-09-23 VITALS
TEMPERATURE: 97 F | HEIGHT: 72 IN | RESPIRATION RATE: 14 BRPM | SYSTOLIC BLOOD PRESSURE: 137 MMHG | OXYGEN SATURATION: 97 % | WEIGHT: 262 LBS | DIASTOLIC BLOOD PRESSURE: 70 MMHG | HEART RATE: 86 BPM | BODY MASS INDEX: 35.49 KG/M2

## 2022-09-23 DIAGNOSIS — L29.3 ITCHING OF PENIS: ICD-10-CM

## 2022-09-23 DIAGNOSIS — L29.0 RECTAL ITCHING: Primary | ICD-10-CM

## 2022-09-23 LAB
BILIRUB UR QL STRIP: NEGATIVE
GLUCOSE UR QL STRIP: NEGATIVE
KETONES UR QL STRIP: NEGATIVE
LEUKOCYTE ESTERASE UR QL STRIP: NEGATIVE
PH, POC UA: 8
POC BLOOD, URINE: POSITIVE
POC NITRATES, URINE: NEGATIVE
PROT UR QL STRIP: POSITIVE
SP GR UR STRIP: 1.01 (ref 1–1.03)
UROBILINOGEN UR STRIP-ACNC: NORMAL (ref 0.3–2.2)

## 2022-09-23 PROCEDURE — 3066F PR DOCUMENTATION OF TREATMENT FOR NEPHROPATHY: ICD-10-PCS | Mod: CPTII,S$GLB,TXP,

## 2022-09-23 PROCEDURE — 4010F ACE/ARB THERAPY RXD/TAKEN: CPT | Mod: CPTII,S$GLB,TXP,

## 2022-09-23 PROCEDURE — 3075F SYST BP GE 130 - 139MM HG: CPT | Mod: CPTII,S$GLB,TXP,

## 2022-09-23 PROCEDURE — 3078F PR MOST RECENT DIASTOLIC BLOOD PRESSURE < 80 MM HG: ICD-10-PCS | Mod: CPTII,S$GLB,TXP,

## 2022-09-23 PROCEDURE — 3008F PR BODY MASS INDEX (BMI) DOCUMENTED: ICD-10-PCS | Mod: CPTII,S$GLB,TXP,

## 2022-09-23 PROCEDURE — 3066F NEPHROPATHY DOC TX: CPT | Mod: CPTII,S$GLB,TXP,

## 2022-09-23 PROCEDURE — 3008F BODY MASS INDEX DOCD: CPT | Mod: CPTII,S$GLB,TXP,

## 2022-09-23 PROCEDURE — 99213 OFFICE O/P EST LOW 20 MIN: CPT | Mod: S$GLB,TXP,,

## 2022-09-23 PROCEDURE — 1159F PR MEDICATION LIST DOCUMENTED IN MEDICAL RECORD: ICD-10-PCS | Mod: CPTII,S$GLB,TXP,

## 2022-09-23 PROCEDURE — 1160F RVW MEDS BY RX/DR IN RCRD: CPT | Mod: CPTII,S$GLB,TXP,

## 2022-09-23 PROCEDURE — 1159F MED LIST DOCD IN RCRD: CPT | Mod: CPTII,S$GLB,TXP,

## 2022-09-23 PROCEDURE — 4010F PR ACE/ARB THEARPY RXD/TAKEN: ICD-10-PCS | Mod: CPTII,S$GLB,TXP,

## 2022-09-23 PROCEDURE — 1160F PR REVIEW ALL MEDS BY PRESCRIBER/CLIN PHARMACIST DOCUMENTED: ICD-10-PCS | Mod: CPTII,S$GLB,TXP,

## 2022-09-23 PROCEDURE — 81003 POCT URINALYSIS, DIPSTICK, AUTOMATED, W/O SCOPE: ICD-10-PCS | Mod: QW,S$GLB,TXP,

## 2022-09-23 PROCEDURE — 3062F POS MACROALBUMINURIA REV: CPT | Mod: CPTII,S$GLB,TXP,

## 2022-09-23 PROCEDURE — 3062F PR POS MACROALBUMINURIA RESULT DOCUMENTED/REVIEW: ICD-10-PCS | Mod: CPTII,S$GLB,TXP,

## 2022-09-23 PROCEDURE — 99213 PR OFFICE/OUTPT VISIT, EST, LEVL III, 20-29 MIN: ICD-10-PCS | Mod: S$GLB,TXP,,

## 2022-09-23 PROCEDURE — 3075F PR MOST RECENT SYSTOLIC BLOOD PRESS GE 130-139MM HG: ICD-10-PCS | Mod: CPTII,S$GLB,TXP,

## 2022-09-23 PROCEDURE — 3078F DIAST BP <80 MM HG: CPT | Mod: CPTII,S$GLB,TXP,

## 2022-09-23 PROCEDURE — 3052F HG A1C>EQUAL 8.0%<EQUAL 9.0%: CPT | Mod: CPTII,S$GLB,TXP,

## 2022-09-23 PROCEDURE — 3052F PR MOST RECENT HEMOGLOBIN A1C LEVEL 8.0 - < 9.0%: ICD-10-PCS | Mod: CPTII,S$GLB,TXP,

## 2022-09-23 PROCEDURE — 1126F AMNT PAIN NOTED NONE PRSNT: CPT | Mod: CPTII,S$GLB,TXP,

## 2022-09-23 PROCEDURE — 81003 URINALYSIS AUTO W/O SCOPE: CPT | Mod: QW,S$GLB,TXP,

## 2022-09-23 PROCEDURE — 1126F PR PAIN SEVERITY QUANTIFIED, NO PAIN PRESENT: ICD-10-PCS | Mod: CPTII,S$GLB,TXP,

## 2022-09-23 RX ORDER — NYSTATIN 100000 U/G
CREAM TOPICAL 2 TIMES DAILY
Qty: 30 G | Refills: 1 | Status: SHIPPED | OUTPATIENT
Start: 2022-09-23 | End: 2023-03-09 | Stop reason: ALTCHOICE

## 2022-09-23 NOTE — PROGRESS NOTES
Subjective:       Patient ID: Isidro Alves is a 67 y.o. male.    Vitals:  height is 6' (1.829 m) and weight is 118.8 kg (262 lb). His tympanic temperature is 97.3 °F (36.3 °C). His blood pressure is 137/70 and his pulse is 86. His respiration is 14 and oxygen saturation is 97%.     Chief Complaint: Anal Itching    Patient presents with anal itching that started 3 days ago.  He states that it starts at the anus and goes across to penis.  He has a history of yeast infection in that area before. States he has previously been treated with both antibiotics and cream and states the antibiotics worked best. He denies any concern or need for STD testing. Denies any anal/penile lesions, discharge.     Other  This is a new problem. The current episode started in the past 7 days (3). The problem occurs constantly. The problem has been gradually worsening. Pertinent negatives include no abdominal pain, anorexia, arthralgias, change in bowel habit, chest pain, chills, congestion, coughing, diaphoresis, fatigue, fever, headaches, joint swelling, myalgias, nausea, neck pain, numbness, rash, sore throat, swollen glands, urinary symptoms, vertigo, visual change, vomiting or weakness. He has tried nothing for the symptoms.     Constitution: Negative for chills, sweating, fatigue and fever.   HENT:  Negative for congestion and sore throat.    Neck: Negative for neck pain.   Cardiovascular:  Negative for chest pain.   Respiratory:  Negative for cough.    Gastrointestinal:  Negative for abdominal pain, nausea and vomiting.   Genitourinary:         Rectal and penile itching   Musculoskeletal:  Negative for joint pain, joint swelling and muscle ache.   Skin:  Negative for rash.   Neurological:  Negative for history of vertigo, headaches and numbness.     Objective:      Physical Exam   Constitutional: He is oriented to person, place, and time. He appears well-developed. No distress.   HENT:   Head: Normocephalic and atraumatic.    Ears:   Right Ear: External ear normal.   Left Ear: External ear normal.   Nose: Nose normal. No nasal deformity. No epistaxis.   Mouth/Throat: Oropharynx is clear and moist and mucous membranes are normal.   Eyes: Conjunctivae and lids are normal.   Neck: Trachea normal and phonation normal. Neck supple.   Cardiovascular: Normal rate, regular rhythm and normal heart sounds.   Pulmonary/Chest: Effort normal and breath sounds normal.   Abdominal: Normal appearance and bowel sounds are normal. He exhibits no distension. Soft. There is no abdominal tenderness.   Genitourinary:         Comments: Foreskin retracted with no visualized lesions or discharge    No visible lesions, discharge, or external hemorrhoid noted on rectal exam     Musculoskeletal: Normal range of motion.         General: Normal range of motion.   Neurological: He is alert and oriented to person, place, and time. He has normal reflexes.   Skin: Skin is warm, dry, intact and not diaphoretic.   Psychiatric: His speech is normal and behavior is normal. Judgment and thought content normal.   Nursing note and vitals reviewed.chaperone present (RT Azam)         Results for orders placed or performed in visit on 09/23/22   POCT Urinalysis, Dipstick, Automated, W/O Scope   Result Value Ref Range    POC Blood, Urine Positive (A) Negative    POC Bilirubin, Urine Negative Negative    POC Urobilinogen, Urine NORMAL 0.3 - 2.2    POC Ketones, Urine Negative Negative    POC Protein, Urine Positive (A) Negative    POC Nitrates, Urine Negative Negative    POC Glucose, Urine Negative Negative    pH, UA 8.0     POC Specific Gravity, Urine 1.010 1.003 - 1.029    POC Leukocytes, Urine Negative Negative     *Note: Due to a large number of results and/or encounters for the requested time period, some results have not been displayed. A complete set of results can be found in Results Review.       Assessment:       1. Rectal itching    2. Itching of penis          Plan:          UA without leuks or nitrates. No indication for antibiotics at this time which I discussed with patient. We will try nystatin cream as patient states clotrimazole did not work for him until it was paired with an antibiotic. Instructed patient to f/u with either PCP or urologist. Return to clinic if symptoms worsen, persisit, or change.   Red flag signs/sx that warrants ED evaluation discussed with patient/parent who verbalized understanding      Rectal itching  -     POCT Urinalysis, Dipstick, Automated, W/O Scope  -     nystatin (MYCOSTATIN) cream; Apply topically 2 (two) times daily. Apply liberally to affected areas twice a day until symptoms have resolved for 21 days  Dispense: 30 g; Refill: 1    Itching of penis  -     POCT Urinalysis, Dipstick, Automated, W/O Scope       Patient Instructions   - Use prescribed cream (NYSTATIN) twice a day for one to three weeks, until symptoms have resolved  - Genital hygiene is important as well -- Retraction of the foreskin with thorough genital cleansing twice daily is recommended  - Do not overuse detergents, soaps, perfumes, condoms, and other chemicals (spermicidal agents, petroleum jelly) as this may worsen symptoms and cause inflammation of glans/foreskin    - Follow up with either PCP or Urology          If you are not allergic and have no contraindications, take tylenol (acetominophen) for fever control, chills, or body aches every 4 hours. Do not exceed 4000 mg/ day.If you are not allergic and have no contraindications, take Motrin (Ibuprofen) every 4 hours for fever, chills, pain or inflammation. Do not exceed 2400 mg/day. You can alternate taking tylenol and motrin.        If you were prescribed a narcotic, muscle relaxer, or controlled substance, do not drive or operate heavy equipment or machinery while taking these medications.       You must understand that you've received an Urgent Care treatment only and that you may be released before all your  medical problems are known or treated. You, the patient, will arrange for follow up care as instructed.    Follow up with your PCP or specialty clinic as directed in the next 1-2 weeks if not improved or as needed. You can call [phone] to schedule an appointment with the appropriate provider.    If your condition worsens we recommend that you receive another evaluation at the emergency room immediately or contact your primary medical clinic's after hours call service to discuss your concerns.    Please go to the Emergency Department for any concerns or worsening of condition.

## 2022-09-23 NOTE — PATIENT INSTRUCTIONS
- Use prescribed cream (NYSTATIN) twice a day for one to three weeks, until symptoms have resolved  - Genital hygiene is important as well -- Retraction of the foreskin with thorough genital cleansing twice daily is recommended  - Do not overuse detergents, soaps, perfumes, condoms, and other chemicals (spermicidal agents, petroleum jelly) as this may worsen symptoms and cause inflammation of glans/foreskin    - Follow up with either PCP or Urology          If you are not allergic and have no contraindications, take tylenol (acetominophen) for fever control, chills, or body aches every 4 hours. Do not exceed 4000 mg/ day.If you are not allergic and have no contraindications, take Motrin (Ibuprofen) every 4 hours for fever, chills, pain or inflammation. Do not exceed 2400 mg/day. You can alternate taking tylenol and motrin.        If you were prescribed a narcotic, muscle relaxer, or controlled substance, do not drive or operate heavy equipment or machinery while taking these medications.       You must understand that you've received an Urgent Care treatment only and that you may be released before all your medical problems are known or treated. You, the patient, will arrange for follow up care as instructed.    Follow up with your PCP or specialty clinic as directed in the next 1-2 weeks if not improved or as needed. You can call [phone] to schedule an appointment with the appropriate provider.    If your condition worsens we recommend that you receive another evaluation at the emergency room immediately or contact your primary medical clinic's after hours call service to discuss your concerns.    Please go to the Emergency Department for any concerns or worsening of condition.

## 2022-09-26 LAB
CHOL/HDLC RATIO: 5.1 (ref 3.3–5)
CHOLEST SERPL-MSCNC: 128 MG/DL (ref 0–199)
HBA1C MFR BLD: 7.4 % (ref 4–5.6)
HDLC SERPL-MCNC: 25 MG/DL (ref 40–60)
LDLC SERPL CALC-MCNC: 82 MG/DL (ref ?–99)
TRIGL SERPL-MCNC: 105 MG/DL (ref ?–149)
VLDLC SERPL-MCNC: 21 MG/DL (ref ?–29)

## 2022-10-03 PROCEDURE — 86833 HLA CLASS II HIGH DEFIN QUAL: CPT | Mod: PO,TXP | Performed by: NURSE PRACTITIONER

## 2022-10-03 PROCEDURE — 86832 HLA CLASS I HIGH DEFIN QUAL: CPT | Mod: PO,TXP | Performed by: NURSE PRACTITIONER

## 2022-10-05 ENCOUNTER — LAB VISIT (OUTPATIENT)
Dept: LAB | Facility: HOSPITAL | Age: 67
End: 2022-10-05
Payer: COMMERCIAL

## 2022-10-05 DIAGNOSIS — Z76.82 AWAITING ORGAN TRANSPLANT STATUS: ICD-10-CM

## 2022-10-07 ENCOUNTER — TELEPHONE (OUTPATIENT)
Dept: UROLOGY | Facility: CLINIC | Age: 67
End: 2022-10-07
Payer: COMMERCIAL

## 2022-10-07 ENCOUNTER — TELEPHONE (OUTPATIENT)
Dept: INTERNAL MEDICINE | Facility: CLINIC | Age: 67
End: 2022-10-07
Payer: COMMERCIAL

## 2022-10-07 NOTE — TELEPHONE ENCOUNTER
----- Message from Priti Rome sent at 10/7/2022  3:25 PM CDT -----  Pr called in re: Wants the Doctor to please give him a call did not say regarding what issue call pt @ .[phone]      Thanks [facility]

## 2022-10-07 NOTE — TELEPHONE ENCOUNTER
mESSAGE LEFT FOR PT TO RETURN MY CALL          ----- Message from Nandini Michael sent at 10/7/2022  3:15 PM CDT -----  Contact: Isidro  Type:  Needs Medical Advice    Who Called:  Isidro  Symptoms (please be specific):  Itching and painful urine  How long has patient had these symptoms:   About  week ago  Pharmacy name and phone #:     [id number] - [place], [place] - [address]  [address] [address]  Phone: [phone] Fax: [phone]  Would the patient rather a call back or a response via [facility]? Call back   Best Call Back Number:  Please call him at [phone]  Additional Information:

## 2022-10-10 ENCOUNTER — OFFICE VISIT (OUTPATIENT)
Dept: INTERNAL MEDICINE | Facility: CLINIC | Age: 67
End: 2022-10-10
Payer: COMMERCIAL

## 2022-10-10 ENCOUNTER — TELEPHONE (OUTPATIENT)
Dept: INTERNAL MEDICINE | Facility: CLINIC | Age: 67
End: 2022-10-10

## 2022-10-10 ENCOUNTER — LAB VISIT (OUTPATIENT)
Dept: LAB | Facility: HOSPITAL | Age: 67
End: 2022-10-10
Payer: COMMERCIAL

## 2022-10-10 VITALS
HEART RATE: 85 BPM | DIASTOLIC BLOOD PRESSURE: 70 MMHG | BODY MASS INDEX: 35.56 KG/M2 | RESPIRATION RATE: 19 BRPM | WEIGHT: 262.56 LBS | HEIGHT: 72 IN | SYSTOLIC BLOOD PRESSURE: 126 MMHG | OXYGEN SATURATION: 95 %

## 2022-10-10 DIAGNOSIS — L29.0 ANAL ITCHING: ICD-10-CM

## 2022-10-10 DIAGNOSIS — R30.0 DYSURIA: ICD-10-CM

## 2022-10-10 DIAGNOSIS — R30.0 DYSURIA: Primary | ICD-10-CM

## 2022-10-10 LAB
BACTERIA #/AREA URNS HPF: ABNORMAL /HPF
BILIRUB UR QL STRIP: NEGATIVE
CLARITY UR: ABNORMAL
COLOR UR: YELLOW
GLUCOSE UR QL STRIP: NEGATIVE
HGB UR QL STRIP: ABNORMAL
HPRA INTERPRETATION: NORMAL
HYALINE CASTS #/AREA URNS LPF: 0 /LPF
KETONES UR QL STRIP: NEGATIVE
LEUKOCYTE ESTERASE UR QL STRIP: ABNORMAL
MICROSCOPIC COMMENT: ABNORMAL
NITRITE UR QL STRIP: NEGATIVE
PH UR STRIP: 7 [PH] (ref 5–8)
PROT UR QL STRIP: ABNORMAL
RBC #/AREA URNS HPF: 0 /HPF (ref 0–4)
SP GR UR STRIP: 1.01 (ref 1–1.03)
SQUAMOUS #/AREA URNS HPF: 0 /HPF
URN SPEC COLLECT METH UR: ABNORMAL
WBC #/AREA URNS HPF: >100 /HPF (ref 0–5)

## 2022-10-10 PROCEDURE — 3062F PR POS MACROALBUMINURIA RESULT DOCUMENTED/REVIEW: ICD-10-PCS | Mod: CPTII,S$GLB,, | Performed by: NURSE PRACTITIONER

## 2022-10-10 PROCEDURE — 1160F PR REVIEW ALL MEDS BY PRESCRIBER/CLIN PHARMACIST DOCUMENTED: ICD-10-PCS | Mod: CPTII,S$GLB,, | Performed by: NURSE PRACTITIONER

## 2022-10-10 PROCEDURE — 1126F AMNT PAIN NOTED NONE PRSNT: CPT | Mod: CPTII,S$GLB,, | Performed by: NURSE PRACTITIONER

## 2022-10-10 PROCEDURE — 1160F RVW MEDS BY RX/DR IN RCRD: CPT | Mod: CPTII,S$GLB,, | Performed by: NURSE PRACTITIONER

## 2022-10-10 PROCEDURE — 3008F PR BODY MASS INDEX (BMI) DOCUMENTED: ICD-10-PCS | Mod: CPTII,S$GLB,, | Performed by: NURSE PRACTITIONER

## 2022-10-10 PROCEDURE — 1159F PR MEDICATION LIST DOCUMENTED IN MEDICAL RECORD: ICD-10-PCS | Mod: CPTII,S$GLB,, | Performed by: NURSE PRACTITIONER

## 2022-10-10 PROCEDURE — 87088 URINE BACTERIA CULTURE: CPT | Mod: TXP | Performed by: NURSE PRACTITIONER

## 2022-10-10 PROCEDURE — 1101F PR PT FALLS ASSESS DOC 0-1 FALLS W/OUT INJ PAST YR: ICD-10-PCS | Mod: CPTII,S$GLB,, | Performed by: NURSE PRACTITIONER

## 2022-10-10 PROCEDURE — 99213 OFFICE O/P EST LOW 20 MIN: CPT | Mod: S$GLB,,, | Performed by: NURSE PRACTITIONER

## 2022-10-10 PROCEDURE — 4010F ACE/ARB THERAPY RXD/TAKEN: CPT | Mod: CPTII,S$GLB,, | Performed by: NURSE PRACTITIONER

## 2022-10-10 PROCEDURE — 99999 PR PBB SHADOW E&M-EST. PATIENT-LVL V: CPT | Mod: PBBFAC,,, | Performed by: NURSE PRACTITIONER

## 2022-10-10 PROCEDURE — 1101F PT FALLS ASSESS-DOCD LE1/YR: CPT | Mod: CPTII,S$GLB,, | Performed by: NURSE PRACTITIONER

## 2022-10-10 PROCEDURE — 3074F SYST BP LT 130 MM HG: CPT | Mod: CPTII,S$GLB,, | Performed by: NURSE PRACTITIONER

## 2022-10-10 PROCEDURE — 99213 PR OFFICE/OUTPT VISIT, EST, LEVL III, 20-29 MIN: ICD-10-PCS | Mod: S$GLB,,, | Performed by: NURSE PRACTITIONER

## 2022-10-10 PROCEDURE — 3052F HG A1C>EQUAL 8.0%<EQUAL 9.0%: CPT | Mod: CPTII,S$GLB,, | Performed by: NURSE PRACTITIONER

## 2022-10-10 PROCEDURE — 3074F PR MOST RECENT SYSTOLIC BLOOD PRESSURE < 130 MM HG: ICD-10-PCS | Mod: CPTII,S$GLB,, | Performed by: NURSE PRACTITIONER

## 2022-10-10 PROCEDURE — 99999 PR PBB SHADOW E&M-EST. PATIENT-LVL V: ICD-10-PCS | Mod: PBBFAC,,, | Performed by: NURSE PRACTITIONER

## 2022-10-10 PROCEDURE — 4010F PR ACE/ARB THEARPY RXD/TAKEN: ICD-10-PCS | Mod: CPTII,S$GLB,, | Performed by: NURSE PRACTITIONER

## 2022-10-10 PROCEDURE — 87077 CULTURE AEROBIC IDENTIFY: CPT | Mod: NTX | Performed by: NURSE PRACTITIONER

## 2022-10-10 PROCEDURE — 81000 URINALYSIS NONAUTO W/SCOPE: CPT | Mod: NTX | Performed by: NURSE PRACTITIONER

## 2022-10-10 PROCEDURE — 3288F FALL RISK ASSESSMENT DOCD: CPT | Mod: CPTII,S$GLB,, | Performed by: NURSE PRACTITIONER

## 2022-10-10 PROCEDURE — 3078F PR MOST RECENT DIASTOLIC BLOOD PRESSURE < 80 MM HG: ICD-10-PCS | Mod: CPTII,S$GLB,, | Performed by: NURSE PRACTITIONER

## 2022-10-10 PROCEDURE — 3288F PR FALLS RISK ASSESSMENT DOCUMENTED: ICD-10-PCS | Mod: CPTII,S$GLB,, | Performed by: NURSE PRACTITIONER

## 2022-10-10 PROCEDURE — 1126F PR PAIN SEVERITY QUANTIFIED, NO PAIN PRESENT: ICD-10-PCS | Mod: CPTII,S$GLB,, | Performed by: NURSE PRACTITIONER

## 2022-10-10 PROCEDURE — 3078F DIAST BP <80 MM HG: CPT | Mod: CPTII,S$GLB,, | Performed by: NURSE PRACTITIONER

## 2022-10-10 PROCEDURE — 3066F PR DOCUMENTATION OF TREATMENT FOR NEPHROPATHY: ICD-10-PCS | Mod: CPTII,S$GLB,, | Performed by: NURSE PRACTITIONER

## 2022-10-10 PROCEDURE — 87086 URINE CULTURE/COLONY COUNT: CPT | Mod: TXP | Performed by: NURSE PRACTITIONER

## 2022-10-10 PROCEDURE — 3052F PR MOST RECENT HEMOGLOBIN A1C LEVEL 8.0 - < 9.0%: ICD-10-PCS | Mod: CPTII,S$GLB,, | Performed by: NURSE PRACTITIONER

## 2022-10-10 PROCEDURE — 87186 SC STD MICRODIL/AGAR DIL: CPT | Mod: NTX | Performed by: NURSE PRACTITIONER

## 2022-10-10 PROCEDURE — 1159F MED LIST DOCD IN RCRD: CPT | Mod: CPTII,S$GLB,, | Performed by: NURSE PRACTITIONER

## 2022-10-10 PROCEDURE — 3008F BODY MASS INDEX DOCD: CPT | Mod: CPTII,S$GLB,, | Performed by: NURSE PRACTITIONER

## 2022-10-10 PROCEDURE — 3062F POS MACROALBUMINURIA REV: CPT | Mod: CPTII,S$GLB,, | Performed by: NURSE PRACTITIONER

## 2022-10-10 PROCEDURE — 3066F NEPHROPATHY DOC TX: CPT | Mod: CPTII,S$GLB,, | Performed by: NURSE PRACTITIONER

## 2022-10-10 RX ORDER — HYDROCORTISONE ACETATE 25 MG/1
25 SUPPOSITORY RECTAL 2 TIMES DAILY PRN
Qty: 14 SUPPOSITORY | Refills: 0 | Status: SHIPPED | OUTPATIENT
Start: 2022-10-10 | End: 2022-10-17

## 2022-10-10 RX ORDER — CEPHALEXIN 500 MG/1
CAPSULE ORAL
Qty: 14 CAPSULE | Refills: 0 | Status: SHIPPED | OUTPATIENT
Start: 2022-10-10 | End: 2022-10-13

## 2022-10-10 RX ORDER — FLUCONAZOLE 150 MG/1
150 TABLET ORAL DAILY
Qty: 1 TABLET | Refills: 0 | Status: SHIPPED | OUTPATIENT
Start: 2022-10-10 | End: 2022-10-11

## 2022-10-10 NOTE — PROGRESS NOTES
OFFICE VISIT 10/10/22  1:40 PM CDT    CHIEF COMPLAINT: Anal Itching      HPI:   Presents today with c/o anal pruritis x 2 weeks. Reports the itching extends into the penis. Associated with urinary urgency and slight burning with urination. Denies anal or gential rash/lesions, hematuria, melena, discharge from penis or rectum, fevers, abdominal pain, back/flank pain. Per review, patient was seen at urgent care for this on 9/23/22 and prescribed Nystatin, which he states is not working. He has suffered with this intermittently over the past couple of years and states the only thing that previously worked was Keflex. Declines STI testing.     DIAGNOSES ADDRESSED THIS VISIT   1. Dysuria  -     Urinalysis, Reflex to Urine Culture Urine, Clean Catch; Future; Expected date: 10/10/2022    2. Anal itching        Unless noted herein, any chronic conditions are represented as and appear compensated and stable. No other significant complaints or concerns were reported.     FOLLOW UP  No follow-ups on file.  Future Appointments   Date Time Provider Department Center   10/10/2022  2:50 PM SPECIMEN, [facility] SPECLAB [facility]   11/15/2022 10:20 AM Mayito Alvarez MD ON [facility]   3/16/2023  8:00 AM Dez Ragland MD [facility] CARDIO [facility]       REVIEW OF SYSTEMS  Review of Systems   Constitutional:  Negative for chills and fever.   HENT:  Negative for congestion, ear pain, hearing loss and sinus pain.    Eyes:  Negative for blurred vision, discharge and redness.   Respiratory:  Negative for cough, hemoptysis, sputum production, shortness of breath and wheezing.    Cardiovascular:  Negative for chest pain, palpitations, claudication and leg swelling.   Gastrointestinal:  Negative for abdominal pain, constipation, diarrhea, heartburn, nausea and vomiting.        Anal itching - around rectum and within anus    Genitourinary:  Positive for dysuria and urgency. Negative for frequency.   Musculoskeletal:  Negative for  "back pain, falls, joint pain, myalgias and neck pain.   Skin:  Negative for itching and rash.   Neurological:  Negative for dizziness, tingling, sensory change, focal weakness, weakness and headaches.   Psychiatric/Behavioral:  Negative for depression.      PHYSICAL EXAM   Vitals:    10/10/22 1339   BP: 126/70   BP Location: Left arm   Patient Position: Sitting   BP Method: Large (Manual)   Pulse: 85   Resp: 19   SpO2: 95%   Weight: 119.1 kg (262 lb 9.1 oz)   Height: 6' (1.829 m)       Physical Exam  Vitals reviewed.   Constitutional:       General: He is not in acute distress.     Appearance: Normal appearance. He is not ill-appearing.   Cardiovascular:      Rate and Rhythm: Normal rate.   Pulmonary:      Effort: Pulmonary effort is normal.   Neurological:      Mental Status: He is alert.   Psychiatric:         Behavior: Behavior normal.        PRESCRIPTION DRUG MANAGEMENT  Outpatient Medications Prior to Visit   Medication Sig Dispense Refill    acyclovir (ZOVIRAX) 400 MG tablet Take 1 tablet (400 mg total) by mouth 2 (two) times daily as needed. 60 tablet 6    ADVAIR DISKUS 250-50 mcg/dose diskus inhaler INHALE 1 PUFF INTO THE LUNGS 2 (TWO) TIMES DAILY. CONTROLLER 60 each 11    aspirin (ECOTRIN) 81 MG EC tablet Take 1 tablet (81 mg total) by mouth once daily.      atorvastatin (LIPITOR) 40 MG tablet TAKE 1 TABLET BY MOUTH EVERY DAY 90 tablet 3    BD INSULIN SYRINGE HALF UNIT 0.3 mL 31 gauge x 5/16" Syrg FOR USE WITH INSULIN. 100 Syringe 1    blood sugar diagnostic Strp 1 each by Misc.(Non-Drug; Combo Route) route 4 (four) times daily. 100 each 11    blood-glucose meter kit Test finger stick blood sugar once daily. 1 each 0    calcitRIOL (ROCALTROL) 0.25 MCG Cap TAKE 1 CAPSULE (0.25 MCG TOTAL) BY MOUTH EVERY MON, WED, FRI. 15 capsule 11    esomeprazole (NEXIUM) 40 MG capsule Take 40 mg by mouth once daily.      finasteride (PROSCAR) 5 mg tablet Take 1 tablet (5 mg total) by mouth once daily. 90 tablet 3    folic " "acid (FOLVITE) 1 MG tablet TAKE 1 TABLET BY MOUTH EVERY DAY 30 tablet 9    furosemide (LASIX) 80 MG tablet Take 1 tablet (80 mg total) by mouth once daily. 30 tablet 6    hydrALAZINE (APRESOLINE) 100 MG tablet Take 1 tablet (100 mg total) by mouth every 8 (eight) hours. 90 tablet 11    insulin degludec (TRESIBA FLEXTOUCH U-200) 200 unit/mL (3 mL) insulin pen Inject 30 Units into the skin once daily. 2 pen 8    isosorbide dinitrate (ISORDIL) 20 MG tablet Take 1 tablet (20 mg total) by mouth 3 (three) times daily. 90 tablet 11    lancets Misc 1 each by Misc.(Non-Drug; Combo Route) route 4 (four) times daily. 100 each 11    metoprolol succinate (TOPROL-XL) 50 MG 24 hr tablet Take 1 tablet (50 mg total) by mouth once daily. 30 tablet 11    multivitamin (THERAGRAN) tablet Take 1 tablet by mouth once daily.      NIFEdipine (PROCARDIA-XL) 90 MG (OSM) 24 hr tablet TAKE 1 TABLET BY MOUTH ONCE DAILY. 30 tablet 5    nystatin (MYCOSTATIN) cream Apply topically 2 (two) times daily. Apply liberally to affected areas twice a day until symptoms have resolved for 21 days 30 g 1    pen needle, diabetic (BD ULTRA-FINE SHIRA PEN NEEDLE) 32 gauge x 5/32" Ndle 1 each by Misc.(Non-Drug; Combo Route) route 4 (four) times daily with meals and nightly. 100 each 11    sevelamer carbonate (RENVELA) 800 mg Tab Take 3 tablets (2,400 mg total) by mouth 3 (three) times daily with meals. 300 tablet 11    tamsulosin (FLOMAX) 0.4 mg Cap Take 1 capsule (0.4 mg total) by mouth once daily. 30 capsule 11    albuterol (PROVENTIL) 2.5 mg /3 mL (0.083 %) nebulizer solution Take 3 mLs (2.5 mg total) by nebulization every 4 to 6 hours as needed for Wheezing or Shortness of Breath. (Patient not taking: No sig reported) 360 mL 11    albuterol (PROVENTIL/VENTOLIN HFA) 90 mcg/actuation inhaler Inhale 2 puffs into the lungs every 4 (four) hours as needed for Wheezing or Shortness of Breath. (Patient not taking: No sig reported) 18 g 11    azelastine (ASTELIN) 137 " "mcg (0.1 %) nasal spray 1 spray (137 mcg total) by Nasal route 2 (two) times daily. 30 mL 0    cetirizine (ZYRTEC) 10 MG tablet Take 1 tablet (10 mg total) by mouth once daily. (Patient not taking: Reported on 10/10/2022) 30 tablet 1    ciprofloxacin-dexamethasone 0.3-0.1% (CIPRODEX) 0.3-0.1 % DrpS 5 drops in affected ear twice daily for 7 days (Patient not taking: Reported on 10/10/2022) 7.5 mL 1    fluticasone propionate (FLONASE) 50 mcg/actuation nasal spray 2 sprays (100 mcg total) by Each Nostril route once daily. (Patient not taking: Reported on 10/10/2022) 16 g 11    inhalation spacing device (VORTEX HOLDING CHAMBER) Use as directed for inhalation. (Patient not taking: Reported on 10/10/2022) 1 Device prn    olopatadine (PATADAY) 0.2 % Drop Place 1 drop into both eyes daily as needed. 2.5 mL 0    valsartan (DIOVAN) 320 MG tablet Take 1 tablet (320 mg total) by mouth once daily. 30 tablet 4     No facility-administered medications prior to visit.   There are no discontinued medications.     Documentation entered by me for this encounter may have been done in part using speech-recognition technology. Although I have made an effort to ensure accuracy, "sound like" errors may exist and should be interpreted in context.        "

## 2022-10-10 NOTE — TELEPHONE ENCOUNTER
----- Message from Sara Juárez NP sent at 10/10/2022  4:18 PM CDT -----  Please schedule the patient for a follow up in 2 weeks to recheck his urine - his urine specimen today revealed infection with blood. We need to ensure the hematuria is resolved.     Thank you,     GIBRAN Jimenez  ----- Message -----  From: New, [facility]  Sent: 10/10/2022   3:55 PM CDT  To: Sara Juárez NP

## 2022-10-10 NOTE — TELEPHONE ENCOUNTER
Informed pt of results,pt verbalized understanding. Scheduled 2 week f/u on  11/01/2022 at 10:00 am.

## 2022-10-11 ENCOUNTER — DOCUMENTATION ONLY (OUTPATIENT)
Dept: NEPHROLOGY | Facility: CLINIC | Age: 67
End: 2022-10-11
Payer: COMMERCIAL

## 2022-10-11 NOTE — H&P
History & Physical      Chief Complaint:  H&P    HPI:        67 y.o. AAM with ESRD on HD MWF at [facility].e   The patient's first date   of hemodialysis was  1/30/201.  His cause of end-stage renal disease is  DM and HTN.  His current dialysis   prescription is as follows:  Treatment Time: 210 Minutes; Frequency: 3 Times per week; Dialyzer: Gambro Revaclr 300 1209; Access Used: CVC  Catheter Jugular (Right); Max UFR (L/hr): 0.0 L/hr; Max UFR (ml/kg/hr): 13.0 ml/kg/hr; Target Wt: 117.0 Kg; Blood  Flow Rate: 400 mL/Min; Dialysate Flow Rate: 800 mL/Min; Dialysate: Custom; K: 3.0 mEq/L; Ca: 2.5 mEq/L; Bicarb:  35.0 mEq/L; Sodium: 138.0 mEq/L; Heparin Loading Dose: 2600; Heparin Concentration: 1:1,000; Heparin Infusion:  1000. Pt has refused permanent access placement at this time. Pt had thought about wanting to do PD but wanted to wait until he discussed transplant with the team since his daughter is wanting to donate. He is aware of risks of long term CVC use including sepsis and death and accepts them.       ROS:        Constitutional: Negative for fever, chills, weight loss, malaise/fatigue and diaphoresis.   HENT: Negative for hearing loss, ear pain, nosebleeds, congestion, sore throat, neck pain, tinnitus and ear discharge.    Eyes: Negative for blurred vision, double vision, photophobia, pain, discharge and redness.   Respiratory: Negative for cough, hemoptysis, sputum production, shortness of breath, wheezing and stridor.    Cardiovascular: Negative for chest pain, palpitations, orthopnea, claudication, leg swelling and PND.   Gastrointestinal: Negative for heartburn, nausea, vomiting, abdominal pain, diarrhea, constipation, blood in stool and melena.   Genitourinary: Negative for dysuria, urgency, frequency, hematuria and flank pain.   Musculoskeletal: Negative for myalgias, back pain, joint pain and falls.   Skin: Negative for itching and rash.   Neurological: Negative for  dizziness, tingling, tremors, sensory change, speech change, focal weakness, seizures, loss of consciousness, weakness and headaches.   Endo/Heme/Allergies: Negative for environmental allergies and polydipsia. Does not bruise/bleed easily.   Psychiatric/Behavioral: Negative for depression, suicidal ideas, hallucinations, memory loss and substance abuse. The patient is not nervous/anxious and does not have insomnia.    All 14 systems reviewed and negative except as noted above.            Past Medical History:   Diagnosis Date    Abnormal nuclear stress test 8/30/2022    Anemia 4/16/2014    BPH (benign prostatic hyperplasia)     CKD (chronic kidney disease) stage 4, GFR 15-29 ml/min     Coronary artery disease of native artery of native heart with stable angina pectoris 4/29/2019    Diabetes mellitus     Diabetes mellitus, type 2     Dilated cardiomyopathy 8/30/2022    Elevated PSA     Encounter for blood transfusion     Herpes labialis     Hyperlipidemia     Hypertension     Obesity     Proteinuria     Secondary hyperparathyroidism (of renal origin)     UTI (lower urinary tract infection) 5/1/2015       Past Surgical History:   Procedure Laterality Date    CATHETERIZATION OF BOTH LEFT AND RIGHT HEART N/A 8/30/2022    Procedure: CATHETERIZATION, HEART, BOTH LEFT AND RIGHT;  Surgeon: Kassandra Deras MD;  Location: [facility] CATH LAB;  Service: Cardiology;  Laterality: N/A;    COLONOSCOPY N/A 12/21/2017    Procedure: COLONOSCOPY;  Surgeon: Fran Escalera MD;  Location: [facility] ENDO;  Service: Endoscopy;  Laterality: N/A;    FLUOROSCOPY N/A 1/21/2021    Procedure: Vascath insertion;  Surgeon: Adrian Pruitt Jr., MD;  Location: [facility] CATH LAB;  Service: General;  Laterality: N/A;    FLUOROSCOPY N/A 4/6/2021    Procedure: Over the wire Vas Cath exchange;  Surgeon: Ho Pressley MD;  Location: [facility] CATH LAB;  Service: General;  Laterality: N/A;    PROSTATE BIOPSY      thorocotomy  2010       Family History   Problem Relation  "Age of Onset    No Known Problems Mother     Diabetes Father     Hypertension Father     Hypertension Other     Diabetes Other        Social History     Socioeconomic History    Marital status:     Number of children: 3   Occupational History     Employer: self-rmployed   Tobacco Use    Smoking status: Never    Smokeless tobacco: Never   Substance and Sexual Activity    Alcohol use: Yes     Comment: seldom     Drug use: No    Sexual activity: Yes     Partners: Female   Social History Narrative    ** Merged History Encounter **            Current Outpatient Medications   Medication Sig Dispense Refill    acyclovir (ZOVIRAX) 400 MG tablet Take 1 tablet (400 mg total) by mouth 2 (two) times daily as needed. 60 tablet 6    ADVAIR DISKUS 250-50 mcg/dose diskus inhaler INHALE 1 PUFF INTO THE LUNGS 2 (TWO) TIMES DAILY. CONTROLLER 60 each 11    albuterol (PROVENTIL) 2.5 mg /3 mL (0.083 %) nebulizer solution Take 3 mLs (2.5 mg total) by nebulization every 4 to 6 hours as needed for Wheezing or Shortness of Breath. (Patient not taking: No sig reported) 360 mL 11    albuterol (PROVENTIL/VENTOLIN HFA) 90 mcg/actuation inhaler Inhale 2 puffs into the lungs every 4 (four) hours as needed for Wheezing or Shortness of Breath. (Patient not taking: No sig reported) 18 g 11    aspirin (ECOTRIN) 81 MG EC tablet Take 1 tablet (81 mg total) by mouth once daily.      atorvastatin (LIPITOR) 40 MG tablet TAKE 1 TABLET BY MOUTH EVERY DAY 90 tablet 3    azelastine (ASTELIN) 137 mcg (0.1 %) nasal spray 1 spray (137 mcg total) by Nasal route 2 (two) times daily. 30 mL 0    BD INSULIN SYRINGE HALF UNIT 0.3 mL 31 gauge x 5/16" Syrg FOR USE WITH INSULIN. 100 Syringe 1    blood sugar diagnostic Strp 1 each by Misc.(Non-Drug; Combo Route) route 4 (four) times daily. 100 each 11    blood-glucose meter kit Test finger stick blood sugar once daily. 1 each 0    calcitRIOL (ROCALTROL) 0.25 MCG Cap TAKE 1 CAPSULE (0.25 MCG TOTAL) BY MOUTH EVERY " MON, WED, FRI. 15 capsule 11    cephALEXin (KEFLEX) 500 MG capsule Take 1 capsule (500 mg total) by mouth every 12 (twelve) hours for 7 days. Take after dialysis on dialysis days. 14 capsule 0    cetirizine (ZYRTEC) 10 MG tablet Take 1 tablet (10 mg total) by mouth once daily. (Patient not taking: Reported on 10/10/2022) 30 tablet 1    ciprofloxacin-dexamethasone 0.3-0.1% (CIPRODEX) 0.3-0.1 % DrpS 5 drops in affected ear twice daily for 7 days (Patient not taking: Reported on 10/10/2022) 7.5 mL 1    esomeprazole (NEXIUM) 40 MG capsule Take 40 mg by mouth once daily.      finasteride (PROSCAR) 5 mg tablet Take 1 tablet (5 mg total) by mouth once daily. 90 tablet 3    fluconazole (DIFLUCAN) 150 MG Tab Take 1 tablet (150 mg total) by mouth once daily. for 1 day 1 tablet 0    fluticasone propionate (FLONASE) 50 mcg/actuation nasal spray 2 sprays (100 mcg total) by Each Nostril route once daily. (Patient not taking: Reported on 10/10/2022) 16 g 11    folic acid (FOLVITE) 1 MG tablet TAKE 1 TABLET BY MOUTH EVERY DAY 30 tablet 9    furosemide (LASIX) 80 MG tablet Take 1 tablet (80 mg total) by mouth once daily. 30 tablet 6    hydrALAZINE (APRESOLINE) 100 MG tablet Take 1 tablet (100 mg total) by mouth every 8 (eight) hours. 90 tablet 11    hydrocortisone (ANUSOL-HC) 25 mg suppository Place 1 suppository (25 mg total) rectally 2 (two) times daily as needed for Hemorrhoids. 14 suppository 0    inhalation spacing device (VORTEX HOLDING CHAMBER) Use as directed for inhalation. (Patient not taking: Reported on 10/10/2022) 1 Device prn    insulin degludec (TRESIBA FLEXTOUCH U-200) 200 unit/mL (3 mL) insulin pen Inject 30 Units into the skin once daily. 2 pen 8    isosorbide dinitrate (ISORDIL) 20 MG tablet Take 1 tablet (20 mg total) by mouth 3 (three) times daily. 90 tablet 11    lancets Misc 1 each by Misc.(Non-Drug; Combo Route) route 4 (four) times daily. 100 each 11    metoprolol succinate (TOPROL-XL) 50 MG 24 hr tablet  "Take 1 tablet (50 mg total) by mouth once daily. 30 tablet 11    multivitamin (THERAGRAN) tablet Take 1 tablet by mouth once daily.      NIFEdipine (PROCARDIA-XL) 90 MG (OSM) 24 hr tablet TAKE 1 TABLET BY MOUTH ONCE DAILY. 30 tablet 5    nystatin (MYCOSTATIN) cream Apply topically 2 (two) times daily. Apply liberally to affected areas twice a day until symptoms have resolved for 21 days 30 g 1    olopatadine (PATADAY) 0.2 % Drop Place 1 drop into both eyes daily as needed. 2.5 mL 0    pen needle, diabetic (BD ULTRA-FINE SHIRA PEN NEEDLE) 32 gauge x 5/32" Ndle 1 each by Misc.(Non-Drug; Combo Route) route 4 (four) times daily with meals and nightly. 100 each 11    sevelamer carbonate (RENVELA) 800 mg Tab Take 3 tablets (2,400 mg total) by mouth 3 (three) times daily with meals. 300 tablet 11    tamsulosin (FLOMAX) 0.4 mg Cap Take 1 capsule (0.4 mg total) by mouth once daily. 30 capsule 11    valsartan (DIOVAN) 320 MG tablet Take 1 tablet (320 mg total) by mouth once daily. 30 tablet 4     No current facility-administered medications for this visit.       Review of patient's allergies indicates:   Allergen Reactions    Bactrim [sulfamethoxazole-trimethoprim] Swelling    Nsaids (non-steroidal anti-inflammatory drug)      CKD         There were no vitals filed for this visit.          Physical Exam   Nursing Notes and Vital Signs Reviewed.     Constitutional: Well developed, well nourished. AAOx3, NAD, speech/ comprehension clear   Head: Atraumatic. Normocephalic.   Eyes: PERRL. EOMI. Conjunctivae are not pale. No scleral icterus.   ENT: Mucous membranes are dry. No tongue tremors. Throat clear.  Neck: Supple. No JVD or LN or Carotid Bruits noted B.  Cardiovascular: S1S2 RRR, no murmurs, rubs, or gallops. Distal pulses are 2+ and symmetric.   Pulmonary/Chest: No evidence of respiratory distress. Clear to auscultation bilaterally. No wheezing, rales or rhonchi. No chest wall TTP.   Abdominal: Soft and non-distended. There " is no tenderness. No rebound, guarding, or rigidity. No organomegaly. No mass or viscera palpable  Musculoskeletal: FROM in all extremities. No deformities, no TTP, no edema. No midline spinal TTP. No step-offs. Pelvis is stable to compression. No cyanosis. Moves all four extremities.   Skin: Skin is warm and dry.   Neurological: No gross neurological deficits, Strength 5/5 B, is equal in the upper and lower extremities bilaterally. No sensory deficits to light touch. No pronator drift.  DTRs are 2+ and equal throughout.   Psychiatric: Good eye contact. Normal Affect.      Laboratory Data:  Reviewed and noted in plan where applicable- Please see chart for full laboratory data.       Lab Results   Component Value Date    WBC 9.58 08/23/2022    HGB 11.7 (L) 08/23/2022    HCT 37.9 (L) 08/23/2022    MCV 88 08/23/2022     08/23/2022     BMP  Lab Results   Component Value Date     08/23/2022    K 4.7 08/23/2022     08/23/2022    CO2 23 08/23/2022    BUN 41 (H) 08/23/2022    CREATININE 8.3 (H) 08/23/2022    CALCIUM 9.8 08/23/2022    ANIONGAP 13 08/23/2022    ESTGFRAFRICA 6.9 (A) 07/14/2022    EGFRNONAA 6.0 (A) 07/14/2022     CMP  Sodium   Date Value Ref Range Status   08/23/2022 142 136 - 145 mmol/L Final     Potassium   Date Value Ref Range Status   08/23/2022 4.7 3.5 - 5.1 mmol/L Final     Chloride   Date Value Ref Range Status   08/23/2022 106 95 - 110 mmol/L Final     CO2   Date Value Ref Range Status   08/23/2022 23 23 - 29 mmol/L Final     Glucose   Date Value Ref Range Status   08/23/2022 109 70 - 110 mg/dL Final     BUN   Date Value Ref Range Status   08/23/2022 41 (H) 8 - 23 mg/dL Final     Creatinine   Date Value Ref Range Status   08/23/2022 8.3 (H) 0.5 - 1.4 mg/dL Final     Calcium   Date Value Ref Range Status   08/23/2022 9.8 8.7 - 10.5 mg/dL Final     Total Protein   Date Value Ref Range Status   07/14/2022 7.5 6.0 - 8.4 g/dL Final   07/14/2022 7.5 6.0 - 8.4 g/dL Final     Albumin   Date  Value Ref Range Status   07/14/2022 3.7 3.5 - 5.2 g/dL Final   07/14/2022 3.7 3.5 - 5.2 g/dL Final   05/17/2016 4.0 3.6 - 5.1 g/dL Final     Comment:     @ Test Performed By:  [facility] Ayanna Stout M.D., PATRICK.,   [address] [place] [id number]  [facility]  [id number]       Total Bilirubin   Date Value Ref Range Status   07/14/2022 1.0 0.1 - 1.0 mg/dL Final     Comment:     For infants and newborns, interpretation of results should be based  on gestational age, weight and in agreement with clinical  observations.    Premature Infant recommended reference ranges:  Up to 24 hours.............<8.0 mg/dL  Up to 48 hours............<12.0 mg/dL  3-5 days..................<15.0 mg/dL  6-29 days.................<15.0 mg/dL     07/14/2022 1.0 0.1 - 1.0 mg/dL Final     Comment:     For infants and newborns, interpretation of results should be based  on gestational age, weight and in agreement with clinical  observations.    Premature Infant recommended reference ranges:  Up to 24 hours.............<8.0 mg/dL  Up to 48 hours............<12.0 mg/dL  3-5 days..................<15.0 mg/dL  6-29 days.................<15.0 mg/dL       Alkaline Phosphatase   Date Value Ref Range Status   07/14/2022 80 55 - 135 U/L Final   07/14/2022 80 55 - 135 U/L Final     AST   Date Value Ref Range Status   07/14/2022 16 10 - 40 U/L Final   07/14/2022 16 10 - 40 U/L Final     ALT   Date Value Ref Range Status   07/14/2022 8 (L) 10 - 44 U/L Final   07/14/2022 8 (L) 10 - 44 U/L Final     Anion Gap   Date Value Ref Range Status   08/23/2022 13 8 - 16 mmol/L Final     eGFR if    Date Value Ref Range Status   07/14/2022 6.9 (A) >60 mL/min/1.73 m^2 Final     eGFR if non    Date Value Ref Range Status   07/14/2022 6.0 (A) >60 mL/min/1.73 m^2 Final     Comment:     Calculation used to obtain the estimated glomerular filtration  rate (eGFR) is the CKD-EPI equation.         Lab Results   Component Value Date    CALCIUM 9.8 08/23/2022    PHOS 7.1 (H) 02/01/2021     Lab Results   Component Value Date    K 4.7 08/23/2022     Lab Results   Component Value Date    LABPROT 10.4 08/23/2022    ALBUMIN 3.7 07/14/2022    ALBUMIN 3.7 07/14/2022     Lab Results   Component Value Date    HGBA1C 8.0 (H) 07/14/2022       BMP  @[id number](GLU,NA,K,Cl,CO2,BUN,Creatinine,Calcium,MG)@      Radiology:  Reviewed and noted in plan where applicable- Please see chart for full radiology data.            ASSESSMENT/PLAN:     Patient Active Problem List   Diagnosis    Severe hypertension    Anemia    Secondary hyperparathyroidism    Metabolic acidosis    Type 2 diabetes mellitus with chronic kidney disease, with long-term current use of insulin    Herpes labialis    Benign non-nodular prostatic hyperplasia without lower urinary tract symptoms    Elevated PSA    Hypogonadism in male    Hyperlipidemia associated with type 2 diabetes mellitus    Class 1 obesity due to excess calories with serious comorbidity and body mass index (BMI) of 34.0 to 34.9 in adult    Colon cancer screening    Special screening for malignant neoplasms, colon    Colon polyp    Other proteinuria    Coronary artery disease of native artery of native heart with stable angina pectoris    Multiple thyroid nodules    Patient on waiting list for kidney transplant    Acute hypoxemic respiratory failure due to COVID-19    ESRD on dialysis    Hyperkalemia    Pneumonia due to COVID-19 virus    Dyspnea    Post viral asthma    Upper airway cough syndrome    Pulmonary fibrosis, postinflammatory    PVC (premature ventricular contraction)    Chronic systolic congestive heart failure    Dilated cardiomyopathy    Abnormal nuclear stress test    Pulmonary HTN    Preop cardiovascular exam    Anal itching         PLAN:      Assessment and plan:    1.  ESRD: Doing very well on  dialysis. We will continue hemodialysis treatments three   times a week, four  hours each treatment, maintaining a URR of 70% or greater and   a Kt/V of 1.20.  Currently, the patient is stable.    2.  Anemia: .  We will check hemoglobin at least monthly,   target range 10 to 11, transferrin saturation monthly, and ferritin quarterly.    We will dose Epogen and iron according to monthly blood work and according to   protocol.    3 . Hypertension.  The patient's blood pressure generally stabilizes during   Treatment. Currently controlled with current medications, sodium and fluid   restrictions and dialysis prescription. BP is much better controlled    4.  Hyperparathyroidism secondary to renal origin.  We will check intact PTH on   a quarterly basis.  We will dose vitamin D according to blood work and protocol.      ADILENE KatC

## 2022-10-12 LAB — BACTERIA UR CULT: ABNORMAL

## 2022-10-13 ENCOUNTER — TELEPHONE (OUTPATIENT)
Dept: INTERNAL MEDICINE | Facility: CLINIC | Age: 67
End: 2022-10-13
Payer: COMMERCIAL

## 2022-10-13 ENCOUNTER — DOCUMENTATION ONLY (OUTPATIENT)
Dept: INTERNAL MEDICINE | Facility: CLINIC | Age: 67
End: 2022-10-13
Payer: COMMERCIAL

## 2022-10-13 RX ORDER — CIPROFLOXACIN 500 MG/1
500 TABLET ORAL DAILY
Qty: 7 TABLET | Refills: 0 | Status: SHIPPED | OUTPATIENT
Start: 2022-10-13 | End: 2022-10-13

## 2022-10-13 RX ORDER — CIPROFLOXACIN 500 MG/1
500 TABLET ORAL DAILY
Qty: 7 TABLET | Refills: 0 | Status: SHIPPED | OUTPATIENT
Start: 2022-10-13 | End: 2022-10-20

## 2022-10-13 NOTE — TELEPHONE ENCOUNTER
Good afternoon,     Please notify Mr. Alves that his urine culture and sensitivities have returned. Based on the sensitivities, I want to change his antibiotic treatment from Cephalexin to Ciprofloxacin 500 mg per day for 7 days. He should take the Ciprofloxacin after dialysis on dialysis days. He has a follow up appt scheduled with Mrs. Beltre on 11/1/2022 to recheck his urine and reevaluate symptoms.     Please let me know if there are any questions or concerns.     LUIS Jimenez

## 2022-10-13 NOTE — PROGRESS NOTES
I discussed cardiology clearance and angiogram with Dr. Robles. Patient will need review by committee before activation on the list per Dr. Robles.

## 2022-10-17 DIAGNOSIS — Z76.82 ORGAN TRANSPLANT CANDIDATE: Primary | ICD-10-CM

## 2022-10-19 ENCOUNTER — TELEPHONE (OUTPATIENT)
Dept: TRANSPLANT | Facility: CLINIC | Age: 67
End: 2022-10-19
Payer: COMMERCIAL

## 2022-10-19 NOTE — TELEPHONE ENCOUNTER
I called patient and explained to him that I discussed his case with Dr. Robles and we would like a repeat echocardiogram to check the status of his EF before discussion for re-activation on the list. He verbalized understanding. Order placed and scheduling card given to Niki to schedule.

## 2022-10-27 ENCOUNTER — TELEPHONE (OUTPATIENT)
Dept: TRANSPLANT | Facility: CLINIC | Age: 67
End: 2022-10-27
Payer: COMMERCIAL

## 2022-11-02 DIAGNOSIS — I10 HTN (HYPERTENSION), BENIGN: ICD-10-CM

## 2022-11-02 DIAGNOSIS — Z76.0 MEDICATION REFILL: Primary | ICD-10-CM

## 2022-11-02 DIAGNOSIS — I10 HYPERTENSION, UNSPECIFIED TYPE: ICD-10-CM

## 2022-11-02 DIAGNOSIS — Z76.82 AWAITING ORGAN TRANSPLANT STATUS: ICD-10-CM

## 2022-11-02 PROCEDURE — 99001 SPECIMEN HANDLING PT-LAB: CPT | Mod: PO,TXP | Performed by: NURSE PRACTITIONER

## 2022-11-02 RX ORDER — VALSARTAN 320 MG/1
320 TABLET ORAL DAILY
Qty: 30 TABLET | Refills: 4 | Status: SHIPPED | OUTPATIENT
Start: 2022-11-02 | End: 2023-03-16

## 2022-11-02 RX ORDER — NIFEDIPINE 90 MG/1
90 TABLET, EXTENDED RELEASE ORAL DAILY
Qty: 30 TABLET | Refills: 5 | Status: SHIPPED | OUTPATIENT
Start: 2022-11-02 | End: 2023-05-25

## 2022-11-04 ENCOUNTER — LAB VISIT (OUTPATIENT)
Dept: LAB | Facility: HOSPITAL | Age: 67
End: 2022-11-04
Payer: COMMERCIAL

## 2022-11-04 DIAGNOSIS — Z76.82 AWAITING ORGAN TRANSPLANT STATUS: ICD-10-CM

## 2022-11-11 DIAGNOSIS — N18.6 ESRD (END STAGE RENAL DISEASE): Primary | ICD-10-CM

## 2022-11-11 DIAGNOSIS — Z76.0 MEDICATION REFILL: ICD-10-CM

## 2022-11-11 RX ORDER — FUROSEMIDE 80 MG/1
80 TABLET ORAL DAILY
Qty: 30 TABLET | Refills: 6 | Status: ON HOLD | OUTPATIENT
Start: 2022-11-11 | End: 2024-02-20 | Stop reason: HOSPADM

## 2022-11-17 ENCOUNTER — TELEPHONE (OUTPATIENT)
Dept: TRANSPLANT | Facility: CLINIC | Age: 67
End: 2022-11-17
Payer: COMMERCIAL

## 2022-11-17 ENCOUNTER — HOSPITAL ENCOUNTER (OUTPATIENT)
Dept: CARDIOLOGY | Facility: HOSPITAL | Age: 67
Discharge: HOME | End: 2022-11-17
Attending: NURSE PRACTITIONER
Payer: COMMERCIAL

## 2022-11-17 VITALS
SYSTOLIC BLOOD PRESSURE: 126 MMHG | WEIGHT: 262 LBS | DIASTOLIC BLOOD PRESSURE: 70 MMHG | BODY MASS INDEX: 35.49 KG/M2 | HEIGHT: 72 IN

## 2022-11-17 DIAGNOSIS — Z76.82 ORGAN TRANSPLANT CANDIDATE: ICD-10-CM

## 2022-11-17 LAB
AORTIC ROOT ANNULUS: 3.71 CM
ASCENDING AORTA: 3.59 CM
AV INDEX (PROSTH): 0.86
AV MEAN GRADIENT: 5 MMHG
AV PEAK GRADIENT: 8 MMHG
AV REGURGITATION PRESSURE HALF TIME: 1172.77 MS
AV VALVE AREA: 3.68 CM2
AV VELOCITY RATIO: 0.81
BSA FOR ECHO PROCEDURE: 2.46 M2
CV ECHO LV RWT: 0.38 CM
DOP CALC AO PEAK VEL: 1.44 M/S
DOP CALC AO VTI: 26.3 CM
DOP CALC LVOT AREA: 4.3 CM2
DOP CALC LVOT DIAMETER: 2.33 CM
DOP CALC LVOT PEAK VEL: 1.16 M/S
DOP CALC LVOT STROKE VOLUME: 96.74 CM3
DOP CALC RVOT PEAK VEL: 0.69 M/S
DOP CALC RVOT VTI: 15.8 CM
DOP CALCLVOT PEAK VEL VTI: 22.7 CM
E WAVE DECELERATION TIME: 234.35 MSEC
E/A RATIO: 0.88
E/E' RATIO: 15.5 M/S
ECHO LV POSTERIOR WALL: 1.27 CM (ref 0.6–1.1)
EJECTION FRACTION: 40 %
FRACTIONAL SHORTENING: 14 % (ref 28–44)
INTERVENTRICULAR SEPTUM: 1.63 CM (ref 0.6–1.1)
IVC DIAMETER: 1.92 CM
IVRT: 91.34 MSEC
LA MAJOR: 7.58 CM
LA MINOR: 6.1 CM
LEFT ATRIUM SIZE: 4.04 CM
LEFT ATRIUM VOLUME INDEX MOD: 30.5 ML/M2
LEFT ATRIUM VOLUME MOD: 72.98 CM3
LEFT INTERNAL DIMENSION IN SYSTOLE: 5.8 CM (ref 2.1–4)
LEFT VENTRICLE DIASTOLIC VOLUME INDEX: 97.77 ML/M2
LEFT VENTRICLE DIASTOLIC VOLUME: 233.67 ML
LEFT VENTRICLE MASS INDEX: 205 G/M2
LEFT VENTRICLE SYSTOLIC VOLUME INDEX: 69.6 ML/M2
LEFT VENTRICLE SYSTOLIC VOLUME: 166.38 ML
LEFT VENTRICULAR INTERNAL DIMENSION IN DIASTOLE: 6.73 CM (ref 3.5–6)
LEFT VENTRICULAR MASS: 490.01 G
LV LATERAL E/E' RATIO: 15.5 M/S
LV SEPTAL E/E' RATIO: 15.5 M/S
LVOT MG: 2.78 MMHG
LVOT MV: 0.75 CM/S
MV PEAK A VEL: 1.06 M/S
MV PEAK E VEL: 0.93 M/S
MV STENOSIS PRESSURE HALF TIME: 67.96 MS
MV VALVE AREA P 1/2 METHOD: 3.24 CM2
PISA AR MAX VEL: 2.55 M/S
PISA TR MAX VEL: 2.72 M/S
PULM VEIN S/D RATIO: 0.58
PV MEAN GRADIENT: 1.08 MMHG
PV PEAK D VEL: 0.95 M/S
PV PEAK S VEL: 0.55 M/S
PV PEAK VELOCITY: 0.89 CM/S
RA MAJOR: 5.47 CM
RA PRESSURE: 3 MMHG
RA WIDTH: 3.76 CM
RIGHT VENTRICULAR END-DIASTOLIC DIMENSION: 2.98 CM
SINUS: 3.43 CM
STJ: 3.39 CM
TDI LATERAL: 0.06 M/S
TDI SEPTAL: 0.06 M/S
TDI: 0.06 M/S
TR MAX PG: 30 MMHG
TRICUSPID ANNULAR PLANE SYSTOLIC EXCURSION: 1.51 CM
TV REST PULMONARY ARTERY PRESSURE: 33 MMHG

## 2022-11-17 PROCEDURE — 93306 ECHO (CUPID ONLY): ICD-10-PCS | Mod: 26,TXP,, | Performed by: INTERNAL MEDICINE

## 2022-11-17 PROCEDURE — 93306 TTE W/DOPPLER COMPLETE: CPT | Mod: TXP

## 2022-11-17 PROCEDURE — 93306 TTE W/DOPPLER COMPLETE: CPT | Mod: 26,TXP,, | Performed by: INTERNAL MEDICINE

## 2022-11-17 NOTE — TELEPHONE ENCOUNTER
----- Message from Natasha Hunter NP sent at 11/17/2022 12:19 PM CST -----  Echo stable  Had Left and right heart cath 8/2022

## 2022-11-18 ENCOUNTER — TELEPHONE (OUTPATIENT)
Dept: TRANSPLANT | Facility: CLINIC | Age: 67
End: 2022-11-18
Payer: COMMERCIAL

## 2022-11-18 DIAGNOSIS — Z76.82 AWAITING ORGAN TRANSPLANT STATUS: Primary | ICD-10-CM

## 2022-11-18 NOTE — TELEPHONE ENCOUNTER
----- Message from Katie Robles MD sent at 11/18/2022  1:13 PM CST -----  Please echo in 6 months. Ask HD for better fluid removal. He needs heart failure follow up regularly to keep EF steady.   ----- Message -----  From: Sue Gonzalez RN  Sent: 11/18/2022   1:12 PM CST  To: Katie Robles MD    Yes. I did a repeat echo after and the EF was still 45%. The echo was just done on the 17th.  Cards cleared him.   ----- Message -----  From: Katie Robles MD  Sent: 11/18/2022  12:52 PM CST  To: Natasha Hunter NP, MARIBEL Morin did we talk about the case after cardiac cath?   Please heart failure team consult.   ----- Message -----  From: Natasha Hunter NP  Sent: 11/18/2022  10:20 AM CST  To: Katie Robles MD, Sue Gonzalez RN    This was previously reviewed with Dr. Robles. Please review it with her. EF went from 55 to 40% in 1 year. EF consistent with his Right and Left heart cath. No sure if she would like the patient to follow with heart failure.     ----- Message -----  From: Sue Gonzalez RN  Sent: 11/17/2022   3:19 PM CST  To: Natasha Hunter NP    So is it okay to re-activate?  ----- Message -----  From: Natasha Hunter NP  Sent: 11/17/2022  12:19 PM CST  To: Kidney Waitlisted Coordinator    Echo stable  Had Left and right heart cath 8/2022

## 2022-11-22 ENCOUNTER — TELEPHONE (OUTPATIENT)
Dept: TRANSPLANT | Facility: CLINIC | Age: 67
End: 2022-11-22
Payer: COMMERCIAL

## 2022-12-02 ENCOUNTER — OFFICE VISIT (OUTPATIENT)
Dept: UROLOGY | Facility: CLINIC | Age: 67
End: 2022-12-02
Payer: COMMERCIAL

## 2022-12-02 VITALS
SYSTOLIC BLOOD PRESSURE: 119 MMHG | DIASTOLIC BLOOD PRESSURE: 71 MMHG | BODY MASS INDEX: 35.53 KG/M2 | HEART RATE: 77 BPM | WEIGHT: 262 LBS

## 2022-12-02 DIAGNOSIS — N39.0 URINARY TRACT INFECTION WITHOUT HEMATURIA, SITE UNSPECIFIED: Primary | ICD-10-CM

## 2022-12-02 DIAGNOSIS — Z76.82 AWAITING ORGAN TRANSPLANT STATUS: ICD-10-CM

## 2022-12-02 PROCEDURE — 3288F FALL RISK ASSESSMENT DOCD: CPT | Mod: CPTII,NTX,S$GLB, | Performed by: UROLOGY

## 2022-12-02 PROCEDURE — 3288F PR FALLS RISK ASSESSMENT DOCUMENTED: ICD-10-PCS | Mod: CPTII,NTX,S$GLB, | Performed by: UROLOGY

## 2022-12-02 PROCEDURE — 87077 CULTURE AEROBIC IDENTIFY: CPT | Mod: TXP | Performed by: UROLOGY

## 2022-12-02 PROCEDURE — 87088 URINE BACTERIA CULTURE: CPT | Mod: TXP | Performed by: UROLOGY

## 2022-12-02 PROCEDURE — 3074F PR MOST RECENT SYSTOLIC BLOOD PRESSURE < 130 MM HG: ICD-10-PCS | Mod: CPTII,NTX,S$GLB, | Performed by: UROLOGY

## 2022-12-02 PROCEDURE — 1101F PR PT FALLS ASSESS DOC 0-1 FALLS W/OUT INJ PAST YR: ICD-10-PCS | Mod: CPTII,NTX,S$GLB, | Performed by: UROLOGY

## 2022-12-02 PROCEDURE — 1159F MED LIST DOCD IN RCRD: CPT | Mod: CPTII,NTX,S$GLB, | Performed by: UROLOGY

## 2022-12-02 PROCEDURE — 3008F PR BODY MASS INDEX (BMI) DOCUMENTED: ICD-10-PCS | Mod: CPTII,NTX,S$GLB, | Performed by: UROLOGY

## 2022-12-02 PROCEDURE — 3066F PR DOCUMENTATION OF TREATMENT FOR NEPHROPATHY: ICD-10-PCS | Mod: CPTII,NTX,S$GLB, | Performed by: UROLOGY

## 2022-12-02 PROCEDURE — 3078F DIAST BP <80 MM HG: CPT | Mod: CPTII,NTX,S$GLB, | Performed by: UROLOGY

## 2022-12-02 PROCEDURE — 3062F PR POS MACROALBUMINURIA RESULT DOCUMENTED/REVIEW: ICD-10-PCS | Mod: CPTII,NTX,S$GLB, | Performed by: UROLOGY

## 2022-12-02 PROCEDURE — 1101F PT FALLS ASSESS-DOCD LE1/YR: CPT | Mod: CPTII,NTX,S$GLB, | Performed by: UROLOGY

## 2022-12-02 PROCEDURE — 3052F PR MOST RECENT HEMOGLOBIN A1C LEVEL 8.0 - < 9.0%: ICD-10-PCS | Mod: CPTII,NTX,S$GLB, | Performed by: UROLOGY

## 2022-12-02 PROCEDURE — 99999 PR PBB SHADOW E&M-EST. PATIENT-LVL IV: CPT | Mod: PBBFAC,TXP,, | Performed by: UROLOGY

## 2022-12-02 PROCEDURE — 3008F BODY MASS INDEX DOCD: CPT | Mod: CPTII,NTX,S$GLB, | Performed by: UROLOGY

## 2022-12-02 PROCEDURE — 99214 OFFICE O/P EST MOD 30 MIN: CPT | Mod: NTX,S$GLB,, | Performed by: UROLOGY

## 2022-12-02 PROCEDURE — 87086 URINE CULTURE/COLONY COUNT: CPT | Mod: NTX | Performed by: UROLOGY

## 2022-12-02 PROCEDURE — 1126F AMNT PAIN NOTED NONE PRSNT: CPT | Mod: CPTII,NTX,S$GLB, | Performed by: UROLOGY

## 2022-12-02 PROCEDURE — 3074F SYST BP LT 130 MM HG: CPT | Mod: CPTII,NTX,S$GLB, | Performed by: UROLOGY

## 2022-12-02 PROCEDURE — 3062F POS MACROALBUMINURIA REV: CPT | Mod: CPTII,NTX,S$GLB, | Performed by: UROLOGY

## 2022-12-02 PROCEDURE — 1160F PR REVIEW ALL MEDS BY PRESCRIBER/CLIN PHARMACIST DOCUMENTED: ICD-10-PCS | Mod: CPTII,NTX,S$GLB, | Performed by: UROLOGY

## 2022-12-02 PROCEDURE — 3052F HG A1C>EQUAL 8.0%<EQUAL 9.0%: CPT | Mod: CPTII,NTX,S$GLB, | Performed by: UROLOGY

## 2022-12-02 PROCEDURE — 1126F PR PAIN SEVERITY QUANTIFIED, NO PAIN PRESENT: ICD-10-PCS | Mod: CPTII,NTX,S$GLB, | Performed by: UROLOGY

## 2022-12-02 PROCEDURE — 4010F PR ACE/ARB THEARPY RXD/TAKEN: ICD-10-PCS | Mod: CPTII,NTX,S$GLB, | Performed by: UROLOGY

## 2022-12-02 PROCEDURE — 99214 PR OFFICE/OUTPT VISIT, EST, LEVL IV, 30-39 MIN: ICD-10-PCS | Mod: NTX,S$GLB,, | Performed by: UROLOGY

## 2022-12-02 PROCEDURE — 87186 SC STD MICRODIL/AGAR DIL: CPT | Mod: TXP | Performed by: UROLOGY

## 2022-12-02 PROCEDURE — 99999 PR PBB SHADOW E&M-EST. PATIENT-LVL IV: ICD-10-PCS | Mod: PBBFAC,TXP,, | Performed by: UROLOGY

## 2022-12-02 PROCEDURE — 3066F NEPHROPATHY DOC TX: CPT | Mod: CPTII,NTX,S$GLB, | Performed by: UROLOGY

## 2022-12-02 PROCEDURE — 4010F ACE/ARB THERAPY RXD/TAKEN: CPT | Mod: CPTII,NTX,S$GLB, | Performed by: UROLOGY

## 2022-12-02 PROCEDURE — 3078F PR MOST RECENT DIASTOLIC BLOOD PRESSURE < 80 MM HG: ICD-10-PCS | Mod: CPTII,NTX,S$GLB, | Performed by: UROLOGY

## 2022-12-02 PROCEDURE — 1160F RVW MEDS BY RX/DR IN RCRD: CPT | Mod: CPTII,NTX,S$GLB, | Performed by: UROLOGY

## 2022-12-02 PROCEDURE — 1159F PR MEDICATION LIST DOCUMENTED IN MEDICAL RECORD: ICD-10-PCS | Mod: CPTII,NTX,S$GLB, | Performed by: UROLOGY

## 2022-12-02 RX ORDER — CEFDINIR 300 MG/1
300 CAPSULE ORAL DAILY
Qty: 5 CAPSULE | Refills: 0 | Status: SHIPPED | OUTPATIENT
Start: 2022-12-02 | End: 2022-12-07

## 2022-12-02 NOTE — PROGRESS NOTES
Chief Complaint: Elevated PSA    HPI:    12/02/2022 - returns today for follow-up, has been doing well overall however over the last month or so has been having intermittent itching in his urethra, very little overt dysuria but still bothersome, was seen at [facility] 10/10 where a urine culture was obtained which grew out Serratia, symptoms are similar to that instance    11/30/2021 - the patient returns today for follow-up, doing well with Flomax and finasteride, on dialysis now 3 days per week, PSA stable in the low threes on finasteride(actual PSA 6), has never had a biopsy or an MRI, denies any gross hematuria or dysuria, continues to have issues with ED, wants to try Viagra    11/23/20: UA abnormal with ++leuk and blood. Good stream.  Reviewed history in detail.  Discussed hygeine.  11/21/19: Doing well no complaints, good stream on fllomax.  Reviewed history in detail.  PSA way down again.  Taking finasteride and PSA down as a result. Uses cialis 10mg with good effect for ED from a friend.  11/5/18: PSA up where it was 2 years ago.  No LUTS, no pain.  Taking flomax voiding fine.  11/16/17: Laws: PSA has gone up from 7.7 to 10.7. Feels ok off the T. Strong stream; no LUTS. Nocturia x4 but depends how much he drinks after 6 pm. The nocturia doesn't bother him. No abd/pelvic pain and no exac/rel factors.  No hematuria.  No urolithiasis.  BP is elevated today; he did not take his BP medication this morning.   11/10/16: Symptoms have resolved in general, PSA is down.    9/28/16: Back early with groin pain.  Describes an itching sensation that runs from the rectum to the penis, waxes/wanes, worse at night.  Was given 7d doxycycline and there was some improvement.  No caffeine.  Good urinary habits.  No skin problems.  No balanitis.  Doing fine on one flomax a day.  6/1/16: Here today with Dr. Ren's records to review.  Had a biopsy 4/15 with PSA 13.9 at the time.  Was on T by Dr. Chiu's reccs but was  "asymptomatic overall and felt no symptomatic change on the T.  Has ED that was improved with Cialis.  Got trimix once but wasn't comfortable with it.  05/17/16: rushing: " This is a 60-yr-old male presenting as a follow-up to TRT. Patient is currently on Axiron 30 mg ( 2 pumps /day) . Total T was 838; 5 months ago. ( H/H 14.4/46.0 ) and psa level was 4.7. Patient was to have psa level and follow-up 3 months ago related to psa level of 4.7 and did not. He does have a h/o negative prostate bx  15 months  ago per Dr. BRIGID Burt. No reported adverse side effects and energy level has greatly improved. Patient is currently on Flomax 0.8mg a day. He states he " ran out " and had an increase in nocturia 4-5 times/ night over the past 10 days. No hematuria, dysuria or flank pain. "  06/12/15: Aram; HPI: Flomax is been very helpful, nocturia now down to 3 times per night from 6 times per night with similar daytime improvement. Has been using Axiron for about 2 weeks, no adverse effects. This patient had severe deficiencies on his pretreatment labs. Denies flank pain, dysuira, hematuria . Despite his subjective improvement, his postvoid residual today in the clinic is 180 mL. Dipstick urinalysis suggested 1+ microscopic hematuria, microscopic examination of urine reveals no RBCs.    Allergies:  Bactrim [sulfamethoxazole-trimethoprim] and Nsaids (non-steroidal anti-inflammatory drug)    Medications:  has a current medication list which includes the following prescription(s): acyclovir, advair diskus, albuterol, albuterol, aspirin, atorvastatin, bd insulin syringe (half unit), blood sugar diagnostic, blood-glucose meter, calcitriol, cetirizine, ciprofloxacin-dexamethasone 0.3-0.1%, esomeprazole, finasteride, fluticasone propionate, folic acid, furosemide, hydralazine, vortex holding chamber, insulin degludec, isosorbide dinitrate, lancets, metoprolol succinate, multivitamin, nifedipine, pen needle, diabetic, sevelamer carbonate, " tamsulosin, valsartan, azelastine, nystatin, and olopatadine.    Review of Systems:  General: No fever, chills  Skin: No rashes  Chest:  Denies cough and sputum production  Heart: Denies chest pain  Resp: Denies dyspnea  Abdomen: Denies diarrhea, abdominal pain, hematemesis, or blood in stool.  Musculoskeletal: No joint stiffness or swelling. Denies back pain.  : see HPI  Neuro: no dizziness or weakness      PMH:   has a past medical history of Abnormal nuclear stress test (8/30/2022), Anemia (4/16/2014), BPH (benign prostatic hyperplasia), CKD (chronic kidney disease) stage 4, GFR 15-29 ml/min, Coronary artery disease of native artery of native heart with stable angina pectoris (4/29/2019), Diabetes mellitus, Diabetes mellitus, type 2, Dilated cardiomyopathy (8/30/2022), Elevated PSA, Encounter for blood transfusion, Herpes labialis, Hyperlipidemia, Hypertension, Obesity, Proteinuria, Secondary hyperparathyroidism (of renal origin), and UTI (lower urinary tract infection) (5/1/2015).    PSH:   has a past surgical history that includes thorocotomy (2010); Prostate biopsy; Colonoscopy (N/A, 12/21/2017); Fluoroscopy (N/A, 1/21/2021); Fluoroscopy (N/A, 4/6/2021); and Catheterization of both left and right heart (N/A, 8/30/2022).    FamHx: family history includes Diabetes in his father and another family member; Hypertension in his father and another family member; No Known Problems in his mother.    SocHx:  reports that he has never smoked. He has never used smokeless tobacco. He reports current alcohol use. He reports that he does not use drugs.      Physical Exam:  Vitals:    12/02/22 1354   BP: 119/71   Pulse: 77     General: awake, alert, cooperative  Head: NC/AT  Ears: external ears normal  Eyes: sclera normal  Lungs: normal inspiration, NAD  Heart: well-perfused  Abdomen: Soft, NT, ND   11/21: Normal uncirc'd phallus, meatus normal in size and position, BL testicles palpable, no masses, nontender, no  abnormalities of epididymi  ROOPA 11/21: Normal rectal tone, no hemorrhoids. Prostate smooth and normal, smooth but bulbous right apex, 50 gm SV not palpable. Perineum and anus normal.  Lymphatic: groin nodes negative  Skin: The skin is warm and dry  Ext: No c/c/e.  Neuro: grossly intact, AOx3    Labs/Studies:   Lab Results   Component Value Date    WBC 9.58 08/23/2022    HGB 11.7 (L) 08/23/2022    HCT 37.9 (L) 08/23/2022     08/23/2022     08/23/2022    K 4.7 08/23/2022     08/23/2022    CREATININE 8.3 (H) 08/23/2022    BUN 41 (H) 08/23/2022    CO2 23 08/23/2022    TSH 1.941 07/14/2022    PSA 6.1 (H) 08/25/2022    INR 0.9 08/23/2022    HGBA1C 8.0 (H) 07/14/2022    CHOL 149 07/14/2022    TRIG 191 (H) 07/14/2022    HDL 31 (L) 07/14/2022    ALT 8 (L) 07/14/2022    ALT 8 (L) 07/14/2022    AST 16 07/14/2022    AST 16 07/14/2022     PSA    4/15: 13.9    11/15: 4.7    5/16: 12.7    11/16: 7.7    11/17: 10.7    10/18: 12.2    6/19: 4.0    11/19: 3.8    6/20: 4.3    Impression/Plan:   Elevated PSA - MRI negative, PSA about the same as it has been, continue to monitor    BPH - continue flomax/finasteride     ED - continue viagra PRN    UTI - urine for culture today, Rx for omnicef provided    Adrian Nugent MD

## 2022-12-04 LAB — BACTERIA UR CULT: ABNORMAL

## 2022-12-05 PROCEDURE — 86833 HLA CLASS II HIGH DEFIN QUAL: CPT | Mod: PO,TXP | Performed by: NURSE PRACTITIONER

## 2022-12-05 PROCEDURE — 86832 HLA CLASS I HIGH DEFIN QUAL: CPT | Mod: PO,TXP | Performed by: NURSE PRACTITIONER

## 2022-12-09 ENCOUNTER — LAB VISIT (OUTPATIENT)
Dept: LAB | Facility: HOSPITAL | Age: 67
End: 2022-12-09
Payer: COMMERCIAL

## 2022-12-09 DIAGNOSIS — Z76.82 AWAITING ORGAN TRANSPLANT STATUS: ICD-10-CM

## 2022-12-15 ENCOUNTER — OFFICE VISIT (OUTPATIENT)
Dept: TRANSPLANT | Facility: CLINIC | Age: 67
End: 2022-12-15
Payer: COMMERCIAL

## 2022-12-15 VITALS
HEART RATE: 81 BPM | WEIGHT: 269.19 LBS | HEIGHT: 74 IN | BODY MASS INDEX: 34.55 KG/M2 | SYSTOLIC BLOOD PRESSURE: 125 MMHG | DIASTOLIC BLOOD PRESSURE: 75 MMHG

## 2022-12-15 DIAGNOSIS — Z99.2 TYPE 2 DIABETES MELLITUS WITH CHRONIC KIDNEY DISEASE ON CHRONIC DIALYSIS, WITH LONG-TERM CURRENT USE OF INSULIN: ICD-10-CM

## 2022-12-15 DIAGNOSIS — N18.6 ESRD ON DIALYSIS: ICD-10-CM

## 2022-12-15 DIAGNOSIS — R94.39 ABNORMAL NUCLEAR STRESS TEST: ICD-10-CM

## 2022-12-15 DIAGNOSIS — Z01.810 PREOP CARDIOVASCULAR EXAM: ICD-10-CM

## 2022-12-15 DIAGNOSIS — I10 SEVERE HYPERTENSION: Primary | ICD-10-CM

## 2022-12-15 DIAGNOSIS — E11.22 TYPE 2 DIABETES MELLITUS WITH CHRONIC KIDNEY DISEASE ON CHRONIC DIALYSIS, WITH LONG-TERM CURRENT USE OF INSULIN: ICD-10-CM

## 2022-12-15 DIAGNOSIS — Z79.4 TYPE 2 DIABETES MELLITUS WITH CHRONIC KIDNEY DISEASE ON CHRONIC DIALYSIS, WITH LONG-TERM CURRENT USE OF INSULIN: ICD-10-CM

## 2022-12-15 DIAGNOSIS — I50.22 CHRONIC SYSTOLIC CONGESTIVE HEART FAILURE: ICD-10-CM

## 2022-12-15 DIAGNOSIS — Z99.2 ESRD ON DIALYSIS: ICD-10-CM

## 2022-12-15 DIAGNOSIS — Z76.82 AWAITING ORGAN TRANSPLANT STATUS: ICD-10-CM

## 2022-12-15 DIAGNOSIS — N18.6 TYPE 2 DIABETES MELLITUS WITH CHRONIC KIDNEY DISEASE ON CHRONIC DIALYSIS, WITH LONG-TERM CURRENT USE OF INSULIN: ICD-10-CM

## 2022-12-15 PROCEDURE — 3078F DIAST BP <80 MM HG: CPT | Mod: CPTII,S$GLB,TXP, | Performed by: INTERNAL MEDICINE

## 2022-12-15 PROCEDURE — 3052F HG A1C>EQUAL 8.0%<EQUAL 9.0%: CPT | Mod: CPTII,S$GLB,TXP, | Performed by: INTERNAL MEDICINE

## 2022-12-15 PROCEDURE — 3288F PR FALLS RISK ASSESSMENT DOCUMENTED: ICD-10-PCS | Mod: CPTII,S$GLB,TXP, | Performed by: INTERNAL MEDICINE

## 2022-12-15 PROCEDURE — 1126F AMNT PAIN NOTED NONE PRSNT: CPT | Mod: CPTII,S$GLB,TXP, | Performed by: INTERNAL MEDICINE

## 2022-12-15 PROCEDURE — 3008F BODY MASS INDEX DOCD: CPT | Mod: CPTII,S$GLB,TXP, | Performed by: INTERNAL MEDICINE

## 2022-12-15 PROCEDURE — 3062F PR POS MACROALBUMINURIA RESULT DOCUMENTED/REVIEW: ICD-10-PCS | Mod: CPTII,S$GLB,TXP, | Performed by: INTERNAL MEDICINE

## 2022-12-15 PROCEDURE — 3008F PR BODY MASS INDEX (BMI) DOCUMENTED: ICD-10-PCS | Mod: CPTII,S$GLB,TXP, | Performed by: INTERNAL MEDICINE

## 2022-12-15 PROCEDURE — 99215 PR OFFICE/OUTPT VISIT, EST, LEVL V, 40-54 MIN: ICD-10-PCS | Mod: S$GLB,TXP,, | Performed by: INTERNAL MEDICINE

## 2022-12-15 PROCEDURE — 4010F PR ACE/ARB THEARPY RXD/TAKEN: ICD-10-PCS | Mod: CPTII,S$GLB,TXP, | Performed by: INTERNAL MEDICINE

## 2022-12-15 PROCEDURE — 99215 OFFICE O/P EST HI 40 MIN: CPT | Mod: S$GLB,TXP,, | Performed by: INTERNAL MEDICINE

## 2022-12-15 PROCEDURE — 3066F PR DOCUMENTATION OF TREATMENT FOR NEPHROPATHY: ICD-10-PCS | Mod: CPTII,S$GLB,TXP, | Performed by: INTERNAL MEDICINE

## 2022-12-15 PROCEDURE — 99999 PR PBB SHADOW E&M-EST. PATIENT-LVL III: ICD-10-PCS | Mod: PBBFAC,TXP,, | Performed by: INTERNAL MEDICINE

## 2022-12-15 PROCEDURE — 1101F PR PT FALLS ASSESS DOC 0-1 FALLS W/OUT INJ PAST YR: ICD-10-PCS | Mod: CPTII,S$GLB,TXP, | Performed by: INTERNAL MEDICINE

## 2022-12-15 PROCEDURE — 1126F PR PAIN SEVERITY QUANTIFIED, NO PAIN PRESENT: ICD-10-PCS | Mod: CPTII,S$GLB,TXP, | Performed by: INTERNAL MEDICINE

## 2022-12-15 PROCEDURE — 3078F PR MOST RECENT DIASTOLIC BLOOD PRESSURE < 80 MM HG: ICD-10-PCS | Mod: CPTII,S$GLB,TXP, | Performed by: INTERNAL MEDICINE

## 2022-12-15 PROCEDURE — 4010F ACE/ARB THERAPY RXD/TAKEN: CPT | Mod: CPTII,S$GLB,TXP, | Performed by: INTERNAL MEDICINE

## 2022-12-15 PROCEDURE — 3074F SYST BP LT 130 MM HG: CPT | Mod: CPTII,S$GLB,TXP, | Performed by: INTERNAL MEDICINE

## 2022-12-15 PROCEDURE — 99999 PR PBB SHADOW E&M-EST. PATIENT-LVL III: CPT | Mod: PBBFAC,TXP,, | Performed by: INTERNAL MEDICINE

## 2022-12-15 PROCEDURE — 3066F NEPHROPATHY DOC TX: CPT | Mod: CPTII,S$GLB,TXP, | Performed by: INTERNAL MEDICINE

## 2022-12-15 PROCEDURE — 3074F PR MOST RECENT SYSTOLIC BLOOD PRESSURE < 130 MM HG: ICD-10-PCS | Mod: CPTII,S$GLB,TXP, | Performed by: INTERNAL MEDICINE

## 2022-12-15 PROCEDURE — 3062F POS MACROALBUMINURIA REV: CPT | Mod: CPTII,S$GLB,TXP, | Performed by: INTERNAL MEDICINE

## 2022-12-15 PROCEDURE — 1101F PT FALLS ASSESS-DOCD LE1/YR: CPT | Mod: CPTII,S$GLB,TXP, | Performed by: INTERNAL MEDICINE

## 2022-12-15 PROCEDURE — 3052F PR MOST RECENT HEMOGLOBIN A1C LEVEL 8.0 - < 9.0%: ICD-10-PCS | Mod: CPTII,S$GLB,TXP, | Performed by: INTERNAL MEDICINE

## 2022-12-15 PROCEDURE — 3288F FALL RISK ASSESSMENT DOCD: CPT | Mod: CPTII,S$GLB,TXP, | Performed by: INTERNAL MEDICINE

## 2022-12-15 NOTE — LETTER
December 15, 2022        Gabino Champion  [address] [address] [place] [address]  Phone: [phone]  Fax: [phone]             [id number]  [address] [facility] [address]  Phone: [phone]   Patient: Isidro Alves   MR Number: [id number]   YOB: 1955   Date of Visit: 12/15/2022       Dear Dr. Gabino Champion    Thank you for referring Isidro Alves to me for evaluation. Attached you will find relevant portions of my assessment and plan of care.    If you have questions, please do not hesitate to call me. I look forward to following Isidro Alves along with you.    Sincerely,    Murphy Harris MD    Enclosure    If you would like to receive this communication electronically, please contact [email or web address] or [phone] to request [employer] Link access.    [employer] Link is a tool which provides read-only access to select patient information with whom you have a relationship. Its easy to use and provides real time access to review your patients record including encounter summaries, notes, results, and demographic information.    If you feel you have received this communication in error or would no longer like to receive these types of communications, please e-mail [email or web address]

## 2022-12-15 NOTE — PROGRESS NOTES
Subjective:    Patient ID:  Isidro Alves is a 67 y.o. male who presents for evaluation of CHF as part of his kidney transplant evaluation.    66 YO M w/ PMH HFrEF 2/2 NICM, LVEF of 40%, DM, HTN, HLD, ESRD on HD since April 2021 listed for KT presenting for evaluation of his CHF.    He sees Dr Ragland at [facility] [facility] as his primary cardiologist.    When talking with him, he does well.  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  This is not a desk job and he has to oversee everything in the clot which involves a lot of standing on his feet and walking around.  In addition to this he stays active by going for walks.  With this activity he denies any chest discomfort, shortness of breath, palpitations, presyncope, syncope, leg swelling, abdominal swelling, PND, or orthopnea.  Appetite is normal, denies any symptoms of low energy levels.    He gets hemodialysis through a tunneled right IJ line on Monday/Wednesday/Friday.  In addition to this he does make urine, and is on Lasix 80 mg and his volume is managed through both his diuretics as well as dialysis.    Cardiac workup    Bicath 8/30/22  RA: 10/ 10/ 8 RV: 42/ 5/ 8 PA: 41/ 22/ 28 PWP: 18/ 27/ 15 .   Cardiac output was 3.9 by thermodilution. Cardiac index is 1.7 L/min/m2.       The ejection fraction was calculated to be 40%.    The pre-procedure left ventricular end diastolic pressure was 13.    The post-procedure left ventricular end diastolic pressure was 16.    Global hypokinesis    non obstructive cad    mild pulmonary htn    normal filling pressures    TTE 11/17/22  The left ventricle is moderately enlarged with eccentric hypertrophy and mildly decreased systolic function.  The estimated ejection fraction is 40%.  Grade I left ventricular diastolic dysfunction.  Normal right ventricular size with low normal right ventricular systolic function.  Mild left atrial enlargement.  Mild-to-moderate mitral regurgitation.  Mild tricuspid regurgitation.  Normal  central venous pressure (3 mmHg).  The estimated PA systolic pressure is 33 mmHg.  There is mild left ventricular global hypokinesis.    NM Stress 10/29/18  Impression: ABNORMAL MYOCARDIAL PERFUSION   1. The perfusion scan is free of evidence for myocardial ischemia.   2. There is mild to moderate intensity fixed defect in the inferior wall of the left ventricle, consistent with myocardial injury.   3. There is abnormal wall motion at rest showing hypokinesis of the inferior wall of the left ventricle.   4. There is resting LV dysfunction with a reduced ejection fraction of 35 %.   5. The ventricular volumes are normal at rest and stress.   6. The extracardiac distribution of radioactivity is normal.   7. There was no previous study available to compare.       Review of Systems   Constitutional: Negative for chills and fever.   HENT:  Negative for hearing loss.    Eyes:  Negative for visual disturbance.   Cardiovascular:  Negative for chest pain, dyspnea on exertion, irregular heartbeat, leg swelling, orthopnea, palpitations, paroxysmal nocturnal dyspnea and syncope.   Respiratory:  Negative for cough and shortness of breath.    Musculoskeletal:  Negative for muscle weakness.   Gastrointestinal:  Negative for diarrhea, nausea and vomiting.   Neurological:  Negative for focal weakness.   Psychiatric/Behavioral:  Negative for memory loss.       Objective:      Vitals:    12/15/22 1330   BP: 125/75   Pulse: 81     Physical Exam  Vitals reviewed.   Constitutional:       General: He is not in acute distress.  HENT:      Head: Atraumatic.   Eyes:      Extraocular Movements: Extraocular movements intact.   Cardiovascular:      Rate and Rhythm: Normal rate and regular rhythm.      Heart sounds: Normal heart sounds.      Comments: JVP 8 cm.  Pulmonary:      Breath sounds: Normal breath sounds.   Abdominal:      Palpations: Abdomen is soft.      Tenderness: There is no abdominal tenderness.   Musculoskeletal:         General:  Normal range of motion.      Right lower leg: No edema.      Left lower leg: No edema.   Neurological:      General: No focal deficit present.      Mental Status: He is alert and oriented to person, place, and time.         Assessment:       1. Severe hypertension    2. Awaiting organ transplant status    3. Chronic systolic congestive heart failure    4. Preop cardiovascular exam    5. Abnormal nuclear stress test    6. ESRD on dialysis    7. Type 2 diabetes mellitus with chronic kidney disease on chronic dialysis, with long-term current use of insulin         Plan:       NICM  HFrEF, LVEF 40%  Preop eval for kidney transplant  DM  HTN  ESRD on HD  - Clinically doing well, denies any CHF symptoms, appears euvolemic on exam today  - NM stress test done in past shows possible inferior infarct in past, but TTE does not show any RWMA and his coronary angiogram shows only non obstructive disease, suspect defect on SPECT was attenuation artifact  - Volume management with both lasix and HD, appears euvolemic on exam today  - On Valsartan 320 mg daily, metoprolol succinate 50 mg daily, not on MRA or SGLT2i due to ESRD  - At this point no further testing or medical modification is needed from a cardiac standpoint prior to kidney transplant listing  - With regards to perioperative risk, per RCRI his perioperative cardiac risk if 4 point, giving him class IV risk equating to 15% 30-day risk of death, MI, or cardiac arrest in the post operative period.  - Continue to follow up with primary cardiologist, Dr Ragland

## 2022-12-16 ENCOUNTER — TELEPHONE (OUTPATIENT)
Dept: TRANSPLANT | Facility: CLINIC | Age: 67
End: 2022-12-16
Payer: COMMERCIAL

## 2022-12-16 DIAGNOSIS — Z76.82 AWAITING ORGAN TRANSPLANT STATUS: Primary | ICD-10-CM

## 2022-12-16 NOTE — TELEPHONE ENCOUNTER
Patient has been re-activated on the kidney transplant waitlist.  [place] and [facility] updated.  Letter created in EPIC.  Patient notified and verbalized understanding.  Patient also reminded that he needs follow-up colonoscopy this month.   Consult placed.

## 2022-12-16 NOTE — TELEPHONE ENCOUNTER
----- Message from Radhika Cardenas DO sent at 12/16/2022 12:12 PM CST -----  Regarding: RE: Heart Failure clinic clearance note  Okay to re-activate   Radhika   ----- Message -----  From: Sue Gonzalez RN  Sent: 12/16/2022  11:56 AM CST  To: [facility]  Subject: Heart Failure clinic clearance note              Please review Heart failure clinic note.  Patient had drop in EF.  HF was consulted.  Can he be re-activated?    Thanks,  Sue    Summary:        NICM  HFrEF, LVEF 40%  Preop eval for kidney transplant  DM  HTN  ESRD on HD  - Clinically doing well, denies any CHF symptoms, appears euvolemic on exam today  - NM stress test done in past shows possible inferior infarct in past, but TTE does not show any RWMA and his coronary angiogram shows only non obstructive disease, suspect defect on SPECT was attenuation artifact  - Volume management with both lasix and HD, appears euvolemic on exam today  - On Valsartan 320 mg daily, metoprolol succinate 50 mg daily, not on MRA or SGLT2i due to ESRD  - At this point no further testing or medical modification is needed from a cardiac standpoint prior to kidney transplant listing  - With regards to perioperative risk, per RCRI his perioperative cardiac risk if 4 point, giving him class IV risk equating to 15% 30-day risk of death, MI, or cardiac arrest in the post operative period.  - Continue to follow up with primary cardiologist, Dr Ragland

## 2022-12-19 ENCOUNTER — TELEPHONE (OUTPATIENT)
Dept: PREADMISSION TESTING | Facility: HOSPITAL | Age: 67
End: 2022-12-19

## 2022-12-19 ENCOUNTER — HOSPITAL ENCOUNTER (OUTPATIENT)
Dept: PREADMISSION TESTING | Facility: HOSPITAL | Age: 67
Discharge: HOME | End: 2022-12-19
Attending: NURSE PRACTITIONER
Payer: COMMERCIAL

## 2022-12-19 ENCOUNTER — E-CONSULT (OUTPATIENT)
Dept: CARDIOLOGY | Facility: CLINIC | Age: 67
End: 2022-12-19
Payer: COMMERCIAL

## 2022-12-19 DIAGNOSIS — Z76.82 AWAITING ORGAN TRANSPLANT STATUS: Primary | ICD-10-CM

## 2022-12-19 DIAGNOSIS — Z01.810 PREOP CARDIOVASCULAR EXAM: Primary | ICD-10-CM

## 2022-12-19 PROCEDURE — 99451 PR INTERPROF, PHONE/INTERNET/EHR, CONSULT, >= 5MINS: ICD-10-PCS | Mod: S$GLB,TXP,, | Performed by: INTERNAL MEDICINE

## 2022-12-19 PROCEDURE — 99451 NTRPROF PH1/NTRNET/EHR 5/>: CPT | Mod: S$GLB,TXP,, | Performed by: INTERNAL MEDICINE

## 2022-12-19 NOTE — TELEPHONE ENCOUNTER
This patient is being scheduled for one of the following procedures: Colonoscopy    Please indicate if the patient is cleared for surgery from a cardiac standpoint.    Patient gave verbal consent for e-consult Yes    Cardiac PMHx: Severe hypertension  Hyperlipidemia associated with type 2 diabetes mellitus  Coronary artery disease of native artery of native heart with stable angina pectoris  PVC (premature ventricular contraction)  Chronic systolic congestive heart failure  Dilated cardiomyopathy  Abnormal nuclear stress test  Preop cardiovascular     Last Echo: Results for orders placed during the hospital encounter of 11/17/22    Echo    Interpretation Summary  · The left ventricle is moderately enlarged with eccentric hypertrophy and mildly decreased systolic function.  · The estimated ejection fraction is 40%.  · Grade I left ventricular diastolic dysfunction.  · Normal right ventricular size with low normal right ventricular systolic function.  · Mild left atrial enlargement.  · Mild-to-moderate mitral regurgitation.  · Mild tricuspid regurgitation.  · Normal central venous pressure (3 mmHg).  · The estimated PA systolic pressure is 33 mmHg.  · There is mild left ventricular global hypokinesis.      Anticoagulants: ls anticoag list: Aspirin 81mg     If you have questions or concerns, please call us at [phone].

## 2022-12-20 NOTE — PROGRESS NOTES
[facility]  Response for E-Consult     Patient Name: Isidro Alves  MRN: [id number]  Primary Care Provider: Steven Morgan MD   Requesting Provider: Lizz Cohen L*      Findings: 66 yo male  E consult for preop clearance of colonoscopy  The chart reviewed  PMH ESRD on HD since  and renal Tx list at [facility], CAD, old MI per MPI in , CHFrEF, IDDM 20 yrs, HTN, HLD. No smoking. Occasional drink.  08/2022 LHC showed non obstructive CAD, EF 40%   ECHO showed ef 40%   ekg NSR     Plan  Elevated periop risk of CV events for non-high risk procedure.  Ok to proceed the scheduled procedure without further cardiac study.  OK to hold Aspirin 5 to 7 days before the procedure and resume ASAP postop.  Continue Metoprolol and Statin periop.  Avoid periop fluid overloaded.      I did not speak to the requesting provider verbally about this.     Total time of Consultation: 15 minute    Percentage of time spent on verbal/written discussion: 50%     *This eConsult is based on the clinical data available to me and is furnished without benefit of a physical examination. The eConsult will need to be interpreted in light of any clinical issues or changes in patient status not available to me at the time of filing this eConsults. Significant changes in patient condition or level of acuity should result in immediate formal consultation and reevaluation. Please alert me if you have further questions.    Thank you for your consult.     Dez Ragland MD  [facility]

## 2022-12-23 LAB — HPRA INTERPRETATION: NORMAL

## 2022-12-29 ENCOUNTER — TELEPHONE (OUTPATIENT)
Dept: TRANSPLANT | Facility: CLINIC | Age: 67
End: 2022-12-29
Payer: COMMERCIAL

## 2022-12-29 ENCOUNTER — PATIENT MESSAGE (OUTPATIENT)
Dept: TRANSPLANT | Facility: CLINIC | Age: 67
End: 2022-12-29
Payer: COMMERCIAL

## 2022-12-30 NOTE — TELEPHONE ENCOUNTER
Called pt to reschedule appt due to Lynne begin out the office    Cimzia Counseling:  I discussed with the patient the risks of Cimzia including but not limited to immunosuppression, allergic reactions and infections.  The patient understands that monitoring is required including a PPD at baseline and must alert us or the primary physician if symptoms of infection or other concerning signs are noted.

## 2023-01-02 PROCEDURE — 99001 SPECIMEN HANDLING PT-LAB: CPT | Mod: PO,TXP | Performed by: NURSE PRACTITIONER

## 2023-01-03 ENCOUNTER — TELEPHONE (OUTPATIENT)
Dept: PREADMISSION TESTING | Facility: HOSPITAL | Age: 68
End: 2023-01-03
Payer: COMMERCIAL

## 2023-01-03 NOTE — TELEPHONE ENCOUNTER
----- Message from Sindi Yancey MA sent at 12/29/2022  2:52 PM CST -----  Contact: [phone]  Patient states someone was suppose to contact for a virtual audio no one called him he states. He would like to speak to someone to reschedule and find out what happened. Please call and advise.

## 2023-01-06 ENCOUNTER — LAB VISIT (OUTPATIENT)
Dept: LAB | Facility: HOSPITAL | Age: 68
End: 2023-01-06
Payer: COMMERCIAL

## 2023-01-06 DIAGNOSIS — Z76.82 AWAITING ORGAN TRANSPLANT STATUS: ICD-10-CM

## 2023-01-12 ENCOUNTER — PATIENT MESSAGE (OUTPATIENT)
Dept: ADMINISTRATIVE | Facility: HOSPITAL | Age: 68
End: 2023-01-12
Payer: COMMERCIAL

## 2023-01-27 NOTE — PROGRESS NOTES
Procedure instructions reviewed. Place, time, begin low fiber diet, clear liquids only on day before procedure no solid food at all, nothing to eat or drink after 2 am dose of prep on am of procedure, no chewing gum or sucking on candy, take BP medication with sip of water at least 2 hours after am dose of prep on am of procedure and inhaler if needed, do not take diabetic medication on am of procedure or any other medications on am of procedure, have someone to drive them home and bowel prep instructions reviewed with pt. Pt aware that he needs to go to lab after going to pt registration for K+ level to be drawn.  Pt verbalized understanding.

## 2023-01-30 ENCOUNTER — PATIENT OUTREACH (OUTPATIENT)
Dept: ADMINISTRATIVE | Facility: HOSPITAL | Age: 68
End: 2023-01-30
Payer: COMMERCIAL

## 2023-01-30 DIAGNOSIS — Z79.4 TYPE 2 DIABETES MELLITUS WITH STAGE 1 CHRONIC KIDNEY DISEASE, WITH LONG-TERM CURRENT USE OF INSULIN: Primary | ICD-10-CM

## 2023-01-30 DIAGNOSIS — N18.1 TYPE 2 DIABETES MELLITUS WITH STAGE 1 CHRONIC KIDNEY DISEASE, WITH LONG-TERM CURRENT USE OF INSULIN: Primary | ICD-10-CM

## 2023-01-30 DIAGNOSIS — E11.22 TYPE 2 DIABETES MELLITUS WITH STAGE 1 CHRONIC KIDNEY DISEASE, WITH LONG-TERM CURRENT USE OF INSULIN: Primary | ICD-10-CM

## 2023-01-30 NOTE — PROGRESS NOTES
Working HTN Report.    Pt has lab appt scheduled, 2/02/23.  A1C ordered and linked to appt.    Per  pt eye exam completed in 2022.  Faxed request for copy of report.    A1C and lipid abstracted/entered from [facility] Every[facility].

## 2023-01-30 NOTE — LETTER
AUTHORIZATION FOR RELEASE OF   CONFIDENTIAL INFORMATION    Dear Yoni Del Real MD,    We are seeing Isidro Alves, date of birth 1955, in the clinic at [facility]. Steven Morgan MD is the patient's PCP. Isidro Alves has an outstanding lab/procedure at the time we reviewed his chart. In order to help keep his health information updated, he has authorized us to request the following medical record(s):        (  )  MAMMOGRAM                                      (  )  COLONOSCOPY      (  )  PAP SMEAR                                          (  )  OUTSIDE LAB RESULTS     (  )  DEXA SCAN                                          (x)  EYE EXAM            (  )  FOOT EXAM                                          (  )  ENTIRE RECORD     (  )  OUTSIDE IMMUNIZATIONS                 (  )  _______________         [facility], [phone].     If you have any questions, please contact   Socorro SOLO LPN   Care Coordination   [facility]  Phone [phone]      Patient Name: Isidro Alves  : 1955  MRN .[id number]  Patient Phone #: [phone]

## 2023-01-30 NOTE — LETTER
AUTHORIZATION FOR RELEASE OF   CONFIDENTIAL INFORMATION    Dear Johnathon Farley MD,    We are seeing Isidro Alves, date of birth 1955, in the clinic at [facility]. Steven Morgan MD is the patient's PCP. Isidro Alves has an outstanding lab/procedure at the time we reviewed his chart. In order to help keep his health information updated, he has authorized us to request the following medical record(s):        (  )  MAMMOGRAM                                      (  )  COLONOSCOPY      (  )  PAP SMEAR                                          (  )  OUTSIDE LAB RESULTS     (  )  DEXA SCAN                                          (x) DIABETIC EYE EXAM            (  )  FOOT EXAM                                          (  )  ENTIRE RECORD     (  )  OUTSIDE IMMUNIZATIONS                 (  )  _______________         [facility], [phone].     If you have any questions, please contact   Socorro SOLO LPN   Care Coordination   [facility]  Phone [phone]      Patient Name: Isidro Alves  : 1955  N [id number]  Patient Phone #: [phone]

## 2023-02-02 ENCOUNTER — TELEPHONE (OUTPATIENT)
Dept: TRANSPLANT | Facility: CLINIC | Age: 68
End: 2023-02-02
Payer: COMMERCIAL

## 2023-02-02 ENCOUNTER — HOSPITAL ENCOUNTER (OUTPATIENT)
Facility: HOSPITAL | Age: 68
Discharge: HOME | End: 2023-02-02
Attending: INTERNAL MEDICINE | Admitting: INTERNAL MEDICINE
Payer: COMMERCIAL

## 2023-02-02 ENCOUNTER — ANESTHESIA EVENT (OUTPATIENT)
Dept: ENDOSCOPY | Facility: HOSPITAL | Age: 68
End: 2023-02-02
Payer: COMMERCIAL

## 2023-02-02 ENCOUNTER — ANESTHESIA (OUTPATIENT)
Dept: ENDOSCOPY | Facility: HOSPITAL | Age: 68
End: 2023-02-02
Payer: COMMERCIAL

## 2023-02-02 VITALS
OXYGEN SATURATION: 96 % | BODY MASS INDEX: 32.08 KG/M2 | DIASTOLIC BLOOD PRESSURE: 77 MMHG | TEMPERATURE: 98 F | HEIGHT: 74 IN | HEART RATE: 74 BPM | SYSTOLIC BLOOD PRESSURE: 143 MMHG | RESPIRATION RATE: 18 BRPM | WEIGHT: 250 LBS

## 2023-02-02 DIAGNOSIS — Z01.818 PREOP TESTING: Primary | ICD-10-CM

## 2023-02-02 LAB — POCT GLUCOSE: 116 MG/DL (ref 70–110)

## 2023-02-02 PROCEDURE — G0105 COLORECTAL SCRN; HI RISK IND: HCPCS | Mod: NTX,,, | Performed by: INTERNAL MEDICINE

## 2023-02-02 PROCEDURE — G0105 COLORECTAL SCRN; HI RISK IND: ICD-10-PCS | Mod: NTX,,, | Performed by: INTERNAL MEDICINE

## 2023-02-02 PROCEDURE — 63600175 PHARM REV CODE 636 W HCPCS: Mod: TXP | Performed by: NURSE ANESTHETIST, CERTIFIED REGISTERED

## 2023-02-02 PROCEDURE — G0105 COLORECTAL SCRN; HI RISK IND: HCPCS | Mod: TXP | Performed by: INTERNAL MEDICINE

## 2023-02-02 PROCEDURE — 37000009 HC ANESTHESIA EA ADD 15 MINS: Mod: TXP | Performed by: INTERNAL MEDICINE

## 2023-02-02 PROCEDURE — 25000003 PHARM REV CODE 250: Mod: TXP | Performed by: NURSE ANESTHETIST, CERTIFIED REGISTERED

## 2023-02-02 PROCEDURE — 37000008 HC ANESTHESIA 1ST 15 MINUTES: Mod: TXP | Performed by: INTERNAL MEDICINE

## 2023-02-02 RX ORDER — PROPOFOL 10 MG/ML
VIAL (ML) INTRAVENOUS
Status: DISCONTINUED | OUTPATIENT
Start: 2023-02-02 | End: 2023-02-02

## 2023-02-02 RX ORDER — LIDOCAINE HYDROCHLORIDE 10 MG/ML
INJECTION, SOLUTION EPIDURAL; INFILTRATION; INTRACAUDAL; PERINEURAL
Status: DISCONTINUED | OUTPATIENT
Start: 2023-02-02 | End: 2023-02-02

## 2023-02-02 RX ORDER — SODIUM CHLORIDE 9 MG/ML
INJECTION, SOLUTION INTRAVENOUS CONTINUOUS
Status: DISCONTINUED | OUTPATIENT
Start: 2023-02-02 | End: 2023-02-02 | Stop reason: HOSPADM

## 2023-02-02 RX ORDER — SODIUM CHLORIDE, SODIUM LACTATE, POTASSIUM CHLORIDE, CALCIUM CHLORIDE 600; 310; 30; 20 MG/100ML; MG/100ML; MG/100ML; MG/100ML
INJECTION, SOLUTION INTRAVENOUS CONTINUOUS PRN
Status: DISCONTINUED | OUTPATIENT
Start: 2023-02-02 | End: 2023-02-02

## 2023-02-02 RX ADMIN — PROPOFOL 100 MG: 10 INJECTION, EMULSION INTRAVENOUS at 10:02

## 2023-02-02 RX ADMIN — PROPOFOL 20 MG: 10 INJECTION, EMULSION INTRAVENOUS at 10:02

## 2023-02-02 RX ADMIN — SODIUM CHLORIDE, SODIUM LACTATE, POTASSIUM CHLORIDE, AND CALCIUM CHLORIDE: .6; .31; .03; .02 INJECTION, SOLUTION INTRAVENOUS at 10:02

## 2023-02-02 RX ADMIN — LIDOCAINE HYDROCHLORIDE 50 MG: 10 INJECTION, SOLUTION EPIDURAL; INFILTRATION; INTRACAUDAL; PERINEURAL at 10:02

## 2023-02-02 NOTE — DISCHARGE SUMMARY
[facility] (Hospital)  Discharge Note  Short Stay    Procedure(s) (LRB):  COLONOSCOPY (N/A)      OUTCOME: Patient tolerated treatment/procedure well without complication and is now ready for discharge.    DISPOSITION: Home or Self Care    FINAL DIAGNOSIS:  <principal problem not specified>    FOLLOWUP: With primary care provider    DISCHARGE INSTRUCTIONS:    Discharge Procedure Orders   POTASSIUM   Standing Status: Future Number of Occurrences: 1 Standing Exp. Date: 02/17/24         Clinical Reference Documents Added to Patient Instructions         Document    [facility] DISCHARGE INSTRUCTIONS (ENGLISH)            TIME SPENT ON DISCHARGE: 20   minutes

## 2023-02-02 NOTE — ANESTHESIA POSTPROCEDURE EVALUATION
Anesthesia Post Evaluation    Patient: Isidro Alves    Procedure(s) Performed: Procedure(s) (LRB):  COLONOSCOPY (N/A)    Final Anesthesia Type: MAC      Patient location during evaluation: GI PACU  Patient participation: Yes- Able to Participate  Level of consciousness: awake and alert  Post-procedure vital signs: reviewed and stable  Pain management: adequate  Airway patency: patent    PONV status at discharge: No PONV  Anesthetic complications: no      Cardiovascular status: blood pressure returned to baseline and hemodynamically stable  Respiratory status: unassisted, spontaneous ventilation and room air  Hydration status: euvolemic  Follow-up not needed.          Vitals Value Taken Time   /83 02/02/23 1058   Temp 36.5 °C (97.7 °F) 02/02/23 1048   Pulse 72 02/02/23 1058   Resp 17 02/02/23 1058   SpO2 96 % 02/02/23 1058         No case tracking events are documented in the log.      Pain/Julia Score: Julia Score: 6 (2/2/2023 10:58 AM)

## 2023-02-02 NOTE — ANESTHESIA PREPROCEDURE EVALUATION
02/02/2023  Isidro Alves is a 67 y.o., male.      Pre-op Assessment    I have reviewed the Patient Summary Reports.     I have reviewed the Nursing Notes. I have reviewed the NPO Status.   I have reviewed the Medications.     Review of Systems  Anesthesia Hx:  No problems with previous Anesthesia  Denies Family Hx of Anesthesia complications.   Denies Personal Hx of Anesthesia complications.   Social:  Non-Smoker    Hematology/Oncology:  Hematology Normal   Oncology Normal     EENT/Dental:EENT/Dental Normal   Cardiovascular:   Exercise tolerance: good Hypertension CAD   Angina CHF ECG has been reviewed. Echo 2022    The left ventricle is moderately enlarged with eccentric hypertrophy and mildly decreased systolic function.  The estimated ejection fraction is 40%.  Grade I left ventricular diastolic dysfunction.  Normal right ventricular size with low normal right ventricular systolic function.  Mild left atrial enlargement Mild-to-moderate mitral regurgitation.  Mild tricuspid regurgitation.  Normal central venous pressure (3 mmHg).  The estimated PA systolic pressure is 33 mmHg.  There is mild left ventricular global hypokinesis.      Pulmonary:   Pneumonia Asthma Shortness of breath    Renal/:   Chronic Renal Disease, CKD    Hepatic/GI:  Hepatic/GI Normal    Musculoskeletal:  Musculoskeletal Normal    Endocrine:   Diabetes, type 2    Dermatological:  Skin Normal    Psych:  Psychiatric Normal           Physical Exam  General: Well nourished, Cooperative, Alert and Oriented    Airway:  Mallampati: II   Mouth Opening: Normal  TM Distance: Normal  Tongue: Normal  Neck ROM: Normal ROM    Dental:  Intact  Pt denies loose or removable dentition  Heart:  Rate: Normal  Rhythm: Regular Rhythm        Anesthesia Plan  Type of Anesthesia, risks & benefits discussed:    Anesthesia Type: MAC  Intra-op Monitoring  Plan: Standard ASA Monitors  Post Op Pain Control Plan: multimodal analgesia  Induction:  IV  Informed Consent: Informed consent signed with the Patient and all parties understand the risks and agree with anesthesia plan.  All questions answered.   ASA Score: 4  Day of Surgery Review of History & Physical: H&P Update referred to the surgeon/provider.I have interviewed and examined the patient. I have reviewed the patient's H&P dated:     Ready For Surgery From Anesthesia Perspective.     .

## 2023-02-02 NOTE — H&P
Short Stay Endoscopy History and Physical    PCP - Steven Morgan MD  Referring Physician - Natasha Hunter, NP  [id number] [place] [address]    Procedure -Colonoscopy  ASA - per anesthesia  Mallampati - per anesthesia  History of Anesthesia problems - no  Family history Anesthesia problems -  no   Plan of anesthesia - General    HPI  67 y.o. male  Reason for procedure: colon cancer screening  Pre transplantation        Personnel H/o colon polyps:  FH of colon cancer:  Anticoagulation:      ROS:  Constitutional: No fevers, chills, No weight loss  CV: No chest pain  Pulm: No cough, No shortness of breath  GI: see HPI    Medical History:  has a past medical history of Abnormal nuclear stress test (8/30/2022), Anemia (4/16/2014), BPH (benign prostatic hyperplasia), CKD (chronic kidney disease) stage 4, GFR 15-29 ml/min, Coronary artery disease of native artery of native heart with stable angina pectoris (4/29/2019), Diabetes mellitus, Diabetes mellitus, type 2, Dilated cardiomyopathy (8/30/2022), Elevated PSA, Encounter for blood transfusion, Herpes labialis, Hyperlipidemia, Hypertension, Obesity, Proteinuria, Secondary hyperparathyroidism (of renal origin), and UTI (lower urinary tract infection) (5/1/2015).    Surgical History:  has a past surgical history that includes thorocotomy (2010); Prostate biopsy; Colonoscopy (N/A, 12/21/2017); Fluoroscopy (N/A, 1/21/2021); Fluoroscopy (N/A, 4/6/2021); and Catheterization of both left and right heart (N/A, 8/30/2022).    Family History: family history includes Diabetes in his father and another family member; Hypertension in his father and another family member; No Known Problems in his mother..    Social History:  reports that he has never smoked. He has never used smokeless tobacco. He reports current alcohol use of about 2.0 standard drinks per week. He reports that he does not use drugs.    Review of patient's allergies indicates:   Allergen Reactions  "   Bactrim [sulfamethoxazole-trimethoprim] Swelling    Nsaids (non-steroidal anti-inflammatory drug)      CKD    Sulfamethoxazole     Trimethoprim        Medications:   Medications Prior to Admission   Medication Sig Dispense Refill Last Dose    acyclovir (ZOVIRAX) 400 MG tablet TAKE 1 TABLET (400 MG TOTAL) BY MOUTH 2 (TWO) TIMES DAILY. ONE TABLET 60 tablet 3 Past Week    ADVAIR DISKUS 250-50 mcg/dose diskus inhaler INHALE 1 PUFF INTO THE LUNGS 2 (TWO) TIMES DAILY. CONTROLLER 60 each 11 2/1/2023    aspirin (ECOTRIN) 81 MG EC tablet Take 1 tablet (81 mg total) by mouth once daily.   2/1/2023    atorvastatin (LIPITOR) 40 MG tablet TAKE 1 TABLET BY MOUTH EVERY DAY 90 tablet 3 2/1/2023    BD INSULIN SYRINGE HALF UNIT 0.3 mL 31 gauge x 5/16" Syrg FOR USE WITH INSULIN. 100 Syringe 1 2/1/2023    blood sugar diagnostic Strp 1 each by Misc.(Non-Drug; Combo Route) route 4 (four) times daily. 100 each 11 2/1/2023    blood-glucose meter kit Test finger stick blood sugar once daily. 1 each 0 2/1/2023    calcitRIOL (ROCALTROL) 0.25 MCG Cap TAKE 1 CAPSULE (0.25 MCG TOTAL) BY MOUTH EVERY MON, WED, FRI. 15 capsule 11 Past Week    cetirizine (ZYRTEC) 10 MG tablet Take 1 tablet (10 mg total) by mouth once daily. 30 tablet 1 Past Month    esomeprazole (NEXIUM) 40 MG capsule Take 40 mg by mouth once daily.   Past Week    finasteride (PROSCAR) 5 mg tablet Take 1 tablet (5 mg total) by mouth once daily. 90 tablet 3 Past Week    folic acid (FOLVITE) 1 MG tablet TAKE 1 TABLET BY MOUTH EVERY DAY 30 tablet 9 Past Week    furosemide (LASIX) 80 MG tablet Take 1 tablet (80 mg total) by mouth once daily. 30 tablet 6 Past Week    hydrALAZINE (APRESOLINE) 100 MG tablet Take 1 tablet (100 mg total) by mouth every 8 (eight) hours. 90 tablet 11 2/1/2023    insulin degludec (TRESIBA FLEXTOUCH U-200) 200 unit/mL (3 mL) insulin pen Inject 30 Units into the skin once daily. 2 pen 8 2/1/2023    isosorbide dinitrate (ISORDIL) 20 MG tablet Take 1 tablet (20 " "mg total) by mouth 3 (three) times daily. 90 tablet 11 2/1/2023    lancets Misc 1 each by Misc.(Non-Drug; Combo Route) route 4 (four) times daily. 100 each 11 2/1/2023    metoprolol succinate (TOPROL-XL) 50 MG 24 hr tablet Take 1 tablet (50 mg total) by mouth once daily. 30 tablet 11 2/1/2023    multivitamin (THERAGRAN) tablet Take 1 tablet by mouth once daily.   2/1/2023    NIFEdipine (PROCARDIA-XL) 90 MG (OSM) 24 hr tablet Take 1 tablet (90 mg total) by mouth once daily. 30 tablet 5 2/1/2023    pen needle, diabetic (BD ULTRA-FINE SHIRA PEN NEEDLE) 32 gauge x 5/32" Ndle 1 each by Misc.(Non-Drug; Combo Route) route 4 (four) times daily with meals and nightly. 100 each 11 2/1/2023    sevelamer carbonate (RENVELA) 800 mg Tab Take 3 tablets (2,400 mg total) by mouth 3 (three) times daily with meals. 300 tablet 11 2/1/2023    albuterol (PROVENTIL) 2.5 mg /3 mL (0.083 %) nebulizer solution Take 3 mLs (2.5 mg total) by nebulization every 4 to 6 hours as needed for Wheezing or Shortness of Breath. 360 mL 11 More than a month    azelastine (ASTELIN) 137 mcg (0.1 %) nasal spray 1 spray (137 mcg total) by Nasal route 2 (two) times daily. (Patient not taking: Reported on 12/15/2022) 30 mL 0     ciprofloxacin-dexamethasone 0.3-0.1% (CIPRODEX) 0.3-0.1 % DrpS 5 drops in affected ear twice daily for 7 days 7.5 mL 1 More than a month    inhalation spacing device (VORTEX HOLDING CHAMBER) Use as directed for inhalation. 1 Device prn More than a month    nystatin (MYCOSTATIN) cream Apply topically 2 (two) times daily. Apply liberally to affected areas twice a day until symptoms have resolved for 21 days 30 g 1     olopatadine (PATADAY) 0.2 % Drop Place 1 drop into both eyes daily as needed. 2.5 mL 0     tamsulosin (FLOMAX) 0.4 mg Cap Take 1 capsule (0.4 mg total) by mouth once daily. 30 capsule 11     valsartan (DIOVAN) 320 MG tablet Take 1 tablet (320 mg total) by mouth once daily. 30 tablet 4        Physical Exam:    Vital Signs: "   Vitals:    02/02/23 0856   BP: (!) 133/94   Pulse: 86   Resp: 20   Temp: 97.9 °F (36.6 °C)       General Appearance: Well appearing in no acute distress  Abdomen: Soft, non tender, non distended with normal bowel sounds, no masses    Labs:  Lab Results   Component Value Date    WBC 9.58 08/23/2022    HGB 11.7 (L) 08/23/2022    HCT 37.9 (L) 08/23/2022     08/23/2022    CHOL 128 09/26/2022    TRIG 105 09/26/2022    HDL 25 (A) 09/26/2022    ALT 8 (L) 07/14/2022    ALT 8 (L) 07/14/2022    AST 16 07/14/2022    AST 16 07/14/2022     08/23/2022    K 4.8 02/02/2023     08/23/2022    CREATININE 8.3 (H) 08/23/2022    BUN 41 (H) 08/23/2022    CO2 23 08/23/2022    TSH 1.941 07/14/2022    PSA 6.1 (H) 08/25/2022    INR 0.9 08/23/2022    HGBA1C 7.4 (A) 09/26/2022       I have explained the risks and benefits of this endoscopic procedure to the patient including but not limited to bleeding, inflammation, infection, perforation, and death.    SEDATION PLAN: per anesthesia      History reviewed, vital signs satisfactory, cardiopulmonary status satisfactory, sedation options, risks and plans have been discussed with the patient  All their questions were answered and the patient agrees to the sedation procedures as planned and the patient is deemed an appropriate candidate for the sedation as planned.    Procedure explained to patient, informed consent obtained and placed in chart.        Anthony Meng MD

## 2023-02-02 NOTE — TRANSFER OF CARE
"Anesthesia Transfer of Care Note    Patient: Isidro Alves    Procedure(s) Performed: Procedure(s) (LRB):  COLONOSCOPY (N/A)    Patient location: GI    Anesthesia Type: MAC    Transport from OR: Transported from OR on room air with adequate spontaneous ventilation    Post pain: adequate analgesia    Post assessment: no apparent anesthetic complications and tolerated procedure well    Post vital signs: stable    Level of consciousness: awake    Nausea/Vomiting: no nausea/vomiting    Complications: none    Transfer of care protocol was followed      Last vitals:   Visit Vitals  BP (!) 133/94 (BP Location: Left arm, Patient Position: Lying)   Pulse 86   Temp 36.6 °C (97.9 °F) (Temporal)   Resp 20   Ht 6' 2" (1.88 m)   Wt 113.4 kg (250 lb)   SpO2 97%   BMI 32.10 kg/m²     "

## 2023-02-03 ENCOUNTER — TELEPHONE (OUTPATIENT)
Dept: TRANSPLANT | Facility: CLINIC | Age: 68
End: 2023-02-03
Payer: COMMERCIAL

## 2023-02-03 NOTE — TELEPHONE ENCOUNTER
MA notes per faxed adherence check form.     FOR THE PAST THREE MONTHS:    0-AMA's  0-No-shows    No concerns with care giving, transportation, or mental health    Brought over to clinic to be scanned in.    Raysa Archuleta  Abdominal Transplant MA

## 2023-02-06 ENCOUNTER — TELEPHONE (OUTPATIENT)
Dept: INTERNAL MEDICINE | Facility: CLINIC | Age: 68
End: 2023-02-06
Payer: COMMERCIAL

## 2023-02-08 PROCEDURE — 86833 HLA CLASS II HIGH DEFIN QUAL: CPT | Mod: PO,TXP | Performed by: NURSE PRACTITIONER

## 2023-02-08 PROCEDURE — 86832 HLA CLASS I HIGH DEFIN QUAL: CPT | Mod: PO,TXP | Performed by: NURSE PRACTITIONER

## 2023-02-09 ENCOUNTER — OFFICE VISIT (OUTPATIENT)
Dept: TRANSPLANT | Facility: CLINIC | Age: 68
End: 2023-02-09
Payer: COMMERCIAL

## 2023-02-09 VITALS
SYSTOLIC BLOOD PRESSURE: 146 MMHG | HEIGHT: 73 IN | DIASTOLIC BLOOD PRESSURE: 89 MMHG | WEIGHT: 254.88 LBS | RESPIRATION RATE: 16 BRPM | HEART RATE: 77 BPM | BODY MASS INDEX: 33.78 KG/M2 | TEMPERATURE: 97 F | OXYGEN SATURATION: 96 %

## 2023-02-09 DIAGNOSIS — E11.69 HYPERLIPIDEMIA ASSOCIATED WITH TYPE 2 DIABETES MELLITUS: ICD-10-CM

## 2023-02-09 DIAGNOSIS — Z76.82 PATIENT ON WAITING LIST FOR KIDNEY TRANSPLANT: Primary | Chronic | ICD-10-CM

## 2023-02-09 DIAGNOSIS — E11.22 TYPE 2 DIABETES MELLITUS WITH CHRONIC KIDNEY DISEASE ON CHRONIC DIALYSIS, WITH LONG-TERM CURRENT USE OF INSULIN: ICD-10-CM

## 2023-02-09 DIAGNOSIS — Z99.2 TYPE 2 DIABETES MELLITUS WITH CHRONIC KIDNEY DISEASE ON CHRONIC DIALYSIS, WITH LONG-TERM CURRENT USE OF INSULIN: ICD-10-CM

## 2023-02-09 DIAGNOSIS — N25.81 SECONDARY HYPERPARATHYROIDISM: ICD-10-CM

## 2023-02-09 DIAGNOSIS — Z79.4 TYPE 2 DIABETES MELLITUS WITH CHRONIC KIDNEY DISEASE ON CHRONIC DIALYSIS, WITH LONG-TERM CURRENT USE OF INSULIN: ICD-10-CM

## 2023-02-09 DIAGNOSIS — N18.6 ESRD ON DIALYSIS: ICD-10-CM

## 2023-02-09 DIAGNOSIS — I25.118 CORONARY ARTERY DISEASE OF NATIVE ARTERY OF NATIVE HEART WITH STABLE ANGINA PECTORIS: ICD-10-CM

## 2023-02-09 DIAGNOSIS — N18.6 TYPE 2 DIABETES MELLITUS WITH CHRONIC KIDNEY DISEASE ON CHRONIC DIALYSIS, WITH LONG-TERM CURRENT USE OF INSULIN: ICD-10-CM

## 2023-02-09 DIAGNOSIS — I15.0 RENOVASCULAR HYPERTENSION: ICD-10-CM

## 2023-02-09 DIAGNOSIS — E78.5 HYPERLIPIDEMIA ASSOCIATED WITH TYPE 2 DIABETES MELLITUS: ICD-10-CM

## 2023-02-09 DIAGNOSIS — Z99.2 ESRD ON DIALYSIS: ICD-10-CM

## 2023-02-09 PROCEDURE — 99215 PR OFFICE/OUTPT VISIT, EST, LEVL V, 40-54 MIN: ICD-10-PCS | Mod: S$PBB,TXP,, | Performed by: NURSE PRACTITIONER

## 2023-02-09 PROCEDURE — 99999 PR PBB SHADOW E&M-EST. PATIENT-LVL V: ICD-10-PCS | Mod: PBBFAC,TXP,, | Performed by: NURSE PRACTITIONER

## 2023-02-09 PROCEDURE — 99999 PR PBB SHADOW E&M-EST. PATIENT-LVL V: CPT | Mod: PBBFAC,TXP,, | Performed by: NURSE PRACTITIONER

## 2023-02-09 PROCEDURE — 4010F PR ACE/ARB THEARPY RXD/TAKEN: ICD-10-PCS | Mod: TXP,,, | Performed by: NURSE PRACTITIONER

## 2023-02-09 PROCEDURE — 99215 OFFICE O/P EST HI 40 MIN: CPT | Mod: S$PBB,TXP,, | Performed by: NURSE PRACTITIONER

## 2023-02-09 PROCEDURE — 4010F ACE/ARB THERAPY RXD/TAKEN: CPT | Mod: TXP,,, | Performed by: NURSE PRACTITIONER

## 2023-02-09 NOTE — PROGRESS NOTES
LISTED PATIENT EDUCATION NOTE    Mr. Isidro Alves was seen in pre-kidney transplant clinic for evaluation for kidney, kidney/pancreas or pancreas only transplant.  The patient attended a an individual video education session that discussed/reviewed the following aspects of transplantation: evaluation including diagnostic and laboratory testing,( Chemistries, Hematology, Serologies including HIV and Hepatitis and HLA) required for transplantation and selection committee process, [facility] waitlist management/multiple listings, types of organs offered (KDPI < 85%, KDPI > 85%, PHS risk, DCD, HCV+, HIV+ for HIV+ recipients and enbloc/dual), financial aspects, surgical procedures, dietary instruction pre- and post-transplant, health maintenance pre- and post-transplant, post-transplant hospitalization and outpatient follow-up, potential to participate in a research protocol, and medication management and side effects.  A question and answer session was provided after the presentation.    The patient was seen by all members of the multi-disciplinary team to include: Nephrologist/SHAMEKA, Surgeon, , Transplant Coordinator, , Pharmacist and Dietician (if applicable).    The patient reviewed and signed all consents for evaluation which were witnessed and sent to scanning into the [facility] chart.    The patient was given an education book and plan for further evaluation based on his individual assessment.      Reviewed program requirement for complete COVID vaccination with documentation prior to listing.  COVID education information reviewed with patient. Patient encouraged to be up to date on all vaccinations.       The patient was informed that the transplant team would manage immediate post op pain. If the patient requires long term pain management, they will need to have that pain management addressed by their PCP or previous provider who wrote for long term pain medicines.    The patient was  encouraged to call with any questions or concerns.

## 2023-02-09 NOTE — PROGRESS NOTES
Kidney Transplant Recipient Reevalulation    Referring Physician: Gabino Champion  Current Nephrologist: Gabino Champion  Waitlist Status: active  Dialysis Start Date: 9/17/2019    Subjective:     CC:   6 month  reassessment of kidney transplant candidacy.    HPI:  Mr. Alves is a 64 y.o. year old Black or  male with ESRD secondary to diabetic nephropathy.  He has been on the wait list for a kidney transplant at [facility] since 9/17/2019. He is currently  ESRD, Pt reports starting HD ~ 3/2021, he is dialyzing MWF for 3 1/2 hours Per session.  Patient reports that he is tolerating dialysis well. Access  Right chest CATHETER FOR DIALYSIS.     Last seen by txp on 8/25/22   FX assessment  UNCHANGED-Patient walks ~ 1/2-1 mile 3x/week.   Denies [place], leg pain or chest pain, denies dizziness.   Over all looks good not frail.         ABN stress test and EF 40 % on TTE   Follows with Cardiology Dr Ragland, LOV 9/16/22         HX Elevated PSA  , s/p prostate bx 2015 (-)  Urologist Dr Nugent /urology 1/19/2022. He has been Cleared for a transplant by urology   Urology: LOV 12/2/22  Impression/Plan:   Elevated PSA - MRI negative, PSA about the same as it has been, continue to monitor  BPH - continue flomax/finasteride   ED - continue viagra PRN  UTI - urine for culture today, Rx for omnicef provided  PSA, Screen (ng/mL)   Date Value   08/25/2022 6.1 (H)        Lab /diagnostic results- txp wkup reviewed      7/17/22 Renal US:  Subcentimeter left renal cyst  PXR 6/30/22  :   No significant vascular calcifications.  7/14/22  CXR:   Lungs are clear without focal consolidation, edema, or mass  6/12/20 iliac doppler: triphasic waveforms bilaterally  2/3/23 colonoscopy:Internal hemorrhoids. The examination was otherwise normal on direct. Repeat colonoscopy in 3 years for surveillance      11/17/22 TTE  The left ventricle is moderately enlarged with eccentric hypertrophy and mildly decreased systolic function.  The  estimated ejection fraction is 40%.  Grade I left ventricular diastolic dysfunction.  Normal right ventricular size with low normal right ventricular systolic function.  Mild left atrial enlargement.  Mild-to-moderate mitral regurgitation.  Mild tricuspid regurgitation.  Normal central venous pressure (3 mmHg).  The estimated PA systolic pressure is 33 mmHg.  There is mild left ventricular global hypokinesis.     8/30/22 RHC/LHC    The ejection fraction was calculated to be 40%.    The pre-procedure left ventricular end diastolic pressure was 13.    The post-procedure left ventricular end diastolic pressure was 16.    Global hypokinesis    non obstructive cad    mild pulmonary htn    normal filling pressures        7/26/22 Stress test     Abnormal myocardial perfusion scan.    There is a moderate to severe intensity, large sized, mostly fixed perfusion abnormality with some reversibilty in the inferior, inferolateral, lateral and apical wall(s).    There are no other significant perfusion abnormalities.    The gated perfusion images showed an ejection fraction of 33% at rest. The gated perfusion images showed an ejection fraction of 48% post stress. Normal ejection fraction is greater than 59%.    There is moderate global hypokinesis at rest and stress.    The EKG portion of this study is negative for ischemia.        Past Medical History:   Diagnosis Date    Abnormal nuclear stress test 8/30/2022    Anemia 4/16/2014    BPH (benign prostatic hyperplasia)     CKD (chronic kidney disease) stage 4, GFR 15-29 ml/min     Coronary artery disease of native artery of native heart with stable angina pectoris 4/29/2019    Diabetes mellitus     Diabetes mellitus, type 2     Dilated cardiomyopathy 8/30/2022    Elevated PSA     Encounter for blood transfusion     Herpes labialis     Hyperlipidemia     Hypertension     Obesity     Proteinuria     Secondary hyperparathyroidism (of renal origin)     UTI (lower urinary tract  infection) 5/1/2015       Past Medical and Surgical History: Mr. Alves  has a past medical history of Abnormal nuclear stress test, Anemia, BPH (benign prostatic hyperplasia), CKD (chronic kidney disease) stage 4, GFR 15-29 ml/min, Coronary artery disease of native artery of native heart with stable angina pectoris, Diabetes mellitus, Diabetes mellitus, type 2, Dilated cardiomyopathy, Elevated PSA, Encounter for blood transfusion, Herpes labialis, Hyperlipidemia, Hypertension, Obesity, Proteinuria, Secondary hyperparathyroidism (of renal origin), and UTI (lower urinary tract infection).  He has a past surgical history that includes thorocotomy (2010); Prostate biopsy; Colonoscopy (N/A, 12/21/2017); Fluoroscopy (N/A, 1/21/2021); Fluoroscopy (N/A, 4/6/2021); Catheterization of both left and right heart (N/A, 8/30/2022); and Colonoscopy (N/A, 2/2/2023).    Past Social and Family History: Mr. Alves reports that he has never smoked. He has never used smokeless tobacco. He reports current alcohol use of about 2.0 standard drinks per week. He reports that he does not use drugs. His family history includes Diabetes in his father and another family member; Hypertension in his father and another family member; No Known Problems in his mother.        Past Medical History:   Diagnosis Date    Abnormal nuclear stress test 8/30/2022    Anemia 4/16/2014    BPH (benign prostatic hyperplasia)     CKD (chronic kidney disease) stage 4, GFR 15-29 ml/min     Coronary artery disease of native artery of native heart with stable angina pectoris 4/29/2019    Diabetes mellitus     Diabetes mellitus, type 2     Dilated cardiomyopathy 8/30/2022    Elevated PSA     Encounter for blood transfusion     Herpes labialis     Hyperlipidemia     Hypertension     Obesity     Proteinuria     Secondary hyperparathyroidism (of renal origin)     UTI (lower urinary tract infection) 5/1/2015       Review of Systems   Constitutional:  Positive for  fatigue. Negative for activity change, appetite change, chills, fever and unexpected weight change.   HENT:  Negative for congestion, facial swelling, postnasal drip, rhinorrhea, sinus pressure, sore throat and trouble swallowing.    Eyes:  Positive for visual disturbance. Negative for pain and redness.        Wears glasses   Respiratory:  Negative for cough, chest tightness, shortness of breath and wheezing.    Cardiovascular:  Negative for chest pain and palpitations.   Gastrointestinal:  Positive for abdominal distention. Negative for abdominal pain, diarrhea, nausea and vomiting.   Genitourinary:  Negative for dysuria, flank pain and urgency.   Musculoskeletal:  Negative for gait problem, neck pain and neck stiffness.   Skin:  Negative for rash.   Allergic/Immunologic: Negative for environmental allergies, food allergies and immunocompromised state.   Neurological:  Negative for dizziness, weakness and light-headedness.   Psychiatric/Behavioral:  Negative for agitation and confusion. The patient is not nervous/anxious.      Objective:   There were no vitals taken for this visit.body mass index is unknown because there is no height or weight on file.    Physical Exam  Vitals reviewed.   Constitutional:       Appearance: Normal appearance. He is well-developed.   HENT:      Head: Normocephalic.   Eyes:      Conjunctiva/sclera: Conjunctivae normal.      Pupils: Pupils are equal, round, and reactive to light.   Cardiovascular:      Rate and Rhythm: Normal rate and regular rhythm.      Heart sounds: Normal heart sounds.   Pulmonary:      Effort: Pulmonary effort is normal.      Breath sounds: Normal breath sounds.   Chest:       Abdominal:      General: Bowel sounds are normal. There is distension.      Palpations: Abdomen is soft. There is no hepatomegaly, splenomegaly or mass.      Tenderness: There is no abdominal tenderness. There is no guarding or rebound. Negative signs include Curran's sign and McBurney's  sign.   Musculoskeletal:         General: Normal range of motion.      Cervical back: Normal range of motion and neck supple.      Comments:     Lymphadenopathy:      Cervical: No cervical adenopathy.   Skin:     General: Skin is warm and dry.   Neurological:      Mental Status: He is alert and oriented to person, place, and time.      Motor: No abnormal muscle tone.      Coordination: Coordination normal.   Psychiatric:         Behavior: Behavior normal.       Labs:  Lab Results   Component Value Date    WBC 9.58 08/23/2022    HGB 11.7 (L) 08/23/2022    HCT 37.9 (L) 08/23/2022     08/23/2022    K 4.8 02/02/2023     08/23/2022    CO2 23 08/23/2022    BUN 41 (H) 08/23/2022    CREATININE 8.3 (H) 08/23/2022    EGFRNONAA 6.0 (A) 07/14/2022    CALCIUM 9.8 08/23/2022    PHOS 7.1 (H) 02/01/2021    MG 2.1 01/27/2021    ALBUMIN 3.7 07/14/2022    ALBUMIN 3.7 07/14/2022    AST 16 07/14/2022    AST 16 07/14/2022    ALT 8 (L) 07/14/2022    ALT 8 (L) 07/14/2022    UTPCR 5.54 (H) 02/01/2021    .5 (H) 08/25/2022       Lab Results   Component Value Date    BILIRUBINUA Negative 10/10/2022    PROTEINUA 2+ (A) 10/10/2022    NITRITE Negative 10/10/2022    RBCUA 0 10/10/2022    WBCUA >100 (H) 10/10/2022       No results found for: HLAABCTYPE    Labs were reviewed with the patient.    Assessment:     1. Patient on waiting list for kidney transplant    2. ESRD on dialysis    3. Type 2 diabetes mellitus with chronic kidney disease on chronic dialysis, with long-term current use of insulin    4. Secondary hyperparathyroidism    5. Hyperlipidemia associated with type 2 diabetes mellitus    6. Coronary artery disease of native artery of native heart with stable angina pectoris    7. Renovascular hypertension        Plan:       F/u with urology and cardiology as scheduled      Transplant Candidacy:   MR. Alves  is acceptable for  kidney transplant candidate.  Meets center eligibility for accepting HCV+ donor offer -  yes.  Patient educated on HCV+ donors. He is willing to accept HCV+ donor offer - yes   Patient is a candidate for KDPI > 85 kidney donor offer - no d/t weight.  He remains in overall stable health, and will remain active on the transplant list      Follow-up:   In addition to the tests noted in the plan, MR. Alves will continue to have reevaluation as per the standing pre-kidney transplant protocol:  Monthly blood for PRA  Annual return to clinic, except HIV positive, > 65 years of age, or pancreas transplant candidates who will be scheduled to see transplant every 6 months while in pre-transplant phase  Annual re-testing: CXR, EKG, yearly mammograms for women over 40 and PSA for males over 40, cardiology follow-up as recommended by initial cardiology pre-transplant evaluation  Renal ultrasound every 2 years  Baseline colonoscopy after age 50 and repeated as recommended      JES Davis Patient Status  Functional Status: 60% - Requires occasional assistance but is able to care for needs  Physical Capacity: No Limitations

## 2023-02-09 NOTE — LETTER
February 13, 2023        Gabino Champion  [address]  [place] [address]  Phone: [phone]  Fax: [phone]             [facility]  [address]  [facility] [address]  Phone: [phone]   Patient: Isidro Alves   MR Number: [id number]   YOB: 1955   Date of Visit: 2/9/2023       Dear Dr. Gabino Champion    Thank you for referring Isidro Alves to me for evaluation. Attached you will find relevant portions of my assessment and plan of care.    If you have questions, please do not hesitate to call me. I look forward to following Isidro Alves along with you.    Sincerely,    Jessica Collins, NP    Enclosure    If you would like to receive this communication electronically, please contact [email or web address] or [phone] to request [employer] access.    [employer] is a tool which provides read-only access to select patient information with whom you have a relationship. Its easy to use and provides real time access to review your patients record including encounter summaries, notes, results, and demographic information.    If you feel you have received this communication in error or would no longer like to receive these types of communications, please e-mail [email or web address]

## 2023-02-12 NOTE — PROGRESS NOTES
YEARLY LIST MANAGEMENT NOTE    Isidro Alves's kidney transplant listing status reviewed.  Patient is due for follow-up appointments on 8/2023.  Appointments will be scheduled per protocol. Patient has f/u with cardiology 3/16/2023. Urology 6/2023. Colonoscopy completed 2/2023 and will need repeat in 3 years.

## 2023-02-13 ENCOUNTER — LAB VISIT (OUTPATIENT)
Dept: LAB | Facility: HOSPITAL | Age: 68
End: 2023-02-13
Payer: COMMERCIAL

## 2023-02-13 ENCOUNTER — OFFICE VISIT (OUTPATIENT)
Dept: INTERNAL MEDICINE | Facility: CLINIC | Age: 68
End: 2023-02-13
Payer: COMMERCIAL

## 2023-02-13 VITALS
DIASTOLIC BLOOD PRESSURE: 70 MMHG | WEIGHT: 263.25 LBS | HEIGHT: 72 IN | TEMPERATURE: 98 F | BODY MASS INDEX: 35.66 KG/M2 | HEART RATE: 83 BPM | OXYGEN SATURATION: 96 % | SYSTOLIC BLOOD PRESSURE: 160 MMHG

## 2023-02-13 DIAGNOSIS — R09.81 NASAL CONGESTION: Primary | ICD-10-CM

## 2023-02-13 DIAGNOSIS — Z76.82 AWAITING ORGAN TRANSPLANT STATUS: ICD-10-CM

## 2023-02-13 LAB
CTP QC/QA: YES
SARS-COV-2 RDRP RESP QL NAA+PROBE: POSITIVE

## 2023-02-13 PROCEDURE — 99999 PR PBB SHADOW E&M-EST. PATIENT-LVL V: ICD-10-PCS | Mod: PBBFAC,,, | Performed by: INTERNAL MEDICINE

## 2023-02-13 PROCEDURE — 99215 OFFICE O/P EST HI 40 MIN: CPT | Mod: PBBFAC,PO | Performed by: INTERNAL MEDICINE

## 2023-02-13 PROCEDURE — 99999 PR PBB SHADOW E&M-EST. PATIENT-LVL V: CPT | Mod: PBBFAC,,, | Performed by: INTERNAL MEDICINE

## 2023-02-13 PROCEDURE — 87635 SARS-COV-2 COVID-19 AMP PRB: CPT | Mod: PBBFAC,PO | Performed by: INTERNAL MEDICINE

## 2023-02-13 PROCEDURE — 99214 OFFICE O/P EST MOD 30 MIN: CPT | Mod: CR,S$GLB,, | Performed by: INTERNAL MEDICINE

## 2023-02-13 PROCEDURE — 99214 PR OFFICE/OUTPT VISIT, EST, LEVL IV, 30-39 MIN: ICD-10-PCS | Mod: CR,S$GLB,, | Performed by: INTERNAL MEDICINE

## 2023-02-13 NOTE — PROGRESS NOTES
HPI:  Patient is a 67-year-old man who comes today with complaints of head and sinus congestion for the last 3 days.  He states he got no sleep last night due to his head being fully blocked up.  He denies any fever.  He states the nasal discharge is clear to yellowish in color.  He denies any sputum production.  He has not taking anything for it.  He has not tested himself for COVID at home    Current meds have been verified and updated per the EMR  Exam:BP (!) 160/70 (BP Location: Left arm, Patient Position: Sitting, BP Method: Large (Manual))   Pulse 83   Temp 97.5 °F (36.4 °C) (Tympanic)   Ht 6' (1.829 m)   Wt 119.4 kg (263 lb 3.7 oz)   SpO2 96%   BMI 35.70 kg/m²   TMs unremarkable, nasal mucosa very hyperemic edematous.  Neck is supple without adenopathy.  Oropharynx is normal.  Chest clear    Point of care testing for COVID-19 was positive    Lab Results   Component Value Date    WBC 9.58 08/23/2022    HGB 11.7 (L) 08/23/2022    HCT 37.9 (L) 08/23/2022     08/23/2022    CHOL 128 09/26/2022    TRIG 105 09/26/2022    HDL 25 (A) 09/26/2022    ALT 8 (L) 07/14/2022    ALT 8 (L) 07/14/2022    AST 16 07/14/2022    AST 16 07/14/2022     08/23/2022    K 4.8 02/02/2023     08/23/2022    CREATININE 8.3 (H) 08/23/2022    BUN 41 (H) 08/23/2022    CO2 23 08/23/2022    TSH 1.941 07/14/2022    PSA 6.1 (H) 08/25/2022    INR 0.9 08/23/2022    HGBA1C 6.4 (H) 02/02/2023       Impression:  COVID-19 infection, no signs of respiratory distress at all.  Patient Active Problem List   Diagnosis    Severe hypertension    Anemia    Secondary hyperparathyroidism    Metabolic acidosis    Type 2 diabetes mellitus with chronic kidney disease, with long-term current use of insulin    Herpes labialis    Benign non-nodular prostatic hyperplasia without lower urinary tract symptoms    Elevated PSA    Hypogonadism in male    Hyperlipidemia associated with type 2 diabetes mellitus    Class 1 obesity due to excess calories  with serious comorbidity and body mass index (BMI) of 34.0 to 34.9 in adult    Colon cancer screening    Special screening for malignant neoplasms, colon    Colon polyp    Other proteinuria    Coronary artery disease of native artery of native heart with stable angina pectoris    Multiple thyroid nodules    Patient on waiting list for kidney transplant    Acute hypoxemic respiratory failure due to COVID-19    ESRD on dialysis    Hyperkalemia    Pneumonia due to COVID-19 virus    Post viral asthma    Upper airway cough syndrome    Pulmonary fibrosis, postinflammatory    PVC (premature ventricular contraction)    Chronic systolic congestive heart failure    Dilated cardiomyopathy    Abnormal nuclear stress test    Pulmonary HTN    Preop cardiovascular exam    Anal itching       Plan:  Orders Placed This Encounter    POCT COVID-19 Rapid Screening   Patient was told use over-the-counter Allegra and Mucinex DM.  He needs to monitor his O2 saturations and if gets less than 92% he should go to the emergency room.  Patient should follow-up with his primary care physician as needed.  He can not take Paxlovid due to his near end-stage renal disease    This note is generated with speech recognition software and is subject to transcription error and sound alike phrases that may be missed by proofreading.

## 2023-02-20 NOTE — PROGRESS NOTES
Transplant Recipient Adult Psychosocial Assessment (UPDATE from 08/25/2022)    Pt presents alone.    Isidro Alves  [address] [facility]  [place] [address]      Telephone Information:   Mobile [phone]   Home               [phone] (home)  Work                There is no work phone number on file.  E-mail              [email or web address]     Sex: male  YOB: 1955  Age: 66 y.o.  [place] Citizen: yes  Primary Language: English   Needed: no     Emergency Contact:  Name: Kunal Alves  Relationship: wife  Address: same as above  Phone Numbers: [phone] (mobile)     Family/Social Support:   Number of dependents/: Pt reports no dependents.  Two adult biological children and one adult step child.    Marital history: Pt  twice, with the first marriage ending in divorce. Pt reports current marriage of 27 years to supportive and involved wife, Mrs. Kunal Alves.  Other family dynamics: Pt reports having seven sisters and two brothers (step or full siblings), pt is the eldest sibling. Reports close family and friends network. Pt presented alone.     Household Composition:  Name: Isidro Alves  Age: 67  Relationship: patient  Does person drive? yes     Name: Kunal Alves  Age: 63  Relationship: wife  Does person drive? Yes    Do you and your caregivers have access to reliable transportation? yes  PRIMARY CAREGIVER: Kunal Alves will be primary caregiver, phone number [phone]. Pt reports caregiver went to get lunch but did not return to clinic.       reviewed in-depth information to patient and caregiver regarding pre- and post-transplant caregiver role.   confirmed patient and caregiver have concrete plans in place for pre- and post-transplant care giving, including primary and back-up caregivers to ensure care giving needs are met as needed.     Patient and Caregiver state understanding all aspects of caregiver role/commitment and is  "able/willing/committed to being caregiver to the fullest extent necessary.     Patient and Caregiver verbalize understanding of the education provided today and caregiver responsibilities.          remains available. Patient and Caregiver agree to contact  in a timely manner if concerns arise.       Able to take time off work without financial concerns: yes.      Additional Significant Others who will Assist with Transplant:  Name: Princess Alves   Age: 75  Phone: [phone]  City: [place] State: [place]  Relationship: aunt  Does person drive? no     Name: Wilner Alves  Age: 44  Phone: [phone]  City: [place] State: [place]  Relationship: son  Does person drive? yes     Name: Sahil Reynolds  Age: 64  Phone: [phone]  City: [place] State: [place]  Relationship: sister  Does person drive? yes     Name: Kevin Alves  Age: 47  Phone: [phone]  City: [place] State: [place]  Relationship: daughter  Does person drive? yes     Living Will: no  Healthcare Power of : no pt reports trusting wife with medical decisions as needed   Advance Directives on file: <<no information> per medical record.  Verbally reviewed LW/HCPA information.   provided patient with copy of LW/HCPA documents and provided education on completion of forms.     Living Donors: none currently, Education and resource information given to patient.     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  Pt states is able to take time away from work as needed including for " transplant and recovery period.     Spouse/Significant Other Employment: Pt reports wife is a retired teacher at [employer]. ([employer]) and plans to retire in one year, however is able to take time off without concern to serve as primary caregiver.   Disabled: no     Monthly Income:  Other: $combined 4200.00 approximately per month  Able to afford all costs now and if transplanted, including medications: yes  Patient verbalizes understanding of personal responsibilities related to transplant costs and the importance of having a financial plan to ensure that patients transplant costs are fully covered.        provided fundraising information/education. Patient and Caregiver verbalizes understanding.   remains available.     Insurance:   Payer/Plan Subscr  Sex Relation Sub. Ins. ID Effective Group Num   1. MEDICARE - ME* KENNEY TEJEDA 1955 Male Self [id number]                                    [address]   2. BLUE CROSS BL* MAGGIE TEJEDA 1960 Female Spouse [id number]                                   [address] [address]     Primary Insurance (for UNOS reporting): Medicare  Secondary Insurance (for UNOS reporting): Private (through spouse's employer)  Patient and Caregiver verbalizes clear understanding that patient may experience difficulty obtaining and/or be denied insurance coverage post-surgery. This includes and is not limited to disability insurance, life insurance, health insurance, burial insurance, long term care insurance, and other insurances.       Patient and Caregiver also report understanding that future health concerns related to or unrelated to transplantation may not be covered by patient's insurance.  Resources and information provided and reviewed.      Patient and Caregiver provide verbal permission to release any necessary information to outside resources for patient care and discharge planning.  Resources and  information provided are reviewed.       Dialysis Adherence: Patient states started HD in March of 2021. Pt reports high level of compliance with treatments and health care recommendations or instructions.      Infusion Service: patient utilizing? no  Home Health: patient utilizing? no  DME: yes Pt reports using a BP cuff   Pulmonary/Cardiac Rehab: Pt denies  ADLS:  Pt reports no difficulties with driving, walking, bathing, cooking, housekeeping, eating, shopping, or taking prescribed medication.     Adherence:   Pt reports remains adherant with all medications and health regimen.  Adherence education and counseling provided.       Confirmed substance use and history as follows:        Tobacco Use    Smoking status: Never Smoker    Smokeless tobacco: Never Used   Substance Use Topics    Alcohol use: Yes, one per week - will discuss more in-depth with MD regarding recommendations and pt states plans to stop use unless medically directed. Denies need for resources or additional support or information at this time.        Comment: seldom       Social History       Illicit Drugs/Non-prescribed Medications: Pt reports a remote history of marijuana use over 20+ years ago. Pt reports no current drug use.      Patient and Caregiver state having a clear understanding of the potential impact of substance use as it relates to transplant candidacy and is aware of possible random substance screening.  Substance abstinence/cessation counseling, education and resources provided and reviewed.      Arrests/DWI/Treatment/Rehab: patient denies     Psychiatric History:  Mental Health: Pt reports no history of or current mental health issues or concerns.   Psychiatrist/Counselor: Pt denies seeing a mental health professional and reports being open to seeing the psych department for talk therapy if necessary.  Medications:  Pt denies taking medications for mental health reasons.  Suicide/Homicide Issues: Pt denies any history of or  current suicidal or homicidal ideations.   Safety at home: Pt reports no current or history of safety concerns in household; or history of abuse of any kind.     Knowledge: Patient and Caregiver stated having clear understanding and realistic expectations regarding the potential risks and potential benefits of organ transplantation and organ donation and agrees to discuss with health care team members and support system members, as well as to utilize available resources and express questions and/or concerns in order to further facilitate the pt informed decision-making.  Resources and information provided and reviewed.     Patient and Caregiver stated are aware of [facility]'s affiliation and/or partnership with agencies in home health care, LTAC, SNF, [facility], and other hospitals and clinics.     Understanding: Patient and Caregiver reported having a clear understanding of the many lifetime commitments involved with being a transplant recipient, including costs, compliance, medications, lab work, procedures, appointments, concrete and financial planning, preparedness, timely and appropriate communication of concerns, abstinence (ETOH, tobacco, illicit non-prescribed drugs), adherence to all health care team recommendations, support system and caregiver involvement, appropriate and timely resource utilization and follow-through, mental health counseling as needed/recommended, and patient and caregiver responsibilities.  Social Service Handbook, resources and detailed educational information provided and reviewed.  Educational information provided.     Patient and Caregiver also reported current and expected compliance with health care regime and states having a clear understanding of the importance of compliance.       Patient and Caregiver reported having a clear understanding that risks and benefits may be involved with organ transplantation and with organ donation.        Patient and Caregiver also reported having a  lear understanding that psychosocial risk factors may affect patient, and include but are not limited to feelings of depression, generalized anxiety, anxiety regarding dependence on others, post traumatic stress disorder, feelings of guilt and other emotional and/or mental concerns, and/or exacerbation of existing mental health concerns.  Detailed resources provided and discussed.       Patient and Caregiver agreed to access appropriate resources in a timely manner as needed and/or as recommended, and to communicate concerns appropriately. Patient and Caregiver also reports a clear understanding of treatment options available.       reviewed resources, resource limitations, educational information, psychosocial risk factors and ways to mitigate risk factors, and provided additional information, and answered all questions.     Feelings or Concerns: Patient and Caregiver reported having no concerns at this time. Patient and Caregiver reported having high levels of motivation to pursue transplant at this time and deny needs, barriers or issues proceeding if pt is called in for a transplant. Also see Epic for documentation from transplant social work team regarding organ offer/reviews.     Coping: Pt reports continues to coping well with the transplant process at this time and reports ongoing family support, is walking the track, and listens to music to manage stressors as needed. Pt reports coping strategies are effective and plans to engage in same or similar positive coping strategies in hospital and during recovery period as medically allowed.     Jew: Pt reports attends [employer] in [place], [place] with strong Jew support.      Goals: Pt reports plans to spend time with family, with work, traveling, and appreciating life now and if transplanted.  Pt denies need for additional support or referrals at this time for vocational support.     Interview Behavior: Patient  presents as alert and oriented x 4, pleasant, good eye contact, well groomed, recall good, concentration/judgement good, average intelligence, calm, communicative, cooperative and asking and answering questions appropriately. He was accompanied by his adult son, Wilner Alves, who presents as engaged and highly supportive of pt's pursuit of transplant. No other concerns reported or indicated. Both state will call as needed and were provided with contact information for myself and transplant team.     Transplant Social Work - Candidacy  Assessment/Plan:      Psychosocial Suitability:  Based on psychosocial risk factors, patient presents as low risk candidate for kidney transplant at this time. Pt presents with clear caregiver plan and good family support. Pt reports financial ability to afford transplant, reports compliance with current medical regimen, reports adequate functional status - currently independent with ADLs, no reported cognitive/developmental/behavioral impairments, no significant mental health concerns reports, no reported substance use/abuse, and reports general understanding of txp process with adequate coping skills.     Recommendations/Additional Comments: SW recommends pt conduct fundraising to assist in the transplant process. SW recommends that pt remain aware of potential mental health concerns and contact the team if any concerns arise. SW recommends that pt remain abstinent from tobacco, ETOH and drug use. SW support pt's continued dialysis adherence. SW remains available to answer any questions or concerns that arise as the pt moves through the transplant process.     Alexus Yancey, MITA, LMSW

## 2023-02-24 LAB — HPRA INTERPRETATION: NORMAL

## 2023-03-07 DIAGNOSIS — I10 SEVERE HYPERTENSION: Primary | ICD-10-CM

## 2023-03-09 ENCOUNTER — PATIENT MESSAGE (OUTPATIENT)
Dept: PRIMARY CARE CLINIC | Facility: CLINIC | Age: 68
End: 2023-03-09
Payer: COMMERCIAL

## 2023-03-09 ENCOUNTER — OFFICE VISIT (OUTPATIENT)
Dept: INTERNAL MEDICINE | Facility: CLINIC | Age: 68
End: 2023-03-09
Payer: COMMERCIAL

## 2023-03-09 VITALS
TEMPERATURE: 98 F | SYSTOLIC BLOOD PRESSURE: 114 MMHG | OXYGEN SATURATION: 95 % | BODY MASS INDEX: 35.09 KG/M2 | DIASTOLIC BLOOD PRESSURE: 78 MMHG | HEIGHT: 72 IN | WEIGHT: 259.06 LBS | HEART RATE: 85 BPM

## 2023-03-09 DIAGNOSIS — I50.22 CHRONIC SYSTOLIC CONGESTIVE HEART FAILURE: ICD-10-CM

## 2023-03-09 DIAGNOSIS — I27.20 PULMONARY HTN: ICD-10-CM

## 2023-03-09 DIAGNOSIS — E11.22 TYPE 2 DIABETES MELLITUS WITH CHRONIC KIDNEY DISEASE ON CHRONIC DIALYSIS, WITH LONG-TERM CURRENT USE OF INSULIN: Primary | ICD-10-CM

## 2023-03-09 DIAGNOSIS — E66.01 SEVERE OBESITY (BMI 35.0-39.9) WITH COMORBIDITY: ICD-10-CM

## 2023-03-09 DIAGNOSIS — E04.2 MULTIPLE THYROID NODULES: ICD-10-CM

## 2023-03-09 DIAGNOSIS — I42.0 DILATED CARDIOMYOPATHY: ICD-10-CM

## 2023-03-09 DIAGNOSIS — Z91.09 ENVIRONMENTAL ALLERGIES: ICD-10-CM

## 2023-03-09 DIAGNOSIS — Z79.4 TYPE 2 DIABETES MELLITUS WITH CHRONIC KIDNEY DISEASE ON CHRONIC DIALYSIS, WITH LONG-TERM CURRENT USE OF INSULIN: Primary | ICD-10-CM

## 2023-03-09 DIAGNOSIS — I25.118 CORONARY ARTERY DISEASE OF NATIVE ARTERY OF NATIVE HEART WITH STABLE ANGINA PECTORIS: ICD-10-CM

## 2023-03-09 DIAGNOSIS — I10 SEVERE HYPERTENSION: ICD-10-CM

## 2023-03-09 DIAGNOSIS — N25.81 SECONDARY HYPERPARATHYROIDISM: ICD-10-CM

## 2023-03-09 DIAGNOSIS — N18.6 TYPE 2 DIABETES MELLITUS WITH CHRONIC KIDNEY DISEASE ON CHRONIC DIALYSIS, WITH LONG-TERM CURRENT USE OF INSULIN: Primary | ICD-10-CM

## 2023-03-09 DIAGNOSIS — R97.20 ELEVATED PSA: ICD-10-CM

## 2023-03-09 DIAGNOSIS — R05.3 PERSISTENT COUGH: ICD-10-CM

## 2023-03-09 DIAGNOSIS — Z99.2 ESRD ON DIALYSIS: ICD-10-CM

## 2023-03-09 DIAGNOSIS — J84.10 PULMONARY FIBROSIS, POSTINFLAMMATORY: ICD-10-CM

## 2023-03-09 DIAGNOSIS — Z99.2 TYPE 2 DIABETES MELLITUS WITH CHRONIC KIDNEY DISEASE ON CHRONIC DIALYSIS, WITH LONG-TERM CURRENT USE OF INSULIN: Primary | ICD-10-CM

## 2023-03-09 DIAGNOSIS — N18.6 ESRD ON DIALYSIS: ICD-10-CM

## 2023-03-09 DIAGNOSIS — N40.0 BENIGN NON-NODULAR PROSTATIC HYPERPLASIA WITHOUT LOWER URINARY TRACT SYMPTOMS: ICD-10-CM

## 2023-03-09 PROBLEM — U07.1 ACUTE HYPOXEMIC RESPIRATORY FAILURE DUE TO COVID-19: Status: RESOLVED | Noted: 2021-01-21 | Resolved: 2023-03-09

## 2023-03-09 PROBLEM — Z01.810 PREOP CARDIOVASCULAR EXAM: Status: RESOLVED | Noted: 2022-09-16 | Resolved: 2023-03-09

## 2023-03-09 PROBLEM — L29.0 ANAL ITCHING: Status: RESOLVED | Noted: 2022-10-10 | Resolved: 2023-03-09

## 2023-03-09 PROBLEM — J45.998 POST VIRAL ASTHMA: Status: RESOLVED | Noted: 2021-04-09 | Resolved: 2023-03-09

## 2023-03-09 PROBLEM — J96.01 ACUTE HYPOXEMIC RESPIRATORY FAILURE DUE TO COVID-19: Status: RESOLVED | Noted: 2021-01-21 | Resolved: 2023-03-09

## 2023-03-09 PROCEDURE — 99214 OFFICE O/P EST MOD 30 MIN: CPT | Mod: S$GLB,,, | Performed by: INTERNAL MEDICINE

## 2023-03-09 PROCEDURE — 99999 PR PBB SHADOW E&M-EST. PATIENT-LVL V: CPT | Mod: PBBFAC,,, | Performed by: INTERNAL MEDICINE

## 2023-03-09 PROCEDURE — 99999 PR PBB SHADOW E&M-EST. PATIENT-LVL V: ICD-10-PCS | Mod: PBBFAC,,, | Performed by: INTERNAL MEDICINE

## 2023-03-09 PROCEDURE — 99215 OFFICE O/P EST HI 40 MIN: CPT | Mod: PBBFAC | Performed by: INTERNAL MEDICINE

## 2023-03-09 PROCEDURE — 99214 PR OFFICE/OUTPT VISIT, EST, LEVL IV, 30-39 MIN: ICD-10-PCS | Mod: S$GLB,,, | Performed by: INTERNAL MEDICINE

## 2023-03-09 RX ORDER — FLUTICASONE PROPIONATE 50 MCG
2 SPRAY, SUSPENSION (ML) NASAL NIGHTLY
Qty: 16 G | Refills: 1 | Status: SHIPPED | OUTPATIENT
Start: 2023-03-09 | End: 2023-04-02

## 2023-03-09 RX ORDER — CETIRIZINE HYDROCHLORIDE 10 MG/1
10 TABLET ORAL DAILY
Qty: 30 TABLET | Refills: 1 | Status: SHIPPED | OUTPATIENT
Start: 2023-03-09 | End: 2023-04-02

## 2023-03-09 NOTE — PATIENT INSTRUCTIONS
Heart healthy diet, regular exercise, and regular use of sunscreen.      Check with your pharmacy regarding new shingles vaccine.      Use over counter saline nasal spray during day for nasal congestion.

## 2023-03-09 NOTE — PROGRESS NOTES
Subjective:      Patient ID: Isidro Alves is a 67 y.o. male.    Chief Complaint: Follow-up    HPI    66 yo with   Patient Active Problem List   Diagnosis    Anemia    Secondary hyperparathyroidism    Metabolic acidosis    Type 2 diabetes mellitus with chronic kidney disease, with long-term current use of insulin    Herpes labialis    Benign non-nodular prostatic hyperplasia without lower urinary tract symptoms    Elevated PSA    Hypogonadism in male    Hyperlipidemia associated with type 2 diabetes mellitus    Class 1 obesity due to excess calories with serious comorbidity and body mass index (BMI) of 34.0 to 34.9 in adult    Colon cancer screening    Special screening for malignant neoplasms, colon    Colon polyp    Other proteinuria    Coronary artery disease of native artery of native heart with stable angina pectoris    Multiple thyroid nodules    Patient on waiting list for kidney transplant    ESRD on dialysis    Hyperkalemia    Upper airway cough syndrome    Pulmonary fibrosis, postinflammatory    PVC (premature ventricular contraction)    Chronic systolic congestive heart failure    Dilated cardiomyopathy    Abnormal nuclear stress test    Pulmonary HTN     Past Medical History:   Diagnosis Date    Abnormal nuclear stress test 8/30/2022    Acute hypoxemic respiratory failure due to COVID-19 1/21/2021    Anal itching 10/10/2022    Anemia 4/16/2014    BPH (benign prostatic hyperplasia)     CKD (chronic kidney disease) stage 4, GFR 15-29 ml/min     Coronary artery disease of native artery of native heart with stable angina pectoris 4/29/2019    Diabetes mellitus     Diabetes mellitus, type 2     Dilated cardiomyopathy 8/30/2022    Elevated PSA     Encounter for blood transfusion     Herpes labialis     Hyperlipidemia     Hypertension     Obesity     Pneumonia due to COVID-19 virus     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    Preop cardiovascular exam 9/16/2022    Proteinuria     Secondary hyperparathyroidism (of renal  origin)     UTI (lower urinary tract infection) 5/1/2015     Here today for management of mult med problems as outlined below.  Compliant with meds without significant side effects. Energy and appetite good.     Patient also complain of 1 day of sneezing, itchy watery eyes, runny nose, stuffy nose, stuffy ears, postnasal drip.  Feels it is due to pollen.  Has not tried any medications.    Review of Systems   Constitutional:  Negative for chills and fever.   HENT:  Negative for ear pain and sore throat.    Respiratory:  Negative for cough.    Cardiovascular:  Negative for chest pain.   Gastrointestinal:  Negative for abdominal pain and blood in stool.   Genitourinary:  Negative for dysuria and hematuria.   Neurological:  Negative for seizures and syncope.   Objective:   /78 (BP Location: Left arm, Patient Position: Sitting, BP Method: Large (Manual))   Pulse 85   Temp 97.6 °F (36.4 °C) (Tympanic)   Ht 6' (1.829 m)   Wt 117.5 kg (259 lb 0.7 oz)   SpO2 95%   BMI 35.13 kg/m²     Physical Exam  Constitutional:       General: He is not in acute distress.     Appearance: He is well-developed.   HENT:      Head: Normocephalic and atraumatic.      Right Ear: Tympanic membrane normal.      Left Ear: Tympanic membrane normal.      Nose:      Right Turbinates: Not swollen.      Left Turbinates: Swollen.      Right Sinus: No maxillary sinus tenderness or frontal sinus tenderness.      Left Sinus: No maxillary sinus tenderness or frontal sinus tenderness.      Mouth/Throat:      Pharynx: Posterior oropharyngeal erythema present. No pharyngeal swelling, oropharyngeal exudate or uvula swelling.   Eyes:      Extraocular Movements: Extraocular movements intact.   Neck:      Thyroid: No thyromegaly.   Cardiovascular:      Rate and Rhythm: Normal rate and regular rhythm.   Pulmonary:      Breath sounds: Normal breath sounds. No wheezing or rales.   Abdominal:      General: Bowel sounds are normal.      Palpations: Abdomen  is soft.      Tenderness: There is no abdominal tenderness.   Musculoskeletal:         General: No swelling.      Cervical back: Neck supple. No rigidity.   Lymphadenopathy:      Cervical: No cervical adenopathy.   Skin:     General: Skin is warm and dry.   Neurological:      Mental Status: He is alert and oriented to person, place, and time.   Psychiatric:         Behavior: Behavior normal.       Lab Results   Component Value Date    WBC 9.58 08/23/2022    HGB 11.7 (L) 08/23/2022    HGB 11.0 (L) 02/01/2021    HGB 11.3 (L) 01/27/2021    HCT 37.9 (L) 08/23/2022    MCV 88 08/23/2022    MCV 81 (L) 02/01/2021    MCV 77 (L) 01/27/2021     08/23/2022    CHOL 128 09/26/2022    TRIG 105 09/26/2022    HDL 25 (A) 09/26/2022    LDLCALC 82 09/26/2022    LDLCALC 79.8 07/14/2022    LDLCALC 98.0 10/06/2020    ALT 8 (L) 07/14/2022    ALT 8 (L) 07/14/2022    AST 16 07/14/2022    AST 16 07/14/2022     08/23/2022    K 4.8 02/02/2023     08/23/2022    CO2 23 08/23/2022    BUN 41 (H) 08/23/2022    CREATININE 8.3 (H) 08/23/2022    CREATININE 8.4 (H) 07/14/2022    CREATININE 8.2 (H) 07/07/2022    EGFRNORACEVR 6.6 (A) 08/23/2022    TSH 1.941 07/14/2022    TSH 1.464 09/05/2019    TSH 1.329 11/20/2017    PSA 6.1 (H) 08/25/2022    PSA 3.6 06/02/2021    PSA 4.3 (H) 06/12/2020    PSADIAG 7.7 (H) 11/04/2016    PSADIAG 12.7 (H) 05/17/2016    PSADIAG 4.7 (H) 11/20/2015     08/23/2022    HGBA1C 6.4 (H) 02/02/2023    HGBA1C 7.4 (A) 09/26/2022    HGBA1C 8.0 (H) 07/14/2022    YCNSGSMV58BC 32 12/01/2020    FONQNLGG72GV 33 09/01/2020    VMLXEXCQ59LM 31 03/19/2020    BNP 2,381 (H) 07/07/2022          The ASCVD Risk score (Naye POON, et al., 2019) failed to calculate for the following reasons:    The valid total cholesterol range is 130 to 320 mg/dL     Assessment:     1. Type 2 diabetes mellitus with chronic kidney disease on chronic dialysis, with long-term current use of insulin    2. Pulmonary HTN    3. Dilated cardiomyopathy     4. Pulmonary fibrosis, postinflammatory    5. Secondary hyperparathyroidism    6. ESRD on dialysis    7. Multiple thyroid nodules    8. Coronary artery disease of native artery of native heart with stable angina pectoris    9. Severe hypertension    10. Elevated PSA    11. Environmental allergies    12. Persistent cough    13. Chronic systolic congestive heart failure    14. Benign non-nodular prostatic hyperplasia without lower urinary tract symptoms      Plan:   1. Type 2 diabetes mellitus with chronic kidney disease on chronic dialysis, with long-term current use of insulin  Overview:    Controlled.  Continue current medications    Orders:  -     Hemoglobin A1C; Future; Expected date: 09/05/2023    2. Pulmonary HTN  Overview:    Stable.  Continue recommendations of follow-up as per Cardiology.      3. Dilated cardiomyopathy  Overview:    Compensated.  Continue current      4. Pulmonary fibrosis, postinflammatory  Overview:  Stable.  Aware to continue follow-up and recommendations as per Pulmonary.      5. Secondary hyperparathyroidism  Overview:  Stable.  Continue recommendations to follow up as per Nephrology.      6. ESRD on dialysis  Overview:  Reports up-to-date with dialysis.      7. Multiple thyroid nodules  Overview:  /25/2019     FINDINGS:  The thyroid gland demonstrates a homogenous echotexture.  The isthmus measures 3.0 mm in thickness.  The right lobe of the thyroid gland measures 5.0 cm in length.  The left lobe of the thyroid gland measures 4.9 cm in length.     There are 2 tiny cystic nodules noted within the lower pole of the right lobe of the thyroid gland measuring 2 mm and 3 mm respectively.  There is also a tiny 2 mm cystic lesion seen within the upper pole of the left lobe of the thyroid gland.     Impression:     1. No nodules requiring FNA or follow-up per TI RADS criteria.        Electronically signed by: Dominic Puentes DO  Date:                                             06/12/2020  Time:                                           10:40           Exam Ended: 06/12/20 10:21                 8. Coronary artery disease of native artery of native heart with stable angina pectoris  Overview:  Stable.  Aware to continue recommendations and follow-up as per Cardiology.      9. Severe hypertension    10. Elevated PSA  Overview:  Stable.  Aware to continue recommendations and follow-up as per Urology.      11. Environmental allergies  -     fluticasone propionate (FLONASE) 50 mcg/actuation nasal spray; 2 sprays (100 mcg total) by Each Nostril route every evening. For nasal congestion or runny nose  Dispense: 16 g; Refill: 1  -     cetirizine (ZYRTEC) 10 MG tablet; Take 1 tablet (10 mg total) by mouth once daily.  Dispense: 30 tablet; Refill: 1    12. Persistent cough    13. Chronic systolic congestive heart failure  Overview:  Compensated.  Aware to continue recommendations and follow-up as per Cardiology.      14. Benign non-nodular prostatic hyperplasia without lower urinary tract symptoms        Patient Instructions   Heart healthy diet, regular exercise, and regular use of sunscreen.      Check with your pharmacy regarding new shingles vaccine.      Use over counter saline nasal spray during day for nasal congestion.     Future Appointments   Date Time Provider Department Center   3/16/2023  8:00 AM Dez Ragland MD [facility] CARDIO [facility]   3/16/2023  8:20 AM EKG, [facility] SPECCPR [facility]   6/2/2023 11:15 AM Adrian Nugent MD [facility] UROLOGY [facility]       Lab Frequency Next Occurrence   Ambulatory referral/consult to Interventional Radiology Once 02/28/2022   Ambulatory referral/consult to Interventional Radiology Once 03/18/2022   Urinalysis, Reflex to Urine Culture Urine, Clean Catch Once 10/24/2022   Hemoglobin A1C Every 12 Weeks    Hemoglobin A1C Every 12 Weeks 3/31/2023   HLA PRA Screen         No follow-ups on file.  Needs next avail appt with pulmonary. Missed last appt.

## 2023-03-16 ENCOUNTER — OFFICE VISIT (OUTPATIENT)
Dept: CARDIOLOGY | Facility: CLINIC | Age: 68
End: 2023-03-16
Payer: MEDICARE

## 2023-03-16 ENCOUNTER — HOSPITAL ENCOUNTER (OUTPATIENT)
Dept: CARDIOLOGY | Facility: HOSPITAL | Age: 68
Discharge: HOME | End: 2023-03-16
Attending: INTERNAL MEDICINE
Payer: MEDICARE

## 2023-03-16 VITALS
DIASTOLIC BLOOD PRESSURE: 82 MMHG | OXYGEN SATURATION: 97 % | WEIGHT: 259.06 LBS | HEART RATE: 85 BPM | BODY MASS INDEX: 35.13 KG/M2 | SYSTOLIC BLOOD PRESSURE: 136 MMHG

## 2023-03-16 DIAGNOSIS — N18.6 TYPE 2 DIABETES MELLITUS WITH CHRONIC KIDNEY DISEASE ON CHRONIC DIALYSIS, WITH LONG-TERM CURRENT USE OF INSULIN: ICD-10-CM

## 2023-03-16 DIAGNOSIS — Z99.2 ESRD ON DIALYSIS: ICD-10-CM

## 2023-03-16 DIAGNOSIS — Z79.4 TYPE 2 DIABETES MELLITUS WITH CHRONIC KIDNEY DISEASE ON CHRONIC DIALYSIS, WITH LONG-TERM CURRENT USE OF INSULIN: ICD-10-CM

## 2023-03-16 DIAGNOSIS — I10 SEVERE HYPERTENSION: ICD-10-CM

## 2023-03-16 DIAGNOSIS — I42.0 DILATED CARDIOMYOPATHY: ICD-10-CM

## 2023-03-16 DIAGNOSIS — Z99.2 TYPE 2 DIABETES MELLITUS WITH CHRONIC KIDNEY DISEASE ON CHRONIC DIALYSIS, WITH LONG-TERM CURRENT USE OF INSULIN: ICD-10-CM

## 2023-03-16 DIAGNOSIS — E78.5 HYPERLIPIDEMIA ASSOCIATED WITH TYPE 2 DIABETES MELLITUS: ICD-10-CM

## 2023-03-16 DIAGNOSIS — E11.22 TYPE 2 DIABETES MELLITUS WITH CHRONIC KIDNEY DISEASE ON CHRONIC DIALYSIS, WITH LONG-TERM CURRENT USE OF INSULIN: ICD-10-CM

## 2023-03-16 DIAGNOSIS — I50.22 CHRONIC SYSTOLIC CONGESTIVE HEART FAILURE: Primary | ICD-10-CM

## 2023-03-16 DIAGNOSIS — E11.69 HYPERLIPIDEMIA ASSOCIATED WITH TYPE 2 DIABETES MELLITUS: ICD-10-CM

## 2023-03-16 DIAGNOSIS — I25.118 CORONARY ARTERY DISEASE OF NATIVE ARTERY OF NATIVE HEART WITH STABLE ANGINA PECTORIS: ICD-10-CM

## 2023-03-16 DIAGNOSIS — N18.6 ESRD ON DIALYSIS: ICD-10-CM

## 2023-03-16 PROCEDURE — 99999 PR PBB SHADOW E&M-EST. PATIENT-LVL IV: ICD-10-PCS | Mod: PBBFAC,TXP,, | Performed by: INTERNAL MEDICINE

## 2023-03-16 PROCEDURE — 93010 EKG 12-LEAD: ICD-10-PCS | Mod: TXP,,, | Performed by: STUDENT IN AN ORGANIZED HEALTH CARE EDUCATION/TRAINING PROGRAM

## 2023-03-16 PROCEDURE — 99999 PR PBB SHADOW E&M-EST. PATIENT-LVL IV: CPT | Mod: PBBFAC,TXP,, | Performed by: INTERNAL MEDICINE

## 2023-03-16 PROCEDURE — 99214 PR OFFICE/OUTPT VISIT, EST, LEVL IV, 30-39 MIN: ICD-10-PCS | Mod: S$PBB,TXP,, | Performed by: INTERNAL MEDICINE

## 2023-03-16 PROCEDURE — 99214 OFFICE O/P EST MOD 30 MIN: CPT | Mod: S$PBB,TXP,, | Performed by: INTERNAL MEDICINE

## 2023-03-16 PROCEDURE — 93010 ELECTROCARDIOGRAM REPORT: CPT | Mod: TXP,,, | Performed by: STUDENT IN AN ORGANIZED HEALTH CARE EDUCATION/TRAINING PROGRAM

## 2023-03-16 PROCEDURE — 93005 ELECTROCARDIOGRAM TRACING: CPT | Mod: TXP

## 2023-03-16 PROCEDURE — 99214 OFFICE O/P EST MOD 30 MIN: CPT | Mod: PBBFAC,TXP | Performed by: INTERNAL MEDICINE

## 2023-03-16 RX ORDER — SACUBITRIL AND VALSARTAN 97; 103 MG/1; MG/1
1 TABLET, FILM COATED ORAL 2 TIMES DAILY
Qty: 60 TABLET | Refills: 11 | Status: ON HOLD | OUTPATIENT
Start: 2023-03-16 | End: 2024-02-20 | Stop reason: HOSPADM

## 2023-03-16 RX ORDER — CARVEDILOL 12.5 MG/1
12.5 TABLET ORAL 2 TIMES DAILY WITH MEALS
Qty: 60 TABLET | Refills: 11 | Status: SHIPPED | OUTPATIENT
Start: 2023-03-16 | End: 2023-05-22 | Stop reason: SDUPTHER

## 2023-03-16 NOTE — PROGRESS NOTES
Subjective:   Patient ID:  Isidro Alves is a 67 y.o. male who presents for follow up of No chief complaint on file.      67 yo male, came in for 6 months f/u. Own a night club.  PMH ESRD on HD since  and renal Tx list at [facility], CAD, old MI per MPI in , CHFrEF, IDDM 20 yrs, HTN, HLD. No smoking. Occasional drink.  H/o Fell off the roof and multi fx.    MPI showed inferior old MI and EF 35%. ECHo showed EF 50% and LVH  No f/h premature CAD.  No smoking  Walks 4 times a week. And on PT  ekg NSR     visit  HD via right chest catheter.   echo dilated LV mod, EF 40% and  echo EF 55%  HOLTER showed 12% PVCs  Occasional palpitation. orthopnea and PND. No chest pain leg swelling and syncope. Urinating 8 times a week. No fluid restriction.    08/2022 visit   Phar mPI showed There is a moderate to severe intensity, large sized, mostly fixed perfusion abnormality with some reversibilty in the inferior, inferolateral, lateral and apical wall(s).  Still has SOB PND orthopnea. No leg swelling   BP high. On low Na diet and on 32 oz fluid restriction     visit  H/o LHC and RHC done on 08/31/2022 by Dr. Deras at [facility]  Nonobstructive cAD 30% lesion on LAD and RCA. EF 40%   PAP 28 mmHG PCWP 15 mmHG.  HD 3 times a week. No chest pain  Fluid resriction 32 oz     Interval history  Some SOB, cough up the phlegm. Fluid restriction.  RHC showed type II pulm HTN  from left side CHF  EKG NSR non specific stt change   Walks 3 times a week        Past Medical History:   Diagnosis Date    Abnormal nuclear stress test 8/30/2022    Acute hypoxemic respiratory failure due to COVID-19 1/21/2021    Anal itching 10/10/2022    Anemia 4/16/2014    BPH (benign prostatic hyperplasia)     CKD (chronic kidney disease) stage 4, GFR 15-29 ml/min     Coronary artery disease of native artery of native heart with stable angina pectoris 4/29/2019    Diabetes mellitus     Diabetes mellitus, type 2      Dilated cardiomyopathy 8/30/2022    Elevated PSA     Encounter for blood transfusion     Herpes labialis     Hyperlipidemia     Hypertension     Obesity     Pneumonia due to COVID-19 virus     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    Preop cardiovascular exam 9/16/2022    Proteinuria     Secondary hyperparathyroidism (of renal origin)     UTI (lower urinary tract infection) 5/1/2015       Past Surgical History:   Procedure Laterality Date    CATHETERIZATION OF BOTH LEFT AND RIGHT HEART N/A 08/30/2022    Procedure: CATHETERIZATION, HEART, BOTH LEFT AND RIGHT;  Surgeon: Kassandra Deras MD;  Location: [facility] CATH LAB;  Service: Cardiology;  Laterality: N/A;    COLONOSCOPY N/A 12/21/2017    Procedure: COLONOSCOPY;  Surgeon: Fran Escalera MD;  Location: [facility] ENDO;  Service: Endoscopy;  Laterality: N/A;    COLONOSCOPY N/A 02/02/2023    Procedure: COLONOSCOPY;  Surgeon: Anthony Meng MD;  Location: [facility] ENDO;  Service: Endoscopy;  Laterality: N/A;    FLUOROSCOPY N/A 01/21/2021    Procedure: Vascath insertion;  Surgeon: Adrian Pruitt Jr., MD;  Location: [facility] CATH LAB;  Service: General;  Laterality: N/A;    FLUOROSCOPY N/A 04/06/2021    Procedure: Over the wire Vas Cath exchange;  Surgeon: Ho Pressley MD;  Location: [facility] CATH LAB;  Service: General;  Laterality: N/A;    PROSTATE BIOPSY      thorocotomy  01/01/2010       Social History     Tobacco Use    Smoking status: Never    Smokeless tobacco: Never   Substance Use Topics    Alcohol use: Not Currently     Comment: seldom     Drug use: No       Family History   Problem Relation Age of Onset    No Known Problems Mother     Diabetes Father     Hypertension Father     Hypertension Other     Diabetes Other          Review of Systems   Constitutional: Positive for malaise/fatigue. Negative for decreased appetite, diaphoresis, fever and night sweats.   HENT:  Negative for nosebleeds.    Eyes:  Negative for blurred vision and double vision.   Cardiovascular:  Positive for  dyspnea on exertion, orthopnea and paroxysmal nocturnal dyspnea. Negative for chest pain, claudication, irregular heartbeat, leg swelling, near-syncope, palpitations and syncope.   Respiratory:  Negative for cough, shortness of breath, sleep disturbances due to breathing, snoring, sputum production and wheezing.    Endocrine: Negative for cold intolerance and polyuria.   Hematologic/Lymphatic: Does not bruise/bleed easily.   Skin:  Negative for rash.   Musculoskeletal:  Negative for back pain, falls, joint pain, joint swelling and neck pain.   Gastrointestinal:  Negative for abdominal pain, heartburn, nausea and vomiting.   Genitourinary:  Negative for dysuria, frequency and hematuria.   Neurological:  Negative for difficulty with concentration, dizziness, focal weakness, headaches, light-headedness, numbness, seizures and weakness.   Psychiatric/Behavioral:  Negative for depression, memory loss and substance abuse. The patient does not have insomnia.    Allergic/Immunologic: Negative for HIV exposure and hives.     Objective:   Physical Exam  HENT:      Head: Normocephalic.   Eyes:      Pupils: Pupils are equal, round, and reactive to light.   Neck:      Thyroid: No thyromegaly.      Vascular: Normal carotid pulses. No carotid bruit or JVD.   Cardiovascular:      Rate and Rhythm: Normal rate and regular rhythm. No extrasystoles are present.     Chest Wall: PMI is not displaced.      Pulses: Normal pulses.      Heart sounds: Normal heart sounds. No murmur heard.    No gallop. No S3 sounds.   Pulmonary:      Effort: No respiratory distress.      Breath sounds: Normal breath sounds. No stridor.   Abdominal:      General: Bowel sounds are normal.      Palpations: Abdomen is soft.      Tenderness: There is no abdominal tenderness. There is no rebound.   Skin:     Findings: No rash.   Neurological:      Mental Status: He is alert and oriented to person, place, and time.   Psychiatric:         Behavior: Behavior normal.        Lab Results   Component Value Date    CHOL 128 09/26/2022    CHOL 149 07/14/2022    CHOL 155 10/06/2020     Lab Results   Component Value Date    HDL 25 (A) 09/26/2022    HDL 31 (L) 07/14/2022    HDL 26 (L) 10/06/2020     Lab Results   Component Value Date    LDLCALC 82 09/26/2022    LDLCALC 79.8 07/14/2022    LDLCALC 98.0 10/06/2020     Lab Results   Component Value Date    TRIG 105 09/26/2022    TRIG 191 (H) 07/14/2022    TRIG 155 (H) 10/06/2020     Lab Results   Component Value Date    CHOLHDL 20.8 07/14/2022    CHOLHDL 16.8 (L) 10/06/2020    CHOLHDL 23.5 09/05/2019       Chemistry        Component Value Date/Time     08/23/2022 1500    K 4.8 02/02/2023 0834     08/23/2022 1500    CO2 23 08/23/2022 1500    BUN 41 (H) 08/23/2022 1500    CREATININE 8.3 (H) 08/23/2022 1500     08/23/2022 1500        Component Value Date/Time    CALCIUM 9.8 08/23/2022 1500    ALKPHOS 80 07/14/2022 0706    ALKPHOS 80 07/14/2022 0706    AST 16 07/14/2022 0706    AST 16 07/14/2022 0706    ALT 8 (L) 07/14/2022 0706    ALT 8 (L) 07/14/2022 0706    BILITOT 1.0 07/14/2022 0706    BILITOT 1.0 07/14/2022 0706    ESTGFRAFRICA 6.9 (A) 07/14/2022 0706    EGFRNONAA 6.0 (A) 07/14/2022 0706          Lab Results   Component Value Date    HGBA1C 6.4 (H) 02/02/2023     Lab Results   Component Value Date    TSH 1.941 07/14/2022     Lab Results   Component Value Date    INR 0.9 08/23/2022    INR 0.9 10/11/2018    INR 0.9 05/02/2015     Lab Results   Component Value Date    WBC 9.58 08/23/2022    HGB 11.7 (L) 08/23/2022    HCT 37.9 (L) 08/23/2022    MCV 88 08/23/2022     08/23/2022     BMP  Sodium   Date Value Ref Range Status   08/23/2022 142 136 - 145 mmol/L Final     Potassium   Date Value Ref Range Status   02/02/2023 4.8 3.5 - 5.1 mmol/L Final     Chloride   Date Value Ref Range Status   08/23/2022 106 95 - 110 mmol/L Final     CO2   Date Value Ref Range Status   08/23/2022 23 23 - 29 mmol/L Final     BUN   Date Value  Ref Range Status   08/23/2022 41 (H) 8 - 23 mg/dL Final     Creatinine   Date Value Ref Range Status   08/23/2022 8.3 (H) 0.5 - 1.4 mg/dL Final     Calcium   Date Value Ref Range Status   08/23/2022 9.8 8.7 - 10.5 mg/dL Final     Anion Gap   Date Value Ref Range Status   08/23/2022 13 8 - 16 mmol/L Final     eGFR if    Date Value Ref Range Status   07/14/2022 6.9 (A) >60 mL/min/1.73 m^2 Final     eGFR if non    Date Value Ref Range Status   07/14/2022 6.0 (A) >60 mL/min/1.73 m^2 Final     Comment:     Calculation used to obtain the estimated glomerular filtration  rate (eGFR) is the CKD-EPI equation.        BNP  @LABRCNTIP(BNP,BNPTRIAGEBLO)@  @LABRCNTIP(troponini)@  CrCl cannot be calculated (Patient's most recent lab result is older than the maximum 7 days allowed.).  No results found in the last 24 hours.  No results found in the last 24 hours.  No results found in the last 24 hours.    Assessment:      1. Chronic systolic congestive heart failure    2. Coronary artery disease of native artery of native heart with stable angina pectoris    3. Hyperlipidemia associated with type 2 diabetes mellitus    4. Dilated cardiomyopathy    5. ESRD on dialysis    6. Type 2 diabetes mellitus with chronic kidney disease on chronic dialysis, with long-term current use of insulin      CHFrEF compensated FC II  CAD nonobstrucitve  Plan:   D/c valsartan and toproXL   Add entresto and Coreg for CHFrEF  If the SBP < 100 mmHg, ok to hold Nifedipine and call the office  Continue nifedipine hydralazine isodil ASA and Statin  HD 3 times a week per nephrology  Fluid restriction 1.0 liters a day  Na< 2 gm   RTC in 6 months

## 2023-03-16 NOTE — PATIENT INSTRUCTIONS
Stop valsartan and toproXL   Add entresto and Coreg  If the SBP < 100 mmHg, ok to hold Nifedipine and call the office

## 2023-03-17 ENCOUNTER — PATIENT MESSAGE (OUTPATIENT)
Dept: TRANSPLANT | Facility: CLINIC | Age: 68
End: 2023-03-17
Payer: COMMERCIAL

## 2023-03-17 DIAGNOSIS — Z76.82 AWAITING ORGAN TRANSPLANT STATUS: Primary | ICD-10-CM

## 2023-03-17 NOTE — LETTER
March 17, 2023    Isidro Alves  [address] [address]    Dear Isidro Alves:  MRN: [id number]    The [facility] transplant team reviewed your transplant candidacy.  It is our programs opinion that you are a transplant candidate and are re-activated at our facility as of 3/17/2023.  You are now eligible to receive a transplant.  Your status is now Status 1.ACTIVE    Attached is a letter from the United Network for Organ Sharing (UNOS).  It describes the services and information offered to patients by UNOS and the Organ Procurement and Transplant Network.    The [facility] transplant team remains available to answer any questions.  Should you have any questions regarding this decision, please do not hesitate to contact our pre-transplant office.      Sincerely,    FAZAL GutierrezN, RN, [facility]  Sue Gonzalez, BSN, RN, [facility]  Jessica DIEHLN, RN  Lopez Ruelas Jr., RN  Ju Mcclain, RN, [facility]  Nora Corbin, BSN, RN, [facility]  Ganesh DIEHLN, RN  Pre-Kidney Coordinators  lh/enclosure    [facility]  [address] [place] [id number]  [phone]    Cc: Julio C Goldensergio Morgan MD            The Organ Procurement and Transplantation Network   Toll-free patient services line: Your resource for organ transplant information     If you have a question regarding your own medical care, you always should call your transplant hospital first. However, for general organ transplant-related information, you can call the Organ Procurement and Transplantation Network (OPTN) toll-free patient services line at [phone].     Anyone, including potential transplant candidates, candidates, recipients, family members, friends, living donors, and donor family members, can call this number to:     Talk about organ donation, living donation, the transplant process, the donation process, and transplant policies.   Get a free patient information kit with  helpful booklets, waiting list and transplant information, and a list of all transplant hospitals.   Ask questions about the OPTN website (https://optn.transplant.hrsa.gov/), the [employer] Network for Organ Sharings (UNOS) website (https://unos.org/), or the UNOS website for living donors and transplant recipients. (https://www.transplantliving.org/).   Learn how the OPTN can help you.   Talk about any concerns that you may have with a transplant hospital.     The nations transplant system, the OPTN, is managed under federal contract by the [employer] for Organ Sharing (UNOS), which is a non-profit charitable organization. The OPTN helps create and define organ sharing policies that make the best use of donated organs. This process continuously evaluating new advances and discoveries so policies can be adapted to best serve patients waiting for transplants. To do so, the OPTN works closely with transplant professionals, transplant patients, transplant candidates, donor families, living donors, and the public. All transplant programs and organ procurement organizations throughout the country are OPTN members and are obligated to follow the policies the OPTN creates for allocating organs.     The OPTN also is responsible for:   Providing educational material for patients, the public, and professionals.   Raising awareness of the need for donated organs and tissue.   Coordinating organ procurement, matching, and placement.   Collecting information about every organ transplant and donation that occurs in the [place].     Remember, you should contact your transplant hospital directly if you have questions or concerns about your own medical care including medical records, work-up progress, and test results.     We are not your transplant hospital, and our staff will not be able to answer questions about your case, so please keep your transplant hospitals phone number handy.   However, while you research your  transplant needs and learn as much as you can about transplantation and donation, we welcome your call to our toll-free patient services line at [phone].

## 2023-03-17 NOTE — PROGRESS NOTES
Patient 29 days post Covid and is asymptomatic. He had Cardiology appointment yesterday.  Re-activated on Kidney transplant waitlist per protocol.

## 2023-03-18 ENCOUNTER — NURSE TRIAGE (OUTPATIENT)
Dept: ADMINISTRATIVE | Facility: CLINIC | Age: 68
End: 2023-03-18
Payer: COMMERCIAL

## 2023-03-18 NOTE — TELEPHONE ENCOUNTER
Patient states he has had a sinus headache for the past couple of days; rates pain at 8/10 currently. Other symptoms include post nasal drip, productive cough with white/ yellow phlegm, and watery eyes. Pt states he has taken two COVID tests at home (Wednesday and yesterday) both were negative. Care advice is to see HCP within 4 hours. Plans to use [facility].     Reason for Disposition   [1] Redness or swelling on the cheek, forehead or around the eye AND [2] no fever    Additional Information   Negative: SEVERE difficulty breathing (e.g., struggling for each breath, speaks in single words)   Negative: Sounds like a life-threatening emergency to the triager   Negative: Fever > 104 F (40 C)   Negative: Patient sounds very sick or weak to the triager   Negative: [1] Difficulty breathing AND [2] not from stuffy nose (e.g., not relieved by cleaning out the nose)   Negative: SEVERE difficulty breathing (e.g., struggling for each breath, speaks in single words)   Negative: Sounds like a life-threatening emergency to the triager   Negative: [1] Difficulty breathing AND [2] not from stuffy nose (e.g., not relieved by cleaning out the nose)   Negative: [1] SEVERE headache AND [2] fever   Negative: [1] Redness or swelling on the cheek, forehead or around the eye AND [2] fever   Negative: Fever > 104 F (40 C)   Negative: Patient sounds very sick or weak to the triager   Negative: [1] SEVERE pain AND [2] not improved 2 hours after pain medicine    Protocols used: Common Cold-A-AH, Sinus Pain or Congestion-A-AH

## 2023-03-21 ENCOUNTER — TELEPHONE (OUTPATIENT)
Dept: PULMONOLOGY | Facility: CLINIC | Age: 68
End: 2023-03-21
Payer: COMMERCIAL

## 2023-03-21 ENCOUNTER — LAB VISIT (OUTPATIENT)
Dept: LAB | Facility: HOSPITAL | Age: 68
End: 2023-03-21
Attending: NURSE PRACTITIONER
Payer: MEDICARE

## 2023-03-21 DIAGNOSIS — Z76.82 AWAITING ORGAN TRANSPLANT STATUS: ICD-10-CM

## 2023-03-21 PROCEDURE — 36415 COLL VENOUS BLD VENIPUNCTURE: CPT | Mod: PO,TXP | Performed by: NURSE PRACTITIONER

## 2023-03-21 PROCEDURE — 86886 COOMBS TEST INDIRECT TITER: CPT | Mod: TXP | Performed by: NURSE PRACTITIONER

## 2023-03-21 NOTE — TELEPHONE ENCOUNTER
----- Message from Shelley Story sent at 3/21/2023  7:43 AM CDT -----  Contact: Isidro  Type:  Sooner Apoointment Request    Caller is requesting a sooner appointment.  Caller declined first available appointment listed below.  Caller will not accept being placed on the waitlist and is requesting a message be sent to doctor.  Name of Caller: Isidro  When is the first available appointment? 4/24/23  Symptoms: Follow up, breathing concern  Would the patient rather a call back or a response via [facility]? Call  Best Call Back Number: [phone]  Additional Information:

## 2023-03-22 LAB
CLASS I ANTIBODIES - LUMINEX: NORMAL
CLASS I ANTIBODY COMMENTS - LUMINEX: NORMAL
CLASS II ANTIBODIES - LUMINEX: NEGATIVE
CPRA %: 0
SERUM COLLECTION DT - LUMINEX CLASS I: NORMAL
SERUM COLLECTION DT - LUMINEX CLASS II: NORMAL
SPCL1 TESTING DATE: NORMAL
SPCL2 TESTING DATE: NORMAL
SPCLU TESTING DATE: NORMAL

## 2023-03-23 ENCOUNTER — OFFICE VISIT (OUTPATIENT)
Dept: OTOLARYNGOLOGY | Facility: CLINIC | Age: 68
End: 2023-03-23
Payer: COMMERCIAL

## 2023-03-23 VITALS — WEIGHT: 255.31 LBS | TEMPERATURE: 97 F | BODY MASS INDEX: 34.62 KG/M2

## 2023-03-23 DIAGNOSIS — H65.92 OME (OTITIS MEDIA WITH EFFUSION), LEFT: Primary | ICD-10-CM

## 2023-03-23 LAB — BLD GP AB SCN TITR SERPL: 2 {TITER}

## 2023-03-23 PROCEDURE — 92511 PR NASOPHARYNGOSCOPY: ICD-10-PCS | Mod: 59,S$GLB,TXP, | Performed by: STUDENT IN AN ORGANIZED HEALTH CARE EDUCATION/TRAINING PROGRAM

## 2023-03-23 PROCEDURE — 1159F PR MEDICATION LIST DOCUMENTED IN MEDICAL RECORD: ICD-10-PCS | Mod: CPTII,S$GLB,TXP, | Performed by: STUDENT IN AN ORGANIZED HEALTH CARE EDUCATION/TRAINING PROGRAM

## 2023-03-23 PROCEDURE — 3044F PR MOST RECENT HEMOGLOBIN A1C LEVEL <7.0%: ICD-10-PCS | Mod: CPTII,S$GLB,TXP, | Performed by: STUDENT IN AN ORGANIZED HEALTH CARE EDUCATION/TRAINING PROGRAM

## 2023-03-23 PROCEDURE — 99213 PR OFFICE/OUTPT VISIT, EST, LEVL III, 20-29 MIN: ICD-10-PCS | Mod: 25,S$GLB,TXP, | Performed by: STUDENT IN AN ORGANIZED HEALTH CARE EDUCATION/TRAINING PROGRAM

## 2023-03-23 PROCEDURE — 99213 OFFICE O/P EST LOW 20 MIN: CPT | Mod: 25,S$GLB,TXP, | Performed by: STUDENT IN AN ORGANIZED HEALTH CARE EDUCATION/TRAINING PROGRAM

## 2023-03-23 PROCEDURE — 4010F PR ACE/ARB THEARPY RXD/TAKEN: ICD-10-PCS | Mod: CPTII,S$GLB,TXP, | Performed by: STUDENT IN AN ORGANIZED HEALTH CARE EDUCATION/TRAINING PROGRAM

## 2023-03-23 PROCEDURE — 1159F MED LIST DOCD IN RCRD: CPT | Mod: CPTII,S$GLB,TXP, | Performed by: STUDENT IN AN ORGANIZED HEALTH CARE EDUCATION/TRAINING PROGRAM

## 2023-03-23 PROCEDURE — 3288F FALL RISK ASSESSMENT DOCD: CPT | Mod: CPTII,S$GLB,TXP, | Performed by: STUDENT IN AN ORGANIZED HEALTH CARE EDUCATION/TRAINING PROGRAM

## 2023-03-23 PROCEDURE — 3008F BODY MASS INDEX DOCD: CPT | Mod: CPTII,S$GLB,TXP, | Performed by: STUDENT IN AN ORGANIZED HEALTH CARE EDUCATION/TRAINING PROGRAM

## 2023-03-23 PROCEDURE — 3288F PR FALLS RISK ASSESSMENT DOCUMENTED: ICD-10-PCS | Mod: CPTII,S$GLB,TXP, | Performed by: STUDENT IN AN ORGANIZED HEALTH CARE EDUCATION/TRAINING PROGRAM

## 2023-03-23 PROCEDURE — 1101F PT FALLS ASSESS-DOCD LE1/YR: CPT | Mod: CPTII,S$GLB,TXP, | Performed by: STUDENT IN AN ORGANIZED HEALTH CARE EDUCATION/TRAINING PROGRAM

## 2023-03-23 PROCEDURE — 99999 PR PBB SHADOW E&M-EST. PATIENT-LVL IV: CPT | Mod: PBBFAC,TXP,, | Performed by: STUDENT IN AN ORGANIZED HEALTH CARE EDUCATION/TRAINING PROGRAM

## 2023-03-23 PROCEDURE — 3044F HG A1C LEVEL LT 7.0%: CPT | Mod: CPTII,S$GLB,TXP, | Performed by: STUDENT IN AN ORGANIZED HEALTH CARE EDUCATION/TRAINING PROGRAM

## 2023-03-23 PROCEDURE — 69433 CREATE EARDRUM OPENING: CPT | Mod: LT,S$GLB,TXP, | Performed by: STUDENT IN AN ORGANIZED HEALTH CARE EDUCATION/TRAINING PROGRAM

## 2023-03-23 PROCEDURE — 92511 NASOPHARYNGOSCOPY: CPT | Mod: 59,S$GLB,TXP, | Performed by: STUDENT IN AN ORGANIZED HEALTH CARE EDUCATION/TRAINING PROGRAM

## 2023-03-23 PROCEDURE — 4010F ACE/ARB THERAPY RXD/TAKEN: CPT | Mod: CPTII,S$GLB,TXP, | Performed by: STUDENT IN AN ORGANIZED HEALTH CARE EDUCATION/TRAINING PROGRAM

## 2023-03-23 PROCEDURE — 69433 PR CREATE EARDRUM OPENING,LOCAL ANESTH: ICD-10-PCS | Mod: LT,S$GLB,TXP, | Performed by: STUDENT IN AN ORGANIZED HEALTH CARE EDUCATION/TRAINING PROGRAM

## 2023-03-23 PROCEDURE — 1126F PR PAIN SEVERITY QUANTIFIED, NO PAIN PRESENT: ICD-10-PCS | Mod: CPTII,S$GLB,TXP, | Performed by: STUDENT IN AN ORGANIZED HEALTH CARE EDUCATION/TRAINING PROGRAM

## 2023-03-23 PROCEDURE — 3008F PR BODY MASS INDEX (BMI) DOCUMENTED: ICD-10-PCS | Mod: CPTII,S$GLB,TXP, | Performed by: STUDENT IN AN ORGANIZED HEALTH CARE EDUCATION/TRAINING PROGRAM

## 2023-03-23 PROCEDURE — 1101F PR PT FALLS ASSESS DOC 0-1 FALLS W/OUT INJ PAST YR: ICD-10-PCS | Mod: CPTII,S$GLB,TXP, | Performed by: STUDENT IN AN ORGANIZED HEALTH CARE EDUCATION/TRAINING PROGRAM

## 2023-03-23 PROCEDURE — 1126F AMNT PAIN NOTED NONE PRSNT: CPT | Mod: CPTII,S$GLB,TXP, | Performed by: STUDENT IN AN ORGANIZED HEALTH CARE EDUCATION/TRAINING PROGRAM

## 2023-03-23 PROCEDURE — 99999 PR PBB SHADOW E&M-EST. PATIENT-LVL IV: ICD-10-PCS | Mod: PBBFAC,TXP,, | Performed by: STUDENT IN AN ORGANIZED HEALTH CARE EDUCATION/TRAINING PROGRAM

## 2023-03-23 RX ORDER — CIPROFLOXACIN HYDROCHLORIDE 3 MG/ML
4 SOLUTION/ DROPS OPHTHALMIC 2 TIMES DAILY
Qty: 10 ML | Refills: 0 | Status: SHIPPED | OUTPATIENT
Start: 2023-03-23 | End: 2023-03-28

## 2023-03-23 NOTE — PROGRESS NOTES
Chief complaint:   Chief Complaint   Patient presents with    Ear Fullness        History of Present Illness, 2/14/22:     Mr. Alves is a 67 y.o. male presenting for evaluation of left ear otalgia and hearing loss. Onset of this chief complaint was about a month ago.  Additional symptoms that also have been associated are left ear hearing loss. Treatment included ear canal flushing. Symptoms worsened after this. Denies otorrhea, vertigo, fever.    The patient also denies pain deep within the ear, tenderness around the ear canal or pre-auricular area, or headaches.       The patient denies significant hearing loss risk factors, ototoxic medication exposure, chronic vestibular suppressant use, head/ facial/ soraya trauma, and otologic surgery.      Return clinic visit, 02/17/2022  Ear pain and throbbing worsened over last couple days. Muffled hearing now. No fever, post auricular pain, facial weakness.     Return clinic visit, 3/17/22  Used abx as prescribed. Pain has resolved. Hearing still off - feels hollow.     No nose bleed, nasal obstruction, neck mass.    Return clinic visit, 3/23/23  No change in symptoms. Continued fullness, muffled hearing AS.      History        Past Medical History:   Past Medical History:   Diagnosis Date    Abnormal nuclear stress test 8/30/2022    Acute hypoxemic respiratory failure due to COVID-19 1/21/2021    Anal itching 10/10/2022    Anemia 4/16/2014    BPH (benign prostatic hyperplasia)     CKD (chronic kidney disease) stage 4, GFR 15-29 ml/min     Coronary artery disease of native artery of native heart with stable angina pectoris 4/29/2019    Diabetes mellitus     Diabetes mellitus, type 2     Dilated cardiomyopathy 8/30/2022    Elevated PSA     Encounter for blood transfusion     Herpes labialis     Hyperlipidemia     Hypertension     Obesity     Pneumonia due to COVID-19 virus     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    Preop cardiovascular exam 9/16/2022    Proteinuria     Secondary  hyperparathyroidism (of renal origin)     UTI (lower urinary tract infection) 5/1/2015    .          Past Surgical History:  Past Surgical History:   Procedure Laterality Date    CATHETERIZATION OF BOTH LEFT AND RIGHT HEART N/A 08/30/2022    Procedure: CATHETERIZATION, HEART, BOTH LEFT AND RIGHT;  Surgeon: Kassandra Deras MD;  Location: [facility] CATH LAB;  Service: Cardiology;  Laterality: N/A;    COLONOSCOPY N/A 12/21/2017    Procedure: COLONOSCOPY;  Surgeon: Fran Escalera MD;  Location: [facility] ENDO;  Service: Endoscopy;  Laterality: N/A;    COLONOSCOPY N/A 02/02/2023    Procedure: COLONOSCOPY;  Surgeon: Anthony Meng MD;  Location: [facility] ENDO;  Service: Endoscopy;  Laterality: N/A;    FLUOROSCOPY N/A 01/21/2021    Procedure: Vascath insertion;  Surgeon: Adrian Pruitt Jr., MD;  Location: [facility] CATH LAB;  Service: General;  Laterality: N/A;    FLUOROSCOPY N/A 04/06/2021    Procedure: Over the wire Vas Cath exchange;  Surgeon: Ho Pressley MD;  Location: [facility] CATH LAB;  Service: General;  Laterality: N/A;    PROSTATE BIOPSY      thorocotomy  01/01/2010   .         Medications: Medication list was reviewed. He  has a current medication list which includes the following prescription(s): acyclovir, aspirin, atorvastatin, bd insulin syringe (half unit), blood sugar diagnostic, blood-glucose meter, calcitriol, carvedilol, cetirizine, esomeprazole, finasteride, fluticasone propionate, folic acid, furosemide, hydralazine, vortex holding chamber, insulin degludec, isosorbide dinitrate, lancets, multivitamin, nifedipine, pen needle, diabetic, entresto, sevelamer carbonate, tamsulosin, and olopatadine.         Allergies:   Review of patient's allergies indicates:   Allergen Reactions    Bactrim [sulfamethoxazole-trimethoprim] Swelling    Nsaids (non-steroidal anti-inflammatory drug)      CKD    Sulfamethoxazole     Trimethoprim             Family history: family history includes Diabetes in his father and another  family member; Hypertension in his father and another family member; No Known Problems in his mother.         Social History          Alcohol use:  reports that he does not currently use alcohol.            Tobacco:  reports that he has never smoked. He has never used smokeless tobacco.         Please see the patient's intake form for full details of past medical history, past surgical history, family history, social history and review of systems. ?This information was reviewed by me and noted.      Physical Examination     General: Well developed, well nourished, well hydrated. Verbal with a strong voice and not dysphonic.     Head/Face: Normocephalic, atraumatic. No scars or lesions. Facial musculature equal.     Eyes: No scleral icterus or conjunctival hemorrhage. EOMI. PERRLA.     Ears:     Right ear: No gross deformity. EAC is clear The TM is intact with a pneumatized middle ear. No signs of retraction, fluid or infection.      Left ear: TM intact, serous effusion      Nose: No gross deformity or lesions.     Mouth/Oropharynx: Lips without any lesions.     Neck: Trachea midline. No masses. No thyromegaly or nodules palpated.     Lymphatic: No lymphadenopathy in the neck.     Extremities: No cyanosis. Warm and well-perfused.     Skin: No scars or lesions on face or neck.      Neurologic: Moving all extremities without gross abnormality.CN II-XII grossly intact. House-Brackmann 1/6. No signs of nystagmus.      Psych: Alert and oriented to person, place, and time with an appropriate mood and affect.       Audiogram     Audiogram  was independently reviewed      Procedure Note - Myringotomy/Tympanostomy Tube Placement     Diagnosis: left Otitis Media with Effusion    DETAILS OF PROCEDURE:   Obstructing cerumen was removed from the left ear canal using magnification and appropriate instrumentation. The middle ear was inspected under the microscope. Serous effusion was noted. A joint decision was made to proceed with  a myringotomy with or without a tympanostomy tube.  With the TM fully in view, the tympanic membrane was anesthetized and a radial incision in the anterior/inferior quadrant.  The middle ear cavity was suctioned out. The middle ear was fully evacuated of a serous effusion. A  tympanostomy tube was grasped with an alligator forcep and inserted into the myringotomy. A straight pick was used to push the tube into place.  The patient tolerated the procedure well.      Haering immediately improved.      Procedure - Fiberoptic Nasopharyngoscopy    Surgeon: Fabio Vargas M.D. .      Anesthesia: topical 0.05% oxymetazoline with 4% lidocaine      Complications: None.     Indication: Isidro Alves is a 67 y.o. male with L Otitis Media with Effusion for 6+ months, unresponsive to medication. Scope examination was necessitated prior to tube placement to rule out nasopharyngeal mass or pathology.    Description of Procedure: With the patient in the sitting position, topical lidocaine and oxymetazoline was applied to the nose. The scope was passed through the nose. Examination was carried out of the nose, sinuses, and nasopharynx. The scope was removed. The patient tolerated the procedure well.      Findings: no evidence of purulence or polyps from sinonasal cavities. No masses of sinonasal cavities or specifically in the NP.           Assessment   OME (otitis media with effusion), left     S/p L PET 3/23/23    Plan:    Ciloxan x 5d  Return to clinic 1 month with audio      Fabio Vargas MD  [facility]   [facility]  [id number] [place] [address].  MAKAYLA [place] [address]  P: [phone]  F: [phone]

## 2023-03-24 ENCOUNTER — TELEPHONE (OUTPATIENT)
Dept: INTERNAL MEDICINE | Facility: CLINIC | Age: 68
End: 2023-03-24
Payer: COMMERCIAL

## 2023-03-24 NOTE — TELEPHONE ENCOUNTER
----- Message from Kim Howard sent at 3/24/2023  2:12 PM CDT -----  Contact: Isidro  Type:  Needs Medical Advice    Who Called: Isidro  Symptoms (please be specific): Constant headaches   How long has patient had these symptoms:  3 days   Pharmacy name and phone #:    [id number] - [place], [place] - [address]   [address] [facility] [address]  Phone: [phone] Fax: [phone]  Would the patient rather a call back or a response via [facility]? call  Best Call Back Number: [phone]    Additional Information: Patient reports having constant headaches and request assistance.  Thank you,  [facility]

## 2023-03-28 ENCOUNTER — OFFICE VISIT (OUTPATIENT)
Dept: PULMONOLOGY | Facility: CLINIC | Age: 68
End: 2023-03-28
Payer: COMMERCIAL

## 2023-03-28 ENCOUNTER — CLINICAL SUPPORT (OUTPATIENT)
Dept: PULMONOLOGY | Facility: CLINIC | Age: 68
End: 2023-03-28
Payer: COMMERCIAL

## 2023-03-28 VITALS
OXYGEN SATURATION: 97 % | HEART RATE: 96 BPM | RESPIRATION RATE: 18 BRPM | HEIGHT: 74 IN | SYSTOLIC BLOOD PRESSURE: 140 MMHG | WEIGHT: 257.38 LBS | BODY MASS INDEX: 33.03 KG/M2 | DIASTOLIC BLOOD PRESSURE: 92 MMHG

## 2023-03-28 DIAGNOSIS — J45.991 ASTHMA, COUGH VARIANT: ICD-10-CM

## 2023-03-28 DIAGNOSIS — J41.0 SIMPLE CHRONIC BRONCHITIS: ICD-10-CM

## 2023-03-28 DIAGNOSIS — J45.998 POST VIRAL ASTHMA: ICD-10-CM

## 2023-03-28 DIAGNOSIS — G47.34 NOCTURNAL HYPOXEMIA: ICD-10-CM

## 2023-03-28 DIAGNOSIS — R09.02 EXERCISE HYPOXEMIA: ICD-10-CM

## 2023-03-28 DIAGNOSIS — G47.34 NOCTURNAL HYPOXEMIA: Primary | ICD-10-CM

## 2023-03-28 DIAGNOSIS — R05.3 CHRONIC COUGH: ICD-10-CM

## 2023-03-28 PROCEDURE — 1126F PR PAIN SEVERITY QUANTIFIED, NO PAIN PRESENT: ICD-10-PCS | Mod: CPTII,NTX,S$GLB, | Performed by: INTERNAL MEDICINE

## 2023-03-28 PROCEDURE — 3077F PR MOST RECENT SYSTOLIC BLOOD PRESSURE >= 140 MM HG: ICD-10-PCS | Mod: CPTII,NTX,S$GLB, | Performed by: INTERNAL MEDICINE

## 2023-03-28 PROCEDURE — 3044F PR MOST RECENT HEMOGLOBIN A1C LEVEL <7.0%: ICD-10-PCS | Mod: CPTII,NTX,S$GLB, | Performed by: INTERNAL MEDICINE

## 2023-03-28 PROCEDURE — 99215 OFFICE O/P EST HI 40 MIN: CPT | Mod: NTX,S$GLB,, | Performed by: INTERNAL MEDICINE

## 2023-03-28 PROCEDURE — 99999 PR PBB SHADOW E&M-EST. PATIENT-LVL V: CPT | Mod: PBBFAC,TXP,, | Performed by: INTERNAL MEDICINE

## 2023-03-28 PROCEDURE — 99215 PR OFFICE/OUTPT VISIT, EST, LEVL V, 40-54 MIN: ICD-10-PCS | Mod: NTX,S$GLB,, | Performed by: INTERNAL MEDICINE

## 2023-03-28 PROCEDURE — 3080F DIAST BP >= 90 MM HG: CPT | Mod: CPTII,NTX,S$GLB, | Performed by: INTERNAL MEDICINE

## 2023-03-28 PROCEDURE — 3288F FALL RISK ASSESSMENT DOCD: CPT | Mod: CPTII,NTX,S$GLB, | Performed by: INTERNAL MEDICINE

## 2023-03-28 PROCEDURE — 3008F BODY MASS INDEX DOCD: CPT | Mod: CPTII,NTX,S$GLB, | Performed by: INTERNAL MEDICINE

## 2023-03-28 PROCEDURE — 4010F ACE/ARB THERAPY RXD/TAKEN: CPT | Mod: CPTII,NTX,S$GLB, | Performed by: INTERNAL MEDICINE

## 2023-03-28 PROCEDURE — 3044F HG A1C LEVEL LT 7.0%: CPT | Mod: CPTII,NTX,S$GLB, | Performed by: INTERNAL MEDICINE

## 2023-03-28 PROCEDURE — 1101F PR PT FALLS ASSESS DOC 0-1 FALLS W/OUT INJ PAST YR: ICD-10-PCS | Mod: CPTII,NTX,S$GLB, | Performed by: INTERNAL MEDICINE

## 2023-03-28 PROCEDURE — 1159F MED LIST DOCD IN RCRD: CPT | Mod: CPTII,NTX,S$GLB, | Performed by: INTERNAL MEDICINE

## 2023-03-28 PROCEDURE — 3080F PR MOST RECENT DIASTOLIC BLOOD PRESSURE >= 90 MM HG: ICD-10-PCS | Mod: CPTII,NTX,S$GLB, | Performed by: INTERNAL MEDICINE

## 2023-03-28 PROCEDURE — 1159F PR MEDICATION LIST DOCUMENTED IN MEDICAL RECORD: ICD-10-PCS | Mod: CPTII,NTX,S$GLB, | Performed by: INTERNAL MEDICINE

## 2023-03-28 PROCEDURE — 4010F PR ACE/ARB THEARPY RXD/TAKEN: ICD-10-PCS | Mod: CPTII,NTX,S$GLB, | Performed by: INTERNAL MEDICINE

## 2023-03-28 PROCEDURE — 3008F PR BODY MASS INDEX (BMI) DOCUMENTED: ICD-10-PCS | Mod: CPTII,NTX,S$GLB, | Performed by: INTERNAL MEDICINE

## 2023-03-28 PROCEDURE — 1126F AMNT PAIN NOTED NONE PRSNT: CPT | Mod: CPTII,NTX,S$GLB, | Performed by: INTERNAL MEDICINE

## 2023-03-28 PROCEDURE — 1101F PT FALLS ASSESS-DOCD LE1/YR: CPT | Mod: CPTII,NTX,S$GLB, | Performed by: INTERNAL MEDICINE

## 2023-03-28 PROCEDURE — 99999 PR PBB SHADOW E&M-EST. PATIENT-LVL II: CPT | Mod: PBBFAC,TXP,,

## 2023-03-28 PROCEDURE — 3077F SYST BP >= 140 MM HG: CPT | Mod: CPTII,NTX,S$GLB, | Performed by: INTERNAL MEDICINE

## 2023-03-28 PROCEDURE — 3288F PR FALLS RISK ASSESSMENT DOCUMENTED: ICD-10-PCS | Mod: CPTII,NTX,S$GLB, | Performed by: INTERNAL MEDICINE

## 2023-03-28 PROCEDURE — 99999 PR PBB SHADOW E&M-EST. PATIENT-LVL II: ICD-10-PCS | Mod: PBBFAC,TXP,,

## 2023-03-28 PROCEDURE — 99999 PR PBB SHADOW E&M-EST. PATIENT-LVL V: ICD-10-PCS | Mod: PBBFAC,TXP,, | Performed by: INTERNAL MEDICINE

## 2023-03-28 RX ORDER — FLUTICASONE PROPIONATE AND SALMETEROL 500; 50 UG/1; UG/1
1 POWDER RESPIRATORY (INHALATION) 2 TIMES DAILY
Qty: 60 EACH | Refills: 11 | Status: SHIPPED | OUTPATIENT
Start: 2023-03-28 | End: 2023-10-23

## 2023-03-28 RX ORDER — ALBUTEROL SULFATE 90 UG/1
2 AEROSOL, METERED RESPIRATORY (INHALATION) EVERY 4 HOURS PRN
Qty: 18 G | Refills: 11 | Status: SHIPPED | OUTPATIENT
Start: 2023-03-28 | End: 2023-10-23

## 2023-03-28 RX ORDER — PROMETHAZINE HYDROCHLORIDE AND DEXTROMETHORPHAN HYDROBROMIDE 6.25; 15 MG/5ML; MG/5ML
5 SYRUP ORAL 4 TIMES DAILY PRN
Qty: 240 ML | Refills: 5 | Status: SHIPPED | OUTPATIENT
Start: 2023-03-28 | End: 2023-04-07

## 2023-03-28 RX ORDER — ALBUTEROL SULFATE 0.83 MG/ML
2.5 SOLUTION RESPIRATORY (INHALATION)
Qty: 360 ML | Refills: 11 | Status: SHIPPED | OUTPATIENT
Start: 2023-03-28 | End: 2023-10-23

## 2023-03-28 NOTE — PROGRESS NOTES
Subjective:     Patient ID: Isidro Alves is a 67 y.o. male.    Chief Complaint:      HPI   On dialysis for about a year  Scheduled for vascular access surgery       Cough  Patient complains of nasal congestion, nonproductive cough, productive cough and productive cough with sputum described as clear. Symptoms began 2 years ago. Symptoms have been unchanged since that time.The cough is nocturnal and paroxysmal and is aggravated by nothing. Associated symptoms include: postnasal drip. Patient does not have new pets. Patient does have a history of asthma. Patient does not have a history of environmental allergens. Patient has not traveled recently. Patient does not have a history of smoking. Patient has had a previous chest x-ray. Patient has had a PPD done.    Hx of COVID pneumonia  COVID 19 pneumonia - discharged on oxygen on 1/27/2021  Now on dialysis    Past Medical History:   Diagnosis Date    Abnormal nuclear stress test 8/30/2022    Acute hypoxemic respiratory failure due to COVID-19 1/21/2021    Anal itching 10/10/2022    Anemia 4/16/2014    BPH (benign prostatic hyperplasia)     CKD (chronic kidney disease) stage 4, GFR 15-29 ml/min     Coronary artery disease of native artery of native heart with stable angina pectoris 4/29/2019    Diabetes mellitus     Diabetes mellitus, type 2     Dilated cardiomyopathy 8/30/2022    Elevated PSA     Encounter for blood transfusion     Herpes labialis     Hyperlipidemia     Hypertension     Obesity     Pneumonia due to COVID-19 virus     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    Preop cardiovascular exam 9/16/2022    Proteinuria     Secondary hyperparathyroidism (of renal origin)     UTI (lower urinary tract infection) 5/1/2015     Past Surgical History:   Procedure Laterality Date    CATHETERIZATION OF BOTH LEFT AND RIGHT HEART N/A 08/30/2022    Procedure: CATHETERIZATION, HEART, BOTH LEFT AND RIGHT;  Surgeon: Kassandra Deras MD;  Location: [facility] CATH LAB;  Service: Cardiology;   "Laterality: N/A;    COLONOSCOPY N/A 12/21/2017    Procedure: COLONOSCOPY;  Surgeon: Fran Escalera MD;  Location: [facility] ENDO;  Service: Endoscopy;  Laterality: N/A;    COLONOSCOPY N/A 02/02/2023    Procedure: COLONOSCOPY;  Surgeon: Anthony Meng MD;  Location: [facility] ENDO;  Service: Endoscopy;  Laterality: N/A;    FLUOROSCOPY N/A 01/21/2021    Procedure: Vascath insertion;  Surgeon: Adrian Pruitt Jr., MD;  Location: [facility] CATH LAB;  Service: General;  Laterality: N/A;    FLUOROSCOPY N/A 04/06/2021    Procedure: Over the wire Vas Cath exchange;  Surgeon: Ho Pressley MD;  Location: [facility] CATH LAB;  Service: General;  Laterality: N/A;    PROSTATE BIOPSY      thorocotomy  01/01/2010     Review of patient's allergies indicates:   Allergen Reactions    Bactrim [sulfamethoxazole-trimethoprim] Swelling    Nsaids (non-steroidal anti-inflammatory drug)      CKD    Sulfamethoxazole     Trimethoprim      Current Outpatient Medications on File Prior to Visit   Medication Sig Dispense Refill    acyclovir (ZOVIRAX) 400 MG tablet TAKE 1 TABLET (400 MG TOTAL) BY MOUTH 2 (TWO) TIMES DAILY. ONE TABLET 60 tablet 3    aspirin (ECOTRIN) 81 MG EC tablet Take 1 tablet (81 mg total) by mouth once daily.      atorvastatin (LIPITOR) 40 MG tablet TAKE 1 TABLET BY MOUTH EVERY DAY 90 tablet 3    BD INSULIN SYRINGE HALF UNIT 0.3 mL 31 gauge x 5/16" Syrg FOR USE WITH INSULIN. 100 Syringe 1    blood sugar diagnostic Strp 1 each by Misc.(Non-Drug; Combo Route) route 4 (four) times daily. 100 each 11    blood-glucose meter kit Test finger stick blood sugar once daily. 1 each 0    calcitRIOL (ROCALTROL) 0.25 MCG Cap TAKE 1 CAPSULE (0.25 MCG TOTAL) BY MOUTH EVERY MON, WED, FRI. 15 capsule 11    carvediloL (COREG) 12.5 MG tablet Take 1 tablet (12.5 mg total) by mouth 2 (two) times daily with meals. 60 tablet 11    cetirizine (ZYRTEC) 10 MG tablet Take 1 tablet (10 mg total) by mouth once daily. 30 tablet 1    esomeprazole (NEXIUM) 40 MG " "capsule Take 40 mg by mouth once daily.      finasteride (PROSCAR) 5 mg tablet Take 1 tablet (5 mg total) by mouth once daily. 90 tablet 3    fluticasone propionate (FLONASE) 50 mcg/actuation nasal spray 2 sprays (100 mcg total) by Each Nostril route every evening. For nasal congestion or runny nose 16 g 1    folic acid (FOLVITE) 1 MG tablet TAKE 1 TABLET BY MOUTH EVERY DAY 30 tablet 9    furosemide (LASIX) 80 MG tablet Take 1 tablet (80 mg total) by mouth once daily. 30 tablet 6    hydrALAZINE (APRESOLINE) 100 MG tablet Take 1 tablet (100 mg total) by mouth every 8 (eight) hours. 90 tablet 11    inhalation spacing device (VORTEX HOLDING CHAMBER) Use as directed for inhalation. 1 Device prn    insulin degludec (TRESIBA FLEXTOUCH U-200) 200 unit/mL (3 mL) insulin pen Inject 30 Units into the skin once daily. 2 pen 8    isosorbide dinitrate (ISORDIL) 20 MG tablet Take 1 tablet (20 mg total) by mouth 3 (three) times daily. 90 tablet 11    lancets Misc 1 each by Misc.(Non-Drug; Combo Route) route 4 (four) times daily. 100 each 11    multivitamin (THERAGRAN) tablet Take 1 tablet by mouth once daily.      NIFEdipine (PROCARDIA-XL) 90 MG (OSM) 24 hr tablet Take 1 tablet (90 mg total) by mouth once daily. 30 tablet 5    pen needle, diabetic (BD ULTRA-FINE SHIRA PEN NEEDLE) 32 gauge x 5/32" Ndle 1 each by Misc.(Non-Drug; Combo Route) route 4 (four) times daily with meals and nightly. 100 each 11    sacubitriL-valsartan (ENTRESTO)  mg per tablet Take 1 tablet by mouth 2 (two) times daily. 60 tablet 11    sevelamer carbonate (RENVELA) 800 mg Tab Take 3 tablets (2,400 mg total) by mouth 3 (three) times daily with meals. 300 tablet 11    tamsulosin (FLOMAX) 0.4 mg Cap TAKE 1 CAPSULE BY MOUTH EVERY DAY 90 capsule 3    olopatadine (PATADAY) 0.2 % Drop Place 1 drop into both eyes daily as needed. 2.5 mL 0     No current facility-administered medications on file prior to visit.     Social History     Socioeconomic History    " "Marital status:     Number of children: 3   Occupational History     Employer: self-rmployed   Tobacco Use    Smoking status: Never    Smokeless tobacco: Never   Substance and Sexual Activity    Alcohol use: Not Currently     Comment: seldom     Drug use: No    Sexual activity: Not Currently     Partners: Female   Social History Narrative    ** Merged History Encounter **          Family History   Problem Relation Age of Onset    No Known Problems Mother     Diabetes Father     Hypertension Father     Hypertension Other     Diabetes Other        Review of Systems   Constitutional:  Positive for fatigue.   HENT:  Positive for congestion.    Respiratory:  Positive for cough, shortness of breath, dyspnea on extertion and use of rescue inhaler.    Musculoskeletal:  Positive for arthralgias.     Objective:      BP (!) 140/92   Pulse 96   Resp 18   Ht 6' 2" (1.88 m)   Wt 116.7 kg (257 lb 6.2 oz)   SpO2 97%   BMI 33.05 kg/m²   Physical Exam  Vitals and nursing note reviewed.   Constitutional:       Appearance: He is well-developed.   HENT:      Head: Normocephalic and atraumatic.      Nose: Congestion present.   Eyes:      Conjunctiva/sclera: Conjunctivae normal.      Pupils: Pupils are equal, round, and reactive to light.   Neck:      Thyroid: No thyromegaly.      Vascular: No JVD.      Trachea: No tracheal deviation.   Cardiovascular:      Rate and Rhythm: Normal rate and regular rhythm.      Heart sounds: Murmur heard.   Systolic murmur is present with a grade of 2/6.   Pulmonary:      Breath sounds: Examination of the right-lower field reveals wheezing. Examination of the left-lower field reveals wheezing. Decreased breath sounds, wheezing and rales present. No rhonchi.   Abdominal:      General: Bowel sounds are normal.      Palpations: Abdomen is soft.   Musculoskeletal:         General: No tenderness. Normal range of motion.      Cervical back: Neck supple.   Lymphadenopathy:      Cervical: No cervical " adenopathy.   Skin:     General: Skin is warm and dry.   Neurological:      Mental Status: He is alert and oriented to person, place, and time.     Personal Diagnostic Review  Chest x-ray: vas chat on the right, mild interstitil changes persist    Echo  · The left ventricle is moderately enlarged with eccentric hypertrophy and   mildly decreased systolic function.  · The estimated ejection fraction is 40%.  · Grade I left ventricular diastolic dysfunction.  · Normal right ventricular size with low normal right ventricular systolic   function.  · Mild left atrial enlargement.  · Mild-to-moderate mitral regurgitation.  · Mild tricuspid regurgitation.  · Normal central venous pressure (3 mmHg).  · The estimated PA systolic pressure is 33 mmHg.  · There is mild left ventricular global hypokinesis.         Office Spirometry Results:     No flowsheet data found.  Pulmonary Studies Review 3/28/2023   SpO2 97   Height 74   Weight 4118.19   BMI (Calculated) 33   Predicted Distance 343.83   Predicted Distance Meters (Calculated) 577.36         Assessment:            Nocturnal hypoxemia  -     PULSE OXIMETRY OVERNIGHT; Future; Expected date: 03/28/2023    Post viral asthma  -     promethazine-dextromethorphan (PROMETHAZINE-DM) 6.25-15 mg/5 mL Syrp; Take 5 mLs by mouth 4 (four) times daily as needed (cough).  Dispense: 240 mL; Refill: 5  -     albuterol (PROVENTIL/VENTOLIN HFA) 90 mcg/actuation inhaler; Inhale 2 puffs into the lungs every 4 (four) hours as needed for Wheezing or Shortness of Breath.  Dispense: 18 g; Refill: 11  -     fluticasone-salmeterol diskus inhaler 500-50 mcg; Inhale 1 puff into the lungs 2 (two) times daily. Controller.Wash out mouth after use  Dispense: 60 each; Refill: 11  -     albuterol (PROVENTIL) 2.5 mg /3 mL (0.083 %) nebulizer solution; Take 3 mLs (2.5 mg total) by nebulization every 4 to 6 hours as needed for Wheezing or Shortness of Breath.  Dispense: 360 mL; Refill: 11  -     NEBULIZER KIT  "(SUPPLIES) FOR HOME USE  -     NEBULIZER FOR HOME USE    Chronic cough    Asthma, cough variant  -     promethazine-dextromethorphan (PROMETHAZINE-DM) 6.25-15 mg/5 mL Syrp; Take 5 mLs by mouth 4 (four) times daily as needed (cough).  Dispense: 240 mL; Refill: 5  -     albuterol (PROVENTIL/VENTOLIN HFA) 90 mcg/actuation inhaler; Inhale 2 puffs into the lungs every 4 (four) hours as needed for Wheezing or Shortness of Breath.  Dispense: 18 g; Refill: 11  -     fluticasone-salmeterol diskus inhaler 500-50 mcg; Inhale 1 puff into the lungs 2 (two) times daily. Controller.Wash out mouth after use  Dispense: 60 each; Refill: 11  -     albuterol (PROVENTIL) 2.5 mg /3 mL (0.083 %) nebulizer solution; Take 3 mLs (2.5 mg total) by nebulization every 4 to 6 hours as needed for Wheezing or Shortness of Breath.  Dispense: 360 mL; Refill: 11  -     NEBULIZER KIT (SUPPLIES) FOR HOME USE  -     NEBULIZER FOR HOME USE    Simple chronic bronchitis  -     Stress test, pulmonary; Future; Expected date: 03/28/2023    Exercise hypoxemia  -     Stress test, pulmonary; Future; Expected date: 03/28/2023          Outpatient Encounter Medications as of 3/28/2023   Medication Sig Dispense Refill    acyclovir (ZOVIRAX) 400 MG tablet TAKE 1 TABLET (400 MG TOTAL) BY MOUTH 2 (TWO) TIMES DAILY. ONE TABLET 60 tablet 3    aspirin (ECOTRIN) 81 MG EC tablet Take 1 tablet (81 mg total) by mouth once daily.      atorvastatin (LIPITOR) 40 MG tablet TAKE 1 TABLET BY MOUTH EVERY DAY 90 tablet 3    BD INSULIN SYRINGE HALF UNIT 0.3 mL 31 gauge x 5/16" Syrg FOR USE WITH INSULIN. 100 Syringe 1    blood sugar diagnostic Strp 1 each by Misc.(Non-Drug; Combo Route) route 4 (four) times daily. 100 each 11    blood-glucose meter kit Test finger stick blood sugar once daily. 1 each 0    calcitRIOL (ROCALTROL) 0.25 MCG Cap TAKE 1 CAPSULE (0.25 MCG TOTAL) BY MOUTH EVERY MON, WED, FRI. 15 capsule 11    carvediloL (COREG) 12.5 MG tablet Take 1 tablet (12.5 mg total) by " "mouth 2 (two) times daily with meals. 60 tablet 11    cetirizine (ZYRTEC) 10 MG tablet Take 1 tablet (10 mg total) by mouth once daily. 30 tablet 1    [] ciprofloxacin HCl (CILOXAN) 0.3 % ophthalmic solution Place 4 drops into the left ear 2 (two) times a day. for 5 days 10 mL 0    esomeprazole (NEXIUM) 40 MG capsule Take 40 mg by mouth once daily.      finasteride (PROSCAR) 5 mg tablet Take 1 tablet (5 mg total) by mouth once daily. 90 tablet 3    fluticasone propionate (FLONASE) 50 mcg/actuation nasal spray 2 sprays (100 mcg total) by Each Nostril route every evening. For nasal congestion or runny nose 16 g 1    folic acid (FOLVITE) 1 MG tablet TAKE 1 TABLET BY MOUTH EVERY DAY 30 tablet 9    furosemide (LASIX) 80 MG tablet Take 1 tablet (80 mg total) by mouth once daily. 30 tablet 6    hydrALAZINE (APRESOLINE) 100 MG tablet Take 1 tablet (100 mg total) by mouth every 8 (eight) hours. 90 tablet 11    inhalation spacing device (VORTEX HOLDING CHAMBER) Use as directed for inhalation. 1 Device prn    insulin degludec (TRESIBA FLEXTOUCH U-200) 200 unit/mL (3 mL) insulin pen Inject 30 Units into the skin once daily. 2 pen 8    isosorbide dinitrate (ISORDIL) 20 MG tablet Take 1 tablet (20 mg total) by mouth 3 (three) times daily. 90 tablet 11    lancets Misc 1 each by Misc.(Non-Drug; Combo Route) route 4 (four) times daily. 100 each 11    multivitamin (THERAGRAN) tablet Take 1 tablet by mouth once daily.      NIFEdipine (PROCARDIA-XL) 90 MG (OSM) 24 hr tablet Take 1 tablet (90 mg total) by mouth once daily. 30 tablet 5    pen needle, diabetic (BD ULTRA-FINE SHIRA PEN NEEDLE) 32 gauge x 5/32" Ndle 1 each by Misc.(Non-Drug; Combo Route) route 4 (four) times daily with meals and nightly. 100 each 11    sacubitriL-valsartan (ENTRESTO)  mg per tablet Take 1 tablet by mouth 2 (two) times daily. 60 tablet 11    sevelamer carbonate (RENVELA) 800 mg Tab Take 3 tablets (2,400 mg total) by mouth 3 (three) times daily " with meals. 300 tablet 11    tamsulosin (FLOMAX) 0.4 mg Cap TAKE 1 CAPSULE BY MOUTH EVERY DAY 90 capsule 3    albuterol (PROVENTIL) 2.5 mg /3 mL (0.083 %) nebulizer solution Take 3 mLs (2.5 mg total) by nebulization every 4 to 6 hours as needed for Wheezing or Shortness of Breath. 360 mL 11    albuterol (PROVENTIL/VENTOLIN HFA) 90 mcg/actuation inhaler Inhale 2 puffs into the lungs every 4 (four) hours as needed for Wheezing or Shortness of Breath. 18 g 11    fluticasone-salmeterol diskus inhaler 500-50 mcg Inhale 1 puff into the lungs 2 (two) times daily. Controller.Wash out mouth after use 60 each 11    olopatadine (PATADAY) 0.2 % Drop Place 1 drop into both eyes daily as needed. 2.5 mL 0    promethazine-dextromethorphan (PROMETHAZINE-DM) 6.25-15 mg/5 mL Syrp Take 5 mLs by mouth 4 (four) times daily as needed (cough). 240 mL 5     No facility-administered encounter medications on file as of 3/28/2023.     Plan:       Requested Prescriptions     Signed Prescriptions Disp Refills    promethazine-dextromethorphan (PROMETHAZINE-DM) 6.25-15 mg/5 mL Syrp 240 mL 5     Sig: Take 5 mLs by mouth 4 (four) times daily as needed (cough).    albuterol (PROVENTIL/VENTOLIN HFA) 90 mcg/actuation inhaler 18 g 11     Sig: Inhale 2 puffs into the lungs every 4 (four) hours as needed for Wheezing or Shortness of Breath.    fluticasone-salmeterol diskus inhaler 500-50 mcg 60 each 11     Sig: Inhale 1 puff into the lungs 2 (two) times daily. Controller.Wash out mouth after use    albuterol (PROVENTIL) 2.5 mg /3 mL (0.083 %) nebulizer solution 360 mL 11     Sig: Take 3 mLs (2.5 mg total) by nebulization every 4 to 6 hours as needed for Wheezing or Shortness of Breath.     Problem List Items Addressed This Visit       Chronic cough     Other Visit Diagnoses       Nocturnal hypoxemia    -  Primary    Relevant Orders    PULSE OXIMETRY OVERNIGHT    Post viral asthma        Relevant Medications    promethazine-dextromethorphan  (PROMETHAZINE-DM) 6.25-15 mg/5 mL Syrp    albuterol (PROVENTIL/VENTOLIN HFA) 90 mcg/actuation inhaler    fluticasone-salmeterol diskus inhaler 500-50 mcg    albuterol (PROVENTIL) 2.5 mg /3 mL (0.083 %) nebulizer solution    Other Relevant Orders    NEBULIZER KIT (SUPPLIES) FOR HOME USE    NEBULIZER FOR HOME USE    Asthma, cough variant        Relevant Medications    promethazine-dextromethorphan (PROMETHAZINE-DM) 6.25-15 mg/5 mL Syrp    albuterol (PROVENTIL/VENTOLIN HFA) 90 mcg/actuation inhaler    fluticasone-salmeterol diskus inhaler 500-50 mcg    albuterol (PROVENTIL) 2.5 mg /3 mL (0.083 %) nebulizer solution    Other Relevant Orders    NEBULIZER KIT (SUPPLIES) FOR HOME USE    NEBULIZER FOR HOME USE    Simple chronic bronchitis        Relevant Orders    Stress test, pulmonary    Exercise hypoxemia        Relevant Orders    Stress test, pulmonary             Follow up in about 3 months (around 6/28/2023) for Overnight O2 Sat ASAP, 6 min walk - on return.    MEDICAL DECISION MAKING: Moderate to high complexity.  Overall, the multiple problems listed are of moderate to high severity that may impact quality of life and activities of daily living. Side effects of medications, treatment plan as well as options and alternatives reviewed and discussed with patient. There was counseling of patient concerning these issues.    Total time spent in counseling and coordination of care - 40  minutes of total time spent on the encounter, which includes face to face time and non-face to face time preparing to see the patient (eg, review of tests), Obtaining and/or reviewing separately obtained history, Documenting clinical information in the electronic or other health record, Independently interpreting results (not separately reported) and communicating results to the patient/family/caregiver, or Care coordination (not separately reported).    Time was used in discussion of prognosis, risks, benefits of treatment, instructions  and compliance with regimen . Discussion with other physicians and/or health care providers - home health or for use of durable medical equipment (oxygen, nebulizers, CPAP, BiPAP) occurred.

## 2023-03-30 PROBLEM — R05.3 CHRONIC COUGH: Status: ACTIVE | Noted: 2023-03-30

## 2023-03-31 ENCOUNTER — TELEPHONE (OUTPATIENT)
Dept: INTERNAL MEDICINE | Facility: CLINIC | Age: 68
End: 2023-03-31
Payer: COMMERCIAL

## 2023-03-31 NOTE — TELEPHONE ENCOUNTER
Pt reports having chills, dry mouth, and right testicle is swollen. Advised to go to urgent care. Pt v/u

## 2023-03-31 NOTE — TELEPHONE ENCOUNTER
----- Message from Oscar Guerrero sent at 3/31/2023  3:34 PM CDT -----  Contact: Lauren Salas is requesting a call back from the nurse  regarding concerns, reports going to dialysis this morning and isn't feeling well, heavy breathing, etc. Please give patient a call back at .[phone]

## 2023-04-04 PROCEDURE — 86833 HLA CLASS II HIGH DEFIN QUAL: CPT | Mod: PO,TXP | Performed by: NURSE PRACTITIONER

## 2023-04-04 PROCEDURE — 86832 HLA CLASS I HIGH DEFIN QUAL: CPT | Mod: PO,TXP | Performed by: NURSE PRACTITIONER

## 2023-04-05 ENCOUNTER — TELEPHONE (OUTPATIENT)
Dept: UROLOGY | Facility: CLINIC | Age: 68
End: 2023-04-05
Payer: COMMERCIAL

## 2023-04-05 ENCOUNTER — OFFICE VISIT (OUTPATIENT)
Dept: UROLOGY | Facility: CLINIC | Age: 68
End: 2023-04-05
Payer: COMMERCIAL

## 2023-04-05 VITALS
DIASTOLIC BLOOD PRESSURE: 78 MMHG | SYSTOLIC BLOOD PRESSURE: 137 MMHG | WEIGHT: 258.63 LBS | BODY MASS INDEX: 33.2 KG/M2 | HEART RATE: 85 BPM | TEMPERATURE: 98 F

## 2023-04-05 DIAGNOSIS — N50.89 TESTICLE SWELLING: ICD-10-CM

## 2023-04-05 DIAGNOSIS — N45.1 EPIDIDYMITIS: Primary | ICD-10-CM

## 2023-04-05 LAB
BILIRUB SERPL-MCNC: NEGATIVE MG/DL
BLOOD URINE, POC: NORMAL
CLARITY, POC UA: CLEAR
COLOR, POC UA: YELLOW
GLUCOSE UR QL STRIP: NEGATIVE
KETONES UR QL STRIP: NEGATIVE
LEUKOCYTE ESTERASE URINE, POC: NORMAL
NITRITE, POC UA: NEGATIVE
PH, POC UA: 7
POC RESIDUAL URINE VOLUME: 69 ML (ref 0–100)
PROTEIN, POC: 100
SPECIFIC GRAVITY, POC UA: 1.02
UROBILINOGEN, POC UA: 0.2

## 2023-04-05 PROCEDURE — 1126F AMNT PAIN NOTED NONE PRSNT: CPT | Mod: CPTII,NTX,S$GLB, | Performed by: UROLOGY

## 2023-04-05 PROCEDURE — 51798 PR MEAS,POST-VOID RES,US,NON-IMAGING: ICD-10-PCS | Mod: NTX,S$GLB,, | Performed by: UROLOGY

## 2023-04-05 PROCEDURE — 1126F PR PAIN SEVERITY QUANTIFIED, NO PAIN PRESENT: ICD-10-PCS | Mod: CPTII,NTX,S$GLB, | Performed by: UROLOGY

## 2023-04-05 PROCEDURE — 3008F PR BODY MASS INDEX (BMI) DOCUMENTED: ICD-10-PCS | Mod: CPTII,NTX,S$GLB, | Performed by: UROLOGY

## 2023-04-05 PROCEDURE — 87086 URINE CULTURE/COLONY COUNT: CPT | Mod: NTX | Performed by: UROLOGY

## 2023-04-05 PROCEDURE — 1159F MED LIST DOCD IN RCRD: CPT | Mod: CPTII,NTX,S$GLB, | Performed by: UROLOGY

## 2023-04-05 PROCEDURE — 3288F FALL RISK ASSESSMENT DOCD: CPT | Mod: CPTII,NTX,S$GLB, | Performed by: UROLOGY

## 2023-04-05 PROCEDURE — 3078F DIAST BP <80 MM HG: CPT | Mod: CPTII,NTX,S$GLB, | Performed by: UROLOGY

## 2023-04-05 PROCEDURE — 1101F PT FALLS ASSESS-DOCD LE1/YR: CPT | Mod: CPTII,NTX,S$GLB, | Performed by: UROLOGY

## 2023-04-05 PROCEDURE — 3008F BODY MASS INDEX DOCD: CPT | Mod: CPTII,NTX,S$GLB, | Performed by: UROLOGY

## 2023-04-05 PROCEDURE — 51798 US URINE CAPACITY MEASURE: CPT | Mod: NTX,S$GLB,, | Performed by: UROLOGY

## 2023-04-05 PROCEDURE — 99214 PR OFFICE/OUTPT VISIT, EST, LEVL IV, 30-39 MIN: ICD-10-PCS | Mod: NTX,S$GLB,, | Performed by: UROLOGY

## 2023-04-05 PROCEDURE — 1160F PR REVIEW ALL MEDS BY PRESCRIBER/CLIN PHARMACIST DOCUMENTED: ICD-10-PCS | Mod: CPTII,NTX,S$GLB, | Performed by: UROLOGY

## 2023-04-05 PROCEDURE — 4010F PR ACE/ARB THEARPY RXD/TAKEN: ICD-10-PCS | Mod: CPTII,NTX,S$GLB, | Performed by: UROLOGY

## 2023-04-05 PROCEDURE — 81002 POCT URINE DIPSTICK WITHOUT MICROSCOPE: ICD-10-PCS | Mod: NTX,S$GLB,, | Performed by: UROLOGY

## 2023-04-05 PROCEDURE — 3075F PR MOST RECENT SYSTOLIC BLOOD PRESS GE 130-139MM HG: ICD-10-PCS | Mod: CPTII,NTX,S$GLB, | Performed by: UROLOGY

## 2023-04-05 PROCEDURE — 1159F PR MEDICATION LIST DOCUMENTED IN MEDICAL RECORD: ICD-10-PCS | Mod: CPTII,NTX,S$GLB, | Performed by: UROLOGY

## 2023-04-05 PROCEDURE — 99999 PR PBB SHADOW E&M-EST. PATIENT-LVL III: ICD-10-PCS | Mod: PBBFAC,TXP,, | Performed by: UROLOGY

## 2023-04-05 PROCEDURE — 3288F PR FALLS RISK ASSESSMENT DOCUMENTED: ICD-10-PCS | Mod: CPTII,NTX,S$GLB, | Performed by: UROLOGY

## 2023-04-05 PROCEDURE — 1160F RVW MEDS BY RX/DR IN RCRD: CPT | Mod: CPTII,NTX,S$GLB, | Performed by: UROLOGY

## 2023-04-05 PROCEDURE — 81002 URINALYSIS NONAUTO W/O SCOPE: CPT | Mod: NTX,S$GLB,, | Performed by: UROLOGY

## 2023-04-05 PROCEDURE — 3075F SYST BP GE 130 - 139MM HG: CPT | Mod: CPTII,NTX,S$GLB, | Performed by: UROLOGY

## 2023-04-05 PROCEDURE — 4010F ACE/ARB THERAPY RXD/TAKEN: CPT | Mod: CPTII,NTX,S$GLB, | Performed by: UROLOGY

## 2023-04-05 PROCEDURE — 3078F PR MOST RECENT DIASTOLIC BLOOD PRESSURE < 80 MM HG: ICD-10-PCS | Mod: CPTII,NTX,S$GLB, | Performed by: UROLOGY

## 2023-04-05 PROCEDURE — 1101F PR PT FALLS ASSESS DOC 0-1 FALLS W/OUT INJ PAST YR: ICD-10-PCS | Mod: CPTII,NTX,S$GLB, | Performed by: UROLOGY

## 2023-04-05 PROCEDURE — 99214 OFFICE O/P EST MOD 30 MIN: CPT | Mod: NTX,S$GLB,, | Performed by: UROLOGY

## 2023-04-05 PROCEDURE — 99999 PR PBB SHADOW E&M-EST. PATIENT-LVL III: CPT | Mod: PBBFAC,TXP,, | Performed by: UROLOGY

## 2023-04-05 PROCEDURE — 3044F PR MOST RECENT HEMOGLOBIN A1C LEVEL <7.0%: ICD-10-PCS | Mod: CPTII,NTX,S$GLB, | Performed by: UROLOGY

## 2023-04-05 PROCEDURE — 3044F HG A1C LEVEL LT 7.0%: CPT | Mod: CPTII,NTX,S$GLB, | Performed by: UROLOGY

## 2023-04-05 RX ORDER — DOXYCYCLINE HYCLATE 100 MG
100 TABLET ORAL 2 TIMES DAILY
Qty: 20 TABLET | Refills: 0 | Status: SHIPPED | OUTPATIENT
Start: 2023-04-05 | End: 2023-04-15

## 2023-04-05 RX ORDER — DOXYCYCLINE HYCLATE 100 MG
100 TABLET ORAL 2 TIMES DAILY
Qty: 14 TABLET | Refills: 0 | Status: SHIPPED | OUTPATIENT
Start: 2023-04-05 | End: 2023-04-05 | Stop reason: SDUPTHER

## 2023-04-05 NOTE — TELEPHONE ENCOUNTER
"Someone scheduled pt for an appt today to see Dr Nugent at 8:15    ----- Message from Willis Vick sent at 4/4/2023  5:56 PM CDT -----  Reschedule Existing Appointment        Appt Date:  6/2    Type of appt:  Established Pt    Physician: Jovanna    Reason for rescheduling?  Requesting to r/s for soonest available; Stating his testicle is swollen like a golf ball    Caller: Self    Contact Preference: [phone]             Additional Information:  "Thank you for all that you do for our patients"     "

## 2023-04-05 NOTE — PROGRESS NOTES
Chief Complaint: Elevated PSA    HPI:    04/05/2023 - returns today for follow-up, began having issues with lower abdominal pain on Friday went to [facility], was prescribed cefdinir but on Sunday began having right testicular swelling and pain, notes that it only hurts when I sit on it wrong, no voiding difficulty, no overt dysuria, no gross hematuria, no foul smell    12/02/2022 - returns today for follow-up, has been doing well overall however over the last month or so has been having intermittent itching in his urethra, very little overt dysuria but still bothersome, was seen at [facility] 10/10 where a urine culture was obtained which grew out Serratia, symptoms are similar to that instance    11/30/2021 - the patient returns today for follow-up, doing well with Flomax and finasteride, on dialysis now 3 days per week, PSA stable in the low threes on finasteride(actual PSA 6), has never had a biopsy or an MRI, denies any gross hematuria or dysuria, continues to have issues with ED, wants to try Viagra    11/23/20: UA abnormal with ++leuk and blood. Good stream.  Reviewed history in detail.  Discussed hygeine.  11/21/19: Doing well no complaints, good stream on fllomax.  Reviewed history in detail.  PSA way down again.  Taking finasteride and PSA down as a result. Uses cialis 10mg with good effect for ED from a friend.  11/5/18: PSA up where it was 2 years ago.  No LUTS, no pain.  Taking flomax voiding fine.  11/16/17: Laws: PSA has gone up from 7.7 to 10.7. Feels ok off the T. Strong stream; no LUTS. Nocturia x4 but depends how much he drinks after 6 pm. The nocturia doesn't bother him. No abd/pelvic pain and no exac/rel factors.  No hematuria.  No urolithiasis.  BP is elevated today; he did not take his BP medication this morning.   11/10/16: Symptoms have resolved in general, PSA is down.    9/28/16: Back early with groin pain.  Describes an itching sensation that runs from the rectum to the  "penis, waxes/wanes, worse at night.  Was given 7d doxycycline and there was some improvement.  No caffeine.  Good urinary habits.  No skin problems.  No balanitis.  Doing fine on one flomax a day.  6/1/16: Here today with Dr. Ren's records to review.  Had a biopsy 4/15 with PSA 13.9 at the time.  Was on T by Dr. Chiu's reccs but was asymptomatic overall and felt no symptomatic change on the T.  Has ED that was improved with Cialis.  Got trimix once but wasn't comfortable with it.  05/17/16: rushing: " This is a 60-yr-old male presenting as a follow-up to TRT. Patient is currently on Axiron 30 mg ( 2 pumps /day) . Total T was 838; 5 months ago. ( H/H 14.4/46.0 ) and psa level was 4.7. Patient was to have psa level and follow-up 3 months ago related to psa level of 4.7 and did not. He does have a h/o negative prostate bx  15 months  ago per Dr. BRIGID Burt. No reported adverse side effects and energy level has greatly improved. Patient is currently on Flomax 0.8mg a day. He states he " ran out " and had an increase in nocturia 4-5 times/ night over the past 10 days. No hematuria, dysuria or flank pain. "  06/12/15: Aram; HPI: Flomax is been very helpful, nocturia now down to 3 times per night from 6 times per night with similar daytime improvement. Has been using Axiron for about 2 weeks, no adverse effects. This patient had severe deficiencies on his pretreatment labs. Denies flank pain, dysuira, hematuria . Despite his subjective improvement, his postvoid residual today in the clinic is 180 mL. Dipstick urinalysis suggested 1+ microscopic hematuria, microscopic examination of urine reveals no RBCs.    Allergies:  Bactrim [sulfamethoxazole-trimethoprim], Nsaids (non-steroidal anti-inflammatory drug), Sulfamethoxazole, and Trimethoprim    Medications:  has a current medication list which includes the following prescription(s): acyclovir, albuterol, albuterol, aspirin, atorvastatin, bd insulin syringe (half unit), " blood sugar diagnostic, blood-glucose meter, calcitriol, carvedilol, cetirizine, esomeprazole, finasteride, fluticasone propionate, fluticasone-salmeterol 500-50 mcg/dose, folic acid, furosemide, hydralazine, vortex holding chamber, insulin degludec, isosorbide dinitrate, lancets, multivitamin, nifedipine, pen needle, diabetic, promethazine-dextromethorphan, entresto, sevelamer carbonate, tamsulosin, doxycycline, and olopatadine.    Review of Systems:  General: No fever, chills  Skin: No rashes  Chest:  Denies cough and sputum production  Heart: Denies chest pain  Resp: Denies dyspnea  Abdomen: Denies diarrhea, abdominal pain, hematemesis, or blood in stool.  Musculoskeletal: No joint stiffness or swelling. Denies back pain.  : see HPI  Neuro: no dizziness or weakness      PMH:   has a past medical history of Abnormal nuclear stress test (8/30/2022), Acute hypoxemic respiratory failure due to COVID-19 (1/21/2021), Anal itching (10/10/2022), Anemia (4/16/2014), BPH (benign prostatic hyperplasia), CKD (chronic kidney disease) stage 4, GFR 15-29 ml/min, Coronary artery disease of native artery of native heart with stable angina pectoris (4/29/2019), Diabetes mellitus, Diabetes mellitus, type 2, Dilated cardiomyopathy (8/30/2022), Elevated PSA, Encounter for blood transfusion, Herpes labialis, Hyperlipidemia, Hypertension, Obesity, Pneumonia due to COVID-19 virus, Post viral asthma (4/9/2021), Preop cardiovascular exam (9/16/2022), Proteinuria, Secondary hyperparathyroidism (of renal origin), and UTI (lower urinary tract infection) (5/1/2015).    PSH:   has a past surgical history that includes thorocotomy (01/01/2010); Prostate biopsy; Colonoscopy (N/A, 12/21/2017); Fluoroscopy (N/A, 01/21/2021); Fluoroscopy (N/A, 04/06/2021); Catheterization of both left and right heart (N/A, 08/30/2022); and Colonoscopy (N/A, 02/02/2023).    FamHx: family history includes Diabetes in his father and another family member;  Hypertension in his father and another family member; No Known Problems in his mother.    SocHx:  reports that he has never smoked. He has never used smokeless tobacco. He reports that he does not currently use alcohol. He reports that he does not use drugs.      Physical Exam:  Vitals:    04/05/23 0835   BP: 137/78   Pulse: 85   Temp: 97.7 °F (36.5 °C)     General: awake, alert, cooperative  Head: NC/AT  Ears: external ears normal  Eyes: sclera normal  Lungs: normal inspiration, NAD  Heart: well-perfused  Abdomen: Soft, NT, ND   4/23: Normal uncirc'd phallus, meatus normal in size and position, right testicle swollen and tender, left normal, no abnormalities of epididymi  ROOPA 11/21: Normal rectal tone, no hemorrhoids. Prostate smooth and normal, smooth but bulbous right apex, 50 gm SV not palpable. Perineum and anus normal.  Lymphatic: groin nodes negative  Skin: The skin is warm and dry  Ext: No c/c/e.  Neuro: grossly intact, AOx3    Labs/Studies:   Lab Results   Component Value Date    WBC 9.58 08/23/2022    HGB 11.7 (L) 08/23/2022    HCT 37.9 (L) 08/23/2022     08/23/2022     08/23/2022    K 4.8 02/02/2023     08/23/2022    CREATININE 8.3 (H) 08/23/2022    BUN 41 (H) 08/23/2022    CO2 23 08/23/2022    TSH 1.941 07/14/2022    PSA 6.1 (H) 08/25/2022    INR 0.9 08/23/2022    HGBA1C 6.4 (H) 02/02/2023    CHOL 128 09/26/2022    TRIG 105 09/26/2022    HDL 25 (A) 09/26/2022    ALT 8 (L) 07/14/2022    ALT 8 (L) 07/14/2022    AST 16 07/14/2022    AST 16 07/14/2022     PSA    4/15: 13.9    11/15: 4.7    5/16: 12.7    11/16: 7.7    11/17: 10.7    10/18: 12.2    6/19: 4.0    11/19: 3.8    6/20: 4.3    Urinalysis obtained in clinic today specific gravity 1.020 pH seven positive LE positive blood, negative for other parameters    PVR = 69mL    Impression/Plan:   Epididymal orchitis - switch from cefdinir to doxycycline, follow-up two weeks for symptom check, reviewed that we should probably consider an  outlet procedure    Elevated PSA - MRI negative, PSA about the same as it has been, continue to monitor    BPH - continue flomax/finasteride     ED - continue viagra PRN      Adrian Nugent MD

## 2023-04-06 ENCOUNTER — HOSPITAL ENCOUNTER (EMERGENCY)
Facility: HOSPITAL | Age: 68
Discharge: HOME | End: 2023-04-06
Attending: EMERGENCY MEDICINE
Payer: COMMERCIAL

## 2023-04-06 ENCOUNTER — NURSE TRIAGE (OUTPATIENT)
Dept: ADMINISTRATIVE | Facility: CLINIC | Age: 68
End: 2023-04-06
Payer: COMMERCIAL

## 2023-04-06 ENCOUNTER — TELEPHONE (OUTPATIENT)
Dept: UROLOGY | Facility: CLINIC | Age: 68
End: 2023-04-06
Payer: COMMERCIAL

## 2023-04-06 VITALS
RESPIRATION RATE: 18 BRPM | TEMPERATURE: 98 F | OXYGEN SATURATION: 97 % | HEART RATE: 88 BPM | SYSTOLIC BLOOD PRESSURE: 136 MMHG | DIASTOLIC BLOOD PRESSURE: 82 MMHG | WEIGHT: 255.75 LBS | BODY MASS INDEX: 32.83 KG/M2

## 2023-04-06 DIAGNOSIS — N45.3 ORCHITIS AND EPIDIDYMITIS: ICD-10-CM

## 2023-04-06 DIAGNOSIS — N50.819 TESTICULAR PAIN: Primary | ICD-10-CM

## 2023-04-06 DIAGNOSIS — N50.811 RIGHT TESTICULAR PAIN: ICD-10-CM

## 2023-04-06 LAB
ALBUMIN SERPL BCP-MCNC: 3.2 G/DL (ref 3.5–5.2)
ALP SERPL-CCNC: 66 U/L (ref 55–135)
ALT SERPL W/O P-5'-P-CCNC: 14 U/L (ref 10–44)
ANION GAP SERPL CALC-SCNC: 14 MMOL/L (ref 8–16)
AST SERPL-CCNC: 19 U/L (ref 10–40)
BACTERIA #/AREA URNS HPF: NORMAL /HPF
BASOPHILS # BLD AUTO: 0.1 K/UL (ref 0–0.2)
BASOPHILS NFR BLD: 0.8 % (ref 0–1.9)
BILIRUB SERPL-MCNC: 0.6 MG/DL (ref 0.1–1)
BILIRUB UR QL STRIP: NEGATIVE
BUN SERPL-MCNC: 43 MG/DL (ref 8–23)
CALCIUM SERPL-MCNC: 9.6 MG/DL (ref 8.7–10.5)
CHLORIDE SERPL-SCNC: 106 MMOL/L (ref 95–110)
CLARITY UR: CLEAR
CO2 SERPL-SCNC: 21 MMOL/L (ref 23–29)
COLOR UR: COLORLESS
CREAT SERPL-MCNC: 9.1 MG/DL (ref 0.5–1.4)
DIFFERENTIAL METHOD: ABNORMAL
EOSINOPHIL # BLD AUTO: 0.4 K/UL (ref 0–0.5)
EOSINOPHIL NFR BLD: 2.8 % (ref 0–8)
ERYTHROCYTE [DISTWIDTH] IN BLOOD BY AUTOMATED COUNT: 15.9 % (ref 11.5–14.5)
EST. GFR  (NO RACE VARIABLE): 6 ML/MIN/1.73 M^2
GLUCOSE SERPL-MCNC: 86 MG/DL (ref 70–110)
GLUCOSE UR QL STRIP: NEGATIVE
HCT VFR BLD AUTO: 31.7 % (ref 40–54)
HGB BLD-MCNC: 10 G/DL (ref 14–18)
HGB UR QL STRIP: ABNORMAL
HYALINE CASTS #/AREA URNS LPF: 0 /LPF
IMM GRANULOCYTES # BLD AUTO: 0.24 K/UL (ref 0–0.04)
IMM GRANULOCYTES NFR BLD AUTO: 1.9 % (ref 0–0.5)
KETONES UR QL STRIP: NEGATIVE
LEUKOCYTE ESTERASE UR QL STRIP: NEGATIVE
LYMPHOCYTES # BLD AUTO: 1.3 K/UL (ref 1–4.8)
LYMPHOCYTES NFR BLD: 9.9 % (ref 18–48)
MCH RBC QN AUTO: 27.1 PG (ref 27–31)
MCHC RBC AUTO-ENTMCNC: 31.5 G/DL (ref 32–36)
MCV RBC AUTO: 86 FL (ref 82–98)
MICROSCOPIC COMMENT: NORMAL
MONOCYTES # BLD AUTO: 1.1 K/UL (ref 0.3–1)
MONOCYTES NFR BLD: 8.8 % (ref 4–15)
NEUTROPHILS # BLD AUTO: 9.6 K/UL (ref 1.8–7.7)
NEUTROPHILS NFR BLD: 75.8 % (ref 38–73)
NITRITE UR QL STRIP: NEGATIVE
NRBC BLD-RTO: 0 /100 WBC
PH UR STRIP: 7 [PH] (ref 5–8)
PLATELET # BLD AUTO: 369 K/UL (ref 150–450)
PMV BLD AUTO: 9.6 FL (ref 9.2–12.9)
POTASSIUM SERPL-SCNC: 4.2 MMOL/L (ref 3.5–5.1)
PROT SERPL-MCNC: 7.4 G/DL (ref 6–8.4)
PROT UR QL STRIP: ABNORMAL
RBC # BLD AUTO: 3.69 M/UL (ref 4.6–6.2)
RBC #/AREA URNS HPF: 0 /HPF (ref 0–4)
SODIUM SERPL-SCNC: 141 MMOL/L (ref 136–145)
SP GR UR STRIP: 1.01 (ref 1–1.03)
URN SPEC COLLECT METH UR: ABNORMAL
UROBILINOGEN UR STRIP-ACNC: NEGATIVE EU/DL
WBC # BLD AUTO: 12.68 K/UL (ref 3.9–12.7)
WBC #/AREA URNS HPF: 1 /HPF (ref 0–5)

## 2023-04-06 PROCEDURE — 25000003 PHARM REV CODE 250: Mod: NTX | Performed by: EMERGENCY MEDICINE

## 2023-04-06 PROCEDURE — 85025 COMPLETE CBC W/AUTO DIFF WBC: CPT | Mod: NTX | Performed by: PHYSICIAN ASSISTANT

## 2023-04-06 PROCEDURE — 80053 COMPREHEN METABOLIC PANEL: CPT | Mod: NTX | Performed by: PHYSICIAN ASSISTANT

## 2023-04-06 PROCEDURE — 99284 EMERGENCY DEPT VISIT MOD MDM: CPT | Mod: NTX

## 2023-04-06 PROCEDURE — 81000 URINALYSIS NONAUTO W/SCOPE: CPT | Mod: NTX | Performed by: PHYSICIAN ASSISTANT

## 2023-04-06 RX ORDER — HYDROCODONE BITARTRATE AND ACETAMINOPHEN 5; 325 MG/1; MG/1
1 TABLET ORAL
Status: COMPLETED | OUTPATIENT
Start: 2023-04-06 | End: 2023-04-06

## 2023-04-06 RX ORDER — HYDROCODONE BITARTRATE AND ACETAMINOPHEN 5; 325 MG/1; MG/1
1 TABLET ORAL EVERY 6 HOURS PRN
Qty: 15 TABLET | Refills: 0 | Status: SHIPPED | OUTPATIENT
Start: 2023-04-06 | End: 2023-05-25

## 2023-04-06 RX ADMIN — HYDROCODONE BITARTRATE AND ACETAMINOPHEN 1 TABLET: 5; 325 TABLET ORAL at 09:04

## 2023-04-06 NOTE — TELEPHONE ENCOUNTER
Received a message from the triage nurse; called pt and stated that he was still having some testicular discomfort after taking the antibiotics; stated he was on his way to the ED.  Advised pt to let us know if condition worsened or if he needed a clinic visit.

## 2023-04-06 NOTE — FIRST PROVIDER EVALUATION
Emergency Department TeleTriage Encounter Note      CHIEF COMPLAINT    Chief Complaint   Patient presents with    Testicle Pain     R testicle swollen and painful onset Friday. Seen at urology yesterday and started on abx, worse today.       VITAL SIGNS   Initial Vitals [04/06/23 1603]   BP Pulse Resp Temp SpO2   138/81 93 20 97.9 °F (36.6 °C) 96 %      MAP       --            ALLERGIES    Review of patient's allergies indicates:   Allergen Reactions    Bactrim [sulfamethoxazole-trimethoprim] Swelling    Nsaids (non-steroidal anti-inflammatory drug)      CKD    Sulfamethoxazole     Trimethoprim        PROVIDER TRIAGE NOTE  Patient presents with complaint of right testicular swelling and tenderness. He saw UC then urology yesterday. Currently on doxy. Denied fever. Denied injury. Denied urinary symptoms.      Phy:   Constitutional: well nourished, well developed, appearing stated age, NAD   HEENT: NCAT, symmetrical lids, No obvious facial deformity.  Normal phonation. Normal Conjunctiva   Neck: NAROM   Respiratory: Normal effort.  No obvious use of accessory muscles   Musculoskeletal: Moved upper extremities well   Neuro: Alert, answers questions appropriately    Psych: appropriate mood and affect      Initial orders will be placed and care will be transferred to an alternate provider when patient is roomed for a full evaluation. Any additional orders and the final disposition will be determined by that provider.        ORDERS  Labs Reviewed - No data to display    ED Orders (720h ago, onward)      None              Virtual Visit Note: The provider triage portion of this emergency department evaluation and documentation was performed via [employer], a HIPAA-compliant telemedicine application, in concert with a tele-presenter in the room. A face to face patient evaluation with one of my colleagues will occur once the patient is placed in an emergency department room.      DISCLAIMER: This note was prepared with  M*Modal voice recognition transcription software. Garbled syntax, mangled pronouns, and other bizarre constructions may be attributed to that software system.

## 2023-04-07 LAB — BACTERIA UR CULT: NO GROWTH

## 2023-04-07 NOTE — ED PROVIDER NOTES
SCRIBE #1 NOTE: I, Cirilo Hardin, am scribing for, and in the presence of, John Edmonds MD. I have scribed the entire note.       History     Chief Complaint   Patient presents with    Testicle Pain     R testicle swollen and painful onset Friday. Seen at urology yesterday and started on abx, worse today.     Review of patient's allergies indicates:   Allergen Reactions    Bactrim [sulfamethoxazole-trimethoprim] Swelling    Nsaids (non-steroidal anti-inflammatory drug)      CKD    Sulfamethoxazole     Trimethoprim          History of Present Illness     HPI    4/6/2023, 8:56 PM  History obtained from the patient      History of Present Illness: Isidro Alves is a 67 y.o. male patient with a PMHx of CKD, T2DM, HTN who presents to the Emergency Department for evaluation of right testicle pain and swelling which onset gradually 6 days ago. Pt states he saw Urology and was started on abx. He notes his sxs has worsened. He notes he was last dialyzed on Wednesday. Symptoms are constant and moderate in severity. Movement worsens pain. Patient denies any dysuria, abdominal pain, numbness, n/v, CP, leg swelling, and all other sxs at this time. No further complaints or concerns at this time.       Arrival mode: Personal vehicle    PCP: Steven Morgan MD        Past Medical History:  Past Medical History:   Diagnosis Date    Abnormal nuclear stress test 8/30/2022    Acute hypoxemic respiratory failure due to COVID-19 1/21/2021    Anal itching 10/10/2022    Anemia 4/16/2014    BPH (benign prostatic hyperplasia)     CKD (chronic kidney disease) stage 4, GFR 15-29 ml/min     Coronary artery disease of native artery of native heart with stable angina pectoris 4/29/2019    Diabetes mellitus     Diabetes mellitus, type 2     Dilated cardiomyopathy 8/30/2022    Elevated PSA     Encounter for blood transfusion     Herpes labialis     Hyperlipidemia     Hypertension     Obesity     Pneumonia due to COVID-19 virus     Post viral  asthma 4/9/2021    Preop cardiovascular exam 9/16/2022    Proteinuria     Secondary hyperparathyroidism (of renal origin)     UTI (lower urinary tract infection) 5/1/2015       Past Surgical History:  Past Surgical History:   Procedure Laterality Date    CATHETERIZATION OF BOTH LEFT AND RIGHT HEART N/A 08/30/2022    Procedure: CATHETERIZATION, HEART, BOTH LEFT AND RIGHT;  Surgeon: Kassandra Deras MD;  Location: [facility] CATH LAB;  Service: Cardiology;  Laterality: N/A;    COLONOSCOPY N/A 12/21/2017    Procedure: COLONOSCOPY;  Surgeon: Fran Escalera MD;  Location: [facility] ENDO;  Service: Endoscopy;  Laterality: N/A;    COLONOSCOPY N/A 02/02/2023    Procedure: COLONOSCOPY;  Surgeon: Anthony Meng MD;  Location: [facility] ENDO;  Service: Endoscopy;  Laterality: N/A;    FLUOROSCOPY N/A 01/21/2021    Procedure: Vascath insertion;  Surgeon: Adrian Pruitt Jr., MD;  Location: [facility] CATH LAB;  Service: General;  Laterality: N/A;    FLUOROSCOPY N/A 04/06/2021    Procedure: Over the wire Vas Cath exchange;  Surgeon: Ho Pressley MD;  Location: [facility] CATH LAB;  Service: General;  Laterality: N/A;    PROSTATE BIOPSY      thorocotomy  01/01/2010         Family History:  Family History   Problem Relation Age of Onset    No Known Problems Mother     Diabetes Father     Hypertension Father     Hypertension Other     Diabetes Other        Social History:  Social History     Tobacco Use    Smoking status: Never    Smokeless tobacco: Never   Substance and Sexual Activity    Alcohol use: Not Currently     Comment: seldom     Drug use: No    Sexual activity: Not Currently     Partners: Female        Review of Systems     Review of Systems   Constitutional:  Negative for fever.   HENT:  Negative for sore throat.    Respiratory:  Negative for shortness of breath.    Cardiovascular:  Negative for chest pain.   Gastrointestinal:  Negative for nausea.   Genitourinary:  Positive for scrotal swelling (right) and testicular pain (right).  Negative for dysuria, hematuria, penile discharge and penile pain.   Musculoskeletal:  Negative for back pain.   Skin:  Negative for rash.   Neurological:  Negative for weakness.   Hematological:  Does not bruise/bleed easily.   All other systems reviewed and are negative.     Physical Exam     Initial Vitals [04/06/23 1603]   BP Pulse Resp Temp SpO2   138/81 93 20 97.9 °F (36.6 °C) 96 %      MAP       --          Physical Exam   Nursing Notes and Vital Signs Reviewed.  Constitutional: Patient is in no acute distress. Well-developed and well-nourished.  Head: Atraumatic. Normocephalic.  Eyes: PERRL. EOM intact. Conjunctivae are not pale. No scleral icterus.  ENT: Mucous membranes are moist. Oropharynx is clear and symmetric.    Neck: Supple. Full ROM. No lymphadenopathy.  Cardiovascular: Regular rate. Regular rhythm. No murmurs, rubs, or gallops. Distal pulses are 2+ and symmetric.  Pulmonary/Chest: No respiratory distress. Clear to auscultation bilaterally. No wheezing or rales.  Abdominal: Soft and non-distended.  There is no tenderness.  No rebound, guarding, or rigidity. Good bowel sounds.  : External inspection is normal.  Penis is uncircumcised. No erythema, rash, or lesions. No penile discharge. Right testicle tenderness and swelling. No masses or hernias around the scrotum, testicles, or inguinal canal.  Musculoskeletal: Moves all extremities. No obvious deformities. No edema. No calf tenderness.  Skin: Warm and dry.  Neurological:  Alert, awake, and appropriate.  Normal speech.  No acute focal neurological deficits are appreciated.  Psychiatric: Normal affect. Good eye contact. Appropriate in content.     ED Course   Procedures  ED Vital Signs:  Vitals:    04/06/23 1603 04/06/23 2053 04/06/23 2111 04/06/23 2142   BP: 138/81 132/84  136/82   Pulse: 93 90  88   Resp: 20 20 18 18   Temp: 97.9 °F (36.6 °C) 98 °F (36.7 °C)  98.1 °F (36.7 °C)   TempSrc: Oral Oral  Oral   SpO2: 96% 97%  97%   Weight: 116 kg (255  lb 11.7 oz)          Abnormal Lab Results:  Labs Reviewed   CBC W/ AUTO DIFFERENTIAL - Abnormal; Notable for the following components:       Result Value    RBC 3.69 (*)     Hemoglobin 10.0 (*)     Hematocrit 31.7 (*)     MCHC 31.5 (*)     RDW 15.9 (*)     Immature Granulocytes 1.9 (*)     Gran # (ANC) 9.6 (*)     Immature Grans (Abs) 0.24 (*)     Mono # 1.1 (*)     Gran % 75.8 (*)     Lymph % 9.9 (*)     All other components within normal limits   COMPREHENSIVE METABOLIC PANEL - Abnormal; Notable for the following components:    CO2 21 (*)     BUN 43 (*)     Creatinine 9.1 (*)     Albumin 3.2 (*)     eGFR 6 (*)     All other components within normal limits   URINALYSIS, REFLEX TO URINE CULTURE - Abnormal; Notable for the following components:    Color, UA Colorless (*)     Protein, UA 2+ (*)     Occult Blood UA 1+ (*)     All other components within normal limits    Narrative:     Specimen Source->Urine   URINALYSIS MICROSCOPIC    Narrative:     Specimen Source->Urine        All Lab Results:  Results for orders placed or performed during the hospital encounter of 04/06/23   CBC auto differential   Result Value Ref Range    WBC 12.68 3.90 - 12.70 K/uL    RBC 3.69 (L) 4.60 - 6.20 M/uL    Hemoglobin 10.0 (L) 14.0 - 18.0 g/dL    Hematocrit 31.7 (L) 40.0 - 54.0 %    MCV 86 82 - 98 fL    MCH 27.1 27.0 - 31.0 pg    MCHC 31.5 (L) 32.0 - 36.0 g/dL    RDW 15.9 (H) 11.5 - 14.5 %    Platelets 369 150 - 450 K/uL    MPV 9.6 9.2 - 12.9 fL    Immature Granulocytes 1.9 (H) 0.0 - 0.5 %    Gran # (ANC) 9.6 (H) 1.8 - 7.7 K/uL    Immature Grans (Abs) 0.24 (H) 0.00 - 0.04 K/uL    Lymph # 1.3 1.0 - 4.8 K/uL    Mono # 1.1 (H) 0.3 - 1.0 K/uL    Eos # 0.4 0.0 - 0.5 K/uL    Baso # 0.10 0.00 - 0.20 K/uL    nRBC 0 0 /100 WBC    Gran % 75.8 (H) 38.0 - 73.0 %    Lymph % 9.9 (L) 18.0 - 48.0 %    Mono % 8.8 4.0 - 15.0 %    Eosinophil % 2.8 0.0 - 8.0 %    Basophil % 0.8 0.0 - 1.9 %    Differential Method Automated    Comprehensive metabolic panel    Result Value Ref Range    Sodium 141 136 - 145 mmol/L    Potassium 4.2 3.5 - 5.1 mmol/L    Chloride 106 95 - 110 mmol/L    CO2 21 (L) 23 - 29 mmol/L    Glucose 86 70 - 110 mg/dL    BUN 43 (H) 8 - 23 mg/dL    Creatinine 9.1 (H) 0.5 - 1.4 mg/dL    Calcium 9.6 8.7 - 10.5 mg/dL    Total Protein 7.4 6.0 - 8.4 g/dL    Albumin 3.2 (L) 3.5 - 5.2 g/dL    Total Bilirubin 0.6 0.1 - 1.0 mg/dL    Alkaline Phosphatase 66 55 - 135 U/L    AST 19 10 - 40 U/L    ALT 14 10 - 44 U/L    Anion Gap 14 8 - 16 mmol/L    eGFR 6 (A) >60 mL/min/1.73 m^2   Urinalysis, Reflex to Urine Culture Urine, Clean Catch    Specimen: Urine   Result Value Ref Range    Specimen UA Urine, Clean Catch     Color, UA Colorless (A) Yellow, Straw, Hazel    Appearance, UA Clear Clear    pH, UA 7.0 5.0 - 8.0    Specific Gravity, UA 1.010 1.005 - 1.030    Protein, UA 2+ (A) Negative    Glucose, UA Negative Negative    Ketones, UA Negative Negative    Bilirubin (UA) Negative Negative    Occult Blood UA 1+ (A) Negative    Nitrite, UA Negative Negative    Urobilinogen, UA Negative <2.0 EU/dL    Leukocytes, UA Negative Negative   Urinalysis Microscopic   Result Value Ref Range    RBC, UA 0 0 - 4 /hpf    WBC, UA 1 0 - 5 /hpf    Bacteria None None-Occ /hpf    Hyaline Casts, UA 0 0-1/lpf /lpf    Microscopic Comment SEE COMMENT      *Note: Due to a large number of results and/or encounters for the requested time period, some results have not been displayed. A complete set of results can be found in Results Review.         Imaging Results:  Imaging Results               US Scrotum And Testicles (Final result)  Result time 04/06/23 18:48:28      Final result by Vishal Tafoya MD (04/06/23 18:48:28)                   Impression:      Findings concerning for right epididymo-orchitis.    Moderate right hydrocele.    This report was flagged in Epic as abnormal.      Electronically signed by: Vishal Tafoya  Date:    04/06/2023  Time:    18:48               Narrative:     EXAMINATION:  US SCROTUM AND TESTICLES    CLINICAL HISTORY:  Testicular pain, unspecified    TECHNIQUE:  Sonography of the scrotum and testes.    COMPARISON:  Multiple priors.    FINDINGS:  Right Testicle:    *Size: 5.6 x 3.4 x 3.8 cm  *Appearance: Normal.  *Flow: Increased vascular flow.  *Epididymis: Increased vascularity  *Hydrocele: Medium  *Varicocele: None.  .    Left Testicle:    *Size: 4.3 x 2.3 x 3.4 cm  *Appearance: Normal.  *Flow: Normal arterial and venous flow  *Epididymis: 6 mm epididymal head cyst.  *Hydrocele: None.  *Varicocele: None.  .    Other findings: None.                                       The Emergency Provider reviewed the vital signs and test results, which are outlined above.     ED Discussion       8:56 PM: Reassessed pt at this time. Discussed with pt all pertinent ED information and results. Discussed pt dx and plan of tx. Gave pt all f/u and return to the ED instructions. All questions and concerns were addressed at this time. Pt expresses understanding of information and instructions, and is comfortable with plan to discharge. Pt is stable for discharge.    I discussed with patient and/or family/caretaker that evaluation in the ED does not suggest any emergent or life threatening medical conditions requiring immediate intervention beyond what was provided in the ED, and I believe patient is safe for discharge.  Regardless, an unremarkable evaluation in the ED does not preclude the development or presence of a serious of life threatening condition. As such, patient was instructed to return immediately for any worsening or change in current symptoms.         Medical Decision Making:   Clinical Tests:   Lab Tests: Ordered and Reviewed  Radiological Study: Ordered and Reviewed         ED Medication(s):  Medications   HYDROcodone-acetaminophen 5-325 mg per tablet 1 tablet (1 tablet Oral Given 4/6/23 2111)       Discharge Medication List as of 4/6/2023  8:53 PM        START taking these  medications    Details   HYDROcodone-acetaminophen (NORCO) 5-325 mg per tablet Take 1 tablet by mouth every 6 (six) hours as needed for Pain., Starting Thu 4/6/2023, Print              Follow-up Information       Adrian Nugent MD In 4 days.    Specialty: Urology  Contact information:  [address] [place] [id number] [phone]               [facility] - Emergency Dept..    Specialty: Emergency Medicine  Why: As needed, If symptoms worsen  Contact information:  [address]  [facility] [address] [phone]                               Scribe Attestation:   Scribe #1: I performed the above scribed service and the documentation accurately describes the services I performed. I attest to the accuracy of the note.     Attending:   Physician Attestation Statement for Scribe #1: I, John Edmonds MD, personally performed the services described in this documentation, as scribed by Cirilo Hardin, in my presence, and it is both accurate and complete.           Clinical Impression       ICD-10-CM ICD-9-CM   1. Testicular pain  N50.819 608.9   2. Right testicular pain  N50.811 608.9   3. Orchitis and epididymitis  N45.3 604.90       Disposition:   Disposition: Discharged  Condition: Stable       John Edmonds MD  04/07/23 0539

## 2023-04-11 ENCOUNTER — LAB VISIT (OUTPATIENT)
Dept: LAB | Facility: HOSPITAL | Age: 68
End: 2023-04-11
Payer: COMMERCIAL

## 2023-04-11 DIAGNOSIS — Z76.82 AWAITING ORGAN TRANSPLANT STATUS: ICD-10-CM

## 2023-04-12 ENCOUNTER — TELEPHONE (OUTPATIENT)
Dept: PULMONOLOGY | Facility: CLINIC | Age: 68
End: 2023-04-12
Payer: COMMERCIAL

## 2023-04-12 PROCEDURE — 94762 PR NONINVASV OXYGEN SATUR, CONT: ICD-10-PCS | Mod: NTX,S$GLB,, | Performed by: INTERNAL MEDICINE

## 2023-04-12 PROCEDURE — 94762 N-INVAS EAR/PLS OXIMTRY CONT: CPT | Mod: NTX,S$GLB,, | Performed by: INTERNAL MEDICINE

## 2023-04-12 NOTE — TELEPHONE ENCOUNTER
Called patient about overnight study, patient stated that they will return pulse oximetry tomorrow (4/13).

## 2023-04-13 NOTE — PROCEDURES
[address] [facility] [address] * [place] [address]  Telephone: [phone]  Test date: 23 Start: 23 21:09:17 Isidro Alves  Doctor: Dr. Crockett End: 23 04:54:17 [id number]  Oximetry: Summary Report  Comments: room air.  Recording time: 07:45:00 Highest pulse: 94 Highest SpO2: 97%  Excluded samplin:06:04 Lowest pulse: 59 Lowest SpO2: 72%  Total valid samplin:38:56 Mean pulse: 76 Mean SpO2: 88.3%  1 S.D.: 5.7 1 S.D.: 3.6  Time with SpO2<90: 4:39:12, 60.8%  Time with SpO2<80: 0:09:32, 2.1%  Time with SpO2<70: 0:00:00, 0.0%  Time with SpO2<60: 0:00:00, 0.0%  Time with SpO2<89: 3:49:52, 50.1%  Time with SpO2 =>90: 2:59:44, 39.2%  Time with SpO2=>80 & <90: 4:29:40, 58.8%  Time with SpO2=>70 & <80: 0:09:32, 2.1%  Time with SpO2=>60 & <70: 0:00:00, 0.0%  The longest continuous time with saturation <=88 was 00:13:32, which started at  23 00:51:57.  A desaturation event was defined as a decrease of saturation by 4 or more.  2 events were excluded due to artifact.  There were no desaturation events over 3 minutes duration.  There were 633 desaturation events of less than 3 minutes duration during which:  The mean high was 91.9%. The mean low was 85.1%.  The number of these events that were:  > 0 & <10 seconds: 28 > 0 seconds: 633  =>10 & <20 seconds: 451 =>10 seconds: 605  =>20 & <30 seconds: 63 =>20 seconds: 154  =>30 & <40 seconds: 10 =>30 seconds: 91  =>40 & <50 seconds: 33 =>40 seconds: 81  =>50 & <60 seconds: 6 =>50 seconds: 48  =>60 seconds: 42 =>60 seconds: 42  The mean length of desaturation events that were >=10 sec & <=3 mins was: 22.2 sec.  Desaturation event index (events >=10 sec per sampled hour): 79.1  Desaturation event index (events >= 0 sec per sampled hour): 82.8  [facility] -    Overnight Oxygen Saturation Study:    INTERPRETATION:  Conditions of Test: Noted above in report    Interpretation of results of overnight oxygen saturation study.  This was a technically  adequate  study.  Overnight oxygen saturation study is abnormal with O2 saturation less than 89%.   Time with SpO2<89: 3:49:52, 50.1% of the study period. Lowest oxygen saturation recorded was 72% .    Based on the above information patient meets criteria for oxygen prescription.  Clinical correlation suggested.  Lul Crockett MD    Medicare Criteria Comments:   Overnight Oximetry test results suggest the patient does fall under Medicare Group 1 Criteria and would be eligible for oxygen prescription.   (Arterial oxygen saturation at or below 88% for at least 5 minutes taken during sleep on stable outpatient)    Details about Medicare Group Criteria coverage can be found at http://www.cms.hhs.gov/manuals/downloads/

## 2023-04-14 ENCOUNTER — PATIENT MESSAGE (OUTPATIENT)
Dept: PULMONOLOGY | Facility: CLINIC | Age: 68
End: 2023-04-14
Payer: COMMERCIAL

## 2023-04-14 DIAGNOSIS — J41.0 SIMPLE CHRONIC BRONCHITIS: ICD-10-CM

## 2023-04-14 DIAGNOSIS — G47.34 NOCTURNAL HYPOXEMIA: Primary | ICD-10-CM

## 2023-04-14 NOTE — TELEPHONE ENCOUNTER
Subjective:     Patient ID: Isidro Alves is a 67 y.o. male.    Chief Complaint:      HPI   On dialysis for about a year  Scheduled for vascular access surgery       Cough  Patient complains of nasal congestion, nonproductive cough, productive cough and productive cough with sputum described as clear. Symptoms began 2 years ago. Symptoms have been unchanged since that time.The cough is nocturnal and paroxysmal and is aggravated by nothing. Associated symptoms include: postnasal drip. Patient does not have new pets. Patient does have a history of asthma. Patient does not have a history of environmental allergens. Patient has not traveled recently. Patient does not have a history of smoking. Patient has had a previous chest x-ray. Patient has had a PPD done.    Hx of COVID pneumonia  COVID 19 pneumonia - discharged on oxygen on 1/27/2021  Now on dialysis    Past Medical History:   Diagnosis Date    Abnormal nuclear stress test 8/30/2022    Acute hypoxemic respiratory failure due to COVID-19 1/21/2021    Anal itching 10/10/2022    Anemia 4/16/2014    BPH (benign prostatic hyperplasia)     CKD (chronic kidney disease) stage 4, GFR 15-29 ml/min     Coronary artery disease of native artery of native heart with stable angina pectoris 4/29/2019    Diabetes mellitus     Diabetes mellitus, type 2     Dilated cardiomyopathy 8/30/2022    Elevated PSA     Encounter for blood transfusion     Herpes labialis     Hyperlipidemia     Hypertension     Obesity     Pneumonia due to COVID-19 virus     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 CATH LAB;  Service: Cardiology;   "Laterality: N/A;    COLONOSCOPY N/A 12/21/2017    Procedure: COLONOSCOPY;  Surgeon: Fran Escalera MD;  Location: [facility] ENDO;  Service: Endoscopy;  Laterality: N/A;    COLONOSCOPY N/A 02/02/2023    Procedure: COLONOSCOPY;  Surgeon: Anthony Meng MD;  Location: [facility] ENDO;  Service: Endoscopy;  Laterality: N/A;    FLUOROSCOPY N/A 01/21/2021    Procedure: Vascath insertion;  Surgeon: Adrian Pruitt Jr., MD;  Location: [facility] CATH LAB;  Service: General;  Laterality: N/A;    FLUOROSCOPY N/A 04/06/2021    Procedure: Over the wire Vas Cath exchange;  Surgeon: Ho Pressley MD;  Location: [facility] CATH LAB;  Service: General;  Laterality: N/A;    PROSTATE BIOPSY      thorocotomy  01/01/2010     Review of patient's allergies indicates:   Allergen Reactions    Bactrim [sulfamethoxazole-trimethoprim] Swelling    Nsaids (non-steroidal anti-inflammatory drug)      CKD    Sulfamethoxazole     Trimethoprim      Current Outpatient Medications on File Prior to Visit   Medication Sig Dispense Refill    acyclovir (ZOVIRAX) 400 MG tablet TAKE 1 TABLET (400 MG TOTAL) BY MOUTH 2 (TWO) TIMES DAILY. ONE TABLET 60 tablet 3    aspirin (ECOTRIN) 81 MG EC tablet Take 1 tablet (81 mg total) by mouth once daily.      atorvastatin (LIPITOR) 40 MG tablet TAKE 1 TABLET BY MOUTH EVERY DAY 90 tablet 3    BD INSULIN SYRINGE HALF UNIT 0.3 mL 31 gauge x 5/16" Syrg FOR USE WITH INSULIN. 100 Syringe 1    blood sugar diagnostic Strp 1 each by Misc.(Non-Drug; Combo Route) route 4 (four) times daily. 100 each 11    blood-glucose meter kit Test finger stick blood sugar once daily. 1 each 0    calcitRIOL (ROCALTROL) 0.25 MCG Cap TAKE 1 CAPSULE (0.25 MCG TOTAL) BY MOUTH EVERY MON, WED, FRI. 15 capsule 11    carvediloL (COREG) 12.5 MG tablet Take 1 tablet (12.5 mg total) by mouth 2 (two) times daily with meals. 60 tablet 11    cetirizine (ZYRTEC) 10 MG tablet Take 1 tablet (10 mg total) by mouth once daily. 30 tablet 1    esomeprazole (NEXIUM) 40 MG " "capsule Take 40 mg by mouth once daily.      finasteride (PROSCAR) 5 mg tablet Take 1 tablet (5 mg total) by mouth once daily. 90 tablet 3    fluticasone propionate (FLONASE) 50 mcg/actuation nasal spray 2 sprays (100 mcg total) by Each Nostril route every evening. For nasal congestion or runny nose 16 g 1    folic acid (FOLVITE) 1 MG tablet TAKE 1 TABLET BY MOUTH EVERY DAY 30 tablet 9    furosemide (LASIX) 80 MG tablet Take 1 tablet (80 mg total) by mouth once daily. 30 tablet 6    hydrALAZINE (APRESOLINE) 100 MG tablet Take 1 tablet (100 mg total) by mouth every 8 (eight) hours. 90 tablet 11    inhalation spacing device (VORTEX HOLDING CHAMBER) Use as directed for inhalation. 1 Device prn    insulin degludec (TRESIBA FLEXTOUCH U-200) 200 unit/mL (3 mL) insulin pen Inject 30 Units into the skin once daily. 2 pen 8    isosorbide dinitrate (ISORDIL) 20 MG tablet Take 1 tablet (20 mg total) by mouth 3 (three) times daily. 90 tablet 11    lancets Misc 1 each by Misc.(Non-Drug; Combo Route) route 4 (four) times daily. 100 each 11    multivitamin (THERAGRAN) tablet Take 1 tablet by mouth once daily.      NIFEdipine (PROCARDIA-XL) 90 MG (OSM) 24 hr tablet Take 1 tablet (90 mg total) by mouth once daily. 30 tablet 5    pen needle, diabetic (BD ULTRA-FINE SHIRA PEN NEEDLE) 32 gauge x 5/32" Ndle 1 each by Misc.(Non-Drug; Combo Route) route 4 (four) times daily with meals and nightly. 100 each 11    sacubitriL-valsartan (ENTRESTO)  mg per tablet Take 1 tablet by mouth 2 (two) times daily. 60 tablet 11    sevelamer carbonate (RENVELA) 800 mg Tab Take 3 tablets (2,400 mg total) by mouth 3 (three) times daily with meals. 300 tablet 11    tamsulosin (FLOMAX) 0.4 mg Cap TAKE 1 CAPSULE BY MOUTH EVERY DAY 90 capsule 3    olopatadine (PATADAY) 0.2 % Drop Place 1 drop into both eyes daily as needed. 2.5 mL 0     No current facility-administered medications on file prior to visit.     Social History     Socioeconomic History    " "Marital status:     Number of children: 3   Occupational History     Employer: self-rmployed   Tobacco Use    Smoking status: Never    Smokeless tobacco: Never   Substance and Sexual Activity    Alcohol use: Not Currently     Comment: seldom     Drug use: No    Sexual activity: Not Currently     Partners: Female   Social History Narrative    ** Merged History Encounter **          Family History   Problem Relation Age of Onset    No Known Problems Mother     Diabetes Father     Hypertension Father     Hypertension Other     Diabetes Other        Review of Systems   Constitutional:  Positive for fatigue.   HENT:  Positive for congestion.    Respiratory:  Positive for cough, shortness of breath, dyspnea on extertion and use of rescue inhaler.    Musculoskeletal:  Positive for arthralgias.     Objective:      BP (!) 140/92   Pulse 96   Resp 18   Ht 6' 2" (1.88 m)   Wt 116.7 kg (257 lb 6.2 oz)   SpO2 97%   BMI 33.05 kg/m²   Physical Exam  Vitals and nursing note reviewed.   Constitutional:       Appearance: He is well-developed.   HENT:      Head: Normocephalic and atraumatic.      Nose: Congestion present.   Eyes:      Conjunctiva/sclera: Conjunctivae normal.      Pupils: Pupils are equal, round, and reactive to light.   Neck:      Thyroid: No thyromegaly.      Vascular: No JVD.      Trachea: No tracheal deviation.   Cardiovascular:      Rate and Rhythm: Normal rate and regular rhythm.      Heart sounds: Murmur heard.   Systolic murmur is present with a grade of 2/6.   Pulmonary:      Breath sounds: Examination of the right-lower field reveals wheezing. Examination of the left-lower field reveals wheezing. Decreased breath sounds, wheezing and rales present. No rhonchi.   Abdominal:      General: Bowel sounds are normal.      Palpations: Abdomen is soft.   Musculoskeletal:         General: No tenderness. Normal range of motion.      Cervical back: Neck supple.   Lymphadenopathy:      Cervical: No cervical " adenopathy.   Skin:     General: Skin is warm and dry.   Neurological:      Mental Status: He is alert and oriented to person, place, and time.     Personal Diagnostic Review  Chest x-ray: vas chat on the right, mild interstitil changes persist    Echo  · The left ventricle is moderately enlarged with eccentric hypertrophy and   mildly decreased systolic function.  · The estimated ejection fraction is 40%.  · Grade I left ventricular diastolic dysfunction.  · Normal right ventricular size with low normal right ventricular systolic   function.  · Mild left atrial enlargement.  · Mild-to-moderate mitral regurgitation.  · Mild tricuspid regurgitation.  · Normal central venous pressure (3 mmHg).  · The estimated PA systolic pressure is 33 mmHg.  · There is mild left ventricular global hypokinesis.         Office Spirometry Results:     No flowsheet data found.  Pulmonary Studies Review 3/28/2023   SpO2 97   Height 74   Weight 4118.19   BMI (Calculated) 33   Predicted Distance 343.83   Predicted Distance Meters (Calculated) 577.36         Assessment:            Nocturnal hypoxemia  -     PULSE OXIMETRY OVERNIGHT; Future; Expected date: 03/28/2023    Post viral asthma  -     promethazine-dextromethorphan (PROMETHAZINE-DM) 6.25-15 mg/5 mL Syrp; Take 5 mLs by mouth 4 (four) times daily as needed (cough).  Dispense: 240 mL; Refill: 5  -     albuterol (PROVENTIL/VENTOLIN HFA) 90 mcg/actuation inhaler; Inhale 2 puffs into the lungs every 4 (four) hours as needed for Wheezing or Shortness of Breath.  Dispense: 18 g; Refill: 11  -     fluticasone-salmeterol diskus inhaler 500-50 mcg; Inhale 1 puff into the lungs 2 (two) times daily. Controller.Wash out mouth after use  Dispense: 60 each; Refill: 11  -     albuterol (PROVENTIL) 2.5 mg /3 mL (0.083 %) nebulizer solution; Take 3 mLs (2.5 mg total) by nebulization every 4 to 6 hours as needed for Wheezing or Shortness of Breath.  Dispense: 360 mL; Refill: 11  -     NEBULIZER KIT  "(SUPPLIES) FOR HOME USE  -     NEBULIZER FOR HOME USE    Chronic cough    Asthma, cough variant  -     promethazine-dextromethorphan (PROMETHAZINE-DM) 6.25-15 mg/5 mL Syrp; Take 5 mLs by mouth 4 (four) times daily as needed (cough).  Dispense: 240 mL; Refill: 5  -     albuterol (PROVENTIL/VENTOLIN HFA) 90 mcg/actuation inhaler; Inhale 2 puffs into the lungs every 4 (four) hours as needed for Wheezing or Shortness of Breath.  Dispense: 18 g; Refill: 11  -     fluticasone-salmeterol diskus inhaler 500-50 mcg; Inhale 1 puff into the lungs 2 (two) times daily. Controller.Wash out mouth after use  Dispense: 60 each; Refill: 11  -     albuterol (PROVENTIL) 2.5 mg /3 mL (0.083 %) nebulizer solution; Take 3 mLs (2.5 mg total) by nebulization every 4 to 6 hours as needed for Wheezing or Shortness of Breath.  Dispense: 360 mL; Refill: 11  -     NEBULIZER KIT (SUPPLIES) FOR HOME USE  -     NEBULIZER FOR HOME USE    Simple chronic bronchitis  -     Stress test, pulmonary; Future; Expected date: 03/28/2023    Exercise hypoxemia  -     Stress test, pulmonary; Future; Expected date: 03/28/2023          Outpatient Encounter Medications as of 3/28/2023   Medication Sig Dispense Refill    acyclovir (ZOVIRAX) 400 MG tablet TAKE 1 TABLET (400 MG TOTAL) BY MOUTH 2 (TWO) TIMES DAILY. ONE TABLET 60 tablet 3    aspirin (ECOTRIN) 81 MG EC tablet Take 1 tablet (81 mg total) by mouth once daily.      atorvastatin (LIPITOR) 40 MG tablet TAKE 1 TABLET BY MOUTH EVERY DAY 90 tablet 3    BD INSULIN SYRINGE HALF UNIT 0.3 mL 31 gauge x 5/16" Syrg FOR USE WITH INSULIN. 100 Syringe 1    blood sugar diagnostic Strp 1 each by Misc.(Non-Drug; Combo Route) route 4 (four) times daily. 100 each 11    blood-glucose meter kit Test finger stick blood sugar once daily. 1 each 0    calcitRIOL (ROCALTROL) 0.25 MCG Cap TAKE 1 CAPSULE (0.25 MCG TOTAL) BY MOUTH EVERY MON, WED, FRI. 15 capsule 11    carvediloL (COREG) 12.5 MG tablet Take 1 tablet (12.5 mg total) by " "mouth 2 (two) times daily with meals. 60 tablet 11    cetirizine (ZYRTEC) 10 MG tablet Take 1 tablet (10 mg total) by mouth once daily. 30 tablet 1    [] ciprofloxacin HCl (CILOXAN) 0.3 % ophthalmic solution Place 4 drops into the left ear 2 (two) times a day. for 5 days 10 mL 0    esomeprazole (NEXIUM) 40 MG capsule Take 40 mg by mouth once daily.      finasteride (PROSCAR) 5 mg tablet Take 1 tablet (5 mg total) by mouth once daily. 90 tablet 3    fluticasone propionate (FLONASE) 50 mcg/actuation nasal spray 2 sprays (100 mcg total) by Each Nostril route every evening. For nasal congestion or runny nose 16 g 1    folic acid (FOLVITE) 1 MG tablet TAKE 1 TABLET BY MOUTH EVERY DAY 30 tablet 9    furosemide (LASIX) 80 MG tablet Take 1 tablet (80 mg total) by mouth once daily. 30 tablet 6    hydrALAZINE (APRESOLINE) 100 MG tablet Take 1 tablet (100 mg total) by mouth every 8 (eight) hours. 90 tablet 11    inhalation spacing device (VORTEX HOLDING CHAMBER) Use as directed for inhalation. 1 Device prn    insulin degludec (TRESIBA FLEXTOUCH U-200) 200 unit/mL (3 mL) insulin pen Inject 30 Units into the skin once daily. 2 pen 8    isosorbide dinitrate (ISORDIL) 20 MG tablet Take 1 tablet (20 mg total) by mouth 3 (three) times daily. 90 tablet 11    lancets Misc 1 each by Misc.(Non-Drug; Combo Route) route 4 (four) times daily. 100 each 11    multivitamin (THERAGRAN) tablet Take 1 tablet by mouth once daily.      NIFEdipine (PROCARDIA-XL) 90 MG (OSM) 24 hr tablet Take 1 tablet (90 mg total) by mouth once daily. 30 tablet 5    pen needle, diabetic (BD ULTRA-FINE SHIRA PEN NEEDLE) 32 gauge x 5/32" Ndle 1 each by Misc.(Non-Drug; Combo Route) route 4 (four) times daily with meals and nightly. 100 each 11    sacubitriL-valsartan (ENTRESTO)  mg per tablet Take 1 tablet by mouth 2 (two) times daily. 60 tablet 11    sevelamer carbonate (RENVELA) 800 mg Tab Take 3 tablets (2,400 mg total) by mouth 3 (three) times daily " with meals. 300 tablet 11    tamsulosin (FLOMAX) 0.4 mg Cap TAKE 1 CAPSULE BY MOUTH EVERY DAY 90 capsule 3    albuterol (PROVENTIL) 2.5 mg /3 mL (0.083 %) nebulizer solution Take 3 mLs (2.5 mg total) by nebulization every 4 to 6 hours as needed for Wheezing or Shortness of Breath. 360 mL 11    albuterol (PROVENTIL/VENTOLIN HFA) 90 mcg/actuation inhaler Inhale 2 puffs into the lungs every 4 (four) hours as needed for Wheezing or Shortness of Breath. 18 g 11    fluticasone-salmeterol diskus inhaler 500-50 mcg Inhale 1 puff into the lungs 2 (two) times daily. Controller.Wash out mouth after use 60 each 11    olopatadine (PATADAY) 0.2 % Drop Place 1 drop into both eyes daily as needed. 2.5 mL 0    promethazine-dextromethorphan (PROMETHAZINE-DM) 6.25-15 mg/5 mL Syrp Take 5 mLs by mouth 4 (four) times daily as needed (cough). 240 mL 5     No facility-administered encounter medications on file as of 3/28/2023.     Plan:       Requested Prescriptions     Signed Prescriptions Disp Refills    promethazine-dextromethorphan (PROMETHAZINE-DM) 6.25-15 mg/5 mL Syrp 240 mL 5     Sig: Take 5 mLs by mouth 4 (four) times daily as needed (cough).    albuterol (PROVENTIL/VENTOLIN HFA) 90 mcg/actuation inhaler 18 g 11     Sig: Inhale 2 puffs into the lungs every 4 (four) hours as needed for Wheezing or Shortness of Breath.    fluticasone-salmeterol diskus inhaler 500-50 mcg 60 each 11     Sig: Inhale 1 puff into the lungs 2 (two) times daily. Controller.Wash out mouth after use    albuterol (PROVENTIL) 2.5 mg /3 mL (0.083 %) nebulizer solution 360 mL 11     Sig: Take 3 mLs (2.5 mg total) by nebulization every 4 to 6 hours as needed for Wheezing or Shortness of Breath.     Problem List Items Addressed This Visit       Chronic cough     Other Visit Diagnoses       Nocturnal hypoxemia    -  Primary    Relevant Orders    PULSE OXIMETRY OVERNIGHT    Post viral asthma        Relevant Medications    promethazine-dextromethorphan  (PROMETHAZINE-DM) 6.25-15 mg/5 mL Syrp    albuterol (PROVENTIL/VENTOLIN HFA) 90 mcg/actuation inhaler    fluticasone-salmeterol diskus inhaler 500-50 mcg    albuterol (PROVENTIL) 2.5 mg /3 mL (0.083 %) nebulizer solution    Other Relevant Orders    NEBULIZER KIT (SUPPLIES) FOR HOME USE    NEBULIZER FOR HOME USE    Asthma, cough variant        Relevant Medications    promethazine-dextromethorphan (PROMETHAZINE-DM) 6.25-15 mg/5 mL Syrp    albuterol (PROVENTIL/VENTOLIN HFA) 90 mcg/actuation inhaler    fluticasone-salmeterol diskus inhaler 500-50 mcg    albuterol (PROVENTIL) 2.5 mg /3 mL (0.083 %) nebulizer solution    Other Relevant Orders    NEBULIZER KIT (SUPPLIES) FOR HOME USE    NEBULIZER FOR HOME USE    Simple chronic bronchitis        Relevant Orders    Stress test, pulmonary    Exercise hypoxemia        Relevant Orders    Stress test, pulmonary             Follow up in about 3 months (around 6/28/2023) for Overnight O2 Sat ASAP, 6 min walk - on return.    MEDICAL DECISION MAKING: Moderate to high complexity.  Overall, the multiple problems listed are of moderate to high severity that may impact quality of life and activities of daily living. Side effects of medications, treatment plan as well as options and alternatives reviewed and discussed with patient. There was counseling of patient concerning these issues.    Total time spent in counseling and coordination of care - 40  minutes of total time spent on the encounter, which includes face to face time and non-face to face time preparing to see the patient (eg, review of tests), Obtaining and/or reviewing separately obtained history, Documenting clinical information in the electronic or other health record, Independently interpreting results (not separately reported) and communicating results to the patient/family/caregiver, or Care coordination (not separately reported).    Time was used in discussion of prognosis, risks, benefits of treatment, instructions  and compliance with regimen . Discussion with other physicians and/or health care providers - home health or for use of durable medical equipment (oxygen, nebulizers, CPAP, BiPAP) occurred.

## 2023-04-17 ENCOUNTER — TELEPHONE (OUTPATIENT)
Dept: UROLOGY | Facility: CLINIC | Age: 68
End: 2023-04-17
Payer: COMMERCIAL

## 2023-04-17 NOTE — TELEPHONE ENCOUNTER
Attempted to call pt twice, to inform him that I did schedule him a sooner appt with the NP. But no answer.  ----- Message from Lesa Andrade sent at 4/17/2023  4:19 PM CDT -----  Contact: Isidro  .Type:  Sooner Apoointment Request    Caller is requesting a sooner appointment.  Caller declined first available appointment listed below.  Caller will not accept being placed on the waitlist and is requesting a message be sent to doctor.  Name of Caller:Isidro  When is the first available appointment?06/01/2023  Symptoms:follow up problem is worse on rt testicle  Would the patient rather a call back or a response via [facility]? call  Best Call Back Number:[phone]  Additional Information: patient trying to come in tomorrow

## 2023-04-19 ENCOUNTER — TELEPHONE (OUTPATIENT)
Dept: UROLOGY | Facility: CLINIC | Age: 68
End: 2023-04-19
Payer: COMMERCIAL

## 2023-04-20 LAB — HPRA INTERPRETATION: NORMAL

## 2023-04-24 ENCOUNTER — TELEPHONE (OUTPATIENT)
Dept: PULMONOLOGY | Facility: CLINIC | Age: 68
End: 2023-04-24
Payer: COMMERCIAL

## 2023-04-24 NOTE — TELEPHONE ENCOUNTER
----- Message from Claribel Caal sent at 4/24/2023 10:29 AM CDT -----  Contact: tima  Patient is calling to speak with the nurse regarding questions and concerns. Reports getting a letter about getting some oxygen and needs to speak with the nurse. Please give the patient a call back at .[phone]   Thanks sharon

## 2023-05-03 ENCOUNTER — OFFICE VISIT (OUTPATIENT)
Dept: UROLOGY | Facility: CLINIC | Age: 68
End: 2023-05-03
Payer: COMMERCIAL

## 2023-05-03 VITALS
HEART RATE: 82 BPM | BODY MASS INDEX: 32.89 KG/M2 | RESPIRATION RATE: 18 BRPM | SYSTOLIC BLOOD PRESSURE: 121 MMHG | WEIGHT: 256.19 LBS | DIASTOLIC BLOOD PRESSURE: 70 MMHG

## 2023-05-03 DIAGNOSIS — N45.3 ORCHITIS AND EPIDIDYMITIS: Primary | ICD-10-CM

## 2023-05-03 PROCEDURE — 1159F MED LIST DOCD IN RCRD: CPT | Mod: CPTII,NTX,S$GLB, | Performed by: NURSE PRACTITIONER

## 2023-05-03 PROCEDURE — 3288F PR FALLS RISK ASSESSMENT DOCUMENTED: ICD-10-PCS | Mod: CPTII,NTX,S$GLB, | Performed by: NURSE PRACTITIONER

## 2023-05-03 PROCEDURE — 1101F PR PT FALLS ASSESS DOC 0-1 FALLS W/OUT INJ PAST YR: ICD-10-PCS | Mod: CPTII,NTX,S$GLB, | Performed by: NURSE PRACTITIONER

## 2023-05-03 PROCEDURE — 3078F DIAST BP <80 MM HG: CPT | Mod: CPTII,NTX,S$GLB, | Performed by: NURSE PRACTITIONER

## 2023-05-03 PROCEDURE — 99214 OFFICE O/P EST MOD 30 MIN: CPT | Mod: NTX,S$GLB,, | Performed by: NURSE PRACTITIONER

## 2023-05-03 PROCEDURE — 3074F PR MOST RECENT SYSTOLIC BLOOD PRESSURE < 130 MM HG: ICD-10-PCS | Mod: CPTII,NTX,S$GLB, | Performed by: NURSE PRACTITIONER

## 2023-05-03 PROCEDURE — 1126F PR PAIN SEVERITY QUANTIFIED, NO PAIN PRESENT: ICD-10-PCS | Mod: CPTII,NTX,S$GLB, | Performed by: NURSE PRACTITIONER

## 2023-05-03 PROCEDURE — 3008F BODY MASS INDEX DOCD: CPT | Mod: CPTII,NTX,S$GLB, | Performed by: NURSE PRACTITIONER

## 2023-05-03 PROCEDURE — 4010F ACE/ARB THERAPY RXD/TAKEN: CPT | Mod: CPTII,NTX,S$GLB, | Performed by: NURSE PRACTITIONER

## 2023-05-03 PROCEDURE — 3008F PR BODY MASS INDEX (BMI) DOCUMENTED: ICD-10-PCS | Mod: CPTII,NTX,S$GLB, | Performed by: NURSE PRACTITIONER

## 2023-05-03 PROCEDURE — 3078F PR MOST RECENT DIASTOLIC BLOOD PRESSURE < 80 MM HG: ICD-10-PCS | Mod: CPTII,NTX,S$GLB, | Performed by: NURSE PRACTITIONER

## 2023-05-03 PROCEDURE — 99001 SPECIMEN HANDLING PT-LAB: CPT | Mod: PO,TXP | Performed by: NURSE PRACTITIONER

## 2023-05-03 PROCEDURE — 3288F FALL RISK ASSESSMENT DOCD: CPT | Mod: CPTII,NTX,S$GLB, | Performed by: NURSE PRACTITIONER

## 2023-05-03 PROCEDURE — 3044F HG A1C LEVEL LT 7.0%: CPT | Mod: CPTII,NTX,S$GLB, | Performed by: NURSE PRACTITIONER

## 2023-05-03 PROCEDURE — 3044F PR MOST RECENT HEMOGLOBIN A1C LEVEL <7.0%: ICD-10-PCS | Mod: CPTII,NTX,S$GLB, | Performed by: NURSE PRACTITIONER

## 2023-05-03 PROCEDURE — 1159F PR MEDICATION LIST DOCUMENTED IN MEDICAL RECORD: ICD-10-PCS | Mod: CPTII,NTX,S$GLB, | Performed by: NURSE PRACTITIONER

## 2023-05-03 PROCEDURE — 99999 PR PBB SHADOW E&M-EST. PATIENT-LVL V: ICD-10-PCS | Mod: PBBFAC,TXP,, | Performed by: NURSE PRACTITIONER

## 2023-05-03 PROCEDURE — 3074F SYST BP LT 130 MM HG: CPT | Mod: CPTII,NTX,S$GLB, | Performed by: NURSE PRACTITIONER

## 2023-05-03 PROCEDURE — 99999 PR PBB SHADOW E&M-EST. PATIENT-LVL V: CPT | Mod: PBBFAC,TXP,, | Performed by: NURSE PRACTITIONER

## 2023-05-03 PROCEDURE — 99214 PR OFFICE/OUTPT VISIT, EST, LEVL IV, 30-39 MIN: ICD-10-PCS | Mod: NTX,S$GLB,, | Performed by: NURSE PRACTITIONER

## 2023-05-03 PROCEDURE — 1101F PT FALLS ASSESS-DOCD LE1/YR: CPT | Mod: CPTII,NTX,S$GLB, | Performed by: NURSE PRACTITIONER

## 2023-05-03 PROCEDURE — 4010F PR ACE/ARB THEARPY RXD/TAKEN: ICD-10-PCS | Mod: CPTII,NTX,S$GLB, | Performed by: NURSE PRACTITIONER

## 2023-05-03 PROCEDURE — 1126F AMNT PAIN NOTED NONE PRSNT: CPT | Mod: CPTII,NTX,S$GLB, | Performed by: NURSE PRACTITIONER

## 2023-05-03 NOTE — PROGRESS NOTES
Chief Complaint:   Epididymal orchitis     HPI:   Patient is following up to epididymal orchitis.  Patient was prescribed doxycycline, switched from cefdinir.  Reports no decrease in symptoms.  Denies fever or gross hematuria.  Blood in urine is negative.  04/05/2023 Jovanna PAGAN  04/05/2023 - returns today for follow-up, began having issues with lower abdominal pain on Friday went to [facility], was prescribed cefdinir but on Sunday began having right testicular swelling and pain, notes that it only hurts when I sit on it wrong, no voiding difficulty, no overt dysuria, no gross hematuria, no foul smell     12/02/2022 - returns today for follow-up, has been doing well overall however over the last month or so has been having intermittent itching in his urethra, very little overt dysuria but still bothersome, was seen at [facility] 10/10 where a urine culture was obtained which grew out Serratia, symptoms are similar to that instance     11/30/2021 - the patient returns today for follow-up, doing well with Flomax and finasteride, on dialysis now 3 days per week, PSA stable in the low threes on finasteride(actual PSA 6), has never had a biopsy or an MRI, denies any gross hematuria or dysuria, continues to have issues with ED, wants to try Viagra     11/23/20: UA abnormal with ++leuk and blood. Good stream.  Reviewed history in detail.  Discussed hygeine.  11/21/19: Doing well no complaints, good stream on fllomax.  Reviewed history in detail.  PSA way down again.  Taking finasteride and PSA down as a result. Uses cialis 10mg with good effect for ED from a friend.  11/5/18: PSA up where it was 2 years ago.  No LUTS, no pain.  Taking flomax voiding fine.  11/16/17: Laws: PSA has gone up from 7.7 to 10.7. Feels ok off the T. Strong stream; no LUTS. Nocturia x4 but depends how much he drinks after 6 pm. The nocturia doesn't bother him. No abd/pelvic pain and no exac/rel factors.  No hematuria.  No  "urolithiasis.  BP is elevated today; he did not take his BP medication this morning.   11/10/16: Symptoms have resolved in general, PSA is down.    9/28/16: Back early with groin pain.  Describes an itching sensation that runs from the rectum to the penis, waxes/wanes, worse at night.  Was given 7d doxycycline and there was some improvement.  No caffeine.  Good urinary habits.  No skin problems.  No balanitis.  Doing fine on one flomax a day.  6/1/16: Here today with Dr. Ren's records to review.  Had a biopsy 4/15 with PSA 13.9 at the time.  Was on T by Dr. Chiu's reccs but was asymptomatic overall and felt no symptomatic change on the T.  Has ED that was improved with Cialis.  Got trimix once but wasn't comfortable with it.  05/17/16: rushing: " This is a 60-yr-old male presenting as a follow-up to TRT. Patient is currently on Axiron 30 mg ( 2 pumps /day) . Total T was 838; 5 months ago. ( H/H 14.4/46.0 ) and psa level was 4.7. Patient was to have psa level and follow-up 3 months ago related to psa level of 4.7 and did not. He does have a h/o negative prostate bx  15 months  ago per Dr. BRIGID Burt. No reported adverse side effects and energy level has greatly improved. Patient is currently on Flomax 0.8mg a day. He states he " ran out " and had an increase in nocturia 4-5 times/ night over the past 10 days. No hematuria, dysuria or flank pain. "  06/12/15: Aram; HPI: Flomax is been very helpful, nocturia now down to 3 times per night from 6 times per night with similar daytime improvement. Has been using Axiron for about 2 weeks, no adverse effects. This patient had severe deficiencies on his pretreatment labs. Denies flank pain, dysuira, hematuria . Despite his subjective improvement, his postvoid residual today in the clinic is 180 mL. Dipstick urinalysis suggested 1+ microscopic hematuria, microscopic examination of urine reveals no RBCs.  Allergies:  Bactrim [sulfamethoxazole-trimethoprim], Nsaids " (non-steroidal anti-inflammatory drug), Sulfamethoxazole, and Trimethoprim    Medications:  has a current medication list which includes the following prescription(s): acyclovir, albuterol, albuterol, aspirin, atorvastatin, bd insulin syringe (half unit), blood sugar diagnostic, blood-glucose meter, calcitriol, carvedilol, cetirizine, esomeprazole, finasteride, fluticasone propionate, fluticasone-salmeterol 500-50 mcg/dose, folic acid, furosemide, hydralazine, hydrocodone-acetaminophen, vortex holding chamber, insulin degludec, isosorbide dinitrate, lancets, multivitamin, nifedipine, pen needle, diabetic, entresto, sevelamer carbonate, tamsulosin, and olopatadine.    Review of Systems:  General: No fever, chills, fatigability, or weight loss.  Skin: No rashes, itching, or changes in color or texture of skin.  Chest: Denies [place], cyanosis, wheezing, cough, and sputum production.  Abdomen: Appetite fine. No weight loss. Denies diarrhea, abdominal pain, hematemesis, or blood in stool.  Musculoskeletal: No joint stiffness or swelling. Denies back pain.  : As above.  All other review of systems negative.    PMH:   has a past medical history of Abnormal nuclear stress test (8/30/2022), Acute hypoxemic respiratory failure due to COVID-19 (1/21/2021), Anal itching (10/10/2022), Anemia (4/16/2014), BPH (benign prostatic hyperplasia), CKD (chronic kidney disease) stage 4, GFR 15-29 ml/min, Coronary artery disease of native artery of native heart with stable angina pectoris (4/29/2019), Diabetes mellitus, Diabetes mellitus, type 2, Dilated cardiomyopathy (8/30/2022), Elevated PSA, Encounter for blood transfusion, Herpes labialis, Hyperlipidemia, Hypertension, Obesity, Pneumonia due to COVID-19 virus, Post viral asthma (4/9/2021), Preop cardiovascular exam (9/16/2022), Proteinuria, Secondary hyperparathyroidism (of renal origin), and UTI (lower urinary tract infection) (5/1/2015).    PSH:   has a past surgical history that  includes thorocotomy (01/01/2010); Prostate biopsy; Colonoscopy (N/A, 12/21/2017); Fluoroscopy (N/A, 01/21/2021); Fluoroscopy (N/A, 04/06/2021); Catheterization of both left and right heart (N/A, 08/30/2022); and Colonoscopy (N/A, 02/02/2023).    FamHx: family history includes Diabetes in his father and another family member; Hypertension in his father and another family member; No Known Problems in his mother.    SocHx:  reports that he has never smoked. He has never used smokeless tobacco. He reports that he does not currently use alcohol. He reports that he does not use drugs.      Physical Exam:  Vitals:    05/03/23 1453   BP: 121/70   Pulse: 82   Resp: 18     General: A&Ox3, no apparent distress, no deformities  Neck: No masses, normal thyroid  Lungs: normal inspiration, no use of accessory muscles  Heart: normal pulse, no arrhythmias  Abdomen: Soft, NT, ND, no masses, no hernias, no hepatosplenomegaly  Lymphatic: Neck and groin nodes negative  Skin: The skin is warm and dry. No jaundice.  Ext: No c/c/e.  : Normal uncirc'd phallus, meatus normal in size and position, right testicle swollen and tender, left normal, no abnormalities of epididymis  Labs/Studies:   Urine in clinic was negative    Impression/Plan:   Epididymal orchitis - doxycycline did not decrease symptoms   Dr. Nugent was presented with today's findings.  Repeat Scrotal ultrasound   Will proceed outlet procedure on Monday 05/08/2023

## 2023-05-04 ENCOUNTER — HOSPITAL ENCOUNTER (OUTPATIENT)
Dept: RADIOLOGY | Facility: HOSPITAL | Age: 68
Discharge: HOME | End: 2023-05-04
Attending: NURSE PRACTITIONER
Payer: COMMERCIAL

## 2023-05-04 DIAGNOSIS — N45.3 ORCHITIS AND EPIDIDYMITIS: ICD-10-CM

## 2023-05-04 PROCEDURE — 76870 US EXAM SCROTUM: CPT | Mod: 26,NTX,, | Performed by: RADIOLOGY

## 2023-05-04 PROCEDURE — 76870 US SCROTUM AND TESTICLES: ICD-10-PCS | Mod: 26,NTX,, | Performed by: RADIOLOGY

## 2023-05-04 PROCEDURE — 76870 US EXAM SCROTUM: CPT | Mod: TC,TXP

## 2023-05-06 ENCOUNTER — LAB VISIT (OUTPATIENT)
Dept: LAB | Facility: HOSPITAL | Age: 68
End: 2023-05-06
Payer: COMMERCIAL

## 2023-05-06 DIAGNOSIS — Z76.82 AWAITING ORGAN TRANSPLANT STATUS: ICD-10-CM

## 2023-05-07 ENCOUNTER — HOSPITAL ENCOUNTER (OUTPATIENT)
Facility: HOSPITAL | Age: 68
Discharge: HOME | End: 2023-05-08
Attending: STUDENT IN AN ORGANIZED HEALTH CARE EDUCATION/TRAINING PROGRAM | Admitting: FAMILY MEDICINE
Payer: COMMERCIAL

## 2023-05-07 DIAGNOSIS — J96.90 RESPIRATORY FAILURE, UNSPECIFIED CHRONICITY, UNSPECIFIED WHETHER WITH HYPOXIA OR HYPERCAPNIA: ICD-10-CM

## 2023-05-07 DIAGNOSIS — N18.6 ESRD ON DIALYSIS: ICD-10-CM

## 2023-05-07 DIAGNOSIS — I50.9 ACUTE CONGESTIVE HEART FAILURE, UNSPECIFIED HEART FAILURE TYPE: ICD-10-CM

## 2023-05-07 DIAGNOSIS — E87.70 FLUID OVERLOAD: ICD-10-CM

## 2023-05-07 DIAGNOSIS — R07.9 CHEST PAIN: ICD-10-CM

## 2023-05-07 DIAGNOSIS — R06.00 DYSPNEA: ICD-10-CM

## 2023-05-07 DIAGNOSIS — I50.9 CHF (CONGESTIVE HEART FAILURE): ICD-10-CM

## 2023-05-07 DIAGNOSIS — Z99.2 ESRD ON DIALYSIS: ICD-10-CM

## 2023-05-07 DIAGNOSIS — I50.43 ACUTE ON CHRONIC COMBINED SYSTOLIC AND DIASTOLIC CONGESTIVE HEART FAILURE: Primary | ICD-10-CM

## 2023-05-07 DIAGNOSIS — J96.20 ACUTE ON CHRONIC RESPIRATORY FAILURE: ICD-10-CM

## 2023-05-07 PROBLEM — J90 PLEURAL EFFUSION ON LEFT: Status: ACTIVE | Noted: 2023-05-07

## 2023-05-07 PROBLEM — J96.01 ACUTE HYPOXEMIC RESPIRATORY FAILURE: Status: ACTIVE | Noted: 2023-05-07

## 2023-05-07 PROBLEM — J18.9 PNEUMONIA: Status: ACTIVE | Noted: 2023-05-07

## 2023-05-07 LAB
ALBUMIN SERPL BCP-MCNC: 3.4 G/DL (ref 3.5–5.2)
ALLENS TEST: ABNORMAL
ALLENS TEST: ABNORMAL
ALP SERPL-CCNC: 96 U/L (ref 55–135)
ALT SERPL W/O P-5'-P-CCNC: 12 U/L (ref 10–44)
AMPHET+METHAMPHET UR QL: NEGATIVE
ANION GAP SERPL CALC-SCNC: 11 MMOL/L (ref 8–16)
ANION GAP SERPL CALC-SCNC: 16 MMOL/L (ref 8–16)
AORTIC ROOT ANNULUS: 3.98 CM
ASCENDING AORTA: 3.4 CM
AST SERPL-CCNC: 27 U/L (ref 10–40)
AV INDEX (PROSTH): 0.99
AV MEAN GRADIENT: 2 MMHG
AV PEAK GRADIENT: 4 MMHG
AV VALVE AREA: 4.38 CM2
AV VELOCITY RATIO: 0.74
BACTERIA #/AREA URNS HPF: ABNORMAL /HPF
BARBITURATES UR QL SCN>200 NG/ML: NEGATIVE
BASOPHILS # BLD AUTO: 0.07 K/UL (ref 0–0.2)
BASOPHILS NFR BLD: 0.4 % (ref 0–1.9)
BENZODIAZ UR QL SCN>200 NG/ML: NEGATIVE
BILIRUB SERPL-MCNC: 0.6 MG/DL (ref 0.1–1)
BILIRUB UR QL STRIP: NEGATIVE
BNP SERPL-MCNC: 4772 PG/ML (ref 0–99)
BSA FOR ECHO PROCEDURE: 2.48 M2
BUN SERPL-MCNC: 36 MG/DL (ref 8–23)
BUN SERPL-MCNC: 50 MG/DL (ref 8–23)
BZE UR QL SCN: NEGATIVE
CALCIUM SERPL-MCNC: 9 MG/DL (ref 8.7–10.5)
CALCIUM SERPL-MCNC: 9.2 MG/DL (ref 8.7–10.5)
CANNABINOIDS UR QL SCN: NEGATIVE
CHLORIDE SERPL-SCNC: 103 MMOL/L (ref 95–110)
CHLORIDE SERPL-SCNC: 106 MMOL/L (ref 95–110)
CLARITY UR: CLEAR
CO2 SERPL-SCNC: 14 MMOL/L (ref 23–29)
CO2 SERPL-SCNC: 24 MMOL/L (ref 23–29)
COLOR UR: COLORLESS
CREAT SERPL-MCNC: 6.3 MG/DL (ref 0.5–1.4)
CREAT SERPL-MCNC: 8.8 MG/DL (ref 0.5–1.4)
CREAT UR-MCNC: 30.1 MG/DL (ref 23–375)
CV ECHO LV RWT: 0.56 CM
DELSYS: ABNORMAL
DELSYS: ABNORMAL
DIFFERENTIAL METHOD: ABNORMAL
DOP CALC AO PEAK VEL: 1.03 M/S
DOP CALC AO VTI: 14 CM
DOP CALC LVOT AREA: 4.4 CM2
DOP CALC LVOT DIAMETER: 2.38 CM
DOP CALC LVOT PEAK VEL: 0.76 M/S
DOP CALC LVOT STROKE VOLUME: 61.36 CM3
DOP CALC RVOT PEAK VEL: 0.37 M/S
DOP CALC RVOT VTI: 6.5 CM
DOP CALCLVOT PEAK VEL VTI: 13.8 CM
E WAVE DECELERATION TIME: 232.81 MSEC
E/A RATIO: 1.69
E/E' RATIO: 15.09 M/S
ECHO LV POSTERIOR WALL: 1.83 CM (ref 0.6–1.1)
EJECTION FRACTION: 20 %
EOSINOPHIL # BLD AUTO: 0.6 K/UL (ref 0–0.5)
EOSINOPHIL NFR BLD: 3 % (ref 0–8)
EP: 8
EP: 8
ERYTHROCYTE [DISTWIDTH] IN BLOOD BY AUTOMATED COUNT: 15.9 % (ref 11.5–14.5)
ERYTHROCYTE [SEDIMENTATION RATE] IN BLOOD BY WESTERGREN METHOD: 18 MM/H
ERYTHROCYTE [SEDIMENTATION RATE] IN BLOOD BY WESTERGREN METHOD: 18 MM/H
EST. GFR  (NO RACE VARIABLE): 6 ML/MIN/1.73 M^2
EST. GFR  (NO RACE VARIABLE): 9 ML/MIN/1.73 M^2
ESTIMATED AVG GLUCOSE: 137 MG/DL (ref 68–131)
FIO2: 40
FIO2: 40
FRACTIONAL SHORTENING: 11 % (ref 28–44)
GLUCOSE SERPL-MCNC: 282 MG/DL (ref 70–110)
GLUCOSE SERPL-MCNC: 99 MG/DL (ref 70–110)
GLUCOSE UR QL STRIP: NEGATIVE
HBA1C MFR BLD: 6.4 % (ref 4–5.6)
HCO3 UR-SCNC: 18.3 MMOL/L (ref 24–28)
HCO3 UR-SCNC: 21 MMOL/L (ref 24–28)
HCT VFR BLD AUTO: 35.7 % (ref 40–54)
HCV AB SERPL QL IA: NEGATIVE
HEP C VIRUS HOLD SPECIMEN: NORMAL
HGB BLD-MCNC: 10.9 G/DL (ref 14–18)
HGB UR QL STRIP: ABNORMAL
HIV 1+2 AB+HIV1 P24 AG SERPL QL IA: NEGATIVE
HYALINE CASTS #/AREA URNS LPF: 0 /LPF
IMM GRANULOCYTES # BLD AUTO: 0.1 K/UL (ref 0–0.04)
IMM GRANULOCYTES NFR BLD AUTO: 0.6 % (ref 0–0.5)
INTERVENTRICULAR SEPTUM: 1.58 CM (ref 0.6–1.1)
IP: 14
IP: 14
IVC DIAMETER: 2.22 CM
IVRT: 59.94 MSEC
KETONES UR QL STRIP: NEGATIVE
LA MAJOR: 5.89 CM
LA MINOR: 6.15 CM
LA WIDTH: 4.1 CM
LEFT ATRIUM SIZE: 4.65 CM
LEFT ATRIUM VOLUME INDEX MOD: 29.7 ML/M2
LEFT ATRIUM VOLUME INDEX: 40.1 ML/M2
LEFT ATRIUM VOLUME MOD: 72.06 CM3
LEFT ATRIUM VOLUME: 97.51 CM3
LEFT INTERNAL DIMENSION IN SYSTOLE: 5.8 CM (ref 2.1–4)
LEFT VENTRICLE DIASTOLIC VOLUME INDEX: 89.19 ML/M2
LEFT VENTRICLE DIASTOLIC VOLUME: 216.73 ML
LEFT VENTRICLE MASS INDEX: 240 G/M2
LEFT VENTRICLE SYSTOLIC VOLUME INDEX: 68.6 ML/M2
LEFT VENTRICLE SYSTOLIC VOLUME: 166.75 ML
LEFT VENTRICULAR INTERNAL DIMENSION IN DIASTOLE: 6.51 CM (ref 3.5–6)
LEFT VENTRICULAR MASS: 583.25 G
LEUKOCYTE ESTERASE UR QL STRIP: ABNORMAL
LV LATERAL E/E' RATIO: 16.6 M/S
LV SEPTAL E/E' RATIO: 13.83 M/S
LVOT MG: 1.33 MMHG
LVOT MV: 0.55 CM/S
LYMPHOCYTES # BLD AUTO: 2.5 K/UL (ref 1–4.8)
LYMPHOCYTES NFR BLD: 13.7 % (ref 18–48)
MAGNESIUM SERPL-MCNC: 1.8 MG/DL (ref 1.6–2.6)
MAGNESIUM SERPL-MCNC: 2.4 MG/DL (ref 1.6–2.6)
MCH RBC QN AUTO: 26.4 PG (ref 27–31)
MCHC RBC AUTO-ENTMCNC: 30.5 G/DL (ref 32–36)
MCV RBC AUTO: 86 FL (ref 82–98)
METHADONE UR QL SCN>300 NG/ML: NEGATIVE
MICROSCOPIC COMMENT: ABNORMAL
MODE: ABNORMAL
MODE: ABNORMAL
MONOCYTES # BLD AUTO: 1.1 K/UL (ref 0.3–1)
MONOCYTES NFR BLD: 6.2 % (ref 4–15)
MV PEAK A VEL: 0.49 M/S
MV PEAK E VEL: 0.83 M/S
MV STENOSIS PRESSURE HALF TIME: 67.51 MS
MV VALVE AREA P 1/2 METHOD: 3.26 CM2
NEUTROPHILS # BLD AUTO: 13.8 K/UL (ref 1.8–7.7)
NEUTROPHILS NFR BLD: 76.1 % (ref 38–73)
NITRITE UR QL STRIP: NEGATIVE
NRBC BLD-RTO: 0 /100 WBC
OPIATES UR QL SCN: NEGATIVE
PCO2 BLDA: 41.7 MMHG (ref 35–45)
PCO2 BLDA: 42 MMHG (ref 35–45)
PCP UR QL SCN>25 NG/ML: NEGATIVE
PH SMN: 7.25 [PH] (ref 7.35–7.45)
PH SMN: 7.31 [PH] (ref 7.35–7.45)
PH UR STRIP: 7 [PH] (ref 5–8)
PHOSPHATE SERPL-MCNC: 2.5 MG/DL (ref 2.7–4.5)
PHOSPHATE SERPL-MCNC: 6 MG/DL (ref 2.7–4.5)
PISA MRMAX VEL: 4.83 M/S
PISA TR MAX VEL: 2.82 M/S
PLATELET # BLD AUTO: 329 K/UL (ref 150–450)
PMV BLD AUTO: 10.6 FL (ref 9.2–12.9)
PO2 BLDA: 111 MMHG (ref 80–100)
PO2 BLDA: 99 MMHG (ref 80–100)
POC BE: -5 MMOL/L
POC BE: -9 MMOL/L
POC SATURATED O2: 97 % (ref 95–100)
POC SATURATED O2: 97 % (ref 95–100)
POCT GLUCOSE: 104 MG/DL (ref 70–110)
POCT GLUCOSE: 125 MG/DL (ref 70–110)
POCT GLUCOSE: 125 MG/DL (ref 70–110)
POCT GLUCOSE: 145 MG/DL (ref 70–110)
POTASSIUM SERPL-SCNC: 3.8 MMOL/L (ref 3.5–5.1)
POTASSIUM SERPL-SCNC: 4.2 MMOL/L (ref 3.5–5.1)
PROCALCITONIN SERPL IA-MCNC: 0.55 NG/ML
PROT SERPL-MCNC: 7.5 G/DL (ref 6–8.4)
PROT UR QL STRIP: ABNORMAL
PV MEAN GRADIENT: 0.31 MMHG
PV MV: 0.58 M/S
PV PEAK VELOCITY: 0.71 CM/S
RA MAJOR: 5.29 CM
RA PRESSURE: 8 MMHG
RBC # BLD AUTO: 4.13 M/UL (ref 4.6–6.2)
RBC #/AREA URNS HPF: 4 /HPF (ref 0–4)
RIGHT VENTRICULAR END-DIASTOLIC DIMENSION: 4.26 CM
RV TISSUE DOPPLER FREE WALL SYSTOLIC VELOCITY 1 (APICAL 4 CHAMBER VIEW): 0.01 CM/S
SAMPLE: ABNORMAL
SAMPLE: ABNORMAL
SITE: ABNORMAL
SITE: ABNORMAL
SODIUM SERPL-SCNC: 136 MMOL/L (ref 136–145)
SODIUM SERPL-SCNC: 138 MMOL/L (ref 136–145)
SP GR UR STRIP: 1.01 (ref 1–1.03)
STJ: 2.48 CM
TDI LATERAL: 0.05 M/S
TDI SEPTAL: 0.06 M/S
TDI: 0.06 M/S
TOXICOLOGY INFORMATION: NORMAL
TR MAX PG: 32 MMHG
TRICUSPID ANNULAR PLANE SYSTOLIC EXCURSION: 1.5 CM
TROPONIN I SERPL DL<=0.01 NG/ML-MCNC: 0.1 NG/ML (ref 0–0.03)
TROPONIN I SERPL DL<=0.01 NG/ML-MCNC: 0.54 NG/ML (ref 0–0.03)
TROPONIN I SERPL DL<=0.01 NG/ML-MCNC: 0.63 NG/ML (ref 0–0.03)
TROPONIN I SERPL DL<=0.01 NG/ML-MCNC: 0.74 NG/ML (ref 0–0.03)
TV REST PULMONARY ARTERY PRESSURE: 40 MMHG
URN SPEC COLLECT METH UR: ABNORMAL
UROBILINOGEN UR STRIP-ACNC: NEGATIVE EU/DL
WBC # BLD AUTO: 18.08 K/UL (ref 3.9–12.7)
WBC #/AREA URNS HPF: 62 /HPF (ref 0–5)
WBC CLUMPS URNS QL MICRO: ABNORMAL

## 2023-05-07 PROCEDURE — 25000242 PHARM REV CODE 250 ALT 637 W/ HCPCS: Mod: NTX | Performed by: NURSE PRACTITIONER

## 2023-05-07 PROCEDURE — 82962 GLUCOSE BLOOD TEST: CPT | Mod: NTX

## 2023-05-07 PROCEDURE — 84100 ASSAY OF PHOSPHORUS: CPT | Mod: 91,NTX | Performed by: FAMILY MEDICINE

## 2023-05-07 PROCEDURE — 80053 COMPREHEN METABOLIC PANEL: CPT | Mod: NTX | Performed by: STUDENT IN AN ORGANIZED HEALTH CARE EDUCATION/TRAINING PROGRAM

## 2023-05-07 PROCEDURE — 96375 TX/PRO/DX INJ NEW DRUG ADDON: CPT | Mod: NTX

## 2023-05-07 PROCEDURE — 87389 HIV-1 AG W/HIV-1&-2 AB AG IA: CPT | Mod: NTX | Performed by: STUDENT IN AN ORGANIZED HEALTH CARE EDUCATION/TRAINING PROGRAM

## 2023-05-07 PROCEDURE — G0378 HOSPITAL OBSERVATION PER HR: HCPCS | Mod: NTX

## 2023-05-07 PROCEDURE — 99900035 HC TECH TIME PER 15 MIN (STAT): Mod: NTX

## 2023-05-07 PROCEDURE — 99291 CRITICAL CARE FIRST HOUR: CPT | Mod: NTX

## 2023-05-07 PROCEDURE — 87086 URINE CULTURE/COLONY COUNT: CPT | Mod: NTX | Performed by: NURSE PRACTITIONER

## 2023-05-07 PROCEDURE — 25000003 PHARM REV CODE 250: Mod: NTX | Performed by: NURSE PRACTITIONER

## 2023-05-07 PROCEDURE — 80307 DRUG TEST PRSMV CHEM ANLYZR: CPT | Mod: NTX | Performed by: STUDENT IN AN ORGANIZED HEALTH CARE EDUCATION/TRAINING PROGRAM

## 2023-05-07 PROCEDURE — 94761 N-INVAS EAR/PLS OXIMETRY MLT: CPT | Mod: NTX

## 2023-05-07 PROCEDURE — 80048 BASIC METABOLIC PNL TOTAL CA: CPT | Mod: NTX,XB | Performed by: FAMILY MEDICINE

## 2023-05-07 PROCEDURE — 25000003 PHARM REV CODE 250: Mod: NTX

## 2023-05-07 PROCEDURE — 36415 COLL VENOUS BLD VENIPUNCTURE: CPT | Mod: NTX | Performed by: NURSE PRACTITIONER

## 2023-05-07 PROCEDURE — 84145 PROCALCITONIN (PCT): CPT | Mod: NTX | Performed by: NURSE PRACTITIONER

## 2023-05-07 PROCEDURE — 93010 ELECTROCARDIOGRAM REPORT: CPT | Mod: NTX,,, | Performed by: INTERNAL MEDICINE

## 2023-05-07 PROCEDURE — 99214 PR OFFICE/OUTPT VISIT, EST, LEVL IV, 30-39 MIN: ICD-10-PCS | Mod: NTX,,, | Performed by: INTERNAL MEDICINE

## 2023-05-07 PROCEDURE — 63600175 PHARM REV CODE 636 W HCPCS: Mod: NTX | Performed by: STUDENT IN AN ORGANIZED HEALTH CARE EDUCATION/TRAINING PROGRAM

## 2023-05-07 PROCEDURE — 94660 CPAP INITIATION&MGMT: CPT | Mod: NTX

## 2023-05-07 PROCEDURE — 63700000 PHARM REV CODE 250 ALT 637 W/O HCPCS: Mod: NTX | Performed by: NURSE PRACTITIONER

## 2023-05-07 PROCEDURE — 63600175 PHARM REV CODE 636 W HCPCS: Mod: NTX | Performed by: INTERNAL MEDICINE

## 2023-05-07 PROCEDURE — 27000190 HC CPAP FULL FACE MASK W/VALVE: Mod: NTX

## 2023-05-07 PROCEDURE — G0257 UNSCHED DIALYSIS ESRD PT HOS: HCPCS | Mod: NTX

## 2023-05-07 PROCEDURE — 93005 ELECTROCARDIOGRAM TRACING: CPT | Mod: NTX

## 2023-05-07 PROCEDURE — 96367 TX/PROPH/DG ADDL SEQ IV INF: CPT | Mod: NTX

## 2023-05-07 PROCEDURE — 96365 THER/PROPH/DIAG IV INF INIT: CPT | Mod: NTX,59

## 2023-05-07 PROCEDURE — 93010 EKG 12-LEAD: ICD-10-PCS | Mod: NTX,,, | Performed by: INTERNAL MEDICINE

## 2023-05-07 PROCEDURE — 36600 WITHDRAWAL OF ARTERIAL BLOOD: CPT | Mod: NTX

## 2023-05-07 PROCEDURE — 25000003 PHARM REV CODE 250: Mod: NTX | Performed by: FAMILY MEDICINE

## 2023-05-07 PROCEDURE — 99214 OFFICE O/P EST MOD 30 MIN: CPT | Mod: NTX,,, | Performed by: INTERNAL MEDICINE

## 2023-05-07 PROCEDURE — 96372 THER/PROPH/DIAG INJ SC/IM: CPT | Mod: 59,NTX | Performed by: NURSE PRACTITIONER

## 2023-05-07 PROCEDURE — 83036 HEMOGLOBIN GLYCOSYLATED A1C: CPT | Mod: NTX | Performed by: NURSE PRACTITIONER

## 2023-05-07 PROCEDURE — 82803 BLOOD GASES ANY COMBINATION: CPT | Mod: NTX

## 2023-05-07 PROCEDURE — 94640 AIRWAY INHALATION TREATMENT: CPT | Mod: NTX

## 2023-05-07 PROCEDURE — 63600175 PHARM REV CODE 636 W HCPCS: Mod: NTX | Performed by: NURSE PRACTITIONER

## 2023-05-07 PROCEDURE — 83735 ASSAY OF MAGNESIUM: CPT | Mod: NTX | Performed by: STUDENT IN AN ORGANIZED HEALTH CARE EDUCATION/TRAINING PROGRAM

## 2023-05-07 PROCEDURE — 96376 TX/PRO/DX INJ SAME DRUG ADON: CPT | Mod: NTX

## 2023-05-07 PROCEDURE — 81000 URINALYSIS NONAUTO W/SCOPE: CPT | Mod: NTX,59 | Performed by: NURSE PRACTITIONER

## 2023-05-07 PROCEDURE — 85025 COMPLETE CBC W/AUTO DIFF WBC: CPT | Mod: NTX | Performed by: STUDENT IN AN ORGANIZED HEALTH CARE EDUCATION/TRAINING PROGRAM

## 2023-05-07 PROCEDURE — 84100 ASSAY OF PHOSPHORUS: CPT | Mod: NTX | Performed by: STUDENT IN AN ORGANIZED HEALTH CARE EDUCATION/TRAINING PROGRAM

## 2023-05-07 PROCEDURE — 86803 HEPATITIS C AB TEST: CPT | Mod: NTX | Performed by: STUDENT IN AN ORGANIZED HEALTH CARE EDUCATION/TRAINING PROGRAM

## 2023-05-07 PROCEDURE — 83880 ASSAY OF NATRIURETIC PEPTIDE: CPT | Mod: NTX | Performed by: STUDENT IN AN ORGANIZED HEALTH CARE EDUCATION/TRAINING PROGRAM

## 2023-05-07 PROCEDURE — 25500020 PHARM REV CODE 255: Mod: NTX | Performed by: FAMILY MEDICINE

## 2023-05-07 PROCEDURE — 27000221 HC OXYGEN, UP TO 24 HOURS: Mod: NTX

## 2023-05-07 PROCEDURE — 83735 ASSAY OF MAGNESIUM: CPT | Mod: 91,NTX | Performed by: FAMILY MEDICINE

## 2023-05-07 PROCEDURE — 84484 ASSAY OF TROPONIN QUANT: CPT | Mod: 91,NTX | Performed by: NURSE PRACTITIONER

## 2023-05-07 PROCEDURE — 84484 ASSAY OF TROPONIN QUANT: CPT | Mod: NTX | Performed by: STUDENT IN AN ORGANIZED HEALTH CARE EDUCATION/TRAINING PROGRAM

## 2023-05-07 PROCEDURE — 94640 AIRWAY INHALATION TREATMENT: CPT | Mod: NTX,XB

## 2023-05-07 RX ORDER — FUROSEMIDE 10 MG/ML
80 INJECTION INTRAMUSCULAR; INTRAVENOUS ONCE
Status: COMPLETED | OUTPATIENT
Start: 2023-05-07 | End: 2023-05-07

## 2023-05-07 RX ORDER — TALC
6 POWDER (GRAM) TOPICAL NIGHTLY PRN
Status: DISCONTINUED | OUTPATIENT
Start: 2023-05-07 | End: 2023-05-08 | Stop reason: HOSPADM

## 2023-05-07 RX ORDER — ATORVASTATIN CALCIUM 40 MG/1
40 TABLET, FILM COATED ORAL DAILY
Status: DISCONTINUED | OUTPATIENT
Start: 2023-05-07 | End: 2023-05-08 | Stop reason: HOSPADM

## 2023-05-07 RX ORDER — CETIRIZINE HYDROCHLORIDE 5 MG/1
5 TABLET ORAL
Status: DISCONTINUED | OUTPATIENT
Start: 2023-05-07 | End: 2023-05-08 | Stop reason: HOSPADM

## 2023-05-07 RX ORDER — CARVEDILOL 12.5 MG/1
12.5 TABLET ORAL 2 TIMES DAILY WITH MEALS
Status: DISCONTINUED | OUTPATIENT
Start: 2023-05-07 | End: 2023-05-08 | Stop reason: HOSPADM

## 2023-05-07 RX ORDER — HEPARIN SODIUM 1000 [USP'U]/ML
3600 INJECTION, SOLUTION INTRAVENOUS; SUBCUTANEOUS
Status: DISCONTINUED | OUTPATIENT
Start: 2023-05-07 | End: 2023-05-08 | Stop reason: HOSPADM

## 2023-05-07 RX ORDER — NALOXONE HCL 0.4 MG/ML
0.02 VIAL (ML) INJECTION
Status: DISCONTINUED | OUTPATIENT
Start: 2023-05-07 | End: 2023-05-08 | Stop reason: HOSPADM

## 2023-05-07 RX ORDER — POLYETHYLENE GLYCOL 3350 17 G/17G
17 POWDER, FOR SOLUTION ORAL 2 TIMES DAILY PRN
Status: DISCONTINUED | OUTPATIENT
Start: 2023-05-07 | End: 2023-05-08 | Stop reason: HOSPADM

## 2023-05-07 RX ORDER — SEVELAMER CARBONATE 800 MG/1
2400 TABLET, FILM COATED ORAL
Status: DISCONTINUED | OUTPATIENT
Start: 2023-05-07 | End: 2023-05-08 | Stop reason: HOSPADM

## 2023-05-07 RX ORDER — AZITHROMYCIN 250 MG/1
500 TABLET, FILM COATED ORAL DAILY
Status: DISCONTINUED | OUTPATIENT
Start: 2023-05-07 | End: 2023-05-08 | Stop reason: HOSPADM

## 2023-05-07 RX ORDER — FUROSEMIDE 40 MG/1
80 TABLET ORAL DAILY
Status: DISCONTINUED | OUTPATIENT
Start: 2023-05-07 | End: 2023-05-07

## 2023-05-07 RX ORDER — NITROGLYCERIN 20 MG/100ML
INJECTION INTRAVENOUS
Status: COMPLETED
Start: 2023-05-07 | End: 2023-05-07

## 2023-05-07 RX ORDER — IBUPROFEN 200 MG
24 TABLET ORAL
Status: DISCONTINUED | OUTPATIENT
Start: 2023-05-07 | End: 2023-05-08 | Stop reason: HOSPADM

## 2023-05-07 RX ORDER — NITROGLYCERIN 20 MG/100ML
0-400 INJECTION INTRAVENOUS CONTINUOUS
Status: DISCONTINUED | OUTPATIENT
Start: 2023-05-07 | End: 2023-05-07

## 2023-05-07 RX ORDER — METHOCARBAMOL 500 MG/1
500 TABLET, FILM COATED ORAL 3 TIMES DAILY PRN
Status: DISCONTINUED | OUTPATIENT
Start: 2023-05-07 | End: 2023-05-08 | Stop reason: HOSPADM

## 2023-05-07 RX ORDER — DOXYCYCLINE HYCLATE 100 MG
100 TABLET ORAL EVERY 12 HOURS
Status: DISCONTINUED | OUTPATIENT
Start: 2023-05-07 | End: 2023-05-07

## 2023-05-07 RX ORDER — FOLIC ACID 1 MG/1
1 TABLET ORAL DAILY
Status: DISCONTINUED | OUTPATIENT
Start: 2023-05-07 | End: 2023-05-08 | Stop reason: HOSPADM

## 2023-05-07 RX ORDER — ONDANSETRON 2 MG/ML
4 INJECTION INTRAMUSCULAR; INTRAVENOUS EVERY 6 HOURS PRN
Status: DISCONTINUED | OUTPATIENT
Start: 2023-05-07 | End: 2023-05-08 | Stop reason: HOSPADM

## 2023-05-07 RX ORDER — FUROSEMIDE 10 MG/ML
120 INJECTION INTRAMUSCULAR; INTRAVENOUS
Status: COMPLETED | OUTPATIENT
Start: 2023-05-07 | End: 2023-05-07

## 2023-05-07 RX ORDER — ISOSORBIDE DINITRATE 20 MG/1
20 TABLET ORAL 3 TIMES DAILY
Status: DISCONTINUED | OUTPATIENT
Start: 2023-05-07 | End: 2023-05-08 | Stop reason: HOSPADM

## 2023-05-07 RX ORDER — PROCHLORPERAZINE EDISYLATE 5 MG/ML
5 INJECTION INTRAMUSCULAR; INTRAVENOUS EVERY 6 HOURS PRN
Status: DISCONTINUED | OUTPATIENT
Start: 2023-05-07 | End: 2023-05-08 | Stop reason: HOSPADM

## 2023-05-07 RX ORDER — BUDESONIDE 0.5 MG/2ML
0.5 INHALANT ORAL EVERY 12 HOURS
Status: DISCONTINUED | OUTPATIENT
Start: 2023-05-07 | End: 2023-05-08 | Stop reason: HOSPADM

## 2023-05-07 RX ORDER — ASPIRIN 81 MG/1
81 TABLET ORAL DAILY
Status: DISCONTINUED | OUTPATIENT
Start: 2023-05-07 | End: 2023-05-08 | Stop reason: HOSPADM

## 2023-05-07 RX ORDER — MAG HYDROX/ALUMINUM HYD/SIMETH 200-200-20
30 SUSPENSION, ORAL (FINAL DOSE FORM) ORAL 4 TIMES DAILY PRN
Status: DISCONTINUED | OUTPATIENT
Start: 2023-05-07 | End: 2023-05-08 | Stop reason: HOSPADM

## 2023-05-07 RX ORDER — HEPARIN SODIUM 5000 [USP'U]/ML
5000 INJECTION, SOLUTION INTRAVENOUS; SUBCUTANEOUS EVERY 8 HOURS
Status: DISCONTINUED | OUTPATIENT
Start: 2023-05-07 | End: 2023-05-08 | Stop reason: HOSPADM

## 2023-05-07 RX ORDER — GLUCAGON 1 MG
1 KIT INJECTION
Status: DISCONTINUED | OUTPATIENT
Start: 2023-05-07 | End: 2023-05-08 | Stop reason: HOSPADM

## 2023-05-07 RX ORDER — SODIUM CHLORIDE 0.9 % (FLUSH) 0.9 %
10 SYRINGE (ML) INJECTION
Status: DISCONTINUED | OUTPATIENT
Start: 2023-05-07 | End: 2023-05-08 | Stop reason: HOSPADM

## 2023-05-07 RX ORDER — FUROSEMIDE 40 MG/1
80 TABLET ORAL DAILY
Status: DISCONTINUED | OUTPATIENT
Start: 2023-05-08 | End: 2023-05-08 | Stop reason: HOSPADM

## 2023-05-07 RX ORDER — HYDRALAZINE HYDROCHLORIDE 50 MG/1
100 TABLET, FILM COATED ORAL EVERY 8 HOURS
Status: DISCONTINUED | OUTPATIENT
Start: 2023-05-07 | End: 2023-05-08 | Stop reason: HOSPADM

## 2023-05-07 RX ORDER — ACETAMINOPHEN 325 MG/1
650 TABLET ORAL EVERY 4 HOURS PRN
Status: DISCONTINUED | OUTPATIENT
Start: 2023-05-07 | End: 2023-05-08 | Stop reason: HOSPADM

## 2023-05-07 RX ORDER — FINASTERIDE 5 MG/1
5 TABLET, FILM COATED ORAL DAILY
Status: DISCONTINUED | OUTPATIENT
Start: 2023-05-07 | End: 2023-05-08 | Stop reason: HOSPADM

## 2023-05-07 RX ORDER — CALCITRIOL 0.25 UG/1
0.25 CAPSULE ORAL
Status: DISCONTINUED | OUTPATIENT
Start: 2023-05-08 | End: 2023-05-08 | Stop reason: HOSPADM

## 2023-05-07 RX ORDER — SIMETHICONE 80 MG
1 TABLET,CHEWABLE ORAL 4 TIMES DAILY PRN
Status: DISCONTINUED | OUTPATIENT
Start: 2023-05-07 | End: 2023-05-08 | Stop reason: HOSPADM

## 2023-05-07 RX ORDER — AMOXICILLIN 250 MG
1 CAPSULE ORAL 2 TIMES DAILY
Status: DISCONTINUED | OUTPATIENT
Start: 2023-05-07 | End: 2023-05-08 | Stop reason: HOSPADM

## 2023-05-07 RX ORDER — ISOSORBIDE DINITRATE 20 MG/1
20 TABLET ORAL 3 TIMES DAILY
Status: DISCONTINUED | OUTPATIENT
Start: 2023-05-07 | End: 2023-05-07

## 2023-05-07 RX ORDER — IPRATROPIUM BROMIDE AND ALBUTEROL SULFATE 2.5; .5 MG/3ML; MG/3ML
3 SOLUTION RESPIRATORY (INHALATION) EVERY 6 HOURS PRN
Status: DISCONTINUED | OUTPATIENT
Start: 2023-05-07 | End: 2023-05-08 | Stop reason: HOSPADM

## 2023-05-07 RX ORDER — HYDROCODONE BITARTRATE AND ACETAMINOPHEN 5; 325 MG/1; MG/1
1 TABLET ORAL EVERY 6 HOURS PRN
Status: DISCONTINUED | OUTPATIENT
Start: 2023-05-07 | End: 2023-05-08 | Stop reason: HOSPADM

## 2023-05-07 RX ORDER — FLUTICASONE PROPIONATE 50 MCG
2 SPRAY, SUSPENSION (ML) NASAL NIGHTLY
Status: DISCONTINUED | OUTPATIENT
Start: 2023-05-07 | End: 2023-05-08 | Stop reason: HOSPADM

## 2023-05-07 RX ORDER — SODIUM CHLORIDE 0.9 % (FLUSH) 0.9 %
10 SYRINGE (ML) INJECTION EVERY 8 HOURS PRN
Status: DISCONTINUED | OUTPATIENT
Start: 2023-05-07 | End: 2023-05-08 | Stop reason: HOSPADM

## 2023-05-07 RX ORDER — TAMSULOSIN HYDROCHLORIDE 0.4 MG/1
1 CAPSULE ORAL DAILY
Status: DISCONTINUED | OUTPATIENT
Start: 2023-05-07 | End: 2023-05-08 | Stop reason: HOSPADM

## 2023-05-07 RX ORDER — INSULIN ASPART 100 [IU]/ML
0-5 INJECTION, SOLUTION INTRAVENOUS; SUBCUTANEOUS
Status: DISCONTINUED | OUTPATIENT
Start: 2023-05-07 | End: 2023-05-08 | Stop reason: HOSPADM

## 2023-05-07 RX ORDER — IBUPROFEN 200 MG
16 TABLET ORAL
Status: DISCONTINUED | OUTPATIENT
Start: 2023-05-07 | End: 2023-05-08 | Stop reason: HOSPADM

## 2023-05-07 RX ORDER — ARFORMOTEROL TARTRATE 15 UG/2ML
15 SOLUTION RESPIRATORY (INHALATION) 2 TIMES DAILY
Status: DISCONTINUED | OUTPATIENT
Start: 2023-05-07 | End: 2023-05-08 | Stop reason: HOSPADM

## 2023-05-07 RX ORDER — AMOXICILLIN AND CLAVULANATE POTASSIUM 875; 125 MG/1; MG/1
1 TABLET, FILM COATED ORAL EVERY 12 HOURS
Status: DISCONTINUED | OUTPATIENT
Start: 2023-05-07 | End: 2023-05-07

## 2023-05-07 RX ADMIN — SENNOSIDES AND DOCUSATE SODIUM 1 TABLET: 50; 8.6 TABLET ORAL at 08:05

## 2023-05-07 RX ADMIN — ARFORMOTEROL TARTRATE 15 MCG: 15 SOLUTION RESPIRATORY (INHALATION) at 08:05

## 2023-05-07 RX ADMIN — SACUBITRIL AND VALSARTAN 1 TABLET: 49; 51 TABLET, FILM COATED ORAL at 09:05

## 2023-05-07 RX ADMIN — CARVEDILOL 12.5 MG: 12.5 TABLET, FILM COATED ORAL at 08:05

## 2023-05-07 RX ADMIN — BUDESONIDE 0.5 MG: 0.5 INHALANT ORAL at 08:05

## 2023-05-07 RX ADMIN — ISOSORBIDE DINITRATE 20 MG: 20 TABLET ORAL at 09:05

## 2023-05-07 RX ADMIN — ATORVASTATIN CALCIUM 40 MG: 40 TABLET, FILM COATED ORAL at 08:05

## 2023-05-07 RX ADMIN — CEFTRIAXONE 1 G: 1 INJECTION, POWDER, FOR SOLUTION INTRAMUSCULAR; INTRAVENOUS at 08:05

## 2023-05-07 RX ADMIN — HUMAN ALBUMIN MICROSPHERES AND PERFLUTREN 0.11 MG: 10; .22 INJECTION, SOLUTION INTRAVENOUS at 09:05

## 2023-05-07 RX ADMIN — FINASTERIDE 5 MG: 5 TABLET, FILM COATED ORAL at 08:05

## 2023-05-07 RX ADMIN — SENNOSIDES AND DOCUSATE SODIUM 1 TABLET: 50; 8.6 TABLET ORAL at 09:05

## 2023-05-07 RX ADMIN — FUROSEMIDE 120 MG: 10 INJECTION, SOLUTION INTRAMUSCULAR; INTRAVENOUS at 03:05

## 2023-05-07 RX ADMIN — FOLIC ACID 1 MG: 1 TABLET ORAL at 08:05

## 2023-05-07 RX ADMIN — CARVEDILOL 12.5 MG: 12.5 TABLET, FILM COATED ORAL at 04:05

## 2023-05-07 RX ADMIN — HYDRALAZINE HYDROCHLORIDE 100 MG: 50 TABLET ORAL at 02:05

## 2023-05-07 RX ADMIN — NITROGLYCERIN 200 MCG/MIN: 20 INJECTION INTRAVENOUS at 03:05

## 2023-05-07 RX ADMIN — ASPIRIN 81 MG: 81 TABLET, COATED ORAL at 08:05

## 2023-05-07 RX ADMIN — SEVELAMER CARBONATE 2400 MG: 800 TABLET, FILM COATED ORAL at 04:05

## 2023-05-07 RX ADMIN — THERA TABS 1 TABLET: TAB at 08:05

## 2023-05-07 RX ADMIN — AZITHROMYCIN MONOHYDRATE 500 MG: 250 TABLET ORAL at 08:05

## 2023-05-07 RX ADMIN — FUROSEMIDE 80 MG: 10 INJECTION, SOLUTION INTRAMUSCULAR; INTRAVENOUS at 08:05

## 2023-05-07 RX ADMIN — FLUTICASONE PROPIONATE 100 MCG: 50 SPRAY, METERED NASAL at 09:05

## 2023-05-07 RX ADMIN — CETIRIZINE HYDROCHLORIDE 5 MG: 5 TABLET ORAL at 09:05

## 2023-05-07 RX ADMIN — TAMSULOSIN HYDROCHLORIDE 0.4 MG: 0.4 CAPSULE ORAL at 08:05

## 2023-05-07 RX ADMIN — NIFEDIPINE 90 MG: 30 TABLET, FILM COATED, EXTENDED RELEASE ORAL at 08:05

## 2023-05-07 RX ADMIN — ISOSORBIDE DINITRATE 20 MG: 20 TABLET ORAL at 02:05

## 2023-05-07 RX ADMIN — HEPARIN SODIUM 3600 UNITS: 1000 INJECTION, SOLUTION INTRAVENOUS; SUBCUTANEOUS at 02:05

## 2023-05-07 RX ADMIN — HEPARIN SODIUM 5000 UNITS: 5000 INJECTION INTRAVENOUS; SUBCUTANEOUS at 03:05

## 2023-05-07 RX ADMIN — METHOCARBAMOL 500 MG: 500 TABLET ORAL at 02:05

## 2023-05-07 RX ADMIN — HEPARIN SODIUM 5000 UNITS: 5000 INJECTION INTRAVENOUS; SUBCUTANEOUS at 09:05

## 2023-05-07 RX ADMIN — HYDRALAZINE HYDROCHLORIDE 100 MG: 50 TABLET ORAL at 09:05

## 2023-05-07 NOTE — ASSESSMENT & PLAN NOTE
Pt denies chest pain   Elevated trop likely demand ischemia from hypoxemia/CHF   Serial troponin   Cont ASA, BB, Statin, Entresto

## 2023-05-07 NOTE — ED PROVIDER NOTES
SCRIBE #1 NOTE: I, Zeyad Lerner, am scribing for, and in the presence of, Mahesh Lane MD. I have scribed the HPI, ROS, and PEx.     SCRIBE #2 NOTE: I, Adrian Garza, am scribing for, and in the presence of,  Remi Rose MD. I have scribed the remaining portions of the note not scribed by Scribe #1.    History     Chief Complaint   Patient presents with    Shortness of Breath     Brought ems for sob X 1 hour,hx: CHF, HD, arrived on CPAP, RA 60's at home     Review of patient's allergies indicates:   Allergen Reactions    Bactrim [sulfamethoxazole-trimethoprim] Swelling    Nsaids (non-steroidal anti-inflammatory drug)      CKD    Sulfamethoxazole     Trimethoprim          History of Present Illness     HPI    5/7/2023, 3:38 AM  History obtained from the patient      History of Present Illness: Isidro Alves is a 67 y.o. male patient with a PMHx of DM, HTN, CKD, HLD, CAD, CHF, and HD who presents to the Emergency Department for evaluation of SOB which onset gradually 2 days ago but worsened 1 hour PTA. Ems states pt arrived on CPAP, RA 60's at home. Pt states dialysis MWF, compliant and has gone to all sessions. Pt also uses nasal cannula to sleep at nuight and was using albuterol inhaler before symptoms became worse. Symptoms are constant and moderate in severity. No other associated sxs. No prior Tx. No further complaints or concerns at this time.       Arrival mode:EMS      PCP: Steven Morgan MD        Past Medical History:  Past Medical History:   Diagnosis Date    Abnormal nuclear stress test 8/30/2022    Acute hypoxemic respiratory failure due to COVID-19 1/21/2021    Anal itching 10/10/2022    Anemia 4/16/2014    BPH (benign prostatic hyperplasia)     CKD (chronic kidney disease) stage 4, GFR 15-29 ml/min     Coronary artery disease of native artery of native heart with stable angina pectoris 4/29/2019    Diabetes mellitus     Diabetes mellitus, type 2     Dilated cardiomyopathy 8/30/2022    Elevated  PSA     Encounter for blood transfusion     Herpes labialis     Hyperlipidemia     Hypertension     Obesity     Pneumonia due to COVID-19 virus     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    Preop cardiovascular exam 9/16/2022    Proteinuria     Secondary hyperparathyroidism (of renal origin)     UTI (lower urinary tract infection) 5/1/2015       Past Surgical History:  Past Surgical History:   Procedure Laterality Date    CATHETERIZATION OF BOTH LEFT AND RIGHT HEART N/A 08/30/2022    Procedure: CATHETERIZATION, HEART, BOTH LEFT AND RIGHT;  Surgeon: Kassandra Deras MD;  Location: [facility] CATH LAB;  Service: Cardiology;  Laterality: N/A;    COLONOSCOPY N/A 12/21/2017    Procedure: COLONOSCOPY;  Surgeon: Fran Escalera MD;  Location: [facility] ENDO;  Service: Endoscopy;  Laterality: N/A;    COLONOSCOPY N/A 02/02/2023    Procedure: COLONOSCOPY;  Surgeon: Anthony Meng MD;  Location: [facility] ENDO;  Service: Endoscopy;  Laterality: N/A;    FLUOROSCOPY N/A 01/21/2021    Procedure: Vascath insertion;  Surgeon: Adrian Pruitt Jr., MD;  Location: [facility] CATH LAB;  Service: General;  Laterality: N/A;    FLUOROSCOPY N/A 04/06/2021    Procedure: Over the wire Vas Cath exchange;  Surgeon: Ho Pressley MD;  Location: [facility] CATH LAB;  Service: General;  Laterality: N/A;    PROSTATE BIOPSY      thorocotomy  01/01/2010         Family History:  Family History   Problem Relation Age of Onset    No Known Problems Mother     Diabetes Father     Hypertension Father     Hypertension Other     Diabetes Other        Social History:  Social History     Tobacco Use    Smoking status: Never    Smokeless tobacco: Never   Substance and Sexual Activity    Alcohol use: Not Currently     Comment: seldom     Drug use: No    Sexual activity: Not Currently     Partners: Female        Review of Systems     Review of Systems   Respiratory:  Positive for shortness of breath.    Neurological:  Positive for weakness.      Physical Exam     Initial Vitals   BP Pulse  Resp Temp SpO2   05/07/23 0335 05/07/23 0335 05/07/23 0335 05/07/23 0357 05/07/23 0335   (!) 157/129 (!) 130 (!) 35 97.1 °F (36.2 °C) 99 %      MAP       --                 Physical Exam  Nursing Notes and Vital Signs Reviewed.  Constitutional: Patient is in severe distress. Obese.  Head: Atraumatic. Normocephalic.  Eyes: PERRL. EOM intact. Conjunctivae are not pale. No scleral icterus.  ENT: Mucous membranes are moist. Oropharynx is clear and symmetric.    Neck: Supple. Full ROM. No lymphadenopathy.  Cardiovascular: Regular rate. Regular rhythm. No murmurs, rubs, or gallops. Distal pulses are 2+ and symmetric.  Pulmonary/Chest: Bilateral crackles. Accessory muscle.  Abdominal: Soft and non-distended.  There is no tenderness.  No rebound, guarding, or rigidity. Good bowel sounds.  Genitourinary: No CVA tenderness  Musculoskeletal: Moves all extremities. No obvious deformities. No edema. No calf tenderness.  Skin: Warm and dry.  Neurological:  Alert, awake, and appropriate.  Normal speech.  No acute focal neurological deficits are appreciated.  Psychiatric: Normal affect. Good eye contact. Appropriate in content.     ED Course   Critical Care    Date/Time: 5/7/2023 7:49 AM  Performed by: Remi Rose MD  Authorized by: Remi Rose MD   Direct patient critical care time: 20 minutes  Additional history critical care time: 10 minutes  Ordering / reviewing critical care time: 15 minutes  Documentation critical care time: 5 minutes  Consulting other physicians critical care time: 5 minutes  Consult with family critical care time: 5 minutes  Total critical care time (exclusive of procedural time) : 60 minutes  Critical care time was exclusive of separately billable procedures and treating other patients and teaching time.  Critical care was necessary to treat or prevent imminent or life-threatening deterioration of the following conditions: respiratory failure.  Critical care was time spent personally by me on  the following activities: blood draw for specimens, development of treatment plan with patient or surrogate, discussions with consultants, evaluation of patient's response to treatment, interpretation of cardiac output measurements, examination of patient, obtaining history from patient or surrogate, ordering and performing treatments and interventions, ordering and review of laboratory studies, ordering and review of radiographic studies, pulse oximetry, re-evaluation of patient's condition and review of old charts.      ED Vital Signs:  Vitals:    05/07/23 0405 05/07/23 0410 05/07/23 0415 05/07/23 0430   BP: 124/88 (!) 134/91 (!) 130/91 (!) 142/87   Pulse: (!) 119 (!) 117 (!) 113 110   Resp: 18 20 20 19   Temp:       TempSrc:       SpO2: 98% 98% 98% 97%   Weight:       Height:        05/07/23 0448 05/07/23 0449 05/07/23 0500 05/07/23 0530   BP: 132/80  119/79 132/80   Pulse: 104 (!) 112 103 96   Resp: (!) 21 (!) 21 (!) 21 15   Temp:       TempSrc:       SpO2: 98% 99% 97% 95%   Weight:       Height:        05/07/23 0545 05/07/23 0556 05/07/23 0600 05/07/23 0630   BP: 129/82 134/84 137/83 (!) 138/90   Pulse: 94 93 93 93   Resp: 14 16 16 16   Temp:       TempSrc:       SpO2: 96% 97% 96% 98%   Weight:       Height:        05/07/23 0700 05/07/23 0736 05/07/23 0743   BP: (!) 140/91     Pulse: 89 93 99   Resp: (!) 21 (!) 25 (!) 41   Temp: 97.9 °F (36.6 °C)     TempSrc: Oral     SpO2: 99% 97% 97%   Weight: 118.3 kg (260 lb 12.9 oz)     Height:          Abnormal Lab Results:  Labs Reviewed   CBC W/ AUTO DIFFERENTIAL - Abnormal; Notable for the following components:       Result Value    WBC 18.08 (*)     RBC 4.13 (*)     Hemoglobin 10.9 (*)     Hematocrit 35.7 (*)     MCH 26.4 (*)     MCHC 30.5 (*)     RDW 15.9 (*)     Immature Granulocytes 0.6 (*)     Gran # (ANC) 13.8 (*)     Immature Grans (Abs) 0.10 (*)     Mono # 1.1 (*)     Eos # 0.6 (*)     Gran % 76.1 (*)     Lymph % 13.7 (*)     All other components within normal  limits    Narrative:     Release to patient->Immediate   COMPREHENSIVE METABOLIC PANEL - Abnormal; Notable for the following components:    CO2 14 (*)     Glucose 282 (*)     BUN 50 (*)     Creatinine 8.8 (*)     Albumin 3.4 (*)     eGFR 6 (*)     All other components within normal limits    Narrative:     Release to patient->Immediate   TROPONIN I - Abnormal; Notable for the following components:    Troponin I 0.101 (*)     All other components within normal limits    Narrative:     Release to patient->Immediate   B-TYPE NATRIURETIC PEPTIDE - Abnormal; Notable for the following components:    BNP 4,772 (*)     All other components within normal limits    Narrative:     Release to patient->Immediate   PHOSPHORUS - Abnormal; Notable for the following components:    Phosphorus 6.0 (*)     All other components within normal limits    Narrative:     Release to patient->Immediate   ISTAT PROCEDURE - Abnormal; Notable for the following components:    POC PH 7.249 (*)     POC PO2 111 (*)     POC HCO3 18.3 (*)     All other components within normal limits   ISTAT PROCEDURE - Abnormal; Notable for the following components:    POC PH 7.307 (*)     POC HCO3 21.0 (*)     All other components within normal limits   HIV 1 / 2 ANTIBODY    Narrative:     Release to patient->Immediate   HEPATITIS C ANTIBODY    Narrative:     Release to patient->Immediate   HEP C VIRUS HOLD SPECIMEN    Narrative:     Release to patient->Immediate   MAGNESIUM    Narrative:     Release to patient->Immediate   DRUG SCREEN PANEL, URINE EMERGENCY   PROCALCITONIN   URINALYSIS, REFLEX TO URINE CULTURE   TROPONIN I        All Lab Results:  Results for orders placed or performed during the hospital encounter of 05/07/23   HIV 1/2 Ag/Ab (4th Gen)   Result Value Ref Range    HIV 1/2 Ag/Ab Negative Negative   Hepatitis C Antibody   Result Value Ref Range    Hepatitis C Ab Negative Negative   HCV Virus Hold Specimen   Result Value Ref Range    HEP C Virus Hold  Specimen Hold for HCV sendout    CBC auto differential   Result Value Ref Range    WBC 18.08 (H) 3.90 - 12.70 K/uL    RBC 4.13 (L) 4.60 - 6.20 M/uL    Hemoglobin 10.9 (L) 14.0 - 18.0 g/dL    Hematocrit 35.7 (L) 40.0 - 54.0 %    MCV 86 82 - 98 fL    MCH 26.4 (L) 27.0 - 31.0 pg    MCHC 30.5 (L) 32.0 - 36.0 g/dL    RDW 15.9 (H) 11.5 - 14.5 %    Platelets 329 150 - 450 K/uL    MPV 10.6 9.2 - 12.9 fL    Immature Granulocytes 0.6 (H) 0.0 - 0.5 %    Gran # (ANC) 13.8 (H) 1.8 - 7.7 K/uL    Immature Grans (Abs) 0.10 (H) 0.00 - 0.04 K/uL    Lymph # 2.5 1.0 - 4.8 K/uL    Mono # 1.1 (H) 0.3 - 1.0 K/uL    Eos # 0.6 (H) 0.0 - 0.5 K/uL    Baso # 0.07 0.00 - 0.20 K/uL    nRBC 0 0 /100 WBC    Gran % 76.1 (H) 38.0 - 73.0 %    Lymph % 13.7 (L) 18.0 - 48.0 %    Mono % 6.2 4.0 - 15.0 %    Eosinophil % 3.0 0.0 - 8.0 %    Basophil % 0.4 0.0 - 1.9 %    Differential Method Automated    Comprehensive metabolic panel   Result Value Ref Range    Sodium 136 136 - 145 mmol/L    Potassium 4.2 3.5 - 5.1 mmol/L    Chloride 106 95 - 110 mmol/L    CO2 14 (L) 23 - 29 mmol/L    Glucose 282 (H) 70 - 110 mg/dL    BUN 50 (H) 8 - 23 mg/dL    Creatinine 8.8 (H) 0.5 - 1.4 mg/dL    Calcium 9.2 8.7 - 10.5 mg/dL    Total Protein 7.5 6.0 - 8.4 g/dL    Albumin 3.4 (L) 3.5 - 5.2 g/dL    Total Bilirubin 0.6 0.1 - 1.0 mg/dL    Alkaline Phosphatase 96 55 - 135 U/L    AST 27 10 - 40 U/L    ALT 12 10 - 44 U/L    Anion Gap 16 8 - 16 mmol/L    eGFR 6 (A) >60 mL/min/1.73 m^2   Troponin I   Result Value Ref Range    Troponin I 0.101 (H) 0.000 - 0.026 ng/mL   Brain natriuretic peptide   Result Value Ref Range    BNP 4,772 (H) 0 - 99 pg/mL   Magnesium   Result Value Ref Range    Magnesium 2.4 1.6 - 2.6 mg/dL   Phosphorus   Result Value Ref Range    Phosphorus 6.0 (H) 2.7 - 4.5 mg/dL   ISTAT PROCEDURE   Result Value Ref Range    POC PH 7.249 (LL) 7.35 - 7.45    POC PCO2 41.7 35 - 45 mmHg    POC PO2 111 (H) 80 - 100 mmHg    POC HCO3 18.3 (L) 24 - 28 mmol/L    POC BE -9 -2 to 2  mmol/L    POC SATURATED O2 97 95 - 100 %    Rate 18     Sample ARTERIAL     Site LR     Allens Test Pass     DelSys CPAP/BiPAP     Mode BiPAP     FiO2 40     IP 14     EP 8    ISTAT PROCEDURE   Result Value Ref Range    POC PH 7.307 (L) 7.35 - 7.45    POC PCO2 42.0 35 - 45 mmHg    POC PO2 99 80 - 100 mmHg    POC HCO3 21.0 (L) 24 - 28 mmol/L    POC BE -5 -2 to 2 mmol/L    POC SATURATED O2 97 95 - 100 %    Rate 18     Sample ARTERIAL     Site LR     Allens Test Pass     DelSys CPAP/BiPAP     Mode BiPAP     FiO2 40     IP 14     EP 8      *Note: Due to a large number of results and/or encounters for the requested time period, some results have not been displayed. A complete set of results can be found in Results Review.        Imaging Results:  Imaging Results              X-Ray Chest AP Portable (Final result)  Result time 05/07/23 06:13:28      Final result by Broderick Beard MD (05/07/23 06:13:28)                   Impression:      Findings suggestive pulmonary edema with possible more confluent consolidation in the left lower lung and possible small left pleural effusion.      Electronically signed by: Broderick Beard  Date:    05/07/2023  Time:    06:13               Narrative:    EXAMINATION:  XR CHEST AP PORTABLE    CLINICAL HISTORY:  dyspnea;    TECHNIQUE:  Single frontal portable view of the chest was performed.    COMPARISON:  X-ray dated 07/14/2022    FINDINGS:  Right-sided dual lumen central line in place with distal catheter tip projecting over the cavoatrial junction.  Faint aortic arch calcification noted.  Cardiomediastinal silhouette is unchanged in size and contour.  Trachea is midline.  Bilateral interstitial prominence noted.  Question of more confluent opacification in the left lower lung.  Possible small left pleural effusion.  No significant right pleural effusion.  No pneumothorax.  No acute bony abnormality.  Degenerative changes and noted in the spine and shoulder.                                        The EKG was ordered, reviewed, and independently interpreted by the ED provider.  Interpretation time: 04:00  Rate: 124 BPM  Rhythm: sinus tachycardia  Interpretation: No acute ST changes. No STEMI.         The Emergency Provider reviewed the vital signs and test results, which are outlined above.     ED Discussion     5:41 AM: Discussed pt's case with Rosita Barron NP who recommends once stable x1 hour off bipap, can eval for admit to floor.     6:00 AM: Dr. Lane transfers care of patient to Dr. Rose pending BiPAP results.     7:51 AM: Discussed case with Leo Anders MD (Hospitalist). Dr. Anders agrees with current care and management of pt and accepts admission.   Admitting Service: Hospital Medicine  Admitting Physician: Dr. Anders  Admit to: Obs      ED Course as of 05/07/23 0801   Sun May 07, 2023   0433 Troponin I(!): 0.101  Similar to prior values, patient has history of troponin leak. [KB]      ED Course User Index  [KB] Mahesh Lane MD     Medical Decision Making:   Initial Assessment:   ESRD on dialysis, presents with sob, arrives on CPAP. Sats in the 60s at home  Differential Diagnosis:   ERSD, pulmonary edema, fluid overload, CHF  Clinical Tests:   Lab Tests: Ordered and Reviewed  Radiological Study: Ordered and Reviewed  Medical Tests: Ordered and Reviewed  ED Management:  Labs and imaging reviewed by me.  No acute findings except for elevated BNP, troponin and pulmonary edema seen on x-ray.  Considered admission, patient and family are agreeable.    Other:   I have discussed this case with another health care provider.       <> Summary of the Discussion: Case discussed with Dr. Anders () will admit to tele         ED Medication(s):  Medications   aspirin EC tablet 81 mg (has no administration in time range)   atorvastatin tablet 40 mg (has no administration in time range)   calcitRIOL capsule 0.25 mcg (has no administration in time range)   carvediloL tablet 12.5 mg  (has no administration in time range)   cetirizine tablet 10 mg (has no administration in time range)   finasteride tablet 5 mg (has no administration in time range)   fluticasone propionate 50 mcg/actuation nasal spray 100 mcg (has no administration in time range)   folic acid tablet 1 mg (has no administration in time range)   hydrALAZINE tablet 100 mg (has no administration in time range)   furosemide tablet 80 mg (has no administration in time range)   isosorbide dinitrate tablet 20 mg (has no administration in time range)   multivitamin tablet (has no administration in time range)   NIFEdipine 24 hr tablet 90 mg (has no administration in time range)   sacubitriL-valsartan 49-51 mg per tablet 1 tablet (has no administration in time range)   sevelamer carbonate tablet 2,400 mg (has no administration in time range)   tamsulosin 24 hr capsule 0.4 mg (has no administration in time range)   sodium chloride 0.9% flush 10 mL (has no administration in time range)   albuterol-ipratropium 2.5 mg-0.5 mg/3 mL nebulizer solution 3 mL (has no administration in time range)   melatonin tablet 6 mg (has no administration in time range)   ondansetron injection 4 mg (has no administration in time range)   prochlorperazine injection Soln 5 mg (has no administration in time range)   polyethylene glycol packet 17 g (has no administration in time range)   senna-docusate 8.6-50 mg per tablet 1 tablet (has no administration in time range)   simethicone chewable tablet 80 mg (has no administration in time range)   aluminum-magnesium hydroxide-simethicone 200-200-20 mg/5 mL suspension 30 mL (has no administration in time range)   acetaminophen tablet 650 mg (has no administration in time range)   HYDROcodone-acetaminophen 5-325 mg per tablet 1 tablet (has no administration in time range)   naloxone 0.4 mg/mL injection 0.02 mg (has no administration in time range)   glucose chewable tablet 16 g (has no administration in time range)    glucose chewable tablet 24 g (has no administration in time range)   glucagon (human recombinant) injection 1 mg (has no administration in time range)   heparin (porcine) injection 5,000 Units (has no administration in time range)   insulin aspart U-100 pen 0-5 Units (has no administration in time range)   budesonide nebulizer solution 0.5 mg (has no administration in time range)   arformoteroL nebulizer solution 15 mcg (has no administration in time range)   dextrose 10% bolus 125 mL 125 mL (has no administration in time range)   dextrose 10% bolus 250 mL 250 mL (has no administration in time range)   cefTRIAXone (ROCEPHIN) 1 g in dextrose 5 % in water (D5W) 5 % 50 mL IVPB (MB+) (has no administration in time range)   azithromycin tablet 500 mg (has no administration in time range)   furosemide injection 120 mg (120 mg Intravenous Given 5/7/23 0342)       New Prescriptions    No medications on file               Scribe Attestation:   Scribe #1: I performed the above scribed service and the documentation accurately describes the services I performed. I attest to the accuracy of the note.     Attending:   Physician Attestation Statement for Scribe #1: I, Mahesh Lane MD, personally performed the services described in this documentation, as scribed by Zeyad Lerner, in my presence, and it is both accurate and complete.       Scribe Attestation:   Scribe #2: I performed the above scribed service and the documentation accurately describes the services I performed. I attest to the accuracy of the note.    Attending Attestation:           Physician Attestation for Scribe:    Physician Attestation Statement for Scribe #2: I, Remi Rose MD, reviewed documentation, as scribed by Adrian Garza in my presence, and it is both accurate and complete. I also acknowledge and confirm the content of the note done by Scribe #1.         Clinical Impression       ICD-10-CM ICD-9-CM   1. Acute congestive heart failure, unspecified  heart failure type  I50.9 428.0   2. Dyspnea  R06.00 786.09   3. ESRD on dialysis  N18.6 585.6    Z99.2 V45.11   4. Respiratory failure, unspecified chronicity, unspecified whether with hypoxia or hypercapnia  J96.90 518.81   5. Acute on chronic combined systolic and diastolic congestive heart failure  I50.43 428.43     428.0   6. Chest pain  R07.9 786.50   7. CHF (congestive heart failure)  I50.9 428.0       Disposition:   Disposition: Admitted  Condition: Stable      Remi Rose MD  05/07/23 0802

## 2023-05-07 NOTE — ASSESSMENT & PLAN NOTE
Patient's FSGs are controlled on current medication regimen.  Last A1c reviewed-   Lab Results   Component Value Date    HGBA1C 6.4 (H) 02/02/2023     Most recent fingerstick glucose reviewed- No results for input(s): POCTGLUCOSE in the last 24 hours.  Current correctional scale  Low  Maintain anti-hyperglycemic dose as follows-   Antihyperglycemics (From admission, onward)    Start     Stop Route Frequency Ordered    05/07/23 0850  insulin aspart U-100 pen 0-5 Units         -- SubQ Before meals & nightly PRN 05/07/23 0751        Hold Oral hypoglycemics while patient is in the hospital.

## 2023-05-07 NOTE — ASSESSMENT & PLAN NOTE
Patient with Hypoxic Respiratory failure which is Acute.  he is on home oxygen- 2liters o2NC at night only. Supplemental oxygen was provided and noted- Oxygen Concentration (%):  [30-40] 32    .   Signs/symptoms of respiratory failure include- respiratory distress. Contributing diagnoses includes - CHF Labs and images were reviewed. Patient Has recent ABG, which has been reviewed. Will treat underlying causes and adjust management of respiratory failure

## 2023-05-07 NOTE — ASSESSMENT & PLAN NOTE
Patient is identified as having Combined Systolic and Diastolic heart failure that is Acute on chronic. CHF is currently uncontrolled due to Rales/crackles on pulmonary exam and Pulmonary edema/pleural effusion on CXR. Latest ECHO performed and demonstrates- Results for orders placed during the hospital encounter of 11/17/22    Echo    Interpretation Summary  · The left ventricle is moderately enlarged with eccentric hypertrophy and mildly decreased systolic function.  · The estimated ejection fraction is 40%.  · Grade I left ventricular diastolic dysfunction.  · Normal right ventricular size with low normal right ventricular systolic function.  · Mild left atrial enlargement.  · Mild-to-moderate mitral regurgitation.  · Mild tricuspid regurgitation.  · Normal central venous pressure (3 mmHg).  · The estimated PA systolic pressure is 33 mmHg.  · There is mild left ventricular global hypokinesis.  . Continue Beta Blocker, Furosemide, Nitrate/Vasodilator and ARNI and monitor clinical status closely. Monitor on telemetry. Patient is on CHF pathway.  Monitor strict Is&Os and daily weights.  Place on fluid restriction of 1.5 L. Continue to stress to patient importance of self efficacy and  on diet for CHF. Last BNP reviewed- and noted below   Recent Labs   Lab 05/07/23  0353   BNP 4,772*   .    Will give one dose of IV Lasix

## 2023-05-07 NOTE — HPI
The patient is a 66 yo male with ESRD on HD since  on renal Tx list at [facility], Vas cath in place right IJ, CAD-old MI per MPI in , Systolic and diastolic CHF-40%, Nocturnal hypoxemia-on 2 liters O2 NC at night only, IDDM, HTN, HLD who presented to ED with SOB. The patient reports he woke up last night around 2am. He then had a coughing spell and became severely SOB and diaphoretic. Pt denies chest pain or N/V. On EMS arrival, O2sat was 60%. He was placed on BIPAP. The patient reports compliance with HD on MWF. He does endorse dietary and fluid indiscretion yesterday. Pt does still urinate.     In the ED, pt arrived on 40% BIPAP-O2sats 99%, EKG showed Sinus tachycardia 130s with no ischemic changes, /129, Labs are significant for WBC 18, Bicarb 14, BNP 4772, Trop 0.101  Initial ABG showed Ph 7.2, Pao2 111, Bicarb 18. Repeat ABG showed Ph 7.3, Bicarb 21.   Pt was continued on BIPAP and placed on NTG drip. BP improved to SBP 130s and he was weaned off NTG drip. Pt was also weaned off BIPAP and is currently on 3 liters O2 NC

## 2023-05-07 NOTE — PROGRESS NOTES
05/07/23 1417   Post-Hemodialysis Assessment   Blood Volume Processed (Liters) 43 L   Dialyzer Clearance Lightly streaked   Duration of Treatment 120 minutes   Total UF (mL) 2500 mL   Patient Response to Treatment Pt tolerated tx well

## 2023-05-07 NOTE — PROGRESS NOTES
Pharmacist Renal Dose Adjustment Note    Isidro Alves is a 67 y.o. male being treated with the medication amoxicillin-clavulanate.     Patient Data:    Vital Signs (Most Recent):  Temp: 97.9 °F (36.6 °C) (05/07/23 0700)  Pulse: 99 (05/07/23 0743)  Resp: (!) 41 (05/07/23 0743)  BP: (!) 140/91 (05/07/23 0700)  SpO2: 97 % (05/07/23 0743) Vital Signs (72h Range):  Temp:  [97.1 °F (36.2 °C)-97.9 °F (36.6 °C)]   Pulse:  []   Resp:  [14-41]   BP: (119-168)/()   SpO2:  [95 %-99 %]      Recent Labs   Lab 05/07/23  0353   CREATININE 8.8*     Serum creatinine: 8.8 mg/dL (H) 05/07/23 0353  Estimated creatinine clearance: 11.3 mL/min (A)    Amoxicillin-clavulanate 875 mg oral every 12 hours will be changed to amoxicillin-clavulanate 500 mg oral every 24 hours per renal dose protocol for dialysis patients.     Thank you,  Pharmacist's Name: Vito Kowalski

## 2023-05-07 NOTE — CONSULTS
[facility] - Emergency Dept.  Nephrology  Consult Note    Patient Name: Isidro Alves  MRN: [id number]  Admission Date: 5/7/2023  Hospital Length of Stay: 0 days  Attending Provider: Leo Anders MD   Primary Care Physician: Steven Morgan MD  Principal Problem:Acute hypoxemic respiratory failure    Inpatient consult to Nephrology  Consult performed by: Colten Rizvi MD  Consult ordered by: Khadra Hitchcock NP  Reason for consult: Stage renal disease, shortness of breath      Subjective:     HPI:  67-year-old male well known to me from chronic dialysis.  Presented to the emergency department with sudden onset of shortness of breath at around 2:00 a.m. this morning.  Initially required BiPAP.  BiPAP has now been weaned to nasal cannula.  Nephrology has been consulted for maintenance dialysis.  Patient was seen in the emergency department.  In bed resting comfortably.  No acute distress noted.  States that he had a large smoothie yesterday and drank more fluid than usual.  His last dialysis treatment was on 05/05/2023.  He reports no difficulty and was ultra filtrated to his usual dry weight.    Past Medical History:   Diagnosis Date    Abnormal nuclear stress test 8/30/2022    Acute hypoxemic respiratory failure due to COVID-19 1/21/2021    Anal itching 10/10/2022    Anemia 4/16/2014    BPH (benign prostatic hyperplasia)     CKD (chronic kidney disease) stage 4, GFR 15-29 ml/min     Coronary artery disease of native artery of native heart with stable angina pectoris 4/29/2019    Diabetes mellitus     Diabetes mellitus, type 2     Dilated cardiomyopathy 8/30/2022    Elevated PSA     Encounter for blood transfusion     Herpes labialis     Hyperlipidemia     Hypertension     Obesity     Pneumonia due to COVID-19 virus     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    Preop cardiovascular exam 9/16/2022    Proteinuria     Secondary hyperparathyroidism (of renal origin)     UTI (lower urinary tract infection) 5/1/2015       Past  Surgical History:   Procedure Laterality Date    CATHETERIZATION OF BOTH LEFT AND RIGHT HEART N/A 08/30/2022    Procedure: CATHETERIZATION, HEART, BOTH LEFT AND RIGHT;  Surgeon: Kassandra Deras MD;  Location: [facility] CATH LAB;  Service: Cardiology;  Laterality: N/A;    COLONOSCOPY N/A 12/21/2017    Procedure: COLONOSCOPY;  Surgeon: Fran Escalera MD;  Location: [facility] ENDO;  Service: Endoscopy;  Laterality: N/A;    COLONOSCOPY N/A 02/02/2023    Procedure: COLONOSCOPY;  Surgeon: Anthony Meng MD;  Location: [facility] ENDO;  Service: Endoscopy;  Laterality: N/A;    FLUOROSCOPY N/A 01/21/2021    Procedure: Vascath insertion;  Surgeon: Adrian Pruitt Jr., MD;  Location: [facility] CATH LAB;  Service: General;  Laterality: N/A;    FLUOROSCOPY N/A 04/06/2021    Procedure: Over the wire Vas Cath exchange;  Surgeon: Ho Pressley MD;  Location: [facility] CATH LAB;  Service: General;  Laterality: N/A;    PROSTATE BIOPSY      thorocotomy  01/01/2010       Review of patient's allergies indicates:   Allergen Reactions    Bactrim [sulfamethoxazole-trimethoprim] Swelling    Nsaids (non-steroidal anti-inflammatory drug)      CKD    Sulfamethoxazole     Trimethoprim      Current Facility-Administered Medications   Medication Frequency    acetaminophen tablet 650 mg Q4H PRN    albuterol-ipratropium 2.5 mg-0.5 mg/3 mL nebulizer solution 3 mL Q6H PRN    aluminum-magnesium hydroxide-simethicone 200-200-20 mg/5 mL suspension 30 mL QID PRN    arformoteroL nebulizer solution 15 mcg BID    aspirin EC tablet 81 mg Daily    atorvastatin tablet 40 mg Daily    azithromycin tablet 500 mg Daily    budesonide nebulizer solution 0.5 mg Q12H    [START ON 5/8/2023] calcitRIOL capsule 0.25 mcg Every Mon, Wed, Fri    carvediloL tablet 12.5 mg BID WM    cefTRIAXone (ROCEPHIN) 1 g in dextrose 5 % in water (D5W) 5 % 50 mL IVPB (MB+) Q24H    cetirizine tablet 5 mg Once per day on Mon Wed Fri    dextrose 10% bolus 125 mL 125 mL PRN    dextrose 10% bolus 250 mL  "250 mL PRN    finasteride tablet 5 mg Daily    fluticasone propionate 50 mcg/actuation nasal spray 100 mcg QHS    folic acid tablet 1 mg Daily    [START ON 5/8/2023] furosemide tablet 80 mg Daily    glucagon (human recombinant) injection 1 mg PRN    glucose chewable tablet 16 g PRN    glucose chewable tablet 24 g PRN    heparin (porcine) injection 5,000 Units Q8H    hydrALAZINE tablet 100 mg Q8H    HYDROcodone-acetaminophen 5-325 mg per tablet 1 tablet Q6H PRN    insulin aspart U-100 pen 0-5 Units QID (AC + HS) PRN    isosorbide dinitrate tablet 20 mg TID    melatonin tablet 6 mg Nightly PRN    multivitamin tablet Daily    naloxone 0.4 mg/mL injection 0.02 mg PRN    NIFEdipine 24 hr tablet 90 mg Daily    ondansetron injection 4 mg Q6H PRN    polyethylene glycol packet 17 g BID PRN    prochlorperazine injection Soln 5 mg Q6H PRN    sacubitriL-valsartan 49-51 mg per tablet 1 tablet BID    senna-docusate 8.6-50 mg per tablet 1 tablet BID    sevelamer carbonate tablet 2,400 mg TID WM    simethicone chewable tablet 80 mg QID PRN    sodium chloride 0.9% flush 10 mL Q8H PRN    tamsulosin 24 hr capsule 0.4 mg Daily     Current Outpatient Medications   Medication    carvediloL (COREG) 12.5 MG tablet    finasteride (PROSCAR) 5 mg tablet    furosemide (LASIX) 80 MG tablet    hydrALAZINE (APRESOLINE) 100 MG tablet    isosorbide dinitrate (ISORDIL) 20 MG tablet    NIFEdipine (PROCARDIA-XL) 90 MG (OSM) 24 hr tablet    tamsulosin (FLOMAX) 0.4 mg Cap    acyclovir (ZOVIRAX) 400 MG tablet    albuterol (PROVENTIL) 2.5 mg /3 mL (0.083 %) nebulizer solution    albuterol (PROVENTIL/VENTOLIN HFA) 90 mcg/actuation inhaler    aspirin (ECOTRIN) 81 MG EC tablet    atorvastatin (LIPITOR) 40 MG tablet    BD INSULIN SYRINGE HALF UNIT 0.3 mL 31 gauge x 5/16" Syrg    blood sugar diagnostic Strp    blood-glucose meter kit    calcitRIOL (ROCALTROL) 0.25 MCG Cap    cetirizine (ZYRTEC) 10 MG tablet    esomeprazole (NEXIUM) 40 MG capsule    fluticasone " "propionate (FLONASE) 50 mcg/actuation nasal spray    fluticasone-salmeterol diskus inhaler 500-50 mcg    folic acid (FOLVITE) 1 MG tablet    HYDROcodone-acetaminophen (NORCO) 5-325 mg per tablet    inhalation spacing device (VORTEX HOLDING CHAMBER)    insulin degludec (TRESIBA FLEXTOUCH U-200) 200 unit/mL (3 mL) insulin pen    lancets Misc    multivitamin (THERAGRAN) tablet    olopatadine (PATADAY) 0.2 % Drop    pen needle, diabetic (BD ULTRA-FINE SHIRA PEN NEEDLE) 32 gauge x 5/32" Ndle    sacubitriL-valsartan (ENTRESTO)  mg per tablet    sevelamer carbonate (RENVELA) 800 mg Tab     Family History       Problem Relation (Age of Onset)    Diabetes Father, Other    Hypertension Father, Other    No Known Problems Mother          Tobacco Use    Smoking status: Never    Smokeless tobacco: Never   Substance and Sexual Activity    Alcohol use: Not Currently     Comment: seldom     Drug use: No    Sexual activity: Not Currently     Partners: Female     Review of Systems   Constitutional: Negative.    HENT: Negative.     Eyes: Negative.    Respiratory:  Positive for shortness of breath.    Cardiovascular: Negative.    Gastrointestinal: Negative.    Genitourinary: Negative.    Musculoskeletal: Negative.    Skin: Negative.    Neurological: Negative.    Objective:     Vital Signs (Most Recent):  Temp: 97.9 °F (36.6 °C) (05/07/23 0700)  Pulse: 93 (05/07/23 0819)  Resp: 18 (05/07/23 0819)  BP: (!) 140/91 (05/07/23 0819)  SpO2: 98 % (05/07/23 0819) Vital Signs (24h Range):  Temp:  [97.1 °F (36.2 °C)-97.9 °F (36.6 °C)] 97.9 °F (36.6 °C)  Pulse:  [] 93  Resp:  [14-41] 18  SpO2:  [95 %-99 %] 98 %  BP: (119-168)/() 140/91     Weight: 117.9 kg (260 lb) (05/07/23 0819)  Body mass index is 33.38 kg/m².  Body surface area is 2.48 meters squared.    I/O last 3 completed shifts:  In: 30.7 [I.V.:30.7]  Out: -     Physical Exam  Constitutional:       Appearance: Normal appearance.   HENT:      Head: Normocephalic and " atraumatic.   Eyes:      General: No scleral icterus.     Extraocular Movements: Extraocular movements intact.      Pupils: Pupils are equal, round, and reactive to light.   Cardiovascular:      Rate and Rhythm: Normal rate and regular rhythm.   Pulmonary:      Effort: Pulmonary effort is normal.      Breath sounds: Rales present.   Musculoskeletal:      Right lower leg: No edema.      Left lower leg: No edema.   Skin:     General: Skin is warm and dry.   Neurological:      General: No focal deficit present.      Mental Status: He is alert and oriented to person, place, and time.   Psychiatric:         Mood and Affect: Mood normal.         Behavior: Behavior normal.       Significant Labs:  BMP:   Recent Labs   Lab 05/07/23 0353   *      K 4.2      CO2 14*   BUN 50*   CREATININE 8.8*   CALCIUM 9.2   MG 2.4     CMP:   Recent Labs   Lab 05/07/23 0353   *   CALCIUM 9.2   ALBUMIN 3.4*   PROT 7.5      K 4.2   CO2 14*      BUN 50*   CREATININE 8.8*   ALKPHOS 96   ALT 12   AST 27   BILITOT 0.6     All labs within the past 24 hours have been reviewed.    Significant Imaging:  Labs: Reviewed      Assessment/Plan:     Active Diagnoses:    Diagnosis Date Noted POA    PRINCIPAL PROBLEM:  Acute hypoxemic respiratory failure [J96.01] 05/07/2023 Yes    Acute on chronic combined systolic and diastolic congestive heart failure [I50.43] 05/07/2023 Yes    Pleural effusion on left [J90] 05/07/2023 Yes    Pneumonia [J18.9] 05/07/2023 Yes    Pulmonary fibrosis, postinflammatory [J84.10] 05/29/2022 Yes    ESRD on dialysis [N18.6, Z99.2]  Not Applicable    Coronary artery disease of native artery of native heart with stable angina pectoris [I25.118] 04/29/2019 Yes    Type 2 diabetes mellitus with chronic kidney disease, with long-term current use of insulin [E11.22, Z79.4] 05/02/2015 Not Applicable    Metabolic acidosis [E87.20] 05/01/2015 Yes    Elevated troponin [R77.8] 05/01/2015 Yes      Problems  Resolved During this Admission:       Assessment and plan:    1. Shortness of breath:  Evidence of volume overload and pulmonary edema on exam and chest x-ray.  Will plan dialysis today with ultrafiltration of at least 2-3 L.    He admits to drinking too much fluid yesterday.  There may also be a concomitant loss of body mass.  Will need to adjust his dry weight in the dialysis unit.    2. End-stage renal disease:  As per above.    3. Hypertension:  Blood pressure has improved with medical management.      4. Anemia:  Secondary to end-stage renal disease.  Hemoglobin is 10.9.  He is within acceptable range for an ESRD patient.    Thank you for your consult.     Colten iRzvi MD  Nephrology  [facility] - Emergency Dept.

## 2023-05-07 NOTE — SUBJECTIVE & OBJECTIVE
Past Medical History:   Diagnosis Date    Abnormal nuclear stress test 8/30/2022    Acute hypoxemic respiratory failure due to COVID-19 1/21/2021    Anal itching 10/10/2022    Anemia 4/16/2014    BPH (benign prostatic hyperplasia)     CKD (chronic kidney disease) stage 4, GFR 15-29 ml/min     Coronary artery disease of native artery of native heart with stable angina pectoris 4/29/2019    Diabetes mellitus     Diabetes mellitus, type 2     Dilated cardiomyopathy 8/30/2022    Elevated PSA     Encounter for blood transfusion     Herpes labialis     Hyperlipidemia     Hypertension     Obesity     Pneumonia due to COVID-19 virus     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    Preop cardiovascular exam 9/16/2022    Proteinuria     Secondary hyperparathyroidism (of renal origin)     UTI (lower urinary tract infection) 5/1/2015       Past Surgical History:   Procedure Laterality Date    CATHETERIZATION OF BOTH LEFT AND RIGHT HEART N/A 08/30/2022    Procedure: CATHETERIZATION, HEART, BOTH LEFT AND RIGHT;  Surgeon: Kassandra Deras MD;  Location: [facility] CATH LAB;  Service: Cardiology;  Laterality: N/A;    COLONOSCOPY N/A 12/21/2017    Procedure: COLONOSCOPY;  Surgeon: Fran Escalera MD;  Location: [facility] ENDO;  Service: Endoscopy;  Laterality: N/A;    COLONOSCOPY N/A 02/02/2023    Procedure: COLONOSCOPY;  Surgeon: Anthony Meng MD;  Location: [facility] ENDO;  Service: Endoscopy;  Laterality: N/A;    FLUOROSCOPY N/A 01/21/2021    Procedure: Vascath insertion;  Surgeon: Adrian Pruitt Jr., MD;  Location: [facility] CATH LAB;  Service: General;  Laterality: N/A;    FLUOROSCOPY N/A 04/06/2021    Procedure: Over the wire Vas Cath exchange;  Surgeon: Ho Pressley MD;  Location: [facility] CATH LAB;  Service: General;  Laterality: N/A;    PROSTATE BIOPSY      thorocotomy  01/01/2010       Review of patient's allergies indicates:   Allergen Reactions    Bactrim [sulfamethoxazole-trimethoprim] Swelling    Nsaids (non-steroidal anti-inflammatory drug)      " CKD    Sulfamethoxazole     Trimethoprim        No current facility-administered medications on file prior to encounter.     Current Outpatient Medications on File Prior to Encounter   Medication Sig    carvediloL (COREG) 12.5 MG tablet Take 1 tablet (12.5 mg total) by mouth 2 (two) times daily with meals.    finasteride (PROSCAR) 5 mg tablet Take 1 tablet (5 mg total) by mouth once daily.    furosemide (LASIX) 80 MG tablet Take 1 tablet (80 mg total) by mouth once daily.    hydrALAZINE (APRESOLINE) 100 MG tablet Take 1 tablet (100 mg total) by mouth every 8 (eight) hours.    isosorbide dinitrate (ISORDIL) 20 MG tablet Take 1 tablet (20 mg total) by mouth 3 (three) times daily.    NIFEdipine (PROCARDIA-XL) 90 MG (OSM) 24 hr tablet Take 1 tablet (90 mg total) by mouth once daily.    tamsulosin (FLOMAX) 0.4 mg Cap TAKE 1 CAPSULE BY MOUTH EVERY DAY    acyclovir (ZOVIRAX) 400 MG tablet TAKE 1 TABLET (400 MG TOTAL) BY MOUTH 2 (TWO) TIMES DAILY. ONE TABLET    albuterol (PROVENTIL) 2.5 mg /3 mL (0.083 %) nebulizer solution Take 3 mLs (2.5 mg total) by nebulization every 4 to 6 hours as needed for Wheezing or Shortness of Breath.    albuterol (PROVENTIL/VENTOLIN HFA) 90 mcg/actuation inhaler Inhale 2 puffs into the lungs every 4 (four) hours as needed for Wheezing or Shortness of Breath.    aspirin (ECOTRIN) 81 MG EC tablet Take 1 tablet (81 mg total) by mouth once daily.    atorvastatin (LIPITOR) 40 MG tablet TAKE 1 TABLET BY MOUTH EVERY DAY    BD INSULIN SYRINGE HALF UNIT 0.3 mL 31 gauge x 5/16" Syrg FOR USE WITH INSULIN.    blood sugar diagnostic Strp 1 each by Misc.(Non-Drug; Combo Route) route 4 (four) times daily.    blood-glucose meter kit Test finger stick blood sugar once daily.    calcitRIOL (ROCALTROL) 0.25 MCG Cap TAKE 1 CAPSULE (0.25 MCG TOTAL) BY MOUTH EVERY MON, WED, FRI.    cetirizine (ZYRTEC) 10 MG tablet TAKE 1 TABLET BY MOUTH EVERY DAY    esomeprazole (NEXIUM) 40 MG capsule Take 40 mg by mouth once " "daily.    fluticasone propionate (FLONASE) 50 mcg/actuation nasal spray 2 sprays (100 mcg total) by Each Nostril route every evening. For nasal congestion or runny nose    fluticasone-salmeterol diskus inhaler 500-50 mcg Inhale 1 puff into the lungs 2 (two) times daily. Controller.Wash out mouth after use    folic acid (FOLVITE) 1 MG tablet TAKE 1 TABLET BY MOUTH EVERY DAY    HYDROcodone-acetaminophen (NORCO) 5-325 mg per tablet Take 1 tablet by mouth every 6 (six) hours as needed for Pain.    inhalation spacing device (VORTEX HOLDING CHAMBER) Use as directed for inhalation.    insulin degludec (TRESIBA FLEXTOUCH U-200) 200 unit/mL (3 mL) insulin pen Inject 30 Units into the skin once daily.    lancets Misc 1 each by Misc.(Non-Drug; Combo Route) route 4 (four) times daily.    multivitamin (THERAGRAN) tablet Take 1 tablet by mouth once daily.    olopatadine (PATADAY) 0.2 % Drop Place 1 drop into both eyes daily as needed.    pen needle, diabetic (BD ULTRA-FINE SHIRA PEN NEEDLE) 32 gauge x 5/32" Ndle 1 each by Misc.(Non-Drug; Combo Route) route 4 (four) times daily with meals and nightly.    sacubitriL-valsartan (ENTRESTO)  mg per tablet Take 1 tablet by mouth 2 (two) times daily.    sevelamer carbonate (RENVELA) 800 mg Tab Take 3 tablets (2,400 mg total) by mouth 3 (three) times daily with meals.     Family History       Problem Relation (Age of Onset)    Diabetes Father, Other    Hypertension Father, Other    No Known Problems Mother          Tobacco Use    Smoking status: Never    Smokeless tobacco: Never   Substance and Sexual Activity    Alcohol use: Not Currently     Comment: seldom     Drug use: No    Sexual activity: Not Currently     Partners: Female     Review of Systems   Constitutional:  Negative for appetite change, chills, diaphoresis, fatigue and fever.   HENT:  Negative for congestion, nosebleeds, sore throat and trouble swallowing.    Eyes:  Negative for pain, discharge and visual disturbance. "   Respiratory:  Positive for cough (dry) and shortness of breath. Negative for apnea, chest tightness, wheezing and stridor.    Cardiovascular:  Positive for leg swelling (mild). Negative for chest pain and palpitations.   Gastrointestinal:  Negative for abdominal distention, abdominal pain, blood in stool, constipation, diarrhea, nausea and vomiting.   Endocrine: Negative for cold intolerance and heat intolerance.   Genitourinary:  Negative for difficulty urinating, dysuria, flank pain, frequency and urgency.   Musculoskeletal:  Negative for arthralgias, back pain, joint swelling, myalgias, neck pain and neck stiffness.   Skin:  Negative for rash and wound.   Allergic/Immunologic: Negative for food allergies and immunocompromised state.   Neurological:  Negative for dizziness, seizures, syncope, facial asymmetry, weakness, light-headedness and headaches.   Hematological:  Negative for adenopathy.   Psychiatric/Behavioral:  Negative for agitation, behavioral problems and confusion. The patient is not nervous/anxious.    Objective:     Vital Signs (Most Recent):  Temp: 97.9 °F (36.6 °C) (05/07/23 0700)  Pulse: 93 (05/07/23 0819)  Resp: 18 (05/07/23 0819)  BP: (!) 140/91 (05/07/23 0700)  SpO2: 98 % (05/07/23 0819) Vital Signs (24h Range):  Temp:  [97.1 °F (36.2 °C)-97.9 °F (36.6 °C)] 97.9 °F (36.6 °C)  Pulse:  [] 93  Resp:  [14-41] 18  SpO2:  [95 %-99 %] 98 %  BP: (119-168)/() 140/91     Weight: 118.3 kg (260 lb 12.9 oz)  Body mass index is 33.49 kg/m².     Physical Exam  Vitals and nursing note reviewed.   Constitutional:       General: He is not in acute distress.     Appearance: He is well-developed. He is not diaphoretic.   HENT:      Head: Normocephalic and atraumatic.      Nose: Nose normal.   Eyes:      General: No scleral icterus.     Conjunctiva/sclera: Conjunctivae normal.   Cardiovascular:      Rate and Rhythm: Normal rate and regular rhythm.      Heart sounds: Normal heart sounds. No murmur  heard.    No friction rub. No gallop.   Pulmonary:      Effort: Pulmonary effort is normal. No respiratory distress.      Breath sounds: No stridor. Rales present. No wheezing.   Chest:      Chest wall: No tenderness.   Abdominal:      General: Bowel sounds are normal. There is no distension.      Palpations: Abdomen is soft.      Tenderness: There is no abdominal tenderness. There is no guarding or rebound.   Musculoskeletal:         General: No tenderness or deformity. Normal range of motion.      Cervical back: Normal range of motion and neck supple.      Right lower leg: Edema (trace) present.      Left lower leg: Edema (trace) present.   Skin:     General: Skin is warm and dry.      Coloration: Skin is not pale.      Findings: No erythema or rash.   Neurological:      Mental Status: He is alert and oriented to person, place, and time.      Cranial Nerves: No cranial nerve deficit.      Motor: No abnormal muscle tone.      Coordination: Coordination normal.      Deep Tendon Reflexes: Reflexes are normal and symmetric.   Psychiatric:         Behavior: Behavior normal.         Thought Content: Thought content normal.              Significant Labs: All pertinent labs within the past 24 hours have been reviewed.    Significant Imaging: I have reviewed all pertinent imaging results/findings within the past 24 hours.

## 2023-05-07 NOTE — ASSESSMENT & PLAN NOTE
Moderate left pleural effusion   Give IV alsix x oen dose and continue Lasix 80mg po daily   UF with HD  Will empirically treat for underlying PNA with IV Rocephin and Azithromycin

## 2023-05-07 NOTE — H&P
[facility] - Emergency Dept.  [facility] Medicine  History & Physical    Patient Name: Isidro Alves  MRN: [id number]  Patient Class: OP- Observation  Admission Date: 5/7/2023  Attending Physician: Leo Anders MD   Primary Care Provider: Steven Morgan MD         Patient information was obtained from patient, spouse/SO and ER records.     Subjective:     Principal Problem:Acute hypoxemic respiratory failure    Chief Complaint:   Chief Complaint   Patient presents with    Shortness of Breath     Brought ems for sob X 1 hour,hx: CHF, HD, arrived on CPAP, RA 60's at home        HPI: The patient is a 68 yo male with ESRD on HD since  on renal Tx list at [facility], Vas cath in place right IJ, CAD-old MI per MPI in , Systolic and diastolic CHF-40%, Night time Hypoxemia- on 2 liters NC home oxygen at night, IDDM, HTN, HLD who presented to ED with SOB. The patient reports he woke up last night around 2am. He then had a coughing spell and became severely SOB and diaphoretic. Pt denies chest pain or N/V. On EMS arrival, O2sat was 60%. He was placed on BIPAP. The patient reports compliance with HD on MWF. He does endorse dietary and fluid indiscretion yesterday. Pt does still urinate.     In the ED, pt arrived on 40% BIPAP-O2sats 99%, EKG showed Sinus tachycardia 130s with no ischemic changes, /129, Labs are significant for WBC 18, Bicarb 14, BNP 4772, Trop 0.101  Initial ABG showed Ph 7.2, Pao2 111, Bicarb 18. Repeat ABG showed Ph 7.3, Bicarb 21.   Pt was continued on BIPAP and placed on NTG drip. BP improved to SBP 130s and he was weaned off NTG drip. Pt was also weaned off BIPAP and is currently on 3 liters O2 NC      Past Medical History:   Diagnosis Date    Abnormal nuclear stress test 8/30/2022    Acute hypoxemic respiratory failure due to COVID-19 1/21/2021    Anal itching 10/10/2022    Anemia 4/16/2014    BPH (benign prostatic hyperplasia)     CKD (chronic kidney disease) stage 4, GFR 15-29 ml/min      Coronary artery disease of native artery of native heart with stable angina pectoris 4/29/2019    Diabetes mellitus     Diabetes mellitus, type 2     Dilated cardiomyopathy 8/30/2022    Elevated PSA     Encounter for blood transfusion     Herpes labialis     Hyperlipidemia     Hypertension     Obesity     Pneumonia due to COVID-19 virus     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    Preop cardiovascular exam 9/16/2022    Proteinuria     Secondary hyperparathyroidism (of renal origin)     UTI (lower urinary tract infection) 5/1/2015       Past Surgical History:   Procedure Laterality Date    CATHETERIZATION OF BOTH LEFT AND RIGHT HEART N/A 08/30/2022    Procedure: CATHETERIZATION, HEART, BOTH LEFT AND RIGHT;  Surgeon: Kassandra Deras MD;  Location: [facility] CATH LAB;  Service: Cardiology;  Laterality: N/A;    COLONOSCOPY N/A 12/21/2017    Procedure: COLONOSCOPY;  Surgeon: Fran Escalera MD;  Location: [facility] ENDO;  Service: Endoscopy;  Laterality: N/A;    COLONOSCOPY N/A 02/02/2023    Procedure: COLONOSCOPY;  Surgeon: Anthony Meng MD;  Location: [facility] ENDO;  Service: Endoscopy;  Laterality: N/A;    FLUOROSCOPY N/A 01/21/2021    Procedure: Vascath insertion;  Surgeon: Adrian Pruitt Jr., MD;  Location: [facility] CATH LAB;  Service: General;  Laterality: N/A;    FLUOROSCOPY N/A 04/06/2021    Procedure: Over the wire Vas Cath exchange;  Surgeon: Ho Pressley MD;  Location: [facility] CATH LAB;  Service: General;  Laterality: N/A;    PROSTATE BIOPSY      thorocotomy  01/01/2010       Review of patient's allergies indicates:   Allergen Reactions    Bactrim [sulfamethoxazole-trimethoprim] Swelling    Nsaids (non-steroidal anti-inflammatory drug)      CKD    Sulfamethoxazole     Trimethoprim        No current facility-administered medications on file prior to encounter.     Current Outpatient Medications on File Prior to Encounter   Medication Sig    carvediloL (COREG) 12.5 MG tablet Take 1 tablet (12.5 mg total) by mouth 2 (two) times  "daily with meals.    finasteride (PROSCAR) 5 mg tablet Take 1 tablet (5 mg total) by mouth once daily.    furosemide (LASIX) 80 MG tablet Take 1 tablet (80 mg total) by mouth once daily.    hydrALAZINE (APRESOLINE) 100 MG tablet Take 1 tablet (100 mg total) by mouth every 8 (eight) hours.    isosorbide dinitrate (ISORDIL) 20 MG tablet Take 1 tablet (20 mg total) by mouth 3 (three) times daily.    NIFEdipine (PROCARDIA-XL) 90 MG (OSM) 24 hr tablet Take 1 tablet (90 mg total) by mouth once daily.    tamsulosin (FLOMAX) 0.4 mg Cap TAKE 1 CAPSULE BY MOUTH EVERY DAY    acyclovir (ZOVIRAX) 400 MG tablet TAKE 1 TABLET (400 MG TOTAL) BY MOUTH 2 (TWO) TIMES DAILY. ONE TABLET    albuterol (PROVENTIL) 2.5 mg /3 mL (0.083 %) nebulizer solution Take 3 mLs (2.5 mg total) by nebulization every 4 to 6 hours as needed for Wheezing or Shortness of Breath.    albuterol (PROVENTIL/VENTOLIN HFA) 90 mcg/actuation inhaler Inhale 2 puffs into the lungs every 4 (four) hours as needed for Wheezing or Shortness of Breath.    aspirin (ECOTRIN) 81 MG EC tablet Take 1 tablet (81 mg total) by mouth once daily.    atorvastatin (LIPITOR) 40 MG tablet TAKE 1 TABLET BY MOUTH EVERY DAY    BD INSULIN SYRINGE HALF UNIT 0.3 mL 31 gauge x 5/16" Syrg FOR USE WITH INSULIN.    blood sugar diagnostic Strp 1 each by Misc.(Non-Drug; Combo Route) route 4 (four) times daily.    blood-glucose meter kit Test finger stick blood sugar once daily.    calcitRIOL (ROCALTROL) 0.25 MCG Cap TAKE 1 CAPSULE (0.25 MCG TOTAL) BY MOUTH EVERY MON, WED, FRI.    cetirizine (ZYRTEC) 10 MG tablet TAKE 1 TABLET BY MOUTH EVERY DAY    esomeprazole (NEXIUM) 40 MG capsule Take 40 mg by mouth once daily.    fluticasone propionate (FLONASE) 50 mcg/actuation nasal spray 2 sprays (100 mcg total) by Each Nostril route every evening. For nasal congestion or runny nose    fluticasone-salmeterol diskus inhaler 500-50 mcg Inhale 1 puff into the lungs 2 (two) times daily. Controller.Wash out mouth " "after use    folic acid (FOLVITE) 1 MG tablet TAKE 1 TABLET BY MOUTH EVERY DAY    HYDROcodone-acetaminophen (NORCO) 5-325 mg per tablet Take 1 tablet by mouth every 6 (six) hours as needed for Pain.    inhalation spacing device (VORTEX HOLDING CHAMBER) Use as directed for inhalation.    insulin degludec (TRESIBA FLEXTOUCH U-200) 200 unit/mL (3 mL) insulin pen Inject 30 Units into the skin once daily.    lancets Misc 1 each by Misc.(Non-Drug; Combo Route) route 4 (four) times daily.    multivitamin (THERAGRAN) tablet Take 1 tablet by mouth once daily.    olopatadine (PATADAY) 0.2 % Drop Place 1 drop into both eyes daily as needed.    pen needle, diabetic (BD ULTRA-FINE SHIRA PEN NEEDLE) 32 gauge x 5/32" Ndle 1 each by Misc.(Non-Drug; Combo Route) route 4 (four) times daily with meals and nightly.    sacubitriL-valsartan (ENTRESTO)  mg per tablet Take 1 tablet by mouth 2 (two) times daily.    sevelamer carbonate (RENVELA) 800 mg Tab Take 3 tablets (2,400 mg total) by mouth 3 (three) times daily with meals.     Family History       Problem Relation (Age of Onset)    Diabetes Father, Other    Hypertension Father, Other    No Known Problems Mother          Tobacco Use    Smoking status: Never    Smokeless tobacco: Never   Substance and Sexual Activity    Alcohol use: Not Currently     Comment: seldom     Drug use: No    Sexual activity: Not Currently     Partners: Female     Review of Systems   Constitutional:  Negative for appetite change, chills, diaphoresis, fatigue and fever.   HENT:  Negative for congestion, nosebleeds, sore throat and trouble swallowing.    Eyes:  Negative for pain, discharge and visual disturbance.   Respiratory:  Positive for cough (dry) and shortness of breath. Negative for apnea, chest tightness, wheezing and stridor.    Cardiovascular:  Positive for leg swelling (mild). Negative for chest pain and palpitations.   Gastrointestinal:  Negative for abdominal distention, abdominal pain, blood " in stool, constipation, diarrhea, nausea and vomiting.   Endocrine: Negative for cold intolerance and heat intolerance.   Genitourinary:  Negative for difficulty urinating, dysuria, flank pain, frequency and urgency.   Musculoskeletal:  Negative for arthralgias, back pain, joint swelling, myalgias, neck pain and neck stiffness.   Skin:  Negative for rash and wound.   Allergic/Immunologic: Negative for food allergies and immunocompromised state.   Neurological:  Negative for dizziness, seizures, syncope, facial asymmetry, weakness, light-headedness and headaches.   Hematological:  Negative for adenopathy.   Psychiatric/Behavioral:  Negative for agitation, behavioral problems and confusion. The patient is not nervous/anxious.    Objective:     Vital Signs (Most Recent):  Temp: 97.9 °F (36.6 °C) (05/07/23 0700)  Pulse: 93 (05/07/23 0819)  Resp: 18 (05/07/23 0819)  BP: (!) 140/91 (05/07/23 0700)  SpO2: 98 % (05/07/23 0819) Vital Signs (24h Range):  Temp:  [97.1 °F (36.2 °C)-97.9 °F (36.6 °C)] 97.9 °F (36.6 °C)  Pulse:  [] 93  Resp:  [14-41] 18  SpO2:  [95 %-99 %] 98 %  BP: (119-168)/() 140/91     Weight: 118.3 kg (260 lb 12.9 oz)  Body mass index is 33.49 kg/m².     Physical Exam  Vitals and nursing note reviewed.   Constitutional:       General: He is not in acute distress.     Appearance: He is well-developed. He is not diaphoretic.   HENT:      Head: Normocephalic and atraumatic.      Nose: Nose normal.   Eyes:      General: No scleral icterus.     Conjunctiva/sclera: Conjunctivae normal.   Cardiovascular:      Rate and Rhythm: Normal rate and regular rhythm.      Heart sounds: Normal heart sounds. No murmur heard.    No friction rub. No gallop.   Pulmonary:      Effort: Pulmonary effort is normal. No respiratory distress.      Breath sounds: No stridor. Rales present. No wheezing.   Chest:      Chest wall: No tenderness.   Abdominal:      General: Bowel sounds are normal. There is no distension.       Palpations: Abdomen is soft.      Tenderness: There is no abdominal tenderness. There is no guarding or rebound.   Musculoskeletal:         General: No tenderness or deformity. Normal range of motion.      Cervical back: Normal range of motion and neck supple.      Right lower leg: Edema (trace) present.      Left lower leg: Edema (trace) present.   Skin:     General: Skin is warm and dry.      Coloration: Skin is not pale.      Findings: No erythema or rash.   Neurological:      Mental Status: He is alert and oriented to person, place, and time.      Cranial Nerves: No cranial nerve deficit.      Motor: No abnormal muscle tone.      Coordination: Coordination normal.      Deep Tendon Reflexes: Reflexes are normal and symmetric.   Psychiatric:         Behavior: Behavior normal.         Thought Content: Thought content normal.              Significant Labs: All pertinent labs within the past 24 hours have been reviewed.    Significant Imaging: I have reviewed all pertinent imaging results/findings within the past 24 hours.    Assessment/Plan:     * Acute on chronic hypoxemic respiratory failure  Patient with Hypoxic Respiratory failure which is Acute.  He is on home oxygen 2liters NC at night time only Supplemental oxygen was provided and noted- Oxygen Concentration (%):  [30-40] 32    .   Signs/symptoms of respiratory failure include- respiratory distress. Contributing diagnoses includes - CHF Labs and images were reviewed. Patient Has recent ABG, which has been reviewed. Will treat underlying causes and adjust management of respiratory failure     Acute on chronic combined systolic and diastolic congestive heart failure  Patient is identified as having Combined Systolic and Diastolic heart failure that is Acute on chronic. CHF is currently uncontrolled due to Rales/crackles on pulmonary exam and Pulmonary edema/pleural effusion on CXR. Latest ECHO performed and demonstrates- Results for orders placed during the  hospital encounter of 11/17/22    Echo    Interpretation Summary  · The left ventricle is moderately enlarged with eccentric hypertrophy and mildly decreased systolic function.  · The estimated ejection fraction is 40%.  · Grade I left ventricular diastolic dysfunction.  · Normal right ventricular size with low normal right ventricular systolic function.  · Mild left atrial enlargement.  · Mild-to-moderate mitral regurgitation.  · Mild tricuspid regurgitation.  · Normal central venous pressure (3 mmHg).  · The estimated PA systolic pressure is 33 mmHg.  · There is mild left ventricular global hypokinesis.  . Continue Beta Blocker, Furosemide, Nitrate/Vasodilator and ARNI and monitor clinical status closely. Monitor on telemetry. Patient is on CHF pathway.  Monitor strict Is&Os and daily weights.  Place on fluid restriction of 1.5 L. Continue to stress to patient importance of self efficacy and  on diet for CHF. Last BNP reviewed- and noted below   Recent Labs   Lab 05/07/23  0353   BNP 4,772*   .    Will give one dose of IV Lasix     ESRD on dialysis  Consult nephrology for HD       Pleural effusion on left  Moderate left pleural effusion   Give IV alsix x oen dose and continue Lasix 80mg po daily   UF with HD  Will empirically treat for underlying PNA with IV Rocephin and Azithromycin       Pneumonia  Possible PNA on LLL  Empiric IV Rocephin and Azithromycin   Sputum culture      Elevated troponin  Pt denies chest pain   Elevated trop likely demand ischemia from hypoxemia/CHF   Serial troponin   Cont ASA, BB, Statin, Entresto       Pulmonary fibrosis, postinflammatory  No wheezing   Cont LABA, ICS neb txs  INDERJIT prn       Coronary artery disease of native artery of native heart with stable angina pectoris  See chest pain plan above       Type 2 diabetes mellitus with chronic kidney disease, with long-term current use of insulin  Patient's FSGs are controlled on current medication regimen.  Last A1c reviewed-    Lab Results   Component Value Date    HGBA1C 6.4 (H) 02/02/2023     Most recent fingerstick glucose reviewed- No results for input(s): POCTGLUCOSE in the last 24 hours.  Current correctional scale  Low  Maintain anti-hyperglycemic dose as follows-   Antihyperglycemics (From admission, onward)      Start     Stop Route Frequency Ordered    05/07/23 0850  insulin aspart U-100 pen 0-5 Units         -- SubQ Before meals & nightly PRN 05/07/23 0751          Hold Oral hypoglycemics while patient is in the hospital.    Metabolic acidosis  Consult nephrology for clearance with HD        VTE Risk Mitigation (From admission, onward)           Ordered     heparin (porcine) injection 5,000 Units  Every 8 hours         05/07/23 0751     IP VTE HIGH RISK PATIENT  Once         05/07/23 0751     Place sequential compression device  Until discontinued         05/07/23 0751                         On 05/07/2023, patient should be placed in hospital observation services under my care in collaboration with Dr. Anders.      Khadra Hitchcock NP  Department of Hospital Medicine  '[place] - Emergency Dept.

## 2023-05-08 VITALS
WEIGHT: 248.69 LBS | HEIGHT: 74 IN | HEART RATE: 73 BPM | OXYGEN SATURATION: 95 % | DIASTOLIC BLOOD PRESSURE: 93 MMHG | RESPIRATION RATE: 22 BRPM | TEMPERATURE: 97 F | BODY MASS INDEX: 31.91 KG/M2 | SYSTOLIC BLOOD PRESSURE: 134 MMHG

## 2023-05-08 LAB
ALBUMIN SERPL BCP-MCNC: 3.2 G/DL (ref 3.5–5.2)
ALP SERPL-CCNC: 55 U/L (ref 55–135)
ALT SERPL W/O P-5'-P-CCNC: 8 U/L (ref 10–44)
ANION GAP SERPL CALC-SCNC: 11 MMOL/L (ref 8–16)
AST SERPL-CCNC: 17 U/L (ref 10–40)
BACTERIA UR CULT: NORMAL
BACTERIA UR CULT: NORMAL
BASOPHILS # BLD AUTO: 0.07 K/UL (ref 0–0.2)
BASOPHILS NFR BLD: 0.9 % (ref 0–1.9)
BILIRUB SERPL-MCNC: 0.6 MG/DL (ref 0.1–1)
BSA FOR ECHO PROCEDURE: 2.48 M2
BUN SERPL-MCNC: 45 MG/DL (ref 8–23)
CALCIUM SERPL-MCNC: 9.2 MG/DL (ref 8.7–10.5)
CHLORIDE SERPL-SCNC: 103 MMOL/L (ref 95–110)
CO2 SERPL-SCNC: 22 MMOL/L (ref 23–29)
CREAT SERPL-MCNC: 8.6 MG/DL (ref 0.5–1.4)
CV ECHO LV RWT: 0.47 CM
DIFFERENTIAL METHOD: ABNORMAL
ECHO LV POSTERIOR WALL: 1.51 CM (ref 0.6–1.1)
EJECTION FRACTION: 40 %
EOSINOPHIL # BLD AUTO: 0.2 K/UL (ref 0–0.5)
EOSINOPHIL NFR BLD: 2.8 % (ref 0–8)
ERYTHROCYTE [DISTWIDTH] IN BLOOD BY AUTOMATED COUNT: 15.6 % (ref 11.5–14.5)
EST. GFR  (NO RACE VARIABLE): 6 ML/MIN/1.73 M^2
FRACTIONAL SHORTENING: 8 % (ref 28–44)
GLUCOSE SERPL-MCNC: 122 MG/DL (ref 70–110)
HCT VFR BLD AUTO: 30.9 % (ref 40–54)
HGB BLD-MCNC: 9.7 G/DL (ref 14–18)
IMM GRANULOCYTES # BLD AUTO: 0.04 K/UL (ref 0–0.04)
IMM GRANULOCYTES NFR BLD AUTO: 0.5 % (ref 0–0.5)
INTERVENTRICULAR SEPTUM: 1.51 CM (ref 0.6–1.1)
LEFT ATRIUM SIZE: 5.1 CM
LEFT INTERNAL DIMENSION IN SYSTOLE: 5.9 CM (ref 2.1–4)
LEFT VENTRICLE DIASTOLIC VOLUME INDEX: 87.74 ML/M2
LEFT VENTRICLE DIASTOLIC VOLUME: 208.81 ML
LEFT VENTRICLE MASS INDEX: 201 G/M2
LEFT VENTRICLE SYSTOLIC VOLUME INDEX: 72.9 ML/M2
LEFT VENTRICLE SYSTOLIC VOLUME: 173.51 ML
LEFT VENTRICULAR INTERNAL DIMENSION IN DIASTOLE: 6.4 CM (ref 3.5–6)
LEFT VENTRICULAR MASS: 477.96 G
LYMPHOCYTES # BLD AUTO: 1.3 K/UL (ref 1–4.8)
LYMPHOCYTES NFR BLD: 17 % (ref 18–48)
MCH RBC QN AUTO: 26.7 PG (ref 27–31)
MCHC RBC AUTO-ENTMCNC: 31.4 G/DL (ref 32–36)
MCV RBC AUTO: 85 FL (ref 82–98)
MONOCYTES # BLD AUTO: 0.8 K/UL (ref 0.3–1)
MONOCYTES NFR BLD: 11.2 % (ref 4–15)
NEUTROPHILS # BLD AUTO: 5.1 K/UL (ref 1.8–7.7)
NEUTROPHILS NFR BLD: 67.6 % (ref 38–73)
NRBC BLD-RTO: 0 /100 WBC
PHOSPHATE SERPL-MCNC: 4.3 MG/DL (ref 2.7–4.5)
PISA TR MAX VEL: 2.69 M/S
PLATELET # BLD AUTO: 254 K/UL (ref 150–450)
PMV BLD AUTO: 11 FL (ref 9.2–12.9)
POCT GLUCOSE: 134 MG/DL (ref 70–110)
POTASSIUM SERPL-SCNC: 4.1 MMOL/L (ref 3.5–5.1)
PROT SERPL-MCNC: 6.8 G/DL (ref 6–8.4)
RBC # BLD AUTO: 3.63 M/UL (ref 4.6–6.2)
RIGHT VENTRICULAR END-DIASTOLIC DIMENSION: 4.06 CM
RV MID DIAMA: 3.65 CM
SODIUM SERPL-SCNC: 136 MMOL/L (ref 136–145)
TR MAX PG: 29 MMHG
WBC # BLD AUTO: 7.52 K/UL (ref 3.9–12.7)

## 2023-05-08 PROCEDURE — 80053 COMPREHEN METABOLIC PANEL: CPT | Mod: NTX | Performed by: NURSE PRACTITIONER

## 2023-05-08 PROCEDURE — 96366 THER/PROPH/DIAG IV INF ADDON: CPT

## 2023-05-08 PROCEDURE — 63600175 PHARM REV CODE 636 W HCPCS: Mod: JZ,NTX | Performed by: INTERNAL MEDICINE

## 2023-05-08 PROCEDURE — 99232 PR SUBSEQUENT HOSPITAL CARE,LEVL II: ICD-10-PCS | Mod: NTX,,, | Performed by: INTERNAL MEDICINE

## 2023-05-08 PROCEDURE — 25000003 PHARM REV CODE 250: Mod: NTX | Performed by: NURSE PRACTITIONER

## 2023-05-08 PROCEDURE — 94640 AIRWAY INHALATION TREATMENT: CPT | Mod: NTX,XB

## 2023-05-08 PROCEDURE — 25000242 PHARM REV CODE 250 ALT 637 W/ HCPCS: Mod: NTX | Performed by: NURSE PRACTITIONER

## 2023-05-08 PROCEDURE — 99232 SBSQ HOSP IP/OBS MODERATE 35: CPT | Mod: NTX,,, | Performed by: INTERNAL MEDICINE

## 2023-05-08 PROCEDURE — 63600175 PHARM REV CODE 636 W HCPCS: Mod: NTX | Performed by: NURSE PRACTITIONER

## 2023-05-08 PROCEDURE — G0257 UNSCHED DIALYSIS ESRD PT HOS: HCPCS

## 2023-05-08 PROCEDURE — 90935 HEMODIALYSIS ONE EVALUATION: CPT | Mod: NTX

## 2023-05-08 PROCEDURE — 99900035 HC TECH TIME PER 15 MIN (STAT): Mod: NTX

## 2023-05-08 PROCEDURE — 99214 PR OFFICE/OUTPT VISIT, EST, LEVL IV, 30-39 MIN: ICD-10-PCS | Mod: NTX,,, | Performed by: PHYSICIAN ASSISTANT

## 2023-05-08 PROCEDURE — G0378 HOSPITAL OBSERVATION PER HR: HCPCS | Mod: NTX

## 2023-05-08 PROCEDURE — 96372 THER/PROPH/DIAG INJ SC/IM: CPT | Mod: NTX | Performed by: NURSE PRACTITIONER

## 2023-05-08 PROCEDURE — 96372 THER/PROPH/DIAG INJ SC/IM: CPT | Mod: NTX | Performed by: INTERNAL MEDICINE

## 2023-05-08 PROCEDURE — 63700000 PHARM REV CODE 250 ALT 637 W/O HCPCS: Mod: NTX | Performed by: NURSE PRACTITIONER

## 2023-05-08 PROCEDURE — 27000221 HC OXYGEN, UP TO 24 HOURS: Mod: NTX

## 2023-05-08 PROCEDURE — 84100 ASSAY OF PHOSPHORUS: CPT | Mod: NTX | Performed by: NURSE PRACTITIONER

## 2023-05-08 PROCEDURE — 36415 COLL VENOUS BLD VENIPUNCTURE: CPT | Mod: NTX | Performed by: NURSE PRACTITIONER

## 2023-05-08 PROCEDURE — 94761 N-INVAS EAR/PLS OXIMETRY MLT: CPT | Mod: NTX

## 2023-05-08 PROCEDURE — 99214 OFFICE O/P EST MOD 30 MIN: CPT | Mod: NTX,,, | Performed by: PHYSICIAN ASSISTANT

## 2023-05-08 PROCEDURE — 85025 COMPLETE CBC W/AUTO DIFF WBC: CPT | Mod: NTX | Performed by: NURSE PRACTITIONER

## 2023-05-08 RX ORDER — MUPIROCIN 20 MG/G
OINTMENT TOPICAL 2 TIMES DAILY
Status: DISCONTINUED | OUTPATIENT
Start: 2023-05-08 | End: 2023-05-08 | Stop reason: HOSPADM

## 2023-05-08 RX ADMIN — ASPIRIN 81 MG: 81 TABLET, COATED ORAL at 08:05

## 2023-05-08 RX ADMIN — FOLIC ACID 1 MG: 1 TABLET ORAL at 08:05

## 2023-05-08 RX ADMIN — HYDRALAZINE HYDROCHLORIDE 100 MG: 50 TABLET ORAL at 02:05

## 2023-05-08 RX ADMIN — ISOSORBIDE DINITRATE 20 MG: 20 TABLET ORAL at 02:05

## 2023-05-08 RX ADMIN — CARVEDILOL 12.5 MG: 12.5 TABLET, FILM COATED ORAL at 08:05

## 2023-05-08 RX ADMIN — FUROSEMIDE 80 MG: 40 TABLET ORAL at 08:05

## 2023-05-08 RX ADMIN — SENNOSIDES AND DOCUSATE SODIUM 1 TABLET: 50; 8.6 TABLET ORAL at 08:05

## 2023-05-08 RX ADMIN — ATORVASTATIN CALCIUM 40 MG: 40 TABLET, FILM COATED ORAL at 08:05

## 2023-05-08 RX ADMIN — SACUBITRIL AND VALSARTAN 1 TABLET: 49; 51 TABLET, FILM COATED ORAL at 08:05

## 2023-05-08 RX ADMIN — HEPARIN SODIUM 3600 UNITS: 1000 INJECTION, SOLUTION INTRAVENOUS; SUBCUTANEOUS at 11:05

## 2023-05-08 RX ADMIN — AZITHROMYCIN MONOHYDRATE 500 MG: 250 TABLET ORAL at 08:05

## 2023-05-08 RX ADMIN — SEVELAMER CARBONATE 2400 MG: 800 TABLET, FILM COATED ORAL at 08:05

## 2023-05-08 RX ADMIN — EPOETIN ALFA-EPBX 11780 UNITS: 10000 INJECTION, SOLUTION INTRAVENOUS; SUBCUTANEOUS at 11:05

## 2023-05-08 RX ADMIN — ARFORMOTEROL TARTRATE 15 MCG: 15 SOLUTION RESPIRATORY (INHALATION) at 07:05

## 2023-05-08 RX ADMIN — HEPARIN SODIUM 5000 UNITS: 5000 INJECTION INTRAVENOUS; SUBCUTANEOUS at 02:05

## 2023-05-08 RX ADMIN — CEFTRIAXONE 1 G: 1 INJECTION, POWDER, FOR SOLUTION INTRAMUSCULAR; INTRAVENOUS at 09:05

## 2023-05-08 RX ADMIN — HEPARIN SODIUM 5000 UNITS: 5000 INJECTION INTRAVENOUS; SUBCUTANEOUS at 05:05

## 2023-05-08 RX ADMIN — ISOSORBIDE DINITRATE 20 MG: 20 TABLET ORAL at 08:05

## 2023-05-08 RX ADMIN — HYDRALAZINE HYDROCHLORIDE 100 MG: 50 TABLET ORAL at 05:05

## 2023-05-08 RX ADMIN — THERA TABS 1 TABLET: TAB at 08:05

## 2023-05-08 RX ADMIN — BUDESONIDE 0.5 MG: 0.5 INHALANT ORAL at 07:05

## 2023-05-08 RX ADMIN — TAMSULOSIN HYDROCHLORIDE 0.4 MG: 0.4 CAPSULE ORAL at 08:05

## 2023-05-08 RX ADMIN — FINASTERIDE 5 MG: 5 TABLET, FILM COATED ORAL at 08:05

## 2023-05-08 NOTE — NURSING
Pt remains free of falls/injury. Safety precautions maintained. Pt denies pain/discomfort. IV abx given.  Diet tolerated and pt complied with 1500mL fluid restriction. VSS. IV's removed No S/S of distress noted at this time. Pt education is complete.  Pt dc'd home per orders.

## 2023-05-08 NOTE — ASSESSMENT & PLAN NOTE
-Troponin bumped to 0.740, now trending down  -Elevation likely secondary to demand ischemia from hypoxia, acidosis, fluid overload, elevated BP  -Denies chest pain symptoms  -Prior [facility] 8/22 showed non-obstructive CAD  -Continue OMT-ASA, statin, BB, CCB, Isordil  -Echo showed drop in EF to 20%, repeat limited echo pending to reassess now that volume status has improved

## 2023-05-08 NOTE — SUBJECTIVE & OBJECTIVE
Past Medical History:   Diagnosis Date    Abnormal nuclear stress test 8/30/2022    Acute hypoxemic respiratory failure due to COVID-19 1/21/2021    Anal itching 10/10/2022    Anemia 4/16/2014    BPH (benign prostatic hyperplasia)     CKD (chronic kidney disease) stage 4, GFR 15-29 ml/min     Coronary artery disease of native artery of native heart with stable angina pectoris 4/29/2019    Diabetes mellitus     Diabetes mellitus, type 2     Dilated cardiomyopathy 8/30/2022    Elevated PSA     Encounter for blood transfusion     Herpes labialis     Hyperlipidemia     Hypertension     Obesity     Pneumonia due to COVID-19 virus     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    Preop cardiovascular exam 9/16/2022    Proteinuria     Secondary hyperparathyroidism (of renal origin)     UTI (lower urinary tract infection) 5/1/2015       Past Surgical History:   Procedure Laterality Date    CATHETERIZATION OF BOTH LEFT AND RIGHT HEART N/A 08/30/2022    Procedure: CATHETERIZATION, HEART, BOTH LEFT AND RIGHT;  Surgeon: Kassandra Deras MD;  Location: [facility] CATH LAB;  Service: Cardiology;  Laterality: N/A;    COLONOSCOPY N/A 12/21/2017    Procedure: COLONOSCOPY;  Surgeon: Fran Escalera MD;  Location: [facility] ENDO;  Service: Endoscopy;  Laterality: N/A;    COLONOSCOPY N/A 02/02/2023    Procedure: COLONOSCOPY;  Surgeon: Anthony Meng MD;  Location: [facility] ENDO;  Service: Endoscopy;  Laterality: N/A;    FLUOROSCOPY N/A 01/21/2021    Procedure: Vascath insertion;  Surgeon: Adrian Pruitt Jr., MD;  Location: [facility] CATH LAB;  Service: General;  Laterality: N/A;    FLUOROSCOPY N/A 04/06/2021    Procedure: Over the wire Vas Cath exchange;  Surgeon: Ho Pressley MD;  Location: [facility] CATH LAB;  Service: General;  Laterality: N/A;    PROSTATE BIOPSY      thorocotomy  01/01/2010       Review of patient's allergies indicates:   Allergen Reactions    Bactrim [sulfamethoxazole-trimethoprim] Swelling    Nsaids (non-steroidal anti-inflammatory drug)      " CKD    Sulfamethoxazole     Trimethoprim        No current facility-administered medications on file prior to encounter.     Current Outpatient Medications on File Prior to Encounter   Medication Sig    carvediloL (COREG) 12.5 MG tablet Take 1 tablet (12.5 mg total) by mouth 2 (two) times daily with meals.    finasteride (PROSCAR) 5 mg tablet Take 1 tablet (5 mg total) by mouth once daily.    furosemide (LASIX) 80 MG tablet Take 1 tablet (80 mg total) by mouth once daily.    hydrALAZINE (APRESOLINE) 100 MG tablet Take 1 tablet (100 mg total) by mouth every 8 (eight) hours.    isosorbide dinitrate (ISORDIL) 20 MG tablet Take 1 tablet (20 mg total) by mouth 3 (three) times daily.    NIFEdipine (PROCARDIA-XL) 90 MG (OSM) 24 hr tablet Take 1 tablet (90 mg total) by mouth once daily.    tamsulosin (FLOMAX) 0.4 mg Cap TAKE 1 CAPSULE BY MOUTH EVERY DAY    acyclovir (ZOVIRAX) 400 MG tablet TAKE 1 TABLET (400 MG TOTAL) BY MOUTH 2 (TWO) TIMES DAILY. ONE TABLET    albuterol (PROVENTIL) 2.5 mg /3 mL (0.083 %) nebulizer solution Take 3 mLs (2.5 mg total) by nebulization every 4 to 6 hours as needed for Wheezing or Shortness of Breath.    albuterol (PROVENTIL/VENTOLIN HFA) 90 mcg/actuation inhaler Inhale 2 puffs into the lungs every 4 (four) hours as needed for Wheezing or Shortness of Breath.    aspirin (ECOTRIN) 81 MG EC tablet Take 1 tablet (81 mg total) by mouth once daily.    atorvastatin (LIPITOR) 40 MG tablet TAKE 1 TABLET BY MOUTH EVERY DAY    BD INSULIN SYRINGE HALF UNIT 0.3 mL 31 gauge x 5/16" Syrg FOR USE WITH INSULIN.    blood sugar diagnostic Strp 1 each by Misc.(Non-Drug; Combo Route) route 4 (four) times daily.    blood-glucose meter kit Test finger stick blood sugar once daily.    calcitRIOL (ROCALTROL) 0.25 MCG Cap TAKE 1 CAPSULE (0.25 MCG TOTAL) BY MOUTH EVERY MON, WED, FRI.    cetirizine (ZYRTEC) 10 MG tablet TAKE 1 TABLET BY MOUTH EVERY DAY    esomeprazole (NEXIUM) 40 MG capsule Take 40 mg by mouth once " "daily.    fluticasone propionate (FLONASE) 50 mcg/actuation nasal spray 2 sprays (100 mcg total) by Each Nostril route every evening. For nasal congestion or runny nose    fluticasone-salmeterol diskus inhaler 500-50 mcg Inhale 1 puff into the lungs 2 (two) times daily. Controller.Wash out mouth after use    folic acid (FOLVITE) 1 MG tablet TAKE 1 TABLET BY MOUTH EVERY DAY    HYDROcodone-acetaminophen (NORCO) 5-325 mg per tablet Take 1 tablet by mouth every 6 (six) hours as needed for Pain.    inhalation spacing device (VORTEX HOLDING CHAMBER) Use as directed for inhalation.    insulin degludec (TRESIBA FLEXTOUCH U-200) 200 unit/mL (3 mL) insulin pen Inject 30 Units into the skin once daily.    lancets Misc 1 each by Misc.(Non-Drug; Combo Route) route 4 (four) times daily.    multivitamin (THERAGRAN) tablet Take 1 tablet by mouth once daily.    olopatadine (PATADAY) 0.2 % Drop Place 1 drop into both eyes daily as needed.    pen needle, diabetic (BD ULTRA-FINE SHIRA PEN NEEDLE) 32 gauge x 5/32" Ndle 1 each by Misc.(Non-Drug; Combo Route) route 4 (four) times daily with meals and nightly.    sacubitriL-valsartan (ENTRESTO)  mg per tablet Take 1 tablet by mouth 2 (two) times daily.    sevelamer carbonate (RENVELA) 800 mg Tab Take 3 tablets (2,400 mg total) by mouth 3 (three) times daily with meals.     Family History       Problem Relation (Age of Onset)    Diabetes Father, Other    Hypertension Father, Other    No Known Problems Mother          Tobacco Use    Smoking status: Never    Smokeless tobacco: Never   Substance and Sexual Activity    Alcohol use: Not Currently     Comment: seldom     Drug use: No    Sexual activity: Not Currently     Partners: Female     Review of Systems   Constitutional: Negative.   HENT: Negative.     Eyes: Negative.    Cardiovascular:  Positive for dyspnea on exertion (improved), orthopnea (resolved) and paroxysmal nocturnal dyspnea (resolved).   Respiratory:  Positive for shortness " of breath (improved).    Endocrine: Negative.    Hematologic/Lymphatic: Negative.    Skin: Negative.    Musculoskeletal: Negative.    Gastrointestinal: Negative.    Genitourinary: Negative.    Neurological: Negative.    Psychiatric/Behavioral: Negative.     Allergic/Immunologic: Negative.    Objective:     Vital Signs (Most Recent):  Temp: 97.9 °F (36.6 °C) (05/08/23 0734)  Pulse: 80 (05/08/23 0742)  Resp: 16 (05/08/23 0742)  BP: 123/74 (05/08/23 0734)  SpO2: 95 % (05/08/23 0742) Vital Signs (24h Range):  Temp:  [97.5 °F (36.4 °C)-98.6 °F (37 °C)] 97.9 °F (36.6 °C)  Pulse:  [67-92] 80  Resp:  [16-20] 16  SpO2:  [95 %-98 %] 95 %  BP: (105-132)/(61-88) 123/74     Weight: 117.8 kg (259 lb 11.2 oz)  Body mass index is 33.34 kg/m².    SpO2: 95 %         Intake/Output Summary (Last 24 hours) at 5/8/2023 1026  Last data filed at 5/8/2023 0856  Gross per 24 hour   Intake 120 ml   Output 2500 ml   Net -2380 ml       Lines/Drains/Airways       Central Venous Catheter Line  Duration                  Hemodialysis Catheter 03/15/22 1238 right internal jugular 418 days              Peripheral Intravenous Line  Duration                  Peripheral IV - Single Lumen 05/07/23 0337 20 G Posterior;Right Hand 1 day         Peripheral IV - Single Lumen 05/07/23 0345 20 G Left;Posterior Hand 1 day                     Physical Exam  Vitals and nursing note reviewed.   Constitutional:       General: He is not in acute distress.     Appearance: Normal appearance. He is well-developed. He is obese. He is not diaphoretic.   HENT:      Head: Normocephalic and atraumatic.   Eyes:      General:         Right eye: No discharge.         Left eye: No discharge.      Pupils: Pupils are equal, round, and reactive to light.   Neck:      Thyroid: No thyromegaly.      Vascular: No JVD.      Trachea: No tracheal deviation.   Cardiovascular:      Rate and Rhythm: Normal rate and regular rhythm.      Heart sounds: Normal heart sounds, S1 normal and S2  normal. No murmur heard.  Pulmonary:      Effort: Pulmonary effort is normal. No respiratory distress.      Breath sounds: Normal breath sounds. No wheezing or rales.   Abdominal:      General: There is no distension.      Palpations: Abdomen is soft.      Tenderness: There is no abdominal tenderness. There is no rebound.   Musculoskeletal:      Cervical back: Neck supple.      Right lower leg: No edema.      Left lower leg: No edema.   Skin:     General: Skin is warm and dry.      Findings: No erythema.   Neurological:      General: No focal deficit present.      Mental Status: He is alert and oriented to person, place, and time.   Psychiatric:         Mood and Affect: Mood normal.         Behavior: Behavior normal.         Thought Content: Thought content normal.        Significant Labs: CMP   Recent Labs   Lab 05/07/23  0353 05/07/23  1458 05/08/23  0530    138 136   K 4.2 3.8 4.1    103 103   CO2 14* 24 22*   * 99 122*   BUN 50* 36* 45*   CREATININE 8.8* 6.3* 8.6*   CALCIUM 9.2 9.0 9.2   PROT 7.5  --  6.8   ALBUMIN 3.4*  --  3.2*   BILITOT 0.6  --  0.6   ALKPHOS 96  --  55   AST 27  --  17   ALT 12  --  8*   ANIONGAP 16 11 11   , CBC   Recent Labs   Lab 05/07/23  0353 05/08/23  0530   WBC 18.08* 7.52   HGB 10.9* 9.7*   HCT 35.7* 30.9*    254   , Troponin   Recent Labs   Lab 05/07/23  1011 05/07/23  1458 05/07/23  2108   TROPONINI 0.539* 0.740* 0.628*   , and All pertinent lab results from the last 24 hours have been reviewed.    Significant Imaging: Echocardiogram: Transthoracic echo (TTE) complete (Cupid Only):   Results for orders placed or performed during the hospital encounter of 05/07/23   Echo   Result Value Ref Range    BSA 2.48 m2    TDI SEPTAL 0.06 m/s    LV LATERAL E/E' RATIO 16.60 m/s    LV SEPTAL E/E' RATIO 13.83 m/s    LA WIDTH 4.10 cm    IVC diameter 2.22 cm    Left Ventricular Outflow Tract Mean Velocity 0.55 cm/s    Left Ventricular Outflow Tract Mean Gradient 1.33 mmHg     Pulmonary Valve Mean Velocity 0.58 m/s    TDI LATERAL 0.05 m/s    PV PEAK VELOCITY 0.71 cm/s    LVIDd 6.51 (A) 3.5 - 6.0 cm    IVS 1.58 (A) 0.6 - 1.1 cm    Posterior Wall 1.83 (A) 0.6 - 1.1 cm    Ao root annulus 3.98 cm    LVIDs 5.80 (A) 2.1 - 4.0 cm    FS 11 28 - 44 %    LA volume 97.51 cm3    STJ 2.48 cm    Ascending aorta 3.40 cm    LV mass 583.25 g    LA size 4.65 cm    RVDD 4.26 cm    TAPSE 1.50 cm    RV S' 0.01 cm/s    Left Ventricle Relative Wall Thickness 0.56 cm    AV mean gradient 2 mmHg    AV valve area 4.38 cm2    AV Velocity Ratio 0.74     AV index (prosthetic) 0.99     MV valve area p 1/2 method 3.26 cm2    E/A ratio 1.69     Mean e' 0.06 m/s    E wave deceleration time 232.81 msec    IVRT 59.94 msec    LVOT diameter 2.38 cm    LVOT area 4.4 cm2    LVOT peak justino 0.76 m/s    LVOT peak VTI 13.80 cm    Ao peak justino 1.03 m/s    Ao VTI 14.0 cm    RVOT peak justino 0.37 m/s    RVOT peak VTI 6.5 cm    Mr max justino 4.83 m/s    LVOT stroke volume 61.36 cm3    AV peak gradient 4 mmHg    PV mean gradient 0.31 mmHg    E/E' ratio 15.09 m/s    MV Peak E Justino 0.83 m/s    TR Max Justino 2.82 m/s    MV stenosis pressure 1/2 time 67.51 ms    MV Peak A Justino 0.49 m/s    LV Systolic Volume 166.75 mL    LV Systolic Volume Index 68.6 mL/m2    LV Diastolic Volume 216.73 mL    LV Diastolic Volume Index 89.19 mL/m2    LA Volume Index 40.1 mL/m2    LV Mass Index 240 g/m2    RA Major Axis 5.29 cm    Left Atrium Minor Axis 6.15 cm    Left Atrium Major Axis 5.89 cm    Triscuspid Valve Regurgitation Peak Gradient 32 mmHg    LA Volume Index (Mod) 29.7 mL/m2    LA volume (mod) 72.06 cm3    Right Atrial Pressure (from IVC) 8 mmHg    EF 20 %    TV rest pulmonary artery pressure 40 mmHg    Narrative    · The left ventricle is severely enlarged with moderate concentric   hypertrophy and severely decreased systolic function.  · Mild left atrial enlargement.  · The estimated ejection fraction is 20%.  · Grade II left ventricular diastolic  dysfunction.  · There is severe left ventricular global hypokinesis.  · Mild right ventricular enlargement with mildly reduced right ventricular   systolic function.  · Mild right atrial enlargement.  · Moderate mitral regurgitation.  · Moderate tricuspid regurgitation.  · Intermediate central venous pressure (8 mmHg).  · The estimated PA systolic pressure is 40 mmHg.      , EKG: Reviewed, and X-Ray: CXR: X-Ray Chest 1 View (CXR): No results found for this visit on 05/07/23. and X-Ray Chest PA and Lateral (CXR):   Results for orders placed or performed during the hospital encounter of 05/07/23   X-Ray Chest PA And Lateral    Narrative    EXAMINATION:  XR CHEST PA AND LATERAL    CLINICAL HISTORY:  effusion;    TECHNIQUE:  PA and lateral views of the chest were performed.    COMPARISON:  05/07/2023    FINDINGS:  Aortic atherosclerosis.  Heart size normal.    No acute appearing infiltrates.  Small amount of scarring lower left lung..    Multiple old bilateral rib fractures.  No advanced arthritic changes.      Impression    No acute findings evident.  No significant pleural effusion evident      Electronically signed by: Adrian Lopez MD  Date:    05/08/2023  Time:    09:04

## 2023-05-08 NOTE — ASSESSMENT & PLAN NOTE
-Presented with volume overload in setting of dietary indiscretion, improved at time of exam  -Echo this admission showed drop in EF to 20%  -Prior [facility] 8/22 showed non-obstructive CAD  -Continue OMT-ASA, BB, Entresto, Isordil, hydralazine  -Repeat limited echo pending to reassess EF now that volume status has improved

## 2023-05-08 NOTE — HPI
Mr. Alves is a 67 year old male patient whose current medical conditions include ERSD on HD, CAD (non-obstructive by cath 8/22), DCM, IDFDM, HTN, and hyperlipidemia who presented to [facility] ED yesterday via EMS with a chief complaint of sudden onset of SOB/coughing that awakened him from sleep. Associated symptoms included diaphoresis. Patient denied any associated chest pain, nausea, vomiting, palpitations. Upon EMS arrival, he was noted to be severely hypoxic with O2 sats in 60's and was subsequently placed on bipap. Initial workup in ED revealed uncontrolled BP (157/129), leukocytosis (WBC 18,000), BNP > 4,000, and troponin of 0.101. CXR showed findings consistent with pulmonary edema and patient was subsequently admitted for further evaluation and treatment. Cardiology consulted to assist with management. Patient seen and examined today during HD. Feels well, back to his baseline. No CV complaints. Denies chest pain symptoms. SOB greatly improved. He admits to dietary indiscretion over the weekend. He reports compliance with his medications. Followed on OP basis in clinic by Dr. Ragland. Labs reviewed. Troponin 0.101>0.539>0.740>0.628. Echo this admission showed drop in EF to 20%, mild RV enlargement with mildly reduced right ventricular systolic function. Discussed with Dr. Blair, will repeat limited echo to reassess now that volume status has improved.

## 2023-05-08 NOTE — CONSULTS
[facility] - Emergency Dept.  Nephrology  Consult Note    Patient Name: Isidro Alves  MRN: [id number]  Admission Date: 5/7/2023  Hospital Length of Stay: 0 days  Attending Provider: Ashtyn Deleon MD   Primary Care Physician: Steven Morgan MD  Principal Problem:Acute on chronic respiratory failure    Consults  Subjective:     HPI:  67-year-old male well known to me from chronic dialysis.  Presented to the emergency department with sudden onset of shortness of breath at around 2:00 a.m. this morning.  Initially required BiPAP.  BiPAP has now been weaned to nasal cannula.  Nephrology has been consulted for maintenance dialysis.  Patient was seen in the emergency department.  In bed resting comfortably.  No acute distress noted.  States that he had a large smoothie yesterday and drank more fluid than usual.  His last dialysis treatment was on 05/05/2023.  He reports no difficulty and was ultra filtrated to his usual dry weight.    05/08/2023: Patient was seen in his hospital room.  In bed resting comfortably.  No acute distress noted.  States that his breathing has improved dramatically since admission.    Past Medical History:   Diagnosis Date    Abnormal nuclear stress test 8/30/2022    Acute hypoxemic respiratory failure due to COVID-19 1/21/2021    Anal itching 10/10/2022    Anemia 4/16/2014    BPH (benign prostatic hyperplasia)     CKD (chronic kidney disease) stage 4, GFR 15-29 ml/min     Coronary artery disease of native artery of native heart with stable angina pectoris 4/29/2019    Diabetes mellitus     Diabetes mellitus, type 2     Dilated cardiomyopathy 8/30/2022    Elevated PSA     Encounter for blood transfusion     Herpes labialis     Hyperlipidemia     Hypertension     Obesity     Pneumonia due to COVID-19 virus     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    Preop cardiovascular exam 9/16/2022    Proteinuria     Secondary hyperparathyroidism (of renal origin)     UTI (lower urinary tract infection) 5/1/2015        Past Surgical History:   Procedure Laterality Date    CATHETERIZATION OF BOTH LEFT AND RIGHT HEART N/A 08/30/2022    Procedure: CATHETERIZATION, HEART, BOTH LEFT AND RIGHT;  Surgeon: Kassandra Deras MD;  Location: [facility] CATH LAB;  Service: Cardiology;  Laterality: N/A;    COLONOSCOPY N/A 12/21/2017    Procedure: COLONOSCOPY;  Surgeon: Fran Escalera MD;  Location: [facility] ENDO;  Service: Endoscopy;  Laterality: N/A;    COLONOSCOPY N/A 02/02/2023    Procedure: COLONOSCOPY;  Surgeon: Anthony Meng MD;  Location: [facility] ENDO;  Service: Endoscopy;  Laterality: N/A;    FLUOROSCOPY N/A 01/21/2021    Procedure: Vascath insertion;  Surgeon: Adrian Pruitt Jr., MD;  Location: [facility] CATH LAB;  Service: General;  Laterality: N/A;    FLUOROSCOPY N/A 04/06/2021    Procedure: Over the wire Vas Cath exchange;  Surgeon: Ho Pressley MD;  Location: [facility] CATH LAB;  Service: General;  Laterality: N/A;    PROSTATE BIOPSY      thorocotomy  01/01/2010       Review of patient's allergies indicates:   Allergen Reactions    Bactrim [sulfamethoxazole-trimethoprim] Swelling    Nsaids (non-steroidal anti-inflammatory drug)      CKD    Sulfamethoxazole     Trimethoprim      Current Facility-Administered Medications   Medication Frequency    acetaminophen tablet 650 mg Q4H PRN    albuterol-ipratropium 2.5 mg-0.5 mg/3 mL nebulizer solution 3 mL Q6H PRN    aluminum-magnesium hydroxide-simethicone 200-200-20 mg/5 mL suspension 30 mL QID PRN    arformoteroL nebulizer solution 15 mcg BID    aspirin EC tablet 81 mg Daily    atorvastatin tablet 40 mg Daily    azithromycin tablet 500 mg Daily    budesonide nebulizer solution 0.5 mg Q12H    calcitRIOL capsule 0.25 mcg Every Mon, Wed, Fri    carvediloL tablet 12.5 mg BID WM    cefTRIAXone (ROCEPHIN) 1 g in dextrose 5 % in water (D5W) 5 % 50 mL IVPB (MB+) Q24H    cetirizine tablet 5 mg Once per day on Mon Wed Fri    dextrose 10% bolus 125 mL 125 mL PRN    dextrose 10% bolus 250 mL 250 mL PRN     finasteride tablet 5 mg Daily    fluticasone propionate 50 mcg/actuation nasal spray 100 mcg QHS    folic acid tablet 1 mg Daily    furosemide tablet 80 mg Daily    glucagon (human recombinant) injection 1 mg PRN    glucose chewable tablet 16 g PRN    glucose chewable tablet 24 g PRN    heparin (porcine) injection 3,600 Units PRN    heparin (porcine) injection 5,000 Units Q8H    hydrALAZINE tablet 100 mg Q8H    HYDROcodone-acetaminophen 5-325 mg per tablet 1 tablet Q6H PRN    insulin aspart U-100 pen 0-5 Units QID (AC + HS) PRN    isosorbide dinitrate tablet 20 mg TID    melatonin tablet 6 mg Nightly PRN    methocarbamoL tablet 500 mg TID PRN    multivitamin tablet Daily    mupirocin 2 % ointment BID    naloxone 0.4 mg/mL injection 0.02 mg PRN    NIFEdipine 24 hr tablet 90 mg Daily    ondansetron injection 4 mg Q6H PRN    polyethylene glycol packet 17 g BID PRN    prochlorperazine injection Soln 5 mg Q6H PRN    sacubitriL-valsartan 49-51 mg per tablet 1 tablet BID    senna-docusate 8.6-50 mg per tablet 1 tablet BID    sevelamer carbonate tablet 2,400 mg TID WM    simethicone chewable tablet 80 mg QID PRN    sodium chloride 0.9% bolus 250 mL 250 mL PRN    sodium chloride 0.9% flush 10 mL Q8H PRN    sodium chloride 0.9% flush 10 mL PRN    tamsulosin 24 hr capsule 0.4 mg Daily     Family History       Problem Relation (Age of Onset)    Diabetes Father, Other    Hypertension Father, Other    No Known Problems Mother          Tobacco Use    Smoking status: Never    Smokeless tobacco: Never   Substance and Sexual Activity    Alcohol use: Not Currently     Comment: seldom     Drug use: No    Sexual activity: Not Currently     Partners: Female     Review of Systems   Constitutional: Negative.    HENT: Negative.     Eyes: Negative.    Respiratory: Negative.     Cardiovascular: Negative.    Gastrointestinal: Negative.    Genitourinary: Negative.    Musculoskeletal: Negative.    Skin: Negative.    Neurological: Negative.     Objective:     Vital Signs (Most Recent):  Temp: 97.9 °F (36.6 °C) (05/08/23 0734)  Pulse: 80 (05/08/23 0742)  Resp: 16 (05/08/23 0742)  BP: 123/74 (05/08/23 0734)  SpO2: 95 % (05/08/23 0742) Vital Signs (24h Range):  Temp:  [97.5 °F (36.4 °C)-98.6 °F (37 °C)] 97.9 °F (36.6 °C)  Pulse:  [67-92] 80  Resp:  [16-20] 16  SpO2:  [95 %-98 %] 95 %  BP: (105-132)/(61-88) 123/74     Weight: 117.8 kg (259 lb 11.2 oz) (05/08/23 0555)  Body mass index is 33.34 kg/m².  Body surface area is 2.48 meters squared.    I/O last 3 completed shifts:  In: 287.7 [P.O.:237; I.V.:50.7]  Out: 2500 [Other:2500]    Physical Exam  Constitutional:       Appearance: Normal appearance.   HENT:      Head: Normocephalic and atraumatic.   Eyes:      General: No scleral icterus.     Extraocular Movements: Extraocular movements intact.      Pupils: Pupils are equal, round, and reactive to light.   Cardiovascular:      Rate and Rhythm: Normal rate and regular rhythm.   Pulmonary:      Effort: Pulmonary effort is normal.      Breath sounds: Normal breath sounds.   Musculoskeletal:      Right lower leg: No edema.      Left lower leg: No edema.   Skin:     General: Skin is warm and dry.   Neurological:      General: No focal deficit present.      Mental Status: He is alert and oriented to person, place, and time.   Psychiatric:         Mood and Affect: Mood normal.         Behavior: Behavior normal.       Significant Labs:  BMP:   Recent Labs   Lab 05/07/23  1458 05/08/23  0530   GLU 99 122*    136   K 3.8 4.1    103   CO2 24 22*   BUN 36* 45*   CREATININE 6.3* 8.6*   CALCIUM 9.0 9.2   MG 1.8  --        CMP:   Recent Labs   Lab 05/08/23  0530   *   CALCIUM 9.2   ALBUMIN 3.2*   PROT 6.8      K 4.1   CO2 22*      BUN 45*   CREATININE 8.6*   ALKPHOS 55   ALT 8*   AST 17   BILITOT 0.6       All labs within the past 24 hours have been reviewed.    Significant Imaging:  Labs: Reviewed      Assessment/Plan:     Active Diagnoses:     Diagnosis Date Noted POA    PRINCIPAL PROBLEM:  Acute on chronic respiratory failure [J96.20] 05/07/2023 Yes    Acute on chronic combined systolic and diastolic congestive heart failure [I50.43] 05/07/2023 Yes    Pleural effusion on left [J90] 05/07/2023 Yes    Pneumonia [J18.9] 05/07/2023 Yes    Pulmonary fibrosis, postinflammatory [J84.10] 05/29/2022 Yes    ESRD on dialysis [N18.6, Z99.2]  Not Applicable    Coronary artery disease of native artery of native heart with stable angina pectoris [I25.118] 04/29/2019 Yes    Type 2 diabetes mellitus with chronic kidney disease, with long-term current use of insulin [E11.22, Z79.4] 05/02/2015 Not Applicable    Metabolic acidosis [E87.20] 05/01/2015 Yes    Elevated troponin [R77.8] 05/01/2015 Yes      Problems Resolved During this Admission:       Assessment and plan:    1. Shortness of breath:  Improved with ultrafiltration and dialysis yesterday.  Will plan another short dialysis treatment again today with further ultrafiltration.    On further discussion Mr. Alves states that he has been getting off dialysis under his dry weight.  Will attempt to challenge his dry weight today.  Will also measure pre and post dialysis weights so that we can adjust his EDW at the chronic unit.  2. End-stage renal disease:  As per above.    3. Hypertension:  Blood pressure has improved with medical management.      4. Anemia:  Secondary to end-stage renal disease.  Hemoglobin has decreased slightly to 9.7.  Likely reflective of phlebotomy.  Will restart DIPTI therapy with dialysis.    Thank you for your consult.     Colten Rizvi MD  Nephrology  [facility] - Emergency Dept.

## 2023-05-08 NOTE — PLAN OF CARE
[facility] - Med Surg  Discharge Assessment    Primary Care Provider: Steven Morgan MD     Discharge Assessment (most recent)       BRIEF DISCHARGE ASSESSMENT - 05/08/23 9520          Discharge Planning    Assessment Type Discharge Planning Brief Assessment     Resource/Environmental Concerns none     Support Systems Spouse/significant other     Assistance Needed Independent PTA     Equipment Currently Used at Home none     Current Living Arrangements home     Patient/Family Anticipates Transition to home     Patient/Family Anticipated Services at Transition none     DME Needed Upon Discharge  none     Discharge Plan A Home with family                     HD MWF in [facility]; 5am shift.

## 2023-05-08 NOTE — PLAN OF CARE
POC discussed with patient and spouse; verbalized understanding. VSS. Blood glucose monitored; no insulin needed. Cardiac monitoring continued. PRN medication given for bilateral leg cramps per MD order. Ambulating in room independently. Tolerating diet. 3L nasal cannula maintained. Free from injury. Chart review completed.

## 2023-05-08 NOTE — PROGRESS NOTES
05/08/23 1232   Required for all Hemodialysis Patients   Hepatitis Status other (see comments)   Handoff Report   Received From Hernando Lyn RN   Given To Natasha Burger RN   Treatment Type   Treatment Type Acute   Vital Signs   Temp 97.4 °F (36.3 °C)   Temp Source Oral   Pulse 73   Heart Rate Source Monitor   Resp (!) 22   SpO2 95 %   Pulse Oximetry Type Intermittent   Device (Oxygen Therapy) room air   BP (!) 134/93   MAP (mmHg) 105   BP Location Right arm   BP Method Automatic   Patient Position Lying   Post-Hemodialysis Assessment   Rinseback Volume (mL) 250 mL   Blood Volume Processed (Liters) 37.2 L   Dialyzer Clearance Lightly streaked   Duration of Treatment 120 minutes   Additional Fluid Intake (mL) 0 mL   Total UF (mL) 1603 mL   Net Fluid Removal 1103   Patient Response to Treatment well tolerated   Post-Treatment Weight 112.8 kg (248 lb 10.9 oz)   Treatment Weight Change -0.4   Post-Hemodialysis Comments 2 hrs of I-HDTx completed as planned, NET UF goal reached with out difficulty, no somatic complaints during tx. blood reperfused and pt de accessed according to P&P. RTF withprimary RN.

## 2023-05-08 NOTE — PLAN OF CARE
Problem: Adult Inpatient Plan of Care  Goal: Plan of Care Review  Outcome: Ongoing, Progressing       Problem: Infection  Goal: Absence of Infection Signs and Symptoms  Outcome: Ongoing, Progressing     Problem: Diabetes Comorbidity  Goal: Blood Glucose Level Within Targeted Range  Outcome: Ongoing, Progressing     Problem: Infection (Hemodialysis)  Goal: Absence of Infection Signs and Symptoms  Outcome: Ongoing, Progressing     Problem: Fluid Imbalance (Pneumonia)  Goal: Fluid Balance  Outcome: Ongoing, Progressing     Problem: Infection (Pneumonia)  Goal: Resolution of Infection Signs and Symptoms  Outcome: Ongoing, Progressing     Problem: Respiratory Compromise (Pneumonia)  Goal: Effective Oxygenation and Ventilation  Outcome: Ongoing, Progressing       Discussed POC with pt, verbalized understanding.  Patient remains free from injury. Safety precautions maintained. No s/s of acute distress.  Purposeful rounding every two hours.   Pain controlled per MD order.    Cardiac monitoring in place, tele box number [id number]  Blood glucose monitoring continued this shift.   Diet orders continued, pt diet: diabetic, 2g sodium restriction, 1500 mL fluid restriction.   Vital signs continued per orders this shift.   Respiratory culture not obtained this shift, supplies at bedside and explained.   Chart and orders review completed. Pt education about care completed.

## 2023-05-08 NOTE — PROGRESS NOTES
05/08/23 0946   Treatment Type   Treatment Type Acute   Vital Signs   Temp 98.1 °F (36.7 °C)   Temp Source Axillary   Pulse 74   Heart Rate Source Monitor   Resp (!) 22   SpO2 (!) 94 %   Pulse Oximetry Type Intermittent   Flow (L/min) 4   Oxygen Concentration (%) 36   Device (Oxygen Therapy) nasal cannula   BP (!) 137/93   MAP (mmHg) 104   BP Location Right arm   BP Method Automatic   Patient Position Lying        Hemodialysis Catheter 03/15/22 1238 right internal jugular   Placement Date/Time: 03/15/22 1238   Present Prior to Hospital Arrival?: Yes  Hand Hygiene: Performed  Barrier Precautions: Performed  Skin Antisepsis: ChloraPrep  Hemodialysis Catheter Type: Tunneled catheter  Location: right internal jugular  Cathet...   Line Necessity Review CRRT/HD   Verification by X-ray Other (Comment)   Site Assessment No drainage;No redness;No swelling;No warmth   Line Securement Device Secured with sutureless device   Dressing Type CHG impregnated dressing/sponge;Central line dressing   Dressing Status Clean;Dry;Intact   Dressing Intervention Integrity maintained   Date on Dressing 05/07/23   Dressing Due to be Changed 05/14/23   Venous Patency/Care flushed w/o difficulty;heparin locked;blood return present   Arterial Patency/Care flushed w/o difficulty;heparin locked;blood return present   Waveform Not being transduced     2 hour I-HDTx started

## 2023-05-08 NOTE — CONSULTS
[facility]  Cardiology  Consult Note    Patient Name: Isidro Alves  MRN: [id number]  Admission Date: 5/7/2023  Hospital Length of Stay: 0 days  Code Status: Full Code   Attending Provider: Ashtyn Deleon MD   Consulting Provider: Majo Song PA-C  Primary Care Physician: Steven Morgan MD  Principal Problem:Acute on chronic respiratory failure    Patient information was obtained from patient, past medical records and ER records.     Inpatient consult to Cardiology  Consult performed by: Majo Song PA-C  Consult ordered by: Leo Anders MD        Subjective:     Chief Complaint:  SOB    HPI:   Mr. Alves is a 67 year old male patient whose current medical conditions include ERSD on HD, CAD (non-obstructive by cath 8/22), DCM, IDFDM, HTN, and hyperlipidemia who presented to [facility] ED yesterday via EMS with a chief complaint of sudden onset of SOB/coughing that awakened him from sleep. Associated symptoms included diaphoresis. Patient denied any associated chest pain, nausea, vomiting, palpitations. Upon EMS arrival, he was noted to be severely hypoxic with O2 sats in 60's and was subsequently placed on bipap. Initial workup in ED revealed uncontrolled BP (157/129), leukocytosis (WBC 18,000), BNP > 4,000, and troponin of 0.101. CXR showed findings consistent with pulmonary edema and patient was subsequently admitted for further evaluation and treatment. Cardiology consulted to assist with management. Patient seen and examined today during HD. Feels well, back to his baseline. No CV complaints. Denies chest pain symptoms. SOB greatly improved. He admits to dietary indiscretion over the weekend. He reports compliance with his medications. Followed on OP basis in clinic by Dr. Ragland. Labs reviewed. Troponin 0.101>0.539>0.740>0.628. Echo this admission showed drop in EF to 20%, mild RV enlargement with mildly reduced right ventricular systolic function. Discussed with noni Frank  repeat limited echo to reassess now that volume status has improved.          Past Medical History:   Diagnosis Date    Abnormal nuclear stress test 8/30/2022    Acute hypoxemic respiratory failure due to COVID-19 1/21/2021    Anal itching 10/10/2022    Anemia 4/16/2014    BPH (benign prostatic hyperplasia)     CKD (chronic kidney disease) stage 4, GFR 15-29 ml/min     Coronary artery disease of native artery of native heart with stable angina pectoris 4/29/2019    Diabetes mellitus     Diabetes mellitus, type 2     Dilated cardiomyopathy 8/30/2022    Elevated PSA     Encounter for blood transfusion     Herpes labialis     Hyperlipidemia     Hypertension     Obesity     Pneumonia due to COVID-19 virus     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    Preop cardiovascular exam 9/16/2022    Proteinuria     Secondary hyperparathyroidism (of renal origin)     UTI (lower urinary tract infection) 5/1/2015       Past Surgical History:   Procedure Laterality Date    CATHETERIZATION OF BOTH LEFT AND RIGHT HEART N/A 08/30/2022    Procedure: CATHETERIZATION, HEART, BOTH LEFT AND RIGHT;  Surgeon: Kassandra Deras MD;  Location: [facility] CATH LAB;  Service: Cardiology;  Laterality: N/A;    COLONOSCOPY N/A 12/21/2017    Procedure: COLONOSCOPY;  Surgeon: Fran Escalera MD;  Location: [facility] ENDO;  Service: Endoscopy;  Laterality: N/A;    COLONOSCOPY N/A 02/02/2023    Procedure: COLONOSCOPY;  Surgeon: Anthony Meng MD;  Location: [facility] ENDO;  Service: Endoscopy;  Laterality: N/A;    FLUOROSCOPY N/A 01/21/2021    Procedure: Vascath insertion;  Surgeon: Adrian Pruitt Jr., MD;  Location: [facility] CATH LAB;  Service: General;  Laterality: N/A;    FLUOROSCOPY N/A 04/06/2021    Procedure: Over the wire Vas Cath exchange;  Surgeon: Ho Pressley MD;  Location: [facility] CATH LAB;  Service: General;  Laterality: N/A;    PROSTATE BIOPSY      thorocotomy  01/01/2010       Review of patient's allergies indicates:   Allergen  "Reactions    Bactrim [sulfamethoxazole-trimethoprim] Swelling    Nsaids (non-steroidal anti-inflammatory drug)      CKD    Sulfamethoxazole     Trimethoprim        No current facility-administered medications on file prior to encounter.     Current Outpatient Medications on File Prior to Encounter   Medication Sig    carvediloL (COREG) 12.5 MG tablet Take 1 tablet (12.5 mg total) by mouth 2 (two) times daily with meals.    finasteride (PROSCAR) 5 mg tablet Take 1 tablet (5 mg total) by mouth once daily.    furosemide (LASIX) 80 MG tablet Take 1 tablet (80 mg total) by mouth once daily.    hydrALAZINE (APRESOLINE) 100 MG tablet Take 1 tablet (100 mg total) by mouth every 8 (eight) hours.    isosorbide dinitrate (ISORDIL) 20 MG tablet Take 1 tablet (20 mg total) by mouth 3 (three) times daily.    NIFEdipine (PROCARDIA-XL) 90 MG (OSM) 24 hr tablet Take 1 tablet (90 mg total) by mouth once daily.    tamsulosin (FLOMAX) 0.4 mg Cap TAKE 1 CAPSULE BY MOUTH EVERY DAY    acyclovir (ZOVIRAX) 400 MG tablet TAKE 1 TABLET (400 MG TOTAL) BY MOUTH 2 (TWO) TIMES DAILY. ONE TABLET    albuterol (PROVENTIL) 2.5 mg /3 mL (0.083 %) nebulizer solution Take 3 mLs (2.5 mg total) by nebulization every 4 to 6 hours as needed for Wheezing or Shortness of Breath.    albuterol (PROVENTIL/VENTOLIN HFA) 90 mcg/actuation inhaler Inhale 2 puffs into the lungs every 4 (four) hours as needed for Wheezing or Shortness of Breath.    aspirin (ECOTRIN) 81 MG EC tablet Take 1 tablet (81 mg total) by mouth once daily.    atorvastatin (LIPITOR) 40 MG tablet TAKE 1 TABLET BY MOUTH EVERY DAY    BD INSULIN SYRINGE HALF UNIT 0.3 mL 31 gauge x 5/16" Syrg FOR USE WITH INSULIN.    blood sugar diagnostic Strp 1 each by Misc.(Non-Drug; Combo Route) route 4 (four) times daily.    blood-glucose meter kit Test finger stick blood sugar once daily.    calcitRIOL (ROCALTROL) 0.25 MCG Cap TAKE 1 CAPSULE (0.25 MCG TOTAL) BY MOUTH EVERY MON, WED, FRI.    " "cetirizine (ZYRTEC) 10 MG tablet TAKE 1 TABLET BY MOUTH EVERY DAY    esomeprazole (NEXIUM) 40 MG capsule Take 40 mg by mouth once daily.    fluticasone propionate (FLONASE) 50 mcg/actuation nasal spray 2 sprays (100 mcg total) by Each Nostril route every evening. For nasal congestion or runny nose    fluticasone-salmeterol diskus inhaler 500-50 mcg Inhale 1 puff into the lungs 2 (two) times daily. Controller.Wash out mouth after use    folic acid (FOLVITE) 1 MG tablet TAKE 1 TABLET BY MOUTH EVERY DAY    HYDROcodone-acetaminophen (NORCO) 5-325 mg per tablet Take 1 tablet by mouth every 6 (six) hours as needed for Pain.    inhalation spacing device (VORTEX HOLDING CHAMBER) Use as directed for inhalation.    insulin degludec (TRESIBA FLEXTOUCH U-200) 200 unit/mL (3 mL) insulin pen Inject 30 Units into the skin once daily.    lancets Misc 1 each by Misc.(Non-Drug; Combo Route) route 4 (four) times daily.    multivitamin (THERAGRAN) tablet Take 1 tablet by mouth once daily.    olopatadine (PATADAY) 0.2 % Drop Place 1 drop into both eyes daily as needed.    pen needle, diabetic (BD ULTRA-FINE SHIRA PEN NEEDLE) 32 gauge x 5/32" Ndle 1 each by Misc.(Non-Drug; Combo Route) route 4 (four) times daily with meals and nightly.    sacubitriL-valsartan (ENTRESTO)  mg per tablet Take 1 tablet by mouth 2 (two) times daily.    sevelamer carbonate (RENVELA) 800 mg Tab Take 3 tablets (2,400 mg total) by mouth 3 (three) times daily with meals.     Family History       Problem Relation (Age of Onset)    Diabetes Father, Other    Hypertension Father, Other    No Known Problems Mother          Tobacco Use    Smoking status: Never    Smokeless tobacco: Never   Substance and Sexual Activity    Alcohol use: Not Currently     Comment: seldom     Drug use: No    Sexual activity: Not Currently     Partners: Female     Review of Systems   Constitutional: Negative.   HENT: Negative.     Eyes: Negative.    Cardiovascular:  " Positive for dyspnea on exertion (improved), orthopnea (resolved) and paroxysmal nocturnal dyspnea (resolved).   Respiratory:  Positive for shortness of breath (improved).    Endocrine: Negative.    Hematologic/Lymphatic: Negative.    Skin: Negative.    Musculoskeletal: Negative.    Gastrointestinal: Negative.    Genitourinary: Negative.    Neurological: Negative.    Psychiatric/Behavioral: Negative.     Allergic/Immunologic: Negative.    Objective:     Vital Signs (Most Recent):  Temp: 97.9 °F (36.6 °C) (05/08/23 0734)  Pulse: 80 (05/08/23 0742)  Resp: 16 (05/08/23 0742)  BP: 123/74 (05/08/23 0734)  SpO2: 95 % (05/08/23 0742) Vital Signs (24h Range):  Temp:  [97.5 °F (36.4 °C)-98.6 °F (37 °C)] 97.9 °F (36.6 °C)  Pulse:  [67-92] 80  Resp:  [16-20] 16  SpO2:  [95 %-98 %] 95 %  BP: (105-132)/(61-88) 123/74     Weight: 117.8 kg (259 lb 11.2 oz)  Body mass index is 33.34 kg/m².    SpO2: 95 %         Intake/Output Summary (Last 24 hours) at 5/8/2023 1026  Last data filed at 5/8/2023 0856  Gross per 24 hour   Intake 120 ml   Output 2500 ml   Net -2380 ml       Lines/Drains/Airways       Central Venous Catheter Line  Duration                  Hemodialysis Catheter 03/15/22 1238 right internal jugular 418 days              Peripheral Intravenous Line  Duration                  Peripheral IV - Single Lumen 05/07/23 0337 20 G Posterior;Right Hand 1 day         Peripheral IV - Single Lumen 05/07/23 0345 20 G Left;Posterior Hand 1 day                     Physical Exam  Vitals and nursing note reviewed.   Constitutional:       General: He is not in acute distress.     Appearance: Normal appearance. He is well-developed. He is obese. He is not diaphoretic.   HENT:      Head: Normocephalic and atraumatic.   Eyes:      General:         Right eye: No discharge.         Left eye: No discharge.      Pupils: Pupils are equal, round, and reactive to light.   Neck:      Thyroid: No thyromegaly.      Vascular: No JVD.      Trachea: No  tracheal deviation.   Cardiovascular:      Rate and Rhythm: Normal rate and regular rhythm.      Heart sounds: Normal heart sounds, S1 normal and S2 normal. No murmur heard.  Pulmonary:      Effort: Pulmonary effort is normal. No respiratory distress.      Breath sounds: Normal breath sounds. No wheezing or rales.   Abdominal:      General: There is no distension.      Palpations: Abdomen is soft.      Tenderness: There is no abdominal tenderness. There is no rebound.   Musculoskeletal:      Cervical back: Neck supple.      Right lower leg: No edema.      Left lower leg: No edema.   Skin:     General: Skin is warm and dry.      Findings: No erythema.   Neurological:      General: No focal deficit present.      Mental Status: He is alert and oriented to person, place, and time.   Psychiatric:         Mood and Affect: Mood normal.         Behavior: Behavior normal.         Thought Content: Thought content normal.        Significant Labs: CMP   Recent Labs   Lab 05/07/23  0353 05/07/23  1458 05/08/23  0530    138 136   K 4.2 3.8 4.1    103 103   CO2 14* 24 22*   * 99 122*   BUN 50* 36* 45*   CREATININE 8.8* 6.3* 8.6*   CALCIUM 9.2 9.0 9.2   PROT 7.5  --  6.8   ALBUMIN 3.4*  --  3.2*   BILITOT 0.6  --  0.6   ALKPHOS 96  --  55   AST 27  --  17   ALT 12  --  8*   ANIONGAP 16 11 11   , CBC   Recent Labs   Lab 05/07/23  0353 05/08/23  0530   WBC 18.08* 7.52   HGB 10.9* 9.7*   HCT 35.7* 30.9*    254   , Troponin   Recent Labs   Lab 05/07/23  1011 05/07/23  1458 05/07/23  2108   TROPONINI 0.539* 0.740* 0.628*   , and All pertinent lab results from the last 24 hours have been reviewed.    Significant Imaging: Echocardiogram: Transthoracic echo (TTE) complete (Cupid Only):   Results for orders placed or performed during the hospital encounter of 05/07/23   Echo   Result Value Ref Range    BSA 2.48 m2    TDI SEPTAL 0.06 m/s    LV LATERAL E/E' RATIO 16.60 m/s    LV SEPTAL E/E' RATIO 13.83 m/s    LA  WIDTH 4.10 cm    IVC diameter 2.22 cm    Left Ventricular Outflow Tract Mean Velocity 0.55 cm/s    Left Ventricular Outflow Tract Mean Gradient 1.33 mmHg    Pulmonary Valve Mean Velocity 0.58 m/s    TDI LATERAL 0.05 m/s    PV PEAK VELOCITY 0.71 cm/s    LVIDd 6.51 (A) 3.5 - 6.0 cm    IVS 1.58 (A) 0.6 - 1.1 cm    Posterior Wall 1.83 (A) 0.6 - 1.1 cm    Ao root annulus 3.98 cm    LVIDs 5.80 (A) 2.1 - 4.0 cm    FS 11 28 - 44 %    LA volume 97.51 cm3    STJ 2.48 cm    Ascending aorta 3.40 cm    LV mass 583.25 g    LA size 4.65 cm    RVDD 4.26 cm    TAPSE 1.50 cm    RV S' 0.01 cm/s    Left Ventricle Relative Wall Thickness 0.56 cm    AV mean gradient 2 mmHg    AV valve area 4.38 cm2    AV Velocity Ratio 0.74     AV index (prosthetic) 0.99     MV valve area p 1/2 method 3.26 cm2    E/A ratio 1.69     Mean e' 0.06 m/s    E wave deceleration time 232.81 msec    IVRT 59.94 msec    LVOT diameter 2.38 cm    LVOT area 4.4 cm2    LVOT peak jsutino 0.76 m/s    LVOT peak VTI 13.80 cm    Ao peak justino 1.03 m/s    Ao VTI 14.0 cm    RVOT peak justino 0.37 m/s    RVOT peak VTI 6.5 cm    Mr max justino 4.83 m/s    LVOT stroke volume 61.36 cm3    AV peak gradient 4 mmHg    PV mean gradient 0.31 mmHg    E/E' ratio 15.09 m/s    MV Peak E Justino 0.83 m/s    TR Max Justino 2.82 m/s    MV stenosis pressure 1/2 time 67.51 ms    MV Peak A Justino 0.49 m/s    LV Systolic Volume 166.75 mL    LV Systolic Volume Index 68.6 mL/m2    LV Diastolic Volume 216.73 mL    LV Diastolic Volume Index 89.19 mL/m2    LA Volume Index 40.1 mL/m2    LV Mass Index 240 g/m2    RA Major Axis 5.29 cm    Left Atrium Minor Axis 6.15 cm    Left Atrium Major Axis 5.89 cm    Triscuspid Valve Regurgitation Peak Gradient 32 mmHg    LA Volume Index (Mod) 29.7 mL/m2    LA volume (mod) 72.06 cm3    Right Atrial Pressure (from IVC) 8 mmHg    EF 20 %    TV rest pulmonary artery pressure 40 mmHg    Narrative    · The left ventricle is severely enlarged with moderate concentric   hypertrophy and severely  decreased systolic function.  · Mild left atrial enlargement.  · The estimated ejection fraction is 20%.  · Grade II left ventricular diastolic dysfunction.  · There is severe left ventricular global hypokinesis.  · Mild right ventricular enlargement with mildly reduced right ventricular   systolic function.  · Mild right atrial enlargement.  · Moderate mitral regurgitation.  · Moderate tricuspid regurgitation.  · Intermediate central venous pressure (8 mmHg).  · The estimated PA systolic pressure is 40 mmHg.      , EKG: Reviewed, and X-Ray: CXR: X-Ray Chest 1 View (CXR): No results found for this visit on 05/07/23. and X-Ray Chest PA and Lateral (CXR):   Results for orders placed or performed during the hospital encounter of 05/07/23   X-Ray Chest PA And Lateral    Narrative    EXAMINATION:  XR CHEST PA AND LATERAL    CLINICAL HISTORY:  effusion;    TECHNIQUE:  PA and lateral views of the chest were performed.    COMPARISON:  05/07/2023    FINDINGS:  Aortic atherosclerosis.  Heart size normal.    No acute appearing infiltrates.  Small amount of scarring lower left lung..    Multiple old bilateral rib fractures.  No advanced arthritic changes.      Impression    No acute findings evident.  No significant pleural effusion evident      Electronically signed by: Adrian Lopez MD  Date:    05/08/2023  Time:    09:04     Assessment and Plan:   Patient who presents with flash pulmonary edema/respiratory failure, in setting of dietary indiscretion. Fluid status improved. Troponin elevated due to demand ischemia. Repeat echo pending to reassess EF.     * Acute on chronic respiratory failure  -Improved  -Secondary to acute pulmonary edema    Acute on chronic combined systolic and diastolic congestive heart failure  -Presented with volume overload in setting of dietary indiscretion, improved at time of exam  -Echo this admission showed drop in EF to 20%  -Prior [facility] 8/22 showed non-obstructive CAD  -Continue OMT-ASA, BB,  Entresto, Isordil, hydralazine  -Repeat limited echo pending to reassess EF now that volume status has improved    ESRD on dialysis  -Mgmt as per nephrology    Coronary artery disease of native artery of native heart with stable angina pectoris  -Continue OMT  -Non-obstructive CAD by cath 8/22    Type 2 diabetes mellitus with chronic kidney disease, with long-term current use of insulin  -Mgmt as per hospital medicine    Elevated troponin  -Troponin bumped to 0.740, now trending down  -Elevation likely secondary to demand ischemia from hypoxia, acidosis, fluid overload, elevated BP  -Denies chest pain symptoms  -Prior [facility] 8/22 showed non-obstructive CAD  -Continue OMT-ASA, statin, BB, CCB, Isordil  -Echo showed drop in EF to 20%, repeat limited echo pending to reassess now that volume status has improved        VTE Risk Mitigation (From admission, onward)         Ordered     heparin (porcine) injection 3,600 Units  As needed (PRN)         05/07/23 1336     heparin (porcine) injection 5,000 Units  Every 8 hours         05/07/23 0751     IP VTE HIGH RISK PATIENT  Once         05/07/23 0751     Place sequential compression device  Until discontinued         05/07/23 0751                Thank you for your consult. I will follow-up with patient. Please contact us if you have any additional questions.    Majo Song PA-C  Cardiology   [facility]

## 2023-05-08 NOTE — PLAN OF CARE
2 hour I-HDTx initiated and in progress as ordered for Dr Rizvi; planned UF = up to 1L as tolerated. Will continue to monitor for changes and trends.  Pre dialysis weight= 113.2kg. EDW = 114kg

## 2023-05-08 NOTE — PLAN OF CARE
[facility]  Discharge Final Note    Primary Care Provider: Steven Morgan MD    Expected Discharge Date: 5/8/2023    Final Discharge Note (most recent)       Final Note - 05/08/23 1447          Final Note    Assessment Type Final Discharge Note     Anticipated Discharge Disposition Home or Self Care     Hospital Resources/Appts/Education Provided Appointments scheduled by Navigator/Coordinator;Appointments scheduled and added to AVS        Post-Acute Status    Discharge Delays None known at this time                   PCP appt scheduled and added to AVS.     No d/c needs.

## 2023-05-09 ENCOUNTER — TELEPHONE (OUTPATIENT)
Dept: UROLOGY | Facility: CLINIC | Age: 68
End: 2023-05-09
Payer: COMMERCIAL

## 2023-05-09 DIAGNOSIS — J96.20 ACUTE ON CHRONIC RESPIRATORY FAILURE: Primary | ICD-10-CM

## 2023-05-09 DIAGNOSIS — J45.991 ASTHMA, COUGH VARIANT: ICD-10-CM

## 2023-05-09 NOTE — TELEPHONE ENCOUNTER
----- Message from Sherrell Garcia sent at 5/9/2023  1:35 PM CDT -----  Contact: Pt @[phone]  1MEDICALADVICE     Patient is calling for Medical Advice regarding:    No message left. Still waiting on a call back from Monday.    Would like response via [employer]:  call back     Comments:   Please call back to advise.

## 2023-05-09 NOTE — TELEPHONE ENCOUNTER
I personally called this patient is he is aware of his follow-up with Dr. Nugent.  He also utilizes patient chart and can review his upcoming appointments utilizing this secure [employer] patient portal.

## 2023-05-09 NOTE — TELEPHONE ENCOUNTER
Pt stated Emelia told him that you would give him a call concerning a procedure for Monday concerning his right testicle. He stated he was waiting on your call.    ----- Message from Sherrell Garcia sent at 5/9/2023  1:36 PM CDT -----  Contact: Pt @[phone]  1MEDICALADVICE     Patient is calling for Medical Advice regarding:    No message left. Still waiting on a call back from Monday.    Would like response via [employer]:  call back     Comments:   Please call back to advise.

## 2023-05-10 LAB
CLASS I ANTIBODIES - LUMINEX: NEGATIVE
CLASS II ANTIBODIES - LUMINEX: NEGATIVE
CPRA %: 0
SERUM COLLECTION DT - LUMINEX CLASS I: NORMAL
SERUM COLLECTION DT - LUMINEX CLASS II: NORMAL
SPCL1 TESTING DATE: NORMAL
SPCL2 TESTING DATE: NORMAL
SPCLU TESTING DATE: NORMAL

## 2023-05-11 ENCOUNTER — TELEPHONE (OUTPATIENT)
Dept: UROLOGY | Facility: CLINIC | Age: 68
End: 2023-05-11
Payer: COMMERCIAL

## 2023-05-11 NOTE — TELEPHONE ENCOUNTER
Spoke with pt. Tried to find something closer. Pt stated he has dialysis MON, Wed, Friday. He stated that he would just keep the appointment and work it out with Dialysis.      ----- Message from Frank Castellon sent at 5/11/2023 10:13 AM CDT -----  Contact: [phone]  Pt is calling regarding appt .please call back at [phone] . Thanks/myrna

## 2023-05-12 ENCOUNTER — PATIENT OUTREACH (OUTPATIENT)
Dept: ADMINISTRATIVE | Facility: HOSPITAL | Age: 68
End: 2023-05-12
Payer: COMMERCIAL

## 2023-05-12 ENCOUNTER — TELEPHONE (OUTPATIENT)
Dept: UROLOGY | Facility: CLINIC | Age: 68
End: 2023-05-12
Payer: COMMERCIAL

## 2023-05-12 NOTE — Clinical Note
Good morning,   I called to confirm this pt HFU appt for Monday 05/15/23, he explains he can not go to any appts on MWF due to dialysis, he states before discharge he explained this to hospital staff, they told him to check portal for HFU, I was not able to find any available Tue/Thurs for his HFU, can you please inform Dr Morgan and contact pt to assist with scheduling.  Thanks Lilly RAMEY LPN CC

## 2023-05-12 NOTE — TELEPHONE ENCOUNTER
Called pt to attempt to get him rescheduled unfortunately the next available appointments are to far out pt states he will keep his current appointment and work something out, he takes diyalasis on Monday Wednesday and fridays that's why he wanted to reschedule.  ----- Message from Ariella Tinsley sent at 5/12/2023  8:19 AM CDT -----  Contact: Isidro  .Type:  Patient Returning Call    Who Called:Isidro  Who Left Message for Patient:Nurse  Does the patient know what this is regarding?:Unknown  Would the patient rather a call back or a response via [facility]? Call back  Best Call Back Number:[phone]    Additional Information: Pt requesting a call

## 2023-05-12 NOTE — PROGRESS NOTES
Hospital Follow up: Called to confirm appt on 05/15/23 with Dr Morgan pt states he can not go to that appt due to he has dialysis on MWF, he also states he told hospital staff he had dialysis on those days, unable to schedule pt on T-TH, will send staff a message.

## 2023-05-14 ENCOUNTER — HOSPITAL ENCOUNTER (INPATIENT)
Facility: HOSPITAL | Age: 68
LOS: 4 days | Discharge: HOME | DRG: 291 | End: 2023-05-18
Attending: EMERGENCY MEDICINE | Admitting: HOSPITALIST
Payer: COMMERCIAL

## 2023-05-14 DIAGNOSIS — I46.9 CARDIAC ARREST: ICD-10-CM

## 2023-05-14 DIAGNOSIS — N18.6 ESRD (END STAGE RENAL DISEASE) ON DIALYSIS: ICD-10-CM

## 2023-05-14 DIAGNOSIS — R06.02 SOB (SHORTNESS OF BREATH): ICD-10-CM

## 2023-05-14 DIAGNOSIS — J96.01 ACUTE RESPIRATORY FAILURE WITH HYPOXIA AND HYPERCAPNIA: Primary | ICD-10-CM

## 2023-05-14 DIAGNOSIS — Z99.2 ESRD (END STAGE RENAL DISEASE) ON DIALYSIS: ICD-10-CM

## 2023-05-14 DIAGNOSIS — J96.02 ACUTE RESPIRATORY FAILURE WITH HYPOXIA AND HYPERCAPNIA: Primary | ICD-10-CM

## 2023-05-14 DIAGNOSIS — I16.1 HYPERTENSIVE EMERGENCY: ICD-10-CM

## 2023-05-14 DIAGNOSIS — I50.1 PULMONARY EDEMA WITH CONGESTIVE HEART FAILURE: ICD-10-CM

## 2023-05-14 DIAGNOSIS — I50.43 ACUTE ON CHRONIC COMBINED SYSTOLIC AND DIASTOLIC CONGESTIVE HEART FAILURE: ICD-10-CM

## 2023-05-14 PROBLEM — I95.9 HYPOTENSION: Status: ACTIVE | Noted: 2023-05-14

## 2023-05-14 PROBLEM — D72.829 LEUKOCYTOSIS: Status: ACTIVE | Noted: 2023-05-14

## 2023-05-14 LAB
ALBUMIN SERPL BCP-MCNC: 3.3 G/DL (ref 3.5–5.2)
ALLENS TEST: ABNORMAL
ALLENS TEST: ABNORMAL
ALP SERPL-CCNC: 70 U/L (ref 55–135)
ALT SERPL W/O P-5'-P-CCNC: 35 U/L (ref 10–44)
ANION GAP SERPL CALC-SCNC: 15 MMOL/L (ref 8–16)
ANION GAP SERPL CALC-SCNC: 15 MMOL/L (ref 8–16)
AST SERPL-CCNC: 53 U/L (ref 10–40)
BACTERIA #/AREA URNS HPF: ABNORMAL /HPF
BASOPHILS # BLD AUTO: 0.04 K/UL (ref 0–0.2)
BASOPHILS # BLD AUTO: 0.07 K/UL (ref 0–0.2)
BASOPHILS NFR BLD: 0.2 % (ref 0–1.9)
BASOPHILS NFR BLD: 0.4 % (ref 0–1.9)
BILIRUB SERPL-MCNC: 0.7 MG/DL (ref 0.1–1)
BILIRUB UR QL STRIP: NEGATIVE
BNP SERPL-MCNC: >4900 PG/ML (ref 0–99)
BUN SERPL-MCNC: 36 MG/DL (ref 8–23)
BUN SERPL-MCNC: 56 MG/DL (ref 8–23)
CALCIUM SERPL-MCNC: 8.8 MG/DL (ref 8.7–10.5)
CALCIUM SERPL-MCNC: 8.9 MG/DL (ref 8.7–10.5)
CHLORIDE SERPL-SCNC: 100 MMOL/L (ref 95–110)
CHLORIDE SERPL-SCNC: 107 MMOL/L (ref 95–110)
CLARITY UR: CLEAR
CO2 SERPL-SCNC: 18 MMOL/L (ref 23–29)
CO2 SERPL-SCNC: 24 MMOL/L (ref 23–29)
COLOR UR: YELLOW
CREAT SERPL-MCNC: 7.1 MG/DL (ref 0.5–1.4)
CREAT SERPL-MCNC: 8.9 MG/DL (ref 0.5–1.4)
DELSYS: ABNORMAL
DELSYS: ABNORMAL
DIFFERENTIAL METHOD: ABNORMAL
DIFFERENTIAL METHOD: ABNORMAL
EOSINOPHIL # BLD AUTO: 0 K/UL (ref 0–0.5)
EOSINOPHIL # BLD AUTO: 0.1 K/UL (ref 0–0.5)
EOSINOPHIL NFR BLD: 0.1 % (ref 0–8)
EOSINOPHIL NFR BLD: 0.3 % (ref 0–8)
ERYTHROCYTE [DISTWIDTH] IN BLOOD BY AUTOMATED COUNT: 16.1 % (ref 11.5–14.5)
ERYTHROCYTE [DISTWIDTH] IN BLOOD BY AUTOMATED COUNT: 16.3 % (ref 11.5–14.5)
ERYTHROCYTE [SEDIMENTATION RATE] IN BLOOD BY WESTERGREN METHOD: 16 MM/H
ERYTHROCYTE [SEDIMENTATION RATE] IN BLOOD BY WESTERGREN METHOD: 24 MM/H
EST. GFR  (NO RACE VARIABLE): 6 ML/MIN/1.73 M^2
EST. GFR  (NO RACE VARIABLE): 8 ML/MIN/1.73 M^2
FIO2: 100
FIO2: 50
GLUCOSE SERPL-MCNC: 294 MG/DL (ref 70–110)
GLUCOSE SERPL-MCNC: 99 MG/DL (ref 70–110)
GLUCOSE UR QL STRIP: ABNORMAL
HBV SURFACE AG SERPL QL IA: NORMAL
HCO3 UR-SCNC: 26.6 MMOL/L (ref 24–28)
HCO3 UR-SCNC: 27.8 MMOL/L (ref 24–28)
HCT VFR BLD AUTO: 34.9 % (ref 40–54)
HCT VFR BLD AUTO: 36.7 % (ref 40–54)
HGB BLD-MCNC: 10.5 G/DL (ref 14–18)
HGB BLD-MCNC: 11 G/DL (ref 14–18)
HGB UR QL STRIP: ABNORMAL
HYALINE CASTS #/AREA URNS LPF: 0 /LPF
IMM GRANULOCYTES # BLD AUTO: 0.1 K/UL (ref 0–0.04)
IMM GRANULOCYTES # BLD AUTO: 0.16 K/UL (ref 0–0.04)
IMM GRANULOCYTES NFR BLD AUTO: 0.6 % (ref 0–0.5)
IMM GRANULOCYTES NFR BLD AUTO: 0.9 % (ref 0–0.5)
KETONES UR QL STRIP: NEGATIVE
LACTATE SERPL-SCNC: 3.2 MMOL/L (ref 0.5–2.2)
LEUKOCYTE ESTERASE UR QL STRIP: ABNORMAL
LYMPHOCYTES # BLD AUTO: 1 K/UL (ref 1–4.8)
LYMPHOCYTES # BLD AUTO: 1.2 K/UL (ref 1–4.8)
LYMPHOCYTES NFR BLD: 5.8 % (ref 18–48)
LYMPHOCYTES NFR BLD: 7.1 % (ref 18–48)
MAGNESIUM SERPL-MCNC: 1.7 MG/DL (ref 1.6–2.6)
MCH RBC QN AUTO: 26.6 PG (ref 27–31)
MCH RBC QN AUTO: 26.8 PG (ref 27–31)
MCHC RBC AUTO-ENTMCNC: 30 G/DL (ref 32–36)
MCHC RBC AUTO-ENTMCNC: 30.1 G/DL (ref 32–36)
MCV RBC AUTO: 88 FL (ref 82–98)
MCV RBC AUTO: 89 FL (ref 82–98)
MICROSCOPIC COMMENT: ABNORMAL
MODE: ABNORMAL
MODE: ABNORMAL
MONOCYTES # BLD AUTO: 1.5 K/UL (ref 0.3–1)
MONOCYTES # BLD AUTO: 1.5 K/UL (ref 0.3–1)
MONOCYTES NFR BLD: 8.7 % (ref 4–15)
MONOCYTES NFR BLD: 8.8 % (ref 4–15)
NEUTROPHILS # BLD AUTO: 14.1 K/UL (ref 1.8–7.7)
NEUTROPHILS # BLD AUTO: 14.3 K/UL (ref 1.8–7.7)
NEUTROPHILS NFR BLD: 82.5 % (ref 38–73)
NEUTROPHILS NFR BLD: 84.6 % (ref 38–73)
NITRITE UR QL STRIP: NEGATIVE
NRBC BLD-RTO: 0 /100 WBC
NRBC BLD-RTO: 0 /100 WBC
PCO2 BLDA: 47.6 MMHG (ref 35–45)
PCO2 BLDA: 72.3 MMHG (ref 35–45)
PEEP: 5
PEEP: 6
PH SMN: 7.17 [PH] (ref 7.35–7.45)
PH SMN: 7.37 [PH] (ref 7.35–7.45)
PH UR STRIP: 7 [PH] (ref 5–8)
PLATELET # BLD AUTO: 293 K/UL (ref 150–450)
PLATELET # BLD AUTO: 331 K/UL (ref 150–450)
PMV BLD AUTO: 10.2 FL (ref 9.2–12.9)
PMV BLD AUTO: 10.9 FL (ref 9.2–12.9)
PO2 BLDA: 98 MMHG (ref 80–100)
PO2 BLDA: 98 MMHG (ref 80–100)
POC BE: -2 MMOL/L
POC BE: 3 MMOL/L
POC SATURATED O2: 95 % (ref 95–100)
POC SATURATED O2: 97 % (ref 95–100)
POCT GLUCOSE: 255 MG/DL (ref 70–110)
POTASSIUM SERPL-SCNC: 4.2 MMOL/L (ref 3.5–5.1)
POTASSIUM SERPL-SCNC: 5.1 MMOL/L (ref 3.5–5.1)
PROCALCITONIN SERPL IA-MCNC: 0.71 NG/ML
PROT SERPL-MCNC: 6.6 G/DL (ref 6–8.4)
PROT UR QL STRIP: ABNORMAL
RBC # BLD AUTO: 3.95 M/UL (ref 4.6–6.2)
RBC # BLD AUTO: 4.11 M/UL (ref 4.6–6.2)
RBC #/AREA URNS HPF: 9 /HPF (ref 0–4)
SAMPLE: ABNORMAL
SAMPLE: ABNORMAL
SITE: ABNORMAL
SITE: ABNORMAL
SODIUM SERPL-SCNC: 139 MMOL/L (ref 136–145)
SODIUM SERPL-SCNC: 140 MMOL/L (ref 136–145)
SP GR UR STRIP: 1.01 (ref 1–1.03)
SQUAMOUS #/AREA URNS HPF: 2 /HPF
TROPONIN I SERPL DL<=0.01 NG/ML-MCNC: 0.2 NG/ML (ref 0–0.03)
TROPONIN I SERPL DL<=0.01 NG/ML-MCNC: 1.63 NG/ML (ref 0–0.03)
UNIDENT CRYS URNS QL MICRO: ABNORMAL
URN SPEC COLLECT METH UR: ABNORMAL
UROBILINOGEN UR STRIP-ACNC: NEGATIVE EU/DL
VT: 450
VT: 500
WBC # BLD AUTO: 16.66 K/UL (ref 3.9–12.7)
WBC # BLD AUTO: 17.31 K/UL (ref 3.9–12.7)
WBC #/AREA URNS HPF: 8 /HPF (ref 0–5)

## 2023-05-14 PROCEDURE — 93010 EKG 12-LEAD: ICD-10-PCS | Mod: NTX,,, | Performed by: INTERNAL MEDICINE

## 2023-05-14 PROCEDURE — 83605 ASSAY OF LACTIC ACID: CPT | Mod: NTX | Performed by: EMERGENCY MEDICINE

## 2023-05-14 PROCEDURE — 85025 COMPLETE CBC W/AUTO DIFF WBC: CPT | Mod: 91,NTX | Performed by: NURSE PRACTITIONER

## 2023-05-14 PROCEDURE — 85025 COMPLETE CBC W/AUTO DIFF WBC: CPT | Mod: NTX | Performed by: EMERGENCY MEDICINE

## 2023-05-14 PROCEDURE — 83735 ASSAY OF MAGNESIUM: CPT | Mod: NTX | Performed by: NURSE PRACTITIONER

## 2023-05-14 PROCEDURE — 36600 WITHDRAWAL OF ARTERIAL BLOOD: CPT | Mod: NTX

## 2023-05-14 PROCEDURE — 99291 CRITICAL CARE FIRST HOUR: CPT | Mod: NTX

## 2023-05-14 PROCEDURE — 36415 COLL VENOUS BLD VENIPUNCTURE: CPT | Mod: NTX | Performed by: INTERNAL MEDICINE

## 2023-05-14 PROCEDURE — 80053 COMPREHEN METABOLIC PANEL: CPT | Mod: NTX | Performed by: EMERGENCY MEDICINE

## 2023-05-14 PROCEDURE — 25000003 PHARM REV CODE 250: Mod: NTX

## 2023-05-14 PROCEDURE — 99900026 HC AIRWAY MAINTENANCE (STAT): Mod: NTX

## 2023-05-14 PROCEDURE — 25000003 PHARM REV CODE 250: Mod: NTX | Performed by: EMERGENCY MEDICINE

## 2023-05-14 PROCEDURE — 96365 THER/PROPH/DIAG IV INF INIT: CPT | Mod: NTX

## 2023-05-14 PROCEDURE — 99223 1ST HOSP IP/OBS HIGH 75: CPT | Mod: 25,NTX,, | Performed by: INTERNAL MEDICINE

## 2023-05-14 PROCEDURE — 63600175 PHARM REV CODE 636 W HCPCS: Mod: NTX | Performed by: EMERGENCY MEDICINE

## 2023-05-14 PROCEDURE — 87081 CULTURE SCREEN ONLY: CPT | Mod: NTX | Performed by: INTERNAL MEDICINE

## 2023-05-14 PROCEDURE — 84145 PROCALCITONIN (PCT): CPT | Mod: NTX | Performed by: INTERNAL MEDICINE

## 2023-05-14 PROCEDURE — 80100014 HC HEMODIALYSIS 1:1: Mod: NTX

## 2023-05-14 PROCEDURE — 36556 INSERT NON-TUNNEL CV CATH: CPT | Mod: NTX

## 2023-05-14 PROCEDURE — 96368 THER/DIAG CONCURRENT INF: CPT | Mod: NTX

## 2023-05-14 PROCEDURE — 51702 INSERT TEMP BLADDER CATH: CPT | Mod: NTX

## 2023-05-14 PROCEDURE — 93010 ELECTROCARDIOGRAM REPORT: CPT | Mod: NTX,,, | Performed by: INTERNAL MEDICINE

## 2023-05-14 PROCEDURE — 82962 GLUCOSE BLOOD TEST: CPT | Mod: NTX

## 2023-05-14 PROCEDURE — 96374 THER/PROPH/DIAG INJ IV PUSH: CPT | Mod: 59,NTX

## 2023-05-14 PROCEDURE — 84484 ASSAY OF TROPONIN QUANT: CPT | Mod: 91,NTX | Performed by: NURSE PRACTITIONER

## 2023-05-14 PROCEDURE — 90935 PR HEMODIALYSIS, ONE EVALUATION: ICD-10-PCS | Mod: NTX,,, | Performed by: INTERNAL MEDICINE

## 2023-05-14 PROCEDURE — 82803 BLOOD GASES ANY COMBINATION: CPT | Mod: NTX

## 2023-05-14 PROCEDURE — 93005 ELECTROCARDIOGRAM TRACING: CPT | Mod: NTX

## 2023-05-14 PROCEDURE — 63600175 PHARM REV CODE 636 W HCPCS: Mod: NTX

## 2023-05-14 PROCEDURE — 36415 COLL VENOUS BLD VENIPUNCTURE: CPT | Mod: NTX | Performed by: NURSE PRACTITIONER

## 2023-05-14 PROCEDURE — 86706 HEP B SURFACE ANTIBODY: CPT | Mod: NTX | Performed by: INTERNAL MEDICINE

## 2023-05-14 PROCEDURE — 63600175 PHARM REV CODE 636 W HCPCS: Mod: JZ,JG,NTX | Performed by: NURSE PRACTITIONER

## 2023-05-14 PROCEDURE — 99900035 HC TECH TIME PER 15 MIN (STAT): Mod: NTX

## 2023-05-14 PROCEDURE — 83880 ASSAY OF NATRIURETIC PEPTIDE: CPT | Mod: NTX | Performed by: NURSE PRACTITIONER

## 2023-05-14 PROCEDURE — P9047 ALBUMIN (HUMAN), 25%, 50ML: HCPCS | Mod: JZ,JG,NTX | Performed by: NURSE PRACTITIONER

## 2023-05-14 PROCEDURE — 63600175 PHARM REV CODE 636 W HCPCS: Mod: NTX | Performed by: INTERNAL MEDICINE

## 2023-05-14 PROCEDURE — 94002 VENT MGMT INPAT INIT DAY: CPT | Mod: NTX

## 2023-05-14 PROCEDURE — 84484 ASSAY OF TROPONIN QUANT: CPT | Mod: NTX | Performed by: EMERGENCY MEDICINE

## 2023-05-14 PROCEDURE — 27000221 HC OXYGEN, UP TO 24 HOURS: Mod: NTX

## 2023-05-14 PROCEDURE — 25000003 PHARM REV CODE 250: Mod: NTX | Performed by: NURSE PRACTITIONER

## 2023-05-14 PROCEDURE — P9047 ALBUMIN (HUMAN), 25%, 50ML: HCPCS | Mod: JZ,JG,NTX | Performed by: INTERNAL MEDICINE

## 2023-05-14 PROCEDURE — 99223 PR INITIAL HOSPITAL CARE,LEVL III: ICD-10-PCS | Mod: 25,NTX,, | Performed by: INTERNAL MEDICINE

## 2023-05-14 PROCEDURE — 94761 N-INVAS EAR/PLS OXIMETRY MLT: CPT | Mod: NTX

## 2023-05-14 PROCEDURE — 87340 HEPATITIS B SURFACE AG IA: CPT | Mod: NTX | Performed by: INTERNAL MEDICINE

## 2023-05-14 PROCEDURE — 25000003 PHARM REV CODE 250: Mod: NTX | Performed by: INTERNAL MEDICINE

## 2023-05-14 PROCEDURE — 90935 HEMODIALYSIS ONE EVALUATION: CPT | Mod: NTX,,, | Performed by: INTERNAL MEDICINE

## 2023-05-14 PROCEDURE — 27200966 HC CLOSED SUCTION SYSTEM: Mod: NTX

## 2023-05-14 PROCEDURE — 20000000 HC ICU ROOM: Mod: NTX

## 2023-05-14 PROCEDURE — 96366 THER/PROPH/DIAG IV INF ADDON: CPT | Mod: NTX

## 2023-05-14 PROCEDURE — 80048 BASIC METABOLIC PNL TOTAL CA: CPT | Mod: NTX,XB | Performed by: NURSE PRACTITIONER

## 2023-05-14 PROCEDURE — C1751 CATH, INF, PER/CENT/MIDLINE: HCPCS | Mod: NTX

## 2023-05-14 PROCEDURE — 81000 URINALYSIS NONAUTO W/SCOPE: CPT | Mod: NTX | Performed by: NURSE PRACTITIONER

## 2023-05-14 RX ORDER — FINASTERIDE 5 MG/1
5 TABLET, FILM COATED ORAL DAILY
Status: DISCONTINUED | OUTPATIENT
Start: 2023-05-14 | End: 2023-05-14

## 2023-05-14 RX ORDER — FINASTERIDE 5 MG/1
5 TABLET, FILM COATED ORAL DAILY
Status: DISCONTINUED | OUTPATIENT
Start: 2023-05-15 | End: 2023-05-15

## 2023-05-14 RX ORDER — INSULIN ASPART 100 [IU]/ML
1-10 INJECTION, SOLUTION INTRAVENOUS; SUBCUTANEOUS EVERY 6 HOURS PRN
Status: DISCONTINUED | OUTPATIENT
Start: 2023-05-14 | End: 2023-05-18 | Stop reason: HOSPADM

## 2023-05-14 RX ORDER — LORAZEPAM 2 MG/ML
INJECTION INTRAMUSCULAR
Status: COMPLETED
Start: 2023-05-14 | End: 2023-05-14

## 2023-05-14 RX ORDER — ALBUMIN HUMAN 250 G/1000ML
25 SOLUTION INTRAVENOUS ONCE
Status: COMPLETED | OUTPATIENT
Start: 2023-05-14 | End: 2023-05-14

## 2023-05-14 RX ORDER — NAPROXEN SODIUM 220 MG/1
81 TABLET, FILM COATED ORAL DAILY
Status: DISCONTINUED | OUTPATIENT
Start: 2023-05-14 | End: 2023-05-15

## 2023-05-14 RX ORDER — TAMSULOSIN HYDROCHLORIDE 0.4 MG/1
1 CAPSULE ORAL DAILY
Status: DISCONTINUED | OUTPATIENT
Start: 2023-05-15 | End: 2023-05-14

## 2023-05-14 RX ORDER — CARVEDILOL 12.5 MG/1
12.5 TABLET ORAL 2 TIMES DAILY WITH MEALS
Status: DISCONTINUED | OUTPATIENT
Start: 2023-05-14 | End: 2023-05-14

## 2023-05-14 RX ORDER — NOREPINEPHRINE BITARTRATE/D5W 4MG/250ML
PLASTIC BAG, INJECTION (ML) INTRAVENOUS
Status: COMPLETED
Start: 2023-05-14 | End: 2023-05-14

## 2023-05-14 RX ORDER — ATORVASTATIN CALCIUM 40 MG/1
40 TABLET, FILM COATED ORAL DAILY
Status: DISCONTINUED | OUTPATIENT
Start: 2023-05-15 | End: 2023-05-15

## 2023-05-14 RX ORDER — FENTANYL CITRATE-0.9 % NACL/PF 10 MCG/ML
0-250 PLASTIC BAG, INJECTION (ML) INTRAVENOUS CONTINUOUS
Status: DISCONTINUED | OUTPATIENT
Start: 2023-05-14 | End: 2023-05-14

## 2023-05-14 RX ORDER — NOREPINEPHRINE BITARTRATE/D5W 4MG/250ML
0-3 PLASTIC BAG, INJECTION (ML) INTRAVENOUS CONTINUOUS
Status: DISCONTINUED | OUTPATIENT
Start: 2023-05-14 | End: 2023-05-15

## 2023-05-14 RX ORDER — HEPARIN SODIUM 5000 [USP'U]/ML
5000 INJECTION, SOLUTION INTRAVENOUS; SUBCUTANEOUS EVERY 8 HOURS
Status: DISCONTINUED | OUTPATIENT
Start: 2023-05-14 | End: 2023-05-18 | Stop reason: HOSPADM

## 2023-05-14 RX ORDER — HYDRALAZINE HYDROCHLORIDE 25 MG/1
100 TABLET, FILM COATED ORAL EVERY 8 HOURS
Status: CANCELLED | OUTPATIENT
Start: 2023-05-14

## 2023-05-14 RX ORDER — ASPIRIN 81 MG/1
81 TABLET ORAL DAILY
Status: DISCONTINUED | OUTPATIENT
Start: 2023-05-14 | End: 2023-05-14

## 2023-05-14 RX ORDER — FENTANYL CITRATE-0.9 % NACL/PF 10 MCG/ML
0-250 PLASTIC BAG, INJECTION (ML) INTRAVENOUS CONTINUOUS
Status: DISCONTINUED | OUTPATIENT
Start: 2023-05-14 | End: 2023-05-15

## 2023-05-14 RX ORDER — NIFEDIPINE 30 MG/1
90 TABLET, EXTENDED RELEASE ORAL DAILY
Status: CANCELLED | OUTPATIENT
Start: 2023-05-14

## 2023-05-14 RX ORDER — NOREPINEPHRINE BITARTRATE/D5W 4MG/250ML
0-3 PLASTIC BAG, INJECTION (ML) INTRAVENOUS CONTINUOUS
Status: CANCELLED | OUTPATIENT
Start: 2023-05-14

## 2023-05-14 RX ORDER — SODIUM CHLORIDE 0.9 % (FLUSH) 0.9 %
10 SYRINGE (ML) INJECTION
Status: DISCONTINUED | OUTPATIENT
Start: 2023-05-14 | End: 2023-05-18 | Stop reason: HOSPADM

## 2023-05-14 RX ORDER — IPRATROPIUM BROMIDE AND ALBUTEROL SULFATE 2.5; .5 MG/3ML; MG/3ML
3 SOLUTION RESPIRATORY (INHALATION) EVERY 6 HOURS PRN
Status: DISCONTINUED | OUTPATIENT
Start: 2023-05-14 | End: 2023-05-18 | Stop reason: HOSPADM

## 2023-05-14 RX ORDER — MUPIROCIN 20 MG/G
OINTMENT TOPICAL 2 TIMES DAILY
Status: DISCONTINUED | OUTPATIENT
Start: 2023-05-14 | End: 2023-05-18 | Stop reason: HOSPADM

## 2023-05-14 RX ORDER — NITROGLYCERIN 20 MG/100ML
0-400 INJECTION INTRAVENOUS CONTINUOUS
Status: DISCONTINUED | OUTPATIENT
Start: 2023-05-14 | End: 2023-05-15

## 2023-05-14 RX ORDER — FUROSEMIDE 10 MG/ML
100 INJECTION INTRAMUSCULAR; INTRAVENOUS
Status: COMPLETED | OUTPATIENT
Start: 2023-05-14 | End: 2023-05-14

## 2023-05-14 RX ORDER — HEPARIN SODIUM 1000 [USP'U]/ML
2000 INJECTION, SOLUTION INTRAVENOUS; SUBCUTANEOUS
Status: DISCONTINUED | OUTPATIENT
Start: 2023-05-14 | End: 2023-05-18 | Stop reason: HOSPADM

## 2023-05-14 RX ORDER — LORAZEPAM 2 MG/ML
2 INJECTION INTRAMUSCULAR ONCE AS NEEDED
Status: COMPLETED | OUTPATIENT
Start: 2023-05-14 | End: 2023-05-14

## 2023-05-14 RX ORDER — GLUCAGON 1 MG
1 KIT INJECTION
Status: DISCONTINUED | OUTPATIENT
Start: 2023-05-14 | End: 2023-05-18 | Stop reason: HOSPADM

## 2023-05-14 RX ORDER — FAMOTIDINE 10 MG/ML
20 INJECTION INTRAVENOUS DAILY
Status: DISCONTINUED | OUTPATIENT
Start: 2023-05-14 | End: 2023-05-18 | Stop reason: HOSPADM

## 2023-05-14 RX ORDER — ATORVASTATIN CALCIUM 40 MG/1
40 TABLET, FILM COATED ORAL DAILY
Status: DISCONTINUED | OUTPATIENT
Start: 2023-05-15 | End: 2023-05-14

## 2023-05-14 RX ORDER — ONDANSETRON 2 MG/ML
4 INJECTION INTRAMUSCULAR; INTRAVENOUS EVERY 8 HOURS PRN
Status: DISCONTINUED | OUTPATIENT
Start: 2023-05-14 | End: 2023-05-18 | Stop reason: HOSPADM

## 2023-05-14 RX ADMIN — NOREPINEPHRINE BITARTRATE 0.02 MCG/KG/MIN: 4 INJECTION, SOLUTION INTRAVENOUS at 04:05

## 2023-05-14 RX ADMIN — HEPARIN SODIUM 2000 UNITS: 1000 INJECTION, SOLUTION INTRAVENOUS; SUBCUTANEOUS at 02:05

## 2023-05-14 RX ADMIN — ALBUMIN (HUMAN) 25 G: 5 SOLUTION INTRAVENOUS at 12:05

## 2023-05-14 RX ADMIN — HEPARIN SODIUM 5000 UNITS: 5000 INJECTION INTRAVENOUS; SUBCUTANEOUS at 11:05

## 2023-05-14 RX ADMIN — Medication 250 MCG/HR: at 06:05

## 2023-05-14 RX ADMIN — Medication 50 MCG/HR: at 08:05

## 2023-05-14 RX ADMIN — LORAZEPAM 2 MG: 2 INJECTION INTRAMUSCULAR; INTRAVENOUS at 04:05

## 2023-05-14 RX ADMIN — MUPIROCIN: 20 OINTMENT TOPICAL at 08:05

## 2023-05-14 RX ADMIN — ASPIRIN 81 MG CHEWABLE TABLET 81 MG: 81 TABLET CHEWABLE at 01:05

## 2023-05-14 RX ADMIN — FAMOTIDINE 20 MG: 10 INJECTION, SOLUTION INTRAVENOUS at 11:05

## 2023-05-14 RX ADMIN — Medication 0.02 MCG/KG/MIN: at 04:05

## 2023-05-14 RX ADMIN — LORAZEPAM 2 MG: 2 INJECTION INTRAMUSCULAR at 04:05

## 2023-05-14 RX ADMIN — FUROSEMIDE 100 MG: 10 INJECTION, SOLUTION INTRAMUSCULAR; INTRAVENOUS at 07:05

## 2023-05-14 RX ADMIN — NITROGLYCERIN 20 MCG/MIN: 20 INJECTION INTRAVENOUS at 08:05

## 2023-05-14 RX ADMIN — HEPARIN SODIUM 5000 UNITS: 5000 INJECTION INTRAVENOUS; SUBCUTANEOUS at 01:05

## 2023-05-14 RX ADMIN — ALBUMIN (HUMAN) 25 G: 5 SOLUTION INTRAVENOUS at 04:05

## 2023-05-14 RX ADMIN — NITROGLYCERIN 2 INCH: 20 OINTMENT TOPICAL at 07:05

## 2023-05-14 NOTE — Clinical Note
Diagnosis: SOB (shortness of breath) [948086]   Admitting Provider:: GRETA WU [[id number]]   Future Attending Provider: GRETA WU [[id number]]   Reason for IP Medical Treatment  (Clinical interventions that can only be accomplished in the IP setting? ) :: critical care   I certify that Inpatient services for greater than or equal to 2 midnights are medically necessary:: Yes   Plans for Post-Acute care--if anticipated (pick the single best option):: A. No post acute care anticipated at this time   Special Needs:: Dialysis - Hemodialysis [12]   Special Needs:: Intubated [19]

## 2023-05-14 NOTE — ASSESSMENT & PLAN NOTE
64 y/o male with CKD stage 5 has initiated chronic HD for ESRD. Pt also has COVID-19 infection:     ESRD  Advanced CKD stage 5 leading to ESRD  Pt has tolerated HD initiation well  Last HD yesterday  Next HD in am, HD orders placed in [facility]     Vascular: femoral vas cath was switched to IJ vas cath   Has outpt HD arranged at [facility] [place]     Metabolic acidosis. Was severe on admit. Improved with dialysis   Hyperkalemia: corrected with HD, K normal today  Anemia: On epogen with HD, iron level good  Hyperphosphatemia: On PO4 binder/renvela  Secondary hyperparathyroidism: on calcitrol  Fluid overload and pulmonary edema good response to UF/HD     To review:  He was choosing PD before, but given the acuity of pt's condition and the positive COVID-19 test, PD at this point is not appropriate. Will need to start dialysis with HD first, may switch to PD after recovery from COVID-19.  Hep B serologies for placement reviewed. Surface antigen negative. Core antibody and surface antibody positive due to prior infection. Has the protective antibody. No action needed.     CKD is due to DM, HTN, and metabolic syndrome     HTN: BP not controlled  Meds reviewed  Will increase amlodipine bhagat to 10 mg po qd     Acute hypoxemic respiratory failure due to COVID-19  Chart reviewed  Will defer treatment decisions to primary team     Type 2 diabetes mellitus with chronic kidney disease, with long-term current use of insulin  Chart was reviewed  Comorbidities noted        Plans and recommendations:  As discussed above   Opportunity [facility] questions provided

## 2023-05-14 NOTE — CONSULTS
[facility] ([facility])  Nephrology  Consult Note    Patient Name: Isidro Alves  MRN: [id number]  Admission Date: 5/14/2023  Hospital Length of Stay: 0 days  Attending Provider: Ta Bethea MD   Primary Care Physician: Steven Morgan MD  Principal Problem:Acute on chronic combined systolic and diastolic congestive heart failure    Reason for consult: ESRD, volume overload, SOB    Consults  Subjective:     HPI: Thank you for referring the pt to us. H/o and chart were reviewed. Pt was seen and examined. Pt is a 68 y/o male with ESRD on chronic HD q MWF at [facility], on HD since Jan 2021, with h/o of DM, HTN, CAD, combined diastolic and systolic CHF (EF 40%), metabolic syndrome, and obesity who presented with SOB. Last HD was per schedule 2 days ago. Per wife, pt woke up and was feeling SOB. Wife called 911, and pt was intubated in the field before arriving in hospital. Pt remains intubate. No CP, no fver. Also had a cough. Pt is not missing HD treatment. Noted pt had been admitted for volume overload causing SOB here at [facility] 1 week ago. Per family pt drinks a lot of fluids.      Past Medical History:   Diagnosis Date    Abnormal nuclear stress test 8/30/2022    Acute hypoxemic respiratory failure due to COVID-19 1/21/2021    Anal itching 10/10/2022    Anemia 4/16/2014    BPH (benign prostatic hyperplasia)     CKD (chronic kidney disease) stage 4, GFR 15-29 ml/min     Coronary artery disease of native artery of native heart with stable angina pectoris 4/29/2019    Diabetes mellitus     Diabetes mellitus, type 2     Dilated cardiomyopathy 8/30/2022    Elevated PSA     Encounter for blood transfusion     Herpes labialis     Hyperlipidemia     Hypertension     Obesity     Pneumonia due to COVID-19 virus     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    Preop cardiovascular exam 9/16/2022    Proteinuria     Secondary hyperparathyroidism (of renal origin)     UTI (lower urinary tract  infection) 5/1/2015       Past Surgical History:   Procedure Laterality Date    CATHETERIZATION OF BOTH LEFT AND RIGHT HEART N/A 08/30/2022    Procedure: CATHETERIZATION, HEART, BOTH LEFT AND RIGHT;  Surgeon: Kassandra Deras MD;  Location: [facility] CATH LAB;  Service: Cardiology;  Laterality: N/A;    COLONOSCOPY N/A 12/21/2017    Procedure: COLONOSCOPY;  Surgeon: Fran Escalera MD;  Location: [facility] ENDO;  Service: Endoscopy;  Laterality: N/A;    COLONOSCOPY N/A 02/02/2023    Procedure: COLONOSCOPY;  Surgeon: Anthony Meng MD;  Location: [facility] ENDO;  Service: Endoscopy;  Laterality: N/A;    FLUOROSCOPY N/A 01/21/2021    Procedure: Vascath insertion;  Surgeon: Adrian Pruitt Jr., MD;  Location: [facility] CATH LAB;  Service: General;  Laterality: N/A;    FLUOROSCOPY N/A 04/06/2021    Procedure: Over the wire Vas Cath exchange;  Surgeon: Ho Pressley MD;  Location: [facility] CATH LAB;  Service: General;  Laterality: N/A;    PROSTATE BIOPSY      thorocotomy  01/01/2010       Review of patient's allergies indicates:   Allergen Reactions    Bactrim [sulfamethoxazole-trimethoprim] Swelling    Nsaids (non-steroidal anti-inflammatory drug)      CKD    Sulfamethoxazole     Trimethoprim      Current Facility-Administered Medications   Medication Frequency    albuterol-ipratropium 2.5 mg-0.5 mg/3 mL nebulizer solution 3 mL Q6H PRN    aspirin chewable tablet 81 mg Daily    [START ON 5/15/2023] atorvastatin tablet 40 mg Daily    dextrose 10% bolus 125 mL 125 mL PRN    famotidine (PF) injection 20 mg Daily    fentaNYL 2500 mcg in 0.9% sodium chloride 250 mL infusion premix (titrating) Continuous    [START ON 5/15/2023] finasteride tablet 5 mg Daily    glucagon (human recombinant) injection 1 mg PRN    heparin (porcine) injection 5,000 Units Q8H    insulin aspart U-100 pen 1-10 Units Q6H PRN    mupirocin 2 % ointment BID    nitroGLYCERIN in 5 % dextrose 50 mg/250 mL (200 mcg/mL) infusion Continuous     ondansetron injection 4 mg Q8H PRN    sodium chloride 0.9% flush 10 mL PRN     Family History       Problem Relation (Age of Onset)    Diabetes Father, Other    Hypertension Father, Other    No Known Problems Mother          Tobacco Use    Smoking status: Never    Smokeless tobacco: Never   Substance and Sexual Activity    Alcohol use: Not Currently     Comment: seldom     Drug use: No    Sexual activity: Not Currently     Partners: Female     Review of Systems   Constitutional: Negative.    HENT: Negative.     Respiratory:  Positive for cough and shortness of breath.    Cardiovascular: Negative.    Gastrointestinal: Negative.    Genitourinary: Negative.    Musculoskeletal: Negative.    Neurological: Negative.    Psychiatric/Behavioral: Negative.     Objective:     Vital Signs (Most Recent):  Temp: 97.7 °F (36.5 °C) (05/14/23 1100)  Pulse: 88 (05/14/23 1330)  Resp: (!) 24 (05/14/23 1330)  BP: 98/71 (05/14/23 1330)  SpO2: 100 % (05/14/23 1330) Vital Signs (24h Range):  Temp:  [97.7 °F (36.5 °C)-98.8 °F (37.1 °C)] 97.7 °F (36.5 °C)  Pulse:  [] 88  Resp:  [18-27] 24  SpO2:  [95 %-100 %] 100 %  BP: ()/() 98/71     Weight: 118.4 kg (261 lb) (05/14/23 0736)  Body mass index is 33.51 kg/m².  Body surface area is 2.49 meters squared.    No intake/output data recorded.     Physical Exam  Vitals and nursing note reviewed.   Constitutional:       Appearance: Normal appearance.   Cardiovascular:      Rate and Rhythm: Normal rate and regular rhythm.      Pulses: Normal pulses.      Heart sounds: Normal heart sounds.   Pulmonary:      Breath sounds: Rales present.      Comments: intubated  Abdominal:      General: Abdomen is flat.      Palpations: Abdomen is soft.   Musculoskeletal:      Right lower leg: Edema present.      Left lower leg: Edema present.   Psychiatric:         Behavior: Behavior normal.        Significant Labs: reviewed:  [facility]  Lab Results   Component Value Date     05/14/2023    K  5.1 05/14/2023     05/14/2023    CO2 18 (L) 05/14/2023    BUN 56 (H) 05/14/2023    CREATININE 8.9 (H) 05/14/2023    CALCIUM 8.9 05/14/2023    ANIONGAP 15 05/14/2023    EGFRNORACEVR 6 (A) 05/14/2023     Lab Results   Component Value Date    WBC 16.66 (H) 05/14/2023    HGB 10.5 (L) 05/14/2023    HCT 34.9 (L) 05/14/2023    MCV 88 05/14/2023     05/14/2023     Lab Results   Component Value Date    .5 (H) 08/25/2022    CALCIUM 8.9 05/14/2023    PHOS 4.3 05/08/2023         ABG  Recent Labs   Lab 05/14/23  0749   PH 7.174*   PO2 98   PCO2 72.3*   HCO3 26.6   BE -2     Recent Labs   Lab 05/14/23  0935   TROPONINI 0.199*       Significant Imaging: reviewed CXR, large pulmonary edema with bilateral pleural effusion    Assessment/Plan:     89y/o male with ESRD on chronic HD presented with fluid overload and pulmonary edema:     ESRD  This is pt's second admission for SOB due to fluid overload in 1 week.  H/o of large fluid intake  Severe pulmonary edema necessitating intubation  Currently receiving HD and UF  Tolerating HD and fluid removal, except for hypotension during HD, will give albumin 25 g IV to assist with UF  Continue HD  Pt received multiple visista nd evaluations.    H/o of ESRD, on chronic HD since Jan 2021. Currently at [facility]  Last HD 2 days ago  ERD due to DM, HTN, and metabolic syndrome      Complications of ESRD  Mild hyperkalemia, will respond to HD  Metabolic acidosis. will address with HD  Anemia: will add epogen,  PO4 normal  Ca normal  iPTH controlled       HTN: BP not controlled on arrival  Hypertensive emergency  Due to fluid overload and vascular congestion  BP has improved after fluid removal  Meds reviewed    Acute hypoxemic respiratory failure   H/o of combined diastolic and systolic CHF (EF 40%)  Cardiorenal syndrome  Presented with acute respiratory acidosis  H/o of unrestricted salt and fluid intake in diet    The necessity for dietary restrictions of  "salt, sweets, and fluids discussed with family  Explained that prognosis for ESRD pts who drink a lot of water between HD treatments is poor.  This is because the rate of fluid removal during HD has a direct relation to the worsening of CHF.  This is because of a process called "cardiac stunning" which occurs when a lot of fluids have to be removed over a limited span of time.  Explained in layman's language.     Type 2 diabetes mellitus with chronic kidney disease, with long-term current use of insulin  Chart was reviewed  Comorbidities noted        Plans and recommendations:  As discussed above   Opportunity [facility] questions provided  Total time spent 60 minutes including time needed to review the records, the   patient evaluation, documentation, face-to-face discussion with the patient,   more than 50% of the time was spent on coordination of care and counseling.    Level V visit.  Multiple medical issues were addressed, as documented. Medical care provided was in addition to providing dialysis. Pt received multiple visits and evaluations.            Idris Ordaz MD   Nephrology  [facility] ([facility])  "

## 2023-05-14 NOTE — ED PROVIDER NOTES
SCRIBE #1 NOTE: IAnette, am scribing for, and in the presence of, John Edmonds MD. I have scribed the entire note.       History     Chief Complaint   Patient presents with    Respiratory Arrest     Transfer from [facility] ED. ET tube in place after respiratory distress and cardiac arrest at [facility].     Review of patient's allergies indicates:   Allergen Reactions    Bactrim [sulfamethoxazole-trimethoprim] Swelling    Nsaids (non-steroidal anti-inflammatory drug)      CKD    Sulfamethoxazole     Trimethoprim          History of Present Illness     HPI    5/14/2023, 9:38 AM  History obtained from the EMS      History of Present Illness: Isidro Alves is a 67 y.o. male patient with a PMHx of DM,HNT, CKD,Anemia, and CAD who presents to the Emergency Department after being transferred from [facility] for respirator failure. He went into cardiac arrest while there and is being transferred here for higher care. Upon arrival the Pt is intubated and unreactive. Symptoms are constant and moderate in severity. No mitigating or exacerbating factors reported.  No further complaints or concerns at this time.       Arrival mode:  EMS      PCP: Steven Morgan MD        Past Medical History:  Past Medical History:   Diagnosis Date    Abnormal nuclear stress test 8/30/2022    Acute hypoxemic respiratory failure due to COVID-19 1/21/2021    Anal itching 10/10/2022    Anemia 4/16/2014    BPH (benign prostatic hyperplasia)     CKD (chronic kidney disease) stage 4, GFR 15-29 ml/min     Coronary artery disease of native artery of native heart with stable angina pectoris 4/29/2019    Diabetes mellitus     Diabetes mellitus, type 2     Dilated cardiomyopathy 8/30/2022    Elevated PSA     Encounter for blood transfusion     Herpes labialis     Hyperlipidemia     Hypertension     Obesity     Pneumonia due to COVID-19 virus     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 CATH LAB;  Service: Cardiology;  Laterality: N/A;    COLONOSCOPY N/A 12/21/2017    Procedure: COLONOSCOPY;  Surgeon: Fran Escalera MD;  Location: [facility] ENDO;  Service: Endoscopy;  Laterality: N/A;    COLONOSCOPY N/A 02/02/2023    Procedure: COLONOSCOPY;  Surgeon: Anthony Meng MD;  Location: [facility] ENDO;  Service: Endoscopy;  Laterality: N/A;    FLUOROSCOPY N/A 01/21/2021    Procedure: Vascath insertion;  Surgeon: Adrian Pruitt Jr., MD;  Location: [facility] CATH LAB;  Service: General;  Laterality: N/A;    FLUOROSCOPY N/A 04/06/2021    Procedure: Over the wire Vas Cath exchange;  Surgeon: Ho Pressley MD;  Location: [facility] CATH LAB;  Service: General;  Laterality: N/A;    PROSTATE BIOPSY      thorocotomy  01/01/2010         Family History:  Family History   Problem Relation Age of Onset    No Known Problems Mother     Diabetes Father     Hypertension Father     Hypertension Other     Diabetes Other        Social History:  Social History     Tobacco Use    Smoking status: Never    Smokeless tobacco: Never   Substance and Sexual Activity    Alcohol use: Not Currently     Comment: seldom     Drug use: No    Sexual activity: Not Currently     Partners: Female        Review of Systems     Review of Systems   Unable to perform ROS: Acuity of condition      Physical Exam     Initial Vitals   BP Pulse Resp Temp SpO2   05/14/23 0730 05/14/23 0730 05/14/23 0730 05/14/23 0736 05/14/23 0730   (!) 217/126 (!) 137 18 98.8 °F (37.1 °C) 96 %      MAP       --                 Physical Exam  Nursing Notes and Vital Signs Reviewed.  Constitutional: Patient is in no acute distress. Well-developed  and well-nourished.  Head: Atraumatic. Normocephalic.  Eyes: PERRL. EOM intact. Conjunctivae are not pale. No scleral icterus.  ENT: Mucous membranes are moist. Oropharynx is clear and symmetric.    Neck: Supple. Full ROM. No lymphadenopathy.  Cardiovascular: Tachycardic. Vas cath.   Pulmonary/Chest: Bilateral rales.   Abdominal: Soft and non-distended.  There is no tenderness.  No rebound, guarding, or rigidity.   Musculoskeletal: Moves all extremities. No obvious deformities. No edema. No calf tenderness.  Skin: Warm and dry.  Neurological:  Alert, awake, and appropriate.  Normal speech.  No acute focal neurological deficits are appreciated.  Psychiatric: Normal affect. Good eye contact. Appropriate in content.     ED Course   Critical Care    Date/Time: 5/14/2023 10:00 AM  Performed by: Anette Luque  Authorized by: Ta Beteha MD   Direct patient critical care time: 10 minutes  Additional history critical care time: 10 minutes  Ordering / reviewing critical care time: 5 minutes  Documentation critical care time: 10 minutes  Other critical care time: 5 minutes  Total critical care time (exclusive of procedural time) : 40 minutes  Critical care was necessary to treat or prevent imminent or life-threatening deterioration of the following conditions: respiratory failure.  Critical care was time spent personally by me on the following activities: blood draw for specimens, development of treatment plan with patient or surrogate, interpretation of cardiac output measurements, evaluation of patient's response to treatment, discussions with consultants, examination of patient, obtaining history from patient or surrogate, ordering and performing treatments and interventions, ordering and review of laboratory studies, ordering and review of radiographic studies, pulse oximetry, re-evaluation of patient's condition and review of old charts.      ED Vital Signs:  Vitals:    05/14/23 1015 05/14/23 1030 05/14/23 1100  05/14/23 1115   BP: (!) 152/104  (!) 148/81 113/78   Pulse: 107  97 99   Resp: (!) 27  (!) 25 (!) 25   Temp:   97.7 °F (36.5 °C)    TempSrc:   Oral    SpO2: 96% 96% 100% 100%   Weight:        05/14/23 1130 05/14/23 1145 05/14/23 1200 05/14/23 1215   BP: (!) 87/58 93/61 100/73 97/70   Pulse: 96 94 91 91   Resp: (!) 24 (!) 24 (!) 24 (!) 24   Temp:       TempSrc:       SpO2: 100% 100% 100% 100%   Weight:        05/14/23 1230 05/14/23 1245 05/14/23 1259 05/14/23 1300   BP: 91/66 (!) 77/60 90/66 92/66   Pulse: 92 91 88 88   Resp: (!) 24 (!) 24 (!) 24 (!) 24   Temp:       TempSrc:       SpO2: 100% 100% 100% 100%   Weight:        05/14/23 1315 05/14/23 1330 05/14/23 1506   BP: 96/69 98/71 115/81   Pulse: 87 88 88   Resp: (!) 24 (!) 24 (!) 24   Temp:      TempSrc:      SpO2: 100% 100% 100%   Weight:          Abnormal Lab Results:  Labs Reviewed   LACTIC ACID, PLASMA - Abnormal; Notable for the following components:       Result Value    Lactate (Lactic Acid) 3.2 (*)     All other components within normal limits   CBC W/ AUTO DIFFERENTIAL - Abnormal; Notable for the following components:    WBC 17.31 (*)     RBC 4.11 (*)     Hemoglobin 11.0 (*)     Hematocrit 36.7 (*)     MCH 26.8 (*)     MCHC 30.0 (*)     RDW 16.3 (*)     Immature Granulocytes 0.9 (*)     Gran # (ANC) 14.3 (*)     Immature Grans (Abs) 0.16 (*)     Mono # 1.5 (*)     Gran % 82.5 (*)     Lymph % 7.1 (*)     All other components within normal limits   COMPREHENSIVE METABOLIC PANEL - Abnormal; Notable for the following components:    CO2 18 (*)     Glucose 294 (*)     BUN 56 (*)     Creatinine 8.9 (*)     Albumin 3.3 (*)     AST 53 (*)     eGFR 6 (*)     All other components within normal limits   TROPONIN I - Abnormal; Notable for the following components:    Troponin I 0.199 (*)     All other components within normal limits   POCT GLUCOSE - Abnormal; Notable for the following components:    POCT Glucose 255 (*)     All other components within normal limits    ISTAT PROCEDURE - Abnormal; Notable for the following components:    POC PH 7.174 (*)     POC PCO2 72.3 (*)     All other components within normal limits        All Lab Results:  Results for orders placed or performed during the hospital encounter of 05/14/23   Lactic acid, plasma   Result Value Ref Range    Lactate (Lactic Acid) 3.2 (H) 0.5 - 2.2 mmol/L   CBC auto differential   Result Value Ref Range    WBC 17.31 (H) 3.90 - 12.70 K/uL    RBC 4.11 (L) 4.60 - 6.20 M/uL    Hemoglobin 11.0 (L) 14.0 - 18.0 g/dL    Hematocrit 36.7 (L) 40.0 - 54.0 %    MCV 89 82 - 98 fL    MCH 26.8 (L) 27.0 - 31.0 pg    MCHC 30.0 (L) 32.0 - 36.0 g/dL    RDW 16.3 (H) 11.5 - 14.5 %    Platelets 331 150 - 450 K/uL    MPV 10.9 9.2 - 12.9 fL    Immature Granulocytes 0.9 (H) 0.0 - 0.5 %    Gran # (ANC) 14.3 (H) 1.8 - 7.7 K/uL    Immature Grans (Abs) 0.16 (H) 0.00 - 0.04 K/uL    Lymph # 1.2 1.0 - 4.8 K/uL    Mono # 1.5 (H) 0.3 - 1.0 K/uL    Eos # 0.1 0.0 - 0.5 K/uL    Baso # 0.07 0.00 - 0.20 K/uL    nRBC 0 0 /100 WBC    Gran % 82.5 (H) 38.0 - 73.0 %    Lymph % 7.1 (L) 18.0 - 48.0 %    Mono % 8.8 4.0 - 15.0 %    Eosinophil % 0.3 0.0 - 8.0 %    Basophil % 0.4 0.0 - 1.9 %    Differential Method Automated    Comprehensive metabolic panel   Result Value Ref Range    Sodium 140 136 - 145 mmol/L    Potassium 5.1 3.5 - 5.1 mmol/L    Chloride 107 95 - 110 mmol/L    CO2 18 (L) 23 - 29 mmol/L    Glucose 294 (H) 70 - 110 mg/dL    BUN 56 (H) 8 - 23 mg/dL    Creatinine 8.9 (H) 0.5 - 1.4 mg/dL    Calcium 8.9 8.7 - 10.5 mg/dL    Total Protein 6.6 6.0 - 8.4 g/dL    Albumin 3.3 (L) 3.5 - 5.2 g/dL    Total Bilirubin 0.7 0.1 - 1.0 mg/dL    Alkaline Phosphatase 70 55 - 135 U/L    AST 53 (H) 10 - 40 U/L    ALT 35 10 - 44 U/L    Anion Gap 15 8 - 16 mmol/L    eGFR 6 (A) >60 mL/min/1.73 m^2   Troponin I   Result Value Ref Range    Troponin I 0.199 (H) 0.000 - 0.026 ng/mL   Hepatitis B surface antigen   Result Value Ref Range    Hepatitis B Surface Ag Non-reactive  Non-reactive   CBC auto differential   Result Value Ref Range    WBC 16.66 (H) 3.90 - 12.70 K/uL    RBC 3.95 (L) 4.60 - 6.20 M/uL    Hemoglobin 10.5 (L) 14.0 - 18.0 g/dL    Hematocrit 34.9 (L) 40.0 - 54.0 %    MCV 88 82 - 98 fL    MCH 26.6 (L) 27.0 - 31.0 pg    MCHC 30.1 (L) 32.0 - 36.0 g/dL    RDW 16.1 (H) 11.5 - 14.5 %    Platelets 293 150 - 450 K/uL    MPV 10.2 9.2 - 12.9 fL    Immature Granulocytes 0.6 (H) 0.0 - 0.5 %    Gran # (ANC) 14.1 (H) 1.8 - 7.7 K/uL    Immature Grans (Abs) 0.10 (H) 0.00 - 0.04 K/uL    Lymph # 1.0 1.0 - 4.8 K/uL    Mono # 1.5 (H) 0.3 - 1.0 K/uL    Eos # 0.0 0.0 - 0.5 K/uL    Baso # 0.04 0.00 - 0.20 K/uL    nRBC 0 0 /100 WBC    Gran % 84.6 (H) 38.0 - 73.0 %    Lymph % 5.8 (L) 18.0 - 48.0 %    Mono % 8.7 4.0 - 15.0 %    Eosinophil % 0.1 0.0 - 8.0 %    Basophil % 0.2 0.0 - 1.9 %    Differential Method Automated    Procalcitonin   Result Value Ref Range    Procalcitonin 0.71 (H) <0.25 ng/mL   Urinalysis, Reflex to Urine Culture Urine, Clean Catch    Specimen: Urine   Result Value Ref Range    Specimen UA Urine, Clean Catch     Color, UA Yellow Yellow, Straw, Hazel    Appearance, UA Clear Clear    pH, UA 7.0 5.0 - 8.0    Specific Gravity, UA 1.010 1.005 - 1.030    Protein, UA 2+ (A) Negative    Glucose, UA 2+ (A) Negative    Ketones, UA Negative Negative    Bilirubin (UA) Negative Negative    Occult Blood UA 1+ (A) Negative    Nitrite, UA Negative Negative    Urobilinogen, UA Negative <2.0 EU/dL    Leukocytes, UA Trace (A) Negative   Brain natriuretic peptide   Result Value Ref Range    BNP >4,900 (H) 0 - 99 pg/mL   Urinalysis Microscopic   Result Value Ref Range    RBC, UA 9 (H) 0 - 4 /hpf    WBC, UA 8 (H) 0 - 5 /hpf    Bacteria Occasional None-Occ /hpf    Squam Epithel, UA 2 /hpf    Hyaline Casts, UA 0 0-1/lpf /lpf    Unclass Amy UA Rare None-Moderate    Microscopic Comment SEE COMMENT    POCT glucose   Result Value Ref Range    POCT Glucose 255 (H) 70 - 110 mg/dL   ISTAT PROCEDURE   Result  Value Ref Range    POC PH 7.174 (LL) 7.35 - 7.45    POC PCO2 72.3 (HH) 35 - 45 mmHg    POC PO2 98 80 - 100 mmHg    POC HCO3 26.6 24 - 28 mmol/L    POC BE -2 -2 to 2 mmol/L    POC SATURATED O2 95 95 - 100 %    Rate 16     Sample ARTERIAL     Site LR     Allens Test Pass     DelSys Adult Vent     Mode AC/PRVC     Vt 500     PEEP 5     FiO2 100      *Note: Due to a large number of results and/or encounters for the requested time period, some results have not been displayed. A complete set of results can be found in Results Review.        Imaging Results:  Imaging Results              CT Head Without Contrast (Final result)  Result time 05/14/23 09:16:50      Final result by Wilian Carrera Jr., MD (05/14/23 09:16:50)                   Impression:      No acute findings.  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  Changes of chronic right sphenoid sinusitis.    All CT scans at this facility use dose modulation, iterative reconstruction, and/or weight base dosing when appropriate to reduce radiation dose to as low as reasonably achievable.      Electronically signed by: Wilian Carrera Jr., MD  Date:    05/14/2023  Time:    09:16               Narrative:    EXAMINATION:  CT HEAD WITHOUT CONTRAST    CLINICAL HISTORY:  Mental status change, unknown cause;    TECHNIQUE:  Contiguous axial CT images were obtained from the skull base through the vertex without intravenous contrast.    COMPARISON:  None    FINDINGS:  No intracranial hemorrhage. No mass effect or midline shift. Chronic deep white matter infarct involving the inferior left parietal lobe.  Likely adjacent cortical infarction as well.  The ventricles and sulci are normal in size and configuration. Severe mucosal thickening of the right sphenoid sinus with bony wall thickening.  No concerning osseous findings.                                       X-Ray Chest AP Portable (Final result)  Result time 05/14/23 08:09:03      Final result by Wilian Carrera  MD Evangelist (05/14/23 08:09:03)                   Impression:      Florid pulmonary edema with bilateral pleural effusions.  Support devices appear to be in normal position although the tip of the enteric tube is difficult to define.      Electronically signed by: Wilian Carrera Jr., MD  Date:    05/14/2023  Time:    08:09               Narrative:    EXAMINATION:  XR CHEST AP PORTABLE    CLINICAL HISTORY:  Shortness of breath    COMPARISON:  Prior from 05/08/2023.    FINDINGS:  Endotracheal tube tip terminates 77 mm above the héctor.  Enteric tube tip is difficult to define.  Right-sided dialysis catheter in place.  Florid alveolar opacity within the perihilar lung with bilateral pleural effusions, left larger than right.  Atelectasis or consolidation of portions of the left lower lobe.  No pneumothorax.  Heart is enlarged.  No significant bony findings.                                       The EKG was ordered, reviewed, and independently interpreted by the ED provider.  Interpretation time: 7:31  Rate: 136 BPM  Rhythm: Sinus tachycardia with fusion complexes.  Interpretation: Possible left atrial enlargement. Nonspecific T wave abnormality. No STEMI.       ED Discussion       10:02 AM: Discussed case with Dr. Bethea ([facility]). Dr. Bethea agrees with current care and management of pt and accepts admission.   Admitting Service: Critical care services  Admitting Physician: Dr. Salas  Admit to: ICU        Medical Decision Making:   Clinical Tests:   Lab Tests: Ordered and Reviewed  Radiological Study: Ordered and Reviewed  Medical Tests: Ordered and Reviewed         ED Medication(s):  Medications   nitroGLYCERIN in 5 % dextrose 50 mg/250 mL (200 mcg/mL) infusion (0 mcg/min Intravenous Stopped 5/14/23 1131)   sodium chloride 0.9% flush 10 mL (has no administration in time range)   heparin (porcine) injection 5,000 Units (5,000 Units Subcutaneous Given 5/14/23 1323)   famotidine (PF) injection 20 mg (20 mg  Intravenous Given 5/14/23 1101)   ondansetron injection 4 mg (has no administration in time range)   albuterol-ipratropium 2.5 mg-0.5 mg/3 mL nebulizer solution 3 mL (has no administration in time range)   mupirocin 2 % ointment (has no administration in time range)   fentaNYL 2500 mcg in 0.9% sodium chloride 250 mL infusion premix (titrating) (200 mcg/hr Intravenous Verify Only 5/14/23 1300)   dextrose 10% bolus 125 mL 125 mL (has no administration in time range)   glucagon (human recombinant) injection 1 mg (has no administration in time range)   insulin aspart U-100 pen 1-10 Units (has no administration in time range)   atorvastatin tablet 40 mg (has no administration in time range)   aspirin chewable tablet 81 mg (81 mg Per OG tube Given 5/14/23 1323)   finasteride tablet 5 mg (has no administration in time range)   heparin (porcine) injection 2,000 Units (2,000 Units Intravenous Given 5/14/23 1444)   furosemide injection 100 mg (100 mg Intravenous Given 5/14/23 0750)   nitroGLYCERIN 2% TD oint ointment 2 inch (2 inches Transdermal Given 5/14/23 0739)   albumin human 25% bottle 25 g (0 g Intravenous Stopped 5/14/23 1350)       Current Discharge Medication List                  Scribe Attestation:   Scribe #1: I performed the above scribed service and the documentation accurately describes the services I performed. I attest to the accuracy of the note.     Attending:   Physician Attestation Statement for Scribe #1: I, John Edmonds MD, personally performed the services described in this documentation, as scribed by Anette Luque, in my presence, and it is both accurate and complete.           Clinical Impression       ICD-10-CM ICD-9-CM   1. Acute respiratory failure with hypoxia and hypercapnia  J96.01 518.81    J96.02    2. SOB (shortness of breath)  R06.02 786.05   3. Cardiac arrest  I46.9 427.5   4. ESRD (end stage renal disease) on dialysis  N18.6 585.6    Z99.2 V45.11   5. Pulmonary edema with congestive  heart failure  I50.1 428.0   6. Hypertensive emergency  I16.1 401.9       Disposition:   Disposition: Admitted  Condition: Fair      John Edmonds MD  05/14/23 5243

## 2023-05-14 NOTE — ASSESSMENT & PLAN NOTE
Patient's FSGs are controlled on current medication regimen.  Last A1c reviewed-   Lab Results   Component Value Date    HGBA1C 6.4 (H) 05/07/2023     Most recent fingerstick glucose reviewed-   Recent Labs   Lab 05/14/23  0733   POCTGLUCOSE 255*     Current correctional scale  Medium  Maintain anti-hyperglycemic dose as follows-   Antihyperglycemics (From admission, onward)    Start     Stop Route Frequency Ordered    05/14/23 1335  insulin aspart U-100 pen 1-10 Units         -- SubQ Every 6 hours PRN 05/14/23 1235        Hold Oral hypoglycemics while patient is in the hospital.

## 2023-05-14 NOTE — PLAN OF CARE
Tridil gtt titrated per pt tolerance. Gtt stopped d/t hypotension. Dialysis done at bedside, 2L fluid removed per dialysis nurse. Fentanyl gtt continued, pt remains on ventilator. Safety precautions in place, alarms active. POC reviewed with spouse.

## 2023-05-14 NOTE — PROCEDURES
Isidro Alves is a 67 y.o. male patient.    Temp: 97.7 °F (36.5 °C) (05/14/23 1100)  Pulse: 88 (05/14/23 1506)  Resp: (!) 24 (05/14/23 1506)  BP: 115/81 (05/14/23 1506)  SpO2: 100 % (05/14/23 1506)  Weight: 118.4 kg (261 lb) (05/14/23 0736)       Central Line    Date/Time: 5/14/2023 4:56 PM  Performed by: Thelma Anders NP  Authorized by: Thelma Anders NP     Supervisor Name:  Ta Bethea  Location procedure was performed:  [facility] PULMONARY MEDICINE  Consent Done ?:  Yes  Indications:  Med administration  Anesthesia:  Local infiltration  Local anesthetic:  Lidocaine 1% without epinephrine  Preparation:  Skin prepped with ChloraPrep  Location:  Left internal jugular  Catheter type:  Double lumen  Catheter size:  7 Fr  Manometry: Yes    Securement:  Line sutured  Complications: No    XRay:  Placement verified by x-ray and no pneumothorax on x-ray  Adverse Events:  None    5/14/2023

## 2023-05-14 NOTE — ASSESSMENT & PLAN NOTE
WBC 16.66  UA pending  CXR unrevealing  Lactic elevated 3.2 but could be reactive   Check repeat lactic

## 2023-05-14 NOTE — PROGRESS NOTES
Pharmacist Renal Dose Adjustment Note    Isidro Alves is a 67 y.o. male being treated with the medication famotidine.     Patient Data:    Vital Signs (Most Recent):  Temp: 98.8 °F (37.1 °C) (05/14/23 0736)  Pulse: 107 (05/14/23 0945)  Resp: (!) 24 (05/14/23 0945)  BP: (!) 159/104 (05/14/23 0945)  SpO2: 95 % (05/14/23 0945) Vital Signs (72h Range):  Temp:  [98.8 °F (37.1 °C)]   Pulse:  [107-158]   Resp:  [18-24]   BP: (159-230)/(104-133)   SpO2:  [95 %-100 %]      Recent Labs   Lab 05/07/23  1458 05/08/23  0530 05/14/23  0935   CREATININE 6.3* 8.6* 8.9*     Serum creatinine: 8.9 mg/dL (H) 05/14/23 0935  Estimated creatinine clearance: 11.2 mL/min (A)    Per protocol for CrCl < 50 ml/min, dose will be reduced from 20 mg IV twice daily to 20 mg IV once daily.     Pharmacist's Name: Katherine E Mcardle  Pharmacist's Extension: [phone]

## 2023-05-14 NOTE — SUBJECTIVE & OBJECTIVE
Past Medical History:   Diagnosis Date    Abnormal nuclear stress test 8/30/2022    Acute hypoxemic respiratory failure due to COVID-19 1/21/2021    Anal itching 10/10/2022    Anemia 4/16/2014    BPH (benign prostatic hyperplasia)     CKD (chronic kidney disease) stage 4, GFR 15-29 ml/min     Coronary artery disease of native artery of native heart with stable angina pectoris 4/29/2019    Diabetes mellitus     Diabetes mellitus, type 2     Dilated cardiomyopathy 8/30/2022    Elevated PSA     Encounter for blood transfusion     Herpes labialis     Hyperlipidemia     Hypertension     Obesity     Pneumonia due to COVID-19 virus     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    Preop cardiovascular exam 9/16/2022    Proteinuria     Secondary hyperparathyroidism (of renal origin)     UTI (lower urinary tract infection) 5/1/2015       Past Surgical History:   Procedure Laterality Date    CATHETERIZATION OF BOTH LEFT AND RIGHT HEART N/A 08/30/2022    Procedure: CATHETERIZATION, HEART, BOTH LEFT AND RIGHT;  Surgeon: Kassandra Deras MD;  Location: [facility] CATH LAB;  Service: Cardiology;  Laterality: N/A;    COLONOSCOPY N/A 12/21/2017    Procedure: COLONOSCOPY;  Surgeon: Farn Escalera MD;  Location: [facility] ENDO;  Service: Endoscopy;  Laterality: N/A;    COLONOSCOPY N/A 02/02/2023    Procedure: COLONOSCOPY;  Surgeon: Anthony Meng MD;  Location: [facility] ENDO;  Service: Endoscopy;  Laterality: N/A;    FLUOROSCOPY N/A 01/21/2021    Procedure: Vascath insertion;  Surgeon: Adrian Pruitt Jr., MD;  Location: [facility] CATH LAB;  Service: General;  Laterality: N/A;    FLUOROSCOPY N/A 04/06/2021    Procedure: Over the wire Vas Cath exchange;  Surgeon: Ho Pressley MD;  Location: [facility] CATH LAB;  Service: General;  Laterality: N/A;    PROSTATE BIOPSY      thorocotomy  01/01/2010       Review of patient's allergies indicates:   Allergen Reactions    Bactrim [sulfamethoxazole-trimethoprim] Swelling    Nsaids (non-steroidal anti-inflammatory drug)       CKD    Sulfamethoxazole     Trimethoprim      Current Facility-Administered Medications   Medication Frequency    albuterol-ipratropium 2.5 mg-0.5 mg/3 mL nebulizer solution 3 mL Q6H PRN    aspirin chewable tablet 81 mg Daily    [START ON 5/15/2023] atorvastatin tablet 40 mg Daily    dextrose 10% bolus 125 mL 125 mL PRN    famotidine (PF) injection 20 mg Daily    fentaNYL 2500 mcg in 0.9% sodium chloride 250 mL infusion premix (titrating) Continuous    [START ON 5/15/2023] finasteride tablet 5 mg Daily    glucagon (human recombinant) injection 1 mg PRN    heparin (porcine) injection 5,000 Units Q8H    insulin aspart U-100 pen 1-10 Units Q6H PRN    mupirocin 2 % ointment BID    nitroGLYCERIN in 5 % dextrose 50 mg/250 mL (200 mcg/mL) infusion Continuous    ondansetron injection 4 mg Q8H PRN    sodium chloride 0.9% flush 10 mL PRN     Family History       Problem Relation (Age of Onset)    Diabetes Father, Other    Hypertension Father, Other    No Known Problems Mother          Tobacco Use    Smoking status: Never    Smokeless tobacco: Never   Substance and Sexual Activity    Alcohol use: Not Currently     Comment: seldom     Drug use: No    Sexual activity: Not Currently     Partners: Female     Review of Systems   Constitutional: Negative.    HENT: Negative.     Respiratory:  Positive for cough and shortness of breath.    Cardiovascular: Negative.    Gastrointestinal: Negative.    Genitourinary: Negative.    Musculoskeletal: Negative.    Neurological: Negative.    Psychiatric/Behavioral: Negative.     Objective:     Vital Signs (Most Recent):  Temp: 97.7 °F (36.5 °C) (05/14/23 1100)  Pulse: 88 (05/14/23 1330)  Resp: (!) 24 (05/14/23 1330)  BP: 98/71 (05/14/23 1330)  SpO2: 100 % (05/14/23 1330) Vital Signs (24h Range):  Temp:  [97.7 °F (36.5 °C)-98.8 °F (37.1 °C)] 97.7 °F (36.5 °C)  Pulse:  [] 88  Resp:  [18-27] 24  SpO2:  [95 %-100 %] 100 %  BP: ()/() 98/71     Weight: 118.4 kg (261 lb) (05/14/23  0736)  Body mass index is 33.51 kg/m².  Body surface area is 2.49 meters squared.    No intake/output data recorded.     Physical Exam  Vitals and nursing note reviewed.   Constitutional:       Appearance: Normal appearance.   Cardiovascular:      Rate and Rhythm: Normal rate and regular rhythm.      Pulses: Normal pulses.      Heart sounds: Normal heart sounds.   Pulmonary:      Breath sounds: Rales present.      Comments: intubated  Abdominal:      General: Abdomen is flat.      Palpations: Abdomen is soft.   Musculoskeletal:      Right lower leg: Edema present.      Left lower leg: Edema present.   Psychiatric:         Behavior: Behavior normal.        Significant Labs: reviewed:  [facility]  Lab Results   Component Value Date     05/14/2023    K 5.1 05/14/2023     05/14/2023    CO2 18 (L) 05/14/2023    BUN 56 (H) 05/14/2023    CREATININE 8.9 (H) 05/14/2023    CALCIUM 8.9 05/14/2023    ANIONGAP 15 05/14/2023    EGFRNORACEVR 6 (A) 05/14/2023     Lab Results   Component Value Date    WBC 16.66 (H) 05/14/2023    HGB 10.5 (L) 05/14/2023    HCT 34.9 (L) 05/14/2023    MCV 88 05/14/2023     05/14/2023       ABG  Recent Labs   Lab 05/14/23  0749   PH 7.174*   PO2 98   PCO2 72.3*   HCO3 26.6   BE -2     Recent Labs   Lab 05/14/23  0935   TROPONINI 0.199*       Significant Imaging: reviewed CXR, large pulmonary edema with bilateral pleural effusion

## 2023-05-14 NOTE — ASSESSMENT & PLAN NOTE
Patient with Hypoxic Respiratory failure which is Acute on chronic.  he is on home oxygen at 2L NC QHS LPM. Supplemental oxygen was provided and noted- Vent Mode: A/C  Oxygen Concentration (%):  [100] 100  Resp Rate Total:  [24 br/min-28 br/min] 24 br/min  Vt Set:  [450 mL] 450 mL  PEEP/CPAP:  [5 cmH20-8 cmH20] 8 cmH20  Pressure Support:  [0 cmH20] 0 cmH20  Mean Airway Pressure:  [13 ojO73-88 cmH20] 19 cmH20    .   Signs/symptoms of respiratory failure include- tachypnea, increased work of breathing, respiratory distress and use of accessory muscles. Contributing diagnoses includes - CHF Labs and images were reviewed. Patient Has recent ABG, which has been reviewed. Will treat underlying causes and adjust management of respiratory failure as follows-   Recent Labs     05/14/23  0749   PH 7.174*   PCO2 72.3*   PO2 98   HCO3 26.6   POCSATURATED 95   BE -2     Repeat ABG in the AM  Sedation for RASS -2  Repeat CXR in the AM  Lasix IV

## 2023-05-14 NOTE — SUBJECTIVE & OBJECTIVE
Past Medical History:   Diagnosis Date    Abnormal nuclear stress test 8/30/2022    Acute hypoxemic respiratory failure due to COVID-19 1/21/2021    Anal itching 10/10/2022    Anemia 4/16/2014    BPH (benign prostatic hyperplasia)     CKD (chronic kidney disease) stage 4, GFR 15-29 ml/min     Coronary artery disease of native artery of native heart with stable angina pectoris 4/29/2019    Diabetes mellitus     Diabetes mellitus, type 2     Dilated cardiomyopathy 8/30/2022    Elevated PSA     Encounter for blood transfusion     Herpes labialis     Hyperlipidemia     Hypertension     Obesity     Pneumonia due to COVID-19 virus     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    Preop cardiovascular exam 9/16/2022    Proteinuria     Secondary hyperparathyroidism (of renal origin)     UTI (lower urinary tract infection) 5/1/2015       Past Surgical History:   Procedure Laterality Date    CATHETERIZATION OF BOTH LEFT AND RIGHT HEART N/A 08/30/2022    Procedure: CATHETERIZATION, HEART, BOTH LEFT AND RIGHT;  Surgeon: Kassandra Deras MD;  Location: [facility] CATH LAB;  Service: Cardiology;  Laterality: N/A;    COLONOSCOPY N/A 12/21/2017    Procedure: COLONOSCOPY;  Surgeon: Fran Escalera MD;  Location: [facility] ENDO;  Service: Endoscopy;  Laterality: N/A;    COLONOSCOPY N/A 02/02/2023    Procedure: COLONOSCOPY;  Surgeon: Anthony Meng MD;  Location: [facility] ENDO;  Service: Endoscopy;  Laterality: N/A;    FLUOROSCOPY N/A 01/21/2021    Procedure: Vascath insertion;  Surgeon: Adrian Pruitt Jr., MD;  Location: [facility] CATH LAB;  Service: General;  Laterality: N/A;    FLUOROSCOPY N/A 04/06/2021    Procedure: Over the wire Vas Cath exchange;  Surgeon: Ho Pressley MD;  Location: [facility] CATH LAB;  Service: General;  Laterality: N/A;    PROSTATE BIOPSY      thorocotomy  01/01/2010       Review of patient's allergies indicates:   Allergen Reactions    Bactrim [sulfamethoxazole-trimethoprim] Swelling    Nsaids (non-steroidal anti-inflammatory drug)       CKD    Sulfamethoxazole     Trimethoprim        Family History       Problem Relation (Age of Onset)    Diabetes Father, Other    Hypertension Father, Other    No Known Problems Mother          Tobacco Use    Smoking status: Never    Smokeless tobacco: Never   Substance and Sexual Activity    Alcohol use: Not Currently     Comment: seldom     Drug use: No    Sexual activity: Not Currently     Partners: Female         Review of Systems   Unable to perform ROS: Intubated   Objective:     Vital Signs (Most Recent):  Temp: 97.7 °F (36.5 °C) (05/14/23 1100)  Pulse: 94 (05/14/23 1145)  Resp: (!) 24 (05/14/23 1145)  BP: 93/61 (05/14/23 1145)  SpO2: 100 % (05/14/23 1145) Vital Signs (24h Range):  Temp:  [97.7 °F (36.5 °C)-98.8 °F (37.1 °C)] 97.7 °F (36.5 °C)  Pulse:  [] 94  Resp:  [18-27] 24  SpO2:  [95 %-100 %] 100 %  BP: ()/() 93/61     Weight: 118.4 kg (261 lb)  Body mass index is 33.51 kg/m².      Intake/Output Summary (Last 24 hours) at 5/14/2023 1216  Last data filed at 5/14/2023 1100  Gross per 24 hour   Intake 86.47 ml   Output --   Net 86.47 ml        Physical Exam  Constitutional:       Appearance: He is ill-appearing and toxic-appearing.   HENT:      Head: Normocephalic and atraumatic.      Nose: No congestion or rhinorrhea.      Mouth/Throat:      Pharynx: No posterior oropharyngeal erythema.   Eyes:      General: No scleral icterus.     Extraocular Movements: Extraocular movements intact.      Pupils: Pupils are equal, round, and reactive to light.   Neck:      Vascular: No carotid bruit.   Cardiovascular:      Rate and Rhythm: Regular rhythm. Tachycardia present.   Pulmonary:      Effort: No respiratory distress.      Breath sounds: No stridor. Rhonchi and rales present. No wheezing.      Comments: Intubated   Chest:      Comments: Right chest wall vas cath in place  Abdominal:      General: There is no distension.      Palpations: Abdomen is soft.      Tenderness: There is no abdominal  tenderness. There is no guarding.   Genitourinary:     Comments: Bates in place  Musculoskeletal:         General: No swelling or deformity. Normal range of motion.      Cervical back: Normal range of motion and neck supple. No rigidity.   Skin:     General: Skin is warm and dry.      Capillary Refill: Capillary refill takes less than 2 seconds.      Coloration: Skin is not jaundiced.      Findings: No erythema or rash.   Neurological:      Motor: No weakness.      Comments: When sedation paused able to follow commands and nod appropriately        Vents:  Vent Mode: A/C (05/14/23 1030)  Set Rate: 24 BPM (05/14/23 1030)  Vt Set: 450 mL (05/14/23 1030)  Pressure Support: 0 cmH20 (05/14/23 1030)  PEEP/CPAP: 8 cmH20 (05/14/23 1030)  Oxygen Concentration (%): 100 (05/14/23 1145)  Peak Airway Pressure: 30 cmH20 (05/14/23 1030)  Plateau Pressure: 0 cmH20 (05/14/23 1030)  Total Ve: 12.5 L/m (05/14/23 1030)  F/VT Ratio<105 (RSBI): (!) 52.4 (05/14/23 0758)    Lines/Drains/Airways       Central Venous Catheter Line  Duration                  Hemodialysis Catheter 03/15/22 1238 right internal jugular 424 days              Drain  Duration                  NG/OG Tube 05/14/23 Center mouth <1 day         Urethral Catheter 05/14/23 0824 Straight-tip;Silicone 16 Fr. <1 day              Airway  Duration                  Airway - Non-Surgical -- days              Peripheral Intravenous Line  Duration                  Peripheral IV - Single Lumen 05/14/23 0816 20 G Anterior;Right Forearm <1 day         Peripheral IV - Single Lumen 05/14/23 0940 18 G Right Antecubital <1 day         Peripheral IV - Single Lumen 05/14/23 20 G Distal;Left;Posterior Forearm <1 day                    Significant Labs:    CBC/Anemia Profile:  Recent Labs   Lab 05/14/23  0927 05/14/23  1053   WBC 17.31* 16.66*   HGB 11.0* 10.5*   HCT 36.7* 34.9*    293   MCV 89 88   RDW 16.3* 16.1*        Chemistries:  Recent Labs   Lab 05/14/23  0935      K 5.1       CO2 18*   BUN 56*   CREATININE 8.9*   CALCIUM 8.9   ALBUMIN 3.3*   PROT 6.6   BILITOT 0.7   ALKPHOS 70   ALT 35   AST 53*       All pertinent labs within the past 24 hours have been reviewed.    Significant Imaging:   I have reviewed all pertinent imaging results/findings within the past 24 hours.

## 2023-05-14 NOTE — HPI
Mr. Alves is a 67 year old male patient whose current medical conditions include ERSD on HD, CAD (non-obstructive by cath 8/22), DCM, IDFDM, HTN, and hyperlipidemia was transferred from [facility] ED 5/14/2023 for flash pulm edema, respiratory failure, witnessed cardiac arrest while being intubated there. He received a round of CPR and ROSC was achieved. He was transferred to [facility] for higher level of care.   Case discussed with Renal by ED and plan to dialyze urgently. ED workup significant for CXR which shows florid pulmonary edema with bilateral pleural effusions, lactate 3.2, ABG results noted. Of note, he is followed by the transplant team for a renal transplant and has been on dialysis for the past 1 year. Critical care consulted for ICU admission.

## 2023-05-14 NOTE — PHARMACY MED REC
"Admission Medication History     The home medication history was taken by Tomas Andrade.    You may go to "Admission" then "Reconcile Home Medications" tabs to review and/or act upon these items.     The home medication list has been updated by the Pharmacy department.   Please read ALL comments highlighted in yellow.   Please address this information as you see fit.    Feel free to contact us if you have any questions or require assistance.      Medications listed below were obtained from: Patient/family and Analytic software- [employer]: SPOUSE  (Not in a hospital admission)        Tomas Andrade  [id number]    Current Outpatient Medications on File Prior to Encounter   Medication Sig Dispense Refill Last Dose    acyclovir (ZOVIRAX) 400 MG tablet TAKE 1 TABLET (400 MG TOTAL) BY MOUTH 2 (TWO) TIMES DAILY. ONE TABLET 180 tablet 1 5/13/2023    albuterol (PROVENTIL) 2.5 mg /3 mL (0.083 %) nebulizer solution Take 3 mLs (2.5 mg total) by nebulization every 4 to 6 hours as needed for Wheezing or Shortness of Breath. 360 mL 11 5/13/2023    albuterol (PROVENTIL/VENTOLIN HFA) 90 mcg/actuation inhaler Inhale 2 puffs into the lungs every 4 (four) hours as needed for Wheezing or Shortness of Breath. 18 g 11 5/13/2023    aspirin (ECOTRIN) 81 MG EC tablet Take 1 tablet (81 mg total) by mouth once daily.   5/13/2023    atorvastatin (LIPITOR) 40 MG tablet TAKE 1 TABLET BY MOUTH EVERY DAY 90 tablet 3 5/13/2023    calcitRIOL (ROCALTROL) 0.25 MCG Cap TAKE 1 CAPSULE (0.25 MCG TOTAL) BY MOUTH EVERY MON, WED, FRI. 15 capsule 11 5/13/2023    carvediloL (COREG) 12.5 MG tablet Take 1 tablet (12.5 mg total) by mouth 2 (two) times daily with meals. 60 tablet 11 5/13/2023    cetirizine (ZYRTEC) 10 MG tablet TAKE 1 TABLET BY MOUTH EVERY DAY 90 tablet 1 5/13/2023    esomeprazole (NEXIUM) 40 MG capsule Take 40 mg by mouth once daily.   Past Week    finasteride (PROSCAR) 5 mg tablet Take 1 tablet (5 mg total) by mouth once daily. 90 tablet 3 " 5/13/2023    fluticasone propionate (FLONASE) 50 mcg/actuation nasal spray 2 sprays (100 mcg total) by Each Nostril route every evening. For nasal congestion or runny nose 48 mL 1 5/13/2023    fluticasone-salmeterol diskus inhaler 500-50 mcg Inhale 1 puff into the lungs 2 (two) times daily. Controller.Wash out mouth after use 60 each 11 5/13/2023    folic acid (FOLVITE) 1 MG tablet TAKE 1 TABLET BY MOUTH EVERY DAY 30 tablet 9 5/13/2023    furosemide (LASIX) 80 MG tablet Take 1 tablet (80 mg total) by mouth once daily. 30 tablet 6 5/13/2023    hydrALAZINE (APRESOLINE) 100 MG tablet Take 1 tablet (100 mg total) by mouth every 8 (eight) hours. 90 tablet 11 5/13/2023    HYDROcodone-acetaminophen (NORCO) 5-325 mg per tablet Take 1 tablet by mouth every 6 (six) hours as needed for Pain. 15 tablet 0 Past Week    insulin degludec (TRESIBA FLEXTOUCH U-200) 200 unit/mL (3 mL) insulin pen Inject 30 Units into the skin once daily. 2 pen 8 5/13/2023    isosorbide dinitrate (ISORDIL) 20 MG tablet Take 1 tablet (20 mg total) by mouth 3 (three) times daily. 90 tablet 11 5/13/2023    multivitamin (THERAGRAN) tablet Take 1 tablet by mouth once daily.   5/13/2023    NIFEdipine (PROCARDIA-XL) 90 MG (OSM) 24 hr tablet Take 1 tablet (90 mg total) by mouth once daily. 30 tablet 5 5/13/2023    sacubitriL-valsartan (ENTRESTO)  mg per tablet Take 1 tablet by mouth 2 (two) times daily. 60 tablet 11 5/13/2023    sevelamer carbonate (RENVELA) 800 mg Tab Take 3 tablets (2,400 mg total) by mouth 3 (three) times daily with meals. 300 tablet 11 5/13/2023    tamsulosin (FLOMAX) 0.4 mg Cap TAKE 1 CAPSULE BY MOUTH EVERY DAY 90 capsule 3 5/13/2023    blood sugar diagnostic Strp 1 each by Misc.(Non-Drug; Combo Route) route 4 (four) times daily. 100 each 11     blood-glucose meter kit Test finger stick blood sugar once daily. 1 each 0     lancets Misc 1 each by Misc.(Non-Drug; Combo Route) route 4 (four) times daily. 100 each 11     pen needle,  "diabetic (BD ULTRA-FINE SHIRA PEN NEEDLE) 32 gauge x 5/32" Ndle 1 each by Misc.(Non-Drug; Combo Route) route 4 (four) times daily with meals and nightly. 100 each 11                            .        "

## 2023-05-14 NOTE — RESPIRATORY THERAPY
Pt arrived to ICU from ED intubated. ETT secure and placed on ventilator on documented settings. Will continue to monitor.

## 2023-05-14 NOTE — ASSESSMENT & PLAN NOTE
Patient is identified as having Combined Systolic and Diastolic heart failure that is Chronic.. Latest ECHO performed and demonstrates- Results for orders placed during the hospital encounter of 05/07/23    Echo    Interpretation Summary  · Concentric hypertrophy and mildly decreased systolic function.  · The estimated ejection fraction is 40%.  · Left ventricular diastolic dysfunction.  · Normal right ventricular size with normal right ventricular systolic function.  · Mild left atrial enlargement.  · Mild tricuspid regurgitation.  . Continue Furosemide and ARNI and monitor clinical status closely. Monitor on telemetry. Patient is off CHF pathway.  Monitor strict Is&Os and daily weights.       Check BNP  Lasix IV  Strict I&Os  Daily weights  HD

## 2023-05-14 NOTE — HPI
Thank you for referring the pt to us. H/o and chart were reviewed. Pt was seen and examined. Pt is a 66 y/o male with ESRD on chronic HD q MWF at [facility], with h/o of DM, HTN, CAD, combined diastolic and systolic CHF (EF 40%), metabolic syndrome, and obesity who presented with SOB. Last HD was per schedule 2 days ago. Per wife, pt woke up and was feeling SOB. Wife called 911, and pt was intubated in the field before arriving in hospital. Pt remains intubate. No CP, no fver. Also had a cough. Pt is not missing HD treatment. Noted pt had been admitted for volume overload causing SOB here at [facility] 1 week ago. Per family pt drinks a lot of fluids.

## 2023-05-14 NOTE — H&P
[facility] / [facility] - Intensive Care ([facility])  Critical Care Medicine  History & Physical    Patient Name: Isidro Alves  MRN: [id number]  Admission Date: 5/14/2023  Hospital Length of Stay: 0 days  Code Status: Full Code  Attending Physician: Ta Bethea MD   Primary Care Provider: Steven Morgan MD   Principal Problem: Acute on chronic combined systolic and diastolic congestive heart failure    Subjective:     HPI:  Mr. lAves is a 67 year old male patient whose current medical conditions include ERSD on HD, CAD (non-obstructive by cath 8/22), DCM, IDFDM, HTN, and hyperlipidemia was transferred from [facility] ED for flash pulm edema, respiratory failure, witnessed cardiac arrest while being intubated there. He received a round of CPR and ROSC was achieved. He was transferred to [facility] for higher level of care.   Case discussed with Renal by ED and plan to dialyze today. ED workup includes: CXR which shows [place] pulmonary edema with bilateral pleural effusions--for which Lasix IV was given. Lactate 3.2, ABG results noted.  Plan to continue mechanical ventilation today with sedation. Will obtain UA to complete infectious workup. Family included and explained POC thoroughly.   Of note, he is followed by the transplant team for a renal transplant and has been on dialysis for the past 1 year.   Full Code.       Hospital/ICU Course:  No notes on file     Past Medical History:   Diagnosis Date    Abnormal nuclear stress test 8/30/2022    Acute hypoxemic respiratory failure due to COVID-19 1/21/2021    Anal itching 10/10/2022    Anemia 4/16/2014    BPH (benign prostatic hyperplasia)     CKD (chronic kidney disease) stage 4, GFR 15-29 ml/min     Coronary artery disease of native artery of native heart with stable angina pectoris 4/29/2019    Diabetes mellitus     Diabetes mellitus, type 2     Dilated cardiomyopathy 8/30/2022    Elevated PSA     Encounter for blood transfusion     Herpes labialis      Hyperlipidemia     Hypertension     Obesity     Pneumonia due to COVID-19 virus     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    Preop cardiovascular exam 9/16/2022    Proteinuria     Secondary hyperparathyroidism (of renal origin)     UTI (lower urinary tract infection) 5/1/2015       Past Surgical History:   Procedure Laterality Date    CATHETERIZATION OF BOTH LEFT AND RIGHT HEART N/A 08/30/2022    Procedure: CATHETERIZATION, HEART, BOTH LEFT AND RIGHT;  Surgeon: Kassandra Deras MD;  Location: [facility] CATH LAB;  Service: Cardiology;  Laterality: N/A;    COLONOSCOPY N/A 12/21/2017    Procedure: COLONOSCOPY;  Surgeon: Fran Escalera MD;  Location: [facility] ENDO;  Service: Endoscopy;  Laterality: N/A;    COLONOSCOPY N/A 02/02/2023    Procedure: COLONOSCOPY;  Surgeon: Anthony Meng MD;  Location: [facility] ENDO;  Service: Endoscopy;  Laterality: N/A;    FLUOROSCOPY N/A 01/21/2021    Procedure: Vascath insertion;  Surgeon: Adrian Pruitt Jr., MD;  Location: [facility] CATH LAB;  Service: General;  Laterality: N/A;    FLUOROSCOPY N/A 04/06/2021    Procedure: Over the wire Vas Cath exchange;  Surgeon: Ho Pressley MD;  Location: [facility] CATH LAB;  Service: General;  Laterality: N/A;    PROSTATE BIOPSY      thorocotomy  01/01/2010       Review of patient's allergies indicates:   Allergen Reactions    Bactrim [sulfamethoxazole-trimethoprim] Swelling    Nsaids (non-steroidal anti-inflammatory drug)      CKD    Sulfamethoxazole     Trimethoprim        Family History       Problem Relation (Age of Onset)    Diabetes Father, Other    Hypertension Father, Other    No Known Problems Mother          Tobacco Use    Smoking status: Never    Smokeless tobacco: Never   Substance and Sexual Activity    Alcohol use: Not Currently     Comment: seldom     Drug use: No    Sexual activity: Not Currently     Partners: Female         Review of Systems   Unable to perform ROS: Intubated   Objective:     Vital Signs (Most Recent):  Temp:  97.7 °F (36.5 °C) (05/14/23 1100)  Pulse: 94 (05/14/23 1145)  Resp: (!) 24 (05/14/23 1145)  BP: 93/61 (05/14/23 1145)  SpO2: 100 % (05/14/23 1145) Vital Signs (24h Range):  Temp:  [97.7 °F (36.5 °C)-98.8 °F (37.1 °C)] 97.7 °F (36.5 °C)  Pulse:  [] 94  Resp:  [18-27] 24  SpO2:  [95 %-100 %] 100 %  BP: ()/() 93/61     Weight: 118.4 kg (261 lb)  Body mass index is 33.51 kg/m².      Intake/Output Summary (Last 24 hours) at 5/14/2023 1216  Last data filed at 5/14/2023 1100  Gross per 24 hour   Intake 86.47 ml   Output --   Net 86.47 ml        Physical Exam  Constitutional:       Appearance: He is ill-appearing and toxic-appearing.   HENT:      Head: Normocephalic and atraumatic.      Nose: No congestion or rhinorrhea.      Mouth/Throat:      Pharynx: No posterior oropharyngeal erythema.   Eyes:      General: No scleral icterus.     Extraocular Movements: Extraocular movements intact.      Pupils: Pupils are equal, round, and reactive to light.   Neck:      Vascular: No carotid bruit.   Cardiovascular:      Rate and Rhythm: Regular rhythm. Tachycardia present.   Pulmonary:      Effort: No respiratory distress.      Breath sounds: No stridor. Rhonchi and rales present. No wheezing.      Comments: Intubated   Chest:      Comments: Right chest wall vas cath in place  Abdominal:      General: There is no distension.      Palpations: Abdomen is soft.      Tenderness: There is no abdominal tenderness. There is no guarding.   Genitourinary:     Comments: Bates in place  Musculoskeletal:         General: No swelling or deformity. Normal range of motion.      Cervical back: Normal range of motion and neck supple. No rigidity.   Skin:     General: Skin is warm and dry.      Capillary Refill: Capillary refill takes less than 2 seconds.      Coloration: Skin is not jaundiced.      Findings: No erythema or rash.   Neurological:      Motor: No weakness.      Comments: When sedation paused able to follow commands and  nod appropriately        Vents:  Vent Mode: A/C (05/14/23 1030)  Set Rate: 24 BPM (05/14/23 1030)  Vt Set: 450 mL (05/14/23 1030)  Pressure Support: 0 cmH20 (05/14/23 1030)  PEEP/CPAP: 8 cmH20 (05/14/23 1030)  Oxygen Concentration (%): 100 (05/14/23 1145)  Peak Airway Pressure: 30 cmH20 (05/14/23 1030)  Plateau Pressure: 0 cmH20 (05/14/23 1030)  Total Ve: 12.5 L/m (05/14/23 1030)  F/VT Ratio<105 (RSBI): (!) 52.4 (05/14/23 0758)    Lines/Drains/Airways       Central Venous Catheter Line  Duration                  Hemodialysis Catheter 03/15/22 1238 right internal jugular 424 days              Drain  Duration                  NG/OG Tube 05/14/23 Center mouth <1 day         Urethral Catheter 05/14/23 0824 Straight-tip;Silicone 16 Fr. <1 day              Airway  Duration                  Airway - Non-Surgical -- days              Peripheral Intravenous Line  Duration                  Peripheral IV - Single Lumen 05/14/23 0816 20 G Anterior;Right Forearm <1 day         Peripheral IV - Single Lumen 05/14/23 0940 18 G Right Antecubital <1 day         Peripheral IV - Single Lumen 05/14/23 20 G Distal;Left;Posterior Forearm <1 day                    Significant Labs:    CBC/Anemia Profile:  Recent Labs   Lab 05/14/23  0927 05/14/23  1053   WBC 17.31* 16.66*   HGB 11.0* 10.5*   HCT 36.7* 34.9*    293   MCV 89 88   RDW 16.3* 16.1*        Chemistries:  Recent Labs   Lab 05/14/23  0935      K 5.1      CO2 18*   BUN 56*   CREATININE 8.9*   CALCIUM 8.9   ALBUMIN 3.3*   PROT 6.6   BILITOT 0.7   ALKPHOS 70   ALT 35   AST 53*       All pertinent labs within the past 24 hours have been reviewed.    Significant Imaging:   I have reviewed all pertinent imaging results/findings within the past 24 hours.    Assessment/Plan:     Pulmonary  Acute on chronic respiratory failure  Patient with Hypoxic Respiratory failure which is Acute on chronic.  he is on home oxygen at 2L NC QHS LPM. Supplemental oxygen was provided and  noted- Vent Mode: A/C  Oxygen Concentration (%):  [100] 100  Resp Rate Total:  [24 br/min-28 br/min] 24 br/min  Vt Set:  [450 mL] 450 mL  PEEP/CPAP:  [5 cmH20-8 cmH20] 8 cmH20  Pressure Support:  [0 cmH20] 0 cmH20  Mean Airway Pressure:  [13 bxT48-24 cmH20] 19 cmH20    .   Signs/symptoms of respiratory failure include- tachypnea, increased work of breathing, respiratory distress and use of accessory muscles. Contributing diagnoses includes - CHF Labs and images were reviewed. Patient Has recent ABG, which has been reviewed. Will treat underlying causes and adjust management of respiratory failure as follows-   Recent Labs     05/14/23  0749   PH 7.174*   PCO2 72.3*   PO2 98   HCO3 26.6   POCSATURATED 95   BE -2     Repeat ABG in the AM  Sedation for RASS -2  Repeat CXR in the AM  Lasix IV      Cardiac/Vascular  * Acute on chronic combined systolic and diastolic congestive heart failure  Patient is identified as having Combined Systolic and Diastolic heart failure that is Chronic.. Latest ECHO performed and demonstrates- Results for orders placed during the hospital encounter of 05/07/23    Echo    Interpretation Summary  · Concentric hypertrophy and mildly decreased systolic function.  · The estimated ejection fraction is 40%.  · Left ventricular diastolic dysfunction.  · Normal right ventricular size with normal right ventricular systolic function.  · Mild left atrial enlargement.  · Mild tricuspid regurgitation.  . Continue Furosemide and ARNI and monitor clinical status closely. Monitor on telemetry. Patient is off CHF pathway.  Monitor strict Is&Os and daily weights.       Check BNP  Lasix IV  Strict I&Os  Daily weights  HD        Hypotension  Initially required a Nitro gtt but since starting dialysis is requiring albumin  Hold all antihypertensives for now    Hyperlipidemia associated with type 2 diabetes mellitus  Statin     Renal/  ESRD on dialysis  Cr 8.9  Plan for HD today  Usual dialysis schedule  MWF    Oncology  Leukocytosis  WBC 16.66  UA pending  CXR unrevealing  Lactic elevated 3.2 but could be reactive   Check repeat lactic    Anemia  S/t chronic disease  No overt signs of bleeding  Transfuse PRN    Endocrine  Type 2 diabetes mellitus with chronic kidney disease, with long-term current use of insulin  Patient's FSGs are controlled on current medication regimen.  Last A1c reviewed-   Lab Results   Component Value Date    HGBA1C 6.4 (H) 05/07/2023     Most recent fingerstick glucose reviewed-   Recent Labs   Lab 05/14/23  0733   POCTGLUCOSE 255*     Current correctional scale  Medium  Maintain anti-hyperglycemic dose as follows-   Antihyperglycemics (From admission, onward)    Start     Stop Route Frequency Ordered    05/14/23 1335  insulin aspart U-100 pen 1-10 Units         -- SubQ Every 6 hours PRN 05/14/23 1235        Hold Oral hypoglycemics while patient is in the hospital.           DVT ppx: heparin SQ  GI ppx: Pepcid  Disp/plan for the day: Cont mechanical vent, wean as able, HD      Critical Care Time: 60 minutes  Critical secondary to Patient has a condition that poses threat to life and bodily function: Severe Respiratory Distress     Critical care was time spent personally by me on the following activities: development of treatment plan with patient or surrogate and bedside caregivers, discussions with consultants, evaluation of patient's response to treatment, examination of patient, ordering and performing treatments and interventions, ordering and review of laboratory studies, ordering and review of radiographic studies, pulse oximetry, re-evaluation of patient's condition. This critical care time did not overlap with that of any other provider or involve time for any procedures.     Thelma Anders NP  Critical Care Medicine  [place] - Intensive Care ([facility])

## 2023-05-14 NOTE — ASSESSMENT & PLAN NOTE
Initially required a Nitro gtt but since starting dialysis is requiring albumin  Hold all antihypertensives for now

## 2023-05-15 ENCOUNTER — TELEPHONE (OUTPATIENT)
Dept: CARDIOLOGY | Facility: CLINIC | Age: 68
End: 2023-05-15
Payer: COMMERCIAL

## 2023-05-15 PROBLEM — D72.829 LEUKOCYTOSIS: Status: RESOLVED | Noted: 2023-05-14 | Resolved: 2023-05-15

## 2023-05-15 LAB
ALLENS TEST: ABNORMAL
BASOPHILS # BLD AUTO: 0.05 K/UL (ref 0–0.2)
BASOPHILS NFR BLD: 0.5 % (ref 0–1.9)
DELSYS: ABNORMAL
DIFFERENTIAL METHOD: ABNORMAL
EOSINOPHIL # BLD AUTO: 0 K/UL (ref 0–0.5)
EOSINOPHIL NFR BLD: 0.3 % (ref 0–8)
ERYTHROCYTE [DISTWIDTH] IN BLOOD BY AUTOMATED COUNT: 15.9 % (ref 11.5–14.5)
ERYTHROCYTE [SEDIMENTATION RATE] IN BLOOD BY WESTERGREN METHOD: 24 MM/H
FIO2: 40
HCO3 UR-SCNC: 26.9 MMOL/L (ref 24–28)
HCT VFR BLD AUTO: 29.6 % (ref 40–54)
HGB BLD-MCNC: 9.4 G/DL (ref 14–18)
IMM GRANULOCYTES # BLD AUTO: 0.04 K/UL (ref 0–0.04)
IMM GRANULOCYTES NFR BLD AUTO: 0.4 % (ref 0–0.5)
LACTATE SERPL-SCNC: 0.9 MMOL/L (ref 0.5–2.2)
LYMPHOCYTES # BLD AUTO: 1 K/UL (ref 1–4.8)
LYMPHOCYTES NFR BLD: 9.4 % (ref 18–48)
MCH RBC QN AUTO: 27.1 PG (ref 27–31)
MCHC RBC AUTO-ENTMCNC: 31.8 G/DL (ref 32–36)
MCV RBC AUTO: 85 FL (ref 82–98)
MODE: ABNORMAL
MONOCYTES # BLD AUTO: 1.1 K/UL (ref 0.3–1)
MONOCYTES NFR BLD: 10.3 % (ref 4–15)
NEUTROPHILS # BLD AUTO: 8.1 K/UL (ref 1.8–7.7)
NEUTROPHILS NFR BLD: 79.1 % (ref 38–73)
NRBC BLD-RTO: 0 /100 WBC
PCO2 BLDA: 45.2 MMHG (ref 35–45)
PEEP: 6
PH SMN: 7.38 [PH] (ref 7.35–7.45)
PLATELET # BLD AUTO: 186 K/UL (ref 150–450)
PLATELET BLD QL SMEAR: ABNORMAL
PMV BLD AUTO: 10.3 FL (ref 9.2–12.9)
PO2 BLDA: 106 MMHG (ref 80–100)
POC BE: 2 MMOL/L
POC SATURATED O2: 98 % (ref 95–100)
POCT GLUCOSE: 106 MG/DL (ref 70–110)
POCT GLUCOSE: 118 MG/DL (ref 70–110)
POCT GLUCOSE: 126 MG/DL (ref 70–110)
POCT GLUCOSE: 132 MG/DL (ref 70–110)
RBC # BLD AUTO: 3.47 M/UL (ref 4.6–6.2)
SAMPLE: ABNORMAL
SITE: ABNORMAL
TROPONIN I SERPL DL<=0.01 NG/ML-MCNC: 1.43 NG/ML (ref 0–0.03)
TROPONIN I SERPL DL<=0.01 NG/ML-MCNC: 1.57 NG/ML (ref 0–0.03)
VT: 450
WBC # BLD AUTO: 10.29 K/UL (ref 3.9–12.7)

## 2023-05-15 PROCEDURE — 83605 ASSAY OF LACTIC ACID: CPT | Mod: NTX | Performed by: NURSE PRACTITIONER

## 2023-05-15 PROCEDURE — 25000003 PHARM REV CODE 250: Mod: NTX | Performed by: NURSE PRACTITIONER

## 2023-05-15 PROCEDURE — 25000003 PHARM REV CODE 250: Mod: NTX | Performed by: INTERNAL MEDICINE

## 2023-05-15 PROCEDURE — 27000221 HC OXYGEN, UP TO 24 HOURS: Mod: NTX

## 2023-05-15 PROCEDURE — 90937 HEMODIALYSIS REPEATED EVAL: CPT | Mod: NTX,,, | Performed by: INTERNAL MEDICINE

## 2023-05-15 PROCEDURE — 99233 PR SUBSEQUENT HOSPITAL CARE,LEVL III: ICD-10-PCS | Mod: 25,NTX,, | Performed by: INTERNAL MEDICINE

## 2023-05-15 PROCEDURE — 36600 WITHDRAWAL OF ARTERIAL BLOOD: CPT | Mod: NTX

## 2023-05-15 PROCEDURE — 21400001 HC TELEMETRY ROOM: Mod: NTX

## 2023-05-15 PROCEDURE — 84484 ASSAY OF TROPONIN QUANT: CPT | Mod: NTX | Performed by: NURSE PRACTITIONER

## 2023-05-15 PROCEDURE — 82803 BLOOD GASES ANY COMBINATION: CPT | Mod: NTX

## 2023-05-15 PROCEDURE — 99233 SBSQ HOSP IP/OBS HIGH 50: CPT | Mod: 25,NTX,, | Performed by: INTERNAL MEDICINE

## 2023-05-15 PROCEDURE — 25000242 PHARM REV CODE 250 ALT 637 W/ HCPCS: Mod: NTX | Performed by: NURSE PRACTITIONER

## 2023-05-15 PROCEDURE — 63600175 PHARM REV CODE 636 W HCPCS: Mod: NTX | Performed by: NURSE PRACTITIONER

## 2023-05-15 PROCEDURE — 80100014 HC HEMODIALYSIS 1:1: Mod: NTX

## 2023-05-15 PROCEDURE — 85025 COMPLETE CBC W/AUTO DIFF WBC: CPT | Mod: NTX | Performed by: NURSE PRACTITIONER

## 2023-05-15 PROCEDURE — 90937 PR HEMODIALYSIS, REPEATED EVAL.: ICD-10-PCS | Mod: NTX,,, | Performed by: INTERNAL MEDICINE

## 2023-05-15 PROCEDURE — 84484 ASSAY OF TROPONIN QUANT: CPT | Mod: 91,NTX | Performed by: NURSE PRACTITIONER

## 2023-05-15 PROCEDURE — 63600175 PHARM REV CODE 636 W HCPCS: Mod: NTX | Performed by: INTERNAL MEDICINE

## 2023-05-15 PROCEDURE — 99900035 HC TECH TIME PER 15 MIN (STAT): Mod: NTX

## 2023-05-15 PROCEDURE — 94761 N-INVAS EAR/PLS OXIMETRY MLT: CPT | Mod: NTX

## 2023-05-15 RX ORDER — NAPROXEN SODIUM 220 MG/1
81 TABLET, FILM COATED ORAL DAILY
Status: DISCONTINUED | OUTPATIENT
Start: 2023-05-16 | End: 2023-05-18 | Stop reason: HOSPADM

## 2023-05-15 RX ORDER — CARVEDILOL 12.5 MG/1
12.5 TABLET ORAL 2 TIMES DAILY WITH MEALS
Status: DISCONTINUED | OUTPATIENT
Start: 2023-05-16 | End: 2023-05-18 | Stop reason: HOSPADM

## 2023-05-15 RX ORDER — ISOSORBIDE DINITRATE 20 MG/1
20 TABLET ORAL 3 TIMES DAILY
Status: DISCONTINUED | OUTPATIENT
Start: 2023-05-15 | End: 2023-05-18 | Stop reason: HOSPADM

## 2023-05-15 RX ORDER — CETIRIZINE HYDROCHLORIDE 10 MG/1
10 TABLET ORAL DAILY
Status: DISCONTINUED | OUTPATIENT
Start: 2023-05-16 | End: 2023-05-18 | Stop reason: HOSPADM

## 2023-05-15 RX ORDER — FINASTERIDE 5 MG/1
5 TABLET, FILM COATED ORAL DAILY
Status: DISCONTINUED | OUTPATIENT
Start: 2023-05-16 | End: 2023-05-18 | Stop reason: HOSPADM

## 2023-05-15 RX ORDER — FUROSEMIDE 80 MG/1
80 TABLET ORAL DAILY
Status: DISCONTINUED | OUTPATIENT
Start: 2023-05-16 | End: 2023-05-16

## 2023-05-15 RX ORDER — ATORVASTATIN CALCIUM 40 MG/1
40 TABLET, FILM COATED ORAL DAILY
Status: DISCONTINUED | OUTPATIENT
Start: 2023-05-16 | End: 2023-05-18 | Stop reason: HOSPADM

## 2023-05-15 RX ORDER — SEVELAMER CARBONATE 800 MG/1
2400 TABLET, FILM COATED ORAL
Status: DISCONTINUED | OUTPATIENT
Start: 2023-05-15 | End: 2023-05-18 | Stop reason: HOSPADM

## 2023-05-15 RX ORDER — FOLIC ACID 1 MG/1
1 TABLET ORAL DAILY
Status: DISCONTINUED | OUTPATIENT
Start: 2023-05-16 | End: 2023-05-18 | Stop reason: HOSPADM

## 2023-05-15 RX ORDER — FLUTICASONE PROPIONATE 50 MCG
2 SPRAY, SUSPENSION (ML) NASAL NIGHTLY
Status: DISCONTINUED | OUTPATIENT
Start: 2023-05-15 | End: 2023-05-18 | Stop reason: HOSPADM

## 2023-05-15 RX ORDER — CALCITRIOL 0.25 UG/1
0.25 CAPSULE ORAL
Status: DISCONTINUED | OUTPATIENT
Start: 2023-05-15 | End: 2023-05-18 | Stop reason: HOSPADM

## 2023-05-15 RX ORDER — ACETAMINOPHEN 325 MG/1
650 TABLET ORAL EVERY 6 HOURS PRN
Status: DISCONTINUED | OUTPATIENT
Start: 2023-05-15 | End: 2023-05-18 | Stop reason: HOSPADM

## 2023-05-15 RX ADMIN — HEPARIN SODIUM 2000 UNITS: 1000 INJECTION, SOLUTION INTRAVENOUS; SUBCUTANEOUS at 01:05

## 2023-05-15 RX ADMIN — HEPARIN SODIUM 5000 UNITS: 5000 INJECTION INTRAVENOUS; SUBCUTANEOUS at 03:05

## 2023-05-15 RX ADMIN — HEPARIN SODIUM 5000 UNITS: 5000 INJECTION INTRAVENOUS; SUBCUTANEOUS at 10:05

## 2023-05-15 RX ADMIN — ISOSORBIDE DINITRATE 20 MG: 20 TABLET ORAL at 10:05

## 2023-05-15 RX ADMIN — FINASTERIDE 5 MG: 5 TABLET, FILM COATED ORAL at 08:05

## 2023-05-15 RX ADMIN — MUPIROCIN: 20 OINTMENT TOPICAL at 10:05

## 2023-05-15 RX ADMIN — FLUTICASONE PROPIONATE 100 MCG: 50 SPRAY, METERED NASAL at 10:05

## 2023-05-15 RX ADMIN — ONDANSETRON 4 MG: 2 INJECTION INTRAMUSCULAR; INTRAVENOUS at 09:05

## 2023-05-15 RX ADMIN — HEPARIN SODIUM 5000 UNITS: 5000 INJECTION INTRAVENOUS; SUBCUTANEOUS at 05:05

## 2023-05-15 RX ADMIN — ATORVASTATIN CALCIUM 40 MG: 40 TABLET, FILM COATED ORAL at 08:05

## 2023-05-15 RX ADMIN — Medication 250 MCG/HR: at 05:05

## 2023-05-15 RX ADMIN — ASPIRIN 81 MG CHEWABLE TABLET 81 MG: 81 TABLET CHEWABLE at 08:05

## 2023-05-15 RX ADMIN — CALCITRIOL CAPSULES 0.25 MCG 0.25 MCG: 0.25 CAPSULE ORAL at 06:05

## 2023-05-15 RX ADMIN — MUPIROCIN: 20 OINTMENT TOPICAL at 08:05

## 2023-05-15 RX ADMIN — FAMOTIDINE 20 MG: 10 INJECTION, SOLUTION INTRAVENOUS at 08:05

## 2023-05-15 RX ADMIN — ACETAMINOPHEN 650 MG: 325 TABLET ORAL at 10:05

## 2023-05-15 RX ADMIN — SEVELAMER CARBONATE 2400 MG: 800 TABLET, FILM COATED ORAL at 06:05

## 2023-05-15 NOTE — ASSESSMENT & PLAN NOTE
Patient with Hypoxic Respiratory failure which is Acute on chronic.  he is on home oxygen at 2 LPM. Supplemental oxygen was provided and noted- Vent Mode: Spont  Oxygen Concentration (%):  [30-60] 30  Resp Rate Total:  [8.6 br/min-28 br/min] 18 br/min  Vt Set:  [450 mL] 450 mL  PEEP/CPAP:  [6 cmH20] 6 cmH20  Pressure Support:  [0 cmH20-5 cmH20] 5 cmH20  Mean Airway Pressure:  [8.5 fvV63-29 cmH20] 8.5 cmH20    .   Signs/symptoms of respiratory failure include- tachypnea, increased work of breathing, respiratory distress and use of accessory muscles. Contributing diagnoses includes - CHF Labs and images were reviewed. Patient Has recent ABG, which has been reviewed. Will treat underlying causes and adjust management of respiratory failure as follows-   Volume removal with HD  Successfully extubated on 05/15/2023  Resume diuretic

## 2023-05-15 NOTE — ASSESSMENT & PLAN NOTE
"89y/o male with ESRD on chronic HD presented with fluid overload and pulmonary edema:     ESRD  This is pt's second admission for SOB due to fluid overload in 1 week.  H/o of large fluid intake  Severe pulmonary edema necessitating intubation  Currently receiving HD and UF  Tolerating HD and fluid removal, except for hypotension during HD, will give albumin 25 g IV to assist with UF  Continue HD  Pt received multiple visista nd evaluations.     H/o of ESRD, on chronic HD since Jan 2021. Currently at [facility]  Last HD 2 days ago  ERD due to DM, HTN, and metabolic syndrome      Complications of ESRD  Mild hyperkalemia, will respond to HD  Metabolic acidosis. will address with HD  Anemia: will add epogen,  PO4 normal  Ca normal  iPTH controlled       HTN: BP not controlled on arrival  Hypertensive emergency  Due to fluid overload and vascular congestion  BP has improved after fluid removal  Meds reviewed     Acute hypoxemic respiratory failure   H/o of combined diastolic and systolic CHF (EF 40%)  Cardiorenal syndrome  Presented with acute respiratory acidosis  H/o of unrestricted salt and fluid intake in diet     The necessity for dietary restrictions of salt, sweets, and fluids discussed with family  Explained that prognosis for ESRD pts who drink a lot of water between HD treatments is poor.  This is because the rate of fluid removal during HD has a direct relation to the worsening of CHF.  This is because of a process called "cardiac stunning" which occurs when a lot of fluids have to be removed over a limited span of time.  Explained in layman's language.     Type 2 diabetes mellitus with chronic kidney disease, with long-term current use of insulin  Chart was reviewed  Comorbidities noted        Plans and recommendations:  As discussed above   Opportunity fort questions provided  Total time spent 60 minutes including time needed to review the records, the   patient evaluation, documentation, " face-to-face discussion with the patient,   more than 50% of the time was spent on coordination of care and counseling.    Level V visit.  Multiple medical issues were addressed, as documented. Medical care provided was in addition to providing dialysis. Pt received multiple visits and evaluations.

## 2023-05-15 NOTE — ASSESSMENT & PLAN NOTE
Required initiation of levophed ~1700 yesterday, off this morning at ~0730  Monitor hemodynamics closely    No

## 2023-05-15 NOTE — ASSESSMENT & PLAN NOTE
Did not miss HD treatments. Dietary and fluid restriction indiscretion  Continue Renvela   HD yesterday   Daily weights   Plan per Nephrology

## 2023-05-15 NOTE — TELEPHONE ENCOUNTER
[facility] ([facility]) Team notified of pt referral via Direct notification via epic in basket message, secure chat message or other .    Patient screened today by HF nurse for enrollment to program.      Pt was deemed not a candidate for enrollment at this time related to patient is currently on hemo-dialysis.    Pt will require additional follow up planning per primary team.     If pt status, diagnosis, or treatment plan changes , please place AMB referral to Heart Failure Clinic ([id number]).

## 2023-05-15 NOTE — ASSESSMENT & PLAN NOTE
Patient currently on transplant list, has been on HD for 1 year  Nephrology following for HD needs   Patient has apparently been compliant with HD although admits to dietary and fluid restriction indiscretion   Continue home Adrianne  Advised compliance with fluid restriction

## 2023-05-15 NOTE — ASSESSMENT & PLAN NOTE
Patient's FSGs are controlled on current medication regimen.  Last A1c reviewed-   Lab Results   Component Value Date    HGBA1C 6.4 (H) 05/07/2023     Most recent fingerstick glucose reviewed-   Recent Labs   Lab 05/15/23  0034 05/15/23  0536   POCTGLUCOSE 126* 132*     Current correctional scale  Medium  Maintain anti-hyperglycemic dose as follows-   Antihyperglycemics (From admission, onward)    Start     Stop Route Frequency Ordered    05/14/23 1335  insulin aspart U-100 pen 1-10 Units         -- SubQ Every 6 hours PRN 05/14/23 1235

## 2023-05-15 NOTE — SUBJECTIVE & OBJECTIVE
Interval History: Pt was seen and examined in ICU this am. Labs and meds reviewed. Discussed with other providers. Pt was extubated this am. No new other events, HD in progress at present, no c/o's with HD. Pt was revisited and re-evaluated during HD. No new c/o's, feels better, less SOB. No fever, no cough, no CP.      Review of patient's allergies indicates:   Allergen Reactions    Bactrim [sulfamethoxazole-trimethoprim] Swelling    Nsaids (non-steroidal anti-inflammatory drug)      CKD    Sulfamethoxazole     Trimethoprim      Current Facility-Administered Medications   Medication Frequency    albuterol-ipratropium 2.5 mg-0.5 mg/3 mL nebulizer solution 3 mL Q6H PRN    aspirin chewable tablet 81 mg Daily    atorvastatin tablet 40 mg Daily    dextrose 10% bolus 125 mL 125 mL PRN    famotidine (PF) injection 20 mg Daily    fentaNYL 2500 mcg in 0.9% sodium chloride 250 mL infusion premix (titrating) Continuous    finasteride tablet 5 mg Daily    glucagon (human recombinant) injection 1 mg PRN    heparin (porcine) injection 2,000 Units PRN    heparin (porcine) injection 5,000 Units Q8H    insulin aspart U-100 pen 1-10 Units Q6H PRN    mupirocin 2 % ointment BID    nitroGLYCERIN in 5 % dextrose 50 mg/250 mL (200 mcg/mL) infusion Continuous    NORepinephrine 4 mg in dextrose 5% 250 mL infusion (premix) (titrating) Continuous    ondansetron injection 4 mg Q8H PRN    sodium chloride 0.9% flush 10 mL PRN       Objective:     Vital Signs (Most Recent):  Temp: 98.2 °F (36.8 °C) (05/15/23 1101)  Pulse: 76 (05/15/23 1305)  Resp: 14 (05/15/23 1305)  BP: (!) 140/89 (05/15/23 1300)  SpO2: 97 % (05/15/23 1305) Vital Signs (24h Range):  Temp:  [98.2 °F (36.8 °C)-99 °F (37.2 °C)] 98.2 °F (36.8 °C)  Pulse:  [] 76  Resp:  [8-29] 14  SpO2:  [91 %-100 %] 97 %  BP: ()/(49-95) 140/89     Weight: 115.4 kg (254 lb 6.6 oz) (05/15/23 0531)  Body mass index is 32.66 kg/m².  Body surface area is 2.45 meters squared.    I/O last 3  completed shifts:  In: 947.6 [I.V.:907.6; NG/GT:40]  Out: 2050 [Urine:50; Other:2000]     Physical Exam  Vitals and nursing note reviewed.   Constitutional:       Appearance: Normal appearance.   Cardiovascular:      Rate and Rhythm: Normal rate and regular rhythm.      Pulses: Normal pulses.      Heart sounds: Normal heart sounds.   Pulmonary:      Effort: Pulmonary effort is normal.      Breath sounds: Rales present.   Abdominal:      Palpations: Abdomen is soft.      Tenderness: There is no abdominal tenderness.   Musculoskeletal:      Right lower leg: No edema.      Left lower leg: No edema.   Neurological:      Mental Status: He is alert and oriented to person, place, and time.   Psychiatric:         Behavior: Behavior normal.        Significant Labs: reviewed  BMP  Lab Results   Component Value Date     05/14/2023    K 4.2 05/14/2023     05/14/2023    CO2 24 05/14/2023    BUN 36 (H) 05/14/2023    CREATININE 7.1 (H) 05/14/2023    CALCIUM 8.8 05/14/2023    ANIONGAP 15 05/14/2023    EGFRNORACEVR 8 (A) 05/14/2023     Lab Results   Component Value Date    WBC 10.29 05/15/2023    HGB 9.4 (L) 05/15/2023    HCT 29.6 (L) 05/15/2023    MCV 85 05/15/2023     05/15/2023            Significant Imaging: reviewed CXR, improved pulm edema

## 2023-05-15 NOTE — SUBJECTIVE & OBJECTIVE
Objective:     Vital Signs (Most Recent):  Temp: 99 °F (37.2 °C) (05/15/23 0715)  Pulse: 72 (05/15/23 0721)  Resp: (!) 24 (05/15/23 0721)  BP: (!) 148/83 (05/15/23 0721)  SpO2: 98 % (05/15/23 0721) Vital Signs (24h Range):  Temp:  [97.7 °F (36.5 °C)-99 °F (37.2 °C)] 99 °F (37.2 °C)  Pulse:  [] 72  Resp:  [18-27] 24  SpO2:  [92 %-100 %] 98 %  BP: ()/() 148/83     Weight: 115.4 kg (254 lb 6.6 oz)  Body mass index is 32.66 kg/m².      Intake/Output Summary (Last 24 hours) at 5/15/2023 0725  Last data filed at 5/15/2023 0724  Gross per 24 hour   Intake 996.52 ml   Output 2065 ml   Net -1068.48 ml        Physical Exam  Vitals reviewed.   Constitutional:       Appearance: He is well-developed and overweight.      Interventions: He is sedated, intubated and restrained.   HENT:      Head: Normocephalic and atraumatic.      Mouth/Throat:      Mouth: Mucous membranes are moist.      Pharynx: Oropharynx is clear.   Eyes:      Extraocular Movements: Extraocular movements intact.      Conjunctiva/sclera: Conjunctivae normal.   Cardiovascular:      Rate and Rhythm: Normal rate and regular rhythm.      Pulses: Normal pulses.      Comments: Occasional PVCs  Pulmonary:      Effort: He is intubated.      Comments: Synchronous with vent. Diminished in bases.  Abdominal:      General: Bowel sounds are normal.      Palpations: Abdomen is soft.   Musculoskeletal:         General: No deformity.      Cervical back: Normal range of motion and neck supple.      Right lower leg: No edema.      Left lower leg: No edema.   Skin:     General: Skin is warm and dry.   Neurological:      Mental Status: He is alert.      Comments: Opens eyes to light voice. Following commands. Can move all extremities    Psychiatric:         Behavior: Behavior is cooperative.         Review of Systems   Unable to perform ROS: Intubated     Vents:  Vent Mode: (S) Spont (05/15/23 0721)  Set Rate: 0 BPM (05/15/23 0721)  Vt Set: 450 mL (05/15/23  0721)  Pressure Support: 5 cmH20 (05/15/23 0721)  PEEP/CPAP: 6 cmH20 (05/15/23 0721)  Oxygen Concentration (%): 30 (05/15/23 0721)  Peak Airway Pressure: 11 cmH20 (05/15/23 0721)  Plateau Pressure: 0 cmH20 (05/15/23 0721)  Total Ve: 4.72 L/m (05/15/23 0721)  F/VT Ratio<105 (RSBI): (!) 52.29 (05/15/23 0721)    Lines/Drains/Airways       Central Venous Catheter Line  Duration                  Hemodialysis Catheter 03/15/22 1238 right internal jugular 425 days    Percutaneous Central Line Insertion/Assessment - Double Lumen  05/14/23 1700 Internal Jugular Left <1 day              Drain  Duration                  NG/OG Tube 05/14/23 Center mouth 1 day         Urethral Catheter 05/14/23 0824 Straight-tip;Silicone 16 Fr. <1 day              Airway  Duration                  Airway - Non-Surgical -- days              Peripheral Intravenous Line  Duration                  Peripheral IV - Single Lumen 05/14/23 0816 20 G Anterior;Right Forearm <1 day         Peripheral IV - Single Lumen 05/14/23 0940 18 G Right Antecubital <1 day                    Significant Labs:    CBC/Anemia Profile:  Recent Labs   Lab 05/14/23  0927 05/14/23  1053 05/15/23  0536   WBC 17.31* 16.66* 10.29   HGB 11.0* 10.5* 9.4*   HCT 36.7* 34.9* 29.6*    293 186   MCV 89 88 85   RDW 16.3* 16.1* 15.9*        Chemistries:  Recent Labs   Lab 05/14/23  0935 05/14/23  2120    139   K 5.1 4.2    100   CO2 18* 24   BUN 56* 36*   CREATININE 8.9* 7.1*   CALCIUM 8.9 8.8   ALBUMIN 3.3*  --    PROT 6.6  --    BILITOT 0.7  --    ALKPHOS 70  --    ALT 35  --    AST 53*  --    MG  --  1.7       All pertinent labs within the past 24 hours have been reviewed.    Significant Imaging:  I have reviewed all pertinent imaging results/findings within the past 24 hours.

## 2023-05-15 NOTE — PROGRESS NOTES
[facility] ([facility])  Nephrology  Progress Note    Patient Name: Isidro Alves  MRN: [id number]  Admission Date: 5/14/2023  Hospital Length of Stay: 1 days  Attending Provider: Jerman Gibson MD   Primary Care Physician: Steven Morgan MD  Principal Problem:Acute on chronic combined systolic and diastolic congestive heart failure    Subjective:     HPI: Thank you for referring the pt to us. H/o and chart were reviewed. Pt was seen and examined. Pt is a 68 y/o male with ESRD on chronic HD q MWF at [facility], with h/o of DM, HTN, CAD, combined diastolic and systolic CHF (EF 40%), metabolic syndrome, and obesity who presented with SOB. Last HD was per schedule 2 days ago. Per wife, pt woke up and was feeling SOB. Wife called 911, and pt was intubated in the field before arriving in hospital. Pt remains intubate. No CP, no fver. Also had a cough. Pt is not missing HD treatment. Noted pt had been admitted for volume overload causing SOB here at [facility] 1 week ago. Per family pt drinks a lot of fluids.      Interval History: Pt was seen and examined in ICU this am. Labs and meds reviewed. Discussed with other providers. Pt was extubated this am. No new other events, HD in progress at present, no c/o's with HD. Pt was revisited and re-evaluated during HD. No new c/o's, feels better, less SOB. No fever, no cough, no CP.      Review of patient's allergies indicates:   Allergen Reactions    Bactrim [sulfamethoxazole-trimethoprim] Swelling    Nsaids (non-steroidal anti-inflammatory drug)      CKD    Sulfamethoxazole     Trimethoprim      Current Facility-Administered Medications   Medication Frequency    albuterol-ipratropium 2.5 mg-0.5 mg/3 mL nebulizer solution 3 mL Q6H PRN    aspirin chewable tablet 81 mg Daily    atorvastatin tablet 40 mg Daily    dextrose 10% bolus 125 mL 125 mL PRN    famotidine (PF) injection 20 mg Daily    fentaNYL 2500 mcg in 0.9% sodium chloride 250 mL infusion  premix (titrating) Continuous    finasteride tablet 5 mg Daily    glucagon (human recombinant) injection 1 mg PRN    heparin (porcine) injection 2,000 Units PRN    heparin (porcine) injection 5,000 Units Q8H    insulin aspart U-100 pen 1-10 Units Q6H PRN    mupirocin 2 % ointment BID    nitroGLYCERIN in 5 % dextrose 50 mg/250 mL (200 mcg/mL) infusion Continuous    NORepinephrine 4 mg in dextrose 5% 250 mL infusion (premix) (titrating) Continuous    ondansetron injection 4 mg Q8H PRN    sodium chloride 0.9% flush 10 mL PRN       Objective:     Vital Signs (Most Recent):  Temp: 98.2 °F (36.8 °C) (05/15/23 1101)  Pulse: 76 (05/15/23 1305)  Resp: 14 (05/15/23 1305)  BP: (!) 140/89 (05/15/23 1300)  SpO2: 97 % (05/15/23 1305) Vital Signs (24h Range):  Temp:  [98.2 °F (36.8 °C)-99 °F (37.2 °C)] 98.2 °F (36.8 °C)  Pulse:  [] 76  Resp:  [8-29] 14  SpO2:  [91 %-100 %] 97 %  BP: ()/(49-95) 140/89     Weight: 115.4 kg (254 lb 6.6 oz) (05/15/23 0531)  Body mass index is 32.66 kg/m².  Body surface area is 2.45 meters squared.    I/O last 3 completed shifts:  In: 947.6 [I.V.:907.6; NG/GT:40]  Out: 2050 [Urine:50; Other:2000]     Physical Exam  Vitals and nursing note reviewed.   Constitutional:       Appearance: Normal appearance.   Cardiovascular:      Rate and Rhythm: Normal rate and regular rhythm.      Pulses: Normal pulses.      Heart sounds: Normal heart sounds.   Pulmonary:      Effort: Pulmonary effort is normal.      Breath sounds: Rales present.   Abdominal:      Palpations: Abdomen is soft.      Tenderness: There is no abdominal tenderness.   Musculoskeletal:      Right lower leg: No edema.      Left lower leg: No edema.   Neurological:      Mental Status: He is alert and oriented to person, place, and time.   Psychiatric:         Behavior: Behavior normal.        Significant Labs: reviewed  BMP  Lab Results   Component Value Date     05/14/2023    K 4.2 05/14/2023     05/14/2023    CO2  24 05/14/2023    BUN 36 (H) 05/14/2023    CREATININE 7.1 (H) 05/14/2023    CALCIUM 8.8 05/14/2023    ANIONGAP 15 05/14/2023    EGFRNORACEVR 8 (A) 05/14/2023     Lab Results   Component Value Date    WBC 10.29 05/15/2023    HGB 9.4 (L) 05/15/2023    HCT 29.6 (L) 05/15/2023    MCV 85 05/15/2023     05/15/2023            Significant Imaging: reviewed CXR, improved pulm edema    Assessment/Plan:     66 y/o male with ESRD on chronic HD presented with fluid overload and pulmonary edema:     ESRD  Symptomatically improved. Good response to HD/UF  Was extubated this am  ESRD pt on chronic HD q MWF at [facility]. On HD since Jan 2021  H/o of DM, HTN, and metabolic syndrome    This is pt's second admission for SOB due to fluid overload in 1 week.  H/o of large fluid intake  Severe pulmonary edema necessitating intubation    Currently receiving HD and UF  Tolerating HD and fluid removal. Continue HD  Pt received multiple visits and evaluations before and during HD         Complications of ESRD  Mild hyperkalemia, will respond to HD  Metabolic acidosis. will address with HD  Anemia: on epogen,  PO4 normal  Ca normal  iPTH controlled       HTN: BP well controlled now  Presented on admit with hypertensive emergency  Due to fluid overload and vascular congestion  BP has improved after fluid removal  Meds reviewed     Acute hypoxemic respiratory failure   H/o of combined diastolic and systolic CHF (EF 40%)  Cardiorenal syndrome  Presented with acute respiratory acidosis  H/o of unrestricted salt and fluid intake in diet    Reviewed the following in layman's language with pt and wife this morning:  The necessity for dietary restrictions of salt, sweets, and fluids discussed with family  Explained that prognosis for ESRD pts who drink a lot of water between HD treatments is poor.  This is because the rate of fluid removal during HD has a direct relation to the worsening of CHF.  This is because of a process called  ""cardiac stunning" which occurs when a lot of fluids have to be removed over a limited span of time.       Type 2 diabetes mellitus with chronic kidney disease, with long-term current use of insulin  Chart was reviewed  Comorbidities noted        Plans and recommendations:  As discussed above   Opportunity fort questions provided  Total time spent 60 minutes including time needed to review the records, the   patient evaluation, documentation, face-to-face discussion with the patient,   more than 50% of the time was spent on coordination of care and counseling.    Level V visit.  Multiple medical issues were addressed, as documented. Medical care provided was in addition to providing dialysis. Pt received multiple visits and evaluations.          Idris Ordaz MD  Nephrology  [facility] ([facility])  "

## 2023-05-15 NOTE — ASSESSMENT & PLAN NOTE
Patient with Hypoxic Respiratory failure which is Acute on chronic.  he is on home oxygen at 2L NC QHS LPM. Supplemental oxygen was provided and noted- Vent Mode: Spont  Oxygen Concentration (%):  [] 30  Resp Rate Total:  [8.6 br/min-29 br/min] 18 br/min  Vt Set:  [450 mL] 450 mL  PEEP/CPAP:  [6 cmH20-8 cmH20] 6 cmH20  Pressure Support:  [0 cmH20-5 cmH20] 5 cmH20  Mean Airway Pressure:  [8.5 kiJ57-46 cmH20] 8.5 cmH20  Signs/symptoms of respiratory failure include- tachypnea, increased work of breathing, respiratory distress and use of accessory muscles. Contributing diagnoses includes - CHF Labs and images were reviewed. Patient Has recent ABG, which has been reviewed.  Will treat underlying causes and adjust management of respiratory failure as follows-   pulm edema and respiratory distress improved with volume removal   Tolerating minimal vent support this am   SAT/SBT and will extubate if appropriate

## 2023-05-15 NOTE — PLAN OF CARE
Duration: 4 hours    Stable/unstable: stable    Ultrafiltration volume: 2750cc    Notes: Tolerated, Mild bilat leg cramps at end of tx. No access issues, Dr. Ordaz visited during hd.

## 2023-05-15 NOTE — PLAN OF CARE
Pt extubated early in shift; on 2-3L nasal cannula, well tolerated. Bates and central lines removed. HD per dialysis RN, see noted; well tolerated. Pt AAOx4. Normotensive. SR via monitor. Transfer orders in place. Pt and pt's family updated on status and plan of care.

## 2023-05-15 NOTE — HPI
Isidro Alves is a 67 y.o. male with ESRD on HD, CAD, dilated cardiomyopathy, hypertension, diabetes mellitus who initially presented to ED on 5/14/2023 from [facility] for flash pulmonary edema, respiratory failure and witnessed cardiac arrest while being intubated there.  Received a round of CPR before ROSC was achieved.  In ED had workup with chest x-ray which showed florid pulmonary edema with bilateral pleural effusions, elevated lactic acid of 3.2.  Admitted to ICU on critical care medicine service.      After admission to ICU, patient required urgent HD.  However he was hypotensive and necessitated central line placement and initiation of Levophed.  Was able to weaned off Levophed and vent and successfully extubated on 05/15/2023.  Hospital medicine consulted for continued care    PCP: Steven Morgan MD

## 2023-05-15 NOTE — ASSESSMENT & PLAN NOTE
Body mass index is 32.66 kg/m². Morbid obesity complicates all aspects of disease management from diagnostic modalities to treatment. Weight loss encouraged and health benefits explained to patient.

## 2023-05-15 NOTE — PLAN OF CARE
[facility] (Hospital)  Initial Discharge Assessment       Primary Care Provider: Steven Morgan MD    Admission Diagnosis: Cardiac arrest [I46.9]  SOB (shortness of breath) [R06.02]  ESRD (end stage renal disease) on dialysis [N18.6, Z99.2]  Pulmonary edema with congestive heart failure [I50.1]  Hypertensive emergency [I16.1]  Acute respiratory failure with hypoxia and hypercapnia [J96.01, J96.02]    Admission Date: 5/14/2023  Expected Discharge Date:     Transition of Care Barriers: None    Payor: BLUE CROSS BLUE SHIELD / Plan: [place] PPO / Product Type: PPO /     Extended Emergency Contact Information  Primary Emergency Contact: Kunal Alves  Address: [id number] [place], [place] [address] [facility] [place] [address] [facility]  Mobile Phone: [phone]  Relation: Spouse    Discharge Plan A: Home with family         [id number] - [place] - [address]  [address] [facility] [address]  Phone: [phone] Fax: [phone]    [facility]  for [place], [place] - [address]  [address] [facility] [address]  Phone: [phone] Fax: [phone]      Initial Assessment (most recent)       Adult Discharge Assessment - 05/15/23 0939          Discharge Assessment    Assessment Type Discharge Planning Assessment     Confirmed/corrected address, phone number and insurance Yes     Confirmed Demographics Correct on Facesheet     Source of Information patient     When was your last doctors appointment? 05/03/23     Communicated LYDIA with patient/caregiver Date not available/Unable to determine     Reason For Admission Acute on chronic combined systolic and diastolic congestive heart failure     People in Home spouse     Facility Arrived From: home     Do you expect to return to your current living situation? Yes     Do you have help at home or someone to help you manage your care at home? Yes     Who are your caregiver(s) and their phone number(s)? Kunal Alves  (Spouse)     Prior to hospitilization cognitive status: Alert/Oriented     Current cognitive status: Alert/Oriented     Home Accessibility wheelchair accessible     Home Layout Able to live on 1st floor     Equipment Currently Used at Home oxygen     Readmission within 30 days? No     Patient currently being followed by outpatient case management? No     Do you currently have service(s) that help you manage your care at home? No     Do you take prescription medications? Yes     Do you have prescription coverage? Yes     Coverage MCR     Do you have any problems affording any of your prescribed medications? No     Is the patient taking medications as prescribed? yes     Who is going to help you get home at discharge? Kunal Alves (Spouse)     How do you get to doctors appointments? car, drives self     Are you on dialysis? No     Dialysis Name and Scheduled days [facility] MWF     Do you take coumadin? No     Discharge Plan A Home with family     DME Needed Upon Discharge  none     Discharge Plan discussed with: Patient     Transition of Care Barriers None                   Anticipated DC dispo: home with family   Prior Level of Function: independent with ADLs   PCP: Steven Morgan MD    Comments:  CM met with patient and family at bedside to introduce role and discuss discharge planning. Patient lives with his spouse who will also be help at home and can provide transport at time of discharge. CM discharge needs depends on hospital progress. CM will continue following to assist with other needs.

## 2023-05-15 NOTE — ASSESSMENT & PLAN NOTE
Patient is identified as having Combined Systolic and Diastolic heart failure that is Acute on chronic. CHF is currently uncontrolled due to Pulmonary edema/pleural effusion on CXR. Latest ECHO performed and demonstrates- Results for orders placed during the hospital encounter of 05/07/23    Echo    Interpretation Summary  · Concentric hypertrophy and mildly decreased systolic function.  · The estimated ejection fraction is 40%.  · Left ventricular diastolic dysfunction.  · Normal right ventricular size with normal right ventricular systolic function.  · Mild left atrial enlargement.  · Mild tricuspid regurgitation.  . Continue lasix, beta blocker, ARNI as BP tolerates, HD and monitor clinical status closely. Monitor on telemetry. Patient is off CHF pathway.  Monitor strict Is&Os and daily weights.  Place on fluid restriction of 1.2 L. Continue to stress to patient importance of self efficacy and  on diet for CHF. Last BNP reviewed- and noted below   Recent Labs   Lab 05/14/23  1053   BNP >4,900*   .

## 2023-05-15 NOTE — ASSESSMENT & PLAN NOTE
Patient's FSGs are controlled on current medication regimen.  Last A1c reviewed-   Lab Results   Component Value Date    HGBA1C 6.4 (H) 05/07/2023     Most recent fingerstick glucose reviewed-   Recent Labs   Lab 05/15/23  0034 05/15/23  0536 05/15/23  1255   POCTGLUCOSE 126* 132* 106     Current correctional scale  Medium  Maintain anti-hyperglycemic dose as follows-   Antihyperglycemics (From admission, onward)    Start     Stop Route Frequency Ordered    05/14/23 1335  insulin aspart U-100 pen 1-10 Units         -- SubQ Every 6 hours PRN 05/14/23 1235        Hold Oral hypoglycemics while patient is in the hospital.

## 2023-05-15 NOTE — HOSPITAL COURSE
5/14: underwent HD; low BP trends necessitating CVC placement and levophed.   5/15: tolerated HD well yesterday, on minimal sedation and ventilator support- following commands. Labs relatively stable. On low dose levophed- nursing at bedside weaning

## 2023-05-15 NOTE — PROGRESS NOTES
[facility] - Intensive Care ([facility])  Critical Care Medicine  Progress Note    Patient Name: Isidro Alves  MRN: [id number]  Admission Date: 5/14/2023  Hospital Length of Stay: 1 days  Code Status: Full Code  Attending Provider: Ta Bethea MD  Primary Care Provider: Steven Morgan MD   Principal Problem: Acute on chronic combined systolic and diastolic congestive heart failure    Subjective:     HPI:  Mr. Alves is a 67 year old male patient whose current medical conditions include ERSD on HD, CAD (non-obstructive by cath 8/22), DCM, IDFDM, HTN, and hyperlipidemia was transferred from [facility] ED 5/14/2023 for flash pulm edema, respiratory failure, witnessed cardiac arrest while being intubated there. He received a round of CPR and ROSC was achieved. He was transferred to [facility] for higher level of care.   Case discussed with Renal by ED and plan to dialyze urgently. ED workup significant for CXR which shows florid pulmonary edema with bilateral pleural effusions, lactate 3.2, ABG results noted. Of note, he is followed by the transplant team for a renal transplant and has been on dialysis for the past 1 year. Critical care consulted for ICU admission.         Hospital/ICU Course:  5/14: underwent HD; low BP trends necessitating CVC placement and levophed.   5/15: tolerated HD well yesterday, on minimal sedation and ventilator support- following commands. Labs relatively stable. On low dose levophed- nursing at bedside weaning         Objective:     Vital Signs (Most Recent):  Temp: 99 °F (37.2 °C) (05/15/23 0715)  Pulse: 72 (05/15/23 0721)  Resp: (!) 24 (05/15/23 0721)  BP: (!) 148/83 (05/15/23 0721)  SpO2: 98 % (05/15/23 0721) Vital Signs (24h Range):  Temp:  [97.7 °F (36.5 °C)-99 °F (37.2 °C)] 99 °F (37.2 °C)  Pulse:  [] 72  Resp:  [18-27] 24  SpO2:  [92 %-100 %] 98 %  BP: ()/() 148/83     Weight: 115.4 kg (254 lb 6.6 oz)  Body mass index is 32.66 kg/m².      Intake/Output  Summary (Last 24 hours) at 5/15/2023 0725  Last data filed at 5/15/2023 0724  Gross per 24 hour   Intake 996.52 ml   Output 2065 ml   Net -1068.48 ml        Physical Exam  Vitals reviewed.   Constitutional:       Appearance: He is well-developed and overweight.      Interventions: He is sedated, intubated and restrained.   HENT:      Head: Normocephalic and atraumatic.      Mouth/Throat:      Mouth: Mucous membranes are moist.      Pharynx: Oropharynx is clear.   Eyes:      Extraocular Movements: Extraocular movements intact.      Conjunctiva/sclera: Conjunctivae normal.   Cardiovascular:      Rate and Rhythm: Normal rate and regular rhythm.      Pulses: Normal pulses.      Comments: Occasional PVCs  Pulmonary:      Effort: He is intubated.      Comments: Synchronous with vent. Diminished in bases.  Abdominal:      General: Bowel sounds are normal.      Palpations: Abdomen is soft.   Musculoskeletal:         General: No deformity.      Cervical back: Normal range of motion and neck supple.      Right lower leg: No edema.      Left lower leg: No edema.   Skin:     General: Skin is warm and dry.   Neurological:      Mental Status: He is alert.      Comments: Opens eyes to light voice. Following commands. Can move all extremities    Psychiatric:         Behavior: Behavior is cooperative.         Review of Systems   Unable to perform ROS: Intubated     Vents:  Vent Mode: (S) Spont (05/15/23 0721)  Set Rate: 0 BPM (05/15/23 0721)  Vt Set: 450 mL (05/15/23 0721)  Pressure Support: 5 cmH20 (05/15/23 0721)  PEEP/CPAP: 6 cmH20 (05/15/23 0721)  Oxygen Concentration (%): 30 (05/15/23 0721)  Peak Airway Pressure: 11 cmH20 (05/15/23 0721)  Plateau Pressure: 0 cmH20 (05/15/23 0721)  Total Ve: 4.72 L/m (05/15/23 0721)  F/VT Ratio<105 (RSBI): (!) 52.29 (05/15/23 0721)    Lines/Drains/Airways       Central Venous Catheter Line  Duration                  Hemodialysis Catheter 03/15/22 1238 right internal jugular 425 days     Percutaneous Central Line Insertion/Assessment - Double Lumen  05/14/23 1700 Internal Jugular Left <1 day              Drain  Duration                  NG/OG Tube 05/14/23 Center mouth 1 day         Urethral Catheter 05/14/23 0824 Straight-tip;Silicone 16 Fr. <1 day              Airway  Duration                  Airway - Non-Surgical -- days              Peripheral Intravenous Line  Duration                  Peripheral IV - Single Lumen 05/14/23 0816 20 G Anterior;Right Forearm <1 day         Peripheral IV - Single Lumen 05/14/23 0940 18 G Right Antecubital <1 day                    Significant Labs:    CBC/Anemia Profile:  Recent Labs   Lab 05/14/23  0927 05/14/23  1053 05/15/23  0536   WBC 17.31* 16.66* 10.29   HGB 11.0* 10.5* 9.4*   HCT 36.7* 34.9* 29.6*    293 186   MCV 89 88 85   RDW 16.3* 16.1* 15.9*        Chemistries:  Recent Labs   Lab 05/14/23  0935 05/14/23  2120    139   K 5.1 4.2    100   CO2 18* 24   BUN 56* 36*   CREATININE 8.9* 7.1*   CALCIUM 8.9 8.8   ALBUMIN 3.3*  --    PROT 6.6  --    BILITOT 0.7  --    ALKPHOS 70  --    ALT 35  --    AST 53*  --    MG  --  1.7       All pertinent labs within the past 24 hours have been reviewed.    Significant Imaging:  I have reviewed all pertinent imaging results/findings within the past 24 hours.      ABG  Recent Labs   Lab 05/15/23  0509   PH 7.383   PO2 106*   PCO2 45.2*   HCO3 26.9   BE 2     Assessment/Plan:     Pulmonary  Acute on chronic respiratory failure  Patient with Hypoxic Respiratory failure which is Acute on chronic.  he is on home oxygen at 2L NC QHS LPM. Supplemental oxygen was provided and noted- Vent Mode: Spont  Oxygen Concentration (%):  [] 30  Resp Rate Total:  [8.6 br/min-29 br/min] 18 br/min  Vt Set:  [450 mL] 450 mL  PEEP/CPAP:  [6 cmH20-8 cmH20] 6 cmH20  Pressure Support:  [0 cmH20-5 cmH20] 5 cmH20  Mean Airway Pressure:  [8.5 wfB57-88 cmH20] 8.5 cmH20  Signs/symptoms of respiratory failure include- tachypnea,  increased work of breathing, respiratory distress and use of accessory muscles. Contributing diagnoses includes - CHF Labs and images were reviewed. Patient Has recent ABG, which has been reviewed.  Will treat underlying causes and adjust management of respiratory failure as follows-   pulm edema and respiratory distress improved with volume removal   Tolerating minimal vent support this am   SAT/SBT and will extubate if appropriate       Cardiac/Vascular  * Acute on chronic combined systolic and diastolic congestive heart failure  BNP 4900   Receiving HD for volume management   Daily weights         Hypotension  Required initiation of levophed ~1700 yesterday, off this morning at ~0730  Monitor hemodynamics closely     Hyperlipidemia associated with type 2 diabetes mellitus  Statin     Renal/  ESRD on dialysis  Did not miss HD treatments. Dietary and fluid restriction indiscretion  Continue Renvela   HD yesterday   Daily weights   Plan per Nephrology      Oncology  Anemia  chronic disease  No overt signs of bleeding  Transfuse for hgb < 7    Endocrine  Type 2 diabetes mellitus with chronic kidney disease, with long-term current use of insulin  Patient's FSGs are controlled on current medication regimen.  Last A1c reviewed-   Lab Results   Component Value Date    HGBA1C 6.4 (H) 05/07/2023     Most recent fingerstick glucose reviewed-   Recent Labs   Lab 05/15/23  0034 05/15/23  0536   POCTGLUCOSE 126* 132*     Current correctional scale  Medium  Maintain anti-hyperglycemic dose as follows-   Antihyperglycemics (From admission, onward)    Start     Stop Route Frequency Ordered    05/14/23 1335  insulin aspart U-100 pen 1-10 Units         -- SubQ Every 6 hours PRN 05/14/23 1235            DVT prophylaxis: heparin  GI prophylaxis: pepcid  Code status: Full   Disposition: extubate this am if passes SBT; if remains off of pressors and pulm status stable post extubation, then will transfer out of ICU later today. Will  consult hospital medicine team upon transfer for assumption of medical care and critical care team will sign off.       Critical Care Time: 32 minutes  Critical secondary to Patient has a condition that poses threat to life and bodily function: acute respiratory failure 2/2 volume overload and pulmonary edema. Requiring management of high risk medication infusion and mechanical ventilation       Critical care was time spent personally by me on the following activities: development of treatment plan with patient or surrogate and bedside caregivers, discussions with consultants, evaluation of patient's response to treatment, examination of patient, ordering and performing treatments and interventions, ordering and review of laboratory studies, ordering and review of radiographic studies, pulse oximetry, re-evaluation of patient's condition. This critical care time did not overlap with that of any other provider or involve time for any procedures.     May Iqbal NP  Critical Care Medicine  [facility] - [facility] ([facility])

## 2023-05-15 NOTE — SUBJECTIVE & OBJECTIVE
Past Medical History:   Diagnosis Date    Abnormal nuclear stress test 8/30/2022    Acute hypoxemic respiratory failure due to COVID-19 1/21/2021    Anal itching 10/10/2022    Anemia 4/16/2014    BPH (benign prostatic hyperplasia)     CKD (chronic kidney disease) stage 4, GFR 15-29 ml/min     Coronary artery disease of native artery of native heart with stable angina pectoris 4/29/2019    Diabetes mellitus     Diabetes mellitus, type 2     Dilated cardiomyopathy 8/30/2022    Elevated PSA     Encounter for blood transfusion     Herpes labialis     Hyperlipidemia     Hypertension     Obesity     Pneumonia due to COVID-19 virus     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    Preop cardiovascular exam 9/16/2022    Proteinuria     Secondary hyperparathyroidism (of renal origin)     UTI (lower urinary tract infection) 5/1/2015       Past Surgical History:   Procedure Laterality Date    CATHETERIZATION OF BOTH LEFT AND RIGHT HEART N/A 08/30/2022    Procedure: CATHETERIZATION, HEART, BOTH LEFT AND RIGHT;  Surgeon: Kassandra Deras MD;  Location: [facility] CATH LAB;  Service: Cardiology;  Laterality: N/A;    COLONOSCOPY N/A 12/21/2017    Procedure: COLONOSCOPY;  Surgeon: Fran Escalera MD;  Location: [facility] ENDO;  Service: Endoscopy;  Laterality: N/A;    COLONOSCOPY N/A 02/02/2023    Procedure: COLONOSCOPY;  Surgeon: Anthony Meng MD;  Location: [facility] ENDO;  Service: Endoscopy;  Laterality: N/A;    FLUOROSCOPY N/A 01/21/2021    Procedure: Vascath insertion;  Surgeon: Adrian Pruitt Jr., MD;  Location: [facility] CATH LAB;  Service: General;  Laterality: N/A;    FLUOROSCOPY N/A 04/06/2021    Procedure: Over the wire Vas Cath exchange;  Surgeon: Ho Pressley MD;  Location: [facility] CATH LAB;  Service: General;  Laterality: N/A;    PROSTATE BIOPSY      thorocotomy  01/01/2010       Review of patient's allergies indicates:   Allergen Reactions    Bactrim [sulfamethoxazole-trimethoprim] Swelling    Nsaids (non-steroidal anti-inflammatory drug)       CKD    Sulfamethoxazole     Trimethoprim        No current facility-administered medications on file prior to encounter.     Current Outpatient Medications on File Prior to Encounter   Medication Sig    acyclovir (ZOVIRAX) 400 MG tablet TAKE 1 TABLET (400 MG TOTAL) BY MOUTH 2 (TWO) TIMES DAILY. ONE TABLET    albuterol (PROVENTIL) 2.5 mg /3 mL (0.083 %) nebulizer solution Take 3 mLs (2.5 mg total) by nebulization every 4 to 6 hours as needed for Wheezing or Shortness of Breath.    albuterol (PROVENTIL/VENTOLIN HFA) 90 mcg/actuation inhaler Inhale 2 puffs into the lungs every 4 (four) hours as needed for Wheezing or Shortness of Breath.    aspirin (ECOTRIN) 81 MG EC tablet Take 1 tablet (81 mg total) by mouth once daily.    atorvastatin (LIPITOR) 40 MG tablet TAKE 1 TABLET BY MOUTH EVERY DAY    calcitRIOL (ROCALTROL) 0.25 MCG Cap TAKE 1 CAPSULE (0.25 MCG TOTAL) BY MOUTH EVERY MON, WED, FRI.    carvediloL (COREG) 12.5 MG tablet Take 1 tablet (12.5 mg total) by mouth 2 (two) times daily with meals.    cetirizine (ZYRTEC) 10 MG tablet TAKE 1 TABLET BY MOUTH EVERY DAY    esomeprazole (NEXIUM) 40 MG capsule Take 40 mg by mouth once daily.    finasteride (PROSCAR) 5 mg tablet Take 1 tablet (5 mg total) by mouth once daily.    fluticasone propionate (FLONASE) 50 mcg/actuation nasal spray 2 sprays (100 mcg total) by Each Nostril route every evening. For nasal congestion or runny nose    fluticasone-salmeterol diskus inhaler 500-50 mcg Inhale 1 puff into the lungs 2 (two) times daily. Controller.Wash out mouth after use    folic acid (FOLVITE) 1 MG tablet TAKE 1 TABLET BY MOUTH EVERY DAY    furosemide (LASIX) 80 MG tablet Take 1 tablet (80 mg total) by mouth once daily.    hydrALAZINE (APRESOLINE) 100 MG tablet Take 1 tablet (100 mg total) by mouth every 8 (eight) hours.    HYDROcodone-acetaminophen (NORCO) 5-325 mg per tablet Take 1 tablet by mouth every 6 (six) hours as needed for Pain.    insulin degludec (TRESIBA  "FLEXTOUCH U-200) 200 unit/mL (3 mL) insulin pen Inject 30 Units into the skin once daily.    isosorbide dinitrate (ISORDIL) 20 MG tablet Take 1 tablet (20 mg total) by mouth 3 (three) times daily.    multivitamin (THERAGRAN) tablet Take 1 tablet by mouth once daily.    NIFEdipine (PROCARDIA-XL) 90 MG (OSM) 24 hr tablet Take 1 tablet (90 mg total) by mouth once daily.    sacubitriL-valsartan (ENTRESTO)  mg per tablet Take 1 tablet by mouth 2 (two) times daily.    sevelamer carbonate (RENVELA) 800 mg Tab Take 3 tablets (2,400 mg total) by mouth 3 (three) times daily with meals.    tamsulosin (FLOMAX) 0.4 mg Cap TAKE 1 CAPSULE BY MOUTH EVERY DAY    blood sugar diagnostic Strp 1 each by Misc.(Non-Drug; Combo Route) route 4 (four) times daily.    blood-glucose meter kit Test finger stick blood sugar once daily.    lancets Misc 1 each by Misc.(Non-Drug; Combo Route) route 4 (four) times daily.    pen needle, diabetic (BD ULTRA-FINE SHIRA PEN NEEDLE) 32 gauge x 5/32" Ndle 1 each by Misc.(Non-Drug; Combo Route) route 4 (four) times daily with meals and nightly.     Family History       Problem Relation (Age of Onset)    Diabetes Father, Other    Hypertension Father, Other    No Known Problems Mother          Tobacco Use    Smoking status: Never    Smokeless tobacco: Never   Substance and Sexual Activity    Alcohol use: Not Currently     Comment: seldom     Drug use: No    Sexual activity: Not Currently     Partners: Female     Review of Systems   Constitutional:  Negative for chills and fever.   Respiratory:  Negative for shortness of breath and wheezing.    Cardiovascular:  Positive for chest pain. Negative for palpitations.   Gastrointestinal:  Negative for abdominal pain, nausea and vomiting.   Neurological:  Negative for dizziness and headaches.   All other systems reviewed and are negative.  Objective:     Vital Signs (Most Recent):  Temp: 98.4 °F (36.9 °C) (05/15/23 1501)  Pulse: 80 (05/15/23 1600)  Resp: 20 " (05/15/23 1600)  BP: (!) 146/85 (05/15/23 1600)  SpO2: (!) 94 % (05/15/23 1600) Vital Signs (24h Range):  Temp:  [98.2 °F (36.8 °C)-99 °F (37.2 °C)] 98.4 °F (36.9 °C)  Pulse:  [] 80  Resp:  [8-31] 20  SpO2:  [91 %-100 %] 94 %  BP: ()/(52-90) 146/85     Weight: 115.4 kg (254 lb 6.6 oz)  Body mass index is 32.66 kg/m².     Physical Exam  Vitals and nursing note reviewed.   Constitutional:       General: He is not in acute distress.  HENT:      Head: Normocephalic and atraumatic.      Right Ear: External ear normal.      Left Ear: External ear normal.      Nose: Nose normal.      Mouth/Throat:      Mouth: Mucous membranes are moist.   Eyes:      Extraocular Movements: Extraocular movements intact.      Pupils: Pupils are equal, round, and reactive to light.   Cardiovascular:      Rate and Rhythm: Normal rate and regular rhythm.      Comments: Reproducible chest pain  Pulmonary:      Effort: Pulmonary effort is normal. No respiratory distress.      Breath sounds: No wheezing.      Comments: On 3 L O2  Abdominal:      General: Bowel sounds are normal.      Palpations: Abdomen is soft.      Tenderness: There is no abdominal tenderness. There is no guarding or rebound.   Musculoskeletal:      Cervical back: Neck supple.      Right lower leg: No edema.      Left lower leg: No edema.   Skin:     General: Skin is warm and dry.   Neurological:      Mental Status: He is alert and oriented to person, place, and time.   Psychiatric:         Mood and Affect: Mood normal.         Behavior: Behavior is cooperative.        Significant Labs: All pertinent labs within the past 24 hours have been reviewed.  A1C:   Recent Labs   Lab 02/02/23  0834 05/07/23  1458   HGBA1C 6.4* 6.4*     CBC:   Recent Labs   Lab 05/14/23  0927 05/14/23  1053 05/15/23  0536   WBC 17.31* 16.66* 10.29   HGB 11.0* 10.5* 9.4*   HCT 36.7* 34.9* 29.6*    293 186     CMP:   Recent Labs   Lab 05/14/23  0935 05/14/23  2120    139   K 5.1 4.2     100   CO2 18* 24   * 99   BUN 56* 36*   CREATININE 8.9* 7.1*   CALCIUM 8.9 8.8   PROT 6.6  --    ALBUMIN 3.3*  --    BILITOT 0.7  --    ALKPHOS 70  --    AST 53*  --    ALT 35  --    ANIONGAP 15 15     Cardiac Markers:   Recent Labs   Lab 05/14/23  1053   BNP >4,900*     Troponin:   Recent Labs   Lab 05/14/23  2120 05/15/23  0037 05/15/23  0536   TROPONINI 1.629* 1.566* 1.425*       Significant Imaging: I have reviewed all pertinent imaging results/findings within the past 24 hours.

## 2023-05-15 NOTE — HOSPITAL COURSE
67-year-old male with CAD, dilated cardiomyopathy, diabetes mellitus on renal transplant list, has been on HD for 1 year presented as transfer on 05/14/2023 from [facility] ED where he was noted to have cardiac arrest while in process of getting intubated due to respiratory distress.  Admitted to ICU    Underwent urgent HD on 5/14, however given hypotension required central line placement and started on Levophed.  Weaned off Levophed, successfully extubated 5/15. Had repeat HD done on 5/15.  5/16/23 analgesia ordered to control MSK chest pain. Failed desat study.   5/17/23 HD today. Will plan to repeat desat study in AM prior to discharge.  5/18/23 does not qualify for supplemental oxygen per desat study. PT/TO recommends outpatient therapy.  Next HD tomorrow. He was asked to follow up with PCP in one week.

## 2023-05-16 DIAGNOSIS — N40.0 BENIGN PROSTATIC HYPERPLASIA, UNSPECIFIED WHETHER LOWER URINARY TRACT SYMPTOMS PRESENT: ICD-10-CM

## 2023-05-16 PROBLEM — I95.9 HYPOTENSION: Status: RESOLVED | Noted: 2023-05-14 | Resolved: 2023-05-16

## 2023-05-16 LAB
ALBUMIN SERPL BCP-MCNC: 3.6 G/DL (ref 3.5–5.2)
ALP SERPL-CCNC: 47 U/L (ref 55–135)
ALT SERPL W/O P-5'-P-CCNC: 20 U/L (ref 10–44)
ANION GAP SERPL CALC-SCNC: 15 MMOL/L (ref 8–16)
AST SERPL-CCNC: 31 U/L (ref 10–40)
BILIRUB SERPL-MCNC: 1.4 MG/DL (ref 0.1–1)
BUN SERPL-MCNC: 38 MG/DL (ref 8–23)
CALCIUM SERPL-MCNC: 9.5 MG/DL (ref 8.7–10.5)
CHLORIDE SERPL-SCNC: 100 MMOL/L (ref 95–110)
CO2 SERPL-SCNC: 20 MMOL/L (ref 23–29)
CREAT SERPL-MCNC: 7.8 MG/DL (ref 0.5–1.4)
EST. GFR  (NO RACE VARIABLE): 7 ML/MIN/1.73 M^2
GLUCOSE SERPL-MCNC: 115 MG/DL (ref 70–110)
MRSA SPEC QL CULT: NORMAL
POCT GLUCOSE: 105 MG/DL (ref 70–110)
POCT GLUCOSE: 112 MG/DL (ref 70–110)
POCT GLUCOSE: 117 MG/DL (ref 70–110)
POCT GLUCOSE: 122 MG/DL (ref 70–110)
POTASSIUM SERPL-SCNC: 4.1 MMOL/L (ref 3.5–5.1)
PROT SERPL-MCNC: 7.4 G/DL (ref 6–8.4)
SODIUM SERPL-SCNC: 135 MMOL/L (ref 136–145)

## 2023-05-16 PROCEDURE — 21400001 HC TELEMETRY ROOM: Mod: NTX

## 2023-05-16 PROCEDURE — 63600175 PHARM REV CODE 636 W HCPCS: Mod: NTX | Performed by: NURSE PRACTITIONER

## 2023-05-16 PROCEDURE — 99233 SBSQ HOSP IP/OBS HIGH 50: CPT | Mod: NTX,,, | Performed by: INTERNAL MEDICINE

## 2023-05-16 PROCEDURE — 80053 COMPREHEN METABOLIC PANEL: CPT | Mod: NTX | Performed by: NURSE PRACTITIONER

## 2023-05-16 PROCEDURE — 25000003 PHARM REV CODE 250: Mod: NTX | Performed by: INTERNAL MEDICINE

## 2023-05-16 PROCEDURE — 25000003 PHARM REV CODE 250: Mod: NTX | Performed by: NURSE PRACTITIONER

## 2023-05-16 PROCEDURE — 63600175 PHARM REV CODE 636 W HCPCS: Mod: NTX | Performed by: INTERNAL MEDICINE

## 2023-05-16 PROCEDURE — 36415 COLL VENOUS BLD VENIPUNCTURE: CPT | Mod: NTX | Performed by: NURSE PRACTITIONER

## 2023-05-16 PROCEDURE — 94760 N-INVAS EAR/PLS OXIMETRY 1: CPT | Mod: NTX

## 2023-05-16 PROCEDURE — 27000221 HC OXYGEN, UP TO 24 HOURS: Mod: NTX

## 2023-05-16 PROCEDURE — 63600175 PHARM REV CODE 636 W HCPCS: Mod: NTX | Performed by: STUDENT IN AN ORGANIZED HEALTH CARE EDUCATION/TRAINING PROGRAM

## 2023-05-16 PROCEDURE — 99233 PR SUBSEQUENT HOSPITAL CARE,LEVL III: ICD-10-PCS | Mod: NTX,,, | Performed by: INTERNAL MEDICINE

## 2023-05-16 PROCEDURE — 99900035 HC TECH TIME PER 15 MIN (STAT): Mod: NTX

## 2023-05-16 RX ORDER — HYDROMORPHONE HYDROCHLORIDE 1 MG/ML
0.5 INJECTION, SOLUTION INTRAMUSCULAR; INTRAVENOUS; SUBCUTANEOUS ONCE
Status: COMPLETED | OUTPATIENT
Start: 2023-05-16 | End: 2023-05-16

## 2023-05-16 RX ORDER — METHOCARBAMOL 500 MG/1
500 TABLET, FILM COATED ORAL 4 TIMES DAILY
Status: DISCONTINUED | OUTPATIENT
Start: 2023-05-16 | End: 2023-05-18 | Stop reason: HOSPADM

## 2023-05-16 RX ORDER — HYDROMORPHONE HYDROCHLORIDE 1 MG/ML
1 INJECTION, SOLUTION INTRAMUSCULAR; INTRAVENOUS; SUBCUTANEOUS ONCE
Status: COMPLETED | OUTPATIENT
Start: 2023-05-16 | End: 2023-05-16

## 2023-05-16 RX ADMIN — SEVELAMER CARBONATE 2400 MG: 800 TABLET, FILM COATED ORAL at 11:05

## 2023-05-16 RX ADMIN — CARVEDILOL 12.5 MG: 12.5 TABLET, FILM COATED ORAL at 04:05

## 2023-05-16 RX ADMIN — HEPARIN SODIUM 5000 UNITS: 5000 INJECTION INTRAVENOUS; SUBCUTANEOUS at 10:05

## 2023-05-16 RX ADMIN — HYDROMORPHONE HYDROCHLORIDE 1 MG: 1 INJECTION, SOLUTION INTRAMUSCULAR; INTRAVENOUS; SUBCUTANEOUS at 06:05

## 2023-05-16 RX ADMIN — ACETAMINOPHEN 650 MG: 325 TABLET ORAL at 10:05

## 2023-05-16 RX ADMIN — ATORVASTATIN CALCIUM 40 MG: 40 TABLET, FILM COATED ORAL at 08:05

## 2023-05-16 RX ADMIN — ISOSORBIDE DINITRATE 20 MG: 20 TABLET ORAL at 10:05

## 2023-05-16 RX ADMIN — METHOCARBAMOL 500 MG: 500 TABLET ORAL at 12:05

## 2023-05-16 RX ADMIN — HYDROMORPHONE HYDROCHLORIDE 0.5 MG: 1 INJECTION, SOLUTION INTRAMUSCULAR; INTRAVENOUS; SUBCUTANEOUS at 10:05

## 2023-05-16 RX ADMIN — CETIRIZINE HYDROCHLORIDE 10 MG: 10 TABLET, FILM COATED ORAL at 08:05

## 2023-05-16 RX ADMIN — FAMOTIDINE 20 MG: 10 INJECTION, SOLUTION INTRAVENOUS at 10:05

## 2023-05-16 RX ADMIN — FOLIC ACID 1 MG: 1 TABLET ORAL at 08:05

## 2023-05-16 RX ADMIN — THERA TABS 1 TABLET: TAB at 09:05

## 2023-05-16 RX ADMIN — FLUTICASONE PROPIONATE 100 MCG: 50 SPRAY, METERED NASAL at 10:05

## 2023-05-16 RX ADMIN — ASPIRIN 81 MG CHEWABLE TABLET 81 MG: 81 TABLET CHEWABLE at 08:05

## 2023-05-16 RX ADMIN — EPOETIN ALFA-EPBX 5000 UNITS: 10000 INJECTION, SOLUTION INTRAVENOUS; SUBCUTANEOUS at 03:05

## 2023-05-16 RX ADMIN — FINASTERIDE 5 MG: 5 TABLET, FILM COATED ORAL at 08:05

## 2023-05-16 RX ADMIN — SEVELAMER CARBONATE 2400 MG: 800 TABLET, FILM COATED ORAL at 08:05

## 2023-05-16 RX ADMIN — SEVELAMER CARBONATE 2400 MG: 800 TABLET, FILM COATED ORAL at 04:05

## 2023-05-16 RX ADMIN — ISOSORBIDE DINITRATE 20 MG: 20 TABLET ORAL at 03:05

## 2023-05-16 RX ADMIN — FUROSEMIDE 80 MG: 80 TABLET ORAL at 08:05

## 2023-05-16 RX ADMIN — HEPARIN SODIUM 5000 UNITS: 5000 INJECTION INTRAVENOUS; SUBCUTANEOUS at 05:05

## 2023-05-16 RX ADMIN — METHOCARBAMOL 500 MG: 500 TABLET ORAL at 04:05

## 2023-05-16 RX ADMIN — MUPIROCIN: 20 OINTMENT TOPICAL at 10:05

## 2023-05-16 RX ADMIN — CARVEDILOL 12.5 MG: 12.5 TABLET, FILM COATED ORAL at 08:05

## 2023-05-16 RX ADMIN — MUPIROCIN: 20 OINTMENT TOPICAL at 08:05

## 2023-05-16 RX ADMIN — HEPARIN SODIUM 5000 UNITS: 5000 INJECTION INTRAVENOUS; SUBCUTANEOUS at 03:05

## 2023-05-16 RX ADMIN — METHOCARBAMOL 500 MG: 500 TABLET ORAL at 10:05

## 2023-05-16 RX ADMIN — ISOSORBIDE DINITRATE 20 MG: 20 TABLET ORAL at 08:05

## 2023-05-16 NOTE — ASSESSMENT & PLAN NOTE
"68 y/o male with ESRD on chronic HD presented with fluid overload and pulmonary edema:     ESRD  Symptomatically improved. Good response to HD/UF  Was extubated this am  ESRD pt on chronic HD q MWF at [facility]. On HD since Jan 2021  H/o of DM, HTN, and metabolic syndrome     This is pt's second admission for SOB due to fluid overload in 1 week.  H/o of large fluid intake  Severe pulmonary edema necessitating intubation     Currently receiving HD and UF  Tolerating HD and fluid removal. Continue HD  Pt received multiple visits and evaluations before and during HD         Complications of ESRD  Mild hyperkalemia, will respond to HD  Metabolic acidosis. 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  This is because the rate of fluid removal during HD has a direct relation to the worsening of CHF.  This is because of a process called "cardiac stunning" which occurs when a lot of fluids have to be removed over a limited span of time.        Type 2 diabetes mellitus with chronic kidney disease, with long-term current use of insulin  Chart was reviewed  Comorbidities noted        Plans and recommendations:  As discussed above   Opportunity fort questions provided  Total time spent 60 minutes including time needed to review the " records, the   patient evaluation, documentation, face-to-face discussion with the patient,   more than 50% of the time was spent on coordination of care and counseling.

## 2023-05-16 NOTE — PROGRESS NOTES
Home Oxygen Evaluation - [facility] - Cardiopulmonary Department      Date Performed: 2023      1) Patient's Home O2 Sat on room air, while at rest: Room Air SpO2 At Rest: (!) 86 %        If O2 sats on room air at rest are 88% or below, patient qualifies.  Document O2 liter flow needed in Step 2.  If O2 sats are 89% or above, complete Step 3.        2)  If patient is not ambulated and O2 sats are <88%, what is the O2 liter flow required to meet ordered saturation?  94% on 2LNC    If O2 sats on room air while exercising remain 89% or above patient does not qualify, no further testing needed Document N/A in step 3. If O2 sats on room air while exercising are 88% or below, continue to Step 4.    3) Patient's O2 Sat on room air while exercisin) Patient's O2 Sat while exercising on O2: SpO2 During Ambulation on O2: 94 % at Ambulation O2 LPM: 2 LPM      (Must show improvement from #4 for patients to qualify)

## 2023-05-16 NOTE — ASSESSMENT & PLAN NOTE
Patient's FSGs are controlled on current medication regimen.  Last A1c reviewed-   Lab Results   Component Value Date    HGBA1C 6.4 (H) 05/07/2023     Most recent fingerstick glucose reviewed-   Recent Labs   Lab 05/15/23  2155 05/16/23  0549 05/16/23  1148 05/16/23  1545   POCTGLUCOSE 118* 105 117* 112*     Current correctional scale  Medium  Maintain anti-hyperglycemic dose as follows-   Antihyperglycemics (From admission, onward)    Start     Stop Route Frequency Ordered    05/14/23 1335  insulin aspart U-100 pen 1-10 Units         -- SubQ Every 6 hours PRN 05/14/23 1235        Hold Oral hypoglycemics while patient is in the hospital.

## 2023-05-16 NOTE — PROGRESS NOTES
[facility])  [facility] Medicine  Progress Note    Patient Name: Isidro Alves  MRN: [id number]  Patient Class: IP- Inpatient   Admission Date: 5/14/2023  Length of Stay: 2 days  Attending Physician: Nils Fritz MD  Primary Care Provider: Steven Morgan MD        Subjective:     Principal Problem:Acute on chronic combined systolic and diastolic congestive heart failure        HPI:  Isidro Alves is a 67 y.o. male with ESRD on HD, CAD, dilated cardiomyopathy, hypertension, diabetes mellitus who initially presented to ED on 5/14/2023 from [facility] for flash pulmonary edema, respiratory failure and witnessed cardiac arrest while being intubated there.  Received a round of CPR before ROSC was achieved.  In ED had workup with chest x-ray which showed florid pulmonary edema with bilateral pleural effusions, elevated lactic acid of 3.2.  Admitted to ICU on critical care medicine service.      After admission to ICU, patient required urgent HD.  However he was hypotensive and necessitated central line placement and initiation of Levophed.  Was able to weaned off Levophed and vent and successfully extubated on 05/15/2023.  Hospital medicine consulted for continued care    PCP: Steven Morgan MD        Overview/Hospital Course:  67-year-old male with CAD, dilated cardiomyopathy, diabetes mellitus on renal transplant list, has been on HD for 1 year presented as transfer on 05/14/2023 from [facility] ED where he was noted to have cardiac arrest while in process of getting intubated due to respiratory distress.  Admitted to ICU    Underwent urgent HD on 5/14, however given hypotension required central line placement and started on Levophed.  Weaned off Levophed, successfully extubated 5/15. Had repeat HD done on 5/15.    5/16:       Interval History: Complains of musculoskeletal chest pain from compressions. Shallow breaths. IS ordered. Desat study positive. Suspect related to above. HD planned  for tomorrow. Spouse at bedside. Updated on plan of care    Review of Systems   Constitutional:  Negative for chills and fever.   Respiratory:  Negative for shortness of breath and wheezing.    Cardiovascular:  Positive for chest pain. Negative for palpitations.   Gastrointestinal:  Negative for abdominal pain, nausea and vomiting.   Neurological:  Negative for dizziness and headaches.   All other systems reviewed and are negative.      Objective:     Vital Signs (Most Recent):  Temp: 97.4 °F (36.3 °C) (05/16/23 1548)  Pulse: 75 (05/16/23 1550)  Resp: 19 (05/16/23 1548)  BP: (!) 153/90 (05/16/23 1548)  SpO2: 97 % (05/16/23 1548) Vital Signs (24h Range):  Temp:  [97.4 °F (36.3 °C)-99.2 °F (37.3 °C)] 97.4 °F (36.3 °C)  Pulse:  [] 75  Resp:  [10-24] 19  SpO2:  [81 %-98 %] 97 %  BP: (122-166)/(73-93) 153/90     Weight: 112.3 kg (247 lb 9.2 oz)  Body mass index is 31.79 kg/m².    Intake/Output Summary (Last 24 hours) at 5/16/2023 1703  Last data filed at 5/16/2023 1445  Gross per 24 hour   Intake 360 ml   Output --   Net 360 ml         Physical Exam  Vitals and nursing note reviewed.   Constitutional:       General: He is not in acute distress.  HENT:      Head: Normocephalic and atraumatic.      Right Ear: External ear normal.      Left Ear: External ear normal.      Nose: Nose normal.      Mouth/Throat:      Mouth: Mucous membranes are moist.   Eyes:      Extraocular Movements: Extraocular movements intact.      Pupils: Pupils are equal, round, and reactive to light.   Cardiovascular:      Rate and Rhythm: Normal rate and regular rhythm.      Comments: Reproducible chest pain  Pulmonary:      Effort: Pulmonary effort is normal. No respiratory distress.      Breath sounds: No wheezing.      Comments: On 3 L O2  Abdominal:      General: Bowel sounds are normal.      Palpations: Abdomen is soft.      Tenderness: There is no abdominal tenderness. There is no guarding or rebound.   Musculoskeletal:      Cervical back:  Neck supple.      Right lower leg: No edema.      Left lower leg: No edema.   Skin:     General: Skin is warm and dry.   Neurological:      Mental Status: He is alert and oriented to person, place, and time.   Psychiatric:         Mood and Affect: Mood normal.         Behavior: Behavior is cooperative.           Significant Labs: All pertinent labs within the past 24 hours have been reviewed.  CBC:   Recent Labs   Lab 05/15/23  0536   WBC 10.29   HGB 9.4*   HCT 29.6*        CMP:   Recent Labs   Lab 05/14/23  2120 05/16/23  0541    135*   K 4.2 4.1    100   CO2 24 20*   GLU 99 115*   BUN 36* 38*   CREATININE 7.1* 7.8*   CALCIUM 8.8 9.5   PROT  --  7.4   ALBUMIN  --  3.6   BILITOT  --  1.4*   ALKPHOS  --  47*   AST  --  31   ALT  --  20   ANIONGAP 15 15       Significant Imaging: I have reviewed all pertinent imaging results/findings within the past 24 hours.      Assessment/Plan:      * Acute on chronic combined systolic and diastolic congestive heart failure  Patient is identified as having Combined Systolic and Diastolic heart failure that is Acute on chronic. CHF is currently uncontrolled due to Pulmonary edema/pleural effusion on CXR. Latest ECHO performed and demonstrates- Results for orders placed during the hospital encounter of 05/07/23    Echo    Interpretation Summary  · Concentric hypertrophy and mildly decreased systolic function.  · The estimated ejection fraction is 40%.  · Left ventricular diastolic dysfunction.  · Normal right ventricular size with normal right ventricular systolic function.  · Mild left atrial enlargement.  · Mild tricuspid regurgitation.  . Continue lasix, beta blocker, ARNI as BP tolerates, HD and monitor clinical status closely. Monitor on telemetry. Patient is off CHF pathway.  Monitor strict Is&Os and daily weights.  Place on fluid restriction of 1.2 L. Continue to stress to patient importance of self efficacy and  on diet for CHF. Last BNP reviewed-  and noted below   Recent Labs   Lab 05/14/23  1053   BNP >4,900*   .      Acute on chronic respiratory failure  Patient with Hypoxic Respiratory failure which is Acute on chronic.  he is on home oxygen at 2 LPM. Supplemental oxygen was provided and noted- Oxygen Concentration (%):  [28] 28    .   Signs/symptoms of respiratory failure include- tachypnea, increased work of breathing, respiratory distress and use of accessory muscles. Contributing diagnoses includes - CHF Labs and images were reviewed. Patient Has recent ABG, which has been reviewed. Will treat underlying causes and adjust management of respiratory failure as follows-   Volume removal with HD  Successfully extubated on 05/15/2023  Resume diuretic    5/16/12  Does not wear supplemental oxygen at home. desat study positive suspected related to shallow breaths from MSK pain. IS ordered. Add muscle relaxants and monitor response.    ESRD on dialysis  Patient currently on transplant list, has been on HD for 1 year  Nephrology following for HD needs   Patient has apparently been compliant with HD although admits to dietary and fluid restriction indiscretion   Continue home Adrianne  Advised compliance with fluid restriction    5/16/23  Plans for HD in AM    Coronary artery disease of native artery of native heart with stable angina pectoris  Continue aspirin and statin      Class 1 obesity due to excess calories with serious comorbidity and body mass index (BMI) of 33.0 to 33.9 in adult  Body mass index is 32.66 kg/m². Morbid obesity complicates all aspects of disease management from diagnostic modalities to treatment. Weight loss encouraged and health benefits explained to patient.         Hyperlipidemia  Continue statin    Type 2 diabetes mellitus with chronic kidney disease, with long-term current use of insulin  Patient's FSGs are controlled on current medication regimen.  Last A1c reviewed-   Lab Results   Component Value Date    HGBA1C 6.4 (H)  05/07/2023     Most recent fingerstick glucose reviewed-   Recent Labs   Lab 05/15/23  2155 05/16/23  0549 05/16/23  1148 05/16/23  1545   POCTGLUCOSE 118* 105 117* 112*     Current correctional scale  Medium  Maintain anti-hyperglycemic dose as follows-   Antihyperglycemics (From admission, onward)    Start     Stop Route Frequency Ordered    05/14/23 1335  insulin aspart U-100 pen 1-10 Units         -- SubQ Every 6 hours PRN 05/14/23 1235        Hold Oral hypoglycemics while patient is in the hospital.    Anemia due to chronic kidney disease  Appears around baseline, no sign of overt bleeding   Patient asymptomatic   Monitor H/H      VTE Risk Mitigation (From admission, onward)         Ordered     heparin (porcine) injection 2,000 Units  As needed (PRN)         05/14/23 1409     heparin (porcine) injection 5,000 Units  Every 8 hours         05/14/23 1015     IP VTE HIGH RISK PATIENT  Once         05/14/23 1015     Place sequential compression device  Until discontinued         05/14/23 1015                Discharge Planning   LYDIA:      Code Status: Full Code   Is the patient medically ready for discharge?:     Reason for patient still in hospital (select all that apply): Treatment  Discharge Plan A: Home with family                  Khushi Trotter NP  Department of Hospital Medicine   [facility] ([facility])

## 2023-05-16 NOTE — ASSESSMENT & PLAN NOTE
Patient with Hypoxic Respiratory failure which is Acute on chronic.  he is on home oxygen at 2 LPM. Supplemental oxygen was provided and noted- Oxygen Concentration (%):  [28] 28    .   Signs/symptoms of respiratory failure include- tachypnea, increased work of breathing, respiratory distress and use of accessory muscles. Contributing diagnoses includes - CHF Labs and images were reviewed. Patient Has recent ABG, which has been reviewed. Will treat underlying causes and adjust management of respiratory failure as follows-   Volume removal with HD  Successfully extubated on 05/15/2023  Resume diuretic    5/16/12  Does not wear supplemental oxygen at home. desat study positive suspected related to shallow breaths from MSK pain. IS ordered. Add muscle relaxants and monitor response.

## 2023-05-16 NOTE — PROGRESS NOTES
"[facility] ([facility])  Nephrology  Progress Note    Patient Name: Isidro Alves  MRN: [id number]  Admission Date: 5/14/2023  Hospital Length of Stay: 2 days  Attending Provider: Nils Fritz MD   Primary Care Physician: Steven Morgan MD  Principal Problem:Acute on chronic combined systolic and diastolic congestive heart failure    Subjective:     HPI: Thank you for referring the pt to us. H/o and chart were reviewed. Pt was seen and examined. Pt is a 66 y/o male with ESRD on chronic HD q MWF at [facility], with h/o of DM, HTN, CAD, combined diastolic and systolic CHF (EF 40%), metabolic syndrome, and obesity who presented with SOB. Last HD was per schedule 2 days ago. Per wife, pt woke up and was feeling SOB. Wife called 911, and pt was intubated in the field before arriving in hospital. Pt remains intubate. No CP, no fver. Also had a cough. Pt is not missing HD treatment. Noted pt had been admitted for volume overload causing SOB here at [facility] 1 week ago. Per family pt drinks a lot of fluids.      Interval History: Pt was seen and examined. Labs and meds reviewed. Discussed with other providers. No new c/o's, transferred out of the ICU. No SOB, feels "better."    Review of patient's allergies indicates:   Allergen Reactions    Bactrim [sulfamethoxazole-trimethoprim] Swelling    Nsaids (non-steroidal anti-inflammatory drug)      CKD    Sulfamethoxazole     Trimethoprim      Current Facility-Administered Medications   Medication Frequency    acetaminophen tablet 650 mg Q6H PRN    albuterol-ipratropium 2.5 mg-0.5 mg/3 mL nebulizer solution 3 mL Q6H PRN    aspirin chewable tablet 81 mg Daily    atorvastatin tablet 40 mg Daily    calcitRIOL capsule 0.25 mcg Every Mon, Wed, Fri    carvediloL tablet 12.5 mg BID WM    cetirizine tablet 10 mg Daily    dextrose 10% bolus 125 mL 125 mL PRN    famotidine (PF) injection 20 mg Daily    finasteride tablet 5 mg Daily    fluticasone " propionate 50 mcg/actuation nasal spray 100 mcg QHS    folic acid tablet 1 mg Daily    furosemide tablet 80 mg Daily    glucagon (human recombinant) injection 1 mg PRN    heparin (porcine) injection 2,000 Units PRN    heparin (porcine) injection 5,000 Units Q8H    insulin aspart U-100 pen 1-10 Units Q6H PRN    isosorbide dinitrate tablet 20 mg TID    methocarbamoL tablet 500 mg QID    multivitamin tablet Daily    mupirocin 2 % ointment BID    ondansetron injection 4 mg Q8H PRN    sevelamer carbonate tablet 2,400 mg TID WM    sodium chloride 0.9% flush 10 mL PRN       Objective:     Vital Signs (Most Recent):  Temp: 98.3 °F (36.8 °C) (05/16/23 1151)  Pulse: 75 (05/16/23 1151)  Resp: 18 (05/16/23 1151)  BP: (!) 148/93 (05/16/23 1151)  SpO2: 97 % (05/16/23 1151) Vital Signs (24h Range):  Temp:  [97.9 °F (36.6 °C)-99.2 °F (37.3 °C)] 98.3 °F (36.8 °C)  Pulse:  [] 75  Resp:  [10-31] 18  SpO2:  [81 %-98 %] 97 %  BP: (122-166)/(73-93) 148/93     Weight: 112.3 kg (247 lb 9.2 oz) (05/16/23 0725)  Body mass index is 31.79 kg/m².  Body surface area is 2.42 meters squared.    I/O last 3 completed shifts:  In: 635.7 [P.O.:120; I.V.:515.7]  Out: 3410 [Urine:160; Other:3250]     Physical Exam  Vitals and nursing note reviewed.   Constitutional:       Appearance: Normal appearance.   Cardiovascular:      Rate and Rhythm: Normal rate and regular rhythm.      Pulses: Normal pulses.      Heart sounds: Normal heart sounds.   Pulmonary:      Breath sounds: No rales.   Musculoskeletal:      Right lower leg: No edema.      Left lower leg: No edema.   Neurological:      Mental Status: He is alert and oriented to person, place, and time.   Psychiatric:         Behavior: Behavior normal.        Significant Labs: reviewed  BMP  Lab Results   Component Value Date     (L) 05/16/2023    K 4.1 05/16/2023     05/16/2023    CO2 20 (L) 05/16/2023    BUN 38 (H) 05/16/2023    CREATININE 7.8 (H) 05/16/2023    CALCIUM 9.5  "05/16/2023    ANIONGAP 15 05/16/2023    EGFRNORACEVR 7 (A) 05/16/2023     Lab Results   Component Value Date    WBC 10.29 05/15/2023    HGB 9.4 (L) 05/15/2023    HCT 29.6 (L) 05/15/2023    MCV 85 05/15/2023     05/15/2023         Significant Imaging: CXR's reviewed and compared, improved pulmonary edema    Assessment/Plan:     68 y/o male with ESRD on chronic HD presented with fluid overload and pulmonary edema:     ESRD  Symptomatically much improved with HD/UF 2 days in a row  Remains extubated  Transferred out of the ICU  ESRD pt on chronic HD q MWF at [facility]. On HD since Jan 2021  H/o of DM, HTN, and metabolic syndrome     K normal  O2 sat good  No indications for HD today  Next HD in am, orders placed       Complications of ESRD  Mild hyperkalemia, will respond to HD  Metabolic acidosis. will address with HD  Anemia: on epogen,  PO4 normal  Ca normal  iPTH controlled       HTN: BP well controlled now  Presented on admit with hypertensive emergency  Due to fluid overload and vascular congestion  BP has improved after fluid removal  Meds reviewed     Acute hypoxemic respiratory failure   H/o of combined diastolic and systolic CHF (EF 40%)  Cardiorenal syndrome  Presented with acute respiratory acidosis  H/o of unrestricted salt and fluid intake in diet    Good response to dietary education and recommendations  Wife very caring and attentive     Reviewed the following in layman's language with pt and wife:  The necessity for dietary restrictions of salt, sweets, and fluids discussed with family  Explained that prognosis for ESRD pts who drink a lot of water between HD treatments is poor.  This is because the rate of fluid removal during HD has a direct relation to the worsening of CHF.  This is because of a process called "cardiac stunning" which occurs when a lot of fluids have to be removed over a limited span of time.        Type 2 diabetes mellitus with chronic kidney disease, with " long-term current use of insulin  Chart was reviewed  Comorbidities noted        Plans and recommendations:  As discussed above   Opportunity fort questions provided  Total time spent 40 minutes including time needed to review the records, the   patient evaluation, documentation, face-to-face discussion with the patient,   more than 50% of the time was spent on coordination of care and counseling.            Idris Ordaz MD  Nephrology  [facility] ([facility])

## 2023-05-16 NOTE — SUBJECTIVE & OBJECTIVE
"Interval History: Pt was seen and examined. Labs and meds reviewed. Discussed with other providers. No new c/o's, transferred out of the ICU. No SOB, feels "better."    Review of patient's allergies indicates:   Allergen Reactions    Bactrim [sulfamethoxazole-trimethoprim] Swelling    Nsaids (non-steroidal anti-inflammatory drug)      CKD    Sulfamethoxazole     Trimethoprim      Current Facility-Administered Medications   Medication Frequency    acetaminophen tablet 650 mg Q6H PRN    albuterol-ipratropium 2.5 mg-0.5 mg/3 mL nebulizer solution 3 mL Q6H PRN    aspirin chewable tablet 81 mg Daily    atorvastatin tablet 40 mg Daily    calcitRIOL capsule 0.25 mcg Every Mon, Wed, Fri    carvediloL tablet 12.5 mg BID WM    cetirizine tablet 10 mg Daily    dextrose 10% bolus 125 mL 125 mL PRN    famotidine (PF) injection 20 mg Daily    finasteride tablet 5 mg Daily    fluticasone propionate 50 mcg/actuation nasal spray 100 mcg QHS    folic acid tablet 1 mg Daily    furosemide tablet 80 mg Daily    glucagon (human recombinant) injection 1 mg PRN    heparin (porcine) injection 2,000 Units PRN    heparin (porcine) injection 5,000 Units Q8H    insulin aspart U-100 pen 1-10 Units Q6H PRN    isosorbide dinitrate tablet 20 mg TID    methocarbamoL tablet 500 mg QID    multivitamin tablet Daily    mupirocin 2 % ointment BID    ondansetron injection 4 mg Q8H PRN    sevelamer carbonate tablet 2,400 mg TID WM    sodium chloride 0.9% flush 10 mL PRN       Objective:     Vital Signs (Most Recent):  Temp: 98.3 °F (36.8 °C) (05/16/23 1151)  Pulse: 75 (05/16/23 1151)  Resp: 18 (05/16/23 1151)  BP: (!) 148/93 (05/16/23 1151)  SpO2: 97 % (05/16/23 1151) Vital Signs (24h Range):  Temp:  [97.9 °F (36.6 °C)-99.2 °F (37.3 °C)] 98.3 °F (36.8 °C)  Pulse:  [] 75  Resp:  [10-31] 18  SpO2:  [81 %-98 %] 97 %  BP: (122-166)/(73-93) 148/93     Weight: 112.3 kg (247 lb 9.2 oz) (05/16/23 0725)  Body mass index is 31.79 kg/m².  Body surface area is " 2.42 meters squared.    I/O last 3 completed shifts:  In: 635.7 [P.O.:120; I.V.:515.7]  Out: 3410 [Urine:160; Other:3250]     Physical Exam  Vitals and nursing note reviewed.   Constitutional:       Appearance: Normal appearance.   Cardiovascular:      Rate and Rhythm: Normal rate and regular rhythm.      Pulses: Normal pulses.      Heart sounds: Normal heart sounds.   Pulmonary:      Breath sounds: No rales.   Musculoskeletal:      Right lower leg: No edema.      Left lower leg: No edema.   Neurological:      Mental Status: He is alert and oriented to person, place, and time.   Psychiatric:         Behavior: Behavior normal.        Significant Labs: reviewed  BMP  Lab Results   Component Value Date     (L) 05/16/2023    K 4.1 05/16/2023     05/16/2023    CO2 20 (L) 05/16/2023    BUN 38 (H) 05/16/2023    CREATININE 7.8 (H) 05/16/2023    CALCIUM 9.5 05/16/2023    ANIONGAP 15 05/16/2023    EGFRNORACEVR 7 (A) 05/16/2023     Lab Results   Component Value Date    WBC 10.29 05/15/2023    HGB 9.4 (L) 05/15/2023    HCT 29.6 (L) 05/15/2023    MCV 85 05/15/2023     05/15/2023         Significant Imaging: CXR's reviewed and compared, improved pulmonary edema

## 2023-05-16 NOTE — TELEPHONE ENCOUNTER
----- Message from Kim Howard sent at 5/16/2023 10:19 AM CDT -----  Contact: Isidro  Patient is calling to speak with someone regarding being in the hospital. Patient reports he is currently in the hospital and would like to know if Dr. Nugent will be coming to see him. Please give patient a call back at [phone] when possible.  Thank you,  GH

## 2023-05-16 NOTE — ASSESSMENT & PLAN NOTE
Patient currently on transplant list, has been on HD for 1 year  Nephrology following for HD needs   Patient has apparently been compliant with HD although admits to dietary and fluid restriction indiscretion   Continue home [facility]  Advised compliance with fluid restriction    5/16/23  Plans for HD in AM

## 2023-05-16 NOTE — PLAN OF CARE
Problem: Device-Related Complication Risk (Hemodialysis)  Goal: Safe, Effective Therapy Delivery  Outcome: Ongoing, Progressing     Problem: Infection (Hemodialysis)  Goal: Absence of Infection Signs and Symptoms  Outcome: Ongoing, Progressing

## 2023-05-16 NOTE — SUBJECTIVE & OBJECTIVE
Interval History: Complains of musculoskeletal chest pain from compressions. Shallow breaths. IS ordered. Desat study positive. Suspect related to above. HD planned for tomorrow. Spouse at bedside. Updated on plan of care    Review of Systems   Constitutional:  Negative for chills and fever.   Respiratory:  Negative for shortness of breath and wheezing.    Cardiovascular:  Positive for chest pain. Negative for palpitations.   Gastrointestinal:  Negative for abdominal pain, nausea and vomiting.   Neurological:  Negative for dizziness and headaches.   All other systems reviewed and are negative.      Objective:     Vital Signs (Most Recent):  Temp: 97.4 °F (36.3 °C) (05/16/23 1548)  Pulse: 75 (05/16/23 1550)  Resp: 19 (05/16/23 1548)  BP: (!) 153/90 (05/16/23 1548)  SpO2: 97 % (05/16/23 1548) Vital Signs (24h Range):  Temp:  [97.4 °F (36.3 °C)-99.2 °F (37.3 °C)] 97.4 °F (36.3 °C)  Pulse:  [] 75  Resp:  [10-24] 19  SpO2:  [81 %-98 %] 97 %  BP: (122-166)/(73-93) 153/90     Weight: 112.3 kg (247 lb 9.2 oz)  Body mass index is 31.79 kg/m².    Intake/Output Summary (Last 24 hours) at 5/16/2023 1703  Last data filed at 5/16/2023 1445  Gross per 24 hour   Intake 360 ml   Output --   Net 360 ml         Physical Exam  Vitals and nursing note reviewed.   Constitutional:       General: He is not in acute distress.  HENT:      Head: Normocephalic and atraumatic.      Right Ear: External ear normal.      Left Ear: External ear normal.      Nose: Nose normal.      Mouth/Throat:      Mouth: Mucous membranes are moist.   Eyes:      Extraocular Movements: Extraocular movements intact.      Pupils: Pupils are equal, round, and reactive to light.   Cardiovascular:      Rate and Rhythm: Normal rate and regular rhythm.      Comments: Reproducible chest pain  Pulmonary:      Effort: Pulmonary effort is normal. No respiratory distress.      Breath sounds: No wheezing.      Comments: On 3 L O2  Abdominal:      General: Bowel sounds are  normal.      Palpations: Abdomen is soft.      Tenderness: There is no abdominal tenderness. There is no guarding or rebound.   Musculoskeletal:      Cervical back: Neck supple.      Right lower leg: No edema.      Left lower leg: No edema.   Skin:     General: Skin is warm and dry.   Neurological:      Mental Status: He is alert and oriented to person, place, and time.   Psychiatric:         Mood and Affect: Mood normal.         Behavior: Behavior is cooperative.           Significant Labs: All pertinent labs within the past 24 hours have been reviewed.  CBC:   Recent Labs   Lab 05/15/23  0536   WBC 10.29   HGB 9.4*   HCT 29.6*        CMP:   Recent Labs   Lab 05/14/23  2120 05/16/23  0541    135*   K 4.2 4.1    100   CO2 24 20*   GLU 99 115*   BUN 36* 38*   CREATININE 7.1* 7.8*   CALCIUM 8.8 9.5   PROT  --  7.4   ALBUMIN  --  3.6   BILITOT  --  1.4*   ALKPHOS  --  47*   AST  --  31   ALT  --  20   ANIONGAP 15 15       Significant Imaging: I have reviewed all pertinent imaging results/findings within the past 24 hours.

## 2023-05-16 NOTE — CONSULTS
Critical access hospital - Intensive Care ([facility])  Hospital Medicine  Consult Note    Patient Name: Isidro Alves  MRN: [id number]  Admission Date: 5/14/2023  Hospital Length of Stay: 1 days  Attending Physician: Nadya Pickard DO   Primary Care Provider: Steven Morgan MD           Patient information was obtained from patient, past medical records, ER records and primary team.     Consults  Subjective:     Principal Problem: Acute on chronic combined systolic and diastolic congestive heart failure    Chief Complaint:   Chief Complaint   Patient presents with    Respiratory Arrest     Transfer from [facility] ED. ET tube in place after respiratory distress and cardiac arrest at [facility].        HPI: Isidro Alves is a 67 y.o. male with ESRD on HD, CAD, dilated cardiomyopathy, hypertension, diabetes mellitus who initially presented to ED on 5/14/2023 from [facility] for flash pulmonary edema, respiratory failure and witnessed cardiac arrest while being intubated there.  Received a round of CPR before ROSC was achieved.  In ED had workup with chest x-ray which showed florid pulmonary edema with bilateral pleural effusions, elevated lactic acid of 3.2.  Admitted to ICU on critical care medicine service.      After admission to ICU, patient required urgent HD.  However he was hypotensive and necessitated central line placement and initiation of Levophed.  Was able to weaned off Levophed and vent and successfully extubated on 05/15/2023.  Hospital medicine consulted for continued care    PCP: Steven Morgan MD        Past Medical History:   Diagnosis Date    Abnormal nuclear stress test 8/30/2022    Acute hypoxemic respiratory failure due to COVID-19 1/21/2021    Anal itching 10/10/2022    Anemia 4/16/2014    BPH (benign prostatic hyperplasia)     CKD (chronic kidney disease) stage 4, GFR 15-29 ml/min     Coronary artery disease of native artery of native heart with stable angina pectoris 4/29/2019    Diabetes mellitus      Diabetes mellitus, type 2     Dilated cardiomyopathy 8/30/2022    Elevated PSA     Encounter for blood transfusion     Herpes labialis     Hyperlipidemia     Hypertension     Obesity     Pneumonia due to COVID-19 virus     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    Preop cardiovascular exam 9/16/2022    Proteinuria     Secondary hyperparathyroidism (of renal origin)     UTI (lower urinary tract infection) 5/1/2015       Past Surgical History:   Procedure Laterality Date    CATHETERIZATION OF BOTH LEFT AND RIGHT HEART N/A 08/30/2022    Procedure: CATHETERIZATION, HEART, BOTH LEFT AND RIGHT;  Surgeon: Kassandra Deras MD;  Location: [facility] CATH LAB;  Service: Cardiology;  Laterality: N/A;    COLONOSCOPY N/A 12/21/2017    Procedure: COLONOSCOPY;  Surgeon: Fran Escalera MD;  Location: [facility] ENDO;  Service: Endoscopy;  Laterality: N/A;    COLONOSCOPY N/A 02/02/2023    Procedure: COLONOSCOPY;  Surgeon: Anthony Meng MD;  Location: [facility] ENDO;  Service: Endoscopy;  Laterality: N/A;    FLUOROSCOPY N/A 01/21/2021    Procedure: Vascath insertion;  Surgeon: Adrian Pruitt Jr., MD;  Location: [facility] CATH LAB;  Service: General;  Laterality: N/A;    FLUOROSCOPY N/A 04/06/2021    Procedure: Over the wire Vas Cath exchange;  Surgeon: Ho Pressley MD;  Location: [facility] CATH LAB;  Service: General;  Laterality: N/A;    PROSTATE BIOPSY      thorocotomy  01/01/2010       Review of patient's allergies indicates:   Allergen Reactions    Bactrim [sulfamethoxazole-trimethoprim] Swelling    Nsaids (non-steroidal anti-inflammatory drug)      CKD    Sulfamethoxazole     Trimethoprim        No current facility-administered medications on file prior to encounter.     Current Outpatient Medications on File Prior to Encounter   Medication Sig    acyclovir (ZOVIRAX) 400 MG tablet TAKE 1 TABLET (400 MG TOTAL) BY MOUTH 2 (TWO) TIMES DAILY. ONE TABLET    albuterol (PROVENTIL) 2.5 mg /3 mL (0.083 %) nebulizer solution Take 3  mLs (2.5 mg total) by nebulization every 4 to 6 hours as needed for Wheezing or Shortness of Breath.    albuterol (PROVENTIL/VENTOLIN HFA) 90 mcg/actuation inhaler Inhale 2 puffs into the lungs every 4 (four) hours as needed for Wheezing or Shortness of Breath.    aspirin (ECOTRIN) 81 MG EC tablet Take 1 tablet (81 mg total) by mouth once daily.    atorvastatin (LIPITOR) 40 MG tablet TAKE 1 TABLET BY MOUTH EVERY DAY    calcitRIOL (ROCALTROL) 0.25 MCG Cap TAKE 1 CAPSULE (0.25 MCG TOTAL) BY MOUTH EVERY MON, WED, FRI.    carvediloL (COREG) 12.5 MG tablet Take 1 tablet (12.5 mg total) by mouth 2 (two) times daily with meals.    cetirizine (ZYRTEC) 10 MG tablet TAKE 1 TABLET BY MOUTH EVERY DAY    esomeprazole (NEXIUM) 40 MG capsule Take 40 mg by mouth once daily.    finasteride (PROSCAR) 5 mg tablet Take 1 tablet (5 mg total) by mouth once daily.    fluticasone propionate (FLONASE) 50 mcg/actuation nasal spray 2 sprays (100 mcg total) by Each Nostril route every evening. For nasal congestion or runny nose    fluticasone-salmeterol diskus inhaler 500-50 mcg Inhale 1 puff into the lungs 2 (two) times daily. Controller.Wash out mouth after use    folic acid (FOLVITE) 1 MG tablet TAKE 1 TABLET BY MOUTH EVERY DAY    furosemide (LASIX) 80 MG tablet Take 1 tablet (80 mg total) by mouth once daily.    hydrALAZINE (APRESOLINE) 100 MG tablet Take 1 tablet (100 mg total) by mouth every 8 (eight) hours.    HYDROcodone-acetaminophen (NORCO) 5-325 mg per tablet Take 1 tablet by mouth every 6 (six) hours as needed for Pain.    insulin degludec (TRESIBA FLEXTOUCH U-200) 200 unit/mL (3 mL) insulin pen Inject 30 Units into the skin once daily.    isosorbide dinitrate (ISORDIL) 20 MG tablet Take 1 tablet (20 mg total) by mouth 3 (three) times daily.    multivitamin (THERAGRAN) tablet Take 1 tablet by mouth once daily.    NIFEdipine (PROCARDIA-XL) 90 MG (OSM) 24 hr tablet Take 1 tablet (90 mg total) by mouth once daily.  "   sacubitriL-valsartan (ENTRESTO)  mg per tablet Take 1 tablet by mouth 2 (two) times daily.    sevelamer carbonate (RENVELA) 800 mg Tab Take 3 tablets (2,400 mg total) by mouth 3 (three) times daily with meals.    tamsulosin (FLOMAX) 0.4 mg Cap TAKE 1 CAPSULE BY MOUTH EVERY DAY    blood sugar diagnostic Strp 1 each by Misc.(Non-Drug; Combo Route) route 4 (four) times daily.    blood-glucose meter kit Test finger stick blood sugar once daily.    lancets Misc 1 each by Misc.(Non-Drug; Combo Route) route 4 (four) times daily.    pen needle, diabetic (BD ULTRA-FINE SHIRA PEN NEEDLE) 32 gauge x 5/32" Ndle 1 each by Misc.(Non-Drug; Combo Route) route 4 (four) times daily with meals and nightly.     Family History       Problem Relation (Age of Onset)    Diabetes Father, Other    Hypertension Father, Other    No Known Problems Mother          Tobacco Use    Smoking status: Never    Smokeless tobacco: Never   Substance and Sexual Activity    Alcohol use: Not Currently     Comment: seldom     Drug use: No    Sexual activity: Not Currently     Partners: Female     Review of Systems   Constitutional:  Negative for chills and fever.   Respiratory:  Negative for shortness of breath and wheezing.    Cardiovascular:  Positive for chest pain. Negative for palpitations.   Gastrointestinal:  Negative for abdominal pain, nausea and vomiting.   Neurological:  Negative for dizziness and headaches.   All other systems reviewed and are negative.  Objective:     Vital Signs (Most Recent):  Temp: 98.4 °F (36.9 °C) (05/15/23 1501)  Pulse: 80 (05/15/23 1600)  Resp: 20 (05/15/23 1600)  BP: (!) 146/85 (05/15/23 1600)  SpO2: (!) 94 % (05/15/23 1600) Vital Signs (24h Range):  Temp:  [98.2 °F (36.8 °C)-99 °F (37.2 °C)] 98.4 °F (36.9 °C)  Pulse:  [] 80  Resp:  [8-31] 20  SpO2:  [91 %-100 %] 94 %  BP: ()/(52-90) 146/85     Weight: 115.4 kg (254 lb 6.6 oz)  Body mass index is 32.66 kg/m².     Physical Exam  Vitals and " nursing note reviewed.   Constitutional:       General: He is not in acute distress.  HENT:      Head: Normocephalic and atraumatic.      Right Ear: External ear normal.      Left Ear: External ear normal.      Nose: Nose normal.      Mouth/Throat:      Mouth: Mucous membranes are moist.   Eyes:      Extraocular Movements: Extraocular movements intact.      Pupils: Pupils are equal, round, and reactive to light.   Cardiovascular:      Rate and Rhythm: Normal rate and regular rhythm.      Comments: Reproducible chest pain  Pulmonary:      Effort: Pulmonary effort is normal. No respiratory distress.      Breath sounds: No wheezing.      Comments: On 3 L O2  Abdominal:      General: Bowel sounds are normal.      Palpations: Abdomen is soft.      Tenderness: There is no abdominal tenderness. There is no guarding or rebound.   Musculoskeletal:      Cervical back: Neck supple.      Right lower leg: No edema.      Left lower leg: No edema.   Skin:     General: Skin is warm and dry.   Neurological:      Mental Status: He is alert and oriented to person, place, and time.   Psychiatric:         Mood and Affect: Mood normal.         Behavior: Behavior is cooperative.        Significant Labs: All pertinent labs within the past 24 hours have been reviewed.  A1C:   Recent Labs   Lab 02/02/23  0834 05/07/23  1458   HGBA1C 6.4* 6.4*     CBC:   Recent Labs   Lab 05/14/23  0927 05/14/23  1053 05/15/23  0536   WBC 17.31* 16.66* 10.29   HGB 11.0* 10.5* 9.4*   HCT 36.7* 34.9* 29.6*    293 186     CMP:   Recent Labs   Lab 05/14/23  0935 05/14/23  2120    139   K 5.1 4.2    100   CO2 18* 24   * 99   BUN 56* 36*   CREATININE 8.9* 7.1*   CALCIUM 8.9 8.8   PROT 6.6  --    ALBUMIN 3.3*  --    BILITOT 0.7  --    ALKPHOS 70  --    AST 53*  --    ALT 35  --    ANIONGAP 15 15     Cardiac Markers:   Recent Labs   Lab 05/14/23  1053   BNP >4,900*     Troponin:   Recent Labs   Lab 05/14/23  2120 05/15/23  0037  05/15/23  0536   TROPONINI 1.629* 1.566* 1.425*       Significant Imaging: I have reviewed all pertinent imaging results/findings within the past 24 hours.    Assessment/Plan:     * Acute on chronic combined systolic and diastolic congestive heart failure  Patient is identified as having Combined Systolic and Diastolic heart failure that is Acute on chronic. CHF is currently uncontrolled due to Pulmonary edema/pleural effusion on CXR. Latest ECHO performed and demonstrates- Results for orders placed during the hospital encounter of 05/07/23    Echo    Interpretation Summary  · Concentric hypertrophy and mildly decreased systolic function.  · The estimated ejection fraction is 40%.  · Left ventricular diastolic dysfunction.  · Normal right ventricular size with normal right ventricular systolic function.  · Mild left atrial enlargement.  · Mild tricuspid regurgitation.  . Continue lasix, beta blocker, ARNI as BP tolerates, HD and monitor clinical status closely. Monitor on telemetry. Patient is off CHF pathway.  Monitor strict Is&Os and daily weights.  Place on fluid restriction of 1.2 L. Continue to stress to patient importance of self efficacy and  on diet for CHF. Last BNP reviewed- and noted below   Recent Labs   Lab 05/14/23  1053   BNP >4,900*   .      Hypotension  Resolved  Required initiation of Levophed after ICU admission, now weaned off      Acute on chronic respiratory failure  Patient with Hypoxic Respiratory failure which is Acute on chronic.  he is on home oxygen at 2 LPM. Supplemental oxygen was provided and noted- Vent Mode: Spont  Oxygen Concentration (%):  [30-60] 30  Resp Rate Total:  [8.6 br/min-28 br/min] 18 br/min  Vt Set:  [450 mL] 450 mL  PEEP/CPAP:  [6 cmH20] 6 cmH20  Pressure Support:  [0 cmH20-5 cmH20] 5 cmH20  Mean Airway Pressure:  [8.5 eaA87-12 cmH20] 8.5 cmH20    .   Signs/symptoms of respiratory failure include- tachypnea, increased work of breathing, respiratory distress and  use of accessory muscles. Contributing diagnoses includes - CHF Labs and images were reviewed. Patient Has recent ABG, which has been reviewed. Will treat underlying causes and adjust management of respiratory failure as follows-   Volume removal with HD  Successfully extubated on 05/15/2023  Resume diuretic    ESRD on dialysis  Patient currently on transplant list, has been on HD for 1 year  Nephrology following for HD needs   Patient has apparently been compliant with HD although admits to dietary and fluid restriction indiscretion   Continue home Renvela  Advised compliance with fluid restriction    Coronary artery disease of native artery of native heart with stable angina pectoris  Continue aspirin and statin      Class 1 obesity due to excess calories with serious comorbidity and body mass index (BMI) of 33.0 to 33.9 in adult  Body mass index is 32.66 kg/m². Morbid obesity complicates all aspects of disease management from diagnostic modalities to treatment. Weight loss encouraged and health benefits explained to patient.         Hyperlipidemia  Continue statin    Type 2 diabetes mellitus with chronic kidney disease, with long-term current use of insulin  Patient's FSGs are controlled on current medication regimen.  Last A1c reviewed-   Lab Results   Component Value Date    HGBA1C 6.4 (H) 05/07/2023     Most recent fingerstick glucose reviewed-   Recent Labs   Lab 05/15/23  0034 05/15/23  0536 05/15/23  1255   POCTGLUCOSE 126* 132* 106     Current correctional scale  Medium  Maintain anti-hyperglycemic dose as follows-   Antihyperglycemics (From admission, onward)    Start     Stop Route Frequency Ordered    05/14/23 1335  insulin aspart U-100 pen 1-10 Units         -- SubQ Every 6 hours PRN 05/14/23 1235        Hold Oral hypoglycemics while patient is in the hospital.    Anemia due to chronic kidney disease  Appears around baseline, no sign of overt bleeding   Patient asymptomatic   Monitor H/H      VTE Risk  Mitigation (From admission, onward)         Ordered     heparin (porcine) injection 2,000 Units  As needed (PRN)         05/14/23 1409     heparin (porcine) injection 5,000 Units  Every 8 hours         05/14/23 1015     IP VTE HIGH RISK PATIENT  Once         05/14/23 1015     Place sequential compression device  Until discontinued         05/14/23 1015                Critical care time spent on the evaluation and treatment of severe organ dysfunction, review of pertinent labs and imaging studies, discussions with consulting providers and discussions with patient/family: 40 minutes.    Thank you for your consult. I will follow-up with patient. Please contact us if you have any additional questions.    Nadya Pickard DO  Department of Hospital Medicine   '[place] - Intensive Care ([facility])

## 2023-05-16 NOTE — PLAN OF CARE
A230/A230 KAELYN Alves is a 67 y.o.male admitted on 5/14/2023 for Acute on chronic combined systolic and diastolic congestive heart failure   Code Status: Full Code MRN: [id number]   Review of patient's allergies indicates:   Allergen Reactions    Bactrim [sulfamethoxazole-trimethoprim] Swelling    Nsaids (non-steroidal anti-inflammatory drug)      CKD    Sulfamethoxazole     Trimethoprim      Past Medical History:   Diagnosis Date    Abnormal nuclear stress test 8/30/2022    Acute hypoxemic respiratory failure due to COVID-19 1/21/2021    Anal itching 10/10/2022    Anemia 4/16/2014    BPH (benign prostatic hyperplasia)     CKD (chronic kidney disease) stage 4, GFR 15-29 ml/min     Coronary artery disease of native artery of native heart with stable angina pectoris 4/29/2019    Diabetes mellitus     Diabetes mellitus, type 2     Dilated cardiomyopathy 8/30/2022    Elevated PSA     Encounter for blood transfusion     Herpes labialis     Hyperlipidemia     Hypertension     Obesity     Pneumonia due to COVID-19 virus     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    Preop cardiovascular exam 9/16/2022    Proteinuria     Secondary hyperparathyroidism (of renal origin)     UTI (lower urinary tract infection) 5/1/2015      PRN meds    acetaminophen, 650 mg, Q6H PRN  albuterol-ipratropium, 3 mL, Q6H PRN  dextrose 10%, 12.5 g, PRN  glucagon (human recombinant), 1 mg, PRN  heparin (porcine), 2,000 Units, PRN  insulin aspart U-100, 1-10 Units, Q6H PRN  ondansetron, 4 mg, Q8H PRN  sodium chloride 0.9%, 10 mL, PRN      Pt working with staff for pain management. No falls reported on shift and hourly rounding is complete. Cardiac and glucose monitoring continues. Chart check completed. Will continue plan of care.      Orientation: oriented x 4  Shanelle Coma Scale Score: 15     Lead Monitored: Lead II Rhythm: normal sinus rhythm Frequency/Ectopy: PVCs  Cardiac/Telemetry Box Number: [id number]  VTE Required Core Measure: Pharmacological prophylaxis  initiated/maintained Last Bowel Movement: 05/15/23  Diet renal Fluid - 1200mL  Voiding Characteristics: patient on hemodialysis  Kvng Score: 14  Fall Risk Score: 20  Accucheck [x]   Freq? q6     Lines/Drains/Airways       Central Venous Catheter Line  Duration                  Hemodialysis Catheter 03/15/22 1238 right internal jugular 427 days              Airway  Duration                  Airway - Non-Surgical -- days              Peripheral Intravenous Line  Duration                  Peripheral IV - Single Lumen 05/14/23 0940 18 G Right Antecubital 2 days

## 2023-05-17 LAB
ALBUMIN SERPL BCP-MCNC: 3.5 G/DL (ref 3.5–5.2)
ALP SERPL-CCNC: 52 U/L (ref 55–135)
ALT SERPL W/O P-5'-P-CCNC: 16 U/L (ref 10–44)
ANION GAP SERPL CALC-SCNC: 14 MMOL/L (ref 8–16)
AST SERPL-CCNC: 23 U/L (ref 10–40)
BASOPHILS # BLD AUTO: 0.07 K/UL (ref 0–0.2)
BASOPHILS NFR BLD: 0.7 % (ref 0–1.9)
BILIRUB SERPL-MCNC: 1.1 MG/DL (ref 0.1–1)
BUN SERPL-MCNC: 55 MG/DL (ref 8–23)
CALCIUM SERPL-MCNC: 9.7 MG/DL (ref 8.7–10.5)
CHLORIDE SERPL-SCNC: 99 MMOL/L (ref 95–110)
CO2 SERPL-SCNC: 21 MMOL/L (ref 23–29)
CREAT SERPL-MCNC: 10.1 MG/DL (ref 0.5–1.4)
DIFFERENTIAL METHOD: ABNORMAL
EOSINOPHIL # BLD AUTO: 0.3 K/UL (ref 0–0.5)
EOSINOPHIL NFR BLD: 2.9 % (ref 0–8)
ERYTHROCYTE [DISTWIDTH] IN BLOOD BY AUTOMATED COUNT: 15.1 % (ref 11.5–14.5)
EST. GFR  (NO RACE VARIABLE): 5 ML/MIN/1.73 M^2
GLUCOSE SERPL-MCNC: 127 MG/DL (ref 70–110)
HBV SURFACE AB SER QL IA: POSITIVE
HBV SURFACE AB SERPL IA-ACNC: NORMAL MIU/ML
HCT VFR BLD AUTO: 31.4 % (ref 40–54)
HGB BLD-MCNC: 9.9 G/DL (ref 14–18)
IMM GRANULOCYTES # BLD AUTO: 0.04 K/UL (ref 0–0.04)
IMM GRANULOCYTES NFR BLD AUTO: 0.4 % (ref 0–0.5)
LYMPHOCYTES # BLD AUTO: 0.7 K/UL (ref 1–4.8)
LYMPHOCYTES NFR BLD: 7.1 % (ref 18–48)
MCH RBC QN AUTO: 26.8 PG (ref 27–31)
MCHC RBC AUTO-ENTMCNC: 31.5 G/DL (ref 32–36)
MCV RBC AUTO: 85 FL (ref 82–98)
MONOCYTES # BLD AUTO: 1.1 K/UL (ref 0.3–1)
MONOCYTES NFR BLD: 11.3 % (ref 4–15)
NEUTROPHILS # BLD AUTO: 7.5 K/UL (ref 1.8–7.7)
NEUTROPHILS NFR BLD: 77.6 % (ref 38–73)
NRBC BLD-RTO: 0 /100 WBC
PLATELET # BLD AUTO: 207 K/UL (ref 150–450)
PMV BLD AUTO: 11.3 FL (ref 9.2–12.9)
POCT GLUCOSE: 108 MG/DL (ref 70–110)
POCT GLUCOSE: 124 MG/DL (ref 70–110)
POCT GLUCOSE: 126 MG/DL (ref 70–110)
POCT GLUCOSE: 149 MG/DL (ref 70–110)
POCT GLUCOSE: 95 MG/DL (ref 70–110)
POTASSIUM SERPL-SCNC: 4.2 MMOL/L (ref 3.5–5.1)
PROT SERPL-MCNC: 7.2 G/DL (ref 6–8.4)
RBC # BLD AUTO: 3.7 M/UL (ref 4.6–6.2)
SODIUM SERPL-SCNC: 134 MMOL/L (ref 136–145)
WBC # BLD AUTO: 9.68 K/UL (ref 3.9–12.7)

## 2023-05-17 PROCEDURE — 25000003 PHARM REV CODE 250: Mod: NTX | Performed by: NURSE PRACTITIONER

## 2023-05-17 PROCEDURE — 21400001 HC TELEMETRY ROOM: Mod: NTX

## 2023-05-17 PROCEDURE — 36415 COLL VENOUS BLD VENIPUNCTURE: CPT | Mod: NTX | Performed by: NURSE PRACTITIONER

## 2023-05-17 PROCEDURE — 99233 SBSQ HOSP IP/OBS HIGH 50: CPT | Mod: NTX,,, | Performed by: INTERNAL MEDICINE

## 2023-05-17 PROCEDURE — 94761 N-INVAS EAR/PLS OXIMETRY MLT: CPT | Mod: NTX

## 2023-05-17 PROCEDURE — 85025 COMPLETE CBC W/AUTO DIFF WBC: CPT | Mod: NTX | Performed by: NURSE PRACTITIONER

## 2023-05-17 PROCEDURE — 99900035 HC TECH TIME PER 15 MIN (STAT): Mod: NTX

## 2023-05-17 PROCEDURE — 94799 UNLISTED PULMONARY SVC/PX: CPT | Mod: NTX

## 2023-05-17 PROCEDURE — 90935 HEMODIALYSIS ONE EVALUATION: CPT | Mod: NTX

## 2023-05-17 PROCEDURE — 80053 COMPREHEN METABOLIC PANEL: CPT | Mod: NTX | Performed by: NURSE PRACTITIONER

## 2023-05-17 PROCEDURE — 63600175 PHARM REV CODE 636 W HCPCS: Mod: NTX | Performed by: INTERNAL MEDICINE

## 2023-05-17 PROCEDURE — 63600175 PHARM REV CODE 636 W HCPCS: Mod: NTX | Performed by: NURSE PRACTITIONER

## 2023-05-17 PROCEDURE — 25000003 PHARM REV CODE 250: Mod: NTX | Performed by: INTERNAL MEDICINE

## 2023-05-17 PROCEDURE — 99233 PR SUBSEQUENT HOSPITAL CARE,LEVL III: ICD-10-PCS | Mod: NTX,,, | Performed by: INTERNAL MEDICINE

## 2023-05-17 PROCEDURE — 27000221 HC OXYGEN, UP TO 24 HOURS: Mod: NTX

## 2023-05-17 RX ORDER — OXYCODONE AND ACETAMINOPHEN 7.5; 325 MG/1; MG/1
1 TABLET ORAL EVERY 6 HOURS PRN
Status: DISCONTINUED | OUTPATIENT
Start: 2023-05-17 | End: 2023-05-18 | Stop reason: HOSPADM

## 2023-05-17 RX ADMIN — ASPIRIN 81 MG CHEWABLE TABLET 81 MG: 81 TABLET CHEWABLE at 09:05

## 2023-05-17 RX ADMIN — ISOSORBIDE DINITRATE 20 MG: 20 TABLET ORAL at 09:05

## 2023-05-17 RX ADMIN — METHOCARBAMOL 500 MG: 500 TABLET ORAL at 05:05

## 2023-05-17 RX ADMIN — METHOCARBAMOL 500 MG: 500 TABLET ORAL at 09:05

## 2023-05-17 RX ADMIN — ISOSORBIDE DINITRATE 20 MG: 20 TABLET ORAL at 03:05

## 2023-05-17 RX ADMIN — THERA TABS 1 TABLET: TAB at 09:05

## 2023-05-17 RX ADMIN — ACETAMINOPHEN 650 MG: 325 TABLET ORAL at 08:05

## 2023-05-17 RX ADMIN — CETIRIZINE HYDROCHLORIDE 10 MG: 10 TABLET, FILM COATED ORAL at 09:05

## 2023-05-17 RX ADMIN — FOLIC ACID 1 MG: 1 TABLET ORAL at 09:05

## 2023-05-17 RX ADMIN — FLUTICASONE PROPIONATE 100 MCG: 50 SPRAY, METERED NASAL at 08:05

## 2023-05-17 RX ADMIN — HEPARIN SODIUM 5000 UNITS: 5000 INJECTION INTRAVENOUS; SUBCUTANEOUS at 08:05

## 2023-05-17 RX ADMIN — SEVELAMER CARBONATE 2400 MG: 800 TABLET, FILM COATED ORAL at 09:05

## 2023-05-17 RX ADMIN — HEPARIN SODIUM 2000 UNITS: 1000 INJECTION, SOLUTION INTRAVENOUS; SUBCUTANEOUS at 03:05

## 2023-05-17 RX ADMIN — CARVEDILOL 12.5 MG: 12.5 TABLET, FILM COATED ORAL at 05:05

## 2023-05-17 RX ADMIN — CALCITRIOL CAPSULES 0.25 MCG 0.25 MCG: 0.25 CAPSULE ORAL at 05:05

## 2023-05-17 RX ADMIN — CARVEDILOL 12.5 MG: 12.5 TABLET, FILM COATED ORAL at 09:05

## 2023-05-17 RX ADMIN — FINASTERIDE 5 MG: 5 TABLET, FILM COATED ORAL at 09:05

## 2023-05-17 RX ADMIN — SEVELAMER CARBONATE 2400 MG: 800 TABLET, FILM COATED ORAL at 05:05

## 2023-05-17 RX ADMIN — MUPIROCIN: 20 OINTMENT TOPICAL at 08:05

## 2023-05-17 RX ADMIN — HEPARIN SODIUM 5000 UNITS: 5000 INJECTION INTRAVENOUS; SUBCUTANEOUS at 05:05

## 2023-05-17 RX ADMIN — MUPIROCIN: 20 OINTMENT TOPICAL at 09:05

## 2023-05-17 RX ADMIN — FAMOTIDINE 20 MG: 10 INJECTION, SOLUTION INTRAVENOUS at 09:05

## 2023-05-17 RX ADMIN — METHOCARBAMOL 500 MG: 500 TABLET ORAL at 08:05

## 2023-05-17 RX ADMIN — ATORVASTATIN CALCIUM 40 MG: 40 TABLET, FILM COATED ORAL at 09:05

## 2023-05-17 RX ADMIN — OXYCODONE AND ACETAMINOPHEN 1 TABLET: 7.5; 325 TABLET ORAL at 05:05

## 2023-05-17 RX ADMIN — ISOSORBIDE DINITRATE 20 MG: 20 TABLET ORAL at 08:05

## 2023-05-17 RX ADMIN — HEPARIN SODIUM 5000 UNITS: 5000 INJECTION INTRAVENOUS; SUBCUTANEOUS at 03:05

## 2023-05-17 NOTE — PROGRESS NOTES
[facility] Telemetry ([facility])  [facility] Medicine  Progress Note    Patient Name: Isidro Alves  MRN: [id number]  Patient Class: IP- Inpatient   Admission Date: 5/14/2023  Length of Stay: 3 days  Attending Physician: Nils Fritz MD  Primary Care Provider: Steven Morgan MD    Subjective:     Principal Problem:Acute on chronic combined systolic and diastolic congestive heart failure        HPI:  Isidro Alves is a 67 y.o. male with ESRD on HD, CAD, dilated cardiomyopathy, hypertension, diabetes mellitus who initially presented to ED on 5/14/2023 from [facility] for flash pulmonary edema, respiratory failure and witnessed cardiac arrest while being intubated there.  Received a round of CPR before ROSC was achieved.  In ED had workup with chest x-ray which showed florid pulmonary edema with bilateral pleural effusions, elevated lactic acid of 3.2.  Admitted to ICU on critical care medicine service.      After admission to ICU, patient required urgent HD.  However he was hypotensive and necessitated central line placement and initiation of Levophed.  Was able to weaned off Levophed and vent and successfully extubated on 05/15/2023.  Hospital medicine consulted for continued care    PCP: Steven Morgan MD        Overview/Hospital Course:  67-year-old male with CAD, dilated cardiomyopathy, diabetes mellitus on renal transplant list, has been on HD for 1 year presented as transfer on 05/14/2023 from [facility] ED where he was noted to have cardiac arrest while in process of getting intubated due to respiratory distress.  Admitted to ICU    Underwent urgent HD on 5/14, however given hypotension required central line placement and started on Levophed.  Weaned off Levophed, successfully extubated 5/15. Had repeat HD done on 5/15.  5/16/23 analgesia ordered to control MSK chest pain. Failed desat study.   5/17/23 HD today. Will plan to repeat desat study in AM prior to discharge.        No new subjective &  objective note has been filed under this hospital service since the last note was generated.      Assessment/Plan:      * Acute on chronic combined systolic and diastolic congestive heart failure  Patient is identified as having Combined Systolic and Diastolic heart failure that is Acute on chronic. CHF is currently uncontrolled due to Pulmonary edema/pleural effusion on CXR. Latest ECHO performed and demonstrates- Results for orders placed during the hospital encounter of 05/07/23    Echo    Interpretation Summary  · Concentric hypertrophy and mildly decreased systolic function.  · The estimated ejection fraction is 40%.  · Left ventricular diastolic dysfunction.  · Normal right ventricular size with normal right ventricular systolic function.  · Mild left atrial enlargement.  · Mild tricuspid regurgitation.  . Continue lasix, beta blocker, ARNI as BP tolerates, HD and monitor clinical status closely. Monitor on telemetry. Patient is off CHF pathway.  Monitor strict Is&Os and daily weights.  Place on fluid restriction of 1.2 L. Continue to stress to patient importance of self efficacy and  on diet for CHF. Last BNP reviewed- and noted below   Recent Labs   Lab 05/14/23  1053   BNP >4,900*   .      Acute on chronic respiratory failure  Patient with Hypoxic Respiratory failure which is Acute on chronic.  he is on home oxygen at 2 LPM. Supplemental oxygen was provided and noted- Oxygen Concentration (%):  [24] 24    .   Signs/symptoms of respiratory failure include- tachypnea, increased work of breathing, respiratory distress and use of accessory muscles. Contributing diagnoses includes - CHF Labs and images were reviewed. Patient Has recent ABG, which has been reviewed. Will treat underlying causes and adjust management of respiratory failure as follows-   Volume removal with HD  Successfully extubated on 05/15/2023  Resume diuretic    5/16/12  Does not wear supplemental oxygen at home. desat study positive  suspected related to shallow breaths from MSK pain. IS ordered. Add muscle relaxants and monitor response.    5/17/23  Analgesia changed to PO regimen. Repeat desat study in AM    ESRD on dialysis  Patient currently on transplant list, has been on HD for 1 year  Nephrology following for HD needs   Patient has apparently been compliant with HD although admits to dietary and fluid restriction indiscretion   Continue home Adrianne  Advised compliance with fluid restriction    5/16/23  Plans for HD in AM  5/17/23  HD today removing 2L    Coronary artery disease of native artery of native heart with stable angina pectoris  Continue aspirin and statin      Class 1 obesity due to excess calories with serious comorbidity and body mass index (BMI) of 33.0 to 33.9 in adult  Body mass index is 32.66 kg/m². Morbid obesity complicates all aspects of disease management from diagnostic modalities to treatment. Weight loss encouraged and health benefits explained to patient.         Hyperlipidemia  Continue statin    Type 2 diabetes mellitus with chronic kidney disease, with long-term current use of insulin  Patient's FSGs are controlled on current medication regimen.  Last A1c reviewed-   Lab Results   Component Value Date    HGBA1C 6.4 (H) 05/07/2023     Most recent fingerstick glucose reviewed-   Recent Labs   Lab 05/15/23  2155 05/16/23  0549 05/16/23  1148 05/16/23  1545   POCTGLUCOSE 118* 105 117* 112*     Current correctional scale  Medium  Maintain anti-hyperglycemic dose as follows-   Antihyperglycemics (From admission, onward)    Start     Stop Route Frequency Ordered    05/14/23 1335  insulin aspart U-100 pen 1-10 Units         -- SubQ Every 6 hours PRN 05/14/23 1235        Hold Oral hypoglycemics while patient is in the hospital.    Anemia due to chronic kidney disease  Appears around baseline, no sign of overt bleeding   Patient asymptomatic   Monitor H/H      VTE Risk Mitigation (From admission, onward)         Ordered      heparin (porcine) injection 2,000 Units  As needed (PRN)         05/14/23 1409     heparin (porcine) injection 5,000 Units  Every 8 hours         05/14/23 1015     IP VTE HIGH RISK PATIENT  Once         05/14/23 1015     Place sequential compression device  Until discontinued         05/14/23 1015                Discharge Planning   LYDIA:      Code Status: Full Code   Is the patient medically ready for discharge?:     Reason for patient still in hospital (select all that apply): Treatment  Discharge Plan A: Home with family          Khushi Trotter NP  Department of Hospital Medicine   '[place] - Telemetry ([facility])

## 2023-05-17 NOTE — ASSESSMENT & PLAN NOTE
Patient with Hypoxic Respiratory failure which is Acute on chronic.  he is on home oxygen at 2 LPM. Supplemental oxygen was provided and noted- Oxygen Concentration (%):  [24] 24    .   Signs/symptoms of respiratory failure include- tachypnea, increased work of breathing, respiratory distress and use of accessory muscles. Contributing diagnoses includes - CHF Labs and images were reviewed. Patient Has recent ABG, which has been reviewed. Will treat underlying causes and adjust management of respiratory failure as follows-   Volume removal with HD  Successfully extubated on 05/15/2023  Resume diuretic    5/16/12  Does not wear supplemental oxygen at home. desat study positive suspected related to shallow breaths from MSK pain. IS ordered. Add muscle relaxants and monitor response.    5/17/23  Analgesia changed to PO regimen. Repeat desat study in AM

## 2023-05-17 NOTE — ASSESSMENT & PLAN NOTE
"68 y/o male with ESRD on chronic HD presented with fluid overload and pulmonary edema:     ESRD  Symptomatically much improved with HD/UF 2 days in a row  Remains extubated  Transferred out of the ICU  ESRD pt on chronic HD q MWF at [facility]. On HD since Jan 2021  H/o of DM, HTN, and metabolic syndrome     K normal  O2 sat good  No indications for HD today  Next HD in am, orders placed       Complications of ESRD  Mild hyperkalemia, will respond to HD  Metabolic acidosis. 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  This is because the rate of fluid removal during HD has a direct relation to the worsening of CHF.  This is because of a process called "cardiac stunning" which occurs when a lot of fluids have to be removed over a limited span of time.        Type 2 diabetes mellitus with chronic kidney disease, with long-term current use of insulin  Chart was reviewed  Comorbidities noted        Plans and recommendations:  As discussed above   Opportunity fort questions provided  Total time spent 40 minutes including time needed to review the records, the   patient evaluation, documentation, face-to-face discussion with the patient,   more than 50% of " the time was spent on coordination of care and counseling.

## 2023-05-17 NOTE — SUBJECTIVE & OBJECTIVE
Interval History: Pt was seen and examined. Labs and meds reviewed. Discussed with other providers. No new c/o's, no SOB, no discomfort, feels better.    Review of patient's allergies indicates:   Allergen Reactions    Bactrim [sulfamethoxazole-trimethoprim] Swelling    Nsaids (non-steroidal anti-inflammatory drug)      CKD    Sulfamethoxazole     Trimethoprim      Current Facility-Administered Medications   Medication Frequency    acetaminophen tablet 650 mg Q6H PRN    albuterol-ipratropium 2.5 mg-0.5 mg/3 mL nebulizer solution 3 mL Q6H PRN    aspirin chewable tablet 81 mg Daily    atorvastatin tablet 40 mg Daily    calcitRIOL capsule 0.25 mcg Every Mon, Wed, Fri    carvediloL tablet 12.5 mg BID WM    cetirizine tablet 10 mg Daily    dextrose 10% bolus 125 mL 125 mL PRN    epoetin dot-epbx injection 5,000 Units Every Tues, Thurs, Sat    famotidine (PF) injection 20 mg Daily    finasteride tablet 5 mg Daily    fluticasone propionate 50 mcg/actuation nasal spray 100 mcg QHS    folic acid tablet 1 mg Daily    glucagon (human recombinant) injection 1 mg PRN    heparin (porcine) injection 2,000 Units PRN    heparin (porcine) injection 5,000 Units Q8H    insulin aspart U-100 pen 1-10 Units Q6H PRN    isosorbide dinitrate tablet 20 mg TID    methocarbamoL tablet 500 mg QID    multivitamin tablet Daily    mupirocin 2 % ointment BID    ondansetron injection 4 mg Q8H PRN    oxyCODONE-acetaminophen 7.5-325 mg per tablet 1 tablet Q6H PRN    sevelamer carbonate tablet 2,400 mg TID WM    sodium chloride 0.9% flush 10 mL PRN       Objective:     Vital Signs (Most Recent):  Temp: 96.9 °F (36.1 °C) (05/17/23 0711)  Pulse: 67 (05/17/23 0711)  Resp: 16 (05/17/23 0711)  BP: 131/83 (05/17/23 0711)  SpO2: 95 % (05/17/23 0900) Vital Signs (24h Range):  Temp:  [96.9 °F (36.1 °C)-98.6 °F (37 °C)] 96.9 °F (36.1 °C)  Pulse:  [67-80] 67  Resp:  [14-19] 16  SpO2:  [94 %-97 %] 95 %  BP: (131-153)/(82-93) 131/83     Weight: 112.3 kg (247 lb 9.2  oz) (05/16/23 0725)  Body mass index is 31.79 kg/m².  Body surface area is 2.42 meters squared.    I/O last 3 completed shifts:  In: 240 [P.O.:240]  Out: -      Physical Exam  Vitals and nursing note reviewed.   Constitutional:       Appearance: Normal appearance.   Cardiovascular:      Rate and Rhythm: Normal rate and regular rhythm.      Pulses: Normal pulses.      Heart sounds: Normal heart sounds.   Pulmonary:      Effort: Pulmonary effort is normal.      Breath sounds: Normal breath sounds. No rales.   Abdominal:      Palpations: Abdomen is soft.      Tenderness: There is no guarding.   Musculoskeletal:      Right lower leg: No edema.      Left lower leg: No edema.   Neurological:      Mental Status: He is alert and oriented to person, place, and time.   Psychiatric:         Behavior: Behavior normal.        Significant Labs: reviewed  BMP  Lab Results   Component Value Date     (L) 05/17/2023    K 4.2 05/17/2023    CL 99 05/17/2023    CO2 21 (L) 05/17/2023    BUN 55 (H) 05/17/2023    CREATININE 10.1 (H) 05/17/2023    CALCIUM 9.7 05/17/2023    ANIONGAP 14 05/17/2023    EGFRNORACEVR 5 (A) 05/17/2023     Lab Results   Component Value Date    WBC 9.68 05/17/2023    HGB 9.9 (L) 05/17/2023    HCT 31.4 (L) 05/17/2023    MCV 85 05/17/2023     05/17/2023

## 2023-05-17 NOTE — ASSESSMENT & PLAN NOTE
Patient currently on transplant list, has been on HD for 1 year  Nephrology following for HD needs   Patient has apparently been compliant with HD although admits to dietary and fluid restriction indiscretion   Continue home Adrianne  Advised compliance with fluid restriction    5/16/23  Plans for HD in AM  5/17/23  HD today removing 2L

## 2023-05-17 NOTE — PLAN OF CARE
Patient updated on plan of care. Fall and safety precautions in place. Vitals every 4 hours. Bed alarm on. Patient remained free from falls. Education provided; questions answered encouraged.  Pt has rested today, got bedside dialysis, pulled off 2 Liters.

## 2023-05-17 NOTE — PLAN OF CARE
Problem: Infection (Hemodialysis)  Goal: Absence of Infection Signs and Symptoms  Outcome: Ongoing, Progressing     Problem: Adult Inpatient Plan of Care  Goal: Plan of Care Review  Outcome: Ongoing, Progressing

## 2023-05-17 NOTE — PROGRESS NOTES
[facility] ([facility])  Nephrology  Progress Note    Patient Name: Isidro Alves  MRN: [id number]  Admission Date: 5/14/2023  Hospital Length of Stay: 3 days  Attending Provider: Nils Fritz MD   Primary Care Physician: Steven Morgan MD  Principal Problem:Acute on chronic combined systolic and diastolic congestive heart failure    Subjective:     HPI: Thank you for referring the pt to us. H/o and chart were reviewed. Pt was seen and examined. Pt is a 68 y/o male with ESRD on chronic HD q MWF at [facility], with h/o of DM, HTN, CAD, combined diastolic and systolic CHF (EF 40%), metabolic syndrome, and obesity who presented with SOB. Last HD was per schedule 2 days ago. Per wife, pt woke up and was feeling SOB. Wife called 911, and pt was intubated in the field before arriving in hospital. Pt remains intubate. No CP, no fver. Also had a cough. Pt is not missing HD treatment. Noted pt had been admitted for volume overload causing SOB here at [facility] 1 week ago. Per family pt drinks a lot of fluids.      Interval History: Pt was seen and examined. Labs and meds reviewed. Discussed with other providers. No new c/o's, no SOB, no discomfort, feels better.    Review of patient's allergies indicates:   Allergen Reactions    Bactrim [sulfamethoxazole-trimethoprim] Swelling    Nsaids (non-steroidal anti-inflammatory drug)      CKD    Sulfamethoxazole     Trimethoprim      Current Facility-Administered Medications   Medication Frequency    acetaminophen tablet 650 mg Q6H PRN    albuterol-ipratropium 2.5 mg-0.5 mg/3 mL nebulizer solution 3 mL Q6H PRN    aspirin chewable tablet 81 mg Daily    atorvastatin tablet 40 mg Daily    calcitRIOL capsule 0.25 mcg Every Mon, Wed, Fri    carvediloL tablet 12.5 mg BID WM    cetirizine tablet 10 mg Daily    dextrose 10% bolus 125 mL 125 mL PRN    epoetin dot-epbx injection 5,000 Units Every Tues, Thurs, Sat    famotidine (PF) injection 20 mg Daily     finasteride tablet 5 mg Daily    fluticasone propionate 50 mcg/actuation nasal spray 100 mcg QHS    folic acid tablet 1 mg Daily    glucagon (human recombinant) injection 1 mg PRN    heparin (porcine) injection 2,000 Units PRN    heparin (porcine) injection 5,000 Units Q8H    insulin aspart U-100 pen 1-10 Units Q6H PRN    isosorbide dinitrate tablet 20 mg TID    methocarbamoL tablet 500 mg QID    multivitamin tablet Daily    mupirocin 2 % ointment BID    ondansetron injection 4 mg Q8H PRN    oxyCODONE-acetaminophen 7.5-325 mg per tablet 1 tablet Q6H PRN    sevelamer carbonate tablet 2,400 mg TID WM    sodium chloride 0.9% flush 10 mL PRN       Objective:     Vital Signs (Most Recent):  Temp: 96.9 °F (36.1 °C) (05/17/23 0711)  Pulse: 67 (05/17/23 0711)  Resp: 16 (05/17/23 0711)  BP: 131/83 (05/17/23 0711)  SpO2: 95 % (05/17/23 0900) Vital Signs (24h Range):  Temp:  [96.9 °F (36.1 °C)-98.6 °F (37 °C)] 96.9 °F (36.1 °C)  Pulse:  [67-80] 67  Resp:  [14-19] 16  SpO2:  [94 %-97 %] 95 %  BP: (131-153)/(82-93) 131/83     Weight: 112.3 kg (247 lb 9.2 oz) (05/16/23 0725)  Body mass index is 31.79 kg/m².  Body surface area is 2.42 meters squared.    I/O last 3 completed shifts:  In: 240 [P.O.:240]  Out: -      Physical Exam  Vitals and nursing note reviewed.   Constitutional:       Appearance: Normal appearance.   Cardiovascular:      Rate and Rhythm: Normal rate and regular rhythm.      Pulses: Normal pulses.      Heart sounds: Normal heart sounds.   Pulmonary:      Effort: Pulmonary effort is normal.      Breath sounds: Normal breath sounds. No rales.   Abdominal:      Palpations: Abdomen is soft.      Tenderness: There is no guarding.   Musculoskeletal:      Right lower leg: No edema.      Left lower leg: No edema.   Neurological:      Mental Status: He is alert and oriented to person, place, and time.   Psychiatric:         Behavior: Behavior normal.        Significant Labs: reviewed  BMP  Lab Results    Component Value Date     (L) 05/17/2023    K 4.2 05/17/2023    CL 99 05/17/2023    CO2 21 (L) 05/17/2023    BUN 55 (H) 05/17/2023    CREATININE 10.1 (H) 05/17/2023    CALCIUM 9.7 05/17/2023    ANIONGAP 14 05/17/2023    EGFRNORACEVR 5 (A) 05/17/2023     Lab Results   Component Value Date    WBC 9.68 05/17/2023    HGB 9.9 (L) 05/17/2023    HCT 31.4 (L) 05/17/2023    MCV 85 05/17/2023     05/17/2023           Assessment/Plan:       66 y/o male with ESRD on chronic HD presented with fluid overload and pulmonary edema:     ESRD  ESRD pt on chronic HD q MWF at [facility]. On HD since Jan 2021  H/o of DM, HTN, and metabolic syndrome  H/o of excess fluid intake between HD treatments.  Was admitted for volume overload causing pulmonary edema, was intubated at home prior to arrival.  Good response to intensive HD/UF, extubated  Doing well today clinically, stable     K normal  O2 sat good  Due for HD today  Has tolerated Hd well, continue HD       Complications of ESRD  Mild hyperkalemia, improved HD, normal now  Metabolic acidosis. 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  This is because the rate of fluid removal during HD has a direct  "relation to the worsening of CHF.  This is because of a process called "cardiac stunning" which occurs when a lot of fluids have to be removed over a limited span of time.        Type 2 diabetes mellitus with chronic kidney disease, with long-term current use of insulin  Chart was reviewed  Comorbidities noted        Plans and recommendations:  As discussed above   Opportunity fort questions provided  Total time spent 40 minutes including time needed to review the records, the   patient evaluation, documentation, face-to-face discussion with the patient,   more than 50% of the time was spent on coordination of care and counseling.               Idris Ordaz MD  Nephrology  [facility] ([facility])  "

## 2023-05-17 NOTE — PT/OT/SLP PROGRESS
Physical Therapy      Patient Name:  Isidro Alves   MRN:  [id number]    Chart review performed and attempted (6615) but patient not seen today secondary to Dialysis. Will follow-up as able/appropriate.

## 2023-05-18 ENCOUNTER — TELEPHONE (OUTPATIENT)
Dept: INTERNAL MEDICINE | Facility: CLINIC | Age: 68
End: 2023-05-18
Payer: COMMERCIAL

## 2023-05-18 VITALS
BODY MASS INDEX: 31.77 KG/M2 | SYSTOLIC BLOOD PRESSURE: 129 MMHG | OXYGEN SATURATION: 92 % | WEIGHT: 247.56 LBS | DIASTOLIC BLOOD PRESSURE: 78 MMHG | HEART RATE: 69 BPM | RESPIRATION RATE: 18 BRPM | HEIGHT: 74 IN | TEMPERATURE: 97 F

## 2023-05-18 PROBLEM — J96.02 ACUTE RESPIRATORY FAILURE WITH HYPOXIA AND HYPERCAPNIA: Status: ACTIVE | Noted: 2023-05-18

## 2023-05-18 PROBLEM — J96.01 ACUTE RESPIRATORY FAILURE WITH HYPOXIA AND HYPERCAPNIA: Status: ACTIVE | Noted: 2023-05-18

## 2023-05-18 PROBLEM — I50.1 PULMONARY EDEMA WITH CONGESTIVE HEART FAILURE: Status: ACTIVE | Noted: 2023-05-18

## 2023-05-18 PROBLEM — I16.1 HYPERTENSIVE EMERGENCY: Status: ACTIVE | Noted: 2023-05-18

## 2023-05-18 LAB
ALBUMIN SERPL BCP-MCNC: 3.6 G/DL (ref 3.5–5.2)
ALP SERPL-CCNC: 49 U/L (ref 55–135)
ALT SERPL W/O P-5'-P-CCNC: 14 U/L (ref 10–44)
ANION GAP SERPL CALC-SCNC: 14 MMOL/L (ref 8–16)
AST SERPL-CCNC: 19 U/L (ref 10–40)
BASOPHILS # BLD AUTO: 0.09 K/UL (ref 0–0.2)
BASOPHILS NFR BLD: 1 % (ref 0–1.9)
BILIRUB SERPL-MCNC: 1 MG/DL (ref 0.1–1)
BUN SERPL-MCNC: 42 MG/DL (ref 8–23)
CALCIUM SERPL-MCNC: 9.8 MG/DL (ref 8.7–10.5)
CHLORIDE SERPL-SCNC: 98 MMOL/L (ref 95–110)
CO2 SERPL-SCNC: 23 MMOL/L (ref 23–29)
CREAT SERPL-MCNC: 8.4 MG/DL (ref 0.5–1.4)
DIFFERENTIAL METHOD: ABNORMAL
EOSINOPHIL # BLD AUTO: 0.4 K/UL (ref 0–0.5)
EOSINOPHIL NFR BLD: 5 % (ref 0–8)
ERYTHROCYTE [DISTWIDTH] IN BLOOD BY AUTOMATED COUNT: 14.8 % (ref 11.5–14.5)
EST. GFR  (NO RACE VARIABLE): 6 ML/MIN/1.73 M^2
GLUCOSE SERPL-MCNC: 127 MG/DL (ref 70–110)
HCT VFR BLD AUTO: 32.3 % (ref 40–54)
HGB BLD-MCNC: 10.1 G/DL (ref 14–18)
IMM GRANULOCYTES # BLD AUTO: 0.03 K/UL (ref 0–0.04)
IMM GRANULOCYTES NFR BLD AUTO: 0.3 % (ref 0–0.5)
LYMPHOCYTES # BLD AUTO: 1.1 K/UL (ref 1–4.8)
LYMPHOCYTES NFR BLD: 12.4 % (ref 18–48)
MCH RBC QN AUTO: 26.4 PG (ref 27–31)
MCHC RBC AUTO-ENTMCNC: 31.3 G/DL (ref 32–36)
MCV RBC AUTO: 85 FL (ref 82–98)
MONOCYTES # BLD AUTO: 1.3 K/UL (ref 0.3–1)
MONOCYTES NFR BLD: 14.9 % (ref 4–15)
NEUTROPHILS # BLD AUTO: 5.8 K/UL (ref 1.8–7.7)
NEUTROPHILS NFR BLD: 66.4 % (ref 38–73)
NRBC BLD-RTO: 0 /100 WBC
PLATELET # BLD AUTO: 215 K/UL (ref 150–450)
PMV BLD AUTO: 10.6 FL (ref 9.2–12.9)
POCT GLUCOSE: 126 MG/DL (ref 70–110)
POCT GLUCOSE: 127 MG/DL (ref 70–110)
POCT GLUCOSE: 128 MG/DL (ref 70–110)
POTASSIUM SERPL-SCNC: 4 MMOL/L (ref 3.5–5.1)
PROT SERPL-MCNC: 7.6 G/DL (ref 6–8.4)
RBC # BLD AUTO: 3.82 M/UL (ref 4.6–6.2)
SODIUM SERPL-SCNC: 135 MMOL/L (ref 136–145)
WBC # BLD AUTO: 8.66 K/UL (ref 3.9–12.7)

## 2023-05-18 PROCEDURE — 63600175 PHARM REV CODE 636 W HCPCS: Mod: NTX | Performed by: INTERNAL MEDICINE

## 2023-05-18 PROCEDURE — 25000003 PHARM REV CODE 250: Mod: NTX | Performed by: NURSE PRACTITIONER

## 2023-05-18 PROCEDURE — 99900035 HC TECH TIME PER 15 MIN (STAT): Mod: NTX

## 2023-05-18 PROCEDURE — 25000003 PHARM REV CODE 250: Mod: NTX | Performed by: INTERNAL MEDICINE

## 2023-05-18 PROCEDURE — 85025 COMPLETE CBC W/AUTO DIFF WBC: CPT | Mod: NTX | Performed by: NURSE PRACTITIONER

## 2023-05-18 PROCEDURE — 99233 SBSQ HOSP IP/OBS HIGH 50: CPT | Mod: NTX,,, | Performed by: INTERNAL MEDICINE

## 2023-05-18 PROCEDURE — 80053 COMPREHEN METABOLIC PANEL: CPT | Mod: NTX | Performed by: NURSE PRACTITIONER

## 2023-05-18 PROCEDURE — 27000221 HC OXYGEN, UP TO 24 HOURS: Mod: NTX

## 2023-05-18 PROCEDURE — 97162 PT EVAL MOD COMPLEX 30 MIN: CPT | Mod: NTX

## 2023-05-18 PROCEDURE — 99233 PR SUBSEQUENT HOSPITAL CARE,LEVL III: ICD-10-PCS | Mod: NTX,,, | Performed by: INTERNAL MEDICINE

## 2023-05-18 PROCEDURE — 63600175 PHARM REV CODE 636 W HCPCS: Mod: NTX | Performed by: NURSE PRACTITIONER

## 2023-05-18 PROCEDURE — 36415 COLL VENOUS BLD VENIPUNCTURE: CPT | Mod: NTX | Performed by: NURSE PRACTITIONER

## 2023-05-18 PROCEDURE — 94761 N-INVAS EAR/PLS OXIMETRY MLT: CPT | Mod: NTX

## 2023-05-18 RX ORDER — METHOCARBAMOL 500 MG/1
500 TABLET, FILM COATED ORAL 3 TIMES DAILY
Qty: 15 TABLET | Refills: 0 | Status: SHIPPED | OUTPATIENT
Start: 2023-05-18 | End: 2023-05-25

## 2023-05-18 RX ORDER — FINASTERIDE 5 MG/1
TABLET, FILM COATED ORAL
Qty: 30 TABLET | Refills: 11 | Status: SHIPPED | OUTPATIENT
Start: 2023-05-18

## 2023-05-18 RX ADMIN — EPOETIN ALFA-EPBX 5000 UNITS: 10000 INJECTION, SOLUTION INTRAVENOUS; SUBCUTANEOUS at 08:05

## 2023-05-18 RX ADMIN — METHOCARBAMOL 500 MG: 500 TABLET ORAL at 01:05

## 2023-05-18 RX ADMIN — ASPIRIN 81 MG CHEWABLE TABLET 81 MG: 81 TABLET CHEWABLE at 08:05

## 2023-05-18 RX ADMIN — FAMOTIDINE 20 MG: 10 INJECTION, SOLUTION INTRAVENOUS at 08:05

## 2023-05-18 RX ADMIN — HEPARIN SODIUM 5000 UNITS: 5000 INJECTION INTRAVENOUS; SUBCUTANEOUS at 01:05

## 2023-05-18 RX ADMIN — FOLIC ACID 1 MG: 1 TABLET ORAL at 08:05

## 2023-05-18 RX ADMIN — ATORVASTATIN CALCIUM 40 MG: 40 TABLET, FILM COATED ORAL at 08:05

## 2023-05-18 RX ADMIN — CARVEDILOL 12.5 MG: 12.5 TABLET, FILM COATED ORAL at 08:05

## 2023-05-18 RX ADMIN — CETIRIZINE HYDROCHLORIDE 10 MG: 10 TABLET, FILM COATED ORAL at 08:05

## 2023-05-18 RX ADMIN — SEVELAMER CARBONATE 2400 MG: 800 TABLET, FILM COATED ORAL at 01:05

## 2023-05-18 RX ADMIN — SEVELAMER CARBONATE 2400 MG: 800 TABLET, FILM COATED ORAL at 08:05

## 2023-05-18 RX ADMIN — ISOSORBIDE DINITRATE 20 MG: 20 TABLET ORAL at 08:05

## 2023-05-18 RX ADMIN — FINASTERIDE 5 MG: 5 TABLET, FILM COATED ORAL at 08:05

## 2023-05-18 RX ADMIN — MUPIROCIN: 20 OINTMENT TOPICAL at 08:05

## 2023-05-18 RX ADMIN — THERA TABS 1 TABLET: TAB at 08:05

## 2023-05-18 RX ADMIN — OXYCODONE AND ACETAMINOPHEN 1 TABLET: 7.5; 325 TABLET ORAL at 10:05

## 2023-05-18 RX ADMIN — HEPARIN SODIUM 5000 UNITS: 5000 INJECTION INTRAVENOUS; SUBCUTANEOUS at 06:05

## 2023-05-18 RX ADMIN — METHOCARBAMOL 500 MG: 500 TABLET ORAL at 08:05

## 2023-05-18 NOTE — PROGRESS NOTES
[facility] ([facility])  Nephrology  Progress Note    Patient Name: Isidro Alves  MRN: [id number]  Admission Date: 5/14/2023  Hospital Length of Stay: 4 days  Attending Provider: Nils Fritz MD   Primary Care Physician: Steven Morgan MD  Principal Problem:Acute on chronic combined systolic and diastolic congestive heart failure    Subjective:     HPI: Thank you for referring the pt to us. H/o and chart were reviewed. Pt was seen and examined. Pt is a 66 y/o male with ESRD on chronic HD q MWF at [facility], with h/o of DM, HTN, CAD, combined diastolic and systolic CHF (EF 40%), metabolic syndrome, and obesity who presented with SOB. Last HD was per schedule 2 days ago. Per wife, pt woke up and was feeling SOB. Wife called 911, and pt was intubated in the field before arriving in hospital. Pt remains intubate. No CP, no fver. Also had a cough. Pt is not missing HD treatment. Noted pt had been admitted for volume overload causing SOB here at [facility] 1 week ago. Per family pt drinks a lot of fluids.      Interval History: Pt was seen and examined. Labs and meds reviewed. Discussed with other providers. No new events, no SOB, feels good today, and wants to go home. No c/o's with HD yesterday.    Review of patient's allergies indicates:   Allergen Reactions    Bactrim [sulfamethoxazole-trimethoprim] Swelling    Nsaids (non-steroidal anti-inflammatory drug)      CKD    Sulfamethoxazole     Trimethoprim      Current Facility-Administered Medications   Medication Frequency    acetaminophen tablet 650 mg Q6H PRN    albuterol-ipratropium 2.5 mg-0.5 mg/3 mL nebulizer solution 3 mL Q6H PRN    aspirin chewable tablet 81 mg Daily    atorvastatin tablet 40 mg Daily    calcitRIOL capsule 0.25 mcg Every Mon, Wed, Fri    carvediloL tablet 12.5 mg BID WM    cetirizine tablet 10 mg Daily    dextrose 10% bolus 125 mL 125 mL PRN    epoetin dot-epbx injection 5,000 Units Every Tues, Thurs, Sat     famotidine (PF) injection 20 mg Daily    finasteride tablet 5 mg Daily    fluticasone propionate 50 mcg/actuation nasal spray 100 mcg QHS    folic acid tablet 1 mg Daily    glucagon (human recombinant) injection 1 mg PRN    heparin (porcine) injection 2,000 Units PRN    heparin (porcine) injection 5,000 Units Q8H    insulin aspart U-100 pen 1-10 Units Q6H PRN    isosorbide dinitrate tablet 20 mg TID    methocarbamoL tablet 500 mg QID    multivitamin tablet Daily    mupirocin 2 % ointment BID    ondansetron injection 4 mg Q8H PRN    oxyCODONE-acetaminophen 7.5-325 mg per tablet 1 tablet Q6H PRN    sevelamer carbonate tablet 2,400 mg TID WM    sodium chloride 0.9% flush 10 mL PRN       Objective:     Vital Signs (Most Recent):  Temp: 97.3 °F (36.3 °C) (05/18/23 0716)  Pulse: 97 (05/18/23 0930)  Resp: 16 (05/18/23 1022)  BP: (!) 141/86 (05/18/23 0716)  SpO2: 95 % (05/18/23 0920) Vital Signs (24h Range):  Temp:  [97.1 °F (36.2 °C)-98.2 °F (36.8 °C)] 97.3 °F (36.3 °C)  Pulse:  [] 97  Resp:  [14-18] 16  SpO2:  [95 %-97 %] 95 %  BP: (119-151)/(71-98) 141/86     Weight: 112.3 kg (247 lb 9.2 oz) (05/16/23 0725)  Body mass index is 31.79 kg/m².  Body surface area is 2.42 meters squared.    I/O last 3 completed shifts:  In: 180 [P.O.:180]  Out: 2663 [Other:2663]     Physical Exam  Vitals reviewed.   Constitutional:       Appearance: Normal appearance.   Cardiovascular:      Rate and Rhythm: Normal rate.      Pulses: Normal pulses.      Heart sounds: Normal heart sounds.   Pulmonary:      Effort: Pulmonary effort is normal.      Breath sounds: Normal breath sounds. No rales.   Abdominal:      Palpations: Abdomen is soft.      Tenderness: There is no abdominal tenderness.   Musculoskeletal:      Right lower leg: No edema.      Left lower leg: No edema.   Neurological:      Mental Status: He is oriented to person, place, and time.   Psychiatric:         Behavior: Behavior normal.        Significant Labs:  reviewed  BMP  Lab Results   Component Value Date     (L) 05/18/2023    K 4.0 05/18/2023    CL 98 05/18/2023    CO2 23 05/18/2023    BUN 42 (H) 05/18/2023    CREATININE 8.4 (H) 05/18/2023    CALCIUM 9.8 05/18/2023    ANIONGAP 14 05/18/2023    EGFRNORACEVR 6 (A) 05/18/2023     Lab Results   Component Value Date    WBC 8.66 05/18/2023    HGB 10.1 (L) 05/18/2023    HCT 32.3 (L) 05/18/2023    MCV 85 05/18/2023     05/18/2023         Significant Imaging: reviewed    Assessment/Plan:     66 y/o male with ESRD on chronic HD presented with fluid overload and pulmonary edema:     ESRD  ESRD pt on chronic HD q MWF at [facility]. On HD since Jan 2021  H/o of DM, HTN, and metabolic syndrome  H/o of excess fluid intake between HD treatments.  Was admitted for volume overload causing pulmonary edema, was intubated at home prior to arrival.  Good response to intensive HD/UF, extubated  Doing well today clinically, stable  S/p HD yesterday, tolerated HD well     K normal  O2 sat good  Volume status improved and stable  No indications for HD today  Next HD in am either inpt (if still here) of in outpt un it (pt aware)       Complications of ESRD  Hyperkalemia, improved HD, normal now  Metabolic acidosis. stable with HD  Anemia: on epogen,  PO4 normal  Ca normal  iPTH controlled       HTN: BP well controlled now  Presented on admit with hypertensive emergency  Due to fluid overload and vascular congestion  BP has improved after fluid removal, is normal  Meds reviewed     Acute hypoxemic respiratory failure   H/o of combined diastolic and systolic CHF (EF 40%)  Cardiorenal syndrome  Presented with acute respiratory acidosis  H/o of unrestricted salt and fluid intake in diet     Good response to dietary education and recommendations  Wife very caring and attentive        Type 2 diabetes mellitus with chronic kidney disease, with long-term current use of insulin  Chart was reviewed  Comorbidities  noted        Plans and recommendations:  As discussed above   Opportunity fort questions provided  Total time spent 40 minutes including time needed to review the records, the   patient evaluation, documentation, face-to-face discussion with the patient,   more than 50% of the time was spent on coordination of care and counseling.          Idris Ordaz MD  Nephrology  [facility] ([facility])

## 2023-05-18 NOTE — PROGRESS NOTES
Home Oxygen Evaluation - [facility] - Cardiopulmonary Department      Date Performed: 5/18/2023      1) Patient's Home O2 Sat on room air, while at rest: Room Air SpO2 At Rest: 94 %        If O2 sats on room air at rest are 88% or below, patient qualifies.  Document O2 liter flow needed in Step 2.  If O2 sats are 89% or above, complete Step 3.        2)  If patient is not ambulated and O2 sats are <88%, what is the O2 liter flow required to meet ordered saturation?      If O2 sats on room air while exercising remain 89% or above patient does not qualify, no further testing needed Document N/A in step 3. If O2 sats on room air while exercising are 88% or below, continue to Step 4.    3) Patient's O2 Sat on room air while exercising: Room Air SpO2 During Ambulation: 93 %        4) Patient's O2 Sat while exercising on O2: SpO2 During Ambulation on O2: 94 % at Ambulation O2 LPM: 2 LPM         (Must show improvement from #4 for patients to qualify)

## 2023-05-18 NOTE — DISCHARGE SUMMARY
[facility] - Tele[facility] ([facility])  [facility] Medicine  Discharge Summary      Patient Name: Isidro Alves  MRN: [id number]  DOE: [id number]  Patient Class: IP- Inpatient  Admission Date: 5/14/2023  Hospital Length of Stay: 4 days  Discharge Date and Time:  05/18/2023 3:21 PM  Attending Physician: Nils Fritz MD   Discharging Provider: Khushi Trotter NP  Primary Care Provider: Steven Morgan MD    Primary Care Team: Networked reference to record PCT     HPI:   Isidro Alves is a 67 y.o. male with ESRD on HD, CAD, dilated cardiomyopathy, hypertension, diabetes mellitus who initially presented to ED on 5/14/2023 from [facility] ED for flash pulmonary edema, respiratory failure and witnessed cardiac arrest while being intubated there.  Received a round of CPR before ROSC was achieved.  In ED had workup with chest x-ray which showed florid pulmonary edema with bilateral pleural effusions, elevated lactic acid of 3.2.  Admitted to ICU on critical care medicine service.      After admission to ICU, patient required urgent HD.  However he was hypotensive and necessitated central line placement and initiation of Levophed.  Was able to weaned off Levophed and vent and successfully extubated on 05/15/2023.  Hospital medicine consulted for continued care    PCP: Steven Morgan MD        * No surgery found *      Hospital Course:   67-year-old male with CAD, dilated cardiomyopathy, diabetes mellitus on renal transplant list, has been on HD for 1 year presented as transfer on 05/14/2023 from [facility] ED where he was noted to have cardiac arrest while in process of getting intubated due to respiratory distress.  Admitted to ICU    Underwent urgent HD on 5/14, however given hypotension required central line placement and started on Levophed.  Weaned off Levophed, successfully extubated 5/15. Had repeat HD done on 5/15.  5/16/23 analgesia ordered to control MSK chest pain. Failed desat study.   5/17/23 HD today. Will  plan to repeat desat study in AM prior to discharge.  5/18/23 does not qualify for supplemental oxygen per desat study. PT/TO recommends outpatient therapy.  Next HD tomorrow. He was asked to follow up with PCP in one week.          Goals of Care Treatment Preferences:  Code Status: Full Code      Consults:   Consults (From admission, onward)          Status Ordering Provider     Inpatient consult to Hospitalist  Once        Provider:  (Not yet assigned)    Acknowledged HARMEET ESPINOSA     Inpatient consult to Critical Care Medicine  Once        Provider:  Ta Bethea MD    Acknowledged JOSE RIVERA     Inpatient consult to Nephrology  Once        Provider:  Idris Ordaz MD    Acknowledged JOSE RIVERA            No new Assessment & Plan notes have been filed under this hospital service since the last note was generated.  Service: Hospital Medicine    Final Active Diagnoses:    Diagnosis Date Noted POA    PRINCIPAL PROBLEM:  Acute on chronic combined systolic and diastolic congestive heart failure [I50.43] 05/07/2023 Yes    Hypertensive emergency [I16.1] 05/18/2023 Yes    Pulmonary edema with congestive heart failure [I50.1] 05/18/2023 Yes    Acute respiratory failure with hypoxia and hypercapnia [J96.01, J96.02] 05/18/2023 Yes    Acute on chronic respiratory failure [J96.20] 05/07/2023 Yes    ESRD (end stage renal disease) on dialysis [N18.6, Z99.2]  Not Applicable    SOB (shortness of breath) [R06.02]  Yes    Coronary artery disease of native artery of native heart with stable angina pectoris [I25.118] 04/29/2019 Yes    Class 1 obesity due to excess calories with serious comorbidity and body mass index (BMI) of 33.0 to 33.9 in adult [E66.09, Z68.33]  Not Applicable    Hyperlipidemia [E78.5] 05/05/2016 Yes     Chronic    Type 2 diabetes mellitus with chronic kidney disease, with long-term current use of insulin [E11.22, Z79.4] 05/02/2015 Not Applicable    Anemia due to chronic kidney disease [N18.9,  D63.1] 04/16/2014 Yes      Problems Resolved During this Admission:    Diagnosis Date Noted Date Resolved POA    Hypotension [I95.9] 05/14/2023 05/16/2023 Yes    Leukocytosis [D72.829] 05/14/2023 05/15/2023 Yes       Discharged Condition: stable    Disposition: Home or Self Care    Follow Up:   Follow-up Information       Steven Morgan MD Follow up.    Specialty: Internal Medicine  Contact information:  [id number] [place], [place] [address] [place] [address]  [phone]                           Patient Instructions:      Ambulatory referral/consult to Physical/Occupational Therapy   Standing Status: Future   Referral Priority: Routine Referral Type: Physical Medicine   Referral Reason: Specialty Services Required   Number of Visits Requested: 1       Significant Diagnostic Studies: N/A    Pending Diagnostic Studies:       Procedure Component Value Units Date/Time    Comprehensive metabolic panel [[id number]] Collected: 05/15/23 0536    Order Status: Sent Lab Status: In process Updated: 05/15/23 0537    Specimen: Blood            Medications:  Reconciled Home Medications:      Medication List        START taking these medications      methocarbamoL 500 MG Tab  Commonly known as: ROBAXIN  Take 1 tablet (500 mg total) by mouth 3 (three) times daily. for 10 days            CHANGE how you take these medications      finasteride 5 mg tablet  Commonly known as: PROSCAR  TAKE 1 TABLET BY MOUTH EVERY DAY  What changed: when to take this            CONTINUE taking these medications      acyclovir 400 MG tablet  Commonly known as: ZOVIRAX  TAKE 1 TABLET (400 MG TOTAL) BY MOUTH 2 (TWO) TIMES DAILY. ONE TABLET     * albuterol 90 mcg/actuation inhaler  Commonly known as: PROVENTIL/VENTOLIN HFA  Inhale 2 puffs into the lungs every 4 (four) hours as needed for Wheezing or Shortness of Breath.     * albuterol 2.5 mg /3 mL (0.083 %) nebulizer solution  Commonly known as: PROVENTIL  Take 3 mLs (2.5 mg total) by nebulization every 4 to 6 hours  as needed for Wheezing or Shortness of Breath.     aspirin 81 MG EC tablet  Commonly known as: ECOTRIN  Take 1 tablet (81 mg total) by mouth once daily.     atorvastatin 40 MG tablet  Commonly known as: LIPITOR  TAKE 1 TABLET BY MOUTH EVERY DAY     blood sugar diagnostic Strp  1 each by Misc.(Non-Drug; Combo Route) route 4 (four) times daily.     blood-glucose meter kit  Test finger stick blood sugar once daily.     calcitRIOL 0.25 MCG Cap  Commonly known as: ROCALTROL  TAKE 1 CAPSULE (0.25 MCG TOTAL) BY MOUTH EVERY MON, WED, FRI.     carvediloL 12.5 MG tablet  Commonly known as: COREG  Take 1 tablet (12.5 mg total) by mouth 2 (two) times daily with meals.     cetirizine 10 MG tablet  Commonly known as: ZYRTEC  TAKE 1 TABLET BY MOUTH EVERY DAY     ENTRESTO  mg per tablet  Generic drug: sacubitriL-valsartan  Take 1 tablet by mouth 2 (two) times daily.     esomeprazole 40 MG capsule  Commonly known as: NEXIUM  Take 40 mg by mouth once daily.     fluticasone propionate 50 mcg/actuation nasal spray  Commonly known as: FLONASE  2 sprays (100 mcg total) by Each Nostril route every evening. For nasal congestion or runny nose     fluticasone-salmeterol 500-50 mcg/dose 500-50 mcg/dose Dsdv diskus inhaler  Commonly known as: ADVAIR DISKUS  Inhale 1 puff into the lungs 2 (two) times daily. Controller.Wash out mouth after use     folic acid 1 MG tablet  Commonly known as: FOLVITE  TAKE 1 TABLET BY MOUTH EVERY DAY     furosemide 80 MG tablet  Commonly known as: LASIX  Take 1 tablet (80 mg total) by mouth once daily.     hydrALAZINE 100 MG tablet  Commonly known as: APRESOLINE  Take 1 tablet (100 mg total) by mouth every 8 (eight) hours.     HYDROcodone-acetaminophen 5-325 mg per tablet  Commonly known as: NORCO  Take 1 tablet by mouth every 6 (six) hours as needed for Pain.     insulin degludec 200 unit/mL (3 mL) insulin pen  Commonly known as: TRESIBA FLEXTOUCH U-200  Inject 30 Units into the skin once daily.    "  isosorbide dinitrate 20 MG tablet  Commonly known as: ISORDIL  Take 1 tablet (20 mg total) by mouth 3 (three) times daily.     lancets Misc  1 each by Misc.(Non-Drug; Combo Route) route 4 (four) times daily.     multivitamin tablet  Commonly known as: THERAGRAN  Take 1 tablet by mouth once daily.     NIFEdipine 90 MG (OSM) 24 hr tablet  Commonly known as: PROCARDIA-XL  Take 1 tablet (90 mg total) by mouth once daily.     pen needle, diabetic 32 gauge x 5/32" Ndle  Commonly known as: BD ULTRA-FINE SHIRA PEN NEEDLE  1 each by Misc.(Non-Drug; Combo Route) route 4 (four) times daily with meals and nightly.     sevelamer carbonate 800 mg Tab  Commonly known as: RENVELA  Take 3 tablets (2,400 mg total) by mouth 3 (three) times daily with meals.     tamsulosin 0.4 mg Cap  Commonly known as: FLOMAX  TAKE 1 CAPSULE BY MOUTH EVERY DAY           * This list has 2 medication(s) that are the same as other medications prescribed for you. Read the directions carefully, and ask your doctor or other care provider to review them with you.                  Indwelling Lines/Drains at time of discharge:   Lines/Drains/Airways       Central Venous Catheter Line  Duration                  Hemodialysis Catheter 03/15/22 1238 right internal jugular 429 days              Airway  Duration                  Airway - Non-Surgical -- days                    Time spent on the discharge of patient: >35 minutes         Khushi Trotter NP  Department of Hospital Medicine  [facility] - Tele[facility] ([facility])  "

## 2023-05-18 NOTE — PLAN OF CARE
Problem: Infection  Goal: Absence of Infection Signs and Symptoms  Outcome: Ongoing, Progressing     Problem: Device-Related Complication Risk (Hemodialysis)  Goal: Safe, Effective Therapy Delivery  Outcome: Ongoing, Progressing     Problem: Hemodynamic Instability (Hemodialysis)  Goal: Effective Tissue Perfusion  Outcome: Ongoing, Progressing     Problem: Infection (Hemodialysis)  Goal: Absence of Infection Signs and Symptoms  Outcome: Ongoing, Progressing     Problem: Adult Inpatient Plan of Care  Goal: Plan of Care Review  Outcome: Ongoing, Progressing  Goal: Patient-Specific Goal (Individualized)  Outcome: Ongoing, Progressing  Goal: Absence of Hospital-Acquired Illness or Injury  Outcome: Ongoing, Progressing  Goal: Optimal Comfort and Wellbeing  Outcome: Ongoing, Progressing  Goal: Readiness for Transition of Care  Outcome: Ongoing, Progressing     Problem: Diabetes Comorbidity  Goal: Blood Glucose Level Within Targeted Range  Outcome: Ongoing, Progressing     Problem: Fluid Imbalance (Pneumonia)  Goal: Fluid Balance  Outcome: Ongoing, Progressing     Problem: Infection (Pneumonia)  Goal: Resolution of Infection Signs and Symptoms  Outcome: Ongoing, Progressing     Problem: Respiratory Compromise (Pneumonia)  Goal: Effective Oxygenation and Ventilation  Outcome: Ongoing, Progressing     Problem: Skin Injury Risk Increased  Goal: Skin Health and Integrity  Outcome: Ongoing, Progressing     Problem: Fall Injury Risk  Goal: Absence of Fall and Fall-Related Injury  Outcome: Ongoing, Progressing     Problem: Communication Impairment (Mechanical Ventilation, Invasive)  Goal: Effective Communication  Outcome: Ongoing, Progressing     Problem: Device-Related Complication Risk (Mechanical Ventilation, Invasive)  Goal: Optimal Device Function  Outcome: Ongoing, Progressing     Problem: Inability to Wean (Mechanical Ventilation, Invasive)  Goal: Mechanical Ventilation Liberation  Outcome: Ongoing, Progressing      Problem: Nutrition Impairment (Mechanical Ventilation, Invasive)  Goal: Optimal Nutrition Delivery  Outcome: Ongoing, Progressing     Problem: Skin and Tissue Injury (Mechanical Ventilation, Invasive)  Goal: Absence of Device-Related Skin and Tissue Injury  Outcome: Ongoing, Progressing     Problem: Ventilator-Induced Lung Injury (Mechanical Ventilation, Invasive)  Goal: Absence of Ventilator-Induced Lung Injury  Outcome: Ongoing, Progressing     Problem: Communication Impairment (Artificial Airway)  Goal: Effective Communication  Outcome: Ongoing, Progressing     Problem: Device-Related Complication Risk (Artificial Airway)  Goal: Optimal Device Function  Outcome: Ongoing, Progressing     Problem: Skin and Tissue Injury (Artificial Airway)  Goal: Absence of Device-Related Skin or Tissue Injury  Outcome: Ongoing, Progressing     Problem: Noninvasive Ventilation Acute  Goal: Effective Unassisted Ventilation and Oxygenation  Outcome: Ongoing, Progressing     Problem: Pain Acute  Goal: Acceptable Pain Control and Functional Ability  Outcome: Ongoing, Progressing     Problem: Device-Related Complication Risk (Hemodialysis)  Goal: Safe, Effective Therapy Delivery  Outcome: Ongoing, Progressing

## 2023-05-18 NOTE — ASSESSMENT & PLAN NOTE
"68 y/o male with ESRD on chronic HD presented with fluid overload and pulmonary edema:     ESRD  ESRD pt on chronic HD q MWF at [facility]. On HD since Jan 2021  H/o of DM, HTN, and metabolic syndrome  H/o of excess fluid intake between HD treatments.  Was admitted for volume overload causing pulmonary edema, was intubated at home prior to arrival.  Good response to intensive HD/UF, extubated  Doing well today clinically, stable     K normal  O2 sat good  Due for HD today  Has tolerated Hd well, continue HD       Complications of ESRD  Mild hyperkalemia, improved HD, normal now  Metabolic acidosis. 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  This is because the rate of fluid removal during HD has a direct relation to the worsening of CHF.  This is because of a process called "cardiac stunning" which occurs when a lot of fluids have to be removed over a limited span of time.        Type 2 diabetes mellitus with chronic kidney disease, with long-term current use of insulin  Chart was reviewed  Comorbidities noted        Plans and recommendations:  As discussed above   Opportunity fort questions provided  Total time spent 40 minutes including time " needed to review the records, the   patient evaluation, documentation, face-to-face discussion with the patient,   more than 50% of the time was spent on coordination of care and counseling.

## 2023-05-18 NOTE — PLAN OF CARE
[facility] - [facility] (Hospital)  Discharge Final Note    Primary Care Provider: Steven Morgan MD    Expected Discharge Date: 5/18/2023    Final Discharge Note (most recent)       Final Note - 05/18/23 1545          Final Note    Assessment Type Final Discharge Note     Anticipated Discharge Disposition Home or Self Care     Hospital Resources/Appts/Education Provided Appointments scheduled by Navigator/Coordinator;Appointments scheduled and added to AVS        Post-Acute Status    Discharge Delays None known at this time                     Important Message from Medicare             Contact Info       Steven Morgan MD   Specialty: Internal Medicine   Relationship: PCP - General    [address]  [place] [address]   Phone: [phone]       Next Steps: Follow up

## 2023-05-18 NOTE — PLAN OF CARE
EVAL AND TX COMPLETED: facilitated bed mobility with independence, transfers with independence. Ambulated 100 ft x 2 with no AD. Recommend outpatient PT

## 2023-05-18 NOTE — PLAN OF CARE
A230/A230 KAELYN Alves is a 67 y.o.male admitted on 5/14/2023 for Acute on chronic combined systolic and diastolic congestive heart failure   Code Status: Full Code MRN: [id number]   Review of patient's allergies indicates:   Allergen Reactions    Bactrim [sulfamethoxazole-trimethoprim] Swelling    Nsaids (non-steroidal anti-inflammatory drug)      CKD    Sulfamethoxazole     Trimethoprim      Past Medical History:   Diagnosis Date    Abnormal nuclear stress test 8/30/2022    Acute hypoxemic respiratory failure due to COVID-19 1/21/2021    Anal itching 10/10/2022    Anemia 4/16/2014    BPH (benign prostatic hyperplasia)     CKD (chronic kidney disease) stage 4, GFR 15-29 ml/min     Coronary artery disease of native artery of native heart with stable angina pectoris 4/29/2019    Diabetes mellitus     Diabetes mellitus, type 2     Dilated cardiomyopathy 8/30/2022    Elevated PSA     Encounter for blood transfusion     Herpes labialis     Hyperlipidemia     Hypertension     Obesity     Pneumonia due to COVID-19 virus     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  Pt voiced understanding and all questions answered to satisfaction.  Stressed importance to making and keeping all follow up appointments.  Medications sent to pt pharmacy and reviewed with pt.  Tele monitor removed and brought to monitor tech.  IV d/c'd with tip intact, pressure dressing applied.  Pt  transported to front  hospital via w/c by PCT to be discharged home.       Orientation: oriented x 4  Shanelle Coma Scale Score: 15     Lead Monitored: Lead II Rhythm: normal sinus rhythm Frequency/Ectopy: PVCs  Cardiac/Telemetry Box Number: [id number]  VTE Required Core Measure: Pharmacological prophylaxis initiated/maintained Last Bowel Movement: 05/17/23  Diet renal Fluid - 1200mL  Voiding Characteristics: patient on hemodialysis  Kvng Score: 15  Fall Risk Score: 15  Accucheck []   Freq?      Lines/Drains/Airways       Central Venous Catheter Line  Duration                  Hemodialysis Catheter 03/15/22 1238 right internal jugular 428 days              Airway  Duration                  Airway - Non-Surgical -- days              Peripheral Intravenous Line  Duration                  Peripheral IV - Single Lumen 05/14/23 0940 18 G Right Antecubital 4 days

## 2023-05-18 NOTE — SUBJECTIVE & OBJECTIVE
Interval History: Pt was seen and examined. Labs and meds reviewed. Discussed with other providers. No new events, no SOB, feels good today, and wants to go home. No c/o's with HD yesterday.    Review of patient's allergies indicates:   Allergen Reactions    Bactrim [sulfamethoxazole-trimethoprim] Swelling    Nsaids (non-steroidal anti-inflammatory drug)      CKD    Sulfamethoxazole     Trimethoprim      Current Facility-Administered Medications   Medication Frequency    acetaminophen tablet 650 mg Q6H PRN    albuterol-ipratropium 2.5 mg-0.5 mg/3 mL nebulizer solution 3 mL Q6H PRN    aspirin chewable tablet 81 mg Daily    atorvastatin tablet 40 mg Daily    calcitRIOL capsule 0.25 mcg Every Mon, Wed, Fri    carvediloL tablet 12.5 mg BID WM    cetirizine tablet 10 mg Daily    dextrose 10% bolus 125 mL 125 mL PRN    epoetin dot-epbx injection 5,000 Units Every Tues, Thurs, Sat    famotidine (PF) injection 20 mg Daily    finasteride tablet 5 mg Daily    fluticasone propionate 50 mcg/actuation nasal spray 100 mcg QHS    folic acid tablet 1 mg Daily    glucagon (human recombinant) injection 1 mg PRN    heparin (porcine) injection 2,000 Units PRN    heparin (porcine) injection 5,000 Units Q8H    insulin aspart U-100 pen 1-10 Units Q6H PRN    isosorbide dinitrate tablet 20 mg TID    methocarbamoL tablet 500 mg QID    multivitamin tablet Daily    mupirocin 2 % ointment BID    ondansetron injection 4 mg Q8H PRN    oxyCODONE-acetaminophen 7.5-325 mg per tablet 1 tablet Q6H PRN    sevelamer carbonate tablet 2,400 mg TID WM    sodium chloride 0.9% flush 10 mL PRN       Objective:     Vital Signs (Most Recent):  Temp: 97.3 °F (36.3 °C) (05/18/23 0716)  Pulse: 97 (05/18/23 0930)  Resp: 16 (05/18/23 1022)  BP: (!) 141/86 (05/18/23 0716)  SpO2: 95 % (05/18/23 0920) Vital Signs (24h Range):  Temp:  [97.1 °F (36.2 °C)-98.2 °F (36.8 °C)] 97.3 °F (36.3 °C)  Pulse:  [] 97  Resp:  [14-18] 16  SpO2:  [95 %-97 %] 95 %  BP:  (119-151)/(71-98) 141/86     Weight: 112.3 kg (247 lb 9.2 oz) (05/16/23 0725)  Body mass index is 31.79 kg/m².  Body surface area is 2.42 meters squared.    I/O last 3 completed shifts:  In: 180 [P.O.:180]  Out: 2663 [Other:2663]     Physical Exam  Vitals reviewed.   Constitutional:       Appearance: Normal appearance.   Cardiovascular:      Rate and Rhythm: Normal rate.      Pulses: Normal pulses.      Heart sounds: Normal heart sounds.   Pulmonary:      Effort: Pulmonary effort is normal.      Breath sounds: Normal breath sounds. No rales.   Abdominal:      Palpations: Abdomen is soft.      Tenderness: There is no abdominal tenderness.   Musculoskeletal:      Right lower leg: No edema.      Left lower leg: No edema.   Neurological:      Mental Status: He is oriented to person, place, and time.   Psychiatric:         Behavior: Behavior normal.        Significant Labs: reviewed  BMP  Lab Results   Component Value Date     (L) 05/18/2023    K 4.0 05/18/2023    CL 98 05/18/2023    CO2 23 05/18/2023    BUN 42 (H) 05/18/2023    CREATININE 8.4 (H) 05/18/2023    CALCIUM 9.8 05/18/2023    ANIONGAP 14 05/18/2023    EGFRNORACEVR 6 (A) 05/18/2023     Lab Results   Component Value Date    WBC 8.66 05/18/2023    HGB 10.1 (L) 05/18/2023    HCT 32.3 (L) 05/18/2023    MCV 85 05/18/2023     05/18/2023         Significant Imaging: reviewed

## 2023-05-18 NOTE — TELEPHONE ENCOUNTER
----- Message from Mia Girard LPN sent at 5/18/2023  4:10 PM CDT -----  Pt is unable to make Monday appointment due to being at dialysis. Please call the patient and reschedule.

## 2023-05-18 NOTE — PT/OT/SLP EVAL
"Physical Therapy Evaluation and Discharge Note    Patient Name:  Isidro Alves   MRN:  [id number]    Recommendations:     Discharge Recommendations: outpatient PT  Discharge Equipment Recommendations:     Barriers to discharge: none    Assessment:     Isidro Alves is a 67 y.o. male admitted with a medical diagnosis of Acute on chronic combined systolic and diastolic congestive heart failure. At this time, patient is performing bed mobility, transfers and gait independently. Ambulated 100ft x 2 independently with no LOB.     Recent Surgery: * No surgery found *      Plan:     During this hospitalization, patient does not require further acute PT services.  Please re-consult if situation changes.      Subjective     Chief Complaint: acute on chronic combined systolic and diastolic CHF  Patient/Family Comments/goals: to go home/get better  "I'll be alright"  Pain/Comfort:  Pain Rating 1: 8/10 (chest pain)  Pain Addressed 1: Reposition, Distraction, Pre-medicate for activity  Pain Rating Post-Intervention 1: 8/10  Pain Addressed 2: Reposition, Distraction, Pre-medicate for activity    Patients cultural, spiritual, Adventist conflicts given the current situation: no    Living Environment:  Pt lives in 2 story home with bed and bath on bottom floor. No steps at entry.   Prior to admission, patients level of function was independent with all functional mobility in community and household. 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  Equipment used at home: walker, rolling, wheelchair, shower chair, grab bar, oxygen.  DME owned (not currently used): none.  Upon discharge, patient will have assistance from spouse/family.    Objective:     Communicated with nursing (Mia) and performed chart review via epic prior to session.  Patient found supine with peripheral IV, telemetry upon PT entry to room. Wife at bedside    General Precautions: Standard, fall    Orthopedic Precautions:N/A   Braces: N/A  Respiratory Status: Room " air    Exams:  Cognitive Exam:  Patient is oriented to Person, Place, Time, and Situation  Gross Motor Coordination:  WFL  Postural Exam:  Patient presented with the following abnormalities:    -       Rounded shoulders  -       Forward head  RLE ROM: WFL  RLE Strength: WFL  LLE ROM: WFL  LLE Strength: WFL    Functional Mobility:  Bed Mobility:     Scooting: independence  Supine to Sit: independence  Sit to Supine: independence  Transfers:     Sit to Stand:  independence with no AD  Bed to Chair: independence with  no AD  using  Stand Pivot  Gait: 100ft x 2 independently with no AD, slow pace, wide ANTONIA, decreased stride length but no LOB  Balance: fair+/good    AM-PAC 6 CLICK MOBILITY  Total Score:21       Treatment and Education:  Educated pt on benefits of consistent participation in PT services to meet functional goals. Educated pt on importance of sitting OOB to promote endurance and overall activity tolerance. Educated pt on call don't fall policy and use of call button to alert nursing staff of needs. Pt expressed understanding.      AM-PAC 6 CLICK MOBILITY  Total Score:21     Patient left supine with all lines intact, call button in reach, nursing notified, and wife present.    GOALS:   Multidisciplinary Problems       Physical Therapy Goals          Problem: Physical Therapy    Goal Priority Disciplines Outcome Goal Variances Interventions   Physical Therapy Goal     PT, PT/OT      Description: No further PT at this time. Pt has no acute PT needs at this time. Pt demonstrated independence in all functional areas.                        History:     Past Medical History:   Diagnosis Date    Abnormal nuclear stress test 8/30/2022    Acute hypoxemic respiratory failure due to COVID-19 1/21/2021    Anal itching 10/10/2022    Anemia 4/16/2014    BPH (benign prostatic hyperplasia)     CKD (chronic kidney disease) stage 4, GFR 15-29 ml/min     Coronary artery disease of native artery of native heart with stable  angina pectoris 4/29/2019    Diabetes mellitus     Diabetes mellitus, type 2     Dilated cardiomyopathy 8/30/2022    Elevated PSA     Encounter for blood transfusion     Herpes labialis     Hyperlipidemia     Hypertension     Obesity     Pneumonia due to COVID-19 virus     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    Preop cardiovascular exam 9/16/2022    Proteinuria     Secondary hyperparathyroidism (of renal origin)     UTI (lower urinary tract infection) 5/1/2015       Past Surgical History:   Procedure Laterality Date    CATHETERIZATION OF BOTH LEFT AND RIGHT HEART N/A 08/30/2022    Procedure: CATHETERIZATION, HEART, BOTH LEFT AND RIGHT;  Surgeon: Kassandra Deras MD;  Location: [facility] CATH LAB;  Service: Cardiology;  Laterality: N/A;    COLONOSCOPY N/A 12/21/2017    Procedure: COLONOSCOPY;  Surgeon: Fran Escalera MD;  Location: [facility] ENDO;  Service: Endoscopy;  Laterality: N/A;    COLONOSCOPY N/A 02/02/2023    Procedure: COLONOSCOPY;  Surgeon: Anthony Meng MD;  Location: [facility] ENDO;  Service: Endoscopy;  Laterality: N/A;    FLUOROSCOPY N/A 01/21/2021    Procedure: Vascath insertion;  Surgeon: Adrian Pruitt Jr., MD;  Location: [facility] CATH LAB;  Service: General;  Laterality: N/A;    FLUOROSCOPY N/A 04/06/2021    Procedure: Over the wire Vas Cath exchange;  Surgeon: Ho Pressley MD;  Location: [facility] CATH LAB;  Service: General;  Laterality: N/A;    PROSTATE BIOPSY      thorocotomy  01/01/2010       Time Tracking:     PT Received On: 05/18/23  PT Start Time: 1015     PT Stop Time: 1030  PT Total Time (min): 15 min     Billable Minutes: Evaluation 15      05/18/2023

## 2023-05-19 ENCOUNTER — PATIENT OUTREACH (OUTPATIENT)
Dept: ADMINISTRATIVE | Facility: CLINIC | Age: 68
End: 2023-05-19
Payer: COMMERCIAL

## 2023-05-19 NOTE — PROGRESS NOTES
C3 nurse spoke with Isidro Alves  for a TCC post hospital discharge follow up call. The patient has a scheduled \[facility]\"" appointment with Steven Morgan MD on 05/25/2023 @ 7808.

## 2023-05-19 NOTE — PROGRESS NOTES
C3 nurse attempted to contact patient. The following occurred:   C3 nurse attempted to contact Isidro Alves  for a TCC post hospital discharge follow up call. The patient is unable to conduct the call @ this time. The patient requested a callback.    The patient has a scheduled HOSFU appointment with Steven Morgan MD  on 05/22/2023 @ 0900. Message sent to Physician staff.

## 2023-05-22 ENCOUNTER — TELEPHONE (OUTPATIENT)
Dept: INTERNAL MEDICINE | Facility: CLINIC | Age: 68
End: 2023-05-22

## 2023-05-22 DIAGNOSIS — Z76.82 ORGAN TRANSPLANT CANDIDATE: Primary | ICD-10-CM

## 2023-05-22 DIAGNOSIS — N18.6 ESRD (END STAGE RENAL DISEASE): Primary | ICD-10-CM

## 2023-05-22 DIAGNOSIS — Z76.0 MEDICATION REFILL: ICD-10-CM

## 2023-05-22 RX ORDER — CARVEDILOL 12.5 MG/1
12.5 TABLET ORAL 2 TIMES DAILY WITH MEALS
Qty: 60 TABLET | Refills: 11 | Status: SHIPPED | OUTPATIENT
Start: 2023-05-22 | End: 2023-05-30

## 2023-05-22 NOTE — TELEPHONE ENCOUNTER
Informed pt that he would need to come in for a visit for Dr. Morgan to evaluate, he v/u, appt currently scheduled for 5/25.

## 2023-05-22 NOTE — TELEPHONE ENCOUNTER
----- Message from Delisa Lopez sent at 5/22/2023  1:10 PM CDT -----  Contact: Patsy  Patient request to speak with someone regarding medication. Patient reports being discharged from the hospital on 5/20/2023 and receiving muscle relaxer's instead of pain medication. Please give patient a call back at [phone] to assist.

## 2023-05-24 ENCOUNTER — PATIENT OUTREACH (OUTPATIENT)
Dept: ADMINISTRATIVE | Facility: HOSPITAL | Age: 68
End: 2023-05-24
Payer: COMMERCIAL

## 2023-05-25 ENCOUNTER — TELEPHONE (OUTPATIENT)
Dept: TRANSPLANT | Facility: CLINIC | Age: 68
End: 2023-05-25
Payer: COMMERCIAL

## 2023-05-25 ENCOUNTER — OFFICE VISIT (OUTPATIENT)
Dept: INTERNAL MEDICINE | Facility: CLINIC | Age: 68
End: 2023-05-25
Payer: COMMERCIAL

## 2023-05-25 VITALS
SYSTOLIC BLOOD PRESSURE: 86 MMHG | DIASTOLIC BLOOD PRESSURE: 66 MMHG | HEART RATE: 78 BPM | BODY MASS INDEX: 30.54 KG/M2 | WEIGHT: 237.88 LBS | TEMPERATURE: 98 F | OXYGEN SATURATION: 97 %

## 2023-05-25 DIAGNOSIS — I42.0 DILATED CARDIOMYOPATHY: ICD-10-CM

## 2023-05-25 DIAGNOSIS — E11.59 HYPERTENSION ASSOCIATED WITH DIABETES: ICD-10-CM

## 2023-05-25 DIAGNOSIS — I50.22 CHRONIC SYSTOLIC CONGESTIVE HEART FAILURE: ICD-10-CM

## 2023-05-25 DIAGNOSIS — I15.2 HYPERTENSION ASSOCIATED WITH DIABETES: ICD-10-CM

## 2023-05-25 DIAGNOSIS — J90 PLEURAL EFFUSION ON LEFT: ICD-10-CM

## 2023-05-25 DIAGNOSIS — R07.9 CHEST PAIN, UNSPECIFIED TYPE: Primary | ICD-10-CM

## 2023-05-25 PROBLEM — I50.1 PULMONARY EDEMA WITH CONGESTIVE HEART FAILURE: Status: RESOLVED | Noted: 2023-05-18 | Resolved: 2023-05-25

## 2023-05-25 PROBLEM — J18.9 PNEUMONIA: Status: RESOLVED | Noted: 2023-05-07 | Resolved: 2023-05-25

## 2023-05-25 PROBLEM — J96.01 ACUTE RESPIRATORY FAILURE WITH HYPOXIA AND HYPERCAPNIA: Status: RESOLVED | Noted: 2023-05-18 | Resolved: 2023-05-25

## 2023-05-25 PROBLEM — J96.20 ACUTE ON CHRONIC RESPIRATORY FAILURE: Status: RESOLVED | Noted: 2023-05-07 | Resolved: 2023-05-25

## 2023-05-25 PROBLEM — I50.43 ACUTE ON CHRONIC COMBINED SYSTOLIC AND DIASTOLIC CONGESTIVE HEART FAILURE: Status: RESOLVED | Noted: 2023-05-07 | Resolved: 2023-05-25

## 2023-05-25 PROBLEM — J96.02 ACUTE RESPIRATORY FAILURE WITH HYPOXIA AND HYPERCAPNIA: Status: RESOLVED | Noted: 2023-05-18 | Resolved: 2023-05-25

## 2023-05-25 PROBLEM — I16.1 HYPERTENSIVE EMERGENCY: Status: RESOLVED | Noted: 2023-05-18 | Resolved: 2023-05-25

## 2023-05-25 PROCEDURE — 99495 TRANSJ CARE MGMT MOD F2F 14D: CPT | Mod: S$GLB,,, | Performed by: INTERNAL MEDICINE

## 2023-05-25 PROCEDURE — 99999 PR PBB SHADOW E&M-EST. PATIENT-LVL V: ICD-10-PCS | Mod: PBBFAC,,, | Performed by: INTERNAL MEDICINE

## 2023-05-25 PROCEDURE — 3044F HG A1C LEVEL LT 7.0%: CPT | Mod: CPTII,S$GLB,, | Performed by: INTERNAL MEDICINE

## 2023-05-25 PROCEDURE — 3074F PR MOST RECENT SYSTOLIC BLOOD PRESSURE < 130 MM HG: ICD-10-PCS | Mod: CPTII,S$GLB,, | Performed by: INTERNAL MEDICINE

## 2023-05-25 PROCEDURE — 4010F PR ACE/ARB THEARPY RXD/TAKEN: ICD-10-PCS | Mod: CPTII,S$GLB,, | Performed by: INTERNAL MEDICINE

## 2023-05-25 PROCEDURE — 3066F NEPHROPATHY DOC TX: CPT | Mod: CPTII,S$GLB,, | Performed by: INTERNAL MEDICINE

## 2023-05-25 PROCEDURE — 1125F PR PAIN SEVERITY QUANTIFIED, PAIN PRESENT: ICD-10-PCS | Mod: CPTII,S$GLB,, | Performed by: INTERNAL MEDICINE

## 2023-05-25 PROCEDURE — 3066F PR DOCUMENTATION OF TREATMENT FOR NEPHROPATHY: ICD-10-PCS | Mod: CPTII,S$GLB,, | Performed by: INTERNAL MEDICINE

## 2023-05-25 PROCEDURE — 99999 PR PBB SHADOW E&M-EST. PATIENT-LVL V: CPT | Mod: PBBFAC,,, | Performed by: INTERNAL MEDICINE

## 2023-05-25 PROCEDURE — 3008F BODY MASS INDEX DOCD: CPT | Mod: CPTII,S$GLB,, | Performed by: INTERNAL MEDICINE

## 2023-05-25 PROCEDURE — 1125F AMNT PAIN NOTED PAIN PRSNT: CPT | Mod: CPTII,S$GLB,, | Performed by: INTERNAL MEDICINE

## 2023-05-25 PROCEDURE — 3074F SYST BP LT 130 MM HG: CPT | Mod: CPTII,S$GLB,, | Performed by: INTERNAL MEDICINE

## 2023-05-25 PROCEDURE — 4010F ACE/ARB THERAPY RXD/TAKEN: CPT | Mod: CPTII,S$GLB,, | Performed by: INTERNAL MEDICINE

## 2023-05-25 PROCEDURE — 99495 TCM SERVICES (MODERATE COMPLEXITY): ICD-10-PCS | Mod: S$GLB,,, | Performed by: INTERNAL MEDICINE

## 2023-05-25 PROCEDURE — 1101F PT FALLS ASSESS-DOCD LE1/YR: CPT | Mod: CPTII,S$GLB,, | Performed by: INTERNAL MEDICINE

## 2023-05-25 PROCEDURE — 3078F DIAST BP <80 MM HG: CPT | Mod: CPTII,S$GLB,, | Performed by: INTERNAL MEDICINE

## 2023-05-25 PROCEDURE — 3008F PR BODY MASS INDEX (BMI) DOCUMENTED: ICD-10-PCS | Mod: CPTII,S$GLB,, | Performed by: INTERNAL MEDICINE

## 2023-05-25 PROCEDURE — 1159F PR MEDICATION LIST DOCUMENTED IN MEDICAL RECORD: ICD-10-PCS | Mod: CPTII,S$GLB,, | Performed by: INTERNAL MEDICINE

## 2023-05-25 PROCEDURE — 3044F PR MOST RECENT HEMOGLOBIN A1C LEVEL <7.0%: ICD-10-PCS | Mod: CPTII,S$GLB,, | Performed by: INTERNAL MEDICINE

## 2023-05-25 PROCEDURE — 1111F PR DISCHARGE MEDS RECONCILED W/ CURRENT OUTPATIENT MED LIST: ICD-10-PCS | Mod: CPTII,S$GLB,, | Performed by: INTERNAL MEDICINE

## 2023-05-25 PROCEDURE — 3288F FALL RISK ASSESSMENT DOCD: CPT | Mod: CPTII,S$GLB,, | Performed by: INTERNAL MEDICINE

## 2023-05-25 PROCEDURE — 1101F PR PT FALLS ASSESS DOC 0-1 FALLS W/OUT INJ PAST YR: ICD-10-PCS | Mod: CPTII,S$GLB,, | Performed by: INTERNAL MEDICINE

## 2023-05-25 PROCEDURE — 3288F PR FALLS RISK ASSESSMENT DOCUMENTED: ICD-10-PCS | Mod: CPTII,S$GLB,, | Performed by: INTERNAL MEDICINE

## 2023-05-25 PROCEDURE — 1159F MED LIST DOCD IN RCRD: CPT | Mod: CPTII,S$GLB,, | Performed by: INTERNAL MEDICINE

## 2023-05-25 PROCEDURE — 3078F PR MOST RECENT DIASTOLIC BLOOD PRESSURE < 80 MM HG: ICD-10-PCS | Mod: CPTII,S$GLB,, | Performed by: INTERNAL MEDICINE

## 2023-05-25 PROCEDURE — 1111F DSCHRG MED/CURRENT MED MERGE: CPT | Mod: CPTII,S$GLB,, | Performed by: INTERNAL MEDICINE

## 2023-05-25 RX ORDER — METHOCARBAMOL 500 MG/1
500 TABLET, FILM COATED ORAL 3 TIMES DAILY
Qty: 15 TABLET | Refills: 0 | Status: SHIPPED | OUTPATIENT
Start: 2023-05-25 | End: 2023-06-04

## 2023-05-25 RX ORDER — TRAMADOL HYDROCHLORIDE 50 MG/1
TABLET ORAL
Qty: 30 EACH | Refills: 0 | Status: SHIPPED | OUTPATIENT
Start: 2023-05-25 | End: 2023-10-23

## 2023-05-25 NOTE — TELEPHONE ENCOUNTER
Spoke to pt confirming appts on 07/06/2023. Appt reminders were mailed on 05/25/2023 and pt is aware to bring care giver.

## 2023-05-25 NOTE — ASSESSMENT & PLAN NOTE
Patient is identified as having Combined Systolic and Diastolic heart failure that is Acute on chronic. CHF is currently uncontrolled due to Rales/crackles on pulmonary exam and Pulmonary edema/pleural effusion on CXR. Latest ECHO performed and demonstrates- Results for orders placed during the hospital encounter of 11/17/22    Echo    Interpretation Summary  · The left ventricle is moderately enlarged with eccentric hypertrophy and mildly decreased systolic function.  · The estimated ejection fraction is 40%.  · Grade I left ventricular diastolic dysfunction.  · Normal right ventricular size with low normal right ventricular systolic function.  · Mild left atrial enlargement.  · Mild-to-moderate mitral regurgitation.  · Mild tricuspid regurgitation.  · Normal central venous pressure (3 mmHg).  · The estimated PA systolic pressure is 33 mmHg.  · There is mild left ventricular global hypokinesis.  . Continue Beta Blocker, Furosemide, Nitrate/Vasodilator and ARNI and monitor clinical status closely. Monitor on telemetry. Patient is on CHF pathway.  Monitor strict Is&Os and daily weights.  Place on fluid restriction of 1.5 L. Continue to stress to patient importance of self efficacy and  on diet for CHF. Last BNP reviewed- and noted below   No results for input(s): BNP, BNPTRIAGEBLO in the last 168 hours..    Will give one dose of IV Lasix

## 2023-05-25 NOTE — ASSESSMENT & PLAN NOTE
Patient's FSGs are controlled on current medication regimen.  Last A1c reviewed-   Lab Results   Component Value Date    HGBA1C 6.4 (H) 05/07/2023     Most recent fingerstick glucose reviewed- No results for input(s): POCTGLUCOSE in the last 24 hours.  Current correctional scale  Low  Maintain anti-hyperglycemic dose as follows-   Antihyperglycemics (From admission, onward)    None        Hold Oral hypoglycemics while patient is in the hospital.

## 2023-05-25 NOTE — DISCHARGE SUMMARY
[facility]  Discharge Summary      Patient Name: Isidro Alves  MRN: [id number]  DOE: [id number]  Patient Class: OP- Observation  Admission Date: 5/7/2023  Hospital Length of Stay: 0 days  Discharge Date and Time: 5/8/2023  3:25 PM  Attending Physician: No att. providers found   Discharging Provider: Ashtyn Myers MD  Primary Care Provider: Steven Morgan MD    Primary Care Team: [facility] MEDICINE D    HPI:   The patient is a 66 yo male with ESRD on HD since  on renal Tx list at [facility], Vas cath in place right IJ, CAD-old MI per MPI in , Systolic and diastolic CHF-40%, Nocturnal hypoxemia-on 2 liters O2 NC at night only, IDDM, HTN, HLD who presented to ED with SOB. The patient reports he woke up last night around 2am. He then had a coughing spell and became severely SOB and diaphoretic. Pt denies chest pain or N/V. On EMS arrival, O2sat was 60%. He was placed on BIPAP. The patient reports compliance with HD on MWF. He does endorse dietary and fluid indiscretion yesterday. Pt does still urinate.     In the ED, pt arrived on 40% BIPAP-O2sats 99%, EKG showed Sinus tachycardia 130s with no ischemic changes, /129, Labs are significant for WBC 18, Bicarb 14, BNP 4772, Trop 0.101  Initial ABG showed Ph 7.2, Pao2 111, Bicarb 18. Repeat ABG showed Ph 7.3, Bicarb 21.   Pt was continued on BIPAP and placed on NTG drip. BP improved to SBP 130s and he was weaned off NTG drip. Pt was also weaned off BIPAP and is currently on 3 liters O2 NC      * No surgery found *      Hospital Course:   Patient admitted for worsening shortness of breath, CHF, hypoxemia with room air sats in the 60s at home.  Patient arrived to emergency department home BiPAP.  Initial blood gas showed pH of 7.2.  He was continued on BiPAP and nitroglycerin drip was begun.  Blood pressure improved he was able to be weaned off BiPAP and placed on 3 L nasal cannula.  He was seen by Nephrology underwent  hemodialysis.  The next morning patient had additional hemodialysis treatment.  Volume was removed and patient was instructed to let his outpatient dialysis unit know that his dry weight is 112.  He is also instructed on following fluid restricted diet as well as adjusting his dry weight at dialysis while in the facility to remove the fluid he has gained between treatments.  Patient seen and examined on day of discharge deemed stable for discharge.  Discussed with Nephrology and he will be discharged home to follow-up with his outpatient hemodialysis.         Goals of Care Treatment Preferences:  Code Status: Full Code      Consults:   Consults (From admission, onward)        Status Ordering Provider     Inpatient consult to Cardiology  Once        Provider:  Herber Ramires MD    Completed JOÃO DOTY     Inpatient consult to Social Work/Case Management  Once        Provider:  (Not yet assigned)    Completed EMILIANA LARA     Inpatient consult to Nephrology  Once        Provider:  (Not yet assigned)    Completed EMILIANA LARA          Pulmonary  * Acute on chronic respiratory failure  Patient with Hypoxic Respiratory failure which is Acute.  he is on home oxygen- 2liters o2NC at night only. Supplemental oxygen was provided and noted- Oxygen Concentration (%):  [30-40] 32    .   Signs/symptoms of respiratory failure include- respiratory distress. Contributing diagnoses includes - CHF Labs and images were reviewed. Patient Has recent ABG, which has been reviewed. Will treat underlying causes and adjust management of respiratory failure     Pneumonia  No pneumonia likely volume related      Pleural effusion on left  Moderate left pleural effusion   Give IV alsix x oen dose and continue Lasix 80mg po daily   UF with HD   empirically treated for possible underlying PNA with IV Rocephin and Azithromycin however after volume removal he is markedly improved in these were not continued    Pulmonary fibrosis,  postinflammatory  No wheezing   Cont LABA, ICS neb txs  INDERJIT prn       Cardiac/Vascular  Acute on chronic combined systolic and diastolic congestive heart failure  Patient is identified as having Combined Systolic and Diastolic heart failure that is Acute on chronic. CHF is currently uncontrolled due to Rales/crackles on pulmonary exam and Pulmonary edema/pleural effusion on CXR. Latest ECHO performed and demonstrates- Results for orders placed during the hospital encounter of 11/17/22    Echo    Interpretation Summary  · The left ventricle is moderately enlarged with eccentric hypertrophy and mildly decreased systolic function.  · The estimated ejection fraction is 40%.  · Grade I left ventricular diastolic dysfunction.  · Normal right ventricular size with low normal right ventricular systolic function.  · Mild left atrial enlargement.  · Mild-to-moderate mitral regurgitation.  · Mild tricuspid regurgitation.  · Normal central venous pressure (3 mmHg).  · The estimated PA systolic pressure is 33 mmHg.  · There is mild left ventricular global hypokinesis.  . Continue Beta Blocker, Furosemide, Nitrate/Vasodilator and ARNI and monitor clinical status closely. Monitor on telemetry. Patient is on CHF pathway.  Monitor strict Is&Os and daily weights.  Place on fluid restriction of 1.5 L. Continue to stress to patient importance of self efficacy and  on diet for CHF. Last BNP reviewed- and noted below   No results for input(s): BNP, BNPTRIAGEBLO in the last 168 hours..    Will give one dose of IV Lasix     Coronary artery disease of native artery of native heart with stable angina pectoris  See chest pain plan above       Elevated troponin  Pt denies chest pain   Elevated trop likely demand ischemia from hypoxemia/CHF   Serial troponin   Cont ASA, BB, Statin, Entresto       Renal/  ESRD (end stage renal disease) on dialysis  Patient underwent 2 hemodialysis treatments with volume removal at both.  His dry weight  is been decreased to 112 kg he is instructed to let his dialysis facility know      Metabolic acidosis  Improved with hemodialysis      Endocrine  Type 2 diabetes mellitus with chronic kidney disease, with long-term current use of insulin  Patient's FSGs are controlled on current medication regimen.  Last A1c reviewed-   Lab Results   Component Value Date    HGBA1C 6.4 (H) 05/07/2023     Most recent fingerstick glucose reviewed- No results for input(s): POCTGLUCOSE in the last 24 hours.  Current correctional scale  Low  Maintain anti-hyperglycemic dose as follows-   Antihyperglycemics (From admission, onward)    None        Hold Oral hypoglycemics while patient is in the hospital.      Final Active Diagnoses:    Diagnosis Date Noted POA    PRINCIPAL PROBLEM:  Acute on chronic respiratory failure [J96.20] 05/07/2023 Yes    Acute on chronic combined systolic and diastolic congestive heart failure [I50.43] 05/07/2023 Yes    Pleural effusion on left [J90] 05/07/2023 Yes    Pneumonia [J18.9] 05/07/2023 Yes    Pulmonary fibrosis, postinflammatory [J84.10] 05/29/2022 Yes    ESRD (end stage renal disease) on dialysis [N18.6, Z99.2]  Not Applicable    Coronary artery disease of native artery of native heart with stable angina pectoris [I25.118] 04/29/2019 Yes    Type 2 diabetes mellitus with chronic kidney disease, with long-term current use of insulin [E11.22, Z79.4] 05/02/2015 Not Applicable    Metabolic acidosis [E87.20] 05/01/2015 Yes    Elevated troponin [R77.8] 05/01/2015 Yes      Problems Resolved During this Admission:       Discharged Condition: fair    Disposition: Home or Self Care    Follow Up:    Patient Instructions:      Diet renal     Notify your health care provider if you experience any of the following:  temperature >100.4     Notify your health care provider if you experience any of the following:  difficulty breathing or increased cough     Notify your health care provider if you experience any  of the following:  persistent dizziness, light-headedness, or visual disturbances     Activity as tolerated     Imaging Results          X-Ray Chest AP Portable (Final result)  Result time 05/07/23 06:13:28    Final result by Broderick Beard MD (05/07/23 06:13:28)                 Impression:      Findings suggestive pulmonary edema with possible more confluent consolidation in the left lower lung and possible small left pleural effusion.      Electronically signed by: Broderick Beard  Date:    05/07/2023  Time:    06:13             Narrative:    EXAMINATION:  XR CHEST AP PORTABLE    CLINICAL HISTORY:  dyspnea;    TECHNIQUE:  Single frontal portable view of the chest was performed.    COMPARISON:  X-ray dated 07/14/2022    FINDINGS:  Right-sided dual lumen central line in place with distal catheter tip projecting over the cavoatrial junction.  Faint aortic arch calcification noted.  Cardiomediastinal silhouette is unchanged in size and contour.  Trachea is midline.  Bilateral interstitial prominence noted.  Question of more confluent opacification in the left lower lung.  Possible small left pleural effusion.  No significant right pleural effusion.  No pneumothorax.  No acute bony abnormality.  Degenerative changes and noted in the spine and shoulder.                                  Significant Diagnostic Studies: Labs: All labs within the past 24 hours have been reviewed    Pending Diagnostic Studies:     None         Medications:  Reconciled Home Medications:      Medication List      CONTINUE taking these medications    acyclovir 400 MG tablet  Commonly known as: ZOVIRAX  TAKE 1 TABLET (400 MG TOTAL) BY MOUTH 2 (TWO) TIMES DAILY. ONE TABLET     * albuterol 90 mcg/actuation inhaler  Commonly known as: PROVENTIL/VENTOLIN HFA  Inhale 2 puffs into the lungs every 4 (four) hours as needed for Wheezing or Shortness of Breath.     * albuterol 2.5 mg /3 mL (0.083 %) nebulizer solution  Commonly known as:  PROVENTIL  Take 3 mLs (2.5 mg total) by nebulization every 4 to 6 hours as needed for Wheezing or Shortness of Breath.     aspirin 81 MG EC tablet  Commonly known as: ECOTRIN  Take 1 tablet (81 mg total) by mouth once daily.     atorvastatin 40 MG tablet  Commonly known as: LIPITOR  TAKE 1 TABLET BY MOUTH EVERY DAY     blood sugar diagnostic Strp  1 each by Misc.(Non-Drug; Combo Route) route 4 (four) times daily.     blood-glucose meter kit  Test finger stick blood sugar once daily.     calcitRIOL 0.25 MCG Cap  Commonly known as: ROCALTROL  TAKE 1 CAPSULE (0.25 MCG TOTAL) BY MOUTH EVERY MON, WED, FRI.     cetirizine 10 MG tablet  Commonly known as: ZYRTEC  TAKE 1 TABLET BY MOUTH EVERY DAY     ENTRESTO  mg per tablet  Generic drug: sacubitriL-valsartan  Take 1 tablet by mouth 2 (two) times daily.     esomeprazole 40 MG capsule  Commonly known as: NEXIUM  Take 40 mg by mouth once daily.     fluticasone propionate 50 mcg/actuation nasal spray  Commonly known as: FLONASE  2 sprays (100 mcg total) by Each Nostril route every evening. For nasal congestion or runny nose     fluticasone-salmeterol 500-50 mcg/dose 500-50 mcg/dose Dsdv diskus inhaler  Commonly known as: ADVAIR DISKUS  Inhale 1 puff into the lungs 2 (two) times daily. Controller.Wash out mouth after use     folic acid 1 MG tablet  Commonly known as: FOLVITE  TAKE 1 TABLET BY MOUTH EVERY DAY     furosemide 80 MG tablet  Commonly known as: LASIX  Take 1 tablet (80 mg total) by mouth once daily.     insulin degludec 200 unit/mL (3 mL) insulin pen  Commonly known as: TRESIBA FLEXTOUCH U-200  Inject 30 Units into the skin once daily.     isosorbide dinitrate 20 MG tablet  Commonly known as: ISORDIL  Take 1 tablet (20 mg total) by mouth 3 (three) times daily.     lancets Misc  1 each by Misc.(Non-Drug; Combo Route) route 4 (four) times daily.     multivitamin tablet  Commonly known as: THERAGRAN  Take 1 tablet by mouth once daily.     pen needle, diabetic 32  "gauge x 5/32" Ndle  Commonly known as: BD ULTRA-FINE SHIRA PEN NEEDLE  1 each by Misc.(Non-Drug; Combo Route) route 4 (four) times daily with meals and nightly.     sevelamer carbonate 800 mg Tab  Commonly known as: RENVELA  Take 3 tablets (2,400 mg total) by mouth 3 (three) times daily with meals.     tamsulosin 0.4 mg Cap  Commonly known as: FLOMAX  TAKE 1 CAPSULE BY MOUTH EVERY DAY         * This list has 2 medication(s) that are the same as other medications prescribed for you. Read the directions carefully, and ask your doctor or other care provider to review them with you.                Indwelling Lines/Drains at time of discharge:   Lines/Drains/Airways     Central Venous Catheter Line  Duration                Hemodialysis Catheter 03/15/22 1238 right internal jugular 436 days          Airway  Duration                Airway - Non-Surgical -- days                Time spent on the discharge of patient: 39 minutes         Ashtyn Myers MD  Department of Hospital Medicine  [facility]  "

## 2023-05-25 NOTE — PATIENT INSTRUCTIONS
Stop carvedilol.       bp goal 115-135 / 60-85. Notify your cardiologist or Dr. Morgan if bp not in range.

## 2023-05-25 NOTE — PROGRESS NOTES
Subjective:      Patient ID: Isidro Alves is a 67 y.o. male.    Chief Complaint: No chief complaint on file.    HPI    Transitional Care Note    Family and/or Caretaker present at visit?  Yes.  Diagnostic tests reviewed/disposition: No diagnosic tests pending after this hospitalization.  Disease/illness education:  Pressure discussed.  Stop carvedilol.  ER precautions discussed.  ER precautions discussed.  Home health/community services discussion/referrals: Patient does not have home health established from hospital visit.  They do not need home health.  If needed, we will set up home health for the patient.   Establishment or re-establishment of referral orders for community resources: No other necessary community resources.   Discussion with other health care providers: No discussion with other health care providers necessary.         66 yo with   Patient Active Problem List   Diagnosis    Anemia due to chronic kidney disease    Hypertension associated with diabetes    Secondary hyperparathyroidism    Metabolic acidosis    Elevated troponin    Type 2 diabetes mellitus with chronic kidney disease, with long-term current use of insulin    Herpes labialis    Benign non-nodular prostatic hyperplasia without lower urinary tract symptoms    Elevated PSA    Hypogonadism in male    Hyperlipidemia    Class 1 obesity due to excess calories with serious comorbidity and body mass index (BMI) of 33.0 to 33.9 in adult    Colon cancer screening    Special screening for malignant neoplasms, colon    Colon polyp    Other proteinuria    Coronary artery disease of native artery of native heart with stable angina pectoris    Multiple thyroid nodules    Patient on waiting list for kidney transplant    ESRD (end stage renal disease) on dialysis    Hyperkalemia    SOB (shortness of breath)    Upper airway cough syndrome    Pulmonary fibrosis, postinflammatory    PVC (premature ventricular contraction)    Chronic systolic congestive  heart failure    Dilated cardiomyopathy    Abnormal nuclear stress test    Pulmonary HTN    Chronic cough    Pleural effusion on left     Past Medical History:   Diagnosis Date    Abnormal nuclear stress test 8/30/2022    Acute hypoxemic respiratory failure due to COVID-19 1/21/2021    Acute on chronic combined systolic and diastolic congestive heart failure 5/7/2023    Acute on chronic respiratory failure 5/7/2023    Acute respiratory failure with hypoxia and hypercapnia 5/18/2023    Anal itching 10/10/2022    Anemia 4/16/2014    BPH (benign prostatic hyperplasia)     CKD (chronic kidney disease) stage 4, GFR 15-29 ml/min     Coronary artery disease of native artery of native heart with stable angina pectoris 4/29/2019    Diabetes mellitus     Diabetes mellitus, type 2     Dilated cardiomyopathy 8/30/2022    Elevated PSA     Encounter for blood transfusion     Herpes labialis     Hyperlipidemia     Hypertension     Hypertensive emergency 5/18/2023    Obesity     Pneumonia 5/7/2023    Pneumonia due to COVID-19 virus     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    Preop cardiovascular exam 9/16/2022    Proteinuria     Pulmonary edema with congestive heart failure 5/18/2023    Secondary hyperparathyroidism (of renal origin)     UTI (lower urinary tract infection) 5/1/2015     Here today for hosp f/u appt after cardiac arrest.   Patient Name: Isidro Alves  MRN: [id number]  DOE: [id number]  Patient Class: IP- Inpatient  Admission Date: 5/14/2023  Hospital Length of Stay: 4 days  Discharge Date and Time:  05/18/2023 3:21 PM  Attending Physician: Nils Fritz MD   Discharging Provider: Khushi Trotter NP  Primary Care Provider: Steven Morgan MD     Primary Care Team: Networked reference to record PCT      HPI:   Isidro Alves is a 67 y.o. male with ESRD on HD, CAD, dilated cardiomyopathy, hypertension, diabetes mellitus who initially presented to ED on 5/14/2023 from [facility] for flash pulmonary edema, respiratory  failure and witnessed cardiac arrest while being intubated there.  Received a round of CPR before ROSC was achieved.  In ED had workup with chest x-ray which showed florid pulmonary edema with bilateral pleural effusions, elevated lactic acid of 3.2.  Admitted to ICU on critical care medicine service.       After admission to ICU, patient required urgent HD.  However he was hypotensive and necessitated central line placement and initiation of Levophed.  Was able to weaned off Levophed and vent and successfully extubated on 05/15/2023.  Hospital medicine consulted for continued care     PCP: Steven Morgan MD           * No surgery found *       Hospital Course:   67-year-old male with CAD, dilated cardiomyopathy, diabetes mellitus on renal transplant list, has been on HD for 1 year presented as transfer on 05/14/2023 from [facility] ED where he was noted to have cardiac arrest while in process of getting intubated due to respiratory distress.  Admitted to ICU     Underwent urgent HD on 5/14, however given hypotension required central line placement and started on Levophed.  Weaned off Levophed, successfully extubated 5/15. Had repeat HD done on 5/15.  5/16/23 analgesia ordered to control MSK chest pain. Failed desat study.   5/17/23 HD today. Will plan to repeat desat study in AM prior to discharge.  5/18/23 does not qualify for supplemental oxygen per desat study. PT/TO recommends outpatient therapy.  Next HD tomorrow. He was asked to follow up with PCP in one week.      ___________________________________________    Patient reports that his dyspnea continues to improve since admission.  However he continues with similar right-sided chest pain worse with certain movements and palpation.  This has been attributed to CPR administered during hospitalization.  Patient was giving pain medication in the hospital which helped he was discharged on muscle relaxers which are not as effective.  He requests pain medication  for these symptoms.    He had hypotension during hospitalization requiring pressors.  Multiple medications were discontinued during hospitalization.  Reports that his blood pressure has been decreasing since discharge.  He reports he has significantly improved his diet since discharge including significant decrease in sodium intake.    Review of Systems   Constitutional:  Negative for chills, diaphoresis, fatigue and fever.   HENT:  Negative for congestion, ear pain and sore throat.    Eyes:  Negative for visual disturbance.   Respiratory:  Negative for cough, shortness of breath and wheezing.    Cardiovascular:  Positive for chest pain. Negative for palpitations.   Gastrointestinal:  Negative for abdominal pain, blood in stool, diarrhea, nausea and vomiting.   Genitourinary:  Negative for dysuria and hematuria.   Skin:  Negative for rash and wound.   Neurological:  Negative for seizures and syncope.   Objective:   BP (!) 86/66 (BP Location: Left arm, Patient Position: Sitting, BP Method: Large (Manual))   Pulse 78   Temp 97.8 °F (36.6 °C) (Tympanic)   Wt 107.9 kg (237 lb 14 oz)   SpO2 97%   BMI 30.54 kg/m²     Physical Exam  Constitutional:       General: He is awake. He is not in acute distress.     Appearance: Normal appearance. He is well-developed. He is not ill-appearing, toxic-appearing or diaphoretic.   HENT:      Head: Normocephalic and atraumatic.   Eyes:      Conjunctiva/sclera: Conjunctivae normal.   Cardiovascular:      Rate and Rhythm: Normal rate and regular rhythm.   Pulmonary:      Effort: Pulmonary effort is normal.      Breath sounds: Normal breath sounds. No wheezing or rales.   Chest:      Chest wall: Tenderness (right chest wall.) present.   Musculoskeletal:         General: No swelling.      Cervical back: No rigidity.   Skin:     General: Skin is warm and dry.   Neurological:      Mental Status: He is alert. Mental status is at baseline.   Psychiatric:         Mood and Affect: Mood  normal.         Behavior: Behavior normal. Behavior is cooperative.         Thought Content: Thought content normal.         Judgment: Judgment normal.       Lab Results   Component Value Date    WBC 8.66 05/18/2023    HGB 10.1 (L) 05/18/2023    HGB 9.9 (L) 05/17/2023    HGB 9.4 (L) 05/15/2023    HCT 32.3 (L) 05/18/2023    MCV 85 05/18/2023    MCV 85 05/17/2023    MCV 85 05/15/2023     05/18/2023    CHOL 128 09/26/2022    TRIG 105 09/26/2022    HDL 25 (A) 09/26/2022    LDLCALC 82 09/26/2022    LDLCALC 79.8 07/14/2022    LDLCALC 98.0 10/06/2020    ALT 14 05/18/2023    AST 19 05/18/2023     (L) 05/18/2023    K 4.0 05/18/2023    CALCIUM 9.8 05/18/2023    CL 98 05/18/2023    CO2 23 05/18/2023    BUN 42 (H) 05/18/2023    CREATININE 8.4 (H) 05/18/2023    CREATININE 10.1 (H) 05/17/2023    CREATININE 7.8 (H) 05/16/2023    EGFRNORACEVR 6 (A) 05/18/2023    EGFRNORACEVR 5 (A) 05/17/2023    EGFRNORACEVR 7 (A) 05/16/2023    TSH 1.941 07/14/2022    TSH 1.464 09/05/2019    TSH 1.329 11/20/2017    PSA 6.1 (H) 08/25/2022    PSA 3.6 06/02/2021    PSA 4.3 (H) 06/12/2020    PSADIAG 7.7 (H) 11/04/2016    PSADIAG 12.7 (H) 05/17/2016    PSADIAG 4.7 (H) 11/20/2015     (H) 05/18/2023    HGBA1C 6.4 (H) 05/07/2023    HGBA1C 6.4 (H) 02/02/2023    HGBA1C 7.4 (A) 09/26/2022    DKQUGWFJ18WN 32 12/01/2020    WKKJTWST48BW 33 09/01/2020    UTUXQCIJ35OP 31 03/19/2020    BNP >4,900 (H) 05/14/2023          The ASCVD Risk score (Naye POON, et al., 2019) failed to calculate for the following reasons:    The valid systolic blood pressure range is 90 to 200 mmHg    The valid total cholesterol range is 130 to 320 mg/dL     Assessment:     1. Chest pain, unspecified type    2. Pleural effusion on left    3. Dilated cardiomyopathy    4. Chronic systolic congestive heart failure    5. Hypertension associated with diabetes      Plan:   1. Chest pain, unspecified type  -     traMADoL (ULTRAM) 50 mg tablet; 1 to 2 tabs po tid prn pain.   Dispense: 30 each; Refill: 0  -     methocarbamoL (ROBAXIN) 500 MG Tab; Take 1 tablet (500 mg total) by mouth 3 (three) times daily for 5 days  Dispense: 15 tablet; Refill: 0    2. Pleural effusion on left  Symptoms improved.     3. Dilated cardiomyopathy  Overview:    Compensated.  Has f/u with cards on 5/30.       4. Chronic systolic congestive heart failure  Overview:  Compensated. Has f/u with cards on 5/30.     5. Hypertension associated with diabetes  Low bp today. Has improved diet  Advised dc carvedilol and monitor bp at home. Parameters given. Advised contact cards or myself if bp no within parameters.         Patient Instructions   Stop carvedilol.       bp goal 115-135 / 60-85. Notify your cardiologist or Dr. Morgan if bp not in range.           Future Appointments   Date Time Provider Department Center   5/30/2023  1:20 PM Dez Ragland MD [facility] CARDIO [place]   6/28/2023  2:00 PM PULMONARY LAB 2, [facility]   6/28/2023  2:40 PM Lul Crockett MD [facility] PULMS[facility]  [facility]   7/6/2023 11:15 AM [facility]IC-XRAY [facility]   7/6/2023 12:00 PM LAB, APPOINTMENT [facility] LAB VNP [facility]   7/6/2023 12:30 PM KIDNEY LISTED, TRANSPLANT [facility]       Lab Frequency Next Occurrence   Urinalysis, Reflex to Urine Culture Urine, Clean Catch Once 10/24/2022   Hemoglobin A1C Once 09/05/2023   Ambulatory referral/consult to [facility] Once 03/16/2023   Stress test, pulmonary Once 03/28/2023   Ambulatory referral/consult to Pulmonary Disease Management w/ Respiratory Therapist Once 05/16/2023   Ambulatory referral/consult to Physical/Occupational Therapy Once 05/25/2023   X-Ray Chest PA And Lateral Once 05/22/2023   ANTIBODY TITER Once 05/22/2023   Hemoglobin A1C Every 12 Weeks    Hemoglobin A1C Every 12 Weeks    HLA PRA Screen     HLA PRA Screen     HLA Virtual Crossmatch         Follow up in about 4 weeks (around 6/22/2023), or if symptoms worsen or  fail to improve.         40+ minutes of total time spent on the encounter, which includes face to face time and non-face to face time preparing to see the patient (eg, review of tests), Obtaining and/or reviewing separately obtained history, documenting clinical information in the electronic or other health record, independently interpreting results (not separately reported) and communicating results to the patient/family/caregiver, or Care coordination (not separately reported).

## 2023-05-25 NOTE — HOSPITAL COURSE
Patient admitted for worsening shortness of breath, CHF, hypoxemia with room air sats in the 60s at home.  Patient arrived to emergency department home BiPAP.  Initial blood gas showed pH of 7.2.  He was continued on BiPAP and nitroglycerin drip was begun.  Blood pressure improved he was able to be weaned off BiPAP and placed on 3 L nasal cannula.  He was seen by Nephrology underwent hemodialysis.  The next morning patient had additional hemodialysis treatment.  Volume was removed and patient was instructed to let his outpatient dialysis unit know that his dry weight is 112.  He is also instructed on following fluid restricted diet as well as adjusting his dry weight at dialysis while in the facility to remove the fluid he has gained between treatments.  Patient seen and examined on day of discharge deemed stable for discharge.  Discussed with Nephrology and he will be discharged home to follow-up with his outpatient hemodialysis.

## 2023-05-25 NOTE — ASSESSMENT & PLAN NOTE
Moderate left pleural effusion   Give IV alsix x oen dose and continue Lasix 80mg po daily   UF with HD   empirically treated for possible underlying PNA with IV Rocephin and Azithromycin however after volume removal he is markedly improved in these were not continued

## 2023-05-25 NOTE — ASSESSMENT & PLAN NOTE
Patient underwent 2 hemodialysis treatments with volume removal at both.  His dry weight is been decreased to 112 kg he is instructed to let his dialysis facility know

## 2023-05-28 ENCOUNTER — HOSPITAL ENCOUNTER (EMERGENCY)
Facility: HOSPITAL | Age: 68
Discharge: HOME | End: 2023-05-28
Attending: FAMILY MEDICINE
Payer: COMMERCIAL

## 2023-05-28 VITALS
HEIGHT: 74 IN | OXYGEN SATURATION: 98 % | HEART RATE: 77 BPM | SYSTOLIC BLOOD PRESSURE: 144 MMHG | BODY MASS INDEX: 31.34 KG/M2 | RESPIRATION RATE: 17 BRPM | DIASTOLIC BLOOD PRESSURE: 78 MMHG | WEIGHT: 244.19 LBS | TEMPERATURE: 98 F

## 2023-05-28 DIAGNOSIS — M79.602 LEFT ARM PAIN: ICD-10-CM

## 2023-05-28 DIAGNOSIS — S22.41XD CLOSED FRACTURE OF MULTIPLE RIBS OF RIGHT SIDE WITH ROUTINE HEALING, SUBSEQUENT ENCOUNTER: Primary | ICD-10-CM

## 2023-05-28 DIAGNOSIS — I50.22 CHRONIC SYSTOLIC CONGESTIVE HEART FAILURE: ICD-10-CM

## 2023-05-28 LAB
ALBUMIN SERPL BCP-MCNC: 3.4 G/DL (ref 3.5–5.2)
ALP SERPL-CCNC: 61 U/L (ref 55–135)
ALT SERPL W/O P-5'-P-CCNC: 7 U/L (ref 10–44)
ANION GAP SERPL CALC-SCNC: 16 MMOL/L (ref 8–16)
AST SERPL-CCNC: 12 U/L (ref 10–40)
BASOPHILS # BLD AUTO: 0.08 K/UL (ref 0–0.2)
BASOPHILS NFR BLD: 0.6 % (ref 0–1.9)
BILIRUB SERPL-MCNC: 0.9 MG/DL (ref 0.1–1)
BNP SERPL-MCNC: 1184 PG/ML (ref 0–99)
BUN SERPL-MCNC: 34 MG/DL (ref 8–23)
CALCIUM SERPL-MCNC: 9.7 MG/DL (ref 8.7–10.5)
CHLORIDE SERPL-SCNC: 95 MMOL/L (ref 95–110)
CO2 SERPL-SCNC: 21 MMOL/L (ref 23–29)
CREAT SERPL-MCNC: 11.5 MG/DL (ref 0.5–1.4)
DIFFERENTIAL METHOD: ABNORMAL
EOSINOPHIL # BLD AUTO: 0.3 K/UL (ref 0–0.5)
EOSINOPHIL NFR BLD: 2.1 % (ref 0–8)
ERYTHROCYTE [DISTWIDTH] IN BLOOD BY AUTOMATED COUNT: 14.7 % (ref 11.5–14.5)
EST. GFR  (NO RACE VARIABLE): 4 ML/MIN/1.73 M^2
GLUCOSE SERPL-MCNC: 133 MG/DL (ref 70–110)
HCT VFR BLD AUTO: 34.3 % (ref 40–54)
HGB BLD-MCNC: 11 G/DL (ref 14–18)
IMM GRANULOCYTES # BLD AUTO: 0.04 K/UL (ref 0–0.04)
IMM GRANULOCYTES NFR BLD AUTO: 0.3 % (ref 0–0.5)
LYMPHOCYTES # BLD AUTO: 0.8 K/UL (ref 1–4.8)
LYMPHOCYTES NFR BLD: 6.5 % (ref 18–48)
MCH RBC QN AUTO: 26.8 PG (ref 27–31)
MCHC RBC AUTO-ENTMCNC: 32.1 G/DL (ref 32–36)
MCV RBC AUTO: 84 FL (ref 82–98)
MONOCYTES # BLD AUTO: 1 K/UL (ref 0.3–1)
MONOCYTES NFR BLD: 8.3 % (ref 4–15)
NEUTROPHILS # BLD AUTO: 10.3 K/UL (ref 1.8–7.7)
NEUTROPHILS NFR BLD: 82.2 % (ref 38–73)
NRBC BLD-RTO: 0 /100 WBC
PLATELET # BLD AUTO: 277 K/UL (ref 150–450)
PMV BLD AUTO: 9.9 FL (ref 9.2–12.9)
POTASSIUM SERPL-SCNC: 4.1 MMOL/L (ref 3.5–5.1)
PROT SERPL-MCNC: 7.2 G/DL (ref 6–8.4)
RBC # BLD AUTO: 4.11 M/UL (ref 4.6–6.2)
SODIUM SERPL-SCNC: 132 MMOL/L (ref 136–145)
TROPONIN I SERPL DL<=0.01 NG/ML-MCNC: 0.1 NG/ML (ref 0–0.03)
WBC # BLD AUTO: 12.48 K/UL (ref 3.9–12.7)

## 2023-05-28 PROCEDURE — 85025 COMPLETE CBC W/AUTO DIFF WBC: CPT | Mod: NTX | Performed by: FAMILY MEDICINE

## 2023-05-28 PROCEDURE — 99285 EMERGENCY DEPT VISIT HI MDM: CPT | Mod: 25,NTX

## 2023-05-28 PROCEDURE — 80053 COMPREHEN METABOLIC PANEL: CPT | Mod: NTX | Performed by: FAMILY MEDICINE

## 2023-05-28 PROCEDURE — 83880 ASSAY OF NATRIURETIC PEPTIDE: CPT | Mod: NTX | Performed by: FAMILY MEDICINE

## 2023-05-28 PROCEDURE — 93010 EKG 12-LEAD: ICD-10-PCS | Mod: NTX,,, | Performed by: INTERNAL MEDICINE

## 2023-05-28 PROCEDURE — 63600175 PHARM REV CODE 636 W HCPCS: Mod: NTX | Performed by: FAMILY MEDICINE

## 2023-05-28 PROCEDURE — 96374 THER/PROPH/DIAG INJ IV PUSH: CPT | Mod: NTX

## 2023-05-28 PROCEDURE — 93005 ELECTROCARDIOGRAM TRACING: CPT | Mod: NTX

## 2023-05-28 PROCEDURE — 93010 ELECTROCARDIOGRAM REPORT: CPT | Mod: NTX,,, | Performed by: INTERNAL MEDICINE

## 2023-05-28 PROCEDURE — 84484 ASSAY OF TROPONIN QUANT: CPT | Mod: NTX | Performed by: FAMILY MEDICINE

## 2023-05-28 PROCEDURE — 25000003 PHARM REV CODE 250: Mod: NTX | Performed by: FAMILY MEDICINE

## 2023-05-28 RX ORDER — OXYCODONE AND ACETAMINOPHEN 10; 325 MG/1; MG/1
1 TABLET ORAL
Status: COMPLETED | OUTPATIENT
Start: 2023-05-28 | End: 2023-05-28

## 2023-05-28 RX ORDER — MORPHINE SULFATE 4 MG/ML
4 INJECTION, SOLUTION INTRAMUSCULAR; INTRAVENOUS
Status: COMPLETED | OUTPATIENT
Start: 2023-05-28 | End: 2023-05-28

## 2023-05-28 RX ORDER — OXYCODONE AND ACETAMINOPHEN 10; 325 MG/1; MG/1
1 TABLET ORAL EVERY 6 HOURS PRN
Qty: 12 TABLET | Refills: 0 | Status: SHIPPED | OUTPATIENT
Start: 2023-05-28 | End: 2023-10-23

## 2023-05-28 RX ADMIN — OXYCODONE AND ACETAMINOPHEN 1 TABLET: 10; 325 TABLET ORAL at 09:05

## 2023-05-28 RX ADMIN — MORPHINE SULFATE 4 MG: 4 INJECTION INTRAVENOUS at 07:05

## 2023-05-28 NOTE — ED NOTES
Pt. Is AAOx4. Pt. Reports feeling SOB and light-headed upon checking in to the ED. Pt. Also reports pain to L arm (9/10) without pain to chest. Pt. Has a cardiac history - states that he coded at [facility] on 5/14/2023. Pt. Reports that he has had right arm pain every day since that admission, but reports that he began having L arm pain today.

## 2023-05-29 NOTE — ED PROVIDER NOTES
SCRIBE #1 NOTE: I, Catracho Thurston, am scribing for, and in the presence of, Jessenia Martinez MD. I have scribed the entire note.       History     Chief Complaint   Patient presents with    Left arm pain/ Cardiac     Pt started with left arm pain this am. Pain described as sharp. He rated the pain 9/10. Cardiac Hx he coded on 5/14/23.  Dialysis MWF 3 hours 45 min tx. Pt is anxious.     Review of patient's allergies indicates:   Allergen Reactions    Bactrim [sulfamethoxazole-trimethoprim] Swelling    Nsaids (non-steroidal anti-inflammatory drug)      CKD    Sulfamethoxazole     Trimethoprim          History of Present Illness     HPI    5/28/2023, 7:05 PM  History obtained from the patient      History of Present Illness: Isidro Alves is a 67 y.o. male patient with a PMHx of acute hypoxemic respiratory failure due to COVID-19, acute on chronic respiratory failure, anemia, BPH, CKD, CAD, DMII, dilated cardiomyopathy, HLD, HTN, PNA, pulmonary edema with CHF who presents to the Emergency Department for evaluation of L arm pain which onset this morning. Pt describes the pain as sharp and rates it a 9/10 in severity. Pt states that last week, he experienced similar sxs on his R arm. Pt coded on 5/14/2023. Symptoms are constant in severity. No mitigating or exacerbating factors reported. Associated sxs include anxiousness. Patient denies any fever, chills, SOB, n/v/d, abdominal pain, leg swelling, and all other sxs at this time. No prior Tx reported. Pt gets dialysis every MWF, and has been on dialysis for 1 year now. No further complaints or concerns at this time.       Arrival mode: Personal vehicle    PCP: Steven Morgan MD        Past Medical History:  Past Medical History:   Diagnosis Date    Abnormal nuclear stress test 8/30/2022    Acute hypoxemic respiratory failure due to COVID-19 1/21/2021    Acute on chronic combined systolic and diastolic congestive heart failure 5/7/2023    Acute on chronic respiratory  failure 5/7/2023    Acute respiratory failure with hypoxia and hypercapnia 5/18/2023    Anal itching 10/10/2022    Anemia 4/16/2014    BPH (benign prostatic hyperplasia)     CKD (chronic kidney disease) stage 4, GFR 15-29 ml/min     Coronary artery disease of native artery of native heart with stable angina pectoris 4/29/2019    Diabetes mellitus     Diabetes mellitus, type 2     Dilated cardiomyopathy 8/30/2022    Elevated PSA     Encounter for blood transfusion     Herpes labialis     Hyperlipidemia     Hypertension     Hypertensive emergency 5/18/2023    Obesity     Pneumonia 5/7/2023    Pneumonia due to COVID-19 virus     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    Preop cardiovascular exam 9/16/2022    Proteinuria     Pulmonary edema with congestive heart failure 5/18/2023    Secondary hyperparathyroidism (of renal origin)     UTI (lower urinary tract infection) 5/1/2015       Past Surgical History:  Past Surgical History:   Procedure Laterality Date    CATHETERIZATION OF BOTH LEFT AND RIGHT HEART N/A 08/30/2022    Procedure: CATHETERIZATION, HEART, BOTH LEFT AND RIGHT;  Surgeon: Kassandra Deras MD;  Location: [facility] CATH LAB;  Service: Cardiology;  Laterality: N/A;    COLONOSCOPY N/A 12/21/2017    Procedure: COLONOSCOPY;  Surgeon: Fran Escalera MD;  Location: [facility] ENDO;  Service: Endoscopy;  Laterality: N/A;    COLONOSCOPY N/A 02/02/2023    Procedure: COLONOSCOPY;  Surgeon: Anthony Meng MD;  Location: [facility] ENDO;  Service: Endoscopy;  Laterality: N/A;    FLUOROSCOPY N/A 01/21/2021    Procedure: Vascath insertion;  Surgeon: Adrian Pruitt Jr., MD;  Location: [facility] CATH LAB;  Service: General;  Laterality: N/A;    FLUOROSCOPY N/A 04/06/2021    Procedure: Over the wire Vas Cath exchange;  Surgeon: Ho Pressley MD;  Location: [facility] CATH LAB;  Service: General;  Laterality: N/A;    PROSTATE BIOPSY      thorocotomy  01/01/2010         Family History:  Family History   Problem Relation Age of Onset    No Known Problems  Mother     Diabetes Father     Hypertension Father     Hypertension Other     Diabetes Other        Social History:  Social History     Tobacco Use    Smoking status: Never    Smokeless tobacco: Never   Substance and Sexual Activity    Alcohol use: Not Currently     Comment: seldom     Drug use: No    Sexual activity: Not Currently     Partners: Female        Review of Systems     Review of Systems   Constitutional:  Negative for chills and fever.   HENT:  Negative for sore throat.    Respiratory:  Negative for shortness of breath.    Cardiovascular:  Negative for chest pain and leg swelling.   Gastrointestinal:  Negative for abdominal pain, diarrhea, nausea and vomiting.   Genitourinary:  Negative for dysuria.   Musculoskeletal:  Negative for back pain.        (+) L arm pain   Skin:  Negative for rash.   Neurological:  Negative for weakness.   Hematological:  Does not bruise/bleed easily.   Psychiatric/Behavioral:  The patient is nervous/anxious.    All other systems reviewed and are negative.     Physical Exam     Initial Vitals [05/28/23 1746]   BP Pulse Resp Temp SpO2   (!) 146/78 79 (!) 28 97.9 °F (36.6 °C) 100 %      MAP       --          Physical Exam  Nursing Notes and Vital Signs Reviewed.  Constitutional: Patient is in no acute distress. Well-developed and well-nourished.  Head: Atraumatic. Normocephalic.  Eyes: PERRL. EOM intact. Conjunctivae are not pale. No scleral icterus.  ENT: Mucous membranes are moist. Oropharynx is clear and symmetric.    Neck: Supple. Full ROM. No lymphadenopathy.  Cardiovascular: Regular rate. Regular rhythm. No murmurs, rubs, or gallops. Distal pulses are 2+ and symmetric. Radial pulses 2+.  Pulmonary/Chest: No respiratory distress. Clear to auscultation bilaterally. No wheezing or rales. Pt has a vas cath on his R anterior chest wall.   Abdominal: Soft and non-distended.  There is no tenderness.  No rebound, guarding, or rigidity. Good bowel sounds.  Genitourinary: No CVA  "tenderness  Musculoskeletal: There is extreme tenderness to L axilla with no obvious abscess, no drainage, no fluctuance. ROM of L arm is limited secondary to pain.  Skin: Warm and dry.  Neurological:  Alert, awake, and appropriate.  Normal speech.  No acute focal neurological deficits are appreciated.  Psychiatric: Normal affect. Good eye contact. Appropriate in content.     ED Course   Procedures  ED Vital Signs:  Vitals:    05/28/23 1746 05/28/23 1747 05/28/23 1802 05/28/23 1945   BP: (!) 146/78 (!) 146/78 (!) 145/82 (!) 153/80   Pulse: 79 78 78 73   Resp: (!) 28 (!) 24 (!) 26 10   Temp: 97.9 °F (36.6 °C)   98.2 °F (36.8 °C)   TempSrc: Oral   Oral   SpO2: 100% 100% 99% 100%   Weight: (!) 239.8 kg (528 lb 10.6 oz)      Height: 6' 2" (1.88 m)       05/28/23 1955 05/28/23 2045 05/28/23 2102 05/28/23 2137   BP:  (!) 145/80 (!) 145/80    Pulse:  69 69    Resp:  15 17 17   Temp:  98 °F (36.7 °C) 98 °F (36.7 °C)    TempSrc:  Oral Oral    SpO2:  (!) 93%     Weight: 110.8 kg (244 lb 3.2 oz)      Height:        05/28/23 2223   BP: (!) 144/78   Pulse: 77   Resp: 17   Temp: 98 °F (36.7 °C)   TempSrc: Oral   SpO2: 98%   Weight:    Height:        Abnormal Lab Results:  Labs Reviewed   CBC W/ AUTO DIFFERENTIAL - Abnormal; Notable for the following components:       Result Value    RBC 4.11 (*)     Hemoglobin 11.0 (*)     Hematocrit 34.3 (*)     MCH 26.8 (*)     RDW 14.7 (*)     Gran # (ANC) 10.3 (*)     Lymph # 0.8 (*)     Gran % 82.2 (*)     Lymph % 6.5 (*)     All other components within normal limits   COMPREHENSIVE METABOLIC PANEL - Abnormal; Notable for the following components:    Sodium 132 (*)     CO2 21 (*)     Glucose 133 (*)     BUN 34 (*)     Creatinine 11.5 (*)     Albumin 3.4 (*)     ALT 7 (*)     eGFR 4 (*)     All other components within normal limits   B-TYPE NATRIURETIC PEPTIDE - Abnormal; Notable for the following components:    BNP 1,184 (*)     All other components within normal limits   TROPONIN I - " Abnormal; Notable for the following components:    Troponin I 0.102 (*)     All other components within normal limits        All Lab Results:  Results for orders placed or performed during the hospital encounter of 05/28/23   CBC auto differential   Result Value Ref Range    WBC 12.48 3.90 - 12.70 K/uL    RBC 4.11 (L) 4.60 - 6.20 M/uL    Hemoglobin 11.0 (L) 14.0 - 18.0 g/dL    Hematocrit 34.3 (L) 40.0 - 54.0 %    MCV 84 82 - 98 fL    MCH 26.8 (L) 27.0 - 31.0 pg    MCHC 32.1 32.0 - 36.0 g/dL    RDW 14.7 (H) 11.5 - 14.5 %    Platelets 277 150 - 450 K/uL    MPV 9.9 9.2 - 12.9 fL    Immature Granulocytes 0.3 0.0 - 0.5 %    Gran # (ANC) 10.3 (H) 1.8 - 7.7 K/uL    Immature Grans (Abs) 0.04 0.00 - 0.04 K/uL    Lymph # 0.8 (L) 1.0 - 4.8 K/uL    Mono # 1.0 0.3 - 1.0 K/uL    Eos # 0.3 0.0 - 0.5 K/uL    Baso # 0.08 0.00 - 0.20 K/uL    nRBC 0 0 /100 WBC    Gran % 82.2 (H) 38.0 - 73.0 %    Lymph % 6.5 (L) 18.0 - 48.0 %    Mono % 8.3 4.0 - 15.0 %    Eosinophil % 2.1 0.0 - 8.0 %    Basophil % 0.6 0.0 - 1.9 %    Differential Method Automated    Comprehensive metabolic panel   Result Value Ref Range    Sodium 132 (L) 136 - 145 mmol/L    Potassium 4.1 3.5 - 5.1 mmol/L    Chloride 95 95 - 110 mmol/L    CO2 21 (L) 23 - 29 mmol/L    Glucose 133 (H) 70 - 110 mg/dL    BUN 34 (H) 8 - 23 mg/dL    Creatinine 11.5 (H) 0.5 - 1.4 mg/dL    Calcium 9.7 8.7 - 10.5 mg/dL    Total Protein 7.2 6.0 - 8.4 g/dL    Albumin 3.4 (L) 3.5 - 5.2 g/dL    Total Bilirubin 0.9 0.1 - 1.0 mg/dL    Alkaline Phosphatase 61 55 - 135 U/L    AST 12 10 - 40 U/L    ALT 7 (L) 10 - 44 U/L    Anion Gap 16 8 - 16 mmol/L    eGFR 4 (A) >60 mL/min/1.73 m^2   B-Type natriuretic peptide (BNP)   Result Value Ref Range    BNP 1,184 (H) 0 - 99 pg/mL   Troponin I   Result Value Ref Range    Troponin I 0.102 (H) 0.000 - 0.026 ng/mL     *Note: Due to a large number of results and/or encounters for the requested time period, some results have not been displayed. A complete set of results  can be found in Results Review.         Imaging Results:  Imaging Results              CT Chest Without Contrast (Final result)  Result time 05/28/23 20:57:51      Final result by Clinton Daniels MD (05/28/23 20:57:51)                   Impression:      Acute fractures of the right lateral ribs.  No pneumothorax.    Mild basilar reticular and ground-glass opacities in the lung bases likely related to atelectasis or scarring.    Prominent nodule in the right lower lobe.  Recommend follow-up.    Old rib deformities right greater than left posterolateral ribs.    No pneumothorax.    All CT scans at this facility use dose modulation, iterative reconstruction and/or weight based dosing when appropriate to reduce radiation dose to as low as reasonably achievable.      Electronically signed by: Clinton Daniels  Date:    05/28/2023  Time:    20:57               Narrative:    EXAMINATION:  CT CHEST WITHOUT CONTRAST    CLINICAL HISTORY:  Chest trauma, blunt;    TECHNIQUE:  5 mm axial images were acquired using helical CT technique from the lung apices through costophrenic sulci.  No intravenous contrast was administered.    FINDINGS:  Right-sided port noted.  Mild reticular opacities in the lung bases consistent with atelectasis and scarring.  Mild lower lobe bronchiectasis.  A nodule is identified in the right lower lobe measuring 13 mm subpleural region.  Recommend attention on follow-up.  Early basilar pneumonia not excluded.  Atherosclerotic changes.  Bilateral right greater than posterior rib deformities..  Nondisplaced fracture of the right lateral fourth rib.  Displaced fracture of the right lateral 5th 6 7th rib.  No aortic injury.  No pneumothorax.  Lipoma of the right paraspinal musculature.                                       X-Ray Chest AP Portable (Final result)  Result time 05/28/23 18:53:15      Final result by Clinton Daniels MD (05/28/23 18:53:15)                   Impression:      No acute  abnormality.      Electronically signed by: Clinton Kimi  Date:    05/28/2023  Time:    18:53               Narrative:    EXAMINATION:  XR CHEST AP PORTABLE    CLINICAL HISTORY:  Chest Pain;    TECHNIQUE:  Single frontal view of the chest was performed.    COMPARISON:  None    FINDINGS:  Retrocardiac opacity not identified.  Right-sided central venous catheter.    Cardiomegaly the hilar and mediastinal contours are unremarkable.    Old left rib deformity.                                       The EKG was ordered, reviewed, and independently interpreted by the ED provider.  Interpretation time: 17:41  Rate: 77 BPM  Rhythm: normal sinus rhythm  Interpretation: Nonspecific T wave abnormality. No STEMI.         The Emergency Provider reviewed the vital signs and test results, which are outlined above.     ED Discussion     9:54 PM: Reassessed pt at this time. Discussed with pt all pertinent ED information and results. Discussed pt dx and plan of tx. Gave pt all f/u and return to the ED instructions. All questions and concerns were addressed at this time. Pt expresses understanding of information and instructions, and is comfortable with plan to discharge. Pt is stable for discharge.    I discussed with patient and/or family/caretaker that evaluation in the ED does not suggest any emergent or life threatening medical conditions requiring immediate intervention beyond what was provided in the ED, and I believe patient is safe for discharge.  Regardless, an unremarkable evaluation in the ED does not preclude the development or presence of a serious of life threatening condition. As such, patient was instructed to return immediately for any worsening or change in current symptoms.       Medical Decision Making:   Clinical Tests:   Lab Tests: Ordered and Reviewed  Radiological Study: Ordered and Reviewed  Medical Tests: Ordered and Reviewed         ED Medication(s):  Medications   morphine injection 4 mg (4 mg Intravenous  Given 5/28/23 1945)   oxyCODONE-acetaminophen  mg per tablet 1 tablet (1 tablet Oral Given 5/28/23 2137)       Discharge Medication List as of 5/28/2023 10:10 PM        START taking these medications    Details   oxyCODONE-acetaminophen (PERCOCET)  mg per tablet Take 1 tablet by mouth every 6 (six) hours as needed for Pain., Starting Sun 5/28/2023, Print              Follow-up Information       Schedule an appointment as soon as possible for a visit  with Steven Morgan MD.    Specialty: Internal Medicine  Why: As needed  Contact information:  [id number]  [place] [address]  [phone]                                 Scribe Attestation:   Scribe #1: I performed the above scribed service and the documentation accurately describes the services I performed. I attest to the accuracy of the note.     Attending:   Physician Attestation Statement for Scribe #1: I, Jessenia Martinez MD, personally performed the services described in this documentation, as scribed by Catracho Thurston, in my presence, and it is both accurate and complete.           Clinical Impression       ICD-10-CM ICD-9-CM   1. Closed fracture of multiple ribs of right side with routine healing, subsequent encounter  S22.41XD V54.19   2. Left arm pain  M79.602 729.5   3. Chronic systolic congestive heart failure  I50.22 428.22     428.0       Disposition:   Disposition: Discharged  Condition: Stable       Jessenia Martinez MD  06/01/23 [id number]

## 2023-05-30 ENCOUNTER — OFFICE VISIT (OUTPATIENT)
Dept: CARDIOLOGY | Facility: CLINIC | Age: 68
End: 2023-05-30
Payer: COMMERCIAL

## 2023-05-30 VITALS
SYSTOLIC BLOOD PRESSURE: 110 MMHG | WEIGHT: 241.63 LBS | HEIGHT: 74 IN | BODY MASS INDEX: 31.01 KG/M2 | HEART RATE: 85 BPM | DIASTOLIC BLOOD PRESSURE: 64 MMHG | OXYGEN SATURATION: 96 %

## 2023-05-30 DIAGNOSIS — Z79.4 TYPE 2 DIABETES MELLITUS WITH CHRONIC KIDNEY DISEASE ON CHRONIC DIALYSIS, WITH LONG-TERM CURRENT USE OF INSULIN: ICD-10-CM

## 2023-05-30 DIAGNOSIS — I49.3 PVC (PREMATURE VENTRICULAR CONTRACTION): ICD-10-CM

## 2023-05-30 DIAGNOSIS — I25.118 CORONARY ARTERY DISEASE OF NATIVE ARTERY OF NATIVE HEART WITH STABLE ANGINA PECTORIS: ICD-10-CM

## 2023-05-30 DIAGNOSIS — I50.22 CHRONIC SYSTOLIC CONGESTIVE HEART FAILURE: Primary | ICD-10-CM

## 2023-05-30 DIAGNOSIS — I15.2 HYPERTENSION ASSOCIATED WITH DIABETES: ICD-10-CM

## 2023-05-30 DIAGNOSIS — E78.2 MIXED HYPERLIPIDEMIA: Chronic | ICD-10-CM

## 2023-05-30 DIAGNOSIS — Z99.2 TYPE 2 DIABETES MELLITUS WITH CHRONIC KIDNEY DISEASE ON CHRONIC DIALYSIS, WITH LONG-TERM CURRENT USE OF INSULIN: ICD-10-CM

## 2023-05-30 DIAGNOSIS — N18.6 TYPE 2 DIABETES MELLITUS WITH CHRONIC KIDNEY DISEASE ON CHRONIC DIALYSIS, WITH LONG-TERM CURRENT USE OF INSULIN: ICD-10-CM

## 2023-05-30 DIAGNOSIS — E11.22 TYPE 2 DIABETES MELLITUS WITH CHRONIC KIDNEY DISEASE ON CHRONIC DIALYSIS, WITH LONG-TERM CURRENT USE OF INSULIN: ICD-10-CM

## 2023-05-30 DIAGNOSIS — E11.59 HYPERTENSION ASSOCIATED WITH DIABETES: ICD-10-CM

## 2023-05-30 PROCEDURE — 1126F AMNT PAIN NOTED NONE PRSNT: CPT | Mod: CPTII,S$GLB,TXP, | Performed by: INTERNAL MEDICINE

## 2023-05-30 PROCEDURE — 1160F PR REVIEW ALL MEDS BY PRESCRIBER/CLIN PHARMACIST DOCUMENTED: ICD-10-PCS | Mod: CPTII,S$GLB,TXP, | Performed by: INTERNAL MEDICINE

## 2023-05-30 PROCEDURE — 3066F NEPHROPATHY DOC TX: CPT | Mod: CPTII,S$GLB,TXP, | Performed by: INTERNAL MEDICINE

## 2023-05-30 PROCEDURE — 3078F DIAST BP <80 MM HG: CPT | Mod: CPTII,S$GLB,TXP, | Performed by: INTERNAL MEDICINE

## 2023-05-30 PROCEDURE — 3074F SYST BP LT 130 MM HG: CPT | Mod: CPTII,S$GLB,TXP, | Performed by: INTERNAL MEDICINE

## 2023-05-30 PROCEDURE — 99214 PR OFFICE/OUTPT VISIT, EST, LEVL IV, 30-39 MIN: ICD-10-PCS | Mod: S$GLB,TXP,, | Performed by: INTERNAL MEDICINE

## 2023-05-30 PROCEDURE — 99999 PR PBB SHADOW E&M-EST. PATIENT-LVL V: CPT | Mod: PBBFAC,TXP,, | Performed by: INTERNAL MEDICINE

## 2023-05-30 PROCEDURE — 4010F ACE/ARB THERAPY RXD/TAKEN: CPT | Mod: CPTII,S$GLB,TXP, | Performed by: INTERNAL MEDICINE

## 2023-05-30 PROCEDURE — 3078F PR MOST RECENT DIASTOLIC BLOOD PRESSURE < 80 MM HG: ICD-10-PCS | Mod: CPTII,S$GLB,TXP, | Performed by: INTERNAL MEDICINE

## 2023-05-30 PROCEDURE — 1159F MED LIST DOCD IN RCRD: CPT | Mod: CPTII,S$GLB,TXP, | Performed by: INTERNAL MEDICINE

## 2023-05-30 PROCEDURE — 1101F PR PT FALLS ASSESS DOC 0-1 FALLS W/OUT INJ PAST YR: ICD-10-PCS | Mod: CPTII,S$GLB,TXP, | Performed by: INTERNAL MEDICINE

## 2023-05-30 PROCEDURE — 3008F BODY MASS INDEX DOCD: CPT | Mod: CPTII,S$GLB,TXP, | Performed by: INTERNAL MEDICINE

## 2023-05-30 PROCEDURE — 3288F PR FALLS RISK ASSESSMENT DOCUMENTED: ICD-10-PCS | Mod: CPTII,S$GLB,TXP, | Performed by: INTERNAL MEDICINE

## 2023-05-30 PROCEDURE — 3288F FALL RISK ASSESSMENT DOCD: CPT | Mod: CPTII,S$GLB,TXP, | Performed by: INTERNAL MEDICINE

## 2023-05-30 PROCEDURE — 99999 PR PBB SHADOW E&M-EST. PATIENT-LVL V: ICD-10-PCS | Mod: PBBFAC,TXP,, | Performed by: INTERNAL MEDICINE

## 2023-05-30 PROCEDURE — 3066F PR DOCUMENTATION OF TREATMENT FOR NEPHROPATHY: ICD-10-PCS | Mod: CPTII,S$GLB,TXP, | Performed by: INTERNAL MEDICINE

## 2023-05-30 PROCEDURE — 3008F PR BODY MASS INDEX (BMI) DOCUMENTED: ICD-10-PCS | Mod: CPTII,S$GLB,TXP, | Performed by: INTERNAL MEDICINE

## 2023-05-30 PROCEDURE — 1111F PR DISCHARGE MEDS RECONCILED W/ CURRENT OUTPATIENT MED LIST: ICD-10-PCS | Mod: CPTII,S$GLB,TXP, | Performed by: INTERNAL MEDICINE

## 2023-05-30 PROCEDURE — 3044F HG A1C LEVEL LT 7.0%: CPT | Mod: CPTII,S$GLB,TXP, | Performed by: INTERNAL MEDICINE

## 2023-05-30 PROCEDURE — 3074F PR MOST RECENT SYSTOLIC BLOOD PRESSURE < 130 MM HG: ICD-10-PCS | Mod: CPTII,S$GLB,TXP, | Performed by: INTERNAL MEDICINE

## 2023-05-30 PROCEDURE — 3044F PR MOST RECENT HEMOGLOBIN A1C LEVEL <7.0%: ICD-10-PCS | Mod: CPTII,S$GLB,TXP, | Performed by: INTERNAL MEDICINE

## 2023-05-30 PROCEDURE — 1126F PR PAIN SEVERITY QUANTIFIED, NO PAIN PRESENT: ICD-10-PCS | Mod: CPTII,S$GLB,TXP, | Performed by: INTERNAL MEDICINE

## 2023-05-30 PROCEDURE — 4010F PR ACE/ARB THEARPY RXD/TAKEN: ICD-10-PCS | Mod: CPTII,S$GLB,TXP, | Performed by: INTERNAL MEDICINE

## 2023-05-30 PROCEDURE — 99214 OFFICE O/P EST MOD 30 MIN: CPT | Mod: S$GLB,TXP,, | Performed by: INTERNAL MEDICINE

## 2023-05-30 PROCEDURE — 1101F PT FALLS ASSESS-DOCD LE1/YR: CPT | Mod: CPTII,S$GLB,TXP, | Performed by: INTERNAL MEDICINE

## 2023-05-30 PROCEDURE — 1160F RVW MEDS BY RX/DR IN RCRD: CPT | Mod: CPTII,S$GLB,TXP, | Performed by: INTERNAL MEDICINE

## 2023-05-30 PROCEDURE — 1111F DSCHRG MED/CURRENT MED MERGE: CPT | Mod: CPTII,S$GLB,TXP, | Performed by: INTERNAL MEDICINE

## 2023-05-30 PROCEDURE — 1159F PR MEDICATION LIST DOCUMENTED IN MEDICAL RECORD: ICD-10-PCS | Mod: CPTII,S$GLB,TXP, | Performed by: INTERNAL MEDICINE

## 2023-05-30 RX ORDER — METOPROLOL SUCCINATE 25 MG/1
12.5 TABLET, EXTENDED RELEASE ORAL DAILY
Qty: 30 TABLET | Refills: 5 | Status: SHIPPED | OUTPATIENT
Start: 2023-05-30 | End: 2023-10-23

## 2023-05-30 NOTE — PROGRESS NOTES
Subjective:   Patient ID:  Isidro Alves is a 67 y.o. male who presents for follow up of No chief complaint on file.      65 yo male, came in for 3 months f/u. Own a night club.  PMH CHFrEF 40%  CAD, old MI per MPI in , IDDM 20 yrs, HTN, HLD. ESRD on HD since  and renal Tx list at [facility], No smoking. Occasional drink.  H/o Fell off the roof and multi fx.    MPI showed inferior old MI and EF 35%. ECHo showed EF 50% and LVH  No f/h premature CAD.  No smoking  Walks 4 times a week. And on PT  ekg NSR     visit  HD via right chest catheter.   echo dilated LV mod, EF 40% and  echo EF 55%  HOLTER showed 12% PVCs  Occasional palpitation. orthopnea and PND. No chest pain leg swelling and syncope. Urinating 8 times a week. No fluid restriction.    08/2022 visit   Phar mPI showed There is a moderate to severe intensity, large sized, mostly fixed perfusion abnormality with some reversibilty in the inferior, inferolateral, lateral and apical wall(s).  Still has SOB PND orthopnea. No leg swelling   BP high. On low Na diet and on 32 oz fluid restriction     visit  H/o LHC and RHC done on 08/31/2022 by Dr. Deras at [facility]  Nonobstructive cAD 30% lesion on LAD and RCA. EF 40%   PAP 28 mmHG PCWP 15 mmHG.  HD 3 times a week. No chest pain  Fluid resriction 32 oz      visit  Some SOB, cough up the phlegm. Fluid restriction.  RHC showed type II pulm HTN  from left side CHF  EKG NSR non specific stt change   Walks 3 times a week    Interval history    admitted to [facility] for CHF exacerbation due to fluid noncompliance and had urgent HD. Had cardiac arrest with one round of CPR. Temporary cardiogenic shock with levophed gtt.   05/28/23 BNP 1280. On fluid restriction  No chest pain  dizziness. On HD 3 times a week. Off coreg 12.5 mg bid due to low BP  Can not add Jardiance due to on HD          Past Medical History:   Diagnosis Date    Abnormal nuclear stress test  8/30/2022    Acute hypoxemic respiratory failure due to COVID-19 1/21/2021    Acute on chronic combined systolic and diastolic congestive heart failure 5/7/2023    Acute on chronic respiratory failure 5/7/2023    Acute respiratory failure with hypoxia and hypercapnia 5/18/2023    Anal itching 10/10/2022    Anemia 4/16/2014    BPH (benign prostatic hyperplasia)     CKD (chronic kidney disease) stage 4, GFR 15-29 ml/min     Coronary artery disease of native artery of native heart with stable angina pectoris 4/29/2019    Diabetes mellitus     Diabetes mellitus, type 2     Dilated cardiomyopathy 8/30/2022    Elevated PSA     Encounter for blood transfusion     Herpes labialis     Hyperlipidemia     Hypertension     Hypertensive emergency 5/18/2023    Obesity     Pneumonia 5/7/2023    Pneumonia due to COVID-19 virus     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    Preop cardiovascular exam 9/16/2022    Proteinuria     Pulmonary edema with congestive heart failure 5/18/2023    Secondary hyperparathyroidism (of renal origin)     UTI (lower urinary tract infection) 5/1/2015       Past Surgical History:   Procedure Laterality Date    CATHETERIZATION OF BOTH LEFT AND RIGHT HEART N/A 08/30/2022    Procedure: CATHETERIZATION, HEART, BOTH LEFT AND RIGHT;  Surgeon: Kassandra Deras MD;  Location: [facility] CATH LAB;  Service: Cardiology;  Laterality: N/A;    COLONOSCOPY N/A 12/21/2017    Procedure: COLONOSCOPY;  Surgeon: Fran Escalera MD;  Location: [facility] ENDO;  Service: Endoscopy;  Laterality: N/A;    COLONOSCOPY N/A 02/02/2023    Procedure: COLONOSCOPY;  Surgeon: Anthony Meng MD;  Location: [facility] ENDO;  Service: Endoscopy;  Laterality: N/A;    FLUOROSCOPY N/A 01/21/2021    Procedure: Vascath insertion;  Surgeon: Adrian Pruitt Jr., MD;  Location: [facility] CATH LAB;  Service: General;  Laterality: N/A;    FLUOROSCOPY N/A 04/06/2021    Procedure: Over the wire Vas Cath exchange;  Surgeon: Ho Pressley MD;   Location: [facility] CATH LAB;  Service: General;  Laterality: N/A;    PROSTATE BIOPSY      thorocotomy  01/01/2010       Social History     Tobacco Use    Smoking status: Never    Smokeless tobacco: Never   Substance Use Topics    Alcohol use: Not Currently     Comment: seldom     Drug use: No       Family History   Problem Relation Age of Onset    No Known Problems Mother     Diabetes Father     Hypertension Father     Hypertension Other     Diabetes Other          ROS    Objective:   Physical Exam  HENT:      Head: Normocephalic.   Eyes:      Pupils: Pupils are equal, round, and reactive to light.   Neck:      Thyroid: No thyromegaly.      Vascular: Normal carotid pulses. No carotid bruit or JVD.   Cardiovascular:      Rate and Rhythm: Normal rate and regular rhythm. No extrasystoles are present.     Chest Wall: PMI is not displaced.      Pulses: Normal pulses.      Heart sounds: Normal heart sounds. No murmur heard.    No gallop. No S3 sounds.   Pulmonary:      Effort: No respiratory distress.      Breath sounds: Normal breath sounds. No stridor.   Abdominal:      General: Bowel sounds are normal.      Palpations: Abdomen is soft.      Tenderness: There is no abdominal tenderness. There is no rebound.   Skin:     Findings: No rash.   Neurological:      Mental Status: He is alert and oriented to person, place, and time.   Psychiatric:         Behavior: Behavior normal.       Lab Results   Component Value Date    CHOL 128 09/26/2022    CHOL 149 07/14/2022    CHOL 155 10/06/2020     Lab Results   Component Value Date    HDL 25 (A) 09/26/2022    HDL 31 (L) 07/14/2022    HDL 26 (L) 10/06/2020     Lab Results   Component Value Date    LDLCALC 82 09/26/2022    LDLCALC 79.8 07/14/2022    LDLCALC 98.0 10/06/2020     Lab Results   Component Value Date    TRIG 105 09/26/2022    TRIG 191 (H) 07/14/2022    TRIG 155 (H) 10/06/2020     Lab Results   Component Value Date    CHOLHDL 20.8 07/14/2022    CHOLHDL 16.8 (L)  10/06/2020    CHOLHDL 23.5 09/05/2019       Chemistry        Component Value Date/Time     (L) 05/28/2023 1815    K 4.1 05/28/2023 1815    CL 95 05/28/2023 1815    CO2 21 (L) 05/28/2023 1815    BUN 34 (H) 05/28/2023 1815    CREATININE 11.5 (H) 05/28/2023 1815     (H) 05/28/2023 1815        Component Value Date/Time    CALCIUM 9.7 05/28/2023 1815    ALKPHOS 61 05/28/2023 1815    AST 12 05/28/2023 1815    ALT 7 (L) 05/28/2023 1815    BILITOT 0.9 05/28/2023 1815    ESTGFRAFRICA 6.9 (A) 07/14/2022 0706    EGFRNONAA 6.0 (A) 07/14/2022 0706          Lab Results   Component Value Date    HGBA1C 6.4 (H) 05/07/2023     Lab Results   Component Value Date    TSH 1.941 07/14/2022     Lab Results   Component Value Date    INR 1.0 05/14/2023    INR 0.9 08/23/2022    INR 0.9 10/11/2018     Lab Results   Component Value Date    WBC 12.48 05/28/2023    HGB 11.0 (L) 05/28/2023    HCT 34.3 (L) 05/28/2023    MCV 84 05/28/2023     05/28/2023     BMP  Sodium   Date Value Ref Range Status   05/28/2023 132 (L) 136 - 145 mmol/L Final     Potassium   Date Value Ref Range Status   05/28/2023 4.1 3.5 - 5.1 mmol/L Final     Chloride   Date Value Ref Range Status   05/28/2023 95 95 - 110 mmol/L Final     CO2   Date Value Ref Range Status   05/28/2023 21 (L) 23 - 29 mmol/L Final     BUN   Date Value Ref Range Status   05/28/2023 34 (H) 8 - 23 mg/dL Final     Creatinine   Date Value Ref Range Status   05/28/2023 11.5 (H) 0.5 - 1.4 mg/dL Final     Calcium   Date Value Ref Range Status   05/28/2023 9.7 8.7 - 10.5 mg/dL Final     Anion Gap   Date Value Ref Range Status   05/28/2023 16 8 - 16 mmol/L Final     eGFR if    Date Value Ref Range Status   07/14/2022 6.9 (A) >60 mL/min/1.73 m^2 Final     eGFR if non    Date Value Ref Range Status   07/14/2022 6.0 (A) >60 mL/min/1.73 m^2 Final     Comment:     Calculation used to obtain the estimated glomerular filtration  rate (eGFR) is the CKD-EPI  equation.        BNP  @LABRCNTIP(BNP,BNPTRIAGEBLO)@  @LABRCNTIP(troponini)@  Estimated Creatinine Clearance: 8.3 mL/min (A) (based on SCr of 11.5 mg/dL (H)).  No results found in the last 24 hours.  No results found in the last 24 hours.  No results found in the last 24 hours.    Assessment:      1. Chronic systolic congestive heart failure    2. Coronary artery disease of native artery of native heart with stable angina pectoris    3. Mixed hyperlipidemia    4. Hypertension associated with diabetes    5. PVC (premature ventricular contraction)    6. Type 2 diabetes mellitus with chronic kidney disease on chronic dialysis, with long-term current use of insulin        Plan:   Add toprolXL 12.5 mg daily  Continue ASA lipitor entresto isordil lasix   Fluid restriction 1.5 liters a day  Na< 2 gm   RTC in 6 months

## 2023-06-05 PROCEDURE — 86833 HLA CLASS II HIGH DEFIN QUAL: CPT | Mod: PO,TXP | Performed by: NURSE PRACTITIONER

## 2023-06-05 PROCEDURE — 86832 HLA CLASS I HIGH DEFIN QUAL: CPT | Mod: PO,TXP | Performed by: NURSE PRACTITIONER

## 2023-06-13 ENCOUNTER — LAB VISIT (OUTPATIENT)
Dept: LAB | Facility: HOSPITAL | Age: 68
End: 2023-06-13
Payer: COMMERCIAL

## 2023-06-13 DIAGNOSIS — Z76.82 AWAITING ORGAN TRANSPLANT STATUS: ICD-10-CM

## 2023-06-20 NOTE — TELEPHONE ENCOUNTER
THEE Ardon Long : [birth year]/2017  Requesting 90 day supply.  
Previously Declined (within the last year)

## 2023-06-26 ENCOUNTER — HOSPITAL ENCOUNTER (OUTPATIENT)
Dept: RADIOLOGY | Facility: HOSPITAL | Age: 68
Discharge: HOME | End: 2023-06-26
Attending: PHYSICIAN ASSISTANT
Payer: COMMERCIAL

## 2023-06-26 ENCOUNTER — OFFICE VISIT (OUTPATIENT)
Dept: INTERNAL MEDICINE | Facility: CLINIC | Age: 68
End: 2023-06-26
Payer: COMMERCIAL

## 2023-06-26 VITALS
HEIGHT: 74 IN | RESPIRATION RATE: 18 BRPM | BODY MASS INDEX: 29.9 KG/M2 | WEIGHT: 233 LBS | HEART RATE: 106 BPM | DIASTOLIC BLOOD PRESSURE: 83 MMHG | SYSTOLIC BLOOD PRESSURE: 117 MMHG | OXYGEN SATURATION: 93 % | TEMPERATURE: 98 F

## 2023-06-26 DIAGNOSIS — I15.2 HYPERTENSION ASSOCIATED WITH DIABETES: ICD-10-CM

## 2023-06-26 DIAGNOSIS — M54.50 ACUTE LOW BACK PAIN WITHOUT SCIATICA, UNSPECIFIED BACK PAIN LATERALITY: Primary | ICD-10-CM

## 2023-06-26 DIAGNOSIS — R22.2 PALPABLE MASS OF LOWER BACK: ICD-10-CM

## 2023-06-26 DIAGNOSIS — E11.59 HYPERTENSION ASSOCIATED WITH DIABETES: ICD-10-CM

## 2023-06-26 DIAGNOSIS — N18.6 ESRD (END STAGE RENAL DISEASE) ON DIALYSIS: ICD-10-CM

## 2023-06-26 DIAGNOSIS — Z99.2 ESRD (END STAGE RENAL DISEASE) ON DIALYSIS: ICD-10-CM

## 2023-06-26 DIAGNOSIS — M54.50 ACUTE LOW BACK PAIN WITHOUT SCIATICA, UNSPECIFIED BACK PAIN LATERALITY: ICD-10-CM

## 2023-06-26 PROCEDURE — 99999 PR PBB SHADOW E&M-EST. PATIENT-LVL V: ICD-10-PCS | Mod: PBBFAC,,, | Performed by: PHYSICIAN ASSISTANT

## 2023-06-26 PROCEDURE — 4010F PR ACE/ARB THEARPY RXD/TAKEN: ICD-10-PCS | Mod: CPTII,S$GLB,, | Performed by: PHYSICIAN ASSISTANT

## 2023-06-26 PROCEDURE — 3008F PR BODY MASS INDEX (BMI) DOCUMENTED: ICD-10-PCS | Mod: CPTII,S$GLB,, | Performed by: PHYSICIAN ASSISTANT

## 2023-06-26 PROCEDURE — 1159F MED LIST DOCD IN RCRD: CPT | Mod: CPTII,S$GLB,, | Performed by: PHYSICIAN ASSISTANT

## 2023-06-26 PROCEDURE — 3288F FALL RISK ASSESSMENT DOCD: CPT | Mod: CPTII,S$GLB,, | Performed by: PHYSICIAN ASSISTANT

## 2023-06-26 PROCEDURE — 3044F HG A1C LEVEL LT 7.0%: CPT | Mod: CPTII,S$GLB,, | Performed by: PHYSICIAN ASSISTANT

## 2023-06-26 PROCEDURE — 3066F NEPHROPATHY DOC TX: CPT | Mod: CPTII,S$GLB,, | Performed by: PHYSICIAN ASSISTANT

## 2023-06-26 PROCEDURE — 3044F PR MOST RECENT HEMOGLOBIN A1C LEVEL <7.0%: ICD-10-PCS | Mod: CPTII,S$GLB,, | Performed by: PHYSICIAN ASSISTANT

## 2023-06-26 PROCEDURE — 1160F PR REVIEW ALL MEDS BY PRESCRIBER/CLIN PHARMACIST DOCUMENTED: ICD-10-PCS | Mod: CPTII,S$GLB,, | Performed by: PHYSICIAN ASSISTANT

## 2023-06-26 PROCEDURE — 1125F AMNT PAIN NOTED PAIN PRSNT: CPT | Mod: CPTII,S$GLB,, | Performed by: PHYSICIAN ASSISTANT

## 2023-06-26 PROCEDURE — 3079F PR MOST RECENT DIASTOLIC BLOOD PRESSURE 80-89 MM HG: ICD-10-PCS | Mod: CPTII,S$GLB,, | Performed by: PHYSICIAN ASSISTANT

## 2023-06-26 PROCEDURE — 99214 OFFICE O/P EST MOD 30 MIN: CPT | Mod: S$GLB,,, | Performed by: PHYSICIAN ASSISTANT

## 2023-06-26 PROCEDURE — 99999 PR PBB SHADOW E&M-EST. PATIENT-LVL V: CPT | Mod: PBBFAC,,, | Performed by: PHYSICIAN ASSISTANT

## 2023-06-26 PROCEDURE — 1159F PR MEDICATION LIST DOCUMENTED IN MEDICAL RECORD: ICD-10-PCS | Mod: CPTII,S$GLB,, | Performed by: PHYSICIAN ASSISTANT

## 2023-06-26 PROCEDURE — 3074F PR MOST RECENT SYSTOLIC BLOOD PRESSURE < 130 MM HG: ICD-10-PCS | Mod: CPTII,S$GLB,, | Performed by: PHYSICIAN ASSISTANT

## 2023-06-26 PROCEDURE — 72100 XR LUMBAR SPINE AP AND LATERAL: ICD-10-PCS | Mod: 26,TXP,, | Performed by: RADIOLOGY

## 2023-06-26 PROCEDURE — 4010F ACE/ARB THERAPY RXD/TAKEN: CPT | Mod: CPTII,S$GLB,, | Performed by: PHYSICIAN ASSISTANT

## 2023-06-26 PROCEDURE — 3288F PR FALLS RISK ASSESSMENT DOCUMENTED: ICD-10-PCS | Mod: CPTII,S$GLB,, | Performed by: PHYSICIAN ASSISTANT

## 2023-06-26 PROCEDURE — 99214 PR OFFICE/OUTPT VISIT, EST, LEVL IV, 30-39 MIN: ICD-10-PCS | Mod: S$GLB,,, | Performed by: PHYSICIAN ASSISTANT

## 2023-06-26 PROCEDURE — 3066F PR DOCUMENTATION OF TREATMENT FOR NEPHROPATHY: ICD-10-PCS | Mod: CPTII,S$GLB,, | Performed by: PHYSICIAN ASSISTANT

## 2023-06-26 PROCEDURE — 1101F PT FALLS ASSESS-DOCD LE1/YR: CPT | Mod: CPTII,S$GLB,, | Performed by: PHYSICIAN ASSISTANT

## 2023-06-26 PROCEDURE — 3079F DIAST BP 80-89 MM HG: CPT | Mod: CPTII,S$GLB,, | Performed by: PHYSICIAN ASSISTANT

## 2023-06-26 PROCEDURE — 3008F BODY MASS INDEX DOCD: CPT | Mod: CPTII,S$GLB,, | Performed by: PHYSICIAN ASSISTANT

## 2023-06-26 PROCEDURE — 1160F RVW MEDS BY RX/DR IN RCRD: CPT | Mod: CPTII,S$GLB,, | Performed by: PHYSICIAN ASSISTANT

## 2023-06-26 PROCEDURE — 1101F PR PT FALLS ASSESS DOC 0-1 FALLS W/OUT INJ PAST YR: ICD-10-PCS | Mod: CPTII,S$GLB,, | Performed by: PHYSICIAN ASSISTANT

## 2023-06-26 PROCEDURE — 1125F PR PAIN SEVERITY QUANTIFIED, PAIN PRESENT: ICD-10-PCS | Mod: CPTII,S$GLB,, | Performed by: PHYSICIAN ASSISTANT

## 2023-06-26 PROCEDURE — 72100 X-RAY EXAM L-S SPINE 2/3 VWS: CPT | Mod: 26,TXP,, | Performed by: RADIOLOGY

## 2023-06-26 PROCEDURE — 72100 X-RAY EXAM L-S SPINE 2/3 VWS: CPT | Mod: TC,NTX

## 2023-06-26 PROCEDURE — 3074F SYST BP LT 130 MM HG: CPT | Mod: CPTII,S$GLB,, | Performed by: PHYSICIAN ASSISTANT

## 2023-06-26 RX ORDER — CYCLOBENZAPRINE HCL 10 MG
10 TABLET ORAL 3 TIMES DAILY
Status: ON HOLD | COMMUNITY
Start: 2023-06-21 | End: 2024-02-20 | Stop reason: HOSPADM

## 2023-06-26 RX ORDER — TIZANIDINE 4 MG/1
4 TABLET ORAL NIGHTLY PRN
Qty: 10 TABLET | Refills: 0 | Status: SHIPPED | OUTPATIENT
Start: 2023-06-26 | End: 2023-07-06

## 2023-06-26 NOTE — PROGRESS NOTES
"Subjective:      Patient ID: Isidro Alves is a 67 y.o. male.    Chief Complaint: Low-back Pain    Patient is new to me, being seen today for low back pain x1wk.  Was lifting some plates and turned to set them down and heard a pop.  Denies pain in legs, denies numbness/tingling of extremities.  Denies swelling.  Denies bowel/bladder incontinence.  Pain worse with movement.  Describes pain as deep     Went to [facility] and was given flexeril and tramadol without relief, Tylenol ES also does not provide relief  Denies h/o back pain     Dialysis MWF    BP typically controlled    Last visit May 2023 with Dr. Morgan.     Review of Systems   Constitutional:  Negative for chills, diaphoresis and fever.   HENT:  Negative for congestion, rhinorrhea and sore throat.    Respiratory:  Negative for cough, shortness of breath and wheezing.    Gastrointestinal:  Negative for abdominal pain, constipation, diarrhea, nausea and vomiting.   Musculoskeletal:  Positive for back pain.   Skin:  Negative for rash.   Neurological:  Negative for dizziness, light-headedness, numbness and headaches.     Objective:   /83 Comment: At dialysis  Pulse 106   Temp 97.7 °F (36.5 °C)   Resp 18   Ht 6' 2" (1.88 m)   Wt 105.7 kg (233 lb 0.4 oz)   SpO2 (!) 93%   BMI 29.92 kg/m²   Physical Exam  Constitutional:       General: He is not in acute distress.     Appearance: Normal appearance. He is well-developed. He is not ill-appearing.   HENT:      Head: Normocephalic and atraumatic.   Cardiovascular:      Rate and Rhythm: Normal rate and regular rhythm.      Heart sounds: Normal heart sounds. No murmur heard.  Pulmonary:      Effort: Pulmonary effort is normal. No respiratory distress.      Breath sounds: Normal breath sounds. No decreased breath sounds.   Musculoskeletal:      Lumbar back: Tenderness present. No bony tenderness.        Back:       Right lower leg: No edema.      Left lower leg: No edema.   Skin:     General: Skin is warm and " dry.      Findings: No rash.   Psychiatric:         Speech: Speech normal.         Behavior: Behavior normal.         Thought Content: Thought content normal.     Assessment:      1. Acute low back pain without sciatica, unspecified back pain laterality    2. Palpable mass of lower back    3. ESRD (end stage renal disease) on dialysis    4. Hypertension associated with diabetes       Plan:   Acute low back pain without sciatica, unspecified back pain laterality  -     X-Ray Lumbar Spine AP And Lateral; Future; Expected date: 06/26/2023  -     tiZANidine (ZANAFLEX) 4 MG tablet; Take 1 tablet (4 mg total) by mouth nightly as needed (Muscle spasm).  Dispense: 10 tablet; Refill: 0  -     Ambulatory referral/consult to Physical/Occupational Therapy; Future; Expected date: 07/03/2023    Palpable mass of lower back  -     US Soft Tissue Lower Back; Future; Expected date: 06/26/2023    ESRD (end stage renal disease) on dialysis    Hypertension associated with diabetes   Controlled     Discussed RICE, Tylenol ES  Caution with muscle relaxer as may cause drowsiness   Prefers external PT, we will fax referral   Consider back and spine referral if no improvement     Discussed worsening signs/symptoms and when to return to clinic or go to ED.   Patient expresses understanding and agrees with treatment plan.

## 2023-06-27 ENCOUNTER — HOSPITAL ENCOUNTER (OUTPATIENT)
Dept: RADIOLOGY | Facility: HOSPITAL | Age: 68
Discharge: HOME | End: 2023-06-27
Attending: PHYSICIAN ASSISTANT
Payer: COMMERCIAL

## 2023-06-27 DIAGNOSIS — R22.2 PALPABLE MASS OF LOWER BACK: ICD-10-CM

## 2023-06-27 PROCEDURE — 76705 US SOFT TISSUE LOWER BACK: ICD-10-PCS | Mod: 26,NTX,, | Performed by: RADIOLOGY

## 2023-06-27 PROCEDURE — 76705 ECHO EXAM OF ABDOMEN: CPT | Mod: 26,NTX,, | Performed by: RADIOLOGY

## 2023-06-27 PROCEDURE — 76705 ECHO EXAM OF ABDOMEN: CPT | Mod: TC,TXP

## 2023-06-29 ENCOUNTER — TELEPHONE (OUTPATIENT)
Dept: TRANSPLANT | Facility: CLINIC | Age: 68
End: 2023-06-29
Payer: COMMERCIAL

## 2023-06-29 NOTE — TELEPHONE ENCOUNTER
MA notes per adherence form     FOR THE PAST THREE MONTHS:    3-AMA's 04/05/23, 05/10/23, 06/19/23 no time given with report     0-No-shows    No concerns with care giving, transportation, or mental health    Brought over to clinic to be scanned in.    Raysa Archuleta  Abdominal Transplant MA

## 2023-07-03 PROCEDURE — 99001 SPECIMEN HANDLING PT-LAB: CPT | Mod: PO,TXP | Performed by: NURSE PRACTITIONER

## 2023-07-06 ENCOUNTER — HOSPITAL ENCOUNTER (OUTPATIENT)
Dept: RADIOLOGY | Facility: HOSPITAL | Age: 68
Discharge: HOME | End: 2023-07-06
Attending: NURSE PRACTITIONER
Payer: COMMERCIAL

## 2023-07-06 ENCOUNTER — TELEPHONE (OUTPATIENT)
Dept: TRANSPLANT | Facility: CLINIC | Age: 68
End: 2023-07-06
Payer: COMMERCIAL

## 2023-07-06 ENCOUNTER — OFFICE VISIT (OUTPATIENT)
Dept: TRANSPLANT | Facility: CLINIC | Age: 68
End: 2023-07-06
Payer: COMMERCIAL

## 2023-07-06 VITALS
HEART RATE: 109 BPM | BODY MASS INDEX: 31.5 KG/M2 | SYSTOLIC BLOOD PRESSURE: 135 MMHG | DIASTOLIC BLOOD PRESSURE: 85 MMHG | HEIGHT: 72 IN | OXYGEN SATURATION: 94 % | TEMPERATURE: 97 F | WEIGHT: 232.56 LBS

## 2023-07-06 DIAGNOSIS — E11.59 HYPERTENSION ASSOCIATED WITH DIABETES: ICD-10-CM

## 2023-07-06 DIAGNOSIS — Z76.82 ORGAN TRANSPLANT CANDIDATE: ICD-10-CM

## 2023-07-06 DIAGNOSIS — Z99.2 ANEMIA DUE TO CHRONIC KIDNEY DISEASE, ON CHRONIC DIALYSIS: ICD-10-CM

## 2023-07-06 DIAGNOSIS — E11.22 TYPE 2 DIABETES MELLITUS WITH CHRONIC KIDNEY DISEASE, WITH LONG-TERM CURRENT USE OF INSULIN, UNSPECIFIED CKD STAGE: ICD-10-CM

## 2023-07-06 DIAGNOSIS — Z76.82 PATIENT ON WAITING LIST FOR KIDNEY TRANSPLANT: Primary | Chronic | ICD-10-CM

## 2023-07-06 DIAGNOSIS — E66.3 OVERWEIGHT WITH BODY MASS INDEX (BMI) OF 29 TO 29.9 IN ADULT: ICD-10-CM

## 2023-07-06 DIAGNOSIS — I25.118 CORONARY ARTERY DISEASE OF NATIVE ARTERY OF NATIVE HEART WITH STABLE ANGINA PECTORIS: ICD-10-CM

## 2023-07-06 DIAGNOSIS — N25.81 SECONDARY HYPERPARATHYROIDISM: ICD-10-CM

## 2023-07-06 DIAGNOSIS — I15.2 HYPERTENSION ASSOCIATED WITH DIABETES: ICD-10-CM

## 2023-07-06 DIAGNOSIS — Z79.4 TYPE 2 DIABETES MELLITUS WITH CHRONIC KIDNEY DISEASE, WITH LONG-TERM CURRENT USE OF INSULIN, UNSPECIFIED CKD STAGE: ICD-10-CM

## 2023-07-06 DIAGNOSIS — N18.6 ANEMIA DUE TO CHRONIC KIDNEY DISEASE, ON CHRONIC DIALYSIS: ICD-10-CM

## 2023-07-06 DIAGNOSIS — N18.6 ESRD (END STAGE RENAL DISEASE) ON DIALYSIS: ICD-10-CM

## 2023-07-06 DIAGNOSIS — E78.5 HYPERLIPIDEMIA, UNSPECIFIED HYPERLIPIDEMIA TYPE: Chronic | ICD-10-CM

## 2023-07-06 DIAGNOSIS — Z99.2 ESRD (END STAGE RENAL DISEASE) ON DIALYSIS: ICD-10-CM

## 2023-07-06 DIAGNOSIS — D63.1 ANEMIA DUE TO CHRONIC KIDNEY DISEASE, ON CHRONIC DIALYSIS: ICD-10-CM

## 2023-07-06 LAB — HPRA INTERPRETATION: NORMAL

## 2023-07-06 PROCEDURE — 3075F SYST BP GE 130 - 139MM HG: CPT | Mod: CPTII,S$GLB,TXP, | Performed by: NURSE PRACTITIONER

## 2023-07-06 PROCEDURE — 1125F AMNT PAIN NOTED PAIN PRSNT: CPT | Mod: CPTII,S$GLB,TXP, | Performed by: NURSE PRACTITIONER

## 2023-07-06 PROCEDURE — 4010F PR ACE/ARB THEARPY RXD/TAKEN: ICD-10-PCS | Mod: CPTII,S$GLB,TXP, | Performed by: NURSE PRACTITIONER

## 2023-07-06 PROCEDURE — 99215 PR OFFICE/OUTPT VISIT, EST, LEVL V, 40-54 MIN: ICD-10-PCS | Mod: S$GLB,TXP,, | Performed by: NURSE PRACTITIONER

## 2023-07-06 PROCEDURE — 3066F PR DOCUMENTATION OF TREATMENT FOR NEPHROPATHY: ICD-10-PCS | Mod: CPTII,S$GLB,TXP, | Performed by: NURSE PRACTITIONER

## 2023-07-06 PROCEDURE — 3075F PR MOST RECENT SYSTOLIC BLOOD PRESS GE 130-139MM HG: ICD-10-PCS | Mod: CPTII,S$GLB,TXP, | Performed by: NURSE PRACTITIONER

## 2023-07-06 PROCEDURE — 1160F PR REVIEW ALL MEDS BY PRESCRIBER/CLIN PHARMACIST DOCUMENTED: ICD-10-PCS | Mod: CPTII,S$GLB,TXP, | Performed by: NURSE PRACTITIONER

## 2023-07-06 PROCEDURE — 3008F BODY MASS INDEX DOCD: CPT | Mod: CPTII,S$GLB,TXP, | Performed by: NURSE PRACTITIONER

## 2023-07-06 PROCEDURE — 3066F NEPHROPATHY DOC TX: CPT | Mod: CPTII,S$GLB,TXP, | Performed by: NURSE PRACTITIONER

## 2023-07-06 PROCEDURE — 3079F PR MOST RECENT DIASTOLIC BLOOD PRESSURE 80-89 MM HG: ICD-10-PCS | Mod: CPTII,S$GLB,TXP, | Performed by: NURSE PRACTITIONER

## 2023-07-06 PROCEDURE — 71046 X-RAY EXAM CHEST 2 VIEWS: CPT | Mod: TC,TXP

## 2023-07-06 PROCEDURE — 1159F PR MEDICATION LIST DOCUMENTED IN MEDICAL RECORD: ICD-10-PCS | Mod: CPTII,S$GLB,TXP, | Performed by: NURSE PRACTITIONER

## 2023-07-06 PROCEDURE — 1160F RVW MEDS BY RX/DR IN RCRD: CPT | Mod: CPTII,S$GLB,TXP, | Performed by: NURSE PRACTITIONER

## 2023-07-06 PROCEDURE — 99999 PR PBB SHADOW E&M-EST. PATIENT-LVL III: CPT | Mod: PBBFAC,TXP,, | Performed by: NURSE PRACTITIONER

## 2023-07-06 PROCEDURE — 1125F PR PAIN SEVERITY QUANTIFIED, PAIN PRESENT: ICD-10-PCS | Mod: CPTII,S$GLB,TXP, | Performed by: NURSE PRACTITIONER

## 2023-07-06 PROCEDURE — 3044F PR MOST RECENT HEMOGLOBIN A1C LEVEL <7.0%: ICD-10-PCS | Mod: CPTII,S$GLB,TXP, | Performed by: NURSE PRACTITIONER

## 2023-07-06 PROCEDURE — 3008F PR BODY MASS INDEX (BMI) DOCUMENTED: ICD-10-PCS | Mod: CPTII,S$GLB,TXP, | Performed by: NURSE PRACTITIONER

## 2023-07-06 PROCEDURE — 4010F ACE/ARB THERAPY RXD/TAKEN: CPT | Mod: CPTII,S$GLB,TXP, | Performed by: NURSE PRACTITIONER

## 2023-07-06 PROCEDURE — 71046 XR CHEST PA AND LATERAL: ICD-10-PCS | Mod: 26,TXP,, | Performed by: RADIOLOGY

## 2023-07-06 PROCEDURE — 3079F DIAST BP 80-89 MM HG: CPT | Mod: CPTII,S$GLB,TXP, | Performed by: NURSE PRACTITIONER

## 2023-07-06 PROCEDURE — 99215 OFFICE O/P EST HI 40 MIN: CPT | Mod: S$GLB,TXP,, | Performed by: NURSE PRACTITIONER

## 2023-07-06 PROCEDURE — 71046 X-RAY EXAM CHEST 2 VIEWS: CPT | Mod: 26,TXP,, | Performed by: RADIOLOGY

## 2023-07-06 PROCEDURE — 3044F HG A1C LEVEL LT 7.0%: CPT | Mod: CPTII,S$GLB,TXP, | Performed by: NURSE PRACTITIONER

## 2023-07-06 PROCEDURE — 1159F MED LIST DOCD IN RCRD: CPT | Mod: CPTII,S$GLB,TXP, | Performed by: NURSE PRACTITIONER

## 2023-07-06 PROCEDURE — 99999 PR PBB SHADOW E&M-EST. PATIENT-LVL III: ICD-10-PCS | Mod: PBBFAC,TXP,, | Performed by: NURSE PRACTITIONER

## 2023-07-06 NOTE — LETTER
July 10, 2023        Gabino Champion  [address]  [place] [address]  Phone: [phone]  Fax: [phone]             [facility] Trans[facility] 1st Fl  [address]  [facility] [address]  Phone: [phone]   Patient: Isidro Alves   MR Number: [id number]   YOB: 1955   Date of Visit: 7/6/2023       Dear Dr. Gabino Champion    Thank you for referring Isidro Alves to me for evaluation. Attached you will find relevant portions of my assessment and plan of care.    If you have questions, please do not hesitate to call me. I look forward to following Isidro Alves along with you.    Sincerely,    Jessica Collins, NP    Enclosure    If you would like to receive this communication electronically, please contact [email or web address] or [phone] to request [employer] Link access.    [employer] Link is a tool which provides read-only access to select patient information with whom you have a relationship. Its easy to use and provides real time access to review your patients record including encounter summaries, notes, results, and demographic information.    If you feel you have received this communication in error or would no longer like to receive these types of communications, please e-mail [email or web address]

## 2023-07-06 NOTE — TELEPHONE ENCOUNTER
ON-CALL NOTE    [facility]# [id number]    Notified by Juice Sosa, , that Isidro Alves on list for  donor kidney. Reviewed patient status with Dr. Chiu, Ronald and Travis. Upon review of medical record, the following was identified as having potential safety issues for the transplant at this time and will require further assessment and resolution: Patient had respiratory and cardiac arrest on 2023 and was admitted to [facility]. Patient will be made inactive on the list and discussed at committee on 2023 regarding candidacy.    On call note routed to listed coordinator for follow up.

## 2023-07-06 NOTE — PROGRESS NOTES
Kidney Transplant Recipient Reevalulation    Referring Physician: Gabino Champion  Current Nephrologist: Gabino Champion  Waitlist Status: active  Dialysis Start Date: 9/17/2019    Subjective:     CC:   6 month  reassessment of kidney transplant candidacy.    HPI:  Mr. Alves is a 64 y.o. year old Black or  male with ESRD secondary to diabetic nephropathy.  He has been on the wait list for a kidney transplant at [facility] since 9/17/2019. He is currently  ESRD, Pt reports starting HD ~ 3/2021, he is dialyzing MWF for 3 1/2 hours Per session.  Patient reports that he is tolerating dialysis well. Reports BP remains stable. Access  Right chest CATHETER FOR DIALYSIS. And LUE AV fistula , which is healing.       Recent Hospitalizations:      Pt reports hospitalization for volume overload, reports being intubated  and required CPR, resulting in --->Closed fx multiple ribs right side     5/28/23 presents to the Emergency Department for evaluation of L arm pain which onset this morning. Pt describes the pain as sharp and rates it a 9/10 in severity. Pt states that last week, he experienced similar sxs on his R arm. Pt coded on 5/14/2023.     5/14/23  pulmonary edema, respiratory failure and witnessed cardiac arrest while being intubated there.  Received a round of CPR before ROSC was achieved.   After admission to ICU, patient required urgent HD.  However he was hypotensive and necessitated central line placement and initiation of Levophed.  Was able to weaned off Levophed and vent and successfully extubated on 05/15/2023.  Hospital medicine consulted for continued care      Was last seen by cardiology on 5/30/23 Dr Dumont   Plan:   Add toprolXL 12.5 mg daily  Continue ASA lipitor entresto isordil lasix   Fluid restriction 1.5 liters a day  Na< 2 gm   RTC in 6 months           Pt was Last seen by txp on 2/9/23    FX assessment     Reports no longer having pain from fx ribs,   Has started walking since  recent hospitalization, is walking 1/2 mile bc of the heat.   Does not appear frail.   Denies [place], leg pain or chest pain, denies dizziness.   Over all looks good not frail.     Cardiac HX:    HX  ABN stress test and EF 40 % on TTE   Follows with Cardiology Dr Ragland, LOV  5/30/23        HX Elevated PSA  , s/p prostate bx 2015 (-)  Urologist Dr Nugent /urology 1/19/2022. He has been Cleared for a transplant by urology     Recently tx for epididymal orchitis-->Pt reports s/s have resolved --no longer on antibiotics   Urology: LOV  5/3/23         PSA, Screen (ng/mL)   Date Value   08/25/2022 6.1 (H)        Lab /diagnostic results- txp wkup reviewed      7/17/22 Renal US:  Subcentimeter left renal cyst  PXR 6/30/22  :   No significant vascular calcifications.   7/6/23 CXR:   Chest two views: Central line tip mid SVC.  Heart size is normal.  There is aortic plaque.  Lungs are clear, No acute abnormality   6/12/20 iliac doppler: triphasic waveforms bilaterally  2/3/23 colonoscopy:Internal hemorrhoids. The examination was otherwise normal on direct. Repeat colonoscopy in 3 years for surveillance        TTE 5/8/23   Concentric hypertrophy and mildly decreased systolic function.  The estimated ejection fraction is 40%.  Left ventricular diastolic dysfunction.  Normal right ventricular size with normal right ventricular systolic function.  Mild left atrial enlargement.  Mild tricuspid regurgitation      11/17/22 TTE  The estimated ejection fraction is 40%.   The estimated PA systolic pressure is 33 mmHg.  There is mild left ventricular global hypokinesis.     8/30/22 RHC/LHC    The ejection fraction was calculated to be 40%.    The pre-procedure left ventricular end diastolic pressure was 13.    The post-procedure left ventricular end diastolic pressure was 16.    Global hypokinesis    non obstructive cad    mild pulmonary htn    normal filling pressures        7/26/22 Stress test     Abnormal myocardial perfusion scan.     There is a moderate to severe intensity, large sized, mostly fixed perfusion abnormality with some reversibilty in the inferior, inferolateral, lateral and apical wall(s).    There are no other significant perfusion abnormalities.    The gated perfusion images showed an ejection fraction of 33% at rest. The gated perfusion images showed an ejection fraction of 48% post stress. Normal ejection fraction is greater than 59%.    There is moderate global hypokinesis at rest and stress.    The EKG portion of this study is negative for ischemia.        Past Medical History:   Diagnosis Date    Abnormal nuclear stress test 8/30/2022    Acute hypoxemic respiratory failure due to COVID-19 1/21/2021    Acute on chronic combined systolic and diastolic congestive heart failure 5/7/2023    Acute on chronic respiratory failure 5/7/2023    Acute respiratory failure with hypoxia and hypercapnia 5/18/2023    Anal itching 10/10/2022    Anemia 4/16/2014    BPH (benign prostatic hyperplasia)     CKD (chronic kidney disease) stage 4, GFR 15-29 ml/min     Coronary artery disease of native artery of native heart with stable angina pectoris 4/29/2019    Diabetes mellitus     Diabetes mellitus, type 2     Dilated cardiomyopathy 8/30/2022    Elevated PSA     Encounter for blood transfusion     Herpes labialis     Hyperlipidemia     Hypertension     Hypertensive emergency 5/18/2023    Obesity     Pneumonia 5/7/2023    Pneumonia due to COVID-19 virus     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    Preop cardiovascular exam 9/16/2022    Proteinuria     Pulmonary edema with congestive heart failure 5/18/2023    Secondary hyperparathyroidism (of renal origin)     UTI (lower urinary tract infection) 5/1/2015       Past Medical and Surgical History: Mr. Alves  has a past medical history of Abnormal nuclear stress test, Acute hypoxemic respiratory failure due to COVID-19, Acute on chronic combined systolic and diastolic congestive heart failure, Acute on chronic  respiratory failure, Acute respiratory failure with hypoxia and hypercapnia, Anal itching, Anemia, BPH (benign prostatic hyperplasia), CKD (chronic kidney disease) stage 4, GFR 15-29 ml/min, Coronary artery disease of native artery of native heart with stable angina pectoris, Diabetes mellitus, Diabetes mellitus, type 2, Dilated cardiomyopathy, Elevated PSA, Encounter for blood transfusion, Herpes labialis, Hyperlipidemia, Hypertension, Hypertensive emergency, Obesity, Pneumonia, Pneumonia due to COVID-19 virus, Post viral asthma, Preop cardiovascular exam, Proteinuria, Pulmonary edema with congestive heart failure, Secondary hyperparathyroidism (of renal origin), and UTI (lower urinary tract infection).  He has a past surgical history that includes thorocotomy (01/01/2010); Prostate biopsy; Colonoscopy (N/A, 12/21/2017); Fluoroscopy (N/A, 01/21/2021); Fluoroscopy (N/A, 04/06/2021); Catheterization of both left and right heart (N/A, 08/30/2022); and Colonoscopy (N/A, 02/02/2023).    Past Social and Family History: Mr. Alves reports that he has never smoked. He has never used smokeless tobacco. He reports that he does not currently use alcohol. He reports that he does not use drugs. His family history includes Diabetes in his father and another family member; Hypertension in his father and another family member; No Known Problems in his mother.        Past Medical History:   Diagnosis Date    Abnormal nuclear stress test 8/30/2022    Acute hypoxemic respiratory failure due to COVID-19 1/21/2021    Acute on chronic combined systolic and diastolic congestive heart failure 5/7/2023    Acute on chronic respiratory failure 5/7/2023    Acute respiratory failure with hypoxia and hypercapnia 5/18/2023    Anal itching 10/10/2022    Anemia 4/16/2014    BPH (benign prostatic hyperplasia)     CKD (chronic kidney disease) stage 4, GFR 15-29 ml/min     Coronary artery disease of native artery of native heart with stable angina  "pectoris 4/29/2019    Diabetes mellitus     Diabetes mellitus, type 2     Dilated cardiomyopathy 8/30/2022    Elevated PSA     Encounter for blood transfusion     Herpes labialis     Hyperlipidemia     Hypertension     Hypertensive emergency 5/18/2023    Obesity     Pneumonia 5/7/2023    Pneumonia due to COVID-19 virus     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    Preop cardiovascular exam 9/16/2022    Proteinuria     Pulmonary edema with congestive heart failure 5/18/2023    Secondary hyperparathyroidism (of renal origin)     UTI (lower urinary tract infection) 5/1/2015       Review of Systems   Constitutional:  Positive for fatigue. Negative for activity change, appetite change, chills, fever and unexpected weight change.   HENT:  Negative for congestion, facial swelling, postnasal drip, rhinorrhea, sinus pressure, sore throat and trouble swallowing.    Eyes:  Positive for visual disturbance. Negative for pain and redness.        Wears glasses   Respiratory:  Negative for cough, chest tightness, shortness of breath and wheezing.    Cardiovascular:  Negative for chest pain and palpitations.   Gastrointestinal:  Positive for abdominal distention. Negative for abdominal pain, diarrhea, nausea and vomiting.   Genitourinary:  Negative for dysuria, flank pain and urgency.   Musculoskeletal:  Negative for gait problem, neck pain and neck stiffness.   Skin:  Negative for rash.   Allergic/Immunologic: Negative for environmental allergies, food allergies and immunocompromised state.   Neurological:  Negative for dizziness, weakness and light-headedness.   Psychiatric/Behavioral:  Negative for agitation and confusion. The patient is not nervous/anxious.      Objective:   Blood pressure 135/85, pulse 109, temperature 97.2 °F (36.2 °C), temperature source Temporal, height 5' 11.65" (1.82 m), weight 105.5 kg (232 lb 9.4 oz), SpO2 (!) 94 %.body mass index is 31.85 kg/m².    Physical Exam  Vitals reviewed.   Constitutional:       Appearance: " Normal appearance. He is well-developed.   HENT:      Head: Normocephalic.   Eyes:      Conjunctiva/sclera: Conjunctivae normal.      Pupils: Pupils are equal, round, and reactive to light.   Cardiovascular:      Rate and Rhythm: Normal rate and regular rhythm.      Heart sounds: Normal heart sounds.   Pulmonary:      Effort: Pulmonary effort is normal.      Breath sounds: Normal breath sounds.   Chest:       Abdominal:      General: Bowel sounds are normal. There is distension.      Palpations: Abdomen is soft. There is no hepatomegaly, splenomegaly or mass.      Tenderness: There is no abdominal tenderness. There is no guarding or rebound. Negative signs include Curran's sign and McBurney's sign.   Musculoskeletal:         General: Normal range of motion.        Arms:       Cervical back: Normal range of motion and neck supple.      Right lower leg: No edema.      Left lower leg: No edema.      Comments:  No peripheral edema   Lymphadenopathy:      Cervical: No cervical adenopathy.   Skin:     General: Skin is warm and dry.   Neurological:      Mental Status: He is alert and oriented to person, place, and time.      Motor: No abnormal muscle tone.      Coordination: Coordination normal.   Psychiatric:         Behavior: Behavior normal.       Labs:  Lab Results   Component Value Date    WBC 12.48 05/28/2023    HGB 11.0 (L) 05/28/2023    HCT 34.3 (L) 05/28/2023     06/15/2023    K 4.1 06/15/2023    K 4.1 06/15/2023     06/15/2023    CO2 27 06/15/2023    BUN 22 06/15/2023    CREATININE 7.49 (H) 06/15/2023    EGFRNONAA 6.0 (A) 07/14/2022    CALCIUM 9.8 06/15/2023    PHOS 4.3 05/08/2023    MG 1.7 05/14/2023    ALBUMIN 3.4 (L) 05/28/2023    AST 12 05/28/2023    ALT 7 (L) 05/28/2023    UTPCR 5.54 (H) 02/01/2021    .5 (H) 08/25/2022       Lab Results   Component Value Date    BILIRUBINUA Negative 05/14/2023    PROTEINUA 2+ (A) 05/14/2023    NITRITE Negative 05/14/2023    RBCUA 9 (H) 05/14/2023    WBCUA  8 (H) 05/14/2023       No results found for: HLAABCTYPE    Labs were reviewed with the patient.    Assessment:     1. Patient on waiting list for kidney transplant    2. ESRD (end stage renal disease) on dialysis    3. Hypertension associated with diabetes    4. Secondary hyperparathyroidism    5. Type 2 diabetes mellitus with chronic kidney disease, with long-term current use of insulin, unspecified CKD stage    6. Coronary artery disease of native artery of native heart with stable angina pectoris    7. Hyperlipidemia, unspecified hyperlipidemia type    8. Anemia due to chronic kidney disease, on chronic dialysis    9. Overweight with body mass index (BMI) of 29 to 29.9 in adult        Plan:           Recent hospitalization, 5/14/28  for volume overload, reports being intubated  and required CPR, resulting in --->Closed fx multiple ribs right side   HX baseline EF 40%  , O2 sat in clinic 94%  follows with Dr Dumont/cardiology    --will need to review with committee       Transplant Candidacy:   MR. Alves  is  marginal /unacceptable for  kidney transplant candidate.  Meets center eligibility for accepting HCV+ donor offer - yes.  Patient educated on HCV+ donors. He is willing to accept HCV+ donor offer - yes   Patient is a candidate for KDPI > 85 kidney donor offer - no d/t weight.  He has experienced recent health changes that warrant further investigation an discussion.    Patient will be placed in inactive  status at present and presented to committee for recs.       Follow-up:   In addition to the tests noted in the plan, MR. Alves will continue to have reevaluation as per the standing pre-kidney transplant protocol:  Monthly blood for PRA  Annual return to clinic, except HIV positive, > 65 years of age, or pancreas transplant candidates who will be scheduled to see transplant every 6 months while in pre-transplant phase  Annual re-testing: CXR, EKG, yearly mammograms for women over 40 and PSA for males over  40, cardiology follow-up as recommended by initial cardiology pre-transplant evaluation  Renal ultrasound every 2 years  Baseline colonoscopy after age 50 and repeated as recommended      JES DavisOS Patient Status  Functional Status: 60% - Requires occasional assistance but is able to care for needs  Physical Capacity: No Limitations

## 2023-07-06 NOTE — PROGRESS NOTES
LISTED PATIENT EDUCATION NOTE    Mr. Isidro Alves was seen in pre-kidney transplant clinic for evaluation for kidney, kidney/pancreas or pancreas only transplant.  The patient attended a an individual video education session that discussed/reviewed the following aspects of transplantation: evaluation including diagnostic and laboratory testing,( Chemistries, Hematology, Serologies including HIV and Hepatitis and HLA) required for transplantation and selection committee process, [facility] waitlist management/multiple listings, types of organs offered (KDPI < 85%, KDPI > 85%, PHS risk, DCD, HCV+, HIV+ for HIV+ recipients and enbloc/dual), financial aspects, surgical procedures, dietary instruction pre- and post-transplant, health maintenance pre- and post-transplant, post-transplant hospitalization and outpatient follow-up, potential to participate in a research protocol, and medication management and side effects.  A question and answer session was provided after the presentation.    The patient was seen by all members of the multi-disciplinary team to include: Nephrologist/SHAMEKA, Surgeon, , Transplant Coordinator, , Pharmacist and Dietician (if applicable).    The patient reviewed and signed all consents for evaluation which were witnessed and sent to scanning into the [facility] chart.    The patient was given an education book and plan for further evaluation based on his individual assessment.      Reviewed program requirement for complete COVID vaccination with documentation prior to listing.  COVID education information reviewed with patient. Patient encouraged to be up to date on all vaccinations.       The patient was informed that the transplant team would manage immediate post op pain. If the patient requires long term pain management, they will need to have that pain management addressed by their PCP or previous provider who wrote for long term pain medicines.    The patient was  encouraged to call with any questions or concerns.

## 2023-07-07 ENCOUNTER — LAB VISIT (OUTPATIENT)
Dept: LAB | Facility: HOSPITAL | Age: 68
End: 2023-07-07
Payer: COMMERCIAL

## 2023-07-07 ENCOUNTER — PATIENT MESSAGE (OUTPATIENT)
Dept: INFECTIOUS DISEASES | Facility: CLINIC | Age: 68
End: 2023-07-07
Payer: COMMERCIAL

## 2023-07-07 DIAGNOSIS — Z76.82 ORGAN TRANSPLANT CANDIDATE: ICD-10-CM

## 2023-07-07 NOTE — PROGRESS NOTES
Transplant Psychosocial Evaluation Update:  Last psychosocial evaluation for transplant was completed on 2/20/2023 by MAGGIE Yancey LMSW    Pt presents today in company of pt's wife/primary caregiver, Kunal Alves.. Pt and caregiver present as alert, oriented to person, place, time, purpose of visit. Pt and caregiver deny concerns about going through with transplant and state motivation and sense of preparedness for transplantation, caregiver role, psychosocial risk factors, medical risk factors, financial aspects, and lifetime commitments. All concerns and education points as per transplant psychosocial evaluation process addressed (also refer to psychosocial dated 2/20/2023). Pt actively participated in today's interview, with pt's wife, Kunal Alves, participating as caregiver. Pt asking questions appropriately and denies changes to previous psychosocial evaluation for transplantation which we reviewed line by line with pt and, per pt choice, with pts caregiver today.    Primary Caregivers and Transportation for Transplant:  1. Kunal Alves, 63 yo wife, retired, DOES drive, [phone]  2. Sahil Reynolds, 64 yo sister, TALI, DOES drive, retired, [phone]  3. Kevin Alves, 48 yo daughter, works as senior research director at [employer]; [place], [place]; DOES drive, [phone]  4. Princess Alves, 76 yo aunt, TALI, retired, does NOT drive, [phone]    Both pt and caregiver/s plan to stay locally at  apartments or hotel of choice - information reviewed in depth. Caregivers plan to stay at hospital as appropriate for transplant and post-transplant process.    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 Pt reports monthly income is around $4,200.     DME: [facility]     ADLS:  Pt reports no difficulties with driving, walking, bathing, cooking, housekeeping, eating, shopping, and taking medication.     Household and Dependents: pt resides  with pt's wife, Maggie Alves, and has no dependents at this time.    Income: Pt states ability to afford all monthly expenses and costs including for medications now and if transplanted based on income and on savings and assets.    Dialysis Adherence: [facility] ([phone]). Dialysis adherence form faxed to JULY and SW awaits reply.     Pt and wife deny any additional concerns, stating preparedness and motivation to proceed with organ transplant.     Payer/Plan Subscr  Sex Relation Sub. Ins. ID Effective Group Num   1. BLUE CROSS BL* MAGGIE ALVES 1960 Female Spouse [id number]                                   [address] [address]   2. MEDICARE - ME* KENNEY ALVES L 1955 Male Self [id number] 21                                    [address]       Suitability for Transplant:   Pt remains low risk for transplant at this time based on psychosocial risk factors and strengths.    Pt patrick well overall with health needs and life in general, has stable supports, adequate insurance, financial stability, transportation and caregiver plans in place, and is using no substances unless prescribed. Pt denies mental health concerns, SI, or HI.    Leonie Del Valle LMSW

## 2023-07-09 ENCOUNTER — TELEPHONE (OUTPATIENT)
Dept: CARDIOLOGY | Facility: CLINIC | Age: 68
End: 2023-07-09
Payer: COMMERCIAL

## 2023-07-09 DIAGNOSIS — Z01.810 PREOP CARDIOVASCULAR EXAM: Primary | ICD-10-CM

## 2023-07-09 NOTE — TELEPHONE ENCOUNTER
----- Message from Sue Gonzalez RN sent at 7/7/2023  1:45 PM CDT -----  He had respiratory and cardiac arrest in May 2023. CPR was administered.  He came up for an organ offer but the transplant team wanted to make sure you were aware and if additional testing was needed. At this time the physicians thought it was too soon after the event to transplant. Not sure if you were aware. His case is being discussed at the transplant committee meeting on 7/14/2023 for possible removal and I would like to see if we should do additional testing before that decision is made.  Regards,  Sue  ----- Message -----  From: Dez Ragland MD  Sent: 7/7/2023  11:03 AM CDT  To: Sue Gonzalez RN    Pts' heart cath done in 08-22 showed nonobstructive CAD and mild pulm HTN PAP 28 mmHG.  EF 40% and CHFrEF compensated.    Elevated periop risk of CV events for high risk procedure.  Good functional and exercise capacity.  No chest pain, active arrhythmia and CHF symptoms.  Ok to proceed the scheduled surgery without further cardiac study.    Dr. Ragland            ----- Message -----  From: Sue Gonzalez RN  Sent: 7/6/2023   1:54 PM CDT  To: MD Dr. Obie Flores,   Mr. Alves has a kidney organ offer right now . Is he cleared from cardiology standpoint?    Thanks,  Sue Gonzalez

## 2023-07-10 ENCOUNTER — TELEPHONE (OUTPATIENT)
Dept: TRANSPLANT | Facility: CLINIC | Age: 68
End: 2023-07-10
Payer: COMMERCIAL

## 2023-07-10 NOTE — TELEPHONE ENCOUNTER
----- Message from Dez Ragland MD sent at 7/9/2023  4:13 PM CDT -----  In 05-23, he had acute pulm edema and cardiac arrest with one round of CPR. It was caused by fluid overloaded leading to temporary cardiogenic shock.     No evidence of arrhythmia related arrest. Obviously he is the high risk pt for any moderate to high risk procedure.     Will refer him to HF clinic for eval.         ----- Message -----  From: Sue Gonzalez RN  Sent: 7/7/2023   1:48 PM CDT  To: Dez Ragland MD    He had respiratory and cardiac arrest in May 2023. CPR was administered.  He came up for an organ offer but the transplant team wanted to make sure you were aware and if additional testing was needed. At this time the physicians thought it was too soon after the event to transplant. Not sure if you were aware. His case is being discussed at the transplant committee meeting on 7/14/2023 for possible removal and I would like to see if we should do additional testing before that decision is made.  Regards,  Sue  ----- Message -----  From: Dez Ragland MD  Sent: 7/7/2023  11:03 AM CDT  To: Sue Gonzalez RN    Pts' heart cath done in 08-22 showed nonobstructive CAD and mild pulm HTN PAP 28 mmHG.  EF 40% and CHFrEF compensated.    Elevated periop risk of CV events for high risk procedure.  Good functional and exercise capacity.  No chest pain, active arrhythmia and CHF symptoms.  Ok to proceed the scheduled surgery without further cardiac study.    Dr. Ragland            ----- Message -----  From: Sue Gonzalez RN  Sent: 7/6/2023   1:54 PM CDT  To: MD Dr. Obie Flores,   Mr. Alves has a kidney organ offer right now . Is he cleared from cardiology standpoint?    Thanks,  Sue Gonzalez

## 2023-07-11 ENCOUNTER — DOCUMENTATION ONLY (OUTPATIENT)
Dept: TRANSPLANT | Facility: CLINIC | Age: 68
End: 2023-07-11
Payer: COMMERCIAL

## 2023-07-11 NOTE — PROGRESS NOTES
PHARM.D. PRE-TRANSPLANT NOTE:    This patient's medication therapy was evaluated as part of his pre-transplant evaluation.      The following general pharmacologic concerns were noted: aspirin--hold perioperatively     The following concerns for post-operative pain management were noted: none    The following pharmacologic concerns related to HCV therapy were noted: none      This patient's medication profile was reviewed for considerations for DAA Hepatitis C therapy:    [x]  No current inducers of CYP 3A4 or PGP  [x]  No amiodarone on this patient's EMR profile in the last 24 months  [x]  No past or current atrial fibrillation on this patient's EMR profile       Current Outpatient Medications   Medication Sig Dispense Refill    acyclovir (ZOVIRAX) 400 MG tablet TAKE 1 TABLET (400 MG TOTAL) BY MOUTH 2 (TWO) TIMES DAILY. ONE TABLET 180 tablet 1    albuterol (PROVENTIL) 2.5 mg /3 mL (0.083 %) nebulizer solution Take 3 mLs (2.5 mg total) by nebulization every 4 to 6 hours as needed for Wheezing or Shortness of Breath. 360 mL 11    albuterol (PROVENTIL/VENTOLIN HFA) 90 mcg/actuation inhaler Inhale 2 puffs into the lungs every 4 (four) hours as needed for Wheezing or Shortness of Breath. 18 g 11    aspirin (ECOTRIN) 81 MG EC tablet Take 1 tablet (81 mg total) by mouth once daily.      atorvastatin (LIPITOR) 40 MG tablet TAKE 1 TABLET BY MOUTH EVERY DAY 90 tablet 3    blood sugar diagnostic Strp 1 each by Misc.(Non-Drug; Combo Route) route 4 (four) times daily. 100 each 11    blood-glucose meter kit Test finger stick blood sugar once daily. 1 each 0    calcitRIOL (ROCALTROL) 0.25 MCG Cap TAKE 1 CAPSULE (0.25 MCG TOTAL) BY MOUTH EVERY MON, WED, FRI. 15 capsule 11    cetirizine (ZYRTEC) 10 MG tablet TAKE 1 TABLET BY MOUTH EVERY DAY 90 tablet 1    cyclobenzaprine (FLEXERIL) 10 MG tablet Take 10 mg by mouth 3 (three) times daily.      esomeprazole (NEXIUM) 40 MG capsule Take 40 mg by mouth once daily.      finasteride  "(PROSCAR) 5 mg tablet TAKE 1 TABLET BY MOUTH EVERY DAY 30 tablet 11    fluticasone propionate (FLONASE) 50 mcg/actuation nasal spray 2 sprays (100 mcg total) by Each Nostril route every evening. For nasal congestion or runny nose 48 mL 1    fluticasone-salmeterol diskus inhaler 500-50 mcg Inhale 1 puff into the lungs 2 (two) times daily. Controller.Wash out mouth after use 60 each 11    folic acid (FOLVITE) 1 MG tablet TAKE 1 TABLET BY MOUTH EVERY DAY 30 tablet 9    furosemide (LASIX) 80 MG tablet Take 1 tablet (80 mg total) by mouth once daily. 30 tablet 6    insulin degludec (TRESIBA FLEXTOUCH U-200) 200 unit/mL (3 mL) insulin pen Inject 30 Units into the skin once daily. 2 pen 8    isosorbide dinitrate (ISORDIL) 20 MG tablet Take 1 tablet (20 mg total) by mouth 3 (three) times daily. 90 tablet 11    lancets Misc 1 each by Misc.(Non-Drug; Combo Route) route 4 (four) times daily. 100 each 11    metoprolol succinate (TOPROL-XL) 25 MG 24 hr tablet Take 0.5 tablets (12.5 mg total) by mouth once daily. 30 tablet 5    multivitamin (THERAGRAN) tablet Take 1 tablet by mouth once daily.      oxyCODONE-acetaminophen (PERCOCET)  mg per tablet Take 1 tablet by mouth every 6 (six) hours as needed for Pain. 12 tablet 0    pen needle, diabetic (BD ULTRA-FINE SHIRA PEN NEEDLE) 32 gauge x 5/32" Ndle 1 each by Misc.(Non-Drug; Combo Route) route 4 (four) times daily with meals and nightly. 100 each 11    sacubitriL-valsartan (ENTRESTO)  mg per tablet Take 1 tablet by mouth 2 (two) times daily. 60 tablet 11    sevelamer carbonate (RENVELA) 800 mg Tab Take 3 tablets (2,400 mg total) by mouth 3 (three) times daily with meals. 300 tablet 11    tamsulosin (FLOMAX) 0.4 mg Cap TAKE 1 CAPSULE BY MOUTH EVERY DAY 90 capsule 3    traMADoL (ULTRAM) 50 mg tablet 1 to 2 tabs po tid prn pain. 30 each 0     No current facility-administered medications for this visit.           I am available for consultation and can be contacted, as " needed by the other members of the Transplant team.

## 2023-07-12 DIAGNOSIS — N18.6 ESRD (END STAGE RENAL DISEASE): Primary | ICD-10-CM

## 2023-07-12 DIAGNOSIS — N18.6 ESRD ON DIALYSIS: ICD-10-CM

## 2023-07-12 DIAGNOSIS — Z99.2 ESRD ON DIALYSIS: ICD-10-CM

## 2023-07-12 RX ORDER — LIDOCAINE AND PRILOCAINE 25; 25 MG/G; MG/G
1 CREAM TOPICAL
Qty: 30 G | Refills: 11 | Status: SHIPPED | OUTPATIENT
Start: 2023-07-12 | End: 2023-10-23

## 2023-07-14 ENCOUNTER — EPISODE CHANGES (OUTPATIENT)
Dept: TRANSPLANT | Facility: CLINIC | Age: 68
End: 2023-07-14

## 2023-07-14 ENCOUNTER — TELEPHONE (OUTPATIENT)
Dept: TRANSPLANT | Facility: CLINIC | Age: 68
End: 2023-07-14
Payer: COMMERCIAL

## 2023-07-14 ENCOUNTER — COMMITTEE REVIEW (OUTPATIENT)
Dept: TRANSPLANT | Facility: CLINIC | Age: 68
End: 2023-07-14
Payer: COMMERCIAL

## 2023-07-14 NOTE — PROGRESS NOTES
YEARLY LIST MANAGEMENT NOTE    Isidro Alves's kidney transplant listing status reviewed.  Patient is due for follow-up appointments on 1/2024.  Appointments will be scheduled per protocol. Patient to see HF clinic for cardiac optimization and then will be discuss again in committee regarding continued candidacy.

## 2023-07-14 NOTE — COMMITTEE REVIEW
Native Organ Dx: Diabetes Mellitus - Type II    Kidney Transplant listed patient discussed at the committee meeting today regarding continued candidacy after cardiac event. .Per committee patient to see HF for cardiac optimization and re-assess in 6 months.He will remain inactive at this time.      Note written by Sue Gonzalez    ===============================================  I was present at the meeting and attest to the decision of the committee.    Radhika Cardenas, DO  Transplant Nephrology

## 2023-08-01 ENCOUNTER — DOCUMENTATION ONLY (OUTPATIENT)
Dept: TRANSPLANT | Facility: CLINIC | Age: 68
End: 2023-08-01
Payer: COMMERCIAL

## 2023-08-01 NOTE — PROGRESS NOTES
Medical record, including care everywhere, reviewed for lab results which meet criteria for an eGFR Waiting Time Modification based on OPTN Policy 8.3.A  Lab testing from 11/14/16 found to meet criteria. Reviewed with nephrology and Waiting Time Modification Form was submitted to [employer].

## 2023-08-02 PROCEDURE — 86833 HLA CLASS II HIGH DEFIN QUAL: CPT | Mod: PO,TXP | Performed by: NURSE PRACTITIONER

## 2023-08-02 PROCEDURE — 86832 HLA CLASS I HIGH DEFIN QUAL: CPT | Mod: PO,TXP | Performed by: NURSE PRACTITIONER

## 2023-08-08 ENCOUNTER — LAB VISIT (OUTPATIENT)
Dept: LAB | Facility: HOSPITAL | Age: 68
End: 2023-08-08
Payer: COMMERCIAL

## 2023-08-08 DIAGNOSIS — Z76.82 AWAITING ORGAN TRANSPLANT STATUS: ICD-10-CM

## 2023-08-09 ENCOUNTER — PATIENT MESSAGE (OUTPATIENT)
Dept: TRANSPLANT | Facility: CLINIC | Age: 68
End: 2023-08-09
Payer: COMMERCIAL

## 2023-08-09 ENCOUNTER — DOCUMENTATION ONLY (OUTPATIENT)
Dept: TRANSPLANT | Facility: CLINIC | Age: 68
End: 2023-08-09
Payer: COMMERCIAL

## 2023-08-09 NOTE — PROGRESS NOTES
Patient notified that the review of their medical record was completed and it was found that additional time may be due based on past lab results. Informed that the information was submitted to [employer] and approved.    Their waiting time for a kidney transplant previously began on 9/17/19. This has now been adjusted to 11/14/16 based on past lab values.

## 2023-08-21 LAB — HPRA INTERPRETATION: NORMAL

## 2023-08-29 ENCOUNTER — DOCUMENTATION ONLY (OUTPATIENT)
Dept: NEPHROLOGY | Facility: CLINIC | Age: 68
End: 2023-08-29
Payer: COMMERCIAL

## 2023-08-29 ENCOUNTER — OFFICE VISIT (OUTPATIENT)
Dept: TRANSPLANT | Facility: CLINIC | Age: 68
End: 2023-08-29
Payer: COMMERCIAL

## 2023-08-29 ENCOUNTER — LAB VISIT (OUTPATIENT)
Dept: LAB | Facility: HOSPITAL | Age: 68
End: 2023-08-29
Attending: INTERNAL MEDICINE
Payer: COMMERCIAL

## 2023-08-29 VITALS
SYSTOLIC BLOOD PRESSURE: 136 MMHG | HEART RATE: 64 BPM | HEIGHT: 74 IN | WEIGHT: 248.25 LBS | DIASTOLIC BLOOD PRESSURE: 76 MMHG | BODY MASS INDEX: 31.86 KG/M2

## 2023-08-29 DIAGNOSIS — N18.6 ESRD ON DIALYSIS: ICD-10-CM

## 2023-08-29 DIAGNOSIS — Z01.818 PRE-OP TESTING: ICD-10-CM

## 2023-08-29 DIAGNOSIS — I25.10 CORONARY ARTERY DISEASE, UNSPECIFIED VESSEL OR LESION TYPE, UNSPECIFIED WHETHER ANGINA PRESENT, UNSPECIFIED WHETHER NATIVE OR TRANSPLANTED HEART: ICD-10-CM

## 2023-08-29 DIAGNOSIS — Z99.2 ESRD ON DIALYSIS: ICD-10-CM

## 2023-08-29 DIAGNOSIS — I50.42 CHRONIC COMBINED SYSTOLIC AND DIASTOLIC HEART FAILURE: Primary | ICD-10-CM

## 2023-08-29 DIAGNOSIS — I25.5 CARDIOMYOPATHY, ISCHEMIC: ICD-10-CM

## 2023-08-29 LAB
ANION GAP SERPL CALC-SCNC: 10 MMOL/L (ref 8–16)
APTT PPP: 25.7 SEC (ref 21–32)
BASOPHILS # BLD AUTO: 0.04 K/UL (ref 0–0.2)
BASOPHILS NFR BLD: 0.6 % (ref 0–1.9)
BUN SERPL-MCNC: 41 MG/DL (ref 8–23)
CALCIUM SERPL-MCNC: 9.6 MG/DL (ref 8.7–10.5)
CHLORIDE SERPL-SCNC: 105 MMOL/L (ref 95–110)
CO2 SERPL-SCNC: 25 MMOL/L (ref 23–29)
CREAT SERPL-MCNC: 8.8 MG/DL (ref 0.5–1.4)
DIFFERENTIAL METHOD: ABNORMAL
EOSINOPHIL # BLD AUTO: 0.3 K/UL (ref 0–0.5)
EOSINOPHIL NFR BLD: 4.5 % (ref 0–8)
ERYTHROCYTE [DISTWIDTH] IN BLOOD BY AUTOMATED COUNT: 17.5 % (ref 11.5–14.5)
EST. GFR  (NO RACE VARIABLE): 6 ML/MIN/1.73 M^2
GLUCOSE SERPL-MCNC: 140 MG/DL (ref 70–110)
HCT VFR BLD AUTO: 35.2 % (ref 40–54)
HGB BLD-MCNC: 11.2 G/DL (ref 14–18)
IMM GRANULOCYTES # BLD AUTO: 0.02 K/UL (ref 0–0.04)
IMM GRANULOCYTES NFR BLD AUTO: 0.3 % (ref 0–0.5)
INR PPP: 1 (ref 0.8–1.2)
LYMPHOCYTES # BLD AUTO: 1.2 K/UL (ref 1–4.8)
LYMPHOCYTES NFR BLD: 17.3 % (ref 18–48)
MCH RBC QN AUTO: 27.3 PG (ref 27–31)
MCHC RBC AUTO-ENTMCNC: 31.8 G/DL (ref 32–36)
MCV RBC AUTO: 86 FL (ref 82–98)
MONOCYTES # BLD AUTO: 0.7 K/UL (ref 0.3–1)
MONOCYTES NFR BLD: 10.1 % (ref 4–15)
NEUTROPHILS # BLD AUTO: 4.5 K/UL (ref 1.8–7.7)
NEUTROPHILS NFR BLD: 67.2 % (ref 38–73)
NRBC BLD-RTO: 0 /100 WBC
PLATELET # BLD AUTO: 212 K/UL (ref 150–450)
PMV BLD AUTO: 11.3 FL (ref 9.2–12.9)
POTASSIUM SERPL-SCNC: 4.8 MMOL/L (ref 3.5–5.1)
PROTHROMBIN TIME: 10.6 SEC (ref 9–12.5)
RBC # BLD AUTO: 4.11 M/UL (ref 4.6–6.2)
SODIUM SERPL-SCNC: 140 MMOL/L (ref 136–145)
WBC # BLD AUTO: 6.66 K/UL (ref 3.9–12.7)

## 2023-08-29 PROCEDURE — 85025 COMPLETE CBC W/AUTO DIFF WBC: CPT | Mod: TXP | Performed by: INTERNAL MEDICINE

## 2023-08-29 PROCEDURE — 3078F PR MOST RECENT DIASTOLIC BLOOD PRESSURE < 80 MM HG: ICD-10-PCS | Mod: CPTII,S$GLB,TXP, | Performed by: INTERNAL MEDICINE

## 2023-08-29 PROCEDURE — 99205 OFFICE O/P NEW HI 60 MIN: CPT | Mod: S$GLB,TXP,, | Performed by: INTERNAL MEDICINE

## 2023-08-29 PROCEDURE — 1126F PR PAIN SEVERITY QUANTIFIED, NO PAIN PRESENT: ICD-10-PCS | Mod: CPTII,S$GLB,TXP, | Performed by: INTERNAL MEDICINE

## 2023-08-29 PROCEDURE — 99999 PR PBB SHADOW E&M-EST. PATIENT-LVL II: ICD-10-PCS | Mod: PBBFAC,TXP,, | Performed by: INTERNAL MEDICINE

## 2023-08-29 PROCEDURE — 4010F PR ACE/ARB THEARPY RXD/TAKEN: ICD-10-PCS | Mod: CPTII,S$GLB,TXP, | Performed by: INTERNAL MEDICINE

## 2023-08-29 PROCEDURE — 3066F NEPHROPATHY DOC TX: CPT | Mod: CPTII,S$GLB,TXP, | Performed by: INTERNAL MEDICINE

## 2023-08-29 PROCEDURE — 3078F DIAST BP <80 MM HG: CPT | Mod: CPTII,S$GLB,TXP, | Performed by: INTERNAL MEDICINE

## 2023-08-29 PROCEDURE — 4010F ACE/ARB THERAPY RXD/TAKEN: CPT | Mod: CPTII,S$GLB,TXP, | Performed by: INTERNAL MEDICINE

## 2023-08-29 PROCEDURE — 99999 PR PBB SHADOW E&M-EST. PATIENT-LVL II: CPT | Mod: PBBFAC,TXP,, | Performed by: INTERNAL MEDICINE

## 2023-08-29 PROCEDURE — 3075F SYST BP GE 130 - 139MM HG: CPT | Mod: CPTII,S$GLB,TXP, | Performed by: INTERNAL MEDICINE

## 2023-08-29 PROCEDURE — 85730 THROMBOPLASTIN TIME PARTIAL: CPT | Mod: TXP | Performed by: INTERNAL MEDICINE

## 2023-08-29 PROCEDURE — 3008F BODY MASS INDEX DOCD: CPT | Mod: CPTII,S$GLB,TXP, | Performed by: INTERNAL MEDICINE

## 2023-08-29 PROCEDURE — 1126F AMNT PAIN NOTED NONE PRSNT: CPT | Mod: CPTII,S$GLB,TXP, | Performed by: INTERNAL MEDICINE

## 2023-08-29 PROCEDURE — 3044F PR MOST RECENT HEMOGLOBIN A1C LEVEL <7.0%: ICD-10-PCS | Mod: CPTII,S$GLB,TXP, | Performed by: INTERNAL MEDICINE

## 2023-08-29 PROCEDURE — 3008F PR BODY MASS INDEX (BMI) DOCUMENTED: ICD-10-PCS | Mod: CPTII,S$GLB,TXP, | Performed by: INTERNAL MEDICINE

## 2023-08-29 PROCEDURE — 80048 BASIC METABOLIC PNL TOTAL CA: CPT | Mod: TXP | Performed by: INTERNAL MEDICINE

## 2023-08-29 PROCEDURE — 3066F PR DOCUMENTATION OF TREATMENT FOR NEPHROPATHY: ICD-10-PCS | Mod: CPTII,S$GLB,TXP, | Performed by: INTERNAL MEDICINE

## 2023-08-29 PROCEDURE — 3075F PR MOST RECENT SYSTOLIC BLOOD PRESS GE 130-139MM HG: ICD-10-PCS | Mod: CPTII,S$GLB,TXP, | Performed by: INTERNAL MEDICINE

## 2023-08-29 PROCEDURE — 99205 PR OFFICE/OUTPT VISIT, NEW, LEVL V, 60-74 MIN: ICD-10-PCS | Mod: S$GLB,TXP,, | Performed by: INTERNAL MEDICINE

## 2023-08-29 PROCEDURE — 3044F HG A1C LEVEL LT 7.0%: CPT | Mod: CPTII,S$GLB,TXP, | Performed by: INTERNAL MEDICINE

## 2023-08-29 PROCEDURE — 36415 COLL VENOUS BLD VENIPUNCTURE: CPT | Mod: NTX | Performed by: INTERNAL MEDICINE

## 2023-08-29 PROCEDURE — 85610 PROTHROMBIN TIME: CPT | Mod: TXP | Performed by: INTERNAL MEDICINE

## 2023-08-29 NOTE — LETTER
August 29, 2023        Gabino Champion  [address]  [place] [address]  Phone: [phone]  Fax: [phone]             [facility] ([facility])  [address] [place] [address]  Phone: [phone]  Fax: [phone]   Patient: Isidro Alves   MR Number: [id number]   YOB: 1955   Date of Visit: 8/29/2023       Dear Dr. Gabino Champion    Thank you for referring Isidro Alves to me for evaluation. Attached you will find relevant portions of my assessment and plan of care.    If you have questions, please do not hesitate to call me. I look forward to following Isidro Alves along with you.    Sincerely,    Reddy Dillon MD    Enclosure    If you would like to receive this communication electronically, please contact [email or web address] or [phone] to request [employer] Link access.    [employer] Link is a tool which provides read-only access to select patient information with whom you have a relationship. Its easy to use and provides real time access to review your patients record including encounter summaries, notes, results, and demographic information.    If you feel you have received this communication in error or would no longer like to receive these types of communications, please e-mail [email or web address]

## 2023-08-29 NOTE — H&P (VIEW-ONLY)
Subjective:       HPI:  Mr. Alves is a 68 y.o. year old black male with stage C HFrEF (EF=40%), CAD (mild based on previous angiogram), ESRD on HD who is referred by our colleague Dr. Ragland for optimization of his CHF prior to reconsideration for kidney transplant listing. Patient had been listed for kidney transplant however in may was admitted to the hospital for acute respiratory failure thought to be secondary to pulmonary edema. He was successfully extubated and dialyzed (though he required vasopressors). Eventually discharged in a stable condition. He did admit not following a fluid restriction and drinking large amount of fluids. He has since then reduced his daily fluid amount and states that he feels better. He is now able to walk 1.5 mile a day without any difficulty. He denies any exertional chest pain. His current HF regimen includes; Entresto 97/103 mg BID and isordil 20 mg BID. He is off metoprolol (likely due to bradycardia).     2D Echo with CFD doen on 5/8/2023  Concentric hypertrophy and mildly decreased systolic function.  The estimated ejection fraction is 40%.  Left ventricular diastolic dysfunction.  Normal right ventricular size with normal right ventricular systolic function.  Mild left atrial enlargement.  Mild tricuspid regurgitation.  \  Past Medical History:   Diagnosis Date    Abnormal nuclear stress test 8/30/2022    Acute hypoxemic respiratory failure due to COVID-19 1/21/2021    Acute on chronic combined systolic and diastolic congestive heart failure 5/7/2023    Acute on chronic respiratory failure 5/7/2023    Acute respiratory failure with hypoxia and hypercapnia 5/18/2023    Anal itching 10/10/2022    Anemia 4/16/2014    BPH (benign prostatic hyperplasia)     CKD (chronic kidney disease) stage 4, GFR 15-29 ml/min     Coronary artery disease of native artery of native heart with stable angina pectoris 4/29/2019    Diabetes mellitus     Diabetes mellitus, type 2     Dilated  cardiomyopathy 8/30/2022    Elevated PSA     Encounter for blood transfusion     Herpes labialis     Hyperlipidemia     Hypertension     Hypertensive emergency 5/18/2023    Obesity     Pneumonia 5/7/2023    Pneumonia due to COVID-19 virus     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    Preop cardiovascular exam 9/16/2022    Proteinuria     Pulmonary edema with congestive heart failure 5/18/2023    Secondary hyperparathyroidism (of renal origin)     UTI (lower urinary tract infection) 5/1/2015     Past Surgical History:   Procedure Laterality Date    CATHETERIZATION OF BOTH LEFT AND RIGHT HEART N/A 08/30/2022    Procedure: CATHETERIZATION, HEART, BOTH LEFT AND RIGHT;  Surgeon: Kassandra Deras MD;  Location: [facility] CATH LAB;  Service: Cardiology;  Laterality: N/A;    COLONOSCOPY N/A 12/21/2017    Procedure: COLONOSCOPY;  Surgeon: Fran Escalera MD;  Location: [facility] ENDO;  Service: Endoscopy;  Laterality: N/A;    COLONOSCOPY N/A 02/02/2023    Procedure: COLONOSCOPY;  Surgeon: Anthony Meng MD;  Location: [facility] ENDO;  Service: Endoscopy;  Laterality: N/A;    FLUOROSCOPY N/A 01/21/2021    Procedure: Vascath insertion;  Surgeon: Adrian Pruitt Jr., MD;  Location: [facility] CATH LAB;  Service: General;  Laterality: N/A;    FLUOROSCOPY N/A 04/06/2021    Procedure: Over the wire Vas Cath exchange;  Surgeon: Ho Pressley MD;  Location: [facility] CATH LAB;  Service: General;  Laterality: N/A;    PROSTATE BIOPSY      thorocotomy  01/01/2010       Review of Systems   Constitutional: Negative. Negative for chills, decreased appetite, diaphoresis, fever, malaise/fatigue, night sweats, weight gain and weight loss.   Eyes: Negative.    Cardiovascular:  Positive for dyspnea on exertion. Negative for chest pain, claudication, cyanosis, irregular heartbeat, leg swelling, near-syncope, orthopnea, palpitations, paroxysmal nocturnal dyspnea and syncope.   Respiratory:  Negative for cough, hemoptysis and shortness of breath.    Endocrine: Negative.   "  Hematologic/Lymphatic: Negative.    Skin:  Negative for color change, dry skin and nail changes.   Musculoskeletal: Negative.    Gastrointestinal: Negative.    Genitourinary: Negative.    Neurological:  Negative for weakness.       Objective:   Blood pressure 136/76, pulse 64, height 6' 2" (1.88 m), weight 112.6 kg (248 lb 3.8 oz).body mass index is 31.87 kg/m².  Physical Exam  Vitals reviewed.   Constitutional:       Appearance: He is well-developed.      Comments: /76   Pulse 64 Comment: radial  Ht 6' 2" (1.88 m)   Wt 112.6 kg (248 lb 3.8 oz)   BMI 31.87 kg/m²      HENT:      Head: Normocephalic.   Neck:      Vascular: No carotid bruit or JVD.   Cardiovascular:      Rate and Rhythm: Regular rhythm.      Chest Wall: PMI is displaced.      Pulses: Normal pulses.      Heart sounds: Normal heart sounds. No murmur heard.     Comments: AV fistula present in his left arm.   Pulmonary:      Effort: Pulmonary effort is normal.      Breath sounds: Normal breath sounds.   Abdominal:      General: Bowel sounds are normal.      Palpations: Abdomen is soft.   Skin:     General: Skin is warm.   Neurological:      Mental Status: He is alert.         Labs:    Chemistry        Component Value Date/Time     06/15/2023 0531     (L) 05/28/2023 1815    K 4.1 06/15/2023 0531    K 4.1 06/15/2023 0531    K 4.1 05/28/2023 1815     06/15/2023 0531    CL 95 05/28/2023 1815    CO2 27 06/15/2023 0531    CO2 21 (L) 05/28/2023 1815    BUN 22 06/15/2023 0531    BUN 34 (H) 05/28/2023 1815    CREATININE 7.49 (H) 06/15/2023 0531    CREATININE 11.5 (H) 05/28/2023 1815     (H) 05/28/2023 1815        Component Value Date/Time    CALCIUM 9.8 06/15/2023 0531    CALCIUM 9.7 05/28/2023 1815    ALKPHOS 61 05/28/2023 1815    AST 12 05/28/2023 1815    ALT 7 (L) 05/28/2023 1815    BILITOT 0.9 05/28/2023 1815    ESTGFRAFRICA 7 06/15/2023 0531    ESTGFRAFRICA 6.9 (A) 07/14/2022 0706    EGFRNONAA 6.0 (A) 07/14/2022 0706    "       Magnesium   Date Value Ref Range Status   05/14/2023 1.7 1.6 - 2.6 mg/dL Final     Lab Results   Component Value Date    WBC 12.48 05/28/2023    HGB 11.0 (L) 05/28/2023    HCT 34.3 (L) 05/28/2023     05/28/2023     Lab Results   Component Value Date    INR 1.0 05/14/2023    INR 0.9 08/23/2022    INR 0.9 10/11/2018     BNP   Date Value Ref Range Status   05/28/2023 1,184 (H) 0 - 99 pg/mL Final     Comment:     Values of less than 100 pg/ml are consistent with non-CHF populations.   05/14/2023 >4,900 (H) 0 - 99 pg/mL Final     Comment:     Values of less than 100 pg/ml are consistent with non-CHF populations.   05/14/2023 4,343 (H) 15 - 150 PG/ML Final     Comment:     Slight hemolysis present, interpret results with caution   05/07/2023 4,772 (H) 0 - 99 pg/mL Final     Comment:     Values of less than 100 pg/ml are consistent with non-CHF populations.       Assessment:      1. Chronic combined systolic and diastolic heart failure    2. Cardiomyopathy, ischemic    3. Coronary artery disease, unspecified vessel or lesion type, unspecified whether angina present, unspecified whether native or transplanted heart    4. ESRD on dialysis    5. Pre-op testing        Plan:   In summary, Mr. Alves is a very pleasant 67 yo black male with stage C HFrEF, NYHA class II-III symptoms, ESRD on HD who is considered for reactivating on kidney Tx list. In view fo his recent episode of pulmonary edema, I recommend a LHC/RHC to better risk stratify him. If his LHC/RHC show no significant CAD and no significant pulmonary HTN,then would recommend to repeat his Echo in 3 months from now to reassess his LV EF (now that he is on full dose of Entresto). Should his EF improve~50%, may proceed with listing for kidney transplant from a cardiac standpoint.   Recommend 2 gram sodium restriction and 1500cc fluid restriction.  Encourage physical activity with graded exercise program.  Requested patient to weigh themselves daily, and to  notify us if their weight increases by more than 3 lbs in 1 day or 5 lbs in 1 week.   LHC and RHC (referred to Dr. Deras)  Thank you for allowing em to participate in the care of this very pleasant patient. Do not hesitate to contact me should you have any questions.     Reddy Dillon MD

## 2023-08-29 NOTE — PROGRESS NOTES
"                   History & Physical      Chief Complaint:  H&P    HPI:        7\ y.o. AAM with ESRD on HD MWF at [facility].e   The patient's first date   of hemodialysis was  1/30/201.  His cause of end-stage renal disease is  DM and HTN.  His current dialysis   prescription is as follows:  : [facility]; Tx Type: In-Center Hemodialysis  Treatment; Prescribed Tx Duration: 225 min(total); Frequency: Three times a week; Dialyzer: Gambro Revaclear 400  [id number]; Concurrent Access: No; Access (Venous): AV Fistula (Upper Arm (Left)); Needle Access (Venous): JMS,  SYSLOC MINI, 17G x 1", SHARP , TWIN; Access Placement Date (Venous): 06/15/2023; Access (Arterial): AV Fistula  (Upper Arm (Left)); Needle Access (Arterial): JMS, SYSLOC MINI, 17G x 1", SHARP , TWIN; Access Placement Date  (Arterial): 06/15/2023; Max UFR (ml/kg/hr): 13.0 mL/kg/hr; Target Wt: 106.5 kg; Blood Flow Rate: 200 mL/min;  Dialysate: Custom; Dialysate Flow Rate: 600 mL/min; Bath 1 Dialysate Concentration Type: CITRAPURE - LIQUID; Bath  1 Dialysate K: 2.0 mEq/L; Bath 1 Ca: 2.5 mEq/L; Bicarbonate: 37.0 mEq/L; Auto Flow: No;     Date of exam: 8/23/23    ROS:        Constitutional: Negative for fever, chills, weight loss, malaise/fatigue and diaphoresis.   HENT: Negative for hearing loss, ear pain, nosebleeds, congestion, sore throat, neck pain, tinnitus and ear discharge.    Eyes: Negative for blurred vision, double vision, photophobia, pain, discharge and redness.   Respiratory: Negative for cough, hemoptysis, sputum production, shortness of breath, wheezing and stridor.    Cardiovascular: Negative for chest pain, palpitations, orthopnea, claudication, leg swelling and PND.   Gastrointestinal: Negative for heartburn, nausea, vomiting, abdominal pain, diarrhea, constipation, blood in stool and melena.   Genitourinary: Negative for dysuria, urgency, frequency, hematuria and flank pain.   Musculoskeletal: Negative for myalgias, back pain, " joint pain and falls.   Skin: Negative for itching and rash.   Neurological: Negative for dizziness, tingling, tremors, sensory change, speech change, focal weakness, seizures, loss of consciousness, weakness and headaches.   Endo/Heme/Allergies: Negative for environmental allergies and polydipsia. Does not bruise/bleed easily.   Psychiatric/Behavioral: Negative for depression, suicidal ideas, hallucinations, memory loss and substance abuse. The patient is not nervous/anxious and does not have insomnia.    All 14 systems reviewed and negative except as noted above.            Past Medical History:   Diagnosis Date    Abnormal nuclear stress test 8/30/2022    Acute hypoxemic respiratory failure due to COVID-19 1/21/2021    Acute on chronic combined systolic and diastolic congestive heart failure 5/7/2023    Acute on chronic respiratory failure 5/7/2023    Acute respiratory failure with hypoxia and hypercapnia 5/18/2023    Anal itching 10/10/2022    Anemia 4/16/2014    BPH (benign prostatic hyperplasia)     CKD (chronic kidney disease) stage 4, GFR 15-29 ml/min     Coronary artery disease of native artery of native heart with stable angina pectoris 4/29/2019    Diabetes mellitus     Diabetes mellitus, type 2     Dilated cardiomyopathy 8/30/2022    Elevated PSA     Encounter for blood transfusion     Herpes labialis     Hyperlipidemia     Hypertension     Hypertensive emergency 5/18/2023    Obesity     Pneumonia 5/7/2023    Pneumonia due to COVID-19 virus     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    Preop cardiovascular exam 9/16/2022    Proteinuria     Pulmonary edema with congestive heart failure 5/18/2023    Secondary hyperparathyroidism (of renal origin)     UTI (lower urinary tract infection) 5/1/2015       Past Surgical History:   Procedure Laterality Date    CATHETERIZATION OF BOTH LEFT AND RIGHT HEART N/A 08/30/2022    Procedure: CATHETERIZATION, HEART, BOTH LEFT AND RIGHT;  Surgeon: Kassandra Deras MD;  Location: [facility]  CATH LAB;  Service: Cardiology;  Laterality: N/A;    COLONOSCOPY N/A 12/21/2017    Procedure: COLONOSCOPY;  Surgeon: Fran Escalera MD;  Location: [facility] ENDO;  Service: Endoscopy;  Laterality: N/A;    COLONOSCOPY N/A 02/02/2023    Procedure: COLONOSCOPY;  Surgeon: Anthony Meng MD;  Location: [facility] ENDO;  Service: Endoscopy;  Laterality: N/A;    FLUOROSCOPY N/A 01/21/2021    Procedure: Vascath insertion;  Surgeon: Adrian Pruitt Jr., MD;  Location: [facility] CATH LAB;  Service: General;  Laterality: N/A;    FLUOROSCOPY N/A 04/06/2021    Procedure: Over the wire Vas Cath exchange;  Surgeon: Ho Pressley MD;  Location: [facility] CATH LAB;  Service: General;  Laterality: N/A;    PROSTATE BIOPSY      thorocotomy  01/01/2010       Family History   Problem Relation Age of Onset    No Known Problems Mother     Diabetes Father     Hypertension Father     Hypertension Other     Diabetes Other        Social History     Socioeconomic History    Marital status:     Number of children: 3   Occupational History     Employer: self-rmployed   Tobacco Use    Smoking status: Never    Smokeless tobacco: Never   Substance and Sexual Activity    Alcohol use: Not Currently     Comment: seldom     Drug use: No    Sexual activity: Not Currently     Partners: Female   Social History Narrative    ** Merged History Encounter **            Current Outpatient Medications   Medication Sig Dispense Refill    acyclovir (ZOVIRAX) 400 MG tablet TAKE 1 TABLET (400 MG TOTAL) BY MOUTH 2 (TWO) TIMES DAILY. ONE TABLET 180 tablet 1    albuterol (PROVENTIL) 2.5 mg /3 mL (0.083 %) nebulizer solution Take 3 mLs (2.5 mg total) by nebulization every 4 to 6 hours as needed for Wheezing or Shortness of Breath. 360 mL 11    albuterol (PROVENTIL/VENTOLIN HFA) 90 mcg/actuation inhaler Inhale 2 puffs into the lungs every 4 (four) hours as needed for Wheezing or Shortness of Breath. 18 g 11    aspirin (ECOTRIN) 81 MG EC tablet Take 1 tablet (81 mg total) by  mouth once daily.      atorvastatin (LIPITOR) 40 MG tablet TAKE 1 TABLET BY MOUTH EVERY DAY 90 tablet 3    blood sugar diagnostic Strp 1 each by Misc.(Non-Drug; Combo Route) route 4 (four) times daily. 100 each 11    blood-glucose meter kit Test finger stick blood sugar once daily. 1 each 0    calcitRIOL (ROCALTROL) 0.25 MCG Cap TAKE 1 CAPSULE (0.25 MCG TOTAL) BY MOUTH EVERY MON, WED, FRI. 15 capsule 11    cetirizine (ZYRTEC) 10 MG tablet TAKE 1 TABLET BY MOUTH EVERY DAY 90 tablet 1    cyclobenzaprine (FLEXERIL) 10 MG tablet Take 10 mg by mouth 3 (three) times daily.      esomeprazole (NEXIUM) 40 MG capsule Take 40 mg by mouth once daily.      finasteride (PROSCAR) 5 mg tablet TAKE 1 TABLET BY MOUTH EVERY DAY 30 tablet 11    fluticasone propionate (FLONASE) 50 mcg/actuation nasal spray 2 sprays (100 mcg total) by Each Nostril route every evening. For nasal congestion or runny nose 48 mL 1    fluticasone-salmeterol diskus inhaler 500-50 mcg Inhale 1 puff into the lungs 2 (two) times daily. Controller.Wash out mouth after use 60 each 11    folic acid (FOLVITE) 1 MG tablet TAKE 1 TABLET BY MOUTH EVERY DAY 30 tablet 9    furosemide (LASIX) 80 MG tablet Take 1 tablet (80 mg total) by mouth once daily. 30 tablet 6    isosorbide dinitrate (ISORDIL) 20 MG tablet Take 1 tablet (20 mg total) by mouth 3 (three) times daily. (Patient taking differently: Take 20 mg by mouth 2 (two) times daily.) 90 tablet 11    lancets Misc 1 each by Misc.(Non-Drug; Combo Route) route 4 (four) times daily. 100 each 11    LIDOcaine-prilocaine (EMLA) cream Apply 1 g topically 3 (three) times a week. Apply to access 1 hour prior to cannulation on dialysis days. 30 g 11    metoprolol succinate (TOPROL-XL) 25 MG 24 hr tablet Take 0.5 tablets (12.5 mg total) by mouth once daily. (Patient not taking: Reported on 8/29/2023) 30 tablet 5    multivitamin (THERAGRAN) tablet Take 1 tablet by mouth once daily.      oxyCODONE-acetaminophen (PERCOCET)  mg  per tablet Take 1 tablet by mouth every 6 (six) hours as needed for Pain. 12 tablet 0    sacubitriL-valsartan (ENTRESTO)  mg per tablet Take 1 tablet by mouth 2 (two) times daily. 60 tablet 11    sevelamer carbonate (RENVELA) 800 mg Tab Take 3 tablets (2,400 mg total) by mouth 3 (three) times daily with meals. 300 tablet 11    tamsulosin (FLOMAX) 0.4 mg Cap TAKE 1 CAPSULE BY MOUTH EVERY DAY 90 capsule 3    traMADoL (ULTRAM) 50 mg tablet 1 to 2 tabs po tid prn pain. 30 each 0     No current facility-administered medications for this visit.       Review of patient's allergies indicates:   Allergen Reactions    Bactrim [sulfamethoxazole-trimethoprim] Swelling    Nsaids (non-steroidal anti-inflammatory drug)      CKD    Sulfamethoxazole     Trimethoprim          There were no vitals filed for this visit.          Physical Exam   Nursing Notes and Vital Signs Reviewed.     Constitutional: Well developed, well nourished. AAOx3, NAD, speech/ comprehension clear   Head: Atraumatic. Normocephalic.   Eyes: PERRL. EOMI. Conjunctivae are not pale. No scleral icterus.   ENT: Mucous membranes are dry. No tongue tremors. Throat clear.  Neck: Supple. No JVD or LN or Carotid Bruits noted B.  Cardiovascular: S1S2 RRR, no murmurs, rubs, or gallops. Distal pulses are 2+ and symmetric.   Pulmonary/Chest: No evidence of respiratory distress. Clear to auscultation bilaterally. No wheezing, rales or rhonchi. No chest wall TTP.   Abdominal: Soft and non-distended. There is no tenderness. No rebound, guarding, or rigidity. No organomegaly. No mass or viscera palpable  Musculoskeletal: FROM in all extremities. No deformities, no TTP, no edema. No midline spinal TTP. No step-offs. Pelvis is stable to compression. No cyanosis. Moves all four extremities.   Skin: Skin is warm and dry.   Neurological: No gross neurological deficits, Strength 5/5 B, is equal in the upper and lower extremities bilaterally. No sensory deficits to light touch.  No pronator drift.  DTRs are 2+ and equal throughout.   Psychiatric: Good eye contact. Normal Affect.      Laboratory Data:  Reviewed and noted in plan where applicable- Please see chart for full laboratory data.       Lab Results   Component Value Date    WBC 6.66 08/29/2023    HGB 11.2 (L) 08/29/2023    HCT 35.2 (L) 08/29/2023    MCV 86 08/29/2023     08/29/2023     BMP  Lab Results   Component Value Date     06/15/2023    K 4.1 06/15/2023    K 4.1 06/15/2023     06/15/2023    CO2 27 06/15/2023    BUN 22 06/15/2023    CREATININE 7.49 (H) 06/15/2023    CALCIUM 9.8 06/15/2023    ANIONGAP 12 06/15/2023    ESTGFRAFRICA 7 06/15/2023    EGFRNONAA 6.0 (A) 07/14/2022     CMP  Sodium   Date Value Ref Range Status   06/15/2023 141 136 - 145 mmol/L Final   05/28/2023 132 (L) 136 - 145 mmol/L Final     Potassium   Date Value Ref Range Status   06/15/2023 4.1 3.5 - 5.1 mmol/L Final   06/15/2023 4.1 3.5 - 5.1 mmol/L Final   05/28/2023 4.1 3.5 - 5.1 mmol/L Final     Chloride   Date Value Ref Range Status   06/15/2023 102 100 - 109 mmol/L Final   05/28/2023 95 95 - 110 mmol/L Final     CO2   Date Value Ref Range Status   05/28/2023 21 (L) 23 - 29 mmol/L Final     Carbon Dioxide   Date Value Ref Range Status   06/15/2023 27 22 - 33 mmol/L Final     Glucose   Date Value Ref Range Status   05/28/2023 133 (H) 70 - 110 mg/dL Final     BUN   Date Value Ref Range Status   05/28/2023 34 (H) 8 - 23 mg/dL Final     Blood Urea Nitrogen   Date Value Ref Range Status   06/15/2023 22 5 - 25 mg/dL Final     Creatinine   Date Value Ref Range Status   06/15/2023 7.49 (H) 0.57 - 1.25 mg/dL Final   05/28/2023 11.5 (H) 0.5 - 1.4 mg/dL Final     Calcium   Date Value Ref Range Status   06/15/2023 9.8 8.8 - 10.6 mg/dL Final   05/28/2023 9.7 8.7 - 10.5 mg/dL Final     Total Protein   Date Value Ref Range Status   05/28/2023 7.2 6.0 - 8.4 g/dL Final     Albumin   Date Value Ref Range Status   05/28/2023 3.4 (L) 3.5 - 5.2 g/dL Final    05/17/2016 4.0 3.6 - 5.1 g/dL Final     Comment:     @ Test Performed By:  [facility] [place] PATRICK Sapp M.D..,   [address] [place] [id number]  [facility]  [id number]       Total Bilirubin   Date Value Ref Range Status   05/28/2023 0.9 0.1 - 1.0 mg/dL Final     Comment:     For infants and newborns, interpretation of results should be based  on gestational age, weight and in agreement with clinical  observations.    Premature Infant recommended reference ranges:  Up to 24 hours.............<8.0 mg/dL  Up to 48 hours............<12.0 mg/dL  3-5 days..................<15.0 mg/dL  6-29 days.................<15.0 mg/dL       Alkaline Phosphatase   Date Value Ref Range Status   05/28/2023 61 55 - 135 U/L Final     AST   Date Value Ref Range Status   05/28/2023 12 10 - 40 U/L Final     ALT   Date Value Ref Range Status   05/28/2023 7 (L) 10 - 44 U/L Final     Anion Gap   Date Value Ref Range Status   06/15/2023 12 8 - 16 mmol/L Final   05/28/2023 16 8 - 16 mmol/L Final     eGFR if    Date Value Ref Range Status   07/14/2022 6.9 (A) >60 mL/min/1.73 m^2 Final     eGFR    Date Value Ref Range Status   06/15/2023 7 mL/min/1.73mSq Final     Comment:     In accordance with NKF-ASN Task Force recommendation, calculation based on the Chronic Kidney Disease Epidemiology Collaboration (CKD-EPI) equation without adjustment for race. eGFR adjusted for gender and age and calculated in ml/min/1.73mSquared. eGFR cannot be calculated if patient is under 18 years of age.     Reference Range:   >= 60 ml/min/1.73mSquared.     eGFR if non    Date Value Ref Range Status   07/14/2022 6.0 (A) >60 mL/min/1.73 m^2 Final     Comment:     Calculation used to obtain the estimated glomerular filtration  rate (eGFR) is the CKD-EPI equation.        Lab Results   Component Value Date    CALCIUM 9.8 06/15/2023    PHOS 4.3 05/08/2023     Lab Results    Component Value Date    K 4.1 06/15/2023    K 4.1 06/15/2023     Lab Results   Component Value Date    LABPROT 13.4 05/14/2023    ALBUMIN 3.4 (L) 05/28/2023     Lab Results   Component Value Date    HGBA1C 6.4 (H) 05/07/2023       BMP  @[id number](GLU,NA,K,Cl,CO2,BUN,Creatinine,Calcium,MG)@      Radiology:  Reviewed and noted in plan where applicable- Please see chart for full radiology data.            ASSESSMENT/PLAN:     Patient Active Problem List   Diagnosis    Anemia due to chronic kidney disease    Hypertension associated with diabetes    Secondary hyperparathyroidism    Metabolic acidosis    Elevated troponin    Type 2 diabetes mellitus with chronic kidney disease, with long-term current use of insulin    Herpes labialis    Benign non-nodular prostatic hyperplasia without lower urinary tract symptoms    Elevated PSA    Hypogonadism in male    Hyperlipidemia    Colon cancer screening    Special screening for malignant neoplasms, colon    Colon polyp    Other proteinuria    Coronary artery disease of native artery of native heart with stable angina pectoris    Multiple thyroid nodules    Patient on waiting list for kidney transplant    ESRD (end stage renal disease) on dialysis    Hyperkalemia    SOB (shortness of breath)    Upper airway cough syndrome    Pulmonary fibrosis, postinflammatory    PVC (premature ventricular contraction)    Chronic systolic congestive heart failure    Dilated cardiomyopathy    Abnormal nuclear stress test    Pulmonary HTN    Chronic cough    Pleural effusion on left    Overweight with body mass index (BMI) of 29 to 29.9 in adult         PLAN:      Assessment and plan:    1.  ESRD: Doing very well on  dialysis. We will continue hemodialysis treatments three   times a week, four hours each treatment, maintaining a URR of 70% or greater and   a Kt/V of 1.20.  Currently, the patient is stable.    2.  Anemia: .  We will check hemoglobin at least monthly,   target range 10 to 11,  transferrin saturation monthly, and ferritin quarterly.    We will dose Epogen and iron according to monthly blood work and according to   protocol.    3 . Hypertension.  The patient's blood pressure generally stabilizes during   Treatment. Currently controlled with current medications, sodium and fluid   restrictions and dialysis prescription. BP is much better controlled    4.  Hyperparathyroidism secondary to renal origin.  We will check intact PTH on   a quarterly basis.  We will dose vitamin D according to blood work and protocol.      SERGE KatP-C

## 2023-08-29 NOTE — PROGRESS NOTES
Subjective:       HPI:  Mr. Alves is a 68 y.o. year old black male with stage C HFrEF (EF=40%), CAD (mild based on previous angiogram), ESRD on HD who is referred by our colleague Dr. Ragland for optimization of his CHF prior to reconsideration for kidney transplant listing. Patient had been listed for kidney transplant however in may was admitted to the hospital for acute respiratory failure thought to be secondary to pulmonary edema. He was successfully extubated and dialyzed (though he required vasopressors). Eventually discharged in a stable condition. He did admit not following a fluid restriction and drinking large amount of fluids. He has since then reduced his daily fluid amount and states that he feels better. He is now able to walk 1.5 mile a day without any difficulty. He denies any exertional chest pain. His current HF regimen includes; Entresto 97/103 mg BID and isordil 20 mg BID. He is off metoprolol (likely due to bradycardia).     2D Echo with CFD doen on 5/8/2023  Concentric hypertrophy and mildly decreased systolic function.  The estimated ejection fraction is 40%.  Left ventricular diastolic dysfunction.  Normal right ventricular size with normal right ventricular systolic function.  Mild left atrial enlargement.  Mild tricuspid regurgitation.  \  Past Medical History:   Diagnosis Date    Abnormal nuclear stress test 8/30/2022    Acute hypoxemic respiratory failure due to COVID-19 1/21/2021    Acute on chronic combined systolic and diastolic congestive heart failure 5/7/2023    Acute on chronic respiratory failure 5/7/2023    Acute respiratory failure with hypoxia and hypercapnia 5/18/2023    Anal itching 10/10/2022    Anemia 4/16/2014    BPH (benign prostatic hyperplasia)     CKD (chronic kidney disease) stage 4, GFR 15-29 ml/min     Coronary artery disease of native artery of native heart with stable angina pectoris 4/29/2019    Diabetes mellitus     Diabetes mellitus, type 2     Dilated  cardiomyopathy 8/30/2022    Elevated PSA     Encounter for blood transfusion     Herpes labialis     Hyperlipidemia     Hypertension     Hypertensive emergency 5/18/2023    Obesity     Pneumonia 5/7/2023    Pneumonia due to COVID-19 virus     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    Preop cardiovascular exam 9/16/2022    Proteinuria     Pulmonary edema with congestive heart failure 5/18/2023    Secondary hyperparathyroidism (of renal origin)     UTI (lower urinary tract infection) 5/1/2015     Past Surgical History:   Procedure Laterality Date    CATHETERIZATION OF BOTH LEFT AND RIGHT HEART N/A 08/30/2022    Procedure: CATHETERIZATION, HEART, BOTH LEFT AND RIGHT;  Surgeon: Kassandra Deras MD;  Location: [facility] CATH LAB;  Service: Cardiology;  Laterality: N/A;    COLONOSCOPY N/A 12/21/2017    Procedure: COLONOSCOPY;  Surgeon: Fran Escalera MD;  Location: [facility] ENDO;  Service: Endoscopy;  Laterality: N/A;    COLONOSCOPY N/A 02/02/2023    Procedure: COLONOSCOPY;  Surgeon: Anthony Meng MD;  Location: [facility] ENDO;  Service: Endoscopy;  Laterality: N/A;    FLUOROSCOPY N/A 01/21/2021    Procedure: Vascath insertion;  Surgeon: Adrian Pruitt Jr., MD;  Location: [facility] CATH LAB;  Service: General;  Laterality: N/A;    FLUOROSCOPY N/A 04/06/2021    Procedure: Over the wire Vas Cath exchange;  Surgeon: Ho Pressley MD;  Location: [facility] CATH LAB;  Service: General;  Laterality: N/A;    PROSTATE BIOPSY      thorocotomy  01/01/2010       Review of Systems   Constitutional: Negative. Negative for chills, decreased appetite, diaphoresis, fever, malaise/fatigue, night sweats, weight gain and weight loss.   Eyes: Negative.    Cardiovascular:  Positive for dyspnea on exertion. Negative for chest pain, claudication, cyanosis, irregular heartbeat, leg swelling, near-syncope, orthopnea, palpitations, paroxysmal nocturnal dyspnea and syncope.   Respiratory:  Negative for cough, hemoptysis and shortness of breath.    Endocrine: Negative.   "  Hematologic/Lymphatic: Negative.    Skin:  Negative for color change, dry skin and nail changes.   Musculoskeletal: Negative.    Gastrointestinal: Negative.    Genitourinary: Negative.    Neurological:  Negative for weakness.       Objective:   Blood pressure 136/76, pulse 64, height 6' 2" (1.88 m), weight 112.6 kg (248 lb 3.8 oz).body mass index is 31.87 kg/m².  Physical Exam  Vitals reviewed.   Constitutional:       Appearance: He is well-developed.      Comments: /76   Pulse 64 Comment: radial  Ht 6' 2" (1.88 m)   Wt 112.6 kg (248 lb 3.8 oz)   BMI 31.87 kg/m²      HENT:      Head: Normocephalic.   Neck:      Vascular: No carotid bruit or JVD.   Cardiovascular:      Rate and Rhythm: Regular rhythm.      Chest Wall: PMI is displaced.      Pulses: Normal pulses.      Heart sounds: Normal heart sounds. No murmur heard.     Comments: AV fistula present in his left arm.   Pulmonary:      Effort: Pulmonary effort is normal.      Breath sounds: Normal breath sounds.   Abdominal:      General: Bowel sounds are normal.      Palpations: Abdomen is soft.   Skin:     General: Skin is warm.   Neurological:      Mental Status: He is alert.         Labs:    Chemistry        Component Value Date/Time     06/15/2023 0531     (L) 05/28/2023 1815    K 4.1 06/15/2023 0531    K 4.1 06/15/2023 0531    K 4.1 05/28/2023 1815     06/15/2023 0531    CL 95 05/28/2023 1815    CO2 27 06/15/2023 0531    CO2 21 (L) 05/28/2023 1815    BUN 22 06/15/2023 0531    BUN 34 (H) 05/28/2023 1815    CREATININE 7.49 (H) 06/15/2023 0531    CREATININE 11.5 (H) 05/28/2023 1815     (H) 05/28/2023 1815        Component Value Date/Time    CALCIUM 9.8 06/15/2023 0531    CALCIUM 9.7 05/28/2023 1815    ALKPHOS 61 05/28/2023 1815    AST 12 05/28/2023 1815    ALT 7 (L) 05/28/2023 1815    BILITOT 0.9 05/28/2023 1815    ESTGFRAFRICA 7 06/15/2023 0531    ESTGFRAFRICA 6.9 (A) 07/14/2022 0706    EGFRNONAA 6.0 (A) 07/14/2022 0706    "       Magnesium   Date Value Ref Range Status   05/14/2023 1.7 1.6 - 2.6 mg/dL Final     Lab Results   Component Value Date    WBC 12.48 05/28/2023    HGB 11.0 (L) 05/28/2023    HCT 34.3 (L) 05/28/2023     05/28/2023     Lab Results   Component Value Date    INR 1.0 05/14/2023    INR 0.9 08/23/2022    INR 0.9 10/11/2018     BNP   Date Value Ref Range Status   05/28/2023 1,184 (H) 0 - 99 pg/mL Final     Comment:     Values of less than 100 pg/ml are consistent with non-CHF populations.   05/14/2023 >4,900 (H) 0 - 99 pg/mL Final     Comment:     Values of less than 100 pg/ml are consistent with non-CHF populations.   05/14/2023 4,343 (H) 15 - 150 PG/ML Final     Comment:     Slight hemolysis present, interpret results with caution   05/07/2023 4,772 (H) 0 - 99 pg/mL Final     Comment:     Values of less than 100 pg/ml are consistent with non-CHF populations.       Assessment:      1. Chronic combined systolic and diastolic heart failure    2. Cardiomyopathy, ischemic    3. Coronary artery disease, unspecified vessel or lesion type, unspecified whether angina present, unspecified whether native or transplanted heart    4. ESRD on dialysis    5. Pre-op testing        Plan:   In summary, Mr. Alves is a very pleasant 69 yo black male with stage C HFrEF, NYHA class II-III symptoms, ESRD on HD who is considered for reactivating on kidney Tx list. In view fo his recent episode of pulmonary edema, I recommend a LHC/RHC to better risk stratify him. If his LHC/RHC show no significant CAD and no significant pulmonary HTN,then would recommend to repeat his Echo in 3 months from now to reassess his LV EF (now that he is on full dose of Entresto). Should his EF improve~50%, may proceed with listing for kidney transplant from a cardiac standpoint.   Recommend 2 gram sodium restriction and 1500cc fluid restriction.  Encourage physical activity with graded exercise program.  Requested patient to weigh themselves daily, and to  notify us if their weight increases by more than 3 lbs in 1 day or 5 lbs in 1 week.   LHC and RHC (referred to Dr. Deras)  Thank you for allowing em to participate in the care of this very pleasant patient. Do not hesitate to contact me should you have any questions.     Reddy Dillon MD

## 2023-09-05 ENCOUNTER — HOSPITAL ENCOUNTER (OUTPATIENT)
Facility: HOSPITAL | Age: 68
Discharge: HOME | End: 2023-09-05
Attending: INTERNAL MEDICINE | Admitting: INTERNAL MEDICINE
Payer: COMMERCIAL

## 2023-09-05 DIAGNOSIS — I42.0 DCM (DILATED CARDIOMYOPATHY): ICD-10-CM

## 2023-09-05 DIAGNOSIS — R94.39 ABNORMAL NUCLEAR STRESS TEST: ICD-10-CM

## 2023-09-05 DIAGNOSIS — I25.10 CORONARY ARTERY DISEASE INVOLVING NATIVE CORONARY ARTERY OF NATIVE HEART, UNSPECIFIED WHETHER ANGINA PRESENT: ICD-10-CM

## 2023-09-05 DIAGNOSIS — I27.20 PULMONARY HTN: ICD-10-CM

## 2023-09-05 DIAGNOSIS — I50.22 CHRONIC SYSTOLIC (CONGESTIVE) HEART FAILURE: ICD-10-CM

## 2023-09-05 DIAGNOSIS — R06.02 SOB (SHORTNESS OF BREATH): ICD-10-CM

## 2023-09-05 LAB
ALLENS TEST: ABNORMAL
CATH EF QUANTITATIVE: 40 %
DELSYS: ABNORMAL
HCO3 UR-SCNC: 25.4 MMOL/L (ref 24–28)
MODE: ABNORMAL
PCO2 BLDA: 48.7 MMHG (ref 35–45)
PH SMN: 7.33 [PH] (ref 7.35–7.45)
PO2 BLDA: 41 MMHG (ref 40–60)
POC BE: -1 MMOL/L
POC SATURATED O2: 72 % (ref 95–100)
SAMPLE: ABNORMAL
SITE: ABNORMAL

## 2023-09-05 PROCEDURE — 27201423 OPTIME MED/SURG SUP & DEVICES STERILE SUPPLY: Mod: TXP | Performed by: INTERNAL MEDICINE

## 2023-09-05 PROCEDURE — C1760 CLOSURE DEV, VASC: HCPCS | Mod: TXP | Performed by: INTERNAL MEDICINE

## 2023-09-05 PROCEDURE — C1751 CATH, INF, PER/CENT/MIDLINE: HCPCS | Mod: TXP | Performed by: INTERNAL MEDICINE

## 2023-09-05 PROCEDURE — 25500020 PHARM REV CODE 255: Mod: TXP | Performed by: INTERNAL MEDICINE

## 2023-09-05 PROCEDURE — C1894 INTRO/SHEATH, NON-LASER: HCPCS | Mod: TXP | Performed by: INTERNAL MEDICINE

## 2023-09-05 PROCEDURE — 99152 MOD SED SAME PHYS/QHP 5/>YRS: CPT | Mod: TXP,,, | Performed by: INTERNAL MEDICINE

## 2023-09-05 PROCEDURE — 99900035 HC TECH TIME PER 15 MIN (STAT): Mod: TXP

## 2023-09-05 PROCEDURE — 99152 MOD SED SAME PHYS/QHP 5/>YRS: CPT | Mod: TXP | Performed by: INTERNAL MEDICINE

## 2023-09-05 PROCEDURE — 99152 PR MOD CONSCIOUS SEDATION, SAME PHYS, 5+ YRS, FIRST 15 MIN: ICD-10-PCS | Mod: TXP,,, | Performed by: INTERNAL MEDICINE

## 2023-09-05 PROCEDURE — 63600175 PHARM REV CODE 636 W HCPCS: Mod: TXP | Performed by: INTERNAL MEDICINE

## 2023-09-05 PROCEDURE — 99153 MOD SED SAME PHYS/QHP EA: CPT | Mod: TXP | Performed by: INTERNAL MEDICINE

## 2023-09-05 PROCEDURE — 93460 PR CATH PLACE/CORON ANGIO, IMG SUPER/INTERP,R&L HRT CATH, L HRT VENTRIC: ICD-10-PCS | Mod: 26,TXP,, | Performed by: INTERNAL MEDICINE

## 2023-09-05 PROCEDURE — 93460 R&L HRT ART/VENTRICLE ANGIO: CPT | Mod: 26,TXP,, | Performed by: INTERNAL MEDICINE

## 2023-09-05 PROCEDURE — 25000003 PHARM REV CODE 250: Mod: TXP | Performed by: INTERNAL MEDICINE

## 2023-09-05 PROCEDURE — 93460 R&L HRT ART/VENTRICLE ANGIO: CPT | Mod: TXP | Performed by: INTERNAL MEDICINE

## 2023-09-05 PROCEDURE — C1769 GUIDE WIRE: HCPCS | Mod: TXP | Performed by: INTERNAL MEDICINE

## 2023-09-05 DEVICE — ANGIO-SEAL VIP VASCULAR CLOSURE DEVICE
Type: IMPLANTABLE DEVICE | Site: GROIN | Status: FUNCTIONAL
Brand: ANGIO-SEAL

## 2023-09-05 RX ORDER — SODIUM CHLORIDE 0.9 % (FLUSH) 0.9 %
10 SYRINGE (ML) INJECTION
Status: DISCONTINUED | OUTPATIENT
Start: 2023-09-05 | End: 2023-09-05 | Stop reason: HOSPADM

## 2023-09-05 RX ORDER — MIDAZOLAM HYDROCHLORIDE 1 MG/ML
INJECTION, SOLUTION INTRAMUSCULAR; INTRAVENOUS
Status: DISCONTINUED | OUTPATIENT
Start: 2023-09-05 | End: 2023-09-05 | Stop reason: HOSPADM

## 2023-09-05 RX ORDER — CEFAZOLIN SODIUM 1 G/3ML
INJECTION, POWDER, FOR SOLUTION INTRAMUSCULAR; INTRAVENOUS
Status: DISCONTINUED | OUTPATIENT
Start: 2023-09-05 | End: 2023-09-05 | Stop reason: HOSPADM

## 2023-09-05 RX ORDER — ACETAMINOPHEN 325 MG/1
650 TABLET ORAL EVERY 4 HOURS PRN
Status: DISCONTINUED | OUTPATIENT
Start: 2023-09-05 | End: 2023-09-05 | Stop reason: HOSPADM

## 2023-09-05 RX ORDER — LIDOCAINE HYDROCHLORIDE 20 MG/ML
INJECTION, SOLUTION INFILTRATION; PERINEURAL
Status: DISCONTINUED | OUTPATIENT
Start: 2023-09-05 | End: 2023-09-05 | Stop reason: HOSPADM

## 2023-09-05 RX ORDER — FENTANYL CITRATE 50 UG/ML
INJECTION, SOLUTION INTRAMUSCULAR; INTRAVENOUS
Status: DISCONTINUED | OUTPATIENT
Start: 2023-09-05 | End: 2023-09-05 | Stop reason: HOSPADM

## 2023-09-05 RX ORDER — NAPROXEN SODIUM 220 MG/1
81 TABLET, FILM COATED ORAL ONCE
Status: COMPLETED | OUTPATIENT
Start: 2023-09-05 | End: 2023-09-05

## 2023-09-05 RX ORDER — DIPHENHYDRAMINE HCL 50 MG
50 CAPSULE ORAL ONCE
Status: COMPLETED | OUTPATIENT
Start: 2023-09-05 | End: 2023-09-05

## 2023-09-05 RX ORDER — SODIUM CHLORIDE 9 MG/ML
INJECTION, SOLUTION INTRAVENOUS CONTINUOUS
Status: DISCONTINUED | OUTPATIENT
Start: 2023-09-05 | End: 2023-09-05 | Stop reason: HOSPADM

## 2023-09-05 RX ORDER — SODIUM CHLORIDE 9 MG/ML
INJECTION, SOLUTION INTRAVENOUS CONTINUOUS
Status: ACTIVE | OUTPATIENT
Start: 2023-09-05 | End: 2023-09-05

## 2023-09-05 RX ORDER — ONDANSETRON 8 MG/1
8 TABLET, ORALLY DISINTEGRATING ORAL EVERY 8 HOURS PRN
Status: DISCONTINUED | OUTPATIENT
Start: 2023-09-05 | End: 2023-09-05 | Stop reason: HOSPADM

## 2023-09-05 RX ADMIN — ASPIRIN 81 MG CHEWABLE TABLET 81 MG: 81 TABLET CHEWABLE at 06:09

## 2023-09-05 RX ADMIN — SODIUM CHLORIDE: 9 INJECTION, SOLUTION INTRAVENOUS at 07:09

## 2023-09-05 RX ADMIN — DIPHENHYDRAMINE HYDROCHLORIDE 50 MG: 50 CAPSULE ORAL at 06:09

## 2023-09-05 NOTE — PLAN OF CARE
Went over discharge instructions with patient.   Stressed importance of making and keeping all follow ups as well as making prescribed medication changes.   Gave education material for safe mobility after discharge.   Walked patient to ensure patient was safe to go home.   Patient verbalized understanding and has no questions in regards to discharge.  IV removed, catheter intact.  Primary nurse notified of pt's discharge status.   Made sure patient has someone to drive them and explained not to drive for 24 hours.   Dressing to right groin CDI with no signs of bleeding or hematoma.

## 2023-09-05 NOTE — Clinical Note
The catheter was inserted into the left ventricle. Hemodynamics were performed.  An angiography was performed of the LV. Multiple views were taken. The angiography was performed via power injection. The injected amount was 30 mL contrast at 12 mL/s.

## 2023-09-05 NOTE — OP NOTE
INPATIENT Operative Note         SUMMARY     Surgery Date: 9/5/2023     Surgeon(s) and Role:     * Kassandra Deras MD - Primary    ASSISTANT:none    Pre-op Diagnosis:  Abnormal nuclear stress test [R94.39]  Chronic systolic (congestive) heart failure [I50.22]  DCM (dilated cardiomyopathy) [I42.0]  SOB (shortness of breath) [R06.02]  Coronary artery disease involving native coronary artery of native heart, unspecified whether angina present [I25.10]  Pulmonary HTN [I27.20]      Post-op Diagnosis:  Abnormal nuclear stress test [R94.39]  Chronic systolic (congestive) heart failure [I50.22]  DCM (dilated cardiomyopathy) [I42.0]  SOB (shortness of breath) [R06.02]  Coronary artery disease involving native coronary artery of native heart, unspecified whether angina present [I25.10]  Pulmonary HTN [I27.20]    Procedure(s) (LRB):  CATHETERIZATION, HEART, BOTH LEFT AND RIGHT (N/A)    COMPLICATION:none    Anesthesia: RN IV Sedation    Findings/Key Components:  Non obs cad   Ef 35% -40% inf hk  Normal filling pressures. Moderate pulmonary htn    Estimated Blood Loss: < 50 ML.         SPECIMEN: NONE    Devices/Prostetics: None    PLAN:   Medical therapy

## 2023-09-05 NOTE — INTERVAL H&P NOTE
The patient has been examined and the H&P has been reviewed:    I concur with the findings and no changes have occurred since H&P was written.    Procedure risks, benefits and alternative options discussed and understood by patient/family.          Active Hospital Problems    Diagnosis  POA    *Abnormal nuclear stress test [R94.39]  Yes    Overweight with body mass index (BMI) of 29 to 29.9 in adult [E66.3, Z68.29]  Not Applicable    Pulmonary HTN [I27.20]  Yes       Stable.  Continue recommendations of follow-up as per Cardiology.      Dilated cardiomyopathy [I42.0]  Yes       Compensated.  Continue current      Chronic systolic congestive heart failure [I50.22]  Yes     Compensated.  Aware to continue recommendations and follow-up as per Cardiology.      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  Aware to continue follow-up and recommendations as per Pulmonary.      ESRD (end stage renal disease) on dialysis [N18.6, Z99.2]  Not Applicable     Reports up-to-date with dialysis.      Coronary artery disease of native artery of native heart with stable angina pectoris [I25.118]  Yes     Stable.  Aware to continue recommendations and follow-up as per Cardiology.      Hyperlipidemia [E78.5]  Yes     Chronic    Type 2 diabetes mellitus with chronic kidney disease, with long-term current use of insulin [E11.22, Z79.4]  Not Applicable       Controlled.  Continue current medications        Resolved Hospital Problems   No resolved problems to display.

## 2023-09-05 NOTE — Clinical Note
The PA catheter was repositioned to the main pulmonary artery. Hemodynamics were performed. Cardiac output was obtained at 7 L/min. Assumed guerrero being obtained. Assumed of Guerrero was 8.0

## 2023-09-06 PROCEDURE — 99001 SPECIMEN HANDLING PT-LAB: CPT | Mod: PO,TXP | Performed by: NURSE PRACTITIONER

## 2023-09-06 NOTE — DISCHARGE SUMMARY
[facility] - Cath Lab (Hospital)  Discharge Note  Short Stay    Procedure(s) (LRB):  CATHETERIZATION, HEART, BOTH LEFT AND RIGHT (N/A)      OUTCOME: Patient tolerated treatment/procedure well without complication and is now ready for discharge.    DISPOSITION: Home or Self Care    FINAL DIAGNOSIS:  Abnormal nuclear stress test    FOLLOWUP: In clinic    DISCHARGE INSTRUCTIONS:    Discharge Procedure Orders   Diet general     Call MD for:  temperature >100.4     Call MD for:  persistent nausea and vomiting     Call MD for:  severe uncontrolled pain     Call MD for:  difficulty breathing, headache or visual disturbances     Call MD for:  redness, tenderness, or signs of infection (pain, swelling, redness, odor or green/yellow discharge around incision site)     Call MD for:  hives     Call MD for:  persistent dizziness or light-headedness     Call MD for:  extreme fatigue        TIME SPENT ON DISCHARGE: 25  minutes

## 2023-09-08 VITALS
WEIGHT: 248 LBS | HEIGHT: 74 IN | SYSTOLIC BLOOD PRESSURE: 164 MMHG | DIASTOLIC BLOOD PRESSURE: 81 MMHG | TEMPERATURE: 98 F | OXYGEN SATURATION: 97 % | BODY MASS INDEX: 31.83 KG/M2 | HEART RATE: 72 BPM | RESPIRATION RATE: 18 BRPM

## 2023-09-13 ENCOUNTER — LAB VISIT (OUTPATIENT)
Dept: LAB | Facility: HOSPITAL | Age: 68
End: 2023-09-13
Payer: COMMERCIAL

## 2023-09-13 DIAGNOSIS — Z76.82 AWAITING ORGAN TRANSPLANT STATUS: ICD-10-CM

## 2023-09-19 ENCOUNTER — OFFICE VISIT (OUTPATIENT)
Dept: CARDIOLOGY | Facility: CLINIC | Age: 68
End: 2023-09-19
Payer: COMMERCIAL

## 2023-09-19 ENCOUNTER — TELEPHONE (OUTPATIENT)
Dept: CARDIOLOGY | Facility: CLINIC | Age: 68
End: 2023-09-19

## 2023-09-19 VITALS
HEART RATE: 78 BPM | DIASTOLIC BLOOD PRESSURE: 80 MMHG | BODY MASS INDEX: 32.2 KG/M2 | SYSTOLIC BLOOD PRESSURE: 140 MMHG | HEIGHT: 74 IN | WEIGHT: 250.88 LBS

## 2023-09-19 DIAGNOSIS — I25.118 CORONARY ARTERY DISEASE OF NATIVE ARTERY OF NATIVE HEART WITH STABLE ANGINA PECTORIS: Primary | ICD-10-CM

## 2023-09-19 DIAGNOSIS — I50.22 CHRONIC SYSTOLIC CONGESTIVE HEART FAILURE: ICD-10-CM

## 2023-09-19 PROCEDURE — 1159F PR MEDICATION LIST DOCUMENTED IN MEDICAL RECORD: ICD-10-PCS | Mod: CPTII,NTX,S$GLB, | Performed by: PHYSICIAN ASSISTANT

## 2023-09-19 PROCEDURE — 3066F PR DOCUMENTATION OF TREATMENT FOR NEPHROPATHY: ICD-10-PCS | Mod: CPTII,NTX,S$GLB, | Performed by: PHYSICIAN ASSISTANT

## 2023-09-19 PROCEDURE — 3008F BODY MASS INDEX DOCD: CPT | Mod: CPTII,NTX,S$GLB, | Performed by: PHYSICIAN ASSISTANT

## 2023-09-19 PROCEDURE — 3077F PR MOST RECENT SYSTOLIC BLOOD PRESSURE >= 140 MM HG: ICD-10-PCS | Mod: CPTII,NTX,S$GLB, | Performed by: PHYSICIAN ASSISTANT

## 2023-09-19 PROCEDURE — 3079F DIAST BP 80-89 MM HG: CPT | Mod: CPTII,NTX,S$GLB, | Performed by: PHYSICIAN ASSISTANT

## 2023-09-19 PROCEDURE — 4010F PR ACE/ARB THEARPY RXD/TAKEN: ICD-10-PCS | Mod: CPTII,NTX,S$GLB, | Performed by: PHYSICIAN ASSISTANT

## 2023-09-19 PROCEDURE — 3066F NEPHROPATHY DOC TX: CPT | Mod: CPTII,NTX,S$GLB, | Performed by: PHYSICIAN ASSISTANT

## 2023-09-19 PROCEDURE — 3077F SYST BP >= 140 MM HG: CPT | Mod: CPTII,NTX,S$GLB, | Performed by: PHYSICIAN ASSISTANT

## 2023-09-19 PROCEDURE — 99214 PR OFFICE/OUTPT VISIT, EST, LEVL IV, 30-39 MIN: ICD-10-PCS | Mod: NTX,S$GLB,, | Performed by: PHYSICIAN ASSISTANT

## 2023-09-19 PROCEDURE — 1160F RVW MEDS BY RX/DR IN RCRD: CPT | Mod: CPTII,NTX,S$GLB, | Performed by: PHYSICIAN ASSISTANT

## 2023-09-19 PROCEDURE — 3044F PR MOST RECENT HEMOGLOBIN A1C LEVEL <7.0%: ICD-10-PCS | Mod: CPTII,NTX,S$GLB, | Performed by: PHYSICIAN ASSISTANT

## 2023-09-19 PROCEDURE — 1160F PR REVIEW ALL MEDS BY PRESCRIBER/CLIN PHARMACIST DOCUMENTED: ICD-10-PCS | Mod: CPTII,NTX,S$GLB, | Performed by: PHYSICIAN ASSISTANT

## 2023-09-19 PROCEDURE — 1126F AMNT PAIN NOTED NONE PRSNT: CPT | Mod: CPTII,NTX,S$GLB, | Performed by: PHYSICIAN ASSISTANT

## 2023-09-19 PROCEDURE — 1126F PR PAIN SEVERITY QUANTIFIED, NO PAIN PRESENT: ICD-10-PCS | Mod: CPTII,NTX,S$GLB, | Performed by: PHYSICIAN ASSISTANT

## 2023-09-19 PROCEDURE — 4010F ACE/ARB THERAPY RXD/TAKEN: CPT | Mod: CPTII,NTX,S$GLB, | Performed by: PHYSICIAN ASSISTANT

## 2023-09-19 PROCEDURE — 99999 PR PBB SHADOW E&M-EST. PATIENT-LVL V: CPT | Mod: PBBFAC,TXP,, | Performed by: PHYSICIAN ASSISTANT

## 2023-09-19 PROCEDURE — 99999 PR PBB SHADOW E&M-EST. PATIENT-LVL V: ICD-10-PCS | Mod: PBBFAC,TXP,, | Performed by: PHYSICIAN ASSISTANT

## 2023-09-19 PROCEDURE — 3079F PR MOST RECENT DIASTOLIC BLOOD PRESSURE 80-89 MM HG: ICD-10-PCS | Mod: CPTII,NTX,S$GLB, | Performed by: PHYSICIAN ASSISTANT

## 2023-09-19 PROCEDURE — 1159F MED LIST DOCD IN RCRD: CPT | Mod: CPTII,NTX,S$GLB, | Performed by: PHYSICIAN ASSISTANT

## 2023-09-19 PROCEDURE — 3044F HG A1C LEVEL LT 7.0%: CPT | Mod: CPTII,NTX,S$GLB, | Performed by: PHYSICIAN ASSISTANT

## 2023-09-19 PROCEDURE — 99214 OFFICE O/P EST MOD 30 MIN: CPT | Mod: NTX,S$GLB,, | Performed by: PHYSICIAN ASSISTANT

## 2023-09-19 PROCEDURE — 3008F PR BODY MASS INDEX (BMI) DOCUMENTED: ICD-10-PCS | Mod: CPTII,NTX,S$GLB, | Performed by: PHYSICIAN ASSISTANT

## 2023-09-19 NOTE — TELEPHONE ENCOUNTER
Pt scheduled at [facility] for a well child visit   Received call and spoke with pt. Pt requesting PT referral by sent to [facility].  Faxed referral for PT to [facility]: [phone].

## 2023-09-19 NOTE — PROGRESS NOTES
HF TCC Provider Note (Follow-up) Consult Note      HPI:  Patient can walk without any SOB   Patient sleeps on 2 pillows   Patient wakes up SOB, has to get out of bed, associated cough, sputum none   Palpitations - none   Dizzy, light-headed, pre-syncope or syncope none   Since discharge frequency of performing weights, home weight and weight change stable   Other information felt pertinent to HPI    Seen by Dr Dillon 8/29 with the following plan:  In summary, Mr. Alves is a very pleasant 69 yo black male with stage C HFrEF, NYHA class II-III symptoms, ESRD on HD who is considered for reactivating on kidney Tx list. In view fo his recent episode of pulmonary edema, I recommend a LHC/RHC to better risk stratify him. If his LHC/RHC show no significant CAD and no significant pulmonary HTN,then would recommend to repeat his Echo in 3 months from now to reassess his LV EF (now that he is on full dose of Entresto). Should his EF improve~50%, may proceed with listing for kidney transplant from a cardiac standpoint.   Recommend 2 gram sodium restriction and 1500cc fluid restriction.  Encourage physical activity with graded exercise program.    Had LHC with Dr Deras last week, minimal CAD, LVEF was 40%    Since has done well. No CV complaints. Only ask is continuing PT and OT, has done very well. On full dose entresto. No issues with HD    No SOB, PNd, orthopnea      PHYSICAL: There were no vitals filed for this visit.   Wt Readings from Last 3 Encounters:   09/05/23 112.5 kg (248 lb)   08/29/23 112.6 kg (248 lb 3.8 oz)   07/06/23 105.5 kg (232 lb 9.4 oz)       JVD: no,    Heart rhythm: regular  Cardiac murmur: No    S3: no  S4: no  Lungs: clear  Liver span: 10 cm:   Hepatojugular reflux: no  Edema: no,       ASSESSMENT: chronic systolic HF. ESRD      PLAN:      Patient Instructions:   Instruct the patient to notify this clinic if HH, a physician or an advanced care provider wants to change medication one of their HF  medications   Activity and Diet restrictions:   Recommend 2-3 gram sodium restriction and 1500cc- 2000cc fluid restriction.  Encourage physical activity with graded exercise program.  Requested patient to weigh themselves daily, and to notify us if their weight increases by more than 3 lbs in 1 day or 5 lbs in 3 days.    Assigned dry weight on home scale: 105 kg  Medication changes (include current dose and changed dose)  No significant CAD on [facility]  Will order echo in November as outlined in dr Dillon note and refer for kidney transplant listing based on LVEF  RTC prn  Physical therapy

## 2023-09-26 DIAGNOSIS — Z76.0 MEDICATION REFILL: ICD-10-CM

## 2023-09-26 RX ORDER — ATORVASTATIN CALCIUM 40 MG/1
TABLET, FILM COATED ORAL
Qty: 90 TABLET | Refills: 0 | Status: SHIPPED | OUTPATIENT
Start: 2023-09-26 | End: 2023-11-27

## 2023-09-26 RX ORDER — ISOSORBIDE DINITRATE 20 MG/1
20 TABLET ORAL 3 TIMES DAILY
Qty: 90 TABLET | Refills: 11 | Status: ON HOLD | OUTPATIENT
Start: 2023-09-26 | End: 2024-02-20 | Stop reason: HOSPADM

## 2023-09-26 NOTE — TELEPHONE ENCOUNTER
Care Due:                  Date            Visit Type   Department     Provider  --------------------------------------------------------------------------------                                HOSPITAL     [facility] INTERNAL  Last Visit: 05-      FOLLOW UP    MEDICINE       Steven Morgan  Next Visit: None Scheduled  None         None Found                                                            Last  Test          Frequency    Reason                     Performed    Due Date  --------------------------------------------------------------------------------    Lipid Panel.  12 months..  atorvastatin.............  09- 09-    [facility] Embedded Care Due Messages. Reference number: [id number].   9/26/2023 8:00:29 AM CDT

## 2023-10-04 PROCEDURE — 86832 HLA CLASS I HIGH DEFIN QUAL: CPT | Mod: PO,TXP | Performed by: NURSE PRACTITIONER

## 2023-10-04 PROCEDURE — 86833 HLA CLASS II HIGH DEFIN QUAL: CPT | Mod: PO,TXP | Performed by: NURSE PRACTITIONER

## 2023-10-05 DIAGNOSIS — N18.6 ESRD (END STAGE RENAL DISEASE): ICD-10-CM

## 2023-10-05 DIAGNOSIS — R07.9 CHEST PAIN, UNSPECIFIED TYPE: ICD-10-CM

## 2023-10-05 DIAGNOSIS — E83.39 HYPERPHOSPHATEMIA: ICD-10-CM

## 2023-10-05 RX ORDER — SEVELAMER CARBONATE 800 MG/1
2400 TABLET, FILM COATED ORAL
Qty: 300 TABLET | Refills: 11 | Status: ON HOLD | OUTPATIENT
Start: 2023-10-05 | End: 2024-02-19 | Stop reason: HOSPADM

## 2023-10-11 ENCOUNTER — LAB VISIT (OUTPATIENT)
Dept: LAB | Facility: HOSPITAL | Age: 68
End: 2023-10-11
Payer: MEDICARE

## 2023-10-11 DIAGNOSIS — Z76.82 AWAITING ORGAN TRANSPLANT STATUS: ICD-10-CM

## 2023-10-23 ENCOUNTER — OFFICE VISIT (OUTPATIENT)
Dept: FAMILY MEDICINE | Facility: CLINIC | Age: 68
End: 2023-10-23
Attending: FAMILY MEDICINE
Payer: COMMERCIAL

## 2023-10-23 VITALS
WEIGHT: 244.38 LBS | HEART RATE: 60 BPM | DIASTOLIC BLOOD PRESSURE: 78 MMHG | BODY MASS INDEX: 31.36 KG/M2 | OXYGEN SATURATION: 98 % | SYSTOLIC BLOOD PRESSURE: 132 MMHG | TEMPERATURE: 97 F | HEIGHT: 74 IN

## 2023-10-23 DIAGNOSIS — L29.9 ITCH: Primary | ICD-10-CM

## 2023-10-23 PROBLEM — R94.39 ABNORMAL NUCLEAR STRESS TEST: Status: RESOLVED | Noted: 2022-08-30 | Resolved: 2023-10-23

## 2023-10-23 PROBLEM — Z12.11 COLON CANCER SCREENING: Status: RESOLVED | Noted: 2017-12-21 | Resolved: 2023-10-23

## 2023-10-23 PROBLEM — Z12.11 SPECIAL SCREENING FOR MALIGNANT NEOPLASMS, COLON: Status: RESOLVED | Noted: 2017-12-21 | Resolved: 2023-10-23

## 2023-10-23 PROCEDURE — 3044F PR MOST RECENT HEMOGLOBIN A1C LEVEL <7.0%: ICD-10-PCS | Mod: CPTII,S$GLB,, | Performed by: FAMILY MEDICINE

## 2023-10-23 PROCEDURE — 3066F NEPHROPATHY DOC TX: CPT | Mod: CPTII,S$GLB,, | Performed by: FAMILY MEDICINE

## 2023-10-23 PROCEDURE — 99999 PR PBB SHADOW E&M-EST. PATIENT-LVL V: ICD-10-PCS | Mod: PBBFAC,,, | Performed by: FAMILY MEDICINE

## 2023-10-23 PROCEDURE — 3075F PR MOST RECENT SYSTOLIC BLOOD PRESS GE 130-139MM HG: ICD-10-PCS | Mod: CPTII,S$GLB,, | Performed by: FAMILY MEDICINE

## 2023-10-23 PROCEDURE — 3008F BODY MASS INDEX DOCD: CPT | Mod: CPTII,S$GLB,, | Performed by: FAMILY MEDICINE

## 2023-10-23 PROCEDURE — 3008F PR BODY MASS INDEX (BMI) DOCUMENTED: ICD-10-PCS | Mod: CPTII,S$GLB,, | Performed by: FAMILY MEDICINE

## 2023-10-23 PROCEDURE — 1159F PR MEDICATION LIST DOCUMENTED IN MEDICAL RECORD: ICD-10-PCS | Mod: CPTII,S$GLB,, | Performed by: FAMILY MEDICINE

## 2023-10-23 PROCEDURE — 99213 PR OFFICE/OUTPT VISIT, EST, LEVL III, 20-29 MIN: ICD-10-PCS | Mod: S$GLB,,, | Performed by: FAMILY MEDICINE

## 2023-10-23 PROCEDURE — 1126F AMNT PAIN NOTED NONE PRSNT: CPT | Mod: CPTII,S$GLB,, | Performed by: FAMILY MEDICINE

## 2023-10-23 PROCEDURE — 99213 OFFICE O/P EST LOW 20 MIN: CPT | Mod: S$GLB,,, | Performed by: FAMILY MEDICINE

## 2023-10-23 PROCEDURE — 3288F PR FALLS RISK ASSESSMENT DOCUMENTED: ICD-10-PCS | Mod: CPTII,S$GLB,, | Performed by: FAMILY MEDICINE

## 2023-10-23 PROCEDURE — 1159F MED LIST DOCD IN RCRD: CPT | Mod: CPTII,S$GLB,, | Performed by: FAMILY MEDICINE

## 2023-10-23 PROCEDURE — 1101F PR PT FALLS ASSESS DOC 0-1 FALLS W/OUT INJ PAST YR: ICD-10-PCS | Mod: CPTII,S$GLB,, | Performed by: FAMILY MEDICINE

## 2023-10-23 PROCEDURE — 1160F PR REVIEW ALL MEDS BY PRESCRIBER/CLIN PHARMACIST DOCUMENTED: ICD-10-PCS | Mod: CPTII,S$GLB,, | Performed by: FAMILY MEDICINE

## 2023-10-23 PROCEDURE — 3044F HG A1C LEVEL LT 7.0%: CPT | Mod: CPTII,S$GLB,, | Performed by: FAMILY MEDICINE

## 2023-10-23 PROCEDURE — 3288F FALL RISK ASSESSMENT DOCD: CPT | Mod: CPTII,S$GLB,, | Performed by: FAMILY MEDICINE

## 2023-10-23 PROCEDURE — 3078F DIAST BP <80 MM HG: CPT | Mod: CPTII,S$GLB,, | Performed by: FAMILY MEDICINE

## 2023-10-23 PROCEDURE — 4010F ACE/ARB THERAPY RXD/TAKEN: CPT | Mod: CPTII,S$GLB,, | Performed by: FAMILY MEDICINE

## 2023-10-23 PROCEDURE — 99999 PR PBB SHADOW E&M-EST. PATIENT-LVL V: CPT | Mod: PBBFAC,,, | Performed by: FAMILY MEDICINE

## 2023-10-23 PROCEDURE — 4010F PR ACE/ARB THEARPY RXD/TAKEN: ICD-10-PCS | Mod: CPTII,S$GLB,, | Performed by: FAMILY MEDICINE

## 2023-10-23 PROCEDURE — 1160F RVW MEDS BY RX/DR IN RCRD: CPT | Mod: CPTII,S$GLB,, | Performed by: FAMILY MEDICINE

## 2023-10-23 PROCEDURE — 1126F PR PAIN SEVERITY QUANTIFIED, NO PAIN PRESENT: ICD-10-PCS | Mod: CPTII,S$GLB,, | Performed by: FAMILY MEDICINE

## 2023-10-23 PROCEDURE — 1101F PT FALLS ASSESS-DOCD LE1/YR: CPT | Mod: CPTII,S$GLB,, | Performed by: FAMILY MEDICINE

## 2023-10-23 PROCEDURE — 3075F SYST BP GE 130 - 139MM HG: CPT | Mod: CPTII,S$GLB,, | Performed by: FAMILY MEDICINE

## 2023-10-23 PROCEDURE — 3078F PR MOST RECENT DIASTOLIC BLOOD PRESSURE < 80 MM HG: ICD-10-PCS | Mod: CPTII,S$GLB,, | Performed by: FAMILY MEDICINE

## 2023-10-23 PROCEDURE — 3066F PR DOCUMENTATION OF TREATMENT FOR NEPHROPATHY: ICD-10-PCS | Mod: CPTII,S$GLB,, | Performed by: FAMILY MEDICINE

## 2023-10-23 RX ORDER — CLOTRIMAZOLE AND BETAMETHASONE DIPROPIONATE 10; .64 MG/G; MG/G
CREAM TOPICAL 2 TIMES DAILY
Qty: 45 G | Refills: 0 | Status: ON HOLD | OUTPATIENT
Start: 2023-10-23 | End: 2024-02-20 | Stop reason: HOSPADM

## 2023-10-23 RX ORDER — CARVEDILOL 12.5 MG/1
12.5 TABLET ORAL 2 TIMES DAILY WITH MEALS
Status: ON HOLD | COMMUNITY
Start: 2023-10-04 | End: 2024-02-20 | Stop reason: HOSPADM

## 2023-10-23 NOTE — PROGRESS NOTES
Subjective:       Patient ID: Isidro Alves is a 68 y.o. male.    Chief Complaint: Anal Itching (X 5 days)    68 y old male with perianal pruritus / dorsum of penis pruritus  for 1 w. No changes in detergents , cosmetic products , sick contacts, dysuria , skin lesions .Not using any meds       Review of Systems   Constitutional: Negative.    HENT: Negative.     Eyes: Negative.    Respiratory: Negative.     Cardiovascular: Negative.    Gastrointestinal: Negative.    Genitourinary: Negative.    Musculoskeletal: Negative.    Skin: Negative.    Hematological: Negative.        Objective:      Physical Exam  Genitourinary:     Penis: Circumcised. No phimosis, paraphimosis, hypospadias, erythema, tenderness, discharge, swelling or lesions.       Testes:         Right: Mass, tenderness, swelling, testicular hydrocele or varicocele not present. Right testis is descended. Cremasteric reflex is present.          Left: Mass, tenderness, swelling, testicular hydrocele or varicocele not present. Left testis is descended. Cremasteric reflex is present.       Rectum: Guaiac result negative. External hemorrhoid present. No mass, tenderness, anal fissure or internal hemorrhoid.         Assessment:       1. Itch        Plan:     Isidro was seen today for anal itching.    Diagnoses and all orders for this visit:    Itch    Other orders  -     clotrimazole-betamethasone 1-0.05% (LOTRISONE) cream; Apply topically 2 (two) times daily.     Call or return to clinic prn if these symptoms worsen or fail to improve as anticipated.

## 2023-10-30 ENCOUNTER — TELEPHONE (OUTPATIENT)
Dept: TRANSPLANT | Facility: CLINIC | Age: 68
End: 2023-10-30
Payer: MEDICARE

## 2023-10-30 DIAGNOSIS — Z76.82 ORGAN TRANSPLANT CANDIDATE: Primary | ICD-10-CM

## 2023-10-30 LAB — HPRA INTERPRETATION: NORMAL

## 2023-10-30 NOTE — TELEPHONE ENCOUNTER
Spoke to pt confirming appts on 01/11/2024 starting at 10:45am, pt is aware to bring caregiver to the appt. Appt reminders were mailed on 10/30/2023.

## 2023-11-01 PROCEDURE — 99001 SPECIMEN HANDLING PT-LAB: CPT | Mod: PO,TXP | Performed by: NURSE PRACTITIONER

## 2023-11-06 ENCOUNTER — LAB VISIT (OUTPATIENT)
Dept: LAB | Facility: HOSPITAL | Age: 68
End: 2023-11-06
Payer: MEDICARE

## 2023-11-06 DIAGNOSIS — Z76.82 ORGAN TRANSPLANT CANDIDATE: ICD-10-CM

## 2023-11-08 DIAGNOSIS — Z91.09 ENVIRONMENTAL ALLERGIES: ICD-10-CM

## 2023-11-08 RX ORDER — CETIRIZINE HYDROCHLORIDE 10 MG/1
TABLET ORAL
Qty: 90 TABLET | Refills: 1 | Status: SHIPPED | OUTPATIENT
Start: 2023-11-08

## 2023-11-08 NOTE — TELEPHONE ENCOUNTER
No care due was identified.  [facility] Embedded Care Due Messages. Reference number: [id number].   11/08/2023 2:17:45 AM CST

## 2023-11-15 ENCOUNTER — HOSPITAL ENCOUNTER (OUTPATIENT)
Dept: CARDIOLOGY | Facility: HOSPITAL | Age: 68
Discharge: HOME | End: 2023-11-15
Attending: PHYSICIAN ASSISTANT
Payer: COMMERCIAL

## 2023-11-15 VITALS
WEIGHT: 244 LBS | BODY MASS INDEX: 31.32 KG/M2 | HEIGHT: 74 IN | SYSTOLIC BLOOD PRESSURE: 132 MMHG | DIASTOLIC BLOOD PRESSURE: 78 MMHG

## 2023-11-15 DIAGNOSIS — I25.118 CORONARY ARTERY DISEASE OF NATIVE ARTERY OF NATIVE HEART WITH STABLE ANGINA PECTORIS: ICD-10-CM

## 2023-11-15 LAB
AORTIC ROOT ANNULUS: 3.35 CM
ASCENDING AORTA: 2.5 CM
AV INDEX (PROSTH): 0.94
AV MEAN GRADIENT: 2 MMHG
AV PEAK GRADIENT: 4 MMHG
AV VALVE AREA BY VELOCITY RATIO: 3.56 CM²
AV VALVE AREA: 4.25 CM²
AV VELOCITY RATIO: 0.79
BSA FOR ECHO PROCEDURE: 2.4 M2
CV ECHO LV RWT: 0.43 CM
DOP CALC AO PEAK VEL: 0.99 M/S
DOP CALC AO VTI: 19.7 CM
DOP CALC LVOT AREA: 4.5 CM2
DOP CALC LVOT DIAMETER: 2.4 CM
DOP CALC LVOT PEAK VEL: 0.78 M/S
DOP CALC LVOT STROKE VOLUME: 83.65 CM3
DOP CALC RVOT PEAK VEL: 0.6 M/S
DOP CALC RVOT VTI: 12.9 CM
DOP CALCLVOT PEAK VEL VTI: 18.5 CM
E WAVE DECELERATION TIME: 206.26 MSEC
E/A RATIO: 0.56
E/E' RATIO: 12 M/S
ECHO LV POSTERIOR WALL: 1.31 CM (ref 0.6–1.1)
FRACTIONAL SHORTENING: 12 % (ref 28–44)
INTERVENTRICULAR SEPTUM: 1.14 CM (ref 0.6–1.1)
IVC DIAMETER: 2.15 CM
IVRT: 131.3 MSEC
LA MAJOR: 6.24 CM
LA MINOR: 6.18 CM
LA WIDTH: 4.5 CM
LEFT ATRIUM SIZE: 5 CM
LEFT ATRIUM VOLUME INDEX MOD: 24.5 ML/M2
LEFT ATRIUM VOLUME INDEX: 50.3 ML/M2
LEFT ATRIUM VOLUME MOD: 57.93 CM3
LEFT ATRIUM VOLUME: 118.76 CM3
LEFT INTERNAL DIMENSION IN SYSTOLE: 5.31 CM (ref 2.1–4)
LEFT VENTRICLE DIASTOLIC VOLUME INDEX: 77.67 ML/M2
LEFT VENTRICLE DIASTOLIC VOLUME: 183.31 ML
LEFT VENTRICLE MASS INDEX: 139 G/M2
LEFT VENTRICLE SYSTOLIC VOLUME INDEX: 57.6 ML/M2
LEFT VENTRICLE SYSTOLIC VOLUME: 135.91 ML
LEFT VENTRICULAR INTERNAL DIMENSION IN DIASTOLE: 6.05 CM (ref 3.5–6)
LEFT VENTRICULAR MASS: 327.31 G
LV LATERAL E/E' RATIO: 10.8 M/S
LV SEPTAL E/E' RATIO: 13.5 M/S
LVOT MG: 1.23 MMHG
LVOT MV: 0.5 CM/S
MV PEAK A VEL: 0.96 M/S
MV PEAK E VEL: 0.54 M/S
MV STENOSIS PRESSURE HALF TIME: 59.82 MS
MV VALVE AREA P 1/2 METHOD: 3.68 CM2
PISA TR MAX VEL: 2.46 M/S
PV MEAN GRADIENT: 1 MMHG
PV MV: 0.62 M/S
PV PEAK GRADIENT: 2 MMHG
PV PEAK VELOCITY: 0.77 M/S
RA MAJOR: 4.45 CM
RA PRESSURE ESTIMATED: 3 MMHG
RA WIDTH: 3.21 CM
RIGHT VENTRICULAR END-DIASTOLIC DIMENSION: 2.99 CM
RV TB RVSP: 5 MMHG
RV TISSUE DOPPLER FREE WALL SYSTOLIC VELOCITY 1 (APICAL 4 CHAMBER VIEW): 11.93 CM/S
SINUS: 3.31 CM
STJ: 2.68 CM
TASV: 12 CM/S
TDI LATERAL: 0.05 M/S
TDI SEPTAL: 0.04 M/S
TDI: 0.05 M/S
TR MAX PG: 24 MMHG
TRICUSPID ANNULAR PLANE SYSTOLIC EXCURSION: 1.24 CM
TV REST PULMONARY ARTERY PRESSURE: 27 MMHG
Z-SCORE OF LEFT VENTRICULAR DIMENSION IN END DIASTOLE: -5.06
Z-SCORE OF LEFT VENTRICULAR DIMENSION IN END SYSTOLE: -1

## 2023-11-15 PROCEDURE — 93306 TTE W/DOPPLER COMPLETE: CPT | Mod: TXP

## 2023-11-15 PROCEDURE — 93306 TTE W/DOPPLER COMPLETE: CPT | Mod: 26,TXP,, | Performed by: INTERNAL MEDICINE

## 2023-11-15 PROCEDURE — 93306 ECHO (CUPID ONLY): ICD-10-PCS | Mod: 26,TXP,, | Performed by: INTERNAL MEDICINE

## 2023-11-25 DIAGNOSIS — Z76.0 MEDICATION REFILL: ICD-10-CM

## 2023-11-25 NOTE — TELEPHONE ENCOUNTER
Care Due:                  Date            Visit Type   Department     Provider  --------------------------------------------------------------------------------                                HOSPITAL     [facility] INTERNAL  Last Visit: 05-      FOLLOW UP    MEDICINE       Steven Morgan  Next Visit: None Scheduled  None         None Found                                                            Last  Test          Frequency    Reason                     Performed    Due Date  --------------------------------------------------------------------------------    Lipid Panel.  12 months..  atorvastatin.............  09- 09-    [facility] Embedded Care Due Messages. Reference number: [id number].   11/25/2023 4:08:21 PM CST

## 2023-11-27 RX ORDER — ATORVASTATIN CALCIUM 40 MG/1
TABLET, FILM COATED ORAL
Qty: 30 TABLET | Refills: 2 | Status: ON HOLD | OUTPATIENT
Start: 2023-11-27 | End: 2024-02-19 | Stop reason: HOSPADM

## 2023-12-05 ENCOUNTER — LAB VISIT (OUTPATIENT)
Dept: LAB | Facility: HOSPITAL | Age: 68
End: 2023-12-05
Payer: MEDICARE

## 2023-12-05 DIAGNOSIS — Z76.82 ORGAN TRANSPLANT CANDIDATE: ICD-10-CM

## 2023-12-21 ENCOUNTER — TELEPHONE (OUTPATIENT)
Dept: INTERNAL MEDICINE | Facility: CLINIC | Age: 68
End: 2023-12-21
Payer: MEDICARE

## 2023-12-21 RX ORDER — ACYCLOVIR 400 MG/1
TABLET ORAL 2 TIMES DAILY
Status: CANCELLED | OUTPATIENT
Start: 2023-12-21 | End: 2024-12-20

## 2023-12-21 NOTE — TELEPHONE ENCOUNTER
No care due was identified.  [facility] Embedded Care Due Messages. Reference number: [id number].   12/21/2023 9:39:39 AM CST

## 2023-12-21 NOTE — TELEPHONE ENCOUNTER
----- Message from Vickie Marroquin sent at 12/21/2023  9:32 AM CST -----  .Type:  Patient Returning Call    Who Called:.Isidro Alves   Who Left Message for Patient:ZULEYKA  Does the patient know what this is regarding?:  Would the patient rather a call back or a response via [facility]? Call back  Best Call Back Number:.[phone]   Additional Information:

## 2023-12-21 NOTE — TELEPHONE ENCOUNTER
----- Message from Taj Figueroa sent at 12/21/2023  9:09 AM CST -----  Contact: Patient  Type:  RX Refill Request    Who Called: Isidro Alves   Refill or New Rx: Refill   RX Name and Strength:Acyclovir   How is the patient currently taking it? (ex. 1XDay): 1X daily   Is this a 30 day or 90 day RX:90 day   Preferred Pharmacy with phone number:   [facility]pharmacy #[id number] - [place], [place] - [address] [address] [facility] [address]  Phone: [phone] Fax: [phone]  Local or Mail Order:Local   Ordering Provider: Dr. Morgan   Would the patient rather a call back or a response via [facility]? Call back   Best Call Back Number:[phone]  Additional Information:  pt states the pharmacy has sent 3 refill request over.

## 2023-12-21 NOTE — TELEPHONE ENCOUNTER
Spoke with patient and he stated that he needs a refill of acyclovir 400 mg twice daily for fever blisters he gets. He said that Dr. Morgan has given him this medication in the past.

## 2023-12-22 NOTE — TELEPHONE ENCOUNTER
----- Message from Jeb Ferrer sent at 12/22/2023  9:44 AM CST -----  Contact: pt  Calling rg following up on acyclovir being called in to his pharm and can be reached at  [phone]/ben/[email or web address]         [id number] - [place], [place]  [address] [address] [facility] [address]  Phone: [phone] Fax: [phone]

## 2024-01-04 LAB
CLASS I ANTIBODIES - LUMINEX: NORMAL
CLASS I ANTIBODY COMMENTS - LUMINEX: NORMAL
CLASS II ANTIBODIES - LUMINEX: NEGATIVE
CPRA %: 2
SERUM COLLECTION DT - LUMINEX CLASS I: NORMAL
SERUM COLLECTION DT - LUMINEX CLASS II: NORMAL
SPCL1 TESTING DATE: NORMAL
SPCL2 TESTING DATE: NORMAL
SPCLU TESTING DATE: NORMAL

## 2024-01-08 ENCOUNTER — TELEPHONE (OUTPATIENT)
Dept: TRANSPLANT | Facility: CLINIC | Age: 69
End: 2024-01-08
Payer: MEDICARE

## 2024-01-11 ENCOUNTER — HOSPITAL ENCOUNTER (OUTPATIENT)
Dept: RADIOLOGY | Facility: HOSPITAL | Age: 69
Discharge: HOME | End: 2024-01-11
Attending: NURSE PRACTITIONER
Payer: COMMERCIAL

## 2024-01-11 ENCOUNTER — TELEPHONE (OUTPATIENT)
Dept: TRANSPLANT | Facility: CLINIC | Age: 69
End: 2024-01-11
Payer: MEDICARE

## 2024-01-11 ENCOUNTER — OFFICE VISIT (OUTPATIENT)
Dept: TRANSPLANT | Facility: CLINIC | Age: 69
End: 2024-01-11
Payer: COMMERCIAL

## 2024-01-11 VITALS
OXYGEN SATURATION: 97 % | BODY MASS INDEX: 32.96 KG/M2 | HEART RATE: 85 BPM | HEIGHT: 74 IN | DIASTOLIC BLOOD PRESSURE: 74 MMHG | TEMPERATURE: 97 F | WEIGHT: 256.81 LBS | SYSTOLIC BLOOD PRESSURE: 163 MMHG | RESPIRATION RATE: 18 BRPM

## 2024-01-11 DIAGNOSIS — Z99.2 HYPERPARATHYROIDISM DUE TO ESRD ON DIALYSIS: ICD-10-CM

## 2024-01-11 DIAGNOSIS — N18.6 HYPERPARATHYROIDISM DUE TO ESRD ON DIALYSIS: ICD-10-CM

## 2024-01-11 DIAGNOSIS — N18.6 ANEMIA DUE TO CHRONIC KIDNEY DISEASE, ON CHRONIC DIALYSIS: ICD-10-CM

## 2024-01-11 DIAGNOSIS — Z79.4 TYPE 2 DIABETES MELLITUS WITH CHRONIC KIDNEY DISEASE, WITH LONG-TERM CURRENT USE OF INSULIN, UNSPECIFIED CKD STAGE: ICD-10-CM

## 2024-01-11 DIAGNOSIS — I25.118 CORONARY ARTERY DISEASE OF NATIVE ARTERY OF NATIVE HEART WITH STABLE ANGINA PECTORIS: ICD-10-CM

## 2024-01-11 DIAGNOSIS — I15.2 HYPERTENSION ASSOCIATED WITH DIABETES: ICD-10-CM

## 2024-01-11 DIAGNOSIS — E11.22 TYPE 2 DIABETES MELLITUS WITH CHRONIC KIDNEY DISEASE, WITH LONG-TERM CURRENT USE OF INSULIN, UNSPECIFIED CKD STAGE: ICD-10-CM

## 2024-01-11 DIAGNOSIS — N25.81 HYPERPARATHYROIDISM DUE TO ESRD ON DIALYSIS: ICD-10-CM

## 2024-01-11 DIAGNOSIS — N18.6 ESRD (END STAGE RENAL DISEASE) ON DIALYSIS: ICD-10-CM

## 2024-01-11 DIAGNOSIS — Z76.82 ORGAN TRANSPLANT CANDIDATE: ICD-10-CM

## 2024-01-11 DIAGNOSIS — D63.1 ANEMIA DUE TO CHRONIC KIDNEY DISEASE, ON CHRONIC DIALYSIS: ICD-10-CM

## 2024-01-11 DIAGNOSIS — E11.59 HYPERTENSION ASSOCIATED WITH DIABETES: ICD-10-CM

## 2024-01-11 DIAGNOSIS — Z76.82 PATIENT ON WAITING LIST FOR KIDNEY TRANSPLANT: Primary | Chronic | ICD-10-CM

## 2024-01-11 DIAGNOSIS — Z99.2 ANEMIA DUE TO CHRONIC KIDNEY DISEASE, ON CHRONIC DIALYSIS: ICD-10-CM

## 2024-01-11 DIAGNOSIS — Z99.2 ESRD (END STAGE RENAL DISEASE) ON DIALYSIS: ICD-10-CM

## 2024-01-11 PROCEDURE — 99999 PR PBB SHADOW E&M-EST. PATIENT-LVL V: CPT | Mod: PBBFAC,TXP,, | Performed by: NURSE PRACTITIONER

## 2024-01-11 PROCEDURE — 76770 US EXAM ABDO BACK WALL COMP: CPT | Mod: 26,TXP,, | Performed by: STUDENT IN AN ORGANIZED HEALTH CARE EDUCATION/TRAINING PROGRAM

## 2024-01-11 PROCEDURE — 99215 OFFICE O/P EST HI 40 MIN: CPT | Mod: S$PBB,TXP,, | Performed by: NURSE PRACTITIONER

## 2024-01-11 PROCEDURE — 76770 US EXAM ABDO BACK WALL COMP: CPT | Mod: TC,TXP

## 2024-01-11 PROCEDURE — 71046 X-RAY EXAM CHEST 2 VIEWS: CPT | Mod: TC,TXP

## 2024-01-11 PROCEDURE — 71046 X-RAY EXAM CHEST 2 VIEWS: CPT | Mod: 26,TXP,, | Performed by: RADIOLOGY

## 2024-01-11 PROCEDURE — 99215 OFFICE O/P EST HI 40 MIN: CPT | Mod: PBBFAC,25,TXP | Performed by: NURSE PRACTITIONER

## 2024-01-11 NOTE — PROGRESS NOTES
AA YEARLY PATIENT EDUCATION NOTE    Mr. Isidro Alves was seen in pre-kidney transplant clinic for yearly (or six months) evaluation for kidney, kidney/pancreas or pancreas only transplant.  The patient attended a web based education session that discussed/reviewed the following aspects of transplantation: UNOS waitlist management/multiple listings, types of organs offered (KDPI < 85%, KDPI > 85%, PHS increased risk, DCD, HCV+), financial aspects, surgical procedures, dietary instruction pre- and post-transplant, health maintenance pre- and post-transplant, post-transplant hospitalization and outpatient follow-up, potential to participate in a research protocol, and medication management and side effects. A question and answer session was provided after the presentation.    The patient was seen by all members of the multi-disciplinary team to include: Nephrologist/PA, , , Pharmacist and Dietician (if applicable).    The Patient was educated on OPTN policy change regarding race based eGFR. For Black or  Americans, this eGFR could have shown that their kidneys were working better than they were.    Because of this change, we are looking at everyones record and assessing waiting time for people who are eligible. We will be reviewing your medical records and will notify you if you are eligible. We also encouraged patient to provide span 20 labs that are not in our electronic medical records.    The patient reviewed and signed all consents for evaluation which were witnessed and sent to scanning into the [facility] chart.    The patient was given an education book and plan for further evaluation based on his individual assessment.      The patient was encouraged to call with any questions or concerns.

## 2024-01-11 NOTE — LETTER
January 16, 2024        Gabino Champion  [address]  [place] [address]  Phone: [phone]  Fax: [phone]             [facility]- Transplant 1st Fl  [address]  [facility] [address]  Phone: [phone]   Patient: Isidro Alves   MR Number: [id number]   YOB: 1955   Date of Visit: 1/11/2024       Dear Dr. Gabino Champion    Thank you for referring Isidro Alves to me for evaluation. Attached you will find relevant portions of my assessment and plan of care.    If you have questions, please do not hesitate to call me. I look forward to following Isidro Alves along with you.    Sincerely,    Jessica Collins, NP    Enclosure    If you would like to receive this communication electronically, please contact [email or web address] or [phone] to request [employer] [facility] access.    [employer] [facility] is a tool which provides read-only access to select patient information with whom you have a relationship. Its easy to use and provides real time access to review your patients record including encounter summaries, notes, results, and demographic information.    If you feel you have received this communication in error or would no longer like to receive these types of communications, please e-mail [email or web address]

## 2024-01-11 NOTE — PROGRESS NOTES
Kidney Transplant Recipient Reevalulation    Referring Physician: Gabino Champion  Current Nephrologist: Gabino Champion  Waitlist Status: active  Dialysis Start Date: 9/17/2019    Subjective:     CC:   6 month  reassessment of kidney transplant candidacy.    HPI:  Mr. Alves is a 64 y.o. year old Black or  male with ESRD secondary to diabetic nephropathy.  He has been on the wait list for a kidney transplant at [facility] since 9/17/2019. He is currently  ESRD, Pt reports starting HD ~ 3/2021, he is dialyzing MWF for 3 1/2 hours Per session.  Patient reports that he is tolerating dialysis well. LUE AV fistula for access.       Recent Hospitalizations:       Last seen by cardiology/HF 9/19/23 and last Seen by Dr Dillon 8/29 with the following plan:  In summary, Mr. Alves is a very pleasant 67 yo black male with stage C HFrEF, NYHA class II-III symptoms, ESRD on HD who is considered for reactivating on kidney Tx list. In view fo his recent episode of pulmonary edema, I recommend a LHC/RHC to better risk stratify him. If his LHC/RHC show no significant CAD and no significant pulmonary HTN,then would recommend to repeat his Echo in 3 months from now to reassess his LV EF (now that he is on full dose of Entresto). Should his EF improve~50%, may proceed with listing for kidney transplant from a cardiac standpoint.       Repeat TTE 11/15/23--with EF 40-45%, estimated pulmonary artery systolic pressure is 27 mmHg   9/5/23 RHC/LHC : EF 40% , non obstructive CAD, Pulmonary hypertension was mild to moderate    Pt was Last seen by txp on  7/6/23    FX assessment      UPDATE     Cont to walk  ~ 2 miles 4x/week and will also do push ups. Denies syncope, looks good,   Does not appear frail.         Lab /diagnostic results / wk up  reviewed    Cardiac HX:    HX  ABN stress test and EF 40 % on TTE   Follows with Cardiology SONY Billy  5/30/23    Urology HX:     HX Elevated PSA  , s/p prostate bx 2015  (-)  Urologist Dr Nugent /urology 1/19/2022. He has been Cleared for a transplant by urology    Last seen by urology on 5/3/23        PSA, Screen (ng/mL)   Date Value   01/11/2024 3.9        Lab /diagnostic results- txp wkup reviewed      1/11/24  Renal US:  Bilateral chronic medical renal disease.   PXR 6/30/22  :   No significant vascular calcifications.  CXR 1/11/24:  Cardiac size is normal and aorta is tortuous.  Lungs are clear.  Old rib fractures are noted but no acute cardiac failure or pleural effusion is seen.     6/12/20 iliac doppler: triphasic waveforms bilaterally  2/3/23 colonoscopy:Internal hemorrhoids. otherwise normal. Repeat colonoscopy in 3 years for surveillance       11/15/23 TTE    Left Ventricle: The left ventricle is moderately dilated. Moderately increased wall thickness. There is concentric hypertrophy. Mild global hypokinesis present. There is mildly reduced systolic function with a visually estimated ejection fraction of 40 - 45%. There is normal diastolic function.    Right Ventricle: Normal right ventricular cavity size. Wall thickness is normal. Right ventricle wall motion  is normal. Systolic function is normal.    Left Atrium: Left atrium is severely dilated.    Tricuspid Valve: There is mild regurgitation with a centrally directed jet.    Pulmonary Artery: The estimated pulmonary artery systolic pressure is 27 mmHg.    IVC/SVC: Normal venous pressure at 3 mmHg.    9/5/23 RHC.LHC    The ejection fraction was calculated to be 40%.    The pre-procedure left ventricular end diastolic pressure was 23.    The post-procedure left ventricular end diastolic pressure was 25.    The estimated blood loss was none.    There was non-obstructive coronary artery disease..    There was diastolic dysfunction.    The filling pressures on the right and left were normal. Pulmonary hypertension was mild to moderate.    There was no mitral valve stenosis.    There was no aortic valve stenosis.    There  was no mitral valve prolapse evident. The annulus was normal. There was normal mitral valve motion.           7/26/22 Stress test     Abnormal myocardial perfusion scan.    There is a moderate to severe intensity, large sized, mostly fixed perfusion abnormality with some reversibilty in the inferior, inferolateral, lateral and apical wall(s).    There are no other significant perfusion abnormalities.    The gated perfusion images showed an ejection fraction of 33% at rest. The gated perfusion images showed an ejection fraction of 48% post stress. Normal ejection fraction is greater than 59%.    There is moderate global hypokinesis at rest and stress.    The EKG portion of this study is negative for ischemia.        Past Medical History:   Diagnosis Date    Abnormal nuclear stress test 8/30/2022    Acute hypoxemic respiratory failure due to COVID-19 1/21/2021    Acute on chronic combined systolic and diastolic congestive heart failure 5/7/2023    Acute on chronic respiratory failure 5/7/2023    Acute respiratory failure with hypoxia and hypercapnia 5/18/2023    Anal itching 10/10/2022    Anemia 4/16/2014    BPH (benign prostatic hyperplasia)     CKD (chronic kidney disease) stage 4, GFR 15-29 ml/min     Coronary artery disease of native artery of native heart with stable angina pectoris 4/29/2019    Diabetes mellitus     Diabetes mellitus, type 2     Dilated cardiomyopathy 8/30/2022    Elevated PSA     Encounter for blood transfusion     Herpes labialis     Hyperlipidemia     Hypertension     Hypertensive emergency 5/18/2023    Obesity     Pneumonia 5/7/2023    Pneumonia due to COVID-19 virus     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    Preop cardiovascular exam 9/16/2022    Proteinuria     Pulmonary edema with congestive heart failure 5/18/2023    Secondary hyperparathyroidism (of renal origin)        Past Medical and Surgical History: Mr. Alves  has a past medical history of Abnormal nuclear stress test, Acute hypoxemic  respiratory failure due to COVID-19, Acute on chronic combined systolic and diastolic congestive heart failure, Acute on chronic respiratory failure, Acute respiratory failure with hypoxia and hypercapnia, Anal itching, Anemia, BPH (benign prostatic hyperplasia), CKD (chronic kidney disease) stage 4, GFR 15-29 ml/min, Coronary artery disease of native artery of native heart with stable angina pectoris, Diabetes mellitus, Diabetes mellitus, type 2, Dilated cardiomyopathy, Elevated PSA, Encounter for blood transfusion, Herpes labialis, Hyperlipidemia, Hypertension, Hypertensive emergency, Obesity, Pneumonia, Pneumonia due to COVID-19 virus, Post viral asthma, Preop cardiovascular exam, Proteinuria, Pulmonary edema with congestive heart failure, and Secondary hyperparathyroidism (of renal origin).  He has a past surgical history that includes thorocotomy (01/01/2010); Prostate biopsy; Colonoscopy (N/A, 12/21/2017); Fluoroscopy (N/A, 01/21/2021); Fluoroscopy (N/A, 04/06/2021); Catheterization of both left and right heart (N/A, 08/30/2022); Colonoscopy (N/A, 02/02/2023); and Catheterization of both left and right heart (N/A, 9/5/2023).    Past Social and Family History: Mr. Alves reports that he has never smoked. He has never used smokeless tobacco. He reports that he does not currently use alcohol. He reports that he does not use drugs. His family history includes Diabetes in his father and another family member; Hypertension in his father and another family member; No Known Problems in his mother.        Past Medical History:   Diagnosis Date    Abnormal nuclear stress test 8/30/2022    Acute hypoxemic respiratory failure due to COVID-19 1/21/2021    Acute on chronic combined systolic and diastolic congestive heart failure 5/7/2023    Acute on chronic respiratory failure 5/7/2023    Acute respiratory failure with hypoxia and hypercapnia 5/18/2023    Anal itching 10/10/2022    Anemia 4/16/2014    BPH (benign prostatic  "hyperplasia)     CKD (chronic kidney disease) stage 4, GFR 15-29 ml/min     Coronary artery disease of native artery of native heart with stable angina pectoris 4/29/2019    Diabetes mellitus     Diabetes mellitus, type 2     Dilated cardiomyopathy 8/30/2022    Elevated PSA     Encounter for blood transfusion     Herpes labialis     Hyperlipidemia     Hypertension     Hypertensive emergency 5/18/2023    Obesity     Pneumonia 5/7/2023    Pneumonia due to COVID-19 virus     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    Preop cardiovascular exam 9/16/2022    Proteinuria     Pulmonary edema with congestive heart failure 5/18/2023    Secondary hyperparathyroidism (of renal origin)        Review of Systems   Constitutional:  Positive for fatigue. Negative for activity change, appetite change, chills, fever and unexpected weight change.   HENT:  Negative for congestion, facial swelling, postnasal drip, rhinorrhea, sinus pressure, sore throat and trouble swallowing.    Eyes:  Positive for visual disturbance. Negative for pain and redness.        Wears glasses   Respiratory:  Negative for cough, chest tightness, shortness of breath and wheezing.    Cardiovascular:  Negative for chest pain and palpitations.   Gastrointestinal:  Positive for abdominal distention. Negative for abdominal pain, diarrhea, nausea and vomiting.   Genitourinary:  Negative for dysuria, flank pain and urgency.   Musculoskeletal:  Negative for gait problem, neck pain and neck stiffness.   Skin:  Negative for rash.   Allergic/Immunologic: Negative for environmental allergies, food allergies and immunocompromised state.   Neurological:  Negative for dizziness, weakness and light-headedness.   Psychiatric/Behavioral:  Negative for agitation and confusion. The patient is not nervous/anxious.        Objective:   Blood pressure (!) 163/74, pulse 85, temperature 97.3 °F (36.3 °C), temperature source Skin, resp. rate 18, height 6' 2" (1.88 m), weight 116.5 kg (256 lb 13.4 oz), " SpO2 97 %.body mass index is 32.98 kg/m².    Physical Exam  Vitals reviewed.   Constitutional:       Appearance: Normal appearance. He is well-developed.   HENT:      Head: Normocephalic.   Eyes:      Conjunctiva/sclera: Conjunctivae normal.      Pupils: Pupils are equal, round, and reactive to light.   Cardiovascular:      Rate and Rhythm: Normal rate and regular rhythm.      Heart sounds: Normal heart sounds.   Pulmonary:      Effort: Pulmonary effort is normal.      Breath sounds: Normal breath sounds.   Chest:       Abdominal:      General: Bowel sounds are normal. There is distension.      Palpations: Abdomen is soft. There is no hepatomegaly, splenomegaly or mass.      Tenderness: There is no abdominal tenderness. There is no guarding or rebound. Negative signs include Curran's sign and McBurney's sign.   Musculoskeletal:         General: Normal range of motion.        Arms:       Cervical back: Normal range of motion and neck supple.      Right lower leg: No edema.      Left lower leg: No edema.      Comments:  No peripheral edema   Lymphadenopathy:      Cervical: No cervical adenopathy.   Skin:     General: Skin is warm and dry.   Neurological:      Mental Status: He is alert and oriented to person, place, and time.      Motor: No abnormal muscle tone.      Coordination: Coordination normal.   Psychiatric:         Behavior: Behavior normal.         Labs:  Lab Results   Component Value Date    WBC 6.66 08/29/2023    HGB 11.2 (L) 08/29/2023    HCT 35.2 (L) 08/29/2023     08/29/2023    K 4.8 08/29/2023     08/29/2023    CO2 25 08/29/2023    BUN 41 (H) 08/29/2023    CREATININE 8.8 (H) 08/29/2023    EGFRNONAA 6.0 (A) 07/14/2022    CALCIUM 9.6 08/29/2023    PHOS 4.3 05/08/2023    MG 1.7 05/14/2023    ALBUMIN 3.4 (L) 05/28/2023    AST 12 05/28/2023    ALT 7 (L) 05/28/2023    UTPCR 5.54 (H) 02/01/2021    .5 (H) 08/25/2022       Lab Results   Component Value Date    BILIRUBINUA Negative 05/14/2023  "   PROTEINUA 2+ (A) 05/14/2023    NITRITE Negative 05/14/2023    RBCUA 9 (H) 05/14/2023    WBCUA 8 (H) 05/14/2023       No results found for: "HLAABCTYPE"    Labs were reviewed with the patient.    Assessment:     1. Patient on waiting list for kidney transplant    2. ESRD (end stage renal disease) on dialysis    3. Hypertension associated with diabetes    4. Type 2 diabetes mellitus with chronic kidney disease, with long-term current use of insulin, unspecified CKD stage    5. Coronary artery disease of native artery of native heart with stable angina pectoris    6. Hyperparathyroidism due to ESRD on dialysis    7. Anemia due to chronic kidney disease, on chronic dialysis        Plan:         LHC/RHC recommended  to better risk stratify him. If his LHC/RHC show no significant CAD and no significant pulmonary HTN,then would recommend to repeat his Echo in 3 months from now to reassess his LV EF (now that he is on full dose of Entresto). Should his EF improve~50%, may proceed with listing for kidney transplant from a cardiac standpoint.     Repeat TTE 11/15/23--with EF 40-45%, estimated pulmonary artery systolic pressure is 27 mmHg   9/5/23 RHC/LHC : EF 40% , non obstructive CAD, Pulmonary hypertension was mild to moderate    - need  review data with committee to see if acceptable to reactivate     Transplant Candidacy:   MR. Alves  is  marginal /unacceptable for  kidney transplant candidate.  Meets center eligibility for accepting HCV+ donor offer - yes.  Patient educated on HCV+ donors. He is willing to accept HCV+ donor offer - yes   Patient is a candidate for KDPI > 85 kidney donor offer - no d/t weight.  He has experienced recent health changes that warrant further investigation an discussion.    Patient will be placed in inactive  status at present and presented to committee for recs.       Follow-up:   In addition to the tests noted in the plan, MR. Alves will continue to have reevaluation as per the " standing pre-kidney transplant protocol:  Monthly blood for PRA  Annual return to clinic, except HIV positive, > 65 years of age, or pancreas transplant candidates who will be scheduled to see transplant every 6 months while in pre-transplant phase  Annual re-testing: CXR, EKG, yearly mammograms for women over 40 and PSA for males over 40, cardiology follow-up as recommended by initial cardiology pre-transplant evaluation  Renal ultrasound every 2 years  Baseline colonoscopy after age 50 and repeated as recommended      JES Davis Patient Status  Functional Status: 60% - Requires occasional assistance but is able to care for needs  Physical Capacity: No Limitations

## 2024-01-11 NOTE — TELEPHONE ENCOUNTER
MA notes per Adherence form     FOR THE PAST THREE MONTHS:    13-AMA's 10/06/23 25 min, 11/21/23 39 min, 11/29/23 24 min. 12/5/23 44 min, 12/6/23 24 min, 12/13/23 19 min, 12/20/23 87 min, 12/22/23 39 min, 12/24/23 60 min, 12/29/23 18 min reason is personal     11/3/23 24 min, 12/4/23 151 min, family emergency     11/15/23 22 min, appointment     0-No-shows    No concerns with care giving, transportation, or mental health    Scanned in pt's media    Raysa Archuleta  Abdominal Transplant MA

## 2024-01-12 NOTE — PROGRESS NOTES
YEARLY LIST MANAGEMENT NOTE    Isidro Alves's kidney transplant listing status reviewed.  Patient is due for follow-up appointments on 7/2024.  Appointments will be scheduled per protocol.  NP would like to discuss patient's case at committee next week.  Patient remains inactive on the list

## 2024-01-13 ENCOUNTER — LAB VISIT (OUTPATIENT)
Dept: LAB | Facility: HOSPITAL | Age: 69
End: 2024-01-13
Payer: MEDICARE

## 2024-01-13 DIAGNOSIS — Z76.82 ORGAN TRANSPLANT CANDIDATE: ICD-10-CM

## 2024-01-18 NOTE — PROGRESS NOTES
Transplant Psychosocial Evaluation Update:  Last psychosocial evaluation for transplant was completed on 7/06/2023 by PRESLEY Talley    Pt presents without a caregiver today. Pt present as alert, oriented to person, place, time, purpose of visit. Pt deny concerns about going through with transplant and state motivation and sense of preparedness for transplantation, caregiver role, psychosocial risk factors, medical risk factors, financial aspects, and lifetime commitments. All concerns and education points as per transplant psychosocial evaluation process addressed (also refer to psychosocial dated 2/20/2023). Pt actively participated in today's interview. Pt asking questions appropriately and denies changes to previous psychosocial evaluation for transplantation which we reviewed line by line with pt and, per pt choice, with pts caregiver today.    Primary Caregivers and Transportation for Transplant:  1. Kunal Alves, 62 yo wife, retired, DOES drive, [phone]  2. Sahil Reynolds, 63 yo sister, BRLA, DOES drive, retired, [phone]  3. Kevin Alves, 49 yo daughter, works as senior research director at [employer]; [place], [place]; DOES drive, [phone]  4. Princess Alves, 77 yo aunt, BRLA, retired, does NOT drive, [phone]    Both pt and caregiver/s plan to stay locally at  apartments or hotel of choice - information reviewed in depth. Caregivers plan to stay at hospital as appropriate for transplant and post-transplant process.    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 Pt reports monthly income is around $4,200.     DME: [facility]     ADLS:  Pt reports no difficulties with driving, walking, bathing, cooking, housekeeping, eating, shopping, and taking medication.     Household and Dependents: pt resides with pt's wife, Kunal Alves, and has no dependents at this time.    Income: Pt states ability to afford all monthly expenses and  "costs including for medications now and if transplanted based on income and on savings and assets.    Dialysis Adherence: Julio C Molina ([phone]).  MA notes per Adherence form   FOR THE PAST THREE MONTHS:  13-AMA's 10/06/23 25 min, 23 39 min, 23 24 min. 23 44 min, 23 24 min, 23 19 min, 23 87 min, 23 39 min, 23 60 min, 23 18 min reason is personal   11/3/23 24 min, 23 151 min, family emergency   11/15/23 22 min, appointment   0-No-shows  No concerns with care giving, transportation, or mental health  Pt and wife deny any additional concerns, stating preparedness and motivation to proceed with organ transplant.     SW inquired about the above reported adherence. Pt reports he shortens treatment and leaves AMA due to muscle cramping. Pt reports he as requested multiple times for the medical team to adjust dry weight with no change. Pt reports he has started working out and "things need to be changed". SW also encouraged pt to set up a  meeting with  nephrologist for further consult.   SW verbalized understanding and validation.     Payer/Plan Subscr  Sex Relation Sub. Ins. ID Effective Group Num   1. MEDICARE - ME* KENNEY TEJEDA 1955 Male Self [id number] 21                                    [address]   2. BLUE CROSS * MAGGIE TEJEDA L 1960  Spouse [id number]                                   [address] [address]       Suitability for Transplant:   Pt remains low risk for transplant at this time based on psychosocial risk factors and strengths.    Pt patrick well overall with health needs and life in general, has stable supports, adequate insurance, financial stability, transportation and caregiver plans in place, and is using no substances unless prescribed. Pt denies mental health concerns, SI, or HI.             "

## 2024-01-19 ENCOUNTER — TELEPHONE (OUTPATIENT)
Dept: OTOLARYNGOLOGY | Facility: CLINIC | Age: 69
End: 2024-01-19
Payer: MEDICARE

## 2024-01-19 ENCOUNTER — COMMITTEE REVIEW (OUTPATIENT)
Dept: TRANSPLANT | Facility: CLINIC | Age: 69
End: 2024-01-19
Payer: MEDICARE

## 2024-01-19 ENCOUNTER — TELEPHONE (OUTPATIENT)
Dept: TRANSPLANT | Facility: CLINIC | Age: 69
End: 2024-01-19
Payer: MEDICARE

## 2024-01-19 NOTE — COMMITTEE REVIEW
Patient discussed today regarding re-activation on list.   Patient cleared by cardiology at increased risk of CV event perioperatively.  EF stable at 40% and PA pressure 27on echo 11/15/2023  Had left heart cath on 9/5/2023.  Committee decision to activate patient on the kidney waitlist.  Patient to have echocardiograms every 6 months while listed.    .  I was present at the meeting and attest to the decision of the committee

## 2024-01-19 NOTE — TELEPHONE ENCOUNTER
----- Message from Vanessa Castellon sent at 1/19/2024  7:29 AM CST -----  Contact: Isidro Broussard is calling in regards to left ear has been draining blood since yesterday when wiping.hector call back at [phone]            Thanks  [facility]

## 2024-01-22 ENCOUNTER — OFFICE VISIT (OUTPATIENT)
Dept: OTOLARYNGOLOGY | Facility: CLINIC | Age: 69
End: 2024-01-22
Payer: MEDICARE

## 2024-01-22 DIAGNOSIS — H61.21 IMPACTED CERUMEN OF RIGHT EAR: Primary | ICD-10-CM

## 2024-01-22 DIAGNOSIS — H92.12 OTORRHEA, LEFT: ICD-10-CM

## 2024-01-22 DIAGNOSIS — Z96.22 HISTORY OF TYMPANOSTOMY TUBE PLACEMENT: ICD-10-CM

## 2024-01-22 PROCEDURE — 99213 OFFICE O/P EST LOW 20 MIN: CPT | Mod: S$PBB,TXP,, | Performed by: STUDENT IN AN ORGANIZED HEALTH CARE EDUCATION/TRAINING PROGRAM

## 2024-01-22 PROCEDURE — 69210 REMOVE IMPACTED EAR WAX UNI: CPT | Mod: 25,S$PBB,TXP, | Performed by: STUDENT IN AN ORGANIZED HEALTH CARE EDUCATION/TRAINING PROGRAM

## 2024-01-22 PROCEDURE — 99213 OFFICE O/P EST LOW 20 MIN: CPT | Mod: PBBFAC,TXP | Performed by: STUDENT IN AN ORGANIZED HEALTH CARE EDUCATION/TRAINING PROGRAM

## 2024-01-22 PROCEDURE — 69210 REMOVE IMPACTED EAR WAX UNI: CPT | Mod: 25,PBBFAC,TXP | Performed by: STUDENT IN AN ORGANIZED HEALTH CARE EDUCATION/TRAINING PROGRAM

## 2024-01-22 PROCEDURE — 99999 PR PBB SHADOW E&M-EST. PATIENT-LVL III: CPT | Mod: PBBFAC,TXP,, | Performed by: STUDENT IN AN ORGANIZED HEALTH CARE EDUCATION/TRAINING PROGRAM

## 2024-01-22 RX ORDER — CIPROFLOXACIN AND DEXAMETHASONE 3; 1 MG/ML; MG/ML
4 SUSPENSION/ DROPS AURICULAR (OTIC) 2 TIMES DAILY
Qty: 7.5 ML | Refills: 0 | Status: ON HOLD | OUTPATIENT
Start: 2024-01-22 | End: 2024-02-20 | Stop reason: HOSPADM

## 2024-01-22 NOTE — PROGRESS NOTES
Chief complaint:   Chief Complaint   Patient presents with    Ear Drainage     Pt states that left ear has been bleeding since about last week         History of Present Illness, 2/14/22:     Mr. Alves is a 68 y.o. male presenting for evaluation of left ear otalgia and hearing loss. Onset of this chief complaint was about a month ago.  Additional symptoms that also have been associated are left ear hearing loss. Treatment included ear canal flushing. Symptoms worsened after this. Denies otorrhea, vertigo, fever.    The patient also denies pain deep within the ear, tenderness around the ear canal or pre-auricular area, or headaches.       The patient denies significant hearing loss risk factors, ototoxic medication exposure, chronic vestibular suppressant use, head/ facial/ soraya trauma, and otologic surgery.      Return clinic visit, 02/17/2022  Ear pain and throbbing worsened over last couple days. Muffled hearing now. No fever, post auricular pain, facial weakness.     Return clinic visit, 3/17/22  Used abx as prescribed. Pain has resolved. Hearing still off - feels hollow.     No nose bleed, nasal obstruction, neck mass.    Return clinic visit, 3/23/23  No change in symptoms. Continued fullness, muffled hearing AS.    Return clinic visit, 1/22/24  S/p  L PET 3/23/23  Left ear bleeding for about 1 week. Feels wet in that ear.   No recent illness.   No tx.      History        Past Medical History:   Past Medical History:   Diagnosis Date    Abnormal nuclear stress test 8/30/2022    Acute hypoxemic respiratory failure due to COVID-19 1/21/2021    Acute on chronic combined systolic and diastolic congestive heart failure 5/7/2023    Acute on chronic respiratory failure 5/7/2023    Acute respiratory failure with hypoxia and hypercapnia 5/18/2023    Anal itching 10/10/2022    Anemia 4/16/2014    BPH (benign prostatic hyperplasia)     CKD (chronic kidney disease) stage 4, GFR 15-29 ml/min     Coronary artery disease of  native artery of native heart with stable angina pectoris 4/29/2019    Diabetes mellitus     Diabetes mellitus, type 2     Dilated cardiomyopathy 8/30/2022    Elevated PSA     Encounter for blood transfusion     Herpes labialis     Hyperlipidemia     Hypertension     Hypertensive emergency 5/18/2023    Obesity     Pneumonia 5/7/2023    Pneumonia due to COVID-19 virus     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    Preop cardiovascular exam 9/16/2022    Proteinuria     Pulmonary edema with congestive heart failure 5/18/2023    Secondary hyperparathyroidism (of renal origin)     .          Past Surgical History:  Past Surgical History:   Procedure Laterality Date    CATHETERIZATION OF BOTH LEFT AND RIGHT HEART N/A 08/30/2022    Procedure: CATHETERIZATION, HEART, BOTH LEFT AND RIGHT;  Surgeon: Kassandra Deras MD;  Location: [facility] CATH LAB;  Service: Cardiology;  Laterality: N/A;    CATHETERIZATION OF BOTH LEFT AND RIGHT HEART N/A 9/5/2023    Procedure: CATHETERIZATION, HEART, BOTH LEFT AND RIGHT;  Surgeon: Kassandra Deras MD;  Location: [facility] CATH LAB;  Service: Cardiology;  Laterality: N/A;    COLONOSCOPY N/A 12/21/2017    Procedure: COLONOSCOPY;  Surgeon: Fran Escalera MD;  Location: [facility] ENDO;  Service: Endoscopy;  Laterality: N/A;    COLONOSCOPY N/A 02/02/2023    Procedure: COLONOSCOPY;  Surgeon: Anthony Meng MD;  Location: [facility] ENDO;  Service: Endoscopy;  Laterality: N/A;    FLUOROSCOPY N/A 01/21/2021    Procedure: Vascath insertion;  Surgeon: Adrian Pruitt Jr., MD;  Location: [facility] CATH LAB;  Service: General;  Laterality: N/A;    FLUOROSCOPY N/A 04/06/2021    Procedure: Over the wire Vas Cath exchange;  Surgeon: Ho Pressley MD;  Location: [facility] CATH LAB;  Service: General;  Laterality: N/A;    PROSTATE BIOPSY      thorocotomy  01/01/2010   .         Medications: Medication list was reviewed. He  has a current medication list which includes the following prescription(s): aspirin, atorvastatin, blood sugar  diagnostic, blood-glucose meter, calcitriol, carvedilol, cetirizine, clotrimazole-betamethasone 1-0.05%, cyclobenzaprine, esomeprazole, finasteride, folic acid, isosorbide dinitrate, lancets, entresto, sevelamer carbonate, tamsulosin, and furosemide.         Allergies:   Review of patient's allergies indicates:   Allergen Reactions    Bactrim [sulfamethoxazole-trimethoprim] Swelling    Nsaids (non-steroidal anti-inflammatory drug)      CKD    Sulfamethoxazole     Trimethoprim             Family history: family history includes Diabetes in his father and another family member; Hypertension in his father and another family member; No Known Problems in his mother.         Social History          Alcohol use:  reports that he does not currently use alcohol.            Tobacco:  reports that he has never smoked. He has never used smokeless tobacco.         Please see the patient's intake form for full details of past medical history, past surgical history, family history, social history and review of systems. ?This information was reviewed by me and noted.      Physical Examination     General: Well developed, well nourished, well hydrated. Verbal with a strong voice and not dysphonic.     Head/Face: Normocephalic, atraumatic. No scars or lesions. Facial musculature equal.     Eyes: No scleral icterus or conjunctival hemorrhage. EOMI. PERRLA.     Ears:     Right ear: after disimpaction - No gross deformity. EAC is clear The TM is intact with a pneumatized middle ear. No signs of retraction, fluid or infection.      Left ear: PET in place with white drainage and granulation around base of tube, suctioned clear      Nose: No gross deformity or lesions.     Mouth/Oropharynx: Lips without any lesions.     Neck: Trachea midline. No masses. No thyromegaly or nodules palpated.     Lymphatic: No lymphadenopathy in the neck.     Extremities: No cyanosis. Warm and well-perfused.     Skin: No scars or lesions on face or neck.       Neurologic: Moving all extremities without gross abnormality.CN II-XII grossly intact. House-Brackmann 1/6. No signs of nystagmus.      Psych: Alert and oriented to person, place, and time with an appropriate mood and affect.     Procedure: ear microscopy with removal of cerumen    Description: The patient was in agreement with the examination and debridement of the ears. Removal of the cerumen required use of an operating microscope and multiple micro-instruments.     With the patient in the supine position, we used the operating microscope to examine both ears with the appropriate sized ear speculum.  A variety of sterile, micro-instruments were utilized to remove the cerumen atraumatically.  I performed the procedure which required a significant amount of time and effort. The tympanic membrane was then well visualized.  The patient tolerated the procedure well and there were no complications.    Findings:   Right ear had significant wax, the EAC was normal, and the tympanic membrane was intact with no evidence of middle ear fluid.        Audiogram     Audiogram  was independently reviewed          Procedure - Fiberoptic Nasopharyngoscopy (prior)    Surgeon: Fabio Vargas M.D. .      Anesthesia: topical 0.05% oxymetazoline with 4% lidocaine      Complications: None.     Indication: Isidro Alves is a 68 y.o. male with L Otitis Media with Effusion for 6+ months, unresponsive to medication. Scope examination was necessitated prior to tube placement to rule out nasopharyngeal mass or pathology.    Description of Procedure: With the patient in the sitting position, topical lidocaine and oxymetazoline was applied to the nose. The scope was passed through the nose. Examination was carried out of the nose, sinuses, and nasopharynx. The scope was removed. The patient tolerated the procedure well.      Findings: no evidence of purulence or polyps from sinonasal cavities. No masses of sinonasal cavities or  specifically in the NP.           Assessment   * No diagnoses found *     S/p L PET 3/23/23 now with new onset tube otorrhea and granulation    Plan:    ciprodex x 10d  Keep ears dry  Return to clinic 2 weeks for recheck      Fabio Vargas MD  [facility]   [facility]  [address] [address] [facility].  [place] [address]  P: [phone]  F: [phone]

## 2024-01-26 LAB — HPRA INTERPRETATION: NORMAL

## 2024-01-26 PROCEDURE — 86833 HLA CLASS II HIGH DEFIN QUAL: CPT | Performed by: NURSE PRACTITIONER

## 2024-01-26 PROCEDURE — 86832 HLA CLASS I HIGH DEFIN QUAL: CPT | Performed by: NURSE PRACTITIONER

## 2024-02-02 PROCEDURE — 86833 HLA CLASS II HIGH DEFIN QUAL: CPT | Mod: PO | Performed by: NURSE PRACTITIONER

## 2024-02-02 PROCEDURE — 86977 RBC SERUM PRETX INCUBJ/INHIB: CPT | Mod: PO | Performed by: NURSE PRACTITIONER

## 2024-02-02 PROCEDURE — 86832 HLA CLASS I HIGH DEFIN QUAL: CPT | Mod: PO | Performed by: NURSE PRACTITIONER

## 2024-02-05 ENCOUNTER — LAB VISIT (OUTPATIENT)
Dept: LAB | Facility: HOSPITAL | Age: 69
End: 2024-02-05
Payer: MEDICARE

## 2024-02-05 DIAGNOSIS — N25.81 SECONDARY HYPERPARATHYROIDISM OF RENAL ORIGIN: ICD-10-CM

## 2024-02-05 DIAGNOSIS — Z76.82 ORGAN TRANSPLANT CANDIDATE: ICD-10-CM

## 2024-02-06 RX ORDER — INSULIN DEGLUDEC 200 U/ML
30 INJECTION, SOLUTION SUBCUTANEOUS
Refills: 8 | OUTPATIENT
Start: 2024-02-06

## 2024-02-06 RX ORDER — CALCITRIOL 0.25 UG/1
0.25 CAPSULE ORAL
Qty: 12 CAPSULE | Refills: 14 | Status: ON HOLD | OUTPATIENT
Start: 2024-02-07 | End: 2024-02-20 | Stop reason: HOSPADM

## 2024-02-06 NOTE — TELEPHONE ENCOUNTER
LOV 07/28/22  Scheduled appt 05/09/2024    Called patient to assist with scheduling a follow up appointment. Patient also states he is no longer taking the Tresiba

## 2024-02-14 RX ORDER — TAMSULOSIN HYDROCHLORIDE 0.4 MG/1
CAPSULE ORAL
Qty: 90 CAPSULE | Refills: 3 | Status: ON HOLD | OUTPATIENT
Start: 2024-02-14 | End: 2024-02-19 | Stop reason: HOSPADM

## 2024-02-15 ENCOUNTER — OFFICE VISIT (OUTPATIENT)
Dept: OTOLARYNGOLOGY | Facility: CLINIC | Age: 69
End: 2024-02-15
Payer: MEDICARE

## 2024-02-15 DIAGNOSIS — J06.9 UPPER RESPIRATORY TRACT INFECTION, UNSPECIFIED TYPE: ICD-10-CM

## 2024-02-15 DIAGNOSIS — Z96.22 HISTORY OF TYMPANOSTOMY TUBE PLACEMENT: ICD-10-CM

## 2024-02-15 DIAGNOSIS — H92.12 OTORRHEA, LEFT: Primary | ICD-10-CM

## 2024-02-15 PROCEDURE — 92504 EAR MICROSCOPY EXAMINATION: CPT | Mod: 25,PBBFAC,NTX | Performed by: STUDENT IN AN ORGANIZED HEALTH CARE EDUCATION/TRAINING PROGRAM

## 2024-02-15 PROCEDURE — 99213 OFFICE O/P EST LOW 20 MIN: CPT | Mod: S$PBB,TXP,, | Performed by: STUDENT IN AN ORGANIZED HEALTH CARE EDUCATION/TRAINING PROGRAM

## 2024-02-15 PROCEDURE — 99999 PR PBB SHADOW E&M-EST. PATIENT-LVL III: CPT | Mod: PBBFAC,TXP,, | Performed by: STUDENT IN AN ORGANIZED HEALTH CARE EDUCATION/TRAINING PROGRAM

## 2024-02-15 PROCEDURE — 92504 EAR MICROSCOPY EXAMINATION: CPT | Mod: 25,S$PBB,TXP, | Performed by: STUDENT IN AN ORGANIZED HEALTH CARE EDUCATION/TRAINING PROGRAM

## 2024-02-15 PROCEDURE — 99213 OFFICE O/P EST LOW 20 MIN: CPT | Mod: PBBFAC,NTX | Performed by: STUDENT IN AN ORGANIZED HEALTH CARE EDUCATION/TRAINING PROGRAM

## 2024-02-15 NOTE — TELEPHONE ENCOUNTER
Isidro calling states he saw Dr. Nugent yesterday for right testicle swelling & prescribed antibiotics. Pt currently c/o right hip pain and increased swelling to right testicle since waking up from sleep this morning. Reports testicle is tender to touch and pain increased with sitting/walking. Pt states it is much harder for him to walk today compared to yesterday. Has taken 3 doses of prescribed Doxycyline. Pt states he is on waiting list for kidney transplant. Advised per triage protocol to go to nearest ED now for physician mana. Instructed to call ems 911 now if no immediate  and/or if unable to walk. V/u.   Reason for Disposition   Scrotum is painful or tender to touch    Protocols used: Scrotum Swelling-A-OH    
Left arm;

## 2024-02-15 NOTE — PROGRESS NOTES
"Chief complaint:   Chief Complaint   Patient presents with    Other     F/u ear, left ear "crusty"        History of Present Illness, 2/14/22:     Mr. Alves is a 68 y.o. male presenting for evaluation of left ear otalgia and hearing loss. Onset of this chief complaint was about a month ago.  Additional symptoms that also have been associated are left ear hearing loss. Treatment included ear canal flushing. Symptoms worsened after this. Denies otorrhea, vertigo, fever.    The patient also denies pain deep within the ear, tenderness around the ear canal or pre-auricular area, or headaches.       The patient denies significant hearing loss risk factors, ototoxic medication exposure, chronic vestibular suppressant use, head/ facial/ soraya trauma, and otologic surgery.      Return clinic visit, 02/17/2022  Ear pain and throbbing worsened over last couple days. Muffled hearing now. No fever, post auricular pain, facial weakness.     Return clinic visit, 3/17/22  Used abx as prescribed. Pain has resolved. Hearing still off - feels hollow.     No nose bleed, nasal obstruction, neck mass.    Return clinic visit, 3/23/23  No change in symptoms. Continued fullness, muffled hearing AS.    Return clinic visit, 1/22/24  S/p  L PET 3/23/23  Left ear bleeding for about 1 week. Feels wet in that ear.   No recent illness.   No tx.    Return clinic visit, 2/15/24  Era doing better. No bleeding, no pain or drainage.   Occasionally feels stopped up.  Used otic drops.    Now with 3 days of fatigue, nasal congestion, headache     History        Past Medical History:   Past Medical History:   Diagnosis Date    Abnormal nuclear stress test 8/30/2022    Acute hypoxemic respiratory failure due to COVID-19 1/21/2021    Acute on chronic combined systolic and diastolic congestive heart failure 5/7/2023    Acute on chronic respiratory failure 5/7/2023    Acute respiratory failure with hypoxia and hypercapnia 5/18/2023    Anal itching 10/10/2022 "    Anemia 4/16/2014    BPH (benign prostatic hyperplasia)     CKD (chronic kidney disease) stage 4, GFR 15-29 ml/min     Coronary artery disease of native artery of native heart with stable angina pectoris 4/29/2019    Diabetes mellitus     Diabetes mellitus, type 2     Dilated cardiomyopathy 8/30/2022    Elevated PSA     Encounter for blood transfusion     Herpes labialis     Hyperlipidemia     Hypertension     Hypertensive emergency 5/18/2023    Obesity     Pneumonia 5/7/2023    Pneumonia due to COVID-19 virus     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    Preop cardiovascular exam 9/16/2022    Proteinuria     Pulmonary edema with congestive heart failure 5/18/2023    Secondary hyperparathyroidism (of renal origin)     .          Past Surgical History:  Past Surgical History:   Procedure Laterality Date    CATHETERIZATION OF BOTH LEFT AND RIGHT HEART N/A 08/30/2022    Procedure: CATHETERIZATION, HEART, BOTH LEFT AND RIGHT;  Surgeon: Kassandra Deras MD;  Location: [facility] CATH LAB;  Service: Cardiology;  Laterality: N/A;    CATHETERIZATION OF BOTH LEFT AND RIGHT HEART N/A 9/5/2023    Procedure: CATHETERIZATION, HEART, BOTH LEFT AND RIGHT;  Surgeon: Kassandra Deras MD;  Location: [facility] CATH LAB;  Service: Cardiology;  Laterality: N/A;    COLONOSCOPY N/A 12/21/2017    Procedure: COLONOSCOPY;  Surgeon: Fran Escalera MD;  Location: [facility] ENDO;  Service: Endoscopy;  Laterality: N/A;    COLONOSCOPY N/A 02/02/2023    Procedure: COLONOSCOPY;  Surgeon: Anthony Meng MD;  Location: [facility] ENDO;  Service: Endoscopy;  Laterality: N/A;    FLUOROSCOPY N/A 01/21/2021    Procedure: Vascath insertion;  Surgeon: Adrian Pruitt Jr., MD;  Location: [facility] CATH LAB;  Service: General;  Laterality: N/A;    FLUOROSCOPY N/A 04/06/2021    Procedure: Over the wire Vas Cath exchange;  Surgeon: Ho Pressley MD;  Location: [facility] CATH LAB;  Service: General;  Laterality: N/A;    PROSTATE BIOPSY      thorocotomy  01/01/2010   .         Medications:  Medication list was reviewed. He  has a current medication list which includes the following prescription(s): aspirin, atorvastatin, blood sugar diagnostic, blood-glucose meter, calcitriol, carvedilol, cetirizine, ciprofloxacin-dexamethasone 0.3-0.1%, clotrimazole-betamethasone 1-0.05%, cyclobenzaprine, esomeprazole, finasteride, folic acid, furosemide, isosorbide dinitrate, lancets, entresto, sevelamer carbonate, and tamsulosin.         Allergies:   Review of patient's allergies indicates:   Allergen Reactions    Bactrim [sulfamethoxazole-trimethoprim] Swelling    Nsaids (non-steroidal anti-inflammatory drug)      CKD    Sulfamethoxazole     Trimethoprim             Family history: family history includes Diabetes in his father and another family member; Hypertension in his father and another family member; No Known Problems in his mother.         Social History          Alcohol use:  reports that he does not currently use alcohol.            Tobacco:  reports that he has never smoked. He has never used smokeless tobacco.         Please see the patient's intake form for full details of past medical history, past surgical history, family history, social history and review of systems. ?This information was reviewed by me and noted.      Physical Examination     General: Well developed, well nourished, well hydrated. Verbal with a strong voice and not dysphonic.     Head/Face: Normocephalic, atraumatic. No scars or lesions. Facial musculature equal.     Eyes: No scleral icterus or conjunctival hemorrhage. EOMI. PERRLA.     Ears:     Right ear: after disimpaction - No gross deformity. EAC is clear The TM is intact with a pneumatized middle ear. No signs of retraction, fluid or infection.      Left ear: see procedure      Nose: No gross deformity or lesions.     Mouth/Oropharynx: Lips without any lesions.     Neck: Trachea midline. No masses. No thyromegaly or nodules palpated.     Lymphatic: No lymphadenopathy in the  neck.     Extremities: No cyanosis. Warm and well-perfused.     Skin: No scars or lesions on face or neck.      Neurologic: Moving all extremities without gross abnormality.CN II-XII grossly intact. House-Brackmann 1/6. No signs of nystagmus.      Psych: Alert and oriented to person, place, and time with an appropriate mood and affect.     Ear Microscopy    Indication: microscopy was required for removal of obstructing pathology and adequate visualization of the tympanic membrane and middle ear    Technique: The patient was placed in a semi-recumbent position.  The left ear was first inspected under the microscope. There was evidence of mild white drainage.  This was removed with microsuction. Repeat examination revealed resolved granulation around tube, PET in place and patent, dry middle ear.        Audiogram     Audiogram  was independently reviewed          Procedure - Fiberoptic Nasopharyngoscopy (prior)    Surgeon: Fabio Vargas M.D. .      Anesthesia: topical 0.05% oxymetazoline with 4% lidocaine      Complications: None.     Indication: Isidro Alves is a 68 y.o. male with L Otitis Media with Effusion for 6+ months, unresponsive to medication. Scope examination was necessitated prior to tube placement to rule out nasopharyngeal mass or pathology.    Description of Procedure: With the patient in the sitting position, topical lidocaine and oxymetazoline was applied to the nose. The scope was passed through the nose. Examination was carried out of the nose, sinuses, and nasopharynx. The scope was removed. The patient tolerated the procedure well.      Findings: no evidence of purulence or polyps from sinonasal cavities. No masses of sinonasal cavities or specifically in the NP.           Assessment   * No diagnoses found *     S/p L PET 3/23/23 now with new onset tube otorrhea and granulation which improved with otic drops    Keep ears dry  Return to clinic 2-3m for recheck      Conservative measures for URI  discussed        Fabio Vargas MD  [facility]   [facility]  [address] [address] [facility].  [place] [address]  P: [phone]  F: [phone]

## 2024-02-17 ENCOUNTER — TELEPHONE (OUTPATIENT)
Dept: TRANSPLANT | Facility: CLINIC | Age: 69
End: 2024-02-17
Payer: MEDICARE

## 2024-02-17 DIAGNOSIS — Z76.82 AWAITING ORGAN TRANSPLANT STATUS: Primary | ICD-10-CM

## 2024-02-17 NOTE — TELEPHONE ENCOUNTER
ON-CALL NOTE    UNOS# [id number]    Notified by Todd Chery, , that Isidro Alves is eligible for kidney offer.  Spoke with patient and identified no acute medical issues with telephone assessment. Protocol script read to patient regarding PHS risk and HCV+ donor offer . Patient verbalized understanding, all questions answered, patient accepts organ offer. Notified by Todd Chery that virtual crossmatch is negative.  Current sample of blood is available from date February 2, 2024 for crossmatch.  Patient reports no sensitizing event since last blood sample for PRA received. Notified Benigno in [facility] Lab to perform a prospective  crossmatch per guideline.    Patient was asked if they have had a positive COVID-19 test or if they have any signs or symptoms. Informed patient that they will be tested for COVID-19 upon arrival to the hospital, unless have a previous positive result. If tested and result is positive, the transplant will not be able to occur, they will be inactivated on the wait list for 21 days per protocol and required to quarantine.     Patient notified of plan to remain at home on standby as backup and await updates. Patient verbalized understanding and had no further questions or concerns.    2/17/2024 @ 1730 pm- Update received from Carlitos, Procurement of Donor OR time 0200 am. Phoned patient and gave updates. Patient informed to continue normal activities and more updates will be received approximately tomorrow before noon. Verbalized understanding.    2/7/2024 @ 0800 am- Update received from Juice WILKINSON , with admit orders for patient. Recipient OR time TBD (will follow first Transplant scheduled for 0930 am today). Phoned patient and informed of admit for Transplant. Detailed admit instructions and directions given to both patient and wife. Both verbalized understanding. Patient instructed not to eat or drink anything, patient stated he has not eaten or  drank anything since 2000 pm last night. Phone number provided to patient and spouse for any further questions. Patient estimated arrival to hospital is 1200 pm today. Reservation for admit made and all appropriate personnel notified.

## 2024-02-18 ENCOUNTER — ANESTHESIA EVENT (OUTPATIENT)
Dept: SURGERY | Facility: HOSPITAL | Age: 69
DRG: 652 | End: 2024-02-18
Payer: MEDICARE

## 2024-02-18 ENCOUNTER — ANESTHESIA (OUTPATIENT)
Dept: SURGERY | Facility: HOSPITAL | Age: 69
DRG: 652 | End: 2024-02-18
Payer: MEDICARE

## 2024-02-18 ENCOUNTER — HOSPITAL ENCOUNTER (INPATIENT)
Facility: HOSPITAL | Age: 69
LOS: 2 days | Discharge: HOME | DRG: 652 | End: 2024-02-20
Attending: SURGERY | Admitting: SURGERY
Payer: MEDICARE

## 2024-02-18 DIAGNOSIS — N40.0 BENIGN NON-NODULAR PROSTATIC HYPERPLASIA WITHOUT LOWER URINARY TRACT SYMPTOMS: ICD-10-CM

## 2024-02-18 DIAGNOSIS — Z79.4 TYPE 2 DIABETES MELLITUS WITH CHRONIC KIDNEY DISEASE, WITH LONG-TERM CURRENT USE OF INSULIN, UNSPECIFIED CKD STAGE: ICD-10-CM

## 2024-02-18 DIAGNOSIS — Z76.82 KIDNEY TRANSPLANT CANDIDATE: ICD-10-CM

## 2024-02-18 DIAGNOSIS — I50.22 CHRONIC SYSTOLIC CONGESTIVE HEART FAILURE: ICD-10-CM

## 2024-02-18 DIAGNOSIS — Z91.89 AT RISK FOR OPPORTUNISTIC INFECTIONS: ICD-10-CM

## 2024-02-18 DIAGNOSIS — I25.118 CORONARY ARTERY DISEASE OF NATIVE ARTERY OF NATIVE HEART WITH STABLE ANGINA PECTORIS: ICD-10-CM

## 2024-02-18 DIAGNOSIS — Z94.0 KIDNEY TRANSPLANT RECIPIENT: ICD-10-CM

## 2024-02-18 DIAGNOSIS — Z94.0 S/P KIDNEY TRANSPLANT: Primary | ICD-10-CM

## 2024-02-18 DIAGNOSIS — D63.1 ANEMIA DUE TO CHRONIC KIDNEY DISEASE, UNSPECIFIED CKD STAGE: ICD-10-CM

## 2024-02-18 DIAGNOSIS — N18.6 ANEMIA DUE TO CHRONIC KIDNEY DISEASE, ON CHRONIC DIALYSIS: ICD-10-CM

## 2024-02-18 DIAGNOSIS — N18.9 ANEMIA DUE TO CHRONIC KIDNEY DISEASE, UNSPECIFIED CKD STAGE: ICD-10-CM

## 2024-02-18 DIAGNOSIS — E11.22 TYPE 2 DIABETES MELLITUS WITH CHRONIC KIDNEY DISEASE, WITH LONG-TERM CURRENT USE OF INSULIN, UNSPECIFIED CKD STAGE: ICD-10-CM

## 2024-02-18 DIAGNOSIS — Z99.2 ANEMIA DUE TO CHRONIC KIDNEY DISEASE, ON CHRONIC DIALYSIS: ICD-10-CM

## 2024-02-18 DIAGNOSIS — Z29.89 PROPHYLACTIC IMMUNOTHERAPY: ICD-10-CM

## 2024-02-18 DIAGNOSIS — D63.1 ANEMIA DUE TO CHRONIC KIDNEY DISEASE, ON CHRONIC DIALYSIS: ICD-10-CM

## 2024-02-18 LAB
ABO + RH BLD: ABNORMAL
ALBUMIN SERPL BCP-MCNC: 3.4 G/DL (ref 3.5–5.2)
ALBUMIN SERPL BCP-MCNC: 3.9 G/DL (ref 3.5–5.2)
ALLENS TEST: ABNORMAL
ALP SERPL-CCNC: 62 U/L (ref 55–135)
ALT SERPL W/O P-5'-P-CCNC: 5 U/L (ref 10–44)
ANION GAP SERPL CALC-SCNC: 14 MMOL/L (ref 8–16)
ANION GAP SERPL CALC-SCNC: 14 MMOL/L (ref 8–16)
ANION GAP SERPL CALC-SCNC: 15 MMOL/L (ref 8–16)
APTT PPP: 27.6 SEC (ref 21–32)
AST SERPL-CCNC: 19 U/L (ref 10–40)
BASOPHILS # BLD AUTO: 0.02 K/UL (ref 0–0.2)
BASOPHILS # BLD AUTO: 0.05 K/UL (ref 0–0.2)
BASOPHILS NFR BLD: 0.1 % (ref 0–1.9)
BASOPHILS NFR BLD: 0.7 % (ref 0–1.9)
BILIRUB SERPL-MCNC: 1 MG/DL (ref 0.1–1)
BLD GP AB SCN CELLS X3 SERPL QL: ABNORMAL
BLD GP AB SCN CELLS X3 SERPL QL: NORMAL
BUN SERPL-MCNC: 40 MG/DL (ref 8–23)
BUN SERPL-MCNC: 41 MG/DL (ref 8–23)
BUN SERPL-MCNC: 42 MG/DL (ref 8–23)
CALCIUM SERPL-MCNC: 10.2 MG/DL (ref 8.7–10.5)
CALCIUM SERPL-MCNC: 8.6 MG/DL (ref 8.7–10.5)
CALCIUM SERPL-MCNC: 9.2 MG/DL (ref 8.7–10.5)
CHLORIDE SERPL-SCNC: 94 MMOL/L (ref 95–110)
CHLORIDE SERPL-SCNC: 97 MMOL/L (ref 95–110)
CHLORIDE SERPL-SCNC: 97 MMOL/L (ref 95–110)
CO2 SERPL-SCNC: 21 MMOL/L (ref 23–29)
CO2 SERPL-SCNC: 22 MMOL/L (ref 23–29)
CO2 SERPL-SCNC: 26 MMOL/L (ref 23–29)
CREAT SERPL-MCNC: 10.4 MG/DL (ref 0.5–1.4)
CREAT SERPL-MCNC: 11 MG/DL (ref 0.5–1.4)
CREAT SERPL-MCNC: 9.8 MG/DL (ref 0.5–1.4)
CREAT UR-MCNC: 86 MG/DL (ref 23–375)
DELSYS: ABNORMAL
DIFFERENTIAL METHOD BLD: ABNORMAL
DIFFERENTIAL METHOD BLD: ABNORMAL
EOSINOPHIL # BLD AUTO: 0 K/UL (ref 0–0.5)
EOSINOPHIL # BLD AUTO: 0.2 K/UL (ref 0–0.5)
EOSINOPHIL NFR BLD: 0.1 % (ref 0–8)
EOSINOPHIL NFR BLD: 3.3 % (ref 0–8)
ERYTHROCYTE [DISTWIDTH] IN BLOOD BY AUTOMATED COUNT: 14.1 % (ref 11.5–14.5)
ERYTHROCYTE [DISTWIDTH] IN BLOOD BY AUTOMATED COUNT: 14.2 % (ref 11.5–14.5)
EST. GFR  (NO RACE VARIABLE): 4.6 ML/MIN/1.73 M^2
EST. GFR  (NO RACE VARIABLE): 5 ML/MIN/1.73 M^2
EST. GFR  (NO RACE VARIABLE): 5.3 ML/MIN/1.73 M^2
FIO2: 21
GLUCOSE SERPL-MCNC: 143 MG/DL (ref 70–110)
GLUCOSE SERPL-MCNC: 175 MG/DL (ref 70–110)
GLUCOSE SERPL-MCNC: 315 MG/DL (ref 70–110)
HBV CORE AB SERPL QL IA: REACTIVE
HBV CORE IGM SERPL QL IA: NORMAL
HBV SURFACE AG SERPL QL IA: NORMAL
HCO3 UR-SCNC: 29.1 MMOL/L (ref 24–28)
HCT VFR BLD AUTO: 33.4 % (ref 40–54)
HCT VFR BLD AUTO: 34.8 % (ref 40–54)
HCT VFR BLD AUTO: 38.6 % (ref 40–54)
HCT VFR BLD CALC: 41 %PCV (ref 36–54)
HCV AB SERPL QL IA: NORMAL
HGB BLD-MCNC: 10.6 G/DL (ref 14–18)
HGB BLD-MCNC: 12.1 G/DL (ref 14–18)
HIV 1+2 AB+HIV1 P24 AG SERPL QL IA: NORMAL
IMM GRANULOCYTES # BLD AUTO: 0.04 K/UL (ref 0–0.04)
IMM GRANULOCYTES # BLD AUTO: 0.09 K/UL (ref 0–0.04)
IMM GRANULOCYTES NFR BLD AUTO: 0.5 % (ref 0–0.5)
IMM GRANULOCYTES NFR BLD AUTO: 0.6 % (ref 0–0.5)
INR PPP: 1 (ref 0.8–1.2)
LYMPHOCYTES # BLD AUTO: 0 K/UL (ref 1–4.8)
LYMPHOCYTES # BLD AUTO: 1.2 K/UL (ref 1–4.8)
LYMPHOCYTES NFR BLD: 0.3 % (ref 18–48)
LYMPHOCYTES NFR BLD: 16.9 % (ref 18–48)
MCH RBC QN AUTO: 27.6 PG (ref 27–31)
MCH RBC QN AUTO: 27.8 PG (ref 27–31)
MCHC RBC AUTO-ENTMCNC: 31.3 G/DL (ref 32–36)
MCHC RBC AUTO-ENTMCNC: 31.7 G/DL (ref 32–36)
MCV RBC AUTO: 87 FL (ref 82–98)
MCV RBC AUTO: 89 FL (ref 82–98)
MODE: ABNORMAL
MONOCYTES # BLD AUTO: 0.2 K/UL (ref 0.3–1)
MONOCYTES # BLD AUTO: 0.7 K/UL (ref 0.3–1)
MONOCYTES NFR BLD: 1.2 % (ref 4–15)
MONOCYTES NFR BLD: 8.9 % (ref 4–15)
NEUTROPHILS # BLD AUTO: 14.7 K/UL (ref 1.8–7.7)
NEUTROPHILS # BLD AUTO: 5.1 K/UL (ref 1.8–7.7)
NEUTROPHILS NFR BLD: 69.7 % (ref 38–73)
NEUTROPHILS NFR BLD: 97.7 % (ref 38–73)
NRBC BLD-RTO: 0 /100 WBC
NRBC BLD-RTO: 0 /100 WBC
PCO2 BLDA: 56.9 MMHG (ref 35–45)
PH SMN: 7.32 [PH] (ref 7.35–7.45)
PHOSPHATE SERPL-MCNC: 4 MG/DL (ref 2.7–4.5)
PHOSPHATE SERPL-MCNC: 4.1 MG/DL (ref 2.7–4.5)
PLATELET # BLD AUTO: 207 K/UL (ref 150–450)
PLATELET # BLD AUTO: 281 K/UL (ref 150–450)
PMV BLD AUTO: 10.7 FL (ref 9.2–12.9)
PMV BLD AUTO: 11.4 FL (ref 9.2–12.9)
PO2 BLDA: 16 MMHG (ref 40–60)
POC BE: 3 MMOL/L
POC IONIZED CALCIUM: 1.25 MMOL/L (ref 1.06–1.42)
POC SATURATED O2: 17 % (ref 95–100)
POC TCO2: 31 MMOL/L (ref 24–29)
POCT GLUCOSE: 166 MG/DL (ref 70–110)
POCT GLUCOSE: 188 MG/DL (ref 70–110)
POTASSIUM BLD-SCNC: 3.7 MMOL/L (ref 3.5–5.1)
POTASSIUM SERPL-SCNC: 3.9 MMOL/L (ref 3.5–5.1)
POTASSIUM SERPL-SCNC: 4 MMOL/L (ref 3.5–5.1)
POTASSIUM SERPL-SCNC: 4.2 MMOL/L (ref 3.5–5.1)
PROT SERPL-MCNC: 7.8 G/DL (ref 6–8.4)
PROT UR-MCNC: 92 MG/DL (ref 0–15)
PROT/CREAT UR: 1.07 MG/G{CREAT} (ref 0–0.2)
PROTHROMBIN TIME: 10.8 SEC (ref 9–12.5)
RBC # BLD AUTO: 3.84 M/UL (ref 4.6–6.2)
RBC # BLD AUTO: 4.36 M/UL (ref 4.6–6.2)
SAMPLE: ABNORMAL
SARS-COV-2 RDRP RESP QL NAA+PROBE: NEGATIVE
SITE: ABNORMAL
SODIUM BLD-SCNC: 135 MMOL/L (ref 136–145)
SODIUM SERPL-SCNC: 132 MMOL/L (ref 136–145)
SODIUM SERPL-SCNC: 133 MMOL/L (ref 136–145)
SODIUM SERPL-SCNC: 135 MMOL/L (ref 136–145)
SPECIMEN OUTDATE: ABNORMAL
WBC # BLD AUTO: 15.03 K/UL (ref 3.9–12.7)
WBC # BLD AUTO: 7.29 K/UL (ref 3.9–12.7)

## 2024-02-18 PROCEDURE — 25000003 PHARM REV CODE 250: Performed by: PHYSICIAN ASSISTANT

## 2024-02-18 PROCEDURE — 27100025 HC TUBING, SET FLUID WARMER: Mod: NTX | Performed by: NURSE ANESTHETIST, CERTIFIED REGISTERED

## 2024-02-18 PROCEDURE — 84295 ASSAY OF SERUM SODIUM: CPT | Mod: NTX

## 2024-02-18 PROCEDURE — 63600175 PHARM REV CODE 636 W HCPCS: Performed by: NURSE ANESTHETIST, CERTIFIED REGISTERED

## 2024-02-18 PROCEDURE — 86704 HEP B CORE ANTIBODY TOTAL: CPT | Performed by: NURSE PRACTITIONER

## 2024-02-18 PROCEDURE — 71000015 HC POSTOP RECOV 1ST HR: Performed by: SURGERY

## 2024-02-18 PROCEDURE — 93005 ELECTROCARDIOGRAM TRACING: CPT

## 2024-02-18 PROCEDURE — 82962 GLUCOSE BLOOD TEST: CPT | Performed by: SURGERY

## 2024-02-18 PROCEDURE — 37000008 HC ANESTHESIA 1ST 15 MINUTES: Performed by: SURGERY

## 2024-02-18 PROCEDURE — 99900035 HC TECH TIME PER 15 MIN (STAT): Mod: NTX

## 2024-02-18 PROCEDURE — C2617 STENT, NON-COR, TEM W/O DEL: HCPCS | Performed by: SURGERY

## 2024-02-18 PROCEDURE — 85014 HEMATOCRIT: CPT | Mod: NTX

## 2024-02-18 PROCEDURE — 86850 RBC ANTIBODY SCREEN: CPT | Performed by: NURSE PRACTITIONER

## 2024-02-18 PROCEDURE — 85014 HEMATOCRIT: CPT | Performed by: SURGERY

## 2024-02-18 PROCEDURE — 71000033 HC RECOVERY, INTIAL HOUR: Performed by: SURGERY

## 2024-02-18 PROCEDURE — 63600175 PHARM REV CODE 636 W HCPCS

## 2024-02-18 PROCEDURE — 85025 COMPLETE CBC W/AUTO DIFF WBC: CPT | Performed by: NURSE PRACTITIONER

## 2024-02-18 PROCEDURE — S5010 5% DEXTROSE AND 0.45% SALINE: HCPCS | Performed by: SURGERY

## 2024-02-18 PROCEDURE — 37000009 HC ANESTHESIA EA ADD 15 MINS: Performed by: SURGERY

## 2024-02-18 PROCEDURE — 27201423 OPTIME MED/SURG SUP & DEVICES STERILE SUPPLY: Performed by: SURGERY

## 2024-02-18 PROCEDURE — D9220A PRA ANESTHESIA: Mod: ANES,,, | Performed by: ANESTHESIOLOGY

## 2024-02-18 PROCEDURE — 84132 ASSAY OF SERUM POTASSIUM: CPT | Mod: NTX

## 2024-02-18 PROCEDURE — 63600175 PHARM REV CODE 636 W HCPCS: Mod: NTX | Performed by: NURSE PRACTITIONER

## 2024-02-18 PROCEDURE — 87389 HIV-1 AG W/HIV-1&-2 AB AG IA: CPT | Performed by: NURSE PRACTITIONER

## 2024-02-18 PROCEDURE — 36415 COLL VENOUS BLD VENIPUNCTURE: CPT | Mod: XB | Performed by: PHYSICIAN ASSISTANT

## 2024-02-18 PROCEDURE — 25000003 PHARM REV CODE 250: Mod: NTX | Performed by: NURSE PRACTITIONER

## 2024-02-18 PROCEDURE — 86705 HEP B CORE ANTIBODY IGM: CPT | Performed by: NURSE PRACTITIONER

## 2024-02-18 PROCEDURE — D9220A PRA ANESTHESIA: Mod: CRNA,,, | Performed by: NURSE ANESTHETIST, CERTIFIED REGISTERED

## 2024-02-18 PROCEDURE — 71000016 HC POSTOP RECOV ADDL HR: Performed by: SURGERY

## 2024-02-18 PROCEDURE — U0002 COVID-19 LAB TEST NON-CDC: HCPCS | Performed by: NURSE PRACTITIONER

## 2024-02-18 PROCEDURE — 27201037 HC PRESSURE MONITORING SET UP: Mod: NTX

## 2024-02-18 PROCEDURE — 85610 PROTHROMBIN TIME: CPT | Performed by: NURSE PRACTITIONER

## 2024-02-18 PROCEDURE — 81300002 HC KIDNEY TRANSPORT, GROUND 4-5 HOURS

## 2024-02-18 PROCEDURE — 86901 BLOOD TYPING SEROLOGIC RH(D): CPT | Performed by: NURSE PRACTITIONER

## 2024-02-18 PROCEDURE — 63600175 PHARM REV CODE 636 W HCPCS: Performed by: PHYSICIAN ASSISTANT

## 2024-02-18 PROCEDURE — 36000931 HC OR TIME LEV VII EA ADD 15 MIN: Performed by: SURGERY

## 2024-02-18 PROCEDURE — 80048 BASIC METABOLIC PNL TOTAL CA: CPT | Mod: XB | Performed by: PHYSICIAN ASSISTANT

## 2024-02-18 PROCEDURE — 99223 1ST HOSP IP/OBS HIGH 75: CPT | Mod: AI,57,NTX, | Performed by: NURSE PRACTITIONER

## 2024-02-18 PROCEDURE — 71000039 HC RECOVERY, EACH ADD'L HOUR: Performed by: SURGERY

## 2024-02-18 PROCEDURE — 50605 INSERT URETERAL SUPPORT: CPT | Mod: 51,RT,, | Performed by: SURGERY

## 2024-02-18 PROCEDURE — 50360 RNL ALTRNSPLJ W/O RCP NFRCT: CPT | Mod: RT,,, | Performed by: SURGERY

## 2024-02-18 PROCEDURE — 81200001 HC KIDNEY ACQUISITION - CADAVER

## 2024-02-18 PROCEDURE — 27800505 HC CATH, RADIAL ARTERY KIT: Mod: NTX | Performed by: NURSE ANESTHETIST, CERTIFIED REGISTERED

## 2024-02-18 PROCEDURE — 25000003 PHARM REV CODE 250: Performed by: NURSE ANESTHETIST, CERTIFIED REGISTERED

## 2024-02-18 PROCEDURE — 20600001 HC STEP DOWN PRIVATE ROOM

## 2024-02-18 PROCEDURE — 63600175 PHARM REV CODE 636 W HCPCS: Performed by: SURGERY

## 2024-02-18 PROCEDURE — 87522 HEPATITIS C REVRS TRNSCRPJ: CPT | Performed by: NURSE PRACTITIONER

## 2024-02-18 PROCEDURE — 63600175 PHARM REV CODE 636 W HCPCS: Mod: JZ,JG | Performed by: SURGERY

## 2024-02-18 PROCEDURE — 82330 ASSAY OF CALCIUM: CPT | Mod: NTX

## 2024-02-18 PROCEDURE — 25000003 PHARM REV CODE 250: Performed by: SURGERY

## 2024-02-18 PROCEDURE — 63600175 PHARM REV CODE 636 W HCPCS: Performed by: ANESTHESIOLOGY

## 2024-02-18 PROCEDURE — 0T160ZB BYPASS RIGHT URETER TO BLADDER, OPEN APPROACH: ICD-10-PCS | Performed by: SURGERY

## 2024-02-18 PROCEDURE — 85730 THROMBOPLASTIN TIME PARTIAL: CPT | Performed by: NURSE PRACTITIONER

## 2024-02-18 PROCEDURE — 80053 COMPREHEN METABOLIC PANEL: CPT | Performed by: NURSE PRACTITIONER

## 2024-02-18 PROCEDURE — 80069 RENAL FUNCTION PANEL: CPT | Performed by: SURGERY

## 2024-02-18 PROCEDURE — 36415 COLL VENOUS BLD VENIPUNCTURE: CPT | Performed by: NURSE PRACTITIONER

## 2024-02-18 PROCEDURE — 0TY00Z0 TRANSPLANTATION OF RIGHT KIDNEY, ALLOGENEIC, OPEN APPROACH: ICD-10-PCS | Performed by: SURGERY

## 2024-02-18 PROCEDURE — 36000930 HC OR TIME LEV VII 1ST 15 MIN: Performed by: SURGERY

## 2024-02-18 PROCEDURE — 82570 ASSAY OF URINE CREATININE: CPT | Performed by: NURSE PRACTITIONER

## 2024-02-18 PROCEDURE — 82803 BLOOD GASES ANY COMBINATION: CPT | Mod: NTX

## 2024-02-18 PROCEDURE — 86706 HEP B SURFACE ANTIBODY: CPT | Performed by: NURSE PRACTITIONER

## 2024-02-18 PROCEDURE — 84100 ASSAY OF PHOSPHORUS: CPT | Performed by: NURSE PRACTITIONER

## 2024-02-18 PROCEDURE — 36415 COLL VENOUS BLD VENIPUNCTURE: CPT | Mod: XB | Performed by: SURGERY

## 2024-02-18 PROCEDURE — 93010 ELECTROCARDIOGRAM REPORT: CPT | Mod: ,,, | Performed by: INTERNAL MEDICINE

## 2024-02-18 PROCEDURE — 86803 HEPATITIS C AB TEST: CPT | Performed by: NURSE PRACTITIONER

## 2024-02-18 PROCEDURE — 85025 COMPLETE CBC W/AUTO DIFF WBC: CPT | Mod: 91 | Performed by: PHYSICIAN ASSISTANT

## 2024-02-18 PROCEDURE — 87340 HEPATITIS B SURFACE AG IA: CPT | Performed by: NURSE PRACTITIONER

## 2024-02-18 DEVICE — STENT DOUBLE J 7FRX12CM
Type: IMPLANTABLE DEVICE | Site: URETER | Status: NON-FUNCTIONAL
Removed: 2024-03-13

## 2024-02-18 RX ORDER — DIPHENHYDRAMINE HCL 25 MG
50 CAPSULE ORAL ONCE AS NEEDED
Status: COMPLETED | OUTPATIENT
Start: 2024-02-18 | End: 2024-02-19

## 2024-02-18 RX ORDER — OXYCODONE HYDROCHLORIDE 5 MG/1
5 TABLET ORAL EVERY 6 HOURS PRN
Status: DISCONTINUED | OUTPATIENT
Start: 2024-02-18 | End: 2024-02-18

## 2024-02-18 RX ORDER — CEFAZOLIN SODIUM 1 G/3ML
INJECTION, POWDER, FOR SOLUTION INTRAMUSCULAR; INTRAVENOUS
Status: DISCONTINUED | OUTPATIENT
Start: 2024-02-18 | End: 2024-02-18 | Stop reason: HOSPADM

## 2024-02-18 RX ORDER — DIPHENHYDRAMINE HCL 25 MG
25 CAPSULE ORAL
Status: COMPLETED | OUTPATIENT
Start: 2024-02-19 | End: 2024-02-20

## 2024-02-18 RX ORDER — ONDANSETRON HYDROCHLORIDE 2 MG/ML
4 INJECTION, SOLUTION INTRAVENOUS EVERY 6 HOURS PRN
Status: DISCONTINUED | OUTPATIENT
Start: 2024-02-18 | End: 2024-02-20 | Stop reason: HOSPADM

## 2024-02-18 RX ORDER — MEPERIDINE HYDROCHLORIDE 50 MG/ML
12.5 INJECTION INTRAMUSCULAR; INTRAVENOUS; SUBCUTANEOUS EVERY 10 MIN PRN
Status: DISCONTINUED | OUTPATIENT
Start: 2024-02-18 | End: 2024-02-18

## 2024-02-18 RX ORDER — HYDROMORPHONE HYDROCHLORIDE 1 MG/ML
0.5 INJECTION, SOLUTION INTRAMUSCULAR; INTRAVENOUS; SUBCUTANEOUS EVERY 6 HOURS PRN
Status: DISCONTINUED | OUTPATIENT
Start: 2024-02-18 | End: 2024-02-20 | Stop reason: HOSPADM

## 2024-02-18 RX ORDER — PROPOFOL 10 MG/ML
VIAL (ML) INTRAVENOUS
Status: DISCONTINUED | OUTPATIENT
Start: 2024-02-18 | End: 2024-02-18

## 2024-02-18 RX ORDER — ACETAMINOPHEN 650 MG/20.3ML
650 LIQUID ORAL ONCE
Status: COMPLETED | OUTPATIENT
Start: 2024-02-18 | End: 2024-02-18

## 2024-02-18 RX ORDER — METHYLPREDNISOLONE SOD SUCC 125 MG
125 VIAL (EA) INJECTION ONCE
Status: COMPLETED | OUTPATIENT
Start: 2024-02-20 | End: 2024-02-20

## 2024-02-18 RX ORDER — IBUPROFEN 200 MG
24 TABLET ORAL
Status: DISCONTINUED | OUTPATIENT
Start: 2024-02-18 | End: 2024-02-20 | Stop reason: HOSPADM

## 2024-02-18 RX ORDER — SODIUM CHLORIDE 0.9 % (FLUSH) 0.9 %
10 SYRINGE (ML) INJECTION
Status: DISCONTINUED | OUTPATIENT
Start: 2024-02-18 | End: 2024-02-18 | Stop reason: HOSPADM

## 2024-02-18 RX ORDER — KETAMINE HCL IN 0.9 % NACL 50 MG/5 ML
SYRINGE (ML) INTRAVENOUS
Status: DISCONTINUED | OUTPATIENT
Start: 2024-02-18 | End: 2024-02-18

## 2024-02-18 RX ORDER — HYDROMORPHONE HYDROCHLORIDE 1 MG/ML
0.2 INJECTION, SOLUTION INTRAMUSCULAR; INTRAVENOUS; SUBCUTANEOUS EVERY 5 MIN PRN
Status: DISCONTINUED | OUTPATIENT
Start: 2024-02-18 | End: 2024-02-18

## 2024-02-18 RX ORDER — HEPARIN SODIUM 5000 [USP'U]/ML
5000 INJECTION, SOLUTION INTRAVENOUS; SUBCUTANEOUS EVERY 8 HOURS
Status: DISCONTINUED | OUTPATIENT
Start: 2024-02-18 | End: 2024-02-20 | Stop reason: HOSPADM

## 2024-02-18 RX ORDER — DEXTROSE MONOHYDRATE AND SODIUM CHLORIDE 5; .45 G/100ML; G/100ML
INJECTION, SOLUTION INTRAVENOUS CONTINUOUS
Status: DISCONTINUED | OUTPATIENT
Start: 2024-02-18 | End: 2024-02-19

## 2024-02-18 RX ORDER — FENTANYL CITRATE 50 UG/ML
INJECTION, SOLUTION INTRAMUSCULAR; INTRAVENOUS
Status: DISCONTINUED | OUTPATIENT
Start: 2024-02-18 | End: 2024-02-18

## 2024-02-18 RX ORDER — FUROSEMIDE 10 MG/ML
INJECTION INTRAMUSCULAR; INTRAVENOUS
Status: DISCONTINUED | OUTPATIENT
Start: 2024-02-18 | End: 2024-02-18

## 2024-02-18 RX ORDER — PHENYLEPHRINE HYDROCHLORIDE 10 MG/ML
INJECTION INTRAVENOUS
Status: DISCONTINUED | OUTPATIENT
Start: 2024-02-18 | End: 2024-02-18

## 2024-02-18 RX ORDER — OXYCODONE HYDROCHLORIDE 5 MG/1
5 TABLET ORAL ONCE AS NEEDED
Status: DISCONTINUED | OUTPATIENT
Start: 2024-02-18 | End: 2024-02-18

## 2024-02-18 RX ORDER — DOCUSATE SODIUM 100 MG/1
100 CAPSULE, LIQUID FILLED ORAL 3 TIMES DAILY
Status: DISCONTINUED | OUTPATIENT
Start: 2024-02-18 | End: 2024-02-20 | Stop reason: HOSPADM

## 2024-02-18 RX ORDER — SODIUM CHLORIDE 0.9 % (FLUSH) 0.9 %
10 SYRINGE (ML) INJECTION
Status: DISCONTINUED | OUTPATIENT
Start: 2024-02-18 | End: 2024-02-20 | Stop reason: HOSPADM

## 2024-02-18 RX ORDER — FENTANYL CITRATE 50 UG/ML
25 INJECTION, SOLUTION INTRAMUSCULAR; INTRAVENOUS EVERY 5 MIN PRN
Status: DISCONTINUED | OUTPATIENT
Start: 2024-02-18 | End: 2024-02-18 | Stop reason: HOSPADM

## 2024-02-18 RX ORDER — TACROLIMUS 1 MG/1
3 CAPSULE ORAL 2 TIMES DAILY
Status: DISCONTINUED | OUTPATIENT
Start: 2024-02-18 | End: 2024-02-19

## 2024-02-18 RX ORDER — ACETAMINOPHEN 325 MG/1
650 TABLET ORAL EVERY 8 HOURS
Status: DISCONTINUED | OUTPATIENT
Start: 2024-02-18 | End: 2024-02-20 | Stop reason: HOSPADM

## 2024-02-18 RX ORDER — GLUCAGON 1 MG
1 KIT INJECTION CONTINUOUS PRN
Status: DISCONTINUED | OUTPATIENT
Start: 2024-02-18 | End: 2024-02-20 | Stop reason: HOSPADM

## 2024-02-18 RX ORDER — LIDOCAINE HYDROCHLORIDE 20 MG/ML
INJECTION INTRAVENOUS
Status: DISCONTINUED | OUTPATIENT
Start: 2024-02-18 | End: 2024-02-18

## 2024-02-18 RX ORDER — PREDNISONE 5 MG/1
20 TABLET ORAL DAILY
Status: DISCONTINUED | OUTPATIENT
Start: 2024-02-21 | End: 2024-02-18

## 2024-02-18 RX ORDER — ACETAMINOPHEN 325 MG/1
650 TABLET ORAL
Status: DISCONTINUED | OUTPATIENT
Start: 2024-02-19 | End: 2024-02-20 | Stop reason: HOSPADM

## 2024-02-18 RX ORDER — PREGABALIN 75 MG/1
75 CAPSULE ORAL
Status: DISCONTINUED | OUTPATIENT
Start: 2024-02-18 | End: 2024-02-18 | Stop reason: HOSPADM

## 2024-02-18 RX ORDER — BUPIVACAINE HYDROCHLORIDE 2.5 MG/ML
INJECTION, SOLUTION EPIDURAL; INFILTRATION; INTRACAUDAL
Status: DISCONTINUED | OUTPATIENT
Start: 2024-02-18 | End: 2024-02-18 | Stop reason: HOSPADM

## 2024-02-18 RX ORDER — ONDANSETRON HYDROCHLORIDE 2 MG/ML
INJECTION, SOLUTION INTRAVENOUS
Status: DISCONTINUED | OUTPATIENT
Start: 2024-02-18 | End: 2024-02-18

## 2024-02-18 RX ORDER — DIPHENHYDRAMINE HCL 25 MG
50 CAPSULE ORAL ONCE AS NEEDED
Status: DISCONTINUED | OUTPATIENT
Start: 2024-02-18 | End: 2024-02-20 | Stop reason: HOSPADM

## 2024-02-18 RX ORDER — MUPIROCIN 20 MG/G
OINTMENT TOPICAL
Status: DISCONTINUED | OUTPATIENT
Start: 2024-02-18 | End: 2024-02-18 | Stop reason: HOSPADM

## 2024-02-18 RX ORDER — MYCOPHENOLATE MOFETIL 250 MG/1
1000 CAPSULE ORAL 2 TIMES DAILY
Status: DISCONTINUED | OUTPATIENT
Start: 2024-02-18 | End: 2024-02-20 | Stop reason: HOSPADM

## 2024-02-18 RX ORDER — OXYCODONE HYDROCHLORIDE 5 MG/1
5 TABLET ORAL EVERY 6 HOURS PRN
Status: DISCONTINUED | OUTPATIENT
Start: 2024-02-18 | End: 2024-02-20 | Stop reason: HOSPADM

## 2024-02-18 RX ORDER — BISACODYL 5 MG
10 TABLET, DELAYED RELEASE (ENTERIC COATED) ORAL NIGHTLY
Status: DISCONTINUED | OUTPATIENT
Start: 2024-02-18 | End: 2024-02-20 | Stop reason: HOSPADM

## 2024-02-18 RX ORDER — DIPHENHYDRAMINE HCL 25 MG
25 CAPSULE ORAL
Status: DISCONTINUED | OUTPATIENT
Start: 2024-02-19 | End: 2024-02-18

## 2024-02-18 RX ORDER — HALOPERIDOL 5 MG/ML
0.5 INJECTION INTRAMUSCULAR EVERY 10 MIN PRN
Status: DISCONTINUED | OUTPATIENT
Start: 2024-02-18 | End: 2024-02-18 | Stop reason: HOSPADM

## 2024-02-18 RX ORDER — IBUPROFEN 200 MG
16 TABLET ORAL
Status: DISCONTINUED | OUTPATIENT
Start: 2024-02-18 | End: 2024-02-20 | Stop reason: HOSPADM

## 2024-02-18 RX ORDER — VALGANCICLOVIR 450 MG/1
450 TABLET, FILM COATED ORAL EVERY MORNING
Status: DISCONTINUED | OUTPATIENT
Start: 2024-02-28 | End: 2024-02-20 | Stop reason: HOSPADM

## 2024-02-18 RX ORDER — EPINEPHRINE 1 MG/ML
1 INJECTION, SOLUTION, CONCENTRATE INTRAVENOUS ONCE AS NEEDED
Status: DISCONTINUED | OUTPATIENT
Start: 2024-02-18 | End: 2024-02-20 | Stop reason: HOSPADM

## 2024-02-18 RX ORDER — HEPARIN SODIUM 1000 [USP'U]/ML
INJECTION, SOLUTION INTRAVENOUS; SUBCUTANEOUS
Status: DISCONTINUED | OUTPATIENT
Start: 2024-02-18 | End: 2024-02-18 | Stop reason: HOSPADM

## 2024-02-18 RX ORDER — SODIUM CHLORIDE 9 MG/ML
INJECTION, SOLUTION INTRAVENOUS CONTINUOUS
Status: DISCONTINUED | OUTPATIENT
Start: 2024-02-18 | End: 2024-02-19

## 2024-02-18 RX ORDER — OXYCODONE HYDROCHLORIDE 10 MG/1
10 TABLET ORAL EVERY 6 HOURS PRN
Status: DISCONTINUED | OUTPATIENT
Start: 2024-02-18 | End: 2024-02-20 | Stop reason: HOSPADM

## 2024-02-18 RX ORDER — ACETAMINOPHEN 325 MG/1
650 TABLET ORAL ONCE AS NEEDED
Status: COMPLETED | OUTPATIENT
Start: 2024-02-18 | End: 2024-02-20

## 2024-02-18 RX ORDER — DIPHENHYDRAMINE HYDROCHLORIDE 50 MG/ML
50 INJECTION INTRAMUSCULAR; INTRAVENOUS ONCE
Status: COMPLETED | OUTPATIENT
Start: 2024-02-18 | End: 2024-02-18

## 2024-02-18 RX ORDER — ROCURONIUM BROMIDE 10 MG/ML
INJECTION, SOLUTION INTRAVENOUS
Status: DISCONTINUED | OUTPATIENT
Start: 2024-02-18 | End: 2024-02-18

## 2024-02-18 RX ORDER — ACETAMINOPHEN 325 MG/1
650 TABLET ORAL
Status: DISCONTINUED | OUTPATIENT
Start: 2024-02-19 | End: 2024-02-18

## 2024-02-18 RX ORDER — ONDANSETRON HYDROCHLORIDE 2 MG/ML
4 INJECTION, SOLUTION INTRAVENOUS DAILY PRN
Status: DISCONTINUED | OUTPATIENT
Start: 2024-02-18 | End: 2024-02-18 | Stop reason: HOSPADM

## 2024-02-18 RX ORDER — TRAMADOL HYDROCHLORIDE 50 MG/1
50 TABLET ORAL EVERY 6 HOURS PRN
Status: DISCONTINUED | OUTPATIENT
Start: 2024-02-18 | End: 2024-02-18

## 2024-02-18 RX ORDER — INSULIN ASPART 100 [IU]/ML
0-5 INJECTION, SOLUTION INTRAVENOUS; SUBCUTANEOUS
Status: DISCONTINUED | OUTPATIENT
Start: 2024-02-18 | End: 2024-02-19

## 2024-02-18 RX ORDER — PREDNISONE 20 MG/1
20 TABLET ORAL DAILY
Status: DISCONTINUED | OUTPATIENT
Start: 2024-02-21 | End: 2024-02-20 | Stop reason: HOSPADM

## 2024-02-18 RX ORDER — MUPIROCIN 20 MG/G
1 OINTMENT TOPICAL 2 TIMES DAILY
Status: DISCONTINUED | OUTPATIENT
Start: 2024-02-18 | End: 2024-02-20 | Stop reason: HOSPADM

## 2024-02-18 RX ORDER — MIDAZOLAM HYDROCHLORIDE 1 MG/ML
INJECTION, SOLUTION INTRAMUSCULAR; INTRAVENOUS
Status: DISCONTINUED | OUTPATIENT
Start: 2024-02-18 | End: 2024-02-18

## 2024-02-18 RX ORDER — HEPARIN SODIUM 5000 [USP'U]/ML
5000 INJECTION, SOLUTION INTRAVENOUS; SUBCUTANEOUS ONCE
Status: COMPLETED | OUTPATIENT
Start: 2024-02-18 | End: 2024-02-18

## 2024-02-18 RX ORDER — HALOPERIDOL 5 MG/ML
0.5 INJECTION INTRAMUSCULAR EVERY 10 MIN PRN
Status: DISCONTINUED | OUTPATIENT
Start: 2024-02-18 | End: 2024-02-18 | Stop reason: SDUPTHER

## 2024-02-18 RX ORDER — HYDROMORPHONE HYDROCHLORIDE 1 MG/ML
INJECTION, SOLUTION INTRAMUSCULAR; INTRAVENOUS; SUBCUTANEOUS
Status: COMPLETED
Start: 2024-02-18 | End: 2024-02-18

## 2024-02-18 RX ORDER — FAMOTIDINE 20 MG/1
20 TABLET, FILM COATED ORAL NIGHTLY
Status: DISCONTINUED | OUTPATIENT
Start: 2024-02-18 | End: 2024-02-20 | Stop reason: HOSPADM

## 2024-02-18 RX ORDER — METHYLPREDNISOLONE SOD SUCC 125 MG
250 VIAL (EA) INJECTION ONCE
Status: COMPLETED | OUTPATIENT
Start: 2024-02-19 | End: 2024-02-19

## 2024-02-18 RX ADMIN — ROCURONIUM BROMIDE 50 MG: 10 INJECTION, SOLUTION INTRAVENOUS at 02:02

## 2024-02-18 RX ADMIN — PROPOFOL 60 MG: 10 INJECTION, EMULSION INTRAVENOUS at 02:02

## 2024-02-18 RX ADMIN — ACETAMINOPHEN 650 MG: 160 SOLUTION ORAL at 01:02

## 2024-02-18 RX ADMIN — FENTANYL CITRATE 50 MCG: 50 INJECTION, SOLUTION INTRAMUSCULAR; INTRAVENOUS at 03:02

## 2024-02-18 RX ADMIN — HYDROMORPHONE HYDROCHLORIDE 0.2 MG: 1 INJECTION, SOLUTION INTRAMUSCULAR; INTRAVENOUS; SUBCUTANEOUS at 05:02

## 2024-02-18 RX ADMIN — PROPOFOL 30 MG: 10 INJECTION, EMULSION INTRAVENOUS at 03:02

## 2024-02-18 RX ADMIN — MIDAZOLAM HYDROCHLORIDE 2 MG: 1 INJECTION, SOLUTION INTRAMUSCULAR; INTRAVENOUS at 01:02

## 2024-02-18 RX ADMIN — PROPOFOL 80 MG: 10 INJECTION, EMULSION INTRAVENOUS at 02:02

## 2024-02-18 RX ADMIN — DIPHENHYDRAMINE HYDROCHLORIDE 50 MG: 50 INJECTION, SOLUTION INTRAMUSCULAR; INTRAVENOUS at 02:02

## 2024-02-18 RX ADMIN — HYDROMORPHONE HYDROCHLORIDE 0.5 MG: 1 INJECTION, SOLUTION INTRAMUSCULAR; INTRAVENOUS; SUBCUTANEOUS at 08:02

## 2024-02-18 RX ADMIN — BISACODYL 10 MG: 5 TABLET, COATED ORAL at 09:02

## 2024-02-18 RX ADMIN — CEFAZOLIN 2 G: 2 INJECTION, POWDER, FOR SOLUTION INTRAMUSCULAR; INTRAVENOUS at 09:02

## 2024-02-18 RX ADMIN — SODIUM CHLORIDE 50 ML: 9 INJECTION, SOLUTION INTRAVENOUS at 05:02

## 2024-02-18 RX ADMIN — TACROLIMUS 3 MG: 1 CAPSULE ORAL at 05:02

## 2024-02-18 RX ADMIN — SODIUM CHLORIDE, SODIUM GLUCONATE, SODIUM ACETATE, POTASSIUM CHLORIDE, MAGNESIUM CHLORIDE, SODIUM PHOSPHATE, DIBASIC, AND POTASSIUM PHOSPHATE: .53; .5; .37; .037; .03; .012; .00082 INJECTION, SOLUTION INTRAVENOUS at 03:02

## 2024-02-18 RX ADMIN — ANTI-THYMOCYTE GLOBULIN (RABBIT) 150 MG: 5 INJECTION, POWDER, LYOPHILIZED, FOR SOLUTION INTRAVENOUS at 03:02

## 2024-02-18 RX ADMIN — ROCURONIUM BROMIDE 10 MG: 10 INJECTION, SOLUTION INTRAVENOUS at 03:02

## 2024-02-18 RX ADMIN — FENTANYL CITRATE 100 MCG: 50 INJECTION, SOLUTION INTRAMUSCULAR; INTRAVENOUS at 02:02

## 2024-02-18 RX ADMIN — CEFAZOLIN 2 G: 2 INJECTION, POWDER, FOR SOLUTION INTRAMUSCULAR; INTRAVENOUS at 02:02

## 2024-02-18 RX ADMIN — ROCURONIUM BROMIDE 20 MG: 10 INJECTION, SOLUTION INTRAVENOUS at 02:02

## 2024-02-18 RX ADMIN — HEPARIN SODIUM 5000 UNITS: 5000 INJECTION INTRAVENOUS; SUBCUTANEOUS at 09:02

## 2024-02-18 RX ADMIN — SODIUM CHLORIDE 0.5 MCG/KG/MIN: 9 INJECTION, SOLUTION INTRAVENOUS at 02:02

## 2024-02-18 RX ADMIN — FENTANYL CITRATE 50 MCG: 50 INJECTION, SOLUTION INTRAMUSCULAR; INTRAVENOUS at 02:02

## 2024-02-18 RX ADMIN — LIDOCAINE HYDROCHLORIDE 100 MG: 20 INJECTION INTRAVENOUS at 02:02

## 2024-02-18 RX ADMIN — DEXTROSE AND SODIUM CHLORIDE: 5; 450 INJECTION, SOLUTION INTRAVENOUS at 05:02

## 2024-02-18 RX ADMIN — MYCOPHENOLATE MOFETIL 1000 MG: 250 CAPSULE ORAL at 09:02

## 2024-02-18 RX ADMIN — ONDANSETRON 4 MG: 2 INJECTION INTRAMUSCULAR; INTRAVENOUS at 04:02

## 2024-02-18 RX ADMIN — DOCUSATE SODIUM 100 MG: 100 CAPSULE, LIQUID FILLED ORAL at 09:02

## 2024-02-18 RX ADMIN — HEPARIN SODIUM 5000 UNITS: 5000 INJECTION INTRAVENOUS; SUBCUTANEOUS at 12:02

## 2024-02-18 RX ADMIN — OXYCODONE 5 MG: 5 TABLET ORAL at 11:02

## 2024-02-18 RX ADMIN — DEXTROSE 500 MG: 50 INJECTION, SOLUTION INTRAVENOUS at 02:02

## 2024-02-18 RX ADMIN — OXYCODONE 5 MG: 5 TABLET ORAL at 05:02

## 2024-02-18 RX ADMIN — FAMOTIDINE 20 MG: 20 TABLET, FILM COATED ORAL at 09:02

## 2024-02-18 RX ADMIN — Medication 30 MG: at 02:02

## 2024-02-18 RX ADMIN — FUROSEMIDE 100 MG: 10 INJECTION, SOLUTION INTRAMUSCULAR; INTRAVENOUS at 03:02

## 2024-02-18 RX ADMIN — SODIUM CHLORIDE, SODIUM GLUCONATE, SODIUM ACETATE, POTASSIUM CHLORIDE, MAGNESIUM CHLORIDE, SODIUM PHOSPHATE, DIBASIC, AND POTASSIUM PHOSPHATE: .53; .5; .37; .037; .03; .012; .00082 INJECTION, SOLUTION INTRAVENOUS at 01:02

## 2024-02-18 RX ADMIN — SUGAMMADEX 400 MG: 100 INJECTION, SOLUTION INTRAVENOUS at 04:02

## 2024-02-18 RX ADMIN — ACETAMINOPHEN 650 MG: 325 TABLET ORAL at 09:02

## 2024-02-18 RX ADMIN — Medication 20 MG: at 02:02

## 2024-02-18 RX ADMIN — PHENYLEPHRINE HYDROCHLORIDE 100 MCG: 10 INJECTION INTRAVENOUS at 02:02

## 2024-02-18 RX ADMIN — MUPIROCIN 1 G: 20 OINTMENT TOPICAL at 09:02

## 2024-02-18 NOTE — PROGRESS NOTES
Pt arrived to [id number]- Liver NP monica notified, Covid swab sent . Anesthesia notified,respiratory called for istat

## 2024-02-18 NOTE — NURSING
Nurses Note -- 4 Eyes      2/18/2024   2:40 PM      Skin assessed during: Admit      [x] No Altered Skin Integrity Present    [x]Prevention Measures Documented      [] Yes- Altered Skin Integrity Present or Discovered   [] LDA Added if Not in Epic (Describe Wound)   [] New Altered Skin Integrity was Present on Admit and Documented in LDA   [] Wound Image Taken    Wound Care Consulted? No    Attending Nurse:  Emelia Cheng RN/Staff Member:   Natasha

## 2024-02-18 NOTE — OP NOTE
Operative Report    Date of Procedure: 2/18/2024  Date of Transplant (UNOS): 2/18/24    Surgeons:  Surgeon(s) and Role:     * Chas Salomon MD - Primary    First Assistant Attestation:  No assistant was required for this procedure apart from OR personnel    Pre-operative Diagnosis: ESRD, requiring chronic dialysis secondary to Diabetes Mellitus - Type II  Post-operative Diagnosis: Same    Procedure(s) Performed:   Back Table Preparation of Right Kidney    Donation after Brain Death Kidney transplant    Anesthesia: General endotracheal    Preamble  Indications and Patient Counseling: The patient is a 68 y.o. year-old male with end-stage kidney disease secondary to Diabetes Mellitus - Type II who has been evaluated for a kidney transplant.  The procedure was thoroughly discussed with the patient, including potential risks, complications, and alternatives.  Specific complications mentioned included death, graft non-function, bleeding, infection, and rejection, as well as the possibility of other complications not specifically mentioned.    Donor Risk Factors: Prior to the operation, the patient was advised of any donor-specific risk factors requiring specific disclosure.  Factors in this case included PHS risk criteria , donor HBcAb+, or donor HCV+.      Specific PHS donor risk criteria for the organ donor include:  Unknown medical or social history    All questions were answered, the patient voiced appropriate understanding, and he agreed to proceed with the planned procedure.    ABO Confirmation: Immediately following arrival of the donor organ and prior to implantation, a formal ABO confirmation was done according to hospital and UNOS policies.  I confirmed the UNOS ID number ([id number]) of the donor organ and the donor and recipient ABO types, directly verifying these data by comparison with the UNOS Match Run report ([id number]).  This confirmation was personally done by an attending surgeon and circulating nurse, and  is officially documented elsewhere.    Time-Out: A complete time out was carried out prior to incision, with confirmation of patient identity, correct procedure, correct operative site, appropriate antibiotic prophylaxis, review of any known allergies, and presence of all needed equipment.    Procedure in Detail  Prior to starting the operation, the right kidney  was prepared on the back table. Arterial anatomy was single. Venous anatomy was single. Ureteral anatomy was single. Back table vascular reconstruction was not required .  Unneeded fat was removed from the kidney, the vessels were cleaned of adherent tissue and tested for leaks, and the kidney was maintained at ice temperature in organ preservation solution until it was brought to the operative field.     The patient was brought into the operating room and placed in a supine position on the OR table.  After the induction of general endotracheal anesthesia, lines were placed by the anesthesiologist.  The urinary bladder was catheterized and irrigated with antibiotic solution.  There was no tension on the axillae and all pressure points were padded.  Sequential compression boots were used as were Margaret Huggers.  The abdomen was prepped and draped in the usual sterile fashion.  Skin was incised over the right with a knife and deepened with electrocautery.  The peritoneum and its contents were swept medially, exposing the right external iliac artery and the right external iliac vein.  The Bookwalter retractor was used to provide exposure.  Overlying lymphatics were ligated or cauterized and the vessels were dissected free for a length compatible with anastomosis.  The kidney was brought to the OR table at 2/18/2024  3:18 PM.  Venous control was obtained with a vascular clamp.  A venotomy was made, the vein irrigated, and an end renal to right external iliac vein anastomosis was created with 5-0 polypropylene.  Arterial control was obtained with a vascular clamp.   Arteriotomy was made, the artery irrigated, and an end renal to right external iliac artery anastomosis was created with 6-0 polypropylene.  The kidney was unclamped and reperfused at 2/18/2024  3:38 PM.  Reperfusion quality was good. Intraoperative urine production was observed.  After hemostasis was obtained, a Lich uretero-neocystostomy was created.  The bladder was filled and identified, opened, and the anastomosis created using 6-0 PDS.  The bladder muscle was closed over the distal ureter to create an antireflux tunnel.  A ureteral stent was used.  With the kidney well perfused and sitting appropriately without tension on the anastomoses, viscera were replaced in their usual position.  The wound was closed after a final check for hemostasis.  Overall, the graft quality was assessed to be good. At the end of the case the needle, sponge and instrument counts were all correct.  Sterile dressings were applied and the patient was brought to the recovery room/ICU in good condition.    Estimated Blood Loss: 50 mL  Fluids Administered:    Drains:  0  specimens: none    Findings:    Organ Transplanted: Right Kidney    Arterial Anatomy: single  Number of Arteries: 1  Configuration of Multiple Arteries: not applicable  Venous Anatomy: single  Number of Veins: 1  Ureteral Anatomy: single  Number of Ureters: 1  Reperfusion Quality: good  Overall Graft Quality: good  Intraoperative Urine Production: yes  Bates: not to be removed before 2 days.  Ureteral Stent: Yes    Ischemic Times:   Anastomosis (warm ischemia) time: 20 minutes   Cold ischemia time: 625 minutes  Total ischemia time: 645 minutes    Donor Data:  UNOS ID:  [id number]  UNOS Match Run:  [id number]  Donor Type:  Donation after Brain Death  Donor CMV Status:  Positive  Donor HBcAB:  Positive  Donor HCV Status:  Positive

## 2024-02-18 NOTE — H&P
"[facility] - [facility]plant [facility]  Kidney Transplant  H&P      Subjective:     Chief Complaint/Reason for Admission: Kidney Transplant Candidate     History of Present Illness:  Mr. Alves is a 63 y/o male with ESRD secondary to diabetic nephropathy. He has been on the wait list for a kidney transplant at [facility] since 9/17/2019. Pt reports starting HD in 3/2021, he is dialyzing MWF.  LUE AV fistula for access.  Patient denies any recent hospitalizations or ED visits.  Dr. Salomon will be the primary surgeon.  All pre-op labs and imaging have been ordered and will be reviewed prior to surgery. Consents to be signed prior to transplant.       Dialysis History: Mr. Felix with ESRD, requiring chronic dialysis who is on hemodialysis started on 3/2021. Patient is dialyzing on MWF schedule.  Patient reports that he is tolerating dialysis well.  Date of Last Dialysis: 2/16/24    Native urine output per day:   "normal amounts of urine daily"    Previous Transplant: no    PTA Medications   Medication Sig    aspirin (ECOTRIN) 81 MG EC tablet Take 1 tablet (81 mg total) by mouth once daily.    atorvastatin (LIPITOR) 40 MG tablet TAKE 1 TABLET BY MOUTH EVERY DAY    blood sugar diagnostic Strp 1 each by Misc.(Non-Drug; Combo Route) route 4 (four) times daily.    blood-glucose meter kit Test finger stick blood sugar once daily.    calcitRIOL (ROCALTROL) 0.25 MCG Cap TAKE 1 CAPSULE (0.25 MCG TOTAL) BY MOUTH EVERY MON, WED, FRI.    carvediloL (COREG) 12.5 MG tablet Take 12.5 mg by mouth 2 (two) times daily with meals.    cetirizine (ZYRTEC) 10 MG tablet TAKE 1 TABLET BY MOUTH EVERY DAY    ciprofloxacin-dexAMETHasone 0.3-0.1% (CIPRODEX) 0.3-0.1 % DrpS Place 4 drops into both ears 2 (two) times daily.    clotrimazole-betamethasone 1-0.05% (LOTRISONE) cream Apply topically 2 (two) times daily.    cyclobenzaprine (FLEXERIL) 10 MG tablet Take 10 mg by mouth 3 (three) times daily.    esomeprazole (NEXIUM) 40 MG capsule Take 40 mg by mouth once " daily.    finasteride (PROSCAR) 5 mg tablet TAKE 1 TABLET BY MOUTH EVERY DAY    folic acid (FOLVITE) 1 MG tablet TAKE 1 TABLET BY MOUTH EVERY DAY    furosemide (LASIX) 80 MG tablet Take 1 tablet (80 mg total) by mouth once daily.    isosorbide dinitrate (ISORDIL) 20 MG tablet TAKE 1 TABLET BY MOUTH THREE TIMES A DAY    lancets Misc 1 each by Misc.(Non-Drug; Combo Route) route 4 (four) times daily.    sacubitriL-valsartan (ENTRESTO)  mg per tablet Take 1 tablet by mouth 2 (two) times daily.    sevelamer carbonate (RENVELA) 800 mg Tab Take 3 tablets (2,400 mg total) by mouth 3 (three) times daily with meals.    tamsulosin (FLOMAX) 0.4 mg Cap TAKE 1 CAPSULE BY MOUTH EVERY DAY       Review of patient's allergies indicates:   Allergen Reactions    Bactrim [sulfamethoxazole-trimethoprim] Swelling    Nsaids (non-steroidal anti-inflammatory drug)      CKD    Sulfamethoxazole     Trimethoprim        Past Medical History:   Diagnosis Date    Abnormal nuclear stress test 8/30/2022    Acute hypoxemic respiratory failure due to COVID-19 1/21/2021    Acute on chronic combined systolic and diastolic congestive heart failure 5/7/2023    Acute on chronic respiratory failure 5/7/2023    Acute respiratory failure with hypoxia and hypercapnia 5/18/2023    Anal itching 10/10/2022    Anemia 4/16/2014    BPH (benign prostatic hyperplasia)     CKD (chronic kidney disease) stage 4, GFR 15-29 ml/min     Coronary artery disease of native artery of native heart with stable angina pectoris 4/29/2019    Diabetes mellitus     Diabetes mellitus, type 2     Dilated cardiomyopathy 8/30/2022    Elevated PSA     Encounter for blood transfusion     Herpes labialis     Hyperlipidemia     Hypertension     Hypertensive emergency 5/18/2023    Obesity     Pneumonia 5/7/2023    Pneumonia due to COVID-19 virus     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    Preop cardiovascular exam 9/16/2022    Proteinuria     Pulmonary edema with congestive heart failure 5/18/2023     Secondary hyperparathyroidism (of renal origin)      Past Surgical History:   Procedure Laterality Date    CATHETERIZATION OF BOTH LEFT AND RIGHT HEART N/A 08/30/2022    Procedure: CATHETERIZATION, HEART, BOTH LEFT AND RIGHT;  Surgeon: Kassandra Deras MD;  Location: [facility] CATH LAB;  Service: Cardiology;  Laterality: N/A;    CATHETERIZATION OF BOTH LEFT AND RIGHT HEART N/A 9/5/2023    Procedure: CATHETERIZATION, HEART, BOTH LEFT AND RIGHT;  Surgeon: Kassandra Deras MD;  Location: [facility] CATH LAB;  Service: Cardiology;  Laterality: N/A;    COLONOSCOPY N/A 12/21/2017    Procedure: COLONOSCOPY;  Surgeon: Fran Escalera MD;  Location: [facility] ENDO;  Service: Endoscopy;  Laterality: N/A;    COLONOSCOPY N/A 02/02/2023    Procedure: COLONOSCOPY;  Surgeon: Anthony Meng MD;  Location: [facility] ENDO;  Service: Endoscopy;  Laterality: N/A;    FLUOROSCOPY N/A 01/21/2021    Procedure: Vascath insertion;  Surgeon: Adrian Pruitt Jr., MD;  Location: [facility] CATH LAB;  Service: General;  Laterality: N/A;    FLUOROSCOPY N/A 04/06/2021    Procedure: Over the wire Vas Cath exchange;  Surgeon: Ho Pressley MD;  Location: [facility] CATH LAB;  Service: General;  Laterality: N/A;    PROSTATE BIOPSY      thorocotomy  01/01/2010     Family History       Problem Relation (Age of Onset)    Diabetes Father, Other    Hypertension Father, Other    No Known Problems Mother          Tobacco Use    Smoking status: Never    Smokeless tobacco: Never   Substance and Sexual Activity    Alcohol use: Not Currently     Comment: seldom     Drug use: No    Sexual activity: Not Currently     Partners: Female        Review of Systems   Constitutional:  Negative for activity change and appetite change.   Eyes:  Negative for visual disturbance.   Respiratory:  Negative for cough and shortness of breath.    Cardiovascular:  Negative for chest pain, palpitations and leg swelling.   Gastrointestinal:  Negative for abdominal distention, abdominal pain,  "constipation, diarrhea, nausea and vomiting.   Genitourinary:  Negative for difficulty urinating.   Musculoskeletal:  Negative for arthralgias.   Skin:  Negative for wound.   Neurological:  Negative for dizziness.   Hematological:  Negative for adenopathy. Does not bruise/bleed easily.   Psychiatric/Behavioral:  Negative for agitation.      Objective:     Vital Signs (Most Recent):  Temp: 97.9 °F (36.6 °C) (02/18/24 1201)  Pulse: 77 (02/18/24 1201)  Resp: 18 (02/18/24 1201)  BP: 116/71 (02/18/24 1201)  SpO2: 96 % (02/18/24 1201)  Height: 6' 2" (188 cm)  Weight: 112.9 kg (248 lb 12.6 oz)  Body mass index is 31.94 kg/m².      Physical Exam  Vitals and nursing note reviewed.   Constitutional:       Appearance: He is well-developed.   HENT:      Head: Normocephalic.   Eyes:      Conjunctiva/sclera: Conjunctivae normal.   Cardiovascular:      Rate and Rhythm: Normal rate and regular rhythm.      Heart sounds: No murmur heard.  Pulmonary:      Effort: Pulmonary effort is normal.      Breath sounds: Normal breath sounds.   Abdominal:      General: Bowel sounds are normal. There is no distension.      Palpations: Abdomen is soft.      Tenderness: There is no abdominal tenderness.   Musculoskeletal:         General: Normal range of motion.        Arms:       Cervical back: Normal range of motion.   Skin:     General: Skin is warm and dry.      Capillary Refill: Capillary refill takes less than 2 seconds.   Neurological:      Mental Status: He is alert and oriented to person, place, and time.   Psychiatric:         Behavior: Behavior normal.         Thought Content: Thought content normal.         Judgment: Judgment normal.          Laboratory  CBC: No results for input(s): "WBC", "RBC", "HGB", "HCT", "PLT", "MCV", "MCH", "MCHC" in the last 168 hours.  BMP: No results for input(s): "GLU", "NA", "K", "CL", "CO2", "BUN", "CREATININE", "CALCIUM" in the last 168 hours.  CMP: No results for input(s): "GLU", "CALCIUM", "ALBUMIN", " ""PROT", "NA", "K", "CO2", "CL", "BUN", "CREATININE", "ALKPHOS", "ALT", "AST" in the last 168 hours.    Invalid input(s): "BILITO"    Diagnostic Results:  Chest X-Ray: No results found. However, due to the size of the patient record, not all encounters were searched. Please check Results Review for a complete set of results.      Assessment/Plan:     No new Assessment & Plan notes have been filed under this hospital service since the last note was generated.  Service: Transplant Kidney      The patient presents for kidney transplant.  There are no apparent contraindications to proceeding with the planned transplant.  The patient understands that the transplant could potentially be cancelled pending detailed assessment of the donor organ.  He will receive Thymoglobulin induction.  A complete discussion of the transplant procedure, including risks, complications, and alternatives, as well as any donor-specific risk factors requiring specific disclosure, will be carried out by the responsible staff surgeon prior to the procedure.     Discharge Planning:  Not candidate for discharge at this time     Medical decision making for this encounter includes review of pertinent labs and diagnostic studies, assessment and planning, discussions with consulting providers, discussion with patient/family, and participation in multidisciplinary rounds. Time spent caring for patient: 60 minutes    KAYE Phelps  Kidney Transplant  [facility]    "

## 2024-02-18 NOTE — ANESTHESIA PROCEDURE NOTES
Intubation    Date/Time: 2/18/2024 2:13 PM    Performed by: Ravinder Pritchard CRNA  Authorized by: Ravinder Pritchard CRNA    Intubation:     Induction:  Intravenous    Intubated:  Postinduction    Mask Ventilation:  Easy with oral airway    Attempts:  1    Attempted By:  CRNA    Method of Intubation:  Video laryngoscopy    Blade:  [place] 3    Laryngeal View Grade: Grade I - full view of cords      Difficult Airway Encountered?: No      Complications:  None    Airway Device:  Oral endotracheal tube    Airway Device Size:  7.5    Style/Cuff Inflation:  Cuffed (inflated to minimal occlusive pressure)    Inflation Amount (mL):  7    Tube secured:  22    Secured at:  The lips    Placement Verified By:  Capnometry    Complicating Factors:  Obesity, short neck and oropharyngeal edema or fat    Findings Post-Intubation:  BS equal bilateral and atraumatic/condition of teeth unchanged

## 2024-02-18 NOTE — TRANSFER OF CARE
"Anesthesia Transfer of Care Note    Patient: Isidro Alves    Procedure(s) Performed: Procedure(s) (LRB):  TRANSPLANT, KIDNEY (N/A)    Patient location: PACU    Anesthesia Type: general    Transport from OR: Transported from OR on 6-10 L/min O2 by face mask with adequate spontaneous ventilation    Post pain: adequate analgesia    Post assessment: no apparent anesthetic complications and tolerated procedure well    Post vital signs: stable    Level of consciousness: sedated    Nausea/Vomiting: no nausea/vomiting    Complications: none    Transfer of care protocol was followed    Last vitals: Visit Vitals  /71 (BP Location: Right arm, Patient Position: Sitting)   Pulse 77   Temp 36.6 °C (97.9 °F) (Oral)   Resp 18   Ht 6' 2" (1.88 m)   Wt 112.9 kg (248 lb 12.6 oz)   SpO2 96%   BMI 31.94 kg/m²     "

## 2024-02-18 NOTE — NURSING TRANSFER
Nursing Transfer Note      2/18/2024   5:23 PM    Nurse giving handoff:Luigi PÉREZ Rn  Nurse receiving handoff:Emelia Donovan    Reason patient is being transferred: postop    Transfer To: [facility]    Transfer via bed    Transfer with n/a    Transported by transport    Transfer Vital Signs:  Blood Pressure:see flowsheet  Heart Rate:  O2:  Temperature:  Respirations:    Telemetry: n/a  Order for Tele Monitor? No    Additional Lines: Bates Catheter      Medicines sent: NS and D5 1/2    Any special needs or follow-up needed: urine ouput    Patient belongings transferred with patient:  n/a    Chart send with patient: Yes    Notified:     Patient reassessed at: 2/18/24  1  Upon arrival to floor: bed in lowest position

## 2024-02-18 NOTE — HPI
Mr. Alves is a 64 y.o. year old Black or  male with ESRD secondary to diabetic nephropathy.  He has been on the wait list for a kidney transplant at [facility] since 9/17/2019. He is currently  ESRD, Pt reports starting HD ~ 3/2021, he is dialyzing MWF.  LUE AV fistula for access.  Patient denies any recent hospitalizations or ED visits.  Dr. Salomon will be the primary surgeon.  All pre-op labs and imaging have been ordered and will be reviewed prior to surgery. Consents to be signed prior to transplant.

## 2024-02-18 NOTE — SUBJECTIVE & OBJECTIVE
Subjective:     Chief Complaint/Reason for Admission: Kidney Transplant Candidate     History of Present Illness:  Mr. Alves is a 64 y.o. year old Black or  male with ESRD secondary to diabetic nephropathy.  He has been on the wait list for a kidney transplant at [facility] since 9/17/2019. He is currently  ESRD, Pt reports starting HD ~ 3/2021, he is dialyzing MWF.  LUE AV fistula for access.  Patient denies any recent hospitalizations or ED visits.  Dr. Saolmon will be the primary surgeon.  All pre-op labs and imaging have been ordered and will be reviewed prior to surgery. Consents to be signed prior to transplant.       Dialysis History: Mr. Felix with ESRD, requiring chronic dialysis who is on hemodialysis started on 3/2021. Patient is dialyzing on MWF schedule.  Patient reports that he is tolerating dialysis well.  Date of Last Dialysis: 2/16/24    Native urine output per day:  mL    Previous Transplant: no    PTA Medications   Medication Sig    aspirin (ECOTRIN) 81 MG EC tablet Take 1 tablet (81 mg total) by mouth once daily.    atorvastatin (LIPITOR) 40 MG tablet TAKE 1 TABLET BY MOUTH EVERY DAY    blood sugar diagnostic Strp 1 each by Misc.(Non-Drug; Combo Route) route 4 (four) times daily.    blood-glucose meter kit Test finger stick blood sugar once daily.    calcitRIOL (ROCALTROL) 0.25 MCG Cap TAKE 1 CAPSULE (0.25 MCG TOTAL) BY MOUTH EVERY MON, WED, FRI.    carvediloL (COREG) 12.5 MG tablet Take 12.5 mg by mouth 2 (two) times daily with meals.    cetirizine (ZYRTEC) 10 MG tablet TAKE 1 TABLET BY MOUTH EVERY DAY    ciprofloxacin-dexAMETHasone 0.3-0.1% (CIPRODEX) 0.3-0.1 % DrpS Place 4 drops into both ears 2 (two) times daily.    clotrimazole-betamethasone 1-0.05% (LOTRISONE) cream Apply topically 2 (two) times daily.    cyclobenzaprine (FLEXERIL) 10 MG tablet Take 10 mg by mouth 3 (three) times daily.    esomeprazole (NEXIUM) 40 MG capsule Take 40 mg by mouth once daily.    finasteride  (PROSCAR) 5 mg tablet TAKE 1 TABLET BY MOUTH EVERY DAY    folic acid (FOLVITE) 1 MG tablet TAKE 1 TABLET BY MOUTH EVERY DAY    furosemide (LASIX) 80 MG tablet Take 1 tablet (80 mg total) by mouth once daily.    isosorbide dinitrate (ISORDIL) 20 MG tablet TAKE 1 TABLET BY MOUTH THREE TIMES A DAY    lancets Misc 1 each by Misc.(Non-Drug; Combo Route) route 4 (four) times daily.    sacubitriL-valsartan (ENTRESTO)  mg per tablet Take 1 tablet by mouth 2 (two) times daily.    sevelamer carbonate (RENVELA) 800 mg Tab Take 3 tablets (2,400 mg total) by mouth 3 (three) times daily with meals.    tamsulosin (FLOMAX) 0.4 mg Cap TAKE 1 CAPSULE BY MOUTH EVERY DAY       Review of patient's allergies indicates:   Allergen Reactions    Bactrim [sulfamethoxazole-trimethoprim] Swelling    Nsaids (non-steroidal anti-inflammatory drug)      CKD    Sulfamethoxazole     Trimethoprim        Past Medical History:   Diagnosis Date    Abnormal nuclear stress test 8/30/2022    Acute hypoxemic respiratory failure due to COVID-19 1/21/2021    Acute on chronic combined systolic and diastolic congestive heart failure 5/7/2023    Acute on chronic respiratory failure 5/7/2023    Acute respiratory failure with hypoxia and hypercapnia 5/18/2023    Anal itching 10/10/2022    Anemia 4/16/2014    BPH (benign prostatic hyperplasia)     CKD (chronic kidney disease) stage 4, GFR 15-29 ml/min     Coronary artery disease of native artery of native heart with stable angina pectoris 4/29/2019    Diabetes mellitus     Diabetes mellitus, type 2     Dilated cardiomyopathy 8/30/2022    Elevated PSA     Encounter for blood transfusion     Herpes labialis     Hyperlipidemia     Hypertension     Hypertensive emergency 5/18/2023    Obesity     Pneumonia 5/7/2023    Pneumonia due to COVID-19 virus     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    Preop cardiovascular exam 9/16/2022    Proteinuria     Pulmonary edema with congestive heart failure 5/18/2023    Secondary  hyperparathyroidism (of renal origin)      Past Surgical History:   Procedure Laterality Date    CATHETERIZATION OF BOTH LEFT AND RIGHT HEART N/A 08/30/2022    Procedure: CATHETERIZATION, HEART, BOTH LEFT AND RIGHT;  Surgeon: Kassandra Deras MD;  Location: [facility] CATH LAB;  Service: Cardiology;  Laterality: N/A;    CATHETERIZATION OF BOTH LEFT AND RIGHT HEART N/A 9/5/2023    Procedure: CATHETERIZATION, HEART, BOTH LEFT AND RIGHT;  Surgeon: Kassandra Deras MD;  Location: [facility] CATH LAB;  Service: Cardiology;  Laterality: N/A;    COLONOSCOPY N/A 12/21/2017    Procedure: COLONOSCOPY;  Surgeon: Fran Escalera MD;  Location: [facility] ENDO;  Service: Endoscopy;  Laterality: N/A;    COLONOSCOPY N/A 02/02/2023    Procedure: COLONOSCOPY;  Surgeon: Anthony Meng MD;  Location: [facility] ENDO;  Service: Endoscopy;  Laterality: N/A;    FLUOROSCOPY N/A 01/21/2021    Procedure: Vascath insertion;  Surgeon: Adrian Pruitt Jr., MD;  Location: [facility] CATH LAB;  Service: General;  Laterality: N/A;    FLUOROSCOPY N/A 04/06/2021    Procedure: Over the wire Vas Cath exchange;  Surgeon: Ho Pressley MD;  Location: [facility] CATH LAB;  Service: General;  Laterality: N/A;    PROSTATE BIOPSY      thorocotomy  01/01/2010     Family History       Problem Relation (Age of Onset)    Diabetes Father, Other    Hypertension Father, Other    No Known Problems Mother          Tobacco Use    Smoking status: Never    Smokeless tobacco: Never   Substance and Sexual Activity    Alcohol use: Not Currently     Comment: seldom     Drug use: No    Sexual activity: Not Currently     Partners: Female        Review of Systems   Constitutional:  Negative for activity change and appetite change.   Eyes:  Negative for visual disturbance.   Respiratory:  Negative for cough and shortness of breath.    Cardiovascular:  Negative for chest pain, palpitations and leg swelling.   Gastrointestinal:  Negative for abdominal distention, abdominal pain, constipation, diarrhea,  "nausea and vomiting.   Genitourinary:  Negative for difficulty urinating.   Musculoskeletal:  Negative for arthralgias.   Skin:  Negative for wound.   Neurological:  Negative for dizziness.   Hematological:  Negative for adenopathy. Does not bruise/bleed easily.   Psychiatric/Behavioral:  Negative for agitation.      Objective:     Vital Signs (Most Recent):  Temp: 97.9 °F (36.6 °C) (02/18/24 1201)  Pulse: 77 (02/18/24 1201)  Resp: 18 (02/18/24 1201)  BP: 116/71 (02/18/24 1201)  SpO2: 96 % (02/18/24 1201)  Height: 6' 2" (188 cm)  Weight: 112.9 kg (248 lb 12.6 oz)  Body mass index is 31.94 kg/m².      Physical Exam  Vitals and nursing note reviewed.   Constitutional:       Appearance: He is well-developed.   HENT:      Head: Normocephalic.   Eyes:      Conjunctiva/sclera: Conjunctivae normal.   Cardiovascular:      Rate and Rhythm: Normal rate and regular rhythm.      Heart sounds: No murmur heard.  Pulmonary:      Effort: Pulmonary effort is normal.      Breath sounds: Normal breath sounds.   Abdominal:      General: Bowel sounds are normal. There is no distension.      Palpations: Abdomen is soft.      Tenderness: There is no abdominal tenderness.   Musculoskeletal:         General: Normal range of motion.        Arms:       Cervical back: Normal range of motion.   Skin:     General: Skin is warm and dry.      Capillary Refill: Capillary refill takes less than 2 seconds.   Neurological:      Mental Status: He is alert and oriented to person, place, and time.   Psychiatric:         Behavior: Behavior normal.         Thought Content: Thought content normal.         Judgment: Judgment normal.          Laboratory  CBC: No results for input(s): "WBC", "RBC", "HGB", "HCT", "PLT", "MCV", "MCH", "MCHC" in the last 168 hours.  BMP: No results for input(s): "GLU", "NA", "K", "CL", "CO2", "BUN", "CREATININE", "CALCIUM" in the last 168 hours.  CMP: No results for input(s): "GLU", "CALCIUM", "ALBUMIN", "PROT", "NA", "K", "CO2", " ""CL", "BUN", "CREATININE", "ALKPHOS", "ALT", "AST" in the last 168 hours.    Invalid input(s): "BILITO"    Diagnostic Results:  Chest X-Ray: No results found. However, due to the size of the patient record, not all encounters were searched. Please check Results Review for a complete set of results.      "

## 2024-02-19 PROBLEM — Z29.89 PROPHYLACTIC IMMUNOTHERAPY: Status: ACTIVE | Noted: 2024-02-19

## 2024-02-19 PROBLEM — Z94.0 S/P KIDNEY TRANSPLANT: Status: ACTIVE | Noted: 2024-02-19

## 2024-02-19 PROBLEM — Z91.89 AT RISK FOR OPPORTUNISTIC INFECTIONS: Status: ACTIVE | Noted: 2024-02-19

## 2024-02-19 LAB
ALBUMIN SERPL BCP-MCNC: 3.1 G/DL (ref 3.5–5.2)
ALBUMIN SERPL BCP-MCNC: 3.1 G/DL (ref 3.5–5.2)
ANION GAP SERPL CALC-SCNC: 13 MMOL/L (ref 8–16)
ANION GAP SERPL CALC-SCNC: 16 MMOL/L (ref 8–16)
BASOPHILS # BLD AUTO: 0.05 K/UL (ref 0–0.2)
BASOPHILS NFR BLD: 0.3 % (ref 0–1.9)
BUN SERPL-MCNC: 41 MG/DL (ref 8–23)
BUN SERPL-MCNC: 45 MG/DL (ref 8–23)
CALCIUM SERPL-MCNC: 9.1 MG/DL (ref 8.7–10.5)
CALCIUM SERPL-MCNC: 9.8 MG/DL (ref 8.7–10.5)
CHLORIDE SERPL-SCNC: 95 MMOL/L (ref 95–110)
CHLORIDE SERPL-SCNC: 97 MMOL/L (ref 95–110)
CO2 SERPL-SCNC: 18 MMOL/L (ref 23–29)
CO2 SERPL-SCNC: 20 MMOL/L (ref 23–29)
CREAT SERPL-MCNC: 8.5 MG/DL (ref 0.5–1.4)
CREAT SERPL-MCNC: 9.4 MG/DL (ref 0.5–1.4)
DIFFERENTIAL METHOD BLD: ABNORMAL
EOSINOPHIL # BLD AUTO: 0 K/UL (ref 0–0.5)
EOSINOPHIL NFR BLD: 0.2 % (ref 0–8)
ERYTHROCYTE [DISTWIDTH] IN BLOOD BY AUTOMATED COUNT: 14.5 % (ref 11.5–14.5)
EST. GFR  (NO RACE VARIABLE): 5.6 ML/MIN/1.73 M^2
EST. GFR  (NO RACE VARIABLE): 6.3 ML/MIN/1.73 M^2
GLUCOSE SERPL-MCNC: 154 MG/DL (ref 70–110)
GLUCOSE SERPL-MCNC: 155 MG/DL (ref 70–110)
GLUCOSE SERPL-MCNC: 298 MG/DL (ref 70–110)
HCO3 UR-SCNC: 24.4 MMOL/L (ref 24–28)
HCT VFR BLD AUTO: 36.9 % (ref 40–54)
HCT VFR BLD CALC: 33 %PCV (ref 36–54)
HCV RNA SERPL QL NAA+PROBE: NOT DETECTED
HCV RNA SPEC NAA+PROBE-ACNC: NOT DETECTED IU/ML
HGB BLD-MCNC: 11.5 G/DL (ref 14–18)
HPRA INTERPRETATION: NORMAL
IMM GRANULOCYTES # BLD AUTO: 0.07 K/UL (ref 0–0.04)
IMM GRANULOCYTES NFR BLD AUTO: 0.4 % (ref 0–0.5)
LYMPHOCYTES # BLD AUTO: 0.1 K/UL (ref 1–4.8)
LYMPHOCYTES NFR BLD: 0.3 % (ref 18–48)
MAGNESIUM SERPL-MCNC: 1.9 MG/DL (ref 1.6–2.6)
MCH RBC QN AUTO: 27.8 PG (ref 27–31)
MCHC RBC AUTO-ENTMCNC: 31.2 G/DL (ref 32–36)
MCV RBC AUTO: 89 FL (ref 82–98)
MONOCYTES # BLD AUTO: 0.4 K/UL (ref 0.3–1)
MONOCYTES NFR BLD: 2.5 % (ref 4–15)
NEUTROPHILS # BLD AUTO: 15.3 K/UL (ref 1.8–7.7)
NEUTROPHILS NFR BLD: 96.3 % (ref 38–73)
NRBC BLD-RTO: 0 /100 WBC
OHS QRS DURATION: 88 MS
OHS QTC CALCULATION: 450 MS
PCO2 BLDA: 36.1 MMHG (ref 35–45)
PH SMN: 7.44 [PH] (ref 7.35–7.45)
PHOSPHATE SERPL-MCNC: 3.8 MG/DL (ref 2.7–4.5)
PHOSPHATE SERPL-MCNC: 4.5 MG/DL (ref 2.7–4.5)
PLATELET # BLD AUTO: 258 K/UL (ref 150–450)
PMV BLD AUTO: 11.3 FL (ref 9.2–12.9)
PO2 BLDA: 186 MMHG (ref 80–100)
POC BE: 0 MMOL/L
POC IONIZED CALCIUM: 1.12 MMOL/L (ref 1.06–1.42)
POC SATURATED O2: 100 % (ref 95–100)
POC TCO2: 25 MMOL/L (ref 23–27)
POCT GLUCOSE: 152 MG/DL (ref 70–110)
POCT GLUCOSE: 220 MG/DL (ref 70–110)
POCT GLUCOSE: 277 MG/DL (ref 70–110)
POCT GLUCOSE: 286 MG/DL (ref 70–110)
POCT GLUCOSE: 325 MG/DL (ref 70–110)
POTASSIUM BLD-SCNC: 3.6 MMOL/L (ref 3.5–5.1)
POTASSIUM SERPL-SCNC: 4.5 MMOL/L (ref 3.5–5.1)
POTASSIUM SERPL-SCNC: 4.8 MMOL/L (ref 3.5–5.1)
RBC # BLD AUTO: 4.13 M/UL (ref 4.6–6.2)
SAMPLE: ABNORMAL
SODIUM BLD-SCNC: 134 MMOL/L (ref 136–145)
SODIUM SERPL-SCNC: 129 MMOL/L (ref 136–145)
SODIUM SERPL-SCNC: 130 MMOL/L (ref 136–145)
TACROLIMUS BLD-MCNC: 2.4 NG/ML (ref 5–15)
WBC # BLD AUTO: 15.87 K/UL (ref 3.9–12.7)

## 2024-02-19 PROCEDURE — 85025 COMPLETE CBC W/AUTO DIFF WBC: CPT | Performed by: SURGERY

## 2024-02-19 PROCEDURE — 25000003 PHARM REV CODE 250

## 2024-02-19 PROCEDURE — 20600001 HC STEP DOWN PRIVATE ROOM

## 2024-02-19 PROCEDURE — 25000003 PHARM REV CODE 250: Performed by: PHYSICIAN ASSISTANT

## 2024-02-19 PROCEDURE — 25000003 PHARM REV CODE 250: Performed by: SURGERY

## 2024-02-19 PROCEDURE — 36620 INSERTION CATHETER ARTERY: CPT | Mod: 59,,, | Performed by: ANESTHESIOLOGY

## 2024-02-19 PROCEDURE — 36415 COLL VENOUS BLD VENIPUNCTURE: CPT | Performed by: SURGERY

## 2024-02-19 PROCEDURE — 99233 SBSQ HOSP IP/OBS HIGH 50: CPT | Mod: 24,,,

## 2024-02-19 PROCEDURE — 83735 ASSAY OF MAGNESIUM: CPT | Performed by: SURGERY

## 2024-02-19 PROCEDURE — 63600175 PHARM REV CODE 636 W HCPCS: Mod: JZ,JG | Performed by: INTERNAL MEDICINE

## 2024-02-19 PROCEDURE — 80069 RENAL FUNCTION PANEL: CPT | Mod: 91

## 2024-02-19 PROCEDURE — 63600175 PHARM REV CODE 636 W HCPCS

## 2024-02-19 PROCEDURE — 80197 ASSAY OF TACROLIMUS: CPT | Performed by: SURGERY

## 2024-02-19 PROCEDURE — 63600175 PHARM REV CODE 636 W HCPCS: Performed by: SURGERY

## 2024-02-19 PROCEDURE — 80069 RENAL FUNCTION PANEL: CPT | Performed by: SURGERY

## 2024-02-19 PROCEDURE — 36415 COLL VENOUS BLD VENIPUNCTURE: CPT | Mod: XB

## 2024-02-19 PROCEDURE — 99222 1ST HOSP IP/OBS MODERATE 55: CPT | Mod: ,,,

## 2024-02-19 RX ORDER — INSULIN ASPART 100 [IU]/ML
0-5 INJECTION, SOLUTION INTRAVENOUS; SUBCUTANEOUS
Status: DISCONTINUED | OUTPATIENT
Start: 2024-02-19 | End: 2024-02-19

## 2024-02-19 RX ORDER — DEXTROSE 4 G
TABLET,CHEWABLE ORAL
Qty: 1 EACH | Refills: 0 | Status: SHIPPED | OUTPATIENT
Start: 2024-02-19

## 2024-02-19 RX ORDER — LANCETS 33 GAUGE
EACH MISCELLANEOUS 3 TIMES DAILY
Qty: 100 EACH | Refills: 1 | Status: SHIPPED | OUTPATIENT
Start: 2024-02-19

## 2024-02-19 RX ORDER — DOXYCYCLINE HYCLATE 100 MG
100 TABLET ORAL EVERY 12 HOURS
Status: DISCONTINUED | OUTPATIENT
Start: 2024-02-19 | End: 2024-02-19

## 2024-02-19 RX ORDER — CARVEDILOL 6.25 MG/1
6.25 TABLET ORAL 2 TIMES DAILY
Status: DISCONTINUED | OUTPATIENT
Start: 2024-02-19 | End: 2024-02-20 | Stop reason: HOSPADM

## 2024-02-19 RX ORDER — VALGANCICLOVIR 450 MG/1
900 TABLET, FILM COATED ORAL EVERY MORNING
Qty: 60 TABLET | Refills: 2 | Status: SHIPPED | OUTPATIENT
Start: 2024-02-28 | End: 2024-02-20

## 2024-02-19 RX ORDER — TAMSULOSIN HYDROCHLORIDE 0.4 MG/1
1 CAPSULE ORAL DAILY
Status: DISCONTINUED | OUTPATIENT
Start: 2024-02-19 | End: 2024-02-20 | Stop reason: HOSPADM

## 2024-02-19 RX ORDER — ATORVASTATIN CALCIUM 40 MG/1
40 TABLET, FILM COATED ORAL DAILY
Qty: 30 TABLET | Refills: 11 | Status: SHIPPED | OUTPATIENT
Start: 2024-02-20 | End: 2024-05-16 | Stop reason: DRUGHIGH

## 2024-02-19 RX ORDER — SODIUM BICARBONATE 650 MG/1
1300 TABLET ORAL 3 TIMES DAILY
Status: DISCONTINUED | OUTPATIENT
Start: 2024-02-19 | End: 2024-02-20 | Stop reason: HOSPADM

## 2024-02-19 RX ORDER — TAMSULOSIN HYDROCHLORIDE 0.4 MG/1
0.4 CAPSULE ORAL DAILY
Qty: 30 CAPSULE | Refills: 11 | Status: SHIPPED | OUTPATIENT
Start: 2024-02-19 | End: 2025-02-18

## 2024-02-19 RX ORDER — MYCOPHENOLATE MOFETIL 250 MG/1
1000 CAPSULE ORAL 2 TIMES DAILY
Qty: 240 CAPSULE | Refills: 11 | Status: SHIPPED | OUTPATIENT
Start: 2024-02-19 | End: 2025-02-18

## 2024-02-19 RX ORDER — TACROLIMUS 1 MG/1
6 CAPSULE ORAL EVERY 12 HOURS
Qty: 360 CAPSULE | Refills: 11 | Status: SHIPPED | OUTPATIENT
Start: 2024-02-19 | End: 2024-04-09 | Stop reason: DRUGHIGH

## 2024-02-19 RX ORDER — TACROLIMUS 1 MG/1
4 CAPSULE ORAL 2 TIMES DAILY
Status: DISCONTINUED | OUTPATIENT
Start: 2024-02-19 | End: 2024-02-20

## 2024-02-19 RX ORDER — PEN NEEDLE, DIABETIC 30 GX3/16"
1 NEEDLE, DISPOSABLE MISCELLANEOUS 4 TIMES DAILY
Qty: 100 EACH | Refills: 1 | Status: SHIPPED | OUTPATIENT
Start: 2024-02-19

## 2024-02-19 RX ORDER — GLUCAGON 1 MG
1 KIT INJECTION
Status: DISCONTINUED | OUTPATIENT
Start: 2024-02-19 | End: 2024-02-20 | Stop reason: HOSPADM

## 2024-02-19 RX ORDER — INSULIN ASPART 100 [IU]/ML
0-5 INJECTION, SOLUTION INTRAVENOUS; SUBCUTANEOUS
Status: DISCONTINUED | OUTPATIENT
Start: 2024-02-19 | End: 2024-02-20 | Stop reason: HOSPADM

## 2024-02-19 RX ORDER — IBUPROFEN 200 MG
24 TABLET ORAL
Status: DISCONTINUED | OUTPATIENT
Start: 2024-02-19 | End: 2024-02-20 | Stop reason: HOSPADM

## 2024-02-19 RX ORDER — TACROLIMUS 1 MG/1
1 CAPSULE ORAL ONCE
Status: COMPLETED | OUTPATIENT
Start: 2024-02-19 | End: 2024-02-19

## 2024-02-19 RX ORDER — ATORVASTATIN CALCIUM 40 MG/1
40 TABLET, FILM COATED ORAL DAILY
Status: DISCONTINUED | OUTPATIENT
Start: 2024-02-19 | End: 2024-02-20 | Stop reason: HOSPADM

## 2024-02-19 RX ORDER — IBUPROFEN 200 MG
16 TABLET ORAL
Status: DISCONTINUED | OUTPATIENT
Start: 2024-02-19 | End: 2024-02-20 | Stop reason: HOSPADM

## 2024-02-19 RX ORDER — PREDNISONE 20 MG/1
TABLET ORAL
Qty: 70 TABLET | Refills: 11 | Status: SHIPPED | OUTPATIENT
Start: 2024-02-21 | End: 2024-02-20

## 2024-02-19 RX ORDER — SODIUM CHLORIDE 9 MG/ML
INJECTION, SOLUTION INTRAVENOUS CONTINUOUS
Status: DISCONTINUED | OUTPATIENT
Start: 2024-02-19 | End: 2024-02-19

## 2024-02-19 RX ORDER — INSULIN ASPART 100 [IU]/ML
8 INJECTION, SOLUTION INTRAVENOUS; SUBCUTANEOUS
Status: DISCONTINUED | OUTPATIENT
Start: 2024-02-19 | End: 2024-02-20

## 2024-02-19 RX ORDER — FAMOTIDINE 20 MG/1
20 TABLET, FILM COATED ORAL NIGHTLY
Qty: 30 TABLET | Refills: 0 | Status: SHIPPED | OUTPATIENT
Start: 2024-02-19 | End: 2024-05-02

## 2024-02-19 RX ADMIN — DIPHENHYDRAMINE HYDROCHLORIDE 25 MG: 25 CAPSULE ORAL at 02:02

## 2024-02-19 RX ADMIN — OXYCODONE HYDROCHLORIDE 10 MG: 10 TABLET ORAL at 08:02

## 2024-02-19 RX ADMIN — ACETAMINOPHEN 650 MG: 325 TABLET ORAL at 10:02

## 2024-02-19 RX ADMIN — INSULIN ASPART 1 UNITS: 100 INJECTION, SOLUTION INTRAVENOUS; SUBCUTANEOUS at 01:02

## 2024-02-19 RX ADMIN — SODIUM BICARBONATE 650 MG TABLET 1300 MG: at 09:02

## 2024-02-19 RX ADMIN — FAMOTIDINE 20 MG: 20 TABLET, FILM COATED ORAL at 08:02

## 2024-02-19 RX ADMIN — INSULIN ASPART 3 UNITS: 100 INJECTION, SOLUTION INTRAVENOUS; SUBCUTANEOUS at 11:02

## 2024-02-19 RX ADMIN — DOCUSATE SODIUM 100 MG: 100 CAPSULE, LIQUID FILLED ORAL at 02:02

## 2024-02-19 RX ADMIN — SODIUM CHLORIDE: 9 INJECTION, SOLUTION INTRAVENOUS at 09:02

## 2024-02-19 RX ADMIN — DIPHENHYDRAMINE HYDROCHLORIDE 50 MG: 25 CAPSULE ORAL at 08:02

## 2024-02-19 RX ADMIN — INSULIN ASPART 8 UNITS: 100 INJECTION, SOLUTION INTRAVENOUS; SUBCUTANEOUS at 03:02

## 2024-02-19 RX ADMIN — TACROLIMUS 1 MG: 1 CAPSULE ORAL at 11:02

## 2024-02-19 RX ADMIN — OXYCODONE HYDROCHLORIDE 10 MG: 10 TABLET ORAL at 10:02

## 2024-02-19 RX ADMIN — MYCOPHENOLATE MOFETIL 1000 MG: 250 CAPSULE ORAL at 09:02

## 2024-02-19 RX ADMIN — TACROLIMUS 4 MG: 1 CAPSULE ORAL at 05:02

## 2024-02-19 RX ADMIN — DOXYCYCLINE HYCLATE 100 MG: 100 TABLET, COATED ORAL at 10:02

## 2024-02-19 RX ADMIN — PENICILLIN G BENZATHINE 2.4 MILLION UNITS: 1200000 INJECTION, SUSPENSION INTRAMUSCULAR at 02:02

## 2024-02-19 RX ADMIN — ACETAMINOPHEN 650 MG: 325 TABLET ORAL at 05:02

## 2024-02-19 RX ADMIN — INSULIN ASPART 1 UNITS: 100 INJECTION, SOLUTION INTRAVENOUS; SUBCUTANEOUS at 10:02

## 2024-02-19 RX ADMIN — TAMSULOSIN HYDROCHLORIDE 0.4 MG: 0.4 CAPSULE ORAL at 09:02

## 2024-02-19 RX ADMIN — ACETAMINOPHEN 650 MG: 325 TABLET ORAL at 02:02

## 2024-02-19 RX ADMIN — TACROLIMUS 3 MG: 1 CAPSULE ORAL at 08:02

## 2024-02-19 RX ADMIN — HEPARIN SODIUM 5000 UNITS: 5000 INJECTION INTRAVENOUS; SUBCUTANEOUS at 01:02

## 2024-02-19 RX ADMIN — METHYLPREDNISOLONE SODIUM SUCCINATE 250 MG: 125 INJECTION, POWDER, FOR SOLUTION INTRAMUSCULAR; INTRAVENOUS at 02:02

## 2024-02-19 RX ADMIN — CARVEDILOL 6.25 MG: 6.25 TABLET, FILM COATED ORAL at 10:02

## 2024-02-19 RX ADMIN — SODIUM BICARBONATE 650 MG TABLET 1300 MG: at 02:02

## 2024-02-19 RX ADMIN — ANTI-THYMOCYTE GLOBULIN (RABBIT) 150 MG: 5 INJECTION, POWDER, LYOPHILIZED, FOR SOLUTION INTRAVENOUS at 03:02

## 2024-02-19 RX ADMIN — INSULIN ASPART 8 UNITS: 100 INJECTION, SOLUTION INTRAVENOUS; SUBCUTANEOUS at 11:02

## 2024-02-19 RX ADMIN — HEPARIN SODIUM 5000 UNITS: 5000 INJECTION INTRAVENOUS; SUBCUTANEOUS at 05:02

## 2024-02-19 RX ADMIN — MYCOPHENOLATE MOFETIL 1000 MG: 250 CAPSULE ORAL at 10:02

## 2024-02-19 RX ADMIN — SODIUM BICARBONATE 650 MG TABLET 1300 MG: at 08:02

## 2024-02-19 RX ADMIN — MUPIROCIN 1 G: 20 OINTMENT TOPICAL at 09:02

## 2024-02-19 RX ADMIN — SODIUM CHLORIDE: 9 INJECTION, SOLUTION INTRAVENOUS at 03:02

## 2024-02-19 RX ADMIN — ATORVASTATIN CALCIUM 40 MG: 40 TABLET, FILM COATED ORAL at 10:02

## 2024-02-19 RX ADMIN — CEFAZOLIN 2 G: 2 INJECTION, POWDER, FOR SOLUTION INTRAMUSCULAR; INTRAVENOUS at 06:02

## 2024-02-19 RX ADMIN — THERA TABS 1 TABLET: TAB at 09:02

## 2024-02-19 RX ADMIN — INSULIN ASPART 5 UNITS: 100 INJECTION, SOLUTION INTRAVENOUS; SUBCUTANEOUS at 08:02

## 2024-02-19 RX ADMIN — HEPARIN SODIUM 5000 UNITS: 5000 INJECTION INTRAVENOUS; SUBCUTANEOUS at 10:02

## 2024-02-19 RX ADMIN — INSULIN DETEMIR 20 UNITS: 100 INJECTION, SOLUTION SUBCUTANEOUS at 09:02

## 2024-02-19 RX ADMIN — DOCUSATE SODIUM 100 MG: 100 CAPSULE, LIQUID FILLED ORAL at 09:02

## 2024-02-19 RX ADMIN — CARVEDILOL 6.25 MG: 6.25 TABLET, FILM COATED ORAL at 08:02

## 2024-02-19 NOTE — PROGRESS NOTES
Admit Note     Met with patient, wife, and daughter to assess needs. Patient is a 68 y.o.  male, admitted for for kidney transplant.      Patient admitted from home on 2/18/2024 .  At this time, patient presents as alert and oriented x 4, pleasant, good eye contact, well groomed, recall good, concentration/judgement good, average intelligence, calm, communicative, cooperative, and asking and answering questions appropriately.  At this time, patients caregiver presents as alert and oriented x 4, pleasant, good eye contact, well groomed, recall good, concentration/judgement good, average intelligence, calm, communicative, cooperative, and asking and answering questions appropriately.    Household/Family Systems     Patient resides with patient's wife, at [address] [facility] [facility] [address].  Support system includes his wife Kunal Alves ([phone]), his daughter Kevin Alves ([phone]), his aunt Princess Alves ([phone]), and his sister Sahil Reynolds ([phone]).  Patient does not have dependents that are need of being cared for.     Patients primary caregiver is Kunal Alves, patients wife, phone number [phone].  Confirmed patients contact information is [phone] (home);   Telephone Information:   Mobile [phone]   .    During admission, patient's caregiver plans to stay in patient's room.  Confirmed patient and patients caregivers do have access to reliable transportation.    Cognitive Status/Learning     Patient reports reading ability as college and states patient does not have difficulty with N/A.  Patient reports patient learns best by reading information.   Needed: No.   Highest education level: Attended College/Technical School    Vocation/Disability   .  Working for Income: yes  If yes, working activity level: Working Full Time  Patient is employed as a night club owner.    Adherence     Patient reports a high level of adherence to patients health  care regimen.  Adherence counseling and education provided. Patient verbalizes understanding.    Substance Use    Patient reports the following substance usage.    Tobacco: none, patient denies any use.  Alcohol: none, patient denies any use.  Illicit Drugs/Non-prescribed Medications: none, patient denies any use.  Patient states clear understanding of the potential impact of substance use.  Substance abstinence/cessation counseling, education and resources provided and reviewed.     Services Utilizing/ADLS    Infusion Service: Prior to admission, patient utilizing? no  Home Health: Prior to admission, patient utilizing? no  DME: Prior to admission, yes BP Cuff  Pulmonary/Cardiac Rehab: Prior to admission, yes  Dialysis:  Prior to admission, yes  Transplant Specialty Pharmacy:  Prior to admission, no; patient provides permission to release necessary information to specialty pharmacy for medications post-tranplant. Resources provided and patient is choosing [facility] pharmacy at this time.    Prior to admission, patient reports patient was independent with ADLS and was driving.  Patient reports patient is not able to care for self at this time due to compromised medical condition (as documented in medical record) and physical weakness..  Patient indicates a willingness to care for self once medically cleared to do so.    Insurance/Medications    Insured by   Payer/Plan Subscr  Sex Relation Sub. Ins. ID Effective Group Num   1. MEDICARE - ME* KENNEY TEJEDA 1955 Male Self [id number] 22                                    [address]   2. BLUE CROSS * MAGGIE TEJEDA L 1960  Spouse [id number]                                   [address] [address]      Primary Insurance (for UNOS reporting): Public Insurance - Medicare FFS (Fee For Service)  Secondary Insurance (for UNOS reporting): Private Insurance    Patient reports patient is able to obtain and afford medications at this time and at  time of discharge.    Living Will/Healthcare Power of     Patient states patient does not have a LW and/or HCPA.   provided education regarding LW and HCPA and the completion of forms.    Coping/Mental Health    Patient is coping adequately with the aid of  family members and friends.  Patient denies mental health difficulties. Patient denied needing or wanting outpatient referrals at this time. Patient and caregivers agree to contact transplant team with needs, questions, or concerns as they arise.                                 Discharge Planning    At time of discharge, patient plans to return to patient's home under the care of Kunal Alves.  Patients wife will transport patient.  Per rounds today, expected discharge date has not been medically determined at this time. Patient and patients caregiver  verbalize understanding and are involved in treatment planning and discharge process.    Additional Concerns    Patient's caretaker denies additional needs and/or concerns at this time.  providing ongoing psychosocial support, education, resources and d/c planning as needed.  SW remains available.  remains available. Patient's caregiver verbalizes understanding and agreement with information reviewed,  availability and how to access available resources as needed. Patient denies additional needs and/or concerns at this time. Patient verbalizes understanding and agreement with information reviewed, social work availability, and how to access available resources as needed.

## 2024-02-19 NOTE — RESPIRATORY THERAPY
RAPID RESPONSE RESPIRATORY THERAPY PROACTIVE NOTE           Time of visit: 1217     Code Status: Full Code   : 1955  Bed: 20198/00734 A:   MRN: [id number]  Time spent at the bedside: < 15 min    SITUATION    Evaluated patient for: VBG/Lytes     BACKGROUND    Why is the patient in the hospital?: Kidney transplant candidate    Patient has a past medical history of Abnormal nuclear stress test, Acute hypoxemic respiratory failure due to COVID-19, Acute on chronic combined systolic and diastolic congestive heart failure, Acute on chronic respiratory failure, Acute respiratory failure with hypoxia and hypercapnia, Anal itching, Anemia, BPH (benign prostatic hyperplasia), CKD (chronic kidney disease) stage 4, GFR 15-29 ml/min, Coronary artery disease of native artery of native heart with stable angina pectoris, Diabetes mellitus, Diabetes mellitus, type 2, Dilated cardiomyopathy, Elevated PSA, Encounter for blood transfusion, Herpes labialis, Hyperlipidemia, Hypertension, Hypertensive emergency, Obesity, Pneumonia, Pneumonia due to COVID-19 virus, Post viral asthma, Preop cardiovascular exam, Proteinuria, Pulmonary edema with congestive heart failure, and Secondary hyperparathyroidism (of renal origin).    24 Hours Vitals Range:  Temp:  [97.3 °F (36.3 °C)-98.7 °F (37.1 °C)]   Pulse:  [70-89]   Resp:  [10-20]   BP: (116-154)/(56-80)   SpO2:  [91 %-100 %]     Labs:    Recent Labs     24  1237 24  1636 24  2143 24  0550   * 133* 132* 129*   K 4.2 4.0 3.9 4.8   CL 94* 97 97 95   CO2 26 22* 21* 18*   BUN 41* 42* 40* 41*   CREATININE 11.0* 10.4* 9.8* 9.4*   * 175* 315* 298*   PHOS 4.0 4.1  --  4.5   MG  --   --   --  1.9        Recent Labs     24  1227   PH 7.316*   PCO2 56.9*   PO2 16*   HCO3 29.1*   POCSATURATED 17   BE 3*       ASSESSMENT/INTERVENTIONS  Called by 11W charge RN to obtain a VBG with lytes for potassium level. Pt is scheduled for kidney transplant later today.  Results obtained at 1227. Left pt bedside at 1229.    Last VS   Temp: 98.3 °F (36.8 °C) (02/19 0445)  Pulse: 80 (02/19 0600)  Resp: 16 (02/19 0600)  BP: 154/80 (02/19 0600)  SpO2: 96 % (02/19 0600)    Level of Consciousness: Level of Consciousness (AVPU): alert  Respiratory Effort:   Expansion/Accessory Muscle Usage: Expansion/Accessory Muscles/Retractions: no use of accessory muscles  All Lung Field Breath Sounds: All Lung Fields Breath Sounds: equal bilaterally, clear  O2 Device/Concentration: RA, 21%.  NIPPV: No Surgical airway: No  ETCO2 monitored:    Ambu at bedside:      Active Orders   Respiratory Care    Incentive spirometry     Frequency: Q4H PRN     Number of Occurrences: Until Specified     Order Comments: Instruct patient on Incentive Spirometer; patient should do Incentive Spirometer every 4 hours while awake      Oxygen Continuous     Frequency: Continuous     Number of Occurrences: Until Specified     Order Comments: Discontinue when Sp02 is greater than or equal to 95% of equal to Preop Sp02       Order Questions:      Device type: Low flow      Device: Simple Face Mask      Titrate O2 per Oxygen Titration Protocol: Yes      Notify MD of: Inability to achieve desired SpO2    Oxygen Continuous     Frequency: Continuous     Number of Occurrences: Until Specified     Order Comments: Discontinue when Sp02 is greater than or equal to 95% of equal to Preop Sp02       Order Questions:      Device type: Low flow      Device: Simple Face Mask      Titrate O2 per Oxygen Titration Protocol: Yes      Notify MD of: Inability to achieve desired SpO2    Pulse Oximetry Continuous     Frequency: Continuous     Number of Occurrences: Until Specified       RECOMMENDATIONS    We recommend: RRT Recs: Continue POC per primary team.    FOLLOW-UP    Please call back the Rapid Response [facility] RRT at x [phone] for any questions or concerns.

## 2024-02-19 NOTE — PROGRESS NOTES
EDUCATION NOTE:    Met with Isidro Alves and his caregivers to provide teaching re: immunosuppressant medications.  Reviewed medication section of the Kidney Transplant Education book that was provided.  Emphasized the importance of compliance, role of the blue medication card, concerns for drug interactions, and process of obtaining refills.  Counseled regarding Prograf, Cellcept , prednisone, including directions for use, monitoring, how to handle missed doses, and side effects.  Patient and wife verbalized understanding and had the opportunity to ask questions.

## 2024-02-19 NOTE — ASSESSMENT & PLAN NOTE
- s/p DBD KTX   - donor hepC+/DERICK- and RPR +, ID consulted   - 2 day harris, remove 2/20 AM; no drains   - Cr slowly clearing, good UOP (1.8L/12h)  - strict Is and Os   - monitor with RFP

## 2024-02-19 NOTE — ASSESSMENT & PLAN NOTE
BG goal: 140-180    (0.4 u/kg/day WBD)   - Start Levemir 20 units   - Start Novolog 8 units TIDWM   - Continue LDC (150/50) SSI PRN ac/hs/0200   - POCT Glucose before meals, at bedtime and at 2 am  - Hypoglycemia protocol in place      ** Please notify Endocrine for any change and/or advance in diet**  ** Please call Endocrine for any BG related issues **     Discharge Planning:   TBD. Please notify endocrinology prior to discharge.

## 2024-02-19 NOTE — PROGRESS NOTES
TRANSPLANT NOTE:      ORGAN:  RIGHT KIDNEY   Disease Etiology: Diabetes Mellitus - Type II  Donor Type:  Donation after Brain Death   Formerly named [facility] High Risk:  Yes   Donor CMV Status: Positive   Donor HBcAB:  Positive   Donor HCV Status:  Positive   Peak cPRA % (within 1 year of transplant): 2     CMV +/+; Donor: RPR+, donor and recipient: HBcAB(+), Recipient HBsAb(+), donor HCVab(+); donor EBV(+)       Isidro Alves is a 68 y.o. male s/p   Donation after Brain Death  kidney transplant on 2/18/2024 (Kidney) for Diabetes Mellitus - Type II.   This patient will receive 3 doses of Thymoglobulin for induction.  This patients maintenance immunosuppression will include a steroid taper per protocol to 5mg daily, Prograf, and Cellcept maintenance.  Opportunistic infection prophylaxis will include Valcyte for 3 months (CMV D + , R + ) and Bactrim for 6 months.  Patient is to begin self medications upon transfer to the [facility], and I plan to meet with this patient and his/her support person prior to discharge to review the medication section of the Kidney Transplant Education Manual.  I have reviewed the pre-op medications and have restarted those, as appropriate.

## 2024-02-19 NOTE — CONSULTS
ID consulted for donor + RPR  Bicillin weekly x 3 doses ordered  First dose today, remaining doses will be scheduled as outpatient in ID clinic    Please call w/ any additional questions or concerns    Argelia Martinez DO  Transplant Infectious Disease

## 2024-02-19 NOTE — PLAN OF CARE
VSS  No signs of evident distress  Pain medication admin as per MAR  Kidney transplant completed.  RLQ incision with island border dressing  Accuchecks AC&HS  Right Hand 20g PIV dressing CDI  Left upper arm fistula +/+.  Pink armband in place  Indwelling urinary catheter with pink tinged urine noted

## 2024-02-19 NOTE — SUBJECTIVE & OBJECTIVE
Interval HPI:   No acute events overnight. Patient in room 75272/14397 A. Blood glucose worsening. BG above goal on current insulin regimen (SSI ). Steroid use- Methylprednisolone  and Hydrocortisone  500/20 mg .   1 Day Post-Op  Renal function- Abnormal - 9.4 cr    Vasopressors-  None     Diet renal     Eatin%  Nausea: No  Hypoglycemia and intervention: No  Fever: No  TPN and/or TF: No    PMH, PSH, FH, SH updated and reviewed     ROS:  Review of Systems   Gastrointestinal:  Negative for constipation, diarrhea, nausea and vomiting.   Endocrine: Negative for polydipsia and polyuria.       Current Medications and/or Treatments Impacting Glycemic Control  Immunotherapy:    Immunosuppressants           Stop Route Frequency     antithymocyte globulin (rabbit) 150 mg, hydrocortisone sodium succinate (SOLU-CORTEF) 20 mg in sodium chloride 0.9% 500 mL (FOR PERIPHERAL LINE ADMINISTRATION ONLY)         24 1159 IV Daily     mycophenolate capsule 1,000 mg         -- Oral 2 times daily     tacrolimus capsule 3 mg         -- Oral 2 times daily          Steroids:   Hormones (From admission, onward)      Start     Stop Route Frequency Ordered    24 0900  predniSONE tablet 20 mg  (IP TXP KIDNEY POST-OP THYMO WITH PERIPHERAL PRECHECKED)         -- Oral Daily 24 1636    24 1100  methylPREDNISolone sodium succinate injection 125 mg         -- IV Once 24 19224 1200  antithymocyte globulin (rabbit) 150 mg, hydrocortisone sodium succinate (SOLU-CORTEF) 20 mg in sodium chloride 0.9% 500 mL (FOR PERIPHERAL LINE ADMINISTRATION ONLY)         24 1159 IV Daily 24 1920    24 1100  methylPREDNISolone sodium succinate injection 250 mg         -- IV Once 24 1920    24 2017  hydrocortisone sodium succinate injection 100 mg         35 1217 IV Once as needed 24 192          Pressors:    Autonomic Drugs (From admission, onward)      Start     Stop Route Frequency  Ordered    02/18/24 2017  EPINEPHrine (PF) injection 1 mg         07/17/35 1217 SubQ Once as needed 02/18/24 1920    02/18/24 1730  EPINEPHrine (PF) injection 1 mg  (IP TXP KIDNEY POST-OP THYMO WITH PERIPHERAL PRECHECKED)         07/17/35 0930 SubQ Once as needed 02/18/24 1636          Hyperglycemia/Diabetes Medications:   Antihyperglycemics (From admission, onward)      Start     Stop Route Frequency Ordered    02/18/24 1729  insulin aspart U-100 pen 0-5 Units  (INSULIN - LOW CORRECTION DOSE (Recommended for BMI <25, GFR<15 or on dialysis, Age > 70, type 1 diabetes, ESLD, severe CHF or patients on <70 units of insulin daily))         -- SubQ Before meals & nightly PRN 02/18/24 1636             PHYSICAL EXAMINATION:  Vitals:    02/19/24 0737   BP: (!) 143/80   Pulse: 69   Resp:    Temp: 98.1 °F (36.7 °C)     Body mass index is 31.39 kg/m².     Physical Exam  Constitutional:       General: He is not in acute distress.     Appearance: Normal appearance. He is not ill-appearing.   HENT:      Head: Normocephalic and atraumatic.      Right Ear: External ear normal.      Left Ear: External ear normal.      Nose: Nose normal.   Cardiovascular:      Rate and Rhythm: Normal rate and regular rhythm.   Pulmonary:      Effort: No respiratory distress.   Abdominal:      General: There is no distension.   Skin:     Coloration: Skin is not jaundiced.      Findings: No erythema.

## 2024-02-19 NOTE — DISCHARGE SUMMARY
[facility]  Kidney Transplant  Discharge Summary    Patient Name: Isidro Alves  MRN: [id number]  Admission Date: 2/18/2024  Hospital Length of Stay: 2 days  Discharge Date and Time:  02/20/2024 12:39 PM  Attending Physician: Karlee Leary MD   Discharging Provider: Jennifer Ortega PA-C  Primary Care Provider: Steven Morgan MD    HPI:   Mr. Alves is a 64 y.o. year old Black or  male with ESRD secondary to diabetic nephropathy.  He has been on the wait list for a kidney transplant at [facility] since 9/17/2019. He is currently  ESRD, Pt reports starting HD ~ 3/2021, he is dialyzing MWF.  LUE AV fistula for access.  Patient denies any recent hospitalizations or ED visits.  Dr. Salomon will be the primary surgeon.  All pre-op labs and imaging have been ordered and will be reviewed prior to surgery. Consents to be signed prior to transplant.       Procedure(s) (LRB):  TRANSPLANT, KIDNEY (N/A)     Hospital Course:    Mr. Alves is now s/p DBD KTX 2/18/24 (CIT 10.5h, HepC+/DERICK- donor, CMV +/+, RPR+ donor) for DMII nephropathy. 2 day harris no drains. Harris removed, voiding freely. Surgery went well, no complications. Cr 7.0. UOP great (2.5L/24h).   Progressed well post op, tolerating diet well, ambulating in the halls, passing flatus. Encouraged patient to cont ambulating and hydrate well. ID consulted for RPR+ donor, penicillin plan in place. (1st dose given inpatient, 32 2/26, #3 3/4).    Patient ready and stable for discharge at this time. On day of discharge, patient ambulating without assistance, tolerating diet, pain well controlled. Incision is CDI with staples.  F/u labs 2/21/24. F/u clinic appointment 2/21/24.  Patient understands discharge instruction and importance of follow up.    Goals of Care Treatment Preferences:  Code Status: Full Code      Final Active Diagnoses:    Diagnosis Date Noted POA    PRINCIPAL PROBLEM:  S/P kidney transplant [Z94.0] 02/19/2024 Not  Applicable    At risk for opportunistic infections [Z91.89] 02/19/2024 No    Prophylactic immunotherapy [Z29.89] 02/19/2024 Not Applicable    Chronic systolic congestive heart failure [I50.22] 07/07/2022 Yes    Coronary artery disease of native artery of native heart with stable angina pectoris [I25.118] 04/29/2019 Yes    Type 2 diabetes mellitus with chronic kidney disease, with long-term current use of insulin [E11.22, Z79.4] 05/02/2015 Not Applicable    Anemia due to chronic kidney disease [N18.9, D63.1] 04/16/2014 Yes      Problems Resolved During this Admission:       Treatments: see above     Consults (From admission, onward)          Status Ordering Provider     Inpatient consult to Infectious Diseases  Once        Provider:  (Not yet assigned)    Completed LUNA MANTILLA     Inpatient consult to Endocrinology  Once        Provider:  (Not yet assigned)    Completed RAUL ESPINOZA     Inpatient consult to Registered Dietitian/Nutritionist  Once        Provider:  (Not yet assigned)    Completed SHIMON WRAY     Inpatient consult to Transplant Nephrology (KTM)  Once        Provider:  (Not yet assigned)    Acknowledged SHIMON WRAY            Pending Diagnostic Studies:       Procedure Component Value Units Date/Time    G6PD,Quantitative [[id number]] Collected: 02/20/24 0637    Order Status: Sent Lab Status: In process Updated: 02/20/24 0707    Specimen: Blood     Hepatitis B Surface Antibody, Qual/Quant [[id number]] Collected: 02/18/24 1237    Order Status: Sent Lab Status: In process Updated: 02/18/24 1252    Specimen: Blood           Significant Diagnostic Studies: Labs: CMP   Recent Labs   Lab 02/19/24  0550 02/19/24  1501 02/20/24  0637   * 130* 130*   K 4.8 4.5 4.3   CL 95 97 96   CO2 18* 20* 22*   * 154* 234*   BUN 41* 45* 53*   CREATININE 9.4* 8.5* 7.0*   CALCIUM 9.1 9.8 9.2   ALBUMIN 3.1* 3.1* 3.0*   ANIONGAP 16 13 12   , CBC   Recent Labs   Lab 02/18/24  2143 02/19/24  0550  02/20/24  0637   WBC 15.03* 15.87* 11.29   HGB 10.6* 11.5* 10.6*   HCT 33.4* 36.9* 32.7*    258 207   , and All labs within the past 24 hours have been reviewed    Discharged Condition: good    Disposition: Home or Self Care    Patient Instructions:      Diet Adult Regular     Notify your health care provider if you experience any of the following:  increased confusion or weakness     Notify your health care provider if you experience any of the following:  persistent dizziness, light-headedness, or visual disturbances     Notify your health care provider if you experience any of the following:  worsening rash     Notify your health care provider if you experience any of the following:  severe persistent headache     Notify your health care provider if you experience any of the following:  difficulty breathing or increased cough     Notify your health care provider if you experience any of the following:  redness, tenderness, or signs of infection (pain, swelling, redness, odor or green/yellow discharge around incision site)     Notify your health care provider if you experience any of the following:  severe uncontrolled pain     Notify your health care provider if you experience any of the following:  persistent nausea and vomiting or diarrhea     Notify your health care provider if you experience any of the following:  temperature >100.4     No dressing needed     Type And Screen Preop   Standing Status: Future Standing Exp. Date: 04/18/25     Activity as tolerated   Order Comments: No strenuous exercise, driving, lifting >10 lbs x 6 weeks     Medications:  Reconciled Home Medications:      Medication List        START taking these medications      atovaquone 750 mg/5 mL Susp oral liquid  Commonly known as: MEPRON  Take 10 mLs (1,500 mg total) by mouth once daily. Stop 8/18/24     blood-glucose meter Misc  Use as instructed  Replaces: blood-glucose meter kit     famotidine 20 MG tablet  Commonly known as:  "PEPCID  Take 1 tablet (20 mg total) by mouth every evening.     insulin degludec 100 unit/mL (3 mL) insulin pen  Commonly known as: TRESIBA FLEXTOUCH U-100  Inject 25 Units into the skin once daily.  Start taking on: February 21, 2024     insulin lispro 100 unit/mL pen  Commonly known as: HumaLOG KwikPen Insulin  Inject 10 Units into the skin 3 (three) times daily. Plus slide; max dose 45 units/d     multivitamin Tab  Take 1 tablet by mouth once daily.     mycophenolate 250 mg Cap  Commonly known as: CELLCEPT  Take 4 capsules (1,000 mg total) by mouth 2 (two) times daily.     oxyCODONE 10 mg Tab immediate release tablet  Commonly known as: ROXICODONE  Take 1 tablet (10 mg total) by mouth every 6 (six) hours as needed for Pain.     pen needle, diabetic 32 gauge x 5/32" Ndle  Commonly known as: BD SHIRA 2ND GEN PEN NEEDLE  Use 1 pen needle to inject insulin 4 (four) times daily.     predniSONE 20 MG tablet  Commonly known as: DELTASONE  Take by mouth daily;  2/21 - 2/27 : 20 mg;  2/28 - 3/5 : 15 mg; 3/6 - 3/12 :10 mg; 3/13/2024 - forever : 5 mg; do not stop  Start taking on: February 21, 2024     sodium bicarbonate 650 MG tablet  Take 2 tablets (1,300 mg total) by mouth 3 (three) times daily.     tacrolimus 1 MG Cap  Commonly known as: PROGRAF  Take 6 capsules (6 mg total) by mouth every 12 (twelve) hours.     valGANciclovir 450 mg Tab  Commonly known as: VALCYTE  Take 1 tablet (450 mg total) by mouth every Mon, Wed, Fri. Stop 5/21/24  Start taking on: February 21, 2024            CHANGE how you take these medications      atorvastatin 40 MG tablet  Commonly known as: LIPITOR  Take 1 tablet (40 mg total) by mouth once daily.  What changed: when to take this     blood sugar diagnostic Strp  Use 1 strip to test blood sugar 3 (three) times daily.  What changed: when to take this     carvediloL 6.25 MG tablet  Commonly known as: COREG  Take 1 tablet (6.25 mg total) by mouth 2 (two) times daily.  What changed:   medication " strength  how much to take  when to take this     lancets 33 gauge Misc  Commonly known as: ONETOUCH DELICA PLUS LANCET  Use 1 lancet to test blood sugar 3 (three) times daily.  What changed:   medication strength  how much to take  when to take this     tamsulosin 0.4 mg Cap  Commonly known as: FLOMAX  Take 1 capsule (0.4 mg total) by mouth once daily.  What changed: when to take this            CONTINUE taking these medications      aspirin 81 MG EC tablet  Commonly known as: ECOTRIN  Take 1 tablet (81 mg total) by mouth once daily.     cetirizine 10 MG tablet  Commonly known as: ZYRTEC  TAKE 1 TABLET BY MOUTH EVERY DAY     finasteride 5 mg tablet  Commonly known as: PROSCAR  TAKE 1 TABLET BY MOUTH EVERY DAY            STOP taking these medications      blood-glucose meter kit  Replaced by: blood-glucose meter Misc     calcitRIOL 0.25 MCG Cap  Commonly known as: ROCALTROL     ciprofloxacin-dexAMETHasone 0.3-0.1% 0.3-0.1 % Drps  Commonly known as: CIPRODEX     clotrimazole-betamethasone 1-0.05% cream  Commonly known as: LOTRISONE     cyclobenzaprine 10 MG tablet  Commonly known as: FLEXERIL     ENTRESTO  mg per tablet  Generic drug: sacubitriL-valsartan     esomeprazole 40 MG capsule  Commonly known as: NEXIUM     folic acid 1 MG tablet  Commonly known as: FOLVITE     furosemide 80 MG tablet  Commonly known as: LASIX     isosorbide dinitrate 20 MG tablet  Commonly known as: ISORDIL     sevelamer carbonate 800 mg Tab  Commonly known as: RENVELA            Time spent caring for patient (Greater than 1/2 spent in direct face-to-face contact): > 30 minutes    Jennifer Ortega PA-C  Kidney Transplant  [facility]

## 2024-02-19 NOTE — RESPIRATORY THERAPY
RAPID RESPONSE RESPIRATORY CHART CHECK       Chart check completed. Please call [phone] for further concerns or assistance.

## 2024-02-19 NOTE — SUBJECTIVE & OBJECTIVE
Subjective:   History of Present Illness:  Mr. Alves is a 64 y.o. year old Black or  male with ESRD secondary to diabetic nephropathy.  He has been on the wait list for a kidney transplant at [facility] since 9/17/2019. He is currently  ESRD, Pt reports starting HD ~ 3/2021, he is dialyzing MWF.  LUE AV fistula for access.  Patient denies any recent hospitalizations or ED visits.  Dr. Salomon will be the primary surgeon.  All pre-op labs and imaging have been ordered and will be reviewed prior to surgery. Consents to be signed prior to transplant.       Hospital Course:  Mr. Alves is now s/p DBD KTX 2/18/24 (CIT 10.5h, HepC+/DERICK- donor, CMV +/+, RPR+ donor) for DMII nephropathy. 2 day harris no drains. Surgery went well, no complications. Cr starting to clear, making good UOP (1.8L/12h). Will pathway. Doing well post op, tolerating diet well, ambulating in the halls, passing flatus. Encouraged patient to cont ambulating and hydrate well today. ID consulted for RPR+ donor.  VSS. Plan for harris removal and poss dc tomorrow.       Past Medical, Surgical, Family, and Social History:   Unchanged from H&P.    Scheduled Meds:   acetaminophen  650 mg Oral Q8H    acetaminophen  650 mg Oral Q24H    antithymocyte globulin (rabbit) 150 mg, hydrocortisone sodium succinate (SOLU-CORTEF) 20 mg in sodium chloride 0.9% 500 mL (FOR PERIPHERAL LINE ADMINISTRATION ONLY)  1.5 mg/kg (Adjusted) Intravenous Daily    bisacodyL  10 mg Oral QHS    diphenhydrAMINE  25 mg Oral Q24H    docusate sodium  100 mg Oral TID    famotidine  20 mg Oral QHS    heparin (porcine)  5,000 Units Subcutaneous Q8H    insulin aspart U-100  8 Units Subcutaneous TIDWM    insulin detemir U-100  20 Units Subcutaneous Daily    [START ON 2/20/2024] methylPREDNISolone sodium succinate injection  125 mg Intravenous Once    methylPREDNISolone sodium succinate injection  250 mg Intravenous Once    multivitamin  1 tablet Oral Daily    mupirocin  1 g Nasal BID  finger    mycophenolate  1,000 mg Oral BID    [START ON 2/21/2024] predniSONE  20 mg Oral Daily    sodium bicarbonate  1,300 mg Oral TID    tacrolimus  3 mg Oral BID    tamsulosin  1 capsule Oral Daily    [START ON 2/28/2024] valGANciclovir  450 mg Oral Daily AM     Continuous Infusions:   sodium chloride 0.9% 125 mL/hr at 02/19/24 0915    glucagon (human recombinant)       PRN Meds:acetaminophen, dextrose 10%, dextrose 10%, dextrose 10%, dextrose 10%, diphenhydrAMINE, diphenhydrAMINE, EPINEPHrine (PF), EPINEPHrine (PF), glucagon (human recombinant), glucagon (human recombinant), glucose, glucose, glucose, glucose, glucose, hydrocortisone sodium succinate, HYDROmorphone, insulin aspart U-100, ondansetron, oxyCODONE, oxyCODONE, sodium chloride 0.9%    Intake/Output - Last 3 Shifts         02/17 0700 02/18 0659 02/18 0700 02/19 0659 02/19 0700 02/20 0659    I.V. (mL/kg)   225 (2)    IV Piggyback  2000     Total Intake(mL/kg)  2000 (18) 225 (2)    Urine (mL/kg/hr)  1680 225 (0.7)    Total Output  1680 225    Net  +320 0                    Review of Systems   Constitutional:  Negative for activity change, appetite change and fever.   HENT:  Negative for trouble swallowing.    Respiratory:  Negative for cough and shortness of breath.    Cardiovascular:  Negative for chest pain and leg swelling.   Gastrointestinal:  Positive for abdominal pain. Negative for abdominal distention, diarrhea, nausea and vomiting.   Genitourinary:  Negative for decreased urine volume.        With harris   Skin:  Positive for wound.   Allergic/Immunologic: Positive for immunocompromised state.   Neurological:  Negative for dizziness and weakness.   Psychiatric/Behavioral:  Negative for confusion.       Objective:     Vital Signs (Most Recent):  Temp: 98.1 °F (36.7 °C) (02/19/24 0737)  Pulse: 69 (02/19/24 0737)  Resp: 16 (02/19/24 0600)  BP: (!) 143/80 (02/19/24 0737)  SpO2: 95 % (02/19/24 0737) Vital Signs (24h Range):  Temp:  [97.3 °F (36.3  "°C)-98.7 °F (37.1 °C)] 98.1 °F (36.7 °C)  Pulse:  [69-89] 69  Resp:  [10-20] 16  SpO2:  [91 %-100 %] 95 %  BP: (116-154)/(56-80) 143/80     Weight: 110.9 kg (244 lb 7.8 oz)  Height: 6' 2" (188 cm)  Body mass index is 31.39 kg/m².     Physical Exam  Vitals and nursing note reviewed.   Constitutional:       General: He is not in acute distress.     Appearance: He is not ill-appearing.   HENT:      Head: Normocephalic.      Mouth/Throat:      Mouth: Mucous membranes are moist.   Eyes:      Extraocular Movements: Extraocular movements intact.      Conjunctiva/sclera: Conjunctivae normal.   Cardiovascular:      Rate and Rhythm: Normal rate and regular rhythm.      Pulses: Normal pulses.      Heart sounds: Normal heart sounds. No murmur heard.  Pulmonary:      Effort: Pulmonary effort is normal. No respiratory distress.      Breath sounds: No wheezing or rales.   Abdominal:      General: Bowel sounds are normal. There is no distension.      Palpations: Abdomen is soft.      Tenderness: There is abdominal tenderness. There is no guarding.       Musculoskeletal:         General: No swelling. Normal range of motion.      Cervical back: Normal range of motion.   Skin:     General: Skin is warm and dry.      Capillary Refill: Capillary refill takes less than 2 seconds.   Neurological:      Mental Status: He is alert and oriented to person, place, and time.      Motor: No weakness.   Psychiatric:         Mood and Affect: Mood normal.         Behavior: Behavior normal.         Thought Content: Thought content normal.         Judgment: Judgment normal.          Laboratory:  CBC:   Recent Labs   Lab 02/18/24  1237 02/18/24  1513 02/18/24  1636 02/18/24  2143 02/19/24  0550   WBC 7.29  --   --  15.03* 15.87*   RBC 4.36*  --   --  3.84* 4.13*   HGB 12.1*  --   --  10.6* 11.5*   HCT 38.6*   < > 34.8* 33.4* 36.9*     --   --  207 258   MCV 89  --   --  87 89   MCH 27.8  --   --  27.6 27.8   MCHC 31.3*  --   --  31.7* 31.2*    < " > = values in this interval not displayed.     CMP:   Recent Labs   Lab 02/18/24  1237 02/18/24  1636 02/18/24  2143 02/19/24  0550   * 175* 315* 298*   CALCIUM 10.2 9.2 8.6* 9.1   ALBUMIN 3.9 3.4*  --  3.1*   PROT 7.8  --   --   --    * 133* 132* 129*   K 4.2 4.0 3.9 4.8   CO2 26 22* 21* 18*   CL 94* 97 97 95   BUN 41* 42* 40* 41*   CREATININE 11.0* 10.4* 9.8* 9.4*   ALKPHOS 62  --   --   --    ALT 5*  --   --   --    AST 19  --   --   --      Labs within the past 24 hours have been reviewed.    Diagnostic Results:  None   lianne

## 2024-02-19 NOTE — PLAN OF CARE
Recommendations    1. Continue Renal diet     2. If PO intake <50%, add ONS Novasource renal BID      3. RD to monitor and follow    Goals: Meet % EEN, EPN by LIV hall  Nutrition Goal Status: new  Communication of RD Recs:  (POC)

## 2024-02-19 NOTE — HPI
Reason for Consult: Management of T2DM, Hyperglycemia     Surgical Procedure and Date: Kidney Transplant 2/18/24     Diabetes diagnosis year: > 10 years ago     Home Diabetes Medications:  Previously on Tresiba but hasn't needed in 6 months     How often checking glucose at home? 1-3 x day   BG readings on regimen: 100s-150s  Hypoglycemia on the regimen?  No    Diabetes Complications include:     Hyperglycemia and Diabetic chronic kidney disease         Complicating diabetes co morbidities:   ESRD now s/p kidney transplant, glucocorticoid use       HPI:   Patient is a 68 y.o. male with ESRD secondary to diabetic nephropathy. He has been on the wait list for a kidney transplant at [facility] since 9/17/2019. Pt reports starting HD in 3/2021, he is dialyzing MWF.  LUE AV fistula for access.  Patient denies any recent hospitalizations or ED visits.  Now presents s/p kidney transplant 2/19/24. Endocrine consulted for BG management.

## 2024-02-19 NOTE — CONSULTS
[facility]plant [facility]  Endocrinology  Diabetes Consult Note    Consult Requested by: Karlee Leary MD   Reason for admit: S/P kidney transplant    HISTORY OF PRESENT ILLNESS:  Reason for Consult: Management of T2DM, Hyperglycemia     Surgical Procedure and Date: Kidney Transplant 24     Diabetes diagnosis year: > 10 years ago     Home Diabetes Medications:  Previously on Tresiba but hasn't needed in 6 months     How often checking glucose at home? 1-3 x day   BG readings on regimen: 100s-150s  Hypoglycemia on the regimen?  No    Diabetes Complications include:     Hyperglycemia and Diabetic chronic kidney disease         Complicating diabetes co morbidities:   ESRD now s/p kidney transplant, glucocorticoid use       HPI:   Patient is a 68 y.o. male with ESRD secondary to diabetic nephropathy. He has been on the wait list for a kidney transplant at [facility] since 2019. Pt reports starting HD in 3/2021, he is dialyzing MWF.  LUE AV fistula for access.  Patient denies any recent hospitalizations or ED visits.  Now presents s/p kidney transplant 24. Endocrine consulted for BG management.         Interval HPI:   No acute events overnight. Patient in room 50455/02915 A. Blood glucose worsening. BG above goal on current insulin regimen (SSI ). Steroid use- Methylprednisolone  and Hydrocortisone  500/20 mg .   1 Day Post-Op  Renal function- Abnormal - 9.4 cr    Vasopressors-  None     Diet renal     Eatin%  Nausea: No  Hypoglycemia and intervention: No  Fever: No  TPN and/or TF: No    PMH, PSH, FH, SH updated and reviewed     ROS:  Review of Systems   Gastrointestinal:  Negative for constipation, diarrhea, nausea and vomiting.   Endocrine: Negative for polydipsia and polyuria.       Current Medications and/or Treatments Impacting Glycemic Control  Immunotherapy:    Immunosuppressants           Stop Route Frequency     antithymocyte globulin (rabbit) 150 mg, hydrocortisone sodium succinate  (SOLU-CORTEF) 20 mg in sodium chloride 0.9% 500 mL (FOR PERIPHERAL LINE ADMINISTRATION ONLY)         02/21/24 1159 IV Daily     mycophenolate capsule 1,000 mg         -- Oral 2 times daily     tacrolimus capsule 3 mg         -- Oral 2 times daily          Steroids:   Hormones (From admission, onward)      Start     Stop Route Frequency Ordered    02/21/24 0900  predniSONE tablet 20 mg  (IP TXP KIDNEY POST-OP THYMO WITH PERIPHERAL PRECHECKED)         -- Oral Daily 02/18/24 1636    02/20/24 1100  methylPREDNISolone sodium succinate injection 125 mg         -- IV Once 02/18/24 1920 02/19/24 1200  antithymocyte globulin (rabbit) 150 mg, hydrocortisone sodium succinate (SOLU-CORTEF) 20 mg in sodium chloride 0.9% 500 mL (FOR PERIPHERAL LINE ADMINISTRATION ONLY)         02/21/24 1159 IV Daily 02/18/24 1920 02/19/24 1100  methylPREDNISolone sodium succinate injection 250 mg         -- IV Once 02/18/24 1920 02/18/24 2017  hydrocortisone sodium succinate injection 100 mg         07/17/35 1217 IV Once as needed 02/18/24 1920          Pressors:    Autonomic Drugs (From admission, onward)      Start     Stop Route Frequency Ordered    02/18/24 2017  EPINEPHrine (PF) injection 1 mg         07/17/35 1217 SubQ Once as needed 02/18/24 1920 02/18/24 1730  EPINEPHrine (PF) injection 1 mg  (IP TXP KIDNEY POST-OP THYMO WITH PERIPHERAL PRECHECKED)         07/17/35 0930 SubQ Once as needed 02/18/24 1636          Hyperglycemia/Diabetes Medications:   Antihyperglycemics (From admission, onward)      Start     Stop Route Frequency Ordered    02/18/24 1729  insulin aspart U-100 pen 0-5 Units  (INSULIN - LOW CORRECTION DOSE (Recommended for BMI <25, GFR<15 or on dialysis, Age > 70, type 1 diabetes, ESLD, severe CHF or patients on <70 units of insulin daily))         -- SubQ Before meals & nightly PRN 02/18/24 1636             PHYSICAL EXAMINATION:  Vitals:    02/19/24 0737   BP: (!) 143/80   Pulse: 69   Resp:    Temp: 98.1 °F (36.7  "°C)     Body mass index is 31.39 kg/m².     Physical Exam  Constitutional:       General: He is not in acute distress.     Appearance: Normal appearance. He is not ill-appearing.   HENT:      Head: Normocephalic and atraumatic.      Right Ear: External ear normal.      Left Ear: External ear normal.      Nose: Nose normal.   Cardiovascular:      Rate and Rhythm: Normal rate and regular rhythm.   Pulmonary:      Effort: No respiratory distress.   Abdominal:      General: There is no distension.   Skin:     Coloration: Skin is not jaundiced.      Findings: No erythema.            Labs Reviewed and Include   Recent Labs   Lab 02/19/24  0550   *   CALCIUM 9.1   ALBUMIN 3.1*   *   K 4.8   CO2 18*   CL 95   BUN 41*   CREATININE 9.4*     Lab Results   Component Value Date    WBC 15.87 (H) 02/19/2024    HGB 11.5 (L) 02/19/2024    HCT 36.9 (L) 02/19/2024    MCV 89 02/19/2024     02/19/2024     No results for input(s): "TSH", "FREET4" in the last 168 hours.  Lab Results   Component Value Date    HGBA1C 6.4 (H) 05/07/2023       Nutritional status:   Body mass index is 31.39 kg/m².  Lab Results   Component Value Date    ALBUMIN 3.1 (L) 02/19/2024    ALBUMIN 3.4 (L) 02/18/2024    ALBUMIN 3.9 02/18/2024     No results found for: "PREALBUMIN"    Estimated Creatinine Clearance: 10.1 mL/min (A) (based on SCr of 9.4 mg/dL (H)).    Accu-Checks  Recent Labs     02/18/24  1305 02/18/24  1655 02/19/24  0114 02/19/24  0716 02/19/24  1112   POCTGLUCOSE 166* 188* 277* 325* 286*        ASSESSMENT and PLAN    Renal/  Kidney transplant candidate      Managed per primary team      Oncology  Anemia due to chronic kidney disease  May affect accuracy of A1C        Endocrine  Type 2 diabetes mellitus with chronic kidney disease, with long-term current use of insulin    BG goal: 140-180    (0.4 u/kg/day WBD)   - Start Levemir 20 units   - Start Novolog 8 units TIDWM   - Continue LDC (150/50) SSI PRN ac/hs/0200   - POCT Glucose " before meals, at bedtime and at 2 am  - Hypoglycemia protocol in place      ** Please notify Endocrine for any change and/or advance in diet**  ** Please call Endocrine for any BG related issues **     Discharge Planning:   TBD. Please notify endocrinology prior to discharge.          Plan discussed with patient, family at bedside.     Arleen Gilliam PA-C  Endocrinology  [facility]

## 2024-02-19 NOTE — PROGRESS NOTES
Patient admitted for Kidney transplant.Transplant coordinator met with the patient on rounds to introduce self and explain the coordinator role. The post-transplant teaching book was given. Transplant Coordinator explained that she will follow the patient while in the hospital and assist with discharge.       ESRD 2/2 DMII  DCD, KDPI 67%  Thymo induction  CMV ++  Donor and Recipient both HBcAb+/Recipient HBsAb+  Donor HCVAb+/DERICK-  RPR+

## 2024-02-19 NOTE — ANESTHESIA PREPROCEDURE EVALUATION
02/19/2024  Isidro Alves is a 68 y.o., male.      Pre-op Assessment    I have reviewed the Patient Summary Reports.       I have reviewed the Medications.     Review of Systems  Anesthesia Hx:  No problems with previous Anesthesia   Neg history of prior surgery.          Denies Family Hx of Anesthesia complications.    Denies Personal Hx of Anesthesia complications.                    Hematology/Oncology:  Hematology Normal   Oncology Normal                                   EENT/Dental:  EENT/Dental Normal           Cardiovascular:  Exercise tolerance: good   Hypertension   CAD  asymptomatic     Denies Angina. CHF        EF = 40%; dilated cardiomyopathy Functional Capacity good / => 4 METS            Carotoid Artery Disease               Pulmonary:  Pulmonary Normal        Pulmonary HTN               Renal/:  Chronic Renal Disease, ESRD                Hepatic/GI:      Denies GERD.             Neurological:    Denies Pain                                 Endocrine:  Diabetes, type 2, using insulin           Psych:  Psychiatric Normal          Phobia and Claustrophobia.        Physical Exam  General: Well nourished and Cooperative    Airway:  Mallampati: III / II  Mouth Opening: Normal  TM Distance: Normal  Tongue: Normal  Neck ROM: Normal ROM    Dental:  Intact, Periodontal disease    Chest/Lungs:  Clear to auscultation, Normal Respiratory Rate    Heart:  Rate: Normal      Anesthesia Plan  Type of Anesthesia, risks & benefits discussed:    Anesthesia Type: Gen ETT  Intra-op Monitoring Plan: Standard ASA Monitors and Art Line  Post Op Pain Control Plan: multimodal analgesia and IV/PO Opioids PRN  Induction:  IV  Airway Plan: , Post-Induction  Informed Consent: Informed consent signed with the Patient and all parties understand the risks and agree with anesthesia plan.  All questions answered. Patient  consented to blood products? Yes  ASA Score: 3 Emergent  Day of Surgery Review of History & Physical: H&P Update referred to the surgeon/provider.    Ready For Surgery From Anesthesia Perspective.   .

## 2024-02-19 NOTE — ANESTHESIA PROCEDURE NOTES
Arterial    Diagnosis: ESRD for kidney transplant; cardiomyopathy  Doctor requesting consult: [email or web address]    Patient location during procedure: done in OR  Timeout: 2/18/2024 2:07 PM  Procedure end time: 2/18/2024 2:20 PM    Staffing  Authorizing Provider: Demond Fox MD  Performing Provider: Demond Fox MD    Staffing  Performed by: Demond Fox MD  Authorized by: Demond Fox MD    Anesthesiologist was present at the time of the procedure.    Preanesthetic Checklist  Completed: patient identified, IV checked, site marked, risks and benefits discussed, surgical consent, monitors and equipment checked, pre-op evaluation, timeout performed and anesthesia consent givenArterial  Skin Prep: chlorhexidine gluconate  Local Infiltration: none  Orientation: right  Location: radial    Catheter Size: 20 G  Catheter placement by Ultrasound guidance. Heme positive aspiration all ports.   Vessel Caliber: small, patent, compressibility poor  Vascular Doppler:  flow caudad  Needle advanced into vessel with real time Ultrasound guidance.Insertion Attempts: 1  Assessment  Dressing: secured with tape and tegaderm

## 2024-02-19 NOTE — CONSULTS
"  [facility] - Transplant Stepdown  Adult Nutrition  Consult Note    SUMMARY     Recommendations    1. Continue Renal diet     2. If PO intake <50%, add ONS Novasource renal BID      3. RD to monitor and follow    Goals: Meet % EEN, EPN by RD isabel  Nutrition Goal Status: new  Communication of RD Recs:  (POC)    Assessment and Plan    Nutrition Problem  Increased nutrient needs (protein, energy)    Related to (etiology):   Increased physiological demands    Signs and Symptoms (as evidenced by):   S/p DBD Kidney transplant (2/18)    Interventions/Recommendations (treatment strategy):  Collaboration with other providers  Nutrition education    Nutrition Diagnosis Status:   New     Reason for Assessment    Reason For Assessment: consult  Diagnosis:  (kidney tx)  Relevant Medical History: anemia, T2DM, CAD, CHF  Interdisciplinary Rounds: did not attend    General Information Comments:   S/p DBD Kidney transplant (2/18). Pt reports good appetite. Was on regular diet PTA. Denies N/V, chewing/swallowing difficulties. + BM. Stated  lb. Wt stable per chart. Pt appears nourished. No indicator of malnutrition. Educated pt and family on food safety and food-drug interaction. Pt and family verbalized understanding.    Nutrition Discharge Planning: Post transplant nutrition education provided on 2/19. Food safety/drug interactions emphasized. General healthy/low salt diet recommended. Education material left at bedside. No other needs identified.    Nutrition Risk Screen    Nutrition Risk Screen: no indicators present    Nutrition/Diet History    Spiritual, Cultural Beliefs, Worship Practices, Values that Affect Care: no    Anthropometrics    Temp: 97.5 °F (36.4 °C)  Height: 6' 2" (188 cm)  Height (inches): 74 in  Weight Method: Bed Scale  Weight: 110.9 kg (244 lb 7.8 oz)  Weight (lb): 244.49 lb  Ideal Body Weight (IBW), Male: 190 lb  % Ideal Body Weight, Male (lb): 130.94 %  BMI (Calculated): 31.4   "     Lab/Procedures/Meds    Pertinent Labs Reviewed: reviewed  Pertinent Labs Comments: H/H 11.5/36.9, glucose 298, Na 129, BUN 41, Cr 9.4, albumin 3.1, eGFR 5.6  Pertinent Medications Reviewed: reviewed  Pertinent Medications Comments: prednisone, methylprednisolone, tacrolimus, insulin, atorvastatin, Na bicarb, mycophenolate, famotidine, heparin    Physical Findings/Assessment         Estimated/Assessed Needs    Weight Used For Calorie Calculations: 110.7 kg (244 lb)  Energy Calorie Requirements (kcal): 2341 kcal  Energy Need Method: Cloud-St Jeor (PAL 1.2)  Protein Requirements: 103-129g (1.2-1.5g/kg IBW)  Weight Used For Protein Calculations: 86.2 kg (190 lb) (IBW)  Fluid Requirements (mL): 1ml/kcal or per MD  Estimated Fluid Requirement Method: RDA Method  RDA Method (mL): 2341  CHO Requirement: 293g      Nutrition Prescription Ordered    Current Diet Order: Renal    Evaluation of Received Nutrient/Fluid Intake    I/O: - 374 ml since admit  Energy Calories Required: not meeting needs  Protein Required: not meeting needs  Comments: LBM 2/18  Tolerance: tolerating    Nutrition Risk    Level of Risk/Frequency of Follow-up:  (1x/week)       Monitor and Evaluation    Food and Nutrient Intake: food and beverage intake, energy intake  Food and Nutrient Adminstration: diet order  Knowledge/Beliefs/Attitudes: food and nutrition knowledge/skill  Physical Activity and Function: nutrition-related ADLs and IADLs  Anthropometric Measurements: height/length, weight, weight change, body mass index  Biochemical Data, Medical Tests and Procedures: electrolyte and renal panel, gastrointestinal profile, inflammatory profile, lipid profile, glucose/endocrine profile  Nutrition-Focused Physical Findings: overall appearance       Nutrition Follow-Up    RD Follow-up?: Yes

## 2024-02-19 NOTE — PROGRESS NOTES
[facility] - Transplant Stepdown  Kidney Transplant  Progress Note      Reason for Follow-up: Reassessment of Kidney Transplant - 2/18/2024  (#1) recipient and management of immunosuppression.    ORGAN:  RIGHT KIDNEY   Donor Type:  Donation after Brain Death   Donor CMV Status: Positive  Donor HBcAB:Positive  Donor HCV Status:Positive  Donor HBV DERICK:   Donor HCV DERICK: Negative, Negative      Subjective:   History of Present Illness:  Mr. Alves is a 64 y.o. year old Black or  male with ESRD secondary to diabetic nephropathy.  He has been on the wait list for a kidney transplant at [facility] since 9/17/2019. He is currently  ESRD, Pt reports starting HD ~ 3/2021, he is dialyzing MWF.  LUE AV fistula for access.  Patient denies any recent hospitalizations or ED visits.  Dr. Salomon will be the primary surgeon.  All pre-op labs and imaging have been ordered and will be reviewed prior to surgery. Consents to be signed prior to transplant.       Hospital Course:  Mr. Alves is now s/p DBD KTX 2/18/24 (CIT 10.5h, HepC+/DERICK- donor, CMV +/+, RPR+ donor) for DMII nephropathy. 2 day harris no drains. Surgery went well, no complications. Cr starting to clear, making good UOP (1.8L/12h). Will pathway. Doing well post op, tolerating diet well, ambulating in the halls, passing flatus. Encouraged patient to cont ambulating and hydrate well today. ID consulted for RPR+ donor.  VSS. Plan for harris removal and poss dc tomorrow.       Past Medical, Surgical, Family, and Social History:   Unchanged from H&P.    Scheduled Meds:   acetaminophen  650 mg Oral Q8H    acetaminophen  650 mg Oral Q24H    antithymocyte globulin (rabbit) 150 mg, hydrocortisone sodium succinate (SOLU-CORTEF) 20 mg in sodium chloride 0.9% 500 mL (FOR PERIPHERAL LINE ADMINISTRATION ONLY)  1.5 mg/kg (Adjusted) Intravenous Daily    bisacodyL  10 mg Oral QHS    diphenhydrAMINE  25 mg Oral Q24H    docusate sodium  100 mg Oral TID    famotidine  20 mg Oral QHS     heparin (porcine)  5,000 Units Subcutaneous Q8H    insulin aspart U-100  8 Units Subcutaneous TIDWM    insulin detemir U-100  20 Units Subcutaneous Daily    [START ON 2/20/2024] methylPREDNISolone sodium succinate injection  125 mg Intravenous Once    methylPREDNISolone sodium succinate injection  250 mg Intravenous Once    multivitamin  1 tablet Oral Daily    mupirocin  1 g Nasal BID    mycophenolate  1,000 mg Oral BID    [START ON 2/21/2024] predniSONE  20 mg Oral Daily    sodium bicarbonate  1,300 mg Oral TID    tacrolimus  3 mg Oral BID    tamsulosin  1 capsule Oral Daily    [START ON 2/28/2024] valGANciclovir  450 mg Oral Daily AM     Continuous Infusions:   sodium chloride 0.9% 125 mL/hr at 02/19/24 0915    glucagon (human recombinant)       PRN Meds:acetaminophen, dextrose 10%, dextrose 10%, dextrose 10%, dextrose 10%, diphenhydrAMINE, diphenhydrAMINE, EPINEPHrine (PF), EPINEPHrine (PF), glucagon (human recombinant), glucagon (human recombinant), glucose, glucose, glucose, glucose, glucose, hydrocortisone sodium succinate, HYDROmorphone, insulin aspart U-100, ondansetron, oxyCODONE, oxyCODONE, sodium chloride 0.9%    Intake/Output - Last 3 Shifts         02/17 0700  02/18 0659 02/18 0700  02/19 0659 02/19 0700  02/20 0659    I.V. (mL/kg)   225 (2)    IV Piggyback  2000     Total Intake(mL/kg)  2000 (18) 225 (2)    Urine (mL/kg/hr)  1680 225 (0.7)    Total Output  1680 225    Net  +320 0                    Review of Systems   Constitutional:  Negative for activity change, appetite change and fever.   HENT:  Negative for trouble swallowing.    Respiratory:  Negative for cough and shortness of breath.    Cardiovascular:  Negative for chest pain and leg swelling.   Gastrointestinal:  Positive for abdominal pain. Negative for abdominal distention, diarrhea, nausea and vomiting.   Genitourinary:  Negative for decreased urine volume.        With harris   Skin:  Positive for wound.   Allergic/Immunologic: Positive for  "immunocompromised state.   Neurological:  Negative for dizziness and weakness.   Psychiatric/Behavioral:  Negative for confusion.       Objective:     Vital Signs (Most Recent):  Temp: 98.1 °F (36.7 °C) (02/19/24 0737)  Pulse: 69 (02/19/24 0737)  Resp: 16 (02/19/24 0600)  BP: (!) 143/80 (02/19/24 0737)  SpO2: 95 % (02/19/24 0737) Vital Signs (24h Range):  Temp:  [97.3 °F (36.3 °C)-98.7 °F (37.1 °C)] 98.1 °F (36.7 °C)  Pulse:  [69-89] 69  Resp:  [10-20] 16  SpO2:  [91 %-100 %] 95 %  BP: (116-154)/(56-80) 143/80     Weight: 110.9 kg (244 lb 7.8 oz)  Height: 6' 2" (188 cm)  Body mass index is 31.39 kg/m².     Physical Exam  Vitals and nursing note reviewed.   Constitutional:       General: He is not in acute distress.     Appearance: He is not ill-appearing.   HENT:      Head: Normocephalic.      Mouth/Throat:      Mouth: Mucous membranes are moist.   Eyes:      Extraocular Movements: Extraocular movements intact.      Conjunctiva/sclera: Conjunctivae normal.   Cardiovascular:      Rate and Rhythm: Normal rate and regular rhythm.      Pulses: Normal pulses.      Heart sounds: Normal heart sounds. No murmur heard.  Pulmonary:      Effort: Pulmonary effort is normal. No respiratory distress.      Breath sounds: No wheezing or rales.   Abdominal:      General: Bowel sounds are normal. There is no distension.      Palpations: Abdomen is soft.      Tenderness: There is abdominal tenderness. There is no guarding.       Musculoskeletal:         General: No swelling. Normal range of motion.      Cervical back: Normal range of motion.   Skin:     General: Skin is warm and dry.      Capillary Refill: Capillary refill takes less than 2 seconds.   Neurological:      Mental Status: He is alert and oriented to person, place, and time.      Motor: No weakness.   Psychiatric:         Mood and Affect: Mood normal.         Behavior: Behavior normal.         Thought Content: Thought content normal.         Judgment: Judgment normal. "          Laboratory:  CBC:   Recent Labs   Lab 02/18/24  1237 02/18/24  1513 02/18/24  1636 02/18/24  2143 02/19/24  0550   WBC 7.29  --   --  15.03* 15.87*   RBC 4.36*  --   --  3.84* 4.13*   HGB 12.1*  --   --  10.6* 11.5*   HCT 38.6*   < > 34.8* 33.4* 36.9*     --   --  207 258   MCV 89  --   --  87 89   MCH 27.8  --   --  27.6 27.8   MCHC 31.3*  --   --  31.7* 31.2*    < > = values in this interval not displayed.     CMP:   Recent Labs   Lab 02/18/24  1237 02/18/24  1636 02/18/24 2143 02/19/24  0550   * 175* 315* 298*   CALCIUM 10.2 9.2 8.6* 9.1   ALBUMIN 3.9 3.4*  --  3.1*   PROT 7.8  --   --   --    * 133* 132* 129*   K 4.2 4.0 3.9 4.8   CO2 26 22* 21* 18*   CL 94* 97 97 95   BUN 41* 42* 40* 41*   CREATININE 11.0* 10.4* 9.8* 9.4*   ALKPHOS 62  --   --   --    ALT 5*  --   --   --    AST 19  --   --   --      Labs within the past 24 hours have been reviewed.    Diagnostic Results:  None  Assessment/Plan:     * S/P kidney transplant  - s/p DBD KTX   - donor hepC+/DERICK- and RPR +, ID consulted   - 2 day harris, remove 2/20 AM; no drains   - Cr slowly clearing, good UOP (1.8L/12h)  - strict Is and Os   - monitor with RFP       Prophylactic immunotherapy  - tac, MMF, pred  - monitor tac for toxicity and therapeutic levels       At risk for opportunistic infections  - valcyte for CMGV ppx   - allergy to bactrim, G6PD pending for alternate agent for PJP      Chronic systolic congestive heart failure  - restarting medications being cautious with risk of hypotension   - watch fluid status closely     Patient is identified as having Systolic (HFrEF) heart failure that is Chronic. CHF is currently controlled. Latest ECHO performed and demonstrates- Results for orders placed during the hospital encounter of 11/15/23    Echo    Interpretation Summary    Left Ventricle: The left ventricle is moderately dilated. Moderately increased wall thickness. There is concentric hypertrophy. Mild global hypokinesis  present. There is mildly reduced systolic function with a visually estimated ejection fraction of 40 - 45%. There is normal diastolic function.    Right Ventricle: Normal right ventricular cavity size. Wall thickness is normal. Right ventricle wall motion  is normal. Systolic function is normal.    Left Atrium: Left atrium is severely dilated.    Tricuspid Valve: There is mild regurgitation with a centrally directed jet.    Pulmonary Artery: The estimated pulmonary artery systolic pressure is 27 mmHg.    IVC/SVC: Normal venous pressure at 3 mmHg.  . Continue Beta Blocker and monitor clinical status closely. Monitor on telemetry. Patient is off CHF pathway.  Monitor strict Is&Os and daily weights. Cardiology has not been consulted. Continue to stress to patient importance of self efficacy and  on diet for CHF.     Coronary artery disease of native artery of native heart with stable angina pectoris  - ASA and statin   - restart as appropriate       Type 2 diabetes mellitus with chronic kidney disease, with long-term current use of insulin  - endocrine following, appreciate assistance       Anemia due to chronic kidney disease  - h/h stable   - monitor with CBC           Discharge Planning:  Poss dc tomorrow     Medical decision making for this encounter includes review of pertinent labs and diagnostic studies, assessment and planning, discussions with consulting providers, discussion with patient/family, and participation in multidisciplinary rounds. Time spent caring for patient: 60 minutes    Jennifer Ortega PA-C  Kidney Transplant  [facility] - Transplant Stepdown

## 2024-02-19 NOTE — PLAN OF CARE
Problem: Adult Inpatient Plan of Care  Goal: Plan of Care Review  Outcome: Ongoing, Progressing  Goal: Patient-Specific Goal (Individualized)  Outcome: Ongoing, Progressing  Goal: Absence of Hospital-Acquired Illness or Injury  Outcome: Ongoing, Progressing  Goal: Optimal Comfort and Wellbeing  Outcome: Ongoing, Progressing  Goal: Readiness for Transition of Care  Outcome: Ongoing, Progressing     Problem: Diabetes Comorbidity  Goal: Blood Glucose Level Within Targeted Range  Outcome: Ongoing, Progressing     Problem: Infection  Goal: Absence of Infection Signs and Symptoms  Outcome: Ongoing, Progressing     Problem: Pain Acute  Goal: Acceptable Pain Control and Functional Ability  Outcome: Ongoing, Progressing     Problem: Adjustment to Transplant (Kidney Transplant)  Goal: Optimal Coping with Organ Transplant  Outcome: Ongoing, Progressing     Problem: Bleeding (Kidney Transplant)  Goal: Absence of Bleeding  Outcome: Ongoing, Progressing     Problem: Bowel Motility Impaired (Kidney Transplant)  Goal: Effective Bowel Elimination  Outcome: Ongoing, Progressing     Problem: Donor Organ Function (Kidney Transplant)  Goal: Effective Renal Function  Outcome: Ongoing, Progressing     Problem: Fluid and Electrolyte Imbalance (Kidney Transplant)  Goal: Fluid and Electrolyte Balance  Outcome: Ongoing, Progressing     Problem: Glycemic Control Impaired (Kidney Transplant)  Goal: Blood Glucose Level Within Targeted Range  Outcome: Ongoing, Progressing     Problem: Infection (Kidney Transplant)  Goal: Absence of Infection Signs and Symptoms  Outcome: Ongoing, Progressing     Problem: Ongoing Anesthesia Effects (Kidney Transplant)  Goal: Anesthesia/Sedation Recovery  Outcome: Ongoing, Progressing     Problem: Oral Intake Inadequate (Kidney Transplant)  Goal: Optimal Nutrition Intake  Outcome: Ongoing, Progressing     Problem: Pain (Kidney Transplant)  Goal: Acceptable Pain Control  Outcome: Ongoing, Progressing     Problem:  Postoperative Nausea and Vomiting (Kidney Transplant)  Goal: Nausea and Vomiting Relief  Outcome: Ongoing, Progressing     Problem: Respiratory Compromise (Kidney Transplant)  Goal: Effective Oxygenation and Ventilation  Outcome: Ongoing, Progressing     Problem: Fall Injury Risk  Goal: Absence of Fall and Fall-Related Injury  Outcome: Ongoing, Progressing

## 2024-02-19 NOTE — ASSESSMENT & PLAN NOTE
- restarting medications being cautious with risk of hypotension   - watch fluid status closely     Patient is identified as having Systolic (HFrEF) heart failure that is Chronic. CHF is currently controlled. Latest ECHO performed and demonstrates- Results for orders placed during the hospital encounter of 11/15/23    Echo    Interpretation Summary    Left Ventricle: The left ventricle is moderately dilated. Moderately increased wall thickness. There is concentric hypertrophy. Mild global hypokinesis present. There is mildly reduced systolic function with a visually estimated ejection fraction of 40 - 45%. There is normal diastolic function.    Right Ventricle: Normal right ventricular cavity size. Wall thickness is normal. Right ventricle wall motion  is normal. Systolic function is normal.    Left Atrium: Left atrium is severely dilated.    Tricuspid Valve: There is mild regurgitation with a centrally directed jet.    Pulmonary Artery: The estimated pulmonary artery systolic pressure is 27 mmHg.    IVC/SVC: Normal venous pressure at 3 mmHg.  . Continue Beta Blocker and monitor clinical status closely. Monitor on telemetry. Patient is off CHF pathway.  Monitor strict Is&Os and daily weights. Cardiology has not been consulted. Continue to stress to patient importance of self efficacy and  on diet for CHF.

## 2024-02-19 NOTE — HOSPITAL COURSE
Mr. Alves is now s/p DBD KTX 2/18/24 (CIT 10.5h, HepC+/DERICK- donor, CMV +/+, RPR+ donor) for DMII nephropathy. 2 day harris no drains. Surgery went well, no complications. Cr starting to clear, making good UOP (1.8L/12h). Will pathway. Doing well post op, tolerating diet well, ambulating in the halls, passing flatus. Encouraged patient to cont ambulating and hydrate well today. ID consulted for RPR+ donor.  VSS. Plan for harris removal and poss dc tomorrow.

## 2024-02-19 NOTE — ANESTHESIA POSTPROCEDURE EVALUATION
Anesthesia Post Evaluation    Patient: Isidro Alves    Procedure(s) Performed: Procedure(s) (LRB):  TRANSPLANT, KIDNEY (N/A)    Final Anesthesia Type: general      Patient location during evaluation: PACU  Patient participation: Yes- Able to Participate  Level of consciousness: awake and alert  Post-procedure vital signs: reviewed and stable  Pain management: adequate  Airway patency: patent    PONV status at discharge: No PONV  Anesthetic complications: no      Cardiovascular status: blood pressure returned to baseline  Respiratory status: unassisted  Hydration status: euvolemic  Follow-up not needed.              Vitals Value Taken Time   /82 02/19/24 1100   Temp 36.9 °C (98.4 °F) 02/19/24 1100   Pulse 80 02/19/24 1100   Resp 20 02/19/24 1100   SpO2 97 % 02/19/24 0931   Vitals shown include unvalidated device data.      Event Time   Out of Recovery 17:45:00         Pain/Julia Score: Pain Rating Prior to Med Admin: 7 (2/19/2024 10:54 AM)  Pain Rating Post Med Admin: 1 (2/19/2024  6:15 AM)  Julia Score: 9 (2/18/2024  8:00 PM)

## 2024-02-20 VITALS
OXYGEN SATURATION: 94 % | HEIGHT: 74 IN | WEIGHT: 244.5 LBS | TEMPERATURE: 98 F | DIASTOLIC BLOOD PRESSURE: 74 MMHG | BODY MASS INDEX: 31.38 KG/M2 | RESPIRATION RATE: 18 BRPM | SYSTOLIC BLOOD PRESSURE: 146 MMHG | HEART RATE: 79 BPM

## 2024-02-20 DIAGNOSIS — Z94.0 KIDNEY REPLACED BY TRANSPLANT: Primary | ICD-10-CM

## 2024-02-20 LAB
ALBUMIN SERPL BCP-MCNC: 3 G/DL (ref 3.5–5.2)
ANION GAP SERPL CALC-SCNC: 12 MMOL/L (ref 8–16)
BASOPHILS # BLD AUTO: 0.01 K/UL (ref 0–0.2)
BASOPHILS NFR BLD: 0.1 % (ref 0–1.9)
BUN SERPL-MCNC: 53 MG/DL (ref 8–23)
CALCIUM SERPL-MCNC: 9.2 MG/DL (ref 8.7–10.5)
CHLORIDE SERPL-SCNC: 96 MMOL/L (ref 95–110)
CO2 SERPL-SCNC: 22 MMOL/L (ref 23–29)
CREAT SERPL-MCNC: 7 MG/DL (ref 0.5–1.4)
DIFFERENTIAL METHOD BLD: ABNORMAL
EOSINOPHIL # BLD AUTO: 0 K/UL (ref 0–0.5)
EOSINOPHIL NFR BLD: 0 % (ref 0–8)
ERYTHROCYTE [DISTWIDTH] IN BLOOD BY AUTOMATED COUNT: 14.4 % (ref 11.5–14.5)
EST. GFR  (NO RACE VARIABLE): 8 ML/MIN/1.73 M^2
GLUCOSE SERPL-MCNC: 234 MG/DL (ref 70–110)
HCT VFR BLD AUTO: 32.7 % (ref 40–54)
HGB BLD-MCNC: 10.6 G/DL (ref 14–18)
IMM GRANULOCYTES # BLD AUTO: 0.06 K/UL (ref 0–0.04)
IMM GRANULOCYTES NFR BLD AUTO: 0.5 % (ref 0–0.5)
LYMPHOCYTES # BLD AUTO: 0.1 K/UL (ref 1–4.8)
LYMPHOCYTES NFR BLD: 0.6 % (ref 18–48)
MAGNESIUM SERPL-MCNC: 1.9 MG/DL (ref 1.6–2.6)
MCH RBC QN AUTO: 28 PG (ref 27–31)
MCHC RBC AUTO-ENTMCNC: 32.4 G/DL (ref 32–36)
MCV RBC AUTO: 87 FL (ref 82–98)
MONOCYTES # BLD AUTO: 0.5 K/UL (ref 0.3–1)
MONOCYTES NFR BLD: 4 % (ref 4–15)
NEUTROPHILS # BLD AUTO: 10.7 K/UL (ref 1.8–7.7)
NEUTROPHILS NFR BLD: 94.8 % (ref 38–73)
NRBC BLD-RTO: 0 /100 WBC
PHOSPHATE SERPL-MCNC: 4.5 MG/DL (ref 2.7–4.5)
PLATELET # BLD AUTO: 207 K/UL (ref 150–450)
PMV BLD AUTO: 12.1 FL (ref 9.2–12.9)
POCT GLUCOSE: 154 MG/DL (ref 70–110)
POCT GLUCOSE: 227 MG/DL (ref 70–110)
POCT GLUCOSE: 247 MG/DL (ref 70–110)
POCT GLUCOSE: 267 MG/DL (ref 70–110)
POTASSIUM SERPL-SCNC: 4.3 MMOL/L (ref 3.5–5.1)
RBC # BLD AUTO: 3.78 M/UL (ref 4.6–6.2)
SODIUM SERPL-SCNC: 130 MMOL/L (ref 136–145)
TACROLIMUS BLD-MCNC: 3.9 NG/ML (ref 5–15)
WBC # BLD AUTO: 11.29 K/UL (ref 3.9–12.7)

## 2024-02-20 PROCEDURE — 94799 UNLISTED PULMONARY SVC/PX: CPT | Mod: XB

## 2024-02-20 PROCEDURE — 63600175 PHARM REV CODE 636 W HCPCS

## 2024-02-20 PROCEDURE — 83735 ASSAY OF MAGNESIUM: CPT | Performed by: SURGERY

## 2024-02-20 PROCEDURE — 25000003 PHARM REV CODE 250

## 2024-02-20 PROCEDURE — 36415 COLL VENOUS BLD VENIPUNCTURE: CPT

## 2024-02-20 PROCEDURE — 25000003 PHARM REV CODE 250: Performed by: PHYSICIAN ASSISTANT

## 2024-02-20 PROCEDURE — 25000003 PHARM REV CODE 250: Performed by: SURGERY

## 2024-02-20 PROCEDURE — 99232 SBSQ HOSP IP/OBS MODERATE 35: CPT | Mod: ,,, | Performed by: NURSE PRACTITIONER

## 2024-02-20 PROCEDURE — 82955 ASSAY OF G6PD ENZYME: CPT

## 2024-02-20 PROCEDURE — 63600175 PHARM REV CODE 636 W HCPCS: Mod: JZ,JG | Performed by: SURGERY

## 2024-02-20 PROCEDURE — 80197 ASSAY OF TACROLIMUS: CPT | Performed by: SURGERY

## 2024-02-20 PROCEDURE — 99024 POSTOP FOLLOW-UP VISIT: CPT | Mod: ,,,

## 2024-02-20 PROCEDURE — 85025 COMPLETE CBC W/AUTO DIFF WBC: CPT | Performed by: SURGERY

## 2024-02-20 PROCEDURE — 80069 RENAL FUNCTION PANEL: CPT | Performed by: SURGERY

## 2024-02-20 RX ORDER — INSULIN LISPRO 100 [IU]/ML
10 INJECTION, SOLUTION INTRAVENOUS; SUBCUTANEOUS 3 TIMES DAILY
Qty: 9 ML | Refills: 11 | Status: SHIPPED | OUTPATIENT
Start: 2024-02-20 | End: 2024-05-09 | Stop reason: ALTCHOICE

## 2024-02-20 RX ORDER — SODIUM BICARBONATE 650 MG/1
1300 TABLET ORAL 2 TIMES DAILY
Qty: 120 TABLET | Refills: 11 | Status: SHIPPED | OUTPATIENT
Start: 2024-02-20 | End: 2025-02-19

## 2024-02-20 RX ORDER — ASPIRIN 81 MG/1
81 TABLET ORAL DAILY
Qty: 30 TABLET | Refills: 11 | Status: SHIPPED | OUTPATIENT
Start: 2024-02-20 | End: 2025-02-19

## 2024-02-20 RX ORDER — TACROLIMUS 1 MG/1
2 CAPSULE ORAL ONCE
Status: COMPLETED | OUTPATIENT
Start: 2024-02-20 | End: 2024-02-20

## 2024-02-20 RX ORDER — ATOVAQUONE 750 MG/5ML
1500 SUSPENSION ORAL DAILY
Qty: 300 ML | Refills: 5 | Status: SHIPPED | OUTPATIENT
Start: 2024-02-20 | End: 2024-02-28 | Stop reason: ALTCHOICE

## 2024-02-20 RX ORDER — OXYCODONE HYDROCHLORIDE 10 MG/1
10 TABLET ORAL EVERY 6 HOURS PRN
Qty: 28 TABLET | Refills: 0 | Status: SHIPPED | OUTPATIENT
Start: 2024-02-20 | End: 2024-03-06

## 2024-02-20 RX ORDER — INSULIN ASPART 100 [IU]/ML
10 INJECTION, SOLUTION INTRAVENOUS; SUBCUTANEOUS
Status: DISCONTINUED | OUTPATIENT
Start: 2024-02-20 | End: 2024-02-20 | Stop reason: HOSPADM

## 2024-02-20 RX ORDER — ASPIRIN 81 MG/1
81 TABLET ORAL DAILY
Qty: 30 TABLET | Refills: 11
Start: 2024-02-20 | End: 2024-02-20 | Stop reason: HOSPADM

## 2024-02-20 RX ORDER — VALGANCICLOVIR 450 MG/1
450 TABLET, FILM COATED ORAL
Qty: 12 TABLET | Refills: 2 | Status: SHIPPED | OUTPATIENT
Start: 2024-02-21 | End: 2024-02-29

## 2024-02-20 RX ORDER — PREDNISONE 5 MG/1
TABLET ORAL
Qty: 70 TABLET | Refills: 11 | Status: SHIPPED | OUTPATIENT
Start: 2024-02-21

## 2024-02-20 RX ORDER — INSULIN DEGLUDEC 100 U/ML
25 INJECTION, SOLUTION SUBCUTANEOUS DAILY
Qty: 9 ML | Refills: 1 | Status: SHIPPED | OUTPATIENT
Start: 2024-02-21 | End: 2024-05-09 | Stop reason: SDUPTHER

## 2024-02-20 RX ORDER — CARVEDILOL 6.25 MG/1
6.25 TABLET ORAL 2 TIMES DAILY
Qty: 60 TABLET | Refills: 11 | Status: SHIPPED | OUTPATIENT
Start: 2024-02-20 | End: 2024-05-02 | Stop reason: DRUGHIGH

## 2024-02-20 RX ADMIN — TAMSULOSIN HYDROCHLORIDE 0.4 MG: 0.4 CAPSULE ORAL at 08:02

## 2024-02-20 RX ADMIN — DIPHENHYDRAMINE HYDROCHLORIDE 25 MG: 25 CAPSULE ORAL at 12:02

## 2024-02-20 RX ADMIN — SODIUM BICARBONATE 650 MG TABLET 1300 MG: at 02:02

## 2024-02-20 RX ADMIN — ANTI-THYMOCYTE GLOBULIN (RABBIT) 150 MG: 5 INJECTION, POWDER, LYOPHILIZED, FOR SOLUTION INTRAVENOUS at 12:02

## 2024-02-20 RX ADMIN — ACETAMINOPHEN 650 MG: 325 TABLET ORAL at 12:02

## 2024-02-20 RX ADMIN — OXYCODONE HYDROCHLORIDE 10 MG: 10 TABLET ORAL at 12:02

## 2024-02-20 RX ADMIN — INSULIN ASPART 8 UNITS: 100 INJECTION, SOLUTION INTRAVENOUS; SUBCUTANEOUS at 08:02

## 2024-02-20 RX ADMIN — ATORVASTATIN CALCIUM 40 MG: 40 TABLET, FILM COATED ORAL at 08:02

## 2024-02-20 RX ADMIN — INSULIN DETEMIR 20 UNITS: 100 INJECTION, SOLUTION SUBCUTANEOUS at 08:02

## 2024-02-20 RX ADMIN — SODIUM BICARBONATE 650 MG TABLET 1300 MG: at 08:02

## 2024-02-20 RX ADMIN — MUPIROCIN 1 G: 20 OINTMENT TOPICAL at 08:02

## 2024-02-20 RX ADMIN — MYCOPHENOLATE MOFETIL 1000 MG: 250 CAPSULE ORAL at 08:02

## 2024-02-20 RX ADMIN — METHYLPREDNISOLONE SODIUM SUCCINATE 125 MG: 125 INJECTION, POWDER, FOR SOLUTION INTRAMUSCULAR; INTRAVENOUS at 12:02

## 2024-02-20 RX ADMIN — INSULIN ASPART 10 UNITS: 100 INJECTION, SOLUTION INTRAVENOUS; SUBCUTANEOUS at 11:02

## 2024-02-20 RX ADMIN — THERA TABS 1 TABLET: TAB at 08:02

## 2024-02-20 RX ADMIN — INSULIN ASPART 1 UNITS: 100 INJECTION, SOLUTION INTRAVENOUS; SUBCUTANEOUS at 03:02

## 2024-02-20 RX ADMIN — CARVEDILOL 6.25 MG: 6.25 TABLET, FILM COATED ORAL at 08:02

## 2024-02-20 RX ADMIN — INSULIN ASPART 2 UNITS: 100 INJECTION, SOLUTION INTRAVENOUS; SUBCUTANEOUS at 11:02

## 2024-02-20 RX ADMIN — TACROLIMUS 2 MG: 1 CAPSULE ORAL at 09:02

## 2024-02-20 RX ADMIN — ACETAMINOPHEN 650 MG: 325 TABLET ORAL at 06:02

## 2024-02-20 RX ADMIN — INSULIN ASPART 10 UNITS: 100 INJECTION, SOLUTION INTRAVENOUS; SUBCUTANEOUS at 05:02

## 2024-02-20 RX ADMIN — OXYCODONE 5 MG: 5 TABLET ORAL at 06:02

## 2024-02-20 RX ADMIN — TACROLIMUS 6 MG: 1 CAPSULE ORAL at 05:02

## 2024-02-20 RX ADMIN — INSULIN ASPART 3 UNITS: 100 INJECTION, SOLUTION INTRAVENOUS; SUBCUTANEOUS at 08:02

## 2024-02-20 RX ADMIN — TACROLIMUS 4 MG: 1 CAPSULE ORAL at 08:02

## 2024-02-20 RX ADMIN — HEPARIN SODIUM 5000 UNITS: 5000 INJECTION INTRAVENOUS; SUBCUTANEOUS at 06:02

## 2024-02-20 NOTE — SUBJECTIVE & OBJECTIVE
"Interval HPI:   Overnight events: No acute events overnight. Patient in room 17643/32649 A. Blood glucose stable. BG at and above goal on current insulin regimen (SSI, prandial, and basal insulin ). Steroid use- Methylprednisolone  125 mg. 2 Days Post-Op  Renal function- Abnormal - Creatinine 7.0   Vasopressors-  None       Endocrine will continue to follow and manage insulin orders inpatient.       Diet renal  Diet Adult Regular     Eatin%  Nausea: No  Hypoglycemia and intervention: No  Fever: No  TPN and/or TF: No      BP (!) 151/83 (Patient Position: Lying)   Pulse 87   Temp 97.9 °F (36.6 °C) (Oral)   Resp 18   Ht 6' 2" (1.88 m)   Wt 110.9 kg (244 lb 7.8 oz)   SpO2 (!) 94%   BMI 31.39 kg/m²     Labs Reviewed and Include    Recent Labs   Lab 24  0637   *   CALCIUM 9.2   ALBUMIN 3.0*   *   K 4.3   CO2 22*   CL 96   BUN 53*   CREATININE 7.0*     Lab Results   Component Value Date    WBC 11.29 2024    HGB 10.6 (L) 2024    HCT 32.7 (L) 2024    MCV 87 2024     2024     No results for input(s): "TSH", "FREET4" in the last 168 hours.  Lab Results   Component Value Date    HGBA1C 6.4 (H) 2023       Nutritional status:   Body mass index is 31.39 kg/m².  Lab Results   Component Value Date    ALBUMIN 3.0 (L) 2024    ALBUMIN 3.1 (L) 2024    ALBUMIN 3.1 (L) 2024     No results found for: "PREALBUMIN"    Estimated Creatinine Clearance: 13.6 mL/min (A) (based on SCr of 7 mg/dL (H)).    Accu-Checks  Recent Labs     24  1305 24  1655 24  0114 24  0716 24  1112 24  1531 24  2211 24  0327 24  0656   POCTGLUCOSE 166* 188* 277* 325* 286* 152* 220* 267* 247*       Current Medications and/or Treatments Impacting Glycemic Control  Immunotherapy:    Immunosuppressants           Stop Route Frequency     tacrolimus capsule 6 mg         -- Oral 2 times daily     antithymocyte globulin (rabbit) " 150 mg, hydrocortisone sodium succinate (SOLU-CORTEF) 20 mg in sodium chloride 0.9% 500 mL (FOR PERIPHERAL LINE ADMINISTRATION ONLY)         02/21/24 1159 IV Daily     mycophenolate capsule 1,000 mg         -- Oral 2 times daily          Steroids:   Hormones (From admission, onward)      Start     Stop Route Frequency Ordered    02/21/24 0900  predniSONE tablet 20 mg  (IP TXP KIDNEY POST-OP THYMO WITH PERIPHERAL PRECHECKED)         -- Oral Daily 02/18/24 1636    02/20/24 1100  methylPREDNISolone sodium succinate injection 125 mg         -- IV Once 02/18/24 1920 02/19/24 1200  antithymocyte globulin (rabbit) 150 mg, hydrocortisone sodium succinate (SOLU-CORTEF) 20 mg in sodium chloride 0.9% 500 mL (FOR PERIPHERAL LINE ADMINISTRATION ONLY)         02/21/24 1159 IV Daily 02/18/24 1920 02/18/24 2017  hydrocortisone sodium succinate injection 100 mg         07/17/35 1217 IV Once as needed 02/18/24 1920          Pressors:    Autonomic Drugs (From admission, onward)      Start     Stop Route Frequency Ordered    02/18/24 2017  EPINEPHrine (PF) injection 1 mg         07/17/35 1217 SubQ Once as needed 02/18/24 1920 02/18/24 1730  EPINEPHrine (PF) injection 1 mg  (IP TXP KIDNEY POST-OP THYMO WITH PERIPHERAL PRECHECKED)         07/17/35 0930 SubQ Once as needed 02/18/24 1636          Hyperglycemia/Diabetes Medications:   Antihyperglycemics (From admission, onward)      Start     Stop Route Frequency Ordered    02/21/24 0900  insulin detemir U-100 (Levemir) pen 25 Units         -- SubQ Daily 02/20/24 0918    02/20/24 1130  insulin aspart U-100 pen 10 Units         -- SubQ 3 times daily with meals 02/20/24 0918    02/19/24 1012  insulin aspart U-100 pen 0-5 Units         -- SubQ Before meals, nightly and at 0200 PRN 02/19/24 0913

## 2024-02-20 NOTE — PROGRESS NOTES
"[facility] - [facility] Stepdown  Endocrinology  Progress Note    Admit Date: 2024     Reason for Consult: Management of T2DM, Hyperglycemia     Surgical Procedure and Date: Kidney Transplant 24     Diabetes diagnosis year: > 10 years ago     Home Diabetes Medications:  Previously on Tresiba but hasn't needed in 6 months     How often checking glucose at home? 1-3 x day   BG readings on regimen: 100s-150s  Hypoglycemia on the regimen?  No    Diabetes Complications include:     Hyperglycemia and Diabetic chronic kidney disease         Complicating diabetes co morbidities:   ESRD now s/p kidney transplant, glucocorticoid use       HPI:   Patient is a 68 y.o. male with ESRD secondary to diabetic nephropathy. He has been on the wait list for a kidney transplant at [facility] since 2019. Pt reports starting HD in 3/2021, he is dialyzing MWF.  LUE AV fistula for access.  Patient denies any recent hospitalizations or ED visits.  Now presents s/p kidney transplant 24. Endocrine consulted for BG management.         Interval HPI:   Overnight events: No acute events overnight. Patient in room 24203/23180 A. Blood glucose stable. BG at and above goal on current insulin regimen (SSI, prandial, and basal insulin ). Steroid use- Methylprednisolone  125 mg. 2 Days Post-Op  Renal function- Abnormal - Creatinine 7.0   Vasopressors-  None       Endocrine will continue to follow and manage insulin orders inpatient.       Diet renal  Diet Adult Regular     Eatin%  Nausea: No  Hypoglycemia and intervention: No  Fever: No  TPN and/or TF: No      BP (!) 151/83 (Patient Position: Lying)   Pulse 87   Temp 97.9 °F (36.6 °C) (Oral)   Resp 18   Ht 6' 2" (1.88 m)   Wt 110.9 kg (244 lb 7.8 oz)   SpO2 (!) 94%   BMI 31.39 kg/m²     Labs Reviewed and Include    Recent Labs   Lab 24  0637   *   CALCIUM 9.2   ALBUMIN 3.0*   *   K 4.3   CO2 22*   CL 96   BUN 53*   CREATININE 7.0*     Lab Results   Component " "Value Date    WBC 11.29 02/20/2024    HGB 10.6 (L) 02/20/2024    HCT 32.7 (L) 02/20/2024    MCV 87 02/20/2024     02/20/2024     No results for input(s): "TSH", "FREET4" in the last 168 hours.  Lab Results   Component Value Date    HGBA1C 6.4 (H) 05/07/2023       Nutritional status:   Body mass index is 31.39 kg/m².  Lab Results   Component Value Date    ALBUMIN 3.0 (L) 02/20/2024    ALBUMIN 3.1 (L) 02/19/2024    ALBUMIN 3.1 (L) 02/19/2024     No results found for: "PREALBUMIN"    Estimated Creatinine Clearance: 13.6 mL/min (A) (based on SCr of 7 mg/dL (H)).    Accu-Checks  Recent Labs     02/18/24  1305 02/18/24  1655 02/19/24  0114 02/19/24  0716 02/19/24  1112 02/19/24  1531 02/19/24  2211 02/20/24  0327 02/20/24  0656   POCTGLUCOSE 166* 188* 277* 325* 286* 152* 220* 267* 247*       Current Medications and/or Treatments Impacting Glycemic Control  Immunotherapy:    Immunosuppressants           Stop Route Frequency     tacrolimus capsule 6 mg         -- Oral 2 times daily     antithymocyte globulin (rabbit) 150 mg, hydrocortisone sodium succinate (SOLU-CORTEF) 20 mg in sodium chloride 0.9% 500 mL (FOR PERIPHERAL LINE ADMINISTRATION ONLY)         02/21/24 1159 IV Daily     mycophenolate capsule 1,000 mg         -- Oral 2 times daily          Steroids:   Hormones (From admission, onward)      Start     Stop Route Frequency Ordered    02/21/24 0900  predniSONE tablet 20 mg  (IP TXP KIDNEY POST-OP THYMO WITH PERIPHERAL PRECHECKED)         -- Oral Daily 02/18/24 1636    02/20/24 1100  methylPREDNISolone sodium succinate injection 125 mg         -- IV Once 02/18/24 1920    02/19/24 1200  antithymocyte globulin (rabbit) 150 mg, hydrocortisone sodium succinate (SOLU-CORTEF) 20 mg in sodium chloride 0.9% 500 mL (FOR PERIPHERAL LINE ADMINISTRATION ONLY)         02/21/24 1159 IV Daily 02/18/24 1920 02/18/24 2017  hydrocortisone sodium succinate injection 100 mg         07/17/35 1217 IV Once as needed 02/18/24 1920 "          Pressors:    Autonomic Drugs (From admission, onward)      Start     Stop Route Frequency Ordered    02/18/24 2017  EPINEPHrine (PF) injection 1 mg         07/17/35 1217 SubQ Once as needed 02/18/24 1920    02/18/24 1730  EPINEPHrine (PF) injection 1 mg  (IP TXP KIDNEY POST-OP THYMO WITH PERIPHERAL PRECHECKED)         07/17/35 0930 SubQ Once as needed 02/18/24 1636          Hyperglycemia/Diabetes Medications:   Antihyperglycemics (From admission, onward)      Start     Stop Route Frequency Ordered    02/21/24 0900  insulin detemir U-100 (Levemir) pen 25 Units         -- SubQ Daily 02/20/24 0918    02/20/24 1130  insulin aspart U-100 pen 10 Units         -- SubQ 3 times daily with meals 02/20/24 0918 02/19/24 1012  insulin aspart U-100 pen 0-5 Units         -- SubQ Before meals, nightly and at 0200 PRN 02/19/24 0913            ASSESSMENT and PLAN    Renal/  * S/P kidney transplant  Titrate insulin slowly to avoid hypoglycemia as the risk of hypoglycemia increases with decreased creatinine clearance.  Estimated Creatinine Clearance: 13.6 mL/min (A) (based on SCr of 7 mg/dL (H)).  Optimize BG control to improve wound healing        Oncology  Anemia due to chronic kidney disease  May affect accuracy of A1C        Endocrine  Type 2 diabetes mellitus with chronic kidney disease, with long-term current use of insulin  BG goal: 140-180    - Levemir 25 units (20% increase due to fasting blood glucose above goal)   - Novolog 10 units TIDWM (20% increase due to prandial blood glucose  above goal)   - Continue LDC (150/50) SSI PRN ac/hs/0200   - POCT Glucose before meals, at bedtime and at 2 am  - Hypoglycemia protocol in place      ** Please notify Endocrine for any change and/or advance in diet**  ** Please call Endocrine for any BG related issues **     Discharge Planning:   TBD. Please notify endocrinology prior to discharge.           Vito Moreno, DNP, FNP  Endocrinology  [facility] - Transplant Stepdown

## 2024-02-20 NOTE — PROGRESS NOTES
Transplant Teaching Book given to patient, Isidro Alves, on 2/19/2024  During the course of the hospital stay the patient received information regarding kidney transplant. Teaching and instruction were completed.  Areas that were discussed included: how to contact the Transplant Team, the importance of measuring intake of fluids and urine output, and monitoring vital signs such as blood pressure, temperature, and daily weights.  Parameters for which to report abnormal findings were given.  Appointment were provided along with the rational for the importance of lab work and clinic visits.  A written medication list was provided.  The importance of immunosuppressive medications, their common side effects, and treatment to prevent or minimize side effects has been reviewed.  Signs and symptoms of rejection and infection along with various treatments were reviewed.  The need to avoid infection was discussed.  Wound care and special consideration regarding activities of daily living were explained.  Written and verbal teaching of the above information was given.     Discussed with the patient and caregiver the importance of maintaining COVID-19 precautions; wearing a mask, good handwashing, and social distancing.  Also, to report any signs or symptoms (fever, difficulty breathing, loss of taste/smell, etc.), suspected exposure, or COVID testing, immediately to the transplant program.     HCV education was provided to the patient, his wife and daughter (via phone)

## 2024-02-20 NOTE — ASSESSMENT & PLAN NOTE
Titrate insulin slowly to avoid hypoglycemia as the risk of hypoglycemia increases with decreased creatinine clearance.  Estimated Creatinine Clearance: 13.6 mL/min (A) (based on SCr of 7 mg/dL (H)).  Optimize BG control to improve wound healing

## 2024-02-20 NOTE — PROGRESS NOTES
"[facility] - Transplant Stepdown  Adult Nutrition  Progress Note    SUMMARY       Recommendations    1. Continue Renal diet     2. If PO intake <50%, add ONS Novasource renal BID      3. RD to monitor and follow    Goals: Meet % EEN, EPN by RD isabel  Nutrition Goal Status: progressing towards goal  Communication of RD Recs:  (POC)    Assessment and Plan    Nutrition Problem  Increased nutrient needs (protein, energy)     Related to (etiology):   Increased physiological demands     Signs and Symptoms (as evidenced by):   S/p DBD Kidney transplant (2/18)     Interventions/Recommendations (treatment strategy):  Collaboration with other providers  Nutrition education     Nutrition Diagnosis Status:   New      Reason for Assessment    Reason For Assessment: RD follow-up  Diagnosis:  (kidney tx)  Relevant Medical History: anemia, T2DM, CAD, CHF  Interdisciplinary Rounds: did not attend    General Information Comments:   S/p DBD Kidney transplant (2/18). Pt with good appetite, tolerating 75% of meals. Per initial assessment, stated  lb. Wt stable per chart. Pt appears nourished. No indicator of malnutrition. Pt with no additional questions regarding post tx diet.    Nutrition Discharge Planning: Post transplant nutrition education provided on 2/19. Food safety/drug interactions emphasized. General healthy/low salt diet recommended. Education material left at bedside. No other needs identified.    Nutrition Risk Screen    Nutrition Risk Screen: no indicators present    Nutrition/Diet History    Spiritual, Cultural Beliefs, Scientologist Practices, Values that Affect Care: no    Anthropometrics    Temp: 97.7 °F (36.5 °C)  Height: 6' 2" (188 cm)  Height (inches): 74 in  Weight Method: Bed Scale  Weight: 110.9 kg (244 lb 7.8 oz)  Weight (lb): 244.49 lb  Ideal Body Weight (IBW), Male: 190 lb  % Ideal Body Weight, Male (lb): 130.94 %  BMI (Calculated): 31.4       Lab/Procedures/Meds    Pertinent Labs Reviewed: " reviewed  Pertinent Labs Comments: glucose 234, Na 130, BUN 53, Cr 7.0, albumin 3.0, eGFR 8.0  Pertinent Medications Reviewed: reviewed  Pertinent Medications Comments: prednisone, insulin, tacrolimus, Na bicarb, mycophenolate, famotidine, heparin    Estimated/Assessed Needs    Weight Used For Calorie Calculations: 110.7 kg (244 lb)  Energy Calorie Requirements (kcal): 2341 kcal  Energy Need Method: [place]-St Jeor (PAL 1.2)  Protein Requirements: 103-129g (1.2-1.5g/kg IBW)  Weight Used For Protein Calculations: 86.2 kg (190 lb) (IBW)  Fluid Requirements (mL): 1ml/kcal or per MD  Estimated Fluid Requirement Method: RDA Method  RDA Method (mL): 2341  CHO Requirement: 293g      Nutrition Prescription Ordered    Current Diet Order: Renal    Evaluation of Received Nutrient/Fluid Intake    I/O: -  Energy Calories Required: not meeting needs  Protein Required: not meeting needs  Comments: LBM 2/19  Tolerance: tolerating      Nutrition Risk    Level of Risk/Frequency of Follow-up:  (1x/week)     Monitor and Evaluation    Food and Nutrient Intake: food and beverage intake, energy intake  Food and Nutrient Adminstration: diet order  Knowledge/Beliefs/Attitudes: food and nutrition knowledge/skill  Physical Activity and Function: nutrition-related ADLs and IADLs  Anthropometric Measurements: height/length, weight, weight change, body mass index  Biochemical Data, Medical Tests and Procedures: electrolyte and renal panel, gastrointestinal profile, inflammatory profile, lipid profile, glucose/endocrine profile  Nutrition-Focused Physical Findings: overall appearance     Nutrition Follow-Up    RD Follow-up?: Yes

## 2024-02-20 NOTE — PROGRESS NOTES
Transplant Social Work Discharge Note:    Pt will discharge to patient's home under the care of Kunal Alves, patient's wife, phone number [phone].     Patient reports readiness to discharge at this time. Per rounds this morning patient does not require referrals from this  at this time. Patient and caregiver denied needing or wanting resources or referrals at this time. Patient was provided with [facility] information packet during this admission. Per medical team patient is okay to return to [place]. Patient and caregiver agree to contact transplant team with needs, questions, or concerns as they arise.     Pt aware of, involved in, and coping well with this discharge plan. Pt did not have any concerns with the discharge plan at this time. SW remains available at [phone].      Lacey Altamirano LMSW

## 2024-02-20 NOTE — ASSESSMENT & PLAN NOTE
BG goal: 140-180    - Levemir 25 units (20% increase due to fasting blood glucose above goal)   - Novolog 10 units TIDWM (20% increase due to prandial blood glucose  above goal)   - Continue LDC (150/50) SSI PRN ac/hs/0200   - POCT Glucose before meals, at bedtime and at 2 am  - Hypoglycemia protocol in place      ** Please notify Endocrine for any change and/or advance in diet**  ** Please call Endocrine for any BG related issues **     Discharge Planning:   TBD. Please notify endocrinology prior to discharge.

## 2024-02-21 ENCOUNTER — TELEPHONE (OUTPATIENT)
Dept: INTERNAL MEDICINE | Facility: CLINIC | Age: 69
End: 2024-02-21
Payer: MEDICARE

## 2024-02-21 ENCOUNTER — PATIENT MESSAGE (OUTPATIENT)
Dept: ENDOCRINOLOGY | Facility: HOSPITAL | Age: 69
End: 2024-02-21
Payer: MEDICARE

## 2024-02-21 ENCOUNTER — TELEPHONE (OUTPATIENT)
Dept: ENDOCRINOLOGY | Facility: HOSPITAL | Age: 69
End: 2024-02-21

## 2024-02-21 ENCOUNTER — CLINICAL SUPPORT (OUTPATIENT)
Dept: TRANSPLANT | Facility: CLINIC | Age: 69
End: 2024-02-21
Payer: MEDICARE

## 2024-02-21 ENCOUNTER — LAB VISIT (OUTPATIENT)
Dept: LAB | Facility: HOSPITAL | Age: 69
End: 2024-02-21
Payer: COMMERCIAL

## 2024-02-21 VITALS
SYSTOLIC BLOOD PRESSURE: 157 MMHG | DIASTOLIC BLOOD PRESSURE: 79 MMHG | WEIGHT: 265.44 LBS | RESPIRATION RATE: 18 BRPM | RESPIRATION RATE: 18 BRPM | BODY MASS INDEX: 34.07 KG/M2 | TEMPERATURE: 97 F | OXYGEN SATURATION: 97 % | HEART RATE: 80 BPM | DIASTOLIC BLOOD PRESSURE: 79 MMHG | HEIGHT: 74 IN | TEMPERATURE: 97 F | SYSTOLIC BLOOD PRESSURE: 157 MMHG | OXYGEN SATURATION: 97 % | HEIGHT: 74 IN | HEART RATE: 80 BPM | BODY MASS INDEX: 34.07 KG/M2 | WEIGHT: 265.44 LBS

## 2024-02-21 DIAGNOSIS — Z94.0 KIDNEY REPLACED BY TRANSPLANT: Primary | ICD-10-CM

## 2024-02-21 DIAGNOSIS — E08.00 DIABETES MELLITUS DUE TO UNDERLYING CONDITION WITH HYPEROSMOLARITY WITHOUT COMA, UNSPECIFIED WHETHER LONG TERM INSULIN USE: ICD-10-CM

## 2024-02-21 DIAGNOSIS — Z94.0 KIDNEY REPLACED BY TRANSPLANT: ICD-10-CM

## 2024-02-21 LAB
ALBUMIN SERPL BCP-MCNC: 3.2 G/DL (ref 3.5–5.2)
ANION GAP SERPL CALC-SCNC: 11 MMOL/L (ref 8–16)
BASOPHILS # BLD AUTO: 0 K/UL (ref 0–0.2)
BASOPHILS NFR BLD: 0 % (ref 0–1.9)
BUN SERPL-MCNC: 52 MG/DL (ref 8–23)
CALCIUM SERPL-MCNC: 9.3 MG/DL (ref 8.7–10.5)
CHLORIDE SERPL-SCNC: 98 MMOL/L (ref 95–110)
CO2 SERPL-SCNC: 24 MMOL/L (ref 23–29)
CREAT SERPL-MCNC: 5.3 MG/DL (ref 0.5–1.4)
DIFFERENTIAL METHOD BLD: ABNORMAL
EOSINOPHIL # BLD AUTO: 0 K/UL (ref 0–0.5)
EOSINOPHIL NFR BLD: 0 % (ref 0–8)
ERYTHROCYTE [DISTWIDTH] IN BLOOD BY AUTOMATED COUNT: 14.3 % (ref 11.5–14.5)
EST. GFR  (NO RACE VARIABLE): 11.2 ML/MIN/1.73 M^2
G6PD RBC-CCNT: 11.7 U/G HB (ref 8–11.9)
GLUCOSE SERPL-MCNC: 112 MG/DL (ref 70–110)
HBV SURFACE AB SER QL IA: POSITIVE
HBV SURFACE AB SERPL IA-ACNC: NORMAL MIU/ML
HCT VFR BLD AUTO: 33.7 % (ref 40–54)
HGB BLD-MCNC: 10.8 G/DL (ref 14–18)
IMM GRANULOCYTES # BLD AUTO: 0.02 K/UL (ref 0–0.04)
IMM GRANULOCYTES NFR BLD AUTO: 0.3 % (ref 0–0.5)
LYMPHOCYTES # BLD AUTO: 0.1 K/UL (ref 1–4.8)
LYMPHOCYTES NFR BLD: 1.3 % (ref 18–48)
MAGNESIUM SERPL-MCNC: 1.8 MG/DL (ref 1.6–2.6)
MCH RBC QN AUTO: 27.2 PG (ref 27–31)
MCHC RBC AUTO-ENTMCNC: 32 G/DL (ref 32–36)
MCV RBC AUTO: 85 FL (ref 82–98)
MONOCYTES # BLD AUTO: 0.5 K/UL (ref 0.3–1)
MONOCYTES NFR BLD: 7 % (ref 4–15)
NEUTROPHILS # BLD AUTO: 6.2 K/UL (ref 1.8–7.7)
NEUTROPHILS NFR BLD: 91.4 % (ref 38–73)
NRBC BLD-RTO: 0 /100 WBC
PHOSPHATE SERPL-MCNC: 3.4 MG/DL (ref 2.7–4.5)
PLATELET # BLD AUTO: 180 K/UL (ref 150–450)
PMV BLD AUTO: 12.1 FL (ref 9.2–12.9)
POTASSIUM SERPL-SCNC: 3.8 MMOL/L (ref 3.5–5.1)
RBC # BLD AUTO: 3.97 M/UL (ref 4.6–6.2)
SODIUM SERPL-SCNC: 133 MMOL/L (ref 136–145)
TACROLIMUS BLD-MCNC: 8.2 NG/ML (ref 5–15)
WBC # BLD AUTO: 6.83 K/UL (ref 3.9–12.7)

## 2024-02-21 PROCEDURE — 83735 ASSAY OF MAGNESIUM: CPT | Performed by: INTERNAL MEDICINE

## 2024-02-21 PROCEDURE — 80197 ASSAY OF TACROLIMUS: CPT | Performed by: INTERNAL MEDICINE

## 2024-02-21 PROCEDURE — 99213 OFFICE O/P EST LOW 20 MIN: CPT | Mod: PBBFAC

## 2024-02-21 PROCEDURE — 99001 SPECIMEN HANDLING PT-LAB: CPT | Mod: PO | Performed by: NURSE PRACTITIONER

## 2024-02-21 PROCEDURE — 99999 PR PBB SHADOW E&M-EST. PATIENT-LVL III: CPT | Mod: PBBFAC,,,

## 2024-02-21 PROCEDURE — 85025 COMPLETE CBC W/AUTO DIFF WBC: CPT | Performed by: INTERNAL MEDICINE

## 2024-02-21 PROCEDURE — 99213 OFFICE O/P EST LOW 20 MIN: CPT | Mod: PBBFAC,27

## 2024-02-21 PROCEDURE — 80069 RENAL FUNCTION PANEL: CPT | Performed by: INTERNAL MEDICINE

## 2024-02-21 PROCEDURE — 36415 COLL VENOUS BLD VENIPUNCTURE: CPT | Performed by: INTERNAL MEDICINE

## 2024-02-21 NOTE — PROGRESS NOTES
Clinic Note: First Return to Clinic Post- Kidney Transplant    Isidro Alves  is a 68 y.o. male  S/p  RIGHT KIDNEY  transplant on 2/18/2024 (Kidney) for Diabetes Mellitus - Type II.      Discharge Course (Issues/Concerns): none identified    Objective:   Vitals:    02/21/24 0921   BP: (!) 157/79   Pulse: 80   Resp: 18   Temp: 97.3 °F (36.3 °C)     Met with patient and his caregiver in the clinic to review current medication list.     Current Outpatient Medications   Medication Sig Dispense Refill    aspirin (ECOTRIN) 81 MG EC tablet Take 1 tablet (81 mg total) by mouth once daily. 30 tablet 11    atorvastatin (LIPITOR) 40 MG tablet Take 1 tablet (40 mg total) by mouth once daily. 30 tablet 11    atovaquone (MEPRON) 750 mg/5 mL Susp oral liquid Take 10 mLs (1,500 mg total) by mouth once daily. Stop 8/18/24 300 mL 5    blood sugar diagnostic Strp Use 1 strip to test blood sugar 3 (three) times daily. 100 strip 1    blood-glucose meter Misc Use as instructed 1 each 0    carvediloL (COREG) 6.25 MG tablet Take 1 tablet (6.25 mg total) by mouth 2 (two) times daily. 60 tablet 11    cetirizine (ZYRTEC) 10 MG tablet TAKE 1 TABLET BY MOUTH EVERY DAY (Patient taking differently: Take 10 mg by mouth daily as needed for Allergies.) 90 tablet 1    famotidine (PEPCID) 20 MG tablet Take 1 tablet (20 mg total) by mouth every evening. 30 tablet 0    finasteride (PROSCAR) 5 mg tablet TAKE 1 TABLET BY MOUTH EVERY DAY 30 tablet 11    insulin degludec (TRESIBA FLEXTOUCH U-100) 100 unit/mL (3 mL) insulin pen Inject 25 Units into the skin once daily. 9 mL 1    insulin lispro (HUMALOG KWIKPEN INSULIN) 100 unit/mL pen Inject 10 Units into the skin 3 (three) times daily. Plus slide; max dose 45 units/d 9 mL 11    lancets (ONETOUCH DELICA PLUS LANCET) 33 gauge Misc Use 1 lancet to test blood sugar 3 (three) times daily. 100 each 1    multivitamin Tab Take 1 tablet by mouth once daily.      mycophenolate (CELLCEPT) 250 mg Cap Take 4 capsules  "(1,000 mg total) by mouth 2 (two) times daily. 240 capsule 11    oxyCODONE (ROXICODONE) 10 mg Tab immediate release tablet Take 1 tablet (10 mg total) by mouth every 6 (six) hours as needed for Pain. 28 tablet 0    pen needle, diabetic (BD SHIRA 2ND GEN PEN NEEDLE) 32 gauge x 5/32" Ndle Use 1 pen needle to inject insulin 4 (four) times daily. 100 each 1    predniSONE (DELTASONE) 5 MG tablet Take by mouth daily;  2/21 - 2/27 : 20 mg;  2/28 - 3/5 : 15 mg; 3/6 - 3/12 :10 mg; 3/13/2024 - forever : 5 mg; do not stop 70 tablet 11    sodium bicarbonate 650 MG tablet Take 2 tablets (1,300 mg total) by mouth 2 (two) times a day. 120 tablet 11    tacrolimus (PROGRAF) 1 MG Cap Take 6 capsules (6 mg total) by mouth every 12 (twelve) hours. 360 capsule 11    tamsulosin (FLOMAX) 0.4 mg Cap Take 1 capsule (0.4 mg total) by mouth once daily. 30 capsule 11    valGANciclovir (VALCYTE) 450 mg Tab Take 1 tablet (450 mg total) by mouth every Mon, Wed, Fri. Stop 5/21/24 12 tablet 2     Current Facility-Administered Medications   Medication Dose Route Frequency Provider Last Rate Last Admin    penicillin G benzathine (BICILLIN LA) injection 2.4 Million Units  2.4 Million Units Intramuscular Weekly Arin Martinez DO         Pharmacy Interventions/Recommendations:     1) Graft Function & Immunosuppression Issues: Induction Thymo, and maintenance FK, MMF, Pred per taper   Tac level: pending    2) Opportunistic Infection prophylaxis:   PJP prophylaxis   Atovaquone 1500 mg  Daily for 6 months total until 8/18/24  G6PD pending and can switch to dapsone if it comes back normal and Hgb okay   2. CMV prophylaxis   CMV +/+ (Moderate Risk) valganciclovir  450 mg three times weekly for 3 months until 5/21/24  Please increase to daily when Crcl > 40 mL/min or 900 mg QD when CrCl >60  3) Donor Serologies & Monitoring:     Donor CMV Status: Positive  Donor HCV Status:   Donor HBcAb: Positive  Donor HBV DERICK: Organ record is missing.  Donor HCV " DERICK: Organ record is missing.      4) Pain Management & Bowel Regimen: Patient reports pain controlled; re-educated patient on alternating oxy with tylenol (max 3,000 mg/day); patient reports normal bowel movements without utilizing any stool softeners or laxatives    5) Blood Pressure Management: Patient did not bring BP logs  - BP this /79: Patient reports took his medications 5 minutes prior to this reading - anticipate SBP lower after medication given time to take effect; coordinator notified  *To note, home entresto is HELD - consider restarting in about a month pending kidney fxn; home isosorbide also currently HELD    6) Blood Sugar Management & Follow-up: Patient did not have any documentation of BG since hospital discharge  - FB; patient needs endocrinology follow-up - coordinator notified    7) Electrolyte Management:   CO2: 24 - continue bicarb 1300 mg BID    8) Osteoporosis Prevention Strategy (Liver Transplant): n/a    9) OTHER medication follow-up (patient assistance, held medications, etc):   # RPR positive donor   Bicillin weekly x 3 doses   First dose today  - remaining doses will be scheduled as outpatient in ID clinic    10) Reinforced medication education conducted in the hospital, including medication indications, dosing, administration, side effects, monitoring-- including timing of immunosuppressant levels.     Patient received their FIRST fill of medications from [facility].  Discussed the process for obtaining refills of medications, including verifying the dose and mailing address to have medications delivered.     Isidro and his caregivers verbalized understanding and had the opportunity to ask questions.

## 2024-02-21 NOTE — PROGRESS NOTES
DISCHARGE NOTE:    Isidro Alves is a 68 y.o. 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      Past Medical History:   Diagnosis Date    Abnormal nuclear stress test 8/30/2022    Acute hypoxemic respiratory failure due to COVID-19 1/21/2021    Acute on chronic combined systolic and diastolic congestive heart failure 5/7/2023    Acute on chronic respiratory failure 5/7/2023    Acute respiratory failure with hypoxia and hypercapnia 5/18/2023    Anal itching 10/10/2022    Anemia 4/16/2014    BPH (benign prostatic hyperplasia)     CKD (chronic kidney disease) stage 4, GFR 15-29 ml/min     Coronary artery disease of native artery of native heart with stable angina pectoris 4/29/2019    Diabetes mellitus     Diabetes mellitus, type 2     Dilated cardiomyopathy 8/30/2022    Elevated PSA     Encounter for blood transfusion     Herpes labialis     Hyperlipidemia     Hypertension     Hypertensive emergency 5/18/2023    Obesity     Pneumonia 5/7/2023    Pneumonia due to COVID-19 virus     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 BP well controlled on minimal meds at this time     Allergies:   Review of patient's allergies indicates:   Allergen Reactions    Bactrim [sulfamethoxazole-trimethoprim] Swelling    Nsaids (non-steroidal anti-inflammatory drug)      CKD    Sulfamethoxazole     Trimethoprim        Patient Pharmacy: [facility]    Discharge Medications:     Medication List        START taking these medications      atovaquone 750 mg/5 mL Susp oral liquid  Commonly  "known as: MEPRON  Take 10 mLs (1,500 mg total) by mouth once daily. Stop 8/18/24     BD ULTRA-FINE SHIRA PEN NEEDLE 32 gauge x 5/32" Ndle  Generic drug: pen needle, diabetic  Use 1 pen needle to inject insulin 4 (four) times daily.     famotidine 20 MG tablet  Commonly known as: PEPCID  Take 1 tablet (20 mg total) by mouth every evening.     insulin lispro 100 unit/mL pen  Commonly known as: HumaLOG KwikPen Insulin  Inject 10 Units into the skin 3 (three) times daily. Plus slide; max dose 45 units/d     multivitamin Tab     mycophenolate 250 mg Cap  Commonly known as: CELLCEPT  Take 4 capsules (1,000 mg total) by mouth 2 (two) times daily.     ONETOUCH ULTRA2 METER Misc  Generic drug: blood-glucose meter  Use as instructed  Replaces: blood-glucose meter kit     oxyCODONE 10 mg Tab immediate release tablet  Commonly known as: ROXICODONE  Take 1 tablet (10 mg total) by mouth every 6 (six) hours as needed for Pain.     predniSONE 5 MG tablet  Commonly known as: DELTASONE  Take by mouth daily;  2/21 - 2/27 : 20 mg;  2/28 - 3/5 : 15 mg; 3/6 - 3/12 :10 mg; 3/13/2024 - forever : 5 mg; do not stop     sodium bicarbonate 650 MG tablet  Take 2 tablets (1,300 mg total) by mouth 2 (two) times a day.     tacrolimus 1 MG Cap  Commonly known as: PROGRAF  Take 6 capsules (6 mg total) by mouth every 12 (twelve) hours.     TRESIBA FLEXTOUCH U-100 100 unit/mL (3 mL) insulin pen  Generic drug: insulin degludec  Inject 25 Units into the skin once daily.     valGANciclovir 450 mg Tab  Commonly known as: VALCYTE  Take 1 tablet (450 mg total) by mouth every Mon, Wed, Fri. Stop 5/21/24            CHANGE how you take these medications      atorvastatin 40 MG tablet  Commonly known as: LIPITOR  Take 1 tablet (40 mg total) by mouth once daily.  What changed: when to take this     carvediloL 6.25 MG tablet  Commonly known as: COREG  Take 1 tablet (6.25 mg total) by mouth 2 (two) times daily.  What changed:   medication strength  how much to " take  when to take this     ONETOUCH DELICA PLUS LANCET 33 gauge Misc  Generic drug: lancets  Use 1 lancet to test blood sugar 3 (three) times daily.  What changed:   medication strength  how much to take  when to take this     ONETOUCH ULTRA TEST Strp  Generic drug: blood sugar diagnostic  Use 1 strip to test blood sugar 3 (three) times daily.  What changed: when to take this     tamsulosin 0.4 mg Cap  Commonly known as: FLOMAX  Take 1 capsule (0.4 mg total) by mouth once daily.  What changed: when to take this            CONTINUE taking these medications      aspirin 81 MG EC tablet  Commonly known as: ECOTRIN  Take 1 tablet (81 mg total) by mouth once daily.     cetirizine 10 MG tablet  Commonly known as: ZYRTEC  TAKE 1 TABLET BY MOUTH EVERY DAY     finasteride 5 mg tablet  Commonly known as: PROSCAR  TAKE 1 TABLET BY MOUTH EVERY DAY            STOP taking these medications      blood-glucose meter kit  Replaced by: ONETOUCH ULTRA2 METER Misc     calcitRIOL 0.25 MCG Cap  Commonly known as: ROCALTROL     ciprofloxacin-dexAMETHasone 0.3-0.1% 0.3-0.1 % Drps  Commonly known as: CIPRODEX     clotrimazole-betamethasone 1-0.05% cream  Commonly known as: LOTRISONE     cyclobenzaprine 10 MG tablet  Commonly known as: FLEXERIL     ENTRESTO  mg per tablet  Generic drug: sacubitriL-valsartan     esomeprazole 40 MG capsule  Commonly known as: NEXIUM     folic acid 1 MG tablet  Commonly known as: FOLVITE     furosemide 80 MG tablet  Commonly known as: LASIX     isosorbide dinitrate 20 MG tablet  Commonly known as: ISORDIL     sevelamer carbonate 800 mg Tab  Commonly known as: RENVELA               Where to Get Your Medications        These medications were sent to [facility] [address], [place] [address]      Hours: Mon-Fri 7a-7p, Sat-Sun 10a-4p Phone: [phone]   aspirin 81 MG EC tablet  atorvastatin 40 MG tablet  atovaquone 750 mg/5 mL Susp oral liquid  BD ULTRA-FINE SHIRA PEN NEEDLE 32  "gauge x 5/32" Ndle  carvediloL 6.25 MG tablet  famotidine 20 MG tablet  insulin lispro 100 unit/mL pen  mycophenolate 250 mg Cap  ONETOUCH DELICA PLUS LANCET 33 gauge Misc  ONETOUCH ULTRA TEST Strp  ONETOUCH ULTRA2 METER Misc  oxyCODONE 10 mg Tab immediate release tablet  predniSONE 5 MG tablet  sodium bicarbonate 650 MG tablet  tacrolimus 1 MG Cap  tamsulosin 0.4 mg Cap  TRESIBA FLEXTOUCH U-100 100 unit/mL (3 mL) insulin pen  valGANciclovir 450 mg Tab          Pharmacy Interventions/Recommendations:  1) Transplant Immunosuppression: Induction Thymo, and maintenance FK, MMF, Pred per taper   Tacrolimus 6 BID   Last FK 3.9 on 4/4   Goal trough: 8-10 ng/mL  Cellcept (Mycophenolate) 1000 mg BID    Prednisone per taper      2) Opportunistic Infection prophylaxis:   PJP prophylaxis   Atovaquone 1500 mg  Daily for 6 months total until 8/18/24  G6PD pending and can switch to dapsone if it comes back normal and Hgb okay   CMV prophylaxis   CMV +/+ (Moderate Risk) valganciclovir  450 mg three times weekly for 3 months until 5/21/24  Please increase to daily when Crcl > 40 mL/min    RPR positive donor   Started on doxy but switched to PCN, got one dose inpt     3) Osteoporosis Prevention measures (liver txp): NA    4) Patient Counseling/Education: Demonstrated the use of the BP cuff, thermometer.    5) Follow-Up/Discharge Needs:      # CHF   Coreg 6.25 mg BID   Can increase as needed for BP control    Hold entresto and consider restarting in about a month pending kidney fxn   Hold isosorbide at this time but consider restarting as BP tolerates     # CAD  Atorvastatin 40 mg daily   Aspirin 81 mg daily     # RPR positive donor    1. 1 dose of pcn given, needs additional doses weekly     # BPH   1. Home Finasteride and tamsulosin restarted     #DM    1. Tresiba 25 units daily    2. Humalog 10 units daily plus slide     # Maintenance   1. Pepcid 20 mg daily    2. Bicarb 1300 mg BID       6) Patient Assistance Information: None "     7) The following medications have been placed on HOLD and should be restarted in the outpatient setting (when appropriate): f/u G6PD, entresto, isosorbide     Isidro and his caregiver verbalized their understanding and had the opportunity to ask questions.

## 2024-02-21 NOTE — TELEPHONE ENCOUNTER
----- Message from Beata Bethea sent at 2/20/2024  5:16 PM CST -----  Type:  Patient Returning Call    Who Called:pt  Would the patient rather a call back or a response via [facility]? call  Best Call Back Number:-[phone]  Additional Information: needs to speak to someone about communication between office and pharmacy

## 2024-02-21 NOTE — PROGRESS NOTES
"1ST NURSING VISIT POST DISCHARGE NOTE    1st RN appointment with Isidro Alves post discharge 2/220/2024 s/p kidney transplant 2/18/24.  Patient's spouse accompanied him.  Patient reports none.  Patient says that he that he is sleeping well.  Incision intact with staples.  Patient that he is able to explain daily incision care and showering instructions.  Reviewed I&O monitoring, measuring, and recording, and the need for hydration (i.e., at least 2 liters of water daily with minimal caffeine and no grapefruit products).  Medication list and rationale were reviewed.  Patient did bring blue medication card and medication bottles for review.  Patient reports that he has stopped Dulcolax and has not continued Colace.  Patient has had a bowel movement.  Patient expressed understanding of daily care including BID VS, medications, and I&O documentation.  Patient made aware of today's creatinine level: 5.3.  Patient aware that coordinator will review today's labs with a transplant physician and call the patient with any dose changes indicated.  Next lab appointment scheduled for 2/26/2024.  First post-operative transplant team appointment with labs scheduled for 2/28/2024.    Using the Kidney Transplant Patient Reference Manual, the patient submitted his open book "Self-assessment of Kidney Transplant Patient Knowledge" test, which was completed in the transplant clinic this morning before 1st nursing visit.  This test includes questions regarding critical dose medications commonly used after kidney transplant, medication dosing and side effects, importance of timed lab draws, important signs and symptoms to report 24/7 immediately post-transplant as well as how to contact the transplant team 24/7.    Test Score: 24/25    After completing the test, the patient was given a copy of the Self-assessment Answer Key to reinforce accurate learning of test content.  Patient expressed his understanding of the value of the " information included in the self-assessment test.    CCMS technique reviewed with pt, pt verbalized understanding.

## 2024-02-21 NOTE — TELEPHONE ENCOUNTER
Patient needs a follow-up appointment in the discharge clinic in 1-2 weeks.    Lab Results   Component Value Date    HGBA1C 6.4 (H) 05/07/2023       POCT Glucose   Date Value Ref Range Status   02/20/2024 154 (H) 70 - 110 mg/dL Final   02/20/2024 227 (H) 70 - 110 mg/dL Final   02/20/2024 247 (H) 70 - 110 mg/dL Final   02/20/2024 267 (H) 70 - 110 mg/dL Final   02/19/2024 220 (H) 70 - 110 mg/dL Final   02/19/2024 152 (H) 70 - 110 mg/dL Final   02/19/2024 286 (H) 70 - 110 mg/dL Final   02/19/2024 325 (H) 70 - 110 mg/dL Final   02/19/2024 277 (H) 70 - 110 mg/dL Final   02/18/2024 188 (H) 70 - 110 mg/dL Final   02/18/2024 166 (H) 70 - 110 mg/dL Final       Diabetes Medications               insulin degludec (TRESIBA FLEXTOUCH U-100) 100 unit/mL (3 mL) insulin pen Inject 25 Units into the skin once daily.    insulin lispro (HUMALOG KWIKPEN INSULIN) 100 unit/mL pen Inject 10 Units into the skin 3 (three) times daily. Plus slide; max dose 45 units/d            Thanks,    Vito Moreno DNP, FNP-C  Department of Endocrinology  Inpatient Glycemic Management

## 2024-02-21 NOTE — PROGRESS NOTES
Transplant Coordinator  2/18-2/20/2024    Pt admitted for a cadaveric kidney transplant from a DCD, HCVAb+/DERCIK-, RPR+ donor. KDPI 67%, Thymo induction, CMV ++    Kidney function improving daily Cr 11--->7. Making more UOP    RLQ incision DAYANA with staples  [place] and HEMA removed    Pt received PCN#1 in the hospital-last 2 are scheduled outpt.    Week#1 HCV labs due 2/26/24  Labs 2/21/24 and RTC to meet with coordinator/pharmD

## 2024-02-22 LAB
CLASS I ANTIBODY COMMENTS - LUMINEX: NORMAL
CLASS II ANTIBODIES - LUMINEX: NEGATIVE
CPRA %: 0
HLA FREEZE AND HOLD INTERPRETATION: NORMAL
HLAFH COLLECTION DATE: NORMAL
HPRA INTERPRETATION: NORMAL
SERUM COLLECTION DT - LUMINEX CLASS I: NORMAL
SERUM COLLECTION DT - LUMINEX CLASS II: NORMAL
SPCL1 TESTING DATE: NORMAL
SPCL2 TESTING DATE: NORMAL
SPLUA TESTING DATE: NORMAL

## 2024-02-26 ENCOUNTER — CLINICAL SUPPORT (OUTPATIENT)
Dept: INFECTIOUS DISEASES | Facility: CLINIC | Age: 69
End: 2024-02-26
Payer: MEDICARE

## 2024-02-26 ENCOUNTER — LAB VISIT (OUTPATIENT)
Dept: LAB | Facility: HOSPITAL | Age: 69
End: 2024-02-26
Attending: INTERNAL MEDICINE
Payer: MEDICARE

## 2024-02-26 DIAGNOSIS — Z94.0 KIDNEY REPLACED BY TRANSPLANT: ICD-10-CM

## 2024-02-26 DIAGNOSIS — A53.9 SYPHILIS: Primary | ICD-10-CM

## 2024-02-26 LAB
ALBUMIN SERPL BCP-MCNC: 3 G/DL (ref 3.5–5.2)
ANION GAP SERPL CALC-SCNC: 9 MMOL/L (ref 8–16)
BASOPHILS # BLD AUTO: 0.06 K/UL (ref 0–0.2)
BASOPHILS NFR BLD: 0.6 % (ref 0–1.9)
BUN SERPL-MCNC: 24 MG/DL (ref 8–23)
CALCIUM SERPL-MCNC: 9.4 MG/DL (ref 8.7–10.5)
CHLORIDE SERPL-SCNC: 108 MMOL/L (ref 95–110)
CO2 SERPL-SCNC: 24 MMOL/L (ref 23–29)
CREAT SERPL-MCNC: 2 MG/DL (ref 0.5–1.4)
DIFFERENTIAL METHOD BLD: ABNORMAL
EOSINOPHIL # BLD AUTO: 0.5 K/UL (ref 0–0.5)
EOSINOPHIL NFR BLD: 5.3 % (ref 0–8)
ERYTHROCYTE [DISTWIDTH] IN BLOOD BY AUTOMATED COUNT: 14.7 % (ref 11.5–14.5)
EST. GFR  (NO RACE VARIABLE): 35.9 ML/MIN/1.73 M^2
GLUCOSE SERPL-MCNC: 74 MG/DL (ref 70–110)
HCT VFR BLD AUTO: 32.5 % (ref 40–54)
HCV AB SERPL QL IA: NORMAL
HGB BLD-MCNC: 10.1 G/DL (ref 14–18)
IMM GRANULOCYTES # BLD AUTO: 0.41 K/UL (ref 0–0.04)
IMM GRANULOCYTES NFR BLD AUTO: 4 % (ref 0–0.5)
LYMPHOCYTES # BLD AUTO: 0.2 K/UL (ref 1–4.8)
LYMPHOCYTES NFR BLD: 2.2 % (ref 18–48)
MAGNESIUM SERPL-MCNC: 1.1 MG/DL (ref 1.6–2.6)
MCH RBC QN AUTO: 28.1 PG (ref 27–31)
MCHC RBC AUTO-ENTMCNC: 31.1 G/DL (ref 32–36)
MCV RBC AUTO: 91 FL (ref 82–98)
MONOCYTES # BLD AUTO: 0.8 K/UL (ref 0.3–1)
MONOCYTES NFR BLD: 7.3 % (ref 4–15)
NEUTROPHILS # BLD AUTO: 8.2 K/UL (ref 1.8–7.7)
NEUTROPHILS NFR BLD: 80.6 % (ref 38–73)
NRBC BLD-RTO: 0 /100 WBC
PHOSPHATE SERPL-MCNC: 2.4 MG/DL (ref 2.7–4.5)
PLATELET # BLD AUTO: 264 K/UL (ref 150–450)
PMV BLD AUTO: 12 FL (ref 9.2–12.9)
POTASSIUM SERPL-SCNC: 4.4 MMOL/L (ref 3.5–5.1)
RBC # BLD AUTO: 3.59 M/UL (ref 4.6–6.2)
SODIUM SERPL-SCNC: 141 MMOL/L (ref 136–145)
WBC # BLD AUTO: 10.21 K/UL (ref 3.9–12.7)

## 2024-02-26 PROCEDURE — 99999 PR PBB SHADOW E&M-EST. PATIENT-LVL I: CPT | Mod: PBBFAC,,,

## 2024-02-26 PROCEDURE — 80197 ASSAY OF TACROLIMUS: CPT | Performed by: INTERNAL MEDICINE

## 2024-02-26 PROCEDURE — 36415 COLL VENOUS BLD VENIPUNCTURE: CPT | Performed by: INTERNAL MEDICINE

## 2024-02-26 PROCEDURE — 80069 RENAL FUNCTION PANEL: CPT | Performed by: INTERNAL MEDICINE

## 2024-02-26 PROCEDURE — 83735 ASSAY OF MAGNESIUM: CPT | Performed by: INTERNAL MEDICINE

## 2024-02-26 PROCEDURE — 96372 THER/PROPH/DIAG INJ SC/IM: CPT | Mod: PBBFAC

## 2024-02-26 PROCEDURE — 99999PBSHW PR PBB SHADOW TECHNICAL ONLY FILED TO HB: Mod: JZ,PBBFAC,,

## 2024-02-26 PROCEDURE — 99211 OFF/OP EST MAY X REQ PHY/QHP: CPT | Mod: PBBFAC,25

## 2024-02-26 PROCEDURE — 87522 HEPATITIS C REVRS TRNSCRPJ: CPT | Performed by: INTERNAL MEDICINE

## 2024-02-26 PROCEDURE — 85025 COMPLETE CBC W/AUTO DIFF WBC: CPT | Performed by: INTERNAL MEDICINE

## 2024-02-26 PROCEDURE — 86803 HEPATITIS C AB TEST: CPT | Performed by: INTERNAL MEDICINE

## 2024-02-26 RX ADMIN — PENICILLIN G BENZATHINE 2.4 MILLION UNITS: 1200000 INJECTION, SUSPENSION INTRAMUSCULAR at 02:02

## 2024-02-26 NOTE — PROGRESS NOTES
Patient received the second dose of Bicillin 2.4, 1.2 in each buttocks. Pt tolerated well. Pt asked to wait in the clinic 15 minutes after injection in the event of an allergic reaction. Pt verbalized understanding. Pt left in NAD. Pt received the first dose of Bicillin in the hospital; post transplant.

## 2024-02-27 ENCOUNTER — PATIENT MESSAGE (OUTPATIENT)
Dept: TRANSPLANT | Facility: CLINIC | Age: 69
End: 2024-02-27
Payer: MEDICARE

## 2024-02-27 DIAGNOSIS — E83.42 HYPOMAGNESEMIA: ICD-10-CM

## 2024-02-27 DIAGNOSIS — Z00.00 ENCOUNTER FOR MEDICARE ANNUAL WELLNESS EXAM: ICD-10-CM

## 2024-02-27 DIAGNOSIS — Z94.0 KIDNEY REPLACED BY TRANSPLANT: Primary | ICD-10-CM

## 2024-02-27 LAB
HCV RNA SERPL QL NAA+PROBE: NOT DETECTED
HCV RNA SPEC NAA+PROBE-ACNC: NOT DETECTED IU/ML
TACROLIMUS BLD-MCNC: 8 NG/ML (ref 5–15)

## 2024-02-27 RX ORDER — LANOLIN ALCOHOL/MO/W.PET/CERES
400 CREAM (GRAM) TOPICAL 2 TIMES DAILY
Qty: 60 TABLET | Refills: 11 | Status: SHIPPED | OUTPATIENT
Start: 2024-02-27

## 2024-02-27 NOTE — TELEPHONE ENCOUNTER
Pt notified to start magnesium oxide 400 mg twice a day and eat foods rich in magnesium and phosphorus      ----- Message from Lynn Cordova MD sent at 2/27/2024  1:04 PM CST -----  Low Mg and Phos - encourage in diet.  Start mag ox 400 mg bid

## 2024-02-28 ENCOUNTER — TELEPHONE (OUTPATIENT)
Dept: TRANSPLANT | Facility: CLINIC | Age: 69
End: 2024-02-28

## 2024-02-28 ENCOUNTER — OFFICE VISIT (OUTPATIENT)
Dept: TRANSPLANT | Facility: CLINIC | Age: 69
End: 2024-02-28
Payer: MEDICARE

## 2024-02-28 VITALS
HEART RATE: 70 BPM | DIASTOLIC BLOOD PRESSURE: 82 MMHG | HEIGHT: 74 IN | BODY MASS INDEX: 33.7 KG/M2 | OXYGEN SATURATION: 100 % | SYSTOLIC BLOOD PRESSURE: 167 MMHG | RESPIRATION RATE: 18 BRPM | TEMPERATURE: 97 F | WEIGHT: 262.56 LBS

## 2024-02-28 DIAGNOSIS — E83.39 HYPOPHOSPHATEMIA: ICD-10-CM

## 2024-02-28 DIAGNOSIS — D84.821 IMMUNOCOMPROMISED STATE DUE TO DRUG THERAPY: ICD-10-CM

## 2024-02-28 DIAGNOSIS — Z91.89 AT RISK FOR OPPORTUNISTIC INFECTIONS: ICD-10-CM

## 2024-02-28 DIAGNOSIS — Z94.0 KIDNEY TRANSPLANT STATUS: Primary | ICD-10-CM

## 2024-02-28 DIAGNOSIS — D63.1 ANEMIA OF CHRONIC RENAL FAILURE, UNSPECIFIED CKD STAGE: ICD-10-CM

## 2024-02-28 DIAGNOSIS — Z79.899 IMMUNOCOMPROMISED STATE DUE TO DRUG THERAPY: ICD-10-CM

## 2024-02-28 DIAGNOSIS — E83.42 HYPOMAGNESEMIA: ICD-10-CM

## 2024-02-28 DIAGNOSIS — Z29.89 PROPHYLACTIC IMMUNOTHERAPY: ICD-10-CM

## 2024-02-28 DIAGNOSIS — N18.9 ANEMIA OF CHRONIC RENAL FAILURE, UNSPECIFIED CKD STAGE: ICD-10-CM

## 2024-02-28 DIAGNOSIS — N25.81 SECONDARY HYPERPARATHYROIDISM OF RENAL ORIGIN: ICD-10-CM

## 2024-02-28 PROCEDURE — 99999 PR PBB SHADOW E&M-EST. PATIENT-LVL V: CPT | Mod: PBBFAC,,, | Performed by: INTERNAL MEDICINE

## 2024-02-28 PROCEDURE — 99215 OFFICE O/P EST HI 40 MIN: CPT | Mod: PBBFAC | Performed by: INTERNAL MEDICINE

## 2024-02-28 PROCEDURE — 99215 OFFICE O/P EST HI 40 MIN: CPT | Mod: S$PBB,,, | Performed by: INTERNAL MEDICINE

## 2024-02-28 RX ORDER — DAPSONE 100 MG/1
100 TABLET ORAL DAILY
Qty: 30 TABLET | Refills: 5 | Status: SHIPPED | OUTPATIENT
Start: 2024-02-28 | End: 2024-03-26 | Stop reason: SDUPTHER

## 2024-02-28 RX ORDER — SACUBITRIL AND VALSARTAN 97; 103 MG/1; MG/1
1 TABLET, FILM COATED ORAL 2 TIMES DAILY
Qty: 60 TABLET | Refills: 11 | Status: SHIPPED | OUTPATIENT
Start: 2024-02-28 | End: 2024-04-21 | Stop reason: SDUPTHER

## 2024-02-28 NOTE — TELEPHONE ENCOUNTER
Returned call to sherin, needed to know if donor was living or  to close out chart    ----- Message from Wilner Beltre sent at 2024  1:50 PM CST -----  Regarding: call back  Sherin with [facility] call to speak with coordinator in regards to some questing requesting call back     Call   CELL

## 2024-02-28 NOTE — LETTER
February 28, 2024        Gabino Champion  [address]  [address] [address]  [place] [address]  Phone: [phone]  Fax: [phone]             [facility] Trans[facility] 1st Fl  [address]  [facility] [address]  Phone: [phone]   Patient: Isidro Alves   MR Number: [id number]   YOB: 1955   Date of Visit: 2/28/2024       Dear Dr. Gabino Champion    Thank you for referring Isidro Alves to me for evaluation. Attached you will find relevant portions of my assessment and plan of care.    If you have questions, please do not hesitate to call me. I look forward to following Isidro Alves along with you.    Sincerely,    Lynn Cordova MD    Enclosure    If you would like to receive this communication electronically, please contact [email or web address] or [phone] to request [employer] access.    [employer] is a tool which provides read-only access to select patient information with whom you have a relationship. Its easy to use and provides real time access to review your patients record including encounter summaries, notes, results, and demographic information.    If you feel you have received this communication in error or would no longer like to receive these types of communications, please e-mail [email or web address]

## 2024-02-28 NOTE — Clinical Note
I did not see pharmD rec to adjust valcyte until late. Please advise him to increase Valcyte to 900 mg daily as recommended by PharmD. Thanks. Use Enhanced Medication Counseling?: No

## 2024-02-28 NOTE — PROGRESS NOTES
"   Kidney Post-Transplant Assessment    Referring Physician: Gabino Champion  Current Nephrologist: Gabino Champion    ORGAN: RIGHT KIDNEY  Donor Type: donation after brain death  PHS Increased Risk: yes  Cold Ischemia: 625 mins  Induction Medications:  Thymoglobulin    Subjective:     CC:  Reassessment of renal allograft function and management of chronic immunosuppression.    HPI:  Mr. Alves is a 68 y.o. year old Black or  male who received a donation after brain death kidney transplant on 2/18/24.   He takes mycophenolate mofetil, prednisone, and tacrolimus for maintenance immunosuppression. He denies any recent hospitalizations or ER visits since his previous clinic visit.    Pertinent Nephrology/Transplant History:   ESRD from DMII  DCD KT 2/18/24; KDPI 67%  RPR+, HCVAb+/DERICK- donor   CMV +/+    02/28/2024   Normal feels well today.  Now 10 days post kidney transplant, and notes he is recovering well.  He is making plenty of urine and reports a bit of tightness from his incision, albeit minor.  He is urinating without difficulty.  He denies edema.      Review of Systems   Respiratory:  Negative for shortness of breath.    Cardiovascular:  Negative for chest pain and leg swelling.   Gastrointestinal:  Negative for abdominal pain, nausea and vomiting.   Genitourinary:  Negative for difficulty urinating.   Skin:  Negative for rash.   Allergic/Immunologic: Positive for immunocompromised state.       Objective:     Blood pressure (!) 167/82, pulse 70, temperature 97.3 °F (36.3 °C), temperature source Tympanic, resp. rate 18, height 6' 2" (1.88 m), weight 119.1 kg (262 lb 9.1 oz), SpO2 100 %.body mass index is 33.71 kg/m².    Physical Exam  Constitutional:       Appearance: Normal appearance.   Cardiovascular:      Rate and Rhythm: Normal rate and regular rhythm.   Pulmonary:      Effort: Pulmonary effort is normal.      Breath sounds: Normal breath sounds.   Abdominal:      Palpations: Abdomen is " "soft. There is no mass.      Tenderness: There is no abdominal tenderness.   Musculoskeletal:      Right lower leg: No edema.      Left lower leg: No edema.     Staples in place, the medial and most inferior portion of his incision seems to have minimal erythema around the staples, but this was also area compressed by his waistband.    Labs:  Lab Results   Component Value Date    WBC 10.21 02/26/2024    HGB 10.1 (L) 02/26/2024    HCT 32.5 (L) 02/26/2024     02/26/2024    K 4.4 02/26/2024     02/26/2024    CO2 24 02/26/2024    BUN 24 (H) 02/26/2024    CREATININE 2.0 (H) 02/26/2024    EGFRNORACEVR 35.9 (A) 02/26/2024    CALCIUM 9.4 02/26/2024    PHOS 2.4 (L) 02/26/2024    MG 1.1 (L) 02/26/2024    ALBUMIN 3.0 (L) 02/26/2024    AST 19 02/18/2024    ALT 5 (L) 02/18/2024    UTPCR 1.07 (H) 02/18/2024    .5 (H) 08/25/2022    TACROLIMUS 8.0 02/26/2024       No results found for: "EXTANC", "EXTWBC", "EXTSEGS", "EXTPLATELETS", "EXTHEMOGLOBI", "EXTHEMATOCRI", "EXTCREATININ", "EXTSODIUM", "EXTPOTASSIUM", "EXTBUN", "EXTCO2", "EXTCALCIUM", "EXTPHOSPHORU", "EXTGLUCOSE", "EXTALBUMIN", "EXTAST", "EXTALT", "EXTBILITOTAL", "EXTLIPASE", "EXTAMYLASE"    No results found for: "EXTCYCLOSLVL", "EXTSIROLIMUS", "EXTTACROLVL", "EXTPROTCRE", "EXTPTHINTACT", "EXTPROTEINUA", "EXTWBCUA", "EXTRBCUA"    PLAN  -advise him to increase Valcyte to 900 mg daily as recommended by PharmD    DCD kidney transplant 02/18/2024 with KDPI 67% [RPR+, HCVAb+/DERICK- donor ]  Recent Labs   Lab 05/14/23  1256 05/14/23  2120 02/18/24  1219 02/18/24  1237 02/26/24  0818   Creatinine  --    < >  --    < > 2.0 H   eGFR  --    < >  --    < > 35.9 A   RBC, UA 9 H  --   --   --   --    WBC, UA 8 H  --   --   --   --    Prot/Creat Ratio, Urine  --   --  1.07 H  --   --     < > = values in this interval not displayed.   -recovering well.  -continue close monitoring as per guidelines    Monitoring of immunosuppression while on prophylactic " "immunosuppressants  Recent Labs   Lab 02/20/24  0637 02/21/24  0844 02/26/24  0818   Tacrolimus Lvl 3.9 L 8.2 8.0   -Target level for Isidro is 7-9  -Monitor for side effects and toxicities, given narrow therapeutic window and significant risk of AE     Evaluation for bone marrow suppression (r/t immunosuppression medication, toxicity or infection)   Counts currently acceptable.  Monitor  Recent Labs   Lab 02/26/24  0818   WBC 10.21   Hemoglobin 10.1 L   Hematocrit 32.5 L   Platelets 264     Immunosuppressed state placing him at risk for opportunistic infections (OI)  PJP until 08/18/2024 - atovaquone changed to dapsone 02/28/2024 (known allergy to Bactrim)  CMV until 05/21/2024 -  Valcyte  -BK Virus Monitoring  No results found for: "BKVIRUSPCRQB" - start 30 days postop  Continue monitoring BK PCRs per program guidelines to avoid allograft loss from BK nephropathy      Renal hypertension  -restart Entresto  -consider restarting isosorbide as well if BP remains high    Metabolic bone disease [Secondary hyperparathyroidism/SPTH, Phosphorus metabolism disorders]   Recent Labs   Lab 06/02/21  1112 07/07/22  1436 08/25/22  1203 04/06/23  1735 02/20/24  0637 02/21/24  0844 02/26/24  0818   Albumin  --    < >  --    < > 3.0 L 3.2 L 3.0 L   Calcium  --    < >  --    < > 9.2 9.3 9.4   Phosphorus  --   --   --    < > 4.5 3.4 2.4 L   PTH, Intact 430.0 H  --  324.5 H  --   --   --   --     < > = values in this interval not displayed.   -need to check PTH and vitamin-D levels post transplant with next blood draw  -dietitian to review high phos and magnesium diet  -Will tolerate mild asymptomatic low phos level d/t pill burden, DDI, and adverse SE     Assessment of electrolytes  Recent Labs   Lab 02/26/24  0818   Sodium 141   Potassium 4.4   Chloride 108   CO2 24   Magnesium 1.1 L   Hypo magnesemia-magnesium supplement added yesterday, along with high magnesium diet education.  We will aim to bring magnesium closer to normal, " but will accept somewhat low-level given pill burden  Labs were reviewed with the patient.    Assessment:   No diagnosis found.    Plan:     Follow-up:   Clinic: return to transplant clinic weekly for the first month after transplant; every 2 weeks during months 2-3; then at 6-, 9-, 12-, 18-, 24-, and 36- months post-transplant to reassess for complications from immunosuppression toxicity and monitor for rejection.  Annually thereafter.    Labs: since patient remains at high risk for rejection and drug-related complications that warrant close monitoring, labs will be ordered as follows: continue twice weekly CBC, renal panel, and drug level for first month; then same labs once weekly through 3rd month post-transplant.  Urine for UA and protein/creatinine ratio monthly.  Serum BK - PCR at 1-, 3-, 6-, 9-, 12-, 18-, 24-, 36- 48-, and 60 months post-transplant.  Hepatic panel at 1-, 2-, 3-, 6-, 9-, 12-, 18-, 24-, and 36- months post-transplant.    Lynn Cordova MD       Education:   Material provided to the patient.  Patient reminded to call with any health changes since these can be early signs of significant complications.  Also, I advised the patient to be sure any new medications or changes of old medications are discussed with either a pharmacist or physician knowledgeable with transplant to avoid rejection/drug toxicity related to significant drug interactions.    Patient advised that it is recommended that all transplanted patients, and their close contacts and household members receive Covid vaccination.    UNOS Patient Status  Functional Status: 70% - Cares for self: unable to carry on normal activity or active work  Physical Capacity: No Limitations

## 2024-02-28 NOTE — PATIENT INSTRUCTIONS
"Note goal BP for patients like you with kidney disease is less than 120/70.  Please keep checking your blood pressure and report to your doctors if the readings stay too high to protect your kidney from damage.      SUBSTITUTION: Dapsone 100 mg tab daily instead of atovaquone twice daily (#4)    DOs and DON'Ts FOR TRANSPLANT PATIENTS USING OVER THE COUNTER REMEDIES    Acne  - Clean affected areas with Panoxyl foaming wash [or any other wash containing 10% benzoyl peroxide] - follow direction on package insert and beware it can bleach fabrics and hair.  - You may also try Differin [adapalene gel  0.1%] to the affected areas as per package insert as well.  - A good moisturizer may be needed if skin dries out. Cetaphil and Cerave make ones often recommended by dermatologists.  - Don't forget to protect your skin from the ski, since you are at increased risk of skin cancer.    Hair Loss - Minoxidil (Rogaine) topically on scalp. Note it wears off if you stop using, so plan on stayingon it as long as you want the effects to last.    Arthritis   - We do not recommend NSAIDS ("Non-Steroidal Anti Inflammatories") by mouth such as ibuprofen, Motrin, Advil, Aleve, naprosen, indomethacin, BC powders, etc. They can cause harm to the kidney. If in doubt, please ask!  - Acetaminophen up to 3000 mg (3 grams) every 24 hrs is safe. May people try 1000 mg (2 extra strength) tabs/caps every 8 hours.  - Topical medicines are OK: Voltaren (diclofenac) gel, lidocaine gel, lidocaine patches, salon pas patches  - Avoid Turmeric (active ingredient: curcumin) as it interacts with your immunosuppression    Fever or pain   - Tylenol (acetaminophen).   - For pain you can try salon pas or lidocaine patches to be applied directly to area of pain.   - Diclofenac [Voltaren] gel is safe to use for a few weeks.    - We do not recommend NSAIDS  by mouth such as Motrin or Advil (ibuprofen), Aleve or naprosen (naproxen), BC powders, etc. If in doubt, " please check as there are several others that can be prescribed!    Cough Suppressant - Dextromethorphan Hydrobromide 30 mg or cough drops (Halls or equivalent generic)    Nasal congestion   - Saline nasal spray is very safe.   - Oxymetazoline (Afrin), but should ONLY USE FOR 3 DAYS.   - Topical Vicks vaporub or Vicks nasal inhaler is also acceptable.  - Note tablet form decongestants (Sudafed, phenylephrine) can raise blood pressure and are not recommended    Allergy symptoms - Antihistamines are safe: diphenhydramine (Benadryl), loratadine (Claritin), cetrizine (Zyrtec). These can also help dry up sinus and nasal drainage.    For sore throat -  salt water gargles, sore throat sprays like Chloraseptic or sore throat lozenges are safe for temporary relief    For thick secretions (thick mucous) - Guaifenesin (Mucinex), saline nasal spray     To prevent colds - low dose vitamin C is probably OK, but can increase risk of kidney stones. Zinc lozenges (not tablets) taken through the day may help minimize. Let the lozenge dissolve in the back of your throat. Note: Zinc can inhibit the virus from multiplying in your throat, but it is not proven to prevent colds.    For memory Loss - OK to try Prevagen    Indigestion  - famotidine (Pepcid) 20 mg daily is generally safe. Calcium carbonate (Tums and many other brands) can cause high calcium and should be taken only after taking to your provider about your calcium levels. High calcium can hurt the kidney. Avoid cimetidine (Tagamet) as it can interfere with your immunosuppression and cause toxic levels. A few doses of pepto bismol should be fine, but continued indigestion should be evaluated by your provider.    Nausea or vomiting - these symptoms can indicate there is something wrong with your stomach and should be evaluated if they last more than 6-8 hours OR anytime you cannot keep your medicines down. Not being able to take your medicine can cause complications.      Diarrhea -  Pepto-Bismol, even Metamucil can help liquid stools. Diarrhea can be a sign of infection, so please let your provider know if it continues past 1-2 days for full evaluation. Avoid Imodium unless you have spoken to a provider.    Constipation-  To prevent constipation:  -High fiber diet and plenty of water  -Metamucil or extra fiber laxative  -Miralax is safe to take regardless of your kidney function  -Colase, dulcolax, Senna/sennekot are safe to take regardless of your kidney function  To relieve constipation:  -Dulcolax (or glycerin) suppository   -Fleet or tap water enemas  -Milk of magnesia, magnesium citrate - Please talk with your provider before using magnesium or phosphorus containing supplements such as magnesium citrate, milk of magnesium, or Fleet's Phospho-Soda.  These remedies may cause harm, depending on the degree of kidney insufficiency you have.  Your doctor can advise if year magnesium and phosphorus levels are low enough to take these.    Herbal and natural remedies - Some herbals are very safe and effective while others are proven to cause renal failure or interact with your medication. Do NOT take any natural or herbal remedies without discussing with transplant.    If you develop fever or shortness of breath, or start to feel worse, you need to get checked out by a provider in a clinic or go to ED, depending on the severity of your symptoms.    Don't forget we recommend yearly influenza vaccine. It does not protect you against all infections, and you can still get the flu. It is proven to protect transplant patients from serious influenza related serious illness or death.

## 2024-02-29 ENCOUNTER — PATIENT MESSAGE (OUTPATIENT)
Dept: TRANSPLANT | Facility: CLINIC | Age: 69
End: 2024-02-29
Payer: MEDICARE

## 2024-02-29 ENCOUNTER — LAB VISIT (OUTPATIENT)
Dept: LAB | Facility: HOSPITAL | Age: 69
End: 2024-02-29
Attending: INTERNAL MEDICINE
Payer: COMMERCIAL

## 2024-02-29 DIAGNOSIS — Z94.0 KIDNEY TRANSPLANT RECIPIENT: ICD-10-CM

## 2024-02-29 DIAGNOSIS — Z94.0 KIDNEY REPLACED BY TRANSPLANT: ICD-10-CM

## 2024-02-29 LAB
ALBUMIN SERPL BCP-MCNC: 3 G/DL (ref 3.5–5.2)
ANION GAP SERPL CALC-SCNC: 8 MMOL/L (ref 8–16)
BASOPHILS # BLD AUTO: 0.07 K/UL (ref 0–0.2)
BASOPHILS NFR BLD: 0.5 % (ref 0–1.9)
BUN SERPL-MCNC: 23 MG/DL (ref 8–23)
CALCIUM SERPL-MCNC: 9.5 MG/DL (ref 8.7–10.5)
CHLORIDE SERPL-SCNC: 108 MMOL/L (ref 95–110)
CO2 SERPL-SCNC: 24 MMOL/L (ref 23–29)
CREAT SERPL-MCNC: 1.8 MG/DL (ref 0.5–1.4)
DIFFERENTIAL METHOD BLD: ABNORMAL
EOSINOPHIL # BLD AUTO: 0.2 K/UL (ref 0–0.5)
EOSINOPHIL NFR BLD: 1.5 % (ref 0–8)
ERYTHROCYTE [DISTWIDTH] IN BLOOD BY AUTOMATED COUNT: 14.9 % (ref 11.5–14.5)
EST. GFR  (NO RACE VARIABLE): 40.7 ML/MIN/1.73 M^2
GLUCOSE SERPL-MCNC: 137 MG/DL (ref 70–110)
HCT VFR BLD AUTO: 34.2 % (ref 40–54)
HGB BLD-MCNC: 10.4 G/DL (ref 14–18)
IMM GRANULOCYTES # BLD AUTO: 0.3 K/UL (ref 0–0.04)
IMM GRANULOCYTES NFR BLD AUTO: 2 % (ref 0–0.5)
LYMPHOCYTES # BLD AUTO: 0.2 K/UL (ref 1–4.8)
LYMPHOCYTES NFR BLD: 1.2 % (ref 18–48)
MAGNESIUM SERPL-MCNC: 1.2 MG/DL (ref 1.6–2.6)
MCH RBC QN AUTO: 27.4 PG (ref 27–31)
MCHC RBC AUTO-ENTMCNC: 30.4 G/DL (ref 32–36)
MCV RBC AUTO: 90 FL (ref 82–98)
MONOCYTES # BLD AUTO: 1.2 K/UL (ref 0.3–1)
MONOCYTES NFR BLD: 7.7 % (ref 4–15)
NEUTROPHILS # BLD AUTO: 13.2 K/UL (ref 1.8–7.7)
NEUTROPHILS NFR BLD: 87.1 % (ref 38–73)
NRBC BLD-RTO: 0 /100 WBC
PHOSPHATE SERPL-MCNC: 2.1 MG/DL (ref 2.7–4.5)
PLATELET # BLD AUTO: 358 K/UL (ref 150–450)
PMV BLD AUTO: 12.4 FL (ref 9.2–12.9)
POTASSIUM SERPL-SCNC: 5.2 MMOL/L (ref 3.5–5.1)
RBC # BLD AUTO: 3.79 M/UL (ref 4.6–6.2)
SODIUM SERPL-SCNC: 140 MMOL/L (ref 136–145)
WBC # BLD AUTO: 15.16 K/UL (ref 3.9–12.7)

## 2024-02-29 PROCEDURE — 80069 RENAL FUNCTION PANEL: CPT | Performed by: INTERNAL MEDICINE

## 2024-02-29 PROCEDURE — 80197 ASSAY OF TACROLIMUS: CPT | Performed by: INTERNAL MEDICINE

## 2024-02-29 PROCEDURE — 83735 ASSAY OF MAGNESIUM: CPT | Performed by: INTERNAL MEDICINE

## 2024-02-29 PROCEDURE — 85025 COMPLETE CBC W/AUTO DIFF WBC: CPT | Performed by: INTERNAL MEDICINE

## 2024-02-29 PROCEDURE — 36415 COLL VENOUS BLD VENIPUNCTURE: CPT | Performed by: INTERNAL MEDICINE

## 2024-02-29 RX ORDER — VALGANCICLOVIR 450 MG/1
900 TABLET, FILM COATED ORAL DAILY
Qty: 60 TABLET | Refills: 2 | Status: SHIPPED | OUTPATIENT
Start: 2024-02-29 | End: 2024-03-13

## 2024-02-29 NOTE — TELEPHONE ENCOUNTER
Pt notified to increase Valcyte to 900 mg daily, therapy complete on 5/21/24.    ----- Message from Lynn Cordova MD sent at 2/28/2024  5:56 PM CST -----  I did not see pharmD rec to adjust valcyte until late. Please advise him to increase Valcyte to 900 mg daily as recommended by PharmD. Thanks.

## 2024-03-01 LAB — TACROLIMUS BLD-MCNC: 7.2 NG/ML (ref 5–15)

## 2024-03-04 ENCOUNTER — LAB VISIT (OUTPATIENT)
Dept: LAB | Facility: HOSPITAL | Age: 69
End: 2024-03-04
Attending: INTERNAL MEDICINE
Payer: MEDICARE

## 2024-03-04 ENCOUNTER — CLINICAL SUPPORT (OUTPATIENT)
Dept: INFECTIOUS DISEASES | Facility: CLINIC | Age: 69
End: 2024-03-04
Payer: MEDICARE

## 2024-03-04 ENCOUNTER — TELEPHONE (OUTPATIENT)
Dept: INTERNAL MEDICINE | Facility: CLINIC | Age: 69
End: 2024-03-04
Payer: MEDICARE

## 2024-03-04 DIAGNOSIS — Z94.0 KIDNEY REPLACED BY TRANSPLANT: ICD-10-CM

## 2024-03-04 DIAGNOSIS — A53.9 SYPHILIS: Primary | ICD-10-CM

## 2024-03-04 LAB
ALBUMIN SERPL BCP-MCNC: 3 G/DL (ref 3.5–5.2)
ANION GAP SERPL CALC-SCNC: 7 MMOL/L (ref 8–16)
BASOPHILS # BLD AUTO: 0.07 K/UL (ref 0–0.2)
BASOPHILS NFR BLD: 0.7 % (ref 0–1.9)
BUN SERPL-MCNC: 20 MG/DL (ref 8–23)
CALCIUM SERPL-MCNC: 9.6 MG/DL (ref 8.7–10.5)
CHLORIDE SERPL-SCNC: 109 MMOL/L (ref 95–110)
CO2 SERPL-SCNC: 23 MMOL/L (ref 23–29)
CREAT SERPL-MCNC: 1.7 MG/DL (ref 0.5–1.4)
DIFFERENTIAL METHOD BLD: ABNORMAL
EOSINOPHIL # BLD AUTO: 0.1 K/UL (ref 0–0.5)
EOSINOPHIL NFR BLD: 1.3 % (ref 0–8)
ERYTHROCYTE [DISTWIDTH] IN BLOOD BY AUTOMATED COUNT: 14.7 % (ref 11.5–14.5)
EST. GFR  (NO RACE VARIABLE): 43.6 ML/MIN/1.73 M^2
GLUCOSE SERPL-MCNC: 113 MG/DL (ref 70–110)
HCT VFR BLD AUTO: 33.1 % (ref 40–54)
HGB BLD-MCNC: 10.1 G/DL (ref 14–18)
IMM GRANULOCYTES # BLD AUTO: 0.08 K/UL (ref 0–0.04)
IMM GRANULOCYTES NFR BLD AUTO: 0.7 % (ref 0–0.5)
LYMPHOCYTES # BLD AUTO: 0.1 K/UL (ref 1–4.8)
LYMPHOCYTES NFR BLD: 1.2 % (ref 18–48)
MAGNESIUM SERPL-MCNC: 1.4 MG/DL (ref 1.6–2.6)
MCH RBC QN AUTO: 27.4 PG (ref 27–31)
MCHC RBC AUTO-ENTMCNC: 30.5 G/DL (ref 32–36)
MCV RBC AUTO: 90 FL (ref 82–98)
MONOCYTES # BLD AUTO: 0.8 K/UL (ref 0.3–1)
MONOCYTES NFR BLD: 7.4 % (ref 4–15)
NEUTROPHILS # BLD AUTO: 9.5 K/UL (ref 1.8–7.7)
NEUTROPHILS NFR BLD: 88.7 % (ref 38–73)
NRBC BLD-RTO: 0 /100 WBC
PHOSPHATE SERPL-MCNC: 1.9 MG/DL (ref 2.7–4.5)
PLATELET # BLD AUTO: 339 K/UL (ref 150–450)
PMV BLD AUTO: 11.7 FL (ref 9.2–12.9)
POTASSIUM SERPL-SCNC: 5 MMOL/L (ref 3.5–5.1)
RBC # BLD AUTO: 3.68 M/UL (ref 4.6–6.2)
SODIUM SERPL-SCNC: 139 MMOL/L (ref 136–145)
WBC # BLD AUTO: 10.73 K/UL (ref 3.9–12.7)

## 2024-03-04 PROCEDURE — 99999 PR PBB SHADOW E&M-EST. PATIENT-LVL I: CPT | Mod: PBBFAC,,,

## 2024-03-04 PROCEDURE — 96372 THER/PROPH/DIAG INJ SC/IM: CPT | Mod: PBBFAC

## 2024-03-04 PROCEDURE — 80197 ASSAY OF TACROLIMUS: CPT | Performed by: INTERNAL MEDICINE

## 2024-03-04 PROCEDURE — 99211 OFF/OP EST MAY X REQ PHY/QHP: CPT | Mod: PBBFAC

## 2024-03-04 PROCEDURE — 83735 ASSAY OF MAGNESIUM: CPT | Performed by: INTERNAL MEDICINE

## 2024-03-04 PROCEDURE — 85025 COMPLETE CBC W/AUTO DIFF WBC: CPT | Performed by: INTERNAL MEDICINE

## 2024-03-04 PROCEDURE — 36415 COLL VENOUS BLD VENIPUNCTURE: CPT | Performed by: INTERNAL MEDICINE

## 2024-03-04 PROCEDURE — 80069 RENAL FUNCTION PANEL: CPT | Performed by: INTERNAL MEDICINE

## 2024-03-04 PROCEDURE — 99999PBSHW PR PBB SHADOW TECHNICAL ONLY FILED TO HB: Mod: JZ,PBBFAC,,

## 2024-03-04 RX ADMIN — PENICILLIN G BENZATHINE 2.4 MILLION UNITS: 1200000 INJECTION, SUSPENSION INTRAMUSCULAR at 10:03

## 2024-03-04 NOTE — TELEPHONE ENCOUNTER
Spoke with patient and he said that he had a message in his My Chart that he needed to schedule his Medicare annual wellness exam. Informed him that I would send a message to the staff that schedules those exams to reach out to him. Patient verbalized understanding.

## 2024-03-04 NOTE — PROGRESS NOTES
Patient received the third dose of Bicillin 2.4 mill units, 1.2 mill units given in each buttocks. Pt tolerated well. Pt asked to wait in the clinic 15 minutes after injection in the event of an allergic reaction. Pt verbalized understanding. Pt left in NAD.

## 2024-03-04 NOTE — TELEPHONE ENCOUNTER
----- Message from Vickie Marroquin sent at 3/4/2024  7:04 AM CST -----  Patient is requesting a call back regarding a wellness exam. Call back number is .[phone]. Thx. EL

## 2024-03-05 PROBLEM — Z76.82 KIDNEY TRANSPLANT CANDIDATE: Status: RESOLVED | Noted: 2020-07-02 | Resolved: 2024-03-05

## 2024-03-05 PROBLEM — R79.89 LOW VITAMIN D LEVEL: Status: ACTIVE | Noted: 2024-03-05

## 2024-03-05 LAB — TACROLIMUS BLD-MCNC: 8.9 NG/ML (ref 5–15)

## 2024-03-06 ENCOUNTER — OFFICE VISIT (OUTPATIENT)
Dept: INTERNAL MEDICINE | Facility: CLINIC | Age: 69
End: 2024-03-06
Payer: MEDICARE

## 2024-03-06 VITALS
HEIGHT: 74 IN | HEART RATE: 78 BPM | RESPIRATION RATE: 20 BRPM | SYSTOLIC BLOOD PRESSURE: 134 MMHG | BODY MASS INDEX: 33.36 KG/M2 | DIASTOLIC BLOOD PRESSURE: 74 MMHG | WEIGHT: 259.94 LBS

## 2024-03-06 DIAGNOSIS — Z29.89 PROPHYLACTIC IMMUNOTHERAPY: ICD-10-CM

## 2024-03-06 DIAGNOSIS — Z99.2 HYPERPARATHYROIDISM DUE TO ESRD ON DIALYSIS: ICD-10-CM

## 2024-03-06 DIAGNOSIS — I15.2 HYPERTENSION ASSOCIATED WITH DIABETES: ICD-10-CM

## 2024-03-06 DIAGNOSIS — E66.01 SEVERE OBESITY (BMI 35.0-39.9) WITH COMORBIDITY: ICD-10-CM

## 2024-03-06 DIAGNOSIS — E29.1 HYPOGONADISM IN MALE: ICD-10-CM

## 2024-03-06 DIAGNOSIS — I42.0 DILATED CARDIOMYOPATHY: ICD-10-CM

## 2024-03-06 DIAGNOSIS — Z99.2 ESRD (END STAGE RENAL DISEASE) ON DIALYSIS: ICD-10-CM

## 2024-03-06 DIAGNOSIS — E11.8 DIABETES MELLITUS TYPE 2 WITH COMPLICATIONS: ICD-10-CM

## 2024-03-06 DIAGNOSIS — R79.89 LOW VITAMIN D LEVEL: ICD-10-CM

## 2024-03-06 DIAGNOSIS — E11.69 HYPERLIPIDEMIA ASSOCIATED WITH TYPE 2 DIABETES MELLITUS: ICD-10-CM

## 2024-03-06 DIAGNOSIS — N18.6 ESRD (END STAGE RENAL DISEASE) ON DIALYSIS: ICD-10-CM

## 2024-03-06 DIAGNOSIS — Z00.00 ENCOUNTER FOR PREVENTATIVE ADULT HEALTH CARE EXAMINATION: Primary | ICD-10-CM

## 2024-03-06 DIAGNOSIS — Z94.0 S/P KIDNEY TRANSPLANT: ICD-10-CM

## 2024-03-06 DIAGNOSIS — E04.2 MULTIPLE THYROID NODULES: ICD-10-CM

## 2024-03-06 DIAGNOSIS — J41.0 SIMPLE CHRONIC BRONCHITIS: ICD-10-CM

## 2024-03-06 DIAGNOSIS — R05.3 CHRONIC COUGH: ICD-10-CM

## 2024-03-06 DIAGNOSIS — N25.81 HYPERPARATHYROIDISM DUE TO ESRD ON DIALYSIS: ICD-10-CM

## 2024-03-06 DIAGNOSIS — I27.20 PULMONARY HTN: ICD-10-CM

## 2024-03-06 DIAGNOSIS — E66.3 OVERWEIGHT WITH BODY MASS INDEX (BMI) OF 29 TO 29.9 IN ADULT: ICD-10-CM

## 2024-03-06 DIAGNOSIS — N40.0 BENIGN NON-NODULAR PROSTATIC HYPERPLASIA WITHOUT LOWER URINARY TRACT SYMPTOMS: ICD-10-CM

## 2024-03-06 DIAGNOSIS — I50.22 CHRONIC SYSTOLIC CONGESTIVE HEART FAILURE: ICD-10-CM

## 2024-03-06 DIAGNOSIS — J84.10 PULMONARY FIBROSIS, POSTINFLAMMATORY: ICD-10-CM

## 2024-03-06 DIAGNOSIS — Z91.89 AT RISK FOR OPPORTUNISTIC INFECTIONS: ICD-10-CM

## 2024-03-06 DIAGNOSIS — I25.118 CORONARY ARTERY DISEASE OF NATIVE ARTERY OF NATIVE HEART WITH STABLE ANGINA PECTORIS: ICD-10-CM

## 2024-03-06 DIAGNOSIS — E78.5 HYPERLIPIDEMIA ASSOCIATED WITH TYPE 2 DIABETES MELLITUS: ICD-10-CM

## 2024-03-06 DIAGNOSIS — E11.22 TYPE 2 DIABETES MELLITUS WITH CHRONIC KIDNEY DISEASE, WITH LONG-TERM CURRENT USE OF INSULIN, UNSPECIFIED CKD STAGE: ICD-10-CM

## 2024-03-06 DIAGNOSIS — D63.1 ANEMIA DUE TO CHRONIC KIDNEY DISEASE, UNSPECIFIED CKD STAGE: ICD-10-CM

## 2024-03-06 DIAGNOSIS — N18.6 HYPERPARATHYROIDISM DUE TO ESRD ON DIALYSIS: ICD-10-CM

## 2024-03-06 DIAGNOSIS — I49.3 PVC (PREMATURE VENTRICULAR CONTRACTION): ICD-10-CM

## 2024-03-06 DIAGNOSIS — K63.5 POLYP OF COLON, UNSPECIFIED PART OF COLON, UNSPECIFIED TYPE: ICD-10-CM

## 2024-03-06 DIAGNOSIS — E87.20 METABOLIC ACIDOSIS: ICD-10-CM

## 2024-03-06 DIAGNOSIS — R80.8 OTHER PROTEINURIA: Chronic | ICD-10-CM

## 2024-03-06 DIAGNOSIS — R05.8 UPPER AIRWAY COUGH SYNDROME: ICD-10-CM

## 2024-03-06 DIAGNOSIS — J90 PLEURAL EFFUSION ON LEFT: ICD-10-CM

## 2024-03-06 DIAGNOSIS — Z00.00 ENCOUNTER FOR MEDICARE ANNUAL WELLNESS EXAM: ICD-10-CM

## 2024-03-06 DIAGNOSIS — N18.9 ANEMIA DUE TO CHRONIC KIDNEY DISEASE, UNSPECIFIED CKD STAGE: ICD-10-CM

## 2024-03-06 DIAGNOSIS — E11.59 HYPERTENSION ASSOCIATED WITH DIABETES: ICD-10-CM

## 2024-03-06 DIAGNOSIS — Z79.4 TYPE 2 DIABETES MELLITUS WITH CHRONIC KIDNEY DISEASE, WITH LONG-TERM CURRENT USE OF INSULIN, UNSPECIFIED CKD STAGE: ICD-10-CM

## 2024-03-06 PROCEDURE — G0439 PPPS, SUBSEQ VISIT: HCPCS | Mod: ,,, | Performed by: NURSE PRACTITIONER

## 2024-03-06 PROCEDURE — 99999 PR PBB SHADOW E&M-EST. PATIENT-LVL V: CPT | Mod: PBBFAC,,, | Performed by: NURSE PRACTITIONER

## 2024-03-06 PROCEDURE — 99215 OFFICE O/P EST HI 40 MIN: CPT | Mod: PBBFAC | Performed by: NURSE PRACTITIONER

## 2024-03-06 NOTE — PROGRESS NOTES
"  Isidro Alves presented for a  Medicare AWV and comprehensive Health Risk Assessment today. The following components were reviewed and updated:    Medical history  Family History  Social history  Allergies and Current Medications  Health Risk Assessment  Health Maintenance  Care Team         ** See Completed Assessments for Annual Wellness Visit within the encounter summary.**         The following assessments were completed:  Living Situation  CAGE  Depression Screening  Timed Get Up and Go  Whisper Test  Cognitive Function Screening  Nutrition Screening  ADL Screening  PAQ Screening      Opioid documentation:      Patient does not have a current opioid prescription.        Vitals:    03/06/24 1406   BP: 134/74   BP Location: Right arm   Patient Position: Sitting   Pulse: 78   Resp: 20   Weight: 117.9 kg (259 lb 14.8 oz)   Height:    Pain scale 6' 2" (1.88 m)    0     Body mass index is 33.37 kg/m².  Physical Exam  Constitutional:       General: He is not in acute distress.     Appearance: He is not ill-appearing or diaphoretic.   HENT:      Mouth/Throat:      Mouth: Mucous membranes are moist.   Eyes:      General:         Right eye: No discharge.         Left eye: No discharge.      Conjunctiva/sclera: Conjunctivae normal.   Cardiovascular:      Rate and Rhythm: Normal rate and regular rhythm.   Pulmonary:      Effort: Pulmonary effort is normal. No respiratory distress.      Breath sounds: Normal breath sounds.   Skin:     General: Skin is warm and dry.   Neurological:      Mental Status: He is alert and oriented to person, place, and time. Mental status is at baseline.   Psychiatric:         Mood and Affect: Mood normal.         Behavior: Behavior normal.         Thought Content: Thought content normal.         Judgment: Judgment normal.       Diagnoses and health risks identified today and associated recommendations/orders:    1. Encounter for preventative adult health care examination,  Encounter for " Medicare annual wellness exam  - Ambulatory Referral/Consult to Enhanced Annual Wellness Visit (eAWV)  Review for opioid screening: Patient does not have Rx for Opioids  Review for substance use disorder: Patient does not use substance per chart    Patient states he does not drink alcohol    I offered to discuss advanced care planning, including how to pick a person who would make decisions for you if you were unable to make them for yourself, called a health care power of , and what kind of decisions you might make such as use of life sustaining treatments such as ventilators and tube feeding when faced with a life limiting illness recorded on a living will that they will need to know. (How you want to be cared for as you near the end of your natural life)     X Patient is interested in learning more about how to make advanced directives.  I provided them paperwork and offered to discuss this with them.     2. Pulmonary fibrosis, postinflammatory  Monitor  Follow up as directed    3. Pulmonary HTN  Monitor  Follow up as directed     4. Hypertension associated with diabetes  Monitor  Stable  Continue home coreg, entresto    5. Coronary artery disease of native artery of native heart with stable angina pectoris, Hyperlipidemia associated with type 2 diabetes mellitus, Dilated cardiomyopathy, PVC (premature ventricular contraction),  Chronic systolic congestive heart failure  Monitor  Follow up as directed  Continue home asa, lipitor    6. S/P kidney transplant 2/18/2024, Prophylactic immunotherapy, At risk for opportunistic infections  Monitor  Follow up with Nephrology, Transplant team as directed  Continue home cellcept, prograf, prednisone, dapsone, valcyte    7. Overweight with body mass index (BMI) of 29 to 29.9 in adult  Monitor  Follow up with PCP    8. Diabetes mellitus type 2 with complications, Type 2 diabetes mellitus with chronic kidney disease, with long-term current use of insulin, unspecified  CKD stage, Anemia due to chronic kidney disease, unspecified CKD stage, Hyperparathyroidism due to ESRD on dialysis, Metabolic acidosis, Other proteinuria  Monitor  Follow up with Nephrology  Patient is S/p kidney transplant and states he is no longer on HD  Continue home tresiba, humalog, Na bicarb  - HEMOGLOBIN A1C; Future    9. Severe obesity (BMI 35.0-39.9) with comorbidity  Monitor  Follow up with PCP     10. Chronic cough, Upper airway cough syndrome, Pleural effusion on left, Simple chronic bronchitis  Monitor  Follow up  as needed, directed     11. Multiple thyroid nodules  Monitor  Follow up as directed    12. Low vitamin D level  Monitor  Follow up with PCP  Continue home MVI    13. Benign non-nodular prostatic hyperplasia without lower urinary tract symptoms, Hypogonadism in male  Monitor  Stable  Continue home proscar, flomax    14. Polyp of colon, unspecified part of colon, unspecified type  Monitor  Follow up with GI                                  Provided Isidro with a 5-10 year written screening schedule and personal prevention plan. Recommendations were developed using the USPSTF age appropriate recommendations. Education, counseling, and referrals were provided as needed. After Visit Summary printed and given to patient which includes a list of additional screenings\tests needed.    Follow up in about 1 year (around 3/6/2025) for Annual wellness visit.    KAYE Garcia

## 2024-03-06 NOTE — PATIENT INSTRUCTIONS
Counseling and Referral of Other Preventative  (Italic type indicates deductible and co-insurance are waived)    Patient Name: Isidro Alves  Today's Date: 3/6/2024    Health Maintenance       Date Due Completion Date    Shingles Vaccine (1 of 2) Never done ---    RSV Vaccine (Age 60+ and Pregnant patients) (1 - 1-dose 60+ series) Never done ---    Pneumococcal Vaccines (Age 65+) (3 of 3 - PPSV23 or PCV20) 04/06/2022 2/9/2022    Foot Exam 04/14/2023 4/14/2022    Influenza Vaccine (1) 09/01/2023 10/7/2022    COVID-19 Vaccine (4 - 2023-24 season) 09/01/2023 11/1/2021    Eye Exam 09/26/2023 9/26/2022    Override on 6/3/2021: Done    Hemoglobin A1c 06/29/2024 12/29/2023    Override on 3/14/2016: Done    Lipid Panel 12/29/2024 12/29/2023    PROSTATE-SPECIFIC ANTIGEN 01/11/2025 1/11/2024    Colorectal Cancer Screening 02/02/2026 2/2/2023    TETANUS VACCINE 05/05/2026 5/5/2016        Orders Placed This Encounter   Procedures    HEMOGLOBIN A1C       The following information is provided to all patients.  This information is to help you find resources for any of the problems found today that may be affecting your health:                  Living healthy guide: www.Pending sale to [facility].[place].gov      Understanding Diabetes: www.diabetes.org      Eating healthy: www.cdc.gov/healthyweight      CDC home safety checklist: www.cdc.gov/steadi/patient.html      Agency on Aging: www.goea.[place].gov      Alcoholics anonymous (AA): www.aa.org      Physical Activity: www.luann.nih.gov/rd1zhgk      Tobacco use: www.quitwithusla.org

## 2024-03-07 ENCOUNTER — LAB VISIT (OUTPATIENT)
Dept: LAB | Facility: HOSPITAL | Age: 69
End: 2024-03-07
Attending: INTERNAL MEDICINE
Payer: MEDICARE

## 2024-03-07 DIAGNOSIS — Z94.0 KIDNEY REPLACED BY TRANSPLANT: ICD-10-CM

## 2024-03-07 LAB
ALBUMIN SERPL BCP-MCNC: 3.1 G/DL (ref 3.5–5.2)
ANION GAP SERPL CALC-SCNC: 7 MMOL/L (ref 8–16)
BASOPHILS # BLD AUTO: 0.1 K/UL (ref 0–0.2)
BASOPHILS NFR BLD: 1.2 % (ref 0–1.9)
BUN SERPL-MCNC: 23 MG/DL (ref 8–23)
CALCIUM SERPL-MCNC: 9.4 MG/DL (ref 8.7–10.5)
CHLORIDE SERPL-SCNC: 111 MMOL/L (ref 95–110)
CO2 SERPL-SCNC: 21 MMOL/L (ref 23–29)
CREAT SERPL-MCNC: 1.9 MG/DL (ref 0.5–1.4)
DIFFERENTIAL METHOD BLD: ABNORMAL
EOSINOPHIL # BLD AUTO: 0.2 K/UL (ref 0–0.5)
EOSINOPHIL NFR BLD: 2.6 % (ref 0–8)
ERYTHROCYTE [DISTWIDTH] IN BLOOD BY AUTOMATED COUNT: 14.8 % (ref 11.5–14.5)
EST. GFR  (NO RACE VARIABLE): 38.2 ML/MIN/1.73 M^2
GLUCOSE SERPL-MCNC: 95 MG/DL (ref 70–110)
HCT VFR BLD AUTO: 32.4 % (ref 40–54)
HGB BLD-MCNC: 10 G/DL (ref 14–18)
IMM GRANULOCYTES # BLD AUTO: 0.05 K/UL (ref 0–0.04)
IMM GRANULOCYTES NFR BLD AUTO: 0.6 % (ref 0–0.5)
LYMPHOCYTES # BLD AUTO: 0.2 K/UL (ref 1–4.8)
LYMPHOCYTES NFR BLD: 2.3 % (ref 18–48)
MAGNESIUM SERPL-MCNC: 1.4 MG/DL (ref 1.6–2.6)
MCH RBC QN AUTO: 27.6 PG (ref 27–31)
MCHC RBC AUTO-ENTMCNC: 30.9 G/DL (ref 32–36)
MCV RBC AUTO: 90 FL (ref 82–98)
MONOCYTES # BLD AUTO: 0.5 K/UL (ref 0.3–1)
MONOCYTES NFR BLD: 5.5 % (ref 4–15)
NEUTROPHILS # BLD AUTO: 7.4 K/UL (ref 1.8–7.7)
NEUTROPHILS NFR BLD: 87.8 % (ref 38–73)
NRBC BLD-RTO: 0 /100 WBC
PHOSPHATE SERPL-MCNC: 2.5 MG/DL (ref 2.7–4.5)
PLATELET # BLD AUTO: 338 K/UL (ref 150–450)
PMV BLD AUTO: 11.9 FL (ref 9.2–12.9)
POTASSIUM SERPL-SCNC: 5.1 MMOL/L (ref 3.5–5.1)
RBC # BLD AUTO: 3.62 M/UL (ref 4.6–6.2)
SODIUM SERPL-SCNC: 139 MMOL/L (ref 136–145)
WBC # BLD AUTO: 8.42 K/UL (ref 3.9–12.7)

## 2024-03-07 PROCEDURE — 83735 ASSAY OF MAGNESIUM: CPT | Performed by: INTERNAL MEDICINE

## 2024-03-07 PROCEDURE — 85025 COMPLETE CBC W/AUTO DIFF WBC: CPT | Performed by: INTERNAL MEDICINE

## 2024-03-07 PROCEDURE — 36415 COLL VENOUS BLD VENIPUNCTURE: CPT | Performed by: INTERNAL MEDICINE

## 2024-03-07 PROCEDURE — 80069 RENAL FUNCTION PANEL: CPT | Performed by: INTERNAL MEDICINE

## 2024-03-07 PROCEDURE — 80197 ASSAY OF TACROLIMUS: CPT | Performed by: INTERNAL MEDICINE

## 2024-03-08 ENCOUNTER — TELEPHONE (OUTPATIENT)
Dept: TRANSPLANT | Facility: CLINIC | Age: 69
End: 2024-03-08
Payer: MEDICARE

## 2024-03-08 LAB — TACROLIMUS BLD-MCNC: 6.7 NG/ML (ref 5–15)

## 2024-03-08 NOTE — TELEPHONE ENCOUNTER
Pt notified to increase water intake. Labs Monday    ----- Message from Lynn Cordova MD sent at 3/8/2024  1:16 PM CST -----  Please increase fluid intake and recheck renal panel in next few days.

## 2024-03-08 NOTE — TELEPHONE ENCOUNTER
Reviewed questionnaire and labs with pt    ----- Message from Nora Olivas sent at 3/8/2024  9:44 AM CST -----  Regarding: Letter  Contact: [phone]  CONSULT/ADVISORY    Name of Caller:  KENNEY TEJEDA [[id number]]    Contact Preference:   [phone]    Nature of Call:  Pt states he received a message via [employer] to contact his clinical care team due to his answers to a questionnaire.   Requesting a call back from Sara WILKINSON

## 2024-03-11 ENCOUNTER — LAB VISIT (OUTPATIENT)
Dept: LAB | Facility: HOSPITAL | Age: 69
End: 2024-03-11
Attending: INTERNAL MEDICINE
Payer: MEDICARE

## 2024-03-11 DIAGNOSIS — Z94.0 KIDNEY REPLACED BY TRANSPLANT: ICD-10-CM

## 2024-03-11 LAB
ALBUMIN SERPL BCP-MCNC: 3.1 G/DL (ref 3.5–5.2)
ANION GAP SERPL CALC-SCNC: 5 MMOL/L (ref 8–16)
BASOPHILS # BLD AUTO: 0.08 K/UL (ref 0–0.2)
BASOPHILS NFR BLD: 1.1 % (ref 0–1.9)
BUN SERPL-MCNC: 18 MG/DL (ref 8–23)
CALCIUM SERPL-MCNC: 9.9 MG/DL (ref 8.7–10.5)
CHLORIDE SERPL-SCNC: 110 MMOL/L (ref 95–110)
CO2 SERPL-SCNC: 24 MMOL/L (ref 23–29)
CREAT SERPL-MCNC: 1.7 MG/DL (ref 0.5–1.4)
DIFFERENTIAL METHOD BLD: ABNORMAL
EOSINOPHIL # BLD AUTO: 0.1 K/UL (ref 0–0.5)
EOSINOPHIL NFR BLD: 1.7 % (ref 0–8)
ERYTHROCYTE [DISTWIDTH] IN BLOOD BY AUTOMATED COUNT: 15 % (ref 11.5–14.5)
EST. GFR  (NO RACE VARIABLE): 43.6 ML/MIN/1.73 M^2
GLUCOSE SERPL-MCNC: 103 MG/DL (ref 70–110)
HCT VFR BLD AUTO: 33.1 % (ref 40–54)
HGB BLD-MCNC: 10.3 G/DL (ref 14–18)
IMM GRANULOCYTES # BLD AUTO: 0.05 K/UL (ref 0–0.04)
IMM GRANULOCYTES NFR BLD AUTO: 0.7 % (ref 0–0.5)
LYMPHOCYTES # BLD AUTO: 0.2 K/UL (ref 1–4.8)
LYMPHOCYTES NFR BLD: 2.3 % (ref 18–48)
MAGNESIUM SERPL-MCNC: 1.4 MG/DL (ref 1.6–2.6)
MCH RBC QN AUTO: 27.8 PG (ref 27–31)
MCHC RBC AUTO-ENTMCNC: 31.1 G/DL (ref 32–36)
MCV RBC AUTO: 89 FL (ref 82–98)
MONOCYTES # BLD AUTO: 0.3 K/UL (ref 0.3–1)
MONOCYTES NFR BLD: 4.1 % (ref 4–15)
NEUTROPHILS # BLD AUTO: 6.4 K/UL (ref 1.8–7.7)
NEUTROPHILS NFR BLD: 90.1 % (ref 38–73)
NRBC BLD-RTO: 0 /100 WBC
PHOSPHATE SERPL-MCNC: 2.4 MG/DL (ref 2.7–4.5)
PLATELET # BLD AUTO: 341 K/UL (ref 150–450)
PMV BLD AUTO: 10.7 FL (ref 9.2–12.9)
POTASSIUM SERPL-SCNC: 4.8 MMOL/L (ref 3.5–5.1)
RBC # BLD AUTO: 3.71 M/UL (ref 4.6–6.2)
SODIUM SERPL-SCNC: 139 MMOL/L (ref 136–145)
WBC # BLD AUTO: 7.07 K/UL (ref 3.9–12.7)

## 2024-03-11 PROCEDURE — 80069 RENAL FUNCTION PANEL: CPT | Performed by: INTERNAL MEDICINE

## 2024-03-11 PROCEDURE — 85025 COMPLETE CBC W/AUTO DIFF WBC: CPT | Performed by: INTERNAL MEDICINE

## 2024-03-11 PROCEDURE — 36415 COLL VENOUS BLD VENIPUNCTURE: CPT | Performed by: INTERNAL MEDICINE

## 2024-03-11 PROCEDURE — 83735 ASSAY OF MAGNESIUM: CPT | Performed by: INTERNAL MEDICINE

## 2024-03-11 PROCEDURE — 80197 ASSAY OF TACROLIMUS: CPT | Performed by: INTERNAL MEDICINE

## 2024-03-12 ENCOUNTER — TELEPHONE (OUTPATIENT)
Dept: TRANSPLANT | Facility: CLINIC | Age: 69
End: 2024-03-12
Payer: MEDICARE

## 2024-03-12 ENCOUNTER — PATIENT MESSAGE (OUTPATIENT)
Dept: TRANSPLANT | Facility: CLINIC | Age: 69
End: 2024-03-12
Payer: MEDICARE

## 2024-03-12 LAB — TACROLIMUS BLD-MCNC: 12.2 NG/ML (ref 5–15)

## 2024-03-12 NOTE — TELEPHONE ENCOUNTER
Reinforced 12 hr level, repeat labs on Thursday 3/14    ----- Message from Lynn Cordova MD sent at 3/12/2024  9:17 AM CDT -----  Please verify this level is accurate, meds have not changed [ new ones added or removed], and repeat a 12 hr trough.

## 2024-03-13 ENCOUNTER — PROCEDURE VISIT (OUTPATIENT)
Dept: UROLOGY | Facility: CLINIC | Age: 69
End: 2024-03-13
Payer: MEDICARE

## 2024-03-13 ENCOUNTER — OFFICE VISIT (OUTPATIENT)
Dept: TRANSPLANT | Facility: CLINIC | Age: 69
End: 2024-03-13
Payer: MEDICARE

## 2024-03-13 VITALS
WEIGHT: 258.38 LBS | OXYGEN SATURATION: 97 % | DIASTOLIC BLOOD PRESSURE: 69 MMHG | TEMPERATURE: 97 F | BODY MASS INDEX: 33.16 KG/M2 | HEART RATE: 89 BPM | SYSTOLIC BLOOD PRESSURE: 147 MMHG | HEIGHT: 74 IN

## 2024-03-13 VITALS
BODY MASS INDEX: 33.16 KG/M2 | OXYGEN SATURATION: 97 % | HEART RATE: 89 BPM | TEMPERATURE: 97 F | HEIGHT: 74 IN | SYSTOLIC BLOOD PRESSURE: 147 MMHG | DIASTOLIC BLOOD PRESSURE: 69 MMHG | WEIGHT: 258.38 LBS

## 2024-03-13 VITALS
WEIGHT: 253.5 LBS | HEART RATE: 70 BPM | SYSTOLIC BLOOD PRESSURE: 174 MMHG | RESPIRATION RATE: 18 BRPM | TEMPERATURE: 97 F | DIASTOLIC BLOOD PRESSURE: 84 MMHG | BODY MASS INDEX: 32.55 KG/M2

## 2024-03-13 DIAGNOSIS — Z94.0 S/P KIDNEY TRANSPLANT: Primary | ICD-10-CM

## 2024-03-13 DIAGNOSIS — I15.2 HYPERTENSION ASSOCIATED WITH DIABETES: ICD-10-CM

## 2024-03-13 DIAGNOSIS — E11.59 HYPERTENSION ASSOCIATED WITH DIABETES: ICD-10-CM

## 2024-03-13 DIAGNOSIS — Z94.0 KIDNEY REPLACED BY TRANSPLANT: ICD-10-CM

## 2024-03-13 DIAGNOSIS — Z94.0 STATUS POST DECEASED-DONOR KIDNEY TRANSPLANTATION: Primary | ICD-10-CM

## 2024-03-13 DIAGNOSIS — Z94.0 KIDNEY TRANSPLANT RECIPIENT: ICD-10-CM

## 2024-03-13 DIAGNOSIS — E11.22 TYPE 2 DIABETES MELLITUS WITH CHRONIC KIDNEY DISEASE, WITH LONG-TERM CURRENT USE OF INSULIN, UNSPECIFIED CKD STAGE: ICD-10-CM

## 2024-03-13 DIAGNOSIS — Z51.81 ENCOUNTER FOR THERAPEUTIC DRUG LEVEL MONITORING: ICD-10-CM

## 2024-03-13 DIAGNOSIS — D84.821 IMMUNOCOMPROMISED STATE DUE TO DRUG THERAPY: ICD-10-CM

## 2024-03-13 DIAGNOSIS — Z79.4 TYPE 2 DIABETES MELLITUS WITH CHRONIC KIDNEY DISEASE, WITH LONG-TERM CURRENT USE OF INSULIN, UNSPECIFIED CKD STAGE: ICD-10-CM

## 2024-03-13 DIAGNOSIS — Z79.899 IMMUNOCOMPROMISED STATE DUE TO DRUG THERAPY: ICD-10-CM

## 2024-03-13 DIAGNOSIS — Z91.89 AT RISK FOR OPPORTUNISTIC INFECTIONS: ICD-10-CM

## 2024-03-13 PROCEDURE — 52310 CYSTOSCOPY AND TREATMENT: CPT | Mod: PBBFAC | Performed by: UROLOGY

## 2024-03-13 PROCEDURE — 99215 OFFICE O/P EST HI 40 MIN: CPT | Mod: S$PBB,,, | Performed by: NURSE PRACTITIONER

## 2024-03-13 PROCEDURE — 52310 CYSTOSCOPY AND TREATMENT: CPT | Mod: S$PBB,,, | Performed by: UROLOGY

## 2024-03-13 PROCEDURE — 99214 OFFICE O/P EST MOD 30 MIN: CPT | Mod: PBBFAC | Performed by: TRANSPLANT SURGERY

## 2024-03-13 PROCEDURE — 99999 PR PBB SHADOW E&M-EST. PATIENT-LVL V: CPT | Mod: PBBFAC,,, | Performed by: NURSE PRACTITIONER

## 2024-03-13 PROCEDURE — 99215 OFFICE O/P EST HI 40 MIN: CPT | Mod: PBBFAC,27 | Performed by: NURSE PRACTITIONER

## 2024-03-13 PROCEDURE — 99214 OFFICE O/P EST MOD 30 MIN: CPT | Mod: 24,S$PBB,GC, | Performed by: TRANSPLANT SURGERY

## 2024-03-13 PROCEDURE — 99999 PR PBB SHADOW E&M-EST. PATIENT-LVL IV: CPT | Mod: PBBFAC,,, | Performed by: TRANSPLANT SURGERY

## 2024-03-13 RX ORDER — DOXYCYCLINE HYCLATE 100 MG
100 TABLET ORAL
Status: COMPLETED | OUTPATIENT
Start: 2024-03-13 | End: 2024-03-13

## 2024-03-13 RX ORDER — VALGANCICLOVIR 450 MG/1
450 TABLET, FILM COATED ORAL DAILY
Qty: 30 TABLET | Refills: 5
Start: 2024-03-13 | End: 2024-08-21

## 2024-03-13 RX ORDER — ISOSORBIDE DINITRATE 10 MG/1
10 TABLET ORAL 3 TIMES DAILY
Qty: 90 TABLET | Refills: 11 | Status: SHIPPED | OUTPATIENT
Start: 2024-03-13 | End: 2024-04-10

## 2024-03-13 RX ORDER — LIDOCAINE HYDROCHLORIDE 20 MG/ML
JELLY TOPICAL
Status: COMPLETED | OUTPATIENT
Start: 2024-03-13 | End: 2024-03-13

## 2024-03-13 RX ADMIN — LIDOCAINE HYDROCHLORIDE: 20 JELLY TOPICAL at 08:03

## 2024-03-13 RX ADMIN — Medication 100 MG: at 09:03

## 2024-03-13 NOTE — PATIENT INSTRUCTIONS
What to Expect After a Cystoscopy with Stent Removal  For the next 24-48 hours, you may feel a mild burning when you urinate. This burning is normal and expected. Drink plenty of water to dilute the urine to help relieve the burning sensation. You may also see a small amount of blood in your urine after the procedure.    Unless you are already taking antibiotics, you may be given an antibiotic after the test to prevent infection.    Signs and Symptoms to Report  Call the [facility] at [phone] if you develop any of the following:  Fever of 101 degrees or higher  Chills or persistent bleeding  Inability to urinate    After hours or on weekends, you may reach a urology resident on call at this number: [phone].

## 2024-03-13 NOTE — PROGRESS NOTES
Transplant Surgery  Kidney Transplant Recipient Follow-up    Referring Physician: Gabino Champion  Current Nephrologist: Gabino Champion    Subjective:     Chief Complaint: Isidro Alves is a 68 y.o. year old Black or  male who is status post Kidney transplant performed on 2/18/2024.    ORGAN:  RIGHT KIDNEY  Disease Etiology: Diabetes Mellitus - Type II  Donor Type:  Donation after Brain Death  Donor CMV Status:  Positive  Donor HBcAB:  Positive  Donor HCV Status:  Positive    History of Present Illness: He reports no concerns.  From a transplant perspective, he reports normal urination and no incisional pain.  Isidro is here for management of his immunosuppression medication.  Isidro states that his immunosuppression is being well tolerated.  Hypertension is is reportedly well controlled.    External provider notes reviewed: Yes    Review of Systems   Constitutional: Negative.    HENT: Negative.     Eyes: Negative.    Respiratory: Negative.     Cardiovascular: Negative.    Gastrointestinal: Negative.    Endocrine: Negative.    Genitourinary: Negative.    Musculoskeletal: Negative.    Skin:  Positive for wound.   Allergic/Immunologic: Positive for immunocompromised state.   Neurological: Negative.    Hematological: Negative.    Psychiatric/Behavioral: Negative.         Objective:     Physical Exam  Constitutional:       Appearance: Normal appearance.   HENT:      Head: Normocephalic and atraumatic.      Mouth/Throat:      Mouth: Mucous membranes are moist.      Pharynx: Oropharynx is clear.   Eyes:      Extraocular Movements: Extraocular movements intact.      Pupils: Pupils are equal, round, and reactive to light.   Cardiovascular:      Rate and Rhythm: Normal rate.   Pulmonary:      Effort: Pulmonary effort is normal. No respiratory distress.   Abdominal:      General: There is no distension.      Tenderness: There is no abdominal tenderness. There is no guarding or rebound.      Comments:  RLQ incision with staples clean, dry and intact with no tenderness, hernia, fluctuance, induration or erythema   Musculoskeletal:         General: No swelling.      Cervical back: Normal range of motion and neck supple.      Right lower leg: No edema.      Left lower leg: No edema.   Skin:     General: Skin is warm and dry.   Neurological:      General: No focal deficit present.      Mental Status: He is alert. Mental status is at baseline.   Psychiatric:         Mood and Affect: Mood normal.         Behavior: Behavior normal.       Lab Results   Component Value Date    CREATININE 1.7 (H) 03/11/2024    BUN 18 03/11/2024     Lab Results   Component Value Date    WBC 7.07 03/11/2024    HGB 10.3 (L) 03/11/2024    HCT 33.1 (L) 03/11/2024    HCT 33 (L) 02/18/2024     03/11/2024     Lab Results   Component Value Date    TACROLIMUS 12.2 03/11/2024       Diagnostics:  The following labs have been reviewed: CBC  BMP  TACROLIMUS LEVEL  The following radiology images have been independently reviewed and interpreted: none    Assessment and Plan:        S/P Kidney transplant.  Chronic immunosuppressive medications for rejection prophylaxis supratherapeutic.  Plan:  will repeat 12hr trough per Dr. Toussaint .  Continue monitoring symptoms, labs and drug levels for drug-related toxicity and side effects.  Renal hypertension at target.  Staple removal today      Additional testing to be completed according to the Kidney: Written Order Guideline for Kidney Transplant Follow-Up (KI-09)    Interpretation of tests and discussion of patient management involves all members of the multidisciplinary transplant team.  Patient advised that it is recommended that all transplant candidates, and their close contacts and household members receive Covid vaccination.  Follow-up: Patient reminded to call with any health changes, since these can be early signs of significant complications.  Also, I advised the patient to be sure any new  medications or changes of old medications are discussed with either a pharmacist, or physician knowledgeable with transplant to avoid rejection/drug toxicity related to significant drug interactions.    Consuelo Pope MD     Agree with above Ilan DENNY Patient Status  Functional Status: 80% - Normal activity with effort: some symptoms of disease  Physical Capacity: No Limitations

## 2024-03-13 NOTE — LETTER
March 13, 2024        Gabino Champion  [address]  [address] [address]  [place] [address]  Phone: [phone]  Fax: [phone]             [facility] [address]st [address]  [address]  [facility] [address]  Phone: [phone]   Patient: Isidro Alves   MR Number: [id number]   YOB: 1955   Date of Visit: 3/13/2024       Dear Dr. Gabino Champion    Thank you for referring Isidro Alves to me for evaluation. Attached you will find relevant portions of my assessment and plan of care.    If you have questions, please do not hesitate to call me. I look forward to following Isidro Alves along with you.    Sincerely,    Natasha Hunter, NP    Enclosure    If you would like to receive this communication electronically, please contact [email or web address] or [phone] to request [employer] Link access.    [employer] Link is a tool which provides read-only access to select patient information with whom you have a relationship. Its easy to use and provides real time access to review your patients record including encounter summaries, notes, results, and demographic information.    If you feel you have received this communication in error or would no longer like to receive these types of communications, please e-mail [email or web address]

## 2024-03-13 NOTE — PROCEDURES
CYSTOSCOPY W/ STENT REMOVAL    Date/Time: 3/13/2024 9:00 AM    Performed by: Tyalor Thao MD  Authorized by: Ilan Sanchez Jr., MD  Comments: Procedure: Flexible cysto-uretheroscopy and stent removal   Pre Procedure Diagnosis:s/p kidney transplant   Post Procedure Diagnosis:same   Surgeon: Taylor Thao MD   Anesthesia: 2% uro-jet lidocaine jelly for local analgesia   Flexible cysto-urethroscopy was performed after consent was obtained. The risks and benefits were explained.   2% lidocaine urojet was used for local analgesia.   The genitalia was prepped and draped in the sterile fashion with betadine.   The flexible scope was advanced into the urethra and into the bladder. The stent was removed without difficulty.   The patient tolerated the procedure well without complication.   They will follow up with transplant.

## 2024-03-13 NOTE — PROGRESS NOTES
"   Kidney Post-Transplant Assessment    Referring Physician: Gabino Champion  Current Nephrologist: Gabino Champion    ORGAN: RIGHT KIDNEY  Donor Type: donation after brain death  PHS Increased Risk: yes  Cold Ischemia: 625 mins  Induction Medications:  Thymoglobulin    Subjective:     CC:  Reassessment of renal allograft function and management of chronic immunosuppression.    HPI:  Mr. Alves is a 68 y.o. year old Black or  male who received a donation after brain death kidney transplant on 2/18/24.   He takes mycophenolate mofetil, prednisone, and tacrolimus for maintenance immunosuppression. He denies any recent hospitalizations or ER visits since his previous clinic visit.    Pertinent Nephrology/Transplant History:   ESRD from [facility]  DCD KT 2/18/24; KDPI 67%  RPR+, HCVAb+/DERICK- donor   CMV +/+      Normal feels well today.  Now 24 days post kidney transplant, and notes he is recovering well.  He is making plenty of urine. BP remains elevated at 150s He is urinating without difficulty.  He denies edema.      Review of Systems   Respiratory:  Negative for shortness of breath.    Cardiovascular:  Negative for chest pain and leg swelling.   Gastrointestinal:  Negative for abdominal pain, nausea and vomiting.   Genitourinary:  Negative for difficulty urinating.   Skin:  Negative for rash.   Allergic/Immunologic: Positive for immunocompromised state.       Objective:     Blood pressure (!) 147/69, pulse 89, temperature 97.3 °F (36.3 °C), temperature source Temporal, height 6' 2" (1.88 m), weight 117.2 kg (258 lb 6.1 oz), SpO2 97 %.body mass index is 33.17 kg/m².    Physical Exam  Constitutional:       Appearance: Normal appearance.   Cardiovascular:      Rate and Rhythm: Normal rate and regular rhythm.   Pulmonary:      Effort: Pulmonary effort is normal.      Breath sounds: Normal breath sounds.   Abdominal:      Palpations: Abdomen is soft. There is no mass.      Tenderness: There is no abdominal " "tenderness.   Musculoskeletal:      Right lower leg: No edema.      Left lower leg: No edema.     Staples removed today    Labs:  Lab Results   Component Value Date    WBC 7.07 03/11/2024    HGB 10.3 (L) 03/11/2024    HCT 33.1 (L) 03/11/2024     03/11/2024    K 4.8 03/11/2024     03/11/2024    CO2 24 03/11/2024    BUN 18 03/11/2024    CREATININE 1.7 (H) 03/11/2024    EGFRNORACEVR 43.6 (A) 03/11/2024    CALCIUM 9.9 03/11/2024    PHOS 2.4 (L) 03/11/2024    MG 1.4 (L) 03/11/2024    ALBUMIN 3.1 (L) 03/11/2024    AST 19 02/18/2024    ALT 5 (L) 02/18/2024    UTPCR 0.33 (H) 03/04/2024    .5 (H) 08/25/2022    TACROLIMUS 12.2 03/11/2024       No results found for: "EXTANC", "EXTWBC", "EXTSEGS", "EXTPLATELETS", "EXTHEMOGLOBI", "EXTHEMATOCRI", "EXTCREATININ", "EXTSODIUM", "EXTPOTASSIUM", "EXTBUN", "EXTCO2", "EXTCALCIUM", "EXTPHOSPHORU", "EXTGLUCOSE", "EXTALBUMIN", "EXTAST", "EXTALT", "EXTBILITOTAL", "EXTLIPASE", "EXTAMYLASE"    No results found for: "EXTCYCLOSLVL", "EXTSIROLIMUS", "EXTTACROLVL", "EXTPROTCRE", "EXTPTHINTACT", "EXTPROTEINUA", "EXTWBCUA", "EXTRBCUA"        Assessment:     1. S/P kidney transplant 2024    2. Hypertension associated with diabetes    3. Type 2 diabetes mellitus with chronic kidney disease, with long-term current use of insulin, unspecified CKD stage    4. Kidney transplant recipient        Plan:   Tac level pending repeat  Hypertension--- added isosorbide 10mg TID  Continue current IS therapy regimen  No need for refills on IS therapy today.         DCD kidney transplant 02/18/2024 with KDPI 67% [RPR+, HCVAb+/DERICK- donor ]  Allograft function assessment  -CKD 3, remains stable.   -recovering well.  -continue close monitoring as per guidelines  Recent Labs   Lab 12/30/21  1352 07/07/22  1436 07/14/22  0706 08/23/22  1500 06/15/23  0531 08/29/23  1039 03/04/24  0848 03/07/24  0805 03/11/24  0842   Creatinine 8.4 H 8.2 H 8.4 H   < > 7.49 H   < > 1.7 H 1.9 H 1.7 H   eGFR if non  " "American 6 A 6.2 A 6.0 A  --   --   --   --   --   --    eGFR if  7 A 7.1 A 6.9 A  --   --   --   --   --   --    eGFR   --   --   --   --  7  --   --   --   --     < > = values in this interval not displayed.      No results found for: "GLUCOSE", "AMYLASE", "LIPASE"      Encounter for Monitoring Immunosuppression post Transplant  Recent Labs   Lab 03/04/24  0848 03/07/24  0805 03/11/24  0842   Tacrolimus Lvl 8.9 6.7 12.2     -Target level for Isidro is 7-9  -Recheck as per guidelines.  -Monitor for side effects and toxicities, given narrow therapeutic window and significant risk of AE       Evaluation for bone marrow suppression (r/t immunosuppression toxicity or infection)  Counts currently acceptable.  Monitor   Lab Results   Component Value Date    WBC 7.07 03/11/2024    HGB 10.3 (L) 03/11/2024    HCT 33.1 (L) 03/11/2024     03/11/2024         Renal hypertension--within target, watch  BP Readings from Last 3 Encounters:   03/13/24 (!) 147/69   03/13/24 (!) 147/69   03/13/24 (!) 174/84   -on Entresto  -consider restarting isosorbide as well if BP remains high      Metabolic bone disease [Secondary hyperparathyroidism, Phosphorus metabolism disorders]  -Will tolerate mild asymptomatic low phos level d/t pill burden, DDI, and adverse SE   Hypo magnesemia-magnesium supplement added yesterday, along with high magnesium diet education.  We will aim to bring magnesium closer to normal, but will accept somewhat low-level given pill burden  Labs were reviewed with the patient.   Recent Labs   Lab 06/02/21  1112 07/07/22  1436 08/25/22  1203 04/06/23  1735 03/04/24  0848 03/07/24  0805 03/11/24  0842   Albumin  --    < >  --    < > 3.0 L 3.1 L 3.1 L   Calcium  --    < >  --    < > 9.6 9.4 9.9   Phosphorus  --   --   --    < > 1.9 L 2.5 L 2.4 L   Magnesium  --   --   --    < > 1.4 L 1.4 L 1.4 L   PTH, Intact 430.0 H  --  324.5 H  --   --   --   --     < > = values in this interval not " "displayed.        Assessment of electrolytes  Lab Results   Component Value Date     03/11/2024    K 4.8 03/11/2024     03/11/2024    CO2 24 03/11/2024    MG 1.4 (L) 03/11/2024       Screening for BK infection to prevent allograft dysfunction  No results found for: "BKVIRUSDNAUR", "BKQUANTURINE", "BKVIRUSLOG", "BKVIRUSURINE", "BKVIRUSPCRQB"    PJP until 08/18/2024 - atovaquone changed to dapsone 02/28/2024 (known allergy to Bactrim)  CMV until 05/21/2024 -  Valcyte  - BK acceptable  -Continue blood PCR screening as per program guidelines to prevent BK viremia and nephropathy leading to allograft dysfunction and potential graft failure      Screening for proteinuria & urinary abnormalities  Recent Labs   Lab 05/14/23  1256 02/18/24  1219 03/04/24  0833   Protein, UA 2+ A  --  Negative   Glucose, UA 2+ A  --  Negative   Occult Blood UA 1+ A  --  Trace A   Leukocytes, UA Trace A  --  1+ A   Prot/Creat Ratio, Urine  --  1.07 H 0.33 H   - Urine p/c ratio acceptable    Management of kidney disease and related issues (HTN, anemia, SPTH, metabolic bone disease) should continue by community nephrologist.         Follow-up:   Clinic: return to transplant clinic weekly for the first month after transplant; every 2 weeks during months 2-3; then at 6-, 9-, 12-, 18-, 24-, and 36- months post-transplant to reassess for complications from immunosuppression toxicity and monitor for rejection.  Annually thereafter.    Labs: since patient remains at high risk for rejection and drug-related complications that warrant close monitoring, labs will be ordered as follows: continue twice weekly CBC, renal panel, and drug level for first month; then same labs once weekly through 3rd month post-transplant.  Urine for UA and protein/creatinine ratio monthly.  Serum BK - PCR at 1-, 3-, 6-, 9-, 12-, 18-, 24-, 36- 48-, and 60 months post-transplant.  Hepatic panel at 1-, 2-, 3-, 6-, 9-, 12-, 18-, 24-, and 36- months " post-transplant.    Natasha Hunter NP       Education:   Material provided to the patient.  Patient reminded to call with any health changes since these can be early signs of significant complications.  Also, I advised the patient to be sure any new medications or changes of old medications are discussed with either a pharmacist or physician knowledgeable with transplant to avoid rejection/drug toxicity related to significant drug interactions.    Patient advised that it is recommended that all transplanted patients, and their close contacts and household members receive Covid vaccination.    UNOS Patient Status  Functional Status: 70% - Cares for self: unable to carry on normal activity or active work  Physical Capacity: No Limitations

## 2024-03-13 NOTE — LETTER
March 13, 2024        Gabino Champion  [address]  [address] [address]  [facility] [address]  Phone: [phone]  Fax: [phone]             [facility]- Transplant 1st Fl  [address]  [facility] [address]  Phone: [phone]   Patient: Isidro Alves   MR Number: [id number]   YOB: 1955   Date of Visit: 3/13/2024       Dear Dr. Gabino Champion    Thank you for referring Isidro Alves to me for evaluation. Attached you will find relevant portions of my assessment and plan of care.    If you have questions, please do not hesitate to call me. I look forward to following Isidro Alves along with you.    Sincerely,    Ilan Sanchez Jr, MD    Enclosure    If you would like to receive this communication electronically, please contact [email or web address] or [phone] to request [employer] Link access.    [employer] Link is a tool which provides read-only access to select patient information with whom you have a relationship. Its easy to use and provides real time access to review your patients record including encounter summaries, notes, results, and demographic information.    If you feel you have received this communication in error or would no longer like to receive these types of communications, please e-mail [email or web address]

## 2024-03-14 ENCOUNTER — LAB VISIT (OUTPATIENT)
Dept: LAB | Facility: HOSPITAL | Age: 69
End: 2024-03-14
Attending: INTERNAL MEDICINE
Payer: MEDICARE

## 2024-03-14 DIAGNOSIS — Z94.0 KIDNEY REPLACED BY TRANSPLANT: ICD-10-CM

## 2024-03-14 LAB
ALBUMIN SERPL BCP-MCNC: 3.3 G/DL (ref 3.5–5.2)
ANION GAP SERPL CALC-SCNC: 5 MMOL/L (ref 8–16)
BASOPHILS # BLD AUTO: 0.06 K/UL (ref 0–0.2)
BASOPHILS NFR BLD: 1 % (ref 0–1.9)
BUN SERPL-MCNC: 15 MG/DL (ref 8–23)
CALCIUM SERPL-MCNC: 9.7 MG/DL (ref 8.7–10.5)
CHLORIDE SERPL-SCNC: 111 MMOL/L (ref 95–110)
CO2 SERPL-SCNC: 24 MMOL/L (ref 23–29)
CREAT SERPL-MCNC: 1.6 MG/DL (ref 0.5–1.4)
DIFFERENTIAL METHOD BLD: ABNORMAL
EOSINOPHIL # BLD AUTO: 0.2 K/UL (ref 0–0.5)
EOSINOPHIL NFR BLD: 2.4 % (ref 0–8)
ERYTHROCYTE [DISTWIDTH] IN BLOOD BY AUTOMATED COUNT: 15.4 % (ref 11.5–14.5)
EST. GFR  (NO RACE VARIABLE): 46.9 ML/MIN/1.73 M^2
GLUCOSE SERPL-MCNC: 94 MG/DL (ref 70–110)
HCT VFR BLD AUTO: 35 % (ref 40–54)
HGB BLD-MCNC: 10.7 G/DL (ref 14–18)
IMM GRANULOCYTES # BLD AUTO: 0.05 K/UL (ref 0–0.04)
IMM GRANULOCYTES NFR BLD AUTO: 0.8 % (ref 0–0.5)
LYMPHOCYTES # BLD AUTO: 0.1 K/UL (ref 1–4.8)
LYMPHOCYTES NFR BLD: 2.3 % (ref 18–48)
MAGNESIUM SERPL-MCNC: 1.3 MG/DL (ref 1.6–2.6)
MCH RBC QN AUTO: 27.7 PG (ref 27–31)
MCHC RBC AUTO-ENTMCNC: 30.6 G/DL (ref 32–36)
MCV RBC AUTO: 91 FL (ref 82–98)
MONOCYTES # BLD AUTO: 0.2 K/UL (ref 0.3–1)
MONOCYTES NFR BLD: 3.9 % (ref 4–15)
NEUTROPHILS # BLD AUTO: 5.6 K/UL (ref 1.8–7.7)
NEUTROPHILS NFR BLD: 89.6 % (ref 38–73)
NRBC BLD-RTO: 0 /100 WBC
PHOSPHATE SERPL-MCNC: 2.7 MG/DL (ref 2.7–4.5)
PLATELET # BLD AUTO: 353 K/UL (ref 150–450)
PMV BLD AUTO: 11.2 FL (ref 9.2–12.9)
POTASSIUM SERPL-SCNC: 5.2 MMOL/L (ref 3.5–5.1)
RBC # BLD AUTO: 3.86 M/UL (ref 4.6–6.2)
SODIUM SERPL-SCNC: 140 MMOL/L (ref 136–145)
WBC # BLD AUTO: 6.2 K/UL (ref 3.9–12.7)

## 2024-03-14 PROCEDURE — 80197 ASSAY OF TACROLIMUS: CPT | Performed by: INTERNAL MEDICINE

## 2024-03-14 PROCEDURE — 83735 ASSAY OF MAGNESIUM: CPT | Performed by: INTERNAL MEDICINE

## 2024-03-14 PROCEDURE — 36415 COLL VENOUS BLD VENIPUNCTURE: CPT | Performed by: INTERNAL MEDICINE

## 2024-03-14 PROCEDURE — 85025 COMPLETE CBC W/AUTO DIFF WBC: CPT | Performed by: INTERNAL MEDICINE

## 2024-03-14 PROCEDURE — 80069 RENAL FUNCTION PANEL: CPT | Performed by: INTERNAL MEDICINE

## 2024-03-15 LAB — TACROLIMUS BLD-MCNC: 10.6 NG/ML (ref 5–15)

## 2024-03-18 ENCOUNTER — PATIENT MESSAGE (OUTPATIENT)
Dept: TRANSPLANT | Facility: CLINIC | Age: 69
End: 2024-03-18
Payer: MEDICARE

## 2024-03-18 ENCOUNTER — LAB VISIT (OUTPATIENT)
Dept: LAB | Facility: HOSPITAL | Age: 69
End: 2024-03-18
Attending: INTERNAL MEDICINE
Payer: COMMERCIAL

## 2024-03-18 ENCOUNTER — TELEPHONE (OUTPATIENT)
Dept: TRANSPLANT | Facility: CLINIC | Age: 69
End: 2024-03-18
Payer: MEDICARE

## 2024-03-18 DIAGNOSIS — Z94.0 KIDNEY REPLACED BY TRANSPLANT: ICD-10-CM

## 2024-03-18 LAB
ALBUMIN SERPL BCP-MCNC: 3.4 G/DL (ref 3.5–5.2)
ALBUMIN SERPL BCP-MCNC: 3.4 G/DL (ref 3.5–5.2)
ALP SERPL-CCNC: 76 U/L (ref 55–135)
ALT SERPL W/O P-5'-P-CCNC: 5 U/L (ref 10–44)
ANION GAP SERPL CALC-SCNC: 10 MMOL/L (ref 8–16)
AST SERPL-CCNC: 11 U/L (ref 10–40)
BASOPHILS # BLD AUTO: 0.05 K/UL (ref 0–0.2)
BASOPHILS NFR BLD: 0.6 % (ref 0–1.9)
BILIRUB DIRECT SERPL-MCNC: 0.2 MG/DL (ref 0.1–0.3)
BILIRUB SERPL-MCNC: 0.6 MG/DL (ref 0.1–1)
BUN SERPL-MCNC: 15 MG/DL (ref 8–23)
CALCIUM SERPL-MCNC: 9.5 MG/DL (ref 8.7–10.5)
CHLORIDE SERPL-SCNC: 110 MMOL/L (ref 95–110)
CO2 SERPL-SCNC: 21 MMOL/L (ref 23–29)
CREAT SERPL-MCNC: 1.5 MG/DL (ref 0.5–1.4)
DIFFERENTIAL METHOD BLD: ABNORMAL
EOSINOPHIL # BLD AUTO: 0.2 K/UL (ref 0–0.5)
EOSINOPHIL NFR BLD: 2.4 % (ref 0–8)
ERYTHROCYTE [DISTWIDTH] IN BLOOD BY AUTOMATED COUNT: 15.5 % (ref 11.5–14.5)
EST. GFR  (NO RACE VARIABLE): 50.7 ML/MIN/1.73 M^2
GLUCOSE SERPL-MCNC: 138 MG/DL (ref 70–110)
HCT VFR BLD AUTO: 31.7 % (ref 40–54)
HCV AB SERPL QL IA: NORMAL
HGB BLD-MCNC: 10.1 G/DL (ref 14–18)
IMM GRANULOCYTES # BLD AUTO: 0.05 K/UL (ref 0–0.04)
IMM GRANULOCYTES NFR BLD AUTO: 0.6 % (ref 0–0.5)
LYMPHOCYTES # BLD AUTO: 0.1 K/UL (ref 1–4.8)
LYMPHOCYTES NFR BLD: 1.4 % (ref 18–48)
MAGNESIUM SERPL-MCNC: 1.4 MG/DL (ref 1.6–2.6)
MCH RBC QN AUTO: 27.6 PG (ref 27–31)
MCHC RBC AUTO-ENTMCNC: 31.9 G/DL (ref 32–36)
MCV RBC AUTO: 87 FL (ref 82–98)
MONOCYTES # BLD AUTO: 0.3 K/UL (ref 0.3–1)
MONOCYTES NFR BLD: 4.2 % (ref 4–15)
NEUTROPHILS # BLD AUTO: 7.2 K/UL (ref 1.8–7.7)
NEUTROPHILS NFR BLD: 90.8 % (ref 38–73)
NRBC BLD-RTO: 0 /100 WBC
PHOSPHATE SERPL-MCNC: 2 MG/DL (ref 2.7–4.5)
PLATELET # BLD AUTO: 347 K/UL (ref 150–450)
PMV BLD AUTO: 10.3 FL (ref 9.2–12.9)
POTASSIUM SERPL-SCNC: 4.9 MMOL/L (ref 3.5–5.1)
PROT SERPL-MCNC: 6.8 G/DL (ref 6–8.4)
RBC # BLD AUTO: 3.66 M/UL (ref 4.6–6.2)
SODIUM SERPL-SCNC: 141 MMOL/L (ref 136–145)
WBC # BLD AUTO: 7.92 K/UL (ref 3.9–12.7)

## 2024-03-18 PROCEDURE — 87522 HEPATITIS C REVRS TRNSCRPJ: CPT | Performed by: INTERNAL MEDICINE

## 2024-03-18 PROCEDURE — 83735 ASSAY OF MAGNESIUM: CPT | Performed by: INTERNAL MEDICINE

## 2024-03-18 PROCEDURE — 87536 HIV-1 QUANT&REVRSE TRNSCRPJ: CPT | Performed by: INTERNAL MEDICINE

## 2024-03-18 PROCEDURE — 84075 ASSAY ALKALINE PHOSPHATASE: CPT | Performed by: INTERNAL MEDICINE

## 2024-03-18 PROCEDURE — 80069 RENAL FUNCTION PANEL: CPT | Performed by: INTERNAL MEDICINE

## 2024-03-18 PROCEDURE — 36415 COLL VENOUS BLD VENIPUNCTURE: CPT | Performed by: INTERNAL MEDICINE

## 2024-03-18 PROCEDURE — 80197 ASSAY OF TACROLIMUS: CPT | Performed by: INTERNAL MEDICINE

## 2024-03-18 PROCEDURE — 85025 COMPLETE CBC W/AUTO DIFF WBC: CPT | Performed by: INTERNAL MEDICINE

## 2024-03-18 PROCEDURE — 86803 HEPATITIS C AB TEST: CPT | Performed by: INTERNAL MEDICINE

## 2024-03-18 PROCEDURE — 87517 HEPATITIS B DNA QUANT: CPT | Performed by: INTERNAL MEDICINE

## 2024-03-18 PROCEDURE — 87799 DETECT AGENT NOS DNA QUANT: CPT | Performed by: INTERNAL MEDICINE

## 2024-03-18 PROCEDURE — 82247 BILIRUBIN TOTAL: CPT | Performed by: INTERNAL MEDICINE

## 2024-03-18 NOTE — TELEPHONE ENCOUNTER
Nelson message sent to pt to call or message    ----- Message from Yenni James RN sent at 3/15/2024 10:03 AM CDT -----  Regarding: FW: Consult/Advisory  Contact: Isidro Alves    ----- Message -----  From: Radha Sifuentes  Sent: 3/15/2024   9:56 AM CDT  To: [facility] Post-Kidney Transplant Clinical  Subject: Consult/Advisory                                    Consult/Advisory     Name Of Caller:Isidro Alves         Contact Preference:[phone] (home)       Nature of call:Patient was instructed to call care team from patient portal. Requesting a call back

## 2024-03-19 ENCOUNTER — TELEPHONE (OUTPATIENT)
Dept: NEPHROLOGY | Facility: CLINIC | Age: 69
End: 2024-03-19
Payer: MEDICARE

## 2024-03-19 LAB
HCV RNA SERPL QL NAA+PROBE: NOT DETECTED
HCV RNA SPEC NAA+PROBE-ACNC: NOT DETECTED IU/ML
HEPATITIS B VIRUS DNA: NORMAL
HEPATITIS B VIRUS PCR, QUANT: NOT DETECTED IU/ML
HIV1 RNA # SERPL NAA+PROBE: NOT DETECTED COPIES/ML
HIV1 RNA SERPL QL NAA+PROBE: NOT DETECTED
TACROLIMUS BLD-MCNC: 7.9 NG/ML (ref 5–15)

## 2024-03-19 NOTE — TELEPHONE ENCOUNTER
---Mr. Isidro Alves stated he had Transplant done in Feb. 18/2024, and will be called in 6 months for Nephrology Clinic.  -- Message from Amy Boggs MA sent at 3/11/2024  6:55 AM CDT -----  Regarding: Scheduling an appt  Hello,    I am needing to see about getting the above patient scheduled for CKD Management. Referral is placed in Epic.    Thanks,   ANTONIO Reyes

## 2024-03-21 ENCOUNTER — LAB VISIT (OUTPATIENT)
Dept: LAB | Facility: HOSPITAL | Age: 69
End: 2024-03-21
Attending: INTERNAL MEDICINE
Payer: COMMERCIAL

## 2024-03-21 DIAGNOSIS — Z94.0 KIDNEY REPLACED BY TRANSPLANT: ICD-10-CM

## 2024-03-21 LAB
ALBUMIN SERPL BCP-MCNC: 3.4 G/DL (ref 3.5–5.2)
ANION GAP SERPL CALC-SCNC: 6 MMOL/L (ref 8–16)
BASOPHILS # BLD AUTO: 0.06 K/UL (ref 0–0.2)
BASOPHILS NFR BLD: 0.8 % (ref 0–1.9)
BKV DNA SERPL NAA+PROBE-ACNC: <125 COPIES/ML
BKV DNA SERPL NAA+PROBE-LOG#: <2.1 LOG (10) COPIES/ML
BKV DNA SERPL QL NAA+PROBE: NOT DETECTED
BUN SERPL-MCNC: 13 MG/DL (ref 8–23)
CALCIUM SERPL-MCNC: 9.4 MG/DL (ref 8.7–10.5)
CHLORIDE SERPL-SCNC: 112 MMOL/L (ref 95–110)
CO2 SERPL-SCNC: 23 MMOL/L (ref 23–29)
CREAT SERPL-MCNC: 1.6 MG/DL (ref 0.5–1.4)
DIFFERENTIAL METHOD BLD: ABNORMAL
EOSINOPHIL # BLD AUTO: 0.1 K/UL (ref 0–0.5)
EOSINOPHIL NFR BLD: 1.5 % (ref 0–8)
ERYTHROCYTE [DISTWIDTH] IN BLOOD BY AUTOMATED COUNT: 15.6 % (ref 11.5–14.5)
EST. GFR  (NO RACE VARIABLE): 46.9 ML/MIN/1.73 M^2
GLUCOSE SERPL-MCNC: 131 MG/DL (ref 70–110)
HCT VFR BLD AUTO: 33.1 % (ref 40–54)
HGB BLD-MCNC: 10.2 G/DL (ref 14–18)
IMM GRANULOCYTES # BLD AUTO: 0.04 K/UL (ref 0–0.04)
IMM GRANULOCYTES NFR BLD AUTO: 0.5 % (ref 0–0.5)
LYMPHOCYTES # BLD AUTO: 0.2 K/UL (ref 1–4.8)
LYMPHOCYTES NFR BLD: 2.2 % (ref 18–48)
MAGNESIUM SERPL-MCNC: 1.4 MG/DL (ref 1.6–2.6)
MCH RBC QN AUTO: 27.8 PG (ref 27–31)
MCHC RBC AUTO-ENTMCNC: 30.8 G/DL (ref 32–36)
MCV RBC AUTO: 90 FL (ref 82–98)
MONOCYTES # BLD AUTO: 0.6 K/UL (ref 0.3–1)
MONOCYTES NFR BLD: 7.4 % (ref 4–15)
NEUTROPHILS # BLD AUTO: 6.5 K/UL (ref 1.8–7.7)
NEUTROPHILS NFR BLD: 87.6 % (ref 38–73)
NRBC BLD-RTO: 0 /100 WBC
PHOSPHATE SERPL-MCNC: 2.7 MG/DL (ref 2.7–4.5)
PLATELET # BLD AUTO: 335 K/UL (ref 150–450)
PMV BLD AUTO: 11.3 FL (ref 9.2–12.9)
POTASSIUM SERPL-SCNC: 4.6 MMOL/L (ref 3.5–5.1)
RBC # BLD AUTO: 3.67 M/UL (ref 4.6–6.2)
SODIUM SERPL-SCNC: 141 MMOL/L (ref 136–145)
WBC # BLD AUTO: 7.42 K/UL (ref 3.9–12.7)

## 2024-03-21 PROCEDURE — 36415 COLL VENOUS BLD VENIPUNCTURE: CPT | Performed by: INTERNAL MEDICINE

## 2024-03-21 PROCEDURE — 80197 ASSAY OF TACROLIMUS: CPT | Performed by: INTERNAL MEDICINE

## 2024-03-21 PROCEDURE — 80069 RENAL FUNCTION PANEL: CPT | Performed by: INTERNAL MEDICINE

## 2024-03-21 PROCEDURE — 85025 COMPLETE CBC W/AUTO DIFF WBC: CPT | Performed by: INTERNAL MEDICINE

## 2024-03-21 PROCEDURE — 83735 ASSAY OF MAGNESIUM: CPT | Performed by: INTERNAL MEDICINE

## 2024-03-22 ENCOUNTER — TELEPHONE (OUTPATIENT)
Dept: TRANSPLANT | Facility: CLINIC | Age: 69
End: 2024-03-22
Payer: MEDICARE

## 2024-03-22 LAB — TACROLIMUS BLD-MCNC: 7.8 NG/ML (ref 5–15)

## 2024-03-22 NOTE — TELEPHONE ENCOUNTER
Returned call to pt. Pt denies any health concerns at this time    ----- Message from Nora Olivas sent at 3/22/2024  9:43 AM CDT -----  Regarding: Call back  Contact: [phone]  CONSULT/ADVISORY    Name of Caller:  KENNEY TEJEDA [[id number]]    Contact Preference:  [phone]    Nature of Call:  Pt is requesting a call back from Sara.  States he received two messages to contact his care team in regards to his answers on a questionnaire.

## 2024-03-25 ENCOUNTER — LAB VISIT (OUTPATIENT)
Dept: LAB | Facility: HOSPITAL | Age: 69
End: 2024-03-25
Attending: INTERNAL MEDICINE
Payer: MEDICARE

## 2024-03-25 ENCOUNTER — OFFICE VISIT (OUTPATIENT)
Dept: ENDOCRINOLOGY | Facility: CLINIC | Age: 69
End: 2024-03-25
Payer: COMMERCIAL

## 2024-03-25 ENCOUNTER — PATIENT MESSAGE (OUTPATIENT)
Dept: ENDOCRINOLOGY | Facility: CLINIC | Age: 69
End: 2024-03-25

## 2024-03-25 DIAGNOSIS — Z94.0 KIDNEY REPLACED BY TRANSPLANT: ICD-10-CM

## 2024-03-25 DIAGNOSIS — Z79.4 TYPE 2 DIABETES MELLITUS WITH CHRONIC KIDNEY DISEASE, WITH LONG-TERM CURRENT USE OF INSULIN, UNSPECIFIED CKD STAGE: Primary | ICD-10-CM

## 2024-03-25 DIAGNOSIS — E11.69 HYPERLIPIDEMIA ASSOCIATED WITH TYPE 2 DIABETES MELLITUS: ICD-10-CM

## 2024-03-25 DIAGNOSIS — E66.01 SEVERE OBESITY (BMI 35.0-39.9) WITH COMORBIDITY: ICD-10-CM

## 2024-03-25 DIAGNOSIS — E78.5 HYPERLIPIDEMIA ASSOCIATED WITH TYPE 2 DIABETES MELLITUS: ICD-10-CM

## 2024-03-25 DIAGNOSIS — E11.22 TYPE 2 DIABETES MELLITUS WITH CHRONIC KIDNEY DISEASE, WITH LONG-TERM CURRENT USE OF INSULIN, UNSPECIFIED CKD STAGE: Primary | ICD-10-CM

## 2024-03-25 DIAGNOSIS — E08.00 DIABETES MELLITUS DUE TO UNDERLYING CONDITION WITH HYPEROSMOLARITY WITHOUT COMA, UNSPECIFIED WHETHER LONG TERM INSULIN USE: ICD-10-CM

## 2024-03-25 LAB
ALBUMIN SERPL BCP-MCNC: 3.5 G/DL (ref 3.5–5.2)
ANION GAP SERPL CALC-SCNC: 5 MMOL/L (ref 8–16)
BASOPHILS # BLD AUTO: 0.05 K/UL (ref 0–0.2)
BASOPHILS NFR BLD: 0.7 % (ref 0–1.9)
BUN SERPL-MCNC: 20 MG/DL (ref 8–23)
CALCIUM SERPL-MCNC: 9.7 MG/DL (ref 8.7–10.5)
CHLORIDE SERPL-SCNC: 110 MMOL/L (ref 95–110)
CO2 SERPL-SCNC: 22 MMOL/L (ref 23–29)
CREAT SERPL-MCNC: 1.4 MG/DL (ref 0.5–1.4)
DIFFERENTIAL METHOD BLD: ABNORMAL
EOSINOPHIL # BLD AUTO: 0.1 K/UL (ref 0–0.5)
EOSINOPHIL NFR BLD: 1.3 % (ref 0–8)
ERYTHROCYTE [DISTWIDTH] IN BLOOD BY AUTOMATED COUNT: 15.5 % (ref 11.5–14.5)
EST. GFR  (NO RACE VARIABLE): 55.1 ML/MIN/1.73 M^2
GLUCOSE SERPL-MCNC: 166 MG/DL (ref 70–110)
HCT VFR BLD AUTO: 34.1 % (ref 40–54)
HGB BLD-MCNC: 10.5 G/DL (ref 14–18)
IMM GRANULOCYTES # BLD AUTO: 0.06 K/UL (ref 0–0.04)
IMM GRANULOCYTES NFR BLD AUTO: 0.8 % (ref 0–0.5)
LYMPHOCYTES # BLD AUTO: 0.2 K/UL (ref 1–4.8)
LYMPHOCYTES NFR BLD: 2.2 % (ref 18–48)
MAGNESIUM SERPL-MCNC: 1.3 MG/DL (ref 1.6–2.6)
MCH RBC QN AUTO: 27 PG (ref 27–31)
MCHC RBC AUTO-ENTMCNC: 30.8 G/DL (ref 32–36)
MCV RBC AUTO: 88 FL (ref 82–98)
MONOCYTES # BLD AUTO: 0.6 K/UL (ref 0.3–1)
MONOCYTES NFR BLD: 8.5 % (ref 4–15)
NEUTROPHILS # BLD AUTO: 6.2 K/UL (ref 1.8–7.7)
NEUTROPHILS NFR BLD: 86.5 % (ref 38–73)
NRBC BLD-RTO: 0 /100 WBC
PHOSPHATE SERPL-MCNC: 2.3 MG/DL (ref 2.7–4.5)
PLATELET # BLD AUTO: 343 K/UL (ref 150–450)
PMV BLD AUTO: 11.5 FL (ref 9.2–12.9)
POTASSIUM SERPL-SCNC: 4.9 MMOL/L (ref 3.5–5.1)
RBC # BLD AUTO: 3.89 M/UL (ref 4.6–6.2)
SODIUM SERPL-SCNC: 137 MMOL/L (ref 136–145)
WBC # BLD AUTO: 7.2 K/UL (ref 3.9–12.7)

## 2024-03-25 PROCEDURE — 36415 COLL VENOUS BLD VENIPUNCTURE: CPT | Performed by: INTERNAL MEDICINE

## 2024-03-25 PROCEDURE — 85025 COMPLETE CBC W/AUTO DIFF WBC: CPT | Performed by: INTERNAL MEDICINE

## 2024-03-25 PROCEDURE — 99214 OFFICE O/P EST MOD 30 MIN: CPT | Mod: 95,,, | Performed by: INTERNAL MEDICINE

## 2024-03-25 PROCEDURE — 80197 ASSAY OF TACROLIMUS: CPT | Performed by: INTERNAL MEDICINE

## 2024-03-25 PROCEDURE — 83735 ASSAY OF MAGNESIUM: CPT | Performed by: INTERNAL MEDICINE

## 2024-03-25 PROCEDURE — 80069 RENAL FUNCTION PANEL: CPT | Performed by: INTERNAL MEDICINE

## 2024-03-25 NOTE — PROGRESS NOTES
Subjective:      Patient ID: Isidro Alves is a 68 y.o. male.    Chief Complaint:    No chief complaint on file.    History of Present Illness  This is a follow up visit after recent hospitalization  Endocrinology was consulted for management of hyperglycemia. Consult was documented as follow:    Surgical Procedure and Date: Kidney Transplant 2/18/24      Diabetes diagnosis year: > 10 years ago      Home Diabetes Medications:  Previously on Tresiba but hasn't needed in 6 months      How often checking glucose at home? 1-3 x day   BG readings on regimen: 100s-150s  Hypoglycemia on the regimen?  No     Diabetes Complications include:     Hyperglycemia and Diabetic chronic kidney disease          Complicating diabetes co morbidities:   ESRD now s/p kidney transplant, glucocorticoid use         HPI:   Patient is a 68 y.o. male with ESRD secondary to diabetic nephropathy. He has been on the wait list for a kidney transplant at [facility] since 9/17/2019. Pt reports starting HD in 3/2021, he is dialyzing MWF.  LUE AV fistula for access.  Patient denies any recent hospitalizations or ED visits.  Now presents s/p kidney transplant 2/19/24. Endocrine consulted for BG management.       Endocrine  Type 2 diabetes mellitus with chronic kidney disease, with long-term current use of insulin  BG goal: 140-180     - Levemir 25 units (20% increase due to fasting blood glucose above goal)   - Novolog 10 units TIDWM (20% increase due to prandial blood glucose  above goal)   - Continue LDC (150/50) SSI PRN ac/hs/0200   - POCT Glucose before meals, at bedtime and at 2 am  - Hypoglycemia protocol in place      ** Please notify Endocrine for any change and/or advance in diet**  ** Please call Endocrine for any BG related issues **     Discharge Planning:   - Humalog 10 units TIDWM + SSI  - Humalog SSI    150 - 200 + 2 unit    201 - 250 + 4 units    251 - 300 + 6 units    301 - 350 + 8 units       > 350   + 10 units  - Levemir 25 units in the  morning.        Etiology of the hyperglycemia: known T2DM   Discharge Date: 02/21/2024  Home Glucocorticoid Regimen: Prednisone 5 mg  Discharge DM Regimen:  Was able to fill prescription for medications and supplies. YES     Missed doses?  Yes, sometimes mises the noon dose.     Prior medications not tolerated or Failed treatments: Tresiba 6 units QD       Current Home DM management after discharge:    # times a day testing 3  No log provided - patient reported to be:     Breakfast- 130; 154  Lunch- 120's  Dinner- 170's  (100-170's)  (No BG > 200 mg/dL)      Hypoglycemic event at home? Yes (3 times, gave the 10 units with no lunch; forgot to hold his insulin when he was not eating)  If yes, needed assistance?  No apple juice.     Typical meals at home:   Breakfast- (Grits, egg, biscuit)  Lunch- Skips  Dinner- Baked fish, vegetable   Snacks- seldom (fruits; peanut butter and jelly)          With regards to reason for admit:  Doing better       Review of Systems   Constitutional:  Negative for unexpected weight change.        250 lbs and stable    Eyes:  Negative for visual disturbance.   Respiratory:  Negative for cough.    Cardiovascular:  Negative for chest pain.   Gastrointestinal:  Negative for nausea and vomiting.   Endocrine: Negative for polydipsia and polyuria.   Musculoskeletal:  Negative for back pain.   Skin:  Negative for rash.   Neurological:  Negative for syncope.   Psychiatric/Behavioral:  Negative for agitation and dysphoric mood.          Objective:   Physical Exam  NONA due to virtual visit.   There is no height or weight on file to calculate BMI.    Lab Review:   Lab Results   Component Value Date    HGBA1C 6.4 (H) 05/07/2023     Lab Results   Component Value Date    CHOL 128 09/26/2022    HDL 25 (A) 09/26/2022    LDLCALC 82 09/26/2022    TRIG 105 09/26/2022    CHOLHDL 20.8 07/14/2022     Lab Results   Component Value Date     03/21/2024    K 4.6 03/21/2024     (H) 03/21/2024    CO2 23  03/21/2024     (H) 03/21/2024    BUN 13 03/21/2024    CREATININE 1.6 (H) 03/21/2024    CALCIUM 9.4 03/21/2024    PROT 6.8 03/18/2024    ALBUMIN 3.4 (L) 03/21/2024    BILITOT 0.6 03/18/2024    ALKPHOS 76 03/18/2024    AST 11 03/18/2024    ALT 5 (L) 03/18/2024    ANIONGAP 6 (L) 03/21/2024    ESTGFRAFRICA 7 06/15/2023    EGFRNONAA 6.0 (A) 07/14/2022    TSH 1.941 07/14/2022       Assessment and Plan   Reviewed goals of therapy are to get the best control we can without hypoglycemia    Reviewed patient's current insulin regimen. Clarified proper insulin dose and timing in relation to meals, etc. Insulin injection sites and proper rotation instructed.       -Advised frequent self blood glucose monitoring.  Patient encouraged to document glucose results and bring them to every clinic visit      -Hypoglycemia precautions discussed. Instructed on precautions before driving.      -Close adherence to lifestyle changes recommended.      -Periodic follow ups for eye evaluations, foot care and dental care suggested.    Return to Clinic in 3 with Jatin Deleon          Problem List Items Addressed This Visit          Cardiac/Vascular    Hyperlipidemia associated with type 2 diabetes mellitus    Current Assessment & Plan     On statin therapy per ADA guidelines.             Endocrine    Type 2 diabetes mellitus with chronic kidney disease, with long-term current use of insulin - Primary    Overview       Controlled.  Continue current medications         Current Assessment & Plan     - Humalog 10 units TIDWM + SSI  - Humalog SSI    150 - 200 + 2 unit    201 - 250 + 4 units    251 - 300 + 6 units    301 - 350 + 8 units       > 350   + 10 units  - Levemir 25 units in the morning.         Severe obesity (BMI 35.0-39.9) with comorbidity    Current Assessment & Plan     There is no height or weight on file to calculate BMI.  Patient may benefit from GLP1-A or combo GIP/GLP1-A in 3 months s/p transplant.   Increased abdominal  adiposity may increase insulin resistance.           Other Visit Diagnoses       Diabetes mellitus due to underlying condition with hyperosmolarity without coma, unspecified whether long term insulin use                Vito Moreno DNP, FNP-C  Department of Endocrinology  Inpatient Glycemic Management

## 2024-03-25 NOTE — ASSESSMENT & PLAN NOTE
- Humalog 10 units TIDWM + SSI  - Humalog SSI    150 - 200 + 2 unit    201 - 250 + 4 units    251 - 300 + 6 units    301 - 350 + 8 units       > 350   + 10 units  - Levemir 25 units in the morning.

## 2024-03-25 NOTE — ASSESSMENT & PLAN NOTE
There is no height or weight on file to calculate BMI.  Patient may benefit from GLP1-A or combo GIP/GLP1-A in 3 months s/p transplant.   Increased abdominal adiposity may increase insulin resistance.

## 2024-03-26 LAB — TACROLIMUS BLD-MCNC: 7.7 NG/ML (ref 5–15)

## 2024-03-26 RX ORDER — DAPSONE 100 MG/1
100 TABLET ORAL DAILY
Qty: 30 TABLET | Refills: 5 | Status: SHIPPED | OUTPATIENT
Start: 2024-03-26 | End: 2024-09-14

## 2024-03-26 NOTE — TELEPHONE ENCOUNTER
Last office visit 8/21/23; Future office visit 11/14/23. Writer spoke with Autumn- spouse.  Autumn indicated pharmacy needed new script if requesting 90 day supply; previous script indicated 30day supply with 5 refills and pharmacy won't dispense 90 day supply without a new script.     Route to [facility]- refill? Script loaded.   Requesting rx sent to [facility] pharmacy

## 2024-03-28 ENCOUNTER — PATIENT MESSAGE (OUTPATIENT)
Dept: ADMINISTRATIVE | Facility: OTHER | Age: 69
End: 2024-03-28
Payer: MEDICARE

## 2024-03-28 NOTE — NURSING
05/14/23 1458   Post-Hemodialysis Assessment   Blood Volume Processed (Liters) 84 L   Dialyzer Clearance Lightly streaked   Duration of Treatment 240 minutes   Total UF (mL) 2000 mL   Patient Response to Treatment Tolerated well, Bp low during dialysis, Albumin 25gm ordered and given.   Post-Hemodialysis Comments Tx ended, Pt reinfused, 2 L UF. Plans for HD tomorrow.       
   05/17/23 1530   Post-Hemodialysis Assessment   Rinseback Volume (mL) 250 mL   Blood Volume Processed (Liters) 83.7 L   Dialyzer Clearance Clear   Duration of Treatment 240 minutes   Total UF (mL) 2663 mL   Net Fluid Removal 2163   Post-Hemodialysis Comments 4 hr H.D. tx completed. Patient was tolerating well until last hour. Pt with c/o cramping in his fingers. UF minimized to 50, Dr. Ordaz aware and ordered to decrease UF to Zero. Last hour of tx was just cleaning the blood. Pt de-accessed per P & P report given to primary nurse.       
Notified provider of decreasing BP. NP at bedside for CL insertion. PRN meds given for sedation- see MAR. Pt tolerated well. Levo started after placement verified.   
Pt arrived to ICU on ventilator with Nitro and Fentanyl gtts. Monitors applied, alarms active. POV reviewed with family.  
Transfer report called to [facility].   
show

## 2024-04-01 ENCOUNTER — LAB VISIT (OUTPATIENT)
Dept: LAB | Facility: HOSPITAL | Age: 69
End: 2024-04-01
Attending: INTERNAL MEDICINE
Payer: COMMERCIAL

## 2024-04-01 DIAGNOSIS — Z94.0 KIDNEY REPLACED BY TRANSPLANT: ICD-10-CM

## 2024-04-01 LAB
ALBUMIN SERPL BCP-MCNC: 3.5 G/DL (ref 3.5–5.2)
ANION GAP SERPL CALC-SCNC: 7 MMOL/L (ref 8–16)
BASOPHILS # BLD AUTO: 0.07 K/UL (ref 0–0.2)
BASOPHILS NFR BLD: 1.1 % (ref 0–1.9)
BUN SERPL-MCNC: 24 MG/DL (ref 8–23)
CALCIUM SERPL-MCNC: 9.6 MG/DL (ref 8.7–10.5)
CHLORIDE SERPL-SCNC: 109 MMOL/L (ref 95–110)
CO2 SERPL-SCNC: 23 MMOL/L (ref 23–29)
CREAT SERPL-MCNC: 1.8 MG/DL (ref 0.5–1.4)
DIFFERENTIAL METHOD BLD: ABNORMAL
EOSINOPHIL # BLD AUTO: 0.1 K/UL (ref 0–0.5)
EOSINOPHIL NFR BLD: 2.1 % (ref 0–8)
ERYTHROCYTE [DISTWIDTH] IN BLOOD BY AUTOMATED COUNT: 15.5 % (ref 11.5–14.5)
EST. GFR  (NO RACE VARIABLE): 40.7 ML/MIN/1.73 M^2
GLUCOSE SERPL-MCNC: 165 MG/DL (ref 70–110)
HCT VFR BLD AUTO: 34.2 % (ref 40–54)
HGB BLD-MCNC: 10.4 G/DL (ref 14–18)
IMM GRANULOCYTES # BLD AUTO: 0.09 K/UL (ref 0–0.04)
IMM GRANULOCYTES NFR BLD AUTO: 1.4 % (ref 0–0.5)
LYMPHOCYTES # BLD AUTO: 0.2 K/UL (ref 1–4.8)
LYMPHOCYTES NFR BLD: 2.6 % (ref 18–48)
MAGNESIUM SERPL-MCNC: 1.4 MG/DL (ref 1.6–2.6)
MCH RBC QN AUTO: 26.5 PG (ref 27–31)
MCHC RBC AUTO-ENTMCNC: 30.4 G/DL (ref 32–36)
MCV RBC AUTO: 87 FL (ref 82–98)
MONOCYTES # BLD AUTO: 0.9 K/UL (ref 0.3–1)
MONOCYTES NFR BLD: 12.8 % (ref 4–15)
NEUTROPHILS # BLD AUTO: 5.3 K/UL (ref 1.8–7.7)
NEUTROPHILS NFR BLD: 80 % (ref 38–73)
NRBC BLD-RTO: 0 /100 WBC
PHOSPHATE SERPL-MCNC: 2.6 MG/DL (ref 2.7–4.5)
PLATELET # BLD AUTO: 388 K/UL (ref 150–450)
PMV BLD AUTO: 11.3 FL (ref 9.2–12.9)
POTASSIUM SERPL-SCNC: 5 MMOL/L (ref 3.5–5.1)
RBC # BLD AUTO: 3.93 M/UL (ref 4.6–6.2)
SODIUM SERPL-SCNC: 139 MMOL/L (ref 136–145)
WBC # BLD AUTO: 6.64 K/UL (ref 3.9–12.7)

## 2024-04-01 PROCEDURE — 80069 RENAL FUNCTION PANEL: CPT | Performed by: INTERNAL MEDICINE

## 2024-04-01 PROCEDURE — 80197 ASSAY OF TACROLIMUS: CPT | Performed by: INTERNAL MEDICINE

## 2024-04-01 PROCEDURE — 83735 ASSAY OF MAGNESIUM: CPT | Performed by: INTERNAL MEDICINE

## 2024-04-01 PROCEDURE — 85025 COMPLETE CBC W/AUTO DIFF WBC: CPT | Performed by: INTERNAL MEDICINE

## 2024-04-01 PROCEDURE — 36415 COLL VENOUS BLD VENIPUNCTURE: CPT | Performed by: INTERNAL MEDICINE

## 2024-04-02 LAB — TACROLIMUS BLD-MCNC: 8.4 NG/ML (ref 5–15)

## 2024-04-08 ENCOUNTER — LAB VISIT (OUTPATIENT)
Dept: LAB | Facility: HOSPITAL | Age: 69
End: 2024-04-08
Attending: INTERNAL MEDICINE
Payer: COMMERCIAL

## 2024-04-08 DIAGNOSIS — Z94.0 KIDNEY REPLACED BY TRANSPLANT: ICD-10-CM

## 2024-04-08 LAB
ALBUMIN SERPL BCP-MCNC: 3.6 G/DL (ref 3.5–5.2)
ANION GAP SERPL CALC-SCNC: 10 MMOL/L (ref 8–16)
BASOPHILS # BLD AUTO: 0.07 K/UL (ref 0–0.2)
BASOPHILS NFR BLD: 0.8 % (ref 0–1.9)
BUN SERPL-MCNC: 22 MG/DL (ref 8–23)
CALCIUM SERPL-MCNC: 9.9 MG/DL (ref 8.7–10.5)
CHLORIDE SERPL-SCNC: 109 MMOL/L (ref 95–110)
CO2 SERPL-SCNC: 22 MMOL/L (ref 23–29)
CREAT SERPL-MCNC: 1.5 MG/DL (ref 0.5–1.4)
DIFFERENTIAL METHOD BLD: ABNORMAL
EOSINOPHIL # BLD AUTO: 0.2 K/UL (ref 0–0.5)
EOSINOPHIL NFR BLD: 1.9 % (ref 0–8)
ERYTHROCYTE [DISTWIDTH] IN BLOOD BY AUTOMATED COUNT: 16.2 % (ref 11.5–14.5)
EST. GFR  (NO RACE VARIABLE): 50.7 ML/MIN/1.73 M^2
GLUCOSE SERPL-MCNC: 150 MG/DL (ref 70–110)
HCT VFR BLD AUTO: 33.2 % (ref 40–54)
HGB BLD-MCNC: 10.1 G/DL (ref 14–18)
IMM GRANULOCYTES # BLD AUTO: 0.26 K/UL (ref 0–0.04)
IMM GRANULOCYTES NFR BLD AUTO: 2.9 % (ref 0–0.5)
LYMPHOCYTES # BLD AUTO: 0.2 K/UL (ref 1–4.8)
LYMPHOCYTES NFR BLD: 2.6 % (ref 18–48)
MAGNESIUM SERPL-MCNC: 1.6 MG/DL (ref 1.6–2.6)
MCH RBC QN AUTO: 26.6 PG (ref 27–31)
MCHC RBC AUTO-ENTMCNC: 30.4 G/DL (ref 32–36)
MCV RBC AUTO: 88 FL (ref 82–98)
MONOCYTES # BLD AUTO: 0.7 K/UL (ref 0.3–1)
MONOCYTES NFR BLD: 8.1 % (ref 4–15)
NEUTROPHILS # BLD AUTO: 7.5 K/UL (ref 1.8–7.7)
NEUTROPHILS NFR BLD: 83.7 % (ref 38–73)
NRBC BLD-RTO: 0 /100 WBC
PHOSPHATE SERPL-MCNC: 2.5 MG/DL (ref 2.7–4.5)
PLATELET # BLD AUTO: 412 K/UL (ref 150–450)
PMV BLD AUTO: 10.9 FL (ref 9.2–12.9)
POTASSIUM SERPL-SCNC: 4.9 MMOL/L (ref 3.5–5.1)
RBC # BLD AUTO: 3.79 M/UL (ref 4.6–6.2)
SODIUM SERPL-SCNC: 141 MMOL/L (ref 136–145)
WBC # BLD AUTO: 8.97 K/UL (ref 3.9–12.7)

## 2024-04-08 PROCEDURE — 80197 ASSAY OF TACROLIMUS: CPT | Performed by: INTERNAL MEDICINE

## 2024-04-08 PROCEDURE — 83735 ASSAY OF MAGNESIUM: CPT | Performed by: INTERNAL MEDICINE

## 2024-04-08 PROCEDURE — 80069 RENAL FUNCTION PANEL: CPT | Performed by: INTERNAL MEDICINE

## 2024-04-08 PROCEDURE — 85025 COMPLETE CBC W/AUTO DIFF WBC: CPT | Performed by: INTERNAL MEDICINE

## 2024-04-08 PROCEDURE — 36415 COLL VENOUS BLD VENIPUNCTURE: CPT | Performed by: INTERNAL MEDICINE

## 2024-04-09 ENCOUNTER — PATIENT MESSAGE (OUTPATIENT)
Dept: TRANSPLANT | Facility: CLINIC | Age: 69
End: 2024-04-09
Payer: MEDICARE

## 2024-04-09 DIAGNOSIS — Z94.0 KIDNEY TRANSPLANT RECIPIENT: ICD-10-CM

## 2024-04-09 LAB — TACROLIMUS BLD-MCNC: 6.4 NG/ML (ref 5–15)

## 2024-04-09 RX ORDER — TACROLIMUS 1 MG/1
7 CAPSULE ORAL EVERY 12 HOURS
Qty: 420 CAPSULE | Refills: 11 | Status: SHIPPED | OUTPATIENT
Start: 2024-04-09 | End: 2024-04-23 | Stop reason: DRUGHIGH

## 2024-04-09 NOTE — TELEPHONE ENCOUNTER
Pt notified to increase Prograf to 7 mg twice a day. Repeat level on Monday.    ----- Message from Marcus Fernandez MD sent at 4/9/2024 10:31 AM CDT -----  Please increase prograf to 7 mg PO bid

## 2024-04-10 ENCOUNTER — OFFICE VISIT (OUTPATIENT)
Dept: TRANSPLANT | Facility: CLINIC | Age: 69
End: 2024-04-10
Payer: COMMERCIAL

## 2024-04-10 DIAGNOSIS — N40.0 BENIGN PROSTATIC HYPERPLASIA, UNSPECIFIED WHETHER LOWER URINARY TRACT SYMPTOMS PRESENT: ICD-10-CM

## 2024-04-10 DIAGNOSIS — I15.2 HYPERTENSION ASSOCIATED WITH DIABETES: ICD-10-CM

## 2024-04-10 DIAGNOSIS — Z91.09 ENVIRONMENTAL ALLERGIES: ICD-10-CM

## 2024-04-10 DIAGNOSIS — E11.59 HYPERTENSION ASSOCIATED WITH DIABETES: ICD-10-CM

## 2024-04-10 DIAGNOSIS — Z94.0 S/P KIDNEY TRANSPLANT: Primary | ICD-10-CM

## 2024-04-10 DIAGNOSIS — E11.8 DIABETES MELLITUS TYPE 2 WITH COMPLICATIONS: ICD-10-CM

## 2024-04-10 PROCEDURE — 99214 OFFICE O/P EST MOD 30 MIN: CPT | Mod: 95,,, | Performed by: NURSE PRACTITIONER

## 2024-04-10 RX ORDER — CETIRIZINE HYDROCHLORIDE 10 MG/1
10 TABLET ORAL DAILY
Qty: 90 TABLET | Refills: 1 | Status: SHIPPED | OUTPATIENT
Start: 2024-04-10

## 2024-04-10 RX ORDER — ISOSORBIDE DINITRATE 10 MG/1
20 TABLET ORAL 3 TIMES DAILY
Qty: 180 TABLET | Refills: 11 | Status: SHIPPED | OUTPATIENT
Start: 2024-04-10 | End: 2024-04-21 | Stop reason: SDUPTHER

## 2024-04-10 NOTE — LETTER
April 10, 2024        Gabino Champion  [address]  [facility] [address]  Phone: [phone]  Fax: [phone]             [facility]- Transplant 1st Fl  [address]  [facility] [address]  Phone: [phone]   Patient: Isidro Alves   MR Number: [id number]   YOB: 1955   Date of Visit: 4/10/2024       Dear Dr. Gabino Champion    Thank you for referring Isidro Alves to me for evaluation. Attached you will find relevant portions of my assessment and plan of care.    If you have questions, please do not hesitate to call me. I look forward to following Isidro Alves along with you.    Sincerely,    Natasha Hunter, NP    Enclosure    If you would like to receive this communication electronically, please contact [email or web address] or [phone] to request [employer] Link access.    [employer] Link is a tool which provides read-only access to select patient information with whom you have a relationship. Its easy to use and provides real time access to review your patients record including encounter summaries, notes, results, and demographic information.    If you feel you have received this communication in error or would no longer like to receive these types of communications, please e-mail [email or web address]

## 2024-04-10 NOTE — PROGRESS NOTES
Kidney Post-Transplant Assessment    Referring Physician: Gabino Champion  Current Nephrologist: Gabino Champion    ORGAN: RIGHT KIDNEY  Donor Type: donation after brain death  PHS Increased Risk: yes  Cold Ischemia: 625 mins  Induction Medications:  Thymoglobulin    Subjective:   The patient location is: home  The chief complaint leading to consultation is: post kidney transplant and immunosuppression management    Visit type: audiovisual    Face to Face time with patient: 20 minutes of total time spent on the encounter, which includes face to face time and non-face to face time preparing to see the patient (eg, review of tests), Obtaining and/or reviewing separately obtained history, Documenting clinical information in the electronic or other health record, Independently interpreting results (not separately reported) and communicating results to the patient/family/caregiver, or Care coordination (not separately reported).    Each patient to whom he or she provides medical services by telemedicine is:  (1) informed of the relationship between the physician and patient and the respective role of any other health care provider with respect to management of the patient; and (2) notified that he or she may decline to receive medical services by telemedicine and may withdraw from such care at any time.      CC:  Reassessment of renal allograft function and management of chronic immunosuppression.    HPI:  Mr. Alves is a 68 y.o. year old Black or  male who received a donation after brain death kidney transplant on 2/18/24.   He takes mycophenolate mofetil, prednisone, and tacrolimus for maintenance immunosuppression. He denies any recent hospitalizations or ER visits since his previous clinic visit.    Pertinent Nephrology/Transplant History:   ESRD from [facility]  DCD KT 2/18/24; KDPI 67%  RPR+, HCVAb+/DERICK- donor   CMV +/+      Normal feels well today.  Now 52 days post kidney transplant, and notes he  "is recovering well.  He is making plenty of urine. BP still remains elevated at 150s He is urinating without difficulty.  He denies edema.  No weight changes. No fever, chills, or chest pain. Reports no issues with taking his medications.     Review of Systems   Respiratory:  Negative for shortness of breath.    Cardiovascular:  Negative for chest pain and leg swelling.   Gastrointestinal:  Negative for abdominal pain, nausea and vomiting.   Genitourinary:  Negative for difficulty urinating.   Skin:  Negative for rash.   Allergic/Immunologic: Positive for immunocompromised state.       Objective:     There were no vitals taken for this visit.body mass index is unknown because there is no height or weight on file.    Physical Exam  Deferred due to AV visit    Labs:  Lab Results   Component Value Date    WBC 8.97 04/08/2024    HGB 10.1 (L) 04/08/2024    HCT 33.2 (L) 04/08/2024     04/08/2024    K 4.9 04/08/2024     04/08/2024    CO2 22 (L) 04/08/2024    BUN 22 04/08/2024    CREATININE 1.5 (H) 04/08/2024    EGFRNORACEVR 50.7 (A) 04/08/2024    CALCIUM 9.9 04/08/2024    PHOS 2.5 (L) 04/08/2024    MG 1.6 04/08/2024    ALBUMIN 3.6 04/08/2024    AST 11 03/18/2024    ALT 5 (L) 03/18/2024    UTPCR 0.45 (H) 03/18/2024    .5 (H) 08/25/2022    TACROLIMUS 6.4 04/08/2024       No results found for: "EXTANC", "EXTWBC", "EXTSEGS", "EXTPLATELETS", "EXTHEMOGLOBI", "EXTHEMATOCRI", "EXTCREATININ", "EXTSODIUM", "EXTPOTASSIUM", "EXTBUN", "EXTCO2", "EXTCALCIUM", "EXTPHOSPHORU", "EXTGLUCOSE", "EXTALBUMIN", "EXTAST", "EXTALT", "EXTBILITOTAL", "EXTLIPASE", "EXTAMYLASE"    No results found for: "EXTCYCLOSLVL", "EXTSIROLIMUS", "EXTTACROLVL", "EXTPROTCRE", "EXTPTHINTACT", "EXTPROTEINUA", "EXTWBCUA", "EXTRBCUA"        Assessment:     1. S/P kidney transplant 2024    2. Hypertension associated with diabetes    3. Diabetes mellitus type 2 with complications        Plan:   Hypertension--- increased isosorbide 20mg TID  Continue " "current IS therapy regimen  No need for refills on IS therapy today.       DCD kidney transplant 02/18/2024 with KDPI 67% [RPR+, HCVAb+/DERICK- donor ]  Allograft function assessment  -CKD 3, remains stable.   -recovering well.  -continue close monitoring as per guidelines  Recent Labs   Lab 12/30/21  1352 07/07/22  1436 07/14/22  0706 08/23/22  1500 06/15/23  0531 08/29/23  1039 03/25/24  0828 04/01/24  0855 04/08/24  0802   Creatinine 8.4 H 8.2 H 8.4 H   < > 7.49 H   < > 1.4 1.8 H 1.5 H   eGFR if non African American 6 A 6.2 A 6.0 A  --   --   --   --   --   --    eGFR if  7 A 7.1 A 6.9 A  --   --   --   --   --   --    eGFR   --   --   --   --  7  --   --   --   --     < > = values in this interval not displayed.      No results found for: "GLUCOSE", "AMYLASE", "LIPASE"      Encounter for Monitoring Immunosuppression post Transplant  Recent Labs   Lab 03/25/24  0828 04/01/24  0855 04/08/24  0802   Tacrolimus Lvl 7.7 8.4 6.4     -Target level for Isidro is 7-9-- Tac increased yesterday; labs on Monday  -Recheck as per guidelines.  -Monitor for side effects and toxicities, given narrow therapeutic window and significant risk of AE       Evaluation for bone marrow suppression (r/t immunosuppression toxicity or infection)  Counts currently acceptable.  Monitor   Lab Results   Component Value Date    WBC 8.97 04/08/2024    HGB 10.1 (L) 04/08/2024    HCT 33.2 (L) 04/08/2024     04/08/2024       Renal hypertension--within target, watch  BP Readings from Last 3 Encounters:   03/13/24 (!) 147/69   03/13/24 (!) 147/69   03/13/24 (!) 174/84   -on Entresto  -consider restarting isosorbide as well if BP remains high      Metabolic bone disease [Secondary hyperparathyroidism, Phosphorus metabolism disorders]  -Will tolerate mild asymptomatic low phos level d/t pill burden, DDI, and adverse SE   Hypo magnesemia-magnesium supplement added yesterday, along with high magnesium diet education.  " We will aim to bring magnesium closer to normal, but will accept somewhat low-level given pill burden  Labs were reviewed with the patient.   Recent Labs   Lab 06/02/21  1112 07/07/22  1436 08/25/22  1203 04/06/23  1735 03/25/24  0828 04/01/24  0855 04/08/24  0802   Albumin  --    < >  --    < > 3.5 3.5 3.6   Calcium  --    < >  --    < > 9.7 9.6 9.9   Phosphorus  --   --   --    < > 2.3 L 2.6 L 2.5 L   Magnesium  --   --   --    < > 1.3 L 1.4 L 1.6   PTH, Intact 430.0 H  --  324.5 H  --   --   --   --     < > = values in this interval not displayed.        Assessment of electrolytes  Lab Results   Component Value Date     04/08/2024    K 4.9 04/08/2024     04/08/2024    CO2 22 (L) 04/08/2024    MG 1.6 04/08/2024       Screening for BK infection to prevent allograft dysfunction  Lab Results   Component Value Date    BKVIRUSPCRQB <125 03/18/2024       PJP until 08/18/2024 - atovaquone changed to dapsone 02/28/2024 (known allergy to Bactrim)  CMV until 05/21/2024 -  Valcyte  - BK acceptable  -Continue blood PCR screening as per program guidelines to prevent BK viremia and nephropathy leading to allograft dysfunction and potential graft failure      Screening for proteinuria & urinary abnormalities  Recent Labs   Lab 03/04/24  0833 03/18/24  0829   Protein, UA Negative Negative   Glucose, UA Negative Negative   Occult Blood UA Trace A Negative   Leukocytes, UA 1+ A 1+ A   Prot/Creat Ratio, Urine 0.33 H 0.45 H   - Urine p/c ratio acceptable    Management of kidney disease and related issues (HTN, anemia, SPTH, metabolic bone disease) should continue by community nephrologist.         Follow-up:   Clinic: return to transplant clinic weekly for the first month after transplant; every 2 weeks during months 2-3; then at 6-, 9-, 12-, 18-, 24-, and 36- months post-transplant to reassess for complications from immunosuppression toxicity and monitor for rejection.  Annually thereafter.    Labs: since patient remains  at high risk for rejection and drug-related complications that warrant close monitoring, labs will be ordered as follows: continue twice weekly CBC, renal panel, and drug level for first month; then same labs once weekly through 3rd month post-transplant.  Urine for UA and protein/creatinine ratio monthly.  Serum BK - PCR at 1-, 3-, 6-, 9-, 12-, 18-, 24-, 36- 48-, and 60 months post-transplant.  Hepatic panel at 1-, 2-, 3-, 6-, 9-, 12-, 18-, 24-, and 36- months post-transplant.    Natasha Hunter NP       Education:   Material provided to the patient.  Patient reminded to call with any health changes since these can be early signs of significant complications.  Also, I advised the patient to be sure any new medications or changes of old medications are discussed with either a pharmacist or physician knowledgeable with transplant to avoid rejection/drug toxicity related to significant drug interactions.    Patient advised that it is recommended that all transplanted patients, and their close contacts and household members receive Covid vaccination.    UNOS Patient Status  Functional Status: 70% - Cares for self: unable to carry on normal activity or active work  Physical Capacity: No Limitations

## 2024-04-10 NOTE — TELEPHONE ENCOUNTER
No care due was identified.  [facility] Embedded Care Due Messages. Reference number: [id number].   4/10/2024 7:50:15 AM CDT

## 2024-04-15 ENCOUNTER — PATIENT MESSAGE (OUTPATIENT)
Dept: TRANSPLANT | Facility: CLINIC | Age: 69
End: 2024-04-15
Payer: MEDICARE

## 2024-04-15 ENCOUNTER — LAB VISIT (OUTPATIENT)
Dept: LAB | Facility: HOSPITAL | Age: 69
End: 2024-04-15
Attending: INTERNAL MEDICINE
Payer: COMMERCIAL

## 2024-04-15 DIAGNOSIS — Z94.0 KIDNEY REPLACED BY TRANSPLANT: ICD-10-CM

## 2024-04-15 LAB
ALBUMIN SERPL BCP-MCNC: 3.5 G/DL (ref 3.5–5.2)
ANION GAP SERPL CALC-SCNC: 8 MMOL/L (ref 8–16)
BASOPHILS # BLD AUTO: 0.06 K/UL (ref 0–0.2)
BASOPHILS NFR BLD: 0.8 % (ref 0–1.9)
BUN SERPL-MCNC: 25 MG/DL (ref 8–23)
CALCIUM SERPL-MCNC: 9.6 MG/DL (ref 8.7–10.5)
CHLORIDE SERPL-SCNC: 110 MMOL/L (ref 95–110)
CO2 SERPL-SCNC: 21 MMOL/L (ref 23–29)
CREAT SERPL-MCNC: 1.7 MG/DL (ref 0.5–1.4)
DIFFERENTIAL METHOD BLD: ABNORMAL
EOSINOPHIL # BLD AUTO: 0.2 K/UL (ref 0–0.5)
EOSINOPHIL NFR BLD: 2.7 % (ref 0–8)
ERYTHROCYTE [DISTWIDTH] IN BLOOD BY AUTOMATED COUNT: 16.2 % (ref 11.5–14.5)
EST. GFR  (NO RACE VARIABLE): 43.6 ML/MIN/1.73 M^2
GLUCOSE SERPL-MCNC: 103 MG/DL (ref 70–110)
HBV CORE AB SERPL QL IA: REACTIVE
HCT VFR BLD AUTO: 32.9 % (ref 40–54)
HGB BLD-MCNC: 9.9 G/DL (ref 14–18)
IMM GRANULOCYTES # BLD AUTO: 0.31 K/UL (ref 0–0.04)
IMM GRANULOCYTES NFR BLD AUTO: 4 % (ref 0–0.5)
LYMPHOCYTES # BLD AUTO: 0.2 K/UL (ref 1–4.8)
LYMPHOCYTES NFR BLD: 2.3 % (ref 18–48)
MAGNESIUM SERPL-MCNC: 1.6 MG/DL (ref 1.6–2.6)
MCH RBC QN AUTO: 26.3 PG (ref 27–31)
MCHC RBC AUTO-ENTMCNC: 30.1 G/DL (ref 32–36)
MCV RBC AUTO: 87 FL (ref 82–98)
MONOCYTES # BLD AUTO: 0.8 K/UL (ref 0.3–1)
MONOCYTES NFR BLD: 10.4 % (ref 4–15)
NEUTROPHILS # BLD AUTO: 6.2 K/UL (ref 1.8–7.7)
NEUTROPHILS NFR BLD: 79.8 % (ref 38–73)
NRBC BLD-RTO: 0 /100 WBC
PHOSPHATE SERPL-MCNC: 2.6 MG/DL (ref 2.7–4.5)
PLATELET # BLD AUTO: 335 K/UL (ref 150–450)
PMV BLD AUTO: 11.7 FL (ref 9.2–12.9)
POTASSIUM SERPL-SCNC: 4.8 MMOL/L (ref 3.5–5.1)
RBC # BLD AUTO: 3.77 M/UL (ref 4.6–6.2)
SODIUM SERPL-SCNC: 139 MMOL/L (ref 136–145)
WBC # BLD AUTO: 7.72 K/UL (ref 3.9–12.7)

## 2024-04-15 PROCEDURE — 83735 ASSAY OF MAGNESIUM: CPT | Performed by: INTERNAL MEDICINE

## 2024-04-15 PROCEDURE — 80069 RENAL FUNCTION PANEL: CPT | Performed by: INTERNAL MEDICINE

## 2024-04-15 PROCEDURE — 36415 COLL VENOUS BLD VENIPUNCTURE: CPT | Performed by: INTERNAL MEDICINE

## 2024-04-15 PROCEDURE — 85025 COMPLETE CBC W/AUTO DIFF WBC: CPT | Performed by: INTERNAL MEDICINE

## 2024-04-15 PROCEDURE — 87522 HEPATITIS C REVRS TRNSCRPJ: CPT | Performed by: INTERNAL MEDICINE

## 2024-04-15 PROCEDURE — 87799 DETECT AGENT NOS DNA QUANT: CPT | Performed by: INTERNAL MEDICINE

## 2024-04-15 PROCEDURE — 86704 HEP B CORE ANTIBODY TOTAL: CPT | Performed by: INTERNAL MEDICINE

## 2024-04-15 PROCEDURE — 80197 ASSAY OF TACROLIMUS: CPT | Performed by: INTERNAL MEDICINE

## 2024-04-16 LAB
HCV RNA SERPL QL NAA+PROBE: NOT DETECTED
HCV RNA SPEC NAA+PROBE-ACNC: NOT DETECTED IU/ML
TACROLIMUS BLD-MCNC: 7.9 NG/ML (ref 5–15)

## 2024-04-18 LAB
BKV DNA SERPL NAA+PROBE-ACNC: <125 COPIES/ML
BKV DNA SERPL NAA+PROBE-LOG#: <2.1 LOG (10) COPIES/ML
BKV DNA SERPL QL NAA+PROBE: NOT DETECTED

## 2024-04-19 RX ORDER — FINASTERIDE 5 MG/1
5 TABLET, FILM COATED ORAL DAILY
Qty: 90 TABLET | Refills: 3 | Status: SHIPPED | OUTPATIENT
Start: 2024-04-19

## 2024-04-22 ENCOUNTER — LAB VISIT (OUTPATIENT)
Dept: LAB | Facility: HOSPITAL | Age: 69
End: 2024-04-22
Attending: INTERNAL MEDICINE
Payer: COMMERCIAL

## 2024-04-22 DIAGNOSIS — Z94.0 KIDNEY REPLACED BY TRANSPLANT: ICD-10-CM

## 2024-04-22 LAB
ALBUMIN SERPL BCP-MCNC: 3.5 G/DL (ref 3.5–5.2)
ANION GAP SERPL CALC-SCNC: 5 MMOL/L (ref 8–16)
ANISOCYTOSIS BLD QL SMEAR: SLIGHT
BASOPHILS NFR BLD: 1 % (ref 0–1.9)
BUN SERPL-MCNC: 20 MG/DL (ref 8–23)
CALCIUM SERPL-MCNC: 10 MG/DL (ref 8.7–10.5)
CHLORIDE SERPL-SCNC: 110 MMOL/L (ref 95–110)
CO2 SERPL-SCNC: 24 MMOL/L (ref 23–29)
CREAT SERPL-MCNC: 1.6 MG/DL (ref 0.5–1.4)
DIFFERENTIAL METHOD BLD: ABNORMAL
EOSINOPHIL NFR BLD: 3 % (ref 0–8)
ERYTHROCYTE [DISTWIDTH] IN BLOOD BY AUTOMATED COUNT: 16.5 % (ref 11.5–14.5)
EST. GFR  (NO RACE VARIABLE): 46.9 ML/MIN/1.73 M^2
GLUCOSE SERPL-MCNC: 115 MG/DL (ref 70–110)
HCT VFR BLD AUTO: 31.3 % (ref 40–54)
HGB BLD-MCNC: 9.3 G/DL (ref 14–18)
HYPOCHROMIA BLD QL SMEAR: ABNORMAL
IMM GRANULOCYTES # BLD AUTO: ABNORMAL K/UL (ref 0–0.04)
IMM GRANULOCYTES NFR BLD AUTO: ABNORMAL % (ref 0–0.5)
LYMPHOCYTES NFR BLD: 3 % (ref 18–48)
MAGNESIUM SERPL-MCNC: 1.4 MG/DL (ref 1.6–2.6)
MCH RBC QN AUTO: 25.1 PG (ref 27–31)
MCHC RBC AUTO-ENTMCNC: 29.7 G/DL (ref 32–36)
MCV RBC AUTO: 85 FL (ref 82–98)
METAMYELOCYTES NFR BLD MANUAL: 2 %
MONOCYTES NFR BLD: 12 % (ref 4–15)
MYELOCYTES NFR BLD MANUAL: 1 %
NEUTROPHILS NFR BLD: 67 % (ref 38–73)
NEUTS BAND NFR BLD MANUAL: 11 %
NRBC BLD-RTO: 0 /100 WBC
OVALOCYTES BLD QL SMEAR: ABNORMAL
PHOSPHATE SERPL-MCNC: 2.6 MG/DL (ref 2.7–4.5)
PLATELET # BLD AUTO: 324 K/UL (ref 150–450)
PLATELET BLD QL SMEAR: ABNORMAL
PMV BLD AUTO: 11.6 FL (ref 9.2–12.9)
POIKILOCYTOSIS BLD QL SMEAR: SLIGHT
POLYCHROMASIA BLD QL SMEAR: ABNORMAL
POTASSIUM SERPL-SCNC: 4.7 MMOL/L (ref 3.5–5.1)
RBC # BLD AUTO: 3.7 M/UL (ref 4.6–6.2)
SCHISTOCYTES BLD QL SMEAR: ABNORMAL
SODIUM SERPL-SCNC: 139 MMOL/L (ref 136–145)
WBC # BLD AUTO: 5.42 K/UL (ref 3.9–12.7)

## 2024-04-22 PROCEDURE — 36415 COLL VENOUS BLD VENIPUNCTURE: CPT | Performed by: INTERNAL MEDICINE

## 2024-04-22 PROCEDURE — 80069 RENAL FUNCTION PANEL: CPT | Performed by: INTERNAL MEDICINE

## 2024-04-22 PROCEDURE — 85007 BL SMEAR W/DIFF WBC COUNT: CPT | Performed by: INTERNAL MEDICINE

## 2024-04-22 PROCEDURE — 85027 COMPLETE CBC AUTOMATED: CPT | Performed by: INTERNAL MEDICINE

## 2024-04-22 PROCEDURE — 80197 ASSAY OF TACROLIMUS: CPT | Performed by: INTERNAL MEDICINE

## 2024-04-22 PROCEDURE — 83735 ASSAY OF MAGNESIUM: CPT | Performed by: INTERNAL MEDICINE

## 2024-04-23 ENCOUNTER — PATIENT MESSAGE (OUTPATIENT)
Dept: TRANSPLANT | Facility: CLINIC | Age: 69
End: 2024-04-23
Payer: MEDICARE

## 2024-04-23 DIAGNOSIS — Z94.0 KIDNEY REPLACED BY TRANSPLANT: Primary | ICD-10-CM

## 2024-04-23 DIAGNOSIS — Z94.0 KIDNEY TRANSPLANT RECIPIENT: ICD-10-CM

## 2024-04-23 LAB — TACROLIMUS BLD-MCNC: 7.3 NG/ML (ref 5–15)

## 2024-04-23 RX ORDER — SACUBITRIL AND VALSARTAN 97; 103 MG/1; MG/1
1 TABLET, FILM COATED ORAL 2 TIMES DAILY
Qty: 60 TABLET | Refills: 11 | Status: SHIPPED | OUTPATIENT
Start: 2024-04-23

## 2024-04-23 RX ORDER — ISOSORBIDE DINITRATE 10 MG/1
20 TABLET ORAL 3 TIMES DAILY
Qty: 180 TABLET | Refills: 11 | Status: SHIPPED | OUTPATIENT
Start: 2024-04-23 | End: 2025-04-23

## 2024-04-23 RX ORDER — TACROLIMUS 1 MG/1
8 CAPSULE ORAL EVERY 12 HOURS
Qty: 480 CAPSULE | Refills: 11 | Status: SHIPPED | OUTPATIENT
Start: 2024-04-23 | End: 2024-06-11 | Stop reason: DRUGHIGH

## 2024-04-23 NOTE — TELEPHONE ENCOUNTER
12 hour level. Pt notified to increase Prograf to 8 mg twice a day. Repeat level 4/29      ----- Message from Lynn Cordova MD sent at 4/23/2024  1:32 PM CDT -----  Increase tacro to 8 mg bid  Repeat tacro in a week

## 2024-04-24 NOTE — Clinical Note
Please fax PT referral to [facility]  in [facility]  General

## 2024-04-29 ENCOUNTER — TELEPHONE (OUTPATIENT)
Dept: TRANSPLANT | Facility: CLINIC | Age: 69
End: 2024-04-29
Payer: MEDICARE

## 2024-04-29 ENCOUNTER — LAB VISIT (OUTPATIENT)
Dept: LAB | Facility: HOSPITAL | Age: 69
End: 2024-04-29
Attending: INTERNAL MEDICINE
Payer: COMMERCIAL

## 2024-04-29 ENCOUNTER — PATIENT MESSAGE (OUTPATIENT)
Dept: TRANSPLANT | Facility: CLINIC | Age: 69
End: 2024-04-29
Payer: MEDICARE

## 2024-04-29 DIAGNOSIS — Z94.0 KIDNEY REPLACED BY TRANSPLANT: Primary | ICD-10-CM

## 2024-04-29 DIAGNOSIS — Z94.0 KIDNEY REPLACED BY TRANSPLANT: ICD-10-CM

## 2024-04-29 LAB
ALBUMIN SERPL BCP-MCNC: 3.2 G/DL (ref 3.5–5.2)
ANION GAP SERPL CALC-SCNC: 9 MMOL/L (ref 8–16)
ANISOCYTOSIS BLD QL SMEAR: SLIGHT
BASOPHILS NFR BLD: 1 % (ref 0–1.9)
BUN SERPL-MCNC: 24 MG/DL (ref 8–23)
CALCIUM SERPL-MCNC: 9.4 MG/DL (ref 8.7–10.5)
CHLORIDE SERPL-SCNC: 108 MMOL/L (ref 95–110)
CO2 SERPL-SCNC: 20 MMOL/L (ref 23–29)
CREAT SERPL-MCNC: 1.8 MG/DL (ref 0.5–1.4)
DIFFERENTIAL METHOD BLD: ABNORMAL
EOSINOPHIL NFR BLD: 0 % (ref 0–8)
ERYTHROCYTE [DISTWIDTH] IN BLOOD BY AUTOMATED COUNT: 16.5 % (ref 11.5–14.5)
EST. GFR  (NO RACE VARIABLE): 40.7 ML/MIN/1.73 M^2
GLUCOSE SERPL-MCNC: 142 MG/DL (ref 70–110)
HCT VFR BLD AUTO: 31.2 % (ref 40–54)
HGB BLD-MCNC: 9.3 G/DL (ref 14–18)
HYPOCHROMIA BLD QL SMEAR: ABNORMAL
IMM GRANULOCYTES # BLD AUTO: ABNORMAL K/UL (ref 0–0.04)
IMM GRANULOCYTES NFR BLD AUTO: ABNORMAL % (ref 0–0.5)
LYMPHOCYTES NFR BLD: 5 % (ref 18–48)
MAGNESIUM SERPL-MCNC: 1.5 MG/DL (ref 1.6–2.6)
MCH RBC QN AUTO: 24.8 PG (ref 27–31)
MCHC RBC AUTO-ENTMCNC: 29.8 G/DL (ref 32–36)
MCV RBC AUTO: 83 FL (ref 82–98)
METAMYELOCYTES NFR BLD MANUAL: 4 %
MONOCYTES NFR BLD: 11 % (ref 4–15)
MYELOCYTES NFR BLD MANUAL: 2 %
NEUTROPHILS NFR BLD: 68 % (ref 38–73)
NEUTS BAND NFR BLD MANUAL: 9 %
NRBC BLD-RTO: 0 /100 WBC
OVALOCYTES BLD QL SMEAR: ABNORMAL
PHOSPHATE SERPL-MCNC: 2.8 MG/DL (ref 2.7–4.5)
PLATELET # BLD AUTO: 402 K/UL (ref 150–450)
PLATELET BLD QL SMEAR: ABNORMAL
PMV BLD AUTO: 10.9 FL (ref 9.2–12.9)
POIKILOCYTOSIS BLD QL SMEAR: SLIGHT
POLYCHROMASIA BLD QL SMEAR: ABNORMAL
POTASSIUM SERPL-SCNC: 5 MMOL/L (ref 3.5–5.1)
RBC # BLD AUTO: 3.75 M/UL (ref 4.6–6.2)
SCHISTOCYTES BLD QL SMEAR: ABNORMAL
SCHISTOCYTES BLD QL SMEAR: PRESENT
SODIUM SERPL-SCNC: 137 MMOL/L (ref 136–145)
WBC # BLD AUTO: 6.53 K/UL (ref 3.9–12.7)

## 2024-04-29 PROCEDURE — 36415 COLL VENOUS BLD VENIPUNCTURE: CPT | Performed by: INTERNAL MEDICINE

## 2024-04-29 PROCEDURE — 85007 BL SMEAR W/DIFF WBC COUNT: CPT | Performed by: INTERNAL MEDICINE

## 2024-04-29 PROCEDURE — 80069 RENAL FUNCTION PANEL: CPT | Performed by: INTERNAL MEDICINE

## 2024-04-29 PROCEDURE — 80197 ASSAY OF TACROLIMUS: CPT | Performed by: INTERNAL MEDICINE

## 2024-04-29 PROCEDURE — 83735 ASSAY OF MAGNESIUM: CPT | Performed by: INTERNAL MEDICINE

## 2024-04-29 PROCEDURE — 85027 COMPLETE CBC AUTOMATED: CPT | Performed by: INTERNAL MEDICINE

## 2024-04-30 LAB — TACROLIMUS BLD-MCNC: 9 NG/ML (ref 5–15)

## 2024-04-30 NOTE — TELEPHONE ENCOUNTER
Pt notified to increase fluid intake, recheck RFP along with below studies Thursday 5/2.    ----- Message from Lynn Cordova MD sent at 4/29/2024  6:27 PM CDT -----  Please increase fluid intake and recheck renal panel in next few days.   Anemia not improving - check iron panel + ferritin + B12 + folate with next lab

## 2024-05-02 ENCOUNTER — HOSPITAL ENCOUNTER (INPATIENT)
Facility: HOSPITAL | Age: 69
LOS: 3 days | Discharge: HOME | DRG: 690 | End: 2024-05-06
Attending: EMERGENCY MEDICINE | Admitting: INTERNAL MEDICINE
Payer: MEDICARE

## 2024-05-02 ENCOUNTER — NURSE TRIAGE (OUTPATIENT)
Dept: ADMINISTRATIVE | Facility: CLINIC | Age: 69
End: 2024-05-02
Payer: MEDICARE

## 2024-05-02 ENCOUNTER — TELEPHONE (OUTPATIENT)
Dept: TRANSPLANT | Facility: CLINIC | Age: 69
End: 2024-05-02
Payer: MEDICARE

## 2024-05-02 ENCOUNTER — E-CONSULT (OUTPATIENT)
Dept: TRANSPLANT | Facility: HOSPITAL | Age: 69
End: 2024-05-02
Payer: MEDICARE

## 2024-05-02 ENCOUNTER — TELEPHONE (OUTPATIENT)
Dept: TRANSPLANT | Facility: HOSPITAL | Age: 69
End: 2024-05-02
Payer: MEDICARE

## 2024-05-02 DIAGNOSIS — Z29.89 PROPHYLACTIC IMMUNOTHERAPY: ICD-10-CM

## 2024-05-02 DIAGNOSIS — Z91.89 AT RISK FOR OPPORTUNISTIC INFECTIONS: ICD-10-CM

## 2024-05-02 DIAGNOSIS — N30.00 ACUTE CYSTITIS: Primary | ICD-10-CM

## 2024-05-02 DIAGNOSIS — T45.1X5A IMMUNODEFICIENCY DUE TO LONG TERM IMMUNOSUPPRESSIVE DRUG THERAPY: ICD-10-CM

## 2024-05-02 DIAGNOSIS — R07.9 CHEST PAIN: ICD-10-CM

## 2024-05-02 DIAGNOSIS — Z79.4 TYPE 2 DIABETES MELLITUS WITH CHRONIC KIDNEY DISEASE, WITH LONG-TERM CURRENT USE OF INSULIN, UNSPECIFIED CKD STAGE: ICD-10-CM

## 2024-05-02 DIAGNOSIS — Z94.0 S/P KIDNEY TRANSPLANT: ICD-10-CM

## 2024-05-02 DIAGNOSIS — Z79.899 IMMUNODEFICIENCY DUE TO LONG TERM IMMUNOSUPPRESSIVE DRUG THERAPY: ICD-10-CM

## 2024-05-02 DIAGNOSIS — D84.821 IMMUNODEFICIENCY DUE TO LONG TERM IMMUNOSUPPRESSIVE DRUG THERAPY: ICD-10-CM

## 2024-05-02 DIAGNOSIS — D63.1 ANEMIA DUE TO CHRONIC KIDNEY DISEASE, UNSPECIFIED CKD STAGE: ICD-10-CM

## 2024-05-02 DIAGNOSIS — E66.01 SEVERE OBESITY (BMI 35.0-39.9) WITH COMORBIDITY: ICD-10-CM

## 2024-05-02 DIAGNOSIS — I27.20 PULMONARY HTN: ICD-10-CM

## 2024-05-02 DIAGNOSIS — E11.22 TYPE 2 DIABETES MELLITUS WITH CHRONIC KIDNEY DISEASE, WITH LONG-TERM CURRENT USE OF INSULIN, UNSPECIFIED CKD STAGE: ICD-10-CM

## 2024-05-02 DIAGNOSIS — Z94.0 KIDNEY TRANSPLANT RECIPIENT: ICD-10-CM

## 2024-05-02 DIAGNOSIS — I42.0 DILATED CARDIOMYOPATHY: ICD-10-CM

## 2024-05-02 DIAGNOSIS — N30.01 ACUTE CYSTITIS WITH HEMATURIA: Primary | ICD-10-CM

## 2024-05-02 DIAGNOSIS — R50.9 FEVER: ICD-10-CM

## 2024-05-02 DIAGNOSIS — E78.5 HYPERLIPIDEMIA ASSOCIATED WITH TYPE 2 DIABETES MELLITUS: ICD-10-CM

## 2024-05-02 DIAGNOSIS — E11.69 HYPERLIPIDEMIA ASSOCIATED WITH TYPE 2 DIABETES MELLITUS: ICD-10-CM

## 2024-05-02 DIAGNOSIS — E11.8 DIABETES MELLITUS TYPE 2 WITH COMPLICATIONS: ICD-10-CM

## 2024-05-02 DIAGNOSIS — N18.9 ANEMIA DUE TO CHRONIC KIDNEY DISEASE, UNSPECIFIED CKD STAGE: ICD-10-CM

## 2024-05-02 DIAGNOSIS — R78.81 GRAM-NEGATIVE BACTEREMIA: ICD-10-CM

## 2024-05-02 LAB
ALBUMIN SERPL BCP-MCNC: 3.5 G/DL (ref 3.5–5.2)
ALP SERPL-CCNC: 54 U/L (ref 55–135)
ALT SERPL W/O P-5'-P-CCNC: 6 U/L (ref 10–44)
ANION GAP SERPL CALC-SCNC: 10 MMOL/L (ref 8–16)
ANISOCYTOSIS BLD QL SMEAR: SLIGHT
APTT PPP: 31.9 SEC (ref 21–32)
AST SERPL-CCNC: 18 U/L (ref 10–40)
BACTERIA #/AREA URNS HPF: ABNORMAL /HPF
BASO STIPL BLD QL SMEAR: ABNORMAL
BASOPHILS NFR BLD: 0 % (ref 0–1.9)
BILIRUB SERPL-MCNC: 1.2 MG/DL (ref 0.1–1)
BILIRUB UR QL STRIP: NEGATIVE
BNP SERPL-MCNC: 1330 PG/ML (ref 0–99)
BUN SERPL-MCNC: 21 MG/DL (ref 8–23)
CALCIUM SERPL-MCNC: 9.7 MG/DL (ref 8.7–10.5)
CHLORIDE SERPL-SCNC: 107 MMOL/L (ref 95–110)
CLARITY UR: CLEAR
CO2 SERPL-SCNC: 19 MMOL/L (ref 23–29)
COLOR UR: YELLOW
CREAT SERPL-MCNC: 1.9 MG/DL (ref 0.5–1.4)
DIFFERENTIAL METHOD BLD: ABNORMAL
EOSINOPHIL NFR BLD: 1 % (ref 0–8)
ERYTHROCYTE [DISTWIDTH] IN BLOOD BY AUTOMATED COUNT: 15.8 % (ref 11.5–14.5)
EST. GFR  (NO RACE VARIABLE): 38 ML/MIN/1.73 M^2
GLUCOSE SERPL-MCNC: 113 MG/DL (ref 70–110)
GLUCOSE SERPL-MCNC: 133 MG/DL (ref 70–110)
GLUCOSE UR QL STRIP: NEGATIVE
HCT VFR BLD AUTO: 30.2 % (ref 40–54)
HGB BLD-MCNC: 9.3 G/DL (ref 14–18)
HGB UR QL STRIP: ABNORMAL
HYALINE CASTS #/AREA URNS LPF: 0 /LPF
HYPOCHROMIA BLD QL SMEAR: ABNORMAL
IMM GRANULOCYTES # BLD AUTO: ABNORMAL K/UL (ref 0–0.04)
IMM GRANULOCYTES NFR BLD AUTO: ABNORMAL % (ref 0–0.5)
INFLUENZA A, MOLECULAR: NEGATIVE
INFLUENZA B, MOLECULAR: NEGATIVE
INR PPP: 1.1 (ref 0.8–1.2)
KETONES UR QL STRIP: NEGATIVE
LACTATE SERPL-SCNC: 0.6 MMOL/L (ref 0.5–2.2)
LACTATE SERPL-SCNC: 0.9 MMOL/L (ref 0.5–2.2)
LEUKOCYTE ESTERASE UR QL STRIP: ABNORMAL
LYMPHOCYTES NFR BLD: 0 % (ref 18–48)
MCH RBC QN AUTO: 24.7 PG (ref 27–31)
MCHC RBC AUTO-ENTMCNC: 30.8 G/DL (ref 32–36)
MCV RBC AUTO: 80 FL (ref 82–98)
MICROSCOPIC COMMENT: ABNORMAL
MONOCYTES NFR BLD: 13 % (ref 4–15)
MYELOCYTES NFR BLD MANUAL: 3 %
NEUTROPHILS NFR BLD: 79 % (ref 38–73)
NEUTS BAND NFR BLD MANUAL: 4 %
NITRITE UR QL STRIP: POSITIVE
NRBC BLD-RTO: 0 /100 WBC
OVALOCYTES BLD QL SMEAR: ABNORMAL
PH UR STRIP: 6 [PH] (ref 5–8)
PLATELET # BLD AUTO: 465 K/UL (ref 150–450)
PLATELET BLD QL SMEAR: ABNORMAL
PMV BLD AUTO: 9.8 FL (ref 9.2–12.9)
POCT GLUCOSE: 113 MG/DL (ref 70–110)
POCT GLUCOSE: 139 MG/DL (ref 70–110)
POIKILOCYTOSIS BLD QL SMEAR: SLIGHT
POLYCHROMASIA BLD QL SMEAR: ABNORMAL
POTASSIUM SERPL-SCNC: 4.9 MMOL/L (ref 3.5–5.1)
PROCALCITONIN SERPL IA-MCNC: 0.21 NG/ML
PROT SERPL-MCNC: 7.2 G/DL (ref 6–8.4)
PROT UR QL STRIP: ABNORMAL
PROTHROMBIN TIME: 11.5 SEC (ref 9–12.5)
RBC # BLD AUTO: 3.76 M/UL (ref 4.6–6.2)
RBC #/AREA URNS HPF: 2 /HPF (ref 0–4)
SARS-COV-2 RDRP RESP QL NAA+PROBE: NEGATIVE
SODIUM SERPL-SCNC: 136 MMOL/L (ref 136–145)
SP GR UR STRIP: 1.01 (ref 1–1.03)
SPECIMEN SOURCE: NORMAL
TROPONIN I SERPL DL<=0.01 NG/ML-MCNC: 0.05 NG/ML (ref 0–0.03)
TROPONIN I SERPL DL<=0.01 NG/ML-MCNC: 0.05 NG/ML (ref 0–0.03)
URN SPEC COLLECT METH UR: ABNORMAL
UROBILINOGEN UR STRIP-ACNC: NEGATIVE EU/DL
WBC # BLD AUTO: 7.38 K/UL (ref 3.9–12.7)
WBC #/AREA URNS HPF: 31 /HPF (ref 0–5)
WBC CLUMPS URNS QL MICRO: ABNORMAL

## 2024-05-02 PROCEDURE — 25000003 PHARM REV CODE 250: Performed by: EMERGENCY MEDICINE

## 2024-05-02 PROCEDURE — 96365 THER/PROPH/DIAG IV INF INIT: CPT

## 2024-05-02 PROCEDURE — 83880 ASSAY OF NATRIURETIC PEPTIDE: CPT | Performed by: EMERGENCY MEDICINE

## 2024-05-02 PROCEDURE — 85610 PROTHROMBIN TIME: CPT | Performed by: EMERGENCY MEDICINE

## 2024-05-02 PROCEDURE — 84484 ASSAY OF TROPONIN QUANT: CPT | Performed by: INTERNAL MEDICINE

## 2024-05-02 PROCEDURE — 63600175 PHARM REV CODE 636 W HCPCS: Performed by: EMERGENCY MEDICINE

## 2024-05-02 PROCEDURE — 83605 ASSAY OF LACTIC ACID: CPT | Mod: 91 | Performed by: EMERGENCY MEDICINE

## 2024-05-02 PROCEDURE — 87502 INFLUENZA DNA AMP PROBE: CPT | Performed by: EMERGENCY MEDICINE

## 2024-05-02 PROCEDURE — 85027 COMPLETE CBC AUTOMATED: CPT | Performed by: EMERGENCY MEDICINE

## 2024-05-02 PROCEDURE — 84145 PROCALCITONIN (PCT): CPT | Performed by: EMERGENCY MEDICINE

## 2024-05-02 PROCEDURE — 87086 URINE CULTURE/COLONY COUNT: CPT | Performed by: EMERGENCY MEDICINE

## 2024-05-02 PROCEDURE — G0378 HOSPITAL OBSERVATION PER HR: HCPCS

## 2024-05-02 PROCEDURE — 93005 ELECTROCARDIOGRAM TRACING: CPT

## 2024-05-02 PROCEDURE — 81000 URINALYSIS NONAUTO W/SCOPE: CPT | Performed by: EMERGENCY MEDICINE

## 2024-05-02 PROCEDURE — 87205 SMEAR GRAM STAIN: CPT | Performed by: INTERNAL MEDICINE

## 2024-05-02 PROCEDURE — 25000003 PHARM REV CODE 250: Performed by: INTERNAL MEDICINE

## 2024-05-02 PROCEDURE — 85007 BL SMEAR W/DIFF WBC COUNT: CPT | Performed by: EMERGENCY MEDICINE

## 2024-05-02 PROCEDURE — 96372 THER/PROPH/DIAG INJ SC/IM: CPT | Performed by: INTERNAL MEDICINE

## 2024-05-02 PROCEDURE — 87040 BLOOD CULTURE FOR BACTERIA: CPT | Performed by: EMERGENCY MEDICINE

## 2024-05-02 PROCEDURE — 87077 CULTURE AEROBIC IDENTIFY: CPT | Performed by: EMERGENCY MEDICINE

## 2024-05-02 PROCEDURE — 99285 EMERGENCY DEPT VISIT HI MDM: CPT | Mod: 25

## 2024-05-02 PROCEDURE — 96374 THER/PROPH/DIAG INJ IV PUSH: CPT | Mod: 59

## 2024-05-02 PROCEDURE — 87186 SC STD MICRODIL/AGAR DIL: CPT | Mod: 59 | Performed by: EMERGENCY MEDICINE

## 2024-05-02 PROCEDURE — 87154 CUL TYP ID BLD PTHGN 6+ TRGT: CPT | Performed by: EMERGENCY MEDICINE

## 2024-05-02 PROCEDURE — 80053 COMPREHEN METABOLIC PANEL: CPT | Performed by: EMERGENCY MEDICINE

## 2024-05-02 PROCEDURE — 99499 UNLISTED E&M SERVICE: CPT | Mod: ,,, | Performed by: STUDENT IN AN ORGANIZED HEALTH CARE EDUCATION/TRAINING PROGRAM

## 2024-05-02 PROCEDURE — 87088 URINE BACTERIA CULTURE: CPT | Performed by: EMERGENCY MEDICINE

## 2024-05-02 PROCEDURE — 36415 COLL VENOUS BLD VENIPUNCTURE: CPT | Performed by: INTERNAL MEDICINE

## 2024-05-02 PROCEDURE — U0002 COVID-19 LAB TEST NON-CDC: HCPCS | Performed by: EMERGENCY MEDICINE

## 2024-05-02 PROCEDURE — 85730 THROMBOPLASTIN TIME PARTIAL: CPT | Performed by: EMERGENCY MEDICINE

## 2024-05-02 PROCEDURE — 93010 ELECTROCARDIOGRAM REPORT: CPT | Mod: ,,, | Performed by: INTERNAL MEDICINE

## 2024-05-02 PROCEDURE — 87070 CULTURE OTHR SPECIMN AEROBIC: CPT | Performed by: INTERNAL MEDICINE

## 2024-05-02 RX ORDER — TAMSULOSIN HYDROCHLORIDE 0.4 MG/1
0.4 CAPSULE ORAL DAILY
Status: DISCONTINUED | OUTPATIENT
Start: 2024-05-02 | End: 2024-05-06 | Stop reason: HOSPADM

## 2024-05-02 RX ORDER — CARVEDILOL 12.5 MG/1
12.5 TABLET ORAL 2 TIMES DAILY
Status: DISCONTINUED | OUTPATIENT
Start: 2024-05-02 | End: 2024-05-06 | Stop reason: HOSPADM

## 2024-05-02 RX ORDER — PREDNISONE 5 MG/1
5 TABLET ORAL DAILY
Status: DISCONTINUED | OUTPATIENT
Start: 2024-05-03 | End: 2024-05-06 | Stop reason: HOSPADM

## 2024-05-02 RX ORDER — MYCOPHENOLATE MOFETIL 250 MG/1
1000 CAPSULE ORAL 2 TIMES DAILY
Status: DISCONTINUED | OUTPATIENT
Start: 2024-05-02 | End: 2024-05-02

## 2024-05-02 RX ORDER — ISOSORBIDE DINITRATE 20 MG/1
20 TABLET ORAL 3 TIMES DAILY
Status: DISCONTINUED | OUTPATIENT
Start: 2024-05-02 | End: 2024-05-06 | Stop reason: HOSPADM

## 2024-05-02 RX ORDER — DAPSONE 25 MG/1
100 TABLET ORAL DAILY
Status: DISCONTINUED | OUTPATIENT
Start: 2024-05-02 | End: 2024-05-06 | Stop reason: HOSPADM

## 2024-05-02 RX ORDER — ASPIRIN 81 MG/1
81 TABLET ORAL DAILY
Status: DISCONTINUED | OUTPATIENT
Start: 2024-05-03 | End: 2024-05-06 | Stop reason: HOSPADM

## 2024-05-02 RX ORDER — GLUCAGON 1 MG
1 KIT INJECTION
Status: DISCONTINUED | OUTPATIENT
Start: 2024-05-02 | End: 2024-05-06 | Stop reason: HOSPADM

## 2024-05-02 RX ORDER — FINASTERIDE 5 MG/1
5 TABLET, FILM COATED ORAL DAILY
Status: DISCONTINUED | OUTPATIENT
Start: 2024-05-02 | End: 2024-05-06 | Stop reason: HOSPADM

## 2024-05-02 RX ORDER — CETIRIZINE HYDROCHLORIDE 10 MG/1
10 TABLET ORAL DAILY
Status: DISCONTINUED | OUTPATIENT
Start: 2024-05-03 | End: 2024-05-06 | Stop reason: HOSPADM

## 2024-05-02 RX ORDER — ATORVASTATIN CALCIUM 40 MG/1
40 TABLET, FILM COATED ORAL NIGHTLY
Status: DISCONTINUED | OUTPATIENT
Start: 2024-05-02 | End: 2024-05-06 | Stop reason: HOSPADM

## 2024-05-02 RX ORDER — INSULIN ASPART 100 [IU]/ML
0-10 INJECTION, SOLUTION INTRAVENOUS; SUBCUTANEOUS
Status: DISCONTINUED | OUTPATIENT
Start: 2024-05-02 | End: 2024-05-06 | Stop reason: HOSPADM

## 2024-05-02 RX ORDER — NALOXONE HCL 0.4 MG/ML
0.02 VIAL (ML) INJECTION
Status: DISCONTINUED | OUTPATIENT
Start: 2024-05-02 | End: 2024-05-06 | Stop reason: HOSPADM

## 2024-05-02 RX ORDER — ACETAMINOPHEN 325 MG/1
650 TABLET ORAL
Status: COMPLETED | OUTPATIENT
Start: 2024-05-02 | End: 2024-05-02

## 2024-05-02 RX ORDER — TACROLIMUS 1 MG/1
8 CAPSULE ORAL 2 TIMES DAILY
Status: DISCONTINUED | OUTPATIENT
Start: 2024-05-02 | End: 2024-05-05

## 2024-05-02 RX ORDER — ONDANSETRON HYDROCHLORIDE 2 MG/ML
4 INJECTION, SOLUTION INTRAVENOUS EVERY 8 HOURS PRN
Status: DISCONTINUED | OUTPATIENT
Start: 2024-05-02 | End: 2024-05-06 | Stop reason: HOSPADM

## 2024-05-02 RX ORDER — SODIUM CHLORIDE 0.9 % (FLUSH) 0.9 %
10 SYRINGE (ML) INJECTION EVERY 12 HOURS PRN
Status: DISCONTINUED | OUTPATIENT
Start: 2024-05-02 | End: 2024-05-06 | Stop reason: HOSPADM

## 2024-05-02 RX ORDER — SODIUM BICARBONATE 650 MG/1
1300 TABLET ORAL 2 TIMES DAILY
Status: DISCONTINUED | OUTPATIENT
Start: 2024-05-02 | End: 2024-05-06 | Stop reason: HOSPADM

## 2024-05-02 RX ORDER — LANOLIN ALCOHOL/MO/W.PET/CERES
400 CREAM (GRAM) TOPICAL 2 TIMES DAILY
Status: DISCONTINUED | OUTPATIENT
Start: 2024-05-02 | End: 2024-05-06 | Stop reason: HOSPADM

## 2024-05-02 RX ORDER — BUTALBITAL, ACETAMINOPHEN AND CAFFEINE 50; 325; 40 MG/1; MG/1; MG/1
1 TABLET ORAL EVERY 4 HOURS PRN
Status: DISCONTINUED | OUTPATIENT
Start: 2024-05-02 | End: 2024-05-06 | Stop reason: HOSPADM

## 2024-05-02 RX ORDER — IBUPROFEN 200 MG
16 TABLET ORAL
Status: DISCONTINUED | OUTPATIENT
Start: 2024-05-02 | End: 2024-05-06 | Stop reason: HOSPADM

## 2024-05-02 RX ORDER — PREDNISONE 2.5 MG/1
2.5 TABLET ORAL DAILY
Status: DISCONTINUED | OUTPATIENT
Start: 2024-05-03 | End: 2024-05-02

## 2024-05-02 RX ORDER — IBUPROFEN 200 MG
24 TABLET ORAL
Status: DISCONTINUED | OUTPATIENT
Start: 2024-05-02 | End: 2024-05-06 | Stop reason: HOSPADM

## 2024-05-02 RX ORDER — CARVEDILOL 12.5 MG/1
12.5 TABLET ORAL 2 TIMES DAILY
COMMUNITY
Start: 2024-05-01 | End: 2024-06-13 | Stop reason: SDUPTHER

## 2024-05-02 RX ORDER — VALGANCICLOVIR 450 MG/1
450 TABLET, FILM COATED ORAL DAILY
Status: DISCONTINUED | OUTPATIENT
Start: 2024-05-03 | End: 2024-05-06 | Stop reason: HOSPADM

## 2024-05-02 RX ADMIN — MAGNESIUM OXIDE TAB 400 MG (241.3 MG ELEMENTAL MG) 400 MG: 400 (241.3 MG) TAB at 08:05

## 2024-05-02 RX ADMIN — ACETAMINOPHEN 650 MG: 325 TABLET ORAL at 12:05

## 2024-05-02 RX ADMIN — ISOSORBIDE DINITRATE 20 MG: 20 TABLET ORAL at 08:05

## 2024-05-02 RX ADMIN — ATORVASTATIN CALCIUM 40 MG: 40 TABLET, FILM COATED ORAL at 08:05

## 2024-05-02 RX ADMIN — SODIUM BICARBONATE 650 MG TABLET 1300 MG: at 02:05

## 2024-05-02 RX ADMIN — CARVEDILOL 12.5 MG: 12.5 TABLET, FILM COATED ORAL at 08:05

## 2024-05-02 RX ADMIN — SACUBITRIL AND VALSARTAN 2 TABLET: 49; 51 TABLET, FILM COATED ORAL at 08:05

## 2024-05-02 RX ADMIN — BUTALBITAL, ACETAMINOPHEN, AND CAFFEINE 1 TABLET: 325; 50; 40 TABLET ORAL at 03:05

## 2024-05-02 RX ADMIN — SODIUM BICARBONATE 650 MG TABLET 1300 MG: at 08:05

## 2024-05-02 RX ADMIN — TAMSULOSIN HYDROCHLORIDE 0.4 MG: 0.4 CAPSULE ORAL at 02:05

## 2024-05-02 RX ADMIN — DAPSONE 100 MG: 25 TABLET ORAL at 03:05

## 2024-05-02 RX ADMIN — FINASTERIDE 5 MG: 5 TABLET, FILM COATED ORAL at 02:05

## 2024-05-02 RX ADMIN — CEFTRIAXONE 2 G: 2 INJECTION, POWDER, FOR SOLUTION INTRAMUSCULAR; INTRAVENOUS at 12:05

## 2024-05-02 RX ADMIN — ISOSORBIDE DINITRATE 20 MG: 20 TABLET ORAL at 02:05

## 2024-05-02 RX ADMIN — TACROLIMUS 8 MG: 1 CAPSULE ORAL at 10:05

## 2024-05-02 RX ADMIN — INSULIN DETEMIR 10 UNITS: 100 INJECTION, SOLUTION SUBCUTANEOUS at 08:05

## 2024-05-02 RX ADMIN — TACROLIMUS 8 MG: 1 CAPSULE ORAL at 05:05

## 2024-05-02 RX ADMIN — MYCOPHENOLATE MOFETIL 1000 MG: 250 CAPSULE ORAL at 10:05

## 2024-05-02 NOTE — PHARMACY MED REC
"Admission Medication History     The home medication history was taken by Katie Willams.    You may go to "Admission" then "Reconcile Home Medications" tabs to review and/or act upon these items.     The home medication list has been updated by the Pharmacy department.   Please read ALL comments highlighted in yellow.   Please address this information as you see fit.    Feel free to contact us if you have any questions or require assistance.      The medications listed below were removed from the home medication list. Please reorder if appropriate:  Patient reports no longer taking the following medication(s):  Famotidine        Medications listed below were obtained from: Patient/family and Analytic software- "[employer]"      LAST [facility] REC COMPLETED:         Katie Willams  [id number]    Current Outpatient Medications on File Prior to Encounter   Medication Sig Dispense Refill Last Dose    aspirin (ECOTRIN) 81 MG EC tablet Take 1 tablet (81 mg total) by mouth once daily. 30 tablet 11 5/1/2024    atorvastatin (LIPITOR) 40 MG tablet Take 1 tablet (40 mg total) by mouth once daily. 30 tablet 11 5/1/2024    carvediloL (COREG) 12.5 MG tablet Take 12.5 mg by mouth 2 (two) times daily.   5/1/2024    cetirizine (ZYRTEC) 10 MG tablet Take 1 tablet (10 mg total) by mouth once daily. 90 tablet 1 5/1/2024    dapsone 100 MG Tab Take 1 tablet (100 mg total) by mouth once daily. 30 tablet 5 5/1/2024    finasteride (PROSCAR) 5 mg tablet Take 1 tablet (5 mg total) by mouth once daily. 90 tablet 3 5/1/2024    insulin degludec (TRESIBA FLEXTOUCH U-100) 100 unit/mL (3 mL) insulin pen Inject 25 Units into the skin once daily. 9 mL 1 5/1/2024    insulin lispro (HUMALOG KWIKPEN INSULIN) 100 unit/mL pen Inject 10 Units into the skin 3 (three) times daily. Plus slide; max dose 45 units/d 9 mL 11 5/1/2024    isosorbide dinitrate (ISORDIL) 10 MG tablet Take 2 tablets (20 mg total) by mouth 3 (three) times daily. 180 tablet 11 5/1/2024    " "magnesium oxide (MAG-OX) 400 mg (241.3 mg magnesium) tablet Take 1 tablet (400 mg total) by mouth 2 (two) times daily. 60 tablet 11 5/1/2024    multivitamin Tab Take 1 tablet by mouth once daily.   5/1/2024    mycophenolate (CELLCEPT) 250 mg Cap Take 4 capsules (1,000 mg total) by mouth 2 (two) times daily. 240 capsule 11 5/1/2024    predniSONE (DELTASONE) 5 MG tablet Take by mouth daily;  2/21 - 2/27 : 20 mg;  2/28 - 3/5 : 15 mg; 3/6 - 3/12 :10 mg; 3/13/2024 - forever : 5 mg; do not stop 70 tablet 11 5/1/2024    sacubitriL-valsartan (ENTRESTO)  mg per tablet Take 1 tablet by mouth 2 (two) times daily. 60 tablet 11 5/1/2024    sodium bicarbonate 650 MG tablet Take 2 tablets (1,300 mg total) by mouth 2 (two) times a day. 120 tablet 11 5/1/2024    tacrolimus (PROGRAF) 1 MG Cap Take 8 capsules (8 mg total) by mouth every 12 (twelve) hours. 480 capsule 11 5/1/2024    tamsulosin (FLOMAX) 0.4 mg Cap Take 1 capsule (0.4 mg total) by mouth once daily. 30 capsule 11 5/1/2024    valGANciclovir (VALCYTE) 450 mg Tab Take 1 tablet (450 mg total) by mouth once daily. Stop 5/21/24 30 tablet 5 5/1/2024    [DISCONTINUED] carvediloL (COREG) 6.25 MG tablet Take 1 tablet (6.25 mg total) by mouth 2 (two) times daily. 60 tablet 11 5/1/2024    blood sugar diagnostic Strp Use 1 strip to test blood sugar 3 (three) times daily. 100 strip 1     blood-glucose meter Misc Use as instructed 1 each 0     lancets (ONETOUCH DELICA PLUS LANCET) 33 gauge Misc Use 1 lancet to test blood sugar 3 (three) times daily. 100 each 1     pen needle, diabetic (BD SHIRA 2ND GEN PEN NEEDLE) 32 gauge x 5/32" Ndle Use 1 pen needle to inject insulin 4 (four) times daily. 100 each 1                            .          "

## 2024-05-02 NOTE — ASSESSMENT & PLAN NOTE
-continue Prograf and prednisone  -hold Cellcept  -monitor renal function, currently at baseline

## 2024-05-02 NOTE — ASSESSMENT & PLAN NOTE
Patient's anemia is currently  stable .  Etiology likely d/t chronic disease due to Chronic Kidney Disease  Current CBC reviewed-   Lab Results   Component Value Date    HGB 9.3 (L) 05/02/2024    HCT 30.2 (L) 05/02/2024     Monitor serial CBC and transfuse if patient becomes hemodynamically unstable, symptomatic or H/H drops below 7/21.

## 2024-05-02 NOTE — ASSESSMENT & PLAN NOTE
Patient is identified as having Systolic (HFrEF) heart failure that is Chronic. CHF is currently controlled. Latest ECHO performed and demonstrates- Results for orders placed during the hospital encounter of 11/15/23    Echo    Interpretation Summary    Left Ventricle: The left ventricle is moderately dilated. Moderately increased wall thickness. There is concentric hypertrophy. Mild global hypokinesis present. There is mildly reduced systolic function with a visually estimated ejection fraction of 40 - 45%. There is normal diastolic function.    Right Ventricle: Normal right ventricular cavity size. Wall thickness is normal. Right ventricle wall motion  is normal. Systolic function is normal.    Left Atrium: Left atrium is severely dilated.    Tricuspid Valve: There is mild regurgitation with a centrally directed jet.    Pulmonary Artery: The estimated pulmonary artery systolic pressure is 27 mmHg.    IVC/SVC: Normal venous pressure at 3 mmHg.  . Continue Beta Blocker and monitor clinical status closely. Monitor on telemetry. Patient is off CHF pathway.  Monitor strict Is&Os and daily weights.  Place on fluid restriction of 1.5 L. Cardiology has not been consulted. Continue to stress to patient importance of self efficacy and  on diet for CHF. Last BNP reviewed- and noted below   Recent Labs   Lab 05/02/24  0915   BNP 1,330*

## 2024-05-02 NOTE — TELEPHONE ENCOUNTER
Received call from Dr. Remi Rose in .   DCDKT 2/18/24 KDPI 67.   Presenting with fevers and chills. Mild SOB.   CBC at baseline. CMP at baseline. BNP elevated, but less than prior. Lactic acid normal. UA consistent with UTI. CXR similar to prior with minimal vascular congestion. Blood and urine cultures obtained. Started on ceftriaxone. For UTI.  Agree with antibiotics and observation for clinical improvement in hospital. Recommend Txp kidney US.   Continue home tacrolimus and prednisone at home doses. Hold MMF for now and resume when completing ABX therapy.   Monitor morning tac troughs and target level 8-10. Continue home valgan and dapsone prophylaxis.   Avoid IV fluids unless specifically indicated given history of volume overload. OK to give a diuretic dose and observe if needed for volume optimization.   Please stay in touch with our team and reach back out to us if with questions or concerns, patient or organs decline, clinical course does not go as expected, or if clinicians do not feel comfortable in management.    Carlitos Lloyd Jr.

## 2024-05-02 NOTE — H&P
[facility]  History & Physical    Patient Name: Isidro Alves  MRN: [id number]  Patient Class: OP- Observation  Admission Date: 5/2/2024  Attending Physician: Tammy Rosales MD   Primary Care Provider: Steven Morgan MD         Patient information was obtained from patient, past medical records, and ER records.     Subjective:     Principal Problem:Acute cystitis    Chief Complaint:   Chief Complaint   Patient presents with    Fever     Pt reports fever chills and headache x 3 day kidney transplant in Feb    Shortness of Breath     X 1 month         HPI: The patient is a 67 yo male with past medical history of kidney transplant, pneumonia, anemia, diabetes, hypertension and BPH who presented to the ED with fever, chills, headache, productive cough for 3 days. He reports he was trying to wait it out but cough and headache persisting. He has ARRINGTON as well. He states coughing is the worst part. Workup in ED revealed UTI. Transplant medicine consulted and recommended: patient stay in [place], continue empiric IV antibiotics, avoid fluids if possible and hold Cellcept. Hospital medicine consulted.     Past Medical History:   Diagnosis Date    Abnormal nuclear stress test 8/30/2022    Acute hypoxemic respiratory failure due to COVID-19 1/21/2021    Acute on chronic combined systolic and diastolic congestive heart failure 5/7/2023    Acute on chronic respiratory failure 5/7/2023    Acute respiratory failure with hypoxia and hypercapnia 5/18/2023    Anal itching 10/10/2022    Anemia 4/16/2014    BPH (benign prostatic hyperplasia)     CKD (chronic kidney disease) stage 4, GFR 15-29 ml/min     Coronary artery disease of native artery of native heart with stable angina pectoris 4/29/2019    Diabetes mellitus     Diabetes mellitus, type 2     Dilated cardiomyopathy 8/30/2022    Elevated PSA     Encounter for blood transfusion     Herpes labialis     Hyperlipidemia      Hypertension     Hypertensive emergency 5/18/2023    Obesity     Pneumonia 5/7/2023    Pneumonia due to COVID-19 virus     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    Preop cardiovascular exam 9/16/2022    Proteinuria     Pulmonary edema with congestive heart failure 5/18/2023    Secondary hyperparathyroidism (of renal origin)        Past Surgical History:   Procedure Laterality Date    CATHETERIZATION OF BOTH LEFT AND RIGHT HEART N/A 08/30/2022    Procedure: CATHETERIZATION, HEART, BOTH LEFT AND RIGHT;  Surgeon: Kassandra Deras MD;  Location: [facility] CATH LAB;  Service: Cardiology;  Laterality: N/A;    CATHETERIZATION OF BOTH LEFT AND RIGHT HEART N/A 9/5/2023    Procedure: CATHETERIZATION, HEART, BOTH LEFT AND RIGHT;  Surgeon: Kassandra Deras MD;  Location: [facility] CATH LAB;  Service: Cardiology;  Laterality: N/A;    COLONOSCOPY N/A 12/21/2017    Procedure: COLONOSCOPY;  Surgeon: Fran Escalera MD;  Location: [facility] ENDO;  Service: Endoscopy;  Laterality: N/A;    COLONOSCOPY N/A 02/02/2023    Procedure: COLONOSCOPY;  Surgeon: Anthony Meng MD;  Location: [facility] ENDO;  Service: Endoscopy;  Laterality: N/A;    FLUOROSCOPY N/A 01/21/2021    Procedure: Vascath insertion;  Surgeon: Adrian Pruitt Jr., MD;  Location: [facility] CATH LAB;  Service: General;  Laterality: N/A;    FLUOROSCOPY N/A 04/06/2021    Procedure: Over the wire Vas Cath exchange;  Surgeon: Ho Pressley MD;  Location: [facility] CATH LAB;  Service: General;  Laterality: N/A;    KIDNEY TRANSPLANT N/A 2/18/2024    Procedure: TRANSPLANT, KIDNEY;  Surgeon: Chas Salomon MD;  Location: [address];  Service: Transplant;  Laterality: N/A;    PROSTATE BIOPSY      thorocotomy  01/01/2010       Review of patient's allergies indicates:   Allergen Reactions    Bactrim [sulfamethoxazole-trimethoprim] Swelling    Nsaids (non-steroidal anti-inflammatory drug)      CKD    Sulfamethoxazole     Trimethoprim        Current Facility-Administered Medications   Medication Dose Route  Frequency Provider Last Rate Last Admin    [START ON 5/3/2024] aspirin EC tablet 81 mg  81 mg Oral Daily Tammy Rosales MD        atorvastatin tablet 40 mg  40 mg Oral QHS Tammy Rosales MD        butalbital-acetaminophen-caffeine -40 mg per tablet 1 tablet  1 tablet Oral Q4H PRN Tammy Rosales MD   1 tablet at 05/02/24 1509    carvediloL tablet 12.5 mg  12.5 mg Oral BID Tammy Rosales MD        [START ON 5/3/2024] cefTRIAXone (Rocephin) 1 g in dextrose 5 % in water (D5W) 100 mL IVPB (MB+)  1 g Intravenous Q24H Tammy Rosales MD        [START ON 5/3/2024] cetirizine tablet 10 mg  10 mg Oral Daily Tammy Rosales MD        dapsone tablet 100 mg  100 mg Oral Daily Tammy Rosales MD   100 mg at 05/02/24 1509    finasteride tablet 5 mg  5 mg Oral Daily Tammy Rosales MD   5 mg at 05/02/24 1443    glucagon (human recombinant) injection 1 mg  1 mg Intramuscular PRN Tammy Rosales MD        glucose chewable tablet 16 g  16 g Oral PRN Tammy Rosales MD        glucose chewable tablet 24 g  24 g Oral PRN Tammy Rosales MD        insulin aspart U-100 pen 0-10 Units  0-10 Units Subcutaneous QID (AC + HS) PRN Tammy Rosales MD        insulin detemir U-100 (Levemir) pen 10 Units  10 Units Subcutaneous QHS Tammy Rosales MD        isosorbide dinitrate tablet 20 mg  20 mg Oral TID Tammy Rosales MD   20 mg at 05/02/24 1444    magnesium oxide tablet 400 mg  400 mg Oral BID Tammy Rosales MD        [START ON 5/3/2024] multivitamin tablet  1 tablet Oral Daily Tammy Rosales MD        naloxone 0.4 mg/mL injection 0.02 mg  0.02 mg Intravenous PRN Tammy Rosalse MD        ondansetron injection 4 mg  4 mg Intravenous Q8H PRN Tammy Rosales MD        [START ON 5/3/2024] predniSONE tablet 5 mg  5 mg Oral Daily Tammy Rosales MD        sacubitriL-valsartan 49-51 mg per tablet 2 tablet  2 tablet Oral BID Tammy Rosales MD        sodium  bicarbonate tablet 1,300 mg  1,300 mg Oral BID Tammy Rosales MD   1,300 mg at 05/02/24 1443    sodium chloride 0.9% flush 10 mL  10 mL Intravenous Q12H PRN Tammy Rosales MD        tacrolimus capsule 8 mg  8 mg Oral BID Remi Rose MD   8 mg at 05/02/24 1722    tamsulosin 24 hr capsule 0.4 mg  0.4 mg Oral Daily Tammy Rosales MD   0.4 mg at 05/02/24 1443    [START ON 5/3/2024] valGANciclovir tablet 450 mg  450 mg Oral Daily Tammy Rosales MD         Family History       Problem Relation (Age of Onset)    Diabetes Father, Other    Hypertension Father, Other    No Known Problems Mother          Tobacco Use    Smoking status: Never    Smokeless tobacco: Never   Substance and Sexual Activity    Alcohol use: Not Currently     Comment: seldom     Drug use: No    Sexual activity: Not Currently     Partners: Female     Review of Systems   Constitutional:  Positive for activity change, chills and fever.   Respiratory:  Positive for cough and shortness of breath.    All other systems reviewed and are negative.    Objective:     Vital Signs (Most Recent):  Temp: 98.9 °F (37.2 °C) (05/02/24 1600)  Pulse: 73 (05/02/24 1736)  Resp: 18 (05/02/24 1600)  BP: (!) 120/58 (05/02/24 1600)  SpO2: 95 % (05/02/24 1736) Vital Signs (24h Range):  Temp:  [98.6 °F (37 °C)-99.2 °F (37.3 °C)] 98.9 °F (37.2 °C)  Pulse:  [69-86] 73  Resp:  [15-22] 18  SpO2:  [92 %-100 %] 95 %  BP: (120-176)/(58-87) 120/58     Weight: 114.3 kg (251 lb 15.8 oz)  Body mass index is 32.35 kg/m².     Physical Exam  HENT:      Head: Normocephalic and atraumatic.   Cardiovascular:      Rate and Rhythm: Normal rate and regular rhythm.      Heart sounds: No murmur heard.  Pulmonary:      Effort: Pulmonary effort is normal. No respiratory distress.      Breath sounds: Decreased breath sounds present. No wheezing.   Abdominal:      General: Bowel sounds are normal. There is no distension.      Palpations: Abdomen is soft.      Tenderness: There  is no abdominal tenderness.   Musculoskeletal:         General: No swelling.   Skin:     General: Skin is warm and dry.   Neurological:      Mental Status: He is alert and oriented to person, place, and time. Mental status is at baseline.                Significant Labs: All pertinent labs within the past 24 hours have been reviewed.  Recent Lab Results  (Last 5 results in the past 24 hours)        05/02/24  1653   05/02/24  1644   05/02/24  1410   05/02/24  1052   05/02/24  0927        Influenza A, Molecular         Negative       Influenza B, Molecular         Negative       Appearance, UA       Clear         Bacteria, UA       Many         Bilirubin (UA)       Negative         Color, UA       Yellow         Flu A & B Source         Nasal swab       Glucose, UA       Negative         Hyaline Casts, UA       0         Ketones, UA       Negative         Lactic Acid Level     0.6  Comment: Falsely low lactic acid results can be found in samples   containing >=13.0 mg/dL total bilirubin and/or >=3.5 mg/dL   direct bilirubin.             Leukocyte Esterase, UA       2+         Microscopic Comment       SEE COMMENT  Comment: Other formed elements not mentioned in the report are not   present in the microscopic examination.            NITRITE UA       Positive         Blood, UA       Trace         pH, UA       6.0         POC Glucose 113               POCT Glucose   113             Protein, UA       1+  Comment: Recommend a 24 hour urine protein or a urine   protein/creatinine ratio if globulin induced proteinuria is  clinically suspected.           RBC, UA       2         SARS-CoV-2 RNA, Amplification, Qual         Negative  Comment: This test utilizes isothermal nucleic acid amplification technology   to   detect the SARS-CoV-2 RdRp nucleic acid segment. The analytical   sensitivity   (limit of detection) is 500 copies/swab.    A POSITIVE result is indicative of the presence of SARS-CoV-2 RNA;   clinical   correlation  with patient history and other diagnostic information is   necessary to determine patient infection status.    A NEGATIVE result means that SARS-CoV-2 nucleic acids are not present   above   the limit of detection. A NEGATIVE result should be treated as   presumptive.   It does not rule out the possibility of COVID-19 and should not be   the sole   basis for treatment decisions.    If COVID-19 is strongly suspected based on clinical and exposure   history,   re-testing using an alternate molecular assay should be considered.    This test is Food and Drug Administration (FDA) approved. Performance   characteristics of this has been independently verified by [facility] Department of Pathology and Laboratory Medicine.         Specific [place], UA       1.010         Specimen UA       Urine, Clean Catch         UROBILINOGEN UA       Negative         WBC Clumps, UA       Few         WBC, UA       31                                Significant Imaging: I have reviewed all pertinent imaging results/findings within the past 24 hours.  Assessment/Plan:     * Acute cystitis  -continue empiric Rocephin as covers most of the previous organism  -follow-up urine culture      Diabetes mellitus type 2 with complications  Patient's FSGs are controlled on current medication regimen.  Last A1c reviewed-   Lab Results   Component Value Date    HGBA1C 6.4 (H) 05/07/2023     Most recent fingerstick glucose reviewed-   Recent Labs   Lab 05/02/24  1644   POCTGLUCOSE 113*     Current correctional scale  Medium  Maintain anti-hyperglycemic dose as follows-   Antihyperglycemics (From admission, onward)      Start     Stop Route Frequency Ordered    05/02/24 2100  insulin detemir U-100 (Levemir) pen 10 Units         -- SubQ Nightly 05/02/24 1508    05/02/24 1607  insulin aspart U-100 pen 0-10 Units         -- SubQ Before meals & nightly PRN 05/02/24 1508          Hold Oral hypoglycemics while patient is in the hospital.    At  risk for opportunistic infections  -continue prophylactic medication  -obtain sputum culture  -respiratory viral panel ordered      S/P kidney transplant 2024  -continue Prograf and prednisone  -hold Cellcept  -monitor renal function, currently at baseline      Chronic systolic congestive heart failure  Patient is identified as having Systolic (HFrEF) heart failure that is Chronic. CHF is currently controlled. Latest ECHO performed and demonstrates- Results for orders placed during the hospital encounter of 11/15/23    Echo    Interpretation Summary    Left Ventricle: The left ventricle is moderately dilated. Moderately increased wall thickness. There is concentric hypertrophy. Mild global hypokinesis present. There is mildly reduced systolic function with a visually estimated ejection fraction of 40 - 45%. There is normal diastolic function.    Right Ventricle: Normal right ventricular cavity size. Wall thickness is normal. Right ventricle wall motion  is normal. Systolic function is normal.    Left Atrium: Left atrium is severely dilated.    Tricuspid Valve: There is mild regurgitation with a centrally directed jet.    Pulmonary Artery: The estimated pulmonary artery systolic pressure is 27 mmHg.    IVC/SVC: Normal venous pressure at 3 mmHg.  . Continue Beta Blocker and monitor clinical status closely. Monitor on telemetry. Patient is off CHF pathway.  Monitor strict Is&Os and daily weights.  Place on fluid restriction of 1.5 L. Cardiology has not been consulted. Continue to stress to patient importance of self efficacy and  on diet for CHF. Last BNP reviewed- and noted below   Recent Labs   Lab 05/02/24  0915   BNP 1,330*       Anemia due to chronic kidney disease  Patient's anemia is currently  stable .  Etiology likely d/t chronic disease due to Chronic Kidney Disease  Current CBC reviewed-   Lab Results   Component Value Date    HGB 9.3 (L) 05/02/2024    HCT 30.2 (L) 05/02/2024     Monitor serial CBC  and transfuse if patient becomes hemodynamically unstable, symptomatic or H/H drops below 7/21.      VTE Risk Mitigation (From admission, onward)           Ordered     IP VTE HIGH RISK PATIENT  Once         05/02/24 1508     Place sequential compression device  Until discontinued         05/02/24 1508                       On 05/02/2024, patient should be placed in hospital observation services under my care.        AdmissionCare    Guideline: Urinary Tract Infection (UTI) - OBS, Observation    Based on the indications selected for the patient, the bed status of Observation was determined to be MET    The following indications were selected as present at the time of evaluation of the patient:      - Immunosuppressed patient    AdmissionCare documentation entered by: Tammy Rosales    [facility] [place], 27th edition, Copyright © 2023 [facility] [employer] [facility] All Rights Reserved.  [id number]    Tammy Rosales MD  Department of Hospital Medicine  '[facility] - Telemetry ([facility])

## 2024-05-02 NOTE — HPI
The patient is a 69 yo male with past medical history of kidney transplant, pneumonia, anemia, diabetes, hypertension and BPH who presented to the ED with fever, chills, headache, productive cough for 3 days. He reports he was trying to wait it out but cough and headache persisting. He has ARRINGTON as well. He states coughing is the worst part. Workup in ED revealed UTI. Transplant medicine consulted and recommended: patient stay in [place], continue empiric IV antibiotics, avoid fluids if possible and hold Cellcept. Hospital medicine consulted.

## 2024-05-02 NOTE — ASSESSMENT & PLAN NOTE
Patient's FSGs are controlled on current medication regimen.  Last A1c reviewed-   Lab Results   Component Value Date    HGBA1C 6.4 (H) 05/07/2023     Most recent fingerstick glucose reviewed-   Recent Labs   Lab 05/02/24  1644   POCTGLUCOSE 113*     Current correctional scale  Medium  Maintain anti-hyperglycemic dose as follows-   Antihyperglycemics (From admission, onward)      Start     Stop Route Frequency Ordered    05/02/24 2100  insulin detemir U-100 (Levemir) pen 10 Units         -- SubQ Nightly 05/02/24 1508    05/02/24 1607  insulin aspart U-100 pen 0-10 Units         -- SubQ Before meals & nightly PRN 05/02/24 1508          Hold Oral hypoglycemics while patient is in the hospital.

## 2024-05-02 NOTE — ADMISSIONCARE
AdmissionCare    Guideline: Urinary Tract Infection (UTI) - OBS, Observation    Based on the indications selected for the patient, the bed status of Observation was determined to be MET    The following indications were selected as present at the time of evaluation of the patient:      - Immunosuppressed patient    AdmissionCare documentation entered by: Tammy Rosales    [facility], 27th edition, Copyright © 2023 [facility] [employer], [facility] All Rights Reserved.  [id number]:00 PAST SURGICAL HISTORY:  No significant past surgical history

## 2024-05-02 NOTE — TELEPHONE ENCOUNTER
Pt called triage line-directed to local ER. Pt presently at local ER for evaluation      Isidro Neely Pre-Kidney Transplant Clinical (supporting You)3 hours ago (7:07 AM)       Good morning Sara my temperature this morning is 100.4 what should I do

## 2024-05-02 NOTE — SUBJECTIVE & OBJECTIVE
Past Medical History:   Diagnosis Date    Abnormal nuclear stress test 8/30/2022    Acute hypoxemic respiratory failure due to COVID-19 1/21/2021    Acute on chronic combined systolic and diastolic congestive heart failure 5/7/2023    Acute on chronic respiratory failure 5/7/2023    Acute respiratory failure with hypoxia and hypercapnia 5/18/2023    Anal itching 10/10/2022    Anemia 4/16/2014    BPH (benign prostatic hyperplasia)     CKD (chronic kidney disease) stage 4, GFR 15-29 ml/min     Coronary artery disease of native artery of native heart with stable angina pectoris 4/29/2019    Diabetes mellitus     Diabetes mellitus, type 2     Dilated cardiomyopathy 8/30/2022    Elevated PSA     Encounter for blood transfusion     Herpes labialis     Hyperlipidemia     Hypertension     Hypertensive emergency 5/18/2023    Obesity     Pneumonia 5/7/2023    Pneumonia due to COVID-19 virus     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    Preop cardiovascular exam 9/16/2022    Proteinuria     Pulmonary edema with congestive heart failure 5/18/2023    Secondary hyperparathyroidism (of renal origin)        Past Surgical History:   Procedure Laterality Date    CATHETERIZATION OF BOTH LEFT AND RIGHT HEART N/A 08/30/2022    Procedure: CATHETERIZATION, HEART, BOTH LEFT AND RIGHT;  Surgeon: Kassandra Deras MD;  Location: [facility] CATH LAB;  Service: Cardiology;  Laterality: N/A;    CATHETERIZATION OF BOTH LEFT AND RIGHT HEART N/A 9/5/2023    Procedure: CATHETERIZATION, HEART, BOTH LEFT AND RIGHT;  Surgeon: Kassandra Deras MD;  Location: [facility] CATH LAB;  Service: Cardiology;  Laterality: N/A;    COLONOSCOPY N/A 12/21/2017    Procedure: COLONOSCOPY;  Surgeon: Fran Escalera MD;  Location: [facility] ENDO;  Service: Endoscopy;  Laterality: N/A;    COLONOSCOPY N/A 02/02/2023    Procedure: COLONOSCOPY;  Surgeon: Anthony Meng MD;  Location: [facility] ENDO;  Service: Endoscopy;  Laterality: N/A;    FLUOROSCOPY N/A 01/21/2021    Procedure: Vascath insertion;   Surgeon: Adrian Pruitt Jr., MD;  Location: [facility] CATH LAB;  Service: General;  Laterality: N/A;    FLUOROSCOPY N/A 04/06/2021    Procedure: Over the wire Vas Cath exchange;  Surgeon: Ho Pressley MD;  Location: [facility] CATH LAB;  Service: General;  Laterality: N/A;    KIDNEY TRANSPLANT N/A 2/18/2024    Procedure: TRANSPLANT, KIDNEY;  Surgeon: Chas Salomon MD;  Location: [address];  Service: Transplant;  Laterality: N/A;    PROSTATE BIOPSY      thorocotomy  01/01/2010       Review of patient's allergies indicates:   Allergen Reactions    Bactrim [sulfamethoxazole-trimethoprim] Swelling    Nsaids (non-steroidal anti-inflammatory drug)      CKD    Sulfamethoxazole     Trimethoprim        Current Facility-Administered Medications   Medication Dose Route Frequency Provider Last Rate Last Admin    [START ON 5/3/2024] aspirin EC tablet 81 mg  81 mg Oral Daily Tammy Rosales MD        atorvastatin tablet 40 mg  40 mg Oral QHS Tammy Rosales MD        butalbital-acetaminophen-caffeine -40 mg per tablet 1 tablet  1 tablet Oral Q4H PRN Tammy Rosales MD   1 tablet at 05/02/24 1509    carvediloL tablet 12.5 mg  12.5 mg Oral BID Tammy Rosales MD        [START ON 5/3/2024] cefTRIAXone (Rocephin) 1 g in dextrose 5 % in water (D5W) 100 mL IVPB (MB+)  1 g Intravenous Q24H Tammy Rosales MD        [START ON 5/3/2024] cetirizine tablet 10 mg  10 mg Oral Daily Tammy Rosales MD        dapsone tablet 100 mg  100 mg Oral Daily Tammy Rosaels MD   100 mg at 05/02/24 1509    finasteride tablet 5 mg  5 mg Oral Daily Tammy Rosales MD   5 mg at 05/02/24 1443    glucagon (human recombinant) injection 1 mg  1 mg Intramuscular PRN Tammy Rosales MD        glucose chewable tablet 16 g  16 g Oral PRN Tammy Rosales MD        glucose chewable tablet 24 g  24 g Oral PRN Tammy Rosales MD        insulin aspart U-100 pen 0-10 Units  0-10 Units Subcutaneous QID (AC + HS) PRN  Tammy Rosales MD        insulin detemir U-100 (Levemir) pen 10 Units  10 Units Subcutaneous QHS Tammy Rosales MD        isosorbide dinitrate tablet 20 mg  20 mg Oral TID Tammy Rosales MD   20 mg at 05/02/24 1444    magnesium oxide tablet 400 mg  400 mg Oral BID Tammy Rosales MD        [START ON 5/3/2024] multivitamin tablet  1 tablet Oral Daily Tammy Rosales MD        naloxone 0.4 mg/mL injection 0.02 mg  0.02 mg Intravenous PRN Tammy Rosales MD        ondansetron injection 4 mg  4 mg Intravenous Q8H PRN Tammy Rosales MD        [START ON 5/3/2024] predniSONE tablet 5 mg  5 mg Oral Daily Tammy Rosales MD        sacubitriL-valsartan 49-51 mg per tablet 2 tablet  2 tablet Oral BID Tammy Rosales MD        sodium bicarbonate tablet 1,300 mg  1,300 mg Oral BID Tammy Rosales MD   1,300 mg at 05/02/24 1443    sodium chloride 0.9% flush 10 mL  10 mL Intravenous Q12H PRN Tammy Rosales MD        tacrolimus capsule 8 mg  8 mg Oral BID Remi Rose MD   8 mg at 05/02/24 1722    tamsulosin 24 hr capsule 0.4 mg  0.4 mg Oral Daily Tammy Rosales MD   0.4 mg at 05/02/24 1443    [START ON 5/3/2024] valGANciclovir tablet 450 mg  450 mg Oral Daily Tammy Rosales MD         Family History       Problem Relation (Age of Onset)    Diabetes Father, Other    Hypertension Father, Other    No Known Problems Mother          Tobacco Use    Smoking status: Never    Smokeless tobacco: Never   Substance and Sexual Activity    Alcohol use: Not Currently     Comment: seldom     Drug use: No    Sexual activity: Not Currently     Partners: Female     Review of Systems   Constitutional:  Positive for activity change, chills and fever.   Respiratory:  Positive for cough and shortness of breath.    All other systems reviewed and are negative.    Objective:     Vital Signs (Most Recent):  Temp: 98.9 °F (37.2 °C) (05/02/24 1600)  Pulse: 73 (05/02/24 1736)  Resp: 18 (05/02/24  1600)  BP: (!) 120/58 (05/02/24 1600)  SpO2: 95 % (05/02/24 1736) Vital Signs (24h Range):  Temp:  [98.6 °F (37 °C)-99.2 °F (37.3 °C)] 98.9 °F (37.2 °C)  Pulse:  [69-86] 73  Resp:  [15-22] 18  SpO2:  [92 %-100 %] 95 %  BP: (120-176)/(58-87) 120/58     Weight: 114.3 kg (251 lb 15.8 oz)  Body mass index is 32.35 kg/m².     Physical Exam  HENT:      Head: Normocephalic and atraumatic.   Cardiovascular:      Rate and Rhythm: Normal rate and regular rhythm.      Heart sounds: No murmur heard.  Pulmonary:      Effort: Pulmonary effort is normal. No respiratory distress.      Breath sounds: Decreased breath sounds present. No wheezing.   Abdominal:      General: Bowel sounds are normal. There is no distension.      Palpations: Abdomen is soft.      Tenderness: There is no abdominal tenderness.   Musculoskeletal:         General: No swelling.   Skin:     General: Skin is warm and dry.   Neurological:      Mental Status: He is alert and oriented to person, place, and time. Mental status is at baseline.                Significant Labs: All pertinent labs within the past 24 hours have been reviewed.  Recent Lab Results  (Last 5 results in the past 24 hours)        05/02/24  1653   05/02/24  1644   05/02/24  1410   05/02/24  1052   05/02/24  0927        Influenza A, Molecular         Negative       Influenza B, Molecular         Negative       Appearance, UA       Clear         Bacteria, UA       Many         Bilirubin (UA)       Negative         Color, UA       Yellow         Flu A & B Source         Nasal swab       Glucose, UA       Negative         Hyaline Casts, UA       0         Ketones, UA       Negative         Lactic Acid Level     0.6  Comment: Falsely low lactic acid results can be found in samples   containing >=13.0 mg/dL total bilirubin and/or >=3.5 mg/dL   direct bilirubin.             Leukocyte Esterase, UA       2+         Microscopic Comment       SEE COMMENT  Comment: Other formed elements not mentioned in  the report are not   present in the microscopic examination.            NITRITE UA       Positive         Blood, UA       Trace         pH, UA       6.0         POC Glucose 113               POCT Glucose   113             Protein, UA       1+  Comment: Recommend a 24 hour urine protein or a urine   protein/creatinine ratio if globulin induced proteinuria is  clinically suspected.           RBC, UA       2         SARS-CoV-2 RNA, Amplification, Qual         Negative  Comment: This test utilizes isothermal nucleic acid amplification technology   to   detect the SARS-CoV-2 RdRp nucleic acid segment. The analytical   sensitivity   (limit of detection) is 500 copies/swab.    A POSITIVE result is indicative of the presence of SARS-CoV-2 RNA;   clinical   correlation with patient history and other diagnostic information is   necessary to determine patient infection status.    A NEGATIVE result means that SARS-CoV-2 nucleic acids are not present   above   the limit of detection. A NEGATIVE result should be treated as   presumptive.   It does not rule out the possibility of COVID-19 and should not be   the sole   basis for treatment decisions.    If COVID-19 is strongly suspected based on clinical and exposure   history,   re-testing using an alternate molecular assay should be considered.    This test is Food and Drug Administration (FDA) approved. Performance   characteristics of this has been independently verified by [facility] Department of Pathology and Laboratory Medicine.         [place], UA       1.010         Specimen UA       Urine, Clean Catch         UROBILINOGEN UA       Negative         WBC Clumps, UA       Few         WBC, UA       31                                Significant Imaging: I have reviewed all pertinent imaging results/findings within the past 24 hours.

## 2024-05-02 NOTE — CONSULTS
[facility] KIDNEY TRANSPLANT  Response for E-Consult     Patient Name: Isidro Alves  MRN: [id number]  Primary Care Provider: Steven Morgan MD   Requesting Provider: Remi Rose MD  E-Consult to Specialist  Consult performed by: Carlitos Lloyd Jr., MD  Consult ordered by: Remi Rose MD  Reason for consult: UTI in txp kidney  Assessment/Recommendations:     Received call from Dr. Remi Rose in .   DCDKT 2/18/24 KDPI 67.   Presenting with fevers and chills. Mild SOB.   CBC at baseline. CMP at baseline. BNP elevated, but less than prior. Lactic acid normal. UA consistent with UTI. CXR similar to prior with minimal vascular congestion. Blood and urine cultures obtained. Started on ceftriaxone. For UTI.  Agree with antibiotics and observation for clinical improvement in hospital. Recommend Txp kidney US.   Continue home tacrolimus and prednisone at home doses. Hold MMF for now and resume when completing ABX therapy.   Monitor morning tac troughs and target level 8-10. Continue home valgan and dapsone prophylaxis.   Avoid IV fluids unless specifically indicated given history of volume overload. OK to give a diuretic dose and observe if needed for volume optimization.   Please stay in touch with our team and reach back out to us if with questions or concerns, patient or organs decline, clinical course does not go as expected, or if clinicians do not feel comfortable in management.                Recommendation: see above        Total time of Consultation: 30 minute    I did speak to the requesting provider verbally about this.     *This eConsult is based on the clinical data available to me and is furnished without benefit of a physical examination. The eConsult will need to be interpreted in light of any clinical issues or changes in patient status not available to me at the time of filing this eConsults. Significant changes in patient condition or level of acuity should result in immediate  formal consultation and reevaluation. Please alert me if you have further questions.    Thank you for this eConsult referral.     Carlitos Lloyd Jr., MD  [facility] KIDNEY TRANSPLANT

## 2024-05-02 NOTE — ED PROVIDER NOTES
SCRIBE #1 NOTE: I, Zoë Son, am scribing for, and in the presence of, Remi Rose MD. I have scribed the entire note.       History     Chief Complaint   Patient presents with    Fever     Pt reports fever chills and headache x 3 day kidney transplant in Feb    Shortness of Breath     X 1 month      Review of patient's allergies indicates:   Allergen Reactions    Bactrim [sulfamethoxazole-trimethoprim] Swelling    Nsaids (non-steroidal anti-inflammatory drug)      CKD    Sulfamethoxazole     Trimethoprim          History of Present Illness     HPI    5/2/2024, 8:51 AM  History obtained from the patient      History of Present Illness: Isidro Alves is a 68 y.o. male patient with a PMHx of HTN, HLD, DM, CKD, CAD, CHF, obesity and a PSHx of s/p kidney transplant who presents to the Emergency Department for evaluation of a fever which onset this morning. Pt also complains of SOB which he states has been intermittent for 1 month. He also complains of chills, cough and a headache. Pt states that he has been coughing up phlegm. Symptoms are constant and moderate in severity. No mitigating or exacerbating factors reported. Patient denies any abdominal pain, n/v/d, dizziness, CP, diaphoresis, sore throat and all other sxs at this time. Pt had s/p kidney transplant on 2/18/24 (Dr. Salomon). No further complaints or concerns at this time.       Arrival mode: Personal vehicle    PCP: Steven Morgan MD        Past Medical History:  Past Medical History:   Diagnosis Date    Abnormal nuclear stress test 8/30/2022    Acute hypoxemic respiratory failure due to COVID-19 1/21/2021    Acute on chronic combined systolic and diastolic congestive heart failure 5/7/2023    Acute on chronic respiratory failure 5/7/2023    Acute respiratory failure with hypoxia and hypercapnia 5/18/2023    Anal itching 10/10/2022    Anemia 4/16/2014    BPH (benign prostatic hyperplasia)     CKD (chronic kidney disease) stage 4, GFR 15-29  ml/min     Coronary artery disease of native artery of native heart with stable angina pectoris 4/29/2019    Diabetes mellitus     Diabetes mellitus, type 2     Dilated cardiomyopathy 8/30/2022    Elevated PSA     Encounter for blood transfusion     Herpes labialis     Hyperlipidemia     Hypertension     Hypertensive emergency 5/18/2023    Obesity     Pneumonia 5/7/2023    Pneumonia due to COVID-19 virus     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    Preop cardiovascular exam 9/16/2022    Proteinuria     Pulmonary edema with congestive heart failure 5/18/2023    Secondary hyperparathyroidism (of renal origin)        Past Surgical History:  Past Surgical History:   Procedure Laterality Date    CATHETERIZATION OF BOTH LEFT AND RIGHT HEART N/A 08/30/2022    Procedure: CATHETERIZATION, HEART, BOTH LEFT AND RIGHT;  Surgeon: Kassandra Deras MD;  Location: [facility] CATH LAB;  Service: Cardiology;  Laterality: N/A;    CATHETERIZATION OF BOTH LEFT AND RIGHT HEART N/A 9/5/2023    Procedure: CATHETERIZATION, HEART, BOTH LEFT AND RIGHT;  Surgeon: Kassandra Deras MD;  Location: [facility] CATH LAB;  Service: Cardiology;  Laterality: N/A;    COLONOSCOPY N/A 12/21/2017    Procedure: COLONOSCOPY;  Surgeon: Fran Escalera MD;  Location: [facility] ENDO;  Service: Endoscopy;  Laterality: N/A;    COLONOSCOPY N/A 02/02/2023    Procedure: COLONOSCOPY;  Surgeon: Anthony Meng MD;  Location: [facility] ENDO;  Service: Endoscopy;  Laterality: N/A;    FLUOROSCOPY N/A 01/21/2021    Procedure: Vascath insertion;  Surgeon: Adrian Pruitt Jr., MD;  Location: [facility] CATH LAB;  Service: General;  Laterality: N/A;    FLUOROSCOPY N/A 04/06/2021    Procedure: Over the wire Vas Cath exchange;  Surgeon: Ho Pressley MD;  Location: [facility] CATH LAB;  Service: General;  Laterality: N/A;    KIDNEY TRANSPLANT N/A 2/18/2024    Procedure: TRANSPLANT, KIDNEY;  Surgeon: Chas Salomon MD;  Location: [facility] OR [address] [place] [address];  Service: Transplant;  Laterality: N/A;    PROSTATE BIOPSY       thorocotomy  01/01/2010         Family History:  Family History   Problem Relation Name Age of Onset    No Known Problems Mother      Diabetes Father Nik Kang     Hypertension Father Nik Kang     Hypertension Other      Diabetes Other         Social History:  Social History     Tobacco Use    Smoking status: Never    Smokeless tobacco: Never   Substance and Sexual Activity    Alcohol use: Not Currently     Comment: seldom     Drug use: No    Sexual activity: Not Currently     Partners: Female        Review of Systems     Review of Systems   Constitutional:  Positive for chills and fever. Negative for diaphoresis.   HENT:  Negative for sore throat.    Respiratory:  Positive for cough and shortness of breath.    Cardiovascular:  Negative for chest pain.   Gastrointestinal:  Negative for abdominal pain, diarrhea, nausea and vomiting.   Genitourinary:  Negative for dysuria.   Musculoskeletal:  Negative for back pain.   Skin:  Negative for rash.   Neurological:  Positive for headaches. Negative for dizziness and weakness.   Hematological:  Does not bruise/bleed easily.   All other systems reviewed and are negative.     Physical Exam     Initial Vitals [05/02/24 0850]   BP Pulse Resp Temp SpO2   (!) 176/79 86 20 99 °F (37.2 °C) 98 %      MAP       --          Physical Exam  Nursing Notes and Vital Signs Reviewed.  Constitutional: Patient is in no acute distress. Well-developed and well-nourished.  Head: Atraumatic. Normocephalic.  Eyes: PERRL. EOM intact. Conjunctivae are not pale. No scleral icterus.  ENT: Mucous membranes are moist. Oropharynx is clear and symmetric.    Neck: Supple. Full ROM. No lymphadenopathy.  Cardiovascular: Regular rate. Regular rhythm. No murmurs, rubs, or gallops. Distal pulses are 2+ and symmetric.  Pulmonary/Chest: No respiratory distress. Clear to auscultation bilaterally. No wheezing or rales.  Abdominal: Soft and non-distended.  There is no tenderness.  No rebound, guarding, or  "rigidity. Good bowel sound.  Genitourinary: No CVA tenderness  Musculoskeletal: Moves all extremities. No obvious deformities. No edema. No calf tenderness.  Skin: Warm and dry.  Neurological:  Alert, awake, and appropriate.  Normal speech.  No acute focal neurological deficits are appreciated.  Psychiatric: Normal affect. Good eye contact. Appropriate in content.     ED Course   Procedures  ED Vital Signs:  Vitals:    05/02/24 0850 05/02/24 0928 05/02/24 0958 05/02/24 1002   BP: (!) 176/79 (!) 173/76  (!) 154/72   Pulse: 86 80 79    Resp: 20 15 17    Temp: 99 °F (37.2 °C)      TempSrc: Oral      SpO2: 98% 96% 95%    Weight: 113.9 kg (251 lb)      Height: 6' 2" (1.88 m)       05/02/24 1032 05/02/24 1220 05/02/24 1227 05/02/24 1328   BP: (!) 155/73 (!) 170/81     Pulse: 81  79 83   Resp: 17  19 (!) 22   Temp:  99.2 °F (37.3 °C)     TempSrc:  Oral     SpO2: 95%  100% 95%   Weight:       Height:        05/02/24 1332 05/02/24 1433   BP: (!) 169/72 (!) 144/87   Pulse:  82   Resp:  20   Temp:  98.6 °F (37 °C)   TempSrc:  Oral   SpO2:  (!) 92%   Weight:  114.3 kg (251 lb 15.8 oz)   Height:         Abnormal Lab Results:  Labs Reviewed   CBC W/ AUTO DIFFERENTIAL - Abnormal; Notable for the following components:       Result Value    RBC 3.76 (*)     Hemoglobin 9.3 (*)     Hematocrit 30.2 (*)     MCV 80 (*)     MCH 24.7 (*)     MCHC 30.8 (*)     RDW 15.8 (*)     Platelets 465 (*)     Gran % 79.0 (*)     Lymph % 0.0 (*)     Platelet Estimate Increased (*)     All other components within normal limits   COMPREHENSIVE METABOLIC PANEL - Abnormal; Notable for the following components:    CO2 19 (*)     Glucose 133 (*)     Creatinine 1.9 (*)     Total Bilirubin 1.2 (*)     Alkaline Phosphatase 54 (*)     ALT 6 (*)     eGFR 38 (*)     All other components within normal limits   URINALYSIS, REFLEX TO URINE CULTURE - Abnormal; Notable for the following components:    Protein, UA 1+ (*)     Occult Blood UA Trace (*)     Nitrite, UA " Positive (*)     Leukocytes, UA 2+ (*)     All other components within normal limits    Narrative:     Specimen Source->Urine   B-TYPE NATRIURETIC PEPTIDE - Abnormal; Notable for the following components:    BNP 1,330 (*)     All other components within normal limits   URINALYSIS MICROSCOPIC - Abnormal; Notable for the following components:    WBC, UA 31 (*)     WBC Clumps, UA Few (*)     Bacteria Many (*)     All other components within normal limits    Narrative:     Specimen Source->Urine   INFLUENZA A & B BY MOLECULAR   CULTURE, BLOOD   CULTURE, BLOOD   CULTURE, URINE   RESPIRATORY INFECTION PANEL (PCR), NASOPHARYNGEAL   CULTURE, RESPIRATORY   LACTIC ACID, PLASMA   PROCALCITONIN   PROTIME-INR   APTT   SARS-COV-2 RNA AMPLIFICATION, QUAL   LACTIC ACID, PLASMA        All Lab Results:  Results for orders placed or performed during the hospital encounter of 05/02/24   Influenza A & B by Molecular    Specimen: Nasopharyngeal Swab   Result Value Ref Range    Influenza A, Molecular Negative Negative    Influenza B, Molecular Negative Negative    Flu A & B Source Nasal swab    CBC auto differential   Result Value Ref Range    WBC 7.38 3.90 - 12.70 K/uL    RBC 3.76 (L) 4.60 - 6.20 M/uL    Hemoglobin 9.3 (L) 14.0 - 18.0 g/dL    Hematocrit 30.2 (L) 40.0 - 54.0 %    MCV 80 (L) 82 - 98 fL    MCH 24.7 (L) 27.0 - 31.0 pg    MCHC 30.8 (L) 32.0 - 36.0 g/dL    RDW 15.8 (H) 11.5 - 14.5 %    Platelets 465 (H) 150 - 450 K/uL    MPV 9.8 9.2 - 12.9 fL    Immature Granulocytes CANCELED 0.0 - 0.5 %    Immature Grans (Abs) CANCELED 0.00 - 0.04 K/uL    nRBC 0 0 /100 WBC    Gran % 79.0 (H) 38.0 - 73.0 %    Lymph % 0.0 (L) 18.0 - 48.0 %    Mono % 13.0 4.0 - 15.0 %    Eosinophil % 1.0 0.0 - 8.0 %    Basophil % 0.0 0.0 - 1.9 %    Bands 4.0 %    Myelocytes 3.0 %    Platelet Estimate Increased (A)     Aniso Slight     Poik Slight     Poly Occasional     Hypo Occasional     Ovalocytes Occasional     Basophilic Stippling Occasional      Differential Method Manual    Comprehensive metabolic panel   Result Value Ref Range    Sodium 136 136 - 145 mmol/L    Potassium 4.9 3.5 - 5.1 mmol/L    Chloride 107 95 - 110 mmol/L    CO2 19 (L) 23 - 29 mmol/L    Glucose 133 (H) 70 - 110 mg/dL    BUN 21 8 - 23 mg/dL    Creatinine 1.9 (H) 0.5 - 1.4 mg/dL    Calcium 9.7 8.7 - 10.5 mg/dL    Total Protein 7.2 6.0 - 8.4 g/dL    Albumin 3.5 3.5 - 5.2 g/dL    Total Bilirubin 1.2 (H) 0.1 - 1.0 mg/dL    Alkaline Phosphatase 54 (L) 55 - 135 U/L    AST 18 10 - 40 U/L    ALT 6 (L) 10 - 44 U/L    eGFR 38 (A) >60 mL/min/1.73 m^2    Anion Gap 10 8 - 16 mmol/L   Lactic acid, plasma #1   Result Value Ref Range    Lactate (Lactic Acid) 0.9 0.5 - 2.2 mmol/L   Urinalysis, Reflex to Urine Culture Urine, Clean Catch    Specimen: Urine   Result Value Ref Range    Specimen UA Urine, Clean Catch     Color, UA Yellow Yellow, Straw, Hazel    Appearance, UA Clear Clear    pH, UA 6.0 5.0 - 8.0    Specific Gravity, UA 1.010 1.005 - 1.030    Protein, UA 1+ (A) Negative    Glucose, UA Negative Negative    Ketones, UA Negative Negative    Bilirubin (UA) Negative Negative    Occult Blood UA Trace (A) Negative    Nitrite, UA Positive (A) Negative    Urobilinogen, UA Negative <2.0 EU/dL    Leukocytes, UA 2+ (A) Negative   Procalcitonin   Result Value Ref Range    Procalcitonin 0.21 <0.25 ng/mL   Brain natriuretic peptide   Result Value Ref Range    BNP 1,330 (H) 0 - 99 pg/mL   Protime-INR   Result Value Ref Range    Prothrombin Time 11.5 9.0 - 12.5 sec    INR 1.1 0.8 - 1.2   APTT   Result Value Ref Range    aPTT 31.9 21.0 - 32.0 sec   COVID-19 Rapid Screening   Result Value Ref Range    SARS-CoV-2 RNA, Amplification, Qual Negative Negative   Urinalysis Microscopic   Result Value Ref Range    RBC, UA 2 0 - 4 /hpf    WBC, UA 31 (H) 0 - 5 /hpf    WBC Clumps, UA Few (A) None-Rare    Bacteria Many (A) None-Occ /hpf    Hyaline Casts, UA 0 0-1/lpf /lpf    Microscopic Comment SEE COMMENT    EKG 12-lead    Result Value Ref Range    QRS Duration 78 ms    [facility] QTC Calculation 396 ms     *Note: Due to a large number of results and/or encounters for the requested time period, some results have not been displayed. A complete set of results can be found in Results Review.        Imaging Results:  Imaging Results              US Transplant Kidney With Doppler (Final result)  Result time 05/02/24 13:38:38   Procedure changed from US Abdomen Pelvis Doppler Study Limited (retroperitoneal)     Final result by Leonor Monroy MD (Timothy) (05/02/24 13:38:38)                   Impression:      Renal transplant in the right lower quadrant appears unremarkable.  However there is a focal fluid collection identified in the subcutaneous fat of the right lower abdomen near site of patient's prior surgery.  It measures approximately 14 cc.      Electronically signed by: Leonor Monroy MD  Date:    05/02/2024  Time:    13:38               Narrative:    EXAMINATION:  US TRANSPLANT KIDNEY WITH DOPPLER    CLINICAL HISTORY:  Fever;Kidney transplant status    TECHNIQUE:  Real time gray scale and doppler ultrasound was performed over the patient's renal allograft.    COMPARISON:  None    FINDINGS:  Renal allograft in the right lower quadrant.  The allograft measures 10.8 cm. Normal perfusion. No hydronephrosis.    There is a focal fluid collection in the subcutaneous fat adjacent to the scar from previous surgery measuring 7 x 1.9 x 3 cm.    Vasculature:    Resistive indices ranged from 0.73 to 0.77.    Main renal artery peak systolic velocity: 74cm/sec with normal waveform.  Velocity within the right iliac artery is 250 centimeters/second.    Renal artery/iliac ratio: 0.25.    The main renal vein is patent.                                       CT Head Without Contrast (Final result)  Result time 05/02/24 11:15:10      Final result by Leonor Monroy MD (Timothy) (05/02/24 11:15:10)                   Impression:      No acute intracranial  abnormality.    All CT scans at this facility use dose modulation, iterative reconstructions, and/or weight base dosing when appropriate to reduce radiation dose to as low as reasonably achievable.      Electronically signed by: Leonor Monroy MD  Date:    05/02/2024  Time:    11:15               Narrative:    EXAMINATION:  CT HEAD WITHOUT CONTRAST    CLINICAL HISTORY:  Syncope, recurrent;    TECHNIQUE:  Noncontrast images were obtained    COMPARISON:  Comparison CT brain 05/14/2023 at    FINDINGS:  No intracranial acute hemorrhage or acute focal brain parenchymal abnormality is identified.  Small chronic infarction left parietal region.  Calvarium is intact.  Chronic opacification of the right sphenoid sinus.                                       X-Ray Chest AP Portable (Final result)  Result time 05/02/24 09:09:16      Final result by Ta Whitehead MD (05/02/24 09:09:16)                   Impression:      No acute process seen.      Electronically signed by: Ta Whitehead MD  Date:    05/02/2024  Time:    09:09               Narrative:    EXAMINATION:  XR CHEST AP PORTABLE    CLINICAL HISTORY:  Sepsis;    FINDINGS:  Single view of the chest.  Comparison 02/18/2024.    Cardiac silhouette is normal.  The lungs demonstrate no evidence of active disease.  Mild dependent changes.  No evidence of pleural effusion or pneumothorax.  Bones appear intact.  Moderate degenerative changes and moderate atherosclerotic disease.                                       The EKG was ordered, reviewed, and independently interpreted by the ED provider.  Interpretation time: 9:22  Rate: 79 BPM  Rhythm: normal sinus rhythm  Interpretation: No acute ST changes. No STEMI.             The Emergency Provider reviewed the vital signs and test results, which are outlined above.     ED Discussion     11:45 AM: Discussed pt's case with Dr. Lloyd who recommends admitting pt to hospital medicine for IV abx.     1:29 PM: Discussed case with  Dr. Rosales ([facility]). Dr. Rosales agrees with current care and management of pt and accepts admission.   Admitting Service: [facility]  Admitting Physician: Dr. Rosales  Admit to: Obs     1:32 PM: Re-evaluated pt. I have discussed test results, shared treatment plan, and the need for admission with patient and family at bedside. Pt and family express understanding at this time and agree with all information. All questions answered. Pt and family have no further questions or concerns at this time. Pt is ready for admit.        Medical Decision Making  DDx: fever, uti,     Amount and/or Complexity of Data Reviewed  Labs: ordered. Decision-making details documented in ED Course.  Radiology: ordered. Decision-making details documented in ED Course.  ECG/medicine tests: ordered and independent interpretation performed. Decision-making details documented in ED Course.    Risk  Decision regarding hospitalization.                ED Medication(s):  Medications   tacrolimus capsule 8 mg (8 mg Oral Given 5/2/24 1036)   atorvastatin tablet 40 mg (has no administration in time range)   aspirin EC tablet 81 mg (has no administration in time range)   dapsone tablet 100 mg (has no administration in time range)   isosorbide dinitrate tablet 20 mg (has no administration in time range)   finasteride tablet 5 mg (has no administration in time range)   sodium bicarbonate tablet 1,300 mg (has no administration in time range)   tamsulosin 24 hr capsule 0.4 mg (has no administration in time range)   valGANciclovir tablet 450 mg (has no administration in time range)   butalbital-acetaminophen-caffeine -40 mg per tablet 1 tablet (has no administration in time range)   predniSONE tablet 5 mg (has no administration in time range)   cefTRIAXone (ROCEPHIN) 2 g in dextrose 5 % in water (D5W) 100 mL IVPB (MB+) (0 g Intravenous Stopped 5/2/24 1403)   acetaminophen tablet 650 mg (650 mg Oral Given 5/2/24 1222)       Current  Discharge Medication List                  Scribe Attestation:   Scribe #1: I performed the above scribed service and the documentation accurately describes the services I performed. I attest to the accuracy of the note.     Attending:   Physician Attestation Statement for Scribe #1: I, Remi Rose MD, personally performed the services described in this documentation, as scribed by Zoë Son, in my presence, and it is both accurate and complete.           Clinical Impression       ICD-10-CM ICD-9-CM   1. Fever  R50.9 780.60   2. Kidney transplant recipient  Z94.0 V42.0   3. Acute cystitis  N30.00 595.0       Disposition:   Disposition: Placed in Observation  Condition: Stable        Remi Rose MD  05/02/24 4403

## 2024-05-02 NOTE — TELEPHONE ENCOUNTER
Kidney transplant 2/18. States he took his temp and it was elevated. Taken in bed, under covers, temp is 100.4. No feeling of not feeling well, states he feels great, is actually getting up to take shower and go get his labs done. Asked to re check temp 100.7, does not feel warm. Triage done- dispo see provider in 4 hours. He will take shower and go to labs and bring his thermometer with him and I will reach out to coordinator to call. Verb understanding.     BPA reviewed, dispo should be ED, patient called back and message left to go to ED. Will continue to reach patient.     Patient called back and advised of need to be seen in th ED, apologized re not noting correct protocol. . Temp of 100.4 or greater. He will go over there and bring his thermometer with him and go to [facility]. Advised to ID as a transplant patient and they will reach out to team.            Reason for Disposition   Transplant patient (e.g., kidney, liver, lung, heart)    Additional Information   Negative: Shock suspected (e.g., cold/pale/clammy skin, too weak to stand, low BP, rapid pulse)   Negative: Difficult to awaken or acting confused (e.g., disoriented, slurred speech)   Negative: [1] Difficulty breathing AND [2] bluish lips, tongue or face   Negative: New-onset rash with multiple purple (or blood-colored) spots or dots   Negative: Sounds like a life-threatening emergency to the triager   Negative: [1] Headache AND [2] stiff neck (can't touch chin to chest)   Negative: Difficulty breathing   Negative: IV Drug Use (IVDU)   Negative: [1] Drinking very little AND [2] dehydration suspected (e.g., no urine > 12 hours, very dry mouth, very lightheaded)   Negative: Widespread rash and cause unknown   Negative: Patient sounds very sick or weak to the triager  (Exception: Mild weakness and hasn't taken fever medicine.)   Negative: Fever > 104 F (40 C)   Negative: [1] Fever > 101 F (38.3 C) AND [2] age > 60 years   Negative: [1] Fever > 100.0 F  (37.8 C) AND [2] bedridden (e.g., CVA, chronic illness, recovering from surgery)   Negative: [1] Fever > 100.0 F (37.8 C) AND [2] indwelling urinary catheter (e.g., Bates, Coude)   Negative: [1] Fever > 100.0 F (37.8 C) AND [2] has port (portacath), central line, or PICC line   Negative: [1] Fever > 100.0 F (37.8 C) AND [2] diabetes mellitus or weak immune system (e.g., HIV positive, cancer chemo, splenectomy, organ transplant, chronic steroids)   Negative: [1] Fever > 100.0 F (37.8 C) AND [2] surgery in the last month    Protocols used: Fever-A-AH

## 2024-05-02 NOTE — PLAN OF CARE
POC reviewed with pt. Pt verbalizes understanding of POC. No questions at this time.  AAOx3. NADN.  NSR on cardiac monitor.  Pt remains free of falls.  No complaints at this time.  Safety measures in place. Will continue to monitor.  Informed pt to call for assistance before getting up. Pt verbalizes understanding.  Hourly rounding and chart check complete.

## 2024-05-03 LAB
ACINETOBACTER CALCOACETICUS/BAUMANNII COMPLEX: NOT DETECTED
ALBUMIN SERPL BCP-MCNC: 3.1 G/DL (ref 3.5–5.2)
ALP SERPL-CCNC: 45 U/L (ref 55–135)
ALT SERPL W/O P-5'-P-CCNC: 8 U/L (ref 10–44)
ANION GAP SERPL CALC-SCNC: 7 MMOL/L (ref 8–16)
ANISOCYTOSIS BLD QL SMEAR: SLIGHT
AST SERPL-CCNC: 21 U/L (ref 10–40)
BACTEROIDES FRAGILIS: NOT DETECTED
BASOPHILS NFR BLD: 2 % (ref 0–1.9)
BILIRUB SERPL-MCNC: 1 MG/DL (ref 0.1–1)
BUN SERPL-MCNC: 24 MG/DL (ref 8–23)
CALCIUM SERPL-MCNC: 9.2 MG/DL (ref 8.7–10.5)
CANDIDA ALBICANS: NOT DETECTED
CANDIDA AURIS: NOT DETECTED
CANDIDA GLABRATA: NOT DETECTED
CANDIDA KRUSEI: NOT DETECTED
CANDIDA PARAPSILOSIS: NOT DETECTED
CANDIDA TROPICALIS: NOT DETECTED
CHLORIDE SERPL-SCNC: 109 MMOL/L (ref 95–110)
CO2 SERPL-SCNC: 19 MMOL/L (ref 23–29)
CREAT SERPL-MCNC: 1.9 MG/DL (ref 0.5–1.4)
CRYPTOCOCCUS NEOFORMANS/GATTII: NOT DETECTED
CTX-M GENE (ESBL PRODUCER): NOT DETECTED
DIFFERENTIAL METHOD BLD: ABNORMAL
ENTEROBACTER CLOACAE COMPLEX: NOT DETECTED
ENTEROBACTERALES: ABNORMAL
ENTEROCOCCUS FAECALIS: NOT DETECTED
ENTEROCOCCUS FAECIUM: NOT DETECTED
EOSINOPHIL NFR BLD: 0 % (ref 0–8)
ERYTHROCYTE [DISTWIDTH] IN BLOOD BY AUTOMATED COUNT: 15.8 % (ref 11.5–14.5)
ESCHERICHIA COLI: NOT DETECTED
EST. GFR  (NO RACE VARIABLE): 38 ML/MIN/1.73 M^2
GLUCOSE SERPL-MCNC: 102 MG/DL (ref 70–110)
GLUCOSE SERPL-MCNC: 163 MG/DL (ref 70–110)
GLUCOSE SERPL-MCNC: 204 MG/DL (ref 70–110)
GLUCOSE SERPL-MCNC: 92 MG/DL (ref 70–110)
HAEMOPHILUS INFLUENZAE: NOT DETECTED
HCT VFR BLD AUTO: 26.7 % (ref 40–54)
HGB BLD-MCNC: 8.4 G/DL (ref 14–18)
IMM GRANULOCYTES # BLD AUTO: ABNORMAL K/UL (ref 0–0.04)
IMM GRANULOCYTES NFR BLD AUTO: ABNORMAL % (ref 0–0.5)
IMP GENE (CARBAPENEM RESISTANT): NOT DETECTED
KLEBSIELLA AEROGENES: NOT DETECTED
KLEBSIELLA OXYTOCA: NOT DETECTED
KLEBSIELLA PNEUMONIAE GROUP: NOT DETECTED
KPC RESISTANCE GENE (CARBAPENEM): NOT DETECTED
LISTERIA MONOCYTOGENES: NOT DETECTED
LYMPHOCYTES NFR BLD: 5 % (ref 18–48)
MAGNESIUM SERPL-MCNC: 1.5 MG/DL (ref 1.6–2.6)
MCH RBC QN AUTO: 25 PG (ref 27–31)
MCHC RBC AUTO-ENTMCNC: 31.5 G/DL (ref 32–36)
MCR-1: ABNORMAL
MCV RBC AUTO: 80 FL (ref 82–98)
MEC A/C AND MREJ (MRSA): ABNORMAL
MEC A/C: ABNORMAL
METAMYELOCYTES NFR BLD MANUAL: 1 %
MONOCYTES NFR BLD: 13 % (ref 4–15)
NDM GENE (CARBAPENEM RESISTANT): NOT DETECTED
NEISSERIA MENINGITIDIS: NOT DETECTED
NEUTROPHILS NFR BLD: 71 % (ref 38–73)
NEUTS BAND NFR BLD MANUAL: 8 %
NRBC BLD-RTO: 0 /100 WBC
OHS QRS DURATION: 78 MS
OHS QTC CALCULATION: 396 MS
OVALOCYTES BLD QL SMEAR: ABNORMAL
OXA-48-LIKE (CARBAPENEM RESISTANT): NOT DETECTED
PLATELET # BLD AUTO: 413 K/UL (ref 150–450)
PLATELET BLD QL SMEAR: ABNORMAL
PMV BLD AUTO: 9.8 FL (ref 9.2–12.9)
POCT GLUCOSE: 163 MG/DL (ref 70–110)
POCT GLUCOSE: 170 MG/DL (ref 70–110)
POCT GLUCOSE: 204 MG/DL (ref 70–110)
POCT GLUCOSE: 92 MG/DL (ref 70–110)
POIKILOCYTOSIS BLD QL SMEAR: SLIGHT
POTASSIUM SERPL-SCNC: 5.1 MMOL/L (ref 3.5–5.1)
PROT SERPL-MCNC: 5.8 G/DL (ref 6–8.4)
PROTEUS SPECIES: NOT DETECTED
PSEUDOMONAS AERUGINOSA: NOT DETECTED
RBC # BLD AUTO: 3.36 M/UL (ref 4.6–6.2)
SALMONELLA SP: NOT DETECTED
SCHISTOCYTES BLD QL SMEAR: PRESENT
SERRATIA MARCESCENS: DETECTED
SODIUM SERPL-SCNC: 135 MMOL/L (ref 136–145)
STAPHYLOCOCCUS AUREUS: NOT DETECTED
STAPHYLOCOCCUS EPIDERMIDIS: NOT DETECTED
STAPHYLOCOCCUS LUGDUNESIS: NOT DETECTED
STAPHYLOCOCCUS SPECIES: NOT DETECTED
STENOTROPHOMONAS MALTOPHILIA: NOT DETECTED
STREPTOCOCCUS AGALACTIAE: NOT DETECTED
STREPTOCOCCUS PNEUMONIAE: NOT DETECTED
STREPTOCOCCUS PYOGENES: NOT DETECTED
STREPTOCOCCUS SPECIES: NOT DETECTED
VAN A/B (VRE GENE): ABNORMAL
VIM GENE (CARBAPENEM RESISTANT): NOT DETECTED
WBC # BLD AUTO: 6.12 K/UL (ref 3.9–12.7)

## 2024-05-03 PROCEDURE — 85027 COMPLETE CBC AUTOMATED: CPT | Performed by: INTERNAL MEDICINE

## 2024-05-03 PROCEDURE — 36415 COLL VENOUS BLD VENIPUNCTURE: CPT | Performed by: INTERNAL MEDICINE

## 2024-05-03 PROCEDURE — 80053 COMPREHEN METABOLIC PANEL: CPT | Performed by: INTERNAL MEDICINE

## 2024-05-03 PROCEDURE — 96366 THER/PROPH/DIAG IV INF ADDON: CPT

## 2024-05-03 PROCEDURE — 63600175 PHARM REV CODE 636 W HCPCS: Performed by: INTERNAL MEDICINE

## 2024-05-03 PROCEDURE — 21400001 HC TELEMETRY ROOM

## 2024-05-03 PROCEDURE — 63600175 PHARM REV CODE 636 W HCPCS: Performed by: EMERGENCY MEDICINE

## 2024-05-03 PROCEDURE — 85007 BL SMEAR W/DIFF WBC COUNT: CPT | Performed by: INTERNAL MEDICINE

## 2024-05-03 PROCEDURE — 25000003 PHARM REV CODE 250: Performed by: INTERNAL MEDICINE

## 2024-05-03 PROCEDURE — A9567 TECHNETIUM TC-99M AEROSOL: HCPCS | Performed by: INTERNAL MEDICINE

## 2024-05-03 PROCEDURE — A9540 TC99M MAA: HCPCS | Performed by: INTERNAL MEDICINE

## 2024-05-03 PROCEDURE — 83735 ASSAY OF MAGNESIUM: CPT | Performed by: INTERNAL MEDICINE

## 2024-05-03 RX ADMIN — KIT FOR THE PREPARATION OF TECHNETIUM TC 99M ALBUMIN AGGREGATED 5.5 MILLICURIE: 2 INJECTION, POWDER, LYOPHILIZED, FOR SUSPENSION INTRAPERITONEAL; INTRAVENOUS at 12:05

## 2024-05-03 RX ADMIN — CARVEDILOL 12.5 MG: 12.5 TABLET, FILM COATED ORAL at 09:05

## 2024-05-03 RX ADMIN — SODIUM BICARBONATE 650 MG TABLET 1300 MG: at 09:05

## 2024-05-03 RX ADMIN — TACROLIMUS 8 MG: 1 CAPSULE ORAL at 05:05

## 2024-05-03 RX ADMIN — ASPIRIN 81 MG: 81 TABLET, COATED ORAL at 09:05

## 2024-05-03 RX ADMIN — PREDNISONE 5 MG: 5 TABLET ORAL at 09:05

## 2024-05-03 RX ADMIN — ISOSORBIDE DINITRATE 20 MG: 20 TABLET ORAL at 08:05

## 2024-05-03 RX ADMIN — SODIUM BICARBONATE 650 MG TABLET 1300 MG: at 08:05

## 2024-05-03 RX ADMIN — CARVEDILOL 12.5 MG: 12.5 TABLET, FILM COATED ORAL at 08:05

## 2024-05-03 RX ADMIN — INSULIN DETEMIR 10 UNITS: 100 INJECTION, SOLUTION SUBCUTANEOUS at 08:05

## 2024-05-03 RX ADMIN — INSULIN ASPART 1 UNITS: 100 INJECTION, SOLUTION INTRAVENOUS; SUBCUTANEOUS at 08:05

## 2024-05-03 RX ADMIN — ISOSORBIDE DINITRATE 20 MG: 20 TABLET ORAL at 03:05

## 2024-05-03 RX ADMIN — CEFTRIAXONE SODIUM 1 G: 1 INJECTION, POWDER, FOR SOLUTION INTRAMUSCULAR; INTRAVENOUS at 05:05

## 2024-05-03 RX ADMIN — FINASTERIDE 5 MG: 5 TABLET, FILM COATED ORAL at 09:05

## 2024-05-03 RX ADMIN — MAGNESIUM OXIDE TAB 400 MG (241.3 MG ELEMENTAL MG) 400 MG: 400 (241.3 MG) TAB at 09:05

## 2024-05-03 RX ADMIN — ISOSORBIDE DINITRATE 20 MG: 20 TABLET ORAL at 09:05

## 2024-05-03 RX ADMIN — TACROLIMUS 8 MG: 1 CAPSULE ORAL at 08:05

## 2024-05-03 RX ADMIN — DAPSONE 100 MG: 25 TABLET ORAL at 09:05

## 2024-05-03 RX ADMIN — THERA TABS 1 TABLET: TAB at 09:05

## 2024-05-03 RX ADMIN — KIT FOR THE PREPARATION OF TECHNETIUM TC 99M PENTETATE 30.1 MILLICURIE: 20 INJECTION, POWDER, LYOPHILIZED, FOR SOLUTION INTRAVENOUS; RESPIRATORY (INHALATION) at 12:05

## 2024-05-03 RX ADMIN — TAMSULOSIN HYDROCHLORIDE 0.4 MG: 0.4 CAPSULE ORAL at 09:05

## 2024-05-03 RX ADMIN — CETIRIZINE HYDROCHLORIDE 10 MG: 10 TABLET, FILM COATED ORAL at 09:05

## 2024-05-03 RX ADMIN — INSULIN ASPART 4 UNITS: 100 INJECTION, SOLUTION INTRAVENOUS; SUBCUTANEOUS at 05:05

## 2024-05-03 RX ADMIN — SACUBITRIL AND VALSARTAN 2 TABLET: 49; 51 TABLET, FILM COATED ORAL at 08:05

## 2024-05-03 RX ADMIN — MAGNESIUM OXIDE TAB 400 MG (241.3 MG ELEMENTAL MG) 400 MG: 400 (241.3 MG) TAB at 08:05

## 2024-05-03 RX ADMIN — CEFTRIAXONE 1 G: 1 INJECTION, POWDER, FOR SOLUTION INTRAMUSCULAR; INTRAVENOUS at 11:05

## 2024-05-03 RX ADMIN — VALGANCICLOVIR 450 MG: 450 TABLET, FILM COATED ORAL at 09:05

## 2024-05-03 RX ADMIN — ATORVASTATIN CALCIUM 40 MG: 40 TABLET, FILM COATED ORAL at 08:05

## 2024-05-03 RX ADMIN — SACUBITRIL AND VALSARTAN 2 TABLET: 49; 51 TABLET, FILM COATED ORAL at 09:05

## 2024-05-03 NOTE — ASSESSMENT & PLAN NOTE
Patient's anemia is currently  stable .  Etiology likely d/t chronic disease due to Chronic Kidney Disease  Current CBC reviewed-   Lab Results   Component Value Date    HGB 8.4 (L) 05/03/2024    HCT 26.7 (L) 05/03/2024     Monitor serial CBC and transfuse if patient becomes hemodynamically unstable, symptomatic or H/H drops below 7/21.

## 2024-05-03 NOTE — PLAN OF CARE
[facility] - Tele[facility] (Hospital)  Initial Discharge Assessment       Primary Care Provider: Steven Morgan MD    Admission Diagnosis: Acute cystitis [N30.00]  Fever [R50.9]  Kidney transplant recipient [Z94.0]    Admission Date: 5/2/2024  Expected Discharge Date:     Transition of Care Barriers: (P) None    Payor: BLUE CROSS BLUE [facility] / Plan: [place] HMO / Product Type: HMO /     Extended Emergency Contact Information  Primary Emergency Contact: Kunal Alves  Address: [id number] [place], [place] [address], [place] [id number] [place] [address]  Mobile Phone: [phone]  Relation: Spouse    Discharge Plan A: (P) Home with family  Discharge Plan B: (P) Home Health      [facility] [address] [address]  Phone: [phone] Fax: [phone]    [id number] [place], [place] - [address]  [address] [address]  Phone: [phone] Fax: [phone]    [facility]  for [place], [place] - [address]  [address] [facility] [address]  Phone: [phone] Fax: [phone]      Initial Assessment (most recent)       Adult Discharge Assessment - 05/03/24 1346          Discharge Assessment    Assessment Type Discharge Planning Assessment (P)      Confirmed/corrected address, phone number and insurance Yes (P)      Confirmed Demographics Correct on Facesheet (P)      Source of Information patient (P)      When was your last doctors appointment? 03/06/24 (P)      Communicated LYDIA with patient/caregiver Date not available/Unable to determine (P)      Reason For Admission Fever (P)      People in Home spouse (P)      Facility Arrived From: home (P)      Do you expect to return to your current living situation? Yes (P)      Do you have help at home or someone to help you manage your care at home? Yes (P)      Who are your caregiver(s) and their phone number(s)? spouse (P)      Prior to hospitilization cognitive status: Alert/Oriented (P)       Current cognitive status: Alert/Oriented (P)      Walking or Climbing Stairs Difficulty no (P)      Dressing/Bathing Difficulty no (P)      Equipment Currently Used at Home glucometer (P)      Readmission within 30 days? No (P)      Patient currently being followed by outpatient case management? No (P)      Do you currently have service(s) that help you manage your care at home? No (P)      Do you take prescription medications? Yes (P)      Do you have prescription coverage? Yes (P)      Coverage BCBS (P)      Do you have any problems affording any of your prescribed medications? No (P)      Who is going to help you get home at discharge? spouse (P)      How do you get to doctors appointments? car, drives self (P)      Are you on dialysis? No (P)      Do you take coumadin? No (P)      Discharge Plan A Home with family (P)      Discharge Plan B Home Health (P)      DME Needed Upon Discharge  none (P)      Discharge Plan discussed with: Patient (P)      Transition of Care Barriers None (P)         Housing Stability    In the last 12 months, was there a time when you were not able to pay the mortgage or rent on time? No (P)      At any time in the past 12 months, were you homeless or living in a shelter (including now)? No (P)         Transportation Needs    In the past 12 months, has lack of transportation kept you from medical appointments or from getting medications? No (P)      In the past 12 months, has lack of transportation kept you from meetings, work, or from getting things needed for daily living? No (P)         Food Insecurity    Within the past 12 months, you worried that your food would run out before you got the money to buy more. Never true (P)      Within the past 12 months, the food you bought just didn't last and you didn't have money to get more. Never true (P)                    Patient lives with spouse who can be his help at home.  Patient is independent with ADL's.  Currently no  needs.

## 2024-05-03 NOTE — SUBJECTIVE & OBJECTIVE
Interval History: f/u bacteremia  positive blood cultures adjusted antibiotics. Obtained VQ scan negative    Review of Systems  Objective:     Vital Signs (Most Recent):  Temp: 97.6 °F (36.4 °C) (05/03/24 1632)  Pulse: 62 (05/03/24 1632)  Resp: 20 (05/03/24 1632)  BP: (!) 150/67 (05/03/24 1632)  SpO2: (!) 93 % (05/03/24 1632) Vital Signs (24h Range):  Temp:  [97.6 °F (36.4 °C)-100.6 °F (38.1 °C)] 97.6 °F (36.4 °C)  Pulse:  [62-86] 62  Resp:  [17-20] 20  SpO2:  [89 %-95 %] 93 %  BP: (119-182)/(57-86) 150/67     Weight: 114.3 kg (251 lb 15.8 oz)  Body mass index is 32.35 kg/m².  No intake or output data in the 24 hours ending 05/03/24 1633      Physical Exam  Constitutional:       Appearance: He is ill-appearing.   HENT:      Head: Normocephalic and atraumatic.   Cardiovascular:      Rate and Rhythm: Normal rate and regular rhythm.      Heart sounds: No murmur heard.  Pulmonary:      Effort: Pulmonary effort is normal. No respiratory distress.      Breath sounds: Normal breath sounds. No wheezing.   Abdominal:      General: Bowel sounds are normal. There is no distension.      Palpations: Abdomen is soft.      Tenderness: There is no abdominal tenderness.   Musculoskeletal:         General: No swelling.   Skin:     General: Skin is warm and dry.   Neurological:      Mental Status: He is alert and oriented to person, place, and time. Mental status is at baseline.             Significant Labs: All pertinent labs within the past 24 hours have been reviewed.  Recent Lab Results  (Last 5 results in the past 24 hours)        05/03/24  1113   05/03/24  1104   05/03/24  0734   05/03/24  0636   05/03/24  0502        Albumin         3.1       ALP         45       ALT         8       Anion Gap         7       Aniso         Slight       AST         21       Bands         8.0       Basophil %         2.0       BILIRUBIN TOTAL         1.0  Comment: For infants and newborns, interpretation of results should be based  on gestational  age, weight and in agreement with clinical  observations.    Premature Infant recommended reference ranges:  Up to 24 hours.............<8.0 mg/dL  Up to 48 hours............<12.0 mg/dL  3-5 days..................<15.0 mg/dL  6-29 days.................<15.0 mg/dL         BUN         24       Calcium         9.2       Chloride         109       CO2         19       Creatinine         1.9       Differential Method         Manual       eGFR         38       Eos %         0.0       Glucose         102       Gran %         71.0       Hematocrit         26.7       Hemoglobin         8.4       Immature Grans (Abs)         CANCELED  Comment: Mild elevation in immature granulocytes is non specific and   can be seen in a variety of conditions including stress response,   acute inflammation, trauma and pregnancy. Correlation with other   laboratory and clinical findings is essential.    Result canceled by the ancillary.         Immature Granulocytes         CANCELED  Comment: Result canceled by the ancillary.       Lymph %         5.0       Magnesium          1.5       MCH         25.0       MCHC         31.5       MCV         80       Metamyelocytes         1.0       Mono %         13.0       MPV         9.8       nRBC         0       Ovalocytes         Occasional       Platelet Estimate         Appears normal       Platelet Count         413       POC Glucose 163     92           POCT Glucose   163     92         Poikilocytosis         Slight       Potassium         5.1       PROTEIN TOTAL         5.8       RBC         3.36       RDW         15.8       Schistocytes         Present       Sodium         135       WBC         6.12                              Significant Imaging: I have reviewed all pertinent imaging results/findings within the past 24 hours.

## 2024-05-03 NOTE — ASSESSMENT & PLAN NOTE
Patient is identified as having Systolic (HFrEF) heart failure that is Chronic. CHF is currently controlled. Latest ECHO performed and demonstrates- Results for orders placed during the hospital encounter of 11/15/23    Echo    Interpretation Summary    Left Ventricle: The left ventricle is moderately dilated. Moderately increased wall thickness. There is concentric hypertrophy. Mild global hypokinesis present. There is mildly reduced systolic function with a visually estimated ejection fraction of 40 - 45%. There is normal diastolic function.    Right Ventricle: Normal right ventricular cavity size. Wall thickness is normal. Right ventricle wall motion  is normal. Systolic function is normal.    Left Atrium: Left atrium is severely dilated.    Tricuspid Valve: There is mild regurgitation with a centrally directed jet.    Pulmonary Artery: The estimated pulmonary artery systolic pressure is 27 mmHg.    IVC/SVC: Normal venous pressure at 3 mmHg.  . Continue Beta Blocker and monitor clinical status closely. Monitor on telemetry. Patient is off CHF pathway.  Monitor strict Is&Os and daily weights.  Place on fluid restriction of 1.5 L. Cardiology has not been consulted. Continue to stress to patient importance of self efficacy and  on diet for CHF. Last BNP reviewed- and noted below   Recent Labs   Lab 05/02/24  0915   BNP 1,330*

## 2024-05-03 NOTE — PROGRESS NOTES
[facility] Medicine  Progress Note    Patient Name: Isidro Alves  MRN: [id number]  Patient Class: IP- Inpatient   Admission Date: 5/2/2024  Length of Stay: 0 days  Attending Physician: Tammy Rosales MD  Primary Care Provider: Steven Morgan MD        Subjective:     Principal Problem:Acute cystitis        HPI:  The patient is a 67 yo male with past medical history of kidney transplant, pneumonia, anemia, diabetes, hypertension and BPH who presented to the ED with fever, chills, headache, productive cough for 3 days. He reports he was trying to wait it out but cough and headache persisting. He has [place] as well. He states coughing is the worst part. Workup in ED revealed UTI. Transplant medicine consulted and recommended: patient stay in [place], continue empiric IV antibiotics, avoid fluids if possible and hold Cellcept. Hospital medicine consulted.     Overview/Hospital Course:  No notes on file    Interval History: f/u bacteremia  positive blood cultures adjusted antibiotics. Obtained VQ scan negative    Review of Systems  Objective:     Vital Signs (Most Recent):  Temp: 97.6 °F (36.4 °C) (05/03/24 1632)  Pulse: 62 (05/03/24 1632)  Resp: 20 (05/03/24 1632)  BP: (!) 150/67 (05/03/24 1632)  SpO2: (!) 93 % (05/03/24 1632) Vital Signs (24h Range):  Temp:  [97.6 °F (36.4 °C)-100.6 °F (38.1 °C)] 97.6 °F (36.4 °C)  Pulse:  [62-86] 62  Resp:  [17-20] 20  SpO2:  [89 %-95 %] 93 %  BP: (119-182)/(57-86) 150/67     Weight: 114.3 kg (251 lb 15.8 oz)  Body mass index is 32.35 kg/m².  No intake or output data in the 24 hours ending 05/03/24 1633      Physical Exam  Constitutional:       Appearance: He is ill-appearing.   HENT:      Head: Normocephalic and atraumatic.   Cardiovascular:      Rate and Rhythm: Normal rate and regular rhythm.      Heart sounds: No murmur heard.  Pulmonary:      Effort: Pulmonary effort is normal. No respiratory distress.      Breath sounds: Normal breath  sounds. No wheezing.   Abdominal:      General: Bowel sounds are normal. There is no distension.      Palpations: Abdomen is soft.      Tenderness: There is no abdominal tenderness.   Musculoskeletal:         General: No swelling.   Skin:     General: Skin is warm and dry.   Neurological:      Mental Status: He is alert and oriented to person, place, and time. Mental status is at baseline.             Significant Labs: All pertinent labs within the past 24 hours have been reviewed.  Recent Lab Results  (Last 5 results in the past 24 hours)        05/03/24  1113   05/03/24  1104   05/03/24  0734   05/03/24  0636   05/03/24  0502        Albumin         3.1       ALP         45       ALT         8       Anion Gap         7       Aniso         Slight       AST         21       Bands         8.0       Basophil %         2.0       BILIRUBIN TOTAL         1.0  Comment: For infants and newborns, interpretation of results should be based  on gestational age, weight and in agreement with clinical  observations.    Premature Infant recommended reference ranges:  Up to 24 hours.............<8.0 mg/dL  Up to 48 hours............<12.0 mg/dL  3-5 days..................<15.0 mg/dL  6-29 days.................<15.0 mg/dL         BUN         24       Calcium         9.2       Chloride         109       CO2         19       Creatinine         1.9       Differential Method         Manual       eGFR         38       Eos %         0.0       Glucose         102       Gran %         71.0       Hematocrit         26.7       Hemoglobin         8.4       Immature Grans (Abs)         CANCELED  Comment: Mild elevation in immature granulocytes is non specific and   can be seen in a variety of conditions including stress response,   acute inflammation, trauma and pregnancy. Correlation with other   laboratory and clinical findings is essential.    Result canceled by the ancillary.         Immature Granulocytes         CANCELED  Comment: Result  canceled by the ancillary.       Lymph %         5.0       Magnesium          1.5       MCH         25.0       MCHC         31.5       MCV         80       Metamyelocytes         1.0       Mono %         13.0       MPV         9.8       nRBC         0       Ovalocytes         Occasional       Platelet Estimate         Appears normal       Platelet Count         413       POC Glucose 163     92           POCT Glucose   163     92         Poikilocytosis         Slight       Potassium         5.1       PROTEIN TOTAL         5.8       RBC         3.36       RDW         15.8       Schistocytes         Present       Sodium         135       WBC         6.12                              Significant Imaging: I have reviewed all pertinent imaging results/findings within the past 24 hours.    Assessment/Plan:      * Acute cystitis  -continue empiric Rocephin as covers most of the previous organism  -follow-up urine culture      Diabetes mellitus type 2 with complications  Patient's FSGs are controlled on current medication regimen.  Last A1c reviewed-   Lab Results   Component Value Date    HGBA1C 6.4 (H) 05/07/2023     Most recent fingerstick glucose reviewed-   Recent Labs   Lab 05/02/24  1644 05/02/24  2030 05/03/24  0636 05/03/24  1104   POCTGLUCOSE 113* 139* 92 163*       Current correctional scale  Medium  Maintain anti-hyperglycemic dose as follows-   Antihyperglycemics (From admission, onward)      Start     Stop Route Frequency Ordered    05/02/24 2100  insulin detemir U-100 (Levemir) pen 10 Units         -- SubQ Nightly 05/02/24 1508    05/02/24 1607  insulin aspart U-100 pen 0-10 Units         -- SubQ Before meals & nightly PRN 05/02/24 1508          Hold Oral hypoglycemics while patient is in the hospital.    At risk for opportunistic infections  -continue prophylactic medication  -obtain sputum culture  -respiratory viral panel ordered      S/P kidney transplant 2024  -continue Prograf and prednisone  -hold  Cellcept  -monitor renal function, currently at baseline      Chronic systolic congestive heart failure  Patient is identified as having Systolic (HFrEF) heart failure that is Chronic. CHF is currently controlled. Latest ECHO performed and demonstrates- Results for orders placed during the hospital encounter of 11/15/23    Echo    Interpretation Summary    Left Ventricle: The left ventricle is moderately dilated. Moderately increased wall thickness. There is concentric hypertrophy. Mild global hypokinesis present. There is mildly reduced systolic function with a visually estimated ejection fraction of 40 - 45%. There is normal diastolic function.    Right Ventricle: Normal right ventricular cavity size. Wall thickness is normal. Right ventricle wall motion  is normal. Systolic function is normal.    Left Atrium: Left atrium is severely dilated.    Tricuspid Valve: There is mild regurgitation with a centrally directed jet.    Pulmonary Artery: The estimated pulmonary artery systolic pressure is 27 mmHg.    IVC/SVC: Normal venous pressure at 3 mmHg.  . Continue Beta Blocker and monitor clinical status closely. Monitor on telemetry. Patient is off CHF pathway.  Monitor strict Is&Os and daily weights.  Place on fluid restriction of 1.5 L. Cardiology has not been consulted. Continue to stress to patient importance of self efficacy and  on diet for CHF. Last BNP reviewed- and noted below   Recent Labs   Lab 05/02/24  0915   BNP 1,330*         Gram-negative bacteremia  -increase Rocephin  -consult ID in am  -follow-up Identification and sensitivities      Anemia due to chronic kidney disease  Patient's anemia is currently  stable .  Etiology likely d/t chronic disease due to Chronic Kidney Disease  Current CBC reviewed-   Lab Results   Component Value Date    HGB 8.4 (L) 05/03/2024    HCT 26.7 (L) 05/03/2024     Monitor serial CBC and transfuse if patient becomes hemodynamically unstable, symptomatic or H/H drops  below 7/21.      VTE Risk Mitigation (From admission, onward)           Ordered     IP VTE HIGH RISK PATIENT  Once         05/02/24 1508     Place sequential compression device  Until discontinued         05/02/24 1508                    Discharge Planning   LYDIA:      Code Status: Full Code   Is the patient medically ready for discharge?:     Reason for patient still in hospital (select all that apply): Patient trending condition, Laboratory test, Treatment, and Consult recommendations  Discharge Plan A: Home with family                  Tammy Rosales MD  Department of Hospital Medicine   [facility])

## 2024-05-03 NOTE — ASSESSMENT & PLAN NOTE
Patient's FSGs are controlled on current medication regimen.  Last A1c reviewed-   Lab Results   Component Value Date    HGBA1C 6.4 (H) 05/07/2023     Most recent fingerstick glucose reviewed-   Recent Labs   Lab 05/02/24  1644 05/02/24  2030 05/03/24  0636 05/03/24  1104   POCTGLUCOSE 113* 139* 92 163*       Current correctional scale  Medium  Maintain anti-hyperglycemic dose as follows-   Antihyperglycemics (From admission, onward)    Start     Stop Route Frequency Ordered    05/02/24 2100  insulin detemir U-100 (Levemir) pen 10 Units         -- SubQ Nightly 05/02/24 1508    05/02/24 1607  insulin aspart U-100 pen 0-10 Units         -- SubQ Before meals & nightly PRN 05/02/24 1508        Hold Oral hypoglycemics while patient is in the hospital.

## 2024-05-04 LAB
ALBUMIN SERPL BCP-MCNC: 3 G/DL (ref 3.5–5.2)
ALP SERPL-CCNC: 68 U/L (ref 55–135)
ALT SERPL W/O P-5'-P-CCNC: 23 U/L (ref 10–44)
ANION GAP SERPL CALC-SCNC: 7 MMOL/L (ref 8–16)
ANISOCYTOSIS BLD QL SMEAR: SLIGHT
AST SERPL-CCNC: 49 U/L (ref 10–40)
BASO STIPL BLD QL SMEAR: ABNORMAL
BASOPHILS NFR BLD: 1 % (ref 0–1.9)
BILIRUB SERPL-MCNC: 0.5 MG/DL (ref 0.1–1)
BUN SERPL-MCNC: 24 MG/DL (ref 8–23)
CALCIUM SERPL-MCNC: 9.6 MG/DL (ref 8.7–10.5)
CHLORIDE SERPL-SCNC: 108 MMOL/L (ref 95–110)
CO2 SERPL-SCNC: 20 MMOL/L (ref 23–29)
CREAT SERPL-MCNC: 1.6 MG/DL (ref 0.5–1.4)
DIFFERENTIAL METHOD BLD: ABNORMAL
EOSINOPHIL NFR BLD: 4 % (ref 0–8)
ERYTHROCYTE [DISTWIDTH] IN BLOOD BY AUTOMATED COUNT: 15.9 % (ref 11.5–14.5)
EST. GFR  (NO RACE VARIABLE): 47 ML/MIN/1.73 M^2
GLUCOSE SERPL-MCNC: 164 MG/DL (ref 70–110)
GLUCOSE SERPL-MCNC: 170 MG/DL (ref 70–110)
HCT VFR BLD AUTO: 27.8 % (ref 40–54)
HGB BLD-MCNC: 8.4 G/DL (ref 14–18)
HYPOCHROMIA BLD QL SMEAR: ABNORMAL
IMM GRANULOCYTES # BLD AUTO: ABNORMAL K/UL (ref 0–0.04)
IMM GRANULOCYTES NFR BLD AUTO: ABNORMAL % (ref 0–0.5)
LYMPHOCYTES NFR BLD: 5 % (ref 18–48)
MAGNESIUM SERPL-MCNC: 1.5 MG/DL (ref 1.6–2.6)
MCH RBC QN AUTO: 24.3 PG (ref 27–31)
MCHC RBC AUTO-ENTMCNC: 30.2 G/DL (ref 32–36)
MCV RBC AUTO: 80 FL (ref 82–98)
METAMYELOCYTES NFR BLD MANUAL: 1 %
MONOCYTES NFR BLD: 20 % (ref 4–15)
NEUTROPHILS NFR BLD: 63 % (ref 38–73)
NEUTS BAND NFR BLD MANUAL: 6 %
NRBC BLD-RTO: 0 /100 WBC
OVALOCYTES BLD QL SMEAR: ABNORMAL
PLATELET # BLD AUTO: 418 K/UL (ref 150–450)
PLATELET BLD QL SMEAR: ABNORMAL
PMV BLD AUTO: 9.5 FL (ref 9.2–12.9)
POCT GLUCOSE: 147 MG/DL (ref 70–110)
POCT GLUCOSE: 170 MG/DL (ref 70–110)
POCT GLUCOSE: 187 MG/DL (ref 70–110)
POCT GLUCOSE: 234 MG/DL (ref 70–110)
POIKILOCYTOSIS BLD QL SMEAR: SLIGHT
POLYCHROMASIA BLD QL SMEAR: ABNORMAL
POTASSIUM SERPL-SCNC: 4.9 MMOL/L (ref 3.5–5.1)
PROT SERPL-MCNC: 6.5 G/DL (ref 6–8.4)
RBC # BLD AUTO: 3.46 M/UL (ref 4.6–6.2)
SCHISTOCYTES BLD QL SMEAR: ABNORMAL
SCHISTOCYTES BLD QL SMEAR: PRESENT
SODIUM SERPL-SCNC: 135 MMOL/L (ref 136–145)
TACROLIMUS BLD-MCNC: 11.4 NG/ML (ref 5–15)
WBC # BLD AUTO: 6.32 K/UL (ref 3.9–12.7)

## 2024-05-04 PROCEDURE — 85027 COMPLETE CBC AUTOMATED: CPT | Performed by: INTERNAL MEDICINE

## 2024-05-04 PROCEDURE — 25000003 PHARM REV CODE 250: Performed by: INTERNAL MEDICINE

## 2024-05-04 PROCEDURE — 85007 BL SMEAR W/DIFF WBC COUNT: CPT | Performed by: INTERNAL MEDICINE

## 2024-05-04 PROCEDURE — 36415 COLL VENOUS BLD VENIPUNCTURE: CPT | Performed by: INTERNAL MEDICINE

## 2024-05-04 PROCEDURE — 63600175 PHARM REV CODE 636 W HCPCS: Performed by: INTERNAL MEDICINE

## 2024-05-04 PROCEDURE — 80197 ASSAY OF TACROLIMUS: CPT | Performed by: INTERNAL MEDICINE

## 2024-05-04 PROCEDURE — 87040 BLOOD CULTURE FOR BACTERIA: CPT | Mod: 59 | Performed by: INTERNAL MEDICINE

## 2024-05-04 PROCEDURE — 63600175 PHARM REV CODE 636 W HCPCS: Performed by: EMERGENCY MEDICINE

## 2024-05-04 PROCEDURE — 83735 ASSAY OF MAGNESIUM: CPT | Performed by: INTERNAL MEDICINE

## 2024-05-04 PROCEDURE — 21400001 HC TELEMETRY ROOM

## 2024-05-04 PROCEDURE — 80053 COMPREHEN METABOLIC PANEL: CPT | Performed by: INTERNAL MEDICINE

## 2024-05-04 RX ADMIN — FINASTERIDE 5 MG: 5 TABLET, FILM COATED ORAL at 08:05

## 2024-05-04 RX ADMIN — DAPSONE 100 MG: 25 TABLET ORAL at 08:05

## 2024-05-04 RX ADMIN — INSULIN ASPART 4 UNITS: 100 INJECTION, SOLUTION INTRAVENOUS; SUBCUTANEOUS at 04:05

## 2024-05-04 RX ADMIN — SACUBITRIL AND VALSARTAN 2 TABLET: 49; 51 TABLET, FILM COATED ORAL at 08:05

## 2024-05-04 RX ADMIN — CETIRIZINE HYDROCHLORIDE 10 MG: 10 TABLET, FILM COATED ORAL at 08:05

## 2024-05-04 RX ADMIN — CEFTRIAXONE 2 G: 2 INJECTION, POWDER, FOR SOLUTION INTRAMUSCULAR; INTRAVENOUS at 11:05

## 2024-05-04 RX ADMIN — INSULIN ASPART 2 UNITS: 100 INJECTION, SOLUTION INTRAVENOUS; SUBCUTANEOUS at 11:05

## 2024-05-04 RX ADMIN — PREDNISONE 5 MG: 5 TABLET ORAL at 08:05

## 2024-05-04 RX ADMIN — ISOSORBIDE DINITRATE 20 MG: 20 TABLET ORAL at 08:05

## 2024-05-04 RX ADMIN — ATORVASTATIN CALCIUM 40 MG: 40 TABLET, FILM COATED ORAL at 08:05

## 2024-05-04 RX ADMIN — THERA TABS 1 TABLET: TAB at 08:05

## 2024-05-04 RX ADMIN — TACROLIMUS 8 MG: 1 CAPSULE ORAL at 08:05

## 2024-05-04 RX ADMIN — MAGNESIUM OXIDE TAB 400 MG (241.3 MG ELEMENTAL MG) 400 MG: 400 (241.3 MG) TAB at 08:05

## 2024-05-04 RX ADMIN — ASPIRIN 81 MG: 81 TABLET, COATED ORAL at 08:05

## 2024-05-04 RX ADMIN — SODIUM BICARBONATE 650 MG TABLET 1300 MG: at 08:05

## 2024-05-04 RX ADMIN — VALGANCICLOVIR 450 MG: 450 TABLET, FILM COATED ORAL at 08:05

## 2024-05-04 RX ADMIN — TACROLIMUS 8 MG: 1 CAPSULE ORAL at 05:05

## 2024-05-04 RX ADMIN — ISOSORBIDE DINITRATE 20 MG: 20 TABLET ORAL at 03:05

## 2024-05-04 RX ADMIN — CARVEDILOL 12.5 MG: 12.5 TABLET, FILM COATED ORAL at 08:05

## 2024-05-04 RX ADMIN — INSULIN DETEMIR 10 UNITS: 100 INJECTION, SOLUTION SUBCUTANEOUS at 08:05

## 2024-05-04 RX ADMIN — TAMSULOSIN HYDROCHLORIDE 0.4 MG: 0.4 CAPSULE ORAL at 08:05

## 2024-05-04 NOTE — PROGRESS NOTES
[facility]  Progress Note    Patient Name: Isidro Alves  MRN: [id number]  Patient Class: IP- Inpatient   Admission Date: 5/2/2024  Length of Stay: 1 days  Attending Physician: Tammy Rosales MD  Primary Care Provider: Steven Morgan MD        Subjective:     Principal Problem:Gram-negative bacteremia        HPI:  The patient is a 69 yo male with past medical history of kidney transplant, pneumonia, anemia, diabetes, hypertension and BPH who presented to the ED with fever, chills, headache, productive cough for 3 days. He reports he was trying to wait it out but cough and headache persisting. He has [place] as well. He states coughing is the worst part. Workup in ED revealed UTI. Transplant medicine consulted and recommended: patient stay in [place], continue empiric IV antibiotics, avoid fluids if possible and hold Cellcept. Hospital medicine consulted.     Overview/Hospital Course:  The patient presented with fever and chills. He was found to have UTI. Kidney transplant team notified and recommended admission in [place]. Patient placed on empiric IV antibiotics and Cellcept was held. Blood and urine cultures monitored. Gram negative rods isolated in both. ID consulted and kidney transplant team notified.     Interval History: f/u bacteremia,  patient feeling much better, discussed not ready for discharge due to positive blood cultures. ID consulted. Kidney transplant team notified. Can stay in [place]    Review of Systems  Objective:     Vital Signs (Most Recent):  Temp: 97.2 °F (36.2 °C) (05/04/24 0729)  Pulse: 65 (05/04/24 0729)  Resp: 12 (05/04/24 0729)  BP: 139/67 (05/04/24 0729)  SpO2: (!) 93 % (05/04/24 0729) Vital Signs (24h Range):  Temp:  [97.2 °F (36.2 °C)-98.3 °F (36.8 °C)] 97.2 °F (36.2 °C)  Pulse:  [62-86] 65  Resp:  [12-20] 12  SpO2:  [92 %-96 %] 93 %  BP: (139-173)/(63-80) 139/67     Weight: 115.5 kg (254 lb 10.1 oz)  Body mass index is 32.69  kg/m².  No intake or output data in the 24 hours ending 05/04/24 1007      Physical Exam  HENT:      Head: Normocephalic and atraumatic.   Cardiovascular:      Rate and Rhythm: Normal rate and regular rhythm.      Heart sounds: No murmur heard.  Pulmonary:      Effort: Pulmonary effort is normal. No respiratory distress.      Breath sounds: Normal breath sounds. No wheezing.   Abdominal:      General: Bowel sounds are normal. There is no distension.      Palpations: Abdomen is soft.      Tenderness: There is no abdominal tenderness.   Musculoskeletal:         General: No swelling.   Skin:     General: Skin is warm and dry.   Neurological:      Mental Status: He is alert and oriented to person, place, and time. Mental status is at baseline.             Significant Labs: All pertinent labs within the past 24 hours have been reviewed.  Recent Lab Results  (Last 5 results in the past 24 hours)        05/04/24  0626   05/04/24  0511   05/03/24  2039   05/03/24  1712   05/03/24  1706        Albumin   3.0             ALP   68             ALT   23             Anion Gap   7             Aniso   Slight             AST   49             Bands   6.0             Basophilic Stippling   Occasional             Basophil %   1.0             BILIRUBIN TOTAL   0.5  Comment: For infants and newborns, interpretation of results should be based  on gestational age, weight and in agreement with clinical  observations.    Premature Infant recommended reference ranges:  Up to 24 hours.............<8.0 mg/dL  Up to 48 hours............<12.0 mg/dL  3-5 days..................<15.0 mg/dL  6-29 days.................<15.0 mg/dL               BUN   24             Calcium   9.6             Chloride   108             CO2   20             Creatinine   1.6             Differential Method   Manual             eGFR   47             Eos %   4.0             Fragmented Cells   Occasional             Glucose   164             Gran %   63.0              Hematocrit   27.8             Hemoglobin   8.4             Hypo   Occasional             Immature Grans (Abs)   CANCELED  Comment: Mild elevation in immature granulocytes is non specific and   can be seen in a variety of conditions including stress response,   acute inflammation, trauma and pregnancy. Correlation with other   laboratory and clinical findings is essential.    Result canceled by the ancillary.               Immature Granulocytes   CANCELED  Comment: Result canceled by the ancillary.             Lymph %   5.0             Magnesium    1.5             MCH   24.3             MCHC   30.2             MCV   80             Metamyelocytes   1.0             Mono %   20.0             MPV   9.5             nRBC   0             Ovalocytes   Occasional             Platelet Estimate   Appears normal             Platelet Count   418             POC Glucose       204         POCT Glucose 147     170     204       Poikilocytosis   Slight             Poly   Occasional             Potassium   4.9             PROTEIN TOTAL   6.5             RBC   3.46             RDW   15.9             Schistocytes   Present             Sodium   135             WBC   6.32                                    Significant Imaging: I have reviewed all pertinent imaging results/findings within the past 24 hours.    Assessment/Plan:      * Gram-negative bacteremia  -continue Rocephin  -follow-up Identification and sensitivities  -repeated blood cultures as recommended by ID    Acute cystitis  -continue empiric Rocephin as covers most of the previous organism  -follow-up urine culture      Diabetes mellitus type 2 with complications  Patient's FSGs are controlled on current medication regimen.  Last A1c reviewed-   Lab Results   Component Value Date    HGBA1C 6.4 (H) 05/07/2023     Most recent fingerstick glucose reviewed-   Recent Labs   Lab 05/03/24  1104 05/03/24  1706 05/03/24 2039 05/04/24  0626   POCTGLUCOSE 163* 204* 170* 147*        Current correctional scale  Medium  Maintain anti-hyperglycemic dose as follows-   Antihyperglycemics (From admission, onward)      Start     Stop Route Frequency Ordered    05/02/24 2100  insulin detemir U-100 (Levemir) pen 10 Units         -- SubQ Nightly 05/02/24 1508    05/02/24 1607  insulin aspart U-100 pen 0-10 Units         -- SubQ Before meals & nightly PRN 05/02/24 1508          Hold Oral hypoglycemics while patient is in the hospital.    At risk for opportunistic infections  -continue prophylactic medication  -obtain sputum culture  -respiratory viral panel ordered      S/P kidney transplant 2024  -continue Prograf and prednisone  -hold Cellcept  -monitor renal function, currently at baseline      Chronic systolic congestive heart failure  Patient is identified as having Systolic (HFrEF) heart failure that is Chronic. CHF is currently controlled. Latest ECHO performed and demonstrates- Results for orders placed during the hospital encounter of 11/15/23    Echo    Interpretation Summary    Left Ventricle: The left ventricle is moderately dilated. Moderately increased wall thickness. There is concentric hypertrophy. Mild global hypokinesis present. There is mildly reduced systolic function with a visually estimated ejection fraction of 40 - 45%. There is normal diastolic function.    Right Ventricle: Normal right ventricular cavity size. Wall thickness is normal. Right ventricle wall motion  is normal. Systolic function is normal.    Left Atrium: Left atrium is severely dilated.    Tricuspid Valve: There is mild regurgitation with a centrally directed jet.    Pulmonary Artery: The estimated pulmonary artery systolic pressure is 27 mmHg.    IVC/SVC: Normal venous pressure at 3 mmHg.  . Continue Beta Blocker and monitor clinical status closely. Monitor on telemetry. Patient is off CHF pathway.  Monitor strict Is&Os and daily weights.  Place on fluid restriction of 1.5 L. Cardiology has not been  consulted. Continue to stress to patient importance of self efficacy and  on diet for CHF. Last BNP reviewed- and noted below   Recent Labs   Lab 05/02/24  0915   BNP 1,330*         Anemia due to chronic kidney disease  Patient's anemia is currently  stable .  Etiology likely d/t chronic disease due to Chronic Kidney Disease  Current CBC reviewed-   Lab Results   Component Value Date    HGB 8.4 (L) 05/03/2024    HCT 26.7 (L) 05/03/2024     Monitor serial CBC and transfuse if patient becomes hemodynamically unstable, symptomatic or H/H drops below 7/21.      VTE Risk Mitigation (From admission, onward)           Ordered     IP VTE HIGH RISK PATIENT  Once         05/02/24 1508     Place sequential compression device  Until discontinued         05/02/24 1508                    Discharge Planning   LYDIA:      Code Status: Full Code   Is the patient medically ready for discharge?:     Reason for patient still in hospital (select all that apply): Patient trending condition, Laboratory test, Treatment, and Consult recommendations  Discharge Plan A: Home with family                  Tammy Rosales MD  Department of Hospital Medicine   '[place] - Telemetry ([facility])

## 2024-05-04 NOTE — HOSPITAL COURSE
The patient presented with fever and chills. He was found to have UTI. Kidney transplant team notified and recommended admission in [place]. Patient placed on empiric IV Rocephin (5/3 - 5/4) and Cellcept was held.   Blood and urine cultures + Serratia M, campos sensitive in the urine culture, but blood culture resistant to Rocephin. ID consulted and kidney transplant team notified.   He was Antibiotics adjusted 5/5 by ID based upon final culture and changed to cefepime IV. Prograf adjusted by transplant team as level elevated.   Repeat cultures negative @ 48 hours. ID changed antibiotics on d/c to Levaquin 750 mg every other day x 10 days. Prescription sent to the in house pharmacy and filled prior to discharge.  Discussed discharge plan with ID, Dr. Fu, prior to discharge.    Follow up with PCP in 3-5 days for hospital follow up.  Follow up with transplant and diabetes this week as scheduled.     Patient seen and examined on the day of discharge.  All questions and concerns were addressed prior to discharge.    Face to face encounter with patient: 33 minutes

## 2024-05-04 NOTE — PLAN OF CARE
Updated patient on plan of care. Afebrile. Instructed patient to use call light for assistance, call light in reach. Hourly rounding performed. Vitals q4 hours. Education provided, questions answered/encouraged. Chart check complete. NSR    Problem: Adult Inpatient Plan of Care  Goal: Plan of Care Review  Outcome: Progressing

## 2024-05-04 NOTE — ASSESSMENT & PLAN NOTE
-continue Rocephin  -follow-up Identification and sensitivities  -repeated blood cultures as recommended by ID

## 2024-05-04 NOTE — CLINICAL REVIEW
Discussed via secure chat ongoing care of this patient.  New findings of bacteremia. ID consulted. Txp kidney US with collection.  Discussed with our team including surgeon Dr. Faith.   We agree to continue current management. Hold MMF. Agree with ID for suspicion of urinary source. For now no plan for collection drainage.   Please stay in touch with our team and reach back out to us if with questions or concerns, patient or organs decline, clinical course does not go as expected, or if clinicians do not feel comfortable in management.

## 2024-05-04 NOTE — SUBJECTIVE & OBJECTIVE
Interval History: f/u bacteremia,  patient feeling much better, discussed not ready for discharge due to positive blood cultures. ID consulted. Kidney transplant team notified. Can stay in [place]    Review of Systems  Objective:     Vital Signs (Most Recent):  Temp: 97.2 °F (36.2 °C) (05/04/24 0729)  Pulse: 65 (05/04/24 0729)  Resp: 12 (05/04/24 0729)  BP: 139/67 (05/04/24 0729)  SpO2: (!) 93 % (05/04/24 0729) Vital Signs (24h Range):  Temp:  [97.2 °F (36.2 °C)-98.3 °F (36.8 °C)] 97.2 °F (36.2 °C)  Pulse:  [62-86] 65  Resp:  [12-20] 12  SpO2:  [92 %-96 %] 93 %  BP: (139-173)/(63-80) 139/67     Weight: 115.5 kg (254 lb 10.1 oz)  Body mass index is 32.69 kg/m².  No intake or output data in the 24 hours ending 05/04/24 1007      Physical Exam  HENT:      Head: Normocephalic and atraumatic.   Cardiovascular:      Rate and Rhythm: Normal rate and regular rhythm.      Heart sounds: No murmur heard.  Pulmonary:      Effort: Pulmonary effort is normal. No respiratory distress.      Breath sounds: Normal breath sounds. No wheezing.   Abdominal:      General: Bowel sounds are normal. There is no distension.      Palpations: Abdomen is soft.      Tenderness: There is no abdominal tenderness.   Musculoskeletal:         General: No swelling.   Skin:     General: Skin is warm and dry.   Neurological:      Mental Status: He is alert and oriented to person, place, and time. Mental status is at baseline.             Significant Labs: All pertinent labs within the past 24 hours have been reviewed.  Recent Lab Results  (Last 5 results in the past 24 hours)        05/04/24  0626   05/04/24  0511   05/03/24 2039   05/03/24  1712   05/03/24  1706        Albumin   3.0             ALP   68             ALT   23             Anion Gap   7             Aniso   Slight             AST   49             Bands   6.0             Basophilic Stippling   Occasional             Basophil %   1.0             BILIRUBIN TOTAL   0.5  Comment: For  infants and newborns, interpretation of results should be based  on gestational age, weight and in agreement with clinical  observations.    Premature Infant recommended reference ranges:  Up to 24 hours.............<8.0 mg/dL  Up to 48 hours............<12.0 mg/dL  3-5 days..................<15.0 mg/dL  6-29 days.................<15.0 mg/dL               BUN   24             Calcium   9.6             Chloride   108             CO2   20             Creatinine   1.6             Differential Method   Manual             eGFR   47             Eos %   4.0             Fragmented Cells   Occasional             Glucose   164             Gran %   63.0             Hematocrit   27.8             Hemoglobin   8.4             Hypo   Occasional             Immature Grans (Abs)   CANCELED  Comment: Mild elevation in immature granulocytes is non specific and   can be seen in a variety of conditions including stress response,   acute inflammation, trauma and pregnancy. Correlation with other   laboratory and clinical findings is essential.    Result canceled by the ancillary.               Immature Granulocytes   CANCELED  Comment: Result canceled by the ancillary.             Lymph %   5.0             Magnesium    1.5             MCH   24.3             MCHC   30.2             MCV   80             Metamyelocytes   1.0             Mono %   20.0             MPV   9.5             nRBC   0             Ovalocytes   Occasional             Platelet Estimate   Appears normal             Platelet Count   418             POC Glucose       204         POCT Glucose 147     170     204       Poikilocytosis   Slight             Poly   Occasional             Potassium   4.9             PROTEIN TOTAL   6.5             RBC   3.46             RDW   15.9             Schistocytes   Present             Sodium   135             WBC   6.32                                    Significant Imaging: I have reviewed all pertinent imaging results/findings within  the past 24 hours.

## 2024-05-04 NOTE — ASSESSMENT & PLAN NOTE
Patient's FSGs are controlled on current medication regimen.  Last A1c reviewed-   Lab Results   Component Value Date    HGBA1C 6.4 (H) 05/07/2023     Most recent fingerstick glucose reviewed-   Recent Labs   Lab 05/03/24  1104 05/03/24  1706 05/03/24  2039 05/04/24  0626   POCTGLUCOSE 163* 204* 170* 147*       Current correctional scale  Medium  Maintain anti-hyperglycemic dose as follows-   Antihyperglycemics (From admission, onward)    Start     Stop Route Frequency Ordered    05/02/24 2100  insulin detemir U-100 (Levemir) pen 10 Units         -- SubQ Nightly 05/02/24 1508    05/02/24 1607  insulin aspart U-100 pen 0-10 Units         -- SubQ Before meals & nightly PRN 05/02/24 1508        Hold Oral hypoglycemics while patient is in the hospital.

## 2024-05-04 NOTE — CARE UPDATE
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 Kidney US reports renal transplant in the right lower quadrant appears unremarkable. However there is a focal fluid collection identified in the subcutaneous fat of the right lower abdomen near site of patient's prior surgery. It measures approximately 14 cc. U/A with 31 WBC. Urine culture growing GNR. Blood cultures growing Serratia in 2 bottles per BCID. Transplant team updated and agrees with antibiotics and observation. Holding MMF for now. Temp curve improving. ID consulted for GN bacteremia.    Recommendations:  --Serratia not high risk for ampC production   --Continue IV ceftriaxone 2 g daily for now  --Follow initial blood cx for susceptibilities   --Follow repeat blood cx for clearance  --Suspect urinary source but need to see if urine cx growing same isolate   --If urinary source confirmed anticipate 7 day course of antibiotics from date of negative blood cx  --ID will continue to follow   --Above d/w primary team

## 2024-05-05 LAB
ALBUMIN SERPL BCP-MCNC: 3.2 G/DL (ref 3.5–5.2)
ALP SERPL-CCNC: 61 U/L (ref 55–135)
ALT SERPL W/O P-5'-P-CCNC: 23 U/L (ref 10–44)
ANION GAP SERPL CALC-SCNC: 9 MMOL/L (ref 8–16)
ANISOCYTOSIS BLD QL SMEAR: SLIGHT
AST SERPL-CCNC: 34 U/L (ref 10–40)
BACTERIA BLD CULT: ABNORMAL
BACTERIA UR CULT: ABNORMAL
BASOPHILS NFR BLD: 0 % (ref 0–1.9)
BILIRUB SERPL-MCNC: 0.6 MG/DL (ref 0.1–1)
BUN SERPL-MCNC: 21 MG/DL (ref 8–23)
CALCIUM SERPL-MCNC: 9.5 MG/DL (ref 8.7–10.5)
CHLORIDE SERPL-SCNC: 107 MMOL/L (ref 95–110)
CO2 SERPL-SCNC: 20 MMOL/L (ref 23–29)
CREAT SERPL-MCNC: 1.7 MG/DL (ref 0.5–1.4)
DIFFERENTIAL METHOD BLD: ABNORMAL
EOSINOPHIL NFR BLD: 5 % (ref 0–8)
ERYTHROCYTE [DISTWIDTH] IN BLOOD BY AUTOMATED COUNT: 15.8 % (ref 11.5–14.5)
EST. GFR  (NO RACE VARIABLE): 44 ML/MIN/1.73 M^2
GLUCOSE SERPL-MCNC: 145 MG/DL (ref 70–110)
GLUCOSE SERPL-MCNC: 206 MG/DL (ref 70–110)
HCT VFR BLD AUTO: 29.2 % (ref 40–54)
HGB BLD-MCNC: 9.1 G/DL (ref 14–18)
IMM GRANULOCYTES # BLD AUTO: ABNORMAL K/UL (ref 0–0.04)
IMM GRANULOCYTES NFR BLD AUTO: ABNORMAL % (ref 0–0.5)
LYMPHOCYTES NFR BLD: 3 % (ref 18–48)
MAGNESIUM SERPL-MCNC: 1.5 MG/DL (ref 1.6–2.6)
MCH RBC QN AUTO: 24.5 PG (ref 27–31)
MCHC RBC AUTO-ENTMCNC: 31.2 G/DL (ref 32–36)
MCV RBC AUTO: 79 FL (ref 82–98)
METAMYELOCYTES NFR BLD MANUAL: 3 %
MONOCYTES NFR BLD: 17 % (ref 4–15)
MYELOCYTES NFR BLD MANUAL: 1 %
NEUTROPHILS NFR BLD: 66 % (ref 38–73)
NEUTS BAND NFR BLD MANUAL: 5 %
NRBC BLD-RTO: 0 /100 WBC
OVALOCYTES BLD QL SMEAR: ABNORMAL
PLATELET # BLD AUTO: 434 K/UL (ref 150–450)
PLATELET BLD QL SMEAR: ABNORMAL
PMV BLD AUTO: 9.5 FL (ref 9.2–12.9)
POCT GLUCOSE: 132 MG/DL (ref 70–110)
POCT GLUCOSE: 206 MG/DL (ref 70–110)
POCT GLUCOSE: 221 MG/DL (ref 70–110)
POCT GLUCOSE: 232 MG/DL (ref 70–110)
POIKILOCYTOSIS BLD QL SMEAR: SLIGHT
POLYCHROMASIA BLD QL SMEAR: ABNORMAL
POTASSIUM SERPL-SCNC: 4.6 MMOL/L (ref 3.5–5.1)
PROT SERPL-MCNC: 6.8 G/DL (ref 6–8.4)
RBC # BLD AUTO: 3.71 M/UL (ref 4.6–6.2)
SCHISTOCYTES BLD QL SMEAR: PRESENT
SODIUM SERPL-SCNC: 136 MMOL/L (ref 136–145)
TACROLIMUS BLD-MCNC: 12.7 NG/ML (ref 5–15)
WBC # BLD AUTO: 5.11 K/UL (ref 3.9–12.7)

## 2024-05-05 PROCEDURE — 25000003 PHARM REV CODE 250: Performed by: INTERNAL MEDICINE

## 2024-05-05 PROCEDURE — 36415 COLL VENOUS BLD VENIPUNCTURE: CPT | Performed by: INTERNAL MEDICINE

## 2024-05-05 PROCEDURE — 83735 ASSAY OF MAGNESIUM: CPT | Performed by: INTERNAL MEDICINE

## 2024-05-05 PROCEDURE — 85027 COMPLETE CBC AUTOMATED: CPT | Performed by: INTERNAL MEDICINE

## 2024-05-05 PROCEDURE — 80197 ASSAY OF TACROLIMUS: CPT | Performed by: INTERNAL MEDICINE

## 2024-05-05 PROCEDURE — 80053 COMPREHEN METABOLIC PANEL: CPT | Performed by: INTERNAL MEDICINE

## 2024-05-05 PROCEDURE — 63600175 PHARM REV CODE 636 W HCPCS: Performed by: STUDENT IN AN ORGANIZED HEALTH CARE EDUCATION/TRAINING PROGRAM

## 2024-05-05 PROCEDURE — 25000003 PHARM REV CODE 250: Performed by: STUDENT IN AN ORGANIZED HEALTH CARE EDUCATION/TRAINING PROGRAM

## 2024-05-05 PROCEDURE — 85007 BL SMEAR W/DIFF WBC COUNT: CPT | Performed by: INTERNAL MEDICINE

## 2024-05-05 PROCEDURE — 21400001 HC TELEMETRY ROOM

## 2024-05-05 PROCEDURE — 63600175 PHARM REV CODE 636 W HCPCS: Performed by: INTERNAL MEDICINE

## 2024-05-05 PROCEDURE — 63600175 PHARM REV CODE 636 W HCPCS: Performed by: EMERGENCY MEDICINE

## 2024-05-05 RX ORDER — TACROLIMUS 1 MG/1
7 CAPSULE ORAL 2 TIMES DAILY
Status: DISCONTINUED | OUTPATIENT
Start: 2024-05-05 | End: 2024-05-06 | Stop reason: HOSPADM

## 2024-05-05 RX ADMIN — INSULIN ASPART 4 UNITS: 100 INJECTION, SOLUTION INTRAVENOUS; SUBCUTANEOUS at 09:05

## 2024-05-05 RX ADMIN — ATORVASTATIN CALCIUM 40 MG: 40 TABLET, FILM COATED ORAL at 09:05

## 2024-05-05 RX ADMIN — TACROLIMUS 8 MG: 1 CAPSULE ORAL at 08:05

## 2024-05-05 RX ADMIN — ISOSORBIDE DINITRATE 20 MG: 20 TABLET ORAL at 02:05

## 2024-05-05 RX ADMIN — INSULIN ASPART 4 UNITS: 100 INJECTION, SOLUTION INTRAVENOUS; SUBCUTANEOUS at 04:05

## 2024-05-05 RX ADMIN — SODIUM BICARBONATE 650 MG TABLET 1300 MG: at 08:05

## 2024-05-05 RX ADMIN — THERA TABS 1 TABLET: TAB at 08:05

## 2024-05-05 RX ADMIN — SACUBITRIL AND VALSARTAN 2 TABLET: 49; 51 TABLET, FILM COATED ORAL at 08:05

## 2024-05-05 RX ADMIN — INSULIN ASPART 4 UNITS: 100 INJECTION, SOLUTION INTRAVENOUS; SUBCUTANEOUS at 11:05

## 2024-05-05 RX ADMIN — PREDNISONE 5 MG: 5 TABLET ORAL at 08:05

## 2024-05-05 RX ADMIN — MAGNESIUM OXIDE TAB 400 MG (241.3 MG ELEMENTAL MG) 400 MG: 400 (241.3 MG) TAB at 09:05

## 2024-05-05 RX ADMIN — ISOSORBIDE DINITRATE 20 MG: 20 TABLET ORAL at 09:05

## 2024-05-05 RX ADMIN — SACUBITRIL AND VALSARTAN 2 TABLET: 49; 51 TABLET, FILM COATED ORAL at 09:05

## 2024-05-05 RX ADMIN — CARVEDILOL 12.5 MG: 12.5 TABLET, FILM COATED ORAL at 09:05

## 2024-05-05 RX ADMIN — INSULIN DETEMIR 10 UNITS: 100 INJECTION, SOLUTION SUBCUTANEOUS at 09:05

## 2024-05-05 RX ADMIN — FINASTERIDE 5 MG: 5 TABLET, FILM COATED ORAL at 08:05

## 2024-05-05 RX ADMIN — ISOSORBIDE DINITRATE 20 MG: 20 TABLET ORAL at 08:05

## 2024-05-05 RX ADMIN — TACROLIMUS 7 MG: 1 CAPSULE ORAL at 05:05

## 2024-05-05 RX ADMIN — VALGANCICLOVIR 450 MG: 450 TABLET, FILM COATED ORAL at 08:05

## 2024-05-05 RX ADMIN — SODIUM BICARBONATE 650 MG TABLET 1300 MG: at 09:05

## 2024-05-05 RX ADMIN — CEFEPIME 2 G: 2 INJECTION, POWDER, FOR SOLUTION INTRAVENOUS at 02:05

## 2024-05-05 RX ADMIN — DAPSONE 100 MG: 25 TABLET ORAL at 08:05

## 2024-05-05 RX ADMIN — CETIRIZINE HYDROCHLORIDE 10 MG: 10 TABLET, FILM COATED ORAL at 08:05

## 2024-05-05 RX ADMIN — MAGNESIUM OXIDE TAB 400 MG (241.3 MG ELEMENTAL MG) 400 MG: 400 (241.3 MG) TAB at 08:05

## 2024-05-05 RX ADMIN — ASPIRIN 81 MG: 81 TABLET, COATED ORAL at 08:05

## 2024-05-05 RX ADMIN — TAMSULOSIN HYDROCHLORIDE 0.4 MG: 0.4 CAPSULE ORAL at 08:05

## 2024-05-05 RX ADMIN — CARVEDILOL 12.5 MG: 12.5 TABLET, FILM COATED ORAL at 08:05

## 2024-05-05 NOTE — CLINICAL REVIEW
[facility] Chart Review      Intake/Output Summary (Last 24 hours) at 5/5/2024 1517  Last data filed at 5/4/2024 2148  Gross per 24 hour   Intake --   Output 150 ml   Net -150 ml       Vitals:    05/04/24 2308 05/05/24 0329 05/05/24 0400 05/05/24 0707   BP: (!) 143/70 (!) 155/70  (!) 155/77   BP Location:       Patient Position:       Pulse: 62 67 60 64   Resp: 16 16  14   Temp: 97.9 °F (36.6 °C) 97.8 °F (36.6 °C)  97.9 °F (36.6 °C)   TempSrc:       SpO2: (!) 94% 95%  (!) 94%   Weight:       Height:           Recent Labs   Lab 04/29/24  0950 05/02/24  0915 05/03/24  0502 05/04/24  0511 05/05/24  0526      < > 135* 135* 136   K 5.0   < > 5.1 4.9 4.6      < > 109 108 107   CO2 20*   < > 19* 20* 20*   BUN 24*   < > 24* 24* 21   CREATININE 1.8*   < > 1.9* 1.6* 1.7*   CALCIUM 9.4   < > 9.2 9.6 9.5   PHOS 2.8  --   --   --   --     < > = values in this interval not displayed.     A1c - 05/07/2023 6.4     Immunosuppression Level - 12.7    Decrease tac dose from 8/8 to 7/7 and continue to trend  Continue pred    No other related issues identified. Please call [facility] as needed; We will continue to follow.    Carlitos Lloyd Jr.

## 2024-05-05 NOTE — PLAN OF CARE
Updated patient on plan of care. Instructed patient to use call light for assistance, call light in reach. Hourly rounding performed. Vitals q4 hours. Education provided, questions answered/encouraged. Chart check complete. NSR    Problem: Adult Inpatient Plan of Care  Goal: Plan of Care Review  Outcome: Progressing

## 2024-05-05 NOTE — ASSESSMENT & PLAN NOTE
Patient's FSGs are controlled on current medication regimen.  Last A1c reviewed-   Lab Results   Component Value Date    HGBA1C 6.4 (H) 05/07/2023     Most recent fingerstick glucose reviewed-   Recent Labs   Lab 05/04/24  1926 05/05/24  0603 05/05/24  1109 05/05/24  1611   POCTGLUCOSE 187* 132* 206* 232*       Current correctional scale  Medium  Maintain anti-hyperglycemic dose as follows-   Antihyperglycemics (From admission, onward)    Start     Stop Route Frequency Ordered    05/02/24 2100  insulin detemir U-100 (Levemir) pen 10 Units         -- SubQ Nightly 05/02/24 1508    05/02/24 1607  insulin aspart U-100 pen 0-10 Units         -- SubQ Before meals & nightly PRN 05/02/24 1508        Hold Oral hypoglycemics while patient is in the hospital.

## 2024-05-05 NOTE — SUBJECTIVE & OBJECTIVE
Interval History: f/u bacteremia, patient asking about discharge. Discussed need blood culture to be negative/clear prior to discharge. Tomorrow morning is 48 hours. Possible discharge tomorrow. Notified transplant team of Prograf level, they adjusted dose    Review of Systems  Objective:     Vital Signs (Most Recent):  Temp: 97.7 °F (36.5 °C) (05/05/24 1847)  Pulse: 71 (05/05/24 1847)  Resp: 16 (05/05/24 1847)  BP: (!) 169/75 (05/05/24 1847)  SpO2: (!) 94 % (05/05/24 1847) Vital Signs (24h Range):  Temp:  [97.7 °F (36.5 °C)-97.9 °F (36.6 °C)] 97.7 °F (36.5 °C)  Pulse:  [60-71] 71  Resp:  [14-18] 16  SpO2:  [94 %-95 %] 94 %  BP: (133-169)/(62-77) 169/75     Weight: 115.5 kg (254 lb 10.1 oz)  Body mass index is 32.69 kg/m².    Intake/Output Summary (Last 24 hours) at 5/5/2024 1856  Last data filed at 5/4/2024 2148  Gross per 24 hour   Intake --   Output 150 ml   Net -150 ml         Physical Exam  HENT:      Head: Normocephalic and atraumatic.   Cardiovascular:      Rate and Rhythm: Normal rate and regular rhythm.      Heart sounds: No murmur heard.  Pulmonary:      Effort: Pulmonary effort is normal. No respiratory distress.      Breath sounds: Normal breath sounds. No wheezing.   Abdominal:      General: Bowel sounds are normal. There is no distension.      Palpations: Abdomen is soft.      Tenderness: There is no abdominal tenderness.   Musculoskeletal:         General: No swelling.   Skin:     General: Skin is warm and dry.   Neurological:      Mental Status: He is alert and oriented to person, place, and time. Mental status is at baseline.             Significant Labs: All pertinent labs within the past 24 hours have been reviewed.  Recent Lab Results  (Last 5 results in the past 24 hours)        05/05/24  1611   05/05/24  1123   05/05/24  1109   05/05/24  0603   05/05/24  0526        Albumin         3.2       ALP         61       ALT         23       Anion Gap         9       Aniso         Slight       AST          34       Bands         5.0       Basophil %         0.0       BILIRUBIN TOTAL         0.6  Comment: For infants and newborns, interpretation of results should be based  on gestational age, weight and in agreement with clinical  observations.    Premature Infant recommended reference ranges:  Up to 24 hours.............<8.0 mg/dL  Up to 48 hours............<12.0 mg/dL  3-5 days..................<15.0 mg/dL  6-29 days.................<15.0 mg/dL         BUN         21       Calcium         9.5       Chloride         107       CO2         20       Creatinine         1.7       Differential Method         Manual       eGFR         44       Eos %         5.0       Glucose         145       Gran %         66.0       Hematocrit         29.2       Hemoglobin         9.1       Immature Grans (Abs)         CANCELED  Comment: Mild elevation in immature granulocytes is non specific and   can be seen in a variety of conditions including stress response,   acute inflammation, trauma and pregnancy. Correlation with other   laboratory and clinical findings is essential.    Result canceled by the ancillary.         Immature Granulocytes         CANCELED  Comment: Result canceled by the ancillary.       Lymph %         3.0       Magnesium          1.5       MCH         24.5       MCHC         31.2       MCV         79       Metamyelocytes         3.0       Mono %         17.0       MPV         9.5       Myelocytes         1.0       nRBC         0       Ovalocytes         Occasional       Platelet Estimate         Appears normal       Platelet Count         434       POC Glucose   206             POCT Glucose 232     206   132         Poikilocytosis         Slight       Poly         Occasional       Potassium         4.6       PROTEIN TOTAL         6.8       RBC         3.71       RDW         15.8       Schistocytes         Present       Sodium         136       Tacrolimus Lvl         12.7  Comment: Testing performed by a  chemiluminescent microparticle   immunoassay on the [employer] System.    CAUTION: No firm therapeutic range exists for tacrolimus in whole   blood. The   complexity of the clinical state, individual differences in   sensitivity to   immunosuppressive and nephrotoxic effects of tacrolimus,   co-administration   of other immunosuppressants, type of transplant, time post-transplant   and a   number of other factors contribute to different requirements for   optimal   blood levels of tacrolimus. Therefore, individual tacrolimus values   cannot   be used as the sole indicator for making changes in treatment regimen   and   each patient should be thoroughly evaluated clinically before changes   in   treatment regimens are made. Each user must establish his or her own   ranges   based on clinical experience.  Therapeutic ranges vary according to the commercial test used, and   therefore   should be established for each commercial test. Values obtained with   different assay methods cannot be used interchangeably due to   differences in   assay methods and cross-reactivity with metabolites, nor should   correction   factors be applied. Therefore, consistent use of one assay for   individual   patients is recommended.         WBC         5.11                              Significant Imaging: I have reviewed all pertinent imaging results/findings within the past 24 hours.

## 2024-05-05 NOTE — ASSESSMENT & PLAN NOTE
-appreciate ID  -antibiotics adjusted to cefepime based upon sensitivities  -repeat cultures negative thus far

## 2024-05-05 NOTE — CARE UPDATE
Blood cultures have finalized with Serratia R to ceftriaxone but S to cefepime. Urine culture with pan S Serratia. Both isolates notably S to fluoroquinolones. Repeat blood cx from 5/4 NGTD. Will switch ceftriaxone to renally dosed cefepime for now. Can likely use levofloxacin at discharge to complete minimum 7 day bacteremia course from date of negative blood cx as urinary source confirmed. QTc from 5/2 was 396. Final recommendations to follow.       Urine culture [[id number]] (Abnormal)  Collected: 05/02/24 1052   Order Status: Completed Specimen: Urine Updated: 05/05/24 0304    Urine Culture, Routine SERRATIA MARCESCENS  >100,000 cfu/ml   Abnormal    Narrative:     Specimen Source->Urine   Susceptibility     Serratia marcescens     CULTURE, URINE     Cefepime <=2 mcg/mL Sensitive     Ceftriaxone <=1 mcg/mL Sensitive     Ciprofloxacin <=1 mcg/mL Sensitive     Ertapenem <=0.5 mcg/mL Sensitive     Gentamicin <=4 mcg/mL Sensitive     Levofloxacin <=2 mcg/mL Sensitive     Meropenem <=1 mcg/mL Sensitive     Piperacillin/Tazo <=16 mcg/mL Sensitive     Tobramycin <=4 mcg/mL Sensitive     Trimeth/Sulfa <=2/38 mcg/mL Sensitive               Linear View         Blood culture x two cultures. Draw prior to antibiotics. [[id number]] (Abnormal)  Collected: 05/02/24 0945   Order Status: Completed Specimen: Blood from Peripheral, Antecubital, Right Updated: 05/05/24 1010    Blood Culture, Routine Gram stain clover bottle: Gram negative rods     Results called to and read back by: Val Espinoza RN  05/03/2024  14:54     SERRATIA MARCESCENS Abnormal    Narrative:     Aerobic and anaerobic   Susceptibility     Serratia marcescens     CULTURE, BLOOD     Cefepime <=2 mcg/mL Sensitive     Ceftriaxone 8 mcg/mL Resistant     Ciprofloxacin <=0.5 mcg/mL Sensitive     Ertapenem <=0.5 mcg/mL Sensitive     Gentamicin 2 mcg/mL Sensitive     Levofloxacin <=1 mcg/mL Sensitive     Meropenem <=1 mcg/mL Sensitive     Piperacillin/Tazo >64 mcg/mL  Resistant     Tobramycin 8 mcg/mL Intermediate     Trimeth/Sulfa <=0.5/9.5 m... Sensitive

## 2024-05-05 NOTE — ASSESSMENT & PLAN NOTE
Patient's anemia is currently  stable .  Etiology likely d/t chronic disease due to Chronic Kidney Disease  Current CBC reviewed-   Lab Results   Component Value Date    HGB 9.1 (L) 05/05/2024    HCT 29.2 (L) 05/05/2024     Monitor serial CBC and transfuse if patient becomes hemodynamically unstable, symptomatic or H/H drops below 7/21.

## 2024-05-05 NOTE — PROGRESS NOTES
[facility]  Progress Note    Patient Name: Isidro Alves  MRN: [id number]  Patient Class: IP- Inpatient   Admission Date: 5/2/2024  Length of Stay: 2 days  Attending Physician: Tammy Rosales MD  Primary Care Provider: Steven Morgan MD        Subjective:     Principal Problem:Gram-negative bacteremia        HPI:  The patient is a 69 yo male with past medical history of kidney transplant, pneumonia, anemia, diabetes, hypertension and BPH who presented to the ED with fever, chills, headache, productive cough for 3 days. He reports he was trying to wait it out but cough and headache persisting. He has [place] as well. He states coughing is the worst part. Workup in ED revealed UTI. Transplant medicine consulted and recommended: patient stay in [place], continue empiric IV antibiotics, avoid fluids if possible and hold Cellcept. Hospital medicine consulted.     Overview/Hospital Course:  The patient presented with fever and chills. He was found to have UTI. Kidney transplant team notified and recommended admission in [place]. Patient placed on empiric IV antibiotics and Cellcept was held. Blood and urine cultures isolated gram negative rods/ ID consulted and kidney transplant team notified. Serratia isolated in both cultures, urine culture pan sensitive but blood culture resistant to ceftriaxone. Antibiotics adjusted 5/5 by ID based upon final culture. Prograf adjusted by transplant team as level elevated.  Repeat cultures negative thus far. If negative at 48 hours, anticipate final ID recs.     Interval History: f/u bacteremia, patient asking about discharge. Discussed need blood culture to be negative/clear prior to discharge. Tomorrow morning is 48 hours. Possible discharge tomorrow. Notified transplant team of Prograf level, they adjusted dose    Review of Systems  Objective:     Vital Signs (Most Recent):  Temp: 97.7 °F (36.5 °C) (05/05/24 1847)  Pulse: 71  (05/05/24 1847)  Resp: 16 (05/05/24 1847)  BP: (!) 169/75 (05/05/24 1847)  SpO2: (!) 94 % (05/05/24 1847) Vital Signs (24h Range):  Temp:  [97.7 °F (36.5 °C)-97.9 °F (36.6 °C)] 97.7 °F (36.5 °C)  Pulse:  [60-71] 71  Resp:  [14-18] 16  SpO2:  [94 %-95 %] 94 %  BP: (133-169)/(62-77) 169/75     Weight: 115.5 kg (254 lb 10.1 oz)  Body mass index is 32.69 kg/m².    Intake/Output Summary (Last 24 hours) at 5/5/2024 1856  Last data filed at 5/4/2024 2148  Gross per 24 hour   Intake --   Output 150 ml   Net -150 ml         Physical Exam  HENT:      Head: Normocephalic and atraumatic.   Cardiovascular:      Rate and Rhythm: Normal rate and regular rhythm.      Heart sounds: No murmur heard.  Pulmonary:      Effort: Pulmonary effort is normal. No respiratory distress.      Breath sounds: Normal breath sounds. No wheezing.   Abdominal:      General: Bowel sounds are normal. There is no distension.      Palpations: Abdomen is soft.      Tenderness: There is no abdominal tenderness.   Musculoskeletal:         General: No swelling.   Skin:     General: Skin is warm and dry.   Neurological:      Mental Status: He is alert and oriented to person, place, and time. Mental status is at baseline.             Significant Labs: All pertinent labs within the past 24 hours have been reviewed.  Recent Lab Results  (Last 5 results in the past 24 hours)        05/05/24  1611   05/05/24  1123   05/05/24  1109   05/05/24  0603   05/05/24  0526        Albumin         3.2       ALP         61       ALT         23       Anion Gap         9       Aniso         Slight       AST         34       Bands         5.0       Basophil %         0.0       BILIRUBIN TOTAL         0.6  Comment: For infants and newborns, interpretation of results should be based  on gestational age, weight and in agreement with clinical  observations.    Premature Infant recommended reference ranges:  Up to 24 hours.............<8.0 mg/dL  Up to 48 hours............<12.0  mg/dL  3-5 days..................<15.0 mg/dL  6-29 days.................<15.0 mg/dL         BUN         21       Calcium         9.5       Chloride         107       CO2         20       Creatinine         1.7       Differential Method         Manual       eGFR         44       Eos %         5.0       Glucose         145       Gran %         66.0       Hematocrit         29.2       Hemoglobin         9.1       Immature Grans (Abs)         CANCELED  Comment: Mild elevation in immature granulocytes is non specific and   can be seen in a variety of conditions including stress response,   acute inflammation, trauma and pregnancy. Correlation with other   laboratory and clinical findings is essential.    Result canceled by the ancillary.         Immature Granulocytes         CANCELED  Comment: Result canceled by the ancillary.       Lymph %         3.0       Magnesium          1.5       MCH         24.5       MCHC         31.2       MCV         79       Metamyelocytes         3.0       Mono %         17.0       MPV         9.5       Myelocytes         1.0       nRBC         0       Ovalocytes         Occasional       Platelet Estimate         Appears normal       Platelet Count         434       POC Glucose   206             POCT Glucose 232     206   132         Poikilocytosis         Slight       Poly         Occasional       Potassium         4.6       PROTEIN TOTAL         6.8       RBC         3.71       RDW         15.8       Schistocytes         Present       Sodium         136       Tacrolimus Lvl         12.7  Comment: Testing performed by a chemiluminescent microparticle   immunoassay on the [employer] System.    CAUTION: No firm therapeutic range exists for tacrolimus in whole   blood. The   complexity of the clinical state, individual differences in   sensitivity to   immunosuppressive and nephrotoxic effects of tacrolimus,   co-administration   of other immunosuppressants, type of transplant, time  post-transplant   and a   number of other factors contribute to different requirements for   optimal   blood levels of tacrolimus. Therefore, individual tacrolimus values   cannot   be used as the sole indicator for making changes in treatment regimen   and   each patient should be thoroughly evaluated clinically before changes   in   treatment regimens are made. Each user must establish his or her own   ranges   based on clinical experience.  Therapeutic ranges vary according to the commercial test used, and   therefore   should be established for each commercial test. Values obtained with   different assay methods cannot be used interchangeably due to   differences in   assay methods and cross-reactivity with metabolites, nor should   correction   factors be applied. Therefore, consistent use of one assay for   individual   patients is recommended.         WBC         5.11                              Significant Imaging: I have reviewed all pertinent imaging results/findings within the past 24 hours.    Assessment/Plan:      * Gram-negative bacteremia  -appreciate ID  -antibiotics adjusted to cefepime based upon sensitivities  -repeat cultures negative thus far    Acute cystitis  -culture isolated pan sensitive Serratia  -antibiotics per ID    Diabetes mellitus type 2 with complications  Patient's FSGs are controlled on current medication regimen.  Last A1c reviewed-   Lab Results   Component Value Date    HGBA1C 6.4 (H) 05/07/2023     Most recent fingerstick glucose reviewed-   Recent Labs   Lab 05/04/24  1926 05/05/24  0603 05/05/24  1109 05/05/24  1611   POCTGLUCOSE 187* 132* 206* 232*       Current correctional scale  Medium  Maintain anti-hyperglycemic dose as follows-   Antihyperglycemics (From admission, onward)      Start     Stop Route Frequency Ordered    05/02/24 2100  insulin detemir U-100 (Levemir) pen 10 Units         -- SubQ Nightly 05/02/24 1508    05/02/24 1607  insulin aspart U-100 pen 0-10  Units         -- SubQ Before meals & nightly PRN 05/02/24 1508          Hold Oral hypoglycemics while patient is in the hospital.    At risk for opportunistic infections  -continue prophylactic medication        S/P kidney transplant 2024  -continue Prograf and prednisone  -hold Cellcept  -monitor renal function, currently at baseline      Chronic systolic congestive heart failure  Patient is identified as having Systolic (HFrEF) heart failure that is Chronic. CHF is currently controlled. Latest ECHO performed and demonstrates- Results for orders placed during the hospital encounter of 11/15/23    Echo    Interpretation Summary    Left Ventricle: The left ventricle is moderately dilated. Moderately increased wall thickness. There is concentric hypertrophy. Mild global hypokinesis present. There is mildly reduced systolic function with a visually estimated ejection fraction of 40 - 45%. There is normal diastolic function.    Right Ventricle: Normal right ventricular cavity size. Wall thickness is normal. Right ventricle wall motion  is normal. Systolic function is normal.    Left Atrium: Left atrium is severely dilated.    Tricuspid Valve: There is mild regurgitation with a centrally directed jet.    Pulmonary Artery: The estimated pulmonary artery systolic pressure is 27 mmHg.    IVC/SVC: Normal venous pressure at 3 mmHg.  . Continue Beta Blocker and monitor clinical status closely. Monitor on telemetry. Patient is off CHF pathway.  Monitor strict Is&Os and daily weights.  Place on fluid restriction of 1.5 L. Cardiology has not been consulted. Continue to stress to patient importance of self efficacy and  on diet for CHF. Last BNP reviewed- and noted below   Recent Labs   Lab 05/02/24  0915   BNP 1,330*         Anemia due to chronic kidney disease  Patient's anemia is currently  stable .  Etiology likely d/t chronic disease due to Chronic Kidney Disease  Current CBC reviewed-   Lab Results   Component Value  Date    HGB 9.1 (L) 05/05/2024    HCT 29.2 (L) 05/05/2024     Monitor serial CBC and transfuse if patient becomes hemodynamically unstable, symptomatic or H/H drops below 7/21.      VTE Risk Mitigation (From admission, onward)           Ordered     IP VTE HIGH RISK PATIENT  Once         05/02/24 1508     Place sequential compression device  Until discontinued         05/02/24 1508                    Discharge Planning   LYDIA:      Code Status: Full Code   Is the patient medically ready for discharge?:     Reason for patient still in hospital (select all that apply): Patient trending condition, Laboratory test, Treatment, and Consult recommendations  Discharge Plan A: Home with family                  Tammy Rosales MD  Department of Hospital Medicine   [facility] - Telemetry ([facility])

## 2024-05-06 ENCOUNTER — PATIENT OUTREACH (OUTPATIENT)
Dept: ADMINISTRATIVE | Facility: HOSPITAL | Age: 69
End: 2024-05-06
Payer: MEDICARE

## 2024-05-06 VITALS
DIASTOLIC BLOOD PRESSURE: 64 MMHG | RESPIRATION RATE: 18 BRPM | WEIGHT: 254.63 LBS | OXYGEN SATURATION: 95 % | BODY MASS INDEX: 32.68 KG/M2 | SYSTOLIC BLOOD PRESSURE: 139 MMHG | TEMPERATURE: 97 F | HEIGHT: 74 IN | HEART RATE: 74 BPM

## 2024-05-06 LAB
BACTERIA SPEC AEROBE CULT: NORMAL
BACTERIA SPEC AEROBE CULT: NORMAL
GRAM STN SPEC: NORMAL
POCT GLUCOSE: 193 MG/DL (ref 70–110)
POCT GLUCOSE: 235 MG/DL (ref 70–110)

## 2024-05-06 PROCEDURE — 80197 ASSAY OF TACROLIMUS: CPT | Performed by: INTERNAL MEDICINE

## 2024-05-06 PROCEDURE — 63600175 PHARM REV CODE 636 W HCPCS: Performed by: STUDENT IN AN ORGANIZED HEALTH CARE EDUCATION/TRAINING PROGRAM

## 2024-05-06 PROCEDURE — 99223 1ST HOSP IP/OBS HIGH 75: CPT | Mod: NSCH,,, | Performed by: INTERNAL MEDICINE

## 2024-05-06 PROCEDURE — 36415 COLL VENOUS BLD VENIPUNCTURE: CPT | Performed by: INTERNAL MEDICINE

## 2024-05-06 PROCEDURE — 25000003 PHARM REV CODE 250: Performed by: STUDENT IN AN ORGANIZED HEALTH CARE EDUCATION/TRAINING PROGRAM

## 2024-05-06 PROCEDURE — 63600175 PHARM REV CODE 636 W HCPCS: Performed by: INTERNAL MEDICINE

## 2024-05-06 PROCEDURE — 25000003 PHARM REV CODE 250: Performed by: INTERNAL MEDICINE

## 2024-05-06 RX ORDER — LEVOFLOXACIN 750 MG/1
750 TABLET ORAL EVERY OTHER DAY
Qty: 5 TABLET | Refills: 0 | Status: SHIPPED | OUTPATIENT
Start: 2024-05-07 | End: 2024-05-16 | Stop reason: ALTCHOICE

## 2024-05-06 RX ORDER — TACROLIMUS 1 MG/1
7 CAPSULE ORAL EVERY 12 HOURS
Start: 2024-05-06 | End: 2024-06-11 | Stop reason: SDUPTHER

## 2024-05-06 RX ADMIN — SODIUM BICARBONATE 650 MG TABLET 1300 MG: at 09:05

## 2024-05-06 RX ADMIN — MAGNESIUM OXIDE TAB 400 MG (241.3 MG ELEMENTAL MG) 400 MG: 400 (241.3 MG) TAB at 09:05

## 2024-05-06 RX ADMIN — VALGANCICLOVIR 450 MG: 450 TABLET, FILM COATED ORAL at 09:05

## 2024-05-06 RX ADMIN — ASPIRIN 81 MG: 81 TABLET, COATED ORAL at 09:05

## 2024-05-06 RX ADMIN — CETIRIZINE HYDROCHLORIDE 10 MG: 10 TABLET, FILM COATED ORAL at 09:05

## 2024-05-06 RX ADMIN — CEFEPIME 2 G: 2 INJECTION, POWDER, FOR SOLUTION INTRAVENOUS at 02:05

## 2024-05-06 RX ADMIN — FINASTERIDE 5 MG: 5 TABLET, FILM COATED ORAL at 09:05

## 2024-05-06 RX ADMIN — THERA TABS 1 TABLET: TAB at 09:05

## 2024-05-06 RX ADMIN — PREDNISONE 5 MG: 5 TABLET ORAL at 09:05

## 2024-05-06 RX ADMIN — DAPSONE 100 MG: 25 TABLET ORAL at 09:05

## 2024-05-06 RX ADMIN — TACROLIMUS 7 MG: 1 CAPSULE ORAL at 09:05

## 2024-05-06 RX ADMIN — INSULIN ASPART 2 UNITS: 100 INJECTION, SOLUTION INTRAVENOUS; SUBCUTANEOUS at 07:05

## 2024-05-06 RX ADMIN — CARVEDILOL 12.5 MG: 12.5 TABLET, FILM COATED ORAL at 09:05

## 2024-05-06 RX ADMIN — INSULIN ASPART 4 UNITS: 100 INJECTION, SOLUTION INTRAVENOUS; SUBCUTANEOUS at 10:05

## 2024-05-06 RX ADMIN — SACUBITRIL AND VALSARTAN 2 TABLET: 49; 51 TABLET, FILM COATED ORAL at 09:05

## 2024-05-06 RX ADMIN — ISOSORBIDE DINITRATE 20 MG: 20 TABLET ORAL at 09:05

## 2024-05-06 RX ADMIN — TAMSULOSIN HYDROCHLORIDE 0.4 MG: 0.4 CAPSULE ORAL at 09:05

## 2024-05-06 NOTE — ASSESSMENT & PLAN NOTE
-continue Prograf and prednisone  -hold Cellcept until completion of antibiotic therapy  -monitor renal function, currently at baseline

## 2024-05-06 NOTE — DISCHARGE SUMMARY
[facility])  [facility] Medicine  Discharge Summary      Patient Name: Isidro Alves  MRN: [id number]  DOE: [id number]  Patient Class: IP- Inpatient  Admission Date: 5/2/2024  Hospital Length of Stay: 3 days  Discharge Date and Time: 5/6/2024  3:35 PM  Attending Physician: Crow Guerrero MD  Discharging Provider: Yenni Lerner NP  Primary Care Provider: Steven Morgan MD    Primary Care Team: Networked reference to record PCT     HPI:   The patient is a 69 yo male with past medical history of kidney transplant, pneumonia, anemia, diabetes, hypertension and BPH who presented to the ED with fever, chills, headache, productive cough for 3 days. He reports he was trying to wait it out but cough and headache persisting. He has [place] as well. He states coughing is the worst part. Workup in ED revealed UTI. Transplant medicine consulted and recommended: patient stay in [place], continue empiric IV antibiotics, avoid fluids if possible and hold Cellcept. Hospital medicine consulted.     * No surgery found *      Hospital Course:   The patient presented with fever and chills. He was found to have UTI. Kidney transplant team notified and recommended admission in [place]. Patient placed on empiric IV Rocephin (5/3 - 5/4) and Cellcept was held.   Blood and urine cultures + Serratia M, campos sensitive in the urine culture, but blood culture resistant to Rocephin. ID consulted and kidney transplant team notified.   He was Antibiotics adjusted 5/5 by ID based upon final culture and changed to cefepime IV. Prograf adjusted by transplant team as level elevated.   Repeat cultures negative @ 48 hours. ID changed antibiotics on d/c to Levaquin 750 mg every other day x 10 days. Prescription sent to the in house pharmacy and filled prior to discharge.  Discussed discharge plan with ID, Dr. Fu, prior to discharge.    Follow up with PCP in 3-5 days for hospital follow up.  Follow up with transplant  and diabetes this week as scheduled.     Patient seen and examined on the day of discharge.  All questions and concerns were addressed prior to discharge.    Face to face encounter with patient: 33 minutes       Goals of Care Treatment Preferences:  Code Status: Full Code      Consults:   Consults (From admission, onward)          Status Ordering Provider     Inpatient consult to Infectious Diseases  Once        Provider:  Can Samuel DO Acknowledged JOHNSON, RANADA A.            Cardiac/Vascular  Chronic systolic congestive heart failure  Patient is identified as having Systolic (HFrEF) heart failure that is Chronic. CHF is currently controlled. Latest ECHO performed and demonstrates- Results for orders placed during the hospital encounter of 11/15/23    Echo    Interpretation Summary    Left Ventricle: The left ventricle is moderately dilated. Moderately increased wall thickness. There is concentric hypertrophy. Mild global hypokinesis present. There is mildly reduced systolic function with a visually estimated ejection fraction of 40 - 45%. There is normal diastolic function.    Right Ventricle: Normal right ventricular cavity size. Wall thickness is normal. Right ventricle wall motion  is normal. Systolic function is normal.    Left Atrium: Left atrium is severely dilated.    Tricuspid Valve: There is mild regurgitation with a centrally directed jet.    Pulmonary Artery: The estimated pulmonary artery systolic pressure is 27 mmHg.    IVC/SVC: Normal venous pressure at 3 mmHg.  . Continue Beta Blocker and monitor clinical status closely. Monitor on telemetry. Patient is off CHF pathway.  Monitor strict Is&Os and daily weights.  Place on fluid restriction of 1.5 L. Cardiology has not been consulted. Continue to stress to patient importance of self efficacy and  on diet for CHF. Last BNP reviewed- and noted below   Recent Labs   Lab 05/02/24  0915   BNP 1,330*         Renal/  Acute  cystitis  -culture isolated pan sensitive Serratia M.  -Rocephin started on admission, changed to cefepime on 5/5 d/t final cultures  -d/c with Levaquin  mg every other day x 10 days per ID final recommendations    S/P kidney transplant 2024  -continue Prograf and prednisone  -hold Cellcept until completion of antibiotic therapy  -monitor renal function, currently at baseline    ID  * Gram-negative bacteremia  -appreciate ID recommendations  -antibiotics adjusted to cefepime based upon sensitivities  -repeat cultures negative @ 48 hours    At risk for opportunistic infections  -continue prophylactic medication    Oncology  Anemia due to chronic kidney disease  Patient's anemia is currently  stable .  Etiology likely d/t chronic disease due to Chronic Kidney Disease  Current CBC reviewed-   Lab Results   Component Value Date    HGB 9.1 (L) 05/05/2024    HCT 29.2 (L) 05/05/2024     Monitor serial CBC and transfuse if patient becomes hemodynamically unstable, symptomatic or H/H drops below 7/21.    Endocrine  Diabetes mellitus type 2 with complications  Patient's FSGs are controlled on current medication regimen.  Last A1c reviewed-   Lab Results   Component Value Date    HGBA1C 6.4 (H) 05/07/2023     Most recent fingerstick glucose reviewed-   Recent Labs   Lab 05/05/24  2146 05/06/24  0613 05/06/24  1054   POCTGLUCOSE 221* 193* 235*       Current correctional scale  Medium  Maintain anti-hyperglycemic dose as follows-   Antihyperglycemics (From admission, onward)      Start     Stop Route Frequency Ordered    05/02/24 2100  insulin detemir U-100 (Levemir) pen 10 Units         -- SubQ Nightly 05/02/24 1508    05/02/24 1607  insulin aspart U-100 pen 0-10 Units         -- SubQ Before meals & nightly PRN 05/02/24 1508          Hold Oral hypoglycemics while patient is in the hospital.  -hypoglycemic protocol      Final Active Diagnoses:    Diagnosis Date Noted POA    PRINCIPAL PROBLEM:  Gram-negative bacteremia  [R78.81] 01/24/2021 Yes    Acute cystitis [N30.00] 05/02/2024 Yes    Diabetes mellitus type 2 with complications [E11.8] 03/06/2024 Yes    At risk for opportunistic infections [Z91.89] 02/19/2024 Yes    S/P kidney transplant 2024 [Z94.0] 02/19/2024 Not Applicable    Chronic systolic congestive heart failure [I50.22] 07/07/2022 Yes    Anemia due to chronic kidney disease [N18.9, D63.1] 04/16/2014 Yes      Problems Resolved During this Admission:       Discharged Condition: good    Disposition: Home or Self Care    Follow Up:   Follow-up Information       Steven Morgan MD. Schedule an appointment as soon as possible for a visit in 3 day(s).    Specialty: Internal Medicine  Why: call office and schedule a hospital follow up in 3-5 days  Contact information:  [address]  [place] [address]  [phone]               Natasha Hunter NP Follow up on 5/8/2024.    Specialty: Transplant  Why: virtual appointment at 3:30 pm  Contact information:  [address] [facility] [address]  [phone]               Jatin Deleon PA-C Follow up on 5/9/2024.    Specialty: Diabetes  Why: virtual appointment @ 7:30 am  Contact information:  [address]  [place] [address]  [phone]                           Patient Instructions:      ACCEPT - Ambulatory referral/consult to Interventional Cardiology   Standing Status: Future   Referral Priority: Routine Referral Type: Consultation   Referral Reason: Specialty Services Required   Requested Specialty: Interventional Cardiology   Number of Visits Requested: 1     Ambulatory referral/consult to Outpatient Case Management   Referral Priority: Routine Referral Type: Consultation   Referral Reason: Specialty Services Required   Number of Visits Requested: 1     Diet diabetic     Activity as tolerated       Significant Diagnostic Studies: Labs: CMP   Recent Labs   Lab 05/05/24  0526      K 4.6      CO2 20*   *   BUN 21  "  CREATININE 1.7*   CALCIUM 9.5   PROT 6.8   ALBUMIN 3.2*   BILITOT 0.6   ALKPHOS 61   AST 34   ALT 23   ANIONGAP 9    and CBC   Recent Labs   Lab 05/05/24  0526   WBC 5.11   HGB 9.1*   HCT 29.2*          Pending Diagnostic Studies:       Procedure Component Value Units Date/Time    Tacrolimus level [[id number]] Collected: 05/06/24 0538    Order Status: Sent Lab Status: In process Updated: 05/06/24 1327    Specimen: Blood            Medications:  Reconciled Home Medications:      Medication List        START taking these medications      levoFLOXacin 750 MG tablet  Commonly known as: LEVAQUIN  Take 1 tablet (750 mg total) by mouth every other day. for 10 days  Start taking on: May 7, 2024            CHANGE how you take these medications      * tacrolimus 1 MG Cap  Commonly known as: PROGRAF  Take 8 capsules (8 mg total) by mouth every 12 (twelve) hours.  What changed: Another medication with the same name was added. Make sure you understand how and when to take each.     * tacrolimus 1 MG Cap  Commonly known as: PROGRAF  Take 7 capsules (7 mg total) by mouth every 12 (twelve) hours.  What changed: You were already taking a medication with the same name, and this prescription was added. Make sure you understand how and when to take each.           * This list has 2 medication(s) that are the same as other medications prescribed for you. Read the directions carefully, and ask your doctor or other care provider to review them with you.                CONTINUE taking these medications      aspirin 81 MG EC tablet  Commonly known as: ECOTRIN  Take 1 tablet (81 mg total) by mouth once daily.     atorvastatin 40 MG tablet  Commonly known as: LIPITOR  Take 1 tablet (40 mg total) by mouth once daily.     BD ULTRA-FINE SHIRA PEN NEEDLE 32 gauge x 5/32" Ndle  Generic drug: pen needle, diabetic  Use 1 pen needle to inject insulin 4 (four) times daily.     carvediloL 12.5 MG tablet  Commonly known as: COREG  Take 12.5 mg " by mouth 2 (two) times daily.     cetirizine 10 MG tablet  Commonly known as: ZYRTEC  Take 1 tablet (10 mg total) by mouth once daily.     dapsone 100 MG Tab  Take 1 tablet (100 mg total) by mouth once daily.     ENTRESTO  mg per tablet  Generic drug: sacubitriL-valsartan  Take 1 tablet by mouth 2 (two) times daily.     finasteride 5 mg tablet  Commonly known as: PROSCAR  Take 1 tablet (5 mg total) by mouth once daily.     insulin lispro 100 unit/mL pen  Commonly known as: HumaLOG KwikPen Insulin  Inject 10 Units into the skin 3 (three) times daily. Plus slide; max dose 45 units/d     isosorbide dinitrate 10 MG tablet  Commonly known as: ISORDIL  Take 2 tablets (20 mg total) by mouth 3 (three) times daily.     magnesium oxide 400 mg (241.3 mg magnesium) tablet  Commonly known as: MAG-OX  Take 1 tablet (400 mg total) by mouth 2 (two) times daily.     multivitamin Tab  Take 1 tablet by mouth once daily.     mycophenolate 250 mg Cap  Commonly known as: CELLCEPT  Take 4 capsules (1,000 mg total) by mouth 2 (two) times daily.     ONETOUCH DELICA PLUS LANCET 33 gauge Misc  Generic drug: lancets  Use 1 lancet to test blood sugar 3 (three) times daily.     ONETOUCH ULTRA TEST Strp  Generic drug: blood sugar diagnostic  Use 1 strip to test blood sugar 3 (three) times daily.     ONETOUCH ULTRA2 METER Misc  Generic drug: blood-glucose meter  Use as instructed     predniSONE 5 MG tablet  Commonly known as: DELTASONE  Take by mouth daily;  2/21 - 2/27 : 20 mg;  2/28 - 3/5 : 15 mg; 3/6 - 3/12 :10 mg; 3/13/2024 - forever : 5 mg; do not stop     sodium bicarbonate 650 MG tablet  Take 2 tablets (1,300 mg total) by mouth 2 (two) times a day.     tamsulosin 0.4 mg Cap  Commonly known as: FLOMAX  Take 1 capsule (0.4 mg total) by mouth once daily.     TRESIBA FLEXTOUCH U-100 100 unit/mL (3 mL) insulin pen  Generic drug: insulin degludec  Inject 25 Units into the skin once daily.     valGANciclovir 450 mg Tab  Commonly known as:  VALCYTE  Take 1 tablet (450 mg total) by mouth once daily. Stop 5/21/24              Indwelling Lines/Drains at time of discharge:   Lines/Drains/Airways       Drain  Duration                  Hemodialysis AV Fistula 02/18/24 1439 Left upper arm 77 days                    Time spent on the discharge of patient: 44 minutes         Yenni Lerner NP  Department of Hospital Medicine  [facility] - Telemetry ([facility])

## 2024-05-06 NOTE — PLAN OF CARE
Problem: Adult Inpatient Plan of Care  Goal: Plan of Care Review  Outcome: Met  Goal: Patient-Specific Goal (Individualized)  Outcome: Met  Goal: Absence of Hospital-Acquired Illness or Injury  Outcome: Met  Goal: Optimal Comfort and Wellbeing  Outcome: Met  Goal: Readiness for Transition of Care  Outcome: Met     Problem: Diabetes Comorbidity  Goal: Blood Glucose Level Within Targeted Range  Outcome: Met     Problem: Fall Injury Risk  Goal: Absence of Fall and Fall-Related Injury  Outcome: Met     Problem: Infection  Goal: Absence of Infection Signs and Symptoms  Outcome: Met

## 2024-05-06 NOTE — ASSESSMENT & PLAN NOTE
-culture isolated pan sensitive Serratia M.  -Rocephin started on admission, changed to cefepime on 5/5 d/t final cultures  -d/c with Levaquin  mg every other day x 10 days per ID final recommendations

## 2024-05-06 NOTE — SUBJECTIVE & OBJECTIVE
Interval History:   68 year old man with serratia UTI/bacteremia   He feels better     Review of Systems   Constitutional:  Negative for activity change, appetite change and chills.   Genitourinary:  Negative for difficulty urinating and dysuria.     Objective:     Vital Signs (Most Recent):  Temp: 97.3 °F (36.3 °C) (05/06/24 1207)  Pulse: 78 (05/06/24 1207)  Resp: 18 (05/06/24 0730)  BP: 139/64 (05/06/24 1207)  SpO2: 95 % (05/06/24 1207) Vital Signs (24h Range):  Temp:  [97.3 °F (36.3 °C)-97.9 °F (36.6 °C)] 97.3 °F (36.3 °C)  Pulse:  [60-90] 78  Resp:  [16-18] 18  SpO2:  [93 %-96 %] 95 %  BP: (139-172)/(64-78) 139/64     Weight: 115.5 kg (254 lb 10.1 oz)  Body mass index is 32.69 kg/m².    Estimated Creatinine Clearance: 57 mL/min (A) (based on SCr of 1.7 mg/dL (H)).     Physical Exam  Vitals and nursing note reviewed.   Eyes:      Pupils: Pupils are equal, round, and reactive to light.   Cardiovascular:      Rate and Rhythm: Normal rate.   Pulmonary:      Effort: Pulmonary effort is normal.   Neurological:      General: No focal deficit present.      Mental Status: He is alert.   Psychiatric:         Mood and Affect: Mood normal.          Significant Labs: Blood Culture:   Recent Labs   Lab 05/02/24  0915 05/02/24  0945 05/04/24  0814   LABBLOO Gram stain aer bottle: Gram negative rods  Positive results previously called 05/03/2024  SERRATIA MARCESCENS  For susceptibility see order #[id number]  * Gram stain clover bottle: Gram negative rods  Results called to and read back by: Val Espinoza RN  05/03/2024  14:54  SERRATIA MARCESCENS* No Growth to date  No Growth to date  No Growth to date  No Growth to date  No Growth to date  No Growth to date     BMP:   Recent Labs   Lab 05/05/24  0526   *      K 4.6      CO2 20*   BUN 21   CREATININE 1.7*   CALCIUM 9.5   MG 1.5*     CBC:   Recent Labs   Lab 05/05/24  0526   WBC 5.11   HGB 9.1*   HCT 29.2*        CMP:   Recent Labs   Lab  05/05/24  0526      K 4.6      CO2 20*   *   BUN 21   CREATININE 1.7*   CALCIUM 9.5   PROT 6.8   ALBUMIN 3.2*   BILITOT 0.6   ALKPHOS 61   AST 34   ALT 23   ANIONGAP 9     Urine Culture:   Recent Labs   Lab 05/02/24  1052   LABURIN SERRATIA MARCESCENS  >100,000 cfu/ml  *     All pertinent labs within the past 24 hours have been reviewed.    Significant Imaging: I have reviewed all pertinent imaging results/findings within the past 24 hours.

## 2024-05-06 NOTE — ASSESSMENT & PLAN NOTE
Patient's FSGs are controlled on current medication regimen.  Last A1c reviewed-   Lab Results   Component Value Date    HGBA1C 6.4 (H) 05/07/2023     Most recent fingerstick glucose reviewed-   Recent Labs   Lab 05/05/24  2146 05/06/24  0613 05/06/24  1054   POCTGLUCOSE 221* 193* 235*       Current correctional scale  Medium  Maintain anti-hyperglycemic dose as follows-   Antihyperglycemics (From admission, onward)      Start     Stop Route Frequency Ordered    05/02/24 2100  insulin detemir U-100 (Levemir) pen 10 Units         -- SubQ Nightly 05/02/24 1508    05/02/24 1607  insulin aspart U-100 pen 0-10 Units         -- SubQ Before meals & nightly PRN 05/02/24 1508          Hold Oral hypoglycemics while patient is in the hospital.  -hypoglycemic protocol

## 2024-05-06 NOTE — PLAN OF CARE
[facility] - Telemetry (Hospital)  Discharge Final Note    Primary Care Provider: Steven Morgan MD    Expected Discharge Date: 5/6/2024    Final Discharge Note (most recent)       Final Note - 05/06/24 1428          Final Note    Assessment Type Final Discharge Note (P)      Anticipated Discharge Disposition Home or Self Care (P)      Hospital Resources/Appts/Education Provided Appointments scheduled and added to AVS (P)         Post-Acute Status    Discharge Delays None known at this time (P)                      Important Message from Medicare             Contact Info       Steven Morgan MD   Specialty: Internal Medicine   Relationship: PCP - General    [address]  [place] [address]   Phone: [phone]       Next Steps: Schedule an appointment as soon as possible for a visit in 3 day(s)    Instructions: call office and schedule a hospital follow up in 3-5 days    Natasha Hunter NP   Specialty: Transplant    [address] [facility] [address]   Phone: [phone]       Next Steps: Follow up on 5/8/2024    Instructions: virtual appointment at 3:30 pm    Jatin Deleon PAMarcelaC   Specialty: Diabetes    [address]  [place] [address]   Phone: [phone]       Next Steps: Follow up on 5/9/2024    Instructions: virtual appointment @ 7:30 am

## 2024-05-06 NOTE — PROGRESS NOTES
Lab visit report: Patient has lab appointment scheduled on 5/13/24, Hemoglobin A1c order linked.

## 2024-05-06 NOTE — ASSESSMENT & PLAN NOTE
-appreciate ID recommendations  -antibiotics adjusted to cefepime based upon sensitivities  -repeat cultures negative @ 48 hours

## 2024-05-07 LAB — TACROLIMUS BLD-MCNC: 11.3 NG/ML (ref 5–15)

## 2024-05-07 NOTE — HPI
67 yo male with past medical history of kidney transplant, pneumonia, anemia, diabetes, hypertension and BPH who presented to the ED with fever, chills, headache, productive cough for 3 days    Labs and imaging test -  Urine and blood culture - Serratia

## 2024-05-07 NOTE — ASSESSMENT & PLAN NOTE
Isolate is Serratia -  Sensitive to levaquin -   Qtc is 396- will complete 10-14  days of treatment from negative blood culture .  Needs close follow up

## 2024-05-07 NOTE — PROGRESS NOTES
[facility])  Infectious Disease  Progress Note    Patient Name: Isidro Alves  MRN: [id number]  Admission Date: 5/2/2024  Length of Stay: 3 days  Attending Physician: No att. providers found  Primary Care Provider: Steven Morgan MD    Isolation Status: No active isolations  Assessment/Plan:      Renal/  Acute cystitis  Will continue Levaquin    S/P kidney transplant 2024  Follow transplant team    ID  * Gram-negative bacteremia  Isolate is Serratia -  Sensitive to levaquin -   Qtc is 396- will complete 10-14  days of treatment from negative blood culture .  Needs close follow up       Endocrine  Diabetes mellitus type 2 with complications  Follow primary team        Anticipated Disposition:     Thank you for your consult. I will follow-up with patient. Please contact us if you have any additional questions.    Adrian Fu MD, [facility]  Infectious Disease  [facility])    Subjective:     Principal Problem:Gram-negative bacteremia    HPI: 67 yo male with past medical history of kidney transplant, pneumonia, anemia, diabetes, hypertension and BPH who presented to the ED with fever, chills, headache, productive cough for 3 days    Labs and imaging test -  Urine and blood culture - Serratia   Interval History:   68 year old man with serratia UTI/bacteremia   He feels better     Review of Systems   Constitutional:  Negative for activity change, appetite change and chills.   Genitourinary:  Negative for difficulty urinating and dysuria.     Objective:     Vital Signs (Most Recent):  Temp: 97.3 °F (36.3 °C) (05/06/24 1207)  Pulse: 78 (05/06/24 1207)  Resp: 18 (05/06/24 0730)  BP: 139/64 (05/06/24 1207)  SpO2: 95 % (05/06/24 1207) Vital Signs (24h Range):  Temp:  [97.3 °F (36.3 °C)-97.9 °F (36.6 °C)] 97.3 °F (36.3 °C)  Pulse:  [60-90] 78  Resp:  [16-18] 18  SpO2:  [93 %-96 %] 95 %  BP: (139-172)/(64-78) 139/64     Weight: 115.5 kg (254 lb 10.1 oz)  Body mass index is 32.69  kg/m².    Estimated Creatinine Clearance: 57 mL/min (A) (based on SCr of 1.7 mg/dL (H)).     Physical Exam  Vitals and nursing note reviewed.   Eyes:      Pupils: Pupils are equal, round, and reactive to light.   Cardiovascular:      Rate and Rhythm: Normal rate.   Pulmonary:      Effort: Pulmonary effort is normal.   Neurological:      General: No focal deficit present.      Mental Status: He is alert.   Psychiatric:         Mood and Affect: Mood normal.          Significant Labs: Blood Culture:   Recent Labs   Lab 05/02/24  0915 05/02/24  0945 05/04/24  0814   LABBLOO Gram stain aer bottle: Gram negative rods  Positive results previously called 05/03/2024  SERRATIA MARCESCENS  For susceptibility see order #[id number]  * Gram stain clover bottle: Gram negative rods  Results called to and read back by: Val Espinoza RN  05/03/2024  14:54  SERRATIA MARCESCENS* No Growth to date  No Growth to date  No Growth to date  No Growth to date  No Growth to date  No Growth to date     BMP:   Recent Labs   Lab 05/05/24  0526   *      K 4.6      CO2 20*   BUN 21   CREATININE 1.7*   CALCIUM 9.5   MG 1.5*     CBC:   Recent Labs   Lab 05/05/24  0526   WBC 5.11   HGB 9.1*   HCT 29.2*        CMP:   Recent Labs   Lab 05/05/24  0526      K 4.6      CO2 20*   *   BUN 21   CREATININE 1.7*   CALCIUM 9.5   PROT 6.8   ALBUMIN 3.2*   BILITOT 0.6   ALKPHOS 61   AST 34   ALT 23   ANIONGAP 9     Urine Culture:   Recent Labs   Lab 05/02/24  1052   LABURIN SERRATIA MARCESCENS  >100,000 cfu/ml  *     All pertinent labs within the past 24 hours have been reviewed.    Significant Imaging: I have reviewed all pertinent imaging results/findings within the past 24 hours.

## 2024-05-08 ENCOUNTER — PATIENT MESSAGE (OUTPATIENT)
Dept: TRANSPLANT | Facility: CLINIC | Age: 69
End: 2024-05-08

## 2024-05-08 ENCOUNTER — OFFICE VISIT (OUTPATIENT)
Dept: TRANSPLANT | Facility: CLINIC | Age: 69
End: 2024-05-08
Payer: COMMERCIAL

## 2024-05-08 DIAGNOSIS — Z79.4 TYPE 2 DIABETES MELLITUS WITH CHRONIC KIDNEY DISEASE, WITH LONG-TERM CURRENT USE OF INSULIN, UNSPECIFIED CKD STAGE: ICD-10-CM

## 2024-05-08 DIAGNOSIS — E11.59 HYPERTENSION ASSOCIATED WITH DIABETES: ICD-10-CM

## 2024-05-08 DIAGNOSIS — Z94.0 S/P KIDNEY TRANSPLANT: Primary | ICD-10-CM

## 2024-05-08 DIAGNOSIS — E11.22 TYPE 2 DIABETES MELLITUS WITH CHRONIC KIDNEY DISEASE, WITH LONG-TERM CURRENT USE OF INSULIN, UNSPECIFIED CKD STAGE: ICD-10-CM

## 2024-05-08 DIAGNOSIS — I15.2 HYPERTENSION ASSOCIATED WITH DIABETES: ICD-10-CM

## 2024-05-08 PROCEDURE — 99214 OFFICE O/P EST MOD 30 MIN: CPT | Mod: 95,,, | Performed by: NURSE PRACTITIONER

## 2024-05-08 NOTE — LETTER
May 8, 2024        Gabino Champion  [address]  [place] [address]  Phone: [phone]  Fax: [phone]             [facility]- Transplant 1st Fl  [address]  [facility] [address]  Phone: [phone]   Patient: Isidro Alves   MR Number: [id number]   YOB: 1955   Date of Visit: 5/8/2024       Dear Dr. Gabino Champion    Thank you for referring Isidro Alves to me for evaluation. Attached you will find relevant portions of my assessment and plan of care.    If you have questions, please do not hesitate to call me. I look forward to following Isidro Alves along with you.    Sincerely,    Natasha Hunter, NP    Enclosure    If you would like to receive this communication electronically, please contact [email or web address] or [phone] to request [employer] Link access.    [employer] Link is a tool which provides read-only access to select patient information with whom you have a relationship. Its easy to use and provides real time access to review your patients record including encounter summaries, notes, results, and demographic information.    If you feel you have received this communication in error or would no longer like to receive these types of communications, please e-mail [email or web address]

## 2024-05-08 NOTE — PROGRESS NOTES
Kidney Post-Transplant Assessment    Referring Physician: Gabino Champion  Current Nephrologist: Gabino Champion    ORGAN: RIGHT KIDNEY  Donor Type: donation after brain death  PHS Increased Risk: yes  Cold Ischemia: 625 mins  Induction Medications:  Thymoglobulin    Subjective:   The patient location is: home  The chief complaint leading to consultation is: post kidney transplant and immunosuppression management    Visit type: audiovisual    Face to Face time with patient: 20 minutes of total time spent on the encounter, which includes face to face time and non-face to face time preparing to see the patient (eg, review of tests), Obtaining and/or reviewing separately obtained history, Documenting clinical information in the electronic or other health record, Independently interpreting results (not separately reported) and communicating results to the patient/family/caregiver, or Care coordination (not separately reported).    Each patient to whom he or she provides medical services by telemedicine is:  (1) informed of the relationship between the physician and patient and the respective role of any other health care provider with respect to management of the patient; and (2) notified that he or she may decline to receive medical services by telemedicine and may withdraw from such care at any time.      CC:  Reassessment of renal allograft function and management of chronic immunosuppression.    HPI:  Mr. Alves is a 68 y.o. year old Black or  male who received a donation after brain death kidney transplant on 2/18/24.   He takes mycophenolate mofetil, prednisone, and tacrolimus for maintenance immunosuppression. He denies any recent hospitalizations or ER visits since his previous clinic visit.    Pertinent Nephrology/Transplant History:   ESRD from [facility]  DCD KT 2/18/24; KDPI 67%  RPR+, HCVAb+/DERICK- donor   CMV +/+      Hospitalized for UTI, hematuria  Blood and urine cultures + andres Harrington  "sensitive in the urine culture, but blood culture resistant to Rocephin.   ID consulted. Was on IV cefepime  D/c home on levaquin 750mg every other day x 10 days  Holding MMF until AbX completed  Renal remained stable during hospitalization. Was discharged home on 5/6/24. No repeat labs since    Normal feels well today. He is making plenty of urine.  He is urinating without difficulty.  He denies edema.  No weight changes. No fever, chills, or chest pain. Reports no issues with taking his medications.     Review of Systems   Respiratory:  Negative for shortness of breath.    Cardiovascular:  Negative for chest pain and leg swelling.   Gastrointestinal:  Negative for abdominal pain, nausea and vomiting.   Genitourinary:  Negative for difficulty urinating.   Skin:  Negative for rash.   Allergic/Immunologic: Positive for immunocompromised state.       Objective:     There were no vitals taken for this visit.body mass index is unknown because there is no height or weight on file.    Physical Exam  Deferred due to AV visit    Labs:  Lab Results   Component Value Date    WBC 5.11 05/05/2024    HGB 9.1 (L) 05/05/2024    HCT 29.2 (L) 05/05/2024     05/05/2024    K 4.6 05/05/2024     05/05/2024    CO2 20 (L) 05/05/2024    BUN 21 05/05/2024    CREATININE 1.7 (H) 05/05/2024    EGFRNORACEVR 44 (A) 05/05/2024    CALCIUM 9.5 05/05/2024    PHOS 2.8 04/29/2024    MG 1.5 (L) 05/05/2024    ALBUMIN 3.2 (L) 05/05/2024    AST 34 05/05/2024    ALT 23 05/05/2024    UTPCR 0.45 (H) 03/18/2024    .5 (H) 08/25/2022    TACROLIMUS 11.3 05/06/2024       No results found for: "EXTANC", "EXTWBC", "EXTSEGS", "EXTPLATELETS", "EXTHEMOGLOBI", "EXTHEMATOCRI", "EXTCREATININ", "EXTSODIUM", "EXTPOTASSIUM", "EXTBUN", "EXTCO2", "EXTCALCIUM", "EXTPHOSPHORU", "EXTGLUCOSE", "EXTALBUMIN", "EXTAST", "EXTALT", "EXTBILITOTAL", "EXTLIPASE", "EXTAMYLASE"    No results found for: "EXTCYCLOSLVL", "EXTSIROLIMUS", "EXTTACROLVL", "EXTPROTCRE", " ""EXTPTHINTACT", "EXTPROTEINUA", "EXTWBCUA", "EXTRBCUA"        Assessment:     1. S/P kidney transplant 2024    2. Hypertension associated with diabetes    3. Type 2 diabetes mellitus with chronic kidney disease, with long-term current use of insulin, unspecified CKD stage        Plan:   Hospitalization for Blood and urine cultures + Lucy BALES  Have surgeon review TXP US from 5/2/24  Needs native kidney US  Can restart MMF on 5/9/24, per Dr. Toussaint since no longer having fevers or chills      Continue current IS therapy regimen  No need for refills on IS therapy today.       DCD kidney transplant 02/18/2024 with KDPI 67% [RPR+, HCVAb+/DERICK- donor ]  Allograft function assessment  -CKD 3, remains stable.   -recovering well.  -continue close monitoring as per guidelines  Recent Labs   Lab 12/30/21  1352 07/07/22  1436 07/14/22  0706 08/23/22  1500 06/15/23  0531 08/29/23  1039 05/03/24  0502 05/04/24  0511 05/05/24  0526   Creatinine 8.4 H 8.2 H 8.4 H   < > 7.49 H   < > 1.9 H 1.6 H 1.7 H   eGFR if non African American 6 A 6.2 A 6.0 A  --   --   --   --   --   --    eGFR if  7 A 7.1 A 6.9 A  --   --   --   --   --   --    eGFR   --   --   --   --  7  --   --   --   --     < > = values in this interval not displayed.      No results found for: "GLUCOSE", "AMYLASE", "LIPASE"      Encounter for Monitoring Immunosuppression post Transplant  Recent Labs   Lab 05/04/24  0511 05/05/24  0526 05/06/24  0538   Tacrolimus Lvl 11.4 12.7 11.3     -Target level for Isidro is 7-9-- Tac increased yesterday; labs on Monday  -Recheck as per guidelines.  -Monitor for side effects and toxicities, given narrow therapeutic window and significant risk of AE       Evaluation for bone marrow suppression (r/t immunosuppression toxicity or infection)  Counts currently acceptable.  Monitor   Lab Results   Component Value Date    WBC 5.11 05/05/2024    HGB 9.1 (L) 05/05/2024    HCT 29.2 (L) 05/05/2024     " 05/05/2024       Renal hypertension--within target, watch  BP Readings from Last 3 Encounters:   05/06/24 139/64   03/13/24 (!) 147/69   03/13/24 (!) 147/69   -on Entresto  -consider restarting isosorbide as well if BP remains high      Metabolic bone disease [Secondary hyperparathyroidism, Phosphorus metabolism disorders]  -Will tolerate mild asymptomatic low phos level d/t pill burden, DDI, and adverse SE   Hypo magnesemia-magnesium supplement added yesterday, along with high magnesium diet education.  We will aim to bring magnesium closer to normal, but will accept somewhat low-level given pill burden  Labs were reviewed with the patient.   Recent Labs   Lab 06/02/21  1112 07/07/22  1436 08/25/22  1203 04/06/23  1735 04/15/24  0852 04/22/24  0908 04/29/24  0950 05/02/24  0915 05/03/24  0502 05/04/24  0511 05/05/24  0526   Albumin  --    < >  --    < > 3.5 3.5 3.2 L   < > 3.1 L 3.0 L 3.2 L   Calcium  --    < >  --    < > 9.6 10.0 9.4   < > 9.2 9.6 9.5   Phosphorus  --   --   --    < > 2.6 L 2.6 L 2.8  --   --   --   --    Magnesium  --   --   --    < > 1.6 1.4 L 1.5 L  --  1.5 L 1.5 L 1.5 L   PTH, Intact 430.0 H  --  324.5 H  --   --   --   --   --   --   --   --     < > = values in this interval not displayed.        Assessment of electrolytes  Lab Results   Component Value Date     05/05/2024    K 4.6 05/05/2024     05/05/2024    CO2 20 (L) 05/05/2024    MG 1.5 (L) 05/05/2024       Screening for BK infection to prevent allograft dysfunction  Lab Results   Component Value Date    BKVIRUSPCRQB <125 04/15/2024       PJP until 08/18/2024 - atovaquone changed to dapsone 02/28/2024 (known allergy to Bactrim)  CMV until 05/21/2024 -  Valcyte  - BK acceptable  -Continue blood PCR screening as per program guidelines to prevent BK viremia and nephropathy leading to allograft dysfunction and potential graft failure      Screening for proteinuria & urinary abnormalities  Recent Labs   Lab 03/04/24  6138  03/18/24  0829 05/02/24  1052   Protein, UA Negative Negative 1+ A   Glucose, UA Negative Negative Negative   Occult Blood UA Trace A Negative Trace A   Leukocytes, UA 1+ A 1+ A 2+ A   Prot/Creat Ratio, Urine 0.33 H 0.45 H  --    - Urine p/c ratio acceptable    Management of kidney disease and related issues (HTN, anemia, SPTH, metabolic bone disease) should continue by community nephrologist.         Follow-up:   Clinic: return to transplant clinic weekly for the first month after transplant; every 2 weeks during months 2-3; then at 6-, 9-, 12-, 18-, 24-, and 36- months post-transplant to reassess for complications from immunosuppression toxicity and monitor for rejection.  Annually thereafter.    Labs: since patient remains at high risk for rejection and drug-related complications that warrant close monitoring, labs will be ordered as follows: continue twice weekly CBC, renal panel, and drug level for first month; then same labs once weekly through 3rd month post-transplant.  Urine for UA and protein/creatinine ratio monthly.  Serum BK - PCR at 1-, 3-, 6-, 9-, 12-, 18-, 24-, 36- 48-, and 60 months post-transplant.  Hepatic panel at 1-, 2-, 3-, 6-, 9-, 12-, 18-, 24-, and 36- months post-transplant.    Natasha Hunter NP       Education:   Material provided to the patient.  Patient reminded to call with any health changes since these can be early signs of significant complications.  Also, I advised the patient to be sure any new medications or changes of old medications are discussed with either a pharmacist or physician knowledgeable with transplant to avoid rejection/drug toxicity related to significant drug interactions.    Patient advised that it is recommended that all transplanted patients, and their close contacts and household members receive Covid vaccination.    UNOS Patient Status  Functional Status: 70% - Cares for self: unable to carry on normal activity or active work  Physical Capacity: No  Limitations

## 2024-05-09 ENCOUNTER — OFFICE VISIT (OUTPATIENT)
Dept: DIABETES | Facility: CLINIC | Age: 69
End: 2024-05-09
Payer: MEDICARE

## 2024-05-09 DIAGNOSIS — E11.59 HYPERTENSION ASSOCIATED WITH DIABETES: ICD-10-CM

## 2024-05-09 DIAGNOSIS — I50.22 CHRONIC SYSTOLIC CONGESTIVE HEART FAILURE: ICD-10-CM

## 2024-05-09 DIAGNOSIS — I25.118 CORONARY ARTERY DISEASE OF NATIVE ARTERY OF NATIVE HEART WITH STABLE ANGINA PECTORIS: ICD-10-CM

## 2024-05-09 DIAGNOSIS — E04.2 MULTIPLE THYROID NODULES: ICD-10-CM

## 2024-05-09 DIAGNOSIS — Z94.0 S/P KIDNEY TRANSPLANT: ICD-10-CM

## 2024-05-09 DIAGNOSIS — E11.22 CONTROLLED TYPE 2 DIABETES MELLITUS WITH STAGE 3 CHRONIC KIDNEY DISEASE, WITH LONG-TERM CURRENT USE OF INSULIN: Primary | ICD-10-CM

## 2024-05-09 DIAGNOSIS — N18.30 CONTROLLED TYPE 2 DIABETES MELLITUS WITH STAGE 3 CHRONIC KIDNEY DISEASE, WITH LONG-TERM CURRENT USE OF INSULIN: Primary | ICD-10-CM

## 2024-05-09 DIAGNOSIS — E78.5 HYPERLIPIDEMIA ASSOCIATED WITH TYPE 2 DIABETES MELLITUS: ICD-10-CM

## 2024-05-09 DIAGNOSIS — Z79.4 CONTROLLED TYPE 2 DIABETES MELLITUS WITH STAGE 3 CHRONIC KIDNEY DISEASE, WITH LONG-TERM CURRENT USE OF INSULIN: Primary | ICD-10-CM

## 2024-05-09 DIAGNOSIS — E29.1 HYPOGONADISM IN MALE: ICD-10-CM

## 2024-05-09 DIAGNOSIS — E11.69 HYPERLIPIDEMIA ASSOCIATED WITH TYPE 2 DIABETES MELLITUS: ICD-10-CM

## 2024-05-09 DIAGNOSIS — I15.2 HYPERTENSION ASSOCIATED WITH DIABETES: ICD-10-CM

## 2024-05-09 LAB
BACTERIA BLD CULT: NORMAL
BACTERIA BLD CULT: NORMAL

## 2024-05-09 PROCEDURE — 99214 OFFICE O/P EST MOD 30 MIN: CPT | Mod: 95,,, | Performed by: NURSE PRACTITIONER

## 2024-05-09 RX ORDER — INSULIN LISPRO 100 [IU]/ML
10 INJECTION, SOLUTION INTRAVENOUS; SUBCUTANEOUS 3 TIMES DAILY
Qty: 15 ML | Refills: 5 | Status: SHIPPED | OUTPATIENT
Start: 2024-05-09

## 2024-05-09 RX ORDER — INSULIN DEGLUDEC 100 U/ML
33 INJECTION, SOLUTION SUBCUTANEOUS DAILY
Qty: 15 ML | Refills: 5 | Status: SHIPPED | OUTPATIENT
Start: 2024-05-09

## 2024-05-09 NOTE — PATIENT INSTRUCTIONS
CURRENT DM MEDICATIONS:   Lantus 33 units in the morning  Humalog 10 units three times a day before each main meal. Try to take at least 10-15 minutes before the meal.    Recommend checking at least 2-3 times per day: Fasting, Before breakfast, lunch, or supper, or 2 hours after any meal.    Goals:  Fasting sugar < 130  2 hours after meal: <180 is good, <160 better, <140 perfect

## 2024-05-09 NOTE — PROGRESS NOTES
The patient location is: [place]  The chief complaint leading to consultation is: Diabetes management follow up     Visit type: audiovisual    Face to Face time with patient: 14 minutes  25 minutes of total time spent on the encounter, which includes face to face time and non-face to face time preparing to see the patient (eg, review of tests), Obtaining and/or reviewing separately obtained history, Documenting clinical information in the electronic or other health record, Independently interpreting results (not separately reported) and communicating results to the patient/family/caregiver, or Care coordination (not separately reported).         Each patient to whom he or she provides medical services by telemedicine is:  (1) informed of the relationship between the physician and patient and the respective role of any other health care provider with respect to management of the patient; and (2) notified that he or she may decline to receive medical services by telemedicine and may withdraw from such care at any time.    Notes:      Subjective:         Patient ID: Isidro Alves is a 68 y.o. male.  Patient's current PCP is Steven Morgan MD.     Chief Complaint: Diabetes Mellitus    HPI  Isidro Alves is a 68 y.o. Black or  male presenting for a new consult with me, previously seen by Jatin Deleon  for diabetes. Patient has been diagnosed with type 2 diabetes for several years.  Received diabetes education: Yes    CURRENT DM MEDICATIONS:   Diabetes Medications               insulin degludec (TRESIBA FLEXTOUCH U-100) 100 unit/mL (3 mL) insulin pen Inject 25 Units into the skin once daily. 30 units    insulin lispro (HUMALOG KWIKPEN INSULIN) 100 unit/mL pen Inject 10 Units into the skin 3 (three) times daily. Plus slide; max dose 45 units/d           Past failed treatment include: Metformin,Xultophy    Blood glucose testing is performed regularly. Patient is testing 1 times per  day.  Meter: One Touch Ultra  Preferred lab: [place]    Any episodes of hypoglycemia? Yes-following supper, mild-60s    Complications related to diabetes: nephropathy and cardiovascular disease    His blood sugar in the clinic today was:   Lab Results   Component Value Date    POCGLU 206 (A) 05/05/2024     Isidro Alves presents today for follow up visit to discuss diabetes management. Remote follow up visit- last visit with Jatin Adrienne 7/28/2022. This is his first visit with me. At last visit, Semglee was changed to Tresiba. He has lab work planned for Monday that will include an A1c.    Feb 18, 2024- kidney transplant - He required dialysis before this. Currently with stable renal function.     States fasting blood sugars have improved, and typically between 120-130. Blood sugar recently dropped to 60 following supper. He reports dosing Humalog after the meals.    We discussed MOA of long- vs short-acting insulins, and how to take Humalog properly. Reviewed not to take before a meal that does not contain carbs. He verbalized understanding. I am unsure yet if a SS will be needed. Discussed glycemic targets and different options to monitor Bgs.     Declines CGM.    Current diet: 2-3 meals/day typical  Activity Level: Non-sedentary    Lab Results   Component Value Date    HGBA1C 6.4 (H) 05/07/2023    HGBA1C 6.4 (H) 02/02/2023    HGBA1C 7.4 (A) 09/26/2022     STANDARDS OF CARE  Diabetes Management Status    Statin: Taking  ACE/ARB: Not taking    Screening or Prevention Patient's value Goal Complete/Controlled?   HgA1C Testing and Control   Lab Results   Component Value Date    HGBA1C 6.4 (H) 05/07/2023      Annually/Less than 8% No   Lipid profile : 12/29/2023 Annually No   LDL control Lab Results   Component Value Date    LDLCALC 82 09/26/2022    Annually/Less than 100 mg/dl  No   Nephropathy screening Lab Results   Component Value Date    LABMICR 1039.0 07/14/2022     Lab Results   Component Value Date     PROTEINUA 1+ (A) 05/02/2024     Lab Results   Component Value Date    UTPCR 0.45 (H) 03/18/2024      Annually Yes   Blood pressure BP Readings from Last 1 Encounters:   05/06/24 139/64    Less than 140/90 Yes   Dilated retinal exam : 09/26/2022 Annually No   Foot exam   : 04/14/2022 Annually No Deferred-virtual visit      Labs reviewed and are noted below.    Lab Results   Component Value Date    WBC 5.11 05/05/2024    HGB 9.1 (L) 05/05/2024    HCT 29.2 (L) 05/05/2024     05/05/2024    CHOL 128 09/26/2022    TRIG 105 09/26/2022    HDL 25 (A) 09/26/2022    LDLCALC 82 09/26/2022    ALT 23 05/05/2024    AST 34 05/05/2024     05/05/2024    K 4.6 05/05/2024     05/05/2024    ANIONGAP 9 05/05/2024    CREATININE 1.7 (H) 05/05/2024    ESTGFRAFRICA 7 06/15/2023    EGFRNONAA 6.0 (A) 07/14/2022    BUN 21 05/05/2024    CO2 20 (L) 05/05/2024    TSH 1.941 07/14/2022    PSA 3.9 01/11/2024    INR 1.1 05/02/2024     (H) 05/05/2024    UTPCR 0.45 (H) 03/18/2024     Lab Results   Component Value Date    GLUTAMICACID 0.00 11/20/2017    CPEPTIDE 3.88 11/20/2017    TSH 1.941 07/14/2022    TESTOSTERONE 204 (L) 05/17/2016    TESTOSTERONE 28.0 (L) 05/17/2016    IRON 43 (L) 01/23/2021    TIBC 112 (L) 01/23/2021    FERRITIN 1,136 (H) 01/22/2021    .5 (H) 08/25/2022    CALCIUM 9.5 05/05/2024    PHOS 2.8 04/29/2024     Lab Results   Component Value Date    CPEPTIDE 3.88 11/20/2017     Lab Results   Component Value Date    GLUTAMICACID 0.00 11/20/2017     Glucose   Date Value Ref Range Status   05/05/2024 145 (H) 70 - 110 mg/dL Final     Anion Gap   Date Value Ref Range Status   05/05/2024 9 8 - 16 mmol/L Final     eGFR if    Date Value Ref Range Status   07/14/2022 6.9 (A) >60 mL/min/1.73 m^2 Final     eGFR    Date Value Ref Range Status   06/15/2023 7 mL/min/1.73mSq Final     Comment:     In accordance with NKF-ASN Task Force recommendation, calculation based on the Chronic Kidney  Disease Epidemiology Collaboration (CKD-EPI) equation without adjustment for race. eGFR adjusted for gender and age and calculated in ml/min/1.73mSquared. eGFR cannot be calculated if patient is under 18 years of age.     Reference Range:   >= 60 ml/min/1.73mSquared.     eGFR if non    Date Value Ref Range Status   07/14/2022 6.0 (A) >60 mL/min/1.73 m^2 Final     Comment:     Calculation used to obtain the estimated glomerular filtration  rate (eGFR) is the CKD-EPI equation.          The following portions of the patient's history were reviewed and updated as appropriate: allergies, current medications, past family history, past medical history, past social history, past surgical history, and problem list.    Review of patient's allergies indicates:   Allergen Reactions    Bactrim [sulfamethoxazole-trimethoprim] Swelling    Nsaids (non-steroidal anti-inflammatory drug)      CKD    Sulfamethoxazole     Trimethoprim      Social History     Socioeconomic History    Marital status:     Number of children: 3   Occupational History     Employer: self-rmployed   Tobacco Use    Smoking status: Never    Smokeless tobacco: Never   Substance and Sexual Activity    Alcohol use: Not Currently     Comment: seldom     Drug use: No    Sexual activity: Not Currently     Partners: Female   Social History Narrative    ** Merged History Encounter **          Social Determinants of Health     Financial Resource Strain: Low Risk  (3/6/2024)    Overall Financial Resource Strain (CARDIA)     Difficulty of Paying Living Expenses: Not hard at all   Food Insecurity: No Food Insecurity (5/3/2024)    Hunger Vital Sign     Worried About Running Out of Food in the Last Year: Never true     Ran Out of Food in the Last Year: Never true   Transportation Needs: No Transportation Needs (5/3/2024)    PRAPARE - Transportation     Lack of Transportation (Medical): No     Lack of Transportation (Non-Medical): No   Physical Activity:  Insufficiently Active (3/6/2024)    Exercise Vital Sign     Days of Exercise per Week: 3 days     Minutes of Exercise per Session: 20 min   Stress: No Stress Concern Present (3/6/2024)    [employer] [place] [employer] - Occupational Stress Questionnaire     Feeling of Stress : Not at all   Recent Concern: Stress - Stress Concern Present (2/19/2024)    [employer] - Occupational Stress Questionnaire     Feeling of Stress : To some extent   Housing Stability: Low Risk  (5/3/2024)    Housing Stability Vital Sign     Unable to Pay for Housing in the Last Year: No     Homeless in the Last Year: No     Past Medical History:   Diagnosis Date    Abnormal nuclear stress test 8/30/2022    Acute hypoxemic respiratory failure due to COVID-19 1/21/2021    Acute on chronic combined systolic and diastolic congestive heart failure 5/7/2023    Acute on chronic respiratory failure 5/7/2023    Acute respiratory failure with hypoxia and hypercapnia 5/18/2023    Anal itching 10/10/2022    Anemia 4/16/2014    BPH (benign prostatic hyperplasia)     CKD (chronic kidney disease) stage 4, GFR 15-29 ml/min     Coronary artery disease of native artery of native heart with stable angina pectoris 4/29/2019    Diabetes mellitus     Diabetes mellitus, type 2     Dilated cardiomyopathy 8/30/2022    Elevated PSA     Encounter for blood transfusion     Herpes labialis     Hyperlipidemia     Hypertension     Hypertensive emergency 5/18/2023    Obesity     Pneumonia 5/7/2023    Pneumonia due to COVID-19 virus     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  Cardiovascular: History of HTN,HLD,CAD,and CHF  GI: Denies nausea,vomiting,constipation,or diarrhea.  : CKD III and h/o renal transplant 2024  Neuro: No  neuropathy.  PSYCH: No tobacco use.  ENDO: See HPI.        Objective:      There were no vitals filed for this visit.  RESPIRATORY: No respiratory distress  NEUROLOGIC: Not assessed-virtual visit  PSYCHIATRIC: Alert & oriented x3. Normal mood and affect.  FOOT EXAMINATION: Deferred-virtual visit  Assessment:       1. Controlled type 2 diabetes mellitus with stage 3 chronic kidney disease, with long-term current use of insulin    2. Hypertension associated with diabetes    3. Hyperlipidemia associated with type 2 diabetes mellitus    4. Coronary artery disease of native artery of native heart with stable angina pectoris    5. Hypogonadism in male    6. Multiple thyroid nodules    7. Chronic systolic congestive heart failure    8. S/P kidney transplant 2024        Plan:   Controlled type 2 diabetes mellitus with stage 3 chronic kidney disease, with long-term current use of insulin  -     insulin degludec (TRESIBA FLEXTOUCH U-100) 100 unit/mL (3 mL) insulin pen; Inject 33 Units into the skin once daily.  Dispense: 15 mL; Refill: 5  -     insulin lispro (HUMALOG KWIKPEN INSULIN) 100 unit/mL pen; Inject 10 Units into the skin 3 (three) times daily. Plus slide; max dose 45 units/d  Dispense: 15 mL; Refill: 5  -     blood sugar diagnostic (ONETOUCH ULTRA TEST) Strp; Check blood sugar 3 times daily  Dispense: 300 each; Refill: 3    Chronic -     CURRENT DM MEDICATIONS:   Lantus 33 units in the morning  Humalog 10 units three times a day before each main meal. Try to take at least 10-15 minutes before the meal.    Recommend checking at least 2-3 times per day: Fasting, Before breakfast, lunch, or supper, or 2 hours after any meal.    Goals:  Fasting sugar < 130  2 hours after meal: <180 is good, <160 better, <140 perfect    Hypertension associated with diabetes    Hyperlipidemia associated with type 2 diabetes mellitus    Coronary artery disease of native artery of native heart with stable angina pectoris    Hypogonadism in  "male    Multiple thyroid nodules    Chronic systolic congestive heart failure    S/P kidney transplant 2024        - Follow up: 3 months    Portions of this note may have been created with voice recognition software. Occasional "wrong-word" or "sound-a-like" substitutions may have occurred due to the inherent limitations of voice recognition software. Please, read the note carefully and recognize, using context, where substitutions have occurred.           Marbella Bobby,KAYE-C, BC-ADM  [facility]  "

## 2024-05-13 ENCOUNTER — OFFICE VISIT (OUTPATIENT)
Dept: INTERNAL MEDICINE | Facility: CLINIC | Age: 69
End: 2024-05-13
Payer: MEDICARE

## 2024-05-13 ENCOUNTER — TELEPHONE (OUTPATIENT)
Dept: TRANSPLANT | Facility: CLINIC | Age: 69
End: 2024-05-13
Payer: MEDICARE

## 2024-05-13 ENCOUNTER — PATIENT MESSAGE (OUTPATIENT)
Dept: TRANSPLANT | Facility: CLINIC | Age: 69
End: 2024-05-13
Payer: MEDICARE

## 2024-05-13 VITALS
HEART RATE: 96 BPM | SYSTOLIC BLOOD PRESSURE: 142 MMHG | HEIGHT: 74 IN | BODY MASS INDEX: 32.6 KG/M2 | DIASTOLIC BLOOD PRESSURE: 78 MMHG | WEIGHT: 254 LBS | TEMPERATURE: 97 F | OXYGEN SATURATION: 96 %

## 2024-05-13 DIAGNOSIS — Z09 FOLLOW-UP EXAM: Primary | ICD-10-CM

## 2024-05-13 DIAGNOSIS — E11.59 HYPERTENSION ASSOCIATED WITH DIABETES: ICD-10-CM

## 2024-05-13 DIAGNOSIS — E11.8 DIABETES MELLITUS TYPE 2 WITH COMPLICATIONS: ICD-10-CM

## 2024-05-13 DIAGNOSIS — Z79.4 TYPE 2 DIABETES MELLITUS WITH CHRONIC KIDNEY DISEASE, WITH LONG-TERM CURRENT USE OF INSULIN, UNSPECIFIED CKD STAGE: ICD-10-CM

## 2024-05-13 DIAGNOSIS — N30.01 ACUTE CYSTITIS WITH HEMATURIA: ICD-10-CM

## 2024-05-13 DIAGNOSIS — E11.22 TYPE 2 DIABETES MELLITUS WITH CHRONIC KIDNEY DISEASE, WITH LONG-TERM CURRENT USE OF INSULIN, UNSPECIFIED CKD STAGE: ICD-10-CM

## 2024-05-13 DIAGNOSIS — I15.2 HYPERTENSION ASSOCIATED WITH DIABETES: ICD-10-CM

## 2024-05-13 PROCEDURE — 99214 OFFICE O/P EST MOD 30 MIN: CPT | Mod: S$PBB,,, | Performed by: NURSE PRACTITIONER

## 2024-05-13 PROCEDURE — 99999 PR PBB SHADOW E&M-EST. PATIENT-LVL V: CPT | Mod: PBBFAC,,, | Performed by: NURSE PRACTITIONER

## 2024-05-13 PROCEDURE — 99215 OFFICE O/P EST HI 40 MIN: CPT | Mod: PBBFAC | Performed by: NURSE PRACTITIONER

## 2024-05-13 NOTE — PROGRESS NOTES
Subjective:       Patient ID: Isidro Alves is a 68 y.o. male.    Chief Complaint: Follow-up ([facility] last thursday)    HPI         [facility] - Telemetry (Hospital)  Hospital Medicine  Discharge Summary        Patient Name: Isidro Alves  MRN: [id number]  DOE: [id number]  Patient Class: IP- Inpatient  Admission Date: 5/2/2024  Hospital Length of Stay: 3 days  Discharge Date and Time: 5/6/2024  3:35 PM  Attending Physician: Crow Guerrero MD  Discharging Provider: Yenni Lerner NP  Primary Care Provider: Steven Morgan MD     Primary Care Team: Networked reference to record PCT      HPI:   The patient is a 67 yo male with past medical history of kidney transplant, pneumonia, anemia, diabetes, hypertension and BPH who presented to the ED with fever, chills, headache, productive cough for 3 days. He reports he was trying to wait it out but cough and headache persisting. He has [place] as well. He states coughing is the worst part. Workup in ED revealed UTI. Transplant medicine consulted and recommended: patient stay in [place], continue empiric IV antibiotics, avoid fluids if possible and hold Cellcept. Hospital medicine consulted.      * No surgery found *       Hospital Course:   The patient presented with fever and chills. He was found to have UTI. Kidney transplant team notified and recommended admission in [place]. Patient placed on empiric IV Rocephin (5/3 - 5/4) and Cellcept was held.   Blood and urine cultures + Serratia M, campos sensitive in the urine culture, but blood culture resistant to Rocephin. ID consulted and kidney transplant team notified.   He was Antibiotics adjusted 5/5 by ID based upon final culture and changed to cefepime IV. Prograf adjusted by transplant team as level elevated.   Repeat cultures negative @ 48 hours. ID changed antibiotics on d/c to Levaquin 750 mg every other day x 10 days. Prescription sent to the in house pharmacy and filled prior to  discharge.  Discussed discharge plan with ID, Dr. Fu, prior to discharge.     Follow up with PCP in 3-5 days for hospital follow up.  Follow up with transplant and diabetes this week as scheduled.          Patient states he is doing well post hospitalization  He has 1 more pill left of Levaquin to complete which he will take today  Blood pressure is a little bit elevated today states he has not taken his blood pressure medicine yet will take it upon his arrival home  Patient recently met with diabetes management on last week working towards changes discussed at the visit  Has appointment with Nephrology/transplant later this week  He reports no fever urinary complaints bowel complaints eating and drinking as normal    Past Medical History:   Diagnosis Date    Abnormal nuclear stress test 8/30/2022    Acute hypoxemic respiratory failure due to COVID-19 1/21/2021    Acute on chronic combined systolic and diastolic congestive heart failure 5/7/2023    Acute on chronic respiratory failure 5/7/2023    Acute respiratory failure with hypoxia and hypercapnia 5/18/2023    Anal itching 10/10/2022    Anemia 4/16/2014    BPH (benign prostatic hyperplasia)     CKD (chronic kidney disease) stage 4, GFR 15-29 ml/min     Coronary artery disease of native artery of native heart with stable angina pectoris 4/29/2019    Diabetes mellitus     Diabetes mellitus, type 2     Dilated cardiomyopathy 8/30/2022    Elevated PSA     Encounter for blood transfusion     Herpes labialis     Hyperlipidemia     Hypertension     Hypertensive emergency 5/18/2023    Obesity     Pneumonia 5/7/2023    Pneumonia due to COVID-19 virus     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    Preop cardiovascular exam 9/16/2022    Proteinuria     Pulmonary edema with congestive heart failure 5/18/2023    Secondary hyperparathyroidism (of renal origin)      Past Surgical History:   Procedure Laterality Date    CATHETERIZATION OF BOTH LEFT AND RIGHT HEART N/A 08/30/2022     Procedure: CATHETERIZATION, HEART, BOTH LEFT AND RIGHT;  Surgeon: Kassandra Deras MD;  Location: [facility] CATH LAB;  Service: Cardiology;  Laterality: N/A;    CATHETERIZATION OF BOTH LEFT AND RIGHT HEART N/A 9/5/2023    Procedure: CATHETERIZATION, HEART, BOTH LEFT AND RIGHT;  Surgeon: Kassandra Deras MD;  Location: [facility] CATH LAB;  Service: Cardiology;  Laterality: N/A;    COLONOSCOPY N/A 12/21/2017    Procedure: COLONOSCOPY;  Surgeon: Fran Escalera MD;  Location: [facility] ENDO;  Service: Endoscopy;  Laterality: N/A;    COLONOSCOPY N/A 02/02/2023    Procedure: COLONOSCOPY;  Surgeon: Anthony Meng MD;  Location: [facility] ENDO;  Service: Endoscopy;  Laterality: N/A;    FLUOROSCOPY N/A 01/21/2021    Procedure: Vascath insertion;  Surgeon: Adrian Pruitt Jr., MD;  Location: [facility] CATH LAB;  Service: General;  Laterality: N/A;    FLUOROSCOPY N/A 04/06/2021    Procedure: Over the wire Vas Cath exchange;  Surgeon: Ho Pressley MD;  Location: [facility] CATH LAB;  Service: General;  Laterality: N/A;    KIDNEY TRANSPLANT N/A 2/18/2024    Procedure: TRANSPLANT, KIDNEY;  Surgeon: Chas Salomon MD;  Location: [address];  Service: Transplant;  Laterality: N/A;    PROSTATE BIOPSY      thorocotomy  01/01/2010     Social History     Socioeconomic History    Marital status:     Number of children: 3   Occupational History     Employer: self-rmployed   Tobacco Use    Smoking status: Never    Smokeless tobacco: Never   Substance and Sexual Activity    Alcohol use: Not Currently     Comment: seldom     Drug use: No    Sexual activity: Not Currently     Partners: Female   Social History Narrative    ** Merged History Encounter **          Social Determinants of Health     Financial Resource Strain: Low Risk  (3/6/2024)    Overall Financial Resource Strain (CARDIA)     Difficulty of Paying Living Expenses: Not hard at all   Food Insecurity: No Food Insecurity (5/3/2024)    Hunger Vital Sign     Worried About Running Out of  Food in the Last Year: Never true     Ran Out of Food in the Last Year: Never true   Transportation Needs: No Transportation Needs (5/3/2024)    PRAPARE - Transportation     Lack of Transportation (Medical): No     Lack of Transportation (Non-Medical): No   Physical Activity: Insufficiently Active (3/6/2024)    Exercise Vital Sign     Days of Exercise per Week: 3 days     Minutes of Exercise per Session: 20 min   Stress: No Stress Concern Present (3/6/2024)    [place] [employer] - Occupational Stress Questionnaire     Feeling of Stress : Not at all   Recent Concern: Stress - Stress Concern Present (2/19/2024)    [place] [employer] - Occupational Stress Questionnaire     Feeling of Stress : To some extent   Housing Stability: Low Risk  (5/3/2024)    Housing Stability Vital Sign     Unable to Pay for Housing in the Last Year: No     Homeless in the Last Year: No     Review of patient's allergies indicates:   Allergen Reactions    Bactrim [sulfamethoxazole-trimethoprim] Swelling    Nsaids (non-steroidal anti-inflammatory drug)      CKD    Sulfamethoxazole     Trimethoprim      Current Outpatient Medications   Medication Sig    aspirin (ECOTRIN) 81 MG EC tablet Take 1 tablet (81 mg total) by mouth once daily.    atorvastatin (LIPITOR) 40 MG tablet Take 1 tablet (40 mg total) by mouth once daily.    blood sugar diagnostic (ONETOUCH ULTRA TEST) Strp Check blood sugar 3 times daily    blood-glucose meter Misc Use as instructed    carvediloL (COREG) 12.5 MG tablet Take 12.5 mg by mouth 2 (two) times daily.    cetirizine (ZYRTEC) 10 MG tablet Take 1 tablet (10 mg total) by mouth once daily.    dapsone 100 MG Tab Take 1 tablet (100 mg total) by mouth once daily.    finasteride (PROSCAR) 5 mg tablet Take 1 tablet (5 mg total) by mouth once daily.    insulin degludec (TRESIBA FLEXTOUCH U-100) 100 unit/mL (3 mL) insulin pen Inject 33 Units into the skin once daily.    insulin lispro  "(HUMALOG KWIKPEN INSULIN) 100 unit/mL pen Inject 10 Units into the skin 3 (three) times daily. Plus slide; max dose 45 units/d    isosorbide dinitrate (ISORDIL) 10 MG tablet Take 2 tablets (20 mg total) by mouth 3 (three) times daily.    lancets (ONETOUCH DELICA PLUS LANCET) 33 gauge Misc Use 1 lancet to test blood sugar 3 (three) times daily.    levoFLOXacin (LEVAQUIN) 750 MG tablet Take 1 tablet (750 mg total) by mouth every other day. for 10 days    magnesium oxide (MAG-OX) 400 mg (241.3 mg magnesium) tablet Take 1 tablet (400 mg total) by mouth 2 (two) times daily.    multivitamin Tab Take 1 tablet by mouth once daily.    mycophenolate (CELLCEPT) 250 mg Cap Take 4 capsules (1,000 mg total) by mouth 2 (two) times daily.    pen needle, diabetic (BD SHIRA 2ND GEN PEN NEEDLE) 32 gauge x 5/32" Ndle Use 1 pen needle to inject insulin 4 (four) times daily.    predniSONE (DELTASONE) 5 MG tablet Take by mouth daily;  2/21 - 2/27 : 20 mg;  2/28 - 3/5 : 15 mg; 3/6 - 3/12 :10 mg; 3/13/2024 - forever : 5 mg; do not stop    sacubitriL-valsartan (ENTRESTO)  mg per tablet Take 1 tablet by mouth 2 (two) times daily.    sodium bicarbonate 650 MG tablet Take 2 tablets (1,300 mg total) by mouth 2 (two) times a day.    tacrolimus (PROGRAF) 1 MG Cap Take 8 capsules (8 mg total) by mouth every 12 (twelve) hours.    tacrolimus (PROGRAF) 1 MG Cap Take 7 capsules (7 mg total) by mouth every 12 (twelve) hours.    tamsulosin (FLOMAX) 0.4 mg Cap Take 1 capsule (0.4 mg total) by mouth once daily.    valGANciclovir (VALCYTE) 450 mg Tab Take 1 tablet (450 mg total) by mouth once daily. Stop 5/21/24     No current facility-administered medications for this visit.           Review of Systems   Constitutional:  Negative for activity change, appetite change, chills, diaphoresis, fatigue, fever and unexpected weight change.   HENT:  Negative for congestion, ear pain, postnasal drip, rhinorrhea, sinus pressure, sinus pain, sneezing, sore " throat, tinnitus, trouble swallowing and voice change.    Eyes:  Negative for photophobia, pain and visual disturbance.   Respiratory:  Negative for cough, chest tightness, shortness of breath and wheezing.    Cardiovascular:  Negative for chest pain, palpitations and leg swelling.   Gastrointestinal:  Negative for abdominal distention, abdominal pain, constipation, diarrhea, nausea and vomiting.   Genitourinary:  Negative for decreased urine volume, difficulty urinating, dysuria, flank pain, frequency, hematuria and urgency.   Musculoskeletal:  Negative for arthralgias, back pain, joint swelling, neck pain and neck stiffness.   Allergic/Immunologic: Negative for immunocompromised state.   Neurological:  Negative for dizziness, tremors, seizures, syncope, facial asymmetry, speech difficulty, weakness, light-headedness, numbness and headaches.   Hematological:  Negative for adenopathy. Does not bruise/bleed easily.   Psychiatric/Behavioral:  Negative for confusion and sleep disturbance.        Objective:      Physical Exam  HENT:      Head: Normocephalic and atraumatic.      Right Ear: Tympanic membrane normal.      Left Ear: Tympanic membrane normal.   Eyes:      Conjunctiva/sclera: Conjunctivae normal.   Cardiovascular:      Rate and Rhythm: Normal rate and regular rhythm.      Heart sounds: Normal heart sounds.   Pulmonary:      Effort: Pulmonary effort is normal.      Breath sounds: Normal breath sounds.   Abdominal:      General: Bowel sounds are normal.      Palpations: Abdomen is soft.   Musculoskeletal:         General: Normal range of motion.      Cervical back: Normal range of motion and neck supple.   Skin:     General: Skin is warm and dry.   Neurological:      Mental Status: He is alert and oriented to person, place, and time.         Assessment:     Vitals:    05/13/24 0933   BP: (!) 142/78   Pulse: 96   Temp: 96.7 °F (35.9 °C)         1. Follow-up exam    2. Acute cystitis with hematuria    3. Diabetes  mellitus type 2 with complications    4. Hypertension associated with diabetes    5. Type 2 diabetes mellitus with chronic kidney disease, with long-term current use of insulin, unspecified CKD stage        Plan:   Follow-up exam    Acute cystitis with hematuria  Comments:  Finishing treatment today no acute symptoms    Diabetes mellitus type 2 with complications    Hypertension associated with diabetes    Type 2 diabetes mellitus with chronic kidney disease, with long-term current use of insulin, unspecified CKD stage          Transitional Care Note    Family and/or Caretaker present at visit?  No.  Diagnostic tests reviewed/disposition: No diagnosic tests pending after this hospitalization.  Disease/illness education:\yes  Home health/community services discussion/referrals: Patient does not have home health established from hospital visit.  They do not need home health.  If needed, we will set up home health for the patient.   Establishment or re-establishment of referral orders for community resources: No other necessary community resources.   Discussion with other health care providers: No discussion with other health care providers necessary.

## 2024-05-13 NOTE — TELEPHONE ENCOUNTER
"Returned call to pt. Pt seeking clarification for gen neph f/u. Txp advises gen neph Q3 months    ----- Message from Wilner Beltre sent at 5/13/2024  2:16 PM CDT -----  Regarding: call back  Consult/Advisory:        Name Of Caller: Self     Contact Preference?:[phone]     What is the nature of the call?: Calling to speak w/ Sara in regards to some question he has requesting call back        Additional Notes:  "Thank you for all that you do for our patients"  "

## 2024-05-15 ENCOUNTER — PATIENT MESSAGE (OUTPATIENT)
Dept: TRANSPLANT | Facility: CLINIC | Age: 69
End: 2024-05-15
Payer: MEDICARE

## 2024-05-15 ENCOUNTER — OUTPATIENT CASE MANAGEMENT (OUTPATIENT)
Dept: ADMINISTRATIVE | Facility: OTHER | Age: 69
End: 2024-05-15
Payer: MEDICARE

## 2024-05-15 ENCOUNTER — TELEPHONE (OUTPATIENT)
Dept: INTERNAL MEDICINE | Facility: CLINIC | Age: 69
End: 2024-05-15
Payer: MEDICARE

## 2024-05-15 ENCOUNTER — OFFICE VISIT (OUTPATIENT)
Dept: NEPHROLOGY | Facility: CLINIC | Age: 69
End: 2024-05-15
Payer: MEDICARE

## 2024-05-15 VITALS
HEART RATE: 84 BPM | DIASTOLIC BLOOD PRESSURE: 80 MMHG | SYSTOLIC BLOOD PRESSURE: 136 MMHG | WEIGHT: 254.44 LBS | HEIGHT: 74 IN | BODY MASS INDEX: 32.65 KG/M2

## 2024-05-15 DIAGNOSIS — I10 PRIMARY HYPERTENSION: ICD-10-CM

## 2024-05-15 DIAGNOSIS — Z94.0 KIDNEY TRANSPLANT STATUS: Primary | ICD-10-CM

## 2024-05-15 DIAGNOSIS — D84.9 IMMUNOSUPPRESSION: ICD-10-CM

## 2024-05-15 PROCEDURE — 99214 OFFICE O/P EST MOD 30 MIN: CPT | Mod: PBBFAC | Performed by: INTERNAL MEDICINE

## 2024-05-15 PROCEDURE — 99215 OFFICE O/P EST HI 40 MIN: CPT | Mod: S$PBB,,, | Performed by: INTERNAL MEDICINE

## 2024-05-15 PROCEDURE — 99999 PR PBB SHADOW E&M-EST. PATIENT-LVL IV: CPT | Mod: PBBFAC,,, | Performed by: INTERNAL MEDICINE

## 2024-05-15 NOTE — TELEPHONE ENCOUNTER
----- Message from Miky Jackson RN sent at 5/15/2024 11:22 AM CDT -----  Regarding: appointment  Good afternoon,    I just spoke to Mr. Alves and he stated that he was instructed to make a follow up appointment with Dr. Morgan but he has not done so yet. He was wondering if the office could call him to help him schedule this appointment. Thank you for your time and assistance!    Kind regards,    Miky Jackson RN OPCM

## 2024-05-15 NOTE — PROGRESS NOTES
Outpatient Care Management  Initial Patient Assessment    Patient: Isidro Alves  MRN: [id number]  Date of Service: 05/15/2024  Completed by: Miky Jackson RN  Referral Date: 05/06/2024  Date of Eligibility: 5/6/2024  Program:   High Risk  Status: Ongoing  Effective Dates: 5/15/2024 - present  Responsible Staff: Miky Jackson RN        Reason for Visit   Patient presents with    Nursing Assessment    OPCM Enrollment Call       Brief Summary:  Isidro Alves was referred by Dr. Guerrero for fever/immunocompromised due to kidney transplant. Patient qualifies for program based on his risk score of 74.8%.   Active problem list, medical, surgical and social history reviewed. Areas of need identified by patient include immunocompromised education and management.   Next steps: collaborate with Dr. Morgan for follow up appointment   Send immunocompromised education   Follow up in one week     Disability Status  Is the patient alert and oriented (person, place, time, and situation)?: Alert and oriented x 4  Hearing Difficulty or Deaf: no  Visual Difficulty or Blind: no  Visual and Hearing Needs Conclusion: no visual or hearing needs  Difficulty Concentrating, Remembering or Making Decisions: no  Communication Difficulty: no  Eating/Swallowing Difficulty: no  Walking or Climbing Stairs Difficulty: no  Dressing/Bathing Difficulty: no  Toileting : Independent  Continence : Continence - Not a problem  Difficulty Managing Errands Independently: no  Equipment Currently Used at Home: none  ADL Conclusion Statement: able to complete ADLs independently  Change in Functional Status Since Onset of Current Illness/Injury: no        Spiritual Beliefs  Spiritual, Cultural Beliefs, Bahai Practices, Values that Affect Care: no      Social History     Socioeconomic History    Marital status:     Number of children: 3   Occupational History     Employer: self-rmployed   Tobacco Use    Smoking status: Never    Smokeless  tobacco: Never   Substance and Sexual Activity    Alcohol use: Not Currently     Comment: seldom     Drug use: No    Sexual activity: Not Currently     Partners: Female   Social History Narrative    ** Merged History Encounter **          Social Determinants of Health     Financial Resource Strain: Low Risk  (5/15/2024)    Overall Financial Resource Strain (CARDIA)     Difficulty of Paying Living Expenses: Not very hard   Food Insecurity: No Food Insecurity (5/15/2024)    Hunger Vital Sign     Worried About Running Out of Food in the Last Year: Never true     Ran Out of Food in the Last Year: Never true   Transportation Needs: No Transportation Needs (5/15/2024)    TRANSPORTATION NEEDS     Transportation : No   Physical Activity: Insufficiently Active (5/15/2024)    Exercise Vital Sign     Days of Exercise per Week: 3 days     Minutes of Exercise per Session: 20 min   Stress: No Stress Concern Present (5/15/2024)    [employer] [place] [employer] - Occupational Stress Questionnaire     Feeling of Stress : Not at all   Recent Concern: Stress - Stress Concern Present (2/19/2024)    [place] [employer] - Occupational Stress Questionnaire     Feeling of Stress : To some extent   Housing Stability: Low Risk  (5/15/2024)    Housing Stability Vital Sign     Unable to Pay for Housing in the Last Year: No     Homeless in the Last Year: No       Roles and Relationships  Primary Source of Support/Comfort: spouse  Name of Support/Comfort Primary Source: Kunal      Advance Directives (For Healthcare)  Advance Directive  (If Adv Dir status is received, view document under Adv Dir in header or Chart Review Media tab): Patient does not have Advance Directive, declines information.        Patient Reported Insurance  Verified current insurance plan:: [employer]            5/15/2024    11:37 AM 3/6/2024     2:03 PM 2/13/2023     1:47 PM 2/10/2022     7:40 AM   Depression Patient Health Questionnaire    Over the last two weeks how often have you been bothered by little interest or pleasure in doing things Not at all Not at all Not at all Not at all   Over the last two weeks how often have you been bothered by feeling down, depressed or hopeless Not at all Not at all Not at all Not at all   PHQ-2 Total Score 0 0 0 0       Learning Assessment       05/15/2024 1158 [facility] (5/15/2024 - Present)   Created by Miyk Jackson, RN -  (Nurse) Status: Complete                 PRIMARY LEARNER     Primary Learner Name:  Isidro Alves JM - 05/15/2024 1158    Relationship:  Patient JM - 05/15/2024 1158    Does the primary learner have any barriers to learning?:  No Barriers JM - 05/15/2024 1158    What is the preferred language of the primary learner?:  English JM - 05/15/2024 1158    Is an  required?:  No JM - 05/15/2024 1158    How does the primary learner prefer to learn new concepts?:  Listening JM - 05/15/2024 1158    How often do you need to have someone help you read instructions, pamphlets, or written material from your doctor or pharmacy?:  Rarely JM - 05/15/2024 1158        CO-LEARNER #1     No question answered        CO-LEARNER #2     No question answered        SPECIAL TOPICS     No question answered        ANSWERED BY:     No question answered        Comments         Edit History       Miky Jackson, RN -  (Nurse)   05/15/2024 1158

## 2024-05-15 NOTE — PROGRESS NOTES
"Subjective:       Patient ID: Isidro Alves is a 68 y.o. male.    Chief Complaint: Kidney Transplant    HPI    He presents to clinic today to establish care after receiving a kidney transplant on February 18, 2024.  Since his transplant he was hospitalized once with a severe urinary tract infection.  He relates that since he was discharged he is done well and is feeling pretty much back to his usual self.  He has completed all of his antibiotics.  In the clinic today he had no specific complaints or issues.  His laboratory studies medications were reviewed.  All Nephrology related questions were answered to his satisfaction.    Review of Systems   Constitutional: Negative.    HENT: Negative.     Respiratory: Negative.     Cardiovascular: Negative.    Gastrointestinal: Negative.    Genitourinary: Negative.    Musculoskeletal: Negative.    Skin: Negative.        /80   Pulse 84   Ht 6' 2" (1.88 m)   Wt 115.4 kg (254 lb 6.6 oz)   BMI 32.66 kg/m²     Lab Results   Component Value Date    WBC 5.34 05/13/2024    HGB 10.1 (L) 05/13/2024    HCT 34.8 (L) 05/13/2024    MCV 86 05/13/2024     (H) 05/13/2024      BMP  Lab Results   Component Value Date     05/13/2024    K 5.4 (H) 05/13/2024     (H) 05/13/2024    CO2 20 (L) 05/13/2024    BUN 24 (H) 05/13/2024    CREATININE 1.6 (H) 05/13/2024    CALCIUM 10.1 05/13/2024    ANIONGAP 9 05/13/2024    ESTGFRAFRICA 7 06/15/2023    EGFRNONAA 6.0 (A) 07/14/2022     CMP  Sodium   Date Value Ref Range Status   05/13/2024 140 136 - 145 mmol/L Final     Potassium   Date Value Ref Range Status   05/13/2024 5.4 (H) 3.5 - 5.1 mmol/L Final     Comment:     *No Visible Hemolysis     Chloride   Date Value Ref Range Status   05/13/2024 111 (H) 95 - 110 mmol/L Final     CO2   Date Value Ref Range Status   05/13/2024 20 (L) 23 - 29 mmol/L Final     Glucose   Date Value Ref Range Status   05/13/2024 145 (H) 70 - 110 mg/dL Final     BUN   Date Value Ref Range Status "   05/13/2024 24 (H) 8 - 23 mg/dL Final     Creatinine   Date Value Ref Range Status   05/13/2024 1.6 (H) 0.5 - 1.4 mg/dL Final     Calcium   Date Value Ref Range Status   05/13/2024 10.1 8.7 - 10.5 mg/dL Final     Total Protein   Date Value Ref Range Status   05/13/2024 6.9 6.0 - 8.4 g/dL Final     Albumin   Date Value Ref Range Status   05/13/2024 3.6 3.5 - 5.2 g/dL Final   05/13/2024 3.6 3.5 - 5.2 g/dL Final   05/17/2016 4.0 3.6 - 5.1 g/dL Final     Comment:     @ Test Performed By:  [employer] LEATHA Sapp M.D.,   [id number] [place] [id number]  [facility]  [id number]       Total Bilirubin   Date Value Ref Range Status   05/13/2024 0.6 0.1 - 1.0 mg/dL Final     Comment:     For infants and newborns, interpretation of results should be based  on gestational age, weight and in agreement with clinical  observations.    Premature Infant recommended reference ranges:  Up to 24 hours.............<8.0 mg/dL  Up to 48 hours............<12.0 mg/dL  3-5 days..................<15.0 mg/dL  6-29 days.................<15.0 mg/dL       Alkaline Phosphatase   Date Value Ref Range Status   05/13/2024 100 55 - 135 U/L Final     AST   Date Value Ref Range Status   05/13/2024 16 10 - 40 U/L Final     ALT   Date Value Ref Range Status   05/13/2024 10 10 - 44 U/L Final     Anion Gap   Date Value Ref Range Status   05/13/2024 9 8 - 16 mmol/L Final     eGFR if    Date Value Ref Range Status   07/14/2022 6.9 (A) >60 mL/min/1.73 m^2 Final     eGFR    Date Value Ref Range Status   06/15/2023 7 mL/min/1.73mSq Final     Comment:     In accordance with NKF-ASN Task Force recommendation, calculation based on the Chronic Kidney Disease Epidemiology Collaboration (CKD-EPI) equation without adjustment for race. eGFR adjusted for gender and age and calculated in ml/min/1.73mSquared. eGFR cannot be calculated if patient is under 18 years of age.      Reference Range:   >= 60 ml/min/1.73mSquared.     eGFR if non    Date Value Ref Range Status   07/14/2022 6.0 (A) >60 mL/min/1.73 m^2 Final     Comment:     Calculation used to obtain the estimated glomerular filtration  rate (eGFR) is the CKD-EPI equation.        Current Outpatient Medications on File Prior to Visit   Medication Sig Dispense Refill    aspirin (ECOTRIN) 81 MG EC tablet Take 1 tablet (81 mg total) by mouth once daily. 30 tablet 11    atorvastatin (LIPITOR) 40 MG tablet Take 1 tablet (40 mg total) by mouth once daily. 30 tablet 11    blood sugar diagnostic (ONETOUCH ULTRA TEST) Strp Check blood sugar 3 times daily 300 each 3    blood-glucose meter Misc Use as instructed 1 each 0    carvediloL (COREG) 12.5 MG tablet Take 12.5 mg by mouth 2 (two) times daily.      cetirizine (ZYRTEC) 10 MG tablet Take 1 tablet (10 mg total) by mouth once daily. 90 tablet 1    dapsone 100 MG Tab Take 1 tablet (100 mg total) by mouth once daily. 30 tablet 5    finasteride (PROSCAR) 5 mg tablet Take 1 tablet (5 mg total) by mouth once daily. 90 tablet 3    insulin degludec (TRESIBA FLEXTOUCH U-100) 100 unit/mL (3 mL) insulin pen Inject 33 Units into the skin once daily. 15 mL 5    insulin lispro (HUMALOG KWIKPEN INSULIN) 100 unit/mL pen Inject 10 Units into the skin 3 (three) times daily. Plus slide; max dose 45 units/d 15 mL 5    isosorbide dinitrate (ISORDIL) 10 MG tablet Take 2 tablets (20 mg total) by mouth 3 (three) times daily. 180 tablet 11    lancets (ONETOUCH DELICA PLUS LANCET) 33 gauge Misc Use 1 lancet to test blood sugar 3 (three) times daily. 100 each 1    levoFLOXacin (LEVAQUIN) 750 MG tablet Take 1 tablet (750 mg total) by mouth every other day. for 10 days 5 tablet 0    magnesium oxide (MAG-OX) 400 mg (241.3 mg magnesium) tablet Take 1 tablet (400 mg total) by mouth 2 (two) times daily. 60 tablet 11    multivitamin Tab Take 1 tablet by mouth once daily.      mycophenolate (CELLCEPT) 250  "mg Cap Take 4 capsules (1,000 mg total) by mouth 2 (two) times daily. 240 capsule 11    pen needle, diabetic (BD SHIRA 2ND GEN PEN NEEDLE) 32 gauge x 5/32" Ndle Use 1 pen needle to inject insulin 4 (four) times daily. 100 each 1    predniSONE (DELTASONE) 5 MG tablet Take by mouth daily;  2/21 - 2/27 : 20 mg;  2/28 - 3/5 : 15 mg; 3/6 - 3/12 :10 mg; 3/13/2024 - forever : 5 mg; do not stop 70 tablet 11    sacubitriL-valsartan (ENTRESTO)  mg per tablet Take 1 tablet by mouth 2 (two) times daily. 60 tablet 11    sodium bicarbonate 650 MG tablet Take 2 tablets (1,300 mg total) by mouth 2 (two) times a day. 120 tablet 11    tacrolimus (PROGRAF) 1 MG Cap Take 8 capsules (8 mg total) by mouth every 12 (twelve) hours. 480 capsule 11    tacrolimus (PROGRAF) 1 MG Cap Take 7 capsules (7 mg total) by mouth every 12 (twelve) hours.      tamsulosin (FLOMAX) 0.4 mg Cap Take 1 capsule (0.4 mg total) by mouth once daily. 30 capsule 11    valGANciclovir (VALCYTE) 450 mg Tab Take 1 tablet (450 mg total) by mouth once daily. Stop 5/21/24 30 tablet 5     No current facility-administered medications on file prior to visit.            Objective:            Physical Exam  Constitutional:       Appearance: Normal appearance.   HENT:      Head: Normocephalic and atraumatic.   Eyes:      General: No scleral icterus.     Extraocular Movements: Extraocular movements intact.      Pupils: Pupils are equal, round, and reactive to light.   Pulmonary:      Effort: Pulmonary effort is normal.      Breath sounds: No stridor.   Musculoskeletal:      Right lower leg: No edema.      Left lower leg: No edema.   Skin:     General: Skin is warm and dry.   Neurological:      General: No focal deficit present.      Mental Status: He is alert and oriented to person, place, and time.   Psychiatric:         Mood and Affect: Mood normal.         Behavior: Behavior normal.       Assessment:       1. Kidney transplant status    2. Immunosuppression    3. Primary " hypertension     4. Anemia  5. Hyperkalemia   Plan:       1. Status post kidney transplant on 02/18/2024.  Stable renal allograft, creatinine tends to run between about 1.5 and 1.7.  There is no current urinalysis for examination.  Will obtain 1 prior to his next office visit.  Is on a RAAS inhibitor in part of Entresto.      2. Currently being managed by kidney transplant medicine [place].  Most recent Prograf level was 8.7.  He was just reduced to 7 mg twice daily.  He is also on a 1000 mg twice a day mycophenolate and 5 mg of prednisone.    3. Blood pressure is well controlled on current regimen.    4. His hemoglobin had dropped while he was hospitalized likely secondary to phlebotomy.  Hemoglobin has improved to 10.1 on most recent laboratory studies.  Will continue to monitor.    5. Potassium is mildly elevated at 5.4.  We discussed dietary modifications for potassium.  He related that he still has his list from dialysis with high potassium foods.  He reviewed the list recently.  He noted that he had been eating a lot of bananas which he is discontinued.  Will continue to monitor.    Approximately 40 minutes was spent in face-to-face conversation and chart review.        Colten Rizvi MD

## 2024-05-15 NOTE — LETTER
May 15, 2024             Dear Isidro,    Welcome to [facility] Complex Care [facility].  It was a pleasure talking with you today.  My name is Miky Jackson, and I look forward to being your Care Manager.  My goal is to help you function at the healthiest and highest level possible.  You can contact me directly at [phone].    As an [facility] patient, some of the services we may be able to provide include:     Development of an individualized care plan with a Registered Nurse   Connection with a   Connection with available resources and services    Coordinate communication among your care team members   Provide coaching and education   Help you understand your doctors treatment plan  Help you obtain information about your insurance coverage.     All services provided by [facility] Complex Care Managers and other care team members are coordinated with and communicated to your primary care team.      As part of your enrollment, you will be receiving education materials and more information about these services in your [facility] account, by phone or through the mail.  If you do not wish to participate or receive information, please contact our office at [phone].      Sincerely,        Miky Jackson RN  [facility]   Out-patient RN Complex Care Manager

## 2024-05-16 ENCOUNTER — OFFICE VISIT (OUTPATIENT)
Dept: INTERNAL MEDICINE | Facility: CLINIC | Age: 69
End: 2024-05-16
Payer: MEDICARE

## 2024-05-16 VITALS
TEMPERATURE: 98 F | HEART RATE: 76 BPM | BODY MASS INDEX: 32.77 KG/M2 | WEIGHT: 255.31 LBS | SYSTOLIC BLOOD PRESSURE: 138 MMHG | OXYGEN SATURATION: 95 % | HEIGHT: 74 IN | DIASTOLIC BLOOD PRESSURE: 70 MMHG

## 2024-05-16 DIAGNOSIS — N18.6 HYPERPARATHYROIDISM DUE TO ESRD ON DIALYSIS: ICD-10-CM

## 2024-05-16 DIAGNOSIS — E11.8 DIABETES MELLITUS TYPE 2 WITH COMPLICATIONS: ICD-10-CM

## 2024-05-16 DIAGNOSIS — N18.30 CONTROLLED TYPE 2 DIABETES MELLITUS WITH STAGE 3 CHRONIC KIDNEY DISEASE, WITH LONG-TERM CURRENT USE OF INSULIN: ICD-10-CM

## 2024-05-16 DIAGNOSIS — Z79.4 CONTROLLED TYPE 2 DIABETES MELLITUS WITH STAGE 3 CHRONIC KIDNEY DISEASE, WITH LONG-TERM CURRENT USE OF INSULIN: ICD-10-CM

## 2024-05-16 DIAGNOSIS — E04.2 MULTIPLE THYROID NODULES: ICD-10-CM

## 2024-05-16 DIAGNOSIS — I15.2 HYPERTENSION ASSOCIATED WITH DIABETES: Primary | ICD-10-CM

## 2024-05-16 DIAGNOSIS — E11.69 HYPERLIPIDEMIA ASSOCIATED WITH TYPE 2 DIABETES MELLITUS: ICD-10-CM

## 2024-05-16 DIAGNOSIS — Z99.2 HYPERPARATHYROIDISM DUE TO ESRD ON DIALYSIS: ICD-10-CM

## 2024-05-16 DIAGNOSIS — E11.59 HYPERTENSION ASSOCIATED WITH DIABETES: Primary | ICD-10-CM

## 2024-05-16 DIAGNOSIS — N25.81 HYPERPARATHYROIDISM DUE TO ESRD ON DIALYSIS: ICD-10-CM

## 2024-05-16 DIAGNOSIS — D63.1 ANEMIA DUE TO CHRONIC KIDNEY DISEASE, UNSPECIFIED CKD STAGE: ICD-10-CM

## 2024-05-16 DIAGNOSIS — N40.0 BENIGN NON-NODULAR PROSTATIC HYPERPLASIA WITHOUT LOWER URINARY TRACT SYMPTOMS: ICD-10-CM

## 2024-05-16 DIAGNOSIS — I50.22 CHRONIC SYSTOLIC CONGESTIVE HEART FAILURE: ICD-10-CM

## 2024-05-16 DIAGNOSIS — E11.22 TYPE 2 DIABETES MELLITUS WITH CHRONIC KIDNEY DISEASE, WITH LONG-TERM CURRENT USE OF INSULIN, UNSPECIFIED CKD STAGE: ICD-10-CM

## 2024-05-16 DIAGNOSIS — I70.0 AORTIC ATHEROSCLEROSIS: ICD-10-CM

## 2024-05-16 DIAGNOSIS — N18.9 ANEMIA DUE TO CHRONIC KIDNEY DISEASE, UNSPECIFIED CKD STAGE: ICD-10-CM

## 2024-05-16 DIAGNOSIS — I25.118 CORONARY ARTERY DISEASE OF NATIVE ARTERY OF NATIVE HEART WITH STABLE ANGINA PECTORIS: ICD-10-CM

## 2024-05-16 DIAGNOSIS — E11.22 CONTROLLED TYPE 2 DIABETES MELLITUS WITH STAGE 3 CHRONIC KIDNEY DISEASE, WITH LONG-TERM CURRENT USE OF INSULIN: ICD-10-CM

## 2024-05-16 DIAGNOSIS — I42.0 DILATED CARDIOMYOPATHY: ICD-10-CM

## 2024-05-16 DIAGNOSIS — E78.5 HYPERLIPIDEMIA ASSOCIATED WITH TYPE 2 DIABETES MELLITUS: ICD-10-CM

## 2024-05-16 DIAGNOSIS — Z79.4 TYPE 2 DIABETES MELLITUS WITH CHRONIC KIDNEY DISEASE, WITH LONG-TERM CURRENT USE OF INSULIN, UNSPECIFIED CKD STAGE: ICD-10-CM

## 2024-05-16 PROBLEM — Z00.00 ROUTINE GENERAL MEDICAL EXAMINATION AT A HEALTH CARE FACILITY: Status: ACTIVE | Noted: 2022-09-16

## 2024-05-16 PROCEDURE — 99999 PR PBB SHADOW E&M-EST. PATIENT-LVL V: CPT | Mod: PBBFAC,,, | Performed by: INTERNAL MEDICINE

## 2024-05-16 PROCEDURE — 99214 OFFICE O/P EST MOD 30 MIN: CPT | Mod: S$PBB,,, | Performed by: INTERNAL MEDICINE

## 2024-05-16 PROCEDURE — 99215 OFFICE O/P EST HI 40 MIN: CPT | Mod: PBBFAC | Performed by: INTERNAL MEDICINE

## 2024-05-16 RX ORDER — ATORVASTATIN CALCIUM 80 MG/1
80 TABLET, FILM COATED ORAL DAILY
Qty: 90 TABLET | Refills: 3 | Status: SHIPPED | OUTPATIENT
Start: 2024-05-16 | End: 2025-05-16

## 2024-05-16 NOTE — PROGRESS NOTES
Subjective:      Patient ID: Isidro Alves is a 68 y.o. male.    Chief Complaint: Annual Exam    HPI      68 y.o. with  Patient Active Problem List   Diagnosis    Anemia due to chronic kidney disease    Hypertension associated with diabetes    Hyperparathyroidism due to ESRD on dialysis    Metabolic acidosis    Type 2 diabetes mellitus with chronic kidney disease, with long-term current use of insulin    Benign non-nodular prostatic hyperplasia without lower urinary tract symptoms    Hypogonadism in male    Hyperlipidemia associated with type 2 diabetes mellitus    Colon polyp    Other proteinuria    Coronary artery disease of native artery of native heart with stable angina pectoris    Multiple thyroid nodules    Gram-negative bacteremia    Upper airway cough syndrome    Pulmonary fibrosis, postinflammatory    PVC (premature ventricular contraction)    Chronic systolic congestive heart failure    Dilated cardiomyopathy    Pulmonary HTN    Routine general medical examination at a health care facility    Chronic cough    Pleural effusion on left    Overweight with body mass index (BMI) of 29 to 29.9 in adult    S/P kidney transplant 2024    At risk for opportunistic infections    Prophylactic immunotherapy    Hx Low vitamin D level    Simple chronic bronchitis    Severe obesity (BMI 35.0-39.9) with comorbidity    Diabetes mellitus type 2 with complications    Acute cystitis    Aortic atherosclerosis    Controlled type 2 diabetes mellitus with stage 3 chronic kidney disease, with long-term current use of insulin     Past Medical History:   Diagnosis Date    Abnormal nuclear stress test 8/30/2022    Acute hypoxemic respiratory failure due to COVID-19 1/21/2021    Acute on chronic combined systolic and diastolic congestive heart failure 5/7/2023    Acute on chronic respiratory failure 5/7/2023    Acute respiratory failure with hypoxia and hypercapnia 5/18/2023    Anal itching 10/10/2022    Anemia 4/16/2014    BPH  (benign prostatic hyperplasia)     CKD (chronic kidney disease) stage 4, GFR 15-29 ml/min     Coronary artery disease of native artery of native heart with stable angina pectoris 4/29/2019    Diabetes mellitus     Diabetes mellitus, type 2     Dilated cardiomyopathy 8/30/2022    Elevated PSA     Encounter for blood transfusion     Herpes labialis     Hyperlipidemia     Hypertension     Hypertensive emergency 5/18/2023    Obesity     Pneumonia 5/7/2023    Pneumonia due to COVID-19 virus     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  Compliant with meds without significant side effects. Energy and appetite are good.         Past Surgical History:   Procedure Laterality Date    CATHETERIZATION OF BOTH LEFT AND RIGHT HEART N/A 08/30/2022    Procedure: CATHETERIZATION, HEART, BOTH LEFT AND RIGHT;  Surgeon: Kassandra Deras MD;  Location: [facility] CATH LAB;  Service: Cardiology;  Laterality: N/A;    CATHETERIZATION OF BOTH LEFT AND RIGHT HEART N/A 9/5/2023    Procedure: CATHETERIZATION, HEART, BOTH LEFT AND RIGHT;  Surgeon: Kassandra Deras MD;  Location: [facility] CATH LAB;  Service: Cardiology;  Laterality: N/A;    COLONOSCOPY N/A 12/21/2017    Procedure: COLONOSCOPY;  Surgeon: Fran Escalera MD;  Location: [facility] ENDO;  Service: Endoscopy;  Laterality: N/A;    COLONOSCOPY N/A 02/02/2023    Procedure: COLONOSCOPY;  Surgeon: Anthony Meng MD;  Location: [facility] ENDO;  Service: Endoscopy;  Laterality: N/A;    FLUOROSCOPY N/A 01/21/2021    Procedure: Vascath insertion;  Surgeon: Adrian Pruitt Jr., MD;  Location: [facility] CATH LAB;  Service: General;  Laterality: N/A;    FLUOROSCOPY N/A 04/06/2021    Procedure: Over the wire Vas Cath exchange;  Surgeon: Ho Pressley MD;  Location: [facility] CATH LAB;  Service: General;  Laterality: N/A;     KIDNEY TRANSPLANT N/A 2/18/2024    Procedure: TRANSPLANT, KIDNEY;  Surgeon: Chas Salomon MD;  Location: [facility] OR [address] [place] [address];  Service: Transplant;  Laterality: N/A;    PROSTATE BIOPSY      thorocotomy  01/01/2010     Social History     Socioeconomic History    Marital status:     Number of children: 3   Occupational History     Employer: self-rmployed   Tobacco Use    Smoking status: Never    Smokeless tobacco: Never   Substance and Sexual Activity    Alcohol use: Not Currently     Comment: seldom     Drug use: No    Sexual activity: Not Currently     Partners: Female   Social History Narrative    ** Merged History Encounter **          Social Determinants of Health     Financial Resource Strain: Low Risk  (5/15/2024)    Overall Financial Resource Strain (CARDIA)     Difficulty of Paying Living Expenses: Not very hard   Food Insecurity: No Food Insecurity (5/15/2024)    Hunger Vital Sign     Worried About Running Out of Food in the Last Year: Never true     Ran Out of Food in the Last Year: Never true   Transportation Needs: No Transportation Needs (5/15/2024)    TRANSPORTATION NEEDS     Transportation : No   Physical Activity: Insufficiently Active (5/15/2024)    Exercise Vital Sign     Days of Exercise per Week: 3 days     Minutes of Exercise per Session: 20 min   Stress: No Stress Concern Present (5/15/2024)    [place] [place] [facility] [employer] - Occupational Stress Questionnaire     Feeling of Stress : Not at all   Recent Concern: Stress - Stress Concern Present (2/19/2024)    [place] [place] [employer] - Occupational Stress Questionnaire     Feeling of Stress : To some extent   Housing Stability: Low Risk  (5/15/2024)    Housing Stability Vital Sign     Unable to Pay for Housing in the Last Year: No     Homeless in the Last Year: No     family history includes Diabetes in his father and another family member; Hypertension in his father and another family member; No Known Problems in  "his mother.      Review of Systems   Constitutional:  Negative for chills and fever.   HENT:  Negative for ear pain and sore throat.    Respiratory:  Negative for cough.    Cardiovascular:  Negative for chest pain.   Gastrointestinal:  Negative for abdominal pain and blood in stool.   Genitourinary:  Negative for dysuria and hematuria.   Neurological:  Negative for seizures and syncope.     Objective:   /70 (BP Location: Right arm, Patient Position: Sitting, BP Method: Large (Manual))   Pulse 76   Temp 97.6 °F (36.4 °C) (Tympanic)   Ht 6' 2" (1.88 m)   Wt 115.8 kg (255 lb 4.7 oz)   SpO2 95%   BMI 32.78 kg/m²     Physical Exam  Constitutional:       General: He is not in acute distress.     Appearance: He is well-developed.   HENT:      Head: Normocephalic and atraumatic.   Eyes:      Extraocular Movements: Extraocular movements intact.   Neck:      Thyroid: No thyromegaly.   Cardiovascular:      Rate and Rhythm: Normal rate and regular rhythm.      Pulses:           Dorsalis pedis pulses are 2+ on the right side and 2+ on the left side.   Pulmonary:      Breath sounds: Normal breath sounds. No wheezing or rales.   Abdominal:      General: Bowel sounds are normal.      Palpations: Abdomen is soft.      Tenderness: There is no abdominal tenderness.   Musculoskeletal:         General: No swelling.      Cervical back: Neck supple. No rigidity.   Feet:      Right foot:      Protective Sensation: 8 sites tested.  8 sites sensed.      Skin integrity: No ulcer or blister.      Left foot:      Protective Sensation: 8 sites tested.  8 sites sensed.      Skin integrity: No ulcer or blister.   Lymphadenopathy:      Cervical: No cervical adenopathy.   Skin:     General: Skin is warm and dry.   Neurological:      Mental Status: He is alert and oriented to person, place, and time.   Psychiatric:         Behavior: Behavior normal.         Lab Results   Component Value Date    WBC 5.34 05/13/2024    HGB 10.1 (L) " 05/13/2024    HGB 9.1 (L) 05/05/2024    HGB 8.4 (L) 05/04/2024    HCT 34.8 (L) 05/13/2024    MCV 86 05/13/2024    MCV 79 (L) 05/05/2024    MCV 80 (L) 05/04/2024     (H) 05/13/2024    CHOL 173 05/13/2024    TRIG 82 05/13/2024    HDL 41 05/13/2024    LDLCALC 115.6 05/13/2024    LDLCALC 82 09/26/2022    LDLCALC 79.8 07/14/2022    ALT 10 05/13/2024    AST 16 05/13/2024     05/13/2024    K 5.4 (H) 05/13/2024    CALCIUM 10.1 05/13/2024     (H) 05/13/2024    CO2 20 (L) 05/13/2024    BUN 24 (H) 05/13/2024    CREATININE 1.6 (H) 05/13/2024    CREATININE 1.7 (H) 05/05/2024    CREATININE 1.6 (H) 05/04/2024    EGFRNORACEVR 46.9 (A) 05/13/2024    EGFRNORACEVR 44 (A) 05/05/2024    EGFRNORACEVR 47 (A) 05/04/2024    TSH 1.941 07/14/2022    TSH 1.464 09/05/2019    TSH 1.329 11/20/2017    PSA 3.9 01/11/2024    PSA 6.1 (H) 08/25/2022    PSA 3.6 06/02/2021    PSADIAG 7.7 (H) 11/04/2016    PSADIAG 12.7 (H) 05/17/2016    PSADIAG 4.7 (H) 11/20/2015     (H) 05/13/2024    HGBA1C 5.6 05/13/2024    HGBA1C 6.4 (H) 05/07/2023    HGBA1C 6.4 (H) 02/02/2023    KRREUSQD12QX 32 12/01/2020    EOWCVOVW69EB 33 09/01/2020    FGNOXJAF55US 31 03/19/2020    BNP 1,330 (H) 05/02/2024          The 10-year ASCVD risk score (Naye POON, et al., 2019) is: 34.5%    Values used to calculate the score:      Age: 68 years      Sex: Male      Is Non- : Yes      Diabetic: Yes      Tobacco smoker: No      Systolic Blood Pressure: 138 mmHg      Is BP treated: Yes      HDL Cholesterol: 41 mg/dL      Total Cholesterol: 173 mg/dL     Assessment:     1. Hypertension associated with diabetes    2. Hyperparathyroidism due to ESRD on dialysis    3. Type 2 diabetes mellitus with chronic kidney disease, with long-term current use of insulin, unspecified CKD stage    4. Anemia due to chronic kidney disease, unspecified CKD stage    5. Benign non-nodular prostatic hyperplasia without lower urinary tract symptoms    6. Hyperlipidemia  associated with type 2 diabetes mellitus    7. Coronary artery disease of native artery of native heart with stable angina pectoris    8. Multiple thyroid nodules    9. Chronic systolic congestive heart failure    10. Dilated cardiomyopathy    11. Diabetes mellitus type 2 with complications    12. Aortic atherosclerosis    13. Controlled type 2 diabetes mellitus with stage 3 chronic kidney disease, with long-term current use of insulin      Plan:   1. Hypertension associated with diabetes  Overview:  Controlled.  Continue current medications      2. Hyperparathyroidism due to ESRD on dialysis  Overview:  Stable.  Continue recommendations to follow up as per Nephrology.      3. Type 2 diabetes mellitus with chronic kidney disease, with long-term current use of insulin, unspecified CKD stage  Overview:    Controlled.  Continue current medications      4. Anemia due to chronic kidney disease, unspecified CKD stage  Overview:   Stable.      5. Benign non-nodular prostatic hyperplasia without lower urinary tract symptoms    6. Hyperlipidemia associated with type 2 diabetes mellitus  Assessment & Plan:    Not at goal.  Increase atorvastatin to 80 mg.    Orders:  -     atorvastatin (LIPITOR) 80 MG tablet; Take 1 tablet (80 mg total) by mouth once daily.  Dispense: 90 tablet; Refill: 3    7. Coronary artery disease of native artery of native heart with stable angina pectoris  Overview:  Stable.  Aware to continue recommendations and follow-up as per Cardiology.      8. Multiple thyroid nodules  Overview:  /25/2019     FINDINGS:  The thyroid gland demonstrates a homogenous echotexture.  The isthmus measures 3.0 mm in thickness.  The right lobe of the thyroid gland measures 5.0 cm in length.  The left lobe of the thyroid gland measures 4.9 cm in length.     There are 2 tiny cystic nodules noted within the lower pole of the right lobe of the thyroid gland measuring 2 mm and 3 mm respectively.  There is also a tiny 2 mm cystic  lesion seen within the upper pole of the left lobe of the thyroid gland.     Impression:     1. No nodules requiring FNA or follow-up per TI RADS criteria.        Electronically signed by: Dominic Puentes DO  Date:                                            06/12/2020  Time:                                           10:40           Exam Ended: 06/12/20 10:21                 9. Chronic systolic congestive heart failure  Overview:  Compensated.  Aware to continue recommendations and follow-up as per Cardiology.      10. Dilated cardiomyopathy  Overview:    Compensated.  Continue current   Medications and recommendations as per      11. Diabetes mellitus type 2 with complications  Overview:   Controlled.  Continue current medications.     Orders:  -     Cancel: Ambulatory referral/consult to Podiatry; Future; Expected date: 05/23/2024    12. Aortic atherosclerosis  Assessment & Plan:  Stable.     COMPARISON:  05/07/2023    FINDINGS:  Aortic atherosclerosis. Heart size normal.    No acute appearing infiltrates. Small amount of scarring lower left lung..    Multiple old bilateral rib fractures. No advanced arthritic changes.    Impression:    No acute findings evident. No significant pleural effusion evident       13. Controlled type 2 diabetes mellitus with stage 3 chronic kidney disease, with long-term current use of insulin        Patient Instructions   Check with your pharmacy regarding rsv and shingles vaccine.      Future Appointments   Date Time Provider Department Center   6/10/2024  8:15 AM LABORATORY, [facility]   6/26/2024 11:20 AM Shahab Calix MD [facility]   8/19/2024  9:30 AM Jatin Deleon PA-C [facility] DIABETE [facility]   10/1/2024  9:45 AM LABORATORY, [facility] LAB [facility]   10/1/2024 10:00 AM SPECIMEN, [facility] SPECLAB [facility]   10/8/2024 10:00 AM Colten Rizvi MD [facility] NEPHRO [facility]       Lab Frequency Next Occurrence   Ambulatory referral/consult  to Pulmonary Disease Management w/ Respiratory Therapist Once 05/16/2023   Ambulatory referral/consult to Physical/Occupational Therapy Once 05/25/2023   Ambulatory referral/consult to Cardiac Electrophysiology Once 06/04/2023   Ambulatory referral/consult to Physical/Occupational Therapy Once 07/03/2023   Ambulatory referral/consult to Transplant, Heart Once 07/16/2023   Cath lab procedure Once 09/28/2023   Ambulatory referral/consult to Physical/Occupational Therapy Once 09/26/2023   Type And Screen Preop Once 02/18/2024   Ambulatory referral/consult to Nephrology Once 08/21/2024   ACCEPT - Ambulatory referral/consult to Interventional Cardiology Once 05/13/2024   Renal Function Panel Once 11/14/2024   PTH, Intact Once 11/14/2024   Magnesium Once 11/14/2024   Tacrolimus Level Once 11/14/2024   Urinalysis Once 11/14/2024   Protein/Creatinine Ratio, Urine Once 11/14/2024   Vitamin D Once 11/14/2024   HLA PRA Screen     HLA Virtual Crossmatch     HLA PRA Screen     Vitamin D     PTH, Intact     CBC auto differential     Renal function panel     Magnesium     Tacrolimus level     Hepatitis C RNA, Quantitative, PCR     Tacrolimus level     CBC auto differential     Renal function panel     Urinalysis     Protein / creatinine ratio, urine, Home collect/Standing     BK virus, DNA, quantitative [facility]; Plasma     CMV DNA, quantitative, PCR     Magnesium     Hepatitis B core antibody, total     HEPATITIS C ANTIBODY         Follow up in about 1 year (around 5/16/2025), or if symptoms worsen or fail to improve.

## 2024-05-16 NOTE — ASSESSMENT & PLAN NOTE
Not at goal.  Increase atorvastatin to 80 mg.  
Stable.     COMPARISON:  05/07/2023    FINDINGS:  Aortic atherosclerosis. Heart size normal.    No acute appearing infiltrates. Small amount of scarring lower left lung..    Multiple old bilateral rib fractures. No advanced arthritic changes.    Impression:    No acute findings evident. No significant pleural effusion evident   
Declines

## 2024-05-27 DIAGNOSIS — Z94.0 KIDNEY REPLACED BY TRANSPLANT: Primary | ICD-10-CM

## 2024-05-29 ENCOUNTER — OUTPATIENT CASE MANAGEMENT (OUTPATIENT)
Dept: ADMINISTRATIVE | Facility: OTHER | Age: 69
End: 2024-05-29
Payer: MEDICARE

## 2024-05-30 ENCOUNTER — PATIENT OUTREACH (OUTPATIENT)
Dept: ADMINISTRATIVE | Facility: HOSPITAL | Age: 69
End: 2024-05-30
Payer: MEDICARE

## 2024-06-05 DIAGNOSIS — E11.22 CONTROLLED TYPE 2 DIABETES MELLITUS WITH STAGE 3 CHRONIC KIDNEY DISEASE, WITH LONG-TERM CURRENT USE OF INSULIN: ICD-10-CM

## 2024-06-05 DIAGNOSIS — Z79.4 CONTROLLED TYPE 2 DIABETES MELLITUS WITH STAGE 3 CHRONIC KIDNEY DISEASE, WITH LONG-TERM CURRENT USE OF INSULIN: ICD-10-CM

## 2024-06-05 DIAGNOSIS — N18.30 CONTROLLED TYPE 2 DIABETES MELLITUS WITH STAGE 3 CHRONIC KIDNEY DISEASE, WITH LONG-TERM CURRENT USE OF INSULIN: ICD-10-CM

## 2024-06-07 RX ORDER — CARVEDILOL 12.5 MG/1
12.5 TABLET ORAL 2 TIMES DAILY
Status: CANCELLED | OUTPATIENT
Start: 2024-06-07

## 2024-06-07 NOTE — TELEPHONE ENCOUNTER
No care due was identified.  Health [facility] Embedded Care Due Messages. Reference number: [id number].   6/07/2024 11:55:50 AM CDT

## 2024-06-07 NOTE — TELEPHONE ENCOUNTER
----- Message from Alma Parker MA sent at 6/7/2024 11:40 AM CDT -----  Type: RX Refill Request    Who Called:  Pt   Have you contacted your pharmacy:  Yes   Refill    RX Name and Strength:  carvediloL (COREG) 12.5 MG tablet  Preferred Pharmacy with phone number:  [facility]/PHARMACY #[id number] - [place], [place] - [address]    [phone]  Local or Mail Order:    Would the patient rather a call back or a response via [facility]?    Best Call Back Number:   [phone]  Additional Information:    Thank you.

## 2024-06-10 ENCOUNTER — LAB VISIT (OUTPATIENT)
Dept: LAB | Facility: HOSPITAL | Age: 69
End: 2024-06-10
Attending: INTERNAL MEDICINE
Payer: COMMERCIAL

## 2024-06-10 DIAGNOSIS — Z94.0 KIDNEY REPLACED BY TRANSPLANT: ICD-10-CM

## 2024-06-10 LAB
ALBUMIN SERPL BCP-MCNC: 3.5 G/DL (ref 3.5–5.2)
ANION GAP SERPL CALC-SCNC: 8 MMOL/L (ref 8–16)
BASOPHILS # BLD AUTO: 0.06 K/UL (ref 0–0.2)
BASOPHILS NFR BLD: 0.8 % (ref 0–1.9)
BUN SERPL-MCNC: 30 MG/DL (ref 8–23)
CALCIUM SERPL-MCNC: 9.5 MG/DL (ref 8.7–10.5)
CHLORIDE SERPL-SCNC: 108 MMOL/L (ref 95–110)
CO2 SERPL-SCNC: 22 MMOL/L (ref 23–29)
CREAT SERPL-MCNC: 1.7 MG/DL (ref 0.5–1.4)
DIFFERENTIAL METHOD BLD: ABNORMAL
EOSINOPHIL # BLD AUTO: 0.2 K/UL (ref 0–0.5)
EOSINOPHIL NFR BLD: 2.4 % (ref 0–8)
ERYTHROCYTE [DISTWIDTH] IN BLOOD BY AUTOMATED COUNT: 18.8 % (ref 11.5–14.5)
EST. GFR  (NO RACE VARIABLE): 43.6 ML/MIN/1.73 M^2
GLUCOSE SERPL-MCNC: 113 MG/DL (ref 70–110)
HBV SURFACE AG SERPL QL IA: NORMAL
HCT VFR BLD AUTO: 34.8 % (ref 40–54)
HGB BLD-MCNC: 10.6 G/DL (ref 14–18)
IMM GRANULOCYTES # BLD AUTO: 0.2 K/UL (ref 0–0.04)
IMM GRANULOCYTES NFR BLD AUTO: 2.6 % (ref 0–0.5)
LYMPHOCYTES # BLD AUTO: 0.3 K/UL (ref 1–4.8)
LYMPHOCYTES NFR BLD: 4.4 % (ref 18–48)
MAGNESIUM SERPL-MCNC: 1.4 MG/DL (ref 1.6–2.6)
MCH RBC QN AUTO: 25.4 PG (ref 27–31)
MCHC RBC AUTO-ENTMCNC: 30.5 G/DL (ref 32–36)
MCV RBC AUTO: 83 FL (ref 82–98)
MONOCYTES # BLD AUTO: 0.7 K/UL (ref 0.3–1)
MONOCYTES NFR BLD: 9.4 % (ref 4–15)
NEUTROPHILS # BLD AUTO: 6.3 K/UL (ref 1.8–7.7)
NEUTROPHILS NFR BLD: 80.4 % (ref 38–73)
NRBC BLD-RTO: 0 /100 WBC
PHOSPHATE SERPL-MCNC: 3 MG/DL (ref 2.7–4.5)
PLATELET # BLD AUTO: 334 K/UL (ref 150–450)
PMV BLD AUTO: 12.5 FL (ref 9.2–12.9)
POTASSIUM SERPL-SCNC: 4.7 MMOL/L (ref 3.5–5.1)
RBC # BLD AUTO: 4.18 M/UL (ref 4.6–6.2)
SODIUM SERPL-SCNC: 138 MMOL/L (ref 136–145)
WBC # BLD AUTO: 7.78 K/UL (ref 3.9–12.7)

## 2024-06-10 PROCEDURE — 87340 HEPATITIS B SURFACE AG IA: CPT | Performed by: INTERNAL MEDICINE

## 2024-06-10 PROCEDURE — 83735 ASSAY OF MAGNESIUM: CPT | Performed by: INTERNAL MEDICINE

## 2024-06-10 PROCEDURE — 80197 ASSAY OF TACROLIMUS: CPT | Performed by: INTERNAL MEDICINE

## 2024-06-10 PROCEDURE — 80069 RENAL FUNCTION PANEL: CPT | Performed by: INTERNAL MEDICINE

## 2024-06-10 PROCEDURE — 85025 COMPLETE CBC W/AUTO DIFF WBC: CPT | Performed by: INTERNAL MEDICINE

## 2024-06-10 PROCEDURE — 36415 COLL VENOUS BLD VENIPUNCTURE: CPT | Performed by: INTERNAL MEDICINE

## 2024-06-10 PROCEDURE — 87517 HEPATITIS B DNA QUANT: CPT | Performed by: INTERNAL MEDICINE

## 2024-06-11 ENCOUNTER — PATIENT MESSAGE (OUTPATIENT)
Dept: TRANSPLANT | Facility: CLINIC | Age: 69
End: 2024-06-11
Payer: MEDICARE

## 2024-06-11 ENCOUNTER — OUTPATIENT CASE MANAGEMENT (OUTPATIENT)
Dept: ADMINISTRATIVE | Facility: OTHER | Age: 69
End: 2024-06-11
Payer: MEDICARE

## 2024-06-11 DIAGNOSIS — Z94.0 KIDNEY TRANSPLANT RECIPIENT: ICD-10-CM

## 2024-06-11 LAB
HEPATITIS B VIRUS DNA: NORMAL
HEPATITIS B VIRUS PCR, QUANT: NOT DETECTED IU/ML
TACROLIMUS BLD-MCNC: 10 NG/ML (ref 5–15)

## 2024-06-11 RX ORDER — TACROLIMUS 1 MG/1
6 CAPSULE ORAL EVERY 12 HOURS
Qty: 360 CAPSULE | Refills: 11 | Status: SHIPPED | OUTPATIENT
Start: 2024-06-11 | End: 2025-06-11

## 2024-06-11 NOTE — TELEPHONE ENCOUNTER
Pt notified to increase hydration and decrease Prograf to 6 mg twice a day. Repeat lab on 6/24    ----- Message from Marcus Fernandez MD sent at 6/11/2024  1:02 PM CDT -----  Please lower prograf to 6 mg PO bid and repeat a tacro level in 2 weeks  Encourage hydration

## 2024-06-12 ENCOUNTER — TELEPHONE (OUTPATIENT)
Dept: TRANSPLANT | Facility: CLINIC | Age: 69
End: 2024-06-12
Payer: MEDICARE

## 2024-06-12 DIAGNOSIS — I50.22 CHRONIC SYSTOLIC CONGESTIVE HEART FAILURE: Primary | ICD-10-CM

## 2024-06-12 NOTE — TELEPHONE ENCOUNTER
Hospital f/u was placed for interventional cardiology consult. Pt states received phone call stating wrong cardiology consult was placed and needs cardiology consult. Placed consult.    ----- Message from Marina Ortiz sent at 6/12/2024  2:42 PM CDT -----  Regarding: Speak to coordinator  Contact: Isidro  Regarding:speak to coordinator          Name Of Caller:    Isidro Alves       Contact Preference: [phone] (home)        Nature of call:  pt is calling to speak to coordinator regarding what he need to see the Cardiologist for , states the one that was chosen was not correct. Requesting a call back.

## 2024-06-13 ENCOUNTER — TELEPHONE (OUTPATIENT)
Dept: OTOLARYNGOLOGY | Facility: CLINIC | Age: 69
End: 2024-06-13
Payer: MEDICARE

## 2024-06-13 RX ORDER — CARVEDILOL 12.5 MG/1
12.5 TABLET ORAL 2 TIMES DAILY
Qty: 60 TABLET | Refills: 11 | Status: SHIPPED | OUTPATIENT
Start: 2024-06-13 | End: 2025-06-13

## 2024-06-13 NOTE — TELEPHONE ENCOUNTER
No care due was identified.  [facility] Embedded Care Due Messages. Reference number: [id number].   6/13/2024 10:50:47 AM CDT

## 2024-06-13 NOTE — TELEPHONE ENCOUNTER
----- Message from Polly Armando sent at 6/13/2024 10:34 AM CDT -----  Contact: Isidro  Type:  RX Refill Request    Who Called: Isidro   Refill or New Rx:refill  RX Name and Strength:carvediloL (COREG) 12.5 MG tablet  How is the patient currently taking it? (ex. 1XDay):2xday  Is this a 30 day or 90 day RX:90day  Preferred Pharmacy with phone number:  [facility]/pharmacy #[id number] [place] - [address]  [address] [address]  Phone: [phone] Fax: [phone]  Local or Mail Order:local  Ordering Provider: not sure   Would the patient rather a call back or a response via [facility]? Call back   Best Call Back Number: [phone]  Additional Information:  After patient transplant, the office told him to contact pcp to get the medication refilled. Patient has been calling for the past two weeks and hasn't gotten a response and the medication hasn't been sent to the pharmacy    Thanks   Am

## 2024-06-13 NOTE — TELEPHONE ENCOUNTER
Spoke to pt and informed we have no openings today but offered earlier appt with KAELYN Andrade in [facility] however, pt decided to keep 4pm appt with Dr. Vargas on tomorrow.

## 2024-06-13 NOTE — TELEPHONE ENCOUNTER
----- Message from Heidi Steven sent at 6/13/2024  7:47 AM CDT -----  Contact: Isidro  Patient called in regards to he has an appointment for tomorrow 6/14/24 for the same concern. Woke up this morning and left ear was bleeding. Would like to be seen today if possible. Call back [phone]

## 2024-06-20 ENCOUNTER — OFFICE VISIT (OUTPATIENT)
Dept: CARDIOLOGY | Facility: CLINIC | Age: 69
End: 2024-06-20
Payer: COMMERCIAL

## 2024-06-20 VITALS
OXYGEN SATURATION: 96 % | WEIGHT: 255.31 LBS | HEIGHT: 74 IN | DIASTOLIC BLOOD PRESSURE: 72 MMHG | BODY MASS INDEX: 32.77 KG/M2 | HEART RATE: 76 BPM | SYSTOLIC BLOOD PRESSURE: 130 MMHG

## 2024-06-20 DIAGNOSIS — I50.22 CHRONIC SYSTOLIC CONGESTIVE HEART FAILURE: Primary | ICD-10-CM

## 2024-06-20 DIAGNOSIS — I42.8 NICM (NONISCHEMIC CARDIOMYOPATHY): ICD-10-CM

## 2024-06-20 DIAGNOSIS — I15.2 HYPERTENSION ASSOCIATED WITH DIABETES: ICD-10-CM

## 2024-06-20 DIAGNOSIS — E11.69 HYPERLIPIDEMIA ASSOCIATED WITH TYPE 2 DIABETES MELLITUS: ICD-10-CM

## 2024-06-20 DIAGNOSIS — E11.59 HYPERTENSION ASSOCIATED WITH DIABETES: ICD-10-CM

## 2024-06-20 DIAGNOSIS — I42.0 DILATED CARDIOMYOPATHY: ICD-10-CM

## 2024-06-20 DIAGNOSIS — I49.3 PVC (PREMATURE VENTRICULAR CONTRACTION): ICD-10-CM

## 2024-06-20 DIAGNOSIS — I70.0 AORTIC ATHEROSCLEROSIS: ICD-10-CM

## 2024-06-20 DIAGNOSIS — I27.20 PULMONARY HTN: ICD-10-CM

## 2024-06-20 DIAGNOSIS — E78.5 HYPERLIPIDEMIA ASSOCIATED WITH TYPE 2 DIABETES MELLITUS: ICD-10-CM

## 2024-06-20 PROCEDURE — 99999 PR PBB SHADOW E&M-EST. PATIENT-LVL V: CPT | Mod: PBBFAC,,, | Performed by: INTERNAL MEDICINE

## 2024-06-20 RX ORDER — HYDRALAZINE HYDROCHLORIDE 10 MG/1
10 TABLET, FILM COATED ORAL 3 TIMES DAILY
Qty: 90 TABLET | Refills: 11 | Status: SHIPPED | OUTPATIENT
Start: 2024-06-20 | End: 2025-06-20

## 2024-06-20 NOTE — PROGRESS NOTES
Subjective:   Patient ID:  Isidro Alves is a 68 y.o. male who presents for follow up of No chief complaint on file.      65 yo male, came in for 12 months f/u. Own a night club.  PMH NICM, CHFrEF 40%  CAD h/o LHC nonobstructive in 09/23, old MI per MPI in , IDDM 20 yrs, HTN, HLD. ESRD on HD since  and h/o renal Tx in 02/24 , No smoking. Occasional drink.  H/o Fell off the roof and multi fx.    MPI showed inferior old MI and EF 35%. ECHo showed EF 50% and LVH  No f/h premature CAD.  No smoking  Walks 4 times a week. And on PT  ekg NSR     visit  HD via right chest catheter.   echo dilated LV mod, EF 40% and  echo EF 55%  HOLTER showed 12% PVCs  Occasional palpitation. orthopnea and PND. No chest pain leg swelling and syncope. Urinating 8 times a week. No fluid restriction.    08/2022 visit   Phar mPI showed There is a moderate to severe intensity, large sized, mostly fixed perfusion abnormality with some reversibilty in the inferior, inferolateral, lateral and apical wall(s).  Still has SOB PND orthopnea. No leg swelling   BP high. On low Na diet and on 32 oz fluid restriction     visit  H/o LHC and RHC done on 08/31/2022 by Dr. Deras at [facility]  Nonobstructive cAD 30% lesion on LAD and RCA. EF 40%   PAP 28 mmHG PCWP 15 mmHG.  HD 3 times a week. No chest pain  Fluid resriction 32 oz      visit  Some SOB, cough up the phlegm. Fluid restriction.  RHC showed type II pulm HTN  from left side CHF  EKG NSR non specific stt change   Walks 3 times a week    05/23 visit    admitted to [facility] for CHF exacerbation due to fluid noncompliance and had urgent HD. Had cardiac arrest with one round of CPR. Temporary cardiogenic shock with levophed gtt.   05/28/23 BNP 1280. On fluid restriction  No chest pain  dizziness. On HD 3 times a week. Off coreg 12.5 mg bid due to low BP  Can not add Jardiance due to on HD    Interval history  09/23 admitted for CP. The cath  showed nonobstructive CAD, LVEDP 23 mmHg, EF 40%   Repeat echo in 11/23 EF 40% and LVEDD 6.02 cm  05/24 admitted for UTI. Rx with Abx. Now recovered  Now back to baseline.  GRF 10. Works at club. No chest pain dyspnea orthopnea PND and leg swelling   BP C                Past Medical History:   Diagnosis Date    Abnormal nuclear stress test 8/30/2022    Acute hypoxemic respiratory failure due to COVID-19 1/21/2021    Acute on chronic combined systolic and diastolic congestive heart failure 5/7/2023    Acute on chronic respiratory failure 5/7/2023    Acute respiratory failure with hypoxia and hypercapnia 5/18/2023    Anal itching 10/10/2022    Anemia 4/16/2014    BPH (benign prostatic hyperplasia)     CKD (chronic kidney disease) stage 4, GFR 15-29 ml/min     Coronary artery disease of native artery of native heart with stable angina pectoris 4/29/2019    Diabetes mellitus     Diabetes mellitus, type 2     Dilated cardiomyopathy 8/30/2022    Elevated PSA     Encounter for blood transfusion     Herpes labialis     Hyperlipidemia     Hypertension     Hypertensive emergency 5/18/2023    Obesity     Pneumonia 5/7/2023    Pneumonia due to COVID-19 virus     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    Preop cardiovascular exam 9/16/2022    Proteinuria     Pulmonary edema with congestive heart failure 5/18/2023    Secondary hyperparathyroidism (of renal origin)        Past Surgical History:   Procedure Laterality Date    CATHETERIZATION OF BOTH LEFT AND RIGHT HEART N/A 08/30/2022    Procedure: CATHETERIZATION, HEART, BOTH LEFT AND RIGHT;  Surgeon: Kassandra Deras MD;  Location: [facility] CATH LAB;  Service: Cardiology;  Laterality: N/A;    CATHETERIZATION OF BOTH LEFT AND RIGHT HEART N/A 9/5/2023    Procedure: CATHETERIZATION, HEART, BOTH LEFT AND RIGHT;  Surgeon: Kassandra Deras MD;  Location: [facility] CATH LAB;  Service: Cardiology;  Laterality: N/A;    COLONOSCOPY N/A 12/21/2017    Procedure: COLONOSCOPY;  Surgeon: Fran Escalera MD;   Location: [facility] ENDO;  Service: Endoscopy;  Laterality: N/A;    COLONOSCOPY N/A 02/02/2023    Procedure: COLONOSCOPY;  Surgeon: Anthony Meng MD;  Location: [facility] ENDO;  Service: Endoscopy;  Laterality: N/A;    FLUOROSCOPY N/A 01/21/2021    Procedure: Vascath insertion;  Surgeon: Adrian Pruitt Jr., MD;  Location: [facility] CATH LAB;  Service: General;  Laterality: N/A;    FLUOROSCOPY N/A 04/06/2021    Procedure: Over the wire Vas Cath exchange;  Surgeon: Ho Pressley MD;  Location: [facility] CATH LAB;  Service: General;  Laterality: N/A;    KIDNEY TRANSPLANT N/A 2/18/2024    Procedure: TRANSPLANT, KIDNEY;  Surgeon: Chas Salomon MD;  Location: [facility] OR [address] [place] [address];  Service: Transplant;  Laterality: N/A;    PROSTATE BIOPSY      thorocotomy  01/01/2010       Social History     Tobacco Use    Smoking status: Never    Smokeless tobacco: Never   Substance Use Topics    Alcohol use: Not Currently     Comment: seldom     Drug use: No       Family History   Problem Relation Name Age of Onset    No Known Problems Mother      Diabetes Father Nik Kang     Hypertension Father Nik Kang     Hypertension Other      Diabetes Other           ROS    Objective:   Physical Exam  HENT:      Head: Normocephalic.   Eyes:      Pupils: Pupils are equal, round, and reactive to light.   Neck:      Thyroid: No thyromegaly.      Vascular: Normal carotid pulses. No carotid bruit or JVD.   Cardiovascular:      Rate and Rhythm: Normal rate and regular rhythm. No extrasystoles are present.     Chest Wall: PMI is not displaced.      Pulses: Normal pulses.      Heart sounds: Normal heart sounds. No murmur heard.     No gallop. No S3 sounds.   Pulmonary:      Effort: No respiratory distress.      Breath sounds: Normal breath sounds. No stridor.   Abdominal:      General: Bowel sounds are normal.      Palpations: Abdomen is soft.      Tenderness: There is no abdominal tenderness. There is no rebound.   Skin:     Findings: No rash.    Neurological:      Mental Status: He is alert and oriented to person, place, and time.   Psychiatric:         Behavior: Behavior normal.         Lab Results   Component Value Date    CHOL 173 05/13/2024    CHOL 128 09/26/2022    CHOL 149 07/14/2022     Lab Results   Component Value Date    HDL 41 05/13/2024    HDL 25 (A) 09/26/2022    HDL 31 (L) 07/14/2022     Lab Results   Component Value Date    LDLCALC 115.6 05/13/2024    LDLCALC 82 09/26/2022    LDLCALC 79.8 07/14/2022     Lab Results   Component Value Date    TRIG 82 05/13/2024    TRIG 105 09/26/2022    TRIG 191 (H) 07/14/2022     Lab Results   Component Value Date    CHOLHDL 23.7 05/13/2024    CHOLHDL 20.8 07/14/2022    CHOLHDL 16.8 (L) 10/06/2020       Chemistry        Component Value Date/Time     06/10/2024 0833    K 4.7 06/10/2024 0833     06/10/2024 0833    CO2 22 (L) 06/10/2024 0833    BUN 30 (H) 06/10/2024 0833    CREATININE 1.7 (H) 06/10/2024 0833     (H) 06/10/2024 0833        Component Value Date/Time    CALCIUM 9.5 06/10/2024 0833    ALKPHOS 100 05/13/2024 0917    AST 16 05/13/2024 0917    ALT 10 05/13/2024 0917    BILITOT 0.6 05/13/2024 0917    ESTGFRAFRICA 7 06/15/2023 0531    ESTGFRAFRICA 6.9 (A) 07/14/2022 0706    EGFRNONAA 6.0 (A) 07/14/2022 0706          Lab Results   Component Value Date    HGBA1C 5.6 05/13/2024     Lab Results   Component Value Date    TSH 1.941 07/14/2022     Lab Results   Component Value Date    INR 1.1 05/02/2024    INR 1.0 02/18/2024    INR 1.0 08/29/2023     Lab Results   Component Value Date    WBC 7.78 06/10/2024    HGB 10.6 (L) 06/10/2024    HCT 34.8 (L) 06/10/2024    MCV 83 06/10/2024     06/10/2024     BMP  Sodium   Date Value Ref Range Status   06/10/2024 138 136 - 145 mmol/L Final     Potassium   Date Value Ref Range Status   06/10/2024 4.7 3.5 - 5.1 mmol/L Final     Chloride   Date Value Ref Range Status   06/10/2024 108 95 - 110 mmol/L Final     CO2   Date Value Ref Range Status    06/10/2024 22 (L) 23 - 29 mmol/L Final     BUN   Date Value Ref Range Status   06/10/2024 30 (H) 8 - 23 mg/dL Final     Creatinine   Date Value Ref Range Status   06/10/2024 1.7 (H) 0.5 - 1.4 mg/dL Final     Calcium   Date Value Ref Range Status   06/10/2024 9.5 8.7 - 10.5 mg/dL Final     Anion Gap   Date Value Ref Range Status   06/10/2024 8 8 - 16 mmol/L Final     eGFR if    Date Value Ref Range Status   07/14/2022 6.9 (A) >60 mL/min/1.73 m^2 Final     eGFR    Date Value Ref Range Status   06/15/2023 7 mL/min/1.73mSq Final     Comment:     In accordance with NKF-ASN Task Force recommendation, calculation based on the Chronic Kidney Disease Epidemiology Collaboration (CKD-EPI) equation without adjustment for race. eGFR adjusted for gender and age and calculated in ml/min/1.73mSquared. eGFR cannot be calculated if patient is under 18 years of age.     Reference Range:   >= 60 ml/min/1.73mSquared.     eGFR if non    Date Value Ref Range Status   07/14/2022 6.0 (A) >60 mL/min/1.73 m^2 Final     Comment:     Calculation used to obtain the estimated glomerular filtration  rate (eGFR) is the CKD-EPI equation.        BNP  @LABRCNTIP(BNP,BNPTRIAGEBLO)@  @LABRCNTIP(troponini)@  CrCl cannot be calculated (Patient's most recent lab result is older than the maximum 7 days allowed.).  No results found in the last 24 hours.  No results found in the last 24 hours.  No results found in the last 24 hours.    Assessment:      1. Chronic systolic congestive heart failure    2. Aortic atherosclerosis    3. Dilated cardiomyopathy    4. Hyperlipidemia associated with type 2 diabetes mellitus    5. Pulmonary HTN    6. PVC (premature ventricular contraction)    7. Hypertension associated with diabetes    8. NICM (nonischemic cardiomyopathy)      BNP elevated   CHFrEF JVP nml  H/o renal tx in 02/24  NICM    Plan:   Resume Hydralazine 10 mg tid for CHFrEF  Continue isodil entresto coreg asa  and increased Liopitor recently  Check BNP in 3 m and if remains high, will discuss with nephrolgy for lasix rx  RTC in 6 M   Daily weight. Please call the office if gain 3 pounds in 1 day or 5 pounds in 1 week.  Fluid restriction 1.5 liters a day  Na< 2 gm

## 2024-06-24 ENCOUNTER — LAB VISIT (OUTPATIENT)
Dept: LAB | Facility: HOSPITAL | Age: 69
End: 2024-06-24
Attending: INTERNAL MEDICINE
Payer: COMMERCIAL

## 2024-06-24 ENCOUNTER — PATIENT MESSAGE (OUTPATIENT)
Dept: TRANSPLANT | Facility: CLINIC | Age: 69
End: 2024-06-24
Payer: MEDICARE

## 2024-06-24 ENCOUNTER — TELEPHONE (OUTPATIENT)
Dept: TRANSPLANT | Facility: CLINIC | Age: 69
End: 2024-06-24
Payer: MEDICARE

## 2024-06-24 DIAGNOSIS — Z94.0 KIDNEY REPLACED BY TRANSPLANT: ICD-10-CM

## 2024-06-24 PROCEDURE — 80197 ASSAY OF TACROLIMUS: CPT | Performed by: INTERNAL MEDICINE

## 2024-06-24 PROCEDURE — 36415 COLL VENOUS BLD VENIPUNCTURE: CPT | Performed by: INTERNAL MEDICINE

## 2024-06-24 NOTE — TELEPHONE ENCOUNTER
Message sent to pt. Valcyte therapy complete.    ----- Message from Rosalina Adkins sent at 6/24/2024 12:44 PM CDT -----  Regarding: check on script for Vancyclovir  Contact: PT  [phone]  The patient called requesting to speak to Nurse regarding his Vancyclovir - PT states is due for refill - please advise   No further information provided      Patient can be contacted @# [phone]

## 2024-06-25 ENCOUNTER — PATIENT MESSAGE (OUTPATIENT)
Dept: TRANSPLANT | Facility: CLINIC | Age: 69
End: 2024-06-25
Payer: MEDICARE

## 2024-06-25 DIAGNOSIS — Z94.0 KIDNEY TRANSPLANT RECIPIENT: ICD-10-CM

## 2024-06-25 LAB
CLASS I ANTIBODY COMMENTS - LUMINEX: NORMAL
CLASS II ANTIBODY COMMENTS - LUMINEX: NORMAL
SERUM COLLECTION DT - LUMINEX CLASS I: NORMAL
SERUM COLLECTION DT - LUMINEX CLASS II: NORMAL
SPCL1 TESTING DATE: NORMAL
SPCL2 TESTING DATE: NORMAL
SPCLU TESTING DATE: NORMAL
TACROLIMUS BLD-MCNC: 12.7 NG/ML (ref 5–15)

## 2024-06-25 RX ORDER — TACROLIMUS 1 MG/1
5 CAPSULE ORAL EVERY 12 HOURS
Qty: 300 CAPSULE | Refills: 11 | Status: SHIPPED | OUTPATIENT
Start: 2024-06-25 | End: 2025-06-25

## 2024-06-25 NOTE — TELEPHONE ENCOUNTER
Pt notified to lower Prograf to 5 mg twice a day. Lab on 7/1.    ----- Message from Katie Robles MD sent at 6/25/2024 11:39 AM CDT -----  Tac to 5 mg BUD if it is a true level. Check tac level in a week

## 2024-06-29 NOTE — ASSESSMENT & PLAN NOTE
Follow transplant team   [facility]  700/05  PROGRESS NOTE   Patient: Kady Corona  Today's Date: 2024    YOB: 1935  Admission Date: 2024    MRN: [id number]  Inpatient LOS: 1    Attending: Jose Black, *  Hospital Day: Hospital Day: 2    Subjective   HISTORY AND SUBJECTIVE COMPLAINTS     Chief Complaint:     Acute hypoxemic respiratory failure  Acute bilateral pulmonary embolism, small clot burden  Right-sided moderate pleural effusion and Diffuse Anasarca due to Acute CHF   S/P 800 ML of thoracentesis done by IR on 2024.  Acute heart failure with reduced ejection fraction  Severe cardiomyopathy with EF of 19%   Minimal elevation of high-sensitivity troponin level  Hypotension   New onset atrial fibrillation with rapid ventricular rate  CKD stage III  Type 2 diabetes mellitus  Severe with generalized weakness and physical deconditioning    Interval History / Subjective:     She was given doses of albumin last night because of hypotension.  Her MAP is in the 65-70 range currently.    She had thoracentesis by IR yesterday.  Her dyspnea is little better.    Remains on oxygen supplement 2 L/min.      Hospital Course:  Kady Corona is a 88 year old female who presented on 2024 with complaints of Shortness of Breath  .    ROS:  Pertinent systems negative except as above.    Objective   PHYSICAL EXAMINATION     Vital 24 Hour Range Most Recent Value   Temperature Temp  Min: 97.6 °F (36.4 °C)  Max: 98.4 °F (36.9 °C) 97.9 °F (36.6 °C)   Pulse Pulse  Min: 71  Max: 127 85   Respiratory Resp  Min: 13  Max: 54 (!) 36   Blood Pressure BP  Min: 78/50  Max: 132/81 99/56   Pulse Oximetry SpO2  Min: 93 %  Max: 99 % 96 %   Arterial BP No data recorded     O2 O2 Flow Rate (L/min)  Av.3 L/min  Min: 2 L/min   Min taken time: 24 0800  Max: 3 L/min   Max taken time: 24 1600       Recorded Intake and Output:  Intake/Output Summary (Last 24 hours) at 2024 1201  Last data filed at  6/29/2024 1020  Gross per 24 hour   Intake 2091.91 ml   Output 1450 ml   Net 641.91 ml      Recorded Last Stool Occurrence: 1 (06/28/24 0700)     Vital Most Recent Value First Value   Weight 72.6 kg (160 lb 0.9 oz) Weight: 75.5 kg (166 lb 7.2 oz)   Height 4' 11\" (149.9 cm) Height: 4' 11\" (149.9 cm)   BMI 32.33 N/A     General: Awake and communicative.  Hard of hearing.  CV: Irregular rhythm, soft systolic murmur  Resp: Diminished bibasilar air entry. Has basilar wheezes and crackles.  Abd: Obese, soft, nontender.  Ext: Has pitting edema of both feet and legs.  Neuro: No gross motor weakness of the extremities.  Has severe generalized weakness.  Psych: Oriented to self, staff and family. Co operative.     TEST RESULTS     Radiology: Imaging studies have been reviewed and pertinent findings discussed in the Assessment and Plan.  Results for orders placed or performed during the hospital encounter of 06/28/24 (from the past 48 hour(s))   XR CHEST PA OR AP 1 VIEW    Impression    IMPRESSION:    1.  Central pulmonary vascular congestion with associated small to moderate  right pleural effusion with underlying right basilar  consolidation/atelectasis. Findings are likely related to pulmonary edema,  superimposed infection may have a similar appearance.        Electronically Signed by: Stu Joaquin DO  Signed on: 6/28/2024 1:20 AM  Created on Workstation ID: [id number]  Signed on Workstation ID: [id number]   CTA CHEST PULMONARY EMBOLISM    Impression    IMPRESSION:  1. Study is positive for pulmonary emboli predominantly within the right  middle lobe pulmonary artery with tiny subsegmental pulmonary emboli in the  right and left lower lobes. There are no findings of right heart strain.   2. Four-chamber cardiomegaly with in particular biatrial enlargement.  3. Moderate right pleural effusion along with small amount of  intra-abdominal perihepatic and perisplenic ascites and diffuse anasarca.  4. 6 mm left lower lobe  pulmonary nodule. In this patient with a past  history of tobacco use, if clinically warranted given patient age and  comorbidities, follow-up CT of the chest in 1 year could be considered.  5. Bibasilar atelectasis without other definite focal consolidation.  Evaluation in the lower lobes is somewhat limited by patient breathing  motion artifact.  6. Nonobstructive staghorn calculus in the right renal pelvis measuring up  to 12 mm.  7. High attenuation material within the gallbladder is likely sludge. Small  amount of pericholecystic fluid may be related to ascites. Correlation with  right upper quadrant pain is suggested.    Findings were discussed with Dr. Chidi Bunch at 0419 hours on  6/28/2024    Electronically Signed by: Alexus Phipps MD  Signed on: 6/28/2024 4:19 AM  Created on Workstation ID: [id number]  Signed on Workstation ID: [id number]   US VASC LOWER EXTREMITY VENOUS DUPLEX BILATERAL    Impression    IMPRESSION:  No duplex Doppler sonographic evidence for deep venous  thrombosis in the examined veins of the bilateral legs.    Electronically Signed by: Devon Patel MD  Signed on: 6/28/2024 9:34 AM  Created on Workstation ID: [id number]  Signed on Workstation ID: [id number]   IR THORACENTESIS WITH IMAGING    Impression    IMPRESSION:  Successful thoracentesis as discussed above.        Electronically Signed by: Ethan Aguero MD  Signed on: 6/28/2024 12:46 PM  Created on Workstation ID: [id number]  Signed on Workstation ID: [id number]   XR CHEST AP OR PA    Impression    IMPRESSION:  Since earlier this morning, interval resolution of small right pleural  effusion and concomitant reexpansion of the right lung base. Otherwise  unchanged. Specifically, no pneumothorax. Right shoulder arthroplasty.  Aortic calcification. Stable cardiomegaly and perihilar opacities  consistent with CHF.    Electronically Signed by: Ethan Aguero MD  Signed on: 6/28/2024 12:45 PM  Created on Workstation ID:  [id number]  Signed on Workstation ID: [id number]        ANCILLARY ORDERS     Diet:  Consistent Carb Moderate (45-75 Gm/Meal), Sodium 2 Gm (Low Sodium) Diet  Telemetry: On  Consults:    IP CONSULT TO CARDIOLOGY  IP CONSULT TO SOCIAL WORK  IP CONSULT TO INTENSIVIST  IP CONSULT TO GERIATRICS  Therapy Orders:   PT and OT Orders Placed this Encounter   Procedures    Occupational Therapy Evaluation    Occupational Therapy Treatment    Physical Therapy Evaluation    Physical Therapy Treatment           ASSESSMENT AND PLAN     Acute hypoxemic respiratory failure  Acute bilateral pulmonary embolism, small clot burden  CT chest and 2D echocardiogram are negative for any RV strain.  Lower extremity venous Doppler is negative for DVT.  Continue on heparin drip for anticoagulation  Continue on oxygen supplement via nasal cannula.    Right-sided moderate pleural effusion and Diffuse Anasarca due to Acute CHF   S/P 800 ML of thoracentesis done by IR on 06/28/2024.  Acute heart failure with reduced ejection fraction  Severe cardiomyopathy with EF of 19%   Minimal elevation of high-sensitivity troponin level  Hypertension  CKD stage III  Workup with 2D echocardiogram showed decreased EF of 19% with severe global LV systolic dysfunction.  Not able to diurese her currently because of hypotension.  Continue to monitor blood pressures.  Monitor BMP, I's/O and daily weights.  She is seen and followed by cardiology and intensivist.    New onset atrial fibrillation with rapid ventricular rate  Not able to give beta-blocker because of hypotension.  Rate control is better after getting IV digoxin last night.  Currently on IV heparin for anticoagulation.    Type 2 diabetes mellitus  Her hemoglobin A1c is 10.9  Hold oral diabetic medications currently  Monitor blood sugars with insulin sliding scale    Severe with generalized weakness and physical deconditioning  Supportive care  Advance activity after stabilization of her blood  pressures.    DVT Prophylaxis: On Heparin Drip currently.     ADVANCED DIRECTIVES     Code Status: Do Not Resuscitate       Family meeting conducted bedside on 06/28/2024 in presence of geriatrician Dr. Lea,  patient's grandson Angel ( [facility] )  and myself.   - Discussed with patient and her grandson about new onset severe cardiomyopathy with EF of 19% and multiple other complex medical problems.  Also discussed about limitation of giving heart failure therapy because her blood pressure is on lower side currently.  Patient overall has poor prognosis.  - Patient is not interested in any invasive cardiac procedure given her advanced age and preference for noninvasive medical management only. She does not feel ready for hospice care.  She would like to try conservative medical management and see her progress day by day.   - He code status remains DNR / DNI / No Vasopressors / No cardioversion.     DISCHARGE PLANNING     Anticipated discharge : To be determined pending clinical progress.    Expected Discharge Date: TBD      Jose Black MD   Hospitalist  6/29/2024  12:01 PM

## 2024-07-01 ENCOUNTER — LAB VISIT (OUTPATIENT)
Dept: LAB | Facility: HOSPITAL | Age: 69
End: 2024-07-01
Attending: INTERNAL MEDICINE
Payer: COMMERCIAL

## 2024-07-01 DIAGNOSIS — Z94.0 KIDNEY REPLACED BY TRANSPLANT: ICD-10-CM

## 2024-07-01 DIAGNOSIS — Z94.0 KIDNEY REPLACED BY TRANSPLANT: Primary | ICD-10-CM

## 2024-07-01 DIAGNOSIS — Z94.0 S/P KIDNEY TRANSPLANT: ICD-10-CM

## 2024-07-01 LAB
ALBUMIN SERPL BCP-MCNC: 3.5 G/DL (ref 3.5–5.2)
ANION GAP SERPL CALC-SCNC: 10 MMOL/L (ref 8–16)
BASOPHILS # BLD AUTO: 0.05 K/UL (ref 0–0.2)
BASOPHILS NFR BLD: 0.9 % (ref 0–1.9)
BUN SERPL-MCNC: 26 MG/DL (ref 8–23)
CALCIUM SERPL-MCNC: 9.3 MG/DL (ref 8.7–10.5)
CHLORIDE SERPL-SCNC: 111 MMOL/L (ref 95–110)
CO2 SERPL-SCNC: 19 MMOL/L (ref 23–29)
CREAT SERPL-MCNC: 1.7 MG/DL (ref 0.5–1.4)
DIFFERENTIAL METHOD BLD: ABNORMAL
EOSINOPHIL # BLD AUTO: 0.2 K/UL (ref 0–0.5)
EOSINOPHIL NFR BLD: 3.6 % (ref 0–8)
ERYTHROCYTE [DISTWIDTH] IN BLOOD BY AUTOMATED COUNT: 18.6 % (ref 11.5–14.5)
EST. GFR  (NO RACE VARIABLE): 43.6 ML/MIN/1.73 M^2
GLUCOSE SERPL-MCNC: 131 MG/DL (ref 70–110)
HCT VFR BLD AUTO: 38.2 % (ref 40–54)
HGB BLD-MCNC: 11.5 G/DL (ref 14–18)
IMM GRANULOCYTES # BLD AUTO: 0.13 K/UL (ref 0–0.04)
IMM GRANULOCYTES NFR BLD AUTO: 2.2 % (ref 0–0.5)
LYMPHOCYTES # BLD AUTO: 0.4 K/UL (ref 1–4.8)
LYMPHOCYTES NFR BLD: 7.3 % (ref 18–48)
MAGNESIUM SERPL-MCNC: 1.5 MG/DL (ref 1.6–2.6)
MCH RBC QN AUTO: 25 PG (ref 27–31)
MCHC RBC AUTO-ENTMCNC: 30.1 G/DL (ref 32–36)
MCV RBC AUTO: 83 FL (ref 82–98)
MONOCYTES # BLD AUTO: 0.7 K/UL (ref 0.3–1)
MONOCYTES NFR BLD: 11.4 % (ref 4–15)
NEUTROPHILS # BLD AUTO: 4.4 K/UL (ref 1.8–7.7)
NEUTROPHILS NFR BLD: 74.6 % (ref 38–73)
NRBC BLD-RTO: 0 /100 WBC
PHOSPHATE SERPL-MCNC: 3.5 MG/DL (ref 2.7–4.5)
PLATELET # BLD AUTO: 243 K/UL (ref 150–450)
PMV BLD AUTO: 11.1 FL (ref 9.2–12.9)
POTASSIUM SERPL-SCNC: 4.8 MMOL/L (ref 3.5–5.1)
RBC # BLD AUTO: 4.6 M/UL (ref 4.6–6.2)
SODIUM SERPL-SCNC: 140 MMOL/L (ref 136–145)
WBC # BLD AUTO: 5.86 K/UL (ref 3.9–12.7)

## 2024-07-01 PROCEDURE — 85025 COMPLETE CBC W/AUTO DIFF WBC: CPT | Performed by: INTERNAL MEDICINE

## 2024-07-01 PROCEDURE — 83735 ASSAY OF MAGNESIUM: CPT | Performed by: INTERNAL MEDICINE

## 2024-07-01 PROCEDURE — 36415 COLL VENOUS BLD VENIPUNCTURE: CPT | Performed by: INTERNAL MEDICINE

## 2024-07-01 PROCEDURE — 80197 ASSAY OF TACROLIMUS: CPT | Performed by: INTERNAL MEDICINE

## 2024-07-01 PROCEDURE — 80069 RENAL FUNCTION PANEL: CPT | Performed by: INTERNAL MEDICINE

## 2024-07-01 RX ORDER — SODIUM BICARBONATE 650 MG/1
1300 TABLET ORAL 3 TIMES DAILY
Qty: 180 TABLET | Refills: 11 | Status: SHIPPED | OUTPATIENT
Start: 2024-07-01 | End: 2025-07-01

## 2024-07-01 NOTE — TELEPHONE ENCOUNTER
Pt notified to increase water intake, follow low sodium diet. Increase sodium bicarb to 1300 mg three times a day    ----- Message from Katie Robles MD sent at 7/1/2024  3:22 PM CDT -----  Low salt diet. More water intake   NaHCO3 1300 mg TID

## 2024-07-02 ENCOUNTER — TELEPHONE (OUTPATIENT)
Dept: TRANSPLANT | Facility: CLINIC | Age: 69
End: 2024-07-02
Payer: MEDICARE

## 2024-07-02 LAB — TACROLIMUS BLD-MCNC: 11.5 NG/ML (ref 5–15)

## 2024-07-02 NOTE — TELEPHONE ENCOUNTER
Pt states taking prograf 6 mg BID. Instructed pt to lower prograf to 5 mg BID per previous lab change. Repeat level on 7/8    ----- Message from Radhika Cardenas DO sent at 7/2/2024 10:01 AM CDT -----  High Fk. Confirm that it is a true FK trough. If so, decrease FK to 4 mg BID from 5 mg BID

## 2024-07-03 ENCOUNTER — OUTPATIENT CASE MANAGEMENT (OUTPATIENT)
Dept: ADMINISTRATIVE | Facility: OTHER | Age: 69
End: 2024-07-03
Payer: MEDICARE

## 2024-07-08 ENCOUNTER — LAB VISIT (OUTPATIENT)
Dept: LAB | Facility: HOSPITAL | Age: 69
End: 2024-07-08
Attending: INTERNAL MEDICINE
Payer: COMMERCIAL

## 2024-07-08 DIAGNOSIS — Z94.0 KIDNEY REPLACED BY TRANSPLANT: ICD-10-CM

## 2024-07-08 PROCEDURE — 36415 COLL VENOUS BLD VENIPUNCTURE: CPT | Performed by: INTERNAL MEDICINE

## 2024-07-08 PROCEDURE — 80197 ASSAY OF TACROLIMUS: CPT | Performed by: INTERNAL MEDICINE

## 2024-07-09 LAB — TACROLIMUS BLD-MCNC: 8.1 NG/ML (ref 5–15)

## 2024-07-10 ENCOUNTER — OFFICE VISIT (OUTPATIENT)
Dept: INTERNAL MEDICINE | Facility: CLINIC | Age: 69
End: 2024-07-10
Payer: COMMERCIAL

## 2024-07-10 VITALS
BODY MASS INDEX: 34.19 KG/M2 | TEMPERATURE: 97 F | SYSTOLIC BLOOD PRESSURE: 110 MMHG | RESPIRATION RATE: 20 BRPM | OXYGEN SATURATION: 95 % | HEART RATE: 90 BPM | HEIGHT: 73 IN | DIASTOLIC BLOOD PRESSURE: 74 MMHG | WEIGHT: 257.94 LBS

## 2024-07-10 DIAGNOSIS — J01.90 ACUTE SINUSITIS, RECURRENCE NOT SPECIFIED, UNSPECIFIED LOCATION: Primary | ICD-10-CM

## 2024-07-10 PROCEDURE — 3288F FALL RISK ASSESSMENT DOCD: CPT | Mod: CPTII,S$GLB,, | Performed by: NURSE PRACTITIONER

## 2024-07-10 PROCEDURE — 3066F NEPHROPATHY DOC TX: CPT | Mod: CPTII,S$GLB,, | Performed by: NURSE PRACTITIONER

## 2024-07-10 PROCEDURE — 1126F AMNT PAIN NOTED NONE PRSNT: CPT | Mod: CPTII,S$GLB,, | Performed by: NURSE PRACTITIONER

## 2024-07-10 PROCEDURE — 3078F DIAST BP <80 MM HG: CPT | Mod: CPTII,S$GLB,, | Performed by: NURSE PRACTITIONER

## 2024-07-10 PROCEDURE — 1101F PT FALLS ASSESS-DOCD LE1/YR: CPT | Mod: CPTII,S$GLB,, | Performed by: NURSE PRACTITIONER

## 2024-07-10 PROCEDURE — 1160F RVW MEDS BY RX/DR IN RCRD: CPT | Mod: CPTII,S$GLB,, | Performed by: NURSE PRACTITIONER

## 2024-07-10 PROCEDURE — 1159F MED LIST DOCD IN RCRD: CPT | Mod: CPTII,S$GLB,, | Performed by: NURSE PRACTITIONER

## 2024-07-10 PROCEDURE — 3074F SYST BP LT 130 MM HG: CPT | Mod: CPTII,S$GLB,, | Performed by: NURSE PRACTITIONER

## 2024-07-10 PROCEDURE — 3008F BODY MASS INDEX DOCD: CPT | Mod: CPTII,S$GLB,, | Performed by: NURSE PRACTITIONER

## 2024-07-10 PROCEDURE — 99999 PR PBB SHADOW E&M-EST. PATIENT-LVL V: CPT | Mod: PBBFAC,,, | Performed by: NURSE PRACTITIONER

## 2024-07-10 PROCEDURE — 4010F ACE/ARB THERAPY RXD/TAKEN: CPT | Mod: CPTII,S$GLB,, | Performed by: NURSE PRACTITIONER

## 2024-07-10 PROCEDURE — 3044F HG A1C LEVEL LT 7.0%: CPT | Mod: CPTII,S$GLB,, | Performed by: NURSE PRACTITIONER

## 2024-07-10 PROCEDURE — 99213 OFFICE O/P EST LOW 20 MIN: CPT | Mod: S$GLB,,, | Performed by: NURSE PRACTITIONER

## 2024-07-10 RX ORDER — PROMETHAZINE HYDROCHLORIDE AND DEXTROMETHORPHAN HYDROBROMIDE 6.25; 15 MG/5ML; MG/5ML
5 SYRUP ORAL EVERY 6 HOURS PRN
Qty: 118 ML | Refills: 0 | Status: SHIPPED | OUTPATIENT
Start: 2024-07-10 | End: 2024-07-20

## 2024-07-10 RX ORDER — DOXYCYCLINE 100 MG/1
100 CAPSULE ORAL EVERY 12 HOURS
Qty: 20 CAPSULE | Refills: 0 | Status: SHIPPED | OUTPATIENT
Start: 2024-07-10

## 2024-07-10 NOTE — PROGRESS NOTES
CHIEF COMPLAINT/REASON FOR VISIT: nasal congestion, post nasal drip, sore throat, facial pressure    HISTORY OF PRESENT ILLNESS:    Isidro Alves.  68 y.o.  male complains of a sinus issue with nasal congestion, post nasal drip, sore throat, facial pressure,     ear discomfort  and productive cough onset within the past 7 days.   Past Medical History:   Diagnosis Date    Abnormal nuclear stress test 8/30/2022    Acute hypoxemic respiratory failure due to COVID-19 1/21/2021    Acute on chronic combined systolic and diastolic congestive heart failure 5/7/2023    Acute on chronic respiratory failure 5/7/2023    Acute respiratory failure with hypoxia and hypercapnia 5/18/2023    Anal itching 10/10/2022    Anemia 4/16/2014    BPH (benign prostatic hyperplasia)     CKD (chronic kidney disease) stage 4, GFR 15-29 ml/min     Coronary artery disease of native artery of native heart with stable angina pectoris 4/29/2019    Diabetes mellitus     Diabetes mellitus, type 2     Dilated cardiomyopathy 8/30/2022    Elevated PSA     Encounter for blood transfusion     Herpes labialis     Hyperlipidemia     Hypertension     Hypertensive emergency 5/18/2023    Obesity     Pneumonia 5/7/2023    Pneumonia due to COVID-19 virus     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    Preop cardiovascular exam 9/16/2022    Proteinuria     Pulmonary edema with congestive heart failure 5/18/2023    Secondary hyperparathyroidism (of renal origin)          PAST SURGICAL HISTORY:.  Past Surgical History:   Procedure Laterality Date    CATHETERIZATION OF BOTH LEFT AND RIGHT HEART N/A 08/30/2022    Procedure: CATHETERIZATION, HEART, BOTH LEFT AND RIGHT;  Surgeon: Kassandra Deras MD;  Location: [facility] CATH LAB;  Service: Cardiology;  Laterality: N/A;    CATHETERIZATION OF BOTH LEFT AND RIGHT HEART N/A 9/5/2023    Procedure: CATHETERIZATION, HEART, BOTH LEFT AND RIGHT;  Surgeon: Kassandra Deras MD;  Location: [facility] CATH LAB;  Service: Cardiology;  Laterality: N/A;     COLONOSCOPY N/A 12/21/2017    Procedure: COLONOSCOPY;  Surgeon: Fran Escalera MD;  Location: [facility] ENDO;  Service: Endoscopy;  Laterality: N/A;    COLONOSCOPY N/A 02/02/2023    Procedure: COLONOSCOPY;  Surgeon: Anthony Meng MD;  Location: [facility] ENDO;  Service: Endoscopy;  Laterality: N/A;    FLUOROSCOPY N/A 01/21/2021    Procedure: Vascath insertion;  Surgeon: Adrian Pruitt Jr., MD;  Location: [facility] CATH LAB;  Service: General;  Laterality: N/A;    FLUOROSCOPY N/A 04/06/2021    Procedure: Over the wire Vas Cath exchange;  Surgeon: Ho Pressley MD;  Location: [facility] CATH LAB;  Service: General;  Laterality: N/A;    KIDNEY TRANSPLANT N/A 2/18/2024    Procedure: TRANSPLANT, KIDNEY;  Surgeon: Chas Salomon MD;  Location: [facility] OR [address] [place] [address];  Service: Transplant;  Laterality: N/A;    PROSTATE BIOPSY      thorocotomy  01/01/2010         SOCIAL HISTORY:.  Social History     Socioeconomic History    Marital status:     Number of children: 3   Occupational History     Employer: self-rmployed   Tobacco Use    Smoking status: Never    Smokeless tobacco: Never   Substance and Sexual Activity    Alcohol use: Not Currently     Comment: seldom     Drug use: No    Sexual activity: Not Currently     Partners: Female   Social History Narrative    ** Merged History Encounter **          Social Determinants of Health     Financial Resource Strain: Low Risk  (5/15/2024)    Overall Financial Resource Strain (CARDIA)     Difficulty of Paying Living Expenses: Not very hard   Food Insecurity: No Food Insecurity (5/15/2024)    Hunger Vital Sign     Worried About Running Out of Food in the Last Year: Never true     Ran Out of Food in the Last Year: Never true   Transportation Needs: No Transportation Needs (5/15/2024)    TRANSPORTATION NEEDS     Transportation : No   Physical Activity: Insufficiently Active (5/15/2024)    Exercise Vital Sign     Days of Exercise per Week: 3 days     Minutes of Exercise per Session: 20 min  "  Stress: No Stress Concern Present (5/15/2024)    [employer] - Occupational Stress Questionnaire     Feeling of Stress : Not at all   Recent Concern: Stress - Stress Concern Present (2/19/2024)    [employer] - Occupational Stress Questionnaire     Feeling of Stress : To some extent   Housing Stability: Low Risk  (5/15/2024)    Housing Stability Vital Sign     Unable to Pay for Housing in the Last Year: No     Homeless in the Last Year: No       ROS:  GENERAL: Reports no fever, chills.  SKIN: No rashes, itching or changes in color or texture of skin.  HEENT: Reports sinus pressure, nasal congestion, postnasal drip, sore throat, ear discomfort, rhinorrhea.  CHEST: Reports cough and sputum production.  Denies shortness of breath and wheezing.  CARDIOVASCULAR: Denies chest pain, PND, orthopnea or reduced exercise tolerance.  ABDOMEN: Appetite fine. No weight loss. .  MUSCULOSKELETAL: No joint stiffness, pain or swelling. Denies back pain.  NEUROLOGIC: Report headaches. No history of seizures, paralysis, alteration of gait or coordination.  PSYCHIATRIC: Denies mood swings, depression or suicidal thoughts.    /74 (BP Location: Right arm, Patient Position: Sitting, BP Method: Large (Manual))   Pulse 90   Temp 97.4 °F (36.3 °C) (Tympanic)   Resp 20   Ht 6' 1.25" (1.861 m)   Wt 117 kg (257 lb 15 oz)   SpO2 95%   BMI 33.80 kg/m² .    PE:   APPEARANCE: Well nourished, well developed, in mild distress.   SKIN: Normal skin turgor, no lesions.  HEENT: Turbinates red and enlarge, sinus tenderness on palpation, erythematous pharynx, TMs poor light reflex bilaterally.  CHEST: Lungs clear to auscultation. no wheezing  CARDIOVASCULAR: Regular rate and rhythm.  MUSCULOSKELETAL: Motor: 5/5 strength major flexors/extensors.    PLAN:   Advise Hydrate and rest.    Practice good handwashing.  Mucinex for cough and chest congestion.  See PCP or go to ER if symptoms " worsen or fail to improve with treatment.        DIAGNOSIS:  Acute sinusitis, recurrence not specified, unspecified location  -     doxycycline (VIBRAMYCIN) 100 MG Cap; Take 1 capsule (100 mg total) by mouth every 12 (twelve) hours.  Dispense: 20 capsule; Refill: 0  -     promethazine-dextromethorphan (PROMETHAZINE-DM) 6.25-15 mg/5 mL Syrp; Take 5 mLs by mouth every 6 (six) hours as needed (cough). Do not drive/operate heavy machinery while taking this medication.  Dispense: 118 mL; Refill: 0        Instructed to take all medications as ordered.  Informed if no improvement or symptoms worsens to follow up with primary care physician.  Informed to hydrate and rest.  Printed and review after visit summary with patient.  Answers submitted by the patient for this visit:  Review of Systems Questionnaire (Submitted on 7/10/2024)  activity change: No  unexpected weight change: No  neck pain: No  hearing loss: No  rhinorrhea: Yes  trouble swallowing: No  eye discharge: No  visual disturbance: No  chest tightness: No  wheezing: No  chest pain: No  palpitations: No  blood in stool: No  constipation: No  vomiting: No  diarrhea: No  polydipsia: No  polyuria: No  difficulty urinating: No  urgency: No  hematuria: No  joint swelling: No  arthralgias: No  headaches: No  weakness: No  confusion: No  dysphoric mood: No

## 2024-07-29 NOTE — PROGRESS NOTES
Kidney Post-Transplant Assessment    Referring Physician: Gabino Champion  Current Nephrologist: Gabino Champion    ORGAN: RIGHT KIDNEY  Donor Type: donation after brain death  PHS Increased Risk: yes  Cold Ischemia: 625 mins  Induction Medications:  Thymoglobulin    Subjective:   The patient location is: home  The chief complaint leading to consultation is: post kidney transplant and immunosuppression management    Visit type: audiovisual    Face to Face time with patient: 20 minutes of total time spent on the encounter, which includes face to face time and non-face to face time preparing to see the patient (eg, review of tests), Obtaining and/or reviewing separately obtained history, Documenting clinical information in the electronic or other health record, Independently interpreting results (not separately reported) and communicating results to the patient/family/caregiver, or Care coordination (not separately reported).    Each patient to whom he or she provides medical services by telemedicine is:  (1) informed of the relationship between the physician and patient and the respective role of any other health care provider with respect to management of the patient; and (2) notified that he or she may decline to receive medical services by telemedicine and may withdraw from such care at any time.      CC:  Reassessment of renal allograft function and management of chronic immunosuppression.    HPI:  Mr. Alves is a 68 y.o. year old Black or  male who received a donation after brain death kidney transplant on 2/18/24.   He takes mycophenolate mofetil, prednisone, and tacrolimus for maintenance immunosuppression. He denies any recent hospitalizations or ER visits since his previous clinic visit.    Pertinent Nephrology/Transplant History:   ESRD from [facility]  DCD KT 2/18/24; KDPI 67%  RPR+, HCVAb+/DERICK- donor   CMV +/+    Normal feels well today. He is making plenty of urine.  He is urinating  "without difficulty.  He denies edema.  No weight changes. No fever, chills, or chest pain. Reports no issues with taking his medications. BP 130s/80s. Has been working out. Discussed weight lifting restrictions.     Review of Systems   Respiratory:  Negative for shortness of breath.    Cardiovascular:  Negative for chest pain and leg swelling.   Gastrointestinal:  Negative for abdominal pain, nausea and vomiting.   Genitourinary:  Negative for difficulty urinating.   Skin:  Negative for rash.   Allergic/Immunologic: Positive for immunocompromised state.       Objective:     There were no vitals taken for this visit.body mass index is unknown because there is no height or weight on file.    Physical Exam  Deferred due to AV visit    Labs:  Lab Results   Component Value Date    WBC 5.86 07/01/2024    HGB 11.5 (L) 07/01/2024    HCT 38.2 (L) 07/01/2024     07/01/2024    K 4.8 07/01/2024     (H) 07/01/2024    CO2 19 (L) 07/01/2024    BUN 26 (H) 07/01/2024    CREATININE 1.7 (H) 07/01/2024    EGFRNORACEVR 43.6 (A) 07/01/2024    CALCIUM 9.3 07/01/2024    PHOS 3.5 07/01/2024    MG 1.5 (L) 07/01/2024    ALBUMIN 3.5 07/01/2024    AST 16 05/13/2024    ALT 10 05/13/2024    UTPCR 0.16 05/13/2024    .5 (H) 08/25/2022    TACROLIMUS 8.1 07/08/2024       No results found for: "EXTANC", "EXTWBC", "EXTSEGS", "EXTPLATELETS", "EXTHEMOGLOBI", "EXTHEMATOCRI", "EXTCREATININ", "EXTSODIUM", "EXTPOTASSIUM", "EXTBUN", "EXTCO2", "EXTCALCIUM", "EXTPHOSPHORU", "EXTGLUCOSE", "EXTALBUMIN", "EXTAST", "EXTALT", "EXTBILITOTAL", "EXTLIPASE", "EXTAMYLASE"    No results found for: "EXTCYCLOSLVL", "EXTSIROLIMUS", "EXTTACROLVL", "EXTPROTCRE", "EXTPTHINTACT", "EXTPROTEINUA", "EXTWBCUA", "EXTRBCUA"        Assessment:     1. S/P kidney transplant 2024    2. Type 2 diabetes mellitus with chronic kidney disease, with long-term current use of insulin, unspecified CKD stage    3. Hypertension associated with diabetes        Plan: " "  Hospitalization for Blood and urine cultures + Lucy Rice surgeon review TXP US from 5/2/24  Continue current IS therapy regimen  Increase water intake  No need for refills on IS therapy today.       DCD kidney transplant 02/18/2024 with KDPI 67% [RPR+, HCVAb+/DERICK- donor ]  Allograft function assessment  -CKD 3, remains stable.   -recovering well.  -continue close monitoring as per guidelines  Recent Labs   Lab 12/30/21  1352 07/07/22  1436 07/14/22  0706 08/23/22  1500 06/15/23  0531 08/29/23  1039 05/13/24  0917 06/10/24  0833 07/01/24  0920   Creatinine 8.4 H 8.2 H 8.4 H   < > 7.49 H   < > 1.6 H 1.7 H 1.7 H   eGFR if non African American 6 A 6.2 A 6.0 A  --   --   --   --   --   --    eGFR if  7 A 7.1 A 6.9 A  --   --   --   --   --   --    eGFR   --   --   --   --  7  --   --   --   --     < > = values in this interval not displayed.      No results found for: "GLUCOSE", "AMYLASE", "LIPASE"      Encounter for Monitoring Immunosuppression post Transplant  Recent Labs   Lab 06/24/24  0834 07/01/24  0920 07/08/24  0902   Tacrolimus Lvl 12.7 11.5 8.1     -Target level for Isidro is 7-9-- Tac increased yesterday; labs on Monday  -Recheck as per guidelines.  -Monitor for side effects and toxicities, given narrow therapeutic window and significant risk of AE       Evaluation for bone marrow suppression (r/t immunosuppression toxicity or infection)  Counts currently acceptable.  Monitor   Lab Results   Component Value Date    WBC 5.86 07/01/2024    HGB 11.5 (L) 07/01/2024    HCT 38.2 (L) 07/01/2024     07/01/2024       Renal hypertension--within target, watch  BP Readings from Last 3 Encounters:   07/10/24 110/74   06/20/24 130/72   05/16/24 138/70   -on Entresto  -consider restarting isosorbide as well if BP remains high      Metabolic bone disease [Secondary hyperparathyroidism, Phosphorus metabolism disorders]  -Will tolerate mild asymptomatic low phos level d/t pill " burden, DDI, and adverse SE   Hypo magnesemia-magnesium supplement added yesterday, along with high magnesium diet education.  We will aim to bring magnesium closer to normal, but will accept somewhat low-level given pill burden  Labs were reviewed with the patient.   Recent Labs   Lab 08/25/22  1203 04/06/23  1735 05/13/24  0917 06/10/24  0833 07/01/24  0920   Albumin  --    < > 3.6  3.6 3.5 3.5   Calcium  --    < > 10.1 9.5 9.3   Phosphorus  --    < > 3.1 3.0 3.5   Magnesium  --    < > 1.6 1.4 L 1.5 L   PTH, Intact 324.5 H  --   --   --   --     < > = values in this interval not displayed.        Assessment of electrolytes  Lab Results   Component Value Date     07/01/2024    K 4.8 07/01/2024     (H) 07/01/2024    CO2 19 (L) 07/01/2024    MG 1.5 (L) 07/01/2024       Screening for BK infection to prevent allograft dysfunction  Lab Results   Component Value Date    BKVIRUSPCRQB <125 05/13/2024       PJP until 08/18/2024 - atovaquone changed to dapsone 02/28/2024 (known allergy to Bactrim)  CMV until 05/21/2024 -  Valcyte  - BK acceptable  -Continue blood PCR screening as per program guidelines to prevent BK viremia and nephropathy leading to allograft dysfunction and potential graft failure      Screening for proteinuria & urinary abnormalities  Recent Labs   Lab 03/18/24  0829 05/02/24  1052 05/13/24  0850   Protein, UA Negative 1+ A Negative   Glucose, UA Negative Negative Negative   Occult Blood UA Negative Trace A Negative   Leukocytes, UA 1+ A 2+ A Negative   Prot/Creat Ratio, Urine 0.45 H  --  0.16   - Urine p/c ratio acceptable    Management of kidney disease and related issues (HTN, anemia, SPTH, metabolic bone disease) should continue by community nephrologist.         Follow-up:   Clinic: return to transplant clinic weekly for the first month after transplant; every 2 weeks during months 2-3; then at 6-, 9-, 12-, 18-, 24-, and 36- months post-transplant to reassess for complications from  immunosuppression toxicity and monitor for rejection.  Annually thereafter.    Labs: since patient remains at high risk for rejection and drug-related complications that warrant close monitoring, labs will be ordered as follows: continue twice weekly CBC, renal panel, and drug level for first month; then same labs once weekly through 3rd month post-transplant.  Urine for UA and protein/creatinine ratio monthly.  Serum BK - PCR at 1-, 3-, 6-, 9-, 12-, 18-, 24-, 36- 48-, and 60 months post-transplant.  Hepatic panel at 1-, 2-, 3-, 6-, 9-, 12-, 18-, 24-, and 36- months post-transplant.    Natasha Hunter NP       Education:   Material provided to the patient.  Patient reminded to call with any health changes since these can be early signs of significant complications.  Also, I advised the patient to be sure any new medications or changes of old medications are discussed with either a pharmacist or physician knowledgeable with transplant to avoid rejection/drug toxicity related to significant drug interactions.    Patient advised that it is recommended that all transplanted patients, and their close contacts and household members receive Covid vaccination.    [employer] Patient Status  Functional Status: 70% - Cares for self: unable to carry on normal activity or active work  Physical Capacity: No Limitations

## 2024-08-01 ENCOUNTER — OUTPATIENT CASE MANAGEMENT (OUTPATIENT)
Dept: ADMINISTRATIVE | Facility: OTHER | Age: 69
End: 2024-08-01
Payer: MEDICARE

## 2024-08-02 ENCOUNTER — PATIENT MESSAGE (OUTPATIENT)
Dept: TRANSPLANT | Facility: CLINIC | Age: 69
End: 2024-08-02

## 2024-08-02 ENCOUNTER — OFFICE VISIT (OUTPATIENT)
Dept: OTOLARYNGOLOGY | Facility: CLINIC | Age: 69
End: 2024-08-02
Payer: COMMERCIAL

## 2024-08-02 ENCOUNTER — OFFICE VISIT (OUTPATIENT)
Dept: TRANSPLANT | Facility: CLINIC | Age: 69
End: 2024-08-02
Payer: COMMERCIAL

## 2024-08-02 DIAGNOSIS — Z96.22 HISTORY OF TYMPANOSTOMY TUBE PLACEMENT: ICD-10-CM

## 2024-08-02 DIAGNOSIS — I15.2 HYPERTENSION ASSOCIATED WITH DIABETES: ICD-10-CM

## 2024-08-02 DIAGNOSIS — E11.59 HYPERTENSION ASSOCIATED WITH DIABETES: ICD-10-CM

## 2024-08-02 DIAGNOSIS — Z79.4 TYPE 2 DIABETES MELLITUS WITH CHRONIC KIDNEY DISEASE, WITH LONG-TERM CURRENT USE OF INSULIN, UNSPECIFIED CKD STAGE: ICD-10-CM

## 2024-08-02 DIAGNOSIS — Z94.0 S/P KIDNEY TRANSPLANT: Primary | ICD-10-CM

## 2024-08-02 DIAGNOSIS — H92.12 OTORRHEA, LEFT: Primary | ICD-10-CM

## 2024-08-02 DIAGNOSIS — E11.22 TYPE 2 DIABETES MELLITUS WITH CHRONIC KIDNEY DISEASE, WITH LONG-TERM CURRENT USE OF INSULIN, UNSPECIFIED CKD STAGE: ICD-10-CM

## 2024-08-02 PROCEDURE — 99999 PR PBB SHADOW E&M-EST. PATIENT-LVL III: CPT | Mod: PBBFAC,,, | Performed by: STUDENT IN AN ORGANIZED HEALTH CARE EDUCATION/TRAINING PROGRAM

## 2024-08-02 RX ORDER — CIPROFLOXACIN HYDROCHLORIDE 3 MG/ML
4 SOLUTION/ DROPS OPHTHALMIC 2 TIMES DAILY
Qty: 10 ML | Refills: 0 | Status: SHIPPED | OUTPATIENT
Start: 2024-08-02 | End: 2024-08-07

## 2024-08-02 NOTE — LETTER
August 2, 2024        Gabino Champion             [facility] 1st Fl  [address]  [facility] [address]  Phone: [phone]   Patient: Isidro Alevs   MR Number: [id number]   YOB: 1955   Date of Visit: 8/2/2024       Dear Dr. Gabino Champion    Thank you for referring Isidro Alves to me for evaluation. Attached you will find relevant portions of my assessment and plan of care.    If you have questions, please do not hesitate to call me. I look forward to following Isidro Alves along with you.    Sincerely,    Natasha Hunter, NP    Enclosure    If you would like to receive this communication electronically, please contact [email or web address] or [phone] to request [employer] access.    [employer] is a tool which provides read-only access to select patient information with whom you have a relationship. Its easy to use and provides real time access to review your patients record including encounter summaries, notes, results, and demographic information.    If you feel you have received this communication in error or would no longer like to receive these types of communications, please e-mail [email or web address]

## 2024-08-02 NOTE — PROGRESS NOTES
Chief complaint:   Chief Complaint   Patient presents with    Ear Problem     Left ear drainage onset 1 month ago, right ear fullness        History of Present Illness, 2/14/22:     Mr. Alves is a 68 y.o. male presenting for evaluation of left ear otalgia and hearing loss. Onset of this chief complaint was about a month ago.  Additional symptoms that also have been associated are left ear hearing loss. Treatment included ear canal flushing. Symptoms worsened after this. Denies otorrhea, vertigo, fever.    The patient also denies pain deep within the ear, tenderness around the ear canal or pre-auricular area, or headaches.       The patient denies significant hearing loss risk factors, ototoxic medication exposure, chronic vestibular suppressant use, head/ facial/ soraya trauma, and otologic surgery.      Return clinic visit, 02/17/2022  Ear pain and throbbing worsened over last couple days. Muffled hearing now. No fever, post auricular pain, facial weakness.     Return clinic visit, 3/17/22  Used abx as prescribed. Pain has resolved. Hearing still off - feels hollow.     No nose bleed, nasal obstruction, neck mass.    Return clinic visit, 3/23/23  No change in symptoms. Continued fullness, muffled hearing AS.    Return clinic visit, 1/22/24  S/p  L PET 3/23/23  Left ear bleeding for about 1 week. Feels wet in that ear.   No recent illness.   No tx.    Return clinic visit, 2/15/24  Ear doing better. No bleeding, no pain or drainage.   Occasionally feels stopped up.  Used otic drops.    Now with 3 days of fatigue, nasal congestion, headache     Return clinic visit, 8/2/24  Continued left otorrhea. Temporarily resolves with drops, then recurs    History        Past Medical History:   Past Medical History:   Diagnosis Date    Abnormal nuclear stress test 8/30/2022    Acute hypoxemic respiratory failure due to COVID-19 1/21/2021    Acute on chronic combined systolic and diastolic congestive heart failure 5/7/2023     Acute on chronic respiratory failure 5/7/2023    Acute respiratory failure with hypoxia and hypercapnia 5/18/2023    Anal itching 10/10/2022    Anemia 4/16/2014    BPH (benign prostatic hyperplasia)     CKD (chronic kidney disease) stage 4, GFR 15-29 ml/min     Coronary artery disease of native artery of native heart with stable angina pectoris 4/29/2019    Diabetes mellitus     Diabetes mellitus, type 2     Dilated cardiomyopathy 8/30/2022    Elevated PSA     Encounter for blood transfusion     Herpes labialis     Hyperlipidemia     Hypertension     Hypertensive emergency 5/18/2023    Obesity     Pneumonia 5/7/2023    Pneumonia due to COVID-19 virus     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    Preop cardiovascular exam 9/16/2022    Proteinuria     Pulmonary edema with congestive heart failure 5/18/2023    Secondary hyperparathyroidism (of renal origin)     .          Past Surgical History:  Past Surgical History:   Procedure Laterality Date    CATHETERIZATION OF BOTH LEFT AND RIGHT HEART N/A 08/30/2022    Procedure: CATHETERIZATION, HEART, BOTH LEFT AND RIGHT;  Surgeon: Kassandra Deras MD;  Location: [facility] CATH LAB;  Service: Cardiology;  Laterality: N/A;    CATHETERIZATION OF BOTH LEFT AND RIGHT HEART N/A 9/5/2023    Procedure: CATHETERIZATION, HEART, BOTH LEFT AND RIGHT;  Surgeon: Kassandra Deras MD;  Location: [facility] CATH LAB;  Service: Cardiology;  Laterality: N/A;    COLONOSCOPY N/A 12/21/2017    Procedure: COLONOSCOPY;  Surgeon: Fran Escalera MD;  Location: [facility] ENDO;  Service: Endoscopy;  Laterality: N/A;    COLONOSCOPY N/A 02/02/2023    Procedure: COLONOSCOPY;  Surgeon: Anthony Meng MD;  Location: [facility] ENDO;  Service: Endoscopy;  Laterality: N/A;    FLUOROSCOPY N/A 01/21/2021    Procedure: Vascath insertion;  Surgeon: Adrian Pruitt Jr., MD;  Location: [facility] CATH LAB;  Service: General;  Laterality: N/A;    FLUOROSCOPY N/A 04/06/2021    Procedure: Over the wire Vas Cath exchange;  Surgeon: Ho Pressley,  MD;  Location: [facility] CATH LAB;  Service: General;  Laterality: N/A;    KIDNEY TRANSPLANT N/A 2/18/2024    Procedure: TRANSPLANT, KIDNEY;  Surgeon: Chas Salomon MD;  Location: [facility] OR [address] [place] [address];  Service: Transplant;  Laterality: N/A;    PROSTATE BIOPSY      thorocotomy  01/01/2010   .         Medications: Medication list was reviewed. He  has a current medication list which includes the following prescription(s): aspirin, atorvastatin, blood sugar diagnostic, blood-glucose meter, carvedilol, cetirizine, dapsone, doxycycline, finasteride, hydralazine, insulin degludec, insulin lispro, isosorbide dinitrate, lancets, magnesium oxide, multivitamin, mycophenolate, pen needle, diabetic, prednisone, entresto, sodium bicarbonate, tacrolimus, tamsulosin, and [DISCONTINUED] valganciclovir.         Allergies:   Review of patient's allergies indicates:   Allergen Reactions    Bactrim [sulfamethoxazole-trimethoprim] Swelling    Nsaids (non-steroidal anti-inflammatory drug)      CKD    Sulfamethoxazole     Trimethoprim             Family history: family history includes Diabetes in his father and another family member; Hypertension in his father and another family member; No Known Problems in his mother.         Social History          Alcohol use:  reports that he does not currently use alcohol.            Tobacco:  reports that he has never smoked. He has never used smokeless tobacco.         Please see the patient's intake form for full details of past medical history, past surgical history, family history, social history and review of systems. ?This information was reviewed by me and noted.      Physical Examination     General: Well developed, well nourished, well hydrated. Verbal with a strong voice and not dysphonic.     Head/Face: Normocephalic, atraumatic. No scars or lesions. Facial musculature equal.     Eyes: No scleral icterus or conjunctival hemorrhage. EOMI. PERRLA.     Ears:     Right ear: after disimpaction - No  gross deformity. EAC is clear The TM is intact with a pneumatized middle ear. No signs of retraction, fluid or infection.      Left ear: see procedure      Nose: No gross deformity or lesions.     Mouth/Oropharynx: Lips without any lesions.     Neck: Trachea midline. No masses. No thyromegaly or nodules palpated.     Lymphatic: No lymphadenopathy in the neck.     Extremities: No cyanosis. Warm and well-perfused.     Skin: No scars or lesions on face or neck.      Neurologic: Moving all extremities without gross abnormality.CN II-XII grossly intact. House-Brackmann 1/6. No signs of nystagmus.      Psych: Alert and oriented to person, place, and time with an appropriate mood and affect.         DATE OF PROCEDURE:  08/02/2024    Surgeon: Fabio Vargas MD    Preoperative Diagnosis:  Retained tube left ear     Postoperative Diagnosis:  Same     Procedure:  Removal of left retained ear tube with myringoplasty     Findings: Retained PET left ear, mild granulation     Anesthesia:  Mask     Blood loss:  scant     Medications administered - ofloxacin      Indications for procedure:   Patient present to ENT clinic with complaints of nonfunctioning ventilation tube(s).  Risks and benefits of tube removal and tympanoplasty were extensively discussed with the child's guardians, and they elected to proceed with the procedure.     Procedure in detail:    His left  EAC was found to have a minimal amount cerumen that was carefully cleaned with a curette.  The tympanic membrane was then visualized, and was found to have a retained PET in place. Using a pick, the tube was gently loosened from the surrounding TM.  An empty alligator was then used to remove the tube from the TM.  A pick was then used to free a small rim of tissue surrounding the residual perforation, and that tissue was then removed with an alligator. The ear was then irrigated with ofloxacin.  A small piece of quick gel soaked in ofloxacin was then placed to fit  overlying the myringotomy. The patient tolerated the procedure well         Audiogram     Audiogram  was independently reviewed          Assessment   1. Otorrhea, left    2. History of tympanostomy tube placement         S/p L PET 3/23/23 now with recurrent tube otorrhea and granulation     PET removed today  F/u 1 month with audio prior  Keep ears dry, ciloxan      Fabio Vargas MD  [facility]   [facility]  [address] [facility] [address] [facility] [place] [address]  P: [phone]  F: [phone]

## 2024-08-05 ENCOUNTER — LAB VISIT (OUTPATIENT)
Dept: LAB | Facility: HOSPITAL | Age: 69
End: 2024-08-05
Attending: INTERNAL MEDICINE
Payer: MEDICARE

## 2024-08-05 ENCOUNTER — LAB VISIT (OUTPATIENT)
Dept: LAB | Facility: HOSPITAL | Age: 69
End: 2024-08-05
Payer: COMMERCIAL

## 2024-08-05 DIAGNOSIS — Z94.0 KIDNEY REPLACED BY TRANSPLANT: ICD-10-CM

## 2024-08-05 LAB
ALBUMIN SERPL BCP-MCNC: 3.8 G/DL (ref 3.5–5.2)
ALBUMIN SERPL BCP-MCNC: 3.8 G/DL (ref 3.5–5.2)
ALP SERPL-CCNC: 69 U/L (ref 55–135)
ALT SERPL W/O P-5'-P-CCNC: 11 U/L (ref 10–44)
ANION GAP SERPL CALC-SCNC: 8 MMOL/L (ref 8–16)
AST SERPL-CCNC: 20 U/L (ref 10–40)
BACTERIA #/AREA URNS HPF: NORMAL /HPF
BASOPHILS # BLD AUTO: 0.05 K/UL (ref 0–0.2)
BASOPHILS NFR BLD: 0.9 % (ref 0–1.9)
BILIRUB DIRECT SERPL-MCNC: 0.4 MG/DL (ref 0.1–0.3)
BILIRUB SERPL-MCNC: 1.1 MG/DL (ref 0.1–1)
BILIRUB UR QL STRIP: NEGATIVE
BUN SERPL-MCNC: 23 MG/DL (ref 8–23)
CALCIUM SERPL-MCNC: 9.9 MG/DL (ref 8.7–10.5)
CHLORIDE SERPL-SCNC: 107 MMOL/L (ref 95–110)
CLARITY UR: CLEAR
CO2 SERPL-SCNC: 22 MMOL/L (ref 23–29)
COLOR UR: YELLOW
CREAT SERPL-MCNC: 1.6 MG/DL (ref 0.5–1.4)
CREAT UR-MCNC: 34 MG/DL (ref 23–375)
DIFFERENTIAL METHOD BLD: ABNORMAL
EOSINOPHIL # BLD AUTO: 0.3 K/UL (ref 0–0.5)
EOSINOPHIL NFR BLD: 4.9 % (ref 0–8)
ERYTHROCYTE [DISTWIDTH] IN BLOOD BY AUTOMATED COUNT: 16.7 % (ref 11.5–14.5)
EST. GFR  (NO RACE VARIABLE): 46.9 ML/MIN/1.73 M^2
GLUCOSE SERPL-MCNC: 151 MG/DL (ref 70–110)
GLUCOSE UR QL STRIP: NEGATIVE
HBV SURFACE AB SER-ACNC: >1000 MIU/ML
HBV SURFACE AB SER-ACNC: REACTIVE M[IU]/ML
HBV SURFACE AG SERPL QL IA: NORMAL
HCT VFR BLD AUTO: 38.8 % (ref 40–54)
HCV AB SERPL QL IA: NORMAL
HGB BLD-MCNC: 11.8 G/DL (ref 14–18)
HGB UR QL STRIP: ABNORMAL
IMM GRANULOCYTES # BLD AUTO: 0.08 K/UL (ref 0–0.04)
IMM GRANULOCYTES NFR BLD AUTO: 1.4 % (ref 0–0.5)
KETONES UR QL STRIP: NEGATIVE
LEUKOCYTE ESTERASE UR QL STRIP: NEGATIVE
LYMPHOCYTES # BLD AUTO: 0.3 K/UL (ref 1–4.8)
LYMPHOCYTES NFR BLD: 4.7 % (ref 18–48)
MAGNESIUM SERPL-MCNC: 1.4 MG/DL (ref 1.6–2.6)
MCH RBC QN AUTO: 24.8 PG (ref 27–31)
MCHC RBC AUTO-ENTMCNC: 30.4 G/DL (ref 32–36)
MCV RBC AUTO: 82 FL (ref 82–98)
MICROSCOPIC COMMENT: NORMAL
MONOCYTES # BLD AUTO: 0.9 K/UL (ref 0.3–1)
MONOCYTES NFR BLD: 15.5 % (ref 4–15)
NEUTROPHILS # BLD AUTO: 4.2 K/UL (ref 1.8–7.7)
NEUTROPHILS NFR BLD: 72.6 % (ref 38–73)
NITRITE UR QL STRIP: NEGATIVE
NRBC BLD-RTO: 0 /100 WBC
PH UR STRIP: 7 [PH] (ref 5–8)
PHOSPHATE SERPL-MCNC: 2.9 MG/DL (ref 2.7–4.5)
PLATELET # BLD AUTO: 287 K/UL (ref 150–450)
PMV BLD AUTO: 11.5 FL (ref 9.2–12.9)
POTASSIUM SERPL-SCNC: 4.6 MMOL/L (ref 3.5–5.1)
PROT SERPL-MCNC: 6.9 G/DL (ref 6–8.4)
PROT UR QL STRIP: NEGATIVE
PROT UR-MCNC: <7 MG/DL (ref 0–15)
PROT/CREAT UR: NORMAL MG/G{CREAT} (ref 0–0.2)
RBC # BLD AUTO: 4.76 M/UL (ref 4.6–6.2)
RBC #/AREA URNS HPF: 2 /HPF (ref 0–4)
SODIUM SERPL-SCNC: 137 MMOL/L (ref 136–145)
SP GR UR STRIP: 1.01 (ref 1–1.03)
SQUAMOUS #/AREA URNS HPF: 1 /HPF
URN SPEC COLLECT METH UR: ABNORMAL
WBC # BLD AUTO: 5.76 K/UL (ref 3.9–12.7)
WBC #/AREA URNS HPF: 1 /HPF (ref 0–5)

## 2024-08-05 PROCEDURE — 87799 DETECT AGENT NOS DNA QUANT: CPT | Performed by: INTERNAL MEDICINE

## 2024-08-05 PROCEDURE — 87522 HEPATITIS C REVRS TRNSCRPJ: CPT | Performed by: INTERNAL MEDICINE

## 2024-08-05 PROCEDURE — 87517 HEPATITIS B DNA QUANT: CPT | Performed by: INTERNAL MEDICINE

## 2024-08-05 PROCEDURE — 85025 COMPLETE CBC W/AUTO DIFF WBC: CPT | Performed by: INTERNAL MEDICINE

## 2024-08-05 PROCEDURE — 84075 ASSAY ALKALINE PHOSPHATASE: CPT | Performed by: INTERNAL MEDICINE

## 2024-08-05 PROCEDURE — 86803 HEPATITIS C AB TEST: CPT | Performed by: INTERNAL MEDICINE

## 2024-08-05 PROCEDURE — 81000 URINALYSIS NONAUTO W/SCOPE: CPT | Performed by: INTERNAL MEDICINE

## 2024-08-05 PROCEDURE — 82570 ASSAY OF URINE CREATININE: CPT | Performed by: INTERNAL MEDICINE

## 2024-08-05 PROCEDURE — 36415 COLL VENOUS BLD VENIPUNCTURE: CPT | Performed by: INTERNAL MEDICINE

## 2024-08-05 PROCEDURE — 80069 RENAL FUNCTION PANEL: CPT | Performed by: INTERNAL MEDICINE

## 2024-08-05 PROCEDURE — 83735 ASSAY OF MAGNESIUM: CPT | Performed by: INTERNAL MEDICINE

## 2024-08-05 PROCEDURE — 80197 ASSAY OF TACROLIMUS: CPT | Performed by: INTERNAL MEDICINE

## 2024-08-05 PROCEDURE — 86706 HEP B SURFACE ANTIBODY: CPT | Performed by: INTERNAL MEDICINE

## 2024-08-05 PROCEDURE — 87340 HEPATITIS B SURFACE AG IA: CPT | Performed by: INTERNAL MEDICINE

## 2024-08-05 PROCEDURE — 82247 BILIRUBIN TOTAL: CPT | Performed by: INTERNAL MEDICINE

## 2024-08-06 ENCOUNTER — PATIENT MESSAGE (OUTPATIENT)
Dept: TRANSPLANT | Facility: CLINIC | Age: 69
End: 2024-08-06
Payer: MEDICARE

## 2024-08-06 DIAGNOSIS — Z94.0 KIDNEY REPLACED BY TRANSPLANT: ICD-10-CM

## 2024-08-06 DIAGNOSIS — Z94.0 KIDNEY TRANSPLANT RECIPIENT: ICD-10-CM

## 2024-08-06 DIAGNOSIS — E83.42 HYPOMAGNESEMIA: ICD-10-CM

## 2024-08-06 LAB
HCV RNA SERPL QL NAA+PROBE: NOT DETECTED
HCV RNA SPEC NAA+PROBE-ACNC: NOT DETECTED IU/ML
HEPATITIS B VIRUS DNA: NORMAL
HEPATITIS B VIRUS PCR, QUANT: NOT DETECTED IU/ML
TACROLIMUS BLD-MCNC: 10 NG/ML (ref 5–15)

## 2024-08-06 RX ORDER — TACROLIMUS 1 MG/1
CAPSULE ORAL
Qty: 270 CAPSULE | Refills: 11 | Status: SHIPPED | OUTPATIENT
Start: 2024-08-06 | End: 2025-08-06

## 2024-08-06 RX ORDER — LANOLIN ALCOHOL/MO/W.PET/CERES
800 CREAM (GRAM) TOPICAL 2 TIMES DAILY
Qty: 120 TABLET | Refills: 11 | Status: SHIPPED | OUTPATIENT
Start: 2024-08-06 | End: 2025-08-06

## 2024-08-12 ENCOUNTER — LAB VISIT (OUTPATIENT)
Dept: LAB | Facility: HOSPITAL | Age: 69
End: 2024-08-12
Attending: INTERNAL MEDICINE
Payer: COMMERCIAL

## 2024-08-12 DIAGNOSIS — Z94.0 KIDNEY REPLACED BY TRANSPLANT: ICD-10-CM

## 2024-08-12 PROCEDURE — 80197 ASSAY OF TACROLIMUS: CPT | Performed by: INTERNAL MEDICINE

## 2024-08-12 PROCEDURE — 36415 COLL VENOUS BLD VENIPUNCTURE: CPT | Performed by: INTERNAL MEDICINE

## 2024-08-13 LAB — TACROLIMUS BLD-MCNC: 7.4 NG/ML (ref 5–15)

## 2024-08-19 ENCOUNTER — OFFICE VISIT (OUTPATIENT)
Dept: DIABETES | Facility: CLINIC | Age: 69
End: 2024-08-19
Payer: COMMERCIAL

## 2024-08-19 VITALS
WEIGHT: 258.63 LBS | HEART RATE: 73 BPM | DIASTOLIC BLOOD PRESSURE: 66 MMHG | BODY MASS INDEX: 34.28 KG/M2 | HEIGHT: 73 IN | SYSTOLIC BLOOD PRESSURE: 122 MMHG

## 2024-08-19 DIAGNOSIS — E11.69 HYPERLIPIDEMIA ASSOCIATED WITH TYPE 2 DIABETES MELLITUS: ICD-10-CM

## 2024-08-19 DIAGNOSIS — E66.9 OBESITY (BMI 30-39.9): ICD-10-CM

## 2024-08-19 DIAGNOSIS — I25.118 CORONARY ARTERY DISEASE OF NATIVE ARTERY OF NATIVE HEART WITH STABLE ANGINA PECTORIS: ICD-10-CM

## 2024-08-19 DIAGNOSIS — Z79.4 TYPE 2 DIABETES MELLITUS WITH HYPERGLYCEMIA, WITH LONG-TERM CURRENT USE OF INSULIN: Primary | ICD-10-CM

## 2024-08-19 DIAGNOSIS — I50.22 CHRONIC SYSTOLIC CONGESTIVE HEART FAILURE: ICD-10-CM

## 2024-08-19 DIAGNOSIS — E11.65 TYPE 2 DIABETES MELLITUS WITH HYPERGLYCEMIA, WITH LONG-TERM CURRENT USE OF INSULIN: Primary | ICD-10-CM

## 2024-08-19 DIAGNOSIS — I15.2 HYPERTENSION ASSOCIATED WITH DIABETES: ICD-10-CM

## 2024-08-19 DIAGNOSIS — E04.2 MULTIPLE THYROID NODULES: ICD-10-CM

## 2024-08-19 DIAGNOSIS — Z94.0 S/P KIDNEY TRANSPLANT: ICD-10-CM

## 2024-08-19 DIAGNOSIS — E11.59 HYPERTENSION ASSOCIATED WITH DIABETES: ICD-10-CM

## 2024-08-19 DIAGNOSIS — E78.5 HYPERLIPIDEMIA ASSOCIATED WITH TYPE 2 DIABETES MELLITUS: ICD-10-CM

## 2024-08-19 PROBLEM — Z00.00 ROUTINE GENERAL MEDICAL EXAMINATION AT A HEALTH CARE FACILITY: Status: RESOLVED | Noted: 2022-09-16 | Resolved: 2024-08-19

## 2024-08-19 LAB — GLUCOSE SERPL-MCNC: 122 MG/DL (ref 70–110)

## 2024-08-19 PROCEDURE — 4010F ACE/ARB THERAPY RXD/TAKEN: CPT | Mod: CPTII,S$GLB,, | Performed by: PHYSICIAN ASSISTANT

## 2024-08-19 PROCEDURE — 3078F DIAST BP <80 MM HG: CPT | Mod: CPTII,S$GLB,, | Performed by: PHYSICIAN ASSISTANT

## 2024-08-19 PROCEDURE — 3044F HG A1C LEVEL LT 7.0%: CPT | Mod: CPTII,S$GLB,, | Performed by: PHYSICIAN ASSISTANT

## 2024-08-19 PROCEDURE — 1101F PT FALLS ASSESS-DOCD LE1/YR: CPT | Mod: CPTII,S$GLB,, | Performed by: PHYSICIAN ASSISTANT

## 2024-08-19 PROCEDURE — 3074F SYST BP LT 130 MM HG: CPT | Mod: CPTII,S$GLB,, | Performed by: PHYSICIAN ASSISTANT

## 2024-08-19 PROCEDURE — 3288F FALL RISK ASSESSMENT DOCD: CPT | Mod: CPTII,S$GLB,, | Performed by: PHYSICIAN ASSISTANT

## 2024-08-19 PROCEDURE — 99999 PR PBB SHADOW E&M-EST. PATIENT-LVL V: CPT | Mod: PBBFAC,,, | Performed by: PHYSICIAN ASSISTANT

## 2024-08-19 PROCEDURE — 3008F BODY MASS INDEX DOCD: CPT | Mod: CPTII,S$GLB,, | Performed by: PHYSICIAN ASSISTANT

## 2024-08-19 PROCEDURE — 1126F AMNT PAIN NOTED NONE PRSNT: CPT | Mod: CPTII,S$GLB,, | Performed by: PHYSICIAN ASSISTANT

## 2024-08-19 PROCEDURE — 1159F MED LIST DOCD IN RCRD: CPT | Mod: CPTII,S$GLB,, | Performed by: PHYSICIAN ASSISTANT

## 2024-08-19 PROCEDURE — 3066F NEPHROPATHY DOC TX: CPT | Mod: CPTII,S$GLB,, | Performed by: PHYSICIAN ASSISTANT

## 2024-08-19 PROCEDURE — 99214 OFFICE O/P EST MOD 30 MIN: CPT | Mod: S$GLB,,, | Performed by: PHYSICIAN ASSISTANT

## 2024-08-19 PROCEDURE — 82962 GLUCOSE BLOOD TEST: CPT | Mod: S$GLB,,, | Performed by: PHYSICIAN ASSISTANT

## 2024-08-19 RX ORDER — BLOOD-GLUCOSE SENSOR
1 EACH MISCELLANEOUS
Qty: 9 EACH | Refills: 3 | Status: SHIPPED | OUTPATIENT
Start: 2024-08-19 | End: 2025-08-19

## 2024-08-19 NOTE — Clinical Note
Hi Dr. Rizvi and Jessica,   Pt is 6 mo post renal transplant and appears to be stable. Would you both be okay if we started GLP-1 medication such as ozempic?

## 2024-08-19 NOTE — PATIENT INSTRUCTIONS
CURRENT DM MEDICATIONS:   Tresiba 30 units in the morning  Lispro 4 units before meals 3x daily - 15 mins before eating   Lispro correction dosing every 3 hours as needed    Lispro Correction Dosing every 3 hours as needed OUTSIDE OF EATING  If  - 250, may take 2 units of Lispro  If  - 300, may take 3 units of Lispro  If  - 350, may take 4 units of Lispro  If  - 400, may take 5 units of Lispro  If +, may take 6 units of Lispro

## 2024-08-19 NOTE — PROGRESS NOTES
PCP: Steven Morgan MD    Subjective:     Chief Complaint: Diabetes - Established Patient    HISTORY OF PRESENT ILLNESS: 68 y.o.   male presenting for diabetes management visit.   The patient's last visit with me was on 7/28/2022. Saw Marbella Bobby NP in May 2024.   Patient has had Type II diabetes since 20 years or more.  Pertinent to decision making is the following comorbidities: HTN, HLD, CAD, CHF, S/p Transplant - Kidney in Feb 2024, and Obesity by BMI  Patient has the following Diabetes complications: with diabetic chronic kidney disease  He  has attended diabetes education in the past.     Patient's most recent A1c of 5.6% was completed 3 months ago.   Patient states since His last A1c His blood glucose levels have been high  throughout the day .   Patient monitors blood glucose 2 times per day with meter : Fasting and Before Bed.   Patient blood glucose monitoring device will not be uploaded into Media Section today.  Patient endorses the following diabetes related symptoms:  None .   Patient is due today for the following diabetes-related health maintenance standards: Eye Exam, COVID-19 Vaccine , and vaccines .   He denies recent hospital admissions or emergency room visits.   He denies having hypoglycemia.   Patient's concerns today include glycemic control.     08/19/2024     KIDNEY TRANSPLANT:  He is 6 months post-kidney transplant, performed on February 18th. He reports everything has gone well since the procedure.    DIABETES MANAGEMENT:  He manages diabetes with Tresiba (long-acting insulin) 30 units in the morning and Lispro (short-acting insulin) with a variable dosage based on blood sugar, maximum 8 units typically after meals. Blood sugar monitoring shows highest recorded level of 207, with morning fasting levels up to 122. He denies any readings below 90. His current weight is 258 lbs.    DIET:  He reports reduced food intake, eating only daily. He consumes fruits and engages  "in juicing, particularly celery, cucumber, and watermelon.    MEDICATION HISTORY:  He previously used Ozempic, which was discontinued when he started dialysis. He denies experiencing any nausea or problems while taking Ozempic in the past.    UPCOMING APPOINTMENTS:  He has scheduled an eye exam with Dr. Gibbs and is aware that the records from this appointment will be sent to our office.      Patient medication regimen is as below.     CURRENT DM MEDICATIONS:   Tresiba 30 units in the morning  Lispro 2-8 units three times a day after meals TID    Patient has failed the following Diabetes medications:   Metformin    Xultophy  Semglee  Lantus      Labs Reviewed.       Lab Results   Component Value Date    CPEPTIDE 3.88 11/20/2017     Lab Results   Component Value Date    GLUTAMICACID 0.00 11/20/2017       Height: 6' 1.25" (186.1 cm)  //  Weight: 117.3 kg (258 lb 9.6 oz), Body mass index is 33.89 kg/m².  His blood sugar in clinic today is:    Lab Results   Component Value Date    POCGLU 122 (A) 08/19/2024       Review of Systems   Constitutional:  Negative for activity change, appetite change, chills and fever.   HENT:  Negative for dental problem, mouth sores, nosebleeds, sore throat and trouble swallowing.    Eyes:  Negative for pain and discharge.   Respiratory:  Negative for shortness of breath, wheezing and stridor.    Cardiovascular:  Negative for chest pain, palpitations and leg swelling.   Gastrointestinal:  Negative for abdominal pain, diarrhea, nausea and vomiting.   Endocrine: Negative for polydipsia, polyphagia and polyuria.   Genitourinary:  Negative for dysuria, frequency and urgency.   Musculoskeletal:  Negative for joint swelling and myalgias.   Skin:  Negative for rash and wound.   Neurological:  Negative for dizziness, syncope, weakness and headaches.   Psychiatric/Behavioral:  Negative for behavioral problems and dysphoric mood.          Diabetes Management Status  Statin: Taking  ACE/ARB: " Taking    Screening or Prevention Patient's value Goal Complete/Controlled?   HgA1C Testing and Control   Lab Results   Component Value Date    HGBA1C 5.6 05/13/2024      Annually/Less than 8% Yes   Lipid profile : 05/13/2024 Annually Yes   LDL control Lab Results   Component Value Date    LDLCALC 115.6 05/13/2024    Annually/Less than 100 mg/dl  Yes   Nephropathy screening Lab Results   Component Value Date    MICALBCREAT 1998.1 (H) 07/14/2022     Lab Results   Component Value Date    PROTEINUA Negative 08/05/2024    Annually Yes   Blood pressure BP Readings from Last 1 Encounters:   08/19/24 122/66    Less than 140/90 No   Dilated retinal exam : 09/26/2022 Annually No    Foot exam   : 05/16/2024 Annually Yes     Social History     Socioeconomic History    Marital status:     Number of children: 3   Occupational History     Employer: [employer]   Tobacco Use    Smoking status: Never     Passive exposure: Never    Smokeless tobacco: Never   Substance and Sexual Activity    Alcohol use: Not Currently     Comment: seldom     Drug use: No    Sexual activity: Not Currently     Partners: Female   Social History Narrative    ** Merged History Encounter **          Social Determinants of Health     Financial Resource Strain: Low Risk  (5/15/2024)    Overall Financial Resource Strain (CARDIA)     Difficulty of Paying Living Expenses: Not very hard   Food Insecurity: No Food Insecurity (5/15/2024)    Hunger Vital Sign     Worried About Running Out of Food in the Last Year: Never true     Ran Out of Food in the Last Year: Never true   Transportation Needs: No Transportation Needs (5/15/2024)    TRANSPORTATION NEEDS     Transportation : No   Physical Activity: Insufficiently Active (5/15/2024)    Exercise Vital Sign     Days of Exercise per Week: 3 days     Minutes of Exercise per Session: 20 min   Stress: No Stress Concern Present (5/15/2024)    [place] [employer] - Occupational Stress  Questionnaire     Feeling of Stress : Not at all   Recent Concern: Stress - Stress Concern Present (2/19/2024)    [place] [employer]ational [employer] - Occupational Stress Questionnaire     Feeling of Stress : To some extent   Housing Stability: Low Risk  (5/15/2024)    Housing Stability Vital Sign     Unable to Pay for Housing in the Last Year: No     Homeless in the Last Year: No     Past Medical History:   Diagnosis Date    Abnormal nuclear stress test 8/30/2022    Acute hypoxemic respiratory failure due to COVID-19 1/21/2021    Acute on chronic combined systolic and diastolic congestive heart failure 5/7/2023    Acute on chronic respiratory failure 5/7/2023    Acute respiratory failure with hypoxia and hypercapnia 5/18/2023    Anal itching 10/10/2022    Anemia 4/16/2014    BPH (benign prostatic hyperplasia)     CKD (chronic kidney disease) stage 4, GFR 15-29 ml/min     Coronary artery disease of native artery of native heart with stable angina pectoris 4/29/2019    Diabetes mellitus     Diabetes mellitus, type 2     Dilated cardiomyopathy 8/30/2022    Elevated PSA     Encounter for blood transfusion     Herpes labialis     Hyperlipidemia     Hypertension     Hypertensive emergency 5/18/2023    Obesity     Pneumonia 5/7/2023    Pneumonia due to COVID-19 virus     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     Appearance: He is well-developed. He is not diaphoretic.   HENT:      Head: Normocephalic and atraumatic.      Right Ear: External ear normal.      Left Ear: External ear normal.      Nose: Nose normal.   Eyes:      General:         Right eye: No discharge.         Left eye: No discharge.      Pupils: Pupils are equal, round, and reactive to light.    Cardiovascular:      Rate and Rhythm: Normal rate and regular rhythm.      Heart sounds: Normal heart sounds.   Pulmonary:      Effort: Pulmonary effort is normal.      Breath sounds: Normal breath sounds.   Abdominal:      Palpations: Abdomen is soft.   Musculoskeletal:         General: Normal range of motion.      Cervical back: Normal range of motion and neck supple.   Skin:     General: Skin is warm and dry.      Capillary Refill: Capillary refill takes less than 2 seconds.   Neurological:      Mental Status: He is alert and oriented to person, place, and time.      Motor: No abnormal muscle tone.      Coordination: Coordination normal.   Psychiatric:         Behavior: Behavior normal.         Thought Content: Thought content normal.         Judgment: Judgment normal.           Assessment / Plan:     Type 2 diabetes mellitus with hyperglycemia, with long-term current use of insulin  -     POCT Glucose, Hand-Held Device  -     blood-glucose sensor (DEXCOM G7 SENSOR) Ro; 1 each by Misc.(Non-Drug; Combo Route) route every 10 days.  Dispense: 9 each; Refill: 3  -     Ambulatory referral/consult to Diabetes Education; Future; Expected date: 08/26/2024    S/P kidney transplant 2024    Chronic systolic congestive heart failure    Hypertension associated with diabetes    Hyperlipidemia associated with type 2 diabetes mellitus    Coronary artery disease of native artery of native heart with stable angina pectoris    Multiple thyroid nodules    Obesity (BMI 30-39.9)      Additional Plan Details:    - POCT Glucose  - Encouraged continuation of lifestyle changes including regular exercise and limiting carbohydrates to 30-45 grams per meal threes times daily and 15 grams per snack with a limit of two daily.   - Encouraged continued monitoring of blood glucose with maintenance of 4 times daily at Fasting, After Lunch, Before Bed, and After Breakfast . Dex G7 Rx.   - Current DM Medication Regimen: Continue Tresiba 30 units  daily. Change Lispro 4 units before meals TID and correction dosing every 3 hours as below. Will discuss GLP-1 with Nephro/Transplant.   - Referral to CDE - Comprehensive Diab ed  - Health Maintenance standards addressed today: Eye Exam - will be completed outside of [facility] and patient will schedule, COVID - 19 Vaccine - patient will schedule outside of [facility] , and Vaccines  - Nursing Visit: Patient is below goal range for nursing visit for age group and will not need nursing visit at this time .   - Follow up in 1 months with A1c prior.      DIABETES:  - Assessed diabetes management 6 months post-kidney transplant.  - Reviewed current insulin regimen: Tresiba 30 units daily and Lispro 2-8 units after meals  - Considered transitioning to GLP-1 agonist (Ozempic) to potentially reduce insulin requirements, promote weight loss, and provide cardiovascular protection.  - Plan to consult with transplant team and nephrology before initiating Ozempic.  - Anticipate starting Ozempic at subtherapeutic dose and titrating slowly to monitor tolerability.  - Considering future addition of SGLT2 inhibitor (e.g., Jardiance, Farxiga) at 1-year post-transplant for heart and kidney benefits.  - Explained continuous glucose monitoring (CGM) system, including wear time, application sites, and data sharing capabilities.  - Discussed importance of timing insulin administration before meals to better manage post-prandial glucose spikes.  - Educated on potential benefits of GLP-1 agonists for diabetes management, weight loss, and cardiovascular protection.  - Continued Tresiba 30 units daily.  - Changed Lispro administration to 4 units 15 minutes before meals.  - Added correction scale for Lispro if glucose remains elevated 3 hours post-meal.  - Started prescription process for Dexcom continuous glucose monitor, pending insurance approval.  - Ordered HbA1c test.  - Referred to diabetes educator for nutritional guidance and diabetes  management post-transplant.    VACCINATIONS:  - Consider discussing vaccine recommendations with transplant team, including COVID-19 booster, pneumonia, RSV, and shingles vaccines.    FOLLOW UP:  - Follow up in 4 weeks to reassess diabetes management and potentially adjust insulin dosing.  - Contact office if Dexcom CGM is approved by insurance to arrange training session with diabetes educator.  - Contact office if any issues arise with new insulin regimen.       CURRENT DM MEDICATIONS:   Tresiba 30 units in the morning  Lispro 4 units before meals 3x daily - 15 mins before eating   Lispro correction dosing every 3 hours as needed    Lispro Correction Dosing every 3 hours as needed OUTSIDE OF EATING  If  - 250, may take 2 units of Lispro  If  - 300, may take 3 units of Lispro  If  - 350, may take 4 units of Lispro  If  - 400, may take 5 units of Lispro  If +, may take 6 units of Lispro    Blakeney McKnight, PA-C [facility]    A total of 30 minutes was spent in face to face time, of which over 50 % was spent in counseling patient on disease process, complications, treatment, and side effects of medications.    This note was generated with the assistance of ambient listening technology. Verbal consent was obtained by the patient and accompanying visitor(s) for the recording of patient appointment to facilitate this note. I attest to having reviewed and edited the generated note for accuracy, though some syntax or spelling errors may persist. Please contact the author of this note for any clarification.

## 2024-08-20 ENCOUNTER — TELEPHONE (OUTPATIENT)
Dept: DIABETES | Facility: CLINIC | Age: 69
End: 2024-08-20
Payer: MEDICARE

## 2024-08-20 DIAGNOSIS — Z79.4 TYPE 2 DIABETES MELLITUS WITH HYPERGLYCEMIA, WITH LONG-TERM CURRENT USE OF INSULIN: Primary | ICD-10-CM

## 2024-08-20 DIAGNOSIS — E11.65 TYPE 2 DIABETES MELLITUS WITH HYPERGLYCEMIA, WITH LONG-TERM CURRENT USE OF INSULIN: Primary | ICD-10-CM

## 2024-08-20 RX ORDER — SEMAGLUTIDE 1.34 MG/ML
INJECTION, SOLUTION SUBCUTANEOUS
Qty: 1 EACH | Refills: 11 | Status: SHIPPED | OUTPATIENT
Start: 2024-08-20 | End: 2025-08-20

## 2024-08-20 NOTE — TELEPHONE ENCOUNTER
PA completed through M: Ozempic 0.25 mg or 0.5 mg(2 mg/1.5 mL)     CaseId:[id number];  Status:Approved;  Review Type:Prior Auth;Coverage   Start Date:07/21/2024;Coverage End Date:08/20/2025;

## 2024-08-20 NOTE — TELEPHONE ENCOUNTER
Per transplant team, ok to use Ozempic. Will have staff inform pt to start. Regimen as below:     CURRENT DM MEDICATIONS:   Tresiba 30 units in the morning  Lispro 4 units before meals 3x daily - 15 mins before eating   Lispro correction dosing every 3 hours as needed  Ozempic 0.25 mg once per week     Lispro Correction Dosing every 3 hours as needed OUTSIDE OF EATING  If  - 250, may take 2 units of Lispro  If  - 300, may take 3 units of Lispro  If  - 350, may take 4 units of Lispro  If  - 400, may take 5 units of Lispro  If +, may take 6 units of Lispro      Jatin Deleon PA-C, BC-ADM  [facility]

## 2024-08-22 ENCOUNTER — OFFICE VISIT (OUTPATIENT)
Dept: OTOLARYNGOLOGY | Facility: CLINIC | Age: 69
End: 2024-08-22
Payer: COMMERCIAL

## 2024-08-22 ENCOUNTER — CLINICAL SUPPORT (OUTPATIENT)
Dept: AUDIOLOGY | Facility: CLINIC | Age: 69
End: 2024-08-22
Payer: COMMERCIAL

## 2024-08-22 DIAGNOSIS — H90.A12 CONDUCTIVE HEARING LOSS OF LEFT EAR WITH RESTRICTED HEARING OF RIGHT EAR: ICD-10-CM

## 2024-08-22 DIAGNOSIS — H61.21 IMPACTED CERUMEN OF RIGHT EAR: ICD-10-CM

## 2024-08-22 DIAGNOSIS — H90.72 MIXED CONDUCTIVE AND SENSORINEURAL HEARING LOSS OF LEFT EAR WITH UNRESTRICTED HEARING OF RIGHT EAR: Primary | ICD-10-CM

## 2024-08-22 DIAGNOSIS — H90.A21 SENSORINEURAL HEARING LOSS (SNHL) OF RIGHT EAR WITH RESTRICTED HEARING OF LEFT EAR: Primary | ICD-10-CM

## 2024-08-22 PROCEDURE — 99999 PR PBB SHADOW E&M-EST. PATIENT-LVL III: CPT | Mod: PBBFAC,,, | Performed by: STUDENT IN AN ORGANIZED HEALTH CARE EDUCATION/TRAINING PROGRAM

## 2024-08-22 PROCEDURE — 99999 PR PBB SHADOW E&M-EST. PATIENT-LVL I: CPT | Mod: PBBFAC,,,

## 2024-08-22 NOTE — PROGRESS NOTES
Chief complaint:   Chief Complaint   Patient presents with    Hearing Loss     Fullness R and can hear heartbeat in ears - denies pain        History of Present Illness, 2/14/22:     Mr. Alves is a 68 y.o. male presenting for f/u of chronic left ear issues s/p PET removal 8/2/24      Return clinic visit, 8/22/24  Overall left ear is doing better. The drainage has stopped. The hearing is stable to slightly improved.   He denies pain.   He has had some PT on the right over the last week.     History        Past Medical History:   Past Medical History:   Diagnosis Date    Abnormal nuclear stress test 8/30/2022    Acute hypoxemic respiratory failure due to COVID-19 1/21/2021    Acute on chronic combined systolic and diastolic congestive heart failure 5/7/2023    Acute on chronic respiratory failure 5/7/2023    Acute respiratory failure with hypoxia and hypercapnia 5/18/2023    Anal itching 10/10/2022    Anemia 4/16/2014    BPH (benign prostatic hyperplasia)     CKD (chronic kidney disease) stage 4, GFR 15-29 ml/min     Coronary artery disease of native artery of native heart with stable angina pectoris 4/29/2019    Diabetes mellitus     Diabetes mellitus, type 2     Dilated cardiomyopathy 8/30/2022    Elevated PSA     Encounter for blood transfusion     Herpes labialis     Hyperlipidemia     Hypertension     Hypertensive emergency 5/18/2023    Obesity     Pneumonia 5/7/2023    Pneumonia due to COVID-19 virus     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    Preop cardiovascular exam 9/16/2022    Proteinuria     Pulmonary edema with congestive heart failure 5/18/2023    Secondary hyperparathyroidism (of renal origin)     .          Past Surgical History:  Past Surgical History:   Procedure Laterality Date    CATHETERIZATION OF BOTH LEFT AND RIGHT HEART N/A 08/30/2022    Procedure: CATHETERIZATION, HEART, BOTH LEFT AND RIGHT;  Surgeon: Kassandra Deras MD;  Location: [facility] CATH LAB;  Service: Cardiology;  Laterality: N/A;     CATHETERIZATION OF BOTH LEFT AND RIGHT HEART N/A 9/5/2023    Procedure: CATHETERIZATION, HEART, BOTH LEFT AND RIGHT;  Surgeon: Kassandra Deras MD;  Location: [facility] CATH LAB;  Service: Cardiology;  Laterality: N/A;    COLONOSCOPY N/A 12/21/2017    Procedure: COLONOSCOPY;  Surgeon: Fran Escalera MD;  Location: [facility] ENDO;  Service: Endoscopy;  Laterality: N/A;    COLONOSCOPY N/A 02/02/2023    Procedure: COLONOSCOPY;  Surgeon: Anthony Meng MD;  Location: [facility] ENDO;  Service: Endoscopy;  Laterality: N/A;    FLUOROSCOPY N/A 01/21/2021    Procedure: Vascath insertion;  Surgeon: Adrian Pruitt Jr., MD;  Location: [facility] CATH LAB;  Service: General;  Laterality: N/A;    FLUOROSCOPY N/A 04/06/2021    Procedure: Over the wire Vas Cath exchange;  Surgeon: Ho Pressley MD;  Location: [facility] CATH LAB;  Service: General;  Laterality: N/A;    KIDNEY TRANSPLANT N/A 2/18/2024    Procedure: TRANSPLANT, KIDNEY;  Surgeon: Chas Salomon MD;  Location: [facility] OR [address] [place] [address];  Service: Transplant;  Laterality: N/A;    PROSTATE BIOPSY      thorocotomy  01/01/2010   .         Medications: Medication list was reviewed. He  has a current medication list which includes the following prescription(s): aspirin, atorvastatin, blood sugar diagnostic, blood-glucose meter, dexcom g7 sensor, carvedilol, cetirizine, dapsone, finasteride, hydralazine, insulin degludec, insulin lispro, isosorbide dinitrate, lancets, magnesium oxide, multivitamin, mycophenolate, pen needle, diabetic, prednisone, entresto, ozempic, sodium bicarbonate, tacrolimus, tamsulosin, and [DISCONTINUED] valganciclovir.         Allergies:   Review of patient's allergies indicates:   Allergen Reactions    Bactrim [sulfamethoxazole-trimethoprim] Swelling    Nsaids (non-steroidal anti-inflammatory drug)      CKD    Sulfamethoxazole     Trimethoprim             Family history: family history includes Diabetes in his father and another family member; Hypertension in his father and  another family member; No Known Problems in his mother.         Social History          Alcohol use:  reports that he does not currently use alcohol.            Tobacco:  reports that he has never smoked. He has never been exposed to tobacco smoke. He has never used smokeless tobacco.         Please see the patient's intake form for full details of past medical history, past surgical history, family history, social history and review of systems. ?This information was reviewed by me and noted.      Physical Examination     General: Well developed, well nourished, well hydrated. Verbal with a strong voice and not dysphonic.     Head/Face: Normocephalic, atraumatic. No scars or lesions. Facial musculature equal.     Eyes: No scleral icterus or conjunctival hemorrhage. EOMI. PERRLA.     Ears:     Right ear: see procedure    Left ear:  mild granulation healing at site of prior myringotomy, small amount of clear-white drainage, TM intact, no effusion      Nose: No gross deformity or lesions.     Mouth/Oropharynx: Lips without any lesions.     Neck: Trachea midline. No masses. No thyromegaly or nodules palpated.     Lymphatic: No lymphadenopathy in the neck.     Extremities: No cyanosis. Warm and well-perfused.     Skin: No scars or lesions on face or neck.      Neurologic: Moving all extremities without gross abnormality.CN II-XII grossly intact. House-Brackmann 1/6. No signs of nystagmus.      Psych: Alert and oriented to person, place, and time with an appropriate mood and affect.       Procedure: ear microscopy with removal of cerumen    Description: The patient was in agreement with the examination and debridement of the ears. Removal of the cerumen required use of an operating microscope and multiple micro-instruments.     With the patient in the supine position, we used the operating microscope to examine both ears with the appropriate sized ear speculum.  A variety of sterile, micro-instruments were utilized to  remove the cerumen atraumatically.  I performed the procedure which required a significant amount of time and effort. The tympanic membrane was then well visualized.  The patient tolerated the procedure well and there were no complications.    Findings:   Right ear had significant wax, the EAC was normal, and the tympanic membrane was intact with no evidence of middle ear fluid.    PT resolved with cleaning             Audiogram     Audiogram  was independently reviewed            Imaging reviewed   CT head 5/2024  Mild mucosal thickening in left mastoid tip, otherwise clear mastoid and middle ear, PET in place    Assessment   1. Mixed conductive and sensorineural hearing loss of left ear with unrestricted hearing of right ear           S/p L PET 3/23/23 with recurrent tube otorrhea and granulation now removed    Hearing back to baseline, no pain or drainage  Cleared for HA when motivated  He elected for f/u in 6 months, sooner if issues arise          Fabio Vargas MD  [facility]   [facility]  [address]  [place] [address]  P: [phone]  F: [phone]

## 2024-08-22 NOTE — PROGRESS NOTES
Isidro Alves was seen 08/22/2024 for an audiological evaluation. Previous audiological evaluation on 09/08/2022 revealed a mild sensorineural hearing loss 250-8000 Hz for the right ear, and a mild-to-moderate conductive hearing loss 250-8000 Hz for the left ear. Patient complains of aural fullness and muffled hearing in his left ear.    Otoscopy revealed clear canals with visualization of the tympanic membrane in both ears. Tympanograms were Type Ad for the right ear and Type B for the left ear. Audiometry revealed normal hearing sensitivity through 3000 Hz sloping to a mild to severe sensorineural hearing loss for the right ear, and a mild-to-severe conductive hearing loss for the left ear. Speech Reception Thresholds were  10 dBHL for the right ear and 30 dBHL for the left ear. Word recognition scores were excellent for the right ear and excellent for the left ear.    Patient was counseled on the above findings.    Recommendations:  Follow-up with ENT, as scheduled.  Repeat audiological evaluation per ENT, or sooner if needed.

## 2024-08-23 NOTE — SEDATION DOCUMENTATION
Procedure completed at this time. VSS, NADN, and pt tolerated procedure well. Sutures/bio-patch/gauze/tegaderm placed to vas-cath insertion site C/D/I with no bleeding or drainage noted to site.   
Pt transferred to stretcher and transported back to recovery area.   
SBO (small bowel obstruction)

## 2024-09-03 ENCOUNTER — LAB VISIT (OUTPATIENT)
Dept: LAB | Facility: HOSPITAL | Age: 69
End: 2024-09-03
Attending: INTERNAL MEDICINE
Payer: COMMERCIAL

## 2024-09-03 DIAGNOSIS — Z94.0 KIDNEY REPLACED BY TRANSPLANT: ICD-10-CM

## 2024-09-03 DIAGNOSIS — E83.42 HYPOMAGNESEMIA: ICD-10-CM

## 2024-09-03 LAB
ALBUMIN SERPL BCP-MCNC: 3.4 G/DL (ref 3.5–5.2)
ANION GAP SERPL CALC-SCNC: 8 MMOL/L (ref 8–16)
BASOPHILS # BLD AUTO: 0.07 K/UL (ref 0–0.2)
BASOPHILS NFR BLD: 0.8 % (ref 0–1.9)
BUN SERPL-MCNC: 24 MG/DL (ref 8–23)
CALCIUM SERPL-MCNC: 9.3 MG/DL (ref 8.7–10.5)
CHLORIDE SERPL-SCNC: 107 MMOL/L (ref 95–110)
CO2 SERPL-SCNC: 21 MMOL/L (ref 23–29)
CREAT SERPL-MCNC: 1.7 MG/DL (ref 0.5–1.4)
DIFFERENTIAL METHOD BLD: ABNORMAL
EOSINOPHIL # BLD AUTO: 0.2 K/UL (ref 0–0.5)
EOSINOPHIL NFR BLD: 1.9 % (ref 0–8)
ERYTHROCYTE [DISTWIDTH] IN BLOOD BY AUTOMATED COUNT: 15.9 % (ref 11.5–14.5)
EST. GFR  (NO RACE VARIABLE): 43.4 ML/MIN/1.73 M^2
GLUCOSE SERPL-MCNC: 186 MG/DL (ref 70–110)
HCT VFR BLD AUTO: 35.2 % (ref 40–54)
HGB BLD-MCNC: 10.7 G/DL (ref 14–18)
IMM GRANULOCYTES # BLD AUTO: 0.29 K/UL (ref 0–0.04)
IMM GRANULOCYTES NFR BLD AUTO: 3.5 % (ref 0–0.5)
LYMPHOCYTES # BLD AUTO: 0.6 K/UL (ref 1–4.8)
LYMPHOCYTES NFR BLD: 7 % (ref 18–48)
MAGNESIUM SERPL-MCNC: 1.5 MG/DL (ref 1.6–2.6)
MCH RBC QN AUTO: 25.8 PG (ref 27–31)
MCHC RBC AUTO-ENTMCNC: 30.4 G/DL (ref 32–36)
MCV RBC AUTO: 85 FL (ref 82–98)
MONOCYTES # BLD AUTO: 0.9 K/UL (ref 0.3–1)
MONOCYTES NFR BLD: 10.6 % (ref 4–15)
NEUTROPHILS # BLD AUTO: 6.3 K/UL (ref 1.8–7.7)
NEUTROPHILS NFR BLD: 76.2 % (ref 38–73)
NRBC BLD-RTO: 0 /100 WBC
PHOSPHATE SERPL-MCNC: 2.3 MG/DL (ref 2.7–4.5)
PLATELET # BLD AUTO: 311 K/UL (ref 150–450)
PMV BLD AUTO: 11.9 FL (ref 9.2–12.9)
POTASSIUM SERPL-SCNC: 4.9 MMOL/L (ref 3.5–5.1)
RBC # BLD AUTO: 4.14 M/UL (ref 4.6–6.2)
SODIUM SERPL-SCNC: 136 MMOL/L (ref 136–145)
WBC # BLD AUTO: 8.24 K/UL (ref 3.9–12.7)

## 2024-09-03 PROCEDURE — 36415 COLL VENOUS BLD VENIPUNCTURE: CPT | Performed by: INTERNAL MEDICINE

## 2024-09-03 PROCEDURE — 80197 ASSAY OF TACROLIMUS: CPT | Performed by: INTERNAL MEDICINE

## 2024-09-03 PROCEDURE — 80069 RENAL FUNCTION PANEL: CPT | Performed by: INTERNAL MEDICINE

## 2024-09-03 PROCEDURE — 83735 ASSAY OF MAGNESIUM: CPT | Performed by: INTERNAL MEDICINE

## 2024-09-03 PROCEDURE — 85025 COMPLETE CBC W/AUTO DIFF WBC: CPT | Performed by: INTERNAL MEDICINE

## 2024-09-03 RX ORDER — LANOLIN ALCOHOL/MO/W.PET/CERES
800 CREAM (GRAM) TOPICAL 2 TIMES DAILY
Qty: 120 TABLET | Refills: 11 | Status: SHIPPED | OUTPATIENT
Start: 2024-09-03 | End: 2025-09-03

## 2024-09-03 NOTE — TELEPHONE ENCOUNTER
No care due was identified.  [facility] Embedded Care Due Messages. Reference number: [id number].   9/03/2024 7:34:46 AM CDT

## 2024-09-04 LAB — TACROLIMUS BLD-MCNC: 8.5 NG/ML (ref 5–15)

## 2024-09-12 DIAGNOSIS — Z94.0 KIDNEY REPLACED BY TRANSPLANT: Primary | ICD-10-CM

## 2024-09-16 RX ORDER — ISOSORBIDE DINITRATE 10 MG/1
20 TABLET ORAL 3 TIMES DAILY
Qty: 180 TABLET | Refills: 6 | Status: SHIPPED | OUTPATIENT
Start: 2024-09-16 | End: 2025-09-16

## 2024-09-17 ENCOUNTER — OFFICE VISIT (OUTPATIENT)
Dept: DIABETES | Facility: CLINIC | Age: 69
End: 2024-09-17
Payer: COMMERCIAL

## 2024-09-17 DIAGNOSIS — Z79.4 TYPE 2 DIABETES MELLITUS WITH HYPERGLYCEMIA, WITH LONG-TERM CURRENT USE OF INSULIN: Primary | ICD-10-CM

## 2024-09-17 DIAGNOSIS — E11.69 HYPERLIPIDEMIA ASSOCIATED WITH TYPE 2 DIABETES MELLITUS: ICD-10-CM

## 2024-09-17 DIAGNOSIS — I50.22 CHRONIC SYSTOLIC CONGESTIVE HEART FAILURE: ICD-10-CM

## 2024-09-17 DIAGNOSIS — E11.65 TYPE 2 DIABETES MELLITUS WITH HYPERGLYCEMIA, WITH LONG-TERM CURRENT USE OF INSULIN: Primary | ICD-10-CM

## 2024-09-17 DIAGNOSIS — E78.5 HYPERLIPIDEMIA ASSOCIATED WITH TYPE 2 DIABETES MELLITUS: ICD-10-CM

## 2024-09-17 DIAGNOSIS — E66.9 OBESITY (BMI 30-39.9): ICD-10-CM

## 2024-09-17 DIAGNOSIS — I25.118 CORONARY ARTERY DISEASE OF NATIVE ARTERY OF NATIVE HEART WITH STABLE ANGINA PECTORIS: ICD-10-CM

## 2024-09-17 DIAGNOSIS — E11.59 HYPERTENSION ASSOCIATED WITH DIABETES: ICD-10-CM

## 2024-09-17 DIAGNOSIS — Z94.0 S/P KIDNEY TRANSPLANT: ICD-10-CM

## 2024-09-17 DIAGNOSIS — I15.2 HYPERTENSION ASSOCIATED WITH DIABETES: ICD-10-CM

## 2024-09-17 PROCEDURE — 3044F HG A1C LEVEL LT 7.0%: CPT | Mod: CPTII,95,, | Performed by: PHYSICIAN ASSISTANT

## 2024-09-17 PROCEDURE — 99214 OFFICE O/P EST MOD 30 MIN: CPT | Mod: 95,,, | Performed by: PHYSICIAN ASSISTANT

## 2024-09-17 PROCEDURE — 3066F NEPHROPATHY DOC TX: CPT | Mod: CPTII,95,, | Performed by: PHYSICIAN ASSISTANT

## 2024-09-17 PROCEDURE — 4010F ACE/ARB THERAPY RXD/TAKEN: CPT | Mod: CPTII,95,, | Performed by: PHYSICIAN ASSISTANT

## 2024-09-17 NOTE — PROGRESS NOTES
PCP: Steven Morgan MD    Subjective:     Chief Complaint: Diabetes - Established Patient    TELEMEDICINE VISIT:     The patient location is: Home  The chief complaint leading to consultation is: Diabetes Follow up  Visit type: Virtual visit with synchronous audio and video  Total time spent with patient: 15  Each patient to whom he or she provides medical services by telemedicine is:  (1) informed of the relationship between the physician and patient and the respective role of any other health care provider with respect to management of the patient; and (2) notified that he or she may decline to receive medical services by telemedicine and may withdraw from such care at any time.    Notes: N/A     HISTORY OF PRESENT ILLNESS: 69 y.o.   male presenting for diabetes management visit.   The patient's last visit with me was on 8/19/2024.  Patient has had Type II diabetes since 20 years or more.  Pertinent to decision making is the following comorbidities: HTN, HLD, CAD, CHF, S/p Transplant - Kidney in Feb 2024, and Obesity by BMI  Patient has the following Diabetes complications: with diabetic chronic kidney disease  He  has attended diabetes education in the past.     Patient's most recent A1c of 5.6% was completed 4 months ago.   Patient states since His last A1c His blood glucose levels have been high  throughout the day .   Patient monitors blood glucose 4 times per day with meter : Fasting, Before Lunch, Before Dinner, and Before Bed. Dexcom G7 pending.   Patient blood glucose monitoring device will not be uploaded into [employer] today.  Per pt, fasting blood sugar ranging from 97 - 100s and up to 180.   Patient endorses the following diabetes related symptoms:  None .   Patient is due today for the following diabetes-related health maintenance standards: Eye Exam, Influenza Vaccine, COVID-19 Vaccine , and vaccines .   He denies recent hospital admissions or emergency room visits.   He  denies having hypoglycemia.   Patient's concerns today include glycemic control.   Patient medication regimen is as below.     CURRENT DM MEDICATIONS:   Tresiba 30 units in the morning  Lispro 4 units before meals 3x daily - 15 mins before eating   Lispro correction dosing every 3 hours as needed  Ozempic 0.25 mg once per week     Lispro Correction Dosing every 3 hours as needed OUTSIDE OF EATING  If  - 250, may take 2 units of Lispro  If  - 300, may take 3 units of Lispro  If  - 350, may take 4 units of Lispro  If  - 400, may take 5 units of Lispro  If +, may take 6 units of Lispro    Patient has failed the following Diabetes medications:   Metformin    Xultophy  Semglee  Lantus      Labs Reviewed.       Lab Results   Component Value Date    CPEPTIDE 3.88 11/20/2017     Lab Results   Component Value Date    GLUTAMICACID 0.00 11/20/2017          //   , There is no height or weight on file to calculate BMI.  His blood sugar in clinic today is:    Lab Results   Component Value Date    POCGLU 122 (A) 08/19/2024       Review of Systems   Constitutional:  Negative for activity change, appetite change, chills and fever.   HENT:  Negative for dental problem, mouth sores, nosebleeds, sore throat and trouble swallowing.    Eyes:  Negative for pain and discharge.   Respiratory:  Negative for shortness of breath, wheezing and stridor.    Cardiovascular:  Negative for chest pain, palpitations and leg swelling.   Gastrointestinal:  Negative for abdominal pain, diarrhea, nausea and vomiting.   Endocrine: Negative for polydipsia, polyphagia and polyuria.   Genitourinary:  Negative for dysuria, frequency and urgency.   Musculoskeletal:  Negative for joint swelling and myalgias.   Skin:  Negative for rash and wound.   Neurological:  Negative for dizziness, syncope, weakness and headaches.   Psychiatric/Behavioral:  Negative for behavioral problems and dysphoric mood.          Diabetes Management  Status  Statin: Taking  ACE/ARB: Taking    Screening or Prevention Patient's value Goal Complete/Controlled?   HgA1C Testing and Control   Lab Results   Component Value Date    HGBA1C 5.6 05/13/2024      Annually/Less than 8% Yes   Lipid profile : 05/13/2024 Annually Yes   LDL control Lab Results   Component Value Date    LDLCALC 115.6 05/13/2024    Annually/Less than 100 mg/dl  Yes   Nephropathy screening Lab Results   Component Value Date    MICALBCREAT 1998.1 (H) 07/14/2022     Lab Results   Component Value Date    PROTEINUA Negative 08/05/2024    Annually Yes   Blood pressure BP Readings from Last 1 Encounters:   08/19/24 122/66    Less than 140/90 No   Dilated retinal exam : 09/26/2022 Annually No    Foot exam   : 05/16/2024 Annually Yes     Social History     Socioeconomic History    Marital status:     Number of children: 3   Occupational History     Employer: self-rmployed   Tobacco Use    Smoking status: Never     Passive exposure: Never    Smokeless tobacco: Never   Substance and Sexual Activity    Alcohol use: Not Currently     Comment: seldom     Drug use: No    Sexual activity: Not Currently     Partners: Female   Social History Narrative    ** Merged History Encounter **          Social Determinants of Health     Financial Resource Strain: Low Risk  (5/15/2024)    Overall Financial Resource Strain (CARDIA)     Difficulty of Paying Living Expenses: Not very hard   Food Insecurity: No Food Insecurity (5/15/2024)    Hunger Vital Sign     Worried About Running Out of Food in the Last Year: Never true     Ran Out of Food in the Last Year: Never true   Transportation Needs: No Transportation Needs (5/15/2024)    TRANSPORTATION NEEDS     Transportation : No   Physical Activity: Insufficiently Active (5/15/2024)    Exercise Vital Sign     Days of Exercise per Week: 3 days     Minutes of Exercise per Session: 20 min   Stress: No Stress Concern Present (5/15/2024)    [employer] [place] [employer] - Occupational Stress Questionnaire     Feeling of Stress : Not at all   Recent Concern: Stress - Stress Concern Present (2/19/2024)    [employer] [place] [employer] - Occupational Stress Questionnaire     Feeling of Stress : To some extent   Housing Stability: Low Risk  (5/15/2024)    Housing Stability Vital Sign     Unable to Pay for Housing in the Last Year: No     Homeless in the Last Year: No     Past Medical History:   Diagnosis Date    Abnormal nuclear stress test 8/30/2022    Acute hypoxemic respiratory failure due to COVID-19 1/21/2021    Acute on chronic combined systolic and diastolic congestive heart failure 5/7/2023    Acute on chronic respiratory failure 5/7/2023    Acute respiratory failure with hypoxia and hypercapnia 5/18/2023    Anal itching 10/10/2022    Anemia 4/16/2014    BPH (benign prostatic hyperplasia)     CKD (chronic kidney disease) stage 4, GFR 15-29 ml/min     Coronary artery disease of native artery of native heart with stable angina pectoris 4/29/2019    Diabetes mellitus     Diabetes mellitus, type 2     Dilated cardiomyopathy 8/30/2022    Elevated PSA     Encounter for blood transfusion     Herpes labialis     Hyperlipidemia     Hypertension     Hypertensive emergency 5/18/2023    Obesity     Pneumonia 5/7/2023    Pneumonia due to COVID-19 virus     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     Appearance: He is well-developed. He is not diaphoretic.   HENT:      Head: Normocephalic and atraumatic.      Right Ear: External ear normal.      Left Ear: External ear normal.      Nose: Nose normal.   Eyes:      General:         Right eye: No discharge.         Left eye: No discharge.   Pulmonary:      Effort: Pulmonary effort  is normal. No respiratory distress.      Breath sounds: No stridor.   Musculoskeletal:         General: Normal range of motion.      Cervical back: Normal range of motion.   Skin:     Coloration: Skin is not pale.   Neurological:      Mental Status: He is alert and oriented to person, place, and time.      Motor: No abnormal muscle tone.      Coordination: Coordination normal.   Psychiatric:         Behavior: Behavior normal.         Thought Content: Thought content normal.         Judgment: Judgment normal.           Assessment / Plan:     Type 2 diabetes mellitus with hyperglycemia, with long-term current use of insulin    S/P kidney transplant 2024    Chronic systolic congestive heart failure    Hypertension associated with diabetes    Hyperlipidemia associated with type 2 diabetes mellitus    Coronary artery disease of native artery of native heart with stable angina pectoris    Obesity (BMI 30-39.9)      Additional Plan Details:    - POCT Glucose  - Encouraged continuation of lifestyle changes including regular exercise and limiting carbohydrates to 30-45 grams per meal threes times daily and 15 grams per snack with a limit of two daily.   - Encouraged continued monitoring of blood glucose with maintenance of 4 times daily at Fasting, After Lunch, Before Bed, and After Breakfast . Dex G7 pending.  - Current DM Medication Regimen: Change Tresiba 24 units daily. Change Lispro correction dosing every 3 hours as below. Start Ozempic 0.5 mg weekly.   - Health Maintenance standards addressed today: Eye Exam - will be completed outside of [facility] and patient will schedule, Flu Shot - patient would like to complete outside of [facility], COVID - 19 Vaccine - patient will schedule outside of [facility] , and Vaccines  - Nursing Visit: Patient is below goal range for nursing visit for age group and will not need nursing visit at this time .   - Follow up in 1 months with A1c prior.      CURRENT DM MEDICATIONS:   Tresiba 24  units in the morning  Lispro correction dosing every 3 hours as needed  Ozempic 0.5 mg once per week     Lispro Correction Dosing every 3 hours as needed OUTSIDE OF EATING  If  - 250, may take 2 units of Lispro  If  - 300, may take 3 units of Lispro  If  - 350, may take 4 units of Lispro  If  - 400, may take 5 units of Lispro  If +, may take 6 units of Lispro    Blakeney McKnight, PA-C [facility]

## 2024-09-17 NOTE — PATIENT INSTRUCTIONS
CURRENT DM MEDICATIONS:   Tresiba 24 units in the morning  Lispro correction dosing every 3 hours as needed  Ozempic 0.5 mg once per week     Lispro Correction Dosing every 3 hours as needed OUTSIDE OF EATING  If  - 250, may take 2 units of Lispro  If  - 300, may take 3 units of Lispro  If  - 350, may take 4 units of Lispro  If  - 400, may take 5 units of Lispro  If +, may take 6 units of Lispro

## 2024-09-20 ENCOUNTER — LAB VISIT (OUTPATIENT)
Dept: LAB | Facility: HOSPITAL | Age: 69
End: 2024-09-20
Attending: INTERNAL MEDICINE
Payer: MEDICARE

## 2024-09-20 DIAGNOSIS — I50.22 CHRONIC SYSTOLIC CONGESTIVE HEART FAILURE: ICD-10-CM

## 2024-09-20 LAB — BNP SERPL-MCNC: 122 PG/ML (ref 0–99)

## 2024-09-20 PROCEDURE — 83880 ASSAY OF NATRIURETIC PEPTIDE: CPT | Performed by: INTERNAL MEDICINE

## 2024-09-20 PROCEDURE — 36415 COLL VENOUS BLD VENIPUNCTURE: CPT | Performed by: INTERNAL MEDICINE

## 2024-09-26 ENCOUNTER — TELEPHONE (OUTPATIENT)
Dept: CARDIOLOGY | Facility: CLINIC | Age: 69
End: 2024-09-26
Payer: MEDICARE

## 2024-09-26 NOTE — TELEPHONE ENCOUNTER
Dez Ragland MD [facility]  The lab showed CHF is controlled  Continue current Rx.  F/U as scheduled    Spoke to patient verbalized understanding

## 2024-09-27 ENCOUNTER — TELEPHONE (OUTPATIENT)
Dept: NEPHROLOGY | Facility: CLINIC | Age: 69
End: 2024-09-27
Payer: MEDICARE

## 2024-09-27 NOTE — TELEPHONE ENCOUNTER
Spoke with patient regarding change in  schedule . Patient agreed to reschedule from 10/8 to 10/16. Patient agreed and accepted new follow up appointment and routine labs for

## 2024-10-05 DIAGNOSIS — N18.30 CONTROLLED TYPE 2 DIABETES MELLITUS WITH STAGE 3 CHRONIC KIDNEY DISEASE, WITH LONG-TERM CURRENT USE OF INSULIN: ICD-10-CM

## 2024-10-05 DIAGNOSIS — Z79.4 CONTROLLED TYPE 2 DIABETES MELLITUS WITH STAGE 3 CHRONIC KIDNEY DISEASE, WITH LONG-TERM CURRENT USE OF INSULIN: ICD-10-CM

## 2024-10-05 DIAGNOSIS — N40.0 BENIGN PROSTATIC HYPERPLASIA, UNSPECIFIED WHETHER LOWER URINARY TRACT SYMPTOMS PRESENT: ICD-10-CM

## 2024-10-05 DIAGNOSIS — E11.22 CONTROLLED TYPE 2 DIABETES MELLITUS WITH STAGE 3 CHRONIC KIDNEY DISEASE, WITH LONG-TERM CURRENT USE OF INSULIN: ICD-10-CM

## 2024-10-05 NOTE — TELEPHONE ENCOUNTER
No care due was identified.  [facility] Embedded Care Due Messages. Reference number: [id number].   10/05/2024 10:13:30 AM CDT

## 2024-10-07 ENCOUNTER — PATIENT OUTREACH (OUTPATIENT)
Dept: ADMINISTRATIVE | Facility: HOSPITAL | Age: 69
End: 2024-10-07
Payer: MEDICARE

## 2024-10-07 DIAGNOSIS — E11.8 DIABETES MELLITUS TYPE 2 WITH COMPLICATIONS: ICD-10-CM

## 2024-10-07 DIAGNOSIS — Z79.4 TYPE 2 DIABETES MELLITUS WITH CHRONIC KIDNEY DISEASE, WITH LONG-TERM CURRENT USE OF INSULIN, UNSPECIFIED CKD STAGE: Primary | ICD-10-CM

## 2024-10-07 DIAGNOSIS — E11.22 TYPE 2 DIABETES MELLITUS WITH CHRONIC KIDNEY DISEASE, WITH LONG-TERM CURRENT USE OF INSULIN, UNSPECIFIED CKD STAGE: Primary | ICD-10-CM

## 2024-10-07 RX ORDER — INSULIN LISPRO 100 [IU]/ML
10 INJECTION, SOLUTION INTRAVENOUS; SUBCUTANEOUS 3 TIMES DAILY
Qty: 15 ML | Refills: 5 | Status: SHIPPED | OUTPATIENT
Start: 2024-10-07

## 2024-10-07 NOTE — PROGRESS NOTES
Working Annual UaCR report.  Pt is a DM.  Has lab appt 10/09/24. Micro albumin urine ordered and linked to appt.

## 2024-10-08 RX ORDER — CARVEDILOL 12.5 MG/1
12.5 TABLET ORAL 2 TIMES DAILY
Qty: 60 TABLET | Refills: 1 | Status: SHIPPED | OUTPATIENT
Start: 2024-10-08 | End: 2024-12-07

## 2024-10-09 ENCOUNTER — LAB VISIT (OUTPATIENT)
Dept: LAB | Facility: HOSPITAL | Age: 69
End: 2024-10-09
Attending: INTERNAL MEDICINE
Payer: MEDICARE

## 2024-10-09 DIAGNOSIS — D84.9 IMMUNOSUPPRESSION: ICD-10-CM

## 2024-10-09 DIAGNOSIS — Z94.0 KIDNEY REPLACED BY TRANSPLANT: ICD-10-CM

## 2024-10-09 DIAGNOSIS — Z94.0 KIDNEY TRANSPLANT STATUS: ICD-10-CM

## 2024-10-09 DIAGNOSIS — I10 PRIMARY HYPERTENSION: ICD-10-CM

## 2024-10-09 LAB
25(OH)D3+25(OH)D2 SERPL-MCNC: 26 NG/ML (ref 30–96)
ALBUMIN SERPL BCP-MCNC: 3.7 G/DL (ref 3.5–5.2)
ALBUMIN SERPL BCP-MCNC: 3.7 G/DL (ref 3.5–5.2)
ANION GAP SERPL CALC-SCNC: 8 MMOL/L (ref 8–16)
ANION GAP SERPL CALC-SCNC: 8 MMOL/L (ref 8–16)
BASOPHILS # BLD AUTO: 0.05 K/UL (ref 0–0.2)
BASOPHILS NFR BLD: 0.8 % (ref 0–1.9)
BUN SERPL-MCNC: 22 MG/DL (ref 8–23)
BUN SERPL-MCNC: 22 MG/DL (ref 8–23)
CALCIUM SERPL-MCNC: 9.8 MG/DL (ref 8.7–10.5)
CALCIUM SERPL-MCNC: 9.8 MG/DL (ref 8.7–10.5)
CHLORIDE SERPL-SCNC: 108 MMOL/L (ref 95–110)
CHLORIDE SERPL-SCNC: 108 MMOL/L (ref 95–110)
CO2 SERPL-SCNC: 24 MMOL/L (ref 23–29)
CO2 SERPL-SCNC: 24 MMOL/L (ref 23–29)
CREAT SERPL-MCNC: 1.7 MG/DL (ref 0.5–1.4)
CREAT SERPL-MCNC: 1.7 MG/DL (ref 0.5–1.4)
DIFFERENTIAL METHOD BLD: ABNORMAL
EOSINOPHIL # BLD AUTO: 0.2 K/UL (ref 0–0.5)
EOSINOPHIL NFR BLD: 3.5 % (ref 0–8)
ERYTHROCYTE [DISTWIDTH] IN BLOOD BY AUTOMATED COUNT: 15.8 % (ref 11.5–14.5)
EST. GFR  (NO RACE VARIABLE): 43.4 ML/MIN/1.73 M^2
EST. GFR  (NO RACE VARIABLE): 43.4 ML/MIN/1.73 M^2
GLUCOSE SERPL-MCNC: 95 MG/DL (ref 70–110)
GLUCOSE SERPL-MCNC: 95 MG/DL (ref 70–110)
HCT VFR BLD AUTO: 41.9 % (ref 40–54)
HGB BLD-MCNC: 12.7 G/DL (ref 14–18)
IMM GRANULOCYTES # BLD AUTO: 0.03 K/UL (ref 0–0.04)
IMM GRANULOCYTES NFR BLD AUTO: 0.5 % (ref 0–0.5)
LYMPHOCYTES # BLD AUTO: 0.6 K/UL (ref 1–4.8)
LYMPHOCYTES NFR BLD: 9.5 % (ref 18–48)
MAGNESIUM SERPL-MCNC: 1.6 MG/DL (ref 1.6–2.6)
MAGNESIUM SERPL-MCNC: 1.6 MG/DL (ref 1.6–2.6)
MCH RBC QN AUTO: 25.2 PG (ref 27–31)
MCHC RBC AUTO-ENTMCNC: 30.3 G/DL (ref 32–36)
MCV RBC AUTO: 83 FL (ref 82–98)
MONOCYTES # BLD AUTO: 0.7 K/UL (ref 0.3–1)
MONOCYTES NFR BLD: 10.8 % (ref 4–15)
NEUTROPHILS # BLD AUTO: 4.6 K/UL (ref 1.8–7.7)
NEUTROPHILS NFR BLD: 74.9 % (ref 38–73)
NRBC BLD-RTO: 0 /100 WBC
PHOSPHATE SERPL-MCNC: 3.3 MG/DL (ref 2.7–4.5)
PHOSPHATE SERPL-MCNC: 3.3 MG/DL (ref 2.7–4.5)
PLATELET # BLD AUTO: 285 K/UL (ref 150–450)
PMV BLD AUTO: 12.3 FL (ref 9.2–12.9)
POTASSIUM SERPL-SCNC: 4.8 MMOL/L (ref 3.5–5.1)
POTASSIUM SERPL-SCNC: 4.8 MMOL/L (ref 3.5–5.1)
PTH-INTACT SERPL-MCNC: 145.7 PG/ML (ref 9–77)
RBC # BLD AUTO: 5.04 M/UL (ref 4.6–6.2)
SODIUM SERPL-SCNC: 140 MMOL/L (ref 136–145)
SODIUM SERPL-SCNC: 140 MMOL/L (ref 136–145)
WBC # BLD AUTO: 6.2 K/UL (ref 3.9–12.7)

## 2024-10-09 PROCEDURE — 83970 ASSAY OF PARATHORMONE: CPT | Performed by: INTERNAL MEDICINE

## 2024-10-09 PROCEDURE — 80069 RENAL FUNCTION PANEL: CPT | Performed by: INTERNAL MEDICINE

## 2024-10-09 PROCEDURE — 83735 ASSAY OF MAGNESIUM: CPT | Performed by: INTERNAL MEDICINE

## 2024-10-09 PROCEDURE — 36415 COLL VENOUS BLD VENIPUNCTURE: CPT | Performed by: INTERNAL MEDICINE

## 2024-10-09 PROCEDURE — 80197 ASSAY OF TACROLIMUS: CPT | Performed by: INTERNAL MEDICINE

## 2024-10-09 PROCEDURE — 85025 COMPLETE CBC W/AUTO DIFF WBC: CPT | Performed by: INTERNAL MEDICINE

## 2024-10-09 PROCEDURE — 82306 VITAMIN D 25 HYDROXY: CPT | Performed by: INTERNAL MEDICINE

## 2024-10-10 LAB
TACROLIMUS BLD-MCNC: 8.1 NG/ML (ref 5–15)
TACROLIMUS BLD-MCNC: 8.1 NG/ML (ref 5–15)

## 2024-10-11 RX ORDER — FINASTERIDE 5 MG/1
5 TABLET, FILM COATED ORAL DAILY
Qty: 90 TABLET | Refills: 3 | Status: SHIPPED | OUTPATIENT
Start: 2024-10-11

## 2024-10-14 ENCOUNTER — OFFICE VISIT (OUTPATIENT)
Dept: OTOLARYNGOLOGY | Facility: CLINIC | Age: 69
End: 2024-10-14
Payer: COMMERCIAL

## 2024-10-14 VITALS — WEIGHT: 257.06 LBS | BODY MASS INDEX: 34.07 KG/M2 | HEIGHT: 73 IN

## 2024-10-14 DIAGNOSIS — H65.92 OME (OTITIS MEDIA WITH EFFUSION), LEFT: Primary | ICD-10-CM

## 2024-10-14 DIAGNOSIS — H91.92 SUBJECTIVE HEARING CHANGE OF LEFT EAR: ICD-10-CM

## 2024-10-14 DIAGNOSIS — H61.21 RIGHT EAR IMPACTED CERUMEN: ICD-10-CM

## 2024-10-14 PROCEDURE — 99999 PR PBB SHADOW E&M-EST. PATIENT-LVL IV: CPT | Mod: PBBFAC,,, | Performed by: OTOLARYNGOLOGY

## 2024-10-14 PROCEDURE — 69210 REMOVE IMPACTED EAR WAX UNI: CPT | Mod: S$GLB,,, | Performed by: OTOLARYNGOLOGY

## 2024-10-14 PROCEDURE — 3008F BODY MASS INDEX DOCD: CPT | Mod: CPTII,S$GLB,, | Performed by: OTOLARYNGOLOGY

## 2024-10-14 PROCEDURE — 99214 OFFICE O/P EST MOD 30 MIN: CPT | Mod: 25,S$GLB,, | Performed by: OTOLARYNGOLOGY

## 2024-10-14 PROCEDURE — 3044F HG A1C LEVEL LT 7.0%: CPT | Mod: CPTII,S$GLB,, | Performed by: OTOLARYNGOLOGY

## 2024-10-14 PROCEDURE — 3066F NEPHROPATHY DOC TX: CPT | Mod: CPTII,S$GLB,, | Performed by: OTOLARYNGOLOGY

## 2024-10-14 PROCEDURE — 3288F FALL RISK ASSESSMENT DOCD: CPT | Mod: CPTII,S$GLB,, | Performed by: OTOLARYNGOLOGY

## 2024-10-14 PROCEDURE — 4010F ACE/ARB THERAPY RXD/TAKEN: CPT | Mod: CPTII,S$GLB,, | Performed by: OTOLARYNGOLOGY

## 2024-10-14 PROCEDURE — 1159F MED LIST DOCD IN RCRD: CPT | Mod: CPTII,S$GLB,, | Performed by: OTOLARYNGOLOGY

## 2024-10-14 PROCEDURE — 1101F PT FALLS ASSESS-DOCD LE1/YR: CPT | Mod: CPTII,S$GLB,, | Performed by: OTOLARYNGOLOGY

## 2024-10-14 PROCEDURE — 1126F AMNT PAIN NOTED NONE PRSNT: CPT | Mod: CPTII,S$GLB,, | Performed by: OTOLARYNGOLOGY

## 2024-10-14 RX ORDER — FLUTICASONE PROPIONATE 50 MCG
2 SPRAY, SUSPENSION (ML) NASAL DAILY
Qty: 16 G | Refills: 5 | Status: SHIPPED | OUTPATIENT
Start: 2024-10-14 | End: 2024-11-13

## 2024-10-14 NOTE — PATIENT INSTRUCTIONS
Dr. Cason's Eustachian Tube Dysfunction Protocol          Day 1-3 (Perform 2x per day)     Afrin (pump spray mist OTC) 2 sprays in each nostril   Fluticasone nasal spray 2 sprays in each nostril      Days 4 - follow up visit (perform 1x per day)    Fluticasone nasal spray 2 sprays in each nostril    *Buy the OTC Afrin pump spray mist - it should be in the allergy/cold/sinus section of the pharmacy  *Autoinsufflate (POP your ears gently!) at least 4 times per day until your follow up appointment    *TAKE ALL OTHER MEDICATIONS AS DIRECTED BY DR CASON    *MAKE SURE YOU STOP USING AFRIN AFTER THE 3RD DAY AS THERE IS A RISK OF BECOMING DEPENDENT ON THAT MEDICATION     Conservative Treatment of Eustachian Tube Dysfunction    Your physician has diagnosed you with eustachian tube dysfunction.  There are several conservative medical treatments that have been shown to help improve the function of your eustachian tube.    Topical Decongestant Spray (Afrin):  2 sprays in each nostril twice daily for 3 days.  It is important to stop this medication after 3 days as it can be habit-forming.  Buy the Afrin pump spray mist.  This usually comes in a red and white box over the counter.  Autoinsufflation (popping the ears):  Recommend gentle popping of the ears 3-4 times per day and as needed for symptom relief.  Oral Steroids:  Your physician may or may not recommend a short course of oral steroids.  If so, take as directed.  These help decrease the inflammation in your nasal/sinus cavity as well as around your eustachian tube which may help with your symptoms  Nasal Steroids:  Most commonly fluticasone 2 sprays in each nostril once daily.  This will help decrease the inflammation in your nasal/sinus cavity as well as around your eustachian tube which may help with your symptoms    If no improvement after 2 weeks of conservative treatment, your physician may recommend a myringotomy and or ear tube placement for symptom relief.

## 2024-10-14 NOTE — PROGRESS NOTES
"Referring Provider:    No referring provider defined for this encounter.  Subjective:   Patient: Isidro Alves [id number], :1955   Visit date:10/14/2024 9:38 AM    Chief Complaint:  Ear Problem (Pt is coming in today for ear problems pt states that his ears are stopped up )    HPI:    Prior notes reviewed by myself.  Clinical documentation obtained by nursing staff reviewed.      69-year-old gentleman presents for evaluation of muffled hearing.  He has been noticing that he has increased difficulty hearing on the left side more than the right side.    He has seen Dr. Vargas in the past and had a tube placed on that side which has since extruded.  He is not using any nasal sprays or practicing autoinsufflation.      Objective:     Physical Exam:  Vitals:  Ht 6' 1" (1.854 m)   Wt 116.6 kg (257 lb 0.9 oz)   BMI 33.91 kg/m²   General appearance:  Well developed, well nourished    Ears:  Otoscopy of external auditory canals with 90% cerumen impaction right side and tympanic membranes was normal right and shad colored middle ear effusion left, clinical speech reception thresholds grossly intact, no mass/lesion of auricle.    Nose:  No masses/lesions of external nose, nasal mucosa, septum, and turbinates were within normal limits.    Mouth:  No mass/lesion of lips, teeth, gums, hard/soft palate, tongue, tonsils, or oropharynx.    Neck & Lymphatics:  No cervical lymphadenopathy, no neck mass/crepitus/ asymmetry, trachea is midline, no thyroid enlargement/tenderness/mass.    Procedure Note    CHIEF COMPLAINT:  Cerumen Impaction    Description:  The patient was seated in an exam chair.  An ear speculum was placed in the right EAC and was examined under the microscope.  Suction and/or loop curettes were used to remove a large cerumen impaction.  The tympanic membrane was visualized and was normal in appearance.  The patient tolerated the procedure well.      [x]  Data Reviewed:    Lab Results   Component Value Date "    WBC 6.20 10/09/2024    HGB 12.7 (L) 10/09/2024    HCT 41.9 10/09/2024    MCV 83 10/09/2024    EOSINOPHIL 3.5 10/09/2024   INDEPENDENT REVIEW:  mixed HL left side, downsloping SNHL right side     Media Information    File Link    Annotation on 8/22/2024 10:24 AM by Justine Irizarry Au.D, CCC-A: AUDIOGRAM        Key Information    Document ID File Type Document Type Description   [id number] Annotation Annotation AUDIOGRAM     Import Information    Attached At Date Time User Dept   Encounter Level 8/22/2024 10:24 AM Justine Irizarry Au.D, CCC-A [facility] Audiology     Encounter    Clinical Support on 8/22/24 with Justine Irizarry Au.D, CCC-A     Media Audit Information    Outside Procedure Note was moved from this patient by Celia Carranza MA on 5/30/2024 at 8:18 AM (DCS ID: [id number], File: [id number])   Outside Procedure Note was moved from this patient by Celia Carranza MA on 5/30/2024 at 8:18 AM (DCS ID: [id number], File: [id number])         Assessment & Plan:   OME (otitis media with effusion), left  -     fluticasone propionate (FLONASE) 50 mcg/actuation nasal spray; 2 sprays (100 mcg total) by Each Nostril route once daily.  Dispense: 16 g; Refill: 5    Right ear impacted cerumen    Subjective hearing change of left ear        He had a cerumen impaction that we removed from the right side.  His right tympanic membrane was normal in appearance.  He has a middle ear effusion on the left side.  We reviewed conservative management strategies as noted below.  I recommended that he follow-up in 2 weeks with Dr. Vargas.   We discussed the possible need for tube replacement if his symptoms do not improve    Dr. Cason's Eustachian Tube Dysfunction Protocol          Day 1-3 (Perform 2x per day)     Afrin (pump spray mist OTC) 2 sprays in each nostril   Fluticasone nasal spray 2 sprays in each nostril      Days 4 - follow up visit (perform 1x per day)    Fluticasone nasal spray 2 sprays in each  nostril    *Buy the OTC Afrin pump spray mist - it should be in the allergy/cold/sinus section of the pharmacy  *Autoinsufflate (POP your ears gently!) at least 4 times per day until your follow up appointment    *TAKE ALL OTHER MEDICATIONS AS DIRECTED BY DR CASON    *MAKE SURE YOU STOP USING AFRIN AFTER THE 3RD DAY AS THERE IS A RISK OF BECOMING DEPENDENT ON THAT MEDICATION    Conservative Treatment of Eustachian Tube Dysfunction    Your physician has diagnosed you with eustachian tube dysfunction.  There are several conservative medical treatments that have been shown to help improve the function of your eustachian tube.    Topical Decongestant Spray (Afrin):  2 sprays in each nostril twice daily for 3 days.  It is important to stop this medication after 3 days as it can be habit-forming.  Buy the Afrin pump spray mist.  This usually comes in a red and white box over the counter.  Autoinsufflation (popping the ears):  Recommend gentle popping of the ears 3-4 times per day and as needed for symptom relief.  Oral Steroids:  Your physician may or may not recommend a short course of oral steroids.  If so, take as directed.  These help decrease the inflammation in your nasal/sinus cavity as well as around your eustachian tube which may help with your symptoms  Nasal Steroids:  Most commonly fluticasone 2 sprays in each nostril once daily.  This will help decrease the inflammation in your nasal/sinus cavity as well as around your eustachian tube which may help with your symptoms    If no improvement after 2 weeks of conservative treatment, your physician may recommend a myringotomy and or ear tube placement for symptom relief.      Suresh Cason M.D.  Department of Otolaryngology - Head & Neck Surgery  [address] [place] [address]  P: [phone]  F: [phone]        DISCLAIMER: This note was prepared with [employer] voice recognition transcription software. Garbled syntax, mangled pronouns, and other  bizarre constructions may be attributed to that software system. While efforts were made to correct any mistakes made by this voice recognition program, some errors and/or omissions may remain in the note that were missed when the note was originally created.

## 2024-10-16 ENCOUNTER — TELEPHONE (OUTPATIENT)
Dept: NEPHROLOGY | Facility: CLINIC | Age: 69
End: 2024-10-16
Payer: MEDICARE

## 2024-10-16 ENCOUNTER — OFFICE VISIT (OUTPATIENT)
Dept: NEPHROLOGY | Facility: CLINIC | Age: 69
End: 2024-10-16
Payer: COMMERCIAL

## 2024-10-16 VITALS
RESPIRATION RATE: 18 BRPM | WEIGHT: 258.19 LBS | BODY MASS INDEX: 34.22 KG/M2 | DIASTOLIC BLOOD PRESSURE: 78 MMHG | HEART RATE: 68 BPM | SYSTOLIC BLOOD PRESSURE: 142 MMHG | HEIGHT: 73 IN

## 2024-10-16 DIAGNOSIS — Z94.0 KIDNEY TRANSPLANT STATUS: Primary | ICD-10-CM

## 2024-10-16 DIAGNOSIS — N25.81 SECONDARY HYPERPARATHYROIDISM OF RENAL ORIGIN: ICD-10-CM

## 2024-10-16 DIAGNOSIS — I10 PRIMARY HYPERTENSION: ICD-10-CM

## 2024-10-16 DIAGNOSIS — E08.00 DIABETES MELLITUS DUE TO UNDERLYING CONDITION WITH HYPEROSMOLARITY WITHOUT COMA, UNSPECIFIED WHETHER LONG TERM INSULIN USE: ICD-10-CM

## 2024-10-16 DIAGNOSIS — D84.9 IMMUNOSUPPRESSION: ICD-10-CM

## 2024-10-16 DIAGNOSIS — N39.0 UTI (URINARY TRACT INFECTION), UNCOMPLICATED: Primary | ICD-10-CM

## 2024-10-16 DIAGNOSIS — N39.0 UTI (URINARY TRACT INFECTION), UNCOMPLICATED: ICD-10-CM

## 2024-10-16 DIAGNOSIS — Z94.0 KIDNEY REPLACED BY TRANSPLANT: ICD-10-CM

## 2024-10-16 PROCEDURE — 3066F NEPHROPATHY DOC TX: CPT | Mod: CPTII,S$GLB,, | Performed by: INTERNAL MEDICINE

## 2024-10-16 PROCEDURE — 3288F FALL RISK ASSESSMENT DOCD: CPT | Mod: CPTII,S$GLB,, | Performed by: INTERNAL MEDICINE

## 2024-10-16 PROCEDURE — 3078F DIAST BP <80 MM HG: CPT | Mod: CPTII,S$GLB,, | Performed by: INTERNAL MEDICINE

## 2024-10-16 PROCEDURE — 99999 PR PBB SHADOW E&M-EST. PATIENT-LVL V: CPT | Mod: PBBFAC,,, | Performed by: INTERNAL MEDICINE

## 2024-10-16 PROCEDURE — 3077F SYST BP >= 140 MM HG: CPT | Mod: CPTII,S$GLB,, | Performed by: INTERNAL MEDICINE

## 2024-10-16 PROCEDURE — 1159F MED LIST DOCD IN RCRD: CPT | Mod: CPTII,S$GLB,, | Performed by: INTERNAL MEDICINE

## 2024-10-16 PROCEDURE — 99215 OFFICE O/P EST HI 40 MIN: CPT | Mod: S$GLB,,, | Performed by: INTERNAL MEDICINE

## 2024-10-16 PROCEDURE — 4010F ACE/ARB THERAPY RXD/TAKEN: CPT | Mod: CPTII,S$GLB,, | Performed by: INTERNAL MEDICINE

## 2024-10-16 PROCEDURE — 1160F RVW MEDS BY RX/DR IN RCRD: CPT | Mod: CPTII,S$GLB,, | Performed by: INTERNAL MEDICINE

## 2024-10-16 PROCEDURE — 1126F AMNT PAIN NOTED NONE PRSNT: CPT | Mod: CPTII,S$GLB,, | Performed by: INTERNAL MEDICINE

## 2024-10-16 PROCEDURE — 3044F HG A1C LEVEL LT 7.0%: CPT | Mod: CPTII,S$GLB,, | Performed by: INTERNAL MEDICINE

## 2024-10-16 PROCEDURE — 1101F PT FALLS ASSESS-DOCD LE1/YR: CPT | Mod: CPTII,S$GLB,, | Performed by: INTERNAL MEDICINE

## 2024-10-16 PROCEDURE — 3008F BODY MASS INDEX DOCD: CPT | Mod: CPTII,S$GLB,, | Performed by: INTERNAL MEDICINE

## 2024-10-16 RX ORDER — CIPROFLOXACIN 500 MG/1
500 TABLET ORAL 2 TIMES DAILY
Qty: 28 TABLET | Refills: 0 | Status: SHIPPED | OUTPATIENT
Start: 2024-10-16

## 2024-10-16 NOTE — TELEPHONE ENCOUNTER
----- Message from Ángela sent at 10/16/2024  2:21 PM CDT -----  Contact: Patient, [phone]  Calling to reschedule his appointment, he is stuck in traffic. Please call him. Thanks.  
Calling patient to reschedule appt he's going to miss today   
0

## 2024-10-16 NOTE — PROGRESS NOTES
"Subjective:       Patient ID: Isidro Alves is a 69 y.o. male.    Chief Complaint:  Kidney transplant status, hypertension, immunosuppression    HPI    He presents to clinic today for routine follow-up.  Since his last office visit he has been doing well.  On further questioning he does relate that he has been having some pressure on urination as well as cloudy and foul-smelling urine.  He relates that he was treated for several UTIs in the recent past.  His laboratory studies and medications were reviewed.  All Nephrology related questions were answered to his satisfaction.      Review of Systems   Constitutional: Negative.    HENT: Negative.     Respiratory: Negative.     Cardiovascular: Negative.    Gastrointestinal: Negative.    Genitourinary:  Positive for dysuria.   Musculoskeletal: Negative.    Skin: Negative.        BP (!) 142/78   Pulse 68   Resp 18   Ht 6' 1" (1.854 m)   Wt 117.1 kg (258 lb 2.5 oz)   BMI 34.06 kg/m²     Lab Results   Component Value Date    WBC 6.20 10/09/2024    HGB 12.7 (L) 10/09/2024    HCT 41.9 10/09/2024    MCV 83 10/09/2024     10/09/2024      BMP  Lab Results   Component Value Date     10/09/2024     10/09/2024    K 4.8 10/09/2024    K 4.8 10/09/2024     10/09/2024     10/09/2024    CO2 24 10/09/2024    CO2 24 10/09/2024    BUN 22 10/09/2024    BUN 22 10/09/2024    CREATININE 1.7 (H) 10/09/2024    CREATININE 1.7 (H) 10/09/2024    CALCIUM 9.8 10/09/2024    CALCIUM 9.8 10/09/2024    ANIONGAP 8 10/09/2024    ANIONGAP 8 10/09/2024    ESTGFRAFRICA 7 06/15/2023    EGFRNONAA 6.0 (A) 07/14/2022     CMP  Sodium   Date Value Ref Range Status   10/09/2024 140 136 - 145 mmol/L Final   10/09/2024 140 136 - 145 mmol/L Final     Potassium   Date Value Ref Range Status   10/09/2024 4.8 3.5 - 5.1 mmol/L Final   10/09/2024 4.8 3.5 - 5.1 mmol/L Final     Chloride   Date Value Ref Range Status   10/09/2024 108 95 - 110 mmol/L Final   10/09/2024 108 95 - 110 mmol/L " Final     CO2   Date Value Ref Range Status   10/09/2024 24 23 - 29 mmol/L Final   10/09/2024 24 23 - 29 mmol/L Final     Glucose   Date Value Ref Range Status   10/09/2024 95 70 - 110 mg/dL Final   10/09/2024 95 70 - 110 mg/dL Final     BUN   Date Value Ref Range Status   10/09/2024 22 8 - 23 mg/dL Final   10/09/2024 22 8 - 23 mg/dL Final     Creatinine   Date Value Ref Range Status   10/09/2024 1.7 (H) 0.5 - 1.4 mg/dL Final   10/09/2024 1.7 (H) 0.5 - 1.4 mg/dL Final     Calcium   Date Value Ref Range Status   10/09/2024 9.8 8.7 - 10.5 mg/dL Final   10/09/2024 9.8 8.7 - 10.5 mg/dL Final     Total Protein   Date Value Ref Range Status   08/05/2024 6.9 6.0 - 8.4 g/dL Final     Albumin   Date Value Ref Range Status   10/09/2024 3.7 3.5 - 5.2 g/dL Final   10/09/2024 3.7 3.5 - 5.2 g/dL Final   05/17/2016 4.0 3.6 - 5.1 g/dL Final     Comment:     @ Test Performed By:  Itegria Levant LEATHA Sapp M.D.,   [id number] [place] [id number]  [facility]  [id number]       Total Bilirubin   Date Value Ref Range Status   08/05/2024 1.1 (H) 0.1 - 1.0 mg/dL Final     Comment:     For infants and newborns, interpretation of results should be based  on gestational age, weight and in agreement with clinical  observations.    Premature Infant recommended reference ranges:  Up to 24 hours.............<8.0 mg/dL  Up to 48 hours............<12.0 mg/dL  3-5 days..................<15.0 mg/dL  6-29 days.................<15.0 mg/dL       Alkaline Phosphatase   Date Value Ref Range Status   08/05/2024 69 55 - 135 U/L Final     AST   Date Value Ref Range Status   08/05/2024 20 10 - 40 U/L Final     ALT   Date Value Ref Range Status   08/05/2024 11 10 - 44 U/L Final     Anion Gap   Date Value Ref Range Status   10/09/2024 8 8 - 16 mmol/L Final   10/09/2024 8 8 - 16 mmol/L Final     eGFR if    Date Value Ref Range Status   07/14/2022 6.9 (A) >60 mL/min/1.73 m^2 Final     eGFR     Date Value Ref Range Status   06/15/2023 7 mL/min/1.73mSq Final     Comment:     In accordance with NKF-ASN Task Force recommendation, calculation based on the Chronic Kidney Disease Epidemiology Collaboration (CKD-EPI) equation without adjustment for race. eGFR adjusted for gender and age and calculated in ml/min/1.73mSquared. eGFR cannot be calculated if patient is under 18 years of age.     Reference Range:   >= 60 ml/min/1.73mSquared.     eGFR if non    Date Value Ref Range Status   07/14/2022 6.0 (A) >60 mL/min/1.73 m^2 Final     Comment:     Calculation used to obtain the estimated glomerular filtration  rate (eGFR) is the CKD-EPI equation.        Current Outpatient Medications on File Prior to Visit   Medication Sig Dispense Refill    aspirin (ECOTRIN) 81 MG EC tablet Take 1 tablet (81 mg total) by mouth once daily. 30 tablet 11    atorvastatin (LIPITOR) 80 MG tablet Take 1 tablet (80 mg total) by mouth once daily. 90 tablet 3    blood sugar diagnostic (ONETOUCH ULTRA TEST) Strp Check blood sugar 3 times daily 300 each 3    blood-glucose meter Misc Use as instructed 1 each 0    blood-glucose sensor (DEXCOM G7 SENSOR) Ro 1 each by Misc.(Non-Drug; Combo Route) route every 10 days. 9 each 3    carvediloL (COREG) 12.5 MG tablet Take 1 tablet (12.5 mg total) by mouth 2 (two) times daily. 60 tablet 1    cetirizine (ZYRTEC) 10 MG tablet Take 1 tablet (10 mg total) by mouth once daily. 90 tablet 1    finasteride (PROSCAR) 5 mg tablet Take 1 tablet (5 mg total) by mouth once daily. 90 tablet 3    fluticasone propionate (FLONASE) 50 mcg/actuation nasal spray 2 sprays (100 mcg total) by Each Nostril route once daily. 16 g 5    hydrALAZINE (APRESOLINE) 10 MG tablet Take 1 tablet (10 mg total) by mouth 3 (three) times daily. 90 tablet 11    insulin degludec (TRESIBA FLEXTOUCH U-100) 100 unit/mL (3 mL) insulin pen Inject 33 Units into the skin once daily. 15 mL 5    insulin lispro  "(HUMALOG KWIKPEN INSULIN) 100 unit/mL pen Inject 10 Units into the skin 3 (three) times daily. Plus slide; max dose 45 units/d 15 mL 5    isosorbide dinitrate (ISORDIL) 10 MG tablet Take 2 tablets (20 mg total) by mouth 3 (three) times daily. 180 tablet 6    lancets (ONETOUCH DELICA PLUS LANCET) 33 gauge Misc Use 1 lancet to test blood sugar 3 (three) times daily. 100 each 1    magnesium oxide (MAG-OX) 400 mg (241.3 mg magnesium) tablet Take 2 tablets (800 mg total) by mouth 2 (two) times daily. 120 tablet 11    multivitamin Tab Take 1 tablet by mouth once daily.      mycophenolate (CELLCEPT) 250 mg Cap Take 4 capsules (1,000 mg total) by mouth 2 (two) times daily. 240 capsule 11    pen needle, diabetic (BD SHIRA 2ND GEN PEN NEEDLE) 32 gauge x 5/32" Ndle Use 1 pen needle to inject insulin 4 (four) times daily. 100 each 1    predniSONE (DELTASONE) 5 MG tablet Take by mouth daily;  2/21 - 2/27 : 20 mg;  2/28 - 3/5 : 15 mg; 3/6 - 3/12 :10 mg; 3/13/2024 - forever : 5 mg; do not stop 70 tablet 11    sacubitriL-valsartan (ENTRESTO)  mg per tablet Take 1 tablet by mouth 2 (two) times daily. 60 tablet 11    semaglutide (OZEMPIC) 0.25 mg or 0.5 mg(2 mg/1.5 mL) pen injector Inject 0.25 mg into the skin every 7 days for 28 days, THEN 0.5 mg every 7 days. 1 each 11    sodium bicarbonate 650 MG tablet Take 2 tablets (1,300 mg total) by mouth 3 (three) times daily. 180 tablet 11    tacrolimus (PROGRAF) 1 MG Cap Take 5 capsules (5 mg total) by mouth every morning AND 4 capsules (4 mg total) every evening. 270 capsule 11    tamsulosin (FLOMAX) 0.4 mg Cap Take 1 capsule (0.4 mg total) by mouth once daily. 30 capsule 11    [DISCONTINUED] dapsone 100 MG Tab Take 1 tablet (100 mg total) by mouth once daily. *STOP 8-* 30 tablet 5    [DISCONTINUED] valGANciclovir (VALCYTE) 450 mg Tab Take 1 tablet (450 mg total) by mouth once daily. Stop 5/21/24 30 tablet 5     No current facility-administered medications on file prior to " visit.            Objective:            Physical Exam  Constitutional:       Appearance: Normal appearance.   HENT:      Head: Normocephalic and atraumatic.   Eyes:      General: No scleral icterus.     Extraocular Movements: Extraocular movements intact.      Pupils: Pupils are equal, round, and reactive to light.   Pulmonary:      Effort: Pulmonary effort is normal.      Breath sounds: No stridor.   Musculoskeletal:      Right lower leg: No edema.      Left lower leg: No edema.   Skin:     General: Skin is warm and dry.   Neurological:      General: No focal deficit present.      Mental Status: He is alert and oriented to person, place, and time.   Psychiatric:         Mood and Affect: Mood normal.         Behavior: Behavior normal.       Assessment:       1. Kidney transplant status    2. Immunosuppression    3. Primary hypertension    4. Secondary hyperparathyroidism of renal origin    5. Kidney replaced by transplant    6. Diabetes mellitus due to underlying condition with hyperosmolarity without coma, unspecified whether long term insulin use    7. UTI (urinary tract infection), uncomplicated        Plan:       1. Status post kidney transplant in February 20, 2024.  Stable renal allograft with creatinine between 1.6 and 1.7.    2. He continues on 5 mg in the morning 4 mg in the evening Prograf.  Most recent level is 8.1.  He is also on mycophenolate 1000 mg twice daily and prednisone 5 mg daily.    2. Blood pressure is adequately controlled on current regimen.  He is on a RAAS inhibitor as part of Entresto.    4. Intact PTH remained stable 145.  Vitamin-D is 26.  Phosphorus is stable at 3.3.  Calcium was normal at 9.8 with an albumin of 3.7.    5. Duplicate    6. Will defer to his diabetologist her primary care physician for management.    7. His urinalysis shows bacteria and white cells.  He has symptoms consistent with foul-smelling urine.  Urine is also cloudy and he has symptoms of dysuria.  He has been on  Cipro in the past.  Will plan to treat with 500 mg twice daily for 2 weeks.  Will arrange for urine culture and urinalysis tomorrow.  He states he just went to the bathroom before coming to clinic today.    He relates that he has a specimen container at home and will collect a urine sample before starting Cipro.  He has had several urinary tract infections over the past few months.  If he continues to have UTIs we may want to have Urology evaluate.  I will send a message to transplant medicine as well.    A proximally 40 minutes was spent in face-to-face conversation, chart review and coordination of care with other providers.        Colten Rizvi MD

## 2024-10-17 ENCOUNTER — LAB VISIT (OUTPATIENT)
Dept: LAB | Facility: HOSPITAL | Age: 69
End: 2024-10-17
Attending: INTERNAL MEDICINE
Payer: COMMERCIAL

## 2024-10-17 DIAGNOSIS — N39.0 UTI (URINARY TRACT INFECTION), UNCOMPLICATED: ICD-10-CM

## 2024-10-17 LAB
BACTERIA #/AREA URNS AUTO: ABNORMAL /HPF
BILIRUB UR QL STRIP: NEGATIVE
CLARITY UR REFRACT.AUTO: CLEAR
COLOR UR AUTO: YELLOW
GLUCOSE UR QL STRIP: NEGATIVE
HGB UR QL STRIP: NEGATIVE
KETONES UR QL STRIP: NEGATIVE
LEUKOCYTE ESTERASE UR QL STRIP: ABNORMAL
MICROSCOPIC COMMENT: ABNORMAL
NITRITE UR QL STRIP: POSITIVE
PH UR STRIP: 7 [PH] (ref 5–8)
PROT UR QL STRIP: NEGATIVE
RBC #/AREA URNS AUTO: 2 /HPF (ref 0–4)
SP GR UR STRIP: 1.01 (ref 1–1.03)
SQUAMOUS #/AREA URNS AUTO: 0 /HPF
URN SPEC COLLECT METH UR: ABNORMAL
WBC #/AREA URNS AUTO: 39 /HPF (ref 0–5)

## 2024-10-17 PROCEDURE — 87086 URINE CULTURE/COLONY COUNT: CPT | Performed by: INTERNAL MEDICINE

## 2024-10-17 PROCEDURE — 81001 URINALYSIS AUTO W/SCOPE: CPT | Performed by: INTERNAL MEDICINE

## 2024-10-17 PROCEDURE — 87088 URINE BACTERIA CULTURE: CPT | Performed by: INTERNAL MEDICINE

## 2024-10-17 PROCEDURE — 87186 SC STD MICRODIL/AGAR DIL: CPT | Performed by: INTERNAL MEDICINE

## 2024-10-18 ENCOUNTER — TELEPHONE (OUTPATIENT)
Dept: NEPHROLOGY | Facility: CLINIC | Age: 69
End: 2024-10-18
Payer: MEDICARE

## 2024-10-18 NOTE — TELEPHONE ENCOUNTER
Spoke with patient regarding lab results below. Patient stated he picked up prescription of Cipro and has started it. Assisted patient on scheduling urology referral. Patient agreed and accepted appointment .

## 2024-10-18 NOTE — TELEPHONE ENCOUNTER
----- Message from Colten Rizvi MD sent at 10/18/2024  6:57 AM CDT -----  Please call Mr. Alves and tell him that his repeat urinalysis does show evidence of a UTI.  Awaiting on the urine cultures.  Please make sure that he went ahead and filled and started his Cipro.  Also have we have been able to get him an appointment with Urology?    I put in an order several days ago.    Thanks  ----- Message -----  From: [employer] [facility]  Sent: 10/17/2024  10:54 PM CDT  To: Colten Rizvi MD

## 2024-10-20 LAB — BACTERIA UR CULT: ABNORMAL

## 2024-10-22 ENCOUNTER — TELEPHONE (OUTPATIENT)
Dept: NEPHROLOGY | Facility: CLINIC | Age: 69
End: 2024-10-22
Payer: MEDICARE

## 2024-10-22 ENCOUNTER — TELEPHONE (OUTPATIENT)
Dept: TRANSPLANT | Facility: CLINIC | Age: 69
End: 2024-10-22
Payer: MEDICARE

## 2024-10-22 ENCOUNTER — PATIENT MESSAGE (OUTPATIENT)
Dept: TRANSPLANT | Facility: CLINIC | Age: 69
End: 2024-10-22
Payer: MEDICARE

## 2024-10-22 DIAGNOSIS — N39.0 UTI (URINARY TRACT INFECTION), UNCOMPLICATED: Primary | ICD-10-CM

## 2024-10-22 RX ORDER — CEFPODOXIME PROXETIL 100 MG/1
100 TABLET, FILM COATED ORAL 2 TIMES DAILY
Qty: 28 TABLET | Refills: 0 | Status: SHIPPED | OUTPATIENT
Start: 2024-10-22

## 2024-10-22 NOTE — TELEPHONE ENCOUNTER
Patient states he is having symptoms of a UTI. Verbalizes understanding of plan for Cefpodoxime 200mg BID x 7 days. Appears general nephrologist,  has already placed this order and routed it to patient's preferred pharmacy.    ----- Message from Willard Deng MD sent at 10/21/2024  7:00 PM CDT -----  If symptomatic can give cefpodoxime but if no symptoms would not treat.  ----- Message -----  From: Lynn Cordova MD  Sent: 10/21/2024   4:57 PM CDT  To: Colten Rizvi MD; Willard Deng MD; #    Hi. This is not his first UTI. And it seems he has had multiple infections pre-txp also. Let me add Dr. Deng so transplant ID can weigh in.

## 2024-10-22 NOTE — TELEPHONE ENCOUNTER
----- Message from Colten Rizvi MD sent at 10/22/2024  9:20 AM CDT -----  Would you please call him and let him know that transplant in [place] has recommended a different antibiotic.  I will call it in today.    Thank you  ----- Message -----  From: Willard Deng MD  Sent: 10/21/2024   7:00 PM CDT  To: Colten Rizvi MD; #    If symptomatic can give cefpodoxime but if no symptoms would not treat.  ----- Message -----  From: Lynn Cordova MD  Sent: 10/21/2024   4:57 PM CDT  To: Colten Rizvi MD; Willard Deng MD; #    Hi. This is not his first UTI. And it seems he has had multiple infections pre-txp also. Let me add Dr. Deng so transplant ID can weigh in.  
Called patient regarding  prescribing new antibiotic. Advised patient to go  new med from pharmacy. Patient agreed  
headache

## 2024-10-29 ENCOUNTER — OFFICE VISIT (OUTPATIENT)
Dept: DIABETES | Facility: CLINIC | Age: 69
End: 2024-10-29
Payer: COMMERCIAL

## 2024-10-29 DIAGNOSIS — E66.9 OBESITY (BMI 30-39.9): ICD-10-CM

## 2024-10-29 DIAGNOSIS — I15.2 HYPERTENSION ASSOCIATED WITH DIABETES: ICD-10-CM

## 2024-10-29 DIAGNOSIS — N18.30 CONTROLLED TYPE 2 DIABETES MELLITUS WITH STAGE 3 CHRONIC KIDNEY DISEASE, WITH LONG-TERM CURRENT USE OF INSULIN: Primary | ICD-10-CM

## 2024-10-29 DIAGNOSIS — E11.22 CONTROLLED TYPE 2 DIABETES MELLITUS WITH STAGE 3 CHRONIC KIDNEY DISEASE, WITH LONG-TERM CURRENT USE OF INSULIN: Primary | ICD-10-CM

## 2024-10-29 DIAGNOSIS — Z94.0 S/P KIDNEY TRANSPLANT: ICD-10-CM

## 2024-10-29 DIAGNOSIS — E11.69 HYPERLIPIDEMIA ASSOCIATED WITH TYPE 2 DIABETES MELLITUS: ICD-10-CM

## 2024-10-29 DIAGNOSIS — Z79.4 CONTROLLED TYPE 2 DIABETES MELLITUS WITH STAGE 3 CHRONIC KIDNEY DISEASE, WITH LONG-TERM CURRENT USE OF INSULIN: Primary | ICD-10-CM

## 2024-10-29 DIAGNOSIS — E78.5 HYPERLIPIDEMIA ASSOCIATED WITH TYPE 2 DIABETES MELLITUS: ICD-10-CM

## 2024-10-29 DIAGNOSIS — E11.59 HYPERTENSION ASSOCIATED WITH DIABETES: ICD-10-CM

## 2024-10-29 DIAGNOSIS — I50.22 CHRONIC SYSTOLIC CONGESTIVE HEART FAILURE: ICD-10-CM

## 2024-10-29 DIAGNOSIS — I25.118 CORONARY ARTERY DISEASE OF NATIVE ARTERY OF NATIVE HEART WITH STABLE ANGINA PECTORIS: ICD-10-CM

## 2024-10-29 PROCEDURE — 3044F HG A1C LEVEL LT 7.0%: CPT | Mod: CPTII,95,, | Performed by: PHYSICIAN ASSISTANT

## 2024-10-29 PROCEDURE — 3066F NEPHROPATHY DOC TX: CPT | Mod: CPTII,95,, | Performed by: PHYSICIAN ASSISTANT

## 2024-10-29 PROCEDURE — 4010F ACE/ARB THERAPY RXD/TAKEN: CPT | Mod: CPTII,95,, | Performed by: PHYSICIAN ASSISTANT

## 2024-10-29 PROCEDURE — 99213 OFFICE O/P EST LOW 20 MIN: CPT | Mod: 95,,, | Performed by: PHYSICIAN ASSISTANT

## 2024-10-29 PROCEDURE — G2211 COMPLEX E/M VISIT ADD ON: HCPCS | Mod: 95,,, | Performed by: PHYSICIAN ASSISTANT

## 2024-10-29 RX ORDER — SEMAGLUTIDE 1.34 MG/ML
0.5 INJECTION, SOLUTION SUBCUTANEOUS
Qty: 1.5 ML | Refills: 11 | Status: SHIPPED | OUTPATIENT
Start: 2024-10-29 | End: 2025-10-29

## 2024-10-30 ENCOUNTER — OFFICE VISIT (OUTPATIENT)
Dept: UROLOGY | Facility: CLINIC | Age: 69
End: 2024-10-30
Payer: COMMERCIAL

## 2024-10-30 VITALS — BODY MASS INDEX: 34.19 KG/M2 | HEIGHT: 73 IN | WEIGHT: 257.94 LBS

## 2024-10-30 DIAGNOSIS — N30.00 ACUTE CYSTITIS WITHOUT HEMATURIA: Primary | ICD-10-CM

## 2024-10-30 DIAGNOSIS — Z87.440 PERSONAL HISTORY OF URINARY (TRACT) INFECTION: ICD-10-CM

## 2024-10-30 DIAGNOSIS — Z12.5 PROSTATE CANCER SCREENING: ICD-10-CM

## 2024-10-30 DIAGNOSIS — N18.32 CHRONIC KIDNEY DISEASE (CKD) STAGE G3B/A1, MODERATELY DECREASED GLOMERULAR FILTRATION RATE (GFR) BETWEEN 30-44 ML/MIN/1.73 SQUARE METER AND ALBUMINURIA CREATININE RATIO LESS THAN 30 MG/G: ICD-10-CM

## 2024-10-30 DIAGNOSIS — N40.0 BPH WITHOUT URINARY OBSTRUCTION: ICD-10-CM

## 2024-10-30 PROCEDURE — 99999 PR PBB SHADOW E&M-EST. PATIENT-LVL III: CPT | Mod: PBBFAC,,, | Performed by: UROLOGY

## 2024-10-30 PROCEDURE — 3066F NEPHROPATHY DOC TX: CPT | Mod: CPTII,S$GLB,, | Performed by: UROLOGY

## 2024-10-30 PROCEDURE — 1126F AMNT PAIN NOTED NONE PRSNT: CPT | Mod: CPTII,S$GLB,, | Performed by: UROLOGY

## 2024-10-30 PROCEDURE — 1101F PT FALLS ASSESS-DOCD LE1/YR: CPT | Mod: CPTII,S$GLB,, | Performed by: UROLOGY

## 2024-10-30 PROCEDURE — 4010F ACE/ARB THERAPY RXD/TAKEN: CPT | Mod: CPTII,S$GLB,, | Performed by: UROLOGY

## 2024-10-30 PROCEDURE — 99214 OFFICE O/P EST MOD 30 MIN: CPT | Mod: S$GLB,,, | Performed by: UROLOGY

## 2024-10-30 PROCEDURE — 1159F MED LIST DOCD IN RCRD: CPT | Mod: CPTII,S$GLB,, | Performed by: UROLOGY

## 2024-10-30 PROCEDURE — 3288F FALL RISK ASSESSMENT DOCD: CPT | Mod: CPTII,S$GLB,, | Performed by: UROLOGY

## 2024-10-30 PROCEDURE — 3044F HG A1C LEVEL LT 7.0%: CPT | Mod: CPTII,S$GLB,, | Performed by: UROLOGY

## 2024-10-30 PROCEDURE — 3008F BODY MASS INDEX DOCD: CPT | Mod: CPTII,S$GLB,, | Performed by: UROLOGY

## 2024-11-04 ENCOUNTER — LAB VISIT (OUTPATIENT)
Dept: LAB | Facility: HOSPITAL | Age: 69
End: 2024-11-04
Attending: INTERNAL MEDICINE
Payer: MEDICARE

## 2024-11-04 ENCOUNTER — LAB VISIT (OUTPATIENT)
Dept: LAB | Facility: HOSPITAL | Age: 69
End: 2024-11-04
Attending: INTERNAL MEDICINE
Payer: COMMERCIAL

## 2024-11-04 DIAGNOSIS — Z12.5 PROSTATE CANCER SCREENING: ICD-10-CM

## 2024-11-04 DIAGNOSIS — Z94.0 KIDNEY REPLACED BY TRANSPLANT: ICD-10-CM

## 2024-11-04 DIAGNOSIS — E11.22 CONTROLLED TYPE 2 DIABETES MELLITUS WITH STAGE 3 CHRONIC KIDNEY DISEASE, WITH LONG-TERM CURRENT USE OF INSULIN: ICD-10-CM

## 2024-11-04 DIAGNOSIS — Z79.4 CONTROLLED TYPE 2 DIABETES MELLITUS WITH STAGE 3 CHRONIC KIDNEY DISEASE, WITH LONG-TERM CURRENT USE OF INSULIN: ICD-10-CM

## 2024-11-04 DIAGNOSIS — N18.30 CONTROLLED TYPE 2 DIABETES MELLITUS WITH STAGE 3 CHRONIC KIDNEY DISEASE, WITH LONG-TERM CURRENT USE OF INSULIN: ICD-10-CM

## 2024-11-04 LAB
ALBUMIN SERPL BCP-MCNC: 3.7 G/DL (ref 3.5–5.2)
ALBUMIN SERPL BCP-MCNC: 3.7 G/DL (ref 3.5–5.2)
ALP SERPL-CCNC: 85 U/L (ref 40–150)
ALT SERPL W/O P-5'-P-CCNC: 8 U/L (ref 10–44)
ANION GAP SERPL CALC-SCNC: 9 MMOL/L (ref 8–16)
AST SERPL-CCNC: 15 U/L (ref 10–40)
BASOPHILS # BLD AUTO: 0.05 K/UL (ref 0–0.2)
BASOPHILS NFR BLD: 0.7 % (ref 0–1.9)
BILIRUB DIRECT SERPL-MCNC: 0.3 MG/DL (ref 0.1–0.3)
BILIRUB SERPL-MCNC: 0.7 MG/DL (ref 0.1–1)
BILIRUB UR QL STRIP: NEGATIVE
BUN SERPL-MCNC: 24 MG/DL (ref 8–23)
CALCIUM SERPL-MCNC: 9.5 MG/DL (ref 8.7–10.5)
CHLORIDE SERPL-SCNC: 108 MMOL/L (ref 95–110)
CLARITY UR: CLEAR
CO2 SERPL-SCNC: 25 MMOL/L (ref 23–29)
COLOR UR: YELLOW
COMPLEXED PSA SERPL-MCNC: 3.6 NG/ML (ref 0–4)
CREAT SERPL-MCNC: 1.7 MG/DL (ref 0.5–1.4)
CREAT UR-MCNC: 37 MG/DL (ref 23–375)
DIFFERENTIAL METHOD BLD: ABNORMAL
EOSINOPHIL # BLD AUTO: 0.3 K/UL (ref 0–0.5)
EOSINOPHIL NFR BLD: 4.4 % (ref 0–8)
ERYTHROCYTE [DISTWIDTH] IN BLOOD BY AUTOMATED COUNT: 15.2 % (ref 11.5–14.5)
EST. GFR  (NO RACE VARIABLE): 43.4 ML/MIN/1.73 M^2
ESTIMATED AVG GLUCOSE: 154 MG/DL (ref 68–131)
GLUCOSE SERPL-MCNC: 130 MG/DL (ref 70–110)
GLUCOSE UR QL STRIP: NEGATIVE
HBA1C MFR BLD: 7 % (ref 4–5.6)
HBV SURFACE AG SERPL QL IA: NORMAL
HCT VFR BLD AUTO: 44.8 % (ref 40–54)
HGB BLD-MCNC: 13.5 G/DL (ref 14–18)
HGB UR QL STRIP: NEGATIVE
IMM GRANULOCYTES # BLD AUTO: 0.03 K/UL (ref 0–0.04)
IMM GRANULOCYTES NFR BLD AUTO: 0.4 % (ref 0–0.5)
KETONES UR QL STRIP: NEGATIVE
LEUKOCYTE ESTERASE UR QL STRIP: NEGATIVE
LYMPHOCYTES # BLD AUTO: 0.7 K/UL (ref 1–4.8)
LYMPHOCYTES NFR BLD: 10 % (ref 18–48)
MAGNESIUM SERPL-MCNC: 1.5 MG/DL (ref 1.6–2.6)
MCH RBC QN AUTO: 24.7 PG (ref 27–31)
MCHC RBC AUTO-ENTMCNC: 30.1 G/DL (ref 32–36)
MCV RBC AUTO: 82 FL (ref 82–98)
MONOCYTES # BLD AUTO: 0.7 K/UL (ref 0.3–1)
MONOCYTES NFR BLD: 10.4 % (ref 4–15)
NEUTROPHILS # BLD AUTO: 5 K/UL (ref 1.8–7.7)
NEUTROPHILS NFR BLD: 74.1 % (ref 38–73)
NITRITE UR QL STRIP: NEGATIVE
NRBC BLD-RTO: 0 /100 WBC
PH UR STRIP: 7 [PH] (ref 5–8)
PHOSPHATE SERPL-MCNC: 3.2 MG/DL (ref 2.7–4.5)
PLATELET # BLD AUTO: 225 K/UL (ref 150–450)
PMV BLD AUTO: 13.4 FL (ref 9.2–12.9)
POTASSIUM SERPL-SCNC: 4.7 MMOL/L (ref 3.5–5.1)
PROT SERPL-MCNC: 6.9 G/DL (ref 6–8.4)
PROT UR QL STRIP: NEGATIVE
PROT UR-MCNC: 10 MG/DL (ref 0–15)
PROT/CREAT UR: 0.27 MG/G{CREAT} (ref 0–0.2)
RBC # BLD AUTO: 5.47 M/UL (ref 4.6–6.2)
SODIUM SERPL-SCNC: 142 MMOL/L (ref 136–145)
SP GR UR STRIP: 1.01 (ref 1–1.03)
URN SPEC COLLECT METH UR: NORMAL
WBC # BLD AUTO: 6.81 K/UL (ref 3.9–12.7)

## 2024-11-04 PROCEDURE — 87517 HEPATITIS B DNA QUANT: CPT | Performed by: INTERNAL MEDICINE

## 2024-11-04 PROCEDURE — 87799 DETECT AGENT NOS DNA QUANT: CPT | Performed by: INTERNAL MEDICINE

## 2024-11-04 PROCEDURE — 81003 URINALYSIS AUTO W/O SCOPE: CPT | Performed by: INTERNAL MEDICINE

## 2024-11-04 PROCEDURE — 80197 ASSAY OF TACROLIMUS: CPT | Performed by: INTERNAL MEDICINE

## 2024-11-04 PROCEDURE — 85025 COMPLETE CBC W/AUTO DIFF WBC: CPT | Performed by: INTERNAL MEDICINE

## 2024-11-04 PROCEDURE — 82570 ASSAY OF URINE CREATININE: CPT | Performed by: INTERNAL MEDICINE

## 2024-11-04 PROCEDURE — 83036 HEMOGLOBIN GLYCOSYLATED A1C: CPT | Performed by: PHYSICIAN ASSISTANT

## 2024-11-04 PROCEDURE — 80069 RENAL FUNCTION PANEL: CPT | Performed by: INTERNAL MEDICINE

## 2024-11-04 PROCEDURE — 82247 BILIRUBIN TOTAL: CPT | Performed by: INTERNAL MEDICINE

## 2024-11-04 PROCEDURE — 87340 HEPATITIS B SURFACE AG IA: CPT | Performed by: INTERNAL MEDICINE

## 2024-11-04 PROCEDURE — 83735 ASSAY OF MAGNESIUM: CPT | Performed by: INTERNAL MEDICINE

## 2024-11-04 PROCEDURE — 84153 ASSAY OF PSA TOTAL: CPT | Performed by: UROLOGY

## 2024-11-04 RX ORDER — TADALAFIL 20 MG/1
20 TABLET ORAL SEE ADMIN INSTRUCTIONS
Qty: 10 TABLET | Refills: 11 | Status: SHIPPED | OUTPATIENT
Start: 2024-11-04 | End: 2025-11-04

## 2024-11-05 LAB
HBV DNA SERPL NAA+PROBE-ACNC: NOT DETECTED IU/ML
HEPATITIS B VIRUS DNA: NORMAL
TACROLIMUS BLD-MCNC: 7.9 NG/ML (ref 5–15)

## 2024-11-11 ENCOUNTER — OFFICE VISIT (OUTPATIENT)
Dept: TRANSPLANT | Facility: CLINIC | Age: 69
End: 2024-11-11
Payer: COMMERCIAL

## 2024-11-11 VITALS
HEIGHT: 73 IN | TEMPERATURE: 97 F | WEIGHT: 258.63 LBS | HEART RATE: 90 BPM | DIASTOLIC BLOOD PRESSURE: 79 MMHG | RESPIRATION RATE: 18 BRPM | OXYGEN SATURATION: 96 % | BODY MASS INDEX: 34.28 KG/M2 | SYSTOLIC BLOOD PRESSURE: 143 MMHG

## 2024-11-11 DIAGNOSIS — E11.59 HYPERTENSION ASSOCIATED WITH DIABETES: ICD-10-CM

## 2024-11-11 DIAGNOSIS — E11.22 TYPE 2 DIABETES MELLITUS WITH CHRONIC KIDNEY DISEASE, WITH LONG-TERM CURRENT USE OF INSULIN, UNSPECIFIED CKD STAGE: ICD-10-CM

## 2024-11-11 DIAGNOSIS — Z94.0 S/P KIDNEY TRANSPLANT: Primary | ICD-10-CM

## 2024-11-11 DIAGNOSIS — Z79.4 TYPE 2 DIABETES MELLITUS WITH CHRONIC KIDNEY DISEASE, WITH LONG-TERM CURRENT USE OF INSULIN, UNSPECIFIED CKD STAGE: ICD-10-CM

## 2024-11-11 DIAGNOSIS — I15.2 HYPERTENSION ASSOCIATED WITH DIABETES: ICD-10-CM

## 2024-11-11 PROCEDURE — 99215 OFFICE O/P EST HI 40 MIN: CPT | Mod: S$GLB,,, | Performed by: NURSE PRACTITIONER

## 2024-11-11 PROCEDURE — 3051F HG A1C>EQUAL 7.0%<8.0%: CPT | Mod: CPTII,S$GLB,, | Performed by: NURSE PRACTITIONER

## 2024-11-11 PROCEDURE — 4010F ACE/ARB THERAPY RXD/TAKEN: CPT | Mod: CPTII,S$GLB,, | Performed by: NURSE PRACTITIONER

## 2024-11-11 PROCEDURE — 3066F NEPHROPATHY DOC TX: CPT | Mod: CPTII,S$GLB,, | Performed by: NURSE PRACTITIONER

## 2024-11-11 PROCEDURE — 1126F AMNT PAIN NOTED NONE PRSNT: CPT | Mod: CPTII,S$GLB,, | Performed by: NURSE PRACTITIONER

## 2024-11-11 PROCEDURE — 3077F SYST BP >= 140 MM HG: CPT | Mod: CPTII,S$GLB,, | Performed by: NURSE PRACTITIONER

## 2024-11-11 PROCEDURE — 3078F DIAST BP <80 MM HG: CPT | Mod: CPTII,S$GLB,, | Performed by: NURSE PRACTITIONER

## 2024-11-11 PROCEDURE — 3008F BODY MASS INDEX DOCD: CPT | Mod: CPTII,S$GLB,, | Performed by: NURSE PRACTITIONER

## 2024-11-11 PROCEDURE — 99999 PR PBB SHADOW E&M-EST. PATIENT-LVL III: CPT | Mod: PBBFAC,,, | Performed by: NURSE PRACTITIONER

## 2024-11-11 RX ORDER — CARVEDILOL 12.5 MG/1
12.5 TABLET ORAL 2 TIMES DAILY
Qty: 60 TABLET | Refills: 11 | Status: SHIPPED | OUTPATIENT
Start: 2024-11-11

## 2024-11-11 NOTE — LETTER
November 11, 2024        Gabino Champion             [facility]  [address]  [facility] [address]  Phone: [phone]   Patient: Isidro Alves   MR Number: [id number]   YOB: 1955   Date of Visit: 11/11/2024       Dear Dr. Gabino Champion    Thank you for referring Isidro Alves to me for evaluation. Attached you will find relevant portions of my assessment and plan of care.    If you have questions, please do not hesitate to call me. I look forward to following Isidro Alves along with you.    Sincerely,    Natasha Hunter, NP    Enclosure    If you would like to receive this communication electronically, please contact [email or web address] or [phone] to request [facility] access.    [facility] is a tool which provides read-only access to select patient information with whom you have a relationship. Its easy to use and provides real time access to review your patients record including encounter summaries, notes, results, and demographic information.    If you feel you have received this communication in error or would no longer like to receive these types of communications, please e-mail [email or web address]

## 2024-11-11 NOTE — PROGRESS NOTES
"   Kidney Post-Transplant Assessment    Referring Physician: Gabino Champion  Current Nephrologist: Gabino Champion    ORGAN: RIGHT KIDNEY  Donor Type: donation after brain death  PHS Increased Risk: yes  Cold Ischemia: 625 mins  Induction Medications:  Thymoglobulin    Subjective:       CC:  Reassessment of renal allograft function and management of chronic immunosuppression.    HPI:  Mr. Alves is a 69 y.o. year old Black or  male who received a donation after brain death kidney transplant on 2/18/24.   He takes mycophenolate mofetil, prednisone, and tacrolimus for maintenance immunosuppression. He denies any recent hospitalizations or ER visits since his previous clinic visit.    Pertinent Nephrology/Transplant History:   ESRD from [facility]  DCD KT 2/18/24; KDPI 67%  RPR+, HCVAb+/DERICK- donor   CMV +/+    Feels well. No complaints He is making plenty of urine.  He is urinating without difficulty.  He denies edema.  No weight changes. No fever, chills, or chest pain. Reports no issues with taking his medications. BP 130s/80s. Has been working out. Discussed weight lifting restrictions.     Review of Systems   Respiratory:  Negative for shortness of breath.    Cardiovascular:  Negative for chest pain and leg swelling.   Gastrointestinal:  Negative for abdominal pain, nausea and vomiting.   Genitourinary:  Negative for difficulty urinating.   Skin:  Negative for rash.   Allergic/Immunologic: Positive for immunocompromised state.       Objective:     Blood pressure (!) 143/79, pulse 90, temperature 97.2 °F (36.2 °C), temperature source Temporal, resp. rate 18, height 6' 1" (1.854 m), weight 117.3 kg (258 lb 9.6 oz), SpO2 96%.body mass index is 34.12 kg/m².    Physical Exam  Constitutional:       General: He is not in acute distress.     Appearance: He is well-developed. He is not diaphoretic.   Cardiovascular:      Rate and Rhythm: Normal rate and regular rhythm.      Heart sounds: Normal heart sounds. " "  Pulmonary:      Effort: Pulmonary effort is normal.      Breath sounds: Normal breath sounds.   Abdominal:      General: Bowel sounds are normal.      Palpations: Abdomen is soft.   Musculoskeletal:         General: No tenderness. Normal range of motion.   Skin:     General: Skin is warm and dry.      Findings: No rash.      Nails: There is no clubbing.   Neurological:      Mental Status: He is alert and oriented to person, place, and time.   Psychiatric:         Behavior: Behavior normal.           Labs:  Lab Results   Component Value Date    WBC 6.81 11/04/2024    HGB 13.5 (L) 11/04/2024    HCT 44.8 11/04/2024     11/04/2024    K 4.7 11/04/2024     11/04/2024    CO2 25 11/04/2024    BUN 24 (H) 11/04/2024    CREATININE 1.7 (H) 11/04/2024    EGFRNORACEVR 43.4 (A) 11/04/2024    CALCIUM 9.5 11/04/2024    PHOS 3.2 11/04/2024    MG 1.5 (L) 11/04/2024    ALBUMIN 3.7 11/04/2024    ALBUMIN 3.7 11/04/2024    AST 15 11/04/2024    ALT 8 (L) 11/04/2024    UTPCR 0.27 (H) 11/04/2024    .7 (H) 10/09/2024    TACROLIMUS 7.9 11/04/2024       No results found for: "EXTANC", "EXTWBC", "EXTSEGS", "EXTPLATELETS", "EXTHEMOGLOBI", "EXTHEMATOCRI", "EXTCREATININ", "EXTSODIUM", "EXTPOTASSIUM", "EXTBUN", "EXTCO2", "EXTCALCIUM", "EXTPHOSPHORU", "EXTGLUCOSE", "EXTALBUMIN", "EXTAST", "EXTALT", "EXTBILITOTAL", "EXTLIPASE", "EXTAMYLASE"    No results found for: "EXTCYCLOSLVL", "EXTSIROLIMUS", "EXTTACROLVL", "EXTPROTCRE", "EXTPTHINTACT", "EXTPROTEINUA", "EXTWBCUA", "EXTRBCUA"        Assessment:     1. S/P kidney transplant 2024    2. Hypertension associated with diabetes    3. Type 2 diabetes mellitus with chronic kidney disease, with long-term current use of insulin, unspecified CKD stage        Plan:   Continue current IS therapy regimen  No need for refills on IS therapy today.   Refill on coreg--patient request    DCD kidney transplant 02/18/2024 with KDPI 67% [RPR+, HCVAb+/DERICK- donor ]  Allograft function assessment  -CKD " "3, remains stable.   -recovering well.  -continue close monitoring as per guidelines  Recent Labs   Lab 12/30/21  1352 07/07/22  1436 07/14/22  0706 08/23/22  1500 06/15/23  0531 08/29/23  1039 09/03/24  0916 10/09/24  1202 11/04/24  0823   Creatinine 8.4 H 8.2 H 8.4 H   < > 7.49 H   < > 1.7 H 1.7 H  1.7 H 1.7 H   eGFR if non African American 6 A 6.2 A 6.0 A  --   --   --   --   --   --    eGFR if  7 A 7.1 A 6.9 A  --   --   --   --   --   --    eGFR   --   --   --   --  7  --   --   --   --     < > = values in this interval not displayed.      No results found for: "GLUCOSE", "AMYLASE", "LIPASE"      Encounter for Monitoring Immunosuppression post Transplant  Recent Labs   Lab 09/03/24  0916 10/09/24  1202 11/04/24  0823   Tacrolimus Lvl 8.5 8.1  8.1 7.9     -Target level for Isidro is 7-9  -Recheck as per guidelines.  -Monitor for side effects and toxicities, given narrow therapeutic window and significant risk of AE       Evaluation for bone marrow suppression (r/t immunosuppression toxicity or infection)  Counts currently acceptable.  Monitor   Lab Results   Component Value Date    WBC 6.81 11/04/2024    HGB 13.5 (L) 11/04/2024    HCT 44.8 11/04/2024     11/04/2024       Renal hypertension--within target, watch  BP Readings from Last 3 Encounters:   11/11/24 (!) 143/79   10/16/24 (!) 142/78   08/19/24 122/66   -on Entresto  -consider restarting isosorbide as well if BP remains high      Metabolic bone disease [Secondary hyperparathyroidism, Phosphorus metabolism disorders]  -Will tolerate mild asymptomatic low phos level d/t pill burden, DDI, and adverse SE   Hypo magnesemia-magnesium supplement added yesterday, along with high magnesium diet education.  We will aim to bring magnesium closer to normal, but will accept somewhat low-level given pill burden  Labs were reviewed with the patient.   Recent Labs   Lab 08/25/22  1203 04/06/23  1735 09/03/24  0916 10/09/24  1202 " 11/04/24  0823   Albumin  --    < > 3.4 L 3.7  3.7 3.7  3.7   Calcium  --    < > 9.3 9.8  9.8 9.5   Phosphorus  --    < > 2.3 L 3.3  3.3 3.2   Magnesium  --    < > 1.5 L 1.6  1.6 1.5 L   PTH, Intact 324.5 H  --   --  145.7 H  --     < > = values in this interval not displayed.        Assessment of electrolytes  Lab Results   Component Value Date     11/04/2024    K 4.7 11/04/2024     11/04/2024    CO2 25 11/04/2024    MG 1.5 (L) 11/04/2024       Screening for BK infection to prevent allograft dysfunction  Lab Results   Component Value Date    BKVIRUSPCRQB <125 11/04/2024       PJP until 08/18/2024 - atovaquone changed to dapsone 02/28/2024 (known allergy to Bactrim)  CMV until 05/21/2024 -  Valcyte  - BK acceptable  -Continue blood PCR screening as per program guidelines to prevent BK viremia and nephropathy leading to allograft dysfunction and potential graft failure      Screening for proteinuria & urinary abnormalities  Recent Labs   Lab 10/09/24  1207 10/17/24  0950 11/04/24  0811   Protein, UA Negative Negative Negative   Glucose, UA Negative Negative Negative   Occult Blood UA 1+ A Negative Negative   Leukocytes, UA 1+ A 2+ A Negative   Prot/Creat Ratio, Urine 0.42 H  --  0.27 H   - Urine p/c ratio acceptable    Management of kidney disease and related issues (HTN, anemia, SPTH, metabolic bone disease) should continue by community nephrologist.         Follow-up:   Clinic: return to transplant clinic weekly for the first month after transplant; every 2 weeks during months 2-3; then at 6-, 9-, 12-, 18-, 24-, and 36- months post-transplant to reassess for complications from immunosuppression toxicity and monitor for rejection.  Annually thereafter.    Labs: since patient remains at high risk for rejection and drug-related complications that warrant close monitoring, labs will be ordered as follows: continue twice weekly CBC, renal panel, and drug level for first month; then same labs once weekly  through 3rd month post-transplant.  Urine for UA and protein/creatinine ratio monthly.  Serum BK - PCR at 1-, 3-, 6-, 9-, 12-, 18-, 24-, 36- 48-, and 60 months post-transplant.  Hepatic panel at 1-, 2-, 3-, 6-, 9-, 12-, 18-, 24-, and 36- months post-transplant.    Natasha Hunter NP       Education:   Material provided to the patient.  Patient reminded to call with any health changes since these can be early signs of significant complications.  Also, I advised the patient to be sure any new medications or changes of old medications are discussed with either a pharmacist or physician knowledgeable with transplant to avoid rejection/drug toxicity related to significant drug interactions.    Patient advised that it is recommended that all transplanted patients, and their close contacts and household members receive Covid vaccination.    UNOS Patient Status  Functional Status: 70% - Cares for self: unable to carry on normal activity or active work  Physical Capacity: No Limitations

## 2024-11-14 ENCOUNTER — TELEPHONE (OUTPATIENT)
Dept: DIABETES | Facility: CLINIC | Age: 69
End: 2024-11-14
Payer: MEDICARE

## 2024-11-14 LAB
LEFT EYE DM RETINOPATHY: NEGATIVE
RIGHT EYE DM RETINOPATHY: NEGATIVE

## 2024-11-21 DIAGNOSIS — Z94.0 KIDNEY REPLACED BY TRANSPLANT: Primary | ICD-10-CM

## 2024-11-22 DIAGNOSIS — N18.30 CONTROLLED TYPE 2 DIABETES MELLITUS WITH STAGE 3 CHRONIC KIDNEY DISEASE, WITH LONG-TERM CURRENT USE OF INSULIN: ICD-10-CM

## 2024-11-22 DIAGNOSIS — Z79.4 CONTROLLED TYPE 2 DIABETES MELLITUS WITH STAGE 3 CHRONIC KIDNEY DISEASE, WITH LONG-TERM CURRENT USE OF INSULIN: ICD-10-CM

## 2024-11-22 DIAGNOSIS — E11.22 CONTROLLED TYPE 2 DIABETES MELLITUS WITH STAGE 3 CHRONIC KIDNEY DISEASE, WITH LONG-TERM CURRENT USE OF INSULIN: ICD-10-CM

## 2024-11-22 RX ORDER — INSULIN DEGLUDEC 100 U/ML
20 INJECTION, SOLUTION SUBCUTANEOUS DAILY
Qty: 18 ML | Refills: 3 | Status: SHIPPED | OUTPATIENT
Start: 2024-11-22 | End: 2025-11-22

## 2024-11-27 ENCOUNTER — OFFICE VISIT (OUTPATIENT)
Dept: DIABETES | Facility: CLINIC | Age: 69
End: 2024-11-27
Payer: COMMERCIAL

## 2024-11-27 DIAGNOSIS — I50.22 CHRONIC SYSTOLIC CONGESTIVE HEART FAILURE: ICD-10-CM

## 2024-11-27 DIAGNOSIS — E11.59 HYPERTENSION ASSOCIATED WITH DIABETES: ICD-10-CM

## 2024-11-27 DIAGNOSIS — I15.2 HYPERTENSION ASSOCIATED WITH DIABETES: ICD-10-CM

## 2024-11-27 DIAGNOSIS — E11.69 HYPERLIPIDEMIA ASSOCIATED WITH TYPE 2 DIABETES MELLITUS: ICD-10-CM

## 2024-11-27 DIAGNOSIS — E11.22 CONTROLLED TYPE 2 DIABETES MELLITUS WITH STAGE 3 CHRONIC KIDNEY DISEASE, WITH LONG-TERM CURRENT USE OF INSULIN: Primary | ICD-10-CM

## 2024-11-27 DIAGNOSIS — I25.118 CORONARY ARTERY DISEASE OF NATIVE ARTERY OF NATIVE HEART WITH STABLE ANGINA PECTORIS: ICD-10-CM

## 2024-11-27 DIAGNOSIS — E78.5 HYPERLIPIDEMIA ASSOCIATED WITH TYPE 2 DIABETES MELLITUS: ICD-10-CM

## 2024-11-27 DIAGNOSIS — N18.30 CONTROLLED TYPE 2 DIABETES MELLITUS WITH STAGE 3 CHRONIC KIDNEY DISEASE, WITH LONG-TERM CURRENT USE OF INSULIN: Primary | ICD-10-CM

## 2024-11-27 DIAGNOSIS — E66.9 OBESITY (BMI 30-39.9): ICD-10-CM

## 2024-11-27 DIAGNOSIS — Z94.0 S/P KIDNEY TRANSPLANT: ICD-10-CM

## 2024-11-27 DIAGNOSIS — Z79.4 CONTROLLED TYPE 2 DIABETES MELLITUS WITH STAGE 3 CHRONIC KIDNEY DISEASE, WITH LONG-TERM CURRENT USE OF INSULIN: Primary | ICD-10-CM

## 2024-11-27 PROCEDURE — 3051F HG A1C>EQUAL 7.0%<8.0%: CPT | Mod: CPTII,95,, | Performed by: PHYSICIAN ASSISTANT

## 2024-11-27 PROCEDURE — 4010F ACE/ARB THERAPY RXD/TAKEN: CPT | Mod: CPTII,95,, | Performed by: PHYSICIAN ASSISTANT

## 2024-11-27 PROCEDURE — 99213 OFFICE O/P EST LOW 20 MIN: CPT | Mod: 95,,, | Performed by: PHYSICIAN ASSISTANT

## 2024-11-27 PROCEDURE — 3066F NEPHROPATHY DOC TX: CPT | Mod: CPTII,95,, | Performed by: PHYSICIAN ASSISTANT

## 2024-11-27 PROCEDURE — G2211 COMPLEX E/M VISIT ADD ON: HCPCS | Mod: 95,,, | Performed by: PHYSICIAN ASSISTANT

## 2024-11-27 RX ORDER — SEMAGLUTIDE 1.34 MG/ML
1 INJECTION, SOLUTION SUBCUTANEOUS
Qty: 3 ML | Refills: 11 | Status: SHIPPED | OUTPATIENT
Start: 2024-11-27 | End: 2024-11-27 | Stop reason: ALTCHOICE

## 2024-11-27 RX ORDER — SEMAGLUTIDE 1.34 MG/ML
0.5 INJECTION, SOLUTION SUBCUTANEOUS
Qty: 1.5 ML | Refills: 11 | Status: SHIPPED | OUTPATIENT
Start: 2024-11-27 | End: 2025-11-27

## 2024-11-27 NOTE — PATIENT INSTRUCTIONS
CURRENT DM MEDICATIONS:   Tresiba 20 units in the morning  Lispro correction dosing every 3 hours as needed  Ozempic 0.5 mg once per week     Lispro Correction Dosing every 3 hours as needed OUTSIDE OF EATING  If  - 250, may take 2 units of Lispro  If  - 300, may take 3 units of Lispro  If  - 350, may take 4 units of Lispro  If  - 400, may take 5 units of Lispro  If +, may take 6 units of Lispro

## 2024-11-27 NOTE — PROGRESS NOTES
PCP: Steven Morgan MD    Subjective:     Chief Complaint: Diabetes - Established Patient    TELEMEDICINE VISIT:     The patient location is: Home  The chief complaint leading to consultation is: Diabetes Follow up  Visit type: Virtual visit with synchronous audio and video  Total time spent with patient: 6  Each patient to whom he or she provides medical services by telemedicine is:  (1) informed of the relationship between the physician and patient and the respective role of any other health care provider with respect to management of the patient; and (2) notified that he or she may decline to receive medical services by telemedicine and may withdraw from such care at any time.    Notes: N/A     HISTORY OF PRESENT ILLNESS: 69 y.o.   male presenting for diabetes management visit.   The patient's last visit with me was on 10/29/2024.   Patient has had Type II diabetes since 20 years or more.  Pertinent to decision making is the following comorbidities: HTN, HLD, CAD, CHF, S/p Transplant - Kidney in Feb 2024, and Obesity by BMI  Patient has the following Diabetes complications: with diabetic chronic kidney disease  He  has attended diabetes education in the past.     Patient's most recent A1c of 7.0% was completed 3 weeks ago.   Patient states since His last A1c His blood glucose levels have been high  throughout the day .   Patient monitors blood glucose 4 times per day with meter : Fasting, Before Lunch, Before Dinner, and Before Bed. Dexcom G7 out of stock at [facility] would like to defer.   Patient blood glucose monitoring device will not be uploaded into [employer] today.  Per pt, fasting blood sugar ranging from 89 - 140 and up to 156.   Patient endorses the following diabetes related symptoms:  None .   Patient is due today for the following diabetes-related health maintenance standards: Urine Microalbumin/creatinine ratio, Influenza Vaccine, COVID-19 Vaccine , and vaccines .   He denies  recent hospital admissions or emergency room visits.   He denies having hypoglycemia.   Patient's concerns today include glycemic control. Of note, pt feels he has had weight loss and tolerating well.   Patient medication regimen is as below.     CURRENT DM MEDICATIONS:   Tresiba 20 units in the morning  Lispro correction dosing every 3 hours as needed  Ozempic 0.5 mg once per week     Lispro Correction Dosing every 3 hours as needed OUTSIDE OF EATING  If  - 250, may take 2 units of Lispro  If  - 300, may take 3 units of Lispro  If  - 350, may take 4 units of Lispro  If  - 400, may take 5 units of Lispro  If +, may take 6 units of Lispro    Patient has failed the following Diabetes medications:   Metformin    Xultophy  Semglee  Lantus      Labs Reviewed.       Lab Results   Component Value Date    CPEPTIDE 3.88 11/20/2017     Lab Results   Component Value Date    GLUTAMICACID 0.00 11/20/2017          //   , There is no height or weight on file to calculate BMI.  His blood sugar in clinic today is:    Lab Results   Component Value Date    POCGLU 122 (A) 08/19/2024       Review of Systems   Constitutional:  Negative for activity change, appetite change, chills and fever.   HENT:  Negative for dental problem, mouth sores, nosebleeds, sore throat and trouble swallowing.    Eyes:  Negative for pain and discharge.   Respiratory:  Negative for shortness of breath, wheezing and stridor.    Cardiovascular:  Negative for chest pain, palpitations and leg swelling.   Gastrointestinal:  Negative for abdominal pain, diarrhea, nausea and vomiting.   Endocrine: Negative for polydipsia, polyphagia and polyuria.   Genitourinary:  Negative for dysuria, frequency and urgency.   Musculoskeletal:  Negative for joint swelling and myalgias.   Skin:  Negative for rash and wound.   Neurological:  Negative for dizziness, syncope, weakness and headaches.   Psychiatric/Behavioral:  Negative for behavioral problems  and dysphoric mood.          Diabetes Management Status  Statin: Taking  ACE/ARB: Taking    Screening or Prevention Patient's value Goal Complete/Controlled?   HgA1C Testing and Control   Lab Results   Component Value Date    HGBA1C 7.0 (H) 11/04/2024      Annually/Less than 8% Yes   Lipid profile : 05/13/2024 Annually Yes   LDL control Lab Results   Component Value Date    LDLCALC 115.6 05/13/2024    Annually/Less than 100 mg/dl  Yes   Nephropathy screening Lab Results   Component Value Date    MICALBCREAT 1998.1 (H) 07/14/2022     Lab Results   Component Value Date    PROTEINUA Negative 11/04/2024    Annually Yes   Blood pressure BP Readings from Last 1 Encounters:   11/11/24 (!) 143/79    Less than 140/90 No   Dilated retinal exam : 11/14/2024 Annually No    Foot exam   : 05/16/2024 Annually Yes     Social History     Socioeconomic History    Marital status:     Number of children: 3   Occupational History     Employer: self-rmployed   Tobacco Use    Smoking status: Never     Passive exposure: Never    Smokeless tobacco: Never   Substance and Sexual Activity    Alcohol use: Not Currently     Comment: seldom     Drug use: No    Sexual activity: Not Currently     Partners: Female   Social History Narrative    ** Merged History Encounter **          Social Drivers of Health     Financial Resource Strain: Low Risk  (5/15/2024)    Overall Financial Resource Strain (CARDIA)     Difficulty of Paying Living Expenses: Not very hard   Food Insecurity: No Food Insecurity (5/15/2024)    Hunger Vital Sign     Worried About Running Out of Food in the Last Year: Never true     Ran Out of Food in the Last Year: Never true   Transportation Needs: No Transportation Needs (5/15/2024)    TRANSPORTATION NEEDS     Transportation : No   Physical Activity: Insufficiently Active (5/15/2024)    Exercise Vital Sign     Days of Exercise per Week: 3 days     Minutes of Exercise per Session: 20 min   Stress: No Stress Concern Present  (5/15/2024)    [place] [employer] - Occupational Stress Questionnaire     Feeling of Stress : Not at all   Recent Concern: Stress - Stress Concern Present (2/19/2024)    [place] [employer] - Occupational Stress Questionnaire     Feeling of Stress : To some extent   Housing Stability: Low Risk  (5/15/2024)    Housing Stability Vital Sign     Unable to Pay for Housing in the Last Year: No     Homeless in the Last Year: No     Past Medical History:   Diagnosis Date    Abnormal nuclear stress test 8/30/2022    Acute hypoxemic respiratory failure due to COVID-19 1/21/2021    Acute on chronic combined systolic and diastolic congestive heart failure 5/7/2023    Acute on chronic respiratory failure 5/7/2023    Acute respiratory failure with hypoxia and hypercapnia 5/18/2023    Anal itching 10/10/2022    Anemia 4/16/2014    BPH (benign prostatic hyperplasia)     CKD (chronic kidney disease) stage 4, GFR 15-29 ml/min     Coronary artery disease of native artery of native heart with stable angina pectoris 4/29/2019    Diabetes mellitus     Diabetes mellitus, type 2     Dilated cardiomyopathy 8/30/2022    Elevated PSA     Encounter for blood transfusion     Herpes labialis     Hyperlipidemia     Hypertension     Hypertensive emergency 5/18/2023    Obesity     Pneumonia 5/7/2023    Pneumonia due to COVID-19 virus     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     Appearance: He is well-developed. He is not diaphoretic.   HENT:      Head: Normocephalic and atraumatic.      Right Ear: External ear normal.      Left Ear: External ear normal.      Nose: Nose normal.   Eyes:      General:         Right eye: No discharge.         Left eye: No  discharge.   Pulmonary:      Effort: Pulmonary effort is normal. No respiratory distress.      Breath sounds: No stridor.   Musculoskeletal:         General: Normal range of motion.      Cervical back: Normal range of motion.   Skin:     Coloration: Skin is not pale.   Neurological:      Mental Status: He is alert and oriented to person, place, and time.      Motor: No abnormal muscle tone.      Coordination: Coordination normal.   Psychiatric:         Behavior: Behavior normal.         Thought Content: Thought content normal.         Judgment: Judgment normal.           Assessment / Plan:     Controlled type 2 diabetes mellitus with stage 3 chronic kidney disease, with long-term current use of insulin  -     Discontinue: semaglutide (OZEMPIC) 1 mg/dose (4 mg/3 mL); Inject 1 mg into the skin every 7 days.  Dispense: 3 mL; Refill: 11  -     semaglutide (OZEMPIC) 0.25 mg or 0.5 mg(2 mg/1.5 mL) pen injector; Inject 0.5 mg into the skin every 7 days.  Dispense: 1.5 mL; Refill: 11  -     blood sugar diagnostic (ONETOUCH ULTRA TEST) Strp; Check blood sugar 3 times daily  Dispense: 300 each; Refill: 3    S/P kidney transplant 2024    Chronic systolic congestive heart failure    Hypertension associated with diabetes    Hyperlipidemia associated with type 2 diabetes mellitus    Coronary artery disease of native artery of native heart with stable angina pectoris    Obesity (BMI 30-39.9)      Additional Plan Details:    - POCT Glucose  - Encouraged continuation of lifestyle changes including regular exercise and limiting carbohydrates to 30-45 grams per meal threes times daily and 15 grams per snack with a limit of two daily.   - Encouraged continued monitoring of blood glucose with maintenance of 4 times daily at Fasting, After Lunch, Before Bed, and After Breakfast . Dex G7 deferred for now.   - Current DM Medication Regimen: Continue Tresiba 20 units daily. Change Lispro correction dosing every 3 hours as below. Continue  Ozempic 0.5 mg weekly. Pt would like to defer change of Ozempic until after new year. Pt may be candidate for SGLT2 1 yr post transplant but will defer decision to Nephro and transplant team.   - Health Maintenance standards addressed today: Urine Microalbumin / Creatinine Ratio scheduled, Flu Shot - [facility], COVID - 19 Vaccine - patient will schedule outside of [facility] , and Vaccines  - Nursing Visit: Patient is below goal range for nursing visit for age group and will not need nursing visit at this time .   - Follow up in 6 wks with A1c prior.      CURRENT DM MEDICATIONS:   Tresiba 20 units in the morning  Lispro correction dosing every 3 hours as needed  Ozempic 0.5 mg once per week     Lispro Correction Dosing every 3 hours as needed OUTSIDE OF EATING  If  - 250, may take 2 units of Lispro  If  - 300, may take 3 units of Lispro  If  - 350, may take 4 units of Lispro  If  - 400, may take 5 units of Lispro  If +, may take 6 units of Lispro    Blakeney McKnight, PA-C [facility]

## 2024-12-04 DIAGNOSIS — I10 BENIGN ESSENTIAL HTN: Primary | ICD-10-CM

## 2024-12-04 RX ORDER — CARVEDILOL 12.5 MG/1
12.5 TABLET ORAL 2 TIMES DAILY
Qty: 60 TABLET | Refills: 0 | Status: SHIPPED | OUTPATIENT
Start: 2024-12-04 | End: 2025-01-05

## 2024-12-05 DIAGNOSIS — Z94.0 KIDNEY REPLACED BY TRANSPLANT: Primary | ICD-10-CM

## 2024-12-05 RX ORDER — TACROLIMUS 1 MG/1
CAPSULE ORAL
Qty: 27 CAPSULE | Refills: 0 | Status: SHIPPED | OUTPATIENT
Start: 2024-12-05 | End: 2024-12-08

## 2024-12-09 ENCOUNTER — LAB VISIT (OUTPATIENT)
Dept: LAB | Facility: HOSPITAL | Age: 69
End: 2024-12-09
Attending: INTERNAL MEDICINE
Payer: MEDICARE

## 2024-12-09 DIAGNOSIS — Z94.0 KIDNEY REPLACED BY TRANSPLANT: ICD-10-CM

## 2024-12-09 LAB
ALBUMIN SERPL BCP-MCNC: 3.5 G/DL (ref 3.5–5.2)
ANION GAP SERPL CALC-SCNC: 8 MMOL/L (ref 8–16)
BASOPHILS # BLD AUTO: 0.06 K/UL (ref 0–0.2)
BASOPHILS NFR BLD: 0.8 % (ref 0–1.9)
BUN SERPL-MCNC: 22 MG/DL (ref 8–23)
CALCIUM SERPL-MCNC: 9.1 MG/DL (ref 8.7–10.5)
CHLORIDE SERPL-SCNC: 111 MMOL/L (ref 95–110)
CO2 SERPL-SCNC: 21 MMOL/L (ref 23–29)
CREAT SERPL-MCNC: 1.5 MG/DL (ref 0.5–1.4)
DIFFERENTIAL METHOD BLD: ABNORMAL
EOSINOPHIL # BLD AUTO: 0.3 K/UL (ref 0–0.5)
EOSINOPHIL NFR BLD: 3.6 % (ref 0–8)
ERYTHROCYTE [DISTWIDTH] IN BLOOD BY AUTOMATED COUNT: 15.7 % (ref 11.5–14.5)
EST. GFR  (NO RACE VARIABLE): 50.4 ML/MIN/1.73 M^2
GLUCOSE SERPL-MCNC: 114 MG/DL (ref 70–110)
HBV SURFACE AB SER-ACNC: >1000 MIU/ML
HBV SURFACE AB SER-ACNC: REACTIVE M[IU]/ML
HCT VFR BLD AUTO: 45.6 % (ref 40–54)
HGB BLD-MCNC: 14 G/DL (ref 14–18)
IMM GRANULOCYTES # BLD AUTO: 0.05 K/UL (ref 0–0.04)
IMM GRANULOCYTES NFR BLD AUTO: 0.7 % (ref 0–0.5)
LYMPHOCYTES # BLD AUTO: 0.7 K/UL (ref 1–4.8)
LYMPHOCYTES NFR BLD: 9.5 % (ref 18–48)
MAGNESIUM SERPL-MCNC: 1.4 MG/DL (ref 1.6–2.6)
MCH RBC QN AUTO: 25 PG (ref 27–31)
MCHC RBC AUTO-ENTMCNC: 30.7 G/DL (ref 32–36)
MCV RBC AUTO: 81 FL (ref 82–98)
MONOCYTES # BLD AUTO: 0.8 K/UL (ref 0.3–1)
MONOCYTES NFR BLD: 10.7 % (ref 4–15)
NEUTROPHILS # BLD AUTO: 5.5 K/UL (ref 1.8–7.7)
NEUTROPHILS NFR BLD: 74.7 % (ref 38–73)
NRBC BLD-RTO: 0 /100 WBC
PHOSPHATE SERPL-MCNC: 2.8 MG/DL (ref 2.7–4.5)
PLATELET # BLD AUTO: 254 K/UL (ref 150–450)
PMV BLD AUTO: 11.8 FL (ref 9.2–12.9)
POTASSIUM SERPL-SCNC: 4.4 MMOL/L (ref 3.5–5.1)
RBC # BLD AUTO: 5.61 M/UL (ref 4.6–6.2)
SODIUM SERPL-SCNC: 140 MMOL/L (ref 136–145)
WBC # BLD AUTO: 7.3 K/UL (ref 3.9–12.7)

## 2024-12-09 PROCEDURE — 86706 HEP B SURFACE ANTIBODY: CPT | Mod: 91 | Performed by: INTERNAL MEDICINE

## 2024-12-09 PROCEDURE — 83735 ASSAY OF MAGNESIUM: CPT | Performed by: INTERNAL MEDICINE

## 2024-12-09 PROCEDURE — 85025 COMPLETE CBC W/AUTO DIFF WBC: CPT | Performed by: INTERNAL MEDICINE

## 2024-12-09 PROCEDURE — 80197 ASSAY OF TACROLIMUS: CPT | Performed by: INTERNAL MEDICINE

## 2024-12-09 PROCEDURE — 80069 RENAL FUNCTION PANEL: CPT | Performed by: INTERNAL MEDICINE

## 2024-12-09 PROCEDURE — 36415 COLL VENOUS BLD VENIPUNCTURE: CPT | Performed by: INTERNAL MEDICINE

## 2024-12-10 LAB — TACROLIMUS BLD-MCNC: 7.3 NG/ML (ref 5–15)

## 2024-12-11 DIAGNOSIS — Z79.4 CONTROLLED TYPE 2 DIABETES MELLITUS WITH STAGE 3 CHRONIC KIDNEY DISEASE, WITH LONG-TERM CURRENT USE OF INSULIN: ICD-10-CM

## 2024-12-11 DIAGNOSIS — E11.22 CONTROLLED TYPE 2 DIABETES MELLITUS WITH STAGE 3 CHRONIC KIDNEY DISEASE, WITH LONG-TERM CURRENT USE OF INSULIN: ICD-10-CM

## 2024-12-11 DIAGNOSIS — N18.30 CONTROLLED TYPE 2 DIABETES MELLITUS WITH STAGE 3 CHRONIC KIDNEY DISEASE, WITH LONG-TERM CURRENT USE OF INSULIN: ICD-10-CM

## 2024-12-12 ENCOUNTER — PATIENT MESSAGE (OUTPATIENT)
Dept: TRANSPLANT | Facility: CLINIC | Age: 69
End: 2024-12-12
Payer: MEDICARE

## 2024-12-20 DIAGNOSIS — I50.22 CHRONIC SYSTOLIC CONGESTIVE HEART FAILURE: ICD-10-CM

## 2024-12-20 DIAGNOSIS — I49.3 PVC (PREMATURE VENTRICULAR CONTRACTION): Primary | ICD-10-CM

## 2024-12-24 ENCOUNTER — OFFICE VISIT (OUTPATIENT)
Dept: CARDIOLOGY | Facility: CLINIC | Age: 69
End: 2024-12-24
Payer: COMMERCIAL

## 2024-12-24 ENCOUNTER — HOSPITAL ENCOUNTER (OUTPATIENT)
Dept: CARDIOLOGY | Facility: HOSPITAL | Age: 69
Discharge: HOME | End: 2024-12-24
Attending: INTERNAL MEDICINE
Payer: COMMERCIAL

## 2024-12-24 ENCOUNTER — PATIENT MESSAGE (OUTPATIENT)
Dept: CARDIOLOGY | Facility: HOSPITAL | Age: 69
End: 2024-12-24
Payer: MEDICARE

## 2024-12-24 VITALS
SYSTOLIC BLOOD PRESSURE: 139 MMHG | BODY MASS INDEX: 33.86 KG/M2 | OXYGEN SATURATION: 95 % | HEART RATE: 76 BPM | DIASTOLIC BLOOD PRESSURE: 78 MMHG | WEIGHT: 256.63 LBS

## 2024-12-24 DIAGNOSIS — I25.118 CORONARY ARTERY DISEASE OF NATIVE ARTERY OF NATIVE HEART WITH STABLE ANGINA PECTORIS: ICD-10-CM

## 2024-12-24 DIAGNOSIS — E11.69 HYPERLIPIDEMIA ASSOCIATED WITH TYPE 2 DIABETES MELLITUS: ICD-10-CM

## 2024-12-24 DIAGNOSIS — N18.30 CONTROLLED TYPE 2 DIABETES MELLITUS WITH STAGE 3 CHRONIC KIDNEY DISEASE, WITH LONG-TERM CURRENT USE OF INSULIN: ICD-10-CM

## 2024-12-24 DIAGNOSIS — Z79.4 CONTROLLED TYPE 2 DIABETES MELLITUS WITH STAGE 3 CHRONIC KIDNEY DISEASE, WITH LONG-TERM CURRENT USE OF INSULIN: ICD-10-CM

## 2024-12-24 DIAGNOSIS — E11.22 CONTROLLED TYPE 2 DIABETES MELLITUS WITH STAGE 3 CHRONIC KIDNEY DISEASE, WITH LONG-TERM CURRENT USE OF INSULIN: ICD-10-CM

## 2024-12-24 DIAGNOSIS — I70.0 AORTIC ATHEROSCLEROSIS: ICD-10-CM

## 2024-12-24 DIAGNOSIS — I27.20 PULMONARY HTN: ICD-10-CM

## 2024-12-24 DIAGNOSIS — I50.22 CHRONIC SYSTOLIC CONGESTIVE HEART FAILURE: ICD-10-CM

## 2024-12-24 DIAGNOSIS — I15.2 HYPERTENSION ASSOCIATED WITH DIABETES: ICD-10-CM

## 2024-12-24 DIAGNOSIS — I50.22 CHRONIC SYSTOLIC CONGESTIVE HEART FAILURE: Primary | ICD-10-CM

## 2024-12-24 DIAGNOSIS — I42.8 NICM (NONISCHEMIC CARDIOMYOPATHY): ICD-10-CM

## 2024-12-24 DIAGNOSIS — E11.59 HYPERTENSION ASSOCIATED WITH DIABETES: ICD-10-CM

## 2024-12-24 DIAGNOSIS — Z94.0 S/P KIDNEY TRANSPLANT: ICD-10-CM

## 2024-12-24 DIAGNOSIS — I49.3 PVC (PREMATURE VENTRICULAR CONTRACTION): ICD-10-CM

## 2024-12-24 DIAGNOSIS — E78.5 HYPERLIPIDEMIA ASSOCIATED WITH TYPE 2 DIABETES MELLITUS: ICD-10-CM

## 2024-12-24 LAB
OHS QRS DURATION: 84 MS
OHS QTC CALCULATION: 420 MS

## 2024-12-24 PROCEDURE — 99214 OFFICE O/P EST MOD 30 MIN: CPT | Mod: S$GLB,,, | Performed by: INTERNAL MEDICINE

## 2024-12-24 PROCEDURE — 1126F AMNT PAIN NOTED NONE PRSNT: CPT | Mod: CPTII,S$GLB,, | Performed by: INTERNAL MEDICINE

## 2024-12-24 PROCEDURE — 3051F HG A1C>EQUAL 7.0%<8.0%: CPT | Mod: CPTII,S$GLB,, | Performed by: INTERNAL MEDICINE

## 2024-12-24 PROCEDURE — 3078F DIAST BP <80 MM HG: CPT | Mod: CPTII,S$GLB,, | Performed by: INTERNAL MEDICINE

## 2024-12-24 PROCEDURE — 1101F PT FALLS ASSESS-DOCD LE1/YR: CPT | Mod: CPTII,S$GLB,, | Performed by: INTERNAL MEDICINE

## 2024-12-24 PROCEDURE — 93005 ELECTROCARDIOGRAM TRACING: CPT

## 2024-12-24 PROCEDURE — 1159F MED LIST DOCD IN RCRD: CPT | Mod: CPTII,S$GLB,, | Performed by: INTERNAL MEDICINE

## 2024-12-24 PROCEDURE — 93010 ELECTROCARDIOGRAM REPORT: CPT | Mod: ,,, | Performed by: INTERNAL MEDICINE

## 2024-12-24 PROCEDURE — 3075F SYST BP GE 130 - 139MM HG: CPT | Mod: CPTII,S$GLB,, | Performed by: INTERNAL MEDICINE

## 2024-12-24 PROCEDURE — 3066F NEPHROPATHY DOC TX: CPT | Mod: CPTII,S$GLB,, | Performed by: INTERNAL MEDICINE

## 2024-12-24 PROCEDURE — 4010F ACE/ARB THERAPY RXD/TAKEN: CPT | Mod: CPTII,S$GLB,, | Performed by: INTERNAL MEDICINE

## 2024-12-24 PROCEDURE — 99999 PR PBB SHADOW E&M-EST. PATIENT-LVL V: CPT | Mod: PBBFAC,,, | Performed by: INTERNAL MEDICINE

## 2024-12-24 PROCEDURE — 3288F FALL RISK ASSESSMENT DOCD: CPT | Mod: CPTII,S$GLB,, | Performed by: INTERNAL MEDICINE

## 2024-12-24 PROCEDURE — 1160F RVW MEDS BY RX/DR IN RCRD: CPT | Mod: CPTII,S$GLB,, | Performed by: INTERNAL MEDICINE

## 2024-12-24 PROCEDURE — 3008F BODY MASS INDEX DOCD: CPT | Mod: CPTII,S$GLB,, | Performed by: INTERNAL MEDICINE

## 2024-12-24 RX ORDER — SPIRONOLACTONE 25 MG/1
12.5 TABLET ORAL DAILY
Qty: 45 TABLET | Refills: 3 | Status: SHIPPED | OUTPATIENT
Start: 2024-12-24 | End: 2025-01-23

## 2024-12-24 NOTE — PROGRESS NOTES
Subjective:   Patient ID:  Isidro Alves is a 69 y.o. male who presents for follow up of Follow-up      68 yo male, came in for 6 months f/u. Own a night club.  PMH NICM, CHFrEF 40%  CAD h/o LHC nonobstructive in 09/23, old MI per MPI in , IDDM 20 yrs, HTN, HLD. ESRD on HD since  and h/o renal Tx in 02/24 , No smoking. Occasional drink.  H/o Fell off the roof and multi fx.    MPI showed inferior old MI and EF 35%. ECHo showed EF 50% and LVH  No f/h premature CAD.  No smoking  Walks 4 times a week. And on PT  ekg NSR     visit  HD via right chest catheter.   echo dilated LV mod, EF 40% and  echo EF 55%  HOLTER showed 12% PVCs  Occasional palpitation. orthopnea and PND. No chest pain leg swelling and syncope. Urinating 8 times a week. No fluid restriction.    08/2022 visit   Phar mPI showed There is a moderate to severe intensity, large sized, mostly fixed perfusion abnormality with some reversibilty in the inferior, inferolateral, lateral and apical wall(s).  Still has SOB PND orthopnea. No leg swelling   BP high. On low Na diet and on 32 oz fluid restriction     visit  H/o LHC and RHC done on 08/31/2022 by Dr. Deras at [facility]  Nonobstructive cAD 30% lesion on LAD and RCA. EF 40%   PAP 28 mmHG PCWP 15 mmHG.  HD 3 times a week. No chest pain  Fluid resriction 32 oz      visit  Some SOB, cough up the phlegm. Fluid restriction.  RHC showed type II pulm HTN  from left side CHF  EKG NSR non specific stt change   Walks 3 times a week    05/23 visit    admitted to [facility] for CHF exacerbation due to fluid noncompliance and had urgent HD. Had cardiac arrest with one round of CPR. Temporary cardiogenic shock with levophed gtt.   05/28/23 BNP 1280. On fluid restriction  No chest pain  dizziness. On HD 3 times a week. Off coreg 12.5 mg bid due to low BP  Can not add Jardiance due to on HD    06/24 visit  09/23 admitted for CP. The cath showed nonobstructive  CAD, LVEDP 23 mmHg, EF 40%   Repeat echo in 11/23 EF 40% and LVEDD 6.02 cm  05/24 admitted for UTI. Rx with Abx. Now recovered  Now back to baseline.  GRF 10. Works at club. No chest pain dyspnea orthopnea PND and leg swelling   BP C    Interval history  EKG reviewed by myself today reveals NSR nonspecific STT change  Exercise regularly  Denied chest pain, dyspnea on exertion, palpitation, fainting, PND, orthopnea, syncope and claudication.   Mostly BP controlled at home                Past Medical History:   Diagnosis Date    Abnormal nuclear stress test 8/30/2022    Acute hypoxemic respiratory failure due to COVID-19 1/21/2021    Acute on chronic combined systolic and diastolic congestive heart failure 5/7/2023    Acute on chronic respiratory failure 5/7/2023    Acute respiratory failure with hypoxia and hypercapnia 5/18/2023    Anal itching 10/10/2022    Anemia 4/16/2014    BPH (benign prostatic hyperplasia)     CKD (chronic kidney disease) stage 4, GFR 15-29 ml/min     Coronary artery disease of native artery of native heart with stable angina pectoris 4/29/2019    Diabetes mellitus     Diabetes mellitus, type 2     Dilated cardiomyopathy 8/30/2022    Elevated PSA     Encounter for blood transfusion     Herpes labialis     Hyperlipidemia     Hypertension     Hypertensive emergency 5/18/2023    Obesity     Pneumonia 5/7/2023    Pneumonia due to COVID-19 virus     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    Preop cardiovascular exam 9/16/2022    Proteinuria     Pulmonary edema with congestive heart failure 5/18/2023    Secondary hyperparathyroidism (of renal origin)        Past Surgical History:   Procedure Laterality Date    ABDOMINAL AORTIC ANEURYSM REPAIR      CATHETERIZATION OF BOTH LEFT AND RIGHT HEART N/A 08/30/2022    Procedure: CATHETERIZATION, HEART, BOTH LEFT AND RIGHT;  Surgeon: Kassandra Deras MD;  Location: [facility] CATH LAB;  Service: Cardiology;  Laterality: N/A;    CATHETERIZATION OF BOTH LEFT AND RIGHT HEART  N/A 09/05/2023    Procedure: CATHETERIZATION, HEART, BOTH LEFT AND RIGHT;  Surgeon: Kassandra Deras MD;  Location: [facility] CATH LAB;  Service: Cardiology;  Laterality: N/A;    COLONOSCOPY N/A 12/21/2017    Procedure: COLONOSCOPY;  Surgeon: Fran Escalera MD;  Location: [facility] ENDO;  Service: Endoscopy;  Laterality: N/A;    COLONOSCOPY N/A 02/02/2023    Procedure: COLONOSCOPY;  Surgeon: Anthony Meng MD;  Location: [facility] ENDO;  Service: Endoscopy;  Laterality: N/A;    FLUOROSCOPY N/A 01/21/2021    Procedure: Vascath insertion;  Surgeon: Adrian Pruitt Jr., MD;  Location: [facility] CATH LAB;  Service: General;  Laterality: N/A;    FLUOROSCOPY N/A 04/06/2021    Procedure: Over the wire Vas Cath exchange;  Surgeon: Ho Pressley MD;  Location: [facility] CATH LAB;  Service: General;  Laterality: N/A;    KIDNEY TRANSPLANT N/A 02/18/2024    Procedure: TRANSPLANT, KIDNEY;  Surgeon: Chas Salomon MD;  Location: [facility] OR [address] [place] [address];  Service: Transplant;  Laterality: N/A;    PROSTATE BIOPSY      thorocotomy  01/01/2010       Social History     Tobacco Use    Smoking status: Never     Passive exposure: Never    Smokeless tobacco: Never   Substance Use Topics    Alcohol use: Not Currently     Comment: seldom     Drug use: No       Family History   Problem Relation Name Age of Onset    No Known Problems Mother      Diabetes Father Nik Kang     Hypertension Father Nik Kang     Hypertension Other      Diabetes Other           ROS    Objective:   Physical Exam  HENT:      Head: Normocephalic.   Eyes:      Pupils: Pupils are equal, round, and reactive to light.   Neck:      Thyroid: No thyromegaly.      Vascular: Normal carotid pulses. No carotid bruit or JVD.   Cardiovascular:      Rate and Rhythm: Normal rate and regular rhythm. No extrasystoles are present.     Chest Wall: PMI is not displaced.      Pulses: Normal pulses.      Heart sounds: Normal heart sounds. No murmur heard.     No gallop. No S3 sounds.   Pulmonary:       Effort: No respiratory distress.      Breath sounds: Normal breath sounds. No stridor.   Abdominal:      General: Bowel sounds are normal.      Palpations: Abdomen is soft.      Tenderness: There is no abdominal tenderness. There is no rebound.   Skin:     Findings: No rash.   Neurological:      Mental Status: He is alert and oriented to person, place, and time.   Psychiatric:         Behavior: Behavior normal.         Lab Results   Component Value Date    CHOL 173 05/13/2024    CHOL 128 09/26/2022    CHOL 149 07/14/2022     Lab Results   Component Value Date    HDL 41 05/13/2024    HDL 25 (A) 09/26/2022    HDL 31 (L) 07/14/2022     Lab Results   Component Value Date    LDLCALC 115.6 05/13/2024    LDLCALC 82 09/26/2022    LDLCALC 79.8 07/14/2022     Lab Results   Component Value Date    TRIG 82 05/13/2024    TRIG 105 09/26/2022    TRIG 191 (H) 07/14/2022     Lab Results   Component Value Date    CHOLHDL 23.7 05/13/2024    CHOLHDL 20.8 07/14/2022    CHOLHDL 16.8 (L) 10/06/2020       Chemistry        Component Value Date/Time     12/09/2024 0902    K 4.4 12/09/2024 0902     (H) 12/09/2024 0902    CO2 21 (L) 12/09/2024 0902    BUN 22 12/09/2024 0902    CREATININE 1.5 (H) 12/09/2024 0902     (H) 12/09/2024 0902        Component Value Date/Time    CALCIUM 9.1 12/09/2024 0902    ALKPHOS 85 11/04/2024 0823    AST 15 11/04/2024 0823    ALT 8 (L) 11/04/2024 0823    BILITOT 0.7 11/04/2024 0823    ESTGFRAFRICA 7 06/15/2023 0531    ESTGFRAFRICA 6.9 (A) 07/14/2022 0706    EGFRNONAA 6.0 (A) 07/14/2022 0706          Lab Results   Component Value Date    HGBA1C 7.0 (H) 11/04/2024     Lab Results   Component Value Date    TSH 1.941 07/14/2022     Lab Results   Component Value Date    INR 1.1 05/02/2024    INR 1.0 02/18/2024    INR 1.0 08/29/2023     Lab Results   Component Value Date    WBC 7.30 12/09/2024    HGB 14.0 12/09/2024    HCT 45.6 12/09/2024    MCV 81 (L) 12/09/2024     12/09/2024      BMP  Sodium   Date Value Ref Range Status   12/09/2024 140 136 - 145 mmol/L Final     Potassium   Date Value Ref Range Status   12/09/2024 4.4 3.5 - 5.1 mmol/L Final     Chloride   Date Value Ref Range Status   12/09/2024 111 (H) 95 - 110 mmol/L Final     CO2   Date Value Ref Range Status   12/09/2024 21 (L) 23 - 29 mmol/L Final     BUN   Date Value Ref Range Status   12/09/2024 22 8 - 23 mg/dL Final     Creatinine   Date Value Ref Range Status   12/09/2024 1.5 (H) 0.5 - 1.4 mg/dL Final     Calcium   Date Value Ref Range Status   12/09/2024 9.1 8.7 - 10.5 mg/dL Final     Anion Gap   Date Value Ref Range Status   12/09/2024 8 8 - 16 mmol/L Final     eGFR if    Date Value Ref Range Status   07/14/2022 6.9 (A) >60 mL/min/1.73 m^2 Final     eGFR    Date Value Ref Range Status   06/15/2023 7 mL/min/1.73mSq Final     Comment:     In accordance with NKF-ASN Task Force recommendation, calculation based on the Chronic Kidney Disease Epidemiology Collaboration (CKD-EPI) equation without adjustment for race. eGFR adjusted for gender and age and calculated in ml/min/1.73mSquared. eGFR cannot be calculated if patient is under 18 years of age.     Reference Range:   >= 60 ml/min/1.73mSquared.     eGFR if non    Date Value Ref Range Status   07/14/2022 6.0 (A) >60 mL/min/1.73 m^2 Final     Comment:     Calculation used to obtain the estimated glomerular filtration  rate (eGFR) is the CKD-EPI equation.        BNP  @LABRCNTIP(BNP,BNPTRIAGEBLO)@  @LABRCNTIP(troponini)@  CrCl cannot be calculated (Patient's most recent lab result is older than the maximum 7 days allowed.).  No results found in the last 24 hours.  No results found in the last 24 hours.  No results found in the last 24 hours.    Assessment:      1. Chronic systolic congestive heart failure    2. Aortic atherosclerosis    3. Coronary artery disease of native artery of native heart with stable angina pectoris    4.  Hyperlipidemia associated with type 2 diabetes mellitus    5. Hypertension associated with diabetes    6. NICM (nonischemic cardiomyopathy)    7. Pulmonary HTN    8. S/P kidney transplant 2024    9. Controlled type 2 diabetes mellitus with stage 3 chronic kidney disease, with long-term current use of insulin      CHFrEF Compenasted FC II    Plan:   Add aldactone 12.5 mg daily for chfref  BMP in 4 weeks  Continue asa Lipitor coreg isordil entresto   dash  RTC and echo in 6m

## 2025-01-06 ENCOUNTER — LAB VISIT (OUTPATIENT)
Dept: LAB | Facility: HOSPITAL | Age: 70
End: 2025-01-06
Attending: INTERNAL MEDICINE
Payer: MEDICARE

## 2025-01-06 DIAGNOSIS — Z94.0 KIDNEY REPLACED BY TRANSPLANT: ICD-10-CM

## 2025-01-06 LAB
ALBUMIN SERPL BCP-MCNC: 3.6 G/DL (ref 3.5–5.2)
ANION GAP SERPL CALC-SCNC: 9 MMOL/L (ref 8–16)
BASOPHILS # BLD AUTO: 0.05 K/UL (ref 0–0.2)
BASOPHILS NFR BLD: 0.8 % (ref 0–1.9)
BUN SERPL-MCNC: 26 MG/DL (ref 8–23)
CALCIUM SERPL-MCNC: 9.6 MG/DL (ref 8.7–10.5)
CHLORIDE SERPL-SCNC: 108 MMOL/L (ref 95–110)
CO2 SERPL-SCNC: 24 MMOL/L (ref 23–29)
CREAT SERPL-MCNC: 1.5 MG/DL (ref 0.5–1.4)
DIFFERENTIAL METHOD BLD: ABNORMAL
EOSINOPHIL # BLD AUTO: 0.3 K/UL (ref 0–0.5)
EOSINOPHIL NFR BLD: 4 % (ref 0–8)
ERYTHROCYTE [DISTWIDTH] IN BLOOD BY AUTOMATED COUNT: 16.3 % (ref 11.5–14.5)
EST. GFR  (NO RACE VARIABLE): 50.4 ML/MIN/1.73 M^2
GLUCOSE SERPL-MCNC: 137 MG/DL (ref 70–110)
HCT VFR BLD AUTO: 46.5 % (ref 40–54)
HGB BLD-MCNC: 13.9 G/DL (ref 14–18)
IMM GRANULOCYTES # BLD AUTO: 0.04 K/UL (ref 0–0.04)
IMM GRANULOCYTES NFR BLD AUTO: 0.6 % (ref 0–0.5)
LYMPHOCYTES # BLD AUTO: 0.8 K/UL (ref 1–4.8)
LYMPHOCYTES NFR BLD: 12 % (ref 18–48)
MAGNESIUM SERPL-MCNC: 1.5 MG/DL (ref 1.6–2.6)
MCH RBC QN AUTO: 25 PG (ref 27–31)
MCHC RBC AUTO-ENTMCNC: 29.9 G/DL (ref 32–36)
MCV RBC AUTO: 84 FL (ref 82–98)
MONOCYTES # BLD AUTO: 0.8 K/UL (ref 0.3–1)
MONOCYTES NFR BLD: 11.7 % (ref 4–15)
NEUTROPHILS # BLD AUTO: 4.6 K/UL (ref 1.8–7.7)
NEUTROPHILS NFR BLD: 70.9 % (ref 38–73)
NRBC BLD-RTO: 0 /100 WBC
PHOSPHATE SERPL-MCNC: 3.6 MG/DL (ref 2.7–4.5)
PLATELET # BLD AUTO: 295 K/UL (ref 150–450)
PMV BLD AUTO: 12.1 FL (ref 9.2–12.9)
POTASSIUM SERPL-SCNC: 5.3 MMOL/L (ref 3.5–5.1)
RBC # BLD AUTO: 5.57 M/UL (ref 4.6–6.2)
SODIUM SERPL-SCNC: 141 MMOL/L (ref 136–145)
WBC # BLD AUTO: 6.43 K/UL (ref 3.9–12.7)

## 2025-01-06 PROCEDURE — 85025 COMPLETE CBC W/AUTO DIFF WBC: CPT | Performed by: INTERNAL MEDICINE

## 2025-01-06 PROCEDURE — 36415 COLL VENOUS BLD VENIPUNCTURE: CPT | Performed by: INTERNAL MEDICINE

## 2025-01-06 PROCEDURE — 83735 ASSAY OF MAGNESIUM: CPT | Performed by: INTERNAL MEDICINE

## 2025-01-06 PROCEDURE — 80069 RENAL FUNCTION PANEL: CPT | Performed by: INTERNAL MEDICINE

## 2025-01-06 PROCEDURE — 80197 ASSAY OF TACROLIMUS: CPT | Performed by: INTERNAL MEDICINE

## 2025-01-07 DIAGNOSIS — E87.5 HYPERKALEMIA, DIMINISHED RENAL EXCRETION: Primary | ICD-10-CM

## 2025-01-07 LAB — TACROLIMUS BLD-MCNC: 9.2 NG/ML (ref 5–15)

## 2025-01-07 NOTE — TELEPHONE ENCOUNTER
Patient states understanding of low potassium diet and increased hydration. Will send Lokelma to his local pharmacy. Repeat potassium Friday.    ----- Message from Marcus Fernandez MD sent at 1/7/2025  9:08 AM CST -----  Low K diet  Encourage hydration   I see he is aldactone and entresto who is ordering thsi because it may need adjustment  In the mean time start lokelma 10 gm daily and repeat K on Friday

## 2025-01-08 ENCOUNTER — OFFICE VISIT (OUTPATIENT)
Dept: DIABETES | Facility: CLINIC | Age: 70
End: 2025-01-08
Payer: COMMERCIAL

## 2025-01-08 DIAGNOSIS — I50.22 CHRONIC SYSTOLIC CONGESTIVE HEART FAILURE: ICD-10-CM

## 2025-01-08 DIAGNOSIS — E11.59 HYPERTENSION ASSOCIATED WITH DIABETES: ICD-10-CM

## 2025-01-08 DIAGNOSIS — E11.22 CONTROLLED TYPE 2 DIABETES MELLITUS WITH STAGE 3 CHRONIC KIDNEY DISEASE, WITH LONG-TERM CURRENT USE OF INSULIN: Primary | ICD-10-CM

## 2025-01-08 DIAGNOSIS — Z79.4 CONTROLLED TYPE 2 DIABETES MELLITUS WITH STAGE 3 CHRONIC KIDNEY DISEASE, WITH LONG-TERM CURRENT USE OF INSULIN: Primary | ICD-10-CM

## 2025-01-08 DIAGNOSIS — Z94.0 S/P KIDNEY TRANSPLANT: ICD-10-CM

## 2025-01-08 DIAGNOSIS — E11.69 HYPERLIPIDEMIA ASSOCIATED WITH TYPE 2 DIABETES MELLITUS: ICD-10-CM

## 2025-01-08 DIAGNOSIS — E66.9 OBESITY (BMI 30-39.9): ICD-10-CM

## 2025-01-08 DIAGNOSIS — I25.118 CORONARY ARTERY DISEASE OF NATIVE ARTERY OF NATIVE HEART WITH STABLE ANGINA PECTORIS: ICD-10-CM

## 2025-01-08 DIAGNOSIS — I15.2 HYPERTENSION ASSOCIATED WITH DIABETES: ICD-10-CM

## 2025-01-08 DIAGNOSIS — N18.30 CONTROLLED TYPE 2 DIABETES MELLITUS WITH STAGE 3 CHRONIC KIDNEY DISEASE, WITH LONG-TERM CURRENT USE OF INSULIN: Primary | ICD-10-CM

## 2025-01-08 DIAGNOSIS — E78.5 HYPERLIPIDEMIA ASSOCIATED WITH TYPE 2 DIABETES MELLITUS: ICD-10-CM

## 2025-01-08 RX ORDER — SEMAGLUTIDE 1.34 MG/ML
1 INJECTION, SOLUTION SUBCUTANEOUS
Qty: 3 ML | Refills: 11 | Status: SHIPPED | OUTPATIENT
Start: 2025-01-08 | End: 2026-01-08

## 2025-01-08 NOTE — PATIENT INSTRUCTIONS
CURRENT DM MEDICATIONS:   Tresiba 15 units in the morning  Lispro correction dosing every 3 hours as needed  Ozempic 1 mg once per week     Lispro Correction Dosing every 3 hours as needed OUTSIDE OF EATING  If  - 250, may take 2 units of Lispro  If  - 300, may take 3 units of Lispro  If  - 350, may take 4 units of Lispro  If  - 400, may take 5 units of Lispro  If +, may take 6 units of Lispro

## 2025-01-08 NOTE — PROGRESS NOTES
PCP: Steven Morgan MD    Subjective:     Chief Complaint: Diabetes - Established Patient    TELEMEDICINE VISIT:     The patient location is: Home  The chief complaint leading to consultation is: Diabetes Follow up  Visit type: Virtual visit with synchronous audio and video  Total time spent with patient: 9  Each patient to whom he or she provides medical services by telemedicine is:  (1) informed of the relationship between the physician and patient and the respective role of any other health care provider with respect to management of the patient; and (2) notified that he or she may decline to receive medical services by telemedicine and may withdraw from such care at any time.    Notes: N/A     HISTORY OF PRESENT ILLNESS: 69 y.o.   male presenting for diabetes management visit.   The patient's last visit with me was on 10/29/2024.   Patient has had Type II diabetes since 20 years or more.  Pertinent to decision making is the following comorbidities: HTN, HLD, CAD, CHF, S/p Transplant - Kidney in Feb 2024, and Obesity by BMI  Patient has the following Diabetes complications: with diabetic chronic kidney disease  He  has attended diabetes education in the past.     Patient's most recent A1c of 7.0% was completed 3 weeks ago.   Patient states since His last A1c His blood glucose levels have been high  throughout the day .   Patient monitors blood glucose 4 times per day with meter : Fasting, Before Lunch, Before Dinner, and Before Bed. Dexcom G7 out of stock at [facility] would like to defer.   Patient blood glucose monitoring device will not be uploaded into [employer] today.  Per pt, fasting blood sugar ranging from 89 - 137 and later in the day 169.   Patient endorses the following diabetes related symptoms:  None .   Patient is due today for the following diabetes-related health maintenance standards: Urine Microalbumin/creatinine ratio, Influenza Vaccine, COVID-19 Vaccine , and vaccines .    He denies recent hospital admissions or emergency room visits.   He denies having hypoglycemia.   Patient's concerns today include glycemic control. Of note, pt feels he has had weight loss and tolerating well.   Patient medication regimen is as below.     CURRENT DM MEDICATIONS:   Tresiba 20 units in the morning  Lispro correction dosing every 3 hours as needed  Ozempic 0.5 mg once per week - 1 pen left      Lispro Correction Dosing every 3 hours as needed OUTSIDE OF EATING  If  - 250, may take 2 units of Lispro  If  - 300, may take 3 units of Lispro  If  - 350, may take 4 units of Lispro  If  - 400, may take 5 units of Lispro  If +, may take 6 units of Lispro    Patient has failed the following Diabetes medications:   Metformin    Xultophy  Semglee  Lantus      Labs Reviewed.       Lab Results   Component Value Date    CPEPTIDE 3.88 11/20/2017     Lab Results   Component Value Date    GLUTAMICACID 0.00 11/20/2017          //   , There is no height or weight on file to calculate BMI.  His blood sugar in clinic today is:    Lab Results   Component Value Date    POCGLU 122 (A) 08/19/2024       Review of Systems   Constitutional:  Negative for activity change, appetite change, chills and fever.   HENT:  Negative for dental problem, mouth sores, nosebleeds, sore throat and trouble swallowing.    Eyes:  Negative for pain and discharge.   Respiratory:  Negative for shortness of breath, wheezing and stridor.    Cardiovascular:  Negative for chest pain, palpitations and leg swelling.   Gastrointestinal:  Negative for abdominal pain, diarrhea, nausea and vomiting.   Endocrine: Negative for polydipsia, polyphagia and polyuria.   Genitourinary:  Negative for dysuria, frequency and urgency.   Musculoskeletal:  Negative for joint swelling and myalgias.   Skin:  Negative for rash and wound.   Neurological:  Negative for dizziness, syncope, weakness and headaches.   Psychiatric/Behavioral:   Negative for behavioral problems and dysphoric mood.          Diabetes Management Status  Statin: Taking  ACE/ARB: Taking    Screening or Prevention Patient's value Goal Complete/Controlled?   HgA1C Testing and Control   Lab Results   Component Value Date    HGBA1C 7.0 (H) 11/04/2024      Annually/Less than 8% Yes   Lipid profile : 05/13/2024 Annually Yes   LDL control Lab Results   Component Value Date    LDLCALC 115.6 05/13/2024    Annually/Less than 100 mg/dl  Yes   Nephropathy screening Lab Results   Component Value Date    MICALBCREAT 1998.1 (H) 07/14/2022     Lab Results   Component Value Date    PROTEINUA Negative 11/04/2024    Annually Yes   Blood pressure BP Readings from Last 1 Encounters:   12/24/24 139/78    Less than 140/90 No   Dilated retinal exam : 11/14/2024 Annually No    Foot exam   : 05/16/2024 Annually Yes     Social History     Socioeconomic History    Marital status:     Number of children: 3   Occupational History     Employer: self-rmployed   Tobacco Use    Smoking status: Never     Passive exposure: Never    Smokeless tobacco: Never   Substance and Sexual Activity    Alcohol use: Not Currently     Comment: seldom     Drug use: No    Sexual activity: Not Currently     Partners: Female   Social History Narrative    ** Merged History Encounter **          Social Drivers of Health     Financial Resource Strain: Low Risk  (5/15/2024)    Overall Financial Resource Strain (CARDIA)     Difficulty of Paying Living Expenses: Not very hard   Food Insecurity: No Food Insecurity (5/15/2024)    Hunger Vital Sign     Worried About Running Out of Food in the Last Year: Never true     Ran Out of Food in the Last Year: Never true   Transportation Needs: No Transportation Needs (5/15/2024)    TRANSPORTATION NEEDS     Transportation : No   Physical Activity: Insufficiently Active (5/15/2024)    Exercise Vital Sign     Days of Exercise per Week: 3 days     Minutes of Exercise per Session: 20 min    Stress: No Stress Concern Present (5/15/2024)    [employer] [place] [employer] - Occupational Stress Questionnaire     Feeling of Stress : Not at all   Recent Concern: Stress - Stress Concern Present (2/19/2024)    [employer] [place] [employer] - Occupational Stress Questionnaire     Feeling of Stress : To some extent   Housing Stability: Low Risk  (5/15/2024)    Housing Stability Vital Sign     Unable to Pay for Housing in the Last Year: No     Homeless in the Last Year: No     Past Medical History:   Diagnosis Date    Abnormal nuclear stress test 8/30/2022    Acute hypoxemic respiratory failure due to COVID-19 1/21/2021    Acute on chronic combined systolic and diastolic congestive heart failure 5/7/2023    Acute on chronic respiratory failure 5/7/2023    Acute respiratory failure with hypoxia and hypercapnia 5/18/2023    Anal itching 10/10/2022    Anemia 4/16/2014    BPH (benign prostatic hyperplasia)     CKD (chronic kidney disease) stage 4, GFR 15-29 ml/min     Coronary artery disease of native artery of native heart with stable angina pectoris 4/29/2019    Diabetes mellitus     Diabetes mellitus, type 2     Dilated cardiomyopathy 8/30/2022    Elevated PSA     Encounter for blood transfusion     Herpes labialis     Hyperlipidemia     Hypertension     Hypertensive emergency 5/18/2023    Obesity     Pneumonia 5/7/2023    Pneumonia due to COVID-19 virus     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     Appearance: He is well-developed. He is not diaphoretic.   HENT:      Head: Normocephalic and atraumatic.      Right Ear: External ear normal.      Left Ear: External ear normal.      Nose: Nose normal.   Eyes:      General:         Right eye: No  discharge.         Left eye: No discharge.   Pulmonary:      Effort: Pulmonary effort is normal. No respiratory distress.      Breath sounds: No stridor.   Musculoskeletal:         General: Normal range of motion.      Cervical back: Normal range of motion.   Skin:     Coloration: Skin is not pale.   Neurological:      Mental Status: He is alert and oriented to person, place, and time.      Motor: No abnormal muscle tone.      Coordination: Coordination normal.   Psychiatric:         Behavior: Behavior normal.         Thought Content: Thought content normal.         Judgment: Judgment normal.           Assessment / Plan:     Controlled type 2 diabetes mellitus with stage 3 chronic kidney disease, with long-term current use of insulin  -     semaglutide (OZEMPIC) 1 mg/dose (2 mg/1.5 mL) PnIj; Inject 1 mg into the skin every 7 days. Inject 1 mg under the skin every 7 days.  Dispense: 3 mL; Refill: 11  -     Hemoglobin A1C; Future; Expected date: 01/08/2025    S/P kidney transplant 2024    Chronic systolic congestive heart failure    Hypertension associated with diabetes    Hyperlipidemia associated with type 2 diabetes mellitus    Coronary artery disease of native artery of native heart with stable angina pectoris    Obesity (BMI 30-39.9)      Additional Plan Details:    - POCT Glucose  - Encouraged continuation of lifestyle changes including regular exercise and limiting carbohydrates to 30-45 grams per meal threes times daily and 15 grams per snack with a limit of two daily.   - Encouraged continued monitoring of blood glucose with maintenance of 4 times daily at Fasting, After Lunch, Before Bed, and After Breakfast . Dex G7 deferred for now.   - Current DM Medication Regimen: Change Tresiba 15 units daily. Change Lispro correction dosing every 3 hours as below. Change Ozempic 1 mg weekly. Pt may be candidate for SGLT2 1 yr post transplant but will defer decision to Nephro and transplant team.   - Health Maintenance  standards addressed today: Urine Microalbumin / Creatinine Ratio scheduled, Flu Shot - patient would like to complete outside of [facility], COVID - 19 Vaccine - patient will schedule outside of [facility] , and Vaccines  - Nursing Visit: Patient is below goal range for nursing visit for age group and will not need nursing visit at this time .   - Follow up in 6 wks with A1c prior.      CURRENT DM MEDICATIONS:   Tresiba 15 units in the morning  Lispro correction dosing every 3 hours as needed  Ozempic 1 mg once per week     Lispro Correction Dosing every 3 hours as needed OUTSIDE OF EATING  If  - 250, may take 2 units of Lispro  If  - 300, may take 3 units of Lispro  If  - 350, may take 4 units of Lispro  If  - 400, may take 5 units of Lispro  If +, may take 6 units of Lispro    Blakeney McKnight, PA-C [facility]

## 2025-01-13 ENCOUNTER — LAB VISIT (OUTPATIENT)
Dept: LAB | Facility: HOSPITAL | Age: 70
End: 2025-01-13
Attending: INTERNAL MEDICINE
Payer: COMMERCIAL

## 2025-01-13 DIAGNOSIS — E87.5 HYPERKALEMIA, DIMINISHED RENAL EXCRETION: ICD-10-CM

## 2025-01-13 LAB — POTASSIUM SERPL-SCNC: 4.5 MMOL/L (ref 3.5–5.1)

## 2025-01-13 PROCEDURE — 36415 COLL VENOUS BLD VENIPUNCTURE: CPT | Performed by: INTERNAL MEDICINE

## 2025-01-13 PROCEDURE — 84132 ASSAY OF SERUM POTASSIUM: CPT | Performed by: INTERNAL MEDICINE

## 2025-01-15 DIAGNOSIS — Z94.0 KIDNEY REPLACED BY TRANSPLANT: Primary | ICD-10-CM

## 2025-01-24 ENCOUNTER — LAB VISIT (OUTPATIENT)
Dept: LAB | Facility: HOSPITAL | Age: 70
End: 2025-01-24
Attending: INTERNAL MEDICINE
Payer: COMMERCIAL

## 2025-01-24 DIAGNOSIS — I50.22 CHRONIC SYSTOLIC CONGESTIVE HEART FAILURE: ICD-10-CM

## 2025-01-24 DIAGNOSIS — E11.22 CONTROLLED TYPE 2 DIABETES MELLITUS WITH STAGE 3 CHRONIC KIDNEY DISEASE, WITH LONG-TERM CURRENT USE OF INSULIN: ICD-10-CM

## 2025-01-24 DIAGNOSIS — N18.30 CONTROLLED TYPE 2 DIABETES MELLITUS WITH STAGE 3 CHRONIC KIDNEY DISEASE, WITH LONG-TERM CURRENT USE OF INSULIN: ICD-10-CM

## 2025-01-24 DIAGNOSIS — Z79.4 CONTROLLED TYPE 2 DIABETES MELLITUS WITH STAGE 3 CHRONIC KIDNEY DISEASE, WITH LONG-TERM CURRENT USE OF INSULIN: ICD-10-CM

## 2025-01-24 PROCEDURE — 83036 HEMOGLOBIN GLYCOSYLATED A1C: CPT | Performed by: PHYSICIAN ASSISTANT

## 2025-01-24 PROCEDURE — 80048 BASIC METABOLIC PNL TOTAL CA: CPT | Performed by: INTERNAL MEDICINE

## 2025-01-24 PROCEDURE — 83880 ASSAY OF NATRIURETIC PEPTIDE: CPT | Performed by: INTERNAL MEDICINE

## 2025-01-25 LAB
ANION GAP SERPL CALC-SCNC: 8 MMOL/L (ref 8–16)
BUN SERPL-MCNC: 26 MG/DL (ref 8–23)
CALCIUM SERPL-MCNC: 9.4 MG/DL (ref 8.7–10.5)
CHLORIDE SERPL-SCNC: 108 MMOL/L (ref 95–110)
CO2 SERPL-SCNC: 22 MMOL/L (ref 23–29)
CREAT SERPL-MCNC: 1.4 MG/DL (ref 0.5–1.4)
EST. GFR  (NO RACE VARIABLE): 54.7 ML/MIN/1.73 M^2
ESTIMATED AVG GLUCOSE: 177 MG/DL (ref 68–131)
GLUCOSE SERPL-MCNC: 86 MG/DL (ref 70–110)
HBA1C MFR BLD: 7.8 % (ref 4–5.6)
POTASSIUM SERPL-SCNC: 5 MMOL/L (ref 3.5–5.1)
SODIUM SERPL-SCNC: 138 MMOL/L (ref 136–145)

## 2025-01-27 LAB — NT-PROBNP SERPL IA-MCNC: 288 PG/ML

## 2025-01-29 ENCOUNTER — TELEPHONE (OUTPATIENT)
Dept: CARDIOLOGY | Facility: CLINIC | Age: 70
End: 2025-01-29
Payer: COMMERCIAL

## 2025-01-30 ENCOUNTER — TELEPHONE (OUTPATIENT)
Dept: CARDIOLOGY | Facility: CLINIC | Age: 70
End: 2025-01-30
Payer: COMMERCIAL

## 2025-01-30 NOTE — TELEPHONE ENCOUNTER
Pt was contacted about results:     The lab showed CHF is controlled   Continue current Rx.   F/U as scheduled       Pt verbalized understanding with no questions or concerns.          ----- Message from Dez Ragland MD sent at 1/29/2025  4:07 PM CST -----  The lab showed CHF is controlled  Continue current Rx.   F/U as scheduled

## 2025-02-04 DIAGNOSIS — Z94.0 KIDNEY TRANSPLANT RECIPIENT: ICD-10-CM

## 2025-02-04 DIAGNOSIS — Z94.0 S/P KIDNEY TRANSPLANT: ICD-10-CM

## 2025-02-04 DIAGNOSIS — N40.0 BENIGN NON-NODULAR PROSTATIC HYPERPLASIA WITHOUT LOWER URINARY TRACT SYMPTOMS: ICD-10-CM

## 2025-02-04 RX ORDER — MYCOPHENOLATE MOFETIL 250 MG/1
1000 CAPSULE ORAL 2 TIMES DAILY
Qty: 240 CAPSULE | Refills: 11 | Status: SHIPPED | OUTPATIENT
Start: 2025-02-04 | End: 2026-02-04

## 2025-02-04 RX ORDER — ASPIRIN 81 MG/1
81 TABLET ORAL DAILY
Qty: 30 TABLET | Refills: 11 | Status: CANCELLED | OUTPATIENT
Start: 2025-02-04 | End: 2026-02-04

## 2025-02-05 RX ORDER — ASPIRIN 81 MG/1
81 TABLET ORAL DAILY
Qty: 30 TABLET | Refills: 0 | Status: SHIPPED | OUTPATIENT
Start: 2025-02-05 | End: 2025-03-08

## 2025-02-05 RX ORDER — TAMSULOSIN HYDROCHLORIDE 0.4 MG/1
0.4 CAPSULE ORAL DAILY
Qty: 30 CAPSULE | Refills: 0 | Status: SHIPPED | OUTPATIENT
Start: 2025-02-05 | End: 2025-03-08

## 2025-02-07 ENCOUNTER — PATIENT MESSAGE (OUTPATIENT)
Dept: TRANSPLANT | Facility: CLINIC | Age: 70
End: 2025-02-07
Payer: COMMERCIAL

## 2025-02-07 ENCOUNTER — LAB VISIT (OUTPATIENT)
Dept: LAB | Facility: HOSPITAL | Age: 70
End: 2025-02-07
Attending: INTERNAL MEDICINE
Payer: MEDICARE

## 2025-02-07 DIAGNOSIS — Z94.0 KIDNEY REPLACED BY TRANSPLANT: ICD-10-CM

## 2025-02-07 LAB
BACTERIA #/AREA URNS HPF: ABNORMAL /HPF
BILIRUB UR QL STRIP: NEGATIVE
CLARITY UR: CLEAR
COLOR UR: YELLOW
CREAT UR-MCNC: 47 MG/DL (ref 23–375)
GLUCOSE UR QL STRIP: NEGATIVE
HGB UR QL STRIP: ABNORMAL
KETONES UR QL STRIP: NEGATIVE
LEUKOCYTE ESTERASE UR QL STRIP: ABNORMAL
MICROSCOPIC COMMENT: ABNORMAL
NITRITE UR QL STRIP: POSITIVE
PH UR STRIP: 6 [PH] (ref 5–8)
PROT UR QL STRIP: NEGATIVE
PROT UR-MCNC: 12 MG/DL (ref 0–15)
PROT/CREAT UR: 0.26 MG/G{CREAT} (ref 0–0.2)
RBC #/AREA URNS HPF: 1 /HPF (ref 0–4)
SP GR UR STRIP: 1.01 (ref 1–1.03)
SQUAMOUS #/AREA URNS HPF: 1 /HPF
URN SPEC COLLECT METH UR: ABNORMAL
WBC #/AREA URNS HPF: 4 /HPF (ref 0–5)
WBC CLUMPS URNS QL MICRO: ABNORMAL

## 2025-02-07 PROCEDURE — 81000 URINALYSIS NONAUTO W/SCOPE: CPT | Performed by: INTERNAL MEDICINE

## 2025-02-07 PROCEDURE — 84156 ASSAY OF PROTEIN URINE: CPT | Performed by: INTERNAL MEDICINE

## 2025-02-12 ENCOUNTER — TELEPHONE (OUTPATIENT)
Dept: TRANSPLANT | Facility: CLINIC | Age: 70
End: 2025-02-12
Payer: COMMERCIAL

## 2025-02-12 NOTE — TELEPHONE ENCOUNTER
Patient denies any signs/symptoms of a UTI.     ----- Message from MARIBEL Benitez sent at 2/7/2025  4:12 PM CST -----    ----- Message -----  From: Lynn Cordova MD  Sent: 2/7/2025   3:43 PM CST  To: [facility] Post-Kidney Transplant Clinical    If has s/s of UTI, need CCMS urine reflex to culture (or add culture to this sample if possible).

## 2025-02-14 NOTE — PROGRESS NOTES
Kidney Post-Transplant Assessment    Referring Physician: Gabino Champion  Current Nephrologist: Gabino Champion    ORGAN: RIGHT KIDNEY  Donor Type: donation after brain death  PHS Increased Risk: yes  Cold Ischemia: 625 mins  Induction Medications: thymo    Subjective:     CC:  Reassessment of renal allograft function and management of chronic immunosuppression.    HPI:  Mr. Alves is a 69 y.o. 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  He has CKD stage 3 - GFR 30-59 and his baseline creatinine is between 1.5-1.7. He takes mycophenolate mofetil, prednisone, and tacrolimus for maintenance immunosuppression. He denies any recent hospitalizations or ER visits since his previous clinic visit.    Has already re-established with general nephrology Dr. Rizvi post transplant. Tripped playing basketball and scratched up his face recently. He feels great without complaints. Looking forward to travelling again and plans to go to [place] soon to visit sister-in-law and daughter. No problems with urination. BP at goal, no peripheral edema. Tolerating IS without issue, no diarrhea or vomiting.     Current Outpatient Medications   Medication Sig Dispense Refill    aspirin (ECOTRIN) 81 MG EC tablet Take 1 tablet (81 mg total) by mouth once daily. 30 tablet 0    atorvastatin (LIPITOR) 80 MG tablet Take 1 tablet (80 mg total) by mouth once daily. 90 tablet 3    blood sugar diagnostic (ONETOUCH ULTRA TEST) Strp Check blood sugar 3 times daily 300 each 3    blood-glucose meter Misc Use as instructed 1 each 0    blood-glucose sensor (DEXCOM G7 SENSOR) Ro 1 each by Misc.(Non-Drug; Combo Route) route every 10 days. 9 each 3    carvediloL (COREG) 12.5 MG tablet Take 1 tablet (12.5 mg total) by mouth 2 (two) times daily. 60 tablet 0    cetirizine (ZYRTEC) 10 MG tablet Take 1 tablet (10 mg total) by  "mouth once daily. 90 tablet 1    finasteride (PROSCAR) 5 mg tablet Take 1 tablet (5 mg total) by mouth once daily. 90 tablet 3    insulin degludec (TRESIBA FLEXTOUCH U-100) 100 unit/mL (3 mL) insulin pen Inject 20 Units into the skin once daily. 18 mL 3    insulin lispro (HUMALOG KWIKPEN INSULIN) 100 unit/mL pen Inject 10 Units into the skin 3 (three) times daily. Plus slide; max dose 45 units/d 15 mL 5    isosorbide dinitrate (ISORDIL) 10 MG tablet Take 2 tablets (20 mg total) by mouth 3 (three) times daily. 180 tablet 6    lancets (ONETOUCH DELICA PLUS LANCET) 33 gauge Misc Use 1 lancet to test blood sugar 3 (three) times daily. 100 each 1    magnesium oxide (MAG-OX) 400 mg (241.3 mg magnesium) tablet Take 2 tablets (800 mg total) by mouth 2 (two) times daily. 120 tablet 11    multivitamin Tab Take 1 tablet by mouth once daily.      mycophenolate (CELLCEPT) 250 mg Cap Take 4 capsules (1,000 mg total) by mouth 2 (two) times daily. 240 capsule 11    pen needle, diabetic (BD SHIRA 2ND GEN PEN NEEDLE) 32 gauge x 5/32" Ndle Use 1 pen needle to inject insulin 4 (four) times daily. 100 each 1    sacubitriL-valsartan (ENTRESTO)  mg per tablet Take 1 tablet by mouth 2 (two) times daily. 60 tablet 11    semaglutide (OZEMPIC) 1 mg/dose (2 mg/1.5 mL) PnIj Inject 1 mg into the skin every 7 days. Inject 1 mg under the skin every 7 days. 3 mL 11    sodium bicarbonate 650 MG tablet Take 2 tablets (1,300 mg total) by mouth 3 (three) times daily. 180 tablet 11    tadalafiL (CIALIS) 20 MG Tab Take 1 tablet (20 mg total) by mouth As instructed (Take 1/2 tablet or 1 tablet by mouth as needed 2 to 12 hours prior to sexual activity. Take the lowest effective dose.). 10 tablet 11    tamsulosin (FLOMAX) 0.4 mg Cap Take 1 capsule (0.4 mg total) by mouth once daily. 30 capsule 0    predniSONE (DELTASONE) 5 MG tablet Take 1 tablet (5 mg total) by mouth once daily. 90 tablet 3    spironolactone (ALDACTONE) 25 MG tablet Take 0.5 tablets " (12.5 mg total) by mouth once daily. 45 tablet 3    tacrolimus (PROGRAF) 1 MG Cap Take 5 capsules (5 mg total) by mouth every morning AND 4 capsules (4 mg total) every evening. 270 capsule 11     No current facility-administered medications for this visit.       Past Medical History:   Diagnosis Date    Abnormal nuclear stress test 8/30/2022    Acute hypoxemic respiratory failure due to COVID-19 1/21/2021    Acute on chronic combined systolic and diastolic congestive heart failure 5/7/2023    Acute on chronic respiratory failure 5/7/2023    Acute respiratory failure with hypoxia and hypercapnia 5/18/2023    Anal itching 10/10/2022    Anemia 4/16/2014    BPH (benign prostatic hyperplasia)     CKD (chronic kidney disease) stage 4, GFR 15-29 ml/min     Coronary artery disease of native artery of native heart with stable angina pectoris 4/29/2019    Diabetes mellitus     Diabetes mellitus, type 2     Dilated cardiomyopathy 8/30/2022    Elevated PSA     Encounter for blood transfusion     Herpes labialis     Hyperlipidemia     Hypertension     Hypertensive emergency 5/18/2023    Obesity     Pneumonia 5/7/2023    Pneumonia due to COVID-19 virus     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   Eyes:  Negative for visual disturbance.   Respiratory:  Negative for cough and shortness of breath.    Cardiovascular:  Negative for chest pain and leg swelling.   Gastrointestinal:  Negative for abdominal pain, constipation, diarrhea and nausea.   Genitourinary:  Negative for difficulty urinating, frequency and hematuria.   Musculoskeletal:  Negative for arthralgias and myalgias.   Skin:  Negative for wound.   Allergic/Immunologic: Positive for immunocompromised state.   Neurological:  Negative for  "weakness.   Psychiatric/Behavioral:  Negative for sleep disturbance.        Objective:     Blood pressure (!) 141/67, pulse 77, temperature 97.3 °F (36.3 °C), temperature source Tympanic, resp. rate 18, height 6' 2" (1.88 m), weight 117.6 kg (259 lb 4.2 oz), SpO2 96%.body mass index is 33.29 kg/m².    Physical Exam  Vitals and nursing note reviewed.   Constitutional:       Appearance: Normal appearance.   Cardiovascular:      Rate and Rhythm: Normal rate and regular rhythm.      Heart sounds: Normal heart sounds.   Pulmonary:      Effort: Pulmonary effort is normal.      Breath sounds: Normal breath sounds.   Abdominal:      General: There is no distension.   Musculoskeletal:         General: Normal range of motion.   Skin:     General: Skin is warm and dry.      Comments: Abrasions mouth, nose   Neurological:      Mental Status: He is alert.         Labs:  Lab Results   Component Value Date    WBC 7.12 02/07/2025    HGB 13.1 (L) 02/07/2025    HCT 43.6 02/07/2025     02/07/2025    K 4.6 02/07/2025     02/07/2025    CO2 24 02/07/2025    BUN 23 02/07/2025    CREATININE 1.5 (H) 02/07/2025    EGFRNORACEVR 50.4 (A) 02/07/2025    CALCIUM 9.4 02/07/2025    PHOS 3.1 02/07/2025    MG 1.5 (L) 02/07/2025    ALBUMIN 3.8 02/07/2025    ALBUMIN 3.8 02/07/2025    AST 20 02/07/2025    ALT 10 02/07/2025    UTPCR 0.26 (H) 02/07/2025    .7 (H) 10/09/2024    TACROLIMUS 7.9 02/07/2025     Labs were reviewed with the patient.    Assessment:     1. S/P kidney transplant    2. Diabetes mellitus type 2 with complications    3. Immunodeficiency due to long term immunosuppressive drug therapy [D84.821, T45.1X5A, Z79.899]    4. Hypertension associated with diabetes    5. Stage 3a chronic kidney disease    6. At risk for opportunistic infections    7. Kidney transplant recipient      Plan:   Continue follow up with general nephrology for CKD care  IS refills x 1 year provided  Stressed importance of routine cancer screenings " (PSA, colonoscopy, skin cancer screenings) while on IS        1. Immunosuppression: Prograf trough 7.9, which is therapeutic. Continue Prograf 5/4, MMF 1000 mg BID, and Prednisone 5 mg QD. Will continue to monitor for drug toxicities    2. Allograft Function: Stable. Continue good oral hydration.   - ESRD secondary to DM2 s/p donation after brain death kidney transplant on 2/18/24 (Thymo induction, CMV ++).  - baseline creatinine is between 1.5-1.7.  Lab Results   Component Value Date    CREATININE 1.5 (H) 02/07/2025       3. Hypertension management:   - advise low salt diet and home BP monitoring      4. Hypomagnesemia    - MagOx 800 mg BID   - high magnesium diet      5. Acidosis   - sodium bicarbonate 1300 mg TID    6. Anemia: stable. No need for intervention   Lab Results   Component Value Date    WBC 7.12 02/07/2025    HGB 13.1 (L) 02/07/2025    HCT 43.6 02/07/2025    MCV 85 02/07/2025     02/07/2025         7. Proteinuria: continue p/c ratio as per guidelines    - last UPC 0.26    8. BK virus infection screening:  BK PCR per protocol    - last PCR not detected    9. Weight education: provided during the clinic visit   Body mass index is 33.29 kg/m².     10. Patient safety education regarding immunosuppression including prophylaxis posttransplant for CMV, PCP            Follow-up:   Clinic: return to transplant clinic weekly for the first month after transplant; every 2 weeks during months 2-3; then at 6-, 9-, 12-, 18-, 24-, and 36- months post-transplant to reassess for complications from immunosuppression toxicity and monitor for rejection.  Annually thereafter.    Labs: since patient remains at high risk for rejection and drug-related complications that warrant close monitoring, labs will be ordered as follows: continue twice weekly CBC, renal panel, and drug level for first month; then same labs once weekly through 3rd month post-transplant.  Urine for UA and protein/creatinine ratio monthly.  Serum  BK - PCR at 1-, 3-, 6-, 9-, 12-, 18-, 24-, 36- 48-, and 60 months post-transplant.  Hepatic panel at 1-, 2-, 3-, 6-, 9-, 12-, 18-, 24-, and 36- months post-transplant.    Jasmina Cortez, NP

## 2025-02-17 ENCOUNTER — OFFICE VISIT (OUTPATIENT)
Dept: TRANSPLANT | Facility: CLINIC | Age: 70
End: 2025-02-17
Payer: COMMERCIAL

## 2025-02-17 ENCOUNTER — LAB VISIT (OUTPATIENT)
Dept: LAB | Facility: HOSPITAL | Age: 70
End: 2025-02-17
Attending: INTERNAL MEDICINE
Payer: COMMERCIAL

## 2025-02-17 VITALS
BODY MASS INDEX: 33.27 KG/M2 | HEART RATE: 77 BPM | SYSTOLIC BLOOD PRESSURE: 141 MMHG | WEIGHT: 259.25 LBS | DIASTOLIC BLOOD PRESSURE: 67 MMHG | RESPIRATION RATE: 18 BRPM | HEIGHT: 74 IN | OXYGEN SATURATION: 96 % | TEMPERATURE: 97 F

## 2025-02-17 DIAGNOSIS — E11.59 HYPERTENSION ASSOCIATED WITH DIABETES: ICD-10-CM

## 2025-02-17 DIAGNOSIS — N18.31 STAGE 3A CHRONIC KIDNEY DISEASE: ICD-10-CM

## 2025-02-17 DIAGNOSIS — I15.2 HYPERTENSION ASSOCIATED WITH DIABETES: ICD-10-CM

## 2025-02-17 DIAGNOSIS — Z91.89 AT RISK FOR OPPORTUNISTIC INFECTIONS: ICD-10-CM

## 2025-02-17 DIAGNOSIS — Z94.0 KIDNEY TRANSPLANT RECIPIENT: ICD-10-CM

## 2025-02-17 DIAGNOSIS — E11.8 DIABETES MELLITUS TYPE 2 WITH COMPLICATIONS: ICD-10-CM

## 2025-02-17 DIAGNOSIS — Z94.0 S/P KIDNEY TRANSPLANT: Primary | ICD-10-CM

## 2025-02-17 DIAGNOSIS — Z79.899 IMMUNODEFICIENCY DUE TO LONG TERM IMMUNOSUPPRESSIVE DRUG THERAPY: ICD-10-CM

## 2025-02-17 DIAGNOSIS — D84.821 IMMUNODEFICIENCY DUE TO LONG TERM IMMUNOSUPPRESSIVE DRUG THERAPY: ICD-10-CM

## 2025-02-17 DIAGNOSIS — Z94.0 KIDNEY REPLACED BY TRANSPLANT: ICD-10-CM

## 2025-02-17 DIAGNOSIS — T45.1X5A IMMUNODEFICIENCY DUE TO LONG TERM IMMUNOSUPPRESSIVE DRUG THERAPY: ICD-10-CM

## 2025-02-17 LAB
HBV SURFACE AG SERPL QL IA: NORMAL
HCV AB SERPL QL IA: NORMAL

## 2025-02-17 PROCEDURE — 86803 HEPATITIS C AB TEST: CPT | Performed by: INTERNAL MEDICINE

## 2025-02-17 PROCEDURE — 87340 HEPATITIS B SURFACE AG IA: CPT | Performed by: INTERNAL MEDICINE

## 2025-02-17 PROCEDURE — 87517 HEPATITIS B DNA QUANT: CPT | Performed by: INTERNAL MEDICINE

## 2025-02-17 PROCEDURE — 36415 COLL VENOUS BLD VENIPUNCTURE: CPT | Performed by: INTERNAL MEDICINE

## 2025-02-17 PROCEDURE — 87522 HEPATITIS C REVRS TRNSCRPJ: CPT | Performed by: INTERNAL MEDICINE

## 2025-02-17 RX ORDER — TACROLIMUS 1 MG/1
CAPSULE ORAL
Qty: 270 CAPSULE | Refills: 11 | Status: SHIPPED | OUTPATIENT
Start: 2025-02-17 | End: 2026-02-17

## 2025-02-17 RX ORDER — PREDNISONE 5 MG/1
5 TABLET ORAL DAILY
Qty: 90 TABLET | Refills: 3 | Status: SHIPPED | OUTPATIENT
Start: 2025-02-17

## 2025-02-17 NOTE — LETTER
February 17, 2025        Gabino Champion             [facility]  [address]  [facility] [address]  Phone: [phone]   Patient: Isidro Alves   MR Number: [id number]   YOB: 1955   Date of Visit: 2/17/2025       Dear Dr. Gabino Champion    Thank you for referring Isidro Alves to me for evaluation. Attached you will find relevant portions of my assessment and plan of care.    If you have questions, please do not hesitate to call me. I look forward to following Isidro Alves along with you.    Sincerely,    Jasmina Cortez, JES    Enclosure    If you would like to receive this communication electronically, please contact [email or web address] or [phone] to request [email or web address] Link access.    [email or web address] Link is a tool which provides read-only access to select patient information with whom you have a relationship. Its easy to use and provides real time access to review your patients record including encounter summaries, notes, results, and demographic information.    If you feel you have received this communication in error or would no longer like to receive these types of communications, please e-mail [email or web address]

## 2025-02-18 LAB
HBV DNA SERPL NAA+PROBE-ACNC: NOT DETECTED IU/ML
HCV RNA SERPL QL NAA+PROBE: NOT DETECTED
HCV RNA SPEC NAA+PROBE-ACNC: NOT DETECTED IU/ML
HEPATITIS B VIRUS DNA: NORMAL

## 2025-02-20 ENCOUNTER — TELEPHONE (OUTPATIENT)
Dept: CARDIOLOGY | Facility: CLINIC | Age: 70
End: 2025-02-20
Payer: COMMERCIAL

## 2025-02-20 NOTE — TELEPHONE ENCOUNTER
Spoke with pt in regards to rescheduling echo appt.                         ----- Message from Jasmin sent at 2/20/2025  9:03 AM CST -----  Contact: [phone]  Type:  Sooner Apoointment RequestCaller is requesting a sooner appointment.  Caller declined first available appointment listed below.  Caller will not accept being placed on the waitlist and is requesting a message be sent to doctor.Name of Caller:KENNEY TEJEDA [[id number]]When is the first available appointment?need to reschedule appointmentSymptoms:TRANSTHORACIC ECHO COMPLETE CARDIOVASCULAR, [facility] Would the patient rather a call back or a response via [facility]? Call [facility] Call Back Number: [phone] Information: [id number] [id number]

## 2025-02-21 ENCOUNTER — PATIENT MESSAGE (OUTPATIENT)
Dept: TRANSPLANT | Facility: CLINIC | Age: 70
End: 2025-02-21
Payer: COMMERCIAL

## 2025-02-21 DIAGNOSIS — Z94.0 KIDNEY REPLACED BY TRANSPLANT: Primary | ICD-10-CM

## 2025-02-24 ENCOUNTER — LAB VISIT (OUTPATIENT)
Dept: LAB | Facility: HOSPITAL | Age: 70
End: 2025-02-24
Attending: INTERNAL MEDICINE
Payer: COMMERCIAL

## 2025-02-24 DIAGNOSIS — Z94.0 KIDNEY REPLACED BY TRANSPLANT: ICD-10-CM

## 2025-02-24 LAB
ALBUMIN SERPL BCP-MCNC: 3.8 G/DL (ref 3.5–5.2)
ALP SERPL-CCNC: 70 U/L (ref 40–150)
ALT SERPL W/O P-5'-P-CCNC: 30 U/L (ref 10–44)
AST SERPL-CCNC: 60 U/L (ref 10–40)
BILIRUB DIRECT SERPL-MCNC: 0.4 MG/DL (ref 0.1–0.3)
BILIRUB SERPL-MCNC: 1.3 MG/DL (ref 0.1–1)
PROT SERPL-MCNC: 7.1 G/DL (ref 6–8.4)

## 2025-02-24 PROCEDURE — 36415 COLL VENOUS BLD VENIPUNCTURE: CPT | Performed by: INTERNAL MEDICINE

## 2025-02-24 PROCEDURE — 86706 HEP B SURFACE ANTIBODY: CPT | Mod: 91 | Performed by: INTERNAL MEDICINE

## 2025-02-24 PROCEDURE — 80076 HEPATIC FUNCTION PANEL: CPT | Performed by: INTERNAL MEDICINE

## 2025-02-25 ENCOUNTER — PATIENT MESSAGE (OUTPATIENT)
Dept: TRANSPLANT | Facility: CLINIC | Age: 70
End: 2025-02-25
Payer: COMMERCIAL

## 2025-02-25 ENCOUNTER — RESULTS FOLLOW-UP (OUTPATIENT)
Dept: TRANSPLANT | Facility: HOSPITAL | Age: 70
End: 2025-02-25
Payer: COMMERCIAL

## 2025-02-25 LAB
HBV SURFACE AB SER-ACNC: >1000 MIU/ML
HBV SURFACE AB SER-ACNC: REACTIVE M[IU]/ML

## 2025-02-25 NOTE — TELEPHONE ENCOUNTER
Patient reports that over the past month he has taken extra strength tylenol 3-4 times and each time took 4 tablets. Advised patient to back off on Tylenol usage if possible and ensure he is staying under max mg per 24 hours. Will recheck LFTs in a month.    ----- Message from Lynn Cordova MD sent at 2/25/2025  1:08 PM CST -----  AST and ALT elevated.  Repeat hepatic panel in a month  Ask him to avoid OTC, excess tylenol  Encourage re-establish with nephrology  ----- Message -----  From: New, [facility]  Sent: 2/24/2025  11:21 PM CST  To: Lynn Cordova MD

## 2025-03-04 DIAGNOSIS — N40.0 BENIGN NON-NODULAR PROSTATIC HYPERPLASIA WITHOUT LOWER URINARY TRACT SYMPTOMS: ICD-10-CM

## 2025-03-04 DIAGNOSIS — Z94.0 S/P KIDNEY TRANSPLANT: ICD-10-CM

## 2025-03-05 DIAGNOSIS — Z94.0 S/P KIDNEY TRANSPLANT: ICD-10-CM

## 2025-03-05 RX ORDER — TAMSULOSIN HYDROCHLORIDE 0.4 MG/1
0.4 CAPSULE ORAL DAILY
Qty: 90 CAPSULE | Refills: 0 | Status: SHIPPED | OUTPATIENT
Start: 2025-03-05

## 2025-03-05 RX ORDER — ASPIRIN 81 MG/1
81 TABLET ORAL DAILY
Qty: 30 TABLET | Refills: 0 | Status: CANCELLED | OUTPATIENT
Start: 2025-03-05 | End: 2025-04-04

## 2025-03-05 NOTE — TELEPHONE ENCOUNTER
Care Due:                  Date            Visit Type   Department     Provider  --------------------------------------------------------------------------------                                EP -                              PRIMARY      [facility] INTERNAL  Last Visit: 05-      CARE ([facility])   MEDICINE       Steven Morgan  Next Visit: None Scheduled  None         None Found                                                            Last  Test          Frequency    Reason                     Performed    Due Date  --------------------------------------------------------------------------------    Lipid Panel.  12 months..  atorvastatin.............  05- 05-    [facility] Embedded Care Due Messages. Reference number: [id number].   3/05/2025 1:38:23 PM CST

## 2025-03-05 NOTE — TELEPHONE ENCOUNTER
Refill Routing Note   Medication(s) are not appropriate for processing by [facility] for the following reason(s):        Outside of protocol    [facility] action(s):  Approve  Route   Requires labs : Yes             Appointments  past 12m or future 3m with PCP    Date Provider   Last Visit   5/16/2024 Steven Morgan MD   Next Visit   Visit date not found Steven Morgan MD   ED visits in past 90 days: 0        Note composed:1:39 PM 03/05/2025

## 2025-03-10 PROBLEM — E66.811 CLASS 1 OBESITY WITH SERIOUS COMORBIDITY AND BODY MASS INDEX (BMI) OF 33.0 TO 33.9 IN ADULT: Status: ACTIVE | Noted: 2025-03-10

## 2025-03-10 PROBLEM — E66.9 TYPE 2 DIABETES MELLITUS WITH OBESITY: Status: ACTIVE | Noted: 2025-03-10

## 2025-03-10 PROBLEM — E66.01 SEVERE OBESITY (BMI 35.0-39.9) WITH COMORBIDITY: Status: RESOLVED | Noted: 2024-03-06 | Resolved: 2025-03-10

## 2025-03-10 PROBLEM — N25.81 SECONDARY HYPERPARATHYROIDISM, RENAL: Status: ACTIVE | Noted: 2025-03-10

## 2025-03-10 PROBLEM — J90 PLEURAL EFFUSION ON LEFT: Status: RESOLVED | Noted: 2023-05-07 | Resolved: 2025-03-10

## 2025-03-10 PROBLEM — R78.81 GRAM-NEGATIVE BACTEREMIA: Status: RESOLVED | Noted: 2021-01-24 | Resolved: 2025-03-10

## 2025-03-10 PROBLEM — N30.00 ACUTE CYSTITIS: Status: RESOLVED | Noted: 2024-05-02 | Resolved: 2025-03-10

## 2025-03-10 PROBLEM — T45.1X5A IMMUNODEFICIENCY DUE TO LONG TERM IMMUNOSUPPRESSIVE DRUG THERAPY: Status: ACTIVE | Noted: 2024-02-19

## 2025-03-10 PROBLEM — E11.69 TYPE 2 DIABETES MELLITUS WITH OBESITY: Status: ACTIVE | Noted: 2025-03-10

## 2025-03-10 PROBLEM — R80.8 OTHER PROTEINURIA: Chronic | Status: RESOLVED | Noted: 2018-10-11 | Resolved: 2025-03-10

## 2025-03-10 PROBLEM — Z79.60 IMMUNODEFICIENCY DUE TO LONG TERM IMMUNOSUPPRESSIVE DRUG THERAPY: Status: ACTIVE | Noted: 2024-02-19

## 2025-03-10 PROBLEM — Z79.899 IMMUNODEFICIENCY DUE TO LONG TERM IMMUNOSUPPRESSIVE DRUG THERAPY: Status: ACTIVE | Noted: 2024-02-19

## 2025-03-10 PROBLEM — R05.8 UPPER AIRWAY COUGH SYNDROME: Status: RESOLVED | Noted: 2022-03-03 | Resolved: 2025-03-10

## 2025-03-10 PROBLEM — E66.3 OVERWEIGHT WITH BODY MASS INDEX (BMI) OF 29 TO 29.9 IN ADULT: Status: RESOLVED | Noted: 2023-07-06 | Resolved: 2025-03-10

## 2025-03-10 PROBLEM — D84.821 IMMUNODEFICIENCY DUE TO LONG TERM IMMUNOSUPPRESSIVE DRUG THERAPY: Status: ACTIVE | Noted: 2024-02-19

## 2025-03-11 DIAGNOSIS — Z94.0 S/P KIDNEY TRANSPLANT: ICD-10-CM

## 2025-03-11 PROBLEM — Z79.4 CONTROLLED TYPE 2 DIABETES MELLITUS WITH STAGE 3 CHRONIC KIDNEY DISEASE, WITH LONG-TERM CURRENT USE OF INSULIN: Status: RESOLVED | Noted: 2024-05-16 | Resolved: 2025-03-11

## 2025-03-11 PROBLEM — E11.22 CONTROLLED TYPE 2 DIABETES MELLITUS WITH STAGE 3 CHRONIC KIDNEY DISEASE, WITH LONG-TERM CURRENT USE OF INSULIN: Status: RESOLVED | Noted: 2024-05-16 | Resolved: 2025-03-11

## 2025-03-11 PROBLEM — N18.30 CONTROLLED TYPE 2 DIABETES MELLITUS WITH STAGE 3 CHRONIC KIDNEY DISEASE, WITH LONG-TERM CURRENT USE OF INSULIN: Status: RESOLVED | Noted: 2024-05-16 | Resolved: 2025-03-11

## 2025-03-11 RX ORDER — ASPIRIN 81 MG/1
81 TABLET ORAL DAILY
Qty: 30 TABLET | Refills: 0 | Status: CANCELLED | OUTPATIENT
Start: 2025-03-11 | End: 2025-04-10

## 2025-03-12 ENCOUNTER — HOSPITAL ENCOUNTER (OUTPATIENT)
Dept: CARDIOLOGY | Facility: HOSPITAL | Age: 70
Discharge: HOME | End: 2025-03-12
Attending: INTERNAL MEDICINE
Payer: COMMERCIAL

## 2025-03-12 ENCOUNTER — OFFICE VISIT (OUTPATIENT)
Dept: INTERNAL MEDICINE | Facility: CLINIC | Age: 70
End: 2025-03-12
Payer: COMMERCIAL

## 2025-03-12 VITALS
DIASTOLIC BLOOD PRESSURE: 67 MMHG | DIASTOLIC BLOOD PRESSURE: 80 MMHG | RESPIRATION RATE: 20 BRPM | BODY MASS INDEX: 33.67 KG/M2 | WEIGHT: 262 LBS | SYSTOLIC BLOOD PRESSURE: 141 MMHG | WEIGHT: 262.38 LBS | HEIGHT: 74 IN | BODY MASS INDEX: 33.62 KG/M2 | HEART RATE: 72 BPM | HEIGHT: 74 IN | SYSTOLIC BLOOD PRESSURE: 146 MMHG

## 2025-03-12 DIAGNOSIS — Z00.00 ENCOUNTER FOR MEDICARE ANNUAL WELLNESS EXAM: Primary | ICD-10-CM

## 2025-03-12 DIAGNOSIS — E87.20 METABOLIC ACIDOSIS: ICD-10-CM

## 2025-03-12 DIAGNOSIS — J41.0 SIMPLE CHRONIC BRONCHITIS: ICD-10-CM

## 2025-03-12 DIAGNOSIS — D84.821 IMMUNODEFICIENCY DUE TO LONG TERM IMMUNOSUPPRESSIVE DRUG THERAPY: ICD-10-CM

## 2025-03-12 DIAGNOSIS — Z29.89 PROPHYLACTIC IMMUNOTHERAPY: ICD-10-CM

## 2025-03-12 DIAGNOSIS — E11.59 HYPERTENSION ASSOCIATED WITH DIABETES: ICD-10-CM

## 2025-03-12 DIAGNOSIS — R05.3 CHRONIC COUGH: ICD-10-CM

## 2025-03-12 DIAGNOSIS — I42.8 NICM (NONISCHEMIC CARDIOMYOPATHY): ICD-10-CM

## 2025-03-12 DIAGNOSIS — Z79.4 TYPE 2 DIABETES MELLITUS WITH STAGE 3B CHRONIC KIDNEY DISEASE, WITH LONG-TERM CURRENT USE OF INSULIN: ICD-10-CM

## 2025-03-12 DIAGNOSIS — I15.2 HYPERTENSION ASSOCIATED WITH DIABETES: ICD-10-CM

## 2025-03-12 DIAGNOSIS — N25.81 SECONDARY HYPERPARATHYROIDISM, RENAL: ICD-10-CM

## 2025-03-12 DIAGNOSIS — D63.1 ANEMIA DUE TO CHRONIC KIDNEY DISEASE, UNSPECIFIED CKD STAGE: ICD-10-CM

## 2025-03-12 DIAGNOSIS — I27.20 PULMONARY HTN: ICD-10-CM

## 2025-03-12 DIAGNOSIS — E66.9 TYPE 2 DIABETES MELLITUS WITH OBESITY: ICD-10-CM

## 2025-03-12 DIAGNOSIS — I25.118 CORONARY ARTERY DISEASE OF NATIVE ARTERY OF NATIVE HEART WITH STABLE ANGINA PECTORIS: ICD-10-CM

## 2025-03-12 DIAGNOSIS — E11.22 TYPE 2 DIABETES MELLITUS WITH STAGE 3B CHRONIC KIDNEY DISEASE, WITH LONG-TERM CURRENT USE OF INSULIN: ICD-10-CM

## 2025-03-12 DIAGNOSIS — E11.8 DIABETES MELLITUS TYPE 2 WITH COMPLICATIONS: ICD-10-CM

## 2025-03-12 DIAGNOSIS — K63.5 POLYP OF COLON, UNSPECIFIED PART OF COLON, UNSPECIFIED TYPE: ICD-10-CM

## 2025-03-12 DIAGNOSIS — N18.9 ANEMIA DUE TO CHRONIC KIDNEY DISEASE, UNSPECIFIED CKD STAGE: ICD-10-CM

## 2025-03-12 DIAGNOSIS — E04.2 MULTIPLE THYROID NODULES: ICD-10-CM

## 2025-03-12 DIAGNOSIS — E78.5 HYPERLIPIDEMIA ASSOCIATED WITH TYPE 2 DIABETES MELLITUS: ICD-10-CM

## 2025-03-12 DIAGNOSIS — N18.32 TYPE 2 DIABETES MELLITUS WITH STAGE 3B CHRONIC KIDNEY DISEASE, WITH LONG-TERM CURRENT USE OF INSULIN: ICD-10-CM

## 2025-03-12 DIAGNOSIS — Z79.899 IMMUNODEFICIENCY DUE TO LONG TERM IMMUNOSUPPRESSIVE DRUG THERAPY: ICD-10-CM

## 2025-03-12 DIAGNOSIS — I50.22 CHRONIC SYSTOLIC CONGESTIVE HEART FAILURE: ICD-10-CM

## 2025-03-12 DIAGNOSIS — R79.89 LOW VITAMIN D LEVEL: ICD-10-CM

## 2025-03-12 DIAGNOSIS — J84.10 PULMONARY FIBROSIS, POSTINFLAMMATORY: ICD-10-CM

## 2025-03-12 DIAGNOSIS — I49.3 PVC (PREMATURE VENTRICULAR CONTRACTION): ICD-10-CM

## 2025-03-12 DIAGNOSIS — I42.0 DILATED CARDIOMYOPATHY: ICD-10-CM

## 2025-03-12 DIAGNOSIS — E11.69 TYPE 2 DIABETES MELLITUS WITH OBESITY: ICD-10-CM

## 2025-03-12 DIAGNOSIS — T45.1X5A IMMUNODEFICIENCY DUE TO LONG TERM IMMUNOSUPPRESSIVE DRUG THERAPY: ICD-10-CM

## 2025-03-12 DIAGNOSIS — I70.0 AORTIC ATHEROSCLEROSIS: ICD-10-CM

## 2025-03-12 DIAGNOSIS — E29.1 HYPOGONADISM IN MALE: ICD-10-CM

## 2025-03-12 DIAGNOSIS — N40.0 BENIGN NON-NODULAR PROSTATIC HYPERPLASIA WITHOUT LOWER URINARY TRACT SYMPTOMS: ICD-10-CM

## 2025-03-12 DIAGNOSIS — E11.69 HYPERLIPIDEMIA ASSOCIATED WITH TYPE 2 DIABETES MELLITUS: ICD-10-CM

## 2025-03-12 DIAGNOSIS — Z94.0 S/P KIDNEY TRANSPLANT: ICD-10-CM

## 2025-03-12 DIAGNOSIS — E66.811 CLASS 1 OBESITY WITH SERIOUS COMORBIDITY AND BODY MASS INDEX (BMI) OF 33.0 TO 33.9 IN ADULT, UNSPECIFIED OBESITY TYPE: ICD-10-CM

## 2025-03-12 PROBLEM — Z74.09 OTHER REDUCED MOBILITY: Status: ACTIVE | Noted: 2025-03-12

## 2025-03-12 LAB
AORTIC ROOT ANNULUS: 3.46 CM
AV INDEX (PROSTH): 0.68
AV MEAN GRADIENT: 5 MMHG
AV PEAK GRADIENT: 9 MMHG
AV VALVE AREA BY VELOCITY RATIO: 2.1 CM²
AV VALVE AREA: 2.4 CM²
AV VELOCITY RATIO: 0.6
BSA FOR ECHO PROCEDURE: 2.49 M2
CV ECHO LV RWT: 0.43 CM
DOP CALC AO PEAK VEL: 1.5 M/S
DOP CALC AO VTI: 29.7 CM
DOP CALC LVOT AREA: 3.5 CM2
DOP CALC LVOT DIAMETER: 2.1 CM
DOP CALC LVOT PEAK VEL: 0.9 M/S
DOP CALC LVOT STROKE VOLUME: 69.9 CM3
DOP CALC RVOT PEAK VEL: 0.63 M/S
DOP CALC RVOT VTI: 14.1 CM
DOP CALCLVOT PEAK VEL VTI: 20.2 CM
E WAVE DECELERATION TIME: 235 MSEC
E/A RATIO: 0.67
E/E' RATIO: 10 M/S
ECHO LV POSTERIOR WALL: 1.3 CM (ref 0.6–1.1)
EJECTION FRACTION: 45 %
FRACTIONAL SHORTENING: 26.2 % (ref 28–44)
INTERVENTRICULAR SEPTUM: 1.2 CM (ref 0.6–1.1)
IVRT: 110 MSEC
LA MAJOR: 5.7 CM
LA MINOR: 5.1 CM
LA WIDTH: 3.2 CM
LEFT ATRIUM AREA SYSTOLIC (APICAL 2 CHAMBER): 22.21 CM2
LEFT ATRIUM AREA SYSTOLIC (APICAL 4 CHAMBER): 23.13 CM2
LEFT ATRIUM SIZE: 4.6 CM
LEFT ATRIUM VOLUME INDEX MOD: 30 ML/M2
LEFT ATRIUM VOLUME INDEX: 28 ML/M2
LEFT ATRIUM VOLUME MOD: 72 ML
LEFT ATRIUM VOLUME: 67 CM3
LEFT INTERNAL DIMENSION IN SYSTOLE: 4.5 CM (ref 2.1–4)
LEFT VENTRICLE DIASTOLIC VOLUME INDEX: 77.87 ML/M2
LEFT VENTRICLE DIASTOLIC VOLUME: 190 ML
LEFT VENTRICLE END SYSTOLIC VOLUME APICAL 2 CHAMBER: 69.58 ML
LEFT VENTRICLE END SYSTOLIC VOLUME APICAL 4 CHAMBER: 65.57 ML
LEFT VENTRICLE MASS INDEX: 139.7 G/M2
LEFT VENTRICLE SYSTOLIC VOLUME INDEX: 37.7 ML/M2
LEFT VENTRICLE SYSTOLIC VOLUME: 92 ML
LEFT VENTRICULAR INTERNAL DIMENSION IN DIASTOLE: 6.1 CM (ref 3.5–6)
LEFT VENTRICULAR MASS: 341 G
LV LATERAL E/E' RATIO: 8.8 M/S
LV SEPTAL E/E' RATIO: 10.6 M/S
LVED V (TEICH): 189.66 ML
LVES V (TEICH): 92.43 ML
LVOT MG: 1.94 MMHG
LVOT MV: 0.65 CM/S
MV PEAK A VEL: 0.79 M/S
MV PEAK E VEL: 0.53 M/S
MV STENOSIS PRESSURE HALF TIME: 68.03 MS
MV VALVE AREA P 1/2 METHOD: 3.23 CM2
OHS CV RV/LV RATIO: 0.31 CM
PISA TR MAX VEL: 2.2 M/S
PV MEAN GRADIENT: 1 MMHG
PV PEAK GRADIENT: 4 MMHG
PV PEAK VELOCITY: 1.01 M/S
RA MAJOR: 4.55 CM
RA PRESSURE ESTIMATED: 3 MMHG
RA WIDTH: 2.44 CM
RIGHT VENTRICLE DIASTOLIC BASEL DIMENSION: 1.9 CM
RIGHT VENTRICULAR END-DIASTOLIC DIMENSION: 1.89 CM
RV TB RVSP: 5 MMHG
SINUS: 2.94 CM
STJ: 3.13 CM
TDI LATERAL: 0.06 M/S
TDI SEPTAL: 0.05 M/S
TDI: 0.06 M/S
TR MAX PG: 20 MMHG
TV REST PULMONARY ARTERY PRESSURE: 22 MMHG
Z-SCORE OF LEFT VENTRICULAR DIMENSION IN END DIASTOLE: -6.76
Z-SCORE OF LEFT VENTRICULAR DIMENSION IN END SYSTOLE: -3.66

## 2025-03-12 PROCEDURE — 99999 PR PBB SHADOW E&M-EST. PATIENT-LVL V: CPT | Mod: PBBFAC,,, | Performed by: NURSE PRACTITIONER

## 2025-03-12 PROCEDURE — 93306 TTE W/DOPPLER COMPLETE: CPT

## 2025-03-12 PROCEDURE — 93306 TTE W/DOPPLER COMPLETE: CPT | Mod: 26,,, | Performed by: INTERNAL MEDICINE

## 2025-03-12 NOTE — PROGRESS NOTES
"  Isidro Alves presented for a  Medicare AWV and comprehensive Health Risk Assessment today. The following components were reviewed and updated:    Medical history  Family History  Social history  Allergies and Current Medications  Health Risk Assessment  Health Maintenance  Care Team         ** See Completed Assessments for Annual Wellness Visit within the encounter summary.**         The following assessments were completed:  Living Situation  CAGE  Depression Screening  Timed Get Up and Go  Whisper Test  Cognitive Function Screening  Nutrition Screening  ADL Screening  PAQ Screening      Opioid documentation:      Patient does not have a current opioid prescription.        Vitals:    03/12/25 1113   BP: (!) 146/80   BP Location: Right arm   Patient Position: Sitting   Pulse: 72   Resp: 20   Weight: 119 kg (262 lb 5.6 oz)   Height: 6' 2" (1.88 m)     Body mass index is 33.68 kg/m².  Physical Exam  Constitutional:       General: He is not in acute distress.     Appearance: He is not ill-appearing or diaphoretic.   HENT:      Mouth/Throat:      Mouth: Mucous membranes are moist.   Eyes:      General:         Right eye: No discharge.         Left eye: No discharge.      Conjunctiva/sclera: Conjunctivae normal.   Cardiovascular:      Rate and Rhythm: Normal rate and regular rhythm.   Pulmonary:      Effort: Pulmonary effort is normal. No respiratory distress.      Breath sounds: Normal breath sounds.   Skin:     General: Skin is warm and dry.   Neurological:      Mental Status: He is alert and oriented to person, place, and time. Mental status is at baseline.      Gait: Gait abnormal.   Psychiatric:         Mood and Affect: Mood normal.         Behavior: Behavior normal.         Thought Content: Thought content normal.         Judgment: Judgment normal.     Diagnoses and health risks identified today and associated recommendations/orders:    1. Encounter for Medicare annual wellness exam  Review for opioid " screening: Patient does not have Rx for Opioids  Review for substance use disorder: Patient does not use substance per chart    Patient states he drinks alcohol- 2 drinks a week    I offered to discuss advanced care planning, including how to pick a person who would make decisions for you if you were unable to make them for yourself, called a health care power of , and what kind of decisions you might make such as use of life sustaining treatments such as ventilators and tube feeding when faced with a life limiting illness recorded on a living will that they will need to know. (How you want to be cared for as you near the end of your natural life)     X Patient is interested in learning more about how to make advanced directives.  I provided them paperwork and offered to discuss this with them.    - Microalbumin/Creatinine Ratio, Urine; Future    2. Class 1 obesity with serious comorbidity and body mass index (BMI) of 33.0 to 33.9 in adult, unspecified obesity type,  Diabetes mellitus type 2 with complications, Type 2 diabetes mellitus with obesity  Monitor  Follow up with PCP  Continue home tresiba, humalog, ozempic  Increased activity and decreased po intake encouraged   Lab Results   Component Value Date    HGBA1C 7.8 (H) 01/24/2025     Discussed with patient importance of getting his blood sugars under better control, especially due to his CKD. Explained to patient that his A1C needs to be between 6-7. Patient verbalized understanding.     3. Polyp of colon, unspecified part of colon, unspecified type    Monitor  Follow up with GI as directed    4. Aortic atherosclerosis, Coronary artery disease of native artery of native heart with stable angina pectoris, Hyperlipidemia associated with type 2 diabetes mellitus  Monitor  Follow up with Cardiology as directed  Continue home asa, lipitor, isordil    5. Simple chronic bronchitis, Pulmonary HTN, Pulmonary fibrosis, postinflammatory, Chronic  cough  Monitor  Follow up with Pulmonology as directed  Continue home zyrtec    6. Hypertension associated with diabetes  Monitor  Stable  Continue home coreg, entresto    7. S/P kidney transplant  on February 18, 2024, Immunodeficiency due to long term immunosuppressive drug therapy, Type 2 diabetes mellitus with stage 3b chronic kidney disease, with long-term current use of insulin, Secondary hyperparathyroidism, renal, Metabolic acidosis, Anemia due to chronic kidney disease, unspecified CKD stage, Prophylactic immunotherapy   Monitor  Follow up with Nephrology and Transplant team as directed  Continue home cellcept, prednisone, na bicarbonate, prograf  - Microalbumin/Creatinine Ratio, Urine; Future    8. Benign non-nodular prostatic hyperplasia without lower urinary tract symptoms, Hypogonadism in male  Monitor  Follow up with Urology as directed  Continue home proscar, flomax, cialis    9. Chronic systolic congestive heart failure, Dilated cardiomyopathy, NICM (nonischemic cardiomyopathy),  PVC (premature ventricular contraction)  Monitor  Follow up with Cardiology as directed  Continue home entresto    10. Hx Low vitamin D level  Monitor  Follow up with PCP  Continue home MVI    11. Multiple thyroid nodules  Monitor  Follow up with PCP                                        Provided Isidro with a 5-10 year written screening schedule and personal prevention plan. Recommendations were developed using the USPSTF age appropriate recommendations. Education, counseling, and referrals were provided as needed. After Visit Summary printed and given to patient which includes a list of additional screenings\tests needed.    Follow up in about 1 year (around 3/12/2026) for Annual wellness visit.    KAYE Garcia

## 2025-03-12 NOTE — PATIENT INSTRUCTIONS
Counseling and Referral of Other Preventative  (Italic type indicates deductible and co-insurance are waived)    Patient Name: Isidro Alves  Today's Date: 3/12/2025    Health Maintenance       Date Due Completion Date    Shingles Vaccine (1 of 2) Never done ---    RSV Vaccine (Age 60+ and Pregnant patients) (1 - Risk 60-74 years 1-dose series) Never done ---    Pneumococcal Vaccines (Age 50+) (3 of 3 - PPSV23, PCV20 or PCV21) 04/06/2022 2/9/2022    Diabetes Urine Screening 07/14/2023 7/14/2022    Influenza Vaccine (1) 09/01/2024 10/7/2022    COVID-19 Vaccine (4 - 2024-25 season) 09/01/2024 11/1/2021    Lipid Panel 05/13/2025 5/13/2024    Foot Exam 05/16/2025 5/16/2024    Hemoglobin A1c 07/24/2025 1/24/2025    Override on 3/14/2016: Done    PROSTATE-SPECIFIC ANTIGEN 11/04/2025 11/4/2024    Diabetic Eye Exam 11/14/2025 11/14/2024    Colorectal Cancer Screening 02/02/2026 2/2/2023    Aspirin/Antiplatelet Therapy 03/12/2026 3/12/2025    TETANUS VACCINE 05/05/2026 5/5/2016        Orders Placed This Encounter   Procedures    Microalbumin/Creatinine Ratio, Urine       The following information is provided to all patients.  This information is to help you find resources for any of the problems found today that may be affecting your health:                  Living healthy guide: www.[facility].[place].gov      Understanding Diabetes: www.diabetes.org      Eating healthy: www.cdc.gov/healthyweight      CDC home safety checklist: www.cdc.gov/steadi/patient.html      Agency on Aging: www.goea.[place].gov      Alcoholics anonymous (AA): www.aa.org      Physical Activity: www.luann.nih.gov/zl7cmaj      Tobacco use: www.quitwithusla.org

## 2025-03-13 ENCOUNTER — TELEPHONE (OUTPATIENT)
Dept: CARDIOLOGY | Facility: CLINIC | Age: 70
End: 2025-03-13
Payer: MEDICARE

## 2025-03-13 ENCOUNTER — RESULTS FOLLOW-UP (OUTPATIENT)
Dept: CARDIOLOGY | Facility: HOSPITAL | Age: 70
End: 2025-03-13

## 2025-03-13 RX ORDER — ASPIRIN 81 MG/1
81 TABLET ORAL DAILY
Qty: 30 TABLET | Refills: 0 | Status: SHIPPED | OUTPATIENT
Start: 2025-03-13 | End: 2025-04-12

## 2025-03-13 NOTE — TELEPHONE ENCOUNTER
Contacted pt to let him know The Echo showed mildly decreased function, no change compared to prior one done 2 yrs ago.  Continue current Rx.   F/U as scheduled  Pt stated understanding.  ----- Message from Nandini sent at 3/13/2025  4:32 PM CDT -----  Contact: Isidro  Type:  Patient Returning CallWho Called: Wilfredo Left Message for Patient: April Cordova MADoes the patient know what this is regarding?: ResultsWould the patient rather a call back or a response via [facility]? Call back Best Call Back Number: Please call her at [phone] Information:

## 2025-03-13 NOTE — TELEPHONE ENCOUNTER
LVM for pt to call back in regards to test results.             ----- Message from Dez Ragland MD sent at 3/13/2025  3:57 PM CDT -----  The Echo showed mildly decreased function, no change compared to prior one done 2 yrs ago.  Continue current Rx.   F/U as scheduled    ----- Message -----  From: Kassandra Deras MD  Sent: 3/12/2025  12:40 PM CDT  To: Dez Ragland MD

## 2025-03-17 ENCOUNTER — OFFICE VISIT (OUTPATIENT)
Dept: DIABETES | Facility: CLINIC | Age: 70
End: 2025-03-17
Payer: COMMERCIAL

## 2025-03-17 ENCOUNTER — TELEPHONE (OUTPATIENT)
Dept: DIABETES | Facility: CLINIC | Age: 70
End: 2025-03-17

## 2025-03-17 VITALS
BODY MASS INDEX: 33.74 KG/M2 | DIASTOLIC BLOOD PRESSURE: 63 MMHG | HEART RATE: 78 BPM | WEIGHT: 262.81 LBS | SYSTOLIC BLOOD PRESSURE: 108 MMHG

## 2025-03-17 DIAGNOSIS — Z79.899 IMMUNODEFICIENCY DUE TO LONG TERM IMMUNOSUPPRESSIVE DRUG THERAPY: ICD-10-CM

## 2025-03-17 DIAGNOSIS — I50.22 CHRONIC SYSTOLIC CONGESTIVE HEART FAILURE: ICD-10-CM

## 2025-03-17 DIAGNOSIS — E78.5 HYPERLIPIDEMIA ASSOCIATED WITH TYPE 2 DIABETES MELLITUS: ICD-10-CM

## 2025-03-17 DIAGNOSIS — Z94.0 S/P KIDNEY TRANSPLANT: ICD-10-CM

## 2025-03-17 DIAGNOSIS — T45.1X5A IMMUNODEFICIENCY DUE TO LONG TERM IMMUNOSUPPRESSIVE DRUG THERAPY: ICD-10-CM

## 2025-03-17 DIAGNOSIS — E11.59 HYPERTENSION ASSOCIATED WITH DIABETES: ICD-10-CM

## 2025-03-17 DIAGNOSIS — E11.22 CONTROLLED TYPE 2 DIABETES MELLITUS WITH STAGE 3 CHRONIC KIDNEY DISEASE, WITH LONG-TERM CURRENT USE OF INSULIN: Primary | ICD-10-CM

## 2025-03-17 DIAGNOSIS — E66.9 OBESITY (BMI 30-39.9): ICD-10-CM

## 2025-03-17 DIAGNOSIS — I25.118 CORONARY ARTERY DISEASE OF NATIVE ARTERY OF NATIVE HEART WITH STABLE ANGINA PECTORIS: ICD-10-CM

## 2025-03-17 DIAGNOSIS — D84.821 IMMUNODEFICIENCY DUE TO LONG TERM IMMUNOSUPPRESSIVE DRUG THERAPY: ICD-10-CM

## 2025-03-17 DIAGNOSIS — Z79.4 CONTROLLED TYPE 2 DIABETES MELLITUS WITH STAGE 3 CHRONIC KIDNEY DISEASE, WITH LONG-TERM CURRENT USE OF INSULIN: Primary | ICD-10-CM

## 2025-03-17 DIAGNOSIS — E11.69 HYPERLIPIDEMIA ASSOCIATED WITH TYPE 2 DIABETES MELLITUS: ICD-10-CM

## 2025-03-17 DIAGNOSIS — I15.2 HYPERTENSION ASSOCIATED WITH DIABETES: ICD-10-CM

## 2025-03-17 DIAGNOSIS — N18.30 CONTROLLED TYPE 2 DIABETES MELLITUS WITH STAGE 3 CHRONIC KIDNEY DISEASE, WITH LONG-TERM CURRENT USE OF INSULIN: Primary | ICD-10-CM

## 2025-03-17 LAB — GLUCOSE SERPL-MCNC: 127 MG/DL (ref 70–110)

## 2025-03-17 PROCEDURE — G2211 COMPLEX E/M VISIT ADD ON: HCPCS | Mod: S$GLB,,, | Performed by: PHYSICIAN ASSISTANT

## 2025-03-17 PROCEDURE — 99214 OFFICE O/P EST MOD 30 MIN: CPT | Mod: S$GLB,,, | Performed by: PHYSICIAN ASSISTANT

## 2025-03-17 PROCEDURE — 1126F AMNT PAIN NOTED NONE PRSNT: CPT | Mod: CPTII,S$GLB,, | Performed by: PHYSICIAN ASSISTANT

## 2025-03-17 PROCEDURE — 99999 PR PBB SHADOW E&M-EST. PATIENT-LVL IV: CPT | Mod: PBBFAC,,, | Performed by: PHYSICIAN ASSISTANT

## 2025-03-17 PROCEDURE — 1101F PT FALLS ASSESS-DOCD LE1/YR: CPT | Mod: CPTII,S$GLB,, | Performed by: PHYSICIAN ASSISTANT

## 2025-03-17 PROCEDURE — 4010F ACE/ARB THERAPY RXD/TAKEN: CPT | Mod: CPTII,S$GLB,, | Performed by: PHYSICIAN ASSISTANT

## 2025-03-17 PROCEDURE — 3074F SYST BP LT 130 MM HG: CPT | Mod: CPTII,S$GLB,, | Performed by: PHYSICIAN ASSISTANT

## 2025-03-17 PROCEDURE — 3288F FALL RISK ASSESSMENT DOCD: CPT | Mod: CPTII,S$GLB,, | Performed by: PHYSICIAN ASSISTANT

## 2025-03-17 PROCEDURE — 3008F BODY MASS INDEX DOCD: CPT | Mod: CPTII,S$GLB,, | Performed by: PHYSICIAN ASSISTANT

## 2025-03-17 PROCEDURE — 3051F HG A1C>EQUAL 7.0%<8.0%: CPT | Mod: CPTII,S$GLB,, | Performed by: PHYSICIAN ASSISTANT

## 2025-03-17 PROCEDURE — 1159F MED LIST DOCD IN RCRD: CPT | Mod: CPTII,S$GLB,, | Performed by: PHYSICIAN ASSISTANT

## 2025-03-17 PROCEDURE — 3078F DIAST BP <80 MM HG: CPT | Mod: CPTII,S$GLB,, | Performed by: PHYSICIAN ASSISTANT

## 2025-03-17 PROCEDURE — 82962 GLUCOSE BLOOD TEST: CPT | Mod: S$GLB,,, | Performed by: PHYSICIAN ASSISTANT

## 2025-03-17 RX ORDER — TACROLIMUS 1 MG/1
CAPSULE ORAL
Qty: 810 CAPSULE | Refills: 3 | Status: SHIPPED | OUTPATIENT
Start: 2025-03-17 | End: 2026-03-17

## 2025-03-17 NOTE — PROGRESS NOTES
PCP: Steven Morgan MD    Subjective:     Chief Complaint: Diabetes - Established Patient    HISTORY OF PRESENT ILLNESS: 69 y.o.   male presenting for diabetes management visit.   The patient's last visit with me was on 1/8/2025.   Patient has had Type II diabetes since 20 years or more.  Pertinent to decision making is the following comorbidities: HTN, HLD, CAD, CHF, S/p Transplant - Kidney in Feb 2024, and Obesity by BMI  Patient has the following Diabetes complications: with diabetic chronic kidney disease  He  has attended diabetes education in the past.     Patient's most recent A1c of 7.8% was completed 2 months ago.   Patient states since His last A1c His blood glucose levels have been high  throughout the day .   Patient monitors blood glucose 4 times per day with meter : Fasting, Before Lunch, Before Dinner, and Before Bed. Dexcom G7 deferred.   Patient blood glucose monitoring device will not be uploaded into Media Section today.  Per pt, fasting blood sugar ranging from 91 - 138 and 120 - 128 later in the day before meals and after 148 after meals.   Patient endorses the following diabetes related symptoms:  None .   Patient is due today for the following diabetes-related health maintenance standards: Foot Exam , Urine Microalbumin/creatinine ratio, Influenza Vaccine, COVID-19 Vaccine , and vaccines .   He denies recent hospital admissions or emergency room visits.   He denies having hypoglycemia.   Patient's concerns today include glycemic control. Of note, pt feels he has had weight loss and tolerating well.   Patient medication regimen is as below.     CURRENT DM MEDICATIONS:   Tresiba 15 units in the morning  Lispro correction dosing every 3 hours as needed  Ozempic 1 mg once per week     Lispro Correction Dosing every 3 hours as needed OUTSIDE OF EATING  If  - 250, may take 2 units of Lispro  If  - 300, may take 3 units of Lispro  If  - 350, may take 4 units of  Lispro  If  - 400, may take 5 units of Lispro  If +, may take 6 units of Lispro    Patient has failed the following Diabetes medications:   Metformin    Xultophy  Semglee  Lantus      Labs Reviewed.       Lab Results   Component Value Date    CPEPTIDE 3.88 11/20/2017     Lab Results   Component Value Date    GLUTAMICACID 0.00 11/20/2017          //   , There is no height or weight on file to calculate BMI.  His blood sugar in clinic today is:    Lab Results   Component Value Date    POCGLU 122 (A) 08/19/2024       Review of Systems   Constitutional:  Negative for activity change, appetite change, chills and fever.   HENT:  Negative for dental problem, mouth sores, nosebleeds, sore throat and trouble swallowing.    Eyes:  Negative for pain and discharge.   Respiratory:  Negative for shortness of breath, wheezing and stridor.    Cardiovascular:  Negative for chest pain, palpitations and leg swelling.   Gastrointestinal:  Negative for abdominal pain, diarrhea, nausea and vomiting.   Endocrine: Negative for polydipsia, polyphagia and polyuria.   Genitourinary:  Negative for dysuria, frequency and urgency.   Musculoskeletal:  Negative for joint swelling and myalgias.   Skin:  Negative for rash and wound.   Neurological:  Negative for dizziness, syncope, weakness and headaches.   Psychiatric/Behavioral:  Negative for behavioral problems and dysphoric mood.          Diabetes Management Status  Statin: Taking  ACE/ARB: Taking    Screening or Prevention Patient's value Goal Complete/Controlled?   HgA1C Testing and Control   Lab Results   Component Value Date    HGBA1C 7.8 (H) 01/24/2025      Annually/Less than 8% Yes   Lipid profile : 05/13/2024 Annually Yes   LDL control Lab Results   Component Value Date    LDLCALC 115.6 05/13/2024    Annually/Less than 100 mg/dl  Yes   Nephropathy screening Lab Results   Component Value Date    MICALBCREAT 1998.1 (H) 07/14/2022     Lab Results   Component Value Date     PROTEINUA Negative 02/07/2025    Annually Yes   Blood pressure BP Readings from Last 1 Encounters:   03/12/25 (!) 141/67    Less than 140/90 No   Dilated retinal exam : 11/14/2024 Annually No    Foot exam   : 05/16/2024 Annually Yes     Social History     Socioeconomic History    Marital status:     Number of children: 3   Occupational History     Employer: self-rmployed   Tobacco Use    Smoking status: Never     Passive exposure: Never    Smokeless tobacco: Never   Substance and Sexual Activity    Alcohol use: Not Currently     Comment: seldom     Drug use: No    Sexual activity: Not Currently     Partners: Female   Social History Narrative    ** Merged History Encounter **          Social Drivers of Health     Financial Resource Strain: Low Risk  (3/12/2025)    Overall Financial Resource Strain (CARDIA)     Difficulty of Paying Living Expenses: Not very hard   Food Insecurity: No Food Insecurity (3/12/2025)    Hunger Vital Sign     Worried About Running Out of Food in the Last Year: Never true     Ran Out of Food in the Last Year: Never true   Transportation Needs: No Transportation Needs (3/12/2025)    PRAPARE - Transportation     Lack of Transportation (Medical): No     Lack of Transportation (Non-Medical): No   Physical Activity: Insufficiently Active (3/12/2025)    Exercise Vital Sign     Days of Exercise per Week: 3 days     Minutes of Exercise per Session: 20 min   Stress: No Stress Concern Present (3/12/2025)    [employer] - Occupational Stress Questionnaire     Feeling of Stress : Not at all   Housing Stability: Low Risk  (3/12/2025)    Housing Stability Vital Sign     Unable to Pay for Housing in the Last Year: No     Homeless in the Last Year: No     Past Medical History:   Diagnosis Date    Abnormal nuclear stress test 8/30/2022    Acute hypoxemic respiratory failure due to COVID-19 1/21/2021    Acute on chronic combined systolic and diastolic congestive heart  failure 5/7/2023    Acute on chronic respiratory failure 5/7/2023    Acute respiratory failure with hypoxia and hypercapnia 5/18/2023    Anal itching 10/10/2022    Anemia 4/16/2014    BPH (benign prostatic hyperplasia)     CKD (chronic kidney disease) stage 4, GFR 15-29 ml/min     Coronary artery disease of native artery of native heart with stable angina pectoris 4/29/2019    Diabetes mellitus     Diabetes mellitus, type 2     Dilated cardiomyopathy 8/30/2022    Elevated PSA     Encounter for blood transfusion     Herpes labialis     Hyperlipidemia     Hypertension     Hypertensive emergency 5/18/2023    Obesity     Pneumonia 5/7/2023    Pneumonia due to COVID-19 virus     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     Appearance: He is well-developed. He is not diaphoretic.   HENT:      Head: Normocephalic and atraumatic.      Right Ear: External ear normal.      Left Ear: External ear normal.      Nose: Nose normal.   Eyes:      General:         Right eye: No discharge.         Left eye: No discharge.      Pupils: Pupils are equal, round, and reactive to light.   Cardiovascular:      Rate and Rhythm: Normal rate and regular rhythm.      Heart sounds: Normal heart sounds.   Pulmonary:      Effort: Pulmonary effort is normal. No respiratory distress.      Breath sounds: Normal breath sounds. No stridor.   Abdominal:      Palpations: Abdomen is soft.   Musculoskeletal:         General: Normal range of motion.      Cervical back: Normal range of motion and neck supple.   Skin:     General: Skin is warm and dry.      Capillary Refill: Capillary refill takes less than 2 seconds.      Coloration: Skin is not pale.   Neurological:      Mental Status: He is alert and oriented  to person, place, and time.      Motor: No abnormal muscle tone.      Coordination: Coordination normal.   Psychiatric:         Behavior: Behavior normal.         Thought Content: Thought content normal.         Judgment: Judgment normal.           Assessment / Plan:     Controlled type 2 diabetes mellitus with stage 3 chronic kidney disease, with long-term current use of insulin  -     POCT Glucose, Hand-Held Device    S/P kidney transplant 2024    Chronic systolic congestive heart failure    Hypertension associated with diabetes    Hyperlipidemia associated with type 2 diabetes mellitus    Coronary artery disease of native artery of native heart with stable angina pectoris    Obesity (BMI 30-39.9)      Additional Plan Details:    - POCT Glucose  - Encouraged continuation of lifestyle changes including regular exercise and limiting carbohydrates to 30-45 grams per meal threes times daily and 15 grams per snack with a limit of two daily.   - Encouraged continued monitoring of blood glucose with maintenance of 4 times daily at Fasting, After Lunch, Before Bed, and After Breakfast . Dex G7 deferred for now.   - Current DM Medication Regimen: Continue Tresiba 15 units daily. Continue Lispro correction dosing every 3 hours as below. Continue Ozempic 1 mg weekly - pt deferred increase today in favor of decrease of insulin. Will discuss if candidate for SGLT2 1 yr post transplant with transplant team and plan to utilize higher dose of Glp-1 and SGLT-2 in favor of reduction of insulin.  - Health Maintenance standards addressed today: Foot Exam - deferred by patient today because time, Urine Microalbumin / Creatinine Ratio scheduled, Flu Shot - patient would like to complete outside of [facility], COVID - 19 Vaccine - patient will schedule outside of [facility] , and Vaccines  - Nursing Visit: Patient is below goal range for nursing visit for age group and will not need nursing visit at this time .   - Follow up in 4 wks with A1c  prior.      CURRENT DM MEDICATIONS:   Tresiba 15 units in the morning  Lispro correction dosing every 3 hours as needed  Ozempic 1 mg once per week     Lispro Correction Dosing every 3 hours as needed OUTSIDE OF EATING  If  - 250, may take 2 units of Lispro  If  - 300, may take 3 units of Lispro  If  - 350, may take 4 units of Lispro  If  - 400, may take 5 units of Lispro  If +, may take 6 units of Lispro    Blakeney McKnight, PA-C [facility]    A total of 30 minutes was spent in face to face time, of which over 50 % was spent in counseling patient on disease process, complications, treatment, and side effects of medications.

## 2025-03-17 NOTE — TELEPHONE ENCOUNTER
Per Dr. Cordova with [facility], ok to proceed with SGLT-2.     Jatin Deleon PA-C, BC-ADM  [facility]

## 2025-03-20 DIAGNOSIS — Z79.4 CONTROLLED TYPE 2 DIABETES MELLITUS WITH STAGE 3 CHRONIC KIDNEY DISEASE, WITH LONG-TERM CURRENT USE OF INSULIN: ICD-10-CM

## 2025-03-20 DIAGNOSIS — E11.22 CONTROLLED TYPE 2 DIABETES MELLITUS WITH STAGE 3 CHRONIC KIDNEY DISEASE, WITH LONG-TERM CURRENT USE OF INSULIN: ICD-10-CM

## 2025-03-20 DIAGNOSIS — N18.30 CONTROLLED TYPE 2 DIABETES MELLITUS WITH STAGE 3 CHRONIC KIDNEY DISEASE, WITH LONG-TERM CURRENT USE OF INSULIN: ICD-10-CM

## 2025-03-20 RX ORDER — INSULIN DEGLUDEC 100 U/ML
20 INJECTION, SOLUTION SUBCUTANEOUS
Qty: 15 ML | Refills: 11 | Status: SHIPPED | OUTPATIENT
Start: 2025-03-20

## 2025-03-21 RX ORDER — ISOSORBIDE DINITRATE 10 MG/1
10 TABLET ORAL 3 TIMES DAILY
Qty: 90 TABLET | Refills: 11 | Status: SHIPPED | OUTPATIENT
Start: 2025-03-21

## 2025-03-24 ENCOUNTER — LAB VISIT (OUTPATIENT)
Dept: LAB | Facility: HOSPITAL | Age: 70
End: 2025-03-24
Attending: INTERNAL MEDICINE
Payer: COMMERCIAL

## 2025-03-24 DIAGNOSIS — Z94.0 KIDNEY REPLACED BY TRANSPLANT: ICD-10-CM

## 2025-03-24 LAB
ALBUMIN SERPL BCP-MCNC: 3.7 G/DL (ref 3.5–5.2)
ALP SERPL-CCNC: 78 UNIT/L (ref 40–150)
ALT SERPL W/O P-5'-P-CCNC: 12 UNIT/L (ref 10–44)
AST SERPL-CCNC: 20 UNIT/L (ref 11–45)
BILIRUB DIRECT SERPL-MCNC: 0.4 MG/DL (ref 0.1–0.3)
BILIRUB SERPL-MCNC: 1.2 MG/DL (ref 0.1–1)
PROT SERPL-MCNC: 7 GM/DL (ref 6–8.4)

## 2025-03-24 PROCEDURE — 80076 HEPATIC FUNCTION PANEL: CPT

## 2025-03-24 PROCEDURE — 36415 COLL VENOUS BLD VENIPUNCTURE: CPT

## 2025-03-25 ENCOUNTER — PATIENT MESSAGE (OUTPATIENT)
Dept: TRANSPLANT | Facility: CLINIC | Age: 70
End: 2025-03-25
Payer: MEDICARE

## 2025-03-25 ENCOUNTER — RESULTS FOLLOW-UP (OUTPATIENT)
Dept: TRANSPLANT | Facility: CLINIC | Age: 70
End: 2025-03-25

## 2025-04-01 DIAGNOSIS — Z94.0 S/P KIDNEY TRANSPLANT: ICD-10-CM

## 2025-04-01 DIAGNOSIS — Z79.60 IMMUNODEFICIENCY DUE TO LONG TERM IMMUNOSUPPRESSIVE DRUG THERAPY: ICD-10-CM

## 2025-04-01 DIAGNOSIS — T45.1X5A IMMUNODEFICIENCY DUE TO LONG TERM IMMUNOSUPPRESSIVE DRUG THERAPY: ICD-10-CM

## 2025-04-01 DIAGNOSIS — D84.821 IMMUNODEFICIENCY DUE TO LONG TERM IMMUNOSUPPRESSIVE DRUG THERAPY: ICD-10-CM

## 2025-04-02 DIAGNOSIS — Z94.0 KIDNEY TRANSPLANT RECIPIENT: ICD-10-CM

## 2025-04-02 DIAGNOSIS — Z79.60 IMMUNODEFICIENCY DUE TO LONG TERM IMMUNOSUPPRESSIVE DRUG THERAPY: ICD-10-CM

## 2025-04-02 DIAGNOSIS — T45.1X5A IMMUNODEFICIENCY DUE TO LONG TERM IMMUNOSUPPRESSIVE DRUG THERAPY: ICD-10-CM

## 2025-04-02 DIAGNOSIS — D84.821 IMMUNODEFICIENCY DUE TO LONG TERM IMMUNOSUPPRESSIVE DRUG THERAPY: ICD-10-CM

## 2025-04-02 DIAGNOSIS — Z94.0 S/P KIDNEY TRANSPLANT: ICD-10-CM

## 2025-04-02 RX ORDER — PREDNISONE 5 MG/1
5 TABLET ORAL DAILY
Qty: 30 TABLET | Refills: 11 | Status: SHIPPED | OUTPATIENT
Start: 2025-04-02

## 2025-04-02 RX ORDER — TACROLIMUS 1 MG/1
CAPSULE ORAL
Qty: 810 CAPSULE | Refills: 3 | Status: SHIPPED | OUTPATIENT
Start: 2025-04-02

## 2025-04-02 RX ORDER — TACROLIMUS 1 MG/1
CAPSULE ORAL
Qty: 810 CAPSULE | Refills: 3 | Status: SHIPPED | OUTPATIENT
Start: 2025-04-02 | End: 2025-04-02 | Stop reason: SDUPTHER

## 2025-04-07 DIAGNOSIS — N18.30 CONTROLLED TYPE 2 DIABETES MELLITUS WITH STAGE 3 CHRONIC KIDNEY DISEASE, WITH LONG-TERM CURRENT USE OF INSULIN: ICD-10-CM

## 2025-04-07 DIAGNOSIS — E11.22 CONTROLLED TYPE 2 DIABETES MELLITUS WITH STAGE 3 CHRONIC KIDNEY DISEASE, WITH LONG-TERM CURRENT USE OF INSULIN: ICD-10-CM

## 2025-04-07 DIAGNOSIS — Z79.4 CONTROLLED TYPE 2 DIABETES MELLITUS WITH STAGE 3 CHRONIC KIDNEY DISEASE, WITH LONG-TERM CURRENT USE OF INSULIN: ICD-10-CM

## 2025-04-07 RX ORDER — INSULIN LISPRO 100 [IU]/ML
10 INJECTION, SOLUTION INTRAVENOUS; SUBCUTANEOUS 3 TIMES DAILY
Qty: 15 ML | Refills: 5 | Status: SHIPPED | OUTPATIENT
Start: 2025-04-07

## 2025-04-07 NOTE — TELEPHONE ENCOUNTER
----- Message from Clare sent at 4/7/2025  9:45 AM CDT -----  Contact: Isidro  .Type:  RX Refill RequestWho Called:  Isidro Refill or New Rx: RefillRX Name and Strength: insulin lispro (HUMALOG KWIKPEN INSULIN) 100 unit/mL pen How is the patient currently taking it? (ex. 1XDay): As prescribedIs this a 30 day or 90 day RX: 30 Preferred Pharmacy with phone number: [facility]/pharmacy #[id number] - [place], [place] - [id number] [facility] [place] [address]Zojqw: [phone] Fax: [phone] or Mail Order: local Ordering Provider: Would the patient rather a call back or a response via [facility]?  Call Best Call Back Number: [phone] Information:

## 2025-04-08 DIAGNOSIS — Z94.0 S/P KIDNEY TRANSPLANT: Primary | ICD-10-CM

## 2025-04-08 DIAGNOSIS — N18.30 STAGE 3 CHRONIC KIDNEY DISEASE, UNSPECIFIED WHETHER STAGE 3A OR 3B CKD: ICD-10-CM

## 2025-04-14 ENCOUNTER — OFFICE VISIT (OUTPATIENT)
Dept: DIABETES | Facility: CLINIC | Age: 70
End: 2025-04-14
Payer: COMMERCIAL

## 2025-04-14 DIAGNOSIS — Z94.0 S/P KIDNEY TRANSPLANT: ICD-10-CM

## 2025-04-14 DIAGNOSIS — E66.9 OBESITY (BMI 30-39.9): ICD-10-CM

## 2025-04-14 DIAGNOSIS — N18.30 CONTROLLED TYPE 2 DIABETES MELLITUS WITH STAGE 3 CHRONIC KIDNEY DISEASE, WITH LONG-TERM CURRENT USE OF INSULIN: Primary | ICD-10-CM

## 2025-04-14 DIAGNOSIS — I25.118 CORONARY ARTERY DISEASE OF NATIVE ARTERY OF NATIVE HEART WITH STABLE ANGINA PECTORIS: ICD-10-CM

## 2025-04-14 DIAGNOSIS — E11.59 HYPERTENSION ASSOCIATED WITH DIABETES: ICD-10-CM

## 2025-04-14 DIAGNOSIS — E11.69 HYPERLIPIDEMIA ASSOCIATED WITH TYPE 2 DIABETES MELLITUS: ICD-10-CM

## 2025-04-14 DIAGNOSIS — E11.22 CONTROLLED TYPE 2 DIABETES MELLITUS WITH STAGE 3 CHRONIC KIDNEY DISEASE, WITH LONG-TERM CURRENT USE OF INSULIN: Primary | ICD-10-CM

## 2025-04-14 DIAGNOSIS — I50.22 CHRONIC SYSTOLIC CONGESTIVE HEART FAILURE: ICD-10-CM

## 2025-04-14 DIAGNOSIS — Z79.4 CONTROLLED TYPE 2 DIABETES MELLITUS WITH STAGE 3 CHRONIC KIDNEY DISEASE, WITH LONG-TERM CURRENT USE OF INSULIN: Primary | ICD-10-CM

## 2025-04-14 DIAGNOSIS — I15.2 HYPERTENSION ASSOCIATED WITH DIABETES: ICD-10-CM

## 2025-04-14 DIAGNOSIS — E78.5 HYPERLIPIDEMIA ASSOCIATED WITH TYPE 2 DIABETES MELLITUS: ICD-10-CM

## 2025-04-14 PROCEDURE — 98006 SYNCH AUDIO-VIDEO EST MOD 30: CPT | Mod: 95,,, | Performed by: PHYSICIAN ASSISTANT

## 2025-04-14 PROCEDURE — G2211 COMPLEX E/M VISIT ADD ON: HCPCS | Mod: 95,,, | Performed by: PHYSICIAN ASSISTANT

## 2025-04-14 PROCEDURE — 4010F ACE/ARB THERAPY RXD/TAKEN: CPT | Mod: CPTII,95,, | Performed by: PHYSICIAN ASSISTANT

## 2025-04-14 PROCEDURE — 3051F HG A1C>EQUAL 7.0%<8.0%: CPT | Mod: CPTII,95,, | Performed by: PHYSICIAN ASSISTANT

## 2025-04-14 RX ORDER — DAPAGLIFLOZIN 5 MG/1
5 TABLET, FILM COATED ORAL DAILY
Qty: 30 TABLET | Refills: 11 | Status: SHIPPED | OUTPATIENT
Start: 2025-04-14 | End: 2026-04-14

## 2025-04-14 NOTE — PROGRESS NOTES
PCP: Steven Morgan MD    Subjective:     Chief Complaint: Diabetes - Established Patient    HISTORY OF PRESENT ILLNESS: 69 y.o.   male presenting for diabetes management visit.   The patient's last visit with me was on 1/8/2025.   Patient has had Type II diabetes since 20 years or more.  Pertinent to decision making is the following comorbidities: HTN, HLD, CAD, CHF, S/p Transplant - Kidney in Feb 2024, and Obesity by BMI  Patient has the following Diabetes complications: with diabetic chronic kidney disease  He  has attended diabetes education in the past.     Patient's most recent A1c of 7.8% was completed 2 months ago.   Patient states since His last A1c His blood glucose levels have been high  throughout the day .   Patient monitors blood glucose 4 times per day with meter : Fasting, Before Lunch, Before Dinner, and Before Bed. Dexcom G7 deferred.   Patient blood glucose monitoring device will not be uploaded into Media Section today.  Per pt, fasting blood sugar ranging from 90 - 123 and 150  later in the day.  Patient endorses the following diabetes related symptoms:  None .   Patient is due today for the following diabetes-related health maintenance standards: Foot Exam , Urine Microalbumin/creatinine ratio, Influenza Vaccine, COVID-19 Vaccine , and vaccines .   He denies recent hospital admissions or emergency room visits.   He denies having hypoglycemia.   Patient's concerns today include glycemic control. Of note, pt feels he has had weight loss and tolerating well. Per transplant team, ok to try SGLT-2.   Patient medication regimen is as below.     CURRENT DM MEDICATIONS:   Tresiba 15 units in the morning  Lispro correction dosing every 3 hours as needed  Ozempic 1 mg once per week     Lispro Correction Dosing every 3 hours as needed OUTSIDE OF EATING  If  - 250, may take 2 units of Lispro  If  - 300, may take 3 units of Lispro  If  - 350, may take 4 units of  Lispro  If  - 400, may take 5 units of Lispro  If +, may take 6 units of Lispro    Patient has failed the following Diabetes medications:   Metformin    Xultophy  Semglee  Lantus      Labs Reviewed.       Lab Results   Component Value Date    CPEPTIDE 3.88 11/20/2017     Lab Results   Component Value Date    GLUTAMICACID 0.00 11/20/2017          //   , There is no height or weight on file to calculate BMI.  His blood sugar in clinic today is:    Lab Results   Component Value Date    POCGLU 127 (A) 03/17/2025       Review of Systems   Constitutional:  Negative for activity change, appetite change, chills and fever.   HENT:  Negative for dental problem, mouth sores, nosebleeds, sore throat and trouble swallowing.    Eyes:  Negative for pain and discharge.   Respiratory:  Negative for shortness of breath, wheezing and stridor.    Cardiovascular:  Negative for chest pain, palpitations and leg swelling.   Gastrointestinal:  Negative for abdominal pain, diarrhea, nausea and vomiting.   Endocrine: Negative for polydipsia, polyphagia and polyuria.   Genitourinary:  Negative for dysuria, frequency and urgency.   Musculoskeletal:  Negative for joint swelling and myalgias.   Skin:  Negative for rash and wound.   Neurological:  Negative for dizziness, syncope, weakness and headaches.   Psychiatric/Behavioral:  Negative for behavioral problems and dysphoric mood.          Diabetes Management Status  Statin: Taking  ACE/ARB: Taking    Screening or Prevention Patient's value Goal Complete/Controlled?   HgA1C Testing and Control   Lab Results   Component Value Date    HGBA1C 7.8 (H) 01/24/2025      Annually/Less than 8% Yes   Lipid profile : 05/13/2024 Annually Yes   LDL control Lab Results   Component Value Date    LDLCALC 115.6 05/13/2024    Annually/Less than 100 mg/dl  Yes   Nephropathy screening Lab Results   Component Value Date    MICALBCREAT 1998.1 (H) 07/14/2022     Lab Results   Component Value Date     PROTEINUA Negative 02/07/2025    Annually Yes   Blood pressure BP Readings from Last 1 Encounters:   03/17/25 108/63    Less than 140/90 No   Dilated retinal exam : 11/14/2024 Annually No    Foot exam   : 05/16/2024 Annually Yes     Social History     Socioeconomic History    Marital status:     Number of children: 3   Occupational History     Employer: self-rmployed   Tobacco Use    Smoking status: Never     Passive exposure: Never    Smokeless tobacco: Never   Substance and Sexual Activity    Alcohol use: Not Currently     Comment: seldom     Drug use: No    Sexual activity: Not Currently     Partners: Female   Social History Narrative    ** Merged History Encounter **          Social Drivers of Health     Financial Resource Strain: Low Risk  (3/12/2025)    Overall Financial Resource Strain (CARDIA)     Difficulty of Paying Living Expenses: Not very hard   Food Insecurity: No Food Insecurity (3/12/2025)    Hunger Vital Sign     Worried About Running Out of Food in the Last Year: Never true     Ran Out of Food in the Last Year: Never true   Transportation Needs: No Transportation Needs (3/12/2025)    PRAPARE - Transportation     Lack of Transportation (Medical): No     Lack of Transportation (Non-Medical): No   Physical Activity: Insufficiently Active (3/12/2025)    Exercise Vital Sign     Days of Exercise per Week: 3 days     Minutes of Exercise per Session: 20 min   Stress: No Stress Concern Present (3/12/2025)    [employer] - Occupational Stress Questionnaire     Feeling of Stress : Not at all   Housing Stability: Low Risk  (3/12/2025)    Housing Stability Vital Sign     Unable to Pay for Housing in the Last Year: No     Homeless in the Last Year: No     Past Medical History:   Diagnosis Date    Abnormal nuclear stress test 8/30/2022    Acute hypoxemic respiratory failure due to COVID-19 1/21/2021    Acute on chronic combined systolic and diastolic congestive heart failure  5/7/2023    Acute on chronic respiratory failure 5/7/2023    Acute respiratory failure with hypoxia and hypercapnia 5/18/2023    Anal itching 10/10/2022    Anemia 4/16/2014    BPH (benign prostatic hyperplasia)     CKD (chronic kidney disease) stage 4, GFR 15-29 ml/min     Coronary artery disease of native artery of native heart with stable angina pectoris 4/29/2019    Diabetes mellitus     Diabetes mellitus, type 2     Dilated cardiomyopathy 8/30/2022    Elevated PSA     Encounter for blood transfusion     Herpes labialis     Hyperlipidemia     Hypertension     Hypertensive emergency 5/18/2023    Obesity     Pneumonia 5/7/2023    Pneumonia due to COVID-19 virus     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     Appearance: He is well-developed. He is not diaphoretic.   HENT:      Head: Normocephalic and atraumatic.      Right Ear: External ear normal.      Left Ear: External ear normal.      Nose: Nose normal.   Eyes:      General:         Right eye: No discharge.         Left eye: No discharge.      Pupils: Pupils are equal, round, and reactive to light.   Cardiovascular:      Rate and Rhythm: Normal rate and regular rhythm.      Heart sounds: Normal heart sounds.   Pulmonary:      Effort: Pulmonary effort is normal. No respiratory distress.      Breath sounds: Normal breath sounds. No stridor.   Abdominal:      Palpations: Abdomen is soft.   Musculoskeletal:         General: Normal range of motion.      Cervical back: Normal range of motion and neck supple.   Skin:     General: Skin is warm and dry.      Capillary Refill: Capillary refill takes less than 2 seconds.      Coloration: Skin is not pale.   Neurological:      Mental Status: He is alert and oriented to  person, place, and time.      Motor: No abnormal muscle tone.      Coordination: Coordination normal.   Psychiatric:         Behavior: Behavior normal.         Thought Content: Thought content normal.         Judgment: Judgment normal.           Assessment / Plan:     Controlled type 2 diabetes mellitus with stage 3 chronic kidney disease, with long-term current use of insulin  -     dapagliflozin propanediol (FARXIGA) 5 mg Tab tablet; Take 1 tablet (5 mg total) by mouth once daily.  Dispense: 30 tablet; Refill: 11  -     Hemoglobin A1C; Future; Expected date: 04/14/2025    S/P kidney transplant 2024    Chronic systolic congestive heart failure  -     dapagliflozin propanediol (FARXIGA) 5 mg Tab tablet; Take 1 tablet (5 mg total) by mouth once daily.  Dispense: 30 tablet; Refill: 11    Hypertension associated with diabetes    Hyperlipidemia associated with type 2 diabetes mellitus    Coronary artery disease of native artery of native heart with stable angina pectoris    Obesity (BMI 30-39.9)      Additional Plan Details:    - POCT Glucose  - Encouraged continuation of lifestyle changes including regular exercise and limiting carbohydrates to 30-45 grams per meal threes times daily and 15 grams per snack with a limit of two daily.   - Encouraged continued monitoring of blood glucose with maintenance of 4 times daily at Fasting, After Lunch, Before Bed, and After Breakfast . Dex G7 deferred for now.   - Current DM Medication Regimen: Change Tresiba 8 units daily. Continue Lispro correction dosing every 3 hours as below. Continue Ozempic 1 mg weekly - pt deferred increase today in favor of starting SGLT-2. Start Farxiga 5 mg daily - ok by transplant. Will cc Nephro. Encouraged to drink plenty of H20 and watch for UTI.    - Health Maintenance standards addressed today: Foot Exam - deferred by patient today because time, Urine Microalbumin / Creatinine Ratio scheduled, Flu Shot - patient would like to complete outside of  [facility], COVID - 19 Vaccine - patient will schedule outside of [facility] , and Vaccines  - Nursing Visit: Patient is below goal range for nursing visit for age group and will not need nursing visit at this time .   - Follow up in 4 wks with A1c prior.      CURRENT DM MEDICATIONS:   Tresiba 8 units in the morning  Lispro correction dosing every 3 hours as needed  Ozempic 1 mg once per week  Farxiga 5 mg daily      Lispro Correction Dosing every 3 hours as needed OUTSIDE OF EATING  If  - 250, may take 2 units of Lispro  If  - 300, may take 3 units of Lispro  If  - 350, may take 4 units of Lispro  If  - 400, may take 5 units of Lispro  If +, may take 6 units of Lispro    Blakeney McKnight, PA-C [facility]

## 2025-04-14 NOTE — PATIENT INSTRUCTIONS
CURRENT DM MEDICATIONS:   Tresiba 8 units in the morning  Lispro correction dosing every 3 hours as needed  Ozempic 1 mg once per week  Farxiga 5 mg daily      Lispro Correction Dosing every 3 hours as needed OUTSIDE OF EATING  If  - 250, may take 2 units of Lispro  If  - 300, may take 3 units of Lispro  If  - 350, may take 4 units of Lispro  If  - 400, may take 5 units of Lispro  If +, may take 6 units of Lispro

## 2025-04-14 NOTE — Clinical Note
"Staff: 4 week virtual "no dev"   Dr. Rizvi, started Kathy - okayed by Dr. Cordova previously since 1 yr post tx. Discussed H20 intake and watching for UTI. Thank you!  "

## 2025-04-15 DIAGNOSIS — Z94.0 S/P KIDNEY TRANSPLANT: ICD-10-CM

## 2025-04-17 ENCOUNTER — LAB VISIT (OUTPATIENT)
Dept: LAB | Facility: HOSPITAL | Age: 70
End: 2025-04-17
Attending: INTERNAL MEDICINE
Payer: MEDICARE

## 2025-04-17 DIAGNOSIS — D84.9 IMMUNOSUPPRESSION: ICD-10-CM

## 2025-04-17 DIAGNOSIS — I10 PRIMARY HYPERTENSION: ICD-10-CM

## 2025-04-17 DIAGNOSIS — E11.22 CONTROLLED TYPE 2 DIABETES MELLITUS WITH STAGE 3 CHRONIC KIDNEY DISEASE, WITH LONG-TERM CURRENT USE OF INSULIN: ICD-10-CM

## 2025-04-17 DIAGNOSIS — Z79.4 CONTROLLED TYPE 2 DIABETES MELLITUS WITH STAGE 3 CHRONIC KIDNEY DISEASE, WITH LONG-TERM CURRENT USE OF INSULIN: ICD-10-CM

## 2025-04-17 DIAGNOSIS — Z94.0 KIDNEY TRANSPLANT STATUS: ICD-10-CM

## 2025-04-17 DIAGNOSIS — N18.30 CONTROLLED TYPE 2 DIABETES MELLITUS WITH STAGE 3 CHRONIC KIDNEY DISEASE, WITH LONG-TERM CURRENT USE OF INSULIN: ICD-10-CM

## 2025-04-17 DIAGNOSIS — N25.81 SECONDARY HYPERPARATHYROIDISM OF RENAL ORIGIN: ICD-10-CM

## 2025-04-17 LAB
25(OH)D3+25(OH)D2 SERPL-MCNC: 27 NG/ML (ref 30–96)
ALBUMIN SERPL BCP-MCNC: 3.5 G/DL (ref 3.5–5.2)
ANION GAP (OHS): 6 MMOL/L (ref 8–16)
BACTERIA #/AREA URNS HPF: ABNORMAL /HPF
BILIRUB UR QL STRIP.AUTO: NEGATIVE
BUN SERPL-MCNC: 24 MG/DL (ref 8–23)
CALCIUM SERPL-MCNC: 9.3 MG/DL (ref 8.7–10.5)
CHLORIDE SERPL-SCNC: 107 MMOL/L (ref 95–110)
CLARITY UR: ABNORMAL
CO2 SERPL-SCNC: 23 MMOL/L (ref 23–29)
COLOR UR AUTO: YELLOW
CREAT SERPL-MCNC: 1.8 MG/DL (ref 0.5–1.4)
CREAT UR-MCNC: 48 MG/DL (ref 23–375)
EAG (OHS): 148 MG/DL (ref 68–131)
GFR SERPLBLD CREATININE-BSD FMLA CKD-EPI: 40 ML/MIN/1.73/M2
GLUCOSE SERPL-MCNC: 126 MG/DL (ref 70–110)
GLUCOSE UR QL STRIP: NEGATIVE
HBA1C MFR BLD: 6.8 % (ref 4–5.6)
HGB UR QL STRIP: NEGATIVE
KETONES UR QL STRIP: NEGATIVE
LEUKOCYTE ESTERASE UR QL STRIP: ABNORMAL
MAGNESIUM SERPL-MCNC: 1.7 MG/DL (ref 1.6–2.6)
MICROSCOPIC COMMENT: ABNORMAL
NITRITE UR QL STRIP: POSITIVE
PH UR STRIP: 6 [PH]
PHOSPHATE SERPL-MCNC: 3.2 MG/DL (ref 2.7–4.5)
POTASSIUM SERPL-SCNC: 4.7 MMOL/L (ref 3.5–5.1)
PROT UR QL STRIP: NEGATIVE
PROT UR-MCNC: 10 MG/DL
PROT/CREAT UR: 0.21 MG/G{CREAT}
PTH-INTACT SERPL-MCNC: 140.2 PG/ML (ref 9–77)
RBC #/AREA URNS HPF: 3 /HPF (ref 0–4)
SODIUM SERPL-SCNC: 136 MMOL/L (ref 136–145)
SP GR UR STRIP: <=1.005
WBC #/AREA URNS HPF: 23 /HPF (ref 0–5)
WBC CLUMPS URNS QL MICRO: ABNORMAL
YEAST URNS QL MICRO: ABNORMAL /HPF

## 2025-04-17 PROCEDURE — 36415 COLL VENOUS BLD VENIPUNCTURE: CPT

## 2025-04-17 PROCEDURE — 84295 ASSAY OF SERUM SODIUM: CPT

## 2025-04-17 PROCEDURE — 83036 HEMOGLOBIN GLYCOSYLATED A1C: CPT

## 2025-04-17 PROCEDURE — 80197 ASSAY OF TACROLIMUS: CPT

## 2025-04-17 PROCEDURE — 83970 ASSAY OF PARATHORMONE: CPT

## 2025-04-17 PROCEDURE — 81003 URINALYSIS AUTO W/O SCOPE: CPT

## 2025-04-17 PROCEDURE — 82570 ASSAY OF URINE CREATININE: CPT

## 2025-04-17 PROCEDURE — 83735 ASSAY OF MAGNESIUM: CPT

## 2025-04-17 PROCEDURE — 82306 VITAMIN D 25 HYDROXY: CPT

## 2025-04-17 RX ORDER — ASPIRIN 81 MG/1
81 TABLET ORAL DAILY
Qty: 30 TABLET | Refills: 0 | Status: SHIPPED | OUTPATIENT
Start: 2025-04-17 | End: 2025-05-17

## 2025-04-18 LAB — TACROLIMUS BLD-MCNC: 10.3 NG/ML (ref 5–15)

## 2025-04-21 ENCOUNTER — RESULTS FOLLOW-UP (OUTPATIENT)
Dept: DIABETES | Facility: CLINIC | Age: 70
End: 2025-04-21

## 2025-04-21 ENCOUNTER — PATIENT MESSAGE (OUTPATIENT)
Dept: ADMINISTRATIVE | Facility: HOSPITAL | Age: 70
End: 2025-04-21
Payer: MEDICARE

## 2025-04-22 ENCOUNTER — PATIENT OUTREACH (OUTPATIENT)
Dept: ADMINISTRATIVE | Facility: HOSPITAL | Age: 70
End: 2025-04-22
Payer: MEDICARE

## 2025-04-22 DIAGNOSIS — E11.69 HYPERLIPIDEMIA ASSOCIATED WITH TYPE 2 DIABETES MELLITUS: Primary | ICD-10-CM

## 2025-04-22 DIAGNOSIS — E78.5 HYPERLIPIDEMIA ASSOCIATED WITH TYPE 2 DIABETES MELLITUS: Primary | ICD-10-CM

## 2025-04-22 NOTE — PROGRESS NOTES
Replying to Campaign Questionnaire for Overdue HM: Labs.    Order placed per [facility]. Called pt and scheduled appointment.

## 2025-04-23 ENCOUNTER — OFFICE VISIT (OUTPATIENT)
Dept: NEPHROLOGY | Facility: CLINIC | Age: 70
End: 2025-04-23
Payer: COMMERCIAL

## 2025-04-23 VITALS
HEIGHT: 74 IN | DIASTOLIC BLOOD PRESSURE: 66 MMHG | WEIGHT: 257.25 LBS | SYSTOLIC BLOOD PRESSURE: 118 MMHG | HEART RATE: 82 BPM | BODY MASS INDEX: 33.02 KG/M2 | RESPIRATION RATE: 20 BRPM

## 2025-04-23 DIAGNOSIS — I10 PRIMARY HYPERTENSION: ICD-10-CM

## 2025-04-23 DIAGNOSIS — D84.9 IMMUNOSUPPRESSION: ICD-10-CM

## 2025-04-23 DIAGNOSIS — N25.81 SECONDARY HYPERPARATHYROIDISM OF RENAL ORIGIN: ICD-10-CM

## 2025-04-23 DIAGNOSIS — Z94.0 KIDNEY TRANSPLANT STATUS: Primary | ICD-10-CM

## 2025-04-23 DIAGNOSIS — N18.30 STAGE 3 CHRONIC KIDNEY DISEASE, UNSPECIFIED WHETHER STAGE 3A OR 3B CKD: ICD-10-CM

## 2025-04-23 PROCEDURE — 3044F HG A1C LEVEL LT 7.0%: CPT | Mod: CPTII,S$GLB,, | Performed by: INTERNAL MEDICINE

## 2025-04-23 PROCEDURE — 3078F DIAST BP <80 MM HG: CPT | Mod: CPTII,S$GLB,, | Performed by: INTERNAL MEDICINE

## 2025-04-23 PROCEDURE — 1160F RVW MEDS BY RX/DR IN RCRD: CPT | Mod: CPTII,S$GLB,, | Performed by: INTERNAL MEDICINE

## 2025-04-23 PROCEDURE — 1101F PT FALLS ASSESS-DOCD LE1/YR: CPT | Mod: CPTII,S$GLB,, | Performed by: INTERNAL MEDICINE

## 2025-04-23 PROCEDURE — 3288F FALL RISK ASSESSMENT DOCD: CPT | Mod: CPTII,S$GLB,, | Performed by: INTERNAL MEDICINE

## 2025-04-23 PROCEDURE — 3066F NEPHROPATHY DOC TX: CPT | Mod: CPTII,S$GLB,, | Performed by: INTERNAL MEDICINE

## 2025-04-23 PROCEDURE — 99214 OFFICE O/P EST MOD 30 MIN: CPT | Mod: S$GLB,,, | Performed by: INTERNAL MEDICINE

## 2025-04-23 PROCEDURE — 3008F BODY MASS INDEX DOCD: CPT | Mod: CPTII,S$GLB,, | Performed by: INTERNAL MEDICINE

## 2025-04-23 PROCEDURE — 1159F MED LIST DOCD IN RCRD: CPT | Mod: CPTII,S$GLB,, | Performed by: INTERNAL MEDICINE

## 2025-04-23 PROCEDURE — 4010F ACE/ARB THERAPY RXD/TAKEN: CPT | Mod: CPTII,S$GLB,, | Performed by: INTERNAL MEDICINE

## 2025-04-23 PROCEDURE — 99999 PR PBB SHADOW E&M-EST. PATIENT-LVL III: CPT | Mod: PBBFAC,,, | Performed by: INTERNAL MEDICINE

## 2025-04-23 PROCEDURE — 1126F AMNT PAIN NOTED NONE PRSNT: CPT | Mod: CPTII,S$GLB,, | Performed by: INTERNAL MEDICINE

## 2025-04-23 PROCEDURE — 3074F SYST BP LT 130 MM HG: CPT | Mod: CPTII,S$GLB,, | Performed by: INTERNAL MEDICINE

## 2025-04-23 NOTE — PROGRESS NOTES
"Subjective:       Patient ID: Isidro Alves is a 69 y.o. male.    Chief Complaint:  Kidney transplant status, immunosuppression, hypertension    HPI    He presents to clinic today for routine follow-up.  Since his last office visit he has been doing well and has no specific or new complaints.  His laboratory studies and medications were reviewed.  All Nephrology related questions were answered to his satisfaction.      Review of Systems   Constitutional: Negative.    HENT: Negative.     Respiratory: Negative.     Cardiovascular: Negative.    Gastrointestinal: Negative.    Genitourinary: Negative.    Musculoskeletal: Negative.    Skin: Negative.        /66   Pulse 82   Resp 20   Ht 6' 2" (1.88 m)   Wt 116.7 kg (257 lb 4.4 oz)   BMI 33.03 kg/m²     Lab Results   Component Value Date    WBC 7.12 02/07/2025    HGB 13.1 (L) 02/07/2025    HCT 43.6 02/07/2025    MCV 85 02/07/2025     02/07/2025      BMP  Lab Results   Component Value Date     04/17/2025    K 4.7 04/17/2025     04/17/2025    CO2 23 04/17/2025    BUN 24 (H) 04/17/2025    CREATININE 1.8 (H) 04/17/2025    CALCIUM 9.3 04/17/2025    ANIONGAP 6 (L) 04/17/2025    ESTGFRAFRICA 7 06/15/2023    EGFRNONAA 6.0 (A) 07/14/2022     CMP  Sodium   Date Value Ref Range Status   04/17/2025 136 136 - 145 mmol/L Final   02/07/2025 142 136 - 145 mmol/L Final     Potassium   Date Value Ref Range Status   04/17/2025 4.7 3.5 - 5.1 mmol/L Final   02/07/2025 4.6 3.5 - 5.1 mmol/L Final     Chloride   Date Value Ref Range Status   04/17/2025 107 95 - 110 mmol/L Final   02/07/2025 110 95 - 110 mmol/L Final     CO2   Date Value Ref Range Status   04/17/2025 23 23 - 29 mmol/L Final   02/07/2025 24 23 - 29 mmol/L Final     Glucose   Date Value Ref Range Status   02/07/2025 71 70 - 110 mg/dL Final     BUN   Date Value Ref Range Status   04/17/2025 24 (H) 8 - 23 mg/dL Final     Creatinine   Date Value Ref Range Status   04/17/2025 1.8 (H) 0.5 - 1.4 mg/dL " Final     Calcium   Date Value Ref Range Status   04/17/2025 9.3 8.7 - 10.5 mg/dL Final   02/07/2025 9.4 8.7 - 10.5 mg/dL Final     Total Protein   Date Value Ref Range Status   02/24/2025 7.1 6.0 - 8.4 g/dL Final     Albumin   Date Value Ref Range Status   04/17/2025 3.5 3.5 - 5.2 g/dL Final   02/24/2025 3.8 3.5 - 5.2 g/dL Final   05/17/2016 4.0 3.6 - 5.1 g/dL Final     Comment:     @ Test Performed By:  [employer] LEATHA Sapp M.D.,   [address] [place] [id number]  [facility]  [id number]       Total Bilirubin   Date Value Ref Range Status   02/24/2025 1.3 (H) 0.1 - 1.0 mg/dL Final     Comment:     For infants and newborns, interpretation of results should be based  on gestational age, weight and in agreement with clinical  observations.    Premature Infant recommended reference ranges:  Up to 24 hours.............<8.0 mg/dL  Up to 48 hours............<12.0 mg/dL  3-5 days..................<15.0 mg/dL  6-29 days.................<15.0 mg/dL       Bilirubin Total   Date Value Ref Range Status   03/24/2025 1.2 (H) 0.1 - 1.0 mg/dL Final     Comment:     For infants and newborns, interpretation of results should be based   on gestational age, weight and in agreement with clinical   observations.    Premature Infant recommended reference ranges:   0-24 hours:  <8.0 mg/dL   24-48 hours: <12.0 mg/dL   3-5 days:    <15.0 mg/dL   6-29 days:   <15.0 mg/dL     Alkaline Phosphatase   Date Value Ref Range Status   02/24/2025 70 40 - 150 U/L Final     ALP   Date Value Ref Range Status   03/24/2025 78 40 - 150 unit/L Final     AST   Date Value Ref Range Status   03/24/2025 20 11 - 45 unit/L Final   02/24/2025 60 (H) 10 - 40 U/L Final     ALT   Date Value Ref Range Status   03/24/2025 12 10 - 44 unit/L Final   02/24/2025 30 10 - 44 U/L Final     Anion Gap   Date Value Ref Range Status   04/17/2025 6 (L) 8 - 16 mmol/L Final     eGFR if    Date Value Ref  Range Status   07/14/2022 6.9 (A) >60 mL/min/1.73 m^2 Final     eGFR    Date Value Ref Range Status   06/15/2023 7 mL/min/1.73mSq Final     Comment:     In accordance with NKF-ASN Task Force recommendation, calculation based on the Chronic Kidney Disease Epidemiology Collaboration (CKD-EPI) equation without adjustment for race. eGFR adjusted for gender and age and calculated in ml/min/1.73mSquared. eGFR cannot be calculated if patient is under 18 years of age.     Reference Range:   >= 60 ml/min/1.73mSquared.     eGFR if non    Date Value Ref Range Status   07/14/2022 6.0 (A) >60 mL/min/1.73 m^2 Final     Comment:     Calculation used to obtain the estimated glomerular filtration  rate (eGFR) is the CKD-EPI equation.        Medications Ordered Prior to Encounter[1]         Objective:            Physical Exam  Constitutional:       Appearance: Normal appearance.   HENT:      Head: Normocephalic and atraumatic.   Eyes:      General: No scleral icterus.     Extraocular Movements: Extraocular movements intact.      Pupils: Pupils are equal, round, and reactive to light.   Pulmonary:      Effort: Pulmonary effort is normal.      Breath sounds: No stridor.   Musculoskeletal:      Right lower leg: No edema.      Left lower leg: No edema.   Skin:     General: Skin is warm and dry.   Neurological:      General: No focal deficit present.      Mental Status: He is alert and oriented to person, place, and time.   Psychiatric:         Mood and Affect: Mood normal.         Behavior: Behavior normal.       Assessment:       1. Kidney transplant status    2. Immunosuppression    3. Stage 3 chronic kidney disease, unspecified whether stage 3a or 3b CKD    4. Secondary hyperparathyroidism of renal origin    5. Primary hypertension        Plan:       1. Status post kidney transplant in February of 2024.  Stable renal allograft.    2. He remains on 5 mg in the morning 4 mg in the evening Prograf.  Most  recent level was 10.3.  Will continue to monitor.  He is also on 1 g twice daily of mycophenolate.  He reports no GI side effects.  He maintains 5 mg of prednisone daily as well.  Magnesium is stable 1.7.    3. Creatinine tends to fluctuate a bit between about 1.4 and 1.8.  We discussed the importance of good hydration.  Additionally he was started on an SGLT 2 inhibitor.  These medications can tend to be dehydrating.      4. Intact PTH is elevated 140, vitamin-D is improving up to 27 on most recent labs.  Phosphorus is stable at 3.2.  Calcium was normal at 9.3 with an albumin of 3.5.    5. Blood pressure is adequately controlled on current regimen.  He is on a RAAS inhibitor as part of his Entresto.      Colten Rizvi MD         [1]   Current Outpatient Medications on File Prior to Visit   Medication Sig Dispense Refill    aspirin (ECOTRIN) 81 MG EC tablet Take 1 tablet (81 mg total) by mouth once daily. 30 tablet 0    atorvastatin (LIPITOR) 80 MG tablet Take 1 tablet (80 mg total) by mouth once daily. 90 tablet 3    blood sugar diagnostic (ONETOUCH ULTRA TEST) Strp Check blood sugar 3 times daily 300 each 3    blood-glucose meter Misc Use as instructed 1 each 0    blood-glucose sensor (DEXCOM G7 SENSOR) Ro 1 each by Misc.(Non-Drug; Combo Route) route every 10 days. 9 each 3    carvediloL (COREG) 12.5 MG tablet Take 1 tablet (12.5 mg total) by mouth 2 (two) times daily. 60 tablet 0    cetirizine (ZYRTEC) 10 MG tablet Take 1 tablet (10 mg total) by mouth once daily. 90 tablet 1    finasteride (PROSCAR) 5 mg tablet Take 1 tablet (5 mg total) by mouth once daily. 90 tablet 3    insulin degludec (TRESIBA FLEXTOUCH U-100) 100 unit/mL (3 mL) insulin pen INJECT 20 UNITS INTO THE SKIN ONCE DAILY. 15 mL 11    insulin lispro (HUMALOG KWIKPEN INSULIN) 100 unit/mL pen Inject 10 Units into the skin 3 (three) times daily. Plus slide; max dose 45 units/d 15 mL 5    isosorbide dinitrate (ISORDIL) 10 MG tablet TAKE 1 TABLET BY  "MOUTH THREE TIMES A DAY 90 tablet 11    lancets (ONETOUCH DELICA PLUS LANCET) 33 gauge Misc Use 1 lancet to test blood sugar 3 (three) times daily. 100 each 1    magnesium oxide (MAG-OX) 400 mg (241.3 mg magnesium) tablet Take 2 tablets (800 mg total) by mouth 2 (two) times daily. 120 tablet 11    multivitamin Tab Take 1 tablet by mouth once daily.      mycophenolate (CELLCEPT) 250 mg Cap Take 4 capsules (1,000 mg total) by mouth 2 (two) times daily. 240 capsule 11    pen needle, diabetic (BD SHIRA 2ND GEN PEN NEEDLE) 32 gauge x 5/32" Ndle Use 1 pen needle to inject insulin 4 (four) times daily. 100 each 1    predniSONE (DELTASONE) 5 MG tablet Take 1 tablet (5 mg total) by mouth once daily. 90 tablet 3    predniSONE (DELTASONE) 5 MG tablet Take 1 tablet (5 mg total) by mouth once daily. 30 tablet 11    sacubitriL-valsartan (ENTRESTO)  mg per tablet Take 1 tablet by mouth 2 (two) times daily. 60 tablet 11    semaglutide (OZEMPIC) 1 mg/dose (2 mg/1.5 mL) PnIj Inject 1 mg into the skin every 7 days. Inject 1 mg under the skin every 7 days. 3 mL 11    sodium bicarbonate 650 MG tablet Take 2 tablets (1,300 mg total) by mouth 3 (three) times daily. 180 tablet 11    tacrolimus (PROGRAF) 1 MG Cap Take 5 capsules (5 mg total) by mouth every morning AND 4 capsules (4 mg total) every evening. 810 capsule 3    tadalafiL (CIALIS) 20 MG Tab Take 1 tablet (20 mg total) by mouth As instructed (Take 1/2 tablet or 1 tablet by mouth as needed 2 to 12 hours prior to sexual activity. Take the lowest effective dose.). 10 tablet 11    tamsulosin (FLOMAX) 0.4 mg Cap Take 1 capsule (0.4 mg total) by mouth once daily. 90 capsule 0    dapagliflozin propanediol (FARXIGA) 5 mg Tab tablet Take 1 tablet (5 mg total) by mouth once daily. (Patient not taking: Reported on 4/23/2025) 30 tablet 11    spironolactone (ALDACTONE) 25 MG tablet Take 0.5 tablets (12.5 mg total) by mouth once daily. (Patient not taking: Reported on 4/23/2025) 45 tablet " 3    [DISCONTINUED] dapsone 100 MG Tab Take 1 tablet (100 mg total) by mouth once daily. *STOP 8-* 30 tablet 5    [DISCONTINUED] valGANciclovir (VALCYTE) 450 mg Tab Take 1 tablet (450 mg total) by mouth once daily. Stop 5/21/24 30 tablet 5     No current facility-administered medications on file prior to visit.

## 2025-04-24 DIAGNOSIS — I10 BENIGN ESSENTIAL HTN: ICD-10-CM

## 2025-04-24 RX ORDER — CARVEDILOL 12.5 MG/1
12.5 TABLET ORAL 2 TIMES DAILY
Qty: 60 TABLET | Refills: 0 | Status: SHIPPED | OUTPATIENT
Start: 2025-04-24 | End: 2025-05-24

## 2025-04-28 ENCOUNTER — LAB VISIT (OUTPATIENT)
Dept: LAB | Facility: HOSPITAL | Age: 70
End: 2025-04-28
Attending: INTERNAL MEDICINE
Payer: COMMERCIAL

## 2025-04-28 DIAGNOSIS — E78.5 HYPERLIPIDEMIA ASSOCIATED WITH TYPE 2 DIABETES MELLITUS: ICD-10-CM

## 2025-04-28 DIAGNOSIS — E11.69 HYPERLIPIDEMIA ASSOCIATED WITH TYPE 2 DIABETES MELLITUS: ICD-10-CM

## 2025-04-28 LAB
CHOLEST SERPL-MCNC: 151 MG/DL (ref 120–199)
CHOLEST/HDLC SERPL: 4.2 {RATIO} (ref 2–5)
HDLC SERPL-MCNC: 36 MG/DL (ref 40–75)
HDLC SERPL: 23.8 % (ref 20–50)
LDLC SERPL CALC-MCNC: 92.2 MG/DL (ref 63–159)
NONHDLC SERPL-MCNC: 115 MG/DL
TRIGL SERPL-MCNC: 114 MG/DL (ref 30–150)

## 2025-04-28 PROCEDURE — 36415 COLL VENOUS BLD VENIPUNCTURE: CPT

## 2025-04-28 PROCEDURE — 82465 ASSAY BLD/SERUM CHOLESTEROL: CPT

## 2025-04-29 ENCOUNTER — RESULTS FOLLOW-UP (OUTPATIENT)
Dept: INTERNAL MEDICINE | Facility: CLINIC | Age: 70
End: 2025-04-29

## 2025-05-05 ENCOUNTER — PATIENT MESSAGE (OUTPATIENT)
Dept: NEPHROLOGY | Facility: CLINIC | Age: 70
End: 2025-05-05
Payer: MEDICARE

## 2025-05-05 ENCOUNTER — OFFICE VISIT (OUTPATIENT)
Dept: INTERNAL MEDICINE | Facility: CLINIC | Age: 70
End: 2025-05-05
Payer: COMMERCIAL

## 2025-05-05 ENCOUNTER — TELEPHONE (OUTPATIENT)
Dept: NEPHROLOGY | Facility: CLINIC | Age: 70
End: 2025-05-05
Payer: MEDICARE

## 2025-05-05 VITALS
OXYGEN SATURATION: 95 % | TEMPERATURE: 98 F | SYSTOLIC BLOOD PRESSURE: 128 MMHG | HEART RATE: 87 BPM | HEIGHT: 74 IN | WEIGHT: 258.19 LBS | DIASTOLIC BLOOD PRESSURE: 72 MMHG | BODY MASS INDEX: 33.13 KG/M2

## 2025-05-05 DIAGNOSIS — E66.9 TYPE 2 DIABETES MELLITUS WITH OBESITY: ICD-10-CM

## 2025-05-05 DIAGNOSIS — Z79.4 TYPE 2 DIABETES MELLITUS WITH STAGE 3B CHRONIC KIDNEY DISEASE, WITH LONG-TERM CURRENT USE OF INSULIN: ICD-10-CM

## 2025-05-05 DIAGNOSIS — E11.69 HYPERLIPIDEMIA ASSOCIATED WITH TYPE 2 DIABETES MELLITUS: ICD-10-CM

## 2025-05-05 DIAGNOSIS — I50.22 CHRONIC SYSTOLIC CONGESTIVE HEART FAILURE: ICD-10-CM

## 2025-05-05 DIAGNOSIS — E11.22 TYPE 2 DIABETES MELLITUS WITH STAGE 3B CHRONIC KIDNEY DISEASE, WITH LONG-TERM CURRENT USE OF INSULIN: ICD-10-CM

## 2025-05-05 DIAGNOSIS — Z94.0 S/P KIDNEY TRANSPLANT: ICD-10-CM

## 2025-05-05 DIAGNOSIS — N18.32 TYPE 2 DIABETES MELLITUS WITH STAGE 3B CHRONIC KIDNEY DISEASE, WITH LONG-TERM CURRENT USE OF INSULIN: ICD-10-CM

## 2025-05-05 DIAGNOSIS — I42.0 DILATED CARDIOMYOPATHY: ICD-10-CM

## 2025-05-05 DIAGNOSIS — E04.2 MULTIPLE THYROID NODULES: ICD-10-CM

## 2025-05-05 DIAGNOSIS — E11.59 HYPERTENSION ASSOCIATED WITH DIABETES: ICD-10-CM

## 2025-05-05 DIAGNOSIS — I15.2 HYPERTENSION ASSOCIATED WITH DIABETES: ICD-10-CM

## 2025-05-05 DIAGNOSIS — E83.42 HYPOMAGNESEMIA: ICD-10-CM

## 2025-05-05 DIAGNOSIS — Z94.0 KIDNEY REPLACED BY TRANSPLANT: ICD-10-CM

## 2025-05-05 DIAGNOSIS — Z23 NEED FOR STREPTOCOCCUS PNEUMONIAE VACCINATION: ICD-10-CM

## 2025-05-05 DIAGNOSIS — E11.8 DIABETES MELLITUS TYPE 2 WITH COMPLICATIONS: Primary | ICD-10-CM

## 2025-05-05 DIAGNOSIS — D63.1 ANEMIA DUE TO CHRONIC KIDNEY DISEASE, UNSPECIFIED CKD STAGE: ICD-10-CM

## 2025-05-05 DIAGNOSIS — N25.81 SECONDARY HYPERPARATHYROIDISM, RENAL: ICD-10-CM

## 2025-05-05 DIAGNOSIS — N18.9 ANEMIA DUE TO CHRONIC KIDNEY DISEASE, UNSPECIFIED CKD STAGE: ICD-10-CM

## 2025-05-05 DIAGNOSIS — E11.69 TYPE 2 DIABETES MELLITUS WITH OBESITY: ICD-10-CM

## 2025-05-05 DIAGNOSIS — E78.5 HYPERLIPIDEMIA ASSOCIATED WITH TYPE 2 DIABETES MELLITUS: ICD-10-CM

## 2025-05-05 DIAGNOSIS — I25.118 CORONARY ARTERY DISEASE OF NATIVE ARTERY OF NATIVE HEART WITH STABLE ANGINA PECTORIS: ICD-10-CM

## 2025-05-05 PROCEDURE — 99214 OFFICE O/P EST MOD 30 MIN: CPT | Mod: S$GLB,,, | Performed by: INTERNAL MEDICINE

## 2025-05-05 PROCEDURE — 3044F HG A1C LEVEL LT 7.0%: CPT | Mod: CPTII,S$GLB,, | Performed by: INTERNAL MEDICINE

## 2025-05-05 PROCEDURE — 3008F BODY MASS INDEX DOCD: CPT | Mod: CPTII,S$GLB,, | Performed by: INTERNAL MEDICINE

## 2025-05-05 PROCEDURE — 3078F DIAST BP <80 MM HG: CPT | Mod: CPTII,S$GLB,, | Performed by: INTERNAL MEDICINE

## 2025-05-05 PROCEDURE — 3066F NEPHROPATHY DOC TX: CPT | Mod: CPTII,S$GLB,, | Performed by: INTERNAL MEDICINE

## 2025-05-05 PROCEDURE — 1159F MED LIST DOCD IN RCRD: CPT | Mod: CPTII,S$GLB,, | Performed by: INTERNAL MEDICINE

## 2025-05-05 PROCEDURE — 1126F AMNT PAIN NOTED NONE PRSNT: CPT | Mod: CPTII,S$GLB,, | Performed by: INTERNAL MEDICINE

## 2025-05-05 PROCEDURE — 99999 PR PBB SHADOW E&M-EST. PATIENT-LVL V: CPT | Mod: PBBFAC,,, | Performed by: INTERNAL MEDICINE

## 2025-05-05 PROCEDURE — 1101F PT FALLS ASSESS-DOCD LE1/YR: CPT | Mod: CPTII,S$GLB,, | Performed by: INTERNAL MEDICINE

## 2025-05-05 PROCEDURE — 3074F SYST BP LT 130 MM HG: CPT | Mod: CPTII,S$GLB,, | Performed by: INTERNAL MEDICINE

## 2025-05-05 PROCEDURE — 4010F ACE/ARB THERAPY RXD/TAKEN: CPT | Mod: CPTII,S$GLB,, | Performed by: INTERNAL MEDICINE

## 2025-05-05 PROCEDURE — 3288F FALL RISK ASSESSMENT DOCD: CPT | Mod: CPTII,S$GLB,, | Performed by: INTERNAL MEDICINE

## 2025-05-05 RX ORDER — SODIUM BICARBONATE 650 MG/1
1300 TABLET ORAL 3 TIMES DAILY
Qty: 180 TABLET | Refills: 11 | Status: SHIPPED | OUTPATIENT
Start: 2025-05-05 | End: 2026-05-05

## 2025-05-05 RX ORDER — EZETIMIBE 10 MG/1
10 TABLET ORAL DAILY
Qty: 90 TABLET | Refills: 3 | Status: SHIPPED | OUTPATIENT
Start: 2025-05-05 | End: 2026-05-05

## 2025-05-05 RX ORDER — ATORVASTATIN CALCIUM 80 MG/1
80 TABLET, FILM COATED ORAL DAILY
Qty: 90 TABLET | Refills: 3 | Status: SHIPPED | OUTPATIENT
Start: 2025-05-05 | End: 2026-05-05

## 2025-05-05 RX ORDER — LANOLIN ALCOHOL/MO/W.PET/CERES
800 CREAM (GRAM) TOPICAL 2 TIMES DAILY
Qty: 120 TABLET | Refills: 11 | Status: SHIPPED | OUTPATIENT
Start: 2025-05-05 | End: 2026-05-05

## 2025-05-05 NOTE — TELEPHONE ENCOUNTER
----- Message from Adilene sent at 5/5/2025 12:59 PM CDT -----  Contact: self  Type:  Needs Medical AdviceWho Called: Isidro Borgesould the patient rather a call back or a response via [email or web address]? Call Maya Call Back Number: [phone] Information: pt stating he spoke with the transplant center and was told to speak with dr farley's office

## 2025-05-05 NOTE — TELEPHONE ENCOUNTER
He s/w transplant about refill on magnesium and they told him to chack with dr farley ,he may not need that or sodium bicarbonate. Sent to dr farley to advise.5/5/25/sf

## 2025-05-05 NOTE — TELEPHONE ENCOUNTER
----- Message from Adilene sent at 5/5/2025 12:59 PM CDT -----  Contact: self  Type:  Needs Medical AdviceWho Called: Isidro Borgesould the patient rather a call back or a response via [email or web address]? Call Maya Call Back Number: [phone] Information: pt stating he spoke with the transplant center and was told to speak with dr farley's office

## 2025-05-05 NOTE — PROGRESS NOTES
Subjective:      Patient ID: Isidro Alves is a 69 y.o. male.    Chief Complaint: Annual Exam    HPI  History of Present Illness                 69 y.o. with  Problem List[1]  Past Medical History:   Diagnosis Date    Abnormal nuclear stress test 8/30/2022    Acute hypoxemic respiratory failure due to COVID-19 1/21/2021    Acute on chronic combined systolic and diastolic congestive heart failure 5/7/2023    Acute on chronic respiratory failure 5/7/2023    Acute respiratory failure with hypoxia and hypercapnia 5/18/2023    Anal itching 10/10/2022    Anemia 4/16/2014    BPH (benign prostatic hyperplasia)     CKD (chronic kidney disease) stage 4, GFR 15-29 ml/min     Coronary artery disease of native artery of native heart with stable angina pectoris 4/29/2019    Diabetes mellitus     Diabetes mellitus, type 2     Dilated cardiomyopathy 8/30/2022    Elevated PSA     Encounter for blood transfusion     Herpes labialis     Hyperlipidemia     Hypertension     Hypertensive emergency 5/18/2023    Obesity     Pneumonia 5/7/2023    Pneumonia due to COVID-19 virus     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  Compliant with meds without significant side effects. Energy and appetite are good.     Declined pneumonia vaccine.     Past Surgical History:   Procedure Laterality Date    ABDOMINAL AORTIC ANEURYSM REPAIR      CATHETERIZATION OF BOTH LEFT AND RIGHT HEART N/A 08/30/2022    Procedure: CATHETERIZATION, HEART, BOTH LEFT AND RIGHT;  Surgeon: Kassandra Deras MD;  Location: [facility] CATH LAB;  Service: Cardiology;  Laterality: N/A;    CATHETERIZATION OF BOTH LEFT AND RIGHT HEART N/A 09/05/2023    Procedure: CATHETERIZATION, HEART, BOTH LEFT AND RIGHT;  Surgeon: Kassandra Deras MD;  Location: [facility] CATH LAB;  Service: Cardiology;   "Laterality: N/A;    COLONOSCOPY N/A 12/21/2017    Procedure: COLONOSCOPY;  Surgeon: Fran Escalera MD;  Location: [facility] ENDO;  Service: Endoscopy;  Laterality: N/A;    COLONOSCOPY N/A 02/02/2023    Procedure: COLONOSCOPY;  Surgeon: Anthony Meng MD;  Location: [facility] ENDO;  Service: Endoscopy;  Laterality: N/A;    FLUOROSCOPY N/A 01/21/2021    Procedure: Vascath insertion;  Surgeon: Adrian Pruitt Jr., MD;  Location: [facility] CATH LAB;  Service: General;  Laterality: N/A;    FLUOROSCOPY N/A 04/06/2021    Procedure: Over the wire Vas Cath exchange;  Surgeon: Ho Pressley MD;  Location: [facility] CATH LAB;  Service: General;  Laterality: N/A;    KIDNEY TRANSPLANT N/A 02/18/2024    Procedure: TRANSPLANT, KIDNEY;  Surgeon: Chas Salomon MD;  Location: [facility] OR [address] [place] [address];  Service: Transplant;  Laterality: N/A;    PROSTATE BIOPSY      thorocotomy  01/01/2010     Social History[2]  family history includes Diabetes in his father and another family member; Hypertension in his father and another family member; No Known Problems in his mother.    Review of Systems   Constitutional:  Negative for chills and fever.   HENT:  Negative for ear pain and sore throat.    Respiratory:  Negative for cough.    Cardiovascular:  Negative for chest pain.   Gastrointestinal:  Negative for abdominal pain and blood in stool.   Genitourinary:  Negative for dysuria and hematuria.   Neurological:  Negative for seizures and syncope.     Objective:   /72 (BP Location: Right arm, Patient Position: Sitting)   Pulse 87   Temp 98.2 °F (36.8 °C)   Ht 6' 2" (1.88 m)   Wt 117.1 kg (258 lb 2.5 oz)   SpO2 95%   BMI 33.15 kg/m²     Physical Exam  Constitutional:       General: He is not in acute distress.     Appearance: He is well-developed.   HENT:      Head: Normocephalic and atraumatic.   Eyes:      Extraocular Movements: Extraocular movements intact.   Neck:      Thyroid: No thyromegaly.   Cardiovascular:      Rate and Rhythm: Normal rate " and regular rhythm.   Pulmonary:      Breath sounds: Normal breath sounds. No wheezing or rales.   Abdominal:      General: Bowel sounds are normal.      Palpations: Abdomen is soft.      Tenderness: There is no abdominal tenderness.   Musculoskeletal:         General: No swelling.      Cervical back: Neck supple. No rigidity.   Lymphadenopathy:      Cervical: No cervical adenopathy.   Skin:     General: Skin is warm and dry.   Neurological:      Mental Status: He is alert and oriented to person, place, and time.   Psychiatric:         Behavior: Behavior normal.         Lab Results   Component Value Date    WBC 7.12 02/07/2025    HGB 13.1 (L) 02/07/2025    HGB 13.9 (L) 01/06/2025    HGB 14.0 12/09/2024    HCT 43.6 02/07/2025    MCV 85 02/07/2025    MCV 84 01/06/2025    MCV 81 (L) 12/09/2024     02/07/2025    CHOL 151 04/28/2025    TRIG 114 04/28/2025    HDL 36 (L) 04/28/2025    LDLCALC 92.2 04/28/2025    LDLCALC 115.6 05/13/2024    LDLCALC 82 09/26/2022    ALT 12 03/24/2025    AST 20 03/24/2025     04/17/2025    K 4.7 04/17/2025    CALCIUM 9.3 04/17/2025     04/17/2025    CO2 23 04/17/2025    BUN 24 (H) 04/17/2025    CREATININE 1.8 (H) 04/17/2025    CREATININE 1.5 (H) 02/07/2025    CREATININE 1.4 01/24/2025    EGFRNORACEVR 40 (L) 04/17/2025    EGFRNORACEVR 50.4 (A) 02/07/2025    EGFRNORACEVR 54.7 (A) 01/24/2025    TSH 1.941 07/14/2022    TSH 1.464 09/05/2019    TSH 1.329 11/20/2017    PSA 3.6 11/04/2024    PSA 3.9 01/11/2024    PSA 6.1 (H) 08/25/2022    PSADIAG 7.7 (H) 11/04/2016    PSADIAG 12.7 (H) 05/17/2016    PSADIAG 4.7 (H) 11/20/2015     (H) 04/17/2025    HGBA1C 6.8 (H) 04/17/2025    HGBA1C 7.8 (H) 01/24/2025    HGBA1C 7.0 (H) 11/04/2024    TUHEKEGO13UH 27 (L) 04/17/2025    DBANYOAX24YK 26 (L) 10/09/2024    IZTYPRHZ75LI 32 12/01/2020     (H) 09/20/2024          The 10-year ASCVD risk score (Naye POON, et al., 2019) is: 31.6%    Values used to calculate the score:      Age: 69  years      Sex: Male      Is Non- : Yes      Diabetic: Yes      Tobacco smoker: No      Systolic Blood Pressure: 128 mmHg      Is BP treated: Yes      HDL Cholesterol: 36 mg/dL      Total Cholesterol: 151 mg/dL     Assessment:     1. Diabetes mellitus type 2 with complications    2. Anemia due to chronic kidney disease, unspecified CKD stage    3. Hypertension associated with diabetes    4. Type 2 diabetes mellitus with stage 3b chronic kidney disease, with long-term current use of insulin    5. Hyperlipidemia associated with type 2 diabetes mellitus    6. Coronary artery disease of native artery of native heart with stable angina pectoris    7. Multiple thyroid nodules    8. Chronic systolic congestive heart failure    9. Dilated cardiomyopathy    10. S/P kidney transplant on February 18, 2024    11. Secondary hyperparathyroidism, renal    12. Type 2 diabetes mellitus with obesity    13. Need for Streptococcus pneumoniae vaccination      Plan:   1. Diabetes mellitus type 2 with complications  Overview:   Controlled.  Continue current medications.       2. Anemia due to chronic kidney disease, unspecified CKD stage  Overview:   Stable.      3. Hypertension associated with diabetes  Overview:  Controlled.  Continue current medications      4. Type 2 diabetes mellitus with stage 3b chronic kidney disease, with long-term current use of insulin  Overview:    Controlled.  Continue current medications    Orders:  -     Microalbumin/Creatinine Ratio, Urine; Future; Expected date: 05/05/2025    5. Hyperlipidemia associated with type 2 diabetes mellitus  -     ezetimibe (ZETIA) 10 mg tablet; Take 1 tablet (10 mg total) by mouth once daily.  Dispense: 90 tablet; Refill: 3    6. Coronary artery disease of native artery of native heart with stable angina pectoris  Overview:  Stable.  Aware to continue recommendations and follow-up as per Cardiology.      7. Multiple thyroid nodules  Overview:  /25/2019      FINDINGS:  The thyroid gland demonstrates a homogenous echotexture.  The isthmus measures 3.0 mm in thickness.  The right lobe of the thyroid gland measures 5.0 cm in length.  The left lobe of the thyroid gland measures 4.9 cm in length.     There are 2 tiny cystic nodules noted within the lower pole of the right lobe of the thyroid gland measuring 2 mm and 3 mm respectively.  There is also a tiny 2 mm cystic lesion seen within the upper pole of the left lobe of the thyroid gland.     Impression:     1. No nodules requiring FNA or follow-up per TI RADS criteria.        Electronically signed by: Dominic Puentes DO  Date:                                            06/12/2020  Time:                                           10:40           Exam Ended: 06/12/20 10:21                 8. Chronic systolic congestive heart failure  Overview:  Compensated.  Aware to continue recommendations and follow-up as per Cardiology.      9. Dilated cardiomyopathy  Overview:    Compensated.  Continue current   Medications and recommendations as per      10. S/P kidney transplant on February 18, 2024    11. Secondary hyperparathyroidism, renal    12. Type 2 diabetes mellitus with obesity    13. Need for Streptococcus pneumoniae vaccination  -     Discontinue: pneumoc 20-santosh conj-dip cr(PF) (PREVNAR-20 (PF)) injection Syrg 0.5 mL        Patient Instructions   Check with your pharmacy regarding rsv and  shingles vaccine.      Future Appointments   Date Time Provider Department Center   5/12/2025 12:00 PM Jatin Deleon PA-C [facility] DIABETE [facility]   6/24/2025  8:20 AM Dez Ragland MD [facility] CARDIO [facility]   10/20/2025  9:15 AM LABORATORY, [facility] LAB [facility]   10/20/2025  9:25 AM LABORATORY, [facility] LAB [facility]   10/27/2025  3:00 PM Colten Rizvi MD ON [facility]   11/5/2025 10:00 AM Benigno Torres MD [facility] UROLOGY [facility]       Lab Frequency Next Occurrence   ACCEPT - Ambulatory referral/consult  to Interventional Cardiology Once 05/13/2024   Ambulatory referral/consult to Diabetes Education Once 08/26/2024   Urinalysis Once 10/16/2024   Microalbumin/Creatinine Ratio, Urine Once 03/12/2025   Protein/Creatinine Ratio, Urine Once 07/08/2025   Urinalysis Once 07/08/2025   Renal Function Panel Once 07/08/2025   PTH, Intact Once 07/08/2025   Vitamin D Once 07/08/2025   Tacrolimus Level Once 07/08/2025   Magnesium Once 07/08/2025   Renal Function Panel Once 04/23/2026   PTH, Intact Once 04/23/2026   Magnesium Once 04/23/2026   Tacrolimus Level Once 04/23/2026   Urinalysis Once 04/23/2026   Protein/Creatinine Ratio, Urine Once 04/23/2026   Vitamin D Once 04/23/2026   HEPATITIS C ANTIBODY     HEPATITIS B VIRAL DNA, QUANTITATIVE     Hepatitis B surface antigen     Hepatic function panel     Hepatitis B surface antibody     Tacrolimus level     CBC auto differential     Renal function panel     Urinalysis     Protein / creatinine ratio, urine, Home collect/Standing     BK virus, DNA, quantitative [facility]; Plasma     CMV DNA, quantitative, PCR     HEPATITIS B SURFACE ANTIGEN Every 12 Weeks    HEPATITIS B VIRAL DNA, QUANTITATIVE Every 12 Months 9/5/2025   HEPATITIS C ANTIBODY Every 6 Months 9/5/2025   Magnesium Every 4 Weeks 5/16/2025, 6/13/2025, 7/11/2025, 8/8/2025, 9/5/2025, 10/3/2025, 10/31/2025, 11/28/2025, 12/26/2025, 1/23/2026, 2/20/2026, 3/20/2026   Hepatic function panel Every 6 Months 9/5/2025, 3/6/2026, 9/4/2026, 3/5/2027, 9/3/2027, 3/3/2028, 9/1/2028, 3/2/2029       Follow up in about 1 year (around 5/5/2026), or if symptoms worsen or fail to improve.                  [1]   Patient Active Problem List  Diagnosis    Anemia due to chronic kidney disease    Hypertension associated with diabetes    Metabolic acidosis    Type 2 diabetes mellitus with stage 3b chronic kidney disease, with long-term current use of insulin    Benign non-nodular prostatic hyperplasia without lower urinary tract symptoms     Hypogonadism in male    Hyperlipidemia associated with type 2 diabetes mellitus    Colon polyp    Coronary artery disease of native artery of native heart with stable angina pectoris    Multiple thyroid nodules    Pulmonary fibrosis, postinflammatory    PVC (premature ventricular contraction)    Chronic systolic congestive heart failure    Dilated cardiomyopathy    Pulmonary HTN    Chronic cough    S/P kidney transplant on February 18, 2024    Immunodeficiency due to long term immunosuppressive drug therapy    Prophylactic immunotherapy    Hx Low vitamin D level    Simple chronic bronchitis    Diabetes mellitus type 2 with complications    Aortic atherosclerosis    NICM (nonischemic cardiomyopathy)    Type 2 diabetes mellitus with obesity    Class 1 obesity with serious comorbidity and body mass index (BMI) of 33.0 to 33.9 in adult    Secondary hyperparathyroidism, renal   [2]   Social History  Socioeconomic History    Marital status:     Number of children: 3   Occupational History     Employer: self-rmployed   Tobacco Use    Smoking status: Never     Passive exposure: Never    Smokeless tobacco: Never   Substance and Sexual Activity    Alcohol use: Not Currently     Comment: seldom     Drug use: No    Sexual activity: Not Currently     Partners: Female   Social History Narrative    ** Merged History Encounter **          Social Drivers of Health     Financial Resource Strain: Low Risk  (5/3/2025)    Overall Financial Resource Strain (CARDIA)     Difficulty of Paying Living Expenses: Not hard at all   Food Insecurity: No Food Insecurity (5/3/2025)    Hunger Vital Sign     Worried About Running Out of Food in the Last Year: Never true     Ran Out of Food in the Last Year: Never true   Transportation Needs: No Transportation Needs (5/3/2025)    PRAPARE - Transportation     Lack of Transportation (Medical): No     Lack of Transportation (Non-Medical): No   Physical Activity: Insufficiently Active (5/3/2025)     Exercise Vital Sign     Days of Exercise per Week: 2 days     Minutes of Exercise per Session: 20 min   Stress: No Stress Concern Present (5/3/2025)    [employer] [place] [employer] - Occupational Stress Questionnaire     Feeling of Stress : Not at all   Housing Stability: Low Risk  (5/3/2025)    Housing Stability Vital Sign     Unable to Pay for Housing in the Last Year: No     Homeless in the Last Year: No

## 2025-05-05 NOTE — TELEPHONE ENCOUNTER
----- Message from Adilene sent at 5/5/2025 12:59 PM CDT -----  Contact: self  Type:  Needs Medical AdviceWho Called: Isidro Borgesould the patient rather a call back or a response via [facility]? Call Maya Call Back Number: [phone] Information: pt stating he spoke with the transplant center and was told to speak with dr farley's office

## 2025-05-12 ENCOUNTER — OFFICE VISIT (OUTPATIENT)
Dept: DIABETES | Facility: CLINIC | Age: 70
End: 2025-05-12
Payer: COMMERCIAL

## 2025-05-12 ENCOUNTER — TELEPHONE (OUTPATIENT)
Dept: DIABETES | Facility: CLINIC | Age: 70
End: 2025-05-12

## 2025-05-12 DIAGNOSIS — N18.30 CONTROLLED TYPE 2 DIABETES MELLITUS WITH STAGE 3 CHRONIC KIDNEY DISEASE, WITH LONG-TERM CURRENT USE OF INSULIN: ICD-10-CM

## 2025-05-12 DIAGNOSIS — I50.22 CHRONIC SYSTOLIC CONGESTIVE HEART FAILURE: ICD-10-CM

## 2025-05-12 DIAGNOSIS — E66.9 OBESITY (BMI 30-39.9): ICD-10-CM

## 2025-05-12 DIAGNOSIS — E11.59 HYPERTENSION ASSOCIATED WITH DIABETES: ICD-10-CM

## 2025-05-12 DIAGNOSIS — E11.22 CONTROLLED TYPE 2 DIABETES MELLITUS WITH STAGE 3 CHRONIC KIDNEY DISEASE, WITH LONG-TERM CURRENT USE OF INSULIN: ICD-10-CM

## 2025-05-12 DIAGNOSIS — I25.118 CORONARY ARTERY DISEASE OF NATIVE ARTERY OF NATIVE HEART WITH STABLE ANGINA PECTORIS: ICD-10-CM

## 2025-05-12 DIAGNOSIS — I15.2 HYPERTENSION ASSOCIATED WITH DIABETES: ICD-10-CM

## 2025-05-12 DIAGNOSIS — Z94.0 S/P KIDNEY TRANSPLANT: ICD-10-CM

## 2025-05-12 DIAGNOSIS — Z79.4 CONTROLLED TYPE 2 DIABETES MELLITUS WITH STAGE 3 CHRONIC KIDNEY DISEASE, WITH LONG-TERM CURRENT USE OF INSULIN: ICD-10-CM

## 2025-05-12 DIAGNOSIS — E78.5 HYPERLIPIDEMIA ASSOCIATED WITH TYPE 2 DIABETES MELLITUS: ICD-10-CM

## 2025-05-12 DIAGNOSIS — E11.69 HYPERLIPIDEMIA ASSOCIATED WITH TYPE 2 DIABETES MELLITUS: ICD-10-CM

## 2025-05-12 DIAGNOSIS — Z53.21 PATIENT LEFT WITHOUT BEING SEEN: Primary | ICD-10-CM

## 2025-05-12 PROCEDURE — 99499 UNLISTED E&M SERVICE: CPT | Mod: 95,,, | Performed by: PHYSICIAN ASSISTANT

## 2025-05-12 NOTE — PROGRESS NOTES
Patient was scheduled for f/u to discuss Farxiga but has not been able to fill yet. Will reschedule for 4 wk. If unable to fill will rx ozempic 2 mg weekly and still drop Tresiba to 8u    Jatin Deleon PA-C, BC-ADM  [facility]

## 2025-05-12 NOTE — TELEPHONE ENCOUNTER
----- Message from Arpita sent at 5/12/2025 12:36 PM CDT -----  Regarding: Isidro  Who called: Alex is the request in detail: Patient states that the doctor needs to call the insurance company to have his medication approved please contact patient for informationCan the clinic reply by [facility]? noWould the patient rather a call back or a response via [facility]? [facility]:.[phone] Information:

## 2025-05-13 RX ORDER — SACUBITRIL AND VALSARTAN 97; 103 MG/1; MG/1
1 TABLET, FILM COATED ORAL 2 TIMES DAILY
Qty: 60 TABLET | Refills: 11 | Status: SHIPPED | OUTPATIENT
Start: 2025-05-13

## 2025-05-15 DIAGNOSIS — Z94.0 KIDNEY TRANSPLANT RECIPIENT: ICD-10-CM

## 2025-05-15 DIAGNOSIS — Z79.60 IMMUNODEFICIENCY DUE TO LONG TERM IMMUNOSUPPRESSIVE DRUG THERAPY: ICD-10-CM

## 2025-05-15 DIAGNOSIS — Z94.0 S/P KIDNEY TRANSPLANT: ICD-10-CM

## 2025-05-15 DIAGNOSIS — T45.1X5A IMMUNODEFICIENCY DUE TO LONG TERM IMMUNOSUPPRESSIVE DRUG THERAPY: ICD-10-CM

## 2025-05-15 DIAGNOSIS — D84.821 IMMUNODEFICIENCY DUE TO LONG TERM IMMUNOSUPPRESSIVE DRUG THERAPY: ICD-10-CM

## 2025-05-15 RX ORDER — PREDNISONE 5 MG/1
5 TABLET ORAL DAILY
Qty: 90 TABLET | Refills: 3 | Status: SHIPPED | OUTPATIENT
Start: 2025-05-15

## 2025-05-15 RX ORDER — TACROLIMUS 1 MG/1
CAPSULE ORAL
Qty: 810 CAPSULE | Refills: 3 | Status: SHIPPED | OUTPATIENT
Start: 2025-05-15

## 2025-05-15 RX ORDER — MYCOPHENOLATE MOFETIL 250 MG/1
1000 CAPSULE ORAL 2 TIMES DAILY
Qty: 240 CAPSULE | Refills: 11 | Status: SHIPPED | OUTPATIENT
Start: 2025-05-15 | End: 2026-05-15

## 2025-05-15 NOTE — TELEPHONE ENCOUNTER
----- Message from Pharmacist Jami sent at 5/12/2025  9:48 AM CDT -----  Regarding: duplicate rx's  Hi Eli!  It appears that has duplicate rx's between [facility] and his [facility] near his home.  He prefers to have his tacro at the [facility], so i convinced him it's better to have all of his immunos together at one pharmacy.  I think [facility] is filling an older rx's b/c we have the most recent dated rx's.  Will you please update and send new rx's for his immunos (fk/mmf/pred) [facility] please?Thank you!Jami Williamson, Pharm.D., B.C.P.S.Clinical Pharmacist for [facility] Services[facility].Phone [phone] (or ext [phone])Fax [phone]

## 2025-06-01 DIAGNOSIS — Z94.0 S/P KIDNEY TRANSPLANT: ICD-10-CM

## 2025-06-03 RX ORDER — ASPIRIN 81 MG/1
81 TABLET ORAL DAILY
Qty: 30 TABLET | Refills: 0 | Status: SHIPPED | OUTPATIENT
Start: 2025-06-03 | End: 2025-07-03

## 2025-06-04 DIAGNOSIS — Z79.60 IMMUNODEFICIENCY DUE TO LONG TERM IMMUNOSUPPRESSIVE DRUG THERAPY: ICD-10-CM

## 2025-06-04 DIAGNOSIS — T45.1X5A IMMUNODEFICIENCY DUE TO LONG TERM IMMUNOSUPPRESSIVE DRUG THERAPY: ICD-10-CM

## 2025-06-04 DIAGNOSIS — Z94.0 KIDNEY TRANSPLANT RECIPIENT: ICD-10-CM

## 2025-06-04 DIAGNOSIS — D84.821 IMMUNODEFICIENCY DUE TO LONG TERM IMMUNOSUPPRESSIVE DRUG THERAPY: ICD-10-CM

## 2025-06-04 DIAGNOSIS — Z94.0 S/P KIDNEY TRANSPLANT: ICD-10-CM

## 2025-06-05 DIAGNOSIS — N40.0 BENIGN NON-NODULAR PROSTATIC HYPERPLASIA WITHOUT LOWER URINARY TRACT SYMPTOMS: ICD-10-CM

## 2025-06-05 RX ORDER — TAMSULOSIN HYDROCHLORIDE 0.4 MG/1
0.4 CAPSULE ORAL DAILY
Qty: 90 CAPSULE | Refills: 3 | Status: SHIPPED | OUTPATIENT
Start: 2025-06-05

## 2025-06-06 DIAGNOSIS — I10 BENIGN ESSENTIAL HTN: ICD-10-CM

## 2025-06-06 RX ORDER — CARVEDILOL 12.5 MG/1
12.5 TABLET ORAL 2 TIMES DAILY
Qty: 60 TABLET | Refills: 0 | Status: SHIPPED | OUTPATIENT
Start: 2025-06-06 | End: 2025-07-06

## 2025-06-06 RX ORDER — TACROLIMUS 1 MG/1
CAPSULE ORAL
Qty: 810 CAPSULE | Refills: 3 | Status: SHIPPED | OUTPATIENT
Start: 2025-06-06

## 2025-06-06 RX ORDER — MYCOPHENOLATE MOFETIL 250 MG/1
1000 CAPSULE ORAL 2 TIMES DAILY
Qty: 240 CAPSULE | Refills: 11 | Status: SHIPPED | OUTPATIENT
Start: 2025-06-06 | End: 2026-06-06

## 2025-06-10 ENCOUNTER — OFFICE VISIT (OUTPATIENT)
Dept: DIABETES | Facility: CLINIC | Age: 70
End: 2025-06-10
Payer: COMMERCIAL

## 2025-06-10 DIAGNOSIS — E66.9 OBESITY (BMI 30-39.9): ICD-10-CM

## 2025-06-10 DIAGNOSIS — N18.30 CONTROLLED TYPE 2 DIABETES MELLITUS WITH STAGE 3 CHRONIC KIDNEY DISEASE, WITH LONG-TERM CURRENT USE OF INSULIN: Primary | ICD-10-CM

## 2025-06-10 DIAGNOSIS — E78.5 HYPERLIPIDEMIA ASSOCIATED WITH TYPE 2 DIABETES MELLITUS: ICD-10-CM

## 2025-06-10 DIAGNOSIS — I25.118 CORONARY ARTERY DISEASE OF NATIVE ARTERY OF NATIVE HEART WITH STABLE ANGINA PECTORIS: ICD-10-CM

## 2025-06-10 DIAGNOSIS — E11.69 HYPERLIPIDEMIA ASSOCIATED WITH TYPE 2 DIABETES MELLITUS: ICD-10-CM

## 2025-06-10 DIAGNOSIS — Z94.0 S/P KIDNEY TRANSPLANT: ICD-10-CM

## 2025-06-10 DIAGNOSIS — I50.22 CHRONIC SYSTOLIC CONGESTIVE HEART FAILURE: ICD-10-CM

## 2025-06-10 DIAGNOSIS — Z79.4 CONTROLLED TYPE 2 DIABETES MELLITUS WITH STAGE 3 CHRONIC KIDNEY DISEASE, WITH LONG-TERM CURRENT USE OF INSULIN: Primary | ICD-10-CM

## 2025-06-10 DIAGNOSIS — E11.22 CONTROLLED TYPE 2 DIABETES MELLITUS WITH STAGE 3 CHRONIC KIDNEY DISEASE, WITH LONG-TERM CURRENT USE OF INSULIN: Primary | ICD-10-CM

## 2025-06-10 DIAGNOSIS — I15.2 HYPERTENSION ASSOCIATED WITH DIABETES: ICD-10-CM

## 2025-06-10 DIAGNOSIS — E11.59 HYPERTENSION ASSOCIATED WITH DIABETES: ICD-10-CM

## 2025-06-10 PROCEDURE — G2211 COMPLEX E/M VISIT ADD ON: HCPCS | Mod: 95,,, | Performed by: PHYSICIAN ASSISTANT

## 2025-06-10 PROCEDURE — 98006 SYNCH AUDIO-VIDEO EST MOD 30: CPT | Mod: 95,,, | Performed by: PHYSICIAN ASSISTANT

## 2025-06-10 PROCEDURE — 3066F NEPHROPATHY DOC TX: CPT | Mod: CPTII,95,, | Performed by: PHYSICIAN ASSISTANT

## 2025-06-10 PROCEDURE — 3044F HG A1C LEVEL LT 7.0%: CPT | Mod: CPTII,95,, | Performed by: PHYSICIAN ASSISTANT

## 2025-06-10 PROCEDURE — 4010F ACE/ARB THERAPY RXD/TAKEN: CPT | Mod: CPTII,95,, | Performed by: PHYSICIAN ASSISTANT

## 2025-06-10 NOTE — PROGRESS NOTES
PCP: Steven Morgan MD    Subjective:     Chief Complaint: Diabetes - Established Patient    TELEMEDICINE VISIT:     The patient location is: Home  The chief complaint leading to consultation is: Diabetes Follow up  Visit type: Virtual visit with synchronous audio and video  Each patient to whom he or she provides medical services by telemedicine is:  (1) informed of the relationship between the physician and patient and the respective role of any other health care provider with respect to management of the patient; and (2) notified that he or she may decline to receive medical services by telemedicine and may withdraw from such care at any time.    Notes: N/A     HISTORY OF PRESENT ILLNESS: 69 y.o.   male presenting for diabetes management visit.   The patient's last visit with me was on 5/12/2025.   Patient has had Type II diabetes since 20 years or more.  Pertinent to decision making is the following comorbidities: HTN, HLD, CAD, CHF, S/p Transplant - Kidney in Feb 2024, and Obesity by BMI  Patient has the following Diabetes complications: with diabetic chronic kidney disease  He  has attended diabetes education in the past.     Patient's most recent A1c of 6.8% was completed 2 months ago.   Patient states since His last A1c His blood glucose levels have been high  throughout the day .   Patient monitors blood glucose 4 times per day with meter : Fasting, Before Lunch, Before Dinner, and Before Bed. Dexcom G7 deferred.   Patient blood glucose monitoring device will not be uploaded into [employer] today.  Per pt, fasting blood sugar ranging from 90 - 133 and up to 135 later in the day. And up to 211 with juicing once.   Patient endorses the following diabetes related symptoms: None.   Patient is due today for the following diabetes-related health maintenance standards: Foot Exam , Urine Microalbumin/creatinine ratio, Influenza Vaccine, COVID-19 Vaccine , and vaccines.   He denies recent  hospital admissions or emergency room visits.   He denies having hypoglycemia.   Patient's concerns today include glycemic control. Of note, pt feels he has had weight loss and tolerating well. Per transplant team, ok to try SGLT-2.   Patient medication regimen is as below.     CURRENT DM MEDICATIONS:   Tresiba 10 units in the morning  Lispro correction dosing every 3 hours as needed  Ozempic 1 mg once per week  Farxiga 5 mg daily      Lispro Correction Dosing every 3 hours as needed OUTSIDE OF EATING  If  - 250, may take 2 units of Lispro  If  - 300, may take 3 units of Lispro  If  - 350, may take 4 units of Lispro  If  - 400, may take 5 units of Lispro  If +, may take 6 units of Lispro    Patient has failed the following Diabetes medications:   Metformin    Xultophy  Semglee  Lantus      Labs Reviewed.       Lab Results   Component Value Date    CPEPTIDE 3.88 11/20/2017     Lab Results   Component Value Date    GLUTAMICACID 0.00 11/20/2017          //   , There is no height or weight on file to calculate BMI.  His blood sugar in clinic today is:    Lab Results   Component Value Date    POCGLU 127 (A) 03/17/2025       Review of Systems   Constitutional:  Negative for activity change, appetite change, chills and fever.   HENT:  Negative for dental problem, mouth sores, nosebleeds, sore throat and trouble swallowing.    Eyes:  Negative for pain and discharge.   Respiratory:  Negative for shortness of breath, wheezing and stridor.    Cardiovascular:  Negative for chest pain, palpitations and leg swelling.   Gastrointestinal:  Negative for abdominal pain, diarrhea, nausea and vomiting.   Endocrine: Negative for polydipsia, polyphagia and polyuria.   Genitourinary:  Negative for dysuria, frequency and urgency.   Musculoskeletal:  Negative for joint swelling and myalgias.   Skin:  Negative for rash and wound.   Neurological:  Negative for dizziness, syncope, weakness and headaches.    Psychiatric/Behavioral:  Negative for behavioral problems and dysphoric mood.          Diabetes Management Status  Statin: Taking  ACE/ARB: Taking    Screening or Prevention Patient's value Goal Complete/Controlled?   HgA1C Testing and Control   Lab Results   Component Value Date    HGBA1C 6.8 (H) 04/17/2025      Annually/Less than 8% Yes   Lipid profile : 04/28/2025 Annually Yes   LDL control Lab Results   Component Value Date    LDLCALC 92.2 04/28/2025    Annually/Less than 100 mg/dl  Yes   Nephropathy screening Lab Results   Component Value Date    MICALBCREAT 1998.1 (H) 07/14/2022     Lab Results   Component Value Date    PROTEINUA Negative 04/17/2025    Annually Yes   Blood pressure BP Readings from Last 1 Encounters:   05/05/25 128/72    Less than 140/90 No   Dilated retinal exam : 11/14/2024 Annually No    Foot exam   : 05/16/2024 Annually Yes     Social History     Socioeconomic History    Marital status:     Number of children: 3   Occupational History     Employer: self-rmployed   Tobacco Use    Smoking status: Never     Passive exposure: Never    Smokeless tobacco: Never   Substance and Sexual Activity    Alcohol use: Not Currently     Comment: seldom     Drug use: No    Sexual activity: Not Currently     Partners: Female   Social History Narrative    ** Merged History Encounter **          Social Drivers of Health     Financial Resource Strain: Low Risk  (5/3/2025)    Overall Financial Resource Strain (CARDIA)     Difficulty of Paying Living Expenses: Not hard at all   Food Insecurity: No Food Insecurity (5/3/2025)    Hunger Vital Sign     Worried About Running Out of Food in the Last Year: Never true     Ran Out of Food in the Last Year: Never true   Transportation Needs: No Transportation Needs (5/3/2025)    PRAPARE - Transportation     Lack of Transportation (Medical): No     Lack of Transportation (Non-Medical): No   Physical Activity: Insufficiently Active (5/3/2025)    Exercise Vital Sign      Days of Exercise per Week: 2 days     Minutes of Exercise per Session: 20 min   Stress: No Stress Concern Present (5/3/2025)    [employer] [employer] - Occupational Stress Questionnaire     Feeling of Stress : Not at all   Housing Stability: Low Risk  (5/3/2025)    Housing Stability Vital Sign     Unable to Pay for Housing in the Last Year: No     Homeless in the Last Year: No     Past Medical History:   Diagnosis Date    Abnormal nuclear stress test 8/30/2022    Acute hypoxemic respiratory failure due to COVID-19 1/21/2021    Acute on chronic combined systolic and diastolic congestive heart failure 5/7/2023    Acute on chronic respiratory failure 5/7/2023    Acute respiratory failure with hypoxia and hypercapnia 5/18/2023    Anal itching 10/10/2022    Anemia 4/16/2014    BPH (benign prostatic hyperplasia)     CKD (chronic kidney disease) stage 4, GFR 15-29 ml/min     Coronary artery disease of native artery of native heart with stable angina pectoris 4/29/2019    Diabetes mellitus     Diabetes mellitus, type 2     Dilated cardiomyopathy 8/30/2022    Elevated PSA     Encounter for blood transfusion     Herpes labialis     Hyperlipidemia     Hypertension     Hypertensive emergency 5/18/2023    Obesity     Pneumonia 5/7/2023    Pneumonia due to COVID-19 virus     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     Appearance: He is well-developed. He is not diaphoretic.   HENT:      Head: Normocephalic and atraumatic.      Right Ear: External ear normal.      Left Ear: External ear normal.      Nose: Nose normal.   Eyes:      General:         Right eye: No discharge.         Left eye: No discharge.      Pupils: Pupils are equal, round, and reactive to  light.   Cardiovascular:      Rate and Rhythm: Normal rate and regular rhythm.      Heart sounds: Normal heart sounds.   Pulmonary:      Effort: Pulmonary effort is normal. No respiratory distress.      Breath sounds: Normal breath sounds. No stridor.   Abdominal:      Palpations: Abdomen is soft.   Musculoskeletal:         General: Normal range of motion.      Cervical back: Normal range of motion and neck supple.   Skin:     General: Skin is warm and dry.      Capillary Refill: Capillary refill takes less than 2 seconds.      Coloration: Skin is not pale.   Neurological:      Mental Status: He is alert and oriented to person, place, and time.      Motor: No abnormal muscle tone.      Coordination: Coordination normal.   Psychiatric:         Behavior: Behavior normal.         Thought Content: Thought content normal.         Judgment: Judgment normal.           Assessment / Plan:     Controlled type 2 diabetes mellitus with stage 3 chronic kidney disease, with long-term current use of insulin    S/P kidney transplant 2024    Chronic systolic congestive heart failure    Hypertension associated with diabetes    Hyperlipidemia associated with type 2 diabetes mellitus    Coronary artery disease of native artery of native heart with stable angina pectoris    Obesity (BMI 30-39.9)      Additional Plan Details:    - POCT Glucose  - Encouraged continuation of lifestyle changes including regular exercise and limiting carbohydrates to 30-45 grams per meal threes times daily and 15 grams per snack with a limit of two daily.   - Encouraged continued monitoring of blood glucose with maintenance of 4 times daily at Fasting, After Lunch, Before Bed, and After Breakfast. Dex G7 deferred for now.   - Current DM Medication Regimen: Trial hold Tresiba with Farxiga increase. Continue Lispro correction dosing every 3 hours as below. Continue Ozempic 1 mg weekly - pt deferred increase today in favor of increasing SGLT-2. change Farxiga  10 mg daily - ok by transplant. Encouraged to drink plenty of H20 and watch for UTI.    - Health Maintenance standards addressed today: Foot Exam - deferred by patient today because time, Urine Microalbumin / Creatinine Ratio scheduled, Flu Shot - patient would like to complete outside of [facility], COVID - 19 Vaccine - patient will schedule outside of [facility] , and Vaccines  - Nursing Visit: Patient is below goal range for nursing visit for age group and will not need nursing visit at this time .   - Follow up in 4 wks with A1c prior.      CURRENT DM MEDICATIONS:   Tresiba - trial HOLD  Lispro correction dosing every 3 hours as needed  Ozempic 1 mg once per week  Farxiga 10 mg daily - may take 2 tabs of 5 mg to finish out current dose once able to fill 10 mg tabs     Lispro Correction Dosing every 3 hours as needed OUTSIDE OF EATING  If  - 250, may take 2 units of Lispro  If  - 300, may take 3 units of Lispro  If  - 350, may take 4 units of Lispro  If  - 400, may take 5 units of Lispro  If +, may take 6 units of Lispro    Blakeney McKnight, PA-C [facility]

## 2025-06-10 NOTE — PATIENT INSTRUCTIONS
CURRENT DM MEDICATIONS:   Tresiba - trial HOLD  Lispro correction dosing every 3 hours as needed  Ozempic 1 mg once per week  Farxiga 10 mg daily - may take 2 tabs of 5 mg to finish out current dose once able to fill 10 mg tabs     Lispro Correction Dosing every 3 hours as needed OUTSIDE OF EATING  If  - 250, may take 2 units of Lispro  If  - 300, may take 3 units of Lispro  If  - 350, may take 4 units of Lispro  If  - 400, may take 5 units of Lispro  If +, may take 6 units of Lispro

## 2025-06-19 DIAGNOSIS — I49.3 PVC (PREMATURE VENTRICULAR CONTRACTION): ICD-10-CM

## 2025-06-19 DIAGNOSIS — E11.59 HYPERTENSION ASSOCIATED WITH DIABETES: Primary | ICD-10-CM

## 2025-06-19 DIAGNOSIS — I15.2 HYPERTENSION ASSOCIATED WITH DIABETES: Primary | ICD-10-CM

## 2025-06-24 ENCOUNTER — OFFICE VISIT (OUTPATIENT)
Dept: CARDIOLOGY | Facility: CLINIC | Age: 70
End: 2025-06-24
Payer: COMMERCIAL

## 2025-06-24 VITALS
SYSTOLIC BLOOD PRESSURE: 136 MMHG | WEIGHT: 253.5 LBS | DIASTOLIC BLOOD PRESSURE: 74 MMHG | BODY MASS INDEX: 32.53 KG/M2 | HEIGHT: 74 IN | OXYGEN SATURATION: 95 % | HEART RATE: 78 BPM

## 2025-06-24 DIAGNOSIS — I50.22 CHRONIC SYSTOLIC CONGESTIVE HEART FAILURE: Primary | ICD-10-CM

## 2025-06-24 DIAGNOSIS — I49.3 PVC (PREMATURE VENTRICULAR CONTRACTION): ICD-10-CM

## 2025-06-24 DIAGNOSIS — I42.8 NICM (NONISCHEMIC CARDIOMYOPATHY): ICD-10-CM

## 2025-06-24 DIAGNOSIS — N18.32 TYPE 2 DIABETES MELLITUS WITH STAGE 3B CHRONIC KIDNEY DISEASE, WITH LONG-TERM CURRENT USE OF INSULIN: ICD-10-CM

## 2025-06-24 DIAGNOSIS — E11.22 TYPE 2 DIABETES MELLITUS WITH STAGE 3B CHRONIC KIDNEY DISEASE, WITH LONG-TERM CURRENT USE OF INSULIN: ICD-10-CM

## 2025-06-24 DIAGNOSIS — E78.5 HYPERLIPIDEMIA ASSOCIATED WITH TYPE 2 DIABETES MELLITUS: ICD-10-CM

## 2025-06-24 DIAGNOSIS — I42.0 DILATED CARDIOMYOPATHY: ICD-10-CM

## 2025-06-24 DIAGNOSIS — I25.118 CORONARY ARTERY DISEASE OF NATIVE ARTERY OF NATIVE HEART WITH STABLE ANGINA PECTORIS: ICD-10-CM

## 2025-06-24 DIAGNOSIS — E11.69 HYPERLIPIDEMIA ASSOCIATED WITH TYPE 2 DIABETES MELLITUS: ICD-10-CM

## 2025-06-24 DIAGNOSIS — I15.2 HYPERTENSION ASSOCIATED WITH DIABETES: ICD-10-CM

## 2025-06-24 DIAGNOSIS — I70.0 AORTIC ATHEROSCLEROSIS: ICD-10-CM

## 2025-06-24 DIAGNOSIS — E11.59 HYPERTENSION ASSOCIATED WITH DIABETES: ICD-10-CM

## 2025-06-24 DIAGNOSIS — I27.20 PULMONARY HTN: ICD-10-CM

## 2025-06-24 DIAGNOSIS — Z79.4 TYPE 2 DIABETES MELLITUS WITH STAGE 3B CHRONIC KIDNEY DISEASE, WITH LONG-TERM CURRENT USE OF INSULIN: ICD-10-CM

## 2025-06-24 PROCEDURE — 3066F NEPHROPATHY DOC TX: CPT | Mod: CPTII,S$GLB,, | Performed by: INTERNAL MEDICINE

## 2025-06-24 PROCEDURE — 3075F SYST BP GE 130 - 139MM HG: CPT | Mod: CPTII,S$GLB,, | Performed by: INTERNAL MEDICINE

## 2025-06-24 PROCEDURE — 99999 PR PBB SHADOW E&M-EST. PATIENT-LVL III: CPT | Mod: PBBFAC,,, | Performed by: INTERNAL MEDICINE

## 2025-06-24 PROCEDURE — 3288F FALL RISK ASSESSMENT DOCD: CPT | Mod: CPTII,S$GLB,, | Performed by: INTERNAL MEDICINE

## 2025-06-24 PROCEDURE — 1101F PT FALLS ASSESS-DOCD LE1/YR: CPT | Mod: CPTII,S$GLB,, | Performed by: INTERNAL MEDICINE

## 2025-06-24 PROCEDURE — 3078F DIAST BP <80 MM HG: CPT | Mod: CPTII,S$GLB,, | Performed by: INTERNAL MEDICINE

## 2025-06-24 PROCEDURE — 3008F BODY MASS INDEX DOCD: CPT | Mod: CPTII,S$GLB,, | Performed by: INTERNAL MEDICINE

## 2025-06-24 PROCEDURE — 99214 OFFICE O/P EST MOD 30 MIN: CPT | Mod: S$GLB,,, | Performed by: INTERNAL MEDICINE

## 2025-06-24 PROCEDURE — 1160F RVW MEDS BY RX/DR IN RCRD: CPT | Mod: CPTII,S$GLB,, | Performed by: INTERNAL MEDICINE

## 2025-06-24 PROCEDURE — 3044F HG A1C LEVEL LT 7.0%: CPT | Mod: CPTII,S$GLB,, | Performed by: INTERNAL MEDICINE

## 2025-06-24 PROCEDURE — 4010F ACE/ARB THERAPY RXD/TAKEN: CPT | Mod: CPTII,S$GLB,, | Performed by: INTERNAL MEDICINE

## 2025-06-24 PROCEDURE — 1126F AMNT PAIN NOTED NONE PRSNT: CPT | Mod: CPTII,S$GLB,, | Performed by: INTERNAL MEDICINE

## 2025-06-24 PROCEDURE — 1159F MED LIST DOCD IN RCRD: CPT | Mod: CPTII,S$GLB,, | Performed by: INTERNAL MEDICINE

## 2025-06-24 NOTE — PROGRESS NOTES
Subjective:   Patient ID:  Isidro Alves is a 69 y.o. male who presents for follow up of No chief complaint on file.      68 yo male, came in for 6 months f/u. Own a night club.  PMH NICM, CHFrEF 40%  CAD h/o LHC nonobstructive in 09/23, old MI per MPI in , IDDM 20 yrs, HTN, HLD. ESRD on HD since  and h/o renal Tx in 02/24 , No smoking. Occasional drink.  H/o Fell off the roof and multi fx.    MPI showed inferior old MI and EF 35%. ECHo showed EF 50% and LVH  No f/h premature CAD.  No smoking  Walks 4 times a week. And on PT  ekg NSR     visit  HD via right chest catheter.   echo dilated LV mod, EF 40% and  echo EF 55%  HOLTER showed 12% PVCs  Occasional palpitation. orthopnea and PND. No chest pain leg swelling and syncope. Urinating 8 times a week. No fluid restriction.    08/2022 visit   Phar mPI showed There is a moderate to severe intensity, large sized, mostly fixed perfusion abnormality with some reversibilty in the inferior, inferolateral, lateral and apical wall(s).  Still has SOB PND orthopnea. No leg swelling   BP high. On low Na diet and on 32 oz fluid restriction     visit  H/o LHC and RHC done on 08/31/2022 by Dr. Deras at [facility]  Nonobstructive cAD 30% lesion on LAD and RCA. EF 40%   PAP 28 mmHG PCWP 15 mmHG.  HD 3 times a week. No chest pain  Fluid resriction 32 oz      visit  Some SOB, cough up the phlegm. Fluid restriction.  RHC showed type II pulm HTN  from left side CHF  EKG NSR non specific stt change   Walks 3 times a week    05/23 visit    admitted to [facility] for CHF exacerbation due to fluid noncompliance and had urgent HD. Had cardiac arrest with one round of CPR. Temporary cardiogenic shock with levophed gtt.   05/28/23 BNP 1280. On fluid restriction  No chest pain  dizziness. On HD 3 times a week. Off coreg 12.5 mg bid due to low BP  Can not add Jardiance due to on HD    06/24 visit  09/23 admitted for CP. The cath  showed nonobstructive CAD, LVEDP 23 mmHg, EF 40%   Repeat echo in 11/23 EF 40% and LVEDD 6.02 cm  05/24 admitted for UTI. Rx with Abx. Now recovered  Now back to baseline.  GRF 10. Works at club. No chest pain dyspnea orthopnea PND and leg swelling   BP C    12/24 viist  EKG reviewed by myself today reveals NSR nonspecific STT change  Exercise regularly  Denied chest pain, dyspnea on exertion, palpitation, fainting, PND, orthopnea, syncope and claudication.   Mostly BP controlled at home    Interval history  No admission in yrs. No orthopnea PND leg swelling  SOB at climbing stair    04/25 echo EF 45% LVEDD 6.1 cm stable              History of Present Illness    CHIEF COMPLAINT:  - Isidro presents for follow-up to monitor their cardiac and overall health status.    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 Mcclellan (local kidney specialist)  - Creatinine level is 1.8, described as stable  - Diabetes well-controlled: A1C of 6.8  - Cholesterol levels well-managed: LDL of 92  - No trouble breathing during physical activity, may feel fatigue when using treadmill or climbing stairs  - Starts slowly and increases pace during exercise  - Denies chest pain, dyspnea, dizziness, syncope, nocturnal dyspnea, swelling of lower extremities, and palpitations  - Denies any ED or hospital visits in the past 6 months    CARDIAC HISTORY:  - Echo 03/2025: EF 44-45%, mildly dilated LV chamber, mild LVH  - Echo 1.5 years ago: similar  heart function and size    MEDICATIONS:  - Aspirin 81 mg  - Carvedilol 12.5 mg  - Furosemide 5 mg  - Ezetimibe 10 mg  - Isosorbide 10 mg 3x daily  - Spironolactone    TEST RESULTS:  - A1C: 6.8, good diabetes control  - Creatinine: 1.8, stable renal function  - LDL: 92, good cholesterol control    MEDICAL HISTORY:  - CHF  - Cardiomyopathy  - Diabetes  - Kidney disease: Required transplant    SURGICAL HISTORY:  - Kidney transplant: Last year    SOCIAL HISTORY:  - Occupation: Works at a club on Tuesdays and Thursdays          Past Medical History:   Diagnosis Date    Abnormal nuclear stress test 8/30/2022    Acute hypoxemic respiratory failure due to COVID-19 1/21/2021    Acute on chronic combined systolic and diastolic congestive heart failure 5/7/2023    Acute on chronic respiratory failure 5/7/2023    Acute respiratory failure with hypoxia and hypercapnia 5/18/2023    Anal itching 10/10/2022    Anemia 4/16/2014    BPH (benign prostatic hyperplasia)     CKD (chronic kidney disease) stage 4, GFR 15-29 ml/min     Coronary artery disease of native artery of native heart with stable angina pectoris 4/29/2019    Diabetes mellitus     Diabetes mellitus, type 2     Dilated cardiomyopathy 8/30/2022    Elevated PSA     Encounter for blood transfusion     Herpes labialis     Hyperlipidemia     Hypertension     Hypertensive emergency 5/18/2023    Obesity     Pneumonia 5/7/2023    Pneumonia due to COVID-19 virus     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    Preop cardiovascular exam 9/16/2022    Proteinuria     Pulmonary edema with congestive heart failure 5/18/2023    Secondary hyperparathyroidism (of renal origin)        Past Surgical History:   Procedure Laterality Date    ABDOMINAL AORTIC ANEURYSM REPAIR      CATHETERIZATION OF BOTH LEFT AND RIGHT HEART N/A 08/30/2022    Procedure: CATHETERIZATION, HEART, BOTH LEFT AND RIGHT;  Surgeon: Kassandra Deras MD;  Location: [facility] CATH LAB;  Service: Cardiology;  Laterality: N/A;     CATHETERIZATION OF BOTH LEFT AND RIGHT HEART N/A 09/05/2023    Procedure: CATHETERIZATION, HEART, BOTH LEFT AND RIGHT;  Surgeon: Kassandra Deras MD;  Location: [facility] CATH LAB;  Service: Cardiology;  Laterality: N/A;    COLONOSCOPY N/A 12/21/2017    Procedure: COLONOSCOPY;  Surgeon: Fran Escalera MD;  Location: [facility] ENDO;  Service: Endoscopy;  Laterality: N/A;    COLONOSCOPY N/A 02/02/2023    Procedure: COLONOSCOPY;  Surgeon: Anthony Meng MD;  Location: [facility] ENDO;  Service: Endoscopy;  Laterality: N/A;    FLUOROSCOPY N/A 01/21/2021    Procedure: Vascath insertion;  Surgeon: Adrian Pruitt Jr., MD;  Location: [facility] CATH LAB;  Service: General;  Laterality: N/A;    FLUOROSCOPY N/A 04/06/2021    Procedure: Over the wire Vas Cath exchange;  Surgeon: Ho Pressley MD;  Location: [facility] CATH LAB;  Service: General;  Laterality: N/A;    KIDNEY TRANSPLANT N/A 02/18/2024    Procedure: TRANSPLANT, KIDNEY;  Surgeon: Chas Salomon MD;  Location: [address];  Service: Transplant;  Laterality: N/A;    PROSTATE BIOPSY      thorocotomy  01/01/2010       Social History[1]    Family History   Problem Relation Name Age of Onset    No Known Problems Mother      Diabetes Father Nik Kang     Hypertension Father Nik Kang     Hypertension Other      Diabetes Other           ROS    Objective:   Physical Exam  HENT:      Head: Normocephalic.   Eyes:      Pupils: Pupils are equal, round, and reactive to light.   Neck:      Thyroid: No thyromegaly.      Vascular: Normal carotid pulses. No carotid bruit or JVD.   Cardiovascular:      Rate and Rhythm: Normal rate and regular rhythm. No extrasystoles are present.     Chest Wall: PMI is not displaced.      Pulses: Normal pulses.      Heart sounds: Normal heart sounds. No murmur heard.     No gallop. No S3 sounds.   Pulmonary:      Effort: No respiratory distress.      Breath sounds: Normal breath sounds. No stridor.   Abdominal:      General: Bowel sounds are normal.       Palpations: Abdomen is soft.      Tenderness: There is no abdominal tenderness. There is no rebound.   Skin:     Findings: No rash.   Neurological:      Mental Status: He is alert and oriented to person, place, and time.   Psychiatric:         Behavior: Behavior normal.         Lab Results   Component Value Date    CHOL 151 04/28/2025    CHOL 173 05/13/2024    CHOL 128 09/26/2022     Lab Results   Component Value Date    HDL 36 (L) 04/28/2025    HDL 41 05/13/2024    HDL 25 (A) 09/26/2022     Lab Results   Component Value Date    LDLCALC 92.2 04/28/2025    LDLCALC 115.6 05/13/2024    LDLCALC 82 09/26/2022     Lab Results   Component Value Date    TRIG 114 04/28/2025    TRIG 82 05/13/2024    TRIG 105 09/26/2022     Lab Results   Component Value Date    CHOLHDL 23.8 04/28/2025    CHOLHDL 23.7 05/13/2024    CHOLHDL 20.8 07/14/2022       Chemistry        Component Value Date/Time     04/17/2025 1040     02/07/2025 0946    K 4.7 04/17/2025 1040    K 4.6 02/07/2025 0946     04/17/2025 1040     02/07/2025 0946    CO2 23 04/17/2025 1040    CO2 24 02/07/2025 0946    BUN 24 (H) 04/17/2025 1040    CREATININE 1.8 (H) 04/17/2025 1040     (H) 04/17/2025 1040    GLU 71 02/07/2025 0946        Component Value Date/Time    CALCIUM 9.3 04/17/2025 1040    CALCIUM 9.4 02/07/2025 0946    ALKPHOS 78 03/24/2025 1152    ALKPHOS 70 02/24/2025 1235    AST 20 03/24/2025 1152    AST 60 (H) 02/24/2025 1235    ALT 12 03/24/2025 1152    ALT 30 02/24/2025 1235    BILITOT 1.2 (H) 03/24/2025 1152    BILITOT 1.3 (H) 02/24/2025 1235    ESTGFRAFRICA 7 06/15/2023 0531    ESTGFRAFRICA 6.9 (A) 07/14/2022 0706    EGFRNONAA 6.0 (A) 07/14/2022 0706          Lab Results   Component Value Date    HGBA1C 6.8 (H) 04/17/2025     Lab Results   Component Value Date    TSH 1.941 07/14/2022     Lab Results   Component Value Date    INR 1.1 05/02/2024    INR 1.0 02/18/2024    INR 1.0 08/29/2023     Lab Results   Component Value Date     WBC 7.12 02/07/2025    HGB 13.1 (L) 02/07/2025    HCT 43.6 02/07/2025    MCV 85 02/07/2025     02/07/2025     BMP  Sodium   Date Value Ref Range Status   04/17/2025 136 136 - 145 mmol/L Final   02/07/2025 142 136 - 145 mmol/L Final     Potassium   Date Value Ref Range Status   04/17/2025 4.7 3.5 - 5.1 mmol/L Final   02/07/2025 4.6 3.5 - 5.1 mmol/L Final     Chloride   Date Value Ref Range Status   04/17/2025 107 95 - 110 mmol/L Final   02/07/2025 110 95 - 110 mmol/L Final     CO2   Date Value Ref Range Status   04/17/2025 23 23 - 29 mmol/L Final   02/07/2025 24 23 - 29 mmol/L Final     BUN   Date Value Ref Range Status   04/17/2025 24 (H) 8 - 23 mg/dL Final     Creatinine   Date Value Ref Range Status   04/17/2025 1.8 (H) 0.5 - 1.4 mg/dL Final     Calcium   Date Value Ref Range Status   04/17/2025 9.3 8.7 - 10.5 mg/dL Final   02/07/2025 9.4 8.7 - 10.5 mg/dL Final     Anion Gap   Date Value Ref Range Status   04/17/2025 6 (L) 8 - 16 mmol/L Final     eGFR if    Date Value Ref Range Status   07/14/2022 6.9 (A) >60 mL/min/1.73 m^2 Final     eGFR    Date Value Ref Range Status   06/15/2023 7 mL/min/1.73mSq Final     Comment:     In accordance with NKF-ASN Task Force recommendation, calculation based on the Chronic Kidney Disease Epidemiology Collaboration (CKD-EPI) equation without adjustment for race. eGFR adjusted for gender and age and calculated in ml/min/1.73mSquared. eGFR cannot be calculated if patient is under 18 years of age.     Reference Range:   >= 60 ml/min/1.73mSquared.     eGFR if non    Date Value Ref Range Status   07/14/2022 6.0 (A) >60 mL/min/1.73 m^2 Final     Comment:     Calculation used to obtain the estimated glomerular filtration  rate (eGFR) is the CKD-EPI equation.        BNP  @LABRCNTIP(BNP,BNPTRIAGEBLO)@  @LABRCNTIP(troponini)@  CrCl cannot be calculated (Patient's most recent lab result is older than the maximum 7 days allowed.).  No  results found in the last 24 hours.  No results found in the last 24 hours.  No results found in the last 24 hours.    Assessment:      1. Chronic systolic congestive heart failure    2. Coronary artery disease of native artery of native heart with stable angina pectoris    3. Aortic atherosclerosis    4. Dilated cardiomyopathy    5. Hyperlipidemia associated with type 2 diabetes mellitus    6. NICM (nonischemic cardiomyopathy)    7. Hypertension associated with diabetes    8. Pulmonary HTN    9. PVC (premature ventricular contraction)    10. Type 2 diabetes mellitus with stage 3b chronic kidney disease, with long-term current use of insulin      CHFrEF compensated FC II  NICM   DCN  Plan:     Assessment & Plan    IMPRESSION:  - EF stable at 40-45%, mildly dilated LV chamber, mild LVH.  - Echocardiogram results stable compared to 1.5 years ago.  - BP control is good.  - Acknowledged congestive heart failure and ischemic cardiomyopathy history.    HEART FAILURE AND CARDIOMYOPATHY:  - Continued Carvedilol 12.5 mg.  - Continued Furosemide 5 mg.  - Continued Isosorbide 10 mg 3 times daily.  - Continued Spironolactone.    HYPERLIPIDEMIA:  - Continued Ezetimibe 10 mg.    CARDIOVASCULAR DISEASE PREVENTION:  - Continued aspirin.    GENERAL HEALTH MANAGEMENT:  - Isidro to continue current exercise regimen and weight loss efforts.          RTC in 6m    This note was generated with the assistance of ambient listening technology. Verbal consent was obtained by the patient and accompanying visitor(s) for the recording of patient appointment to facilitate this note. I attest to having reviewed and edited the generated note for accuracy, though some syntax or spelling errors may persist. Please contact the author of this note for any clarification.              [1]   Social History  Tobacco Use    Smoking status: Never     Passive exposure: Never    Smokeless tobacco: Never   Substance Use Topics    Alcohol use: Not Currently      Comment: seldom     Drug use: No

## 2025-07-09 DIAGNOSIS — Z94.0 KIDNEY REPLACED BY TRANSPLANT: Primary | ICD-10-CM

## 2025-07-11 NOTE — TELEPHONE ENCOUNTER
Copied from CRM #[id number]. Topic: Medications - Medication Refill  >> Jul 11, 2025  8:38 AM Vicky wrote:               Is this a Refill or New Rx: Refill         Rx Name and Strength:sacubitriL-valsartan (ENTRESTO)  mg per tablet         Preferred Pharmacy with phone number:  [facility]/pharmacy #[id number] - [place], [place]  [address]  [address] [place] [address]  Phone: [phone] Fax: [phone]           Communication Preference:[phone]

## 2025-07-15 RX ORDER — SACUBITRIL AND VALSARTAN 97; 103 MG/1; MG/1
1 TABLET, FILM COATED ORAL 2 TIMES DAILY
Qty: 60 TABLET | Refills: 11 | Status: SHIPPED | OUTPATIENT
Start: 2025-07-15

## 2025-07-16 DIAGNOSIS — N40.0 BENIGN PROSTATIC HYPERPLASIA, UNSPECIFIED WHETHER LOWER URINARY TRACT SYMPTOMS PRESENT: ICD-10-CM

## 2025-07-17 DIAGNOSIS — Z94.0 S/P KIDNEY TRANSPLANT: ICD-10-CM

## 2025-07-17 RX ORDER — ASPIRIN 81 MG/1
81 TABLET ORAL DAILY
Qty: 30 TABLET | Refills: 0 | Status: SHIPPED | OUTPATIENT
Start: 2025-07-17 | End: 2025-08-17

## 2025-07-18 RX ORDER — FINASTERIDE 5 MG/1
5 TABLET, FILM COATED ORAL
Qty: 90 TABLET | Refills: 1 | Status: SHIPPED | OUTPATIENT
Start: 2025-07-18

## 2025-07-28 ENCOUNTER — OFFICE VISIT (OUTPATIENT)
Dept: DIABETES | Facility: CLINIC | Age: 70
End: 2025-07-28
Payer: COMMERCIAL

## 2025-07-28 DIAGNOSIS — E78.5 HYPERLIPIDEMIA ASSOCIATED WITH TYPE 2 DIABETES MELLITUS: ICD-10-CM

## 2025-07-28 DIAGNOSIS — E66.9 OBESITY (BMI 30-39.9): ICD-10-CM

## 2025-07-28 DIAGNOSIS — Z79.4 CONTROLLED TYPE 2 DIABETES MELLITUS WITH STAGE 3 CHRONIC KIDNEY DISEASE, WITH LONG-TERM CURRENT USE OF INSULIN: Primary | ICD-10-CM

## 2025-07-28 DIAGNOSIS — E11.69 HYPERLIPIDEMIA ASSOCIATED WITH TYPE 2 DIABETES MELLITUS: ICD-10-CM

## 2025-07-28 DIAGNOSIS — Z94.0 S/P KIDNEY TRANSPLANT: ICD-10-CM

## 2025-07-28 DIAGNOSIS — I25.118 CORONARY ARTERY DISEASE OF NATIVE ARTERY OF NATIVE HEART WITH STABLE ANGINA PECTORIS: ICD-10-CM

## 2025-07-28 DIAGNOSIS — E11.22 CONTROLLED TYPE 2 DIABETES MELLITUS WITH STAGE 3 CHRONIC KIDNEY DISEASE, WITH LONG-TERM CURRENT USE OF INSULIN: Primary | ICD-10-CM

## 2025-07-28 DIAGNOSIS — N18.30 CONTROLLED TYPE 2 DIABETES MELLITUS WITH STAGE 3 CHRONIC KIDNEY DISEASE, WITH LONG-TERM CURRENT USE OF INSULIN: Primary | ICD-10-CM

## 2025-07-28 DIAGNOSIS — I50.22 CHRONIC SYSTOLIC CONGESTIVE HEART FAILURE: ICD-10-CM

## 2025-07-28 DIAGNOSIS — E11.59 HYPERTENSION ASSOCIATED WITH DIABETES: ICD-10-CM

## 2025-07-28 DIAGNOSIS — I15.2 HYPERTENSION ASSOCIATED WITH DIABETES: ICD-10-CM

## 2025-07-28 PROCEDURE — G2211 COMPLEX E/M VISIT ADD ON: HCPCS | Mod: 95,,, | Performed by: PHYSICIAN ASSISTANT

## 2025-07-28 PROCEDURE — 3044F HG A1C LEVEL LT 7.0%: CPT | Mod: CPTII,95,, | Performed by: PHYSICIAN ASSISTANT

## 2025-07-28 PROCEDURE — 3066F NEPHROPATHY DOC TX: CPT | Mod: CPTII,95,, | Performed by: PHYSICIAN ASSISTANT

## 2025-07-28 PROCEDURE — 98005 SYNCH AUDIO-VIDEO EST LOW 20: CPT | Mod: 95,,, | Performed by: PHYSICIAN ASSISTANT

## 2025-07-28 PROCEDURE — 4010F ACE/ARB THERAPY RXD/TAKEN: CPT | Mod: CPTII,95,, | Performed by: PHYSICIAN ASSISTANT

## 2025-07-28 RX ORDER — DAPAGLIFLOZIN 10 MG/1
10 TABLET, FILM COATED ORAL DAILY
Qty: 90 TABLET | Refills: 3 | Status: SHIPPED | OUTPATIENT
Start: 2025-07-28

## 2025-07-28 NOTE — PROGRESS NOTES
PCP: Steven Morgan MD    Subjective:     Chief Complaint: Diabetes - Established Patient    TELEMEDICINE VISIT:     The patient location is: Home  The chief complaint leading to consultation is: Diabetes Follow up  Visit type: Virtual visit with synchronous audio and video  Each patient to whom he or she provides medical services by telemedicine is:  (1) informed of the relationship between the physician and patient and the respective role of any other health care provider with respect to management of the patient; and (2) notified that he or she may decline to receive medical services by telemedicine and may withdraw from such care at any time.    Notes: N/A     HISTORY OF PRESENT ILLNESS: 69 y.o.   male presenting for diabetes management visit.   The patient's last visit with me was on 6/10/2025.   Patient has had Type II diabetes since 20 years or more.  Pertinent to decision making is the following comorbidities: HTN, HLD, CAD, CHF, S/p Transplant - Kidney in Feb 2024, and Obesity by BMI  Patient has the following Diabetes complications: with diabetic chronic kidney disease  He  has attended diabetes education in the past.     Patient's most recent A1c of 6.8% was completed 2 months ago.   Patient states since His last A1c His blood glucose levels have been high  throughout the day .   Patient monitors blood glucose 4 times per day with meter : Fasting, Before Lunch, Before Dinner, and Before Bed. Dexcom G7 deferred.   Patient blood glucose monitoring device will not be uploaded into [employer] today.  Per pt, fasting blood sugar ranging from 90 - 130. Has not gotten 10 mg farxiga.   Patient endorses the following diabetes related symptoms: None.   Patient is due today for the following diabetes-related health maintenance standards: Foot Exam , Urine Microalbumin/creatinine ratio, Influenza Vaccine, COVID-19 Vaccine , and vaccines.   He denies recent hospital admissions or emergency room  visits.   He denies having hypoglycemia.   Patient's concerns today include glycemic control. Of note, pt feels he has had weight loss and tolerating well. Per transplant team, ok to try SGLT-2.   Patient medication regimen is as below.     CURRENT DM MEDICATIONS:   Tresiba 10 units in the morning  Lispro correction dosing every 3 hours as needed  Ozempic 1 mg once per week  Farxiga 5 mg daily - has not gotten 10 mg farxiga     Lispro Correction Dosing every 3 hours as needed OUTSIDE OF EATING  If  - 250, may take 2 units of Lispro  If  - 300, may take 3 units of Lispro  If  - 350, may take 4 units of Lispro  If  - 400, may take 5 units of Lispro  If +, may take 6 units of Lispro    Patient has failed the following Diabetes medications:   Metformin    Xultophy  Semglee  Lantus      Labs Reviewed.       Lab Results   Component Value Date    CPEPTIDE 3.88 11/20/2017     Lab Results   Component Value Date    GLUTAMICACID 0.00 11/20/2017          //   , There is no height or weight on file to calculate BMI.  His blood sugar in clinic today is:    Lab Results   Component Value Date    POCGLU 127 (A) 03/17/2025       Review of Systems   Constitutional:  Negative for activity change, appetite change, chills and fever.   HENT:  Negative for dental problem, mouth sores, nosebleeds, sore throat and trouble swallowing.    Eyes:  Negative for pain and discharge.   Respiratory:  Negative for shortness of breath, wheezing and stridor.    Cardiovascular:  Negative for chest pain, palpitations and leg swelling.   Gastrointestinal:  Negative for abdominal pain, diarrhea, nausea and vomiting.   Endocrine: Negative for polydipsia, polyphagia and polyuria.   Genitourinary:  Negative for dysuria, frequency and urgency.   Musculoskeletal:  Negative for joint swelling and myalgias.   Skin:  Negative for rash and wound.   Neurological:  Negative for dizziness, syncope, weakness and headaches.    Psychiatric/Behavioral:  Negative for behavioral problems and dysphoric mood.          Diabetes Management Status  Statin: Taking  ACE/ARB: Taking    Screening or Prevention Patient's value Goal Complete/Controlled?   HgA1C Testing and Control   Lab Results   Component Value Date    HGBA1C 6.8 (H) 04/17/2025      Annually/Less than 8% Yes   Lipid profile : 04/28/2025 Annually Yes   LDL control Lab Results   Component Value Date    LDLCALC 92.2 04/28/2025    Annually/Less than 100 mg/dl  Yes   Nephropathy screening Lab Results   Component Value Date    MICALBCREAT 1998.1 (H) 07/14/2022     Lab Results   Component Value Date    PROTEINUA Negative 04/17/2025    Annually Yes   Blood pressure BP Readings from Last 1 Encounters:   06/24/25 136/74    Less than 140/90 No   Dilated retinal exam : 11/14/2024 Annually No    Foot exam   : 05/16/2024 Annually Yes     Social History     Socioeconomic History    Marital status:     Number of children: 3   Occupational History     Employer: self-rmployed   Tobacco Use    Smoking status: Never     Passive exposure: Never    Smokeless tobacco: Never   Substance and Sexual Activity    Alcohol use: Not Currently     Comment: seldom     Drug use: No    Sexual activity: Not Currently     Partners: Female   Social History Narrative    ** Merged History Encounter **          Social Drivers of Health     Financial Resource Strain: Low Risk  (5/3/2025)    Overall Financial Resource Strain (CARDIA)     Difficulty of Paying Living Expenses: Not hard at all   Food Insecurity: No Food Insecurity (5/3/2025)    Hunger Vital Sign     Worried About Running Out of Food in the Last Year: Never true     Ran Out of Food in the Last Year: Never true   Transportation Needs: No Transportation Needs (5/3/2025)    PRAPARE - Transportation     Lack of Transportation (Medical): No     Lack of Transportation (Non-Medical): No   Physical Activity: Insufficiently Active (5/3/2025)    Exercise Vital Sign      Days of Exercise per Week: 2 days     Minutes of Exercise per Session: 20 min   Stress: No Stress Concern Present (5/3/2025)    [employer] [place] [employer] - Occupational Stress Questionnaire     Feeling of Stress : Not at all   Housing Stability: Low Risk  (5/3/2025)    Housing Stability Vital Sign     Unable to Pay for Housing in the Last Year: No     Homeless in the Last Year: No     Past Medical History:   Diagnosis Date    Abnormal nuclear stress test 8/30/2022    Acute hypoxemic respiratory failure due to COVID-19 1/21/2021    Acute on chronic combined systolic and diastolic congestive heart failure 5/7/2023    Acute on chronic respiratory failure 5/7/2023    Acute respiratory failure with hypoxia and hypercapnia 5/18/2023    Anal itching 10/10/2022    Anemia 4/16/2014    BPH (benign prostatic hyperplasia)     CKD (chronic kidney disease) stage 4, GFR 15-29 ml/min     Coronary artery disease of native artery of native heart with stable angina pectoris 4/29/2019    Diabetes mellitus     Diabetes mellitus, type 2     Dilated cardiomyopathy 8/30/2022    Elevated PSA     Encounter for blood transfusion     Herpes labialis     Hyperlipidemia     Hypertension     Hypertensive emergency 5/18/2023    Obesity     Pneumonia 5/7/2023    Pneumonia due to COVID-19 virus     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     Appearance: He is well-developed. He is not diaphoretic.   HENT:      Head: Normocephalic and atraumatic.      Right Ear: External ear normal.      Left Ear: External ear normal.      Nose: Nose normal.   Eyes:      General:         Right eye: No discharge.         Left eye: No discharge.      Pupils: Pupils are equal, round, and reactive to  light.   Cardiovascular:      Rate and Rhythm: Normal rate and regular rhythm.      Heart sounds: Normal heart sounds.   Pulmonary:      Effort: Pulmonary effort is normal. No respiratory distress.      Breath sounds: Normal breath sounds. No stridor.   Abdominal:      Palpations: Abdomen is soft.   Musculoskeletal:         General: Normal range of motion.      Cervical back: Normal range of motion and neck supple.   Skin:     General: Skin is warm and dry.      Capillary Refill: Capillary refill takes less than 2 seconds.      Coloration: Skin is not pale.   Neurological:      Mental Status: He is alert and oriented to person, place, and time.      Motor: No abnormal muscle tone.      Coordination: Coordination normal.   Psychiatric:         Behavior: Behavior normal.         Thought Content: Thought content normal.         Judgment: Judgment normal.           Assessment / Plan:     Controlled type 2 diabetes mellitus with stage 3 chronic kidney disease, with long-term current use of insulin  -     dapagliflozin propanediol (FARXIGA) 10 mg tablet; Take 1 tablet (10 mg total) by mouth once daily.  Dispense: 90 tablet; Refill: 3  -     Microalbumin/Creatinine Ratio, Urine; Future; Expected date: 07/28/2025    S/P kidney transplant 2024    Chronic systolic congestive heart failure    Hypertension associated with diabetes    Hyperlipidemia associated with type 2 diabetes mellitus    Coronary artery disease of native artery of native heart with stable angina pectoris    Obesity (BMI 30-39.9)      Additional Plan Details:    - POCT Glucose  - Encouraged continuation of lifestyle changes including regular exercise and limiting carbohydrates to 30-45 grams per meal threes times daily and 15 grams per snack with a limit of two daily.   - Encouraged continued monitoring of blood glucose with maintenance of 4 times daily at Fasting, After Lunch, Before Bed, and After Breakfast. Dex G7 deferred for now.   - Current DM  Medication Regimen: Trial hold Tresiba with Farxiga increase. Continue Lispro correction dosing every 3 hours as below. Continue Ozempic 1 mg weekly - pt deferred increase today in favor of increasing SGLT-2. change Farxiga 10 mg daily - ok by transplant. Encouraged to drink plenty of H20 and watch for UTI.    - Health Maintenance standards addressed today: Foot Exam - deferred by patient today because time, Urine Microalbumin / Creatinine Ratio scheduled, Flu Shot - patient would like to complete outside of [facility], COVID - 19 Vaccine - patient will schedule outside of [facility] , and Vaccines  - Nursing Visit: Patient is below goal range for nursing visit for age group and will not need nursing visit at this time .   - Follow up in 6 wks with A1c prior.      CURRENT DM MEDICATIONS:   Tresiba - trial HOLD  Lispro correction dosing every 3 hours as needed  Ozempic 1 mg once per week  Farxiga 10 mg daily - may take 2 tabs of 5 mg to finish out current dose once able to fill 10 mg tabs     Lispro Correction Dosing every 3 hours as needed OUTSIDE OF EATING  If  - 250, may take 2 units of Lispro  If  - 300, may take 3 units of Lispro  If  - 350, may take 4 units of Lispro  If  - 400, may take 5 units of Lispro  If +, may take 6 units of Lispro    Blakeney McKnight, PA-C [facility]

## 2025-08-04 ENCOUNTER — LAB VISIT (OUTPATIENT)
Dept: LAB | Facility: HOSPITAL | Age: 70
End: 2025-08-04
Attending: INTERNAL MEDICINE
Payer: MEDICARE

## 2025-08-04 DIAGNOSIS — Z94.0 KIDNEY REPLACED BY TRANSPLANT: ICD-10-CM

## 2025-08-04 LAB
ABSOLUTE EOSINOPHIL (OHS): 0.21 K/UL
ABSOLUTE MONOCYTE (OHS): 0.79 K/UL (ref 0.3–1)
ABSOLUTE NEUTROPHIL COUNT (OHS): 5.38 K/UL (ref 1.8–7.7)
ALBUMIN SERPL BCP-MCNC: 3.7 G/DL (ref 3.5–5.2)
ALP SERPL-CCNC: 82 UNIT/L (ref 40–150)
ALT SERPL W/O P-5'-P-CCNC: 13 UNIT/L (ref 0–55)
ANION GAP (OHS): 12 MMOL/L (ref 8–16)
AST SERPL-CCNC: 23 UNIT/L (ref 0–50)
BACTERIA #/AREA URNS HPF: ABNORMAL /HPF
BASOPHILS # BLD AUTO: 0.04 K/UL
BASOPHILS NFR BLD AUTO: 0.5 %
BILIRUB DIRECT SERPL-MCNC: 0.3 MG/DL (ref 0.1–0.3)
BILIRUB SERPL-MCNC: 1 MG/DL (ref 0.1–1)
BILIRUB UR QL STRIP.AUTO: NEGATIVE
BUN SERPL-MCNC: 26 MG/DL (ref 8–23)
CALCIUM SERPL-MCNC: 9.2 MG/DL (ref 8.7–10.5)
CHLORIDE SERPL-SCNC: 108 MMOL/L (ref 95–110)
CLARITY UR: CLEAR
CO2 SERPL-SCNC: 21 MMOL/L (ref 23–29)
COLOR UR AUTO: YELLOW
CREAT SERPL-MCNC: 1.7 MG/DL (ref 0.5–1.4)
CREAT UR-MCNC: 108 MG/DL (ref 23–375)
ERYTHROCYTE [DISTWIDTH] IN BLOOD BY AUTOMATED COUNT: 15.6 % (ref 11.5–14.5)
GFR SERPLBLD CREATININE-BSD FMLA CKD-EPI: 43 ML/MIN/1.73/M2
GLUCOSE SERPL-MCNC: 92 MG/DL (ref 70–110)
GLUCOSE UR QL STRIP: ABNORMAL
HCT VFR BLD AUTO: 45.7 % (ref 40–54)
HCV AB SERPL QL IA: NORMAL
HGB BLD-MCNC: 13.9 GM/DL (ref 14–18)
HGB UR QL STRIP: ABNORMAL
HYALINE CASTS #/AREA URNS LPF: 0 /LPF (ref 0–1)
IMM GRANULOCYTES # BLD AUTO: 0.04 K/UL (ref 0–0.04)
IMM GRANULOCYTES NFR BLD AUTO: 0.5 % (ref 0–0.5)
KETONES UR QL STRIP: NEGATIVE
LEUKOCYTE ESTERASE UR QL STRIP: ABNORMAL
LYMPHOCYTES # BLD AUTO: 1.04 K/UL (ref 1–4.8)
MCH RBC QN AUTO: 24.8 PG (ref 27–31)
MCHC RBC AUTO-ENTMCNC: 30.4 G/DL (ref 32–36)
MCV RBC AUTO: 82 FL (ref 82–98)
MICROSCOPIC COMMENT: ABNORMAL
NITRITE UR QL STRIP: NEGATIVE
NUCLEATED RBC (/100WBC) (OHS): 0 /100 WBC
PH UR STRIP: 6 [PH]
PHOSPHATE SERPL-MCNC: 3.6 MG/DL (ref 2.7–4.5)
PLATELET # BLD AUTO: 283 K/UL (ref 150–450)
PMV BLD AUTO: 11 FL (ref 9.2–12.9)
POTASSIUM SERPL-SCNC: 4.5 MMOL/L (ref 3.5–5.1)
PROT SERPL-MCNC: 7.2 GM/DL (ref 6–8.4)
PROT UR QL STRIP: ABNORMAL
PROT UR-MCNC: 43 MG/DL
PROT/CREAT UR: 0.4 MG/G{CREAT}
RBC # BLD AUTO: 5.6 M/UL (ref 4.6–6.2)
RBC #/AREA URNS HPF: 8 /HPF (ref 0–4)
RELATIVE EOSINOPHIL (OHS): 2.8 %
RELATIVE LYMPHOCYTE (OHS): 13.9 % (ref 18–48)
RELATIVE MONOCYTE (OHS): 10.5 % (ref 4–15)
RELATIVE NEUTROPHIL (OHS): 71.8 % (ref 38–73)
SODIUM SERPL-SCNC: 141 MMOL/L (ref 136–145)
SP GR UR STRIP: 1.01
WBC # BLD AUTO: 7.5 K/UL (ref 3.9–12.7)
WBC #/AREA URNS HPF: >100 /HPF (ref 0–5)
WBC CLUMPS URNS QL MICRO: ABNORMAL
YEAST URNS QL MICRO: ABNORMAL /HPF

## 2025-08-04 PROCEDURE — 87799 DETECT AGENT NOS DNA QUANT: CPT

## 2025-08-04 PROCEDURE — 86803 HEPATITIS C AB TEST: CPT

## 2025-08-04 PROCEDURE — 81001 URINALYSIS AUTO W/SCOPE: CPT

## 2025-08-04 PROCEDURE — 36415 COLL VENOUS BLD VENIPUNCTURE: CPT

## 2025-08-04 PROCEDURE — 80076 HEPATIC FUNCTION PANEL: CPT

## 2025-08-04 PROCEDURE — 84100 ASSAY OF PHOSPHORUS: CPT

## 2025-08-04 PROCEDURE — 85025 COMPLETE CBC W/AUTO DIFF WBC: CPT

## 2025-08-04 PROCEDURE — 80197 ASSAY OF TACROLIMUS: CPT

## 2025-08-04 PROCEDURE — 82570 ASSAY OF URINE CREATININE: CPT

## 2025-08-05 LAB — TACROLIMUS BLD-MCNC: 7.9 NG/ML (ref 5–15)

## 2025-08-08 LAB
W BK VIRUS DNA, QUALITATIVE, PLASMA: NOT DETECTED
W BK VIRUS DNA, QUANTITATIVE, PLASMA: <50 IU/ML
W LOG BK VIRUS DNA, PLASMA: <1.7 LOG (10) IU/ML

## 2025-08-21 DIAGNOSIS — Z94.0 S/P KIDNEY TRANSPLANT: ICD-10-CM

## 2025-08-21 RX ORDER — ASPIRIN 81 MG/1
81 TABLET ORAL DAILY
Qty: 30 TABLET | Refills: 0 | Status: SHIPPED | OUTPATIENT
Start: 2025-08-21 | End: 2025-09-20

## 2025-08-25 ENCOUNTER — OFFICE VISIT (OUTPATIENT)
Dept: TRANSPLANT | Facility: CLINIC | Age: 70
End: 2025-08-25
Payer: COMMERCIAL

## 2025-08-25 VITALS
BODY MASS INDEX: 32.34 KG/M2 | OXYGEN SATURATION: 98 % | RESPIRATION RATE: 18 BRPM | HEART RATE: 70 BPM | TEMPERATURE: 97 F | SYSTOLIC BLOOD PRESSURE: 137 MMHG | DIASTOLIC BLOOD PRESSURE: 69 MMHG | HEIGHT: 74 IN | WEIGHT: 252 LBS

## 2025-08-25 DIAGNOSIS — Z79.60 IMMUNODEFICIENCY DUE TO LONG TERM IMMUNOSUPPRESSIVE DRUG THERAPY: ICD-10-CM

## 2025-08-25 DIAGNOSIS — I15.2 HYPERTENSION ASSOCIATED WITH DIABETES: ICD-10-CM

## 2025-08-25 DIAGNOSIS — E83.42 HYPOMAGNESEMIA: ICD-10-CM

## 2025-08-25 DIAGNOSIS — D84.821 IMMUNODEFICIENCY DUE TO LONG TERM IMMUNOSUPPRESSIVE DRUG THERAPY: ICD-10-CM

## 2025-08-25 DIAGNOSIS — N18.32 TYPE 2 DIABETES MELLITUS WITH STAGE 3B CHRONIC KIDNEY DISEASE, WITH LONG-TERM CURRENT USE OF INSULIN: ICD-10-CM

## 2025-08-25 DIAGNOSIS — T45.1X5A IMMUNODEFICIENCY DUE TO LONG TERM IMMUNOSUPPRESSIVE DRUG THERAPY: ICD-10-CM

## 2025-08-25 DIAGNOSIS — Z94.0 S/P KIDNEY TRANSPLANT: Primary | ICD-10-CM

## 2025-08-25 DIAGNOSIS — Z79.4 TYPE 2 DIABETES MELLITUS WITH STAGE 3B CHRONIC KIDNEY DISEASE, WITH LONG-TERM CURRENT USE OF INSULIN: ICD-10-CM

## 2025-08-25 DIAGNOSIS — E11.59 HYPERTENSION ASSOCIATED WITH DIABETES: ICD-10-CM

## 2025-08-25 DIAGNOSIS — Z94.0 KIDNEY REPLACED BY TRANSPLANT: ICD-10-CM

## 2025-08-25 DIAGNOSIS — E11.22 TYPE 2 DIABETES MELLITUS WITH STAGE 3B CHRONIC KIDNEY DISEASE, WITH LONG-TERM CURRENT USE OF INSULIN: ICD-10-CM

## 2025-08-25 PROCEDURE — 3008F BODY MASS INDEX DOCD: CPT | Mod: CPTII,S$GLB,, | Performed by: NURSE PRACTITIONER

## 2025-08-25 PROCEDURE — 3288F FALL RISK ASSESSMENT DOCD: CPT | Mod: CPTII,S$GLB,, | Performed by: NURSE PRACTITIONER

## 2025-08-25 PROCEDURE — 3075F SYST BP GE 130 - 139MM HG: CPT | Mod: CPTII,S$GLB,, | Performed by: NURSE PRACTITIONER

## 2025-08-25 PROCEDURE — 99215 OFFICE O/P EST HI 40 MIN: CPT | Mod: S$GLB,,, | Performed by: NURSE PRACTITIONER

## 2025-08-25 PROCEDURE — 1159F MED LIST DOCD IN RCRD: CPT | Mod: CPTII,S$GLB,, | Performed by: NURSE PRACTITIONER

## 2025-08-25 PROCEDURE — 3078F DIAST BP <80 MM HG: CPT | Mod: CPTII,S$GLB,, | Performed by: NURSE PRACTITIONER

## 2025-08-25 PROCEDURE — 99999 PR PBB SHADOW E&M-EST. PATIENT-LVL V: CPT | Mod: PBBFAC,,, | Performed by: NURSE PRACTITIONER

## 2025-08-25 PROCEDURE — 1101F PT FALLS ASSESS-DOCD LE1/YR: CPT | Mod: CPTII,S$GLB,, | Performed by: NURSE PRACTITIONER

## 2025-08-25 PROCEDURE — 4010F ACE/ARB THERAPY RXD/TAKEN: CPT | Mod: CPTII,S$GLB,, | Performed by: NURSE PRACTITIONER

## 2025-08-25 PROCEDURE — 3044F HG A1C LEVEL LT 7.0%: CPT | Mod: CPTII,S$GLB,, | Performed by: NURSE PRACTITIONER

## 2025-08-25 PROCEDURE — 3066F NEPHROPATHY DOC TX: CPT | Mod: CPTII,S$GLB,, | Performed by: NURSE PRACTITIONER

## 2025-08-25 PROCEDURE — 1126F AMNT PAIN NOTED NONE PRSNT: CPT | Mod: CPTII,S$GLB,, | Performed by: NURSE PRACTITIONER

## 2025-08-25 RX ORDER — LANOLIN ALCOHOL/MO/W.PET/CERES
400 CREAM (GRAM) TOPICAL 2 TIMES DAILY
Qty: 60 TABLET | Refills: 11 | Status: SHIPPED | OUTPATIENT
Start: 2025-08-25 | End: 2026-08-25

## 2025-09-04 ENCOUNTER — TELEPHONE (OUTPATIENT)
Dept: DIABETES | Facility: CLINIC | Age: 70
End: 2025-09-04
Payer: MEDICARE

## 2025-09-04 ENCOUNTER — OFFICE VISIT (OUTPATIENT)
Dept: DIABETES | Facility: CLINIC | Age: 70
End: 2025-09-04
Payer: COMMERCIAL

## 2025-09-04 VITALS
DIASTOLIC BLOOD PRESSURE: 80 MMHG | SYSTOLIC BLOOD PRESSURE: 138 MMHG | OXYGEN SATURATION: 96 % | BODY MASS INDEX: 31.82 KG/M2 | HEART RATE: 96 BPM | WEIGHT: 247.81 LBS

## 2025-09-04 DIAGNOSIS — Z79.4 CONTROLLED TYPE 2 DIABETES MELLITUS WITH STAGE 3 CHRONIC KIDNEY DISEASE, WITH LONG-TERM CURRENT USE OF INSULIN: Primary | ICD-10-CM

## 2025-09-04 DIAGNOSIS — E11.69 TYPE 2 DIABETES MELLITUS WITH OBESITY: ICD-10-CM

## 2025-09-04 DIAGNOSIS — I15.2 HYPERTENSION ASSOCIATED WITH DIABETES: ICD-10-CM

## 2025-09-04 DIAGNOSIS — E11.59 HYPERTENSION ASSOCIATED WITH DIABETES: ICD-10-CM

## 2025-09-04 DIAGNOSIS — E66.9 TYPE 2 DIABETES MELLITUS WITH OBESITY: ICD-10-CM

## 2025-09-04 DIAGNOSIS — I25.118 CORONARY ARTERY DISEASE OF NATIVE ARTERY OF NATIVE HEART WITH STABLE ANGINA PECTORIS: ICD-10-CM

## 2025-09-04 DIAGNOSIS — E11.69 HYPERLIPIDEMIA ASSOCIATED WITH TYPE 2 DIABETES MELLITUS: ICD-10-CM

## 2025-09-04 DIAGNOSIS — E78.5 HYPERLIPIDEMIA ASSOCIATED WITH TYPE 2 DIABETES MELLITUS: ICD-10-CM

## 2025-09-04 DIAGNOSIS — E11.22 CONTROLLED TYPE 2 DIABETES MELLITUS WITH STAGE 3 CHRONIC KIDNEY DISEASE, WITH LONG-TERM CURRENT USE OF INSULIN: Primary | ICD-10-CM

## 2025-09-04 DIAGNOSIS — N18.30 CONTROLLED TYPE 2 DIABETES MELLITUS WITH STAGE 3 CHRONIC KIDNEY DISEASE, WITH LONG-TERM CURRENT USE OF INSULIN: Primary | ICD-10-CM

## 2025-09-04 DIAGNOSIS — I50.22 CHRONIC SYSTOLIC CONGESTIVE HEART FAILURE: ICD-10-CM

## 2025-09-04 DIAGNOSIS — Z94.0 S/P KIDNEY TRANSPLANT: ICD-10-CM

## 2025-09-04 LAB — GLUCOSE SERPL-MCNC: 112 MG/DL (ref 70–110)

## 2025-09-04 PROCEDURE — 99999 PR PBB SHADOW E&M-EST. PATIENT-LVL V: CPT | Mod: PBBFAC,,, | Performed by: NURSE PRACTITIONER

## 2025-09-04 RX ORDER — SEMAGLUTIDE 1.34 MG/ML
1 INJECTION, SOLUTION SUBCUTANEOUS
COMMUNITY
Start: 2025-08-18

## 2025-09-04 RX ORDER — INSULIN GLARGINE 300 U/ML
10 INJECTION, SOLUTION SUBCUTANEOUS DAILY
Qty: 4.5 ML | Refills: 1 | Status: SHIPPED | OUTPATIENT
Start: 2025-09-04

## 2025-09-04 RX ORDER — INSULIN LISPRO 100 [IU]/ML
10 INJECTION, SOLUTION INTRAVENOUS; SUBCUTANEOUS 3 TIMES DAILY
Qty: 15 ML | Refills: 1 | Status: SHIPPED | OUTPATIENT
Start: 2025-09-04

## (undated) DEVICE — SUT SILK 2-0 STRANDS 30IN

## (undated) DEVICE — Device

## (undated) DEVICE — TRAY CATH FOL SIL URIMTR 16FR

## (undated) DEVICE — ADHESIVE DERMABOND ADVANCED

## (undated) DEVICE — SUT 3-0 12-18IN SILK

## (undated) DEVICE — STOCKINETTE 2INX36

## (undated) DEVICE — DECANTER FLUID TRNSF WHITE 9IN

## (undated) DEVICE — SUT 1 36IN PDS II VIO MONO

## (undated) DEVICE — SUT 4-0 12-18IN SILK BLACK

## (undated) DEVICE — KIT SYR REUSABLE

## (undated) DEVICE — SET IRR URLGY 2LINE UNIV SPIKE

## (undated) DEVICE — SUT ETHILON 3-0 PS2 18 BLK

## (undated) DEVICE — SOL NS 1000CC

## (undated) DEVICE — SYR ONLY LUER LOCK 20CC

## (undated) DEVICE — CATH SWAN GANZ 7FR 110CM

## (undated) DEVICE — SHEATH INTRO PINNACLE 7F 10CM

## (undated) DEVICE — GUIDEWIRE EMERALD .035IN 260CM

## (undated) DEVICE — CLIPPER BLADE MOD 4406 (CAREF)

## (undated) DEVICE — SUT PERCLOSE PROSTYLE MEDIATE

## (undated) DEVICE — PACK HEART CATH BR

## (undated) DEVICE — PACK CATH LAB CUSTOM BR

## (undated) DEVICE — OMNIPAQUE 300MG 150ML VIAL

## (undated) DEVICE — FOLEY BLLN 20FR 3WAY 5CC

## (undated) DEVICE — CATH DIAG IMPULSE 6FR FR4

## (undated) DEVICE — SHEATH INTRODUCER 7FR 11CM

## (undated) DEVICE — HEMOSTAT SURGICEL NU-KNIT 6X9

## (undated) DEVICE — TIP YANKAUERS BULB NO VENT

## (undated) DEVICE — ELECTRODE REM PLYHSV RETURN 9

## (undated) DEVICE — SUT PROLENE 5-0 36IN C-1

## (undated) DEVICE — SUT PROLENE 6-0 BV-1 30IN

## (undated) DEVICE — CATH DIAG IMPULSE 6FR FL4

## (undated) DEVICE — NDL PERCUTANEOUS 21G 7CM VASC

## (undated) DEVICE — PLUG CATHETER STERILE FOLEY

## (undated) DEVICE — KIT MANIFOLD LOW PRESS TUBING

## (undated) DEVICE — SUT PDS BV 6-0

## (undated) DEVICE — OMNIPAQUE 350 200ML

## (undated) DEVICE — PUNCH AORTIC 4.8MM

## (undated) DEVICE — DRAPE SLUSH WARMER WITH DISC

## (undated) DEVICE — ANGIOTOUCH KIT

## (undated) DEVICE — SUT 2-0 12-18IN SILK

## (undated) DEVICE — WIRE GUIDE TEFLON 3CM .035 145

## (undated) DEVICE — PATCH VASC CLOSURE THROMBIX

## (undated) DEVICE — SHEATH INTRODUCER 6FR 11CM

## (undated) DEVICE — GUIDEWIRE STF .035X180CM ANG

## (undated) DEVICE — TOWEL OR XRAY WHITE 17X26IN

## (undated) DEVICE — SUT SILK 3-0 STRANDS 30IN

## (undated) DEVICE — GUIDEWIRE WHOLEY HI TORQ 175CM

## (undated) DEVICE — INSERTS STEALTH FIBRA SIZE 1.

## (undated) DEVICE — PACK KIDNEY TRANSPLANT CUSTOM

## (undated) DEVICE — CATH IMPULSE PIGTAIL 6F 110CM

## (undated) DEVICE — STAPLER SKIN PROXIMATE WIDE

## (undated) DEVICE — SUT 4/0 27IN PDS II VIO MON

## (undated) DEVICE — CATH IMPULSE 6FR MULTI-PAK

## (undated) DEVICE — DRESSING ABSRBNT ISLAND 3.6X8